# Patient Record
Sex: FEMALE | Race: WHITE | NOT HISPANIC OR LATINO | Employment: OTHER | URBAN - METROPOLITAN AREA
[De-identification: names, ages, dates, MRNs, and addresses within clinical notes are randomized per-mention and may not be internally consistent; named-entity substitution may affect disease eponyms.]

---

## 2019-12-16 ENCOUNTER — OFFICE VISIT (OUTPATIENT)
Dept: TRANSPLANT | Facility: CLINIC | Age: 69
End: 2019-12-16
Attending: INTERNAL MEDICINE
Payer: MEDICARE

## 2019-12-16 ENCOUNTER — MEDICAL CORRESPONDENCE (OUTPATIENT)
Dept: TRANSPLANT | Facility: CLINIC | Age: 69
End: 2019-12-16

## 2019-12-16 VITALS
BODY MASS INDEX: 24.75 KG/M2 | OXYGEN SATURATION: 100 % | HEART RATE: 89 BPM | SYSTOLIC BLOOD PRESSURE: 111 MMHG | DIASTOLIC BLOOD PRESSURE: 72 MMHG | HEIGHT: 64 IN | TEMPERATURE: 98.1 F | WEIGHT: 145 LBS | RESPIRATION RATE: 16 BRPM

## 2019-12-16 DIAGNOSIS — Z71.9 VISIT FOR COUNSELING: Primary | ICD-10-CM

## 2019-12-16 DIAGNOSIS — C90.00 MULTIPLE MYELOMA (H): Primary | ICD-10-CM

## 2019-12-16 DIAGNOSIS — C90.00 MULTIPLE MYELOMA NOT HAVING ACHIEVED REMISSION (H): Primary | ICD-10-CM

## 2019-12-16 PROCEDURE — G0463 HOSPITAL OUTPT CLINIC VISIT: HCPCS

## 2019-12-16 PROCEDURE — 40000268 ZZH STATISTIC NO CHARGES: Mod: ZF

## 2019-12-16 RX ORDER — ATORVASTATIN CALCIUM 40 MG/1
40 TABLET, FILM COATED ORAL DAILY
COMMUNITY
Start: 2019-11-01 | End: 2022-05-30

## 2019-12-16 RX ORDER — SULFAMETHOXAZOLE/TRIMETHOPRIM 800-160 MG
TABLET ORAL
COMMUNITY
Start: 2019-12-06 | End: 2022-01-21

## 2019-12-16 RX ORDER — LORAZEPAM 0.5 MG/1
0.5 TABLET ORAL
COMMUNITY
Start: 2019-07-01 | End: 2022-01-21

## 2019-12-16 RX ORDER — LEVOTHYROXINE SODIUM 112 UG/1
112 TABLET ORAL DAILY
Status: ON HOLD | COMMUNITY
Start: 2018-10-15 | End: 2022-05-30

## 2019-12-16 RX ORDER — LENALIDOMIDE 25 MG/1
CAPSULE ORAL
Status: ON HOLD | COMMUNITY
Start: 2019-12-03 | End: 2022-02-01

## 2019-12-16 RX ORDER — METOPROLOL SUCCINATE 25 MG/1
25 TABLET, EXTENDED RELEASE ORAL DAILY
Status: ON HOLD | COMMUNITY
Start: 2018-11-16 | End: 2022-05-30

## 2019-12-16 RX ORDER — VENLAFAXINE 75 MG/1
75 TABLET ORAL
COMMUNITY
Start: 2019-11-01 | End: 2022-01-21

## 2019-12-16 RX ORDER — CYCLOBENZAPRINE HCL 10 MG
TABLET ORAL
COMMUNITY
Start: 2018-12-04 | End: 2022-01-21

## 2019-12-16 RX ORDER — OXYCODONE HYDROCHLORIDE 10 MG/1
10 TABLET ORAL EVERY 6 HOURS PRN
COMMUNITY
Start: 2019-12-11 | End: 2023-01-01

## 2019-12-16 RX ORDER — ACETAMINOPHEN 500 MG
500-1000 TABLET ORAL EVERY 6 HOURS PRN
Status: ON HOLD | COMMUNITY
Start: 2019-11-21 | End: 2023-01-01

## 2019-12-16 RX ORDER — LIDOCAINE/PRILOCAINE 2.5 %-2.5%
5 CREAM (GRAM) TOPICAL
COMMUNITY
End: 2022-01-21

## 2019-12-16 RX ORDER — ALBUTEROL SULFATE 90 UG/1
2 AEROSOL, METERED RESPIRATORY (INHALATION) EVERY 6 HOURS PRN
COMMUNITY
Start: 2019-04-30

## 2019-12-16 RX ORDER — POLYETHYLENE GLYCOL 3350 17 G/17G
17 POWDER, FOR SOLUTION ORAL DAILY PRN
Status: ON HOLD | COMMUNITY
Start: 2019-08-24 | End: 2022-02-03

## 2019-12-16 RX ORDER — ASPIRIN 81 MG/1
81 TABLET, CHEWABLE ORAL DAILY
COMMUNITY
Start: 2019-08-24 | End: 2022-02-09

## 2019-12-16 RX ORDER — IPRATROPIUM BROMIDE AND ALBUTEROL SULFATE 2.5; .5 MG/3ML; MG/3ML
3 SOLUTION RESPIRATORY (INHALATION)
COMMUNITY
Start: 2019-04-12 | End: 2022-01-21

## 2019-12-16 RX ORDER — BUTALBITAL, ACETAMINOPHEN AND CAFFEINE 300; 40; 50 MG/1; MG/1; MG/1
1 CAPSULE ORAL
COMMUNITY
Start: 2019-11-26 | End: 2022-01-21

## 2019-12-16 RX ORDER — FENTANYL 25 UG/1
1 PATCH TRANSDERMAL
COMMUNITY
End: 2022-01-21

## 2019-12-16 RX ORDER — ONDANSETRON 8 MG/1
TABLET, FILM COATED ORAL
COMMUNITY
Start: 2019-11-18 | End: 2022-01-21

## 2019-12-16 RX ORDER — DEXAMETHASONE 4 MG/1
TABLET ORAL
COMMUNITY
Start: 2019-12-06 | End: 2022-01-21

## 2019-12-16 ASSESSMENT — MIFFLIN-ST. JEOR: SCORE: 1177.72

## 2019-12-16 NOTE — NURSING NOTE
"Oncology Rooming Note    December 16, 2019 2:53 PM   Libby Grimaldo is a 69 year old female who presents for:    Chief Complaint   Patient presents with     RECHECK     Pt is here for a New Eval Consult for      Initial Vitals: Blood Pressure 111/72   Pulse 89   Temperature 98.1  F (36.7  C) (Oral)   Respiration 16   Height 1.626 m (5' 4\")   Weight 65.8 kg (145 lb)   Oxygen Saturation 100%   Body Mass Index 24.89 kg/m   Estimated body mass index is 24.89 kg/m  as calculated from the following:    Height as of this encounter: 1.626 m (5' 4\").    Weight as of this encounter: 65.8 kg (145 lb). Body surface area is 1.72 meters squared.  Data Unavailable Comment: Data Unavailable   No LMP recorded.  Allergies reviewed: Yes  Medications reviewed: Yes    Medications: Medication refills not needed today.  Pharmacy name entered into EPIC: Data Unavailable    Clinical concerns: PT is here for a Consult        Jeanine Weaver MA            "

## 2019-12-16 NOTE — PROGRESS NOTES
"Met with Libby,  and sister following new transplant visit with Dr. So. Reviewed plan of care per NT conversation for Stem Cell Transplant. Explained role of the Nurse Coordinator throughout the BMT process as well as general time line and expectations for transplant. Discussed necessity of caregiver and program's proximity requirements. All questions were answered. Plan:  AutologousTransplant following clearance by dermatology for lesion on shoulder w/ abnormal borders and neurology consult for h/o seizures.    Of note pt seemed to have mild speech ataxia and a poor historian, incorrect when asked her age; pt stated \"66\",  and sister corrected \"no you are 69\".     Contact information provided for Jamaal Guerrero: Yes    HLA typing drawn: No    PRA typing drawn: No    Contact information provided for : No    Financial Release for [facility] search obtained: No      "

## 2019-12-16 NOTE — PROGRESS NOTES
BMT Consult Note   12/16/2019    Patient ID:  Libby Grimaldo is a 69 year old female with high risk, ISS stage III, IgG lambda MM.  She is currently in a *VGRP s/p 7 cycles of KRD, with the most recent ELP showing a detectable M-protein of 0.09 (5/16/2019); down from 4.51 g/dL on 11/26/2018.  IgG was 516, with immunofixation on serum showing the previously identified IgG Lambda specificity monoclonal protein with no free light chains detected.  A bone marrow biopsy from 11/4/2019 shows a cellularity of 20% and no evidence of myeloma by morphology or flow cytometry.  No circulating plasma cells were observed on the peripheral smear.  Recent myeloma labs from 11/8/2019 show a detectable M-protein of 0.2 g/dL.  Thus, curret response is consistent with a VGPR.    A bone marrow biopsy at time of diagnosis in 11/2018 showed 75% clonal plasma cells and was positive for gain of CKS1B (1q21.3), loss of both X66D297 (13q14.3) and 13q34 suggesting monosomy 13, and additional copies of D5S23/F7P110 (5p15.2), CEP9 (centromere 9), and CEP15 (centromere 15) suggesting polysomy.  The gain of 1q21 is associate with higher risk disease (An G. Haematologica. 2014).  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  IgG was 6370 mg/dL, with complete IgG lambda specificity.      Bone pain is controlled with Fentanyl and oxycodone, and is managed locally.    KRD was complicated by mild neuropathy, hypogammaglobulinemia with recurrent infections, and cholecystitis with post-op incisional cellulitis.    SHx:  Former smoker.      FMHx:   Sister breast ca    PMHx:    1.  High risk MM  2.  HTN  3.  Neuropathy  4.  Hypogammaglobulinemia with recurrent infections  5.  Cholecystitis with post-op incisional cellulitis  6.  History of unexplained seizure disorder  7.  Grave's disease  8.  Seizures (last while in undergrad, and occurred after a fever; currently off prophylaxis).  9.  Syncope    Review of Systems:  "10 point ROS negative except as noted above.  # Pain Assessment:   Managed locally.    Scheduled Medications  Current Outpatient Medications   Medication     acetaminophen (TYLENOL) 500 MG tablet     albuterol (PROAIR HFA/PROVENTIL HFA/VENTOLIN HFA) 108 (90 Base) MCG/ACT inhaler     aspirin (ASA) 81 MG chewable tablet     atorvastatin (LIPITOR) 40 MG tablet     Butalbital-APAP-Caffeine -40 MG CAPS     cholecalciferol (VITAMIN D-1000 MAX ST) 25 MCG (1000 UT) TABS     cyclobenzaprine (FLEXERIL) 10 MG tablet     ipratropium - albuterol 0.5 mg/2.5 mg/3 mL (DUONEB) 0.5-2.5 (3) MG/3ML neb solution     levothyroxine (SYNTHROID/LEVOTHROID) 112 MCG tablet     LORazepam (ATIVAN) 0.5 MG tablet     metoprolol succinate ER (TOPROL-XL) 25 MG 24 hr tablet     polyethylene glycol (MIRALAX/GLYCOLAX) powder     venlafaxine (EFFEXOR) 75 MG tablet     dexamethasone (DECADRON) 4 MG tablet     fentaNYL (DURAGESIC) 25 mcg/hr 72 hr patch     Fluticasone-Umeclidin-Vilanterol (TRELEGY ELLIPTA) 100-62.5-25 MCG/INH oral inhaler     lidocaine-prilocaine (EMLA) 2.5-2.5 % external cream     ondansetron (ZOFRAN) 8 MG tablet     oxyCODONE IR (ROXICODONE) 10 MG tablet     REVLIMID 25 MG CAPS capsule     sulfamethoxazole-trimethoprim (BACTRIM DS/SEPTRA DS) 800-160 MG tablet     No current facility-administered medications for this visit.      PHYSICAL EXAM     Weight In/Out     Wt Readings from Last 3 Encounters:   No data found for Wt      [unfilled]       KPS:  80    /72   Pulse 89   Temp 98.1  F (36.7  C) (Oral)   Resp 16   Ht 1.626 m (5' 4\")   Wt 65.8 kg (145 lb)   SpO2 100%   BMI 24.89 kg/m      General: NAD   Eyes: : GREGG, sclera anicteric   Nose/Mouth/Throat: OP clear, buccal mucosa moist, no ulcerations   Lungs: CTA bilaterally  Cardiovascular: RRR, no M/R/G   Abdominal/Rectal: +BS, soft, NT, ND, No HSM   Lymphatics: no edema  Skin: Macula with uneven borders of the right outer shoulder.  Neuro: A&O     LABS AND " IMAGING - PAST 24 HOURS   Reviewed in full.    ASSESSMENT AND PLAN BY SYSTEMS   Libby Grimaldo is a 69 year old female with high risk, ISS stage III, IgG lambda MM.  She was diagnosed over a year ago and has received 7 cycles of KRD, with a VGPR.  Plan on mobilization and collection with Cytoxan, given the detectable M-protein in 11/2019 (0.2 g/dL).  This can be arranged now, given the current response and prior chemotherapy.  ASCT conditioning will consist of melphalan.  The risks and benefits or ASCT were discussed and all questions were answered in full.  We anticipate that day +100 maintenance therapy will consist of Velcade BIW x 2 years.    1) During work up we request a consult from neurology given the unexplained history of seizures.  2) Additionally, we will need a Derm consult to work up the right shoulder nevus.    Rashad So

## 2019-12-17 NOTE — PROGRESS NOTES
"BMT Clinical Social Work New Transplant Evaluation    Information for this evaluation on 19 was collected via Libby (pt) and her family's report, consultation with medical team, and medical chart review.     Present:  Patient: Libby Grimaldo  Spouse: Tres \"Jerrica\" Acoba   Pt's sister: Leisa  Clinical  (CSW):Christine Morgan MSW, [facility]    Medical Team:   Nurse Coordinator: Yesenia New RN  BMT Physician: Rashad So MD  Referring Physician: Kennedi Nichols MD    With whom does pt live: Pt lives with her .     Relocation Requirement: Pt lives in [place], thus will not be required to relocate.     Diagnosis: Multiple Myeloma (MM)    Transplant Type: Autologous    Special Needs:  No needs identified at this time.     Family Constellation:   Spouse: Tres \"Jerrica\" Acoba  Children: 2 adult sons: Luis Eduardo (lives in [facility]) and Pravin (lives in [place]).   Parents:   Siblings: Sister (Leisa) lives in [place] and was present for today's appt.     Employment  Currently employed: No  Previous Occupation: Pt worked as a RN at [employer]. She went on disability in .     Source of Income: Social Security [facility] and a small RN's pension     Spouse/Partner Employed: Retired  Previous Occupation: Worked as an  for 38 years    Finances/Insurance:  Pt shared that her biggest concern is finances. CSW provided pt with [employer] application and S grants. Discussed grants available to transplant patients and let Libby know that I will help her apply for those when she returns for transplant.     Pt stated that she has Medicare but no supplemental insurance. She said that she believes she signed up for [employer] but has not yet received a confirmation. Pt said that she cannot undergo transplant until her supplemental [employer] coverage starts in 2020. CSW provided information regarding the insurance authorization process and the role of the BMT financial case-mangers. CSW provided contact info for " the BMT financial case-managers and pt agreed to have CSW reach out to [facility] ([place]) to see if she has any info on pt's [employer] coverage.     Caregiver:  CSW discussed the caregiver role and expectation at length. Libby is agreeable to having a full time caregiver for a minimum of 30 days until cleared by the BMT physician. Libby's identified caregivers are her  and sister. Caregiver education and information provided. No caregiver concerns identified.     Healthcare Directive:  No. CSW provided education and forms. CSW encouraged Libby to have discussions with their family regarding their health care wishes.     Resources Provided:  BMT Information Book   BMT Resources Packet:   Healthcare Directive:   Tour of Unit NO   Transplant unit description and information provided.     Identified Concerns:   Pt does not have supplemental insurance with her Medicare at this.     Summary:   Libby presents to [facility] for consultation regarding an autologous stem cell transplant. Libby, her  and her sister. She asked good questions regarding psychosocial factors related to BMT; all of which were answered. She lives locally and there are no caregiver concerns. She will need supplemental insurance prior to coming for transplant. CSW emailed [facility] ([place]) to follow up on this. Libby presented as overwhelmed and pleasant. Her affect was anxious. She family presented as very supportive and engaged.      Plan:   CSW provided contact information and encouraged Libby to contact CSW with questions, concerns, resources and for support. CSW will continue to follow Pt to provide supportive counseling and assistance with resources as needed.      ANDREW Leger, [facility]  [phone]

## 2021-02-17 ENCOUNTER — MEDICAL CORRESPONDENCE (OUTPATIENT)
Dept: TRANSPLANT | Facility: CLINIC | Age: 71
End: 2021-02-17
Payer: MEDICARE

## 2021-02-25 ENCOUNTER — MEDICAL CORRESPONDENCE (OUTPATIENT)
Dept: TRANSPLANT | Facility: CLINIC | Age: 71
End: 2021-02-25
Payer: MEDICARE

## 2021-03-03 ENCOUNTER — TELEPHONE (OUTPATIENT)
Dept: TRANSPLANT | Facility: CLINIC | Age: 71
End: 2021-03-03

## 2021-03-03 DIAGNOSIS — C90.00 MULTIPLE MYELOMA (H): Primary | ICD-10-CM

## 2021-04-06 PROBLEM — C90.01 MULTIPLE MYELOMA IN REMISSION (H): Status: ACTIVE | Noted: 2021-04-06

## 2021-04-06 NOTE — PROGRESS NOTES
BMT Consultation    Oncology History   Multiple myeloma not having achieved remission (H)   11/7/2018 -  Cancer Staged    Staging form: Plasma Cell Myeloma and Disorders, AJCC 8th Edition  - Clinical stage from 11/7/2018: Albumin (g/dL): 3.4, ISS: Stage II, High-risk cytogenetics: Absent, LDH: Unknown     11/7/2018 Initial Diagnosis    Multiple myeloma not having achieved remission (H)     1/23/2019 - 11/14/2019 Chemotherapy    KRD x 6 cycles     11/4/2019 - 2/14/2021 Chemotherapy    Rev/Dex(20)      4/3/2020 - 7/17/2020 Chemotherapy    Velcade maintenance - dose reductions for orthostatic hypotension     7/17/2020 - 12/15/2020 Chemotherapy    Revlimid maintenance     11/23/2020 Progression    Bone marrow 35% plasma cells, PET CT demonstrating new lytic lesions     12/15/2020 -  Chemotherapy    [place]         Libby Grimaldo is a 70 year old female referred by Dr. Nichols for relapsed myeloma.    HPI:  Please see my entry above for hematologic history.  Libby comes to clinic today to discuss options for the treatment of her relapsed multiple myeloma diagnosed originally in 2018.  She has had difficulty with the therapy with recurrent infections, difficulty tolerating various medications in particular dexamethasone, Velcade, and possibly carfilzomib.  She had reasonable quality of life while receiving Revlimid maintenance which continued until approximately December 2020.  Unfortunately she developed back pain and was found to have a new T10 in December 2020 and then further evaluation revealed overt relapse.  With the new development, her treatment has changed to daratumumab, carfilzomib, dexamethasone.  From her perspective she feels she is tolerating this well and she is aware of the fact that based on serum markers, she has responded well.  She has received 3 cycles of therapy although it is unclear how well she is tolerating the therapy.  Today in clinic, she has difficulty remembering many aspects of her  treatment.  She is unsure of the date of original diagnosis, unsure of how long she has received treatment, and unsure when her treatment was changed to [place].  She states that her primary symptom is extreme fatigue and malaise.  In having her describe her days, she leaves her house 3 to 4 days/week and is otherwise remaining inside largely watching TV or doing minimal activity at home.  She does not drive, has some unsteadiness with walking and so avoids any long walk while being unaccompanied, and she has little interest in activities.  Her most notable symptoms are fatigue, back pain, and unsteadiness on her feet.  She does require regular assistance from her  and does not cook, shop, or leave the house independently.  She feels her pain is under reasonable control and she is currently taking only nonnarcotic pain medications.  She did recently have an admission to the hospital which she feels is precipitated by the ingestion of too much edible marijuana.  She was offered this as a way to help manage her back pain and she was unsure of the particular dosing.  Her sensorium cleared and she was discharged without sequelae.  Her entire course has been difficult due to recurrent bacterial infections, toxicity from treatment, and ongoing pain.      ASSESSMENT AND PLAN:  Multiple myeloma in relapse-currently, Ms. Santos is a relatively impaired performance status and appears frail.  In addition I have concerns about the neuropsychiatric conditions due to her difficulty remembering various events in the past and communicating her experiences.  She also is limited in her activity and does require help.  Fortunately, she appears to be responding well to therapy and she does feel that her back pain is currently under good control.  Her sister came with her today to help discuss treatment options, which I think is helpful given her apparent memory deficits.  I discussed with her 3 basic options.  The first option  would include moving ahead to collect peripheral stem cells and proceed to high-dose melphalan with stem cell reinfusion.  I explained that the purpose of this therapy is to achieve deeper remissions with longer time to progression that is typically associated with improved quality of life.  I reviewed with her the toxicities of high-dose melphalan which would include significant GI toxicity, pancytopenia, and in her situation the likelihood of a 2 to 3-week hospitalization.  Given her apparent frailty, at this point if we were to proceed along this route, she is likely to be hospitalized during the course.  I discussed the potential need for prolonged convalescence.  The second option we discussed was a clinical trial of which we have potentially 3 different cell therapy trial she may be eligible to join.  She is most likely eligible for the IPS derived NK trial (), which has generally been well-tolerated and with what appears to be low risk of CRS and other infusional reactions.  I spent some time reviewing the risk and benefits of this protocol of which she expressed interest.  She is potentially eligible for other clinical clinical trials as well.  A third potential option would be treatment with Ebonie-li, which will soon become available.  This particular product appears to have significant activity with high rates of complete remission however carries significant risk for cytokine release syndrome and associated complications in particular neurotoxicity.  Of course she is responding well to her current therapy and a final option would be to continue her current path for another 6 months followed by some type of ongoing maintenance without further cell based therapy.    Given her frailty, I recommended that she reach out for physical therapy assessment and guidance to develop strengthening program.  Regardless of the options open to her, she would benefit from improved strength and stability on her feet.   Both her and her sister agree that this should be an extent.    At the end of her visit, I explained that we will review her clinical scenario in our multidisciplinary meeting and I will follow up with her regarding specific recommendations for next steps in therapy.  In the meantime she will continue treatment as planned with Dr. Nichols.          ROS:    10 point ROS neg other than the symptoms noted above in the HPI.        Past Medical History:   Diagnosis Date     Graves disease      Multiple myeloma in relapse (H)      Vitamin B12 deficiency (non anemic)        Past Surgical History:   Procedure Laterality Date     CHOLECYSTECTOMY         Family History   Problem Relation Age of Onset     Heart Disease Mother      Diabetes Father        Social History     Tobacco Use     Smoking status: Current Every Day Smoker     Packs/day: 1.00     Smokeless tobacco: Former User     Quit date: 2010   Substance Use Topics     Alcohol use: None     Drug use: None         Allergies   Allergen Reactions     Bupropion      Other reaction(s): Seizures        Current Outpatient Medications   Medication Sig Dispense Refill     acetaminophen (TYLENOL) 500 MG tablet Take 1,000 mg by mouth       albuterol (PROAIR HFA/PROVENTIL HFA/VENTOLIN HFA) 108 (90 Base) MCG/ACT inhaler Inhale 2 puffs into the lungs       aspirin (ASA) 81 MG chewable tablet Take 81 mg by mouth       atorvastatin (LIPITOR) 40 MG tablet Take 40 mg by mouth       Butalbital-APAP-Caffeine -40 MG CAPS Take 1 capsule by mouth       cholecalciferol (VITAMIN D-1000 MAX ST) 25 MCG (1000 UT) TABS Take 1,000 Units by mouth       cyclobenzaprine (FLEXERIL) 10 MG tablet        dexamethasone (DECADRON) 4 MG tablet        fentaNYL (DURAGESIC) 25 mcg/hr 72 hr patch Place 1 patch onto the skin       Fluticasone-Umeclidin-Vilanterol (TRELEGY ELLIPTA) 100-62.5-25 MCG/INH oral inhaler Inhale 1 puff into the lungs       ipratropium - albuterol 0.5 mg/2.5 mg/3 mL (DUONEB) 0.5-2.5  "(3) MG/3ML neb solution Inhale 3 mLs into the lungs       levothyroxine (SYNTHROID/LEVOTHROID) 112 MCG tablet Take 112 mcg by mouth       lidocaine-prilocaine (EMLA) 2.5-2.5 % external cream Apply 5 g topically       LORazepam (ATIVAN) 0.5 MG tablet Take 0.5 mg by mouth       metoprolol succinate ER (TOPROL-XL) 25 MG 24 hr tablet Take 25 mg by mouth       ondansetron (ZOFRAN) 8 MG tablet        oxyCODONE IR (ROXICODONE) 10 MG tablet        polyethylene glycol (MIRALAX/GLYCOLAX) powder 17 g by Nasogastric route       REVLIMID 25 MG CAPS capsule        sulfamethoxazole-trimethoprim (BACTRIM DS/SEPTRA DS) 800-160 MG tablet        venlafaxine (EFFEXOR) 75 MG tablet Take 75 mg by mouth           Physical Exam:     Vital Signs: BP (!) 157/89   Pulse 94   Temp 99.2  F (37.3  C) (Oral)   Resp 20   Ht 1.62 m (5' 3.78\")   Wt 67.5 kg (148 lb 12.8 oz)   SpO2 100%   BMI 25.72 kg/m          KPS:  70    General Appearance: alert, no distress and fatigued  Eyes: PERRL, conjunctiva and lids normal, sclera nonicteric  Ears/Nose/M/Throat: Oral mucosa and posterior oropharynx normal, moist mucous membranes, edentulous  with denture plate in place above  Neck supple, non-tender, free range of motion, no adenopathy  Cardio/Vascular:regular rate and rhythm, normal S1 and S2, no murmur  Resp Effort And Auscultation: Normal - Clear to auscultation without rales, rhonchi, or wheezing.  GI: soft, nontender, bowel sounds present in all four quadrants, no hepatosplenomegaly  Lymphatics:no significant enlargement of lymph nodes globally   Musculoskeletal: Musculoskeletal normal although with slow movement with standing  Edema: none  Skin: Skin color, texture, turgor normal. No rashes or lesions.  Neurologic: negative although she has difficulty reviewing her history and requires assistance to remember past events.  She is oriented x 3 and has no specific weakness.her speech is slow and deliberate  Psych/Affect: Mood and affect are " appropriate although with mildly depressed appearance  Vascular Access:  Port in place          TODAY'S ASSESSMENT BY SYSTEMS     HEMATOLOGY, COAGULATION    She appears to be responding well to her current therapy but will need to be restaged prior to next steps.    IMMUNOCOMPROMISED HOST  She has history of multiple bacterial infections    PULMONARY  She is a former smoker but does not show evidence of pulmonary insufficiency    LIVER  Adequate    GASTROINTESTINAL  Adequate    RENAL  Adequate    CARDIOVASCULAR  No evidence of cardiovascular disease    ENDOCRINE  She history of graves disease and repeat thyroid studies would be important prior to cell therapy    MUSCULOSKELETAL  She is weak and standing from a chair and has some unsteadiness when walking.    NEUROLOGIC  She shows signs of possible neuropsychiatric disorder and further evaluation may be helpful    PSYCHIATRIC  She has a depressed appearance although states that this is not a problem for her    SOCIAL  She has good support from her  and sister both of whom are healthy well and willing to provide a necessary supportive environment    Libby understood the above assessment and recommendations.  Multiple questions answered.  No barriers to learning identified although her ability to retain information may be compromised.      Known issues that I take into account for medical decisions, with salient changes to the plan considering these complexities noted above.    Patient Active Problem List   Diagnosis     Multiple myeloma not having achieved remission (H)       Today's summary: As mentioned above, we will review her clinical situation and conference and then discuss recommendations for next steps in therapy    I spent 60 minutes in the care of this patient today, which included time necessary for preparation for the visit, obtaining history, ordering medications/tests/procedures as medically indicated, review of pertinent medical literature,  counseling of the patient, communication of recommendations to the care team, and documentation time.    SOTO BOWSER      ------------------------------------------------------------------------------------------------------------------------------------------------    Patient Care Team       Relationship Specialty Notifications Start End    Leah Gustafson PCP - General Family Practice  11/19/19     Phone: [phone] Fax: [phone]         [facility] [address] [facility] [address]    Kennedi Nichols DO Referring Physician Hematology & Oncology  11/19/19     Phone: [phone] Fax: [phone]         [facility] [address] [facility] [address] [facility] [address]    Rashad So MD Assigned Cancer Care Provider   10/23/20     Phone: [phone] Fax: [phone]         [address] [place] [address] [address] [facility] [address]          MDM  Number of Diagnoses or Management Options     Amount and/or Complexity of Data Reviewed  Clinical lab tests: reviewed  Tests in the radiology section of CPT : reviewed  Tests in the medicine section of CPT : reviewed  Discussion of test results with the performing providers: no  Decide to obtain previous medical records or to obtain history from someone other than the patient: yes  Obtain history from someone other than the patient: yes  Review and summarize past medical records: yes  Discuss the patient with other providers: yes  Independent visualization of images, tracings, or specimens: yes    Risk of Complications, Morbidity, and/or Mortality  Presenting problems: high  Diagnostic procedures: high  Management options: high    Critical Care  Total time providing critical care: 30-74 minutes    Patient Progress  Patient progress: stable

## 2021-04-07 ENCOUNTER — OFFICE VISIT (OUTPATIENT)
Dept: TRANSPLANT | Facility: CLINIC | Age: 71
End: 2021-04-07
Attending: INTERNAL MEDICINE
Payer: MEDICARE

## 2021-04-07 ENCOUNTER — MEDICAL CORRESPONDENCE (OUTPATIENT)
Dept: TRANSPLANT | Facility: CLINIC | Age: 71
End: 2021-04-07

## 2021-04-07 VITALS
TEMPERATURE: 99.2 F | DIASTOLIC BLOOD PRESSURE: 89 MMHG | HEART RATE: 94 BPM | BODY MASS INDEX: 25.4 KG/M2 | OXYGEN SATURATION: 100 % | SYSTOLIC BLOOD PRESSURE: 157 MMHG | RESPIRATION RATE: 20 BRPM | WEIGHT: 148.8 LBS | HEIGHT: 64 IN

## 2021-04-07 DIAGNOSIS — C90.00 MULTIPLE MYELOMA NOT HAVING ACHIEVED REMISSION (H): ICD-10-CM

## 2021-04-07 DIAGNOSIS — C90.00 MULTIPLE MYELOMA NOT HAVING ACHIEVED REMISSION (H): Primary | ICD-10-CM

## 2021-04-07 PROCEDURE — G0463 HOSPITAL OUTPT CLINIC VISIT: HCPCS

## 2021-04-07 PROCEDURE — 99215 OFFICE O/P EST HI 40 MIN: CPT

## 2021-04-07 ASSESSMENT — MIFFLIN-ST. JEOR: SCORE: 1186.46

## 2021-04-07 ASSESSMENT — PAIN SCALES - GENERAL: PAINLEVEL: MODERATE PAIN (5)

## 2021-04-07 NOTE — LETTER
4/7/2021         RE: Libby Grimaldo  [address] [facility] [address]             BMT Consultation    Oncology History   Multiple myeloma not having achieved remission (H)   11/7/2018 -  Cancer Staged    Staging form: Plasma Cell Myeloma and Disorders, AJCC 8th Edition  - Clinical stage from 11/7/2018: Albumin (g/dL): 3.4, ISS: Stage II, High-risk cytogenetics: Absent, LDH: Unknown     11/7/2018 Initial Diagnosis    Multiple myeloma not having achieved remission (H)     1/23/2019 - 11/14/2019 Chemotherapy    KRD x 6 cycles     11/4/2019 - 2/14/2021 Chemotherapy    Rev/Dex(20)      4/3/2020 - 7/17/2020 Chemotherapy    Velcade maintenance - dose reductions for orthostatic hypotension     7/17/2020 - 12/15/2020 Chemotherapy    Revlimid maintenance     11/23/2020 Progression    Bone marrow 35% plasma cells, PET CT demonstrating new lytic lesions     12/15/2020 -  Chemotherapy    Chino Chin         Libby Grimaldo is a 70 year old female referred by Dr. Nichols for relapsed myeloma.    HPI:  Please see my entry above for hematologic history.  Libby comes to clinic today to discuss options for the treatment of her relapsed multiple myeloma diagnosed originally in 2018.  She has had difficulty with the therapy with recurrent infections, difficulty tolerating various medications in particular dexamethasone, Velcade, and possibly carfilzomib.  She had reasonable quality of life while receiving Revlimid maintenance which continued until approximately December 2020.  Unfortunately she developed back pain and was found to have a new T10 in December 2020 and then further evaluation revealed overt relapse.  With the new development, her treatment has changed to daratumumab, carfilzomib, dexamethasone.  From her perspective she feels she is tolerating this well and she is aware of the fact that based on serum markers, she has responded well.  She has received 3 cycles of therapy although it is unclear how well she is tolerating  the therapy.  Today in clinic, she has difficulty remembering many aspects of her treatment.  She is unsure of the date of original diagnosis, unsure of how long she has received treatment, and unsure when her treatment was changed to Chino Chin.  She states that her primary symptom is extreme fatigue and malaise.  In having her describe her days, she leaves her house 3 to 4 days/week and is otherwise remaining inside largely watching TV or doing minimal activity at home.  She does not drive, has some unsteadiness with walking and so avoids any long walk while being unaccompanied, and she has little interest in activities.  Her most notable symptoms are fatigue, back pain, and unsteadiness on her feet.  She does require regular assistance from her  and does not cook, shop, or leave the house independently.  She feels her pain is under reasonable control and she is currently taking only nonnarcotic pain medications.  She did recently have an admission to the hospital which she feels is precipitated by the ingestion of too much edible marijuana.  She was offered this as a way to help manage her back pain and she was unsure of the particular dosing.  Her sensorium cleared and she was discharged without sequelae.  Her entire course has been difficult due to recurrent bacterial infections, toxicity from treatment, and ongoing pain.      ASSESSMENT AND PLAN:  Multiple myeloma in relapse-currently, Ms. Santos is a relatively impaired performance status and appears frail.  In addition I have concerns about the neuropsychiatric conditions due to her difficulty remembering various events in the past and communicating her experiences.  She also is limited in her activity and does require help.  Fortunately, she appears to be responding well to therapy and she does feel that her back pain is currently under good control.  Her sister came with her today to help discuss treatment options, which I think is helpful given her  apparent memory deficits.  I discussed with her 3 basic options.  The first option would include moving ahead to collect peripheral stem cells and proceed to high-dose melphalan with stem cell reinfusion.  I explained that the purpose of this therapy is to achieve deeper remissions with longer time to progression that is typically associated with improved quality of life.  I reviewed with her the toxicities of high-dose melphalan which would include significant GI toxicity, pancytopenia, and in her situation the likelihood of a 2 to 3-week hospitalization.  Given her apparent frailty, at this point if we were to proceed along this route, she is likely to be hospitalized during the course.  I discussed the potential need for prolonged convalescence.  The second option we discussed was a clinical trial of which we have potentially 3 different cell therapy trial she may be eligible to join.  She is most likely eligible for the IPS derived NK trial (), which has generally been well-tolerated and with what appears to be low risk of CRS and other infusional reactions.  I spent some time reviewing the risk and benefits of this protocol of which she expressed interest.  She is potentially eligible for other clinical clinical trials as well.  A third potential option would be treatment with Ebonie-il, which will soon become available.  This particular product appears to have significant activity with high rates of complete remission however carries significant risk for cytokine release syndrome and associated complications in particular neurotoxicity.  Of course she is responding well to her current therapy and a final option would be to continue her current path for another 6 months followed by some type of ongoing maintenance without further cell based therapy.    Given her frailty, I recommended that she reach out for physical therapy assessment and guidance to develop strengthening program.  Regardless of the options  open to her, she would benefit from improved strength and stability on her feet.  Both her and her sister agree that this should be an extent.    At the end of her visit, I explained that we will review her clinical scenario in our multidisciplinary meeting and I will follow up with her regarding specific recommendations for next steps in therapy.  In the meantime she will continue treatment as planned with Dr. Nichols.          ROS:    10 point ROS neg other than the symptoms noted above in the HPI.        Past Medical History:   Diagnosis Date     Graves disease      Multiple myeloma in relapse (H)      Vitamin B12 deficiency (non anemic)        Past Surgical History:   Procedure Laterality Date     CHOLECYSTECTOMY         Family History   Problem Relation Age of Onset     Heart Disease Mother      Diabetes Father        Social History     Tobacco Use     Smoking status: Current Every Day Smoker     Packs/day: 1.00     Smokeless tobacco: Former User     Quit date: 2010   Substance Use Topics     Alcohol use: None     Drug use: None         Allergies   Allergen Reactions     Bupropion      Other reaction(s): Seizures        Current Outpatient Medications   Medication Sig Dispense Refill     acetaminophen (TYLENOL) 500 MG tablet Take 1,000 mg by mouth       albuterol (PROAIR HFA/PROVENTIL HFA/VENTOLIN HFA) 108 (90 Base) MCG/ACT inhaler Inhale 2 puffs into the lungs       aspirin (ASA) 81 MG chewable tablet Take 81 mg by mouth       atorvastatin (LIPITOR) 40 MG tablet Take 40 mg by mouth       Butalbital-APAP-Caffeine -40 MG CAPS Take 1 capsule by mouth       cholecalciferol (VITAMIN D-1000 MAX ST) 25 MCG (1000 UT) TABS Take 1,000 Units by mouth       cyclobenzaprine (FLEXERIL) 10 MG tablet        dexamethasone (DECADRON) 4 MG tablet        fentaNYL (DURAGESIC) 25 mcg/hr 72 hr patch Place 1 patch onto the skin       Fluticasone-Umeclidin-Vilanterol (TRELEGY ELLIPTA) 100-62.5-25 MCG/INH oral inhaler Inhale 1 puff  "into the lungs       ipratropium - albuterol 0.5 mg/2.5 mg/3 mL (DUONEB) 0.5-2.5 (3) MG/3ML neb solution Inhale 3 mLs into the lungs       levothyroxine (SYNTHROID/LEVOTHROID) 112 MCG tablet Take 112 mcg by mouth       lidocaine-prilocaine (EMLA) 2.5-2.5 % external cream Apply 5 g topically       LORazepam (ATIVAN) 0.5 MG tablet Take 0.5 mg by mouth       metoprolol succinate ER (TOPROL-XL) 25 MG 24 hr tablet Take 25 mg by mouth       ondansetron (ZOFRAN) 8 MG tablet        oxyCODONE IR (ROXICODONE) 10 MG tablet        polyethylene glycol (MIRALAX/GLYCOLAX) powder 17 g by Nasogastric route       REVLIMID 25 MG CAPS capsule        sulfamethoxazole-trimethoprim (BACTRIM DS/SEPTRA DS) 800-160 MG tablet        venlafaxine (EFFEXOR) 75 MG tablet Take 75 mg by mouth           Physical Exam:     Vital Signs: BP (!) 157/89   Pulse 94   Temp 99.2  F (37.3  C) (Oral)   Resp 20   Ht 1.62 m (5' 3.78\")   Wt 67.5 kg (148 lb 12.8 oz)   SpO2 100%   BMI 25.72 kg/m          KPS:  70    General Appearance: alert, no distress and fatigued  Eyes: PERRL, conjunctiva and lids normal, sclera nonicteric  Ears/Nose/M/Throat: Oral mucosa and posterior oropharynx normal, moist mucous membranes, edentulous  with denture plate in place above  Neck supple, non-tender, free range of motion, no adenopathy  Cardio/Vascular:regular rate and rhythm, normal S1 and S2, no murmur  Resp Effort And Auscultation: Normal - Clear to auscultation without rales, rhonchi, or wheezing.  GI: soft, nontender, bowel sounds present in all four quadrants, no hepatosplenomegaly  Lymphatics:no significant enlargement of lymph nodes globally   Musculoskeletal: Musculoskeletal normal although with slow movement with standing  Edema: none  Skin: Skin color, texture, turgor normal. No rashes or lesions.  Neurologic: negative although she has difficulty reviewing her history and requires assistance to remember past events.  She is oriented x 3 and has no specific " weakness.her speech is slow and deliberate  Psych/Affect: Mood and affect are appropriate although with mildly depressed appearance  Vascular Access:  Port in place          TODAY'S ASSESSMENT BY SYSTEMS     HEMATOLOGY, COAGULATION    She appears to be responding well to her current therapy but will need to be restaged prior to next steps.    IMMUNOCOMPROMISED HOST  She has history of multiple bacterial infections    PULMONARY  She is a former smoker but does not show evidence of pulmonary insufficiency    LIVER  Adequate    GASTROINTESTINAL  Adequate    RENAL  Adequate    CARDIOVASCULAR  No evidence of cardiovascular disease    ENDOCRINE  She history of graves disease and repeat thyroid studies would be important prior to cell therapy    MUSCULOSKELETAL  She is weak and standing from a chair and has some unsteadiness when walking.    NEUROLOGIC  She shows signs of possible neuropsychiatric disorder and further evaluation may be helpful    PSYCHIATRIC  She has a depressed appearance although states that this is not a problem for her    SOCIAL  She has good support from her  and sister both of whom are healthy well and willing to provide a necessary supportive environment    Libby understood the above assessment and recommendations.  Multiple questions answered.  No barriers to learning identified although her ability to retain information may be compromised.      Known issues that I take into account for medical decisions, with salient changes to the plan considering these complexities noted above.    Patient Active Problem List   Diagnosis     Multiple myeloma not having achieved remission (H)       Today's summary: As mentioned above, we will review her clinical situation and conference and then discuss recommendations for next steps in therapy    I spent 60 minutes in the care of this patient today, which included time necessary for preparation for the visit, obtaining history, ordering  medications/tests/procedures as medically indicated, review of pertinent medical literature, counseling of the patient, communication of recommendations to the care team, and documentation time.    SOTO BOWSER      ------------------------------------------------------------------------------------------------------------------------------------------------    Patient Care Team       Relationship Specialty Notifications Start End    Sj Leah Corbin PCP - General Family Practice  11/19/19     Phone: [phone] Fax: [phone]         [facility] [address] [facility] [address]    Kennedi Nichols DO Referring Physician Hematology & Oncology  11/19/19     Phone: [phone] Fax: [phone]         [facility] [address] [facility] [address] [facility] [address]    Rashad So MD Assigned Cancer Care Provider   10/23/20     Phone: [phone] Fax: [phone]         [address] [facility] [address] [facility] [address]          MDM  Number of Diagnoses or Management Options     Amount and/or Complexity of Data Reviewed  Clinical lab tests: reviewed  Tests in the radiology section of CPT : reviewed  Tests in the medicine section of CPT : reviewed  Discussion of test results with the performing providers: no  Decide to obtain previous medical records or to obtain history from someone other than the patient: yes  Obtain history from someone other than the patient: yes  Review and summarize past medical records: yes  Discuss the patient with other providers: yes  Independent visualization of images, tracings, or specimens: yes    Risk of Complications, Morbidity, and/or Mortality  Presenting problems: high  Diagnostic procedures: high  Management options: high    Critical Care  Total time providing critical care: 30-74 minutes    Patient Progress  Patient progress: stable

## 2021-04-07 NOTE — LETTER
4/7/2021         RE: Libby Grimaldo  [address] [address]        Dear Colleague,    Thank you for referring your patient, Libby Grimaldo, to the [facility] BLOOD AND MARROW TRANSPLANT PROGRAM [place]. Please see a copy of my visit note below.           BMT Consultation    Oncology History   Multiple myeloma not having achieved remission (H)   11/7/2018 -  Cancer Staged    Staging form: Plasma Cell Myeloma and Disorders, AJCC 8th Edition  - Clinical stage from 11/7/2018: Albumin (g/dL): 3.4, ISS: Stage II, High-risk cytogenetics: Absent, LDH: Unknown     11/7/2018 Initial Diagnosis    Multiple myeloma not having achieved remission (H)     1/23/2019 - 11/14/2019 Chemotherapy    KRD x 6 cycles     11/4/2019 - 2/14/2021 Chemotherapy    Rev/Dex(20)      4/3/2020 - 7/17/2020 Chemotherapy    Velcade maintenance - dose reductions for orthostatic hypotension     7/17/2020 - 12/15/2020 Chemotherapy    Revlimid maintenance     11/23/2020 Progression    Bone marrow 35% plasma cells, PET CT demonstrating new lytic lesions     12/15/2020 -  Chemotherapy    Chino Chin         Libby Grimaldo is a 70 year old female referred by Dr. Nichols for relapsed myeloma.    HPI:  Please see my entry above for hematologic history.  Libby comes to clinic today to discuss options for the treatment of her relapsed multiple myeloma diagnosed originally in 2018.  She has had difficulty with the therapy with recurrent infections, difficulty tolerating various medications in particular dexamethasone, Velcade, and possibly carfilzomib.  She had reasonable quality of life while receiving Revlimid maintenance which continued until approximately December 2020.  Unfortunately she developed back pain and was found to have a new T10 in December 2020 and then further evaluation revealed overt relapse.  With the new development, her treatment has changed to daratumumab, carfilzomib, dexamethasone.  From her perspective she feels she  is tolerating this well and she is aware of the fact that based on serum markers, she has responded well.  She has received 3 cycles of therapy although it is unclear how well she is tolerating the therapy.  Today in clinic, she has difficulty remembering many aspects of her treatment.  She is unsure of the date of original diagnosis, unsure of how long she has received treatment, and unsure when her treatment was changed to [place], [place].  She states that her primary symptom is extreme fatigue and malaise.  In having her describe her days, she leaves her house 3 to 4 days/week and is otherwise remaining inside largely watching TV or doing minimal activity at home.  She does not drive, has some unsteadiness with walking and so avoids any long walk while being unaccompanied, and she has little interest in activities.  Her most notable symptoms are fatigue, back pain, and unsteadiness on her feet.  She does require regular assistance from her  and does not cook, shop, or leave the house independently.  She feels her pain is under reasonable control and she is currently taking only nonnarcotic pain medications.  She did recently have an admission to the hospital which she feels is precipitated by the ingestion of too much edible marijuana.  She was offered this as a way to help manage her back pain and she was unsure of the particular dosing.  Her sensorium cleared and she was discharged without sequelae.  Her entire course has been difficult due to recurrent bacterial infections, toxicity from treatment, and ongoing pain.      ASSESSMENT AND PLAN:  Multiple myeloma in relapse-currently, Ms. Santos is a relatively impaired performance status and appears frail.  In addition I have concerns about the neuropsychiatric conditions due to her difficulty remembering various events in the past and communicating her experiences.  She also is limited in her activity and does require help.  Fortunately, she appears to be  responding well to therapy and she does feel that her back pain is currently under good control.  Her sister came with her today to help discuss treatment options, which I think is helpful given her apparent memory deficits.  I discussed with her 3 basic options.  The first option would include moving ahead to collect peripheral stem cells and proceed to high-dose melphalan with stem cell reinfusion.  I explained that the purpose of this therapy is to achieve deeper remissions with longer time to progression that is typically associated with improved quality of life.  I reviewed with her the toxicities of high-dose melphalan which would include significant GI toxicity, pancytopenia, and in her situation the likelihood of a 2 to 3-week hospitalization.  Given her apparent frailty, at this point if we were to proceed along this route, she is likely to be hospitalized during the course.  I discussed the potential need for prolonged convalescence.  The second option we discussed was a clinical trial of which we have potentially 3 different cell therapy trial she may be eligible to join.  She is most likely eligible for the IPS derived NK trial (), which has generally been well-tolerated and with what appears to be low risk of CRS and other infusional reactions.  I spent some time reviewing the risk and benefits of this protocol of which she expressed interest.  She is potentially eligible for other clinical clinical trials as well.  A third potential option would be treatment with Ebonie-li, which will soon become available.  This particular product appears to have significant activity with high rates of complete remission however carries significant risk for cytokine release syndrome and associated complications in particular neurotoxicity.  Of course she is responding well to her current therapy and a final option would be to continue her current path for another 6 months followed by some type of ongoing  maintenance without further cell based therapy.    Given her frailty, I recommended that she reach out for physical therapy assessment and guidance to develop strengthening program.  Regardless of the options open to her, she would benefit from improved strength and stability on her feet.  Both her and her sister agree that this should be an extent.    At the end of her visit, I explained that we will review her clinical scenario in our multidisciplinary meeting and I will follow up with her regarding specific recommendations for next steps in therapy.  In the meantime she will continue treatment as planned with Dr. Nichols.          ROS:    10 point ROS neg other than the symptoms noted above in the HPI.        Past Medical History:   Diagnosis Date     Graves disease      Multiple myeloma in relapse (H)      Vitamin B12 deficiency (non anemic)        Past Surgical History:   Procedure Laterality Date     CHOLECYSTECTOMY         Family History   Problem Relation Age of Onset     Heart Disease Mother      Diabetes Father        Social History     Tobacco Use     Smoking status: Current Every Day Smoker     Packs/day: 1.00     Smokeless tobacco: Former User     Quit date: 2010   Substance Use Topics     Alcohol use: None     Drug use: None         Allergies   Allergen Reactions     Bupropion      Other reaction(s): Seizures        Current Outpatient Medications   Medication Sig Dispense Refill     acetaminophen (TYLENOL) 500 MG tablet Take 1,000 mg by mouth       albuterol (PROAIR HFA/PROVENTIL HFA/VENTOLIN HFA) 108 (90 Base) MCG/ACT inhaler Inhale 2 puffs into the lungs       aspirin (ASA) 81 MG chewable tablet Take 81 mg by mouth       atorvastatin (LIPITOR) 40 MG tablet Take 40 mg by mouth       Butalbital-APAP-Caffeine -40 MG CAPS Take 1 capsule by mouth       cholecalciferol (VITAMIN D-1000 MAX ST) 25 MCG (1000 UT) TABS Take 1,000 Units by mouth       cyclobenzaprine (FLEXERIL) 10 MG tablet         "dexamethasone (DECADRON) 4 MG tablet        fentaNYL (DURAGESIC) 25 mcg/hr 72 hr patch Place 1 patch onto the skin       Fluticasone-Umeclidin-Vilanterol (TRELEGY ELLIPTA) 100-62.5-25 MCG/INH oral inhaler Inhale 1 puff into the lungs       ipratropium - albuterol 0.5 mg/2.5 mg/3 mL (DUONEB) 0.5-2.5 (3) MG/3ML neb solution Inhale 3 mLs into the lungs       levothyroxine (SYNTHROID/LEVOTHROID) 112 MCG tablet Take 112 mcg by mouth       lidocaine-prilocaine (EMLA) 2.5-2.5 % external cream Apply 5 g topically       LORazepam (ATIVAN) 0.5 MG tablet Take 0.5 mg by mouth       metoprolol succinate ER (TOPROL-XL) 25 MG 24 hr tablet Take 25 mg by mouth       ondansetron (ZOFRAN) 8 MG tablet        oxyCODONE IR (ROXICODONE) 10 MG tablet        polyethylene glycol (MIRALAX/GLYCOLAX) powder 17 g by Nasogastric route       REVLIMID 25 MG CAPS capsule        sulfamethoxazole-trimethoprim (BACTRIM DS/SEPTRA DS) 800-160 MG tablet        venlafaxine (EFFEXOR) 75 MG tablet Take 75 mg by mouth           Physical Exam:     Vital Signs: BP (!) 157/89   Pulse 94   Temp 99.2  F (37.3  C) (Oral)   Resp 20   Ht 1.62 m (5' 3.78\")   Wt 67.5 kg (148 lb 12.8 oz)   SpO2 100%   BMI 25.72 kg/m          KPS:  70    General Appearance: alert, no distress and fatigued  Eyes: PERRL, conjunctiva and lids normal, sclera nonicteric  Ears/Nose/M/Throat: Oral mucosa and posterior oropharynx normal, moist mucous membranes, edentulous  with denture plate in place above  Neck supple, non-tender, free range of motion, no adenopathy  Cardio/Vascular:regular rate and rhythm, normal S1 and S2, no murmur  Resp Effort And Auscultation: Normal - Clear to auscultation without rales, rhonchi, or wheezing.  GI: soft, nontender, bowel sounds present in all four quadrants, no hepatosplenomegaly  Lymphatics:no significant enlargement of lymph nodes globally   Musculoskeletal: Musculoskeletal normal although with slow movement with standing  Edema: none  Skin: Skin " color, texture, turgor normal. No rashes or lesions.  Neurologic: negative although she has difficulty reviewing her history and requires assistance to remember past events.  She is oriented x 3 and has no specific weakness.her speech is slow and deliberate  Psych/Affect: Mood and affect are appropriate although with mildly depressed appearance  Vascular Access:  Port in place          TODAY'S ASSESSMENT BY SYSTEMS     HEMATOLOGY, COAGULATION    She appears to be responding well to her current therapy but will need to be restaged prior to next steps.    IMMUNOCOMPROMISED HOST  She has history of multiple bacterial infections    PULMONARY  She is a former smoker but does not show evidence of pulmonary insufficiency    LIVER  Adequate    GASTROINTESTINAL  Adequate    RENAL  Adequate    CARDIOVASCULAR  No evidence of cardiovascular disease    ENDOCRINE  She history of graves disease and repeat thyroid studies would be important prior to cell therapy    MUSCULOSKELETAL  She is weak and standing from a chair and has some unsteadiness when walking.    NEUROLOGIC  She shows signs of possible neuropsychiatric disorder and further evaluation may be helpful    PSYCHIATRIC  She has a depressed appearance although states that this is not a problem for her    SOCIAL  She has good support from her  and sister both of whom are healthy well and willing to provide a necessary supportive environment    Libby understood the above assessment and recommendations.  Multiple questions answered.  No barriers to learning identified although her ability to retain information may be compromised.      Known issues that I take into account for medical decisions, with salient changes to the plan considering these complexities noted above.    Patient Active Problem List   Diagnosis     Multiple myeloma not having achieved remission (H)       Today's summary: As mentioned above, we will review her clinical situation and conference and then  discuss recommendations for next steps in therapy    I spent 60 minutes in the care of this patient today, which included time necessary for preparation for the visit, obtaining history, ordering medications/tests/procedures as medically indicated, review of pertinent medical literature, counseling of the patient, communication of recommendations to the care team, and documentation time.    SOTO BOWSER      ------------------------------------------------------------------------------------------------------------------------------------------------    Patient Care Team       Relationship Specialty Notifications Start End    Leah Gustafson PCP - General Family Practice  11/19/19     Phone: [phone] Fax: [phone]         [facility] [address] [facility] [address]    Kennedi Nichols DO Referring Physician Hematology & Oncology  11/19/19     Phone: [phone] Fax: [phone]         [place] [facility] [address] [facility] [address] [facility] [address]    Rashad So MD Assigned Cancer Care Provider   10/23/20     Phone: [phone] Fax: [phone]         [address] [place] [address] [address] [facility] [address]          [facility]  Number of Diagnoses or Management Options     Amount and/or Complexity of Data Reviewed  Clinical lab tests: reviewed  Tests in the radiology section of CPT : reviewed  Tests in the medicine section of CPT : reviewed  Discussion of test results with the performing providers: no  Decide to obtain previous medical records or to obtain history from someone other than the patient: yes  Obtain history from someone other than the patient: yes  Review and summarize past medical records: yes  Discuss the patient with other providers: yes  Independent visualization of images, tracings, or specimens: yes    Risk of Complications, Morbidity, and/or Mortality  Presenting problems: high  Diagnostic procedures: high  Management options: high    Critical Care  Total time providing critical care: 30-74  minutes    Patient Progress  Patient progress: stable

## 2021-04-08 ENCOUNTER — ALLIED HEALTH/NURSE VISIT (OUTPATIENT)
Dept: TRANSPLANT | Facility: CLINIC | Age: 71
End: 2021-04-08
Attending: INTERNAL MEDICINE
Payer: MEDICARE

## 2021-04-08 DIAGNOSIS — Z71.9 VISIT FOR COUNSELING: Primary | ICD-10-CM

## 2021-04-08 NOTE — PROGRESS NOTES
"BMT Clinical Social Work New Transplant Evaluation    Information for this evaluation on 2021 was collected via Pt report, consultation with medical team, and medical chart review.     Present:  Patient: Libby Grimaldo   Clinical  (CSW): ANDREW Bangura, [facility]    Medical Team:   Nurse Coordinator: Sheri Morrison RN  BMT Physician: Julio C Guillory MD  Referring Physician: Simone Kolb MD    Diagnosis: Multiple Myeloma (MM)  Diagnosis Date:       Presenting Information:   Pt is a 70 year old female diagnosed with MM who presents to [facility] for consultation regarding an autologous stem cell transplant. Pt was alone for today's telephone.      Contact Information:  Pt Phone: [phone]  Pt Email: [email or web address]  Pt's Tres \"Jerrica\" Linda Phone: [phone]    Lodging Needs:  No needs identified at this time.The pt lives in [place], [place] and does not need to relocate.     Family Constellation:   Spouse: Tres \"Jerrica\" Acoba  Children: 2 adult sons: Luis Eduardo (lives in [place]) and Pravin (lives in [place]).   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 Pt's  Jerrica is also retired.    Finances/Insurance:  The pt expressed worry regarding her insurance, she was told that she did not have coverage for an OP infusion and is now worried that BMT would not be covered. She did apply for MA, but is uncertain what the status is on this.Message sent to the financial counselors to check about MA status. Pt does have Medicare insurance as well.     No financial concerns identified at this time by the pt.      CSW provided information regarding the insurance authorization process and the role of the BMT financial case-mangers. CSW provided contact info for the BMT financial case-managers and referred pt to them for future insurance questions.     Caregiver:  CSW " discussed with Pt the caregiver role and expectation at length. Pt is agreeable to having a full time caregiver for a minimum of 30 days until cleared by the BMT physician. Pt's identified caregivers are her sister and . Caregiver education and information provided. No caregiver concerns identified.     Healthcare Directive:  No. CSW provided education and forms. CSW encouraged Pt to have discussions with their family regarding their health care wishes.     Resources Provided:  BMT Information Book   BMT Resources Packet:   Healthcare Directive:   Tour of Unit NO   Transplant unit description and information provided.     Identified Concerns:   No concerns identified at this time.     Summary:   Pt presents to [facility] for consultation regarding an autologous stem cell transplant. Pt/family asked good questions regarding psychosocial factors related to BMT; all of which were answered. Pt presented as friendly and open. Pt's affect was engaged and expressive of some worries about the BMT process.     Plan:   CSW provided contact information and encouraged Pt to contact CSW with questions, concerns, resources and for support. CSW will continue to follow Pt to provide supportive counseling and assistance with resources as needed.      ANDREW Bangura, [facility]  Phone [phone]  Pager [phone]

## 2021-04-08 NOTE — PROGRESS NOTES
Spoke with Libby, following new transplant visit with Dr. Guillory. Reviewed plan of care per NT conversation for Stem Cell Transplant. Explained role of the Nurse Coordinator throughout the BMT process as well as general time line and expectations for transplant. Discussed necessity of caregiver and program's proximity requirements. All questions were answered.     Plan: Autologous Transplant, pending additional discussions needed between physician, pt's RMD and patient    Contact information provided for :  yes    HLA typing drawn: no    PRA typing drawn:  no    Contact information provided for :  no    Financial Release for URD search obtained:  no    [id number] Consent Signed: no    EOC Reason updated: yes

## 2021-04-09 ENCOUNTER — TELEPHONE (OUTPATIENT)
Dept: TRANSPLANT | Facility: CLINIC | Age: 71
End: 2021-04-09

## 2021-04-09 NOTE — TELEPHONE ENCOUNTER
I spoke to Dr. Nichols regarding Libby.  We agreed that she is frail currently and at greater risk of morbidity with HD-Sylwia.  He has referred her to addiction services and rehab for treatment.  In the meantime, he will continue her kameron based treatment, which she is responding to well.  In two months, if she has improved her performance status, we will plan to re-evaluate suitability for HD-Sylwia.  She is also a possible candidate for cell-based clinical trial, and we will consider that option as well.      SOTO BOWSER MD  April 9, 2021

## 2021-10-29 ENCOUNTER — MEDICAL CORRESPONDENCE (OUTPATIENT)
Dept: TRANSPLANT | Facility: CLINIC | Age: 71
End: 2021-10-29
Payer: MEDICARE

## 2021-11-02 ENCOUNTER — TELEPHONE (OUTPATIENT)
Dept: TRANSPLANT | Facility: CLINIC | Age: 71
End: 2021-11-02

## 2021-11-02 NOTE — TELEPHONE ENCOUNTER
I spoke to Dr. Nichols regarding Libby.  She has continued her DKD therapy and is now on kameron maintenance.  She has received therapy for a substance abuse disorder and has been doing well with much improved performance status, function and strength.  He wishes to have her return to consider auto PBSCT options.  He plans to restage her in Dec 2021 with bone marrow and PET/CT.  I recommended that we see her after her evaluation.      SOTO BOWSER MD  November 2, 2021

## 2021-11-22 ENCOUNTER — MEDICAL CORRESPONDENCE (OUTPATIENT)
Dept: TRANSPLANT | Facility: CLINIC | Age: 71
End: 2021-11-22
Payer: MEDICARE

## 2021-12-20 ENCOUNTER — TELEPHONE (OUTPATIENT)
Dept: TRANSPLANT | Facility: CLINIC | Age: 71
End: 2021-12-20
Payer: MEDICARE

## 2021-12-20 NOTE — TELEPHONE ENCOUNTER
Called pt. to confirm new appointment.    After 3 attempts, the call keeps going straight to voicemail.     Left our office number so the patient can call to confirm, reschedule or ask any questions the pt might have.

## 2021-12-22 ENCOUNTER — OFFICE VISIT (OUTPATIENT)
Dept: TRANSPLANT | Facility: CLINIC | Age: 71
End: 2021-12-22
Attending: INTERNAL MEDICINE
Payer: MEDICARE

## 2021-12-22 ENCOUNTER — CARE COORDINATION (OUTPATIENT)
Dept: TRANSPLANT | Facility: CLINIC | Age: 71
End: 2021-12-22

## 2021-12-22 VITALS
TEMPERATURE: 98.3 F | RESPIRATION RATE: 18 BRPM | OXYGEN SATURATION: 98 % | WEIGHT: 132.5 LBS | DIASTOLIC BLOOD PRESSURE: 71 MMHG | HEART RATE: 78 BPM | SYSTOLIC BLOOD PRESSURE: 110 MMHG | BODY MASS INDEX: 22.9 KG/M2

## 2021-12-22 DIAGNOSIS — C90.00 MULTIPLE MYELOMA NOT HAVING ACHIEVED REMISSION (H): Primary | ICD-10-CM

## 2021-12-22 PROCEDURE — 99215 OFFICE O/P EST HI 40 MIN: CPT | Performed by: INTERNAL MEDICINE

## 2021-12-22 PROCEDURE — G0463 HOSPITAL OUTPT CLINIC VISIT: HCPCS

## 2021-12-22 RX ORDER — BUPRENORPHINE AND NALOXONE 8; 2 MG/1; MG/1
1 FILM, SOLUBLE BUCCAL; SUBLINGUAL 3 TIMES DAILY
COMMUNITY
Start: 2021-07-07 | End: 2023-01-01

## 2021-12-22 RX ORDER — ACYCLOVIR 400 MG/1
TABLET ORAL
COMMUNITY
Start: 2021-09-03 | End: 2022-01-21

## 2021-12-22 RX ORDER — ALLOPURINOL 300 MG/1
300 TABLET ORAL
COMMUNITY
Start: 2021-01-14 | End: 2022-01-21

## 2021-12-22 RX ORDER — ONDANSETRON 4 MG/1
TABLET, ORALLY DISINTEGRATING ORAL
COMMUNITY
Start: 2021-01-15 | End: 2022-01-20

## 2021-12-22 RX ORDER — PROMETHAZINE HYDROCHLORIDE 25 MG/1
TABLET ORAL
COMMUNITY
Start: 2021-01-05 | End: 2022-01-21

## 2021-12-22 ASSESSMENT — ENCOUNTER SYMPTOMS
FEVER: 0
CARDIOVASCULAR NEGATIVE: 1
CHILLS: 0
DIZZINESS: 1
FATIGUE: 0
ARTHRALGIAS: 0
PSYCHIATRIC NEGATIVE: 1
HEADACHES: 0
NUMBNESS: 1
MYALGIAS: 0
UNEXPECTED WEIGHT CHANGE: 0
LIGHT-HEADEDNESS: 0
GASTROINTESTINAL NEGATIVE: 1
BACK PAIN: 1
RESPIRATORY NEGATIVE: 1
SPEECH DIFFICULTY: 0
HEMATOLOGIC/LYMPHATIC NEGATIVE: 1
NECK PAIN: 0

## 2021-12-22 ASSESSMENT — PAIN SCALES - GENERAL: PAINLEVEL: MODERATE PAIN (5)

## 2021-12-22 NOTE — PROGRESS NOTES
BMT Clinic Follow up    Libby Grimaldo is a 71 year old female      Oncology History   Multiple myeloma not having achieved remission (H)   11/7/2018 -  Cancer Staged    Staging form: Plasma Cell Myeloma and Disorders, AJCC 8th Edition  - Clinical stage from 11/7/2018: Albumin (g/dL): 3.4, ISS: Stage II, High-risk cytogenetics: Absent, LDH: Unknown     11/7/2018 Initial Diagnosis    Multiple myeloma not having achieved remission (H)     1/23/2019 - 11/14/2019 Chemotherapy    KRD x 6 cycles     11/4/2019 - 2/14/2021 Chemotherapy    Rev/Dex(20)      4/3/2020 - 7/17/2020 Chemotherapy    Velcade maintenance - dose reductions for orthostatic hypotension     7/17/2020 - 12/15/2020 Chemotherapy    Revlimid maintenance     11/23/2020 Progression    Bone marrow 35% plasma cells, PET CT demonstrating new lytic lesions     12/15/2020 - 6/11/2021 Chemotherapy    Elsy, Kd x 6 cycles     7/7/2021 -  Cancer Staged    PET/CT - CR     7/21/2021 -  Chemotherapy    Subcutaneous Daratumumab, IVIG, denosumab monthly     11/22/2021 -  Cancer Staged    Bone marrow aspirate - MRD 0.0022% positive          Hematologic history: Libby is a 71-year-old woman with a history of myeloma as described above.  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  She progressed in November 2020 and then received 6 cycles of KRD.  From her perspective she tolerated this reasonably well and it did lead to a complete remission.  She then began maintenance therapy with subcutaneous daratumumab which she is tolerated well.  I saw her in February 2021 at a time that she was frail and recovering from some ongoing issues.  We agreed that that was not the time to move ahead and so her physician continued the maintenance therapy which is helped her disease over most of the past year.  She recently was restaged and  the marrow did show some emerging MRD.  Ms. Santos is does not have signs or symptoms of disease at this time and in fact over the last 8 months or so she is improved her overall state of wellbeing, her activity level, and strength.  I met with her and her sister today.  She describes for the most part feeling well and having relatively normal days.  She gets up routinely in the morning around 9 AM, does chores around the house, has some various hobbies that she does in the house, and she visits with a few friends who she sees in light of the pandemic and ongoing risk.  She does continue to have chronic pain in her back but feels that this is under good control and she has not required any changes in her medications for a number of months.  She is independent at home, cooking and cleaning, and otherwise getting around with any particular help.  Her sister describes some of the activities that they do together and relates stories about activities that she could do now that she was unable to do 9 months ago.  She continues to see physical therapy periodically who helps her with various strengthening exercises.    When I asked her what is most important to her to discuss today, she states that she is now interested in moving ahead to high-dose chemotherapy with transplant.  She is looking for the potential benefit in terms of prolonging her remission with hopefully good quality life.  Currently she lives in [place] with her .  Her sister lives not far away and they interact with each other regularly.  She would anticipate both through  and sister helping with caregiving during the transplant process.  While she is a former smoker of both tobacco and marijuana, she is not smoking currently and does not use any substances.  She currently is not drinking alcohol.  She eats from her perspective a reasonably healthy diet and has no limitations.    Into review of systems, her big complaint is some changes in  her vision.  She at times has difficulty focusing on certain things that she tries to read.  She does sometimes worry about tripping and other hazards on the floor.  She has fallen twice in the last 4 months.  In both accounts it was due to a combination of poor vision she states and also ongoing neuropathy in her feet that sometimes lead to tripping.  She does have neuropathy in both her hands and feet somewhat worse in her left hand and states that this has been stable over the last number of months.  She sleeps well at night but does sometimes have significant fatigue during the day and at times takes a nap.  She has had urinary tract infections and an episode of pneumonia and this led to the institution of IVIG    KPS:  80    ROS:    10 point ROS neg other than the symptoms noted above in the HPI.      Review of Systems   Constitutional: Negative for chills, fatigue, fever and unexpected weight change.   HENT:  Negative.         She is adentulous   Eyes:        She complains of blurry vision   Respiratory: Negative.    Cardiovascular: Negative.    Gastrointestinal: Negative.    Genitourinary: Negative.     Musculoskeletal: Positive for back pain. Negative for arthralgias, gait problem, myalgias and neck pain.   Skin: Negative.    Neurological: Positive for dizziness and numbness. Negative for gait problem, headaches, light-headedness and speech difficulty.        Occasional dizziness with some movements   Hematological: Negative.    Psychiatric/Behavioral: Negative.    All other systems reviewed and are negative.      Past Medical History:   Diagnosis Date     Graves disease      Multiple myeloma in relapse (H)      Vitamin B12 deficiency (non anemic)        Past Surgical History:   Procedure Laterality Date     CHOLECYSTECTOMY         Family History   Problem Relation Age of Onset     Heart Disease Mother      Diabetes Father        Social History     Tobacco Use     Smoking status: Former Smoker     Packs/day:  1.00     Smokeless tobacco: Former User     Quit date: 2010   Substance Use Topics     Alcohol use: None     Drug use: None         Allergies   Allergen Reactions     Bupropion      Other reaction(s): Seizures        Current Outpatient Medications   Medication Sig Dispense Refill     acetaminophen (TYLENOL) 500 MG tablet Take 1,000 mg by mouth       acyclovir (ZOVIRAX) 400 MG tablet        albuterol (PROAIR HFA/PROVENTIL HFA/VENTOLIN HFA) 108 (90 Base) MCG/ACT inhaler Inhale 2 puffs into the lungs       allopurinol (ZYLOPRIM) 300 MG tablet Take 300 mg by mouth       aspirin (ASA) 81 MG chewable tablet Take 81 mg by mouth       atorvastatin (LIPITOR) 40 MG tablet Take 40 mg by mouth       buprenorphine HCl-naloxone HCl (SUBOXONE) 8-2 MG per film Place 1 Film under the tongue       Butalbital-APAP-Caffeine -40 MG CAPS Take 1 capsule by mouth       cholecalciferol (VITAMIN D-1000 MAX ST) 25 MCG (1000 UT) TABS Take 1,000 Units by mouth       cyclobenzaprine (FLEXERIL) 10 MG tablet        dexamethasone (DECADRON) 4 MG tablet        fentaNYL (DURAGESIC) 25 mcg/hr 72 hr patch Place 1 patch onto the skin       Fluticasone-Umeclidin-Vilanterol (TRELEGY ELLIPTA) 100-62.5-25 MCG/INH oral inhaler Inhale 1 puff into the lungs       ipratropium - albuterol 0.5 mg/2.5 mg/3 mL (DUONEB) 0.5-2.5 (3) MG/3ML neb solution Inhale 3 mLs into the lungs       levothyroxine (SYNTHROID/LEVOTHROID) 112 MCG tablet Take 112 mcg by mouth       lidocaine-prilocaine (EMLA) 2.5-2.5 % external cream Apply 5 g topically       LORazepam (ATIVAN) 0.5 MG tablet Take 0.5 mg by mouth       metoprolol succinate ER (TOPROL-XL) 25 MG 24 hr tablet Take 25 mg by mouth       ondansetron (ZOFRAN) 8 MG tablet        ondansetron (ZOFRAN-ODT) 4 MG ODT tab        oxyCODONE IR (ROXICODONE) 10 MG tablet        polyethylene glycol (MIRALAX/GLYCOLAX) powder 17 g by Nasogastric route       promethazine (PHENERGAN) 25 MG tablet        REVLIMID 25 MG CAPS capsule         sulfamethoxazole-trimethoprim (BACTRIM DS/SEPTRA DS) 800-160 MG tablet        venlafaxine (EFFEXOR) 75 MG tablet Take 75 mg by mouth           Physical Exam:     Vital Signs: /71   Pulse 78   Temp 98.3  F (36.8  C) (Oral)   Resp 18   Wt 60.1 kg (132 lb 8 oz)   SpO2 98%   BMI 22.90 kg/m      Physical Exam  Vitals reviewed.   Constitutional:       General: She is not in acute distress.     Appearance: Normal appearance.   HENT:      Head: Normocephalic.      Nose: Nose normal.      Mouth/Throat:      Mouth: Mucous membranes are moist.      Pharynx: No oropharyngeal exudate or posterior oropharyngeal erythema.      Comments: adentulous  Eyes:      Extraocular Movements: Extraocular movements intact.      Comments: exopthalmous   Cardiovascular:      Rate and Rhythm: Normal rate and regular rhythm.      Pulses: Normal pulses.      Heart sounds: Normal heart sounds. No murmur heard.      Pulmonary:      Effort: Pulmonary effort is normal.      Breath sounds: Normal breath sounds.   Abdominal:      General: Abdomen is flat.   Musculoskeletal:         General: No swelling. Normal range of motion.      Cervical back: Normal range of motion.   Lymphadenopathy:      Cervical: No cervical adenopathy.   Skin:     General: Skin is warm and dry.   Neurological:      General: No focal deficit present.      Mental Status: She is alert.      Cranial Nerves: No cranial nerve deficit.      Motor: No weakness.      Gait: Gait normal.   Psychiatric:         Mood and Affect: Mood normal.         Behavior: Behavior normal.         Thought Content: Thought content normal.         Judgment: Judgment normal.         Vascular Access:  port    Patient Active Problem List   Diagnosis     Multiple myeloma not having achieved remission (H)         ASSESSMENT AND PLAN:  Multiple myeloma-Ms. Delacruz was recently restaged and these studies demonstrate early progression of multiple myeloma compared to her staging evaluations that  occurred over the summer.  She is currently asymptomatic from the disease point of view, and her performance status and overall quality life is significantly improve then when she presented in February 2021.  Today I spent time reviewing with her and her sister the process of transplantation.  I explained it would first include a reassessment of her health, and then placement of a large bore catheter which would be used both for stem cell collection as well as management during the transplant process.  I described the benefits and risk associated with high-dose melphalan in particular the GI toxicity, severe hematologic toxicity, and how we would manage these problems.  I discussed the transplant process including a pheresis and stem cell reinfusion.  I also described how we would follow her closely as an outpatient and with some of the days would look like during the early time following transplantation.  After spending time reviewing the various aspects of the process and also drying out a timeline for her, she does wish to proceed when possible.  For now she will continue the maintenance daratumumab and will look towards working her in following the holidays.    Libby understood the above assessment and recommendations.  Multiple questions answered.  No barriers to learning identified.     Known issues that I take into account for medical decisions, with salient changes to the plan considering these complexities noted above.    I spent 60 minutes in the care of this patient today, which included time necessary for preparation for the visit, obtaining history, ordering medications/tests/procedures as medically indicated, review of pertinent medical literature, counseling of the patient, communication of recommendations to the care team, and documentation time.    SOTO BOWSER MD  December 22,  2021    MDM    ------------------------------------------------------------------------------------------------------------------------------------------------    Patient Care Team       Relationship Specialty Notifications Start End    Leah Gustafson PCP - General Family Practice  11/19/19     Phone: [phone] Fax: [phone]         [facility] [address] [facility] [address]    Kennedi Nichols DO Referring Physician Hematology & Oncology  11/19/19     Phone: [phone] Fax: [phone]         [facility] [address] [address]

## 2021-12-22 NOTE — LETTER
12/22/2021         RE: Libby Grimaldo  [address] [facility] [address]        Dear Colleague,    Thank you for referring your patient, Libby Grimaldo, to the [facility] BLOOD AND MARROW TRANSPLANT PROGRAM [place]. Please see a copy of my visit note below.     [facility] BMT and Cell Therapy Program  RN Coordinator Pre-Visit Documentation    Libby Grimaldo is a 71 year old female who has been referred to the [facility] BMT and Cell Therapy Program for hematopoietic cell transplant or immune effector cell therapy.      Referring MD Name: Kennedi Nichols, telephone [phone]      RN Coordinator: evelina    Reason for referral: auto bmt    Link to Adult BMT algorithm    All relevant clinical notes, labs, imaging, and pathology are available in [facility], Care Everywhere, or scanned into Media.    The appropriate disease evaluation form has been emailed to the physician and their continuity clinic fellow to complete.      Patient Care Team       Relationship Specialty Notifications Start End    Leah Gustafson PCP - General [facility] Practice  11/19/19     Phone: [phone] Fax: [phone]         [facility] [address] [facility] [address]    Kennedi Nichols DO Referring Physician Hematology & Oncology  11/19/19     Phone: [phone] Fax: [phone]         [facility] [address] [address]            Evelina Pyle, RN             BMT Clinic Follow up    Libby Grimaldo is a 71 year old female      Oncology History   Multiple myeloma not having achieved remission (H)   11/7/2018 -  Cancer Staged    Staging form: Plasma Cell Myeloma and Disorders, AJCC 8th Edition  - Clinical stage from 11/7/2018: Albumin (g/dL): 3.4, ISS: Stage II, High-risk cytogenetics: Absent, LDH: Unknown     11/7/2018 Initial Diagnosis    Multiple myeloma not having achieved remission (H)     1/23/2019 - 11/14/2019 Chemotherapy    KRD x 6 cycles     11/4/2019 - 2/14/2021 Chemotherapy     Rev/Dex(20)      4/3/2020 - 7/17/2020 Chemotherapy    Velcade maintenance - dose reductions for orthostatic hypotension     7/17/2020 - 12/15/2020 Chemotherapy    Revlimid maintenance     11/23/2020 Progression    Bone marrow 35% plasma cells, PET CT demonstrating new lytic lesions     12/15/2020 - 6/11/2021 Chemotherapy    Elsy, Kd x 6 cycles     7/7/2021 -  Cancer Staged    PET/CT - CR     7/21/2021 -  Chemotherapy    Subcutaneous Daratumumab, IVIG, denosumab monthly     11/22/2021 -  Cancer Staged    Bone marrow aspirate - MRD 0.0022% positive          Hematologic history: Libby is a 71-year-old woman with a history of myeloma as described above.  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  She progressed in November 2020 and then received 6 cycles of KRD.  From her perspective she tolerated this reasonably well and it did lead to a complete remission.  She then began maintenance therapy with subcutaneous daratumumab which she is tolerated well.  I saw her in February 2021 at a time that she was frail and recovering from some ongoing issues.  We agreed that that was not the time to move ahead and so her physician continued the maintenance therapy which is helped her disease over most of the past year.  She recently was restaged and the marrow did show some emerging MRD.  Ms. Santos is does not have signs or symptoms of disease at this time and in fact over the last 8 months or so she is improved her overall state of wellbeing, her activity level, and strength.  I met with her and her sister today.  She describes for the most part feeling well and having relatively normal days.  She gets up routinely in the morning around 9 AM, does chores around the house, has some various hobbies that she does in the house, and she visits with a few friends who she sees in light of  the pandemic and ongoing risk.  She does continue to have chronic pain in her back but feels that this is under good control and she has not required any changes in her medications for a number of months.  She is independent at home, cooking and cleaning, and otherwise getting around with any particular help.  Her sister describes some of the activities that they do together and relates stories about activities that she could do now that she was unable to do 9 months ago.  She continues to see physical therapy periodically who helps her with various strengthening exercises.    When I asked her what is most important to her to discuss today, she states that she is not interested in moving ahead to high-dose chemotherapy with transplant.  She is looking for the potential benefit in terms of prolonging her remission with hopefully good quality life.  Currently she lives in [place] with her .  Her sister lives not far away and they interact with each other regularly.  She would anticipate both through  and sister helping with caregiving during the transplant process.  While she is a former smoker of both tobacco and marijuana, she is not smoking currently and does not use any substances.  She currently is not drinking alcohol.  She eats from her perspective a reasonably healthy diet and has no limitations.    Into review of systems, her big complaint is some changes in her vision.  She at times has difficulty focusing on certain things that she tries to read.  She does sometimes worry about tripping and other hazards on the floor.  She has fallen twice in the last 4 months.  In both accounts it was due to a combination of poor vision she states and also ongoing neuropathy in her feet that sometimes lead to tripping.  She does have neuropathy in both her hands and feet somewhat worse in her left hand and states that this has been stable over the last number of months.  She sleeps well at night but does  sometimes have significant fatigue during the day and at times takes a nap.  She has had urinary tract infections and an episode of pneumonia and this led to the institution of IVIG    KPS:  80    ROS:    10 point ROS neg other than the symptoms noted above in the HPI.      Review of Systems   Constitutional: Negative for chills, fatigue, fever and unexpected weight change.   HENT:  Negative.         She is adentulous   Eyes:        She complains of blurry vision   Respiratory: Negative.    Cardiovascular: Negative.    Gastrointestinal: Negative.    Genitourinary: Negative.     Musculoskeletal: Positive for back pain. Negative for arthralgias, gait problem, myalgias and neck pain.   Skin: Negative.    Neurological: Positive for dizziness and numbness. Negative for gait problem, headaches, light-headedness and speech difficulty.        Occasional dizziness with some movements   Hematological: Negative.    Psychiatric/Behavioral: Negative.    All other systems reviewed and are negative.      Past Medical History:   Diagnosis Date     Graves disease      Multiple myeloma in relapse (H)      Vitamin B12 deficiency (non anemic)        Past Surgical History:   Procedure Laterality Date     CHOLECYSTECTOMY         Family History   Problem Relation Age of Onset     Heart Disease Mother      Diabetes Father        Social History     Tobacco Use     Smoking status: Former Smoker     Packs/day: 1.00     Smokeless tobacco: Former User     Quit date: 2010   Substance Use Topics     Alcohol use: None     Drug use: None         Allergies   Allergen Reactions     Bupropion      Other reaction(s): Seizures        Current Outpatient Medications   Medication Sig Dispense Refill     acetaminophen (TYLENOL) 500 MG tablet Take 1,000 mg by mouth       acyclovir (ZOVIRAX) 400 MG tablet        albuterol (PROAIR HFA/PROVENTIL HFA/VENTOLIN HFA) 108 (90 Base) MCG/ACT inhaler Inhale 2 puffs into the lungs       allopurinol (ZYLOPRIM) 300 MG  tablet Take 300 mg by mouth       aspirin (ASA) 81 MG chewable tablet Take 81 mg by mouth       atorvastatin (LIPITOR) 40 MG tablet Take 40 mg by mouth       buprenorphine HCl-naloxone HCl (SUBOXONE) 8-2 MG per film Place 1 Film under the tongue       Butalbital-APAP-Caffeine -40 MG CAPS Take 1 capsule by mouth       cholecalciferol (VITAMIN D-1000 MAX ST) 25 MCG (1000 UT) TABS Take 1,000 Units by mouth       cyclobenzaprine (FLEXERIL) 10 MG tablet        dexamethasone (DECADRON) 4 MG tablet        fentaNYL (DURAGESIC) 25 mcg/hr 72 hr patch Place 1 patch onto the skin       Fluticasone-Umeclidin-Vilanterol (TRELEGY ELLIPTA) 100-62.5-25 MCG/INH oral inhaler Inhale 1 puff into the lungs       ipratropium - albuterol 0.5 mg/2.5 mg/3 mL (DUONEB) 0.5-2.5 (3) MG/3ML neb solution Inhale 3 mLs into the lungs       levothyroxine (SYNTHROID/LEVOTHROID) 112 MCG tablet Take 112 mcg by mouth       lidocaine-prilocaine (EMLA) 2.5-2.5 % external cream Apply 5 g topically       LORazepam (ATIVAN) 0.5 MG tablet Take 0.5 mg by mouth       metoprolol succinate ER (TOPROL-XL) 25 MG 24 hr tablet Take 25 mg by mouth       ondansetron (ZOFRAN) 8 MG tablet        ondansetron (ZOFRAN-ODT) 4 MG ODT tab        oxyCODONE IR (ROXICODONE) 10 MG tablet        polyethylene glycol (MIRALAX/GLYCOLAX) powder 17 g by Nasogastric route       promethazine (PHENERGAN) 25 MG tablet        REVLIMID 25 MG CAPS capsule        sulfamethoxazole-trimethoprim (BACTRIM DS/SEPTRA DS) 800-160 MG tablet        venlafaxine (EFFEXOR) 75 MG tablet Take 75 mg by mouth           Physical Exam:     Vital Signs: /71   Pulse 78   Temp 98.3  F (36.8  C) (Oral)   Resp 18   Wt 60.1 kg (132 lb 8 oz)   SpO2 98%   BMI 22.90 kg/m      Physical Exam  Vitals reviewed.   Constitutional:       General: She is not in acute distress.     Appearance: Normal appearance.   HENT:      Head: Normocephalic.      Nose: Nose normal.      Mouth/Throat:      Mouth: Mucous  membranes are moist.      Pharynx: No oropharyngeal exudate or posterior oropharyngeal erythema.      Comments: adentulous  Eyes:      Extraocular Movements: Extraocular movements intact.      Comments: exopthalmous   Cardiovascular:      Rate and Rhythm: Normal rate and regular rhythm.      Pulses: Normal pulses.      Heart sounds: Normal heart sounds. No murmur heard.      Pulmonary:      Effort: Pulmonary effort is normal.      Breath sounds: Normal breath sounds.   Abdominal:      General: Abdomen is flat.   Musculoskeletal:         General: No swelling. Normal range of motion.      Cervical back: Normal range of motion.   Lymphadenopathy:      Cervical: No cervical adenopathy.   Skin:     General: Skin is warm and dry.   Neurological:      General: No focal deficit present.      Mental Status: She is alert.      Cranial Nerves: No cranial nerve deficit.      Motor: No weakness.      Gait: Gait normal.   Psychiatric:         Mood and Affect: Mood normal.         Behavior: Behavior normal.         Thought Content: Thought content normal.         Judgment: Judgment normal.         Vascular Access:  port    Patient Active Problem List   Diagnosis     Multiple myeloma not having achieved remission (H)         ASSESSMENT AND PLAN:  Multiple myeloma-Ms. Delacruz was recently restaged and these studies demonstrate early progression of multiple myeloma compared to her staging evaluations that occurred over the summer.  She is currently asymptomatic from the disease point of view, and her performance status and overall quality life is significantly improve then when she presented in February 2021.  Today I spent time reviewing with her and her sister the process of transplantation.  I explained it would first include a reassessment of her health, and then placement of a large bore catheter which would be used both for stem cell collection as well as management during the transplant process.  I described the benefits and risk  associated with high-dose melphalan in particular the GI toxicity, severe hematologic toxicity, and how we would manage these problems.  I discussed the transplant process including a pheresis and stem cell reinfusion.  I also described how we would follow her closely as an outpatient and with some of the days would look like during the early time following transplantation.  After spending time reviewing the various aspects of the process and also drying out a timeline for her, she does wish to proceed when possible.  For now she will continue the maintenance daratumumab and will look towards working her in following the holidays.    Libby understood the above assessment and recommendations.  Multiple questions answered.  No barriers to learning identified.     Known issues that I take into account for medical decisions, with salient changes to the plan considering these complexities noted above.    I spent 60 minutes in the care of this patient today, which included time necessary for preparation for the visit, obtaining history, ordering medications/tests/procedures as medically indicated, review of pertinent medical literature, counseling of the patient, communication of recommendations to the care team, and documentation time.    SOTO BOWSER MD  December 22, 2021    [facility]    ------------------------------------------------------------------------------------------------------------------------------------------------    Patient Care Team       Relationship Specialty Notifications Start End    Sj Leah Corbin PCP - General Family Practice  11/19/19     Phone: [phone] Fax: [phone]         [facility] [address] [facility] [address]    Kennedi Nichols DO Referring Physician Hematology & Oncology  11/19/19     Phone: [phone] Fax: [phone]         [facility] [address] [address]              SOTO BOWSER MD

## 2021-12-22 NOTE — PROGRESS NOTES
[facility] BMT and Cell Therapy Program  RN Coordinator Pre-Visit Documentation      Libby Grimaldo is a 71 year old female who has been referred to the [facility] BMT and Cell Therapy Program for hematopoietic cell transplant or immune effector cell therapy.      Referring MD Name: Kennedi Nichols, telephone [phone]      RN Coordinator: evelina    Reason for referral: auto bmt    Link to Adult BMT algorithm    All relevant clinical notes, labs, imaging, and pathology are available in Epic, Care Everywhere, or scanned into Media.    The appropriate disease evaluation form has been emailed to the physician and their continuity clinic fellow to complete.      Patient Care Team       Relationship Specialty Notifications Start End    Leah Gustafson PCP - General [facility] Practice  11/19/19     Phone: [phone] Fax: [phone]         [facility] [address] [facility] [address]    Kennedi Nichols DO Referring Physician Hematology & Oncology  11/19/19     Phone: [phone] Fax: [phone]         [facility] [address] [address]            Evelina Pyle, RN

## 2021-12-22 NOTE — NURSING NOTE
"Oncology Rooming Note    December 22, 2021 3:46 PM   Libby Grimaldo is a 71 year old female who presents for:    Chief Complaint   Patient presents with     Oncology Clinic Visit     Multiple myeloma      Initial Vitals: /71   Pulse 78   Temp 98.3  F (36.8  C) (Oral)   Resp 18   Wt 60.1 kg (132 lb 8 oz)   SpO2 98%   BMI 22.90 kg/m   Estimated body mass index is 22.9 kg/m  as calculated from the following:    Height as of 4/7/21: 1.62 m (5' 3.78\").    Weight as of this encounter: 60.1 kg (132 lb 8 oz). Body surface area is 1.64 meters squared.  Moderate Pain (5) Comment: Data Unavailable   No LMP recorded. Patient is postmenopausal.  Allergies reviewed: Yes  Medications reviewed: Yes    Medications: Medication refills not needed today.  Pharmacy name entered into [facility]: [id number] [address] AT [facility] [address]    Clinical concerns: Next steps moving forward       Krista Alebrto LPN            "

## 2021-12-23 ENCOUNTER — APPOINTMENT (OUTPATIENT)
Dept: TRANSPLANT | Facility: CLINIC | Age: 71
End: 2021-12-23
Attending: INTERNAL MEDICINE
Payer: MEDICARE

## 2021-12-23 DIAGNOSIS — Z71.9 VISIT FOR COUNSELING: Primary | ICD-10-CM

## 2021-12-23 NOTE — LETTER
Date:January 4, 2022      Provider requested that no letter be sent. Do not send.       [facility]

## 2021-12-23 NOTE — PROGRESS NOTES
"Blood and Marrow Transplant   New Transplant Visit with   Clinical     Libby Grimaldo  2021    New Transplant MD: Julio C Guillory MD  New Transplant NC: Evelina Pyle RN    With whom do you live:  Jerrica    Relocation Requirement:   Libby lives 13 minutes / 8 miles from [facility] and will not be required to relocate post-transplant.     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 She was on disability prior to her [employer] - she feels that the disability was related to her diagnosis of Multiple Myeloma. Her  Jerrica is retired from his work as an  at [employer] and his work at [employer].     Source of Income: detention savings and SSA    Do you have concerns or questions about finances or insurance related to BMT?:   Payor/Plan Subscriber Name Rel Member # Group #   MEDICARE - MEDICARE LIBBY GRIMALDO Self [id number]       ATTN CLAIMS, [address]   [place]ICAID [place] - [place]* LIBBY GRIMALDO Self [id number]       [address] [address], [address]    Libby was concerned at her last appointment with the BMT SW staff when she had just applied for [place]. She now has [place] along with a gris from [employer] for an expensive chemotherapy. She believes that she also applied for [place]-[employer] Assistance but did not submit a reimbursement document within 90 days and was dropped from the gris. We talked about going on-line to reapply (LLS is closed through 2022) if she would like. She " also applied for/did not meet financial eligibility criteria for an [employer] gris while undergoing chemotherapy - we talked about a few financial grants that she would be eligible to apply for while undergoing her BMT.     Have you completed a health care directive?:   Provided education - Libby did not feel prepared to complete the HCD in the past - she has 2 blank copies of the HCD document at home. She now feels more prepared to complete the form and plans to do so. We talked about availability of remote notaries at the hospital and clinic if she would require assistance with this.     Support:  Is there a network of people who are available to support you and/or your family?: Yes    Education Provided:   Caregiver Requirement/Role  BMT Packet provided  BMT Book provided  HCD information   [facility]  Support resources  Description of Inpatient Unit    Comments: Libby has spoken with BMT 2 times in the past and was not surprised by any of the information shared yesterday by Dr. Guillory. Her sister was on the call with BMT SW today. They shared that they did not have any questions about transplant and were prepared to proceed with Work-Up once our program office calls her back to set this up. Libby would like another copy of the packet and spiral bound book sent out to her for her family. Encouraged patient to call with questions or concerns as they arise.     Anita MORALES [facility]    Clinical   12/23/2021  [facility]  Adult Blood and Marrow Transplant Program  [address] [place] [id number]  [email or web address]  [email or web address]  Office: [phone]   Pager: [phone]

## 2021-12-23 NOTE — LETTER
"    2021         RE: Libby Grimaldo  [address] [facility] [address]        Dear Colleague,    Thank you for referring your patient, Libby Grimaldo, to the [facility] BLOOD AND MARROW TRANSPLANT PROGRAM [place]. Please see a copy of my visit note below.    Blood and Marrow Transplant   New Transplant Visit with   Clinical     Libby Grimaldo  2021    New Transplant MD: Julio C Guillory MD  New Transplant NC: Evelina Pyle RN    With whom do you live:  Jerrica    Relocation Requirement:   Libby lives 13 minutes / 8 miles from [facility] and will not be required to relocate post-transplant.     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 Her  Jerrica is retired from his work as an  at [employer] and his work at [employer].     Source of Income: [facility] savings and SSA    Do you have concerns or questions about finances or insurance related to BMT?:   Payor/Plan Subscriber Name Rel Member # Group #   MEDICARE - MEDICARE LIBBY GRIMALDO Self [id number]       ATTN CLAIMS, [address]   [place]ID [place]* LIBBY GRIMALDO Self [id number]       [address] [address], [address]    Libby was concerned at her last appointment with the BMT SW staff when she had just applied for [place]. She now has [place] along with a gris from [employer] " for an expensive chemotherapy. She believes that she also applied for [facility] Co-Pay Assistance but did not submit a reimbursement document within 90 days and was dropped from the gris. We talked about going on-line to reapply (S is closed through 1.2.2022) if she would like. She also applied for/did not meet financial eligibility criteria for an [employer] [facility] gris while undergoing chemotherapy - we talked about a few financial grants that she would be eligible to apply for while undergoing her BMT.     Have you completed a health care directive?:   Provided education - Libby did not feel prepared to complete the HCD in the past - she has 2 blank copies of the HCD document at home. She now feels more prepared to complete the form and plans to do so. We talked about availability of remote notaries at the hospital and clinic if she would require assistance with this.     Support:  Is there a network of people who are available to support you and/or your family?: Yes    Education Provided:   Caregiver Requirement/Role  BMT Packet provided  BMT Book provided  HCD information   [place]  Support resources  Description of Inpatient Unit    Comments: Libby has spoken with BMT 2 times in the past and was not surprised by any of the information shared yesterday by Dr. Guillory. Her sister was on the call with BMT SW today. They shared that they did not have any questions about transplant and were prepared to proceed with Work-Up once our program office calls her back to set this up. Libby would like another copy of the packet and spiral bound book sent out to her for her family. Encouraged patient to call with questions or concerns as they arise.     Anita MORALES [facility]    Clinical   12/23/2021  [facility]  Adult Blood and Marrow Transplant Program  [address] [place] [id number]  [email or web address]   [email or web address]  Office: [phone]   Pager: [phone]          Again, thank you for allowing me to participate in the care of your patient.        Sincerely,        MONO Blair

## 2022-01-11 DIAGNOSIS — Z86.2 PERSONAL HISTORY OF DISEASES OF BLOOD AND BLOOD-FORMING ORGANS: ICD-10-CM

## 2022-01-11 DIAGNOSIS — Z79.899 OTHER LONG TERM (CURRENT) DRUG THERAPY: ICD-10-CM

## 2022-01-11 DIAGNOSIS — Z01.818 EXAMINATION PRIOR TO CHEMOTHERAPY: Primary | ICD-10-CM

## 2022-01-11 DIAGNOSIS — C90.00 MYELOMA (H): ICD-10-CM

## 2022-01-12 DIAGNOSIS — C90.00 MYELOMA (H): Primary | ICD-10-CM

## 2022-01-13 DIAGNOSIS — C90.00 MULTIPLE MYELOMA NOT HAVING ACHIEVED REMISSION (H): Primary | ICD-10-CM

## 2022-01-13 RX ORDER — HEPARIN SODIUM (PORCINE) LOCK FLUSH IV SOLN 100 UNIT/ML 100 UNIT/ML
5 SOLUTION INTRAVENOUS
Status: CANCELLED | OUTPATIENT
Start: 2022-01-18

## 2022-01-13 RX ORDER — HEPARIN SODIUM,PORCINE 10 UNIT/ML
5 VIAL (ML) INTRAVENOUS
Status: CANCELLED | OUTPATIENT
Start: 2022-01-18

## 2022-01-14 DIAGNOSIS — Z11.59 ENCOUNTER FOR SCREENING FOR OTHER VIRAL DISEASES: Primary | ICD-10-CM

## 2022-01-19 ENCOUNTER — MEDICAL CORRESPONDENCE (OUTPATIENT)
Dept: TRANSPLANT | Facility: CLINIC | Age: 72
End: 2022-01-19
Payer: MEDICARE

## 2022-01-19 ENCOUNTER — MEDICAL CORRESPONDENCE (OUTPATIENT)
Dept: TRANSPLANT | Facility: CLINIC | Age: 72
End: 2022-01-19

## 2022-01-19 ENCOUNTER — LAB (OUTPATIENT)
Dept: LAB | Facility: CLINIC | Age: 72
End: 2022-01-19
Attending: INTERNAL MEDICINE
Payer: MEDICARE

## 2022-01-19 VITALS
SYSTOLIC BLOOD PRESSURE: 167 MMHG | HEART RATE: 74 BPM | RESPIRATION RATE: 18 BRPM | DIASTOLIC BLOOD PRESSURE: 86 MMHG | TEMPERATURE: 99.3 F | BODY MASS INDEX: 23.85 KG/M2 | WEIGHT: 138.01 LBS

## 2022-01-19 DIAGNOSIS — C90.00 MYELOMA (H): ICD-10-CM

## 2022-01-19 DIAGNOSIS — C90.00 PLASMA CELL MYELOMA (H): Primary | ICD-10-CM

## 2022-01-19 DIAGNOSIS — Z01.818 EXAMINATION PRIOR TO CHEMOTHERAPY: ICD-10-CM

## 2022-01-19 DIAGNOSIS — Z86.2 PERSONAL HISTORY OF DISEASES OF BLOOD AND BLOOD-FORMING ORGANS: ICD-10-CM

## 2022-01-19 PROCEDURE — G0463 HOSPITAL OUTPT CLINIC VISIT: HCPCS

## 2022-01-19 NOTE — CONSULTS
APHERESIS INITIAL CONSULT CHECKLIST    Current Encounter Information  Current Encounter Information: Reason for Visit, Allergies and Current Meds  Procedure Requested: MNC/PBSC Collection  History of: (Reason for Apheresis): Hx of Multiple Myeloma    Access Assessment  Access Assessment  Vein Assessment:  Veins are adequate: N/A  Catheter Assessment: BMT will schedule for line placement  Needs a catheter placed for Apheresis?: Yes, transfusion medicine physician informed.    Vital Signs  Vital Signs  BP: (!) 167/86  Pulse: 74  Temp: 99.3  F (37.4  C)  Temp src: Oral  Resp: 18    Reviewed   Review With Patient  Have you read the brochure Getting ready for Apheresis?: Yes  Have you had any invasive procedures, surgery, biopsy, bleeding in the last month?: No  Review medications and allergies: Yes  Have you ever been transfused?: No  Do you require pre-medication for blood products?: No  Patient given tour of the unit: Yes  Photophoresis: sun precautions reviewed with patient: N/A    Additional Information  Notes, needs and time spent with patient  Explain procedure, side effects or reactions, instructions: Yes  Patient has special need?: No  Time spent: 45mins face to face contact    Patient is here for consult for her autologous stem cell collection; noted that BP is elevated today, pt takes BP med. Has no significant travel history. Patient reported bilateral eye surgery about ten years ago, which might have used a cadaver tendon. Procedure explained with an emphasis on duration , low fat diet , hydration, side effects. Questions were answered and understood. Transfusion Med physician met with patient for consult and consent.

## 2022-01-19 NOTE — CONSULTS
Transfusion Medicine Consultation    Libby Grimaldo [id number]   YOB: 1950 Age: 71 years old   Date of Consult: 01/19/2022  Reason for consult: Autologous hematopoietic progenitor cell (HPC) collection          Assessment and Plan:     Ms. Grimaldo is a 71-year-old female who presented in consultation for the evaluation of fitness for autologous HPC collection for treatment of her multiple myeloma via autologous HPC transplantation. Despite her history of pulmonary fibrosis, COPD, and several pneumonias complicated by sepsis, she reported her need for oxygen to be minimal and had no respiratory distress or difficulties during our visit with her today such that she should be an appropriate candidate for the proposed apheresis procedures so long as no striking cardiac or pulmonary deficits arise on the remainder of her BMT work-up. We plan to collect for 1 to 3 days or until the target goal is met. A central line will be placed and will be used for access for the procedure.         Chief Complaint:     Transfusion medicine consultation.         History of Present Illness:     Ms. Grimaldo is a 71-year-old female with a past medical history significant for HTN, HLD,  febrile seizures in her youth (once as a toddler, once in high school, and once in college, none since that time),  pulmonary fibrosis, COPD, sepsis secondary to pneumonia, and recent-onset of new 'floaters' in her eyes, double vision, and possible vertigo, and multiple myeloma (please see Dr. Julio C Guillory's 12/22/2021 note for more detail regarding her oncologic history). Additionally, she reported many previous neck surgeries and continued radiculopathies (worse in her LEFT upper and lower extremities) along with back pain. When asked, she stated that reclining in a hospital bed for several hours would pose no problem for her. With regard to her previous pulmonary conditions, she reported that she no longer requires any supplemental oxygen.  Additionally, Ms. Grimaldo has received both shots in the Pfizer COVID vaccination series along with a booster shot. She reported a recent flu vaccination. Ms. Grimaldo had no significant travel history nor did she have any identifiable risk factors for infectious disease. Avila Thompson RN mentioned that Ms. Grimaldo had a cadaver tendon utilized as part of the previous eye surgery, however, this does not seem to pose an infectious risk. The procedure and its concomitant risks, benefits, and alternative(s) were discussed with the patient and all of her questions were addressed at the time of our visit.         Past Medical History:     Past Medical History:   Diagnosis Date     Cervical radiculopathy      COPD (chronic obstructive pulmonary disease) (H)      Double vision      Febrile seizure (H)     Per patient. Once while a toddler, once while in highschool, and once while in college.     Floaters      Graves disease      HLD (hyperlipidemia)      HTN (hypertension)      IPF (idiopathic pulmonary fibrosis) (H)      Lumbar radiculopathy      Multiple myeloma in relapse (H)      Osteoporosis      Pneumonia     Per patient. Complicated by sepsis.     Recurrent UTI     Per patient. Has required prophylactic antibiotcs.     Vitamin B12 deficiency (non anemic)      The above was reviewed with Ms. Grimaldo.         Past Surgical History:     Past Surgical History:   Procedure Laterality Date     CERVICAL FUSION       CHOLECYSTECTOMY       CYSTECTOMY OVARIAN BENIGN       EYE SURGERY      Per patient.     TONSILLECTOMY       WRIST SURGERY Left      The above was reviewed with Ms. Grimaldo.         Social History:     Social History     Tobacco Use     Smoking status: Former Smoker     Packs/day: 1.00     Years: 40.00     Pack years: 40.00     Smokeless tobacco: Former User     Quit date: 2010   Substance Use Topics     Alcohol use: Not Currently     Drug use: Not Currently     Types: Marijuana   Denied ever receiving a tattoo.    The  "above was reviewed with MsChristal Dillan.         Family History:     Family History   Problem Relation Age of Onset     Heart Disease Mother      Cancer Mother         \"Around lungs\" - she explicitly said it was *not* lung cancer.     Diabetes Father      Heart Disease Father      The above was reviewed with MsChristal Dillan.         Immunizations:     She reported receiving both doses of the two-dose series Pfizer COVID-19 vaccination along with a booster dose.  She reported receiving the 5287-8371 influenza vaccination.         Allergies:     Allergies   Allergen Reactions     Bupropion      Other reaction(s): Seizures     Ms. Grimaldo said she did not have a seizure from this medication but remembered 'feeling weird'.         Medications:     Current Outpatient Medications   Medication     acetaminophen (TYLENOL) 500 MG tablet     acyclovir (ZOVIRAX) 400 MG tablet     allopurinol (ZYLOPRIM) 300 MG tablet     aspirin (ASA) 81 MG chewable tablet     atorvastatin (LIPITOR) 40 MG tablet     buprenorphine HCl-naloxone HCl (SUBOXONE) 8-2 MG per film     Butalbital-APAP-Caffeine -40 MG CAPS     cholecalciferol (VITAMIN D-1000 MAX ST) 25 MCG (1000 UT) TABS     cyclobenzaprine (FLEXERIL) 10 MG tablet     dexamethasone (DECADRON) 4 MG tablet     fentaNYL (DURAGESIC) 25 mcg/hr 72 hr patch     Fluticasone-Umeclidin-Vilanterol (TRELEGY ELLIPTA) 100-62.5-25 MCG/INH oral inhaler     levothyroxine (SYNTHROID/LEVOTHROID) 112 MCG tablet     lidocaine-prilocaine (EMLA) 2.5-2.5 % external cream     LORazepam (ATIVAN) 0.5 MG tablet     metoprolol succinate ER (TOPROL-XL) 25 MG 24 hr tablet     ondansetron (ZOFRAN) 8 MG tablet     ondansetron (ZOFRAN-ODT) 4 MG ODT tab     oxyCODONE IR (ROXICODONE) 10 MG tablet     polyethylene glycol (MIRALAX/GLYCOLAX) powder     promethazine (PHENERGAN) 25 MG tablet     REVLIMID 25 MG CAPS capsule     sulfamethoxazole-trimethoprim (BACTRIM DS/SEPTRA DS) 800-160 MG tablet     venlafaxine (EFFEXOR) 75 MG tablet     " albuterol (PROAIR HFA/PROVENTIL HFA/VENTOLIN HFA) 108 (90 Base) MCG/ACT inhaler     ipratropium - albuterol 0.5 mg/2.5 mg/3 mL (DUONEB) 0.5-2.5 (3) MG/3ML neb solution     No current facility-administered medications for this encounter.     The above was reviewed with Ms. Grimaldo by Avila Thompson RN.         Review of Systems:     Reported fevers, reported chills, and reported night sweats.  Denied rhinorrhea, denied cough, denied swollen lymph nodes.  Denied chest pain, denied palpitations.  Denied shortness of breath, denied wheeze.  Denied abdominal pain, reported intermittent diarrhea, reported intermittent constipation, reported intermittent nausea, reported intermittent vomiting, reported painless hernia.  Reported radiating tingling more so in her LEFT arm and leg, reported having had a febrile seizure as a toddler, in high school, and in college.  Reported chronic back and neck pain.          Vital Signs:     01/19/2022 at 1500: BP (!) 167/86   Pulse 74   Temp 99.3  F (37.4  C) (Oral)   Resp 18   Wt 62.6 kg (138 lb 0.1 oz)   BMI 23.85 kg/m           Data:     Ms. Grimaldo did not have any recent laboratory testing.    Terry Cole MD (PGY-2)  Laboratory Medicine and Pathology  Blood Bank and Transfusion Medicine Service  [phone] ([phone]  [phone] (pager)

## 2022-01-20 ENCOUNTER — ALLIED HEALTH/NURSE VISIT (OUTPATIENT)
Dept: TRANSPLANT | Facility: CLINIC | Age: 72
End: 2022-01-20
Attending: INTERNAL MEDICINE
Payer: MEDICARE

## 2022-01-20 ENCOUNTER — ONCOLOGY VISIT (OUTPATIENT)
Dept: TRANSPLANT | Facility: CLINIC | Age: 72
End: 2022-01-20
Attending: STUDENT IN AN ORGANIZED HEALTH CARE EDUCATION/TRAINING PROGRAM
Payer: MEDICARE

## 2022-01-20 ENCOUNTER — ANCILLARY PROCEDURE (OUTPATIENT)
Dept: GENERAL RADIOLOGY | Facility: CLINIC | Age: 72
End: 2022-01-20
Attending: INTERNAL MEDICINE
Payer: MEDICARE

## 2022-01-20 ENCOUNTER — OFFICE VISIT (OUTPATIENT)
Dept: EDUCATION SERVICES | Facility: CLINIC | Age: 72
End: 2022-01-20
Attending: INTERNAL MEDICINE
Payer: MEDICARE

## 2022-01-20 ENCOUNTER — HOSPITAL ENCOUNTER (OUTPATIENT)
Dept: CARDIOLOGY | Facility: CLINIC | Age: 72
End: 2022-01-20
Attending: INTERNAL MEDICINE
Payer: MEDICARE

## 2022-01-20 VITALS
DIASTOLIC BLOOD PRESSURE: 72 MMHG | WEIGHT: 138.01 LBS | SYSTOLIC BLOOD PRESSURE: 129 MMHG | HEART RATE: 78 BPM | OXYGEN SATURATION: 98 % | HEIGHT: 63 IN | TEMPERATURE: 98.2 F | BODY MASS INDEX: 24.45 KG/M2

## 2022-01-20 VITALS
OXYGEN SATURATION: 98 % | TEMPERATURE: 98.2 F | RESPIRATION RATE: 16 BRPM | HEART RATE: 78 BPM | BODY MASS INDEX: 24.45 KG/M2 | WEIGHT: 138 LBS | HEIGHT: 63 IN | DIASTOLIC BLOOD PRESSURE: 72 MMHG | SYSTOLIC BLOOD PRESSURE: 129 MMHG

## 2022-01-20 DIAGNOSIS — Z86.2 PERSONAL HISTORY OF DISEASES OF BLOOD AND BLOOD-FORMING ORGANS: ICD-10-CM

## 2022-01-20 DIAGNOSIS — C90.00 MULTIPLE MYELOMA NOT HAVING ACHIEVED REMISSION (H): ICD-10-CM

## 2022-01-20 DIAGNOSIS — Z01.818 EXAMINATION PRIOR TO CHEMOTHERAPY: ICD-10-CM

## 2022-01-20 DIAGNOSIS — C90.00 MYELOMA (H): ICD-10-CM

## 2022-01-20 DIAGNOSIS — C90.00 MULTIPLE MYELOMA NOT HAVING ACHIEVED REMISSION (H): Primary | ICD-10-CM

## 2022-01-20 DIAGNOSIS — Z71.9 ENCOUNTER FOR EDUCATION: Primary | ICD-10-CM

## 2022-01-20 LAB
3D LVEF ECHO: NORMAL
LVEF ECHO: NORMAL

## 2022-01-20 PROCEDURE — 84484 ASSAY OF TROPONIN QUANT: CPT

## 2022-01-20 PROCEDURE — 85610 PROTHROMBIN TIME: CPT

## 2022-01-20 PROCEDURE — 84165 PROTEIN E-PHORESIS SERUM: CPT | Mod: TC | Performed by: PATHOLOGY

## 2022-01-20 PROCEDURE — 83521 IG LIGHT CHAINS FREE EACH: CPT | Mod: 59 | Performed by: INTERNAL MEDICINE

## 2022-01-20 PROCEDURE — 80053 COMPREHEN METABOLIC PANEL: CPT

## 2022-01-20 PROCEDURE — 86703 HIV-1/HIV-2 1 RESULT ANTBDY: CPT | Mod: GZ | Performed by: INTERNAL MEDICINE

## 2022-01-20 PROCEDURE — 93306 TTE W/DOPPLER COMPLETE: CPT | Mod: 26 | Performed by: INTERNAL MEDICINE

## 2022-01-20 PROCEDURE — 85730 THROMBOPLASTIN TIME PARTIAL: CPT

## 2022-01-20 PROCEDURE — 84550 ASSAY OF BLOOD/URIC ACID: CPT

## 2022-01-20 PROCEDURE — 87798 DETECT AGENT NOS DNA AMP: CPT | Performed by: INTERNAL MEDICINE

## 2022-01-20 PROCEDURE — 84703 CHORIONIC GONADOTROPIN ASSAY: CPT

## 2022-01-20 PROCEDURE — 83615 LACTATE (LD) (LDH) ENZYME: CPT

## 2022-01-20 PROCEDURE — 71046 X-RAY EXAM CHEST 2 VIEWS: CPT | Mod: GC | Performed by: RADIOLOGY

## 2022-01-20 PROCEDURE — 93005 ELECTROCARDIOGRAM TRACING: CPT

## 2022-01-20 PROCEDURE — 83735 ASSAY OF MAGNESIUM: CPT

## 2022-01-20 PROCEDURE — 250N000011 HC RX IP 250 OP 636: Performed by: INTERNAL MEDICINE

## 2022-01-20 PROCEDURE — 82306 VITAMIN D 25 HYDROXY: CPT | Performed by: INTERNAL MEDICINE

## 2022-01-20 PROCEDURE — 85025 COMPLETE CBC W/AUTO DIFF WBC: CPT

## 2022-01-20 PROCEDURE — 86901 BLOOD TYPING SEROLOGIC RH(D): CPT | Performed by: INTERNAL MEDICINE

## 2022-01-20 PROCEDURE — 93306 TTE W/DOPPLER COMPLETE: CPT

## 2022-01-20 PROCEDURE — 99214 OFFICE O/P EST MOD 30 MIN: CPT

## 2022-01-20 PROCEDURE — U0005 INFEC AGEN DETEC AMPLI PROBE: HCPCS

## 2022-01-20 PROCEDURE — 84155 ASSAY OF PROTEIN SERUM: CPT | Mod: 91 | Performed by: INTERNAL MEDICINE

## 2022-01-20 PROCEDURE — 82784 ASSAY IGA/IGD/IGG/IGM EACH: CPT | Performed by: INTERNAL MEDICINE

## 2022-01-20 PROCEDURE — G0463 HOSPITAL OUTPT CLINIC VISIT: HCPCS

## 2022-01-20 PROCEDURE — 86334 IMMUNOFIX E-PHORESIS SERUM: CPT | Performed by: INTERNAL MEDICINE

## 2022-01-20 PROCEDURE — 36415 COLL VENOUS BLD VENIPUNCTURE: CPT

## 2022-01-20 PROCEDURE — 0001U RBC DNA HEA 35 AG 11 BLD GRP: CPT | Performed by: INTERNAL MEDICINE

## 2022-01-20 PROCEDURE — 88240 CELL CRYOPRESERVE/STORAGE: CPT | Performed by: INTERNAL MEDICINE

## 2022-01-20 PROCEDURE — 82785 ASSAY OF IGE: CPT | Performed by: INTERNAL MEDICINE

## 2022-01-20 PROCEDURE — 82784 ASSAY IGA/IGD/IGG/IGM EACH: CPT

## 2022-01-20 PROCEDURE — 84100 ASSAY OF PHOSPHORUS: CPT

## 2022-01-20 PROCEDURE — 86850 RBC ANTIBODY SCREEN: CPT | Performed by: INTERNAL MEDICINE

## 2022-01-20 PROCEDURE — 36591 DRAW BLOOD OFF VENOUS DEVICE: CPT

## 2022-01-20 PROCEDURE — 86780 TREPONEMA PALLIDUM: CPT | Performed by: INTERNAL MEDICINE

## 2022-01-20 PROCEDURE — 82232 ASSAY OF BETA-2 PROTEIN: CPT | Performed by: INTERNAL MEDICINE

## 2022-01-20 PROCEDURE — 93010 ELECTROCARDIOGRAM REPORT: CPT | Performed by: INTERNAL MEDICINE

## 2022-01-20 PROCEDURE — 86665 EPSTEIN-BARR CAPSID VCA: CPT | Performed by: INTERNAL MEDICINE

## 2022-01-20 PROCEDURE — 86870 RBC ANTIBODY IDENTIFICATION: CPT | Performed by: INTERNAL MEDICINE

## 2022-01-20 PROCEDURE — 86696 HERPES SIMPLEX TYPE 2 TEST: CPT | Performed by: INTERNAL MEDICINE

## 2022-01-20 PROCEDURE — 83021 HEMOGLOBIN CHROMOTOGRAPHY: CPT

## 2022-01-20 RX ORDER — HEPARIN SODIUM (PORCINE) LOCK FLUSH IV SOLN 100 UNIT/ML 100 UNIT/ML
5 SOLUTION INTRAVENOUS ONCE
Status: COMPLETED | OUTPATIENT
Start: 2022-01-20 | End: 2022-01-20

## 2022-01-20 RX ORDER — ONDANSETRON 4 MG/1
4 TABLET, ORALLY DISINTEGRATING ORAL EVERY 8 HOURS PRN
Qty: 16 TABLET | Refills: 0 | Status: SHIPPED | OUTPATIENT
Start: 2022-01-20 | End: 2022-02-05

## 2022-01-20 RX ORDER — LEVOFLOXACIN 250 MG/1
500 TABLET, FILM COATED ORAL DAILY
COMMUNITY
End: 2022-01-21

## 2022-01-20 RX ADMIN — SODIUM CHLORIDE, PRESERVATIVE FREE 5 ML: 5 INJECTION INTRAVENOUS at 16:32

## 2022-01-20 ASSESSMENT — MIFFLIN-ST. JEOR
SCORE: 1126.21
SCORE: 1126.25

## 2022-01-20 ASSESSMENT — PAIN SCALES - GENERAL
PAINLEVEL: NO PAIN (0)
PAINLEVEL: NO PAIN (0)

## 2022-01-20 NOTE — NURSING NOTE
EKG done in clinic, pt tolerated. Results scanned into chart.  Tavia Linton CMA on 1/20/2022 at 4:25 PM

## 2022-01-20 NOTE — LETTER
1/20/2022         RE: Libby Grimaldo  [address] [facility] [address] [facility] [address]        Dear Colleague,    Thank you for referring your patient, Libby Grimaldo, to the [facility] BLOOD AND MARROW TRANSPLANT PROGRAM [place]. Please see a copy of my visit note below.        [facility]  BMTCT OPEN VISIT    January 20, 2022        Libby Grimaldo is a 71 year old female undergoing evaluation prior to hematopoietic cell transplant or immune effector cell therapy.    Reason for BMTCT: multiple myeloma    Recent chemotherapy: through first week of January     Recent infections: current UTI    Blood thinner use? If yes, why? No    Treatment for diabetes? No          Today, the patient notes the following symptoms:  Review Of Systems  Skin: positive for easy to bruise, easy to cut skin  Eyes: negative  Ears/Nose/Throat: positive for thrush symptoms with current antibiotic  Respiratory: No shortness of breath, dyspnea on exertion, cough, or hemoptysis  Cardiovascular: negative, palpitations, tachycardia and dyspnea on exertion  Gastrointestinal: positive for poor appetite, nausea and diarrhea  Genitourinary: negative, dysuria, frequency and urgency  Musculoskeletal: positive for neck pain, joint pain and joint stiffness  Neurologic: negative  Psychiatric: negative  Hematologic/Lymphatic/Immunologic: negative  Endocrine: negative      Libby Grimaldo's History    Past Medical History:   Diagnosis Date     Cervical radiculopathy      COPD (chronic obstructive pulmonary disease) (H)      Double vision      Febrile seizure (H)     Per patient. Once while a toddler, once while in highschool, and once while in college.     Floaters      Graves disease      HLD (hyperlipidemia)      HTN (hypertension)      IPF (idiopathic pulmonary fibrosis) (H)      Lumbar radiculopathy      Multiple myeloma in relapse (H)      Osteoporosis      Pneumonia     Per patient. Complicated by sepsis.     Recurrent UTI     Per patient. Has  "required prophylactic antibiotcs.     Vitamin B12 deficiency (non anemic)        Past Surgical History:   Procedure Laterality Date     CERVICAL FUSION       CHOLECYSTECTOMY       CYSTECTOMY OVARIAN BENIGN       EYE SURGERY      Per patient.     TONSILLECTOMY       WRIST SURGERY Left        Family History   Problem Relation Age of Onset     Heart Disease Mother      Cancer Mother         \"Around lungs\" - she explicitly said it was *not* lung cancer.     Diabetes Father      Heart Disease Father        Social History     Tobacco Use     Smoking status: Former Smoker     Packs/day: 1.00     Years: 40.00     Pack years: 40.00     Smokeless tobacco: Former User     Quit date: 2010   Substance Use Topics     Alcohol use: Not Currently           Libby Patton Medications and Allergies    Current Outpatient Medications   Medication     acetaminophen (TYLENOL) 500 MG tablet     acyclovir (ZOVIRAX) 400 MG tablet     albuterol (PROAIR HFA/PROVENTIL HFA/VENTOLIN HFA) 108 (90 Base) MCG/ACT inhaler     allopurinol (ZYLOPRIM) 300 MG tablet     aspirin (ASA) 81 MG chewable tablet     atorvastatin (LIPITOR) 40 MG tablet     buprenorphine HCl-naloxone HCl (SUBOXONE) 8-2 MG per film     Butalbital-APAP-Caffeine -40 MG CAPS     cholecalciferol (VITAMIN D-1000 MAX ST) 25 MCG (1000 UT) TABS     cyclobenzaprine (FLEXERIL) 10 MG tablet     dexamethasone (DECADRON) 4 MG tablet     fentaNYL (DURAGESIC) 25 mcg/hr 72 hr patch     Fluticasone-Umeclidin-Vilanterol (TRELEGY ELLIPTA) 100-62.5-25 MCG/INH oral inhaler     ipratropium - albuterol 0.5 mg/2.5 mg/3 mL (DUONEB) 0.5-2.5 (3) MG/3ML neb solution     levofloxacin (LEVAQUIN) 250 MG tablet     levothyroxine (SYNTHROID/LEVOTHROID) 112 MCG tablet     lidocaine-prilocaine (EMLA) 2.5-2.5 % external cream     LORazepam (ATIVAN) 0.5 MG tablet     metoprolol succinate ER (TOPROL-XL) 25 MG 24 hr tablet     ondansetron (ZOFRAN) 8 MG tablet     ondansetron (ZOFRAN-ODT) 4 MG ODT tab     oxyCODONE " "IR (ROXICODONE) 10 MG tablet     polyethylene glycol (MIRALAX/GLYCOLAX) powder     promethazine (PHENERGAN) 25 MG tablet     REVLIMID 25 MG CAPS capsule     sulfamethoxazole-trimethoprim (BACTRIM DS/SEPTRA DS) 800-160 MG tablet     venlafaxine (EFFEXOR) 75 MG tablet     No current facility-administered medications for this visit.          Allergies   Allergen Reactions     Bupropion      Other reaction(s): Seizures           Physical Examination    /72   Pulse 78   Temp 98.2  F (36.8  C) (Oral)   Ht 1.61 m (5' 3.39\")   Wt 62.6 kg (138 lb 0.1 oz)   SpO2 98%   BMI 24.15 kg/m      Exam:  Constitutional: healthy, alert and no distress  Head: Normocephalic. No masses, lesions, tenderness or abnormalities  ENT: ENT exam normal, no neck nodes or sinus tenderness  Cardiovascular: negative, PMI normal. No lifts, heaves, or thrills. RRR. No murmurs, clicks gallops or rub  Respiratory: negative, Percussion normal. Good diaphragmatic excursion. Lungs clear  Gastrointestinal: Abdomen soft, non-tender. BS normal. No masses, organomegaly  : Deferred  Musculoskeletal: extremities normal- no gross deformities noted, gait normal and normal muscle tone  Skin: no suspicious lesions or rashes  Neurologic: Gait normal. Reflexes normal and symmetric. Sensation grossly WNL.  Psychiatric: mentation appears normal and affect normal/bright  Hematologic/Lymphatic/Immunologic: Normal cervical lymph nodes        Frailty Screening    1. Weight loss: Have you lost >10 pounds (or >5% body weight) unintentionally over the last year? No      2. Exhaustion: How often in the past week did you feel that:  o I feel that everything I do is an effort : .Exhaustion: 1 = some of the time (1-2 days)  o I feel I cannot get going: Exhaustion: 1 = some of the time (1-2 days)    3. Weakness:  Hand  strength (measured by MA; calculate average): 10     Male BMI Frailty Criteria for  Male  Strength Female BMI Frailty Criteria for  Female  " Strength   ?24 ?29 ?23 ?17   24.1 - 26 ?30 23.1 - 26 ?17.3   26.1 - 28 ?30 26.1 - 29 ?18   >28 ?32 >29 ?21     4. Slowness:  15 foot walk time (measured by MA):  6    Height Frailty Criteria for  15 Foot Walk Time   Men   ?173 cm ? 7 seconds    >173 cm  ? 6 seconds   Women   ?159 cm ? 7 seconds   >159 cm  ? 6 seconds     5. Physical activity:     *Please complete 2 calculations for kcal (see frailty worksheet for equations)     Energy expenditure for frailty: 465 kcal expended per week     Gender Frailty Criteria for Low Physical Activity   Male <383 kcal/week   Female <270 kcal/weel     IPAQ score: 450 MET minutes per week       Libby Grimaldo met the following criteria for prefrailty (score 1-2) or frailty (score 3+): Frailty: Weakness  Frailty Score is: 1      Additional assessments not to be used in frailty calculation:   What types of physical activity can you tolerate? Walking around house, laundry, some house chores    Sit to stand test (time to complete 5 reps): 16 seconds    Standing balance in 10 seconds:  Record the Total number of seconds(0-30)--add a+b+c ( take best attempt for each)    First attempt: 10 seconds    Second attempt: 10 seconds        I have reviewed the diagnostic data, medications, frailty screening, and general processes prior to BMTCT.  I have notified the Primary BMT Physician/and or Attending Physician in the clinic of any issues. We also discussed in detail the database and biorepository research for which Libby Grimaldo is eligible. We discussed the potential risks and potential benefits of each of these protocols individually. We explained potential alternatives to the protocols discussed. We explained to the patient that participation is voluntary and that consent may be withdrawn at any time.     Her zofran was refilled today. I deferred the renewal of any ativan or other controlled substances.      Consents Signed:    Blood transfusion consent form    Ethnicity form    [facility]  [facility]    [facility] B[facility] Database    Present during the discussion were Libby and her sister. Copies of the signed consent forms will be provided to the patient on admission. No procedures specific to any studies were performed prior to the patient signing the consent form.    Libby Grimaldo had the opportunity to ask questions, and I answered all of the questions to the best of my ability.      Wendi Davis PA-C        Again, thank you for allowing me to participate in the care of your patient.      Sincerely,    BMT Advanced Practice Provider

## 2022-01-20 NOTE — PROGRESS NOTES
Met with Libby and sister Paola for CVC education. 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 they were both attentive learners throughout.    Both demonstrated understanding of flushing the line with heparin and end cap change. They watched a dressing change and verbalized their understanding for that.    We went through all the instructions for line care, when to call for help, pre insertion etc.    They had questions about their schedule and I answered what I could and then spoke with  Quita to assist with the rest. Quita to call sister Paola's phone with appointment updates with number provided by writer.    No further questions at this time.    Literature given: Handwashing and Skin Care, Your Central Venous Catheter, Caring for Your Central Venous Catheter at Home, Changing the End Cap, Flushing the Line with Heparin, Saline or Citrate, Changing Your Bandage.

## 2022-01-20 NOTE — PROGRESS NOTES
Hx MM here for initial workup appointment. VSS, A&Ox4. Height and weight obtained. Allergies and medications reviewed. Calendar of upcoming clinic visits discussed at length. Map and locations of appointments explained. Clinic consents obtained. Covid swab obtained and sent to lab. Labs drawn via port and sent to lab. 24 hour urine kit given to patient and patient advised to complete on Sunday 1/23 and drop off on Monday 1/24.  Folder given and patient advised to look through before meeting with nurse coordinator. EKG and frailty completed by MA. Total time spent 1 hour.

## 2022-01-21 ENCOUNTER — VIRTUAL VISIT (OUTPATIENT)
Dept: TRANSPLANT | Facility: CLINIC | Age: 72
End: 2022-01-21
Attending: INTERNAL MEDICINE
Payer: MEDICARE

## 2022-01-21 ENCOUNTER — ANESTHESIA EVENT (OUTPATIENT)
Dept: SURGERY | Facility: AMBULATORY SURGERY CENTER | Age: 72
End: 2022-01-21
Payer: MEDICARE

## 2022-01-21 DIAGNOSIS — C90.00 MYELOMA (H): Primary | ICD-10-CM

## 2022-01-21 DIAGNOSIS — C90.00 MULTIPLE MYELOMA NOT HAVING ACHIEVED REMISSION (H): Primary | ICD-10-CM

## 2022-01-21 DIAGNOSIS — R79.1 ABNORMAL COAGULATION PROFILE: ICD-10-CM

## 2022-01-21 DIAGNOSIS — Z71.9 ENCOUNTER FOR COUNSELING: Primary | ICD-10-CM

## 2022-01-21 LAB — SARS-COV-2 RNA RESP QL NAA+PROBE: NEGATIVE

## 2022-01-21 PROCEDURE — 86334 IMMUNOFIX E-PHORESIS SERUM: CPT | Mod: 26 | Performed by: PATHOLOGY

## 2022-01-21 PROCEDURE — 84165 PROTEIN E-PHORESIS SERUM: CPT | Mod: 26 | Performed by: PATHOLOGY

## 2022-01-21 RX ORDER — VENLAFAXINE HYDROCHLORIDE 75 MG/1
75 CAPSULE, EXTENDED RELEASE ORAL DAILY
Status: ON HOLD | COMMUNITY
End: 2022-02-10

## 2022-01-21 NOTE — LETTER
"    1/21/2022         RE: Libby Grimaldo  [address] [facility] [address]        Dear Colleague,    Thank you for referring your patient, Libby Grimaldo, to the [facility] BLOOD AND MARROW TRANSPLANT PROGRAM [place]. Please see a copy of my visit note below.    Pharmacy Assessment - Pre-Stem Cell Transplant    Assessments & Recommendations:  1) Suboxone & Oxycodone dosing shown below is correct based on fill history and  database.  2)  Use oral ice cryotherapy during and for a couple hours after melphalan to decrease the risk/severity of mucositis.  3) Patient may use own inhalers while inpatient (Trelegy and Albuterol)    If this patient is admitted under observation, the patient may bring in their own supply of the following medication for use in the hospital:  1) Trelegy Ellipa inhaler  2) Albuterol inhaler  All other medications will be supply by the hospital inpatient pharmacy.  -Per \"Medications Not Supplied by Pharmacy\" policy (available on [employer])    History of Present Illness:  Libby Grimaldo is a 71 year old year old female diagnosed with Multiple Myeloma.  She has been treated with KVD and recently Daratumumab maintenance.  She is now being work up for Autologous Stem Cell Transplant on protocol 2016-35, which utilizes HD Melphalan as a conditioning regimen.    Pertinent labs/tests:  Viral Testing:  CMV(pending) / HSV(+) / EBV(+)  Ejection Fraction: 57% (1/20/22)  QTc: 430 msec (1/20/22)    Weights:   Wt Readings from Last 3 Encounters:   01/20/22 62.6 kg (138 lb 0.1 oz)   01/20/22 62.6 kg (138 lb)   01/19/22 62.6 kg (138 lb 0.1 oz)   Ideal body weight: 53.3 kg (117 lb 7.7 oz)  Adjusted ideal body weight: 57 kg (125 lb 11 oz)  % IBW:  8.3% over IBW  There is no height or weight on file to calculate BMI.    Primary BMT Physician: Dr. Guillory  BMT RN Coordinator:  Rashad Chen    Past Medical History:  Past Medical History:   Diagnosis Date     Cervical radiculopathy      COPD (chronic " obstructive pulmonary disease) (H)      Double vision      Febrile seizure (H)     Per patient. Once while a toddler, once while in highschool, and once while in college.     Floaters      Graves disease      HLD (hyperlipidemia)      HTN (hypertension)      IPF (idiopathic pulmonary fibrosis) (H)      Lumbar radiculopathy      Multiple myeloma in relapse (H)      Osteoporosis      Pneumonia     Per patient. Complicated by sepsis.     Recurrent UTI     Per patient. Has required prophylactic antibiotcs.     Vitamin B12 deficiency (non anemic)      Medication Allergies:  Allergies   Allergen Reactions     Bupropion      Other reaction(s): Seizures     Current Medications (pre-admit):  Current Outpatient Medications   Medication Sig Dispense Refill     acetaminophen (TYLENOL) 500 MG tablet Take 500-1,000 mg by mouth every 6 hours as needed for mild pain        albuterol (PROAIR HFA/PROVENTIL HFA/VENTOLIN HFA) 108 (90 Base) MCG/ACT inhaler Inhale 2 puffs into the lungs every 6 hours as needed for shortness of breath / dyspnea        aspirin (ASA) 81 MG chewable tablet Take 81 mg by mouth daily        atorvastatin (LIPITOR) 40 MG tablet Take 40 mg by mouth daily        buprenorphine HCl-naloxone HCl (SUBOXONE) 8-2 MG per film Place 1 Film under the tongue 3 times daily        cholecalciferol (VITAMIN D-1000 MAX ST) 25 MCG (1000 UT) TABS Take 1,000 Units by mouth daily        Fluticasone-Umeclidin-Vilanterol (TRELEGY ELLIPTA) 100-62.5-25 MCG/INH oral inhaler Inhale 1 puff into the lungs daily        levothyroxine (SYNTHROID/LEVOTHROID) 112 MCG tablet Take 112 mcg by mouth daily        metoprolol succinate ER (TOPROL-XL) 25 MG 24 hr tablet Take 25 mg by mouth daily        ondansetron (ZOFRAN-ODT) 4 MG ODT tab Take 1 tablet (4 mg) by mouth every 8 hours as needed for nausea 16 tablet 0     oxyCODONE IR (ROXICODONE) 10 MG tablet Take 10 mg by mouth every 8 hours as needed for severe pain        polyethylene glycol  (MIRALAX/GLYCOLAX) powder 17 g by Nasogastric route daily as needed        venlafaxine (EFFEXOR-XR) 75 MG 24 hr capsule Take 75 mg by mouth daily       REVLIMID 25 MG CAPS capsule  (Patient not taking: Reported on 1/20/2022)       Herbal Medication/Nutritional Supplements:  None    Smoking/Past Drug Use:  Former smoker.  Also smoked marijuana about 3 months ago, but not since.    Nausea/Vomiting, Pain, or other issues:  No significant N/V recently.  Libby deals with chronic pain.  She is on Suboxone and Oxycodone.    Summary:  I met with Libby Grimaldo for approximately 45 minutes.  We discussed current medications, allergies, the mobilization and transplant process, use of growth factor, possible need for and side effects of cyclophosphamide, melphalan conditioning chemotherapy, side effects, risk of infection, use of antimicrobials, use of a pill box post transplant, and pharmacy expectations post transplant.  Libby voiced understanding of our conversation.    Thank you,  Andy J. Kurtzweil, PharmD        Again, thank you for allowing me to participate in the care of your patient.      Sincerely,    BMT Pharm D, [facility]

## 2022-01-21 NOTE — LETTER
"    1/21/2022         RE: Libby Grimaldo  [address] [address]        Dear Colleague,    Thank you for referring your patient, Libby Grimaldo, to the [facility] BLOOD AND MARROW TRANSPLANT PROGRAM [place]. Please see a copy of my visit note below.    Blood and Marrow Transplant   Psychosocial Assessment with   Clinical     Assessment completed on 1/21/2022 via phone as part of the COVID 19 protocol. Assessment of living situation, support system, financial status, functional status, coping, stressors, need for resources and social work intervention provided as needed. Information for this assessment was provided by pt, pt's -Jerrica, and pt's sister-Leisa's report in addition to medical chart review and consultation with medical team.     Present at Assessment:   Patient: Libby Grimaldo  Spouse: Tres \"Jerrica\" Linda  : ANDREW Lambert, [facility]    Diagnosis: Multiple Myeloma     Date of Diagnosis: 12/20219    Transplant type: Autologous    Donor: Autologous      Physician: Alex Monsalve MD    Nurse Coordinator (Work-Up): Rashad Chen RN    Nurse Coordinator (Primary): Kiara Stewart RN    : ANDREW Lambert, [facility]     Permanent Address:   [address] [place] [address]    Living Situation: Pt lives at home with her Queenie.    Contact Information:  Pt's Cell Phone: [phone]  Pt Email: [email or web address]  Teddy Mckeon's Phone: [phone]  Efren Grimaldo's Phone: [phone]    Presenting Information:  Libby is a 71 year old female diagnosed with MM who presents for evaluation for autologous transplant at the [facility] ([facility]). Pt was accompanied to today's visit by her -Jerrica and sister-Leisa.     Decision Making: Self    Health Care Directive: No. SW provided education and forms. SW encouraged pt to have discussions with their family regarding their health care wishes. SW " "explained that since she does not have a healthcare directive, legally her -Tres Mckeon would make decisions on her behalf, if she did not have capacity to make her own medical decisions. Pt understood.     Pt wants to complete a healthcare directive. SW will check in with pt while in the hospital.     Relationship Status: . Pt and pt's spouse described relationship as stable and supportive.     Family/Support System: Pt endorsed a good support system including family and close friends who will be available to support pt throughout transplant process.     Spouse: Tres \"Jerrica\" Linda ( has kidney failure and has been sick and attending appts himself)  Children: Son-Luis Eduardo (Lives in [place]) and Son-Pravin (Lives in [place]) - These are Libby's biological children. -Jerrica has 3 Children of his own (2 live in [place], [place] and 1 lives in [place])  Grandchildren: Libby has 1 Grandchild-Yamel Cm (7 years old) and Jerrica has 7 Grandchildren - Together they have 8 grandchildren  Parents:   Siblings: Sister-Leisa Grimaldo (Lives in [place] - 3 miles away from pt)    Caregiver: MIKEY discussed with pt, pt's , and pt's sister the caregiver role and expectation at length. Pt is agreeable to having a full time caregiver for a minimum of 30 days until cleared by the BMT physician. Pt and pt's family confirmed understanding of the caregiver requirement. Pt's primary caregivers will be -Jerrica and sister-Leisa. Pt reviewed and signed the caregiver contract which will be scanned into the EM. Caregiver education and resources provided. No caregiver concerns identified.     Caregiver Contact Information:  -Tres Mckeon's Phone: [phone]  Sister-Leisa Grimaldo's Phone: [phone]    Transportation Mode: Private Vehicle. Pt is aware of driving restrictions post-BMT and the need for the caregiver is to drive until cleared to drive by the BMT physician. SW provided information on " parking info and monthly parking pass options. Pt reported no concerns with transportation and confirmed her caregivers will drive her to call her doctor's appointments.    Insurance: Pt has Medicare and Medicaid [place] health insurance. Pt denied specific insurance concerns at this time. MIKEY reiterated information about the [employer]  should specific insurance questions arise as pt moves through transplant process. Future Insurance questions referred to [employer] -Michael Valdes (CP: [phone]).    Sources of Income: Pt is supported by Social Security correction and Pension. Pt is anticipating financial hardship related to BMT at this time. MIKEY provided information on gris options. Pt would like to apply for grants. MIKEY mailed pt on 1.21.2022 the [employer], [employer], [facility] Patient Aid and [employer] gris applications. MIKEY encouraged pt to return applications to  when completed.     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     Mental Health: Pt denied a history of mental health concerns, specific diagnoses or medications at this time. MIKEY explained that it's not uncommon for patients going through transplant to experience symptoms of depression/anxiety.     PHQ-2:  Pt scored a 0 which indicates no sign of depression on the depression severity scale. Pt endorses this is an accurate reflection of her emotional state.    GAD7:  Pt scored a 5 which indicates mild anxiety on the Generalized Anxiety Disorder Questionnaire. Pt endorses this is not an accurate reflection of her emotional state.    Chemical Use:   Tobacco: Pt quit 15 years ago.  Alcohol: No use  Marijuana: No hx  Other Drugs: No hx  Based on the information provided, there appear to be no specific risks or concerns identified at this time.     Trauma/Loss/Abuse History: Multiple losses associated with cancer diagnosis and treatment, including  "health, changes to physical appearance, etc.     Spirituality: SW explained that there are Chaplains on the unit and pt can request to meet with a  at anytime.    Coping: Pt noted that she is currently feeling \"okay, tired yet moving around and handling it well\". Pt shared she is worried about her  and sister having to be her caregiver all the time. Pt shared she is having trouble with sleeping at night and plants to talk with the MD about her sleep trouble. Pt shared that her main coping mechanisms are talking with family/friends and spending time with her granddaughter. SW and pt discussed additional positive coping mechanisms that pt can utilize while in the hospital. While hospitalized, pt plans to bring her computer.     Caregiver Coping: Pt's caregivers noted that they is feeling \"fine\" at this time. SW encouraged pt's caregivers to reach out if they need anything at all for support as well.     Education Provided: Transplant process expectations, Caregiver requirements, Caregiver self-care, Financial issues related to transplant, Financial resources/grants available, Common psychosocial stressors pre/post transplant, Support group(s) available, Hospital resources available, Web site information, Social Work role and Resources for grandchildren.    Interventions Provided: Psychosocial Support and Education     Assessment and Recommendations for Team:  Pt is a 71 year old female diagnosed with MM who is here undergoing preparation for a planned autologous transplant.     Pt is pleasant, calm and able to articulate concerns/coping mechanisms in an appropriate manner. During our meeting pt was alert, she was interactive, affect was full, she displayed appropriate memory and thought processes. Pt feels comfortable communicating with the medical team. Pt has a strong supportive network of family and friends who are involved. Pt and pt's family will benefit from ongoing psychosocial support in regards " to coping with the adjustment to the BMT process.     Pt has a good support system and a well developed caregiver plan. Pt verbalizes understanding of the transplant process and wanting to proceed. SW provided contact information and encouraged pt to contact SW with questions, concerns, resources and for support.    Per this assessment, SW did not identify any barriers to this patient moving forward with transplant.    Important Information:   -Would like clinic appointments later in the day.    Follow up Planned:   -Initiated financial resources -Mailed gris applications on 1.21.2022 and received on 1.29.2022  -Psychosocial support  -Healthcare Directive - Check in while inpatient  -Resources for grandchildren - Mailed [employer], [employer], and [employer] on 1.21.2022 and received on 1.29.2022              Again, thank you for allowing me to participate in the care of your patient.      Sincerely,    ANDREW Lambert

## 2022-01-21 NOTE — PROGRESS NOTES
"Pharmacy Assessment - Pre-Stem Cell Transplant    Assessments & Recommendations:  1) Suboxone & Oxycodone dosing shown below is correct based on fill history and  database.  2)  Use oral ice cryotherapy during and for a couple hours after melphalan to decrease the risk/severity of mucositis.  3) Patient may use own inhalers while inpatient (Trelegy and Albuterol)    If this patient is admitted under observation, the patient may bring in their own supply of the following medication for use in the hospital:  1) Trelegy Ellipa inhaler  2) Albuterol inhaler  All other medications will be supply by the hospital inpatient pharmacy.  -Per \"Medications Not Supplied by Pharmacy\" policy (available on [employer])    History of Present Illness:  Libby Grimaldo is a 71 year old year old female diagnosed with Multiple Myeloma.  She has been treated with KVD and recently Daratumumab maintenance.  She is now being work up for Autologous Stem Cell Transplant on protocol 2016-35, which utilizes HD Melphalan as a conditioning regimen.    Pertinent labs/tests:  Viral Testing:  CMV(pending) / HSV(+) / EBV(+)  Ejection Fraction: 57% (1/20/22)  QTc: 430 msec (1/20/22)    Weights:   Wt Readings from Last 3 Encounters:   01/20/22 62.6 kg (138 lb 0.1 oz)   01/20/22 62.6 kg (138 lb)   01/19/22 62.6 kg (138 lb 0.1 oz)   Ideal body weight: 53.3 kg (117 lb 7.7 oz)  Adjusted ideal body weight: 57 kg (125 lb 11 oz)  % IBW:  8.3% over IBW  There is no height or weight on file to calculate BMI.    Primary BMT Physician: Dr. Guillory  BMT RN Coordinator:  Rashad Chen    Past Medical History:  Past Medical History:   Diagnosis Date     Cervical radiculopathy      COPD (chronic obstructive pulmonary disease) (H)      Double vision      Febrile seizure (H)     Per patient. Once while a toddler, once while in highschool, and once while in college.     Floaters      Graves disease      HLD (hyperlipidemia)      HTN (hypertension)      IPF (idiopathic " pulmonary fibrosis) (H)      Lumbar radiculopathy      Multiple myeloma in relapse (H)      Osteoporosis      Pneumonia     Per patient. Complicated by sepsis.     Recurrent UTI     Per patient. Has required prophylactic antibiotcs.     Vitamin B12 deficiency (non anemic)      Medication Allergies:  Allergies   Allergen Reactions     Bupropion      Other reaction(s): Seizures     Current Medications (pre-admit):  Current Outpatient Medications   Medication Sig Dispense Refill     acetaminophen (TYLENOL) 500 MG tablet Take 500-1,000 mg by mouth every 6 hours as needed for mild pain        albuterol (PROAIR HFA/PROVENTIL HFA/VENTOLIN HFA) 108 (90 Base) MCG/ACT inhaler Inhale 2 puffs into the lungs every 6 hours as needed for shortness of breath / dyspnea        aspirin (ASA) 81 MG chewable tablet Take 81 mg by mouth daily        atorvastatin (LIPITOR) 40 MG tablet Take 40 mg by mouth daily        buprenorphine HCl-naloxone HCl (SUBOXONE) 8-2 MG per film Place 1 Film under the tongue 3 times daily        cholecalciferol (VITAMIN D-1000 MAX ST) 25 MCG (1000 UT) TABS Take 1,000 Units by mouth daily        Fluticasone-Umeclidin-Vilanterol (TRELEGY ELLIPTA) 100-62.5-25 MCG/INH oral inhaler Inhale 1 puff into the lungs daily        levothyroxine (SYNTHROID/LEVOTHROID) 112 MCG tablet Take 112 mcg by mouth daily        metoprolol succinate ER (TOPROL-XL) 25 MG 24 hr tablet Take 25 mg by mouth daily        ondansetron (ZOFRAN-ODT) 4 MG ODT tab Take 1 tablet (4 mg) by mouth every 8 hours as needed for nausea 16 tablet 0     oxyCODONE IR (ROXICODONE) 10 MG tablet Take 10 mg by mouth every 8 hours as needed for severe pain        polyethylene glycol (MIRALAX/GLYCOLAX) powder 17 g by Nasogastric route daily as needed        venlafaxine (EFFEXOR-XR) 75 MG 24 hr capsule Take 75 mg by mouth daily       REVLIMID 25 MG CAPS capsule  (Patient not taking: Reported on 1/20/2022)       Herbal Medication/Nutritional  Supplements:  None    Smoking/Past Drug Use:  Former smoker.  Also smoked marijuana about 3 months ago, but not since.    Nausea/Vomiting, Pain, or other issues:  No significant N/V recently.  Libby deals with chronic pain.  She is on Suboxone and Oxycodone.    Summary:  I met with Libby Grimaldo for approximately 45 minutes.  We discussed current medications, allergies, the mobilization and transplant process, use of growth factor, possible need for and side effects of cyclophosphamide, melphalan conditioning chemotherapy, side effects, risk of infection, use of antimicrobials, use of a pill box post transplant, and pharmacy expectations post transplant.  Libby voiced understanding of our conversation.    Thank you,  Andy J. Kurtzweil, PharmD

## 2022-01-21 NOTE — LETTER
1/21/2022         RE: Libby Grimaldo  [address] [facility] [address]        Dear Colleague,    Thank you for referring your patient, Libby Grimaldo, to the [facility] BLOOD AND MARROW TRANSPLANT PROGRAM [place]. Please see a copy of my visit note below.    BMT Teaching Flowsheet    Libby Grimaldo is a 71 year old female  There were no encounter diagnoses.    Teaching Topic: 2016-35    Person(s) involved in teaching: Patient and sister  Motivation Level  Asks Questions: Yes  Eager to Learn: Yes  Cooperative: Yes  Receptive (willing/able to accept information): Yes  Any cultural factors/Yazidism beliefs that may influence understanding or compliance? No    Patient and Caregiver demonstrates understanding of the following:  - Reason for the appointment, diagnosis and treatment plan: Yes  - Knowledge of proper use of medications and conditions for which they are ordered (with special attention to potential side effects or drug interactions): Yes  - Which situations necessitate calling provider and whom to contact: Yes    Teaching concerns addressed: None    Proper use and care of (medical equipment, care aids, etc.) NA  Pain management techniques: NA  Patient instructed on hand hygiene: Yes  How and/when to access community resources: Yes    Infection Control:  Patient and Caregiver demonstrates understanding of the following:  Surgical procedure site care taught NA  Signs and symptoms of infection taught Yes  Wound care taught NA  Central venous catheter care taught NA    Instructional Materials Used/Given:   Patient was given and reviewed BMT Teaching Binder, including medication pamphlets, sample treatment calendars, consents, contact phone numbers, hospital and discharge guidelines.  Patient verbalizes understanding of the material and was encouraged to call with any additional questions. .    Research participant Libby Grimaldo was provided the information on the Research Participant Information  Sheet by Rashad Chen RN on January 21, 2022. This document contains information regarding the risks of participating in a research study during the COVID-19 pandemic. Receipt of the information sheet was confirmed by study personnel prior to the visit.    Time spent with patient: 90 minutes.      Specific Concerns: NA          Again, thank you for allowing me to participate in the care of your patient.      Sincerely,    BMT Nurse Coordinator

## 2022-01-23 RX ORDER — LIDOCAINE 40 MG/G
CREAM TOPICAL
Status: CANCELLED | OUTPATIENT
Start: 2022-01-23

## 2022-01-23 RX ORDER — LIDOCAINE HYDROCHLORIDE 10 MG/ML
8-10 INJECTION, SOLUTION EPIDURAL; INFILTRATION; INTRACAUDAL; PERINEURAL
Status: CANCELLED | OUTPATIENT
Start: 2022-01-23

## 2022-01-24 ENCOUNTER — ONCOLOGY VISIT (OUTPATIENT)
Dept: TRANSPLANT | Facility: CLINIC | Age: 72
End: 2022-01-24
Attending: PHYSICIAN ASSISTANT
Payer: MEDICARE

## 2022-01-24 ENCOUNTER — HOSPITAL ENCOUNTER (OUTPATIENT)
Facility: AMBULATORY SURGERY CENTER | Age: 72
End: 2022-01-24
Attending: PHYSICIAN ASSISTANT
Payer: MEDICARE

## 2022-01-24 ENCOUNTER — LAB (OUTPATIENT)
Dept: LAB | Facility: CLINIC | Age: 72
End: 2022-01-24

## 2022-01-24 ENCOUNTER — APPOINTMENT (OUTPATIENT)
Dept: LAB | Facility: CLINIC | Age: 72
End: 2022-01-24
Attending: PHYSICIAN ASSISTANT
Payer: MEDICARE

## 2022-01-24 ENCOUNTER — ANESTHESIA (OUTPATIENT)
Dept: SURGERY | Facility: AMBULATORY SURGERY CENTER | Age: 72
End: 2022-01-24
Payer: MEDICARE

## 2022-01-24 VITALS
HEART RATE: 77 BPM | HEIGHT: 63 IN | OXYGEN SATURATION: 97 % | BODY MASS INDEX: 24.8 KG/M2 | RESPIRATION RATE: 18 BRPM | DIASTOLIC BLOOD PRESSURE: 70 MMHG | WEIGHT: 140 LBS | SYSTOLIC BLOOD PRESSURE: 121 MMHG | TEMPERATURE: 97.2 F

## 2022-01-24 VITALS
BODY MASS INDEX: 24.8 KG/M2 | HEIGHT: 63 IN | TEMPERATURE: 98 F | WEIGHT: 140 LBS | OXYGEN SATURATION: 100 % | SYSTOLIC BLOOD PRESSURE: 141 MMHG | RESPIRATION RATE: 16 BRPM | HEART RATE: 68 BPM | DIASTOLIC BLOOD PRESSURE: 66 MMHG

## 2022-01-24 DIAGNOSIS — C90.00 MULTIPLE MYELOMA NOT HAVING ACHIEVED REMISSION (H): Primary | ICD-10-CM

## 2022-01-24 DIAGNOSIS — Z01.818 EXAMINATION PRIOR TO CHEMOTHERAPY: ICD-10-CM

## 2022-01-24 DIAGNOSIS — C90.00 MULTIPLE MYELOMA NOT HAVING ACHIEVED REMISSION (H): ICD-10-CM

## 2022-01-24 DIAGNOSIS — Z86.2 PERSONAL HISTORY OF DISEASES OF BLOOD AND BLOOD-FORMING ORGANS: ICD-10-CM

## 2022-01-24 DIAGNOSIS — R79.1 ABNORMAL COAGULATION PROFILE: ICD-10-CM

## 2022-01-24 DIAGNOSIS — C90.00 MYELOMA (H): ICD-10-CM

## 2022-01-24 LAB
ALBUMIN UR-MCNC: NEGATIVE MG/DL
APPEARANCE UR: CLEAR
APTT PPP: 47 SECONDS (ref 22–38)
ATRIAL RATE - MUSE: 69 BPM
BASOPHILS # BLD AUTO: 0 10E3/UL (ref 0–0.2)
BASOPHILS NFR BLD AUTO: 0 %
BILIRUB UR QL STRIP: NEGATIVE
CAOX CRY #/AREA URNS HPF: ABNORMAL /HPF
COLLECT DURATION TIME UR: 24 H
COLLECT DURATION TIME UR: 24 H
COLOR UR AUTO: YELLOW
CREAT 24H UR-MRATE: 0.52 G/SPEC (ref 0.8–1.8)
CREAT 24H UR-MRATE: 0.52 G/SPEC (ref 0.8–1.8)
CREAT CL 24H UR+SERPL-VRATE: 48 ML/MIN
CREAT CL/1.73 SQ M 24H UR+SERPL-ARVRAT: 49 ML/MIN/1.7M2
CREAT SERPL-MCNC: 0.75 MG/DL (ref 0.52–1.04)
CREAT UR-MCNC: 54 MG/DL
CREAT UR-MCNC: 54 MG/DL
CREATININE (SYNCED VALUE): 0.75 MG/DL
DIASTOLIC BLOOD PRESSURE - MUSE: NORMAL MMHG
EOSINOPHIL # BLD AUTO: 0 10E3/UL (ref 0–0.7)
EOSINOPHIL NFR BLD AUTO: 1 %
ERYTHROCYTE [DISTWIDTH] IN BLOOD BY AUTOMATED COUNT: 15.8 % (ref 10–15)
GFR SERPL CREATININE-BSD FRML MDRD: 86 ML/MIN/1.73M2
GLUCOSE UR STRIP-MCNC: NEGATIVE MG/DL
HCT VFR BLD AUTO: 34.6 % (ref 35–47)
HGB BLD-MCNC: 10.6 G/DL (ref 11.7–15.7)
HGB UR QL STRIP: NEGATIVE
IMM GRANULOCYTES # BLD: 0 10E3/UL
IMM GRANULOCYTES NFR BLD: 0 %
INTERPRETATION ECG - MUSE: NORMAL
KETONES UR STRIP-MCNC: NEGATIVE MG/DL
LEUKOCYTE ESTERASE UR QL STRIP: NEGATIVE
LYMPHOCYTES # BLD AUTO: 2.1 10E3/UL (ref 0.8–5.3)
LYMPHOCYTES NFR BLD AUTO: 48 %
MCH RBC QN AUTO: 31.9 PG (ref 26.5–33)
MCHC RBC AUTO-ENTMCNC: 30.6 G/DL (ref 31.5–36.5)
MCV RBC AUTO: 104 FL (ref 78–100)
MONOCYTES # BLD AUTO: 0.5 10E3/UL (ref 0–1.3)
MONOCYTES NFR BLD AUTO: 11 %
MUCOUS THREADS #/AREA URNS LPF: PRESENT /LPF
NEUTROPHILS # BLD AUTO: 1.7 10E3/UL (ref 1.6–8.3)
NEUTROPHILS NFR BLD AUTO: 40 %
NITRATE UR QL: NEGATIVE
NRBC # BLD AUTO: 0 10E3/UL
NRBC BLD AUTO-RTO: 0 /100
P AXIS - MUSE: 74 DEGREES
PATH REPORT.COMMENTS IMP SPEC: NORMAL
PATH REPORT.FINAL DX SPEC: NORMAL
PATH REPORT.GROSS SPEC: NORMAL
PATH REPORT.RELEVANT HX SPEC: NORMAL
PATH REPORT.SITE OF ORIGIN SPEC: NORMAL
PH UR STRIP: 6 [PH] (ref 5–7)
PLATELET # BLD AUTO: 161 10E3/UL (ref 150–450)
PR INTERVAL - MUSE: 172 MS
PROT 24H UR-MRATE: 0.1 G/SPEC (ref 0.04–0.23)
PROT UR-MCNC: 0.1 G/L
PROT/CREAT 24H UR: 0.19 G/G CR (ref 0–0.2)
QRS DURATION - MUSE: 80 MS
QT - MUSE: 402 MS
QTC - MUSE: 430 MS
R AXIS - MUSE: 56 DEGREES
RBC # BLD AUTO: 3.32 10E6/UL (ref 3.8–5.2)
RBC URINE: 1 /HPF
SP GR UR STRIP: 1.01 (ref 1–1.03)
SPECIMEN VOL UR: 968 ML
SPECIMEN VOL UR: 968 ML
SQUAMOUS EPITHELIAL: <1 /HPF
SYSTOLIC BLOOD PRESSURE - MUSE: NORMAL MMHG
T AXIS - MUSE: 70 DEGREES
UROBILINOGEN UR STRIP-MCNC: NORMAL MG/DL
VENTRICULAR RATE- MUSE: 69 BPM
WBC # BLD AUTO: 4.3 10E3/UL (ref 4–11)
WBC URINE: 1 /HPF

## 2022-01-24 PROCEDURE — 86335 IMMUNFIX E-PHORSIS/URINE/CSF: CPT | Mod: 26 | Performed by: PATHOLOGY

## 2022-01-24 PROCEDURE — 84166 PROTEIN E-PHORESIS/URINE/CSF: CPT | Mod: 26 | Performed by: PATHOLOGY

## 2022-01-24 PROCEDURE — 88184 FLOWCYTOMETRY/ TC 1 MARKER: CPT | Performed by: STUDENT IN AN ORGANIZED HEALTH CARE EDUCATION/TRAINING PROGRAM

## 2022-01-24 PROCEDURE — 88321 CONSLTJ&REPRT SLD PREP ELSWR: CPT | Performed by: PATHOLOGY

## 2022-01-24 PROCEDURE — 88313 SPECIAL STAINS GROUP 2: CPT | Mod: TC | Performed by: PHYSICIAN ASSISTANT

## 2022-01-24 PROCEDURE — 84166 PROTEIN E-PHORESIS/URINE/CSF: CPT | Performed by: PATHOLOGY

## 2022-01-24 PROCEDURE — 84156 ASSAY OF PROTEIN URINE: CPT | Performed by: PATHOLOGY

## 2022-01-24 PROCEDURE — 38222 DX BONE MARROW BX & ASPIR: CPT | Mod: RT

## 2022-01-24 PROCEDURE — 81050 URINALYSIS VOLUME MEASURE: CPT | Performed by: PATHOLOGY

## 2022-01-24 PROCEDURE — 250N000011 HC RX IP 250 OP 636: Performed by: PHYSICIAN ASSISTANT

## 2022-01-24 PROCEDURE — 36591 DRAW BLOOD OFF VENOUS DEVICE: CPT

## 2022-01-24 PROCEDURE — 88185 FLOWCYTOMETRY/TC ADD-ON: CPT | Performed by: INTERNAL MEDICINE

## 2022-01-24 PROCEDURE — 88311 DECALCIFY TISSUE: CPT | Mod: TC | Performed by: PHYSICIAN ASSISTANT

## 2022-01-24 PROCEDURE — 88275 CYTOGENETICS 100-300: CPT

## 2022-01-24 PROCEDURE — 85730 THROMBOPLASTIN TIME PARTIAL: CPT

## 2022-01-24 PROCEDURE — 88187 FLOWCYTOMETRY/READ 2-8: CPT | Performed by: STUDENT IN AN ORGANIZED HEALTH CARE EDUCATION/TRAINING PROGRAM

## 2022-01-24 PROCEDURE — 88237 TISSUE CULTURE BONE MARROW: CPT

## 2022-01-24 PROCEDURE — G0463 HOSPITAL OUTPT CLINIC VISIT: HCPCS

## 2022-01-24 PROCEDURE — 82575 CREATININE CLEARANCE TEST: CPT | Performed by: PATHOLOGY

## 2022-01-24 PROCEDURE — 86335 IMMUNFIX E-PHORSIS/URINE/CSF: CPT | Performed by: INTERNAL MEDICINE

## 2022-01-24 PROCEDURE — 81001 URINALYSIS AUTO W/SCOPE: CPT

## 2022-01-24 PROCEDURE — 85025 COMPLETE CBC W/AUTO DIFF WBC: CPT

## 2022-01-24 PROCEDURE — 82565 ASSAY OF CREATININE: CPT

## 2022-01-24 RX ORDER — HEPARIN SODIUM,PORCINE 10 UNIT/ML
5-10 VIAL (ML) INTRAVENOUS EVERY 24 HOURS
Status: DISCONTINUED | OUTPATIENT
Start: 2022-01-24 | End: 2022-01-25 | Stop reason: HOSPADM

## 2022-01-24 RX ORDER — PROPOFOL 10 MG/ML
INJECTION, EMULSION INTRAVENOUS CONTINUOUS PRN
Status: DISCONTINUED | OUTPATIENT
Start: 2022-01-24 | End: 2022-01-24

## 2022-01-24 RX ORDER — ONDANSETRON 2 MG/ML
4 INJECTION INTRAMUSCULAR; INTRAVENOUS EVERY 30 MIN PRN
Status: DISCONTINUED | OUTPATIENT
Start: 2022-01-24 | End: 2022-01-25 | Stop reason: HOSPADM

## 2022-01-24 RX ORDER — SODIUM CHLORIDE, SODIUM LACTATE, POTASSIUM CHLORIDE, CALCIUM CHLORIDE 600; 310; 30; 20 MG/100ML; MG/100ML; MG/100ML; MG/100ML
INJECTION, SOLUTION INTRAVENOUS CONTINUOUS
Status: DISCONTINUED | OUTPATIENT
Start: 2022-01-24 | End: 2022-01-25 | Stop reason: HOSPADM

## 2022-01-24 RX ORDER — HYDROMORPHONE HYDROCHLORIDE 1 MG/ML
0.2 INJECTION, SOLUTION INTRAMUSCULAR; INTRAVENOUS; SUBCUTANEOUS EVERY 5 MIN PRN
Status: DISCONTINUED | OUTPATIENT
Start: 2022-01-24 | End: 2022-01-25 | Stop reason: HOSPADM

## 2022-01-24 RX ORDER — MEPERIDINE HYDROCHLORIDE 25 MG/ML
12.5 INJECTION INTRAMUSCULAR; INTRAVENOUS; SUBCUTANEOUS
Status: DISCONTINUED | OUTPATIENT
Start: 2022-01-24 | End: 2022-01-25 | Stop reason: HOSPADM

## 2022-01-24 RX ORDER — FENTANYL CITRATE 50 UG/ML
25 INJECTION, SOLUTION INTRAMUSCULAR; INTRAVENOUS
Status: DISCONTINUED | OUTPATIENT
Start: 2022-01-24 | End: 2022-01-25 | Stop reason: HOSPADM

## 2022-01-24 RX ORDER — HYDRALAZINE HYDROCHLORIDE 20 MG/ML
2.5-5 INJECTION INTRAMUSCULAR; INTRAVENOUS EVERY 10 MIN PRN
Status: DISCONTINUED | OUTPATIENT
Start: 2022-01-24 | End: 2022-01-25 | Stop reason: HOSPADM

## 2022-01-24 RX ORDER — LIDOCAINE 40 MG/G
CREAM TOPICAL
Status: DISCONTINUED | OUTPATIENT
Start: 2022-01-24 | End: 2022-01-25 | Stop reason: HOSPADM

## 2022-01-24 RX ORDER — HEPARIN SODIUM (PORCINE) LOCK FLUSH IV SOLN 100 UNIT/ML 100 UNIT/ML
5 SOLUTION INTRAVENOUS EVERY 8 HOURS
Status: DISCONTINUED | OUTPATIENT
Start: 2022-01-24 | End: 2022-01-24 | Stop reason: HOSPADM

## 2022-01-24 RX ORDER — OXYCODONE HYDROCHLORIDE 5 MG/1
5 TABLET ORAL EVERY 4 HOURS PRN
Status: DISCONTINUED | OUTPATIENT
Start: 2022-01-24 | End: 2022-01-25 | Stop reason: HOSPADM

## 2022-01-24 RX ORDER — ACETAMINOPHEN 325 MG/1
975 TABLET ORAL ONCE
Status: COMPLETED | OUTPATIENT
Start: 2022-01-24 | End: 2022-01-24

## 2022-01-24 RX ORDER — LIDOCAINE HYDROCHLORIDE 20 MG/ML
INJECTION, SOLUTION INFILTRATION; PERINEURAL PRN
Status: DISCONTINUED | OUTPATIENT
Start: 2022-01-24 | End: 2022-01-24

## 2022-01-24 RX ORDER — ONDANSETRON 4 MG/1
4 TABLET, ORALLY DISINTEGRATING ORAL EVERY 30 MIN PRN
Status: DISCONTINUED | OUTPATIENT
Start: 2022-01-24 | End: 2022-01-25 | Stop reason: HOSPADM

## 2022-01-24 RX ORDER — HEPARIN SODIUM (PORCINE) LOCK FLUSH IV SOLN 100 UNIT/ML 100 UNIT/ML
5-10 SOLUTION INTRAVENOUS
Status: DISCONTINUED | OUTPATIENT
Start: 2022-01-24 | End: 2022-01-25 | Stop reason: HOSPADM

## 2022-01-24 RX ORDER — FENTANYL CITRATE 50 UG/ML
25 INJECTION, SOLUTION INTRAMUSCULAR; INTRAVENOUS EVERY 5 MIN PRN
Status: DISCONTINUED | OUTPATIENT
Start: 2022-01-24 | End: 2022-01-25 | Stop reason: HOSPADM

## 2022-01-24 RX ORDER — METOPROLOL TARTRATE 1 MG/ML
1-2 INJECTION, SOLUTION INTRAVENOUS EVERY 5 MIN PRN
Status: DISCONTINUED | OUTPATIENT
Start: 2022-01-24 | End: 2022-01-25 | Stop reason: HOSPADM

## 2022-01-24 RX ORDER — LIDOCAINE HYDROCHLORIDE 10 MG/ML
8-10 INJECTION, SOLUTION EPIDURAL; INFILTRATION; INTRACAUDAL; PERINEURAL
Status: DISCONTINUED | OUTPATIENT
Start: 2022-01-24 | End: 2022-01-25 | Stop reason: HOSPADM

## 2022-01-24 RX ORDER — HEPARIN SODIUM,PORCINE 10 UNIT/ML
5-10 VIAL (ML) INTRAVENOUS
Status: DISCONTINUED | OUTPATIENT
Start: 2022-01-24 | End: 2022-01-25 | Stop reason: HOSPADM

## 2022-01-24 RX ADMIN — OXYCODONE HYDROCHLORIDE 5 MG: 5 TABLET ORAL at 12:06

## 2022-01-24 RX ADMIN — HEPARIN SODIUM (PORCINE) LOCK FLUSH IV SOLN 100 UNIT/ML 5 ML: 100 SOLUTION at 12:21

## 2022-01-24 RX ADMIN — LIDOCAINE HYDROCHLORIDE 20 MG: 20 INJECTION, SOLUTION INFILTRATION; PERINEURAL at 11:16

## 2022-01-24 RX ADMIN — ACETAMINOPHEN 975 MG: 325 TABLET ORAL at 10:04

## 2022-01-24 RX ADMIN — Medication 5 ML: at 07:59

## 2022-01-24 RX ADMIN — SODIUM CHLORIDE, SODIUM LACTATE, POTASSIUM CHLORIDE, CALCIUM CHLORIDE: 600; 310; 30; 20 INJECTION, SOLUTION INTRAVENOUS at 11:10

## 2022-01-24 RX ADMIN — LIDOCAINE HYDROCHLORIDE 40 MG: 20 INJECTION, SOLUTION INFILTRATION; PERINEURAL at 11:13

## 2022-01-24 RX ADMIN — PROPOFOL 150 MCG/KG/MIN: 10 INJECTION, EMULSION INTRAVENOUS at 11:12

## 2022-01-24 ASSESSMENT — MIFFLIN-ST. JEOR
SCORE: 1135.29
SCORE: 1135.29

## 2022-01-24 ASSESSMENT — LIFESTYLE VARIABLES: TOBACCO_USE: 1

## 2022-01-24 ASSESSMENT — PAIN SCALES - GENERAL: PAINLEVEL: NO PAIN (0)

## 2022-01-24 ASSESSMENT — COPD QUESTIONNAIRES: COPD: 1

## 2022-01-24 NOTE — ANESTHESIA CARE TRANSFER NOTE
Patient: Libby Grimaldo    Procedure: Procedure(s):  BIOPSY, BONE MARROW       Diagnosis: Myeloma (H) [C90.00]  Diagnosis Additional Information: No value filed.    Anesthesia Type:   MAC     Note:    Oropharynx: spontaneously breathing  Level of Consciousness: awake  Oxygen Supplementation: room air    Independent Airway: airway patency satisfactory and stable  Dentition: dentition unchanged  Vital Signs Stable: post-procedure vital signs reviewed and stable  Report to RN Given: handoff report given  Patient transferred to: Phase II    Handoff Report: Identifed the Patient, Identified the Reponsible Provider, Reviewed the pertinent medical history, Discussed the surgical course, Reviewed Intra-OP anesthesia mangement and issues during anesthesia, Set expectations for post-procedure period and Allowed opportunity for questions and acknowledgement of understanding      Vitals:  Vitals Value Taken Time   BP     Temp     Pulse     Resp     SpO2         Electronically Signed By: QUAN Allred CRNA  January 24, 2022  11:31 AM

## 2022-01-24 NOTE — OP NOTE
BMT ONC Adult Bone Marrow Biopsy Procedure Note  January 24, 2022  There were no vitals taken for this visit.     Learning needs assessment complete within 12 months? YES    DIAGNOSIS: MM, work up for BMT     PROCEDURE: Unilateral Bone Marrow Biopsy and Unilateral Aspirate    LOCATION: [facility] 5th floor-Procedure Room    Patient s identification was positively verified by verbal identification and invasive procedure safety checklist was completed. Informed consent was obtained. Following the administration of Propofol as pre-medication, patient was placed in the prone position and prepped and draped in a sterile manner. Approximately 10 cc of 1% Lidocaine was used over the right posterior iliac spine. Following this a 3 mm incision was made. Trephine bone marrow core(s) was (were) obtained from the [facility]. Bone marrow aspirates were obtained from the [facility]. Aspirates were sent for morphology, immunophenotyping, cytogenetics and molecular diagnostics. A total of approximately 20 ml of marrow was aspirated. Following this procedure a sterile dressing was applied to the bone marrow biopsy site(s). The patient was placed in the supine position to maintain pressure on the biopsy site. Post-procedure wound care instructions were given.     Complications: NO    Pre-procedural pain: 0 out of 10 on the numeric pain rating scale.       Post-procedural pain assessment: See nursing post-op notes.     Interventions: NO    Length of procedure:20 minutes or less      Procedure performed by: Yuniel Tineo PA-C

## 2022-01-24 NOTE — ANESTHESIA PREPROCEDURE EVALUATION
Anesthesia Pre-Procedure Evaluation    Patient: Libby Grimaldo   MRN: [id number] : 1950        Preoperative Diagnosis: Myeloma (H) [C90.00]    Procedure : Procedure(s):  BIOPSY, BONE MARROW          Past Medical History:   Diagnosis Date     Cervical radiculopathy      COPD (chronic obstructive pulmonary disease) (H)      Double vision      Febrile seizure (H)     Per patient. Once while a toddler, once while in highschool, and once while in college.     Floaters      Graves disease      HLD (hyperlipidemia)      HTN (hypertension)      IPF (idiopathic pulmonary fibrosis) (H)      Lumbar radiculopathy      Multiple myeloma in relapse (H)      Osteoporosis      Pneumonia     Per patient. Complicated by sepsis.     Recurrent UTI     Per patient. Has required prophylactic antibiotcs.     Vitamin B12 deficiency (non anemic)       Past Surgical History:   Procedure Laterality Date     CERVICAL FUSION       CHOLECYSTECTOMY       CYSTECTOMY OVARIAN BENIGN       EYE SURGERY      Per patient.     TONSILLECTOMY       WRIST SURGERY Left       Allergies   Allergen Reactions     Bupropion      Other reaction(s): Seizures      Social History     Tobacco Use     Smoking status: Former Smoker     Packs/day: 1.00     Years: 40.00     Pack years: 40.00     Smokeless tobacco: Former User     Quit date:    Substance Use Topics     Alcohol use: Not Currently      Wt Readings from Last 1 Encounters:   22 63.5 kg (140 lb)        Anesthesia Evaluation   Pt has had prior anesthetic.     No history of anesthetic complications       ROS/MED HX  ENT/Pulmonary: Comment: IPF (idiopathic pulmonary fibrosis) (H)    (+) tobacco use, Past use, COPD,     Neurologic:  - neg neurologic ROS     Cardiovascular:     (+) Dyslipidemia hypertension-----    METS/Exercise Tolerance: 4 - Raking leaves, gardening    Hematologic:  - neg hematologic  ROS     Musculoskeletal:  - neg musculoskeletal ROS     GI/Hepatic:  - neg GI/hepatic ROS      Renal/Genitourinary:  - neg Renal ROS     Endo:     (+) thyroid problem,     Psychiatric/Substance Use:  - neg psychiatric ROS     Infectious Disease:  - neg infectious disease ROS     Malignancy:   (+) Malignancy, History of Other.Other CA Multiple myeloma status post.    Other:  - neg other ROS          Physical Exam    Airway  airway exam normal      Mallampati: I   TM distance: > 3 FB   Neck ROM: full   Mouth opening: > 3 cm    Respiratory Devices and Support         Dental  no notable dental history         Cardiovascular   cardiovascular exam normal       Rhythm and rate: regular and normal     Pulmonary   pulmonary exam normal        breath sounds clear to auscultation           OUTSIDE LABS:  CBC:   Lab Results   Component Value Date    WBC 4.3 01/24/2022    WBC 5.4 01/20/2022    HGB 10.6 (L) 01/24/2022    HGB 12.4 01/20/2022    HCT 34.6 (L) 01/24/2022    HCT 40.2 01/20/2022     01/24/2022     01/20/2022     BMP:   Lab Results   Component Value Date     01/20/2022    POTASSIUM 3.9 01/20/2022    CHLORIDE 111 (H) 01/20/2022    CO2 26 01/20/2022    BUN 23 01/20/2022    CR 0.75 01/24/2022    CR 0.75 01/20/2022    GLC 97 01/20/2022     COAGS:   Lab Results   Component Value Date    PTT 47 (H) 01/24/2022    INR 0.94 01/20/2022     POC:   Lab Results   Component Value Date    HCGS Negative 01/20/2022     HEPATIC:   Lab Results   Component Value Date    ALBUMIN 3.1 (L) 01/20/2022    PROTTOTAL 6.2 (L) 01/20/2022     (H) 01/20/2022    AST 74 (H) 01/20/2022    ALKPHOS 194 (H) 01/20/2022    BILITOTAL 0.4 01/20/2022     OTHER:   Lab Results   Component Value Date    DIANDRA 8.6 01/20/2022    PHOS 3.0 01/20/2022    MAG 2.0 01/20/2022       Anesthesia Plan    ASA Status:  3   NPO Status:  NPO Appropriate    Anesthesia Type: MAC.     - Reason for MAC: Deep or markedly invasive procedure (G8)   Induction: Propofol.   Maintenance: N/A.        Consents    Anesthesia Plan(s) and associated risks,  benefits, and realistic alternatives discussed. Questions answered and patient/representative(s) expressed understanding.    - Discussed:     - Discussed with:  Patient    Use of blood products discussed: No .     Postoperative Care    Pain management: Multi-modal analgesia, Oral pain medications.   PONV prophylaxis: Ondansetron (or other 5HT-3), Dexamethasone or Solumedrol     Comments:                Milton Angeles, DO

## 2022-01-24 NOTE — NURSING NOTE
"Oncology Rooming Note    January 24, 2022 8:15 AM   Libby Grimaldo is a 71 year old female who presents for:    Chief Complaint   Patient presents with     Port Draw     power needle. heparin locked, vitals checked     Oncology Clinic Visit     UMP RETURN - MULTIPLE MYELOMA     Initial Vitals: /70   Pulse 77   Temp 97.2  F (36.2  C)   Resp 18   Ht 1.61 m (5' 3.39\")   Wt 63.5 kg (140 lb)   SpO2 97%   BMI 24.50 kg/m   Estimated body mass index is 24.5 kg/m  as calculated from the following:    Height as of this encounter: 1.61 m (5' 3.39\").    Weight as of this encounter: 63.5 kg (140 lb). Body surface area is 1.69 meters squared.  No Pain (0) Comment: Data Unavailable   No LMP recorded. Patient is postmenopausal.  Allergies reviewed: Yes  Medications reviewed: Yes    Medications: Medication refills not needed today.  Pharmacy name entered into instruMagic: [id number] - [facility] [address] AT [facility] [address]    Clinical concerns: No new concerns. Provider was notified.      Osman Langley LPN            "

## 2022-01-24 NOTE — DISCHARGE INSTRUCTIONS
How to Care for your Bone Marrow Biopsy    Activity  Relax and take it easy for the next 24 hours.   Resume regular activity after 24 hours.    Diet   Resume pre-procedure diet and drink plenty of fluids.    If you received sedation, you may feel a little nauseated so start with a clear liquid diet until the nausea passes.    Do Not Immerse Bone Marrow Biopsy Puncture Site in Water  Do not take a bath until the puncture site has healed.  Do not sit in a hot tub or spa until the puncture site has healed.  Do not swim until the puncture site has healed.  Wait 24 hours before taking a shower.    Drainage  Drainage should be minimal.  IF bleeding should occur and soaks through the dressing, lie down and put pressure on the puncture site.    IF bleeding persists, apply gentle pressure with your hand over the dressing for 5 minutes.    IF the pressure doesn't stop the bleeding, contact your provider immediately.    Dressing  Keep the dressing dry and in place for 24 hours, unless instructed otherwise.    IF bleeding soaks through the dressing in the first 24 hours do NOT remove the dressing as you may pull off any scab that has formed.  Instead, reinforce the dressing with extra gauze and tape.    No Alcohol  Do not drink alcoholic beverages for the next 24 hours.    No Driving or Operating Machinery  No driving or operating machinery for the next 24 hours.    Notify your provider IF:    Excessive bleeding or drainage at the puncture site    Excessive swelling, redness or tenderness at the puncture site    Fever above 100.5 degrees taken orally    Severe pain    Drainage that is green, yellow, thick white or has a bad odor    Telephone Numbers  Bone Marrow transplant clinic:  [phone] (Monday thru Friday, 8:00 am to 4:00 pm)  After business hours call the [facility]:  [phone] and ask for the Hematology/BMT doctor on call.  Or call the Emergency Room at the [facility]  [facility]:  [phone].

## 2022-01-24 NOTE — LETTER
2022         RE: Libby Grimaldo  [address] [facility] [address] [address]        Dear Colleague,    Thank you for referring your patient, Libby Grimaldo, to the [facility] BLOOD AND MARROW TRANSPLANT PROGRAM [place]. Please see a copy of my visit note below.    Patient Name: Libby Grimaldo  Patient MRN: [id number]  Patient : 1950    Abbreviated H&P and Pre-sedation Assessment for bone marrow biopsy (procedure name) with sedation    Chief complaint and/or reason for Procedure: MM    Planned level of sedation: MAC    History of problems with sedation: (patient or family hx): No    ASA Assessment Category: 1 - Healthy patient, no medical problems    History of sleep apnea: No    History of blood thinners: No     Appropriate NPO status: Yes    Current tobacco use: No    Any recent fever, cough, chest or sinus congestion, SOB, weight loss, chest pain, diarrhea or constipation. No    2022 covid neg    Medications   Currently Scheduled Medications       Home Med List)  (Not in a hospital admission)      Allergies  Bupropion    PMH:  Past Medical History:   Diagnosis Date     Cervical radiculopathy      COPD (chronic obstructive pulmonary disease) (H)      Double vision      Febrile seizure (H)     Per patient. Once while a toddler, once while in highschool, and once while in college.     Floaters      Graves disease      HLD (hyperlipidemia)      HTN (hypertension)      IPF (idiopathic pulmonary fibrosis) (H)      Lumbar radiculopathy      Multiple myeloma in relapse (H)      Osteoporosis      Pneumonia     Per patient. Complicated by sepsis.     Recurrent UTI     Per patient. Has required prophylactic antibiotcs.     Vitamin B12 deficiency (non anemic)        Past Surgical History:   Procedure Laterality Date     CERVICAL FUSION       CHOLECYSTECTOMY       CYSTECTOMY OVARIAN BENIGN       EYE SURGERY      Per patient.     TONSILLECTOMY       WRIST SURGERY Left      Family History   Problem  "Relation Age of Onset     Heart Disease Mother      Cancer Mother         \"Around lungs\" - she explicitly said it was *not* lung cancer.     Diabetes Father      Heart Disease Father      Social History     Tobacco Use     Smoking status: Former Smoker     Packs/day: 1.00     Years: 40.00     Pack years: 40.00     Smokeless tobacco: Former User     Quit date: 2010   Substance Use Topics     Alcohol use: Not Currently     Drug use: Not Currently     Types: Marijuana         Focused Physical exam pertinent to procedure:          (Details of heart, lung, ASA assessment and mallampati assessment in pre procedure assessment flowsheet)  General- healthy,alert,no distress   Recent vital signs-  Blood pressure 121/70, pulse 77, temperature 97.2  F (36.2  C), resp. rate 18, height 1.61 m (5' 3.39\"), weight 63.5 kg (140 lb), SpO2 97 %.    HEART-regular rate and rhythm and no murmurs, gallops, or rub  LUNGS-Clear to Ausculation  OROPHARYNGEAL - MALLAMPATTI- Class III (visualization of the soft palate and base of uvula)    A/P:Reviewed history, medications, allergies, clinical information and pre procedure assessment. The patient was informed of the risks and benefits of the procedure.  They would like to proceed.  Pending anesthesia review, Libby Grimaldo is approved for use of sedation during their procedure as noted above.      Sarita Jimenez PA-C        Again, thank you for allowing me to participate in the care of your patient.      Sincerely,    [facility] Provider    "

## 2022-01-24 NOTE — NURSING NOTE
Chief Complaint   Patient presents with     Port Draw     power needle. heparin locked, vitals checked     Christen Kong RN on 1/24/2022 at 8:03 AM

## 2022-01-24 NOTE — ANESTHESIA POSTPROCEDURE EVALUATION
Patient: Libby Grimaldo    Procedure: Procedure(s):  BIOPSY, BONE MARROW       Diagnosis:Myeloma (H) [C90.00]  Diagnosis Additional Information: No value filed.    Anesthesia Type:  MAC    Note:  Disposition: Outpatient   Postop Pain Control: Uneventful            Sign Out: Well controlled pain   PONV: No   Neuro/Psych: Uneventful            Sign Out: Acceptable/Baseline neuro status   Airway/Respiratory: Uneventful            Sign Out: Acceptable/Baseline resp. status   CV/Hemodynamics: Uneventful            Sign Out: Acceptable CV status   Other NRE: NONE   DID A NON-ROUTINE EVENT OCCUR? No           Last vitals:  Vitals Value Taken Time   /66 01/24/22 1200   Temp 36.8  C (98.2  F) 01/24/22 1136   Pulse 68 01/24/22 1200   Resp 16 01/24/22 1200   SpO2 100 % 01/24/22 1200       Electronically Signed By: Milton Angeles DO  January 24, 2022  12:07 PM

## 2022-01-24 NOTE — PROGRESS NOTES
"Patient Name: Libby Grimaldo  Patient MRN: [id number]  Patient : 1950    Abbreviated H&P and Pre-sedation Assessment for bone marrow biopsy (procedure name) with sedation    Chief complaint and/or reason for Procedure: MM    Planned level of sedation: MAC    History of problems with sedation: (patient or family hx): No    ASA Assessment Category: 1 - Healthy patient, no medical problems    History of sleep apnea: No    History of blood thinners: No     Appropriate NPO status: Yes    Current tobacco use: No    Any recent fever, cough, chest or sinus congestion, SOB, weight loss, chest pain, diarrhea or constipation. No    2022 covid neg    Medications   Currently Scheduled Medications       Home Med List)  (Not in a hospital admission)      Allergies  Bupropion    PMH:  Past Medical History:   Diagnosis Date     Cervical radiculopathy      COPD (chronic obstructive pulmonary disease) (H)      Double vision      Febrile seizure (H)     Per patient. Once while a toddler, once while in highschool, and once while in college.     Floaters      Graves disease      HLD (hyperlipidemia)      HTN (hypertension)      IPF (idiopathic pulmonary fibrosis) (H)      Lumbar radiculopathy      Multiple myeloma in relapse (H)      Osteoporosis      Pneumonia     Per patient. Complicated by sepsis.     Recurrent UTI     Per patient. Has required prophylactic antibiotcs.     Vitamin B12 deficiency (non anemic)        Past Surgical History:   Procedure Laterality Date     CERVICAL FUSION       CHOLECYSTECTOMY       CYSTECTOMY OVARIAN BENIGN       EYE SURGERY      Per patient.     TONSILLECTOMY       WRIST SURGERY Left      Family History   Problem Relation Age of Onset     Heart Disease Mother      Cancer Mother         \"Around lungs\" - she explicitly said it was *not* lung cancer.     Diabetes Father      Heart Disease Father      Social History     Tobacco Use     Smoking status: Former Smoker     Packs/day: 1.00     Years: " "40.00     Pack years: 40.00     Smokeless tobacco: Former User     Quit date: 2010   Substance Use Topics     Alcohol use: Not Currently     Drug use: Not Currently     Types: Marijuana         Focused Physical exam pertinent to procedure:          (Details of heart, lung, ASA assessment and mallampati assessment in pre procedure assessment flowsheet)  General- healthy,alert,no distress   Recent vital signs-  Blood pressure 121/70, pulse 77, temperature 97.2  F (36.2  C), resp. rate 18, height 1.61 m (5' 3.39\"), weight 63.5 kg (140 lb), SpO2 97 %.    HEART-regular rate and rhythm and no murmurs, gallops, or rub  LUNGS-Clear to Ausculation  OROPHARYNGEAL - MALLAMPATTI- Class III (visualization of the soft palate and base of uvula)    A/P:Reviewed history, medications, allergies, clinical information and pre procedure assessment. The patient was informed of the risks and benefits of the procedure.  They would like to proceed.  Pending anesthesia review, Libby BERNY Grimaldo is approved for use of sedation during their procedure as noted above.      CAROLINA DarnellC    "

## 2022-01-24 NOTE — LETTER
1/24/2022         RE: Libby Grimaldo  [address] [facility] [address]        Dear Colleague,    Thank you for referring your patient, Libby Grimaldo, to the [facility] BLOOD AND MARROW TRANSPLANT PROGRAM [place]. Please see a copy of my visit note below.    See op note regarding bone marrow biopsy done 1/24/22.     Yuniel Tineo PA-C  [phone]        Again, thank you for allowing me to participate in the care of your patient.      Sincerely,     BONE MARROW BIOPSY

## 2022-01-25 ENCOUNTER — HOSPITAL ENCOUNTER (OUTPATIENT)
Dept: PET IMAGING | Facility: CLINIC | Age: 72
Discharge: HOME | End: 2022-01-25
Attending: INTERNAL MEDICINE | Admitting: INTERNAL MEDICINE
Payer: MEDICARE

## 2022-01-25 DIAGNOSIS — C90.00 MULTIPLE MYELOMA NOT HAVING ACHIEVED REMISSION (H): Primary | ICD-10-CM

## 2022-01-25 LAB
ALBUMIN MFR UR ELPH: 100 %
ALBUMIN SERPL-MCNC: 2.8 G/DL (ref 3.4–5)
ALP SERPL-CCNC: 152 U/L (ref 40–150)
ALPHA1 GLOB MFR UR ELPH: ABNORMAL %
ALPHA2 GLOB MFR UR ELPH: ABNORMAL %
ALT SERPL W P-5'-P-CCNC: 53 U/L (ref 0–50)
AST SERPL W P-5'-P-CCNC: 42 U/L (ref 0–45)
B-GLOBULIN MFR UR ELPH: ABNORMAL %
BILIRUB DIRECT SERPL-MCNC: <0.1 MG/DL (ref 0–0.2)
BILIRUB SERPL-MCNC: 0.2 MG/DL (ref 0.2–1.3)
GAMMA GLOB MFR UR ELPH: ABNORMAL %
M PROTEIN MFR UR ELPH: 0 %
PATH REPORT.COMMENTS IMP SPEC: NORMAL
PATH REPORT.FINAL DX SPEC: NORMAL
PATH REPORT.MICROSCOPIC SPEC OTHER STN: NORMAL
PATH REPORT.RELEVANT HX SPEC: NORMAL
PROT ELPH PNL UR ELPH: NORMAL
PROT PATTERN UR ELPH-IMP: ABNORMAL
PROT SERPL-MCNC: 5.6 G/DL (ref 6.8–8.8)

## 2022-01-25 PROCEDURE — 343N000001 HC RX 343: Performed by: INTERNAL MEDICINE

## 2022-01-25 PROCEDURE — 78816 PET IMAGE W/CT FULL BODY: CPT | Mod: 26

## 2022-01-25 PROCEDURE — G1004 CDSM NDSC: HCPCS | Mod: GC

## 2022-01-25 PROCEDURE — A9552 F18 FDG: HCPCS | Performed by: INTERNAL MEDICINE

## 2022-01-25 PROCEDURE — G1004 CDSM NDSC: HCPCS | Mod: PS

## 2022-01-25 PROCEDURE — 250N000011 HC RX IP 250 OP 636: Performed by: INTERNAL MEDICINE

## 2022-01-25 RX ORDER — HEPARIN SODIUM (PORCINE) LOCK FLUSH IV SOLN 100 UNIT/ML 100 UNIT/ML
500 SOLUTION INTRAVENOUS ONCE
Status: COMPLETED | OUTPATIENT
Start: 2022-01-25 | End: 2022-01-25

## 2022-01-25 RX ADMIN — FLUDEOXYGLUCOSE F-18 10.15 MCI.: 500 INJECTION, SOLUTION INTRAVENOUS at 13:43

## 2022-01-25 RX ADMIN — HEPARIN 500 UNITS: 100 SYRINGE at 13:45

## 2022-01-26 ENCOUNTER — OFFICE VISIT (OUTPATIENT)
Dept: TRANSPLANT | Facility: CLINIC | Age: 72
End: 2022-01-26
Attending: INTERNAL MEDICINE
Payer: MEDICARE

## 2022-01-26 VITALS
DIASTOLIC BLOOD PRESSURE: 80 MMHG | HEART RATE: 91 BPM | TEMPERATURE: 98 F | SYSTOLIC BLOOD PRESSURE: 144 MMHG | BODY MASS INDEX: 24.79 KG/M2 | HEIGHT: 63 IN | WEIGHT: 139.9 LBS | OXYGEN SATURATION: 97 %

## 2022-01-26 DIAGNOSIS — C90.00 MULTIPLE MYELOMA NOT HAVING ACHIEVED REMISSION (H): Primary | ICD-10-CM

## 2022-01-26 DIAGNOSIS — Z01.818 EXAMINATION PRIOR TO CHEMOTHERAPY: ICD-10-CM

## 2022-01-26 DIAGNOSIS — C90.00 MYELOMA (H): ICD-10-CM

## 2022-01-26 DIAGNOSIS — Z86.2 PERSONAL HISTORY OF DISEASES OF BLOOD AND BLOOD-FORMING ORGANS: ICD-10-CM

## 2022-01-26 LAB
INTERPRETATION: NORMAL
PATH REPORT.COMMENTS IMP SPEC: NORMAL
PATH REPORT.COMMENTS IMP SPEC: NORMAL
PATH REPORT.FINAL DX SPEC: NORMAL
PATH REPORT.GROSS SPEC: NORMAL
PATH REPORT.MICROSCOPIC SPEC OTHER STN: NORMAL
PATH REPORT.MICROSCOPIC SPEC OTHER STN: NORMAL
PATH REPORT.RELEVANT HX SPEC: NORMAL

## 2022-01-26 PROCEDURE — 94375 RESPIRATORY FLOW VOLUME LOOP: CPT | Performed by: INTERNAL MEDICINE

## 2022-01-26 PROCEDURE — 88341 IMHCHEM/IMCYTCHM EA ADD ANTB: CPT | Mod: 26 | Performed by: PATHOLOGY

## 2022-01-26 PROCEDURE — 94729 DIFFUSING CAPACITY: CPT | Performed by: INTERNAL MEDICINE

## 2022-01-26 PROCEDURE — 88305 TISSUE EXAM BY PATHOLOGIST: CPT | Mod: 26 | Performed by: PATHOLOGY

## 2022-01-26 PROCEDURE — 94726 PLETHYSMOGRAPHY LUNG VOLUMES: CPT | Performed by: INTERNAL MEDICINE

## 2022-01-26 PROCEDURE — M0220 HC INJECTION TIXAGEVIMAB & CILGAVIMAB (EVUSHELD): HCPCS | Performed by: INTERNAL MEDICINE

## 2022-01-26 PROCEDURE — 88342 IMHCHEM/IMCYTCHM 1ST ANTB: CPT | Mod: 26 | Performed by: PATHOLOGY

## 2022-01-26 PROCEDURE — 85097 BONE MARROW INTERPRETATION: CPT | Performed by: PATHOLOGY

## 2022-01-26 PROCEDURE — 99215 OFFICE O/P EST HI 40 MIN: CPT | Performed by: INTERNAL MEDICINE

## 2022-01-26 PROCEDURE — 96372 THER/PROPH/DIAG INJ SC/IM: CPT | Performed by: INTERNAL MEDICINE

## 2022-01-26 PROCEDURE — 250N000011 HC RX IP 250 OP 636: Performed by: INTERNAL MEDICINE

## 2022-01-26 PROCEDURE — 88313 SPECIAL STAINS GROUP 2: CPT | Mod: 26 | Performed by: PATHOLOGY

## 2022-01-26 PROCEDURE — 88311 DECALCIFY TISSUE: CPT | Mod: 26 | Performed by: PATHOLOGY

## 2022-01-26 PROCEDURE — G0463 HOSPITAL OUTPT CLINIC VISIT: HCPCS | Mod: 25

## 2022-01-26 RX ORDER — HEPARIN SODIUM,PORCINE 10 UNIT/ML
5 VIAL (ML) INTRAVENOUS
Status: CANCELLED | OUTPATIENT
Start: 2022-01-26

## 2022-01-26 RX ORDER — HEPARIN SODIUM (PORCINE) LOCK FLUSH IV SOLN 100 UNIT/ML 100 UNIT/ML
5 SOLUTION INTRAVENOUS
Status: CANCELLED | OUTPATIENT
Start: 2022-01-26

## 2022-01-26 RX ADMIN — Medication: at 15:42

## 2022-01-26 ASSESSMENT — PAIN SCALES - GENERAL: PAINLEVEL: MILD PAIN (2)

## 2022-01-26 ASSESSMENT — EJECTION FRACTION: LAST EJECTION FRACTION (EF) PRIOR TO CONDITIONING (%): NO

## 2022-01-26 ASSESSMENT — MIFFLIN-ST. JEOR: SCORE: 1134.83

## 2022-01-26 ASSESSMENT — PULMONARY FUNCTION TESTS: FEV1/FVC_PERCENT_PREDICTED: 107

## 2022-01-26 NOTE — PROGRESS NOTES
BMT/Cell Therapy Consultation      Libby Grimaldo is a 71 year old female referred by Dr. Nichols for Myeloma.      Diagnosis and Treatment Summary     Oncology History   Multiple myeloma not having achieved remission (H)   11/7/2018 -  Cancer Staged    Staging form: Plasma Cell Myeloma and Disorders, AJCC 8th Edition  - Clinical stage from 11/7/2018: Albumin (g/dL): 3.4, ISS: Stage II, High-risk cytogenetics: Absent, LDH: Unknown     11/7/2018 Initial Diagnosis    Multiple myeloma not having achieved remission (H)     1/23/2019 - 11/14/2019 Chemotherapy    KRD x 6 cycles     11/4/2019 - 2/14/2021 Chemotherapy    Rev/Dex(20)      4/3/2020 - 7/17/2020 Chemotherapy    Velcade maintenance - dose reductions for orthostatic hypotension     7/17/2020 - 12/15/2020 Chemotherapy    Revlimid maintenance     11/23/2020 Progression    Bone marrow 35% plasma cells, PET CT demonstrating new lytic lesions     12/15/2020 - 6/11/2021 Chemotherapy    Elsy, Kd x 6 cycles     7/7/2021 -  Cancer Staged    PET/CT - CR     7/21/2021 -  Chemotherapy    Subcutaneous Daratumumab, IVIG, denosumab monthly     11/22/2021 -  Cancer Staged    Bone marrow aspirate - MRD 0.0022% positive         HPI:  Please see my entry above for disease and treatment history.  Libby is a 71-year-old woman with multiple myeloma is received the treatment as described above.  She achieved an excellent response but was known to have MRD positive disease in November.  For that reason she was referred for possible transplantation.  Since that time she has continued to receive daratumumab maintenance which she is tolerated quite well.  At one point, Libby had a compromised performance status but over the past year her performance status is improved significantly.  She is active and busy at home, interacting with her sister and other friends, and living life with her .  She has good support with notable caregivers and rides back and forth to clinic.  She did have an  "episode of a urinary tract infection that was treated with a combination of Levaquin and TMP sulfa.  During this time, she had elevated liver function studies which resolved afterwards.  Her cystitis symptoms have resolved as well although she did develop some thrush which is now better as well.  She does have some increased lower back pain over the past month.  She has no other notable new sites of pain or any signs of the myeloma that she can detect.      ROS:    10 point ROS neg other than the symptoms noted above in the HPI.        Past Medical History:   Diagnosis Date     Cervical radiculopathy      COPD (chronic obstructive pulmonary disease) (H)      Double vision      Febrile seizure (H)     Per patient. Once while a toddler, once while in highschool, and once while in college.     Floaters      Graves disease      HLD (hyperlipidemia)      HTN (hypertension)      IPF (idiopathic pulmonary fibrosis) (H)      Lumbar radiculopathy      Multiple myeloma in relapse (H)      Osteoporosis      Pneumonia     Per patient. Complicated by sepsis.     Recurrent UTI     Per patient. Has required prophylactic antibiotcs.     Vitamin B12 deficiency (non anemic)        Past Surgical History:   Procedure Laterality Date     BONE MARROW BIOPSY, BONE SPECIMEN, NEEDLE/TROCAR Right 1/24/2022    Procedure: BIOPSY, BONE MARROW;  Surgeon: Yuniel Tineo;  Location: [facility] OR     CERVICAL FUSION       CHOLECYSTECTOMY       CYSTECTOMY OVARIAN BENIGN       EYE SURGERY      Per patient.     TONSILLECTOMY       WRIST SURGERY Left        Family History   Problem Relation Age of Onset     Heart Disease Mother      Cancer Mother         \"Around lungs\" - she explicitly said it was *not* lung cancer.     Diabetes Father      Heart Disease Father        Social History     Tobacco Use     Smoking status: Former Smoker     Packs/day: 1.00     Years: 40.00     Pack years: 40.00     Smokeless tobacco: Former User     Quit date: 2010   Substance " "Use Topics     Alcohol use: Not Currently     Drug use: Not Currently     Types: Marijuana         Allergies   Allergen Reactions     Bupropion      Other reaction(s): Seizures        Current Outpatient Medications   Medication Sig Dispense Refill     acetaminophen (TYLENOL) 500 MG tablet Take 500-1,000 mg by mouth every 6 hours as needed for mild pain        albuterol (PROAIR HFA/PROVENTIL HFA/VENTOLIN HFA) 108 (90 Base) MCG/ACT inhaler Inhale 2 puffs into the lungs every 6 hours as needed for shortness of breath / dyspnea        aspirin (ASA) 81 MG chewable tablet Take 81 mg by mouth daily        atorvastatin (LIPITOR) 40 MG tablet Take 40 mg by mouth daily        buprenorphine HCl-naloxone HCl (SUBOXONE) 8-2 MG per film Place 1 Film under the tongue 3 times daily        cholecalciferol (VITAMIN D-1000 MAX ST) 25 MCG (1000 UT) TABS Take 1,000 Units by mouth daily        Fluticasone-Umeclidin-Vilanterol (TRELEGY ELLIPTA) 100-62.5-25 MCG/INH oral inhaler Inhale 1 puff into the lungs daily        levothyroxine (SYNTHROID/LEVOTHROID) 112 MCG tablet Take 112 mcg by mouth daily        metoprolol succinate ER (TOPROL-XL) 25 MG 24 hr tablet Take 25 mg by mouth daily        ondansetron (ZOFRAN-ODT) 4 MG ODT tab Take 1 tablet (4 mg) by mouth every 8 hours as needed for nausea 16 tablet 0     oxyCODONE IR (ROXICODONE) 10 MG tablet Take 10 mg by mouth every 8 hours as needed for severe pain        polyethylene glycol (MIRALAX/GLYCOLAX) powder 17 g by Nasogastric route daily as needed        venlafaxine (EFFEXOR-XR) 75 MG 24 hr capsule Take 75 mg by mouth daily       REVLIMID 25 MG CAPS capsule  (Patient not taking: Reported on 1/20/2022)           Physical Exam:     Vital Signs: BP (!) 144/80 (BP Location: Right arm, Patient Position: Sitting, Cuff Size: Adult Regular)   Pulse 91   Temp 98  F (36.7  C) (Oral)   Ht 1.61 m (5' 3.39\")   Wt 63.5 kg (139 lb 14.4 oz)   SpO2 97%   BMI 24.48 kg/m      Physical Exam  Vitals " reviewed.   Constitutional:       Appearance: Normal appearance. She is normal weight.   HENT:      Nose: Nose normal.      Mouth/Throat:      Mouth: Mucous membranes are dry.      Pharynx: No oropharyngeal exudate or posterior oropharyngeal erythema.   Eyes:      Conjunctiva/sclera: Conjunctivae normal.   Cardiovascular:      Rate and Rhythm: Normal rate and regular rhythm.      Pulses: Normal pulses.      Heart sounds: Normal heart sounds.   Pulmonary:      Effort: Pulmonary effort is normal.      Breath sounds: No wheezing or rales.   Abdominal:      General: Abdomen is flat.   Musculoskeletal:         General: No swelling. Normal range of motion.      Cervical back: Normal range of motion and neck supple.   Lymphadenopathy:      Cervical: No cervical adenopathy.   Skin:     General: Skin is warm.      Findings: No rash.   Neurological:      General: No focal deficit present.      Mental Status: She is alert. Mental status is at baseline.   Psychiatric:         Mood and Affect: Mood normal.         Behavior: Behavior normal.         Thought Content: Thought content normal.         Judgment: Judgment normal.     Vascular Access:  Port in place and clean      BMT and Cell Therapy Informed Consent Discussion     Multiple myeloma - she has evidence of myeloma progression, although with a low tumor volume.  She does have back pain that is worse recently suggesting symptomatic disease although her symptoms do not correlate with her site of possible disease on PET/CT.  Given the findings and the likelihood that we will have some difficulty collecting PBSC for transplant, I recommended mobilization using cyclophosphamide.  This treatment carries risk of neutropenic infection and toxicity although the use will treat her myeloma, contribute to her disease focused therapy, and improve the PBSC yield.  I reviewed the risks and benefits with her and she agrees to proceed.  We will plan to move ahead within a few  days.    EVUSHELD - Talked to patient and evaluated by me. We discussed the risks and benefits of receiving EVUSHELD, as well as alternative treatments. The Emergency Use Administration (EUA) was provided to the patient for review and an opportunity for questions was provided. Patient wishes to proceed with EVUSHELD.     In today's visit, we discussed in detail the research for which Libby Grimaldo is eligible. We discussed the potential risks and potential benefits of each protocol individually. We explained potential alternatives to the protocols discussed. We explained to the patient that participation is voluntary and that consent may be withdrawn at any time.     The patient completed the last round of treatment on 12/27/21 (daratumumab alone).    HCT-CI score: 3. We counseled the patient about the impact of this on the risk of treatment related and overall mortality. The score fit within treatment protocol eligibility criteria.    Karnofsky performance score: 90     ECOG (required for CAR-T, see KPS to ECOG conversion chart): 0    Active infections:  none.  Prior infections that require additional special prophylaxis considerations: none.  I reviewed and discussed infectious disease evaluation with the patient and the management plan during treatment.    Reproductive status: What methods of birth control does the patient plan to use during the treatment period beginning with conditioning and ending with the discontinuation of immune suppression (indicate with an X all that apply):  __ The patient is confirmed to be sterile or post-menopausal  __ Sexual abstinence  __ Condoms  __ Implants  __ Injectables  __ Oral contraceptives  __ Intrauterine devices (IUD)  __ Other (describe)    The patient received appropriate reproductive counseling and agreed with the need for effective contraception during the treatment procedures.      Dental health suitable to proceed: Yes      Transplants for multiple myeloma:  The  patient has High risk MM (defined as ONE or more of the following: del17p, p53 mutation, t(4;14), t(14;16), t(14;20), abnormal chromosome 1, -13 [by metaphase testing], or plasma cell leukemia), and the collection goal is 5 million CD34/kg for a single transplant..  The day for admission to begin melphalan conditioning is Day -1 for melphalan 200 mg/m2 (not frail, creatinine clearance 30+). .    After our detailed discussion above, the patient signed the following consents for treatment and protocols:    [id number]       Known issues that I take into account for medical decisions, with salient changes to the plan considering these complexities noted above.    Patient Active Problem List   Diagnosis     Multiple myeloma not having achieved remission (H)     I spent 60 minutes in the care of this patient today, which included time necessary for preparation for the visit, obtaining history, ordering medications/tests/procedures as medically indicated, review of pertinent medical literature, counseling of the patient, communication of recommendations to the care team, and documentation time.    SOTO BOWSER MD    Communicate with me securely using "[employer],"    ____________________________________________________________________          BMT/Cell Therapy Workup Summary    Workup Nurse Coordinator: Gustavo  Primary BMT Physician: Kahlil  [facility] BMT Physician (if different): Kahlil  Date of Summary:  01/26/2022        Patient Demographics       Patient ID:  Libby Grimaldo   Age:  71 year old   Sex:  female  Reason for Transplant: Myeloma  Protocol: [id number]    Blood Counts       Recent Labs   Lab Test 01/24/22  0758 01/20/22  1618   HGB 10.6* 12.4   HCT 34.6* 40.2   WBC 4.3 5.4   ANEUTAUTO 1.7 2.8   ALYMPAUTO 2.1 2.0   AMONOAUTO 0.5 0.5   AEOSAUTO 0.0 0.0   ABSBASO 0.0 0.0   NRBCMAN 0.0 0.0    160         Recent Labs   Lab Test 01/20/22  1618   ABORH A POS         Recent Labs   Lab Test 01/20/22  1619   HGBS  Negative         Chemistries     Basic Panel  Recent Labs   Lab Test 01/24/22  0758 01/20/22  1619   NA  --  143   POTASSIUM  --  3.9   CHLORIDE  --  111*   CO2  --  26   BUN  --  23   CR 0.75 0.75   GLC  --  97        Calcium, Magnesium, Phosphorus  Recent Labs   Lab Test 01/20/22  1619   DIANDRA 8.6   MAG 2.0   PHOS 3.0        LFTs  Recent Labs   Lab Test 01/24/22  0758 01/20/22  1619   BILITOTAL 0.2 0.4   ALKPHOS 152* 194*   AST 42 74*   ALT 53* 146*   ALBUMIN 2.8* 3.1*       LDH  Recent Labs   Lab Test 01/20/22  1619          B2-Microglobulin  Recent Labs   Lab Test 01/20/22  1618   [id number] 4.0*       Vitamin D  Recent Labs   Lab Test 01/20/22  1621   VITDT 36         Urine Studies       Recent Labs   Lab Test 01/24/22  0645   COLOR Yellow   APPEARANCE Clear   URINEGLC Negative   URINEBILI Negative   URINEKETONE Negative   SG 1.012   UBLD Negative   URINEPH 6.0   PROTEIN Negative   UUROI Normal   NITRITE Negative   LEUKEST Negative   MUCUS Present*   RBCU 1   WBCU 1   USQEI <1       Creatinine Clearance    Recent Labs   Lab Test 01/24/22  0645      WT 64.0   RAW 48   STD 49     968   DUR 24.0  24.0   CREATININE 0.75   UCRR 54  54   UCR24 0.52*  0.52*         Infectious Disease Markers     [facility] IDM    Recent Labs   Lab Test 01/20/22  1620   DCMIG Positive*   DHBSAG Non-Reactive   DHBCAB Non-Reactive   DHIVAB Non-Reactive   DHCVAB Non-Reactive   DHTLVA Non-Reactive   TCRUZI Non-Reactive   DTRPAB Non-Reactive       CMV  No lab results found.      EBV    Recent Labs   Lab Test 01/20/22  1620   EBVCAG Positive*       HSV 1/2    Recent Labs   Lab Test 01/20/22  1620   U2HHWNS 22.40*   H1IGG Positive.  IgG antibody to HSV-1 detected.*   [id number] 3.16*   H2IGG Positive.  IgG antibody to HSV-2 detected.*         VZV    No lab results found.      HTLV    Recent Labs   Lab Test 01/20/22  1620   DHTLVA Non-Reactive         Toxoplasma  (not routinely checked)      COVID    Recent Labs    Lab Test 01/20/22  1604   [id number] Negative         Immunoglobulins     Recent Labs   Lab Test 01/20/22  1619          Recent Labs   Lab Test 01/20/22  1619   IGA 3*       Recent Labs   Lab Test 01/20/22  1619   IGM <10*         Monocloncal Protein Studies     M spike    Recent Labs   Lab Test 01/20/22  1618   ELPM 0.1*       Cuney FLC    Recent Labs   Lab Test 01/20/22  1619   KFLCA 0.09*       Lambda FLC    Recent Labs   Lab Test 01/20/22  1619   LFLCA 9.96*       FLC Ratio    Recent Labs   Lab Test 01/20/22  1619   KLRA 0.01*           Bone Marrow Biopsy     Final Diagnosis   Bone marrow, posterior iliac crest, right decalcified trephine biopsy and touch imprint; right particle crush, direct aspirate smear, and concentrated aspirate smear; and peripheral smear:  - Marrow cellularity of 10-20%, with trilineage hematopoiesis; plasma cells are not increased; no definitive clonal population by IHC, but minimal residual disease detected by flow cytometry (see comment)  - Peripheral blood showing slight normochromic, macrocytic anemia   Electronically signed by Juma Levy MD on 1/26/2022 at  1:44 PM   Comment  UUMAYO   Concurrent flow cytometry was performed ([id number]) and lambda monotypic plasma cells were identified.  There are very few plasma cells seen on the trephine core based on IHC - this likely represents minimal residual disease.     There are rare hypogranular neutrophils seen on scanning, as well as a few atypical megakaryocytes, but the overall findings are not definitive for dysplasia.         Chest X-Ray - 2 view     Results for orders placed in visit on 01/20/22    XR CHEST 2 VW    Status: Normal 1/20/2022    Narrative  EXAMINATION:  XR CHEST 2 VW 1/20/2022 11:54 AM.    COMPARISON: PET CT 11/23/2021. Radiographic 9/15/2021    HISTORY:  Examination prior to chemotherapy; Myeloma (H); Personal  history of diseases of blood and blood-forming organs    FINDINGS: Upright PA and  lateral radiographs of the chest. Right chest  wall Port-A-Cath tip projects over the SVC. Cardiac silhouette is  normal. No pleural effusion or pneumothorax. No focal pulmonary  opacity. Paraspinous lentiform opacity in right lower thoracic  paravertebral region. Breast implants with calcified capsule on the  left. Cholecystectomy. Degenerative changes of the spine.    Impression  IMPRESSION: Clear lungs. Right paravertebral lentiform opacity  inferiorly seen on the PET-CT.    I have personally reviewed the examination and initial interpretation  and I agree with the findings.    LAWRENCE CARSON MD      SYSTEM ID:  [id number]        Chest CT without Contrast       No results found for this or any previous visit.        PFTs     FVC%  Recent Labs   Lab Test 22  125 104       FEV1%  Recent Labs   Lab Test 22 107       DLCO%  Recent Labs   Lab Test 22  1259    81       EKG       ECG results from 22   EKG 12-lead complete w/read - Clinics     Value    Systolic Blood Pressure     Diastolic Blood Pressure     Ventricular Rate 69    Atrial Rate 69    WI Interval 172    QRS Duration 80        QTc 430    P Axis 74    R AXIS 56    T Axis 70    Interpretation ECG      Sinus rhythm  Minimal voltage criteria for LVH, may be normal variant  Borderline ECG  No previous ECGs available  Confirmed by fellow Chaudhry, Mohsan ([id number]) on 2022 3:56:12 PM  Confirmed by MD KEENA, CECY ([id number]) on 2022 6:05:16 PM           ECHOCARDIOGRAM       Results for orders placed during the hospital encounter of 22    ECHOCARDIOGRAM COMPLETE    Status: Normal 2022    Narrative  [id number]  [id number]  [id number]  [id number]^NIELS^SOTO^GISELA    [facility] [place]  Echocardiography Laboratory  [address] [place] [id number]    Name: ABBI MARTÍNEZ  MRN: [id number]  : 1950  Study Date: 2022 09:44 AM  Age: 71 yrs  Gender: Female  Patient  Location: [email or web address]  Reason For Study: Examination prior to chemotherapy, Myeloma (H), Personal  history  Ordering Physician: SOTO BOWSER  Referring Physician: SOTO BOWSER  Performed By: Linden Dewey    BSA: 1.7 m2  Height: 63 in  Weight: 138 lb  HR: 75  BP: 176/79 mmHg  ______________________________________________________________________________  Procedure  Echocardiogram with two-dimensional, color and spectral Doppler performed.  ______________________________________________________________________________  Interpretation Summary  Global and regional left ventricular function is normal. 3D LVEF volumetric  analysis is 57%. Diastolic dysfunction.  Global peak LV longitudinal strain is averaged at -19.1%. This is within  reported normal limits (normal <-18%).  Global right ventricular function is normal.  Mild calcification of the aortic valve and mitral annulus, but normal doppler  assessment of all valves.  There is no prior study for direct comparison.    Mild LV wall thickening with abnormal diastolic function in the setting of  myeloma. Although strain imaging is not consistent with amyloidosis, could  consider cardiac MRI to confirm.  ______________________________________________________________________________  Left Ventricle  Global and regional left ventricular function is normal with an EF of 55-60%.  3D LVEF volumetric analysis is 57%. Left ventricular size is normal. Relative  wall thickness is increased consistent with concentric remodeling. Grade II or  moderate diastolic dysfunction. Global peak LV longitudinal strain is averaged  at -19.1%. This is within reported normal limits (normal <-18%). No regional  wall motion abnormalities are seen.    Right Ventricle  The right ventricle is normal size. Global right ventricular function is  normal.    Atria  The right atria appears normal. Severe left atrial enlargement is present.    Mitral Valve  Mild mitral annular calcification is present. On  Doppler interrogation, there  is no significant stenosis or regurgitation.    Aortic Valve  Trileaflet aortic sclerosis without stenosis.    Tricuspid Valve  The tricuspid valve is normal. The peak velocity of the tricuspid regurgitant  jet is not obtainable. Pulmonary artery systolic pressure cannot be assessed.    Pulmonic Valve  The valve leaflets are not well visualized. On Doppler interrogation, there is  no significant stenosis or regurgitation.    Vessels  Normal diameter aortic root and proximal ascending aorta. The inferior vena  cava is normal.    Pericardium  No pericardial effusion is present.    Compared to Previous Study  There is no prior study for direct comparison.  ______________________________________________________________________________  MMode/2D Measurements & Calculations  IVSd: 1.2 cm    LVIDd: 3.5 cm  LVIDs: 2.3 cm  LVPWd: 0.93 cm  FS: 33.9 %  LV mass(C)d: 111.8 grams  LV mass(C)dI: 67.7 grams/m2  Ao root diam: 3.1 cm  asc Aorta Diam: 3.1 cm  LVOT diam: 1.9 cm  LVOT area: 2.8 cm2  LA Volume (BP): 95.8 ml  LA Volume Index (BP): 58.1 ml/m2  RWT: 0.53    Doppler Measurements & Calculations  MV E max yudelka: 83.9 cm/sec  MV A max yudelka: 80.5 cm/sec  MV E/A: 1.0  MV dec slope: 366.0 cm/sec2  E/E' avg: 15.5  Lateral E/e': 14.5  Medial E/e': 16.4    ______________________________________________________________________________  Report approved by: Naomie Velasquez 01/20/2022 10:51 AM        PET Scan       Results for orders placed during the hospital encounter of 01/20/22    PET ONCOLOGY WHOLE BODY    Status: Normal 1/25/2022    Narrative  Combined Report of:    PET and CT on  1/25/2022 3:15 PM :    1. PET of the neck, chest, abdomen, and pelvis.  2. PET CT Fusion for Attenuation Correction and Anatomical  Localization:  3. 3D MIP and PET-CT fused images were processed on an independent  workstation and archived to PACS and reviewed by a radiologist.    Technique:    1. PET: The patient  received 10.15 mCi of F-18-FDG; the serum glucose  was 86 prior to administration, body weight was 63.5 kg. Images were  evaluated in the axial, sagittal, and coronal planes as well as the  rotational whole body MIP. Images were acquired from the Vertex to the  Feet.    UPTAKE WAS MEASURED AT 60 MINUTES.    BACKGROUND:  Liver SUV max= 3.56,   Aorta Blood SUV Max: 2.39.    2. CT: CT only obtained for attenuation correction and not diagnostic  purposes.    INDICATION: Multiple myeloma, follow up; Examination prior to  chemotherapy; Myeloma (H); Personal history of diseases of blood and  blood-forming organs    ADDITIONAL INFORMATION OBTAINED FROM EMR: 71 year old year old female  diagnosed with?Multiple Myeloma.??She has been treated with?KVD and  recently Daratumumab maintenance.??She is now being work up  for?Autologous?Stem Cell Transplant on protocol 2016-35, which  utilizes?HD Melphalan?as a conditioning regimen.    COMPARISON: PET/CT 11/23/2021.    FINDINGS:    HEAD/NECK:  There is no suspicious FDG uptake in the neck.    Atrophic thyroid gland demonstrates diffuse uptake, may represent  thyroiditis.    CHEST:  There is no suspicious FDG uptake in the chest.    Right chest wall Port-A-Cath with its tip in the right atrium.  Cardiomegaly. Mild atherosclerosis of the coronary arteries. Trace  pericardial effusion. Bilateral trace pleural effusions.    ABDOMEN AND PELVIS:  Focal hypermetabolism in the distal rectum with SUV max of 20 (series  3 image 400), indeterminate for primary rectal malignancy.    Atherosclerotic calcifications along the aorta and bilateral iliac  arteries.    LOWER EXTREMITIES:  No abnormal masses or hypermetabolic lesions.    BONES:  Increased in thickness and FDG avidity to the right pre-/paravertebral  soft tissue thickening along the T10 vertebra since PET/CT from  11/23/2021, measures 1.7 x 4.3 cm with SUV max of 10.2 (Series 3 image  221).  Lytic/sclerotic lesion along the right aspect  of T10 vertebral body  without abnormal FDG uptake, likely representing treated multiple  myeloma with stable compression deformity T10 vertebral body. Mixed  lytic sclerotic foci in the the iliac bones likely related to prior  bone marrow biopsy and treated multiple myeloma.  Multilevel spondylosis with calcified posterior disc herniation at  T10-T11 level resulting in moderate to severe spinal canal narrowing.  ACDF from C5-7. Unchanged grade 2 anterolisthesis of L5 on S1. Grade 1  anterolisthesis of C3 on C4.    Impression  IMPRESSION: In this patient with multiple myeloma status  post-chemotherapy:    1. Increased  thickness and FDG avidity to the right  pre-/paravertebral soft tissue thickening along the T10 vertebra since  PET/CT from 11/23/2021, suspicious for active perimedullary multiple  myeloma.  2. Mixed lytic sclerotic lesions in the iliac bones and T10 vertebral  body without suspicious FDG uptake, likely treated multiple myeloma.  3. Multilevel spondylosis with calcified posterior disc herniation at  T10-T11 level resulting in moderate to severe spinal canal narrowing.  4. Focal hypermetabolism in the distal rectum and possibly cecum,  indeterminate for primary rectal / colon malignancy. Recommend direct  visualization with colonoscopy.    I have personally reviewed the examination and initial interpretation  and I agree with the findings.    JOAQUIM WILSON MD      SYSTEM ID:  [id number]

## 2022-01-26 NOTE — NURSING NOTE
"Oncology Rooming Note    January 26, 2022 2:21 PM   Libby Grimaldo is a 71 year old female who presents for:    Chief Complaint   Patient presents with     Oncology Clinic Visit     Multiple myeloma      Initial Vitals: BP (!) 144/80 (BP Location: Right arm, Patient Position: Sitting, Cuff Size: Adult Regular)   Pulse 91   Temp 98  F (36.7  C) (Oral)   Ht 1.61 m (5' 3.39\")   Wt 63.5 kg (139 lb 14.4 oz)   SpO2 97%   BMI 24.48 kg/m   Estimated body mass index is 24.48 kg/m  as calculated from the following:    Height as of this encounter: 1.61 m (5' 3.39\").    Weight as of this encounter: 63.5 kg (139 lb 14.4 oz). Body surface area is 1.69 meters squared.  Mild Pain (2) Comment: Data Unavailable   No LMP recorded. Patient is postmenopausal.  Allergies reviewed: Yes  Medications reviewed: Yes    Medications: Medication refills not needed today.  Pharmacy name entered into [facility] [employer]: [facility] #[id number] - [facility] [address] AT [facility] [address]    Clinical concerns:     Pt would like to know if provider would like her to have her chemo at [facility].     Ismael Ho MA              "

## 2022-01-26 NOTE — LETTER
1/26/2022         RE: Libby Grimaldo  [address] [facility] [address]        Dear Colleague,    Thank you for referring your patient, Libby Grimaldo, to the [facility] BLOOD AND MARROW TRANSPLANT PROGRAM [place]. Please see a copy of my visit note below.    BMT/Cell Therapy Consultation      Libby Grimaldo is a 71 year old female referred by Dr. Nichols for Myeloma.      Diagnosis and Treatment Summary     Oncology History   Multiple myeloma not having achieved remission (H)   11/7/2018 -  Cancer Staged    Staging form: Plasma Cell Myeloma and Disorders, AJCC 8th Edition  - Clinical stage from 11/7/2018: Albumin (g/dL): 3.4, ISS: Stage II, High-risk cytogenetics: Absent, LDH: Unknown     11/7/2018 Initial Diagnosis    Multiple myeloma not having achieved remission (H)     1/23/2019 - 11/14/2019 Chemotherapy    KRD x 6 cycles     11/4/2019 - 2/14/2021 Chemotherapy    Rev/Dex(20)      4/3/2020 - 7/17/2020 Chemotherapy    Velcade maintenance - dose reductions for orthostatic hypotension     7/17/2020 - 12/15/2020 Chemotherapy    Revlimid maintenance     11/23/2020 Progression    Bone marrow 35% plasma cells, PET CT demonstrating new lytic lesions     12/15/2020 - 6/11/2021 Chemotherapy    Elsy, Kd x 6 cycles     7/7/2021 -  Cancer Staged    PET/CT - CR     7/21/2021 -  Chemotherapy    Subcutaneous Daratumumab, IVIG, denosumab monthly     11/22/2021 -  Cancer Staged    Bone marrow aspirate - MRD 0.0022% positive         HPI:  Please see my entry above for disease and treatment history.  Libby is a 71-year-old woman with multiple myeloma is received the treatment as described above.  She achieved an excellent response but was known to have MRD positive disease in November.  For that reason she was referred for possible transplantation.  Since that time she has continued to receive daratumumab maintenance which she is tolerated quite well.  At one point, Libby had a compromised performance status but over  "the past year her performance status is improved significantly.  She is active and busy at home, interacting with her sister and other friends, and living life with her .  She has good support with notable caregivers and rides back and forth to clinic.  She did have an episode of a urinary tract infection that was treated with a combination of Levaquin and TMP sulfa.  During this time, she had elevated liver function studies which resolved afterwards.  Her cystitis symptoms have resolved as well although she did develop some thrush which is now better as well.  She does have some increased lower back pain over the past month.  She has no other notable new sites of pain or any signs of the myeloma that she can detect.      ROS:    10 point ROS neg other than the symptoms noted above in the HPI.        Past Medical History:   Diagnosis Date     Cervical radiculopathy      COPD (chronic obstructive pulmonary disease) (H)      Double vision      Febrile seizure (H)     Per patient. Once while a toddler, once while in highschool, and once while in college.     Floaters      Graves disease      HLD (hyperlipidemia)      HTN (hypertension)      IPF (idiopathic pulmonary fibrosis) (H)      Lumbar radiculopathy      Multiple myeloma in relapse (H)      Osteoporosis      Pneumonia     Per patient. Complicated by sepsis.     Recurrent UTI     Per patient. Has required prophylactic antibiotcs.     Vitamin B12 deficiency (non anemic)        Past Surgical History:   Procedure Laterality Date     BONE MARROW BIOPSY, BONE SPECIMEN, NEEDLE/TROCAR Right 1/24/2022    Procedure: BIOPSY, BONE MARROW;  Surgeon: Yuniel Tineo;  Location: [facility] OR     CERVICAL FUSION       CHOLECYSTECTOMY       CYSTECTOMY OVARIAN BENIGN       EYE SURGERY      Per patient.     TONSILLECTOMY       WRIST SURGERY Left        Family History   Problem Relation Age of Onset     Heart Disease Mother      Cancer Mother         \"Around lungs\" - she " explicitly said it was *not* lung cancer.     Diabetes Father      Heart Disease Father        Social History     Tobacco Use     Smoking status: Former Smoker     Packs/day: 1.00     Years: 40.00     Pack years: 40.00     Smokeless tobacco: Former User     Quit date: 2010   Substance Use Topics     Alcohol use: Not Currently     Drug use: Not Currently     Types: Marijuana         Allergies   Allergen Reactions     Bupropion      Other reaction(s): Seizures        Current Outpatient Medications   Medication Sig Dispense Refill     acetaminophen (TYLENOL) 500 MG tablet Take 500-1,000 mg by mouth every 6 hours as needed for mild pain        albuterol (PROAIR HFA/PROVENTIL HFA/VENTOLIN HFA) 108 (90 Base) MCG/ACT inhaler Inhale 2 puffs into the lungs every 6 hours as needed for shortness of breath / dyspnea        aspirin (ASA) 81 MG chewable tablet Take 81 mg by mouth daily        atorvastatin (LIPITOR) 40 MG tablet Take 40 mg by mouth daily        buprenorphine HCl-naloxone HCl (SUBOXONE) 8-2 MG per film Place 1 Film under the tongue 3 times daily        cholecalciferol (VITAMIN D-1000 MAX ST) 25 MCG (1000 UT) TABS Take 1,000 Units by mouth daily        Fluticasone-Umeclidin-Vilanterol (TRELEGY ELLIPTA) 100-62.5-25 MCG/INH oral inhaler Inhale 1 puff into the lungs daily        levothyroxine (SYNTHROID/LEVOTHROID) 112 MCG tablet Take 112 mcg by mouth daily        metoprolol succinate ER (TOPROL-XL) 25 MG 24 hr tablet Take 25 mg by mouth daily        ondansetron (ZOFRAN-ODT) 4 MG ODT tab Take 1 tablet (4 mg) by mouth every 8 hours as needed for nausea 16 tablet 0     oxyCODONE IR (ROXICODONE) 10 MG tablet Take 10 mg by mouth every 8 hours as needed for severe pain        polyethylene glycol (MIRALAX/GLYCOLAX) powder 17 g by Nasogastric route daily as needed        venlafaxine (EFFEXOR-XR) 75 MG 24 hr capsule Take 75 mg by mouth daily       REVLIMID 25 MG CAPS capsule  (Patient not taking: Reported on 1/20/2022)    "        Physical Exam:     Vital Signs: BP (!) 144/80 (BP Location: Right arm, Patient Position: Sitting, Cuff Size: Adult Regular)   Pulse 91   Temp 98  F (36.7  C) (Oral)   Ht 1.61 m (5' 3.39\")   Wt 63.5 kg (139 lb 14.4 oz)   SpO2 97%   BMI 24.48 kg/m      Physical Exam  Vitals reviewed.   Constitutional:       Appearance: Normal appearance. She is normal weight.   HENT:      Nose: Nose normal.      Mouth/Throat:      Mouth: Mucous membranes are dry.      Pharynx: No oropharyngeal exudate or posterior oropharyngeal erythema.   Eyes:      Conjunctiva/sclera: Conjunctivae normal.   Cardiovascular:      Rate and Rhythm: Normal rate and regular rhythm.      Pulses: Normal pulses.      Heart sounds: Normal heart sounds.   Pulmonary:      Effort: Pulmonary effort is normal.      Breath sounds: No wheezing or rales.   Abdominal:      General: Abdomen is flat.   Musculoskeletal:         General: No swelling. Normal range of motion.      Cervical back: Normal range of motion and neck supple.   Lymphadenopathy:      Cervical: No cervical adenopathy.   Skin:     General: Skin is warm.      Findings: No rash.   Neurological:      General: No focal deficit present.      Mental Status: She is alert. Mental status is at baseline.   Psychiatric:         Mood and Affect: Mood normal.         Behavior: Behavior normal.         Thought Content: Thought content normal.         Judgment: Judgment normal.     Vascular Access:  Port in place and clean      BMT and Cell Therapy Informed Consent Discussion     Multiple myeloma - she has evidence of myeloma progression, although with a low tumor volume.  She does have back pain that is worse recently suggesting symptomatic disease although her symptoms do not correlate with her site of possible disease on PET/CT.  Given the findings and the likelihood that we will have some difficulty collecting PBSC for transplant, I recommended mobilization using cyclophosphamide.  This treatment " carries risk of neutropenic infection and toxicity although the use will treat her myeloma, contribute to her disease focused therapy, and improve the PBSC yield.  I reviewed the risks and benefits with her and she agrees to proceed.  We will plan to move ahead within a few days.    EVUSHELD - Talked to patient and evaluated by me. We discussed the risks and benefits of receiving EVUSHELD, as well as alternative treatments. The Emergency Use Administration (EUA) was provided to the patient for review and an opportunity for questions was provided. Patient wishes to proceed with EVUSHELD.     In today's visit, we discussed in detail the research for which Libby Grimaldo is eligible. We discussed the potential risks and potential benefits of each protocol individually. We explained potential alternatives to the protocols discussed. We explained to the patient that participation is voluntary and that consent may be withdrawn at any time.     The patient completed the last round of treatment on 12/27/21 (daratumumab alone).    HCT-CI score: 3. We counseled the patient about the impact of this on the risk of treatment related and overall mortality. The score fit within treatment protocol eligibility criteria.    Karnofsky performance score: 90     ECOG (required for CAR-T, see KPS to ECOG conversion chart): 0    Active infections:  none.  Prior infections that require additional special prophylaxis considerations: none.  I reviewed and discussed infectious disease evaluation with the patient and the management plan during treatment.    Reproductive status: What methods of birth control does the patient plan to use during the treatment period beginning with conditioning and ending with the discontinuation of immune suppression (indicate with an X all that apply):  __ The patient is confirmed to be sterile or post-menopausal  __ Sexual abstinence  __ Condoms  __ Implants  __ Injectables  __ Oral contraceptives  __  Intrauterine devices (IUD)  __ Other (describe)    The patient received appropriate reproductive counseling and agreed with the need for effective contraception during the treatment procedures.      Dental health suitable to proceed: Yes      Transplants for multiple myeloma:  The patient has High risk MM (defined as ONE or more of the following: del17p, p53 mutation, t(4;14), t(14;16), t(14;20), abnormal chromosome 1, -13 [by metaphase testing], or plasma cell leukemia), and the collection goal is 5 million CD34/kg for a single transplant..  The day for admission to begin melphalan conditioning is Day -1 for melphalan 200 mg/m2 (not frail, creatinine clearance 30+). .    After our detailed discussion above, the patient signed the following consents for treatment and protocols:    MT2016-35       Known issues that I take into account for medical decisions, with salient changes to the plan considering these complexities noted above.    Patient Active Problem List   Diagnosis     Multiple myeloma not having achieved remission (H)     I spent 60 minutes in the care of this patient today, which included time necessary for preparation for the visit, obtaining history, ordering medications/tests/procedures as medically indicated, review of pertinent medical literature, counseling of the patient, communication of recommendations to the care team, and documentation time.    SOTO BOWSER MD    Communicate with me securely using [employer]    ____________________________________________________________________          BMT/Cell Therapy Workup Summary    Workup Nurse Coordinator: Gustavo  Primary BMT Physician: Kahlil  [facility] BMT Physician (if different): Kahill  Date of Summary:  01/26/2022        Patient Demographics       Patient ID:  Libby Grimadlo   Age:  71 year old   Sex:  female  Reason for Transplant: Myeloma  Protocol: [id number]    Blood Counts       Recent Labs   Lab Test 01/24/22  0758 01/20/22  1618   HGB 10.6*  12.4   HCT 34.6* 40.2   WBC 4.3 5.4   ANEUTAUTO 1.7 2.8   ALYMPAUTO 2.1 2.0   AMONOAUTO 0.5 0.5   AEOSAUTO 0.0 0.0   ABSBASO 0.0 0.0   NRBCMAN 0.0 0.0    160         Recent Labs   Lab Test 01/20/22  1618   ABORH A POS         Recent Labs   Lab Test 01/20/22  1619   HGBS Negative         Chemistries     Basic Panel  Recent Labs   Lab Test 01/24/22  0758 01/20/22  1619   NA  --  143   POTASSIUM  --  3.9   CHLORIDE  --  111*   CO2  --  26   BUN  --  23   CR 0.75 0.75   GLC  --  97        Calcium, Magnesium, Phosphorus  Recent Labs   Lab Test 01/20/22  1619   DIANDRA 8.6   MAG 2.0   PHOS 3.0        LFTs  Recent Labs   Lab Test 01/24/22  0758 01/20/22  1619   BILITOTAL 0.2 0.4   ALKPHOS 152* 194*   AST 42 74*   ALT 53* 146*   ALBUMIN 2.8* 3.1*       LDH  Recent Labs   Lab Test 01/20/22  1619          B2-Microglobulin  Recent Labs   Lab Test 01/20/22  1618   RQXH1TDFB 4.0*       Vitamin D  Recent Labs   Lab Test 01/20/22  1621   VITDT 36         Urine Studies       Recent Labs   Lab Test 01/24/22  0645   COLOR Yellow   APPEARANCE Clear   URINEGLC Negative   URINEBILI Negative   URINEKETONE Negative   SG 1.012   UBLD Negative   URINEPH 6.0   PROTEIN Negative   UUROI Normal   NITRITE Negative   LEUKEST Negative   MUCUS Present*   RBCU 1   WBCU 1   USQEI <1       Creatinine Clearance    Recent Labs   Lab Test 01/24/22  0645      WT 64.0   RAW 48   STD 49     968   DUR 24.0  24.0   CREATININE 0.75   UCRR 54  54   UCR24 0.52*  0.52*         Infectious Disease Markers     [facility] IDM    Recent Labs   Lab Test 01/20/22  1620   DCMIG Positive*   DHBSAG Non-Reactive   DHBCAB Non-Reactive   DHIVAB Non-Reactive   DHCVAB Non-Reactive   DHTLVA Non-Reactive   TCRUZI Non-Reactive   DTRPAB Non-Reactive       CMV  No lab results found.      EBV    Recent Labs   Lab Test 01/20/22  1620   EBVCAG Positive*       HSV 1/2    Recent Labs   Lab Test 01/20/22  1620   B9DEMMK 22.40*   H1IGG Positive.  IgG  antibody to HSV-1 detected.*   M9CKHEH 3.16*   H2IGG Positive.  IgG antibody to HSV-2 detected.*         VZV    No lab results found.      HTLV    Recent Labs   Lab Test 01/20/22  1620   DHTLVA Non-Reactive         Toxoplasma  (not routinely checked)      COVID    Recent Labs   Lab Test 01/20/22  1604   [id number] Negative         Immunoglobulins     Recent Labs   Lab Test 01/20/22  1619          Recent Labs   Lab Test 01/20/22  1619   IGA 3*       Recent Labs   Lab Test 01/20/22  1619   IGM <10*         Monocloncal Protein Studies     M spike    Recent Labs   Lab Test 01/20/22  1618   ELPM 0.1*       [place] FLC    Recent Labs   Lab Test 01/20/22  1619   KFLCA 0.09*       Lambda FLC    Recent Labs   Lab Test 01/20/22  1619   LFLCA 9.96*       FLC Ratio    Recent Labs   Lab Test 01/20/22  1619   KLRA 0.01*           Bone Marrow Biopsy     Final Diagnosis   Bone marrow, posterior iliac crest, right decalcified trephine biopsy and touch imprint; right particle crush, direct aspirate smear, and concentrated aspirate smear; and peripheral smear:  - Marrow cellularity of 10-20%, with trilineage hematopoiesis; plasma cells are not increased; no definitive clonal population by IHC, but minimal residual disease detected by flow cytometry (see comment)  - Peripheral blood showing slight normochromic, macrocytic anemia   Electronically signed by Juma Levy MD on 1/26/2022 at  1:44 PM   Comment  UUMAYO   Concurrent flow cytometry was performed ([id number]) and lambda monotypic plasma cells were identified.  There are very few plasma cells seen on the trephine core based on IHC - this likely represents minimal residual disease.     There are rare hypogranular neutrophils seen on scanning, as well as a few atypical megakaryocytes, but the overall findings are not definitive for dysplasia.         Chest X-Ray - 2 view     Results for orders placed in visit on 01/20/22    XR CHEST 2 VW    Status: Normal  1/20/2022    Narrative  EXAMINATION:  XR CHEST 2 VW 1/20/2022 11:54 AM.    COMPARISON: PET CT 11/23/2021. Radiographic 9/15/2021    HISTORY:  Examination prior to chemotherapy; Myeloma (H); Personal  history of diseases of blood and blood-forming organs    FINDINGS: Upright PA and lateral radiographs of the chest. Right chest  wall Port-A-Cath tip projects over the SVC. Cardiac silhouette is  normal. No pleural effusion or pneumothorax. No focal pulmonary  opacity. Paraspinous lentiform opacity in right lower thoracic  paravertebral region. Breast implants with calcified capsule on the  left. Cholecystectomy. Degenerative changes of the spine.    Impression  IMPRESSION: Clear lungs. Right paravertebral lentiform opacity  inferiorly seen on the PET-CT.    I have personally reviewed the examination and initial interpretation  and I agree with the findings.    LAWRENCE CARSON MD      SYSTEM ID:  [id number]        Chest CT without Contrast       No results found for this or any previous visit.        PFTs     FVC%  Recent Labs   Lab Test 01/26/22  1259   [id number] 104       FEV1%  Recent Labs   Lab Test 01/26/22  1259   [id number] 107       DLCO%  Recent Labs   Lab Test 01/26/22  1259   [id number] 81       EKG       ECG results from 01/20/22   EKG 12-lead complete w/read - Clinics     Value    Systolic Blood Pressure     Diastolic Blood Pressure     Ventricular Rate 69    Atrial Rate 69    GA Interval 172    QRS Duration 80        QTc 430    P Axis 74    R AXIS 56    T Axis 70    Interpretation ECG      Sinus rhythm  Minimal voltage criteria for LVH, may be normal variant  Borderline ECG  No previous ECGs available  Confirmed by fellow Chaudhry, Mohsan ([id number]) on 1/21/2022 3:56:12 PM  Confirmed by MD KEENA, CECY ([id number]) on 1/24/2022 6:05:16 PM           ECHOCARDIOGRAM       Results for orders placed during the hospital encounter of 01/20/22    ECHOCARDIOGRAM COMPLETE    Status: Normal  2022    [facility]  [id number]  [id number]  [id number]  [id number]^NIELS^SOTO^GISELA    [facility],[place]  Echocardiography Laboratory  [address] [place] [id number]    Name: ABBI MARTÍNEZ  MRN: [id number]  : 1950  Study Date: 2022 09:44 AM  Age: 71 yrs  Gender: Female  Patient Location: [facility]  Reason For Study: Examination prior to chemotherapy, Myeloma (H), Personal  history  Ordering Physician: SOTO BOWSER  Referring Physician: SOTO BOWSER  Performed By: Linden Dewey    BSA: 1.7 m2  Height: 63 in  Weight: 138 lb  HR: 75  BP: 176/79 mmHg  ______________________________________________________________________________  Procedure  Echocardiogram with two-dimensional, color and spectral Doppler performed.  ______________________________________________________________________________  Interpretation Summary  Global and regional left ventricular function is normal. 3D LVEF volumetric  analysis is 57%. Diastolic dysfunction.  Global peak LV longitudinal strain is averaged at -19.1%. This is within  reported normal limits (normal <-18%).  Global right ventricular function is normal.  Mild calcification of the aortic valve and mitral annulus, but normal doppler  assessment of all valves.  There is no prior study for direct comparison.    Mild LV wall thickening with abnormal diastolic function in the setting of  myeloma. Although strain imaging is not consistent with amyloidosis, could  consider cardiac MRI to confirm.  ______________________________________________________________________________  Left Ventricle  Global and regional left ventricular function is normal with an EF of 55-60%.  3D LVEF volumetric analysis is 57%. Left ventricular size is normal. Relative  wall thickness is increased consistent with concentric remodeling. Grade II or  moderate diastolic dysfunction. Global peak LV longitudinal strain is averaged  at -19.1%. This is within reported normal  limits (normal <-18%). No regional  wall motion abnormalities are seen.    Right Ventricle  The right ventricle is normal size. Global right ventricular function is  normal.    Atria  The right atria appears normal. Severe left atrial enlargement is present.    Mitral Valve  Mild mitral annular calcification is present. On Doppler interrogation, there  is no significant stenosis or regurgitation.    Aortic Valve  Trileaflet aortic sclerosis without stenosis.    Tricuspid Valve  The tricuspid valve is normal. The peak velocity of the tricuspid regurgitant  jet is not obtainable. Pulmonary artery systolic pressure cannot be assessed.    Pulmonic Valve  The valve leaflets are not well visualized. On Doppler interrogation, there is  no significant stenosis or regurgitation.    Vessels  Normal diameter aortic root and proximal ascending aorta. The inferior vena  cava is normal.    Pericardium  No pericardial effusion is present.    Compared to Previous Study  There is no prior study for direct comparison.  ______________________________________________________________________________  MMode/2D Measurements & Calculations  IVSd: 1.2 cm    LVIDd: 3.5 cm  LVIDs: 2.3 cm  LVPWd: 0.93 cm  FS: 33.9 %  LV mass(C)d: 111.8 grams  LV mass(C)dI: 67.7 grams/m2  Ao root diam: 3.1 cm  asc Aorta Diam: 3.1 cm  LVOT diam: 1.9 cm  LVOT area: 2.8 cm2  LA Volume (BP): 95.8 ml  LA Volume Index (BP): 58.1 ml/m2  RWT: 0.53    Doppler Measurements & Calculations  MV E max yudelka: 83.9 cm/sec  MV A max yudelka: 80.5 cm/sec  MV E/A: 1.0  MV dec slope: 366.0 cm/sec2  E/E' avg: 15.5  Lateral E/e': 14.5  Medial E/e': 16.4    ______________________________________________________________________________  Report approved by: Naomie Velasquez 01/20/2022 10:51 AM        PET Scan       Results for orders placed during the hospital encounter of 01/20/22    PET ONCOLOGY WHOLE BODY    Status: Normal 1/25/2022    Narrative  Combined Report of:    PET and CT on   1/25/2022 3:15 PM :    1. PET of the neck, chest, abdomen, and pelvis.  2. PET CT Fusion for Attenuation Correction and Anatomical  Localization:  3. 3D MIP and PET-CT fused images were processed on an independent  workstation and archived to PACS and reviewed by a radiologist.    Technique:    1. PET: The patient received 10.15 mCi of F-18-FDG; the serum glucose  was 86 prior to administration, body weight was 63.5 kg. Images were  evaluated in the axial, sagittal, and coronal planes as well as the  rotational whole body MIP. Images were acquired from the Vertex to the  Feet.    UPTAKE WAS MEASURED AT 60 MINUTES.    BACKGROUND:  Liver SUV max= 3.56,   Aorta Blood SUV Max: 2.39.    2. CT: CT only obtained for attenuation correction and not diagnostic  purposes.    INDICATION: Multiple myeloma, follow up; Examination prior to  chemotherapy; Myeloma (H); Personal history of diseases of blood and  blood-forming organs    ADDITIONAL INFORMATION OBTAINED FROM EMR: 71 year old year old female  diagnosed with?Multiple Myeloma.??She has been treated with?KVD and  recently Daratumumab maintenance.??She is now being work up  for?Autologous?Stem Cell Transplant on protocol 2016-35, which  utilizes?HD Melphalan?as a conditioning regimen.    COMPARISON: PET/CT 11/23/2021.    FINDINGS:    HEAD/NECK:  There is no suspicious FDG uptake in the neck.    Atrophic thyroid gland demonstrates diffuse uptake, may represent  thyroiditis.    CHEST:  There is no suspicious FDG uptake in the chest.    Right chest wall Port-A-Cath with its tip in the right atrium.  Cardiomegaly. Mild atherosclerosis of the coronary arteries. Trace  pericardial effusion. Bilateral trace pleural effusions.    ABDOMEN AND PELVIS:  Focal hypermetabolism in the distal rectum with SUV max of 20 (series  3 image 400), indeterminate for primary rectal malignancy.    Atherosclerotic calcifications along the aorta and bilateral iliac  arteries.    LOWER  EXTREMITIES:  No abnormal masses or hypermetabolic lesions.    BONES:  Increased in thickness and FDG avidity to the right pre-/paravertebral  soft tissue thickening along the T10 vertebra since PET/CT from  11/23/2021, measures 1.7 x 4.3 cm with SUV max of 10.2 (Series 3 image  221).  Lytic/sclerotic lesion along the right aspect of T10 vertebral body  without abnormal FDG uptake, likely representing treated multiple  myeloma with stable compression deformity T10 vertebral body. Mixed  lytic sclerotic foci in the the iliac bones likely related to prior  bone marrow biopsy and treated multiple myeloma.  Multilevel spondylosis with calcified posterior disc herniation at  T10-T11 level resulting in moderate to severe spinal canal narrowing.  ACDF from C5-7. Unchanged grade 2 anterolisthesis of L5 on S1. Grade 1  anterolisthesis of C3 on C4.    Impression  IMPRESSION: In this patient with multiple myeloma status  post-chemotherapy:    1. Increased  thickness and FDG avidity to the right  pre-/paravertebral soft tissue thickening along the T10 vertebra since  PET/CT from 11/23/2021, suspicious for active perimedullary multiple  myeloma.  2. Mixed lytic sclerotic lesions in the iliac bones and T10 vertebral  body without suspicious FDG uptake, likely treated multiple myeloma.  3. Multilevel spondylosis with calcified posterior disc herniation at  T10-T11 level resulting in moderate to severe spinal canal narrowing.  4. Focal hypermetabolism in the distal rectum and possibly cecum,  indeterminate for primary rectal / colon malignancy. Recommend direct  visualization with colonoscopy.    I have personally reviewed the examination and initial interpretation  and I agree with the findings.    JOAQUIM WILSON MD      SYSTEM ID:  [id number]           Again, thank you for allowing me to participate in the care of your patient.      Sincerely,    SOTO BOWSER MD

## 2022-01-26 NOTE — NURSING NOTE
EVUSHELD Administration Note:  Libby Grimaldo presents today for EVUSHELD.  Patient seen by provider today: Yes: Julio C Guillory.   present during visit today: Not Applicable.    Note: N/A.    Confirmed patient received the Emergency Use Authorization Fact Sheet for Patients, Parents and Caregivers prior to receiving medication. Confirmed patient had conversation with provider about medication and no further questions at this time.     Post Injection Assessment:  Patient tolerated injection without incident.   Patient was observed in the room for a minimum of 10 minutes after injection per standard, then remained in the buidling for a total 60 minute observation period.      Discharge Plan:   Patient and/or family verbalized understanding of discharge instructions and all questions answered.    Flakito Chu RN

## 2022-01-28 DIAGNOSIS — C90.00 MULTIPLE MYELOMA NOT HAVING ACHIEVED REMISSION (H): Primary | ICD-10-CM

## 2022-01-28 LAB
DLCOCOR-%PRED-PRE: 81 %
DLCOCOR-PRE: 15.29 ML/MIN/MMHG
DLCOUNC-%PRED-PRE: 73 %
DLCOUNC-PRE: 13.79 ML/MIN/MMHG
DLCOUNC-PRED: 18.72 ML/MIN/MMHG
ERV-%PRED-PRE: 74 %
ERV-PRE: 0.5 L
ERV-PRED: 0.67 L
EXPTIME-PRE: 7.51 SEC
FEF2575-%PRED-PRE: 117 %
FEF2575-PRE: 2.17 L/SEC
FEF2575-PRED: 1.86 L/SEC
FEFMAX-%PRED-PRE: 143 %
FEFMAX-PRE: 7.96 L/SEC
FEFMAX-PRED: 5.53 L/SEC
FEV1-%PRED-PRE: 107 %
FEV1-PRE: 2.3 L
FEV1FEV6-PRE: 82 %
FEV1FEV6-PRED: 79 %
FEV1FVC-PRE: 80 %
FEV1FVC-PRED: 78 %
FEV1SVC-PRE: 80 %
FEV1SVC-PRED: 73 %
FIFMAX-PRE: 4.79 L/SEC
FRCPLETH-%PRED-PRE: 89 %
FRCPLETH-PRE: 2.37 L
FRCPLETH-PRED: 2.64 L
FVC-%PRED-PRE: 104 %
FVC-PRE: 2.87 L
FVC-PRED: 2.76 L
IC-%PRED-PRE: 104 %
IC-PRE: 2.35 L
IC-PRED: 2.25 L
RVPLETH-%PRED-PRE: 93 %
RVPLETH-PRE: 1.86 L
RVPLETH-PRED: 1.99 L
TLCPLETH-%PRED-PRE: 99 %
TLCPLETH-PRE: 4.72 L
TLCPLETH-PRED: 4.73 L
VA-%PRED-PRE: 101 %
VA-PRE: 4.53 L
VC-%PRED-PRE: 97 %
VC-PRE: 2.86 L
VC-PRED: 2.92 L

## 2022-01-31 ENCOUNTER — TELEPHONE (OUTPATIENT)
Dept: INTERVENTIONAL RADIOLOGY/VASCULAR | Facility: CLINIC | Age: 72
End: 2022-01-31

## 2022-01-31 ENCOUNTER — LAB (OUTPATIENT)
Dept: LAB | Facility: CLINIC | Age: 72
End: 2022-01-31
Attending: INTERNAL MEDICINE

## 2022-01-31 DIAGNOSIS — C90.00 MULTIPLE MYELOMA NOT HAVING ACHIEVED REMISSION (H): Primary | ICD-10-CM

## 2022-01-31 DIAGNOSIS — C90.00 MULTIPLE MYELOMA NOT HAVING ACHIEVED REMISSION (H): ICD-10-CM

## 2022-01-31 LAB — SARS-COV-2 RNA RESP QL NAA+PROBE: NEGATIVE

## 2022-01-31 PROCEDURE — U0005 INFEC AGEN DETEC AMPLI PROBE: HCPCS | Performed by: INTERNAL MEDICINE

## 2022-01-31 ASSESSMENT — ANXIETY QUESTIONNAIRES
6. BECOMING EASILY ANNOYED OR IRRITABLE: SEVERAL DAYS
5. BEING SO RESTLESS THAT IT IS HARD TO SIT STILL: SEVERAL DAYS
GAD7 TOTAL SCORE: 5
2. NOT BEING ABLE TO STOP OR CONTROL WORRYING: NOT AT ALL
1. FEELING NERVOUS, ANXIOUS, OR ON EDGE: SEVERAL DAYS
3. WORRYING TOO MUCH ABOUT DIFFERENT THINGS: SEVERAL DAYS
IF YOU CHECKED OFF ANY PROBLEMS ON THIS QUESTIONNAIRE, HOW DIFFICULT HAVE THESE PROBLEMS MADE IT FOR YOU TO DO YOUR WORK, TAKE CARE OF THINGS AT HOME, OR GET ALONG WITH OTHER PEOPLE: NOT DIFFICULT AT ALL
7. FEELING AFRAID AS IF SOMETHING AWFUL MIGHT HAPPEN: NOT AT ALL

## 2022-01-31 ASSESSMENT — PATIENT HEALTH QUESTIONNAIRE - PHQ9: 5. POOR APPETITE OR OVEREATING: SEVERAL DAYS

## 2022-01-31 NOTE — PROGRESS NOTES
"Blood and Marrow Transplant   Psychosocial Assessment with   Clinical     Assessment completed on 1/21/2022 via phone as part of the COVID 19 protocol. Assessment of living situation, support system, financial status, functional status, coping, stressors, need for resources and social work intervention provided as needed. Information for this assessment was provided by pt, pt's -Jerrica, and pt's sister-Leisa's report in addition to medical chart review and consultation with medical team.     Present at Assessment:   Patient: Libby Grimaldo  Spouse: Tres \"Jerrica\" Acovidio  : ANDREW Lambert, [facility]    Diagnosis: Multiple Myeloma     Date of Diagnosis: 12/20219    Transplant type: Autologous    Donor: Autologous      Physician: Alex Monsalve MD    Nurse Coordinator (Work-Up): Rashad Chen RN    Nurse Coordinator (Primary): Kiara Stewart RN    : ANDREW Lambert, [facility]     Permanent Address:   [address] [place] [address]    Living Situation: Pt lives at home with her Queenie.    Contact Information:  Pt's Cell Phone: [phone]  Pt Email: [email or web address]  -Tres Mckeon's Phone: [phone]  Sister-Leisa Grimaldo's Phone: [phone]    Presenting Information:  Libby is a 71 year old female diagnosed with MM who presents for evaluation for autologous transplant at the [facility] ([facility]). Pt was accompanied to today's visit by her -Jerrica and sister-Leisa.     Decision Making: Self    Health Care Directive: No. SW provided education and forms. SW encouraged pt to have discussions with their family regarding their health care wishes. SW explained that since she does not have a healthcare directive, legally her -Tres Mckeon would make decisions on her behalf, if she did not have capacity to make her own medical decisions. Pt understood.     Pt wants to complete a healthcare directive. SW will check in with pt " "while in the hospital.     Relationship Status: . Pt and pt's spouse described relationship as stable and supportive.     Family/Support System: Pt endorsed a good support system including family and close friends who will be available to support pt throughout transplant process.     Spouse: Tres \"Jerrica\"Linda ( has kidney failure and has been sick and attending appts himself)  Children: Son-Luis Eduardo (Lives in [place]) and Son-Pravin (Lives in [place]) - These are Libby's biological children. -Jerrica has 3 Children of his own (2 live in [place], [place] and 1 lives in [place])  Grandchildren: Libby has 1 Grandchild-Yamel Cm (7 years old) and Jerrica has 7 Grandchildren - Together they have 8 grandchildren  Parents:   Siblings: Sister-Leisa Grimaldo (Lives in [place] - 3 miles away from pt)    Caregiver: MIKEY discussed with pt, pt's , and pt's sister the caregiver role and expectation at length. Pt is agreeable to having a full time caregiver for a minimum of 30 days until cleared by the BMT physician. Pt and pt's family confirmed understanding of the caregiver requirement. Pt's primary caregivers will be -Jerrica and sister-Leisa. Pt reviewed and signed the caregiver contract which will be scanned into the EM. Caregiver education and resources provided. No caregiver concerns identified.     Caregiver Contact Information:  -Tres Mckeon's Phone: [phone]  Sister-Leisa Grimaldo's Phone: [phone]    Transportation Mode: Private Vehicle. Pt is aware of driving restrictions post-BMT and the need for the caregiver is to drive until cleared to drive by the BMT physician. MIKEY provided information on parking info and monthly parking pass options. Pt reported no concerns with transportation and confirmed her caregivers will drive her to call her doctor's appointments.    Insurance: Pt has Medicare and Medicaid [place] health insurance. Pt denied specific insurance concerns at this time. MIKEY " "reiterated information about the [employer]  should specific insurance questions arise as pt moves through transplant process. Future Insurance questions referred to [facility] [employer] -Michael Valdes (CP: [phone]).    Sources of Income: Pt is supported by Social Security long-term and Pension. Pt is anticipating financial hardship related to BMT at this time. MIKEY provided information on gris options. Pt would like to apply for grants. MIKEY mailed pt on 1.21.2022 the [employer], [employer], [facility] and [employer] gris applications. MIKEY encouraged pt to return applications to  when completed.     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     Mental Health: Pt denied a history of mental health concerns, specific diagnoses or medications at this time. MIKEY explained that it's not uncommon for patients going through transplant to experience symptoms of depression/anxiety.     PHQ-2:  Pt scored a 0 which indicates no sign of depression on the depression severity scale. Pt endorses this is an accurate reflection of her emotional state.    GAD7:  Pt scored a 5 which indicates mild anxiety on the Generalized Anxiety Disorder Questionnaire. Pt endorses this is not an accurate reflection of her emotional state.    Chemical Use:   Tobacco: Pt quit 15 years ago.  Alcohol: No use  Marijuana: No hx  Other Drugs: No hx  Based on the information provided, there appear to be no specific risks or concerns identified at this time.     Trauma/Loss/Abuse History: Multiple losses associated with cancer diagnosis and treatment, including health, changes to physical appearance, etc.     Spirituality: MIKEY explained that there are Chaplains on the unit and pt can request to meet with a  at anytime.    Coping: Pt noted that she is currently feeling \"okay, tired yet moving around and handling it well\". Pt shared she is " "worried about her  and sister having to be her caregiver all the time. Pt shared she is having trouble with sleeping at night and plants to talk with the MD about her sleep trouble. Pt shared that her main coping mechanisms are talking with family/friends and spending time with her granddaughter. SW and pt discussed additional positive coping mechanisms that pt can utilize while in the hospital. While hospitalized, pt plans to bring her computer.     Caregiver Coping: Pt's caregivers noted that they is feeling \"fine\" at this time. SW encouraged pt's caregivers to reach out if they need anything at all for support as well.     Education Provided: Transplant process expectations, Caregiver requirements, Caregiver self-care, Financial issues related to transplant, Financial resources/grants available, Common psychosocial stressors pre/post transplant, Support group(s) available, Hospital resources available, Web site information, Social Work role and Resources for grandchildren.    Interventions Provided: Psychosocial Support and Education     Assessment and Recommendations for Team:  Pt is a 71 year old female diagnosed with MM who is here undergoing preparation for a planned autologous transplant.     Pt is pleasant, calm and able to articulate concerns/coping mechanisms in an appropriate manner. During our meeting pt was alert, she was interactive, affect was full, she displayed appropriate memory and thought processes. Pt feels comfortable communicating with the medical team. Pt has a strong supportive network of family and friends who are involved. Pt and pt's family will benefit from ongoing psychosocial support in regards to coping with the adjustment to the BMT process.     Pt has a good support system and a well developed caregiver plan. Pt verbalizes understanding of the transplant process and wanting to proceed. SW provided contact information and encouraged pt to contact SW with questions, concerns, " resources and for support.    Per this assessment, SW did not identify any barriers to this patient moving forward with transplant.    Important Information:   -Would like clinic appointments later in the day.    Follow up Planned:   -Initiated financial resources -Mailed gris applications on 1.21.2022 and received on 1.29.2022  -Psychosocial support  -Healthcare Directive - Check in while inpatient  -Resources for grandchildren - Mailed [employer], [employer], and [employer] on 1.21.2022 and received on 1.29.2022    Estefanía MORALES, [facility]  Phone: [phone]  Pager: [phone]

## 2022-02-01 ENCOUNTER — APPOINTMENT (OUTPATIENT)
Dept: INTERVENTIONAL RADIOLOGY/VASCULAR | Facility: CLINIC | Age: 72
End: 2022-02-01
Attending: INTERNAL MEDICINE
Payer: MEDICARE

## 2022-02-01 ENCOUNTER — HOSPITAL ENCOUNTER (OUTPATIENT)
Facility: CLINIC | Age: 72
Setting detail: OBSERVATION
Discharge: HOME | End: 2022-02-03
Attending: INTERNAL MEDICINE | Admitting: RADIOLOGY
Payer: MEDICARE

## 2022-02-01 ENCOUNTER — APPOINTMENT (OUTPATIENT)
Dept: MEDSURG UNIT | Facility: CLINIC | Age: 72
End: 2022-02-01
Attending: INTERNAL MEDICINE
Payer: MEDICARE

## 2022-02-01 DIAGNOSIS — Z51.11 ADMISSION FOR CHEMOTHERAPY: ICD-10-CM

## 2022-02-01 DIAGNOSIS — C90.00 MULTIPLE MYELOMA NOT HAVING ACHIEVED REMISSION (H): Primary | ICD-10-CM

## 2022-02-01 LAB
ABO/RH(D): NORMAL
ALBUMIN SERPL-MCNC: 2.5 G/DL (ref 3.4–5)
ALP SERPL-CCNC: 140 U/L (ref 40–150)
ALT SERPL W P-5'-P-CCNC: 17 U/L (ref 0–50)
ANION GAP SERPL CALCULATED.3IONS-SCNC: 5 MMOL/L (ref 3–14)
APTT PPP: 29 SECONDS (ref 22–38)
AST SERPL W P-5'-P-CCNC: 23 U/L (ref 0–45)
BASOPHILS # BLD AUTO: 0 10E3/UL (ref 0–0.2)
BASOPHILS NFR BLD AUTO: 0 %
BILIRUB SERPL-MCNC: 0.3 MG/DL (ref 0.2–1.3)
BUN SERPL-MCNC: 12 MG/DL (ref 7–30)
CALCIUM SERPL-MCNC: 8.1 MG/DL (ref 8.5–10.1)
CHLORIDE BLD-SCNC: 111 MMOL/L (ref 94–109)
CO2 SERPL-SCNC: 27 MMOL/L (ref 20–32)
CREAT SERPL-MCNC: 0.7 MG/DL (ref 0.52–1.04)
EOSINOPHIL # BLD AUTO: 0 10E3/UL (ref 0–0.7)
EOSINOPHIL NFR BLD AUTO: 1 %
ERYTHROCYTE [DISTWIDTH] IN BLOOD BY AUTOMATED COUNT: 14.6 % (ref 10–15)
GFR SERPL CREATININE-BSD FRML MDRD: >90 ML/MIN/1.73M2
GLUCOSE BLD-MCNC: 83 MG/DL (ref 70–99)
HCT VFR BLD AUTO: 30.9 % (ref 35–47)
HGB BLD-MCNC: 9.7 G/DL (ref 11.7–15.7)
IMM GRANULOCYTES # BLD: 0 10E3/UL
IMM GRANULOCYTES NFR BLD: 0 %
INR PPP: 1.06 (ref 0.85–1.15)
LYMPHOCYTES # BLD AUTO: 1.9 10E3/UL (ref 0.8–5.3)
LYMPHOCYTES NFR BLD AUTO: 48 %
MAGNESIUM SERPL-MCNC: 1.9 MG/DL (ref 1.6–2.3)
MCH RBC QN AUTO: 32 PG (ref 26.5–33)
MCHC RBC AUTO-ENTMCNC: 31.4 G/DL (ref 31.5–36.5)
MCV RBC AUTO: 102 FL (ref 78–100)
MONOCYTES # BLD AUTO: 0.5 10E3/UL (ref 0–1.3)
MONOCYTES NFR BLD AUTO: 13 %
NEUTROPHILS # BLD AUTO: 1.5 10E3/UL (ref 1.6–8.3)
NEUTROPHILS NFR BLD AUTO: 38 %
NRBC # BLD AUTO: 0 10E3/UL
NRBC BLD AUTO-RTO: 0 /100
PHOSPHATE SERPL-MCNC: 2.8 MG/DL (ref 2.5–4.5)
PLATELET # BLD AUTO: 165 10E3/UL (ref 150–450)
POTASSIUM BLD-SCNC: 3.6 MMOL/L (ref 3.4–5.3)
POTASSIUM BLD-SCNC: 3.6 MMOL/L (ref 3.4–5.3)
PROT SERPL-MCNC: 5.4 G/DL (ref 6.8–8.8)
RBC # BLD AUTO: 3.03 10E6/UL (ref 3.8–5.2)
SODIUM SERPL-SCNC: 143 MMOL/L (ref 133–144)
SPECIMEN EXPIRATION DATE: NORMAL
URATE SERPL-MCNC: 4.3 MG/DL (ref 2.6–6)
WBC # BLD AUTO: 4 10E3/UL (ref 4–11)

## 2022-02-01 PROCEDURE — 76937 US GUIDE VASCULAR ACCESS: CPT | Mod: 26 | Performed by: RADIOLOGY

## 2022-02-01 PROCEDURE — G0378 HOSPITAL OBSERVATION PER HR: HCPCS

## 2022-02-01 PROCEDURE — 85730 THROMBOPLASTIN TIME PARTIAL: CPT | Performed by: PHYSICIAN ASSISTANT

## 2022-02-01 PROCEDURE — 85610 PROTHROMBIN TIME: CPT | Mod: GZ | Performed by: PHYSICIAN ASSISTANT

## 2022-02-01 PROCEDURE — 250N000011 HC RX IP 250 OP 636: Performed by: INTERNAL MEDICINE

## 2022-02-01 PROCEDURE — 84100 ASSAY OF PHOSPHORUS: CPT | Performed by: PHYSICIAN ASSISTANT

## 2022-02-01 PROCEDURE — 86901 BLOOD TYPING SEROLOGIC RH(D): CPT | Performed by: INTERNAL MEDICINE

## 2022-02-01 PROCEDURE — 99152 MOD SED SAME PHYS/QHP 5/>YRS: CPT | Mod: GC | Performed by: RADIOLOGY

## 2022-02-01 PROCEDURE — 85025 COMPLETE CBC W/AUTO DIFF WBC: CPT | Performed by: PHYSICIAN ASSISTANT

## 2022-02-01 PROCEDURE — 272N000602 HC WOUND GLUE CR1

## 2022-02-01 PROCEDURE — 36558 INSERT TUNNELED CV CATH: CPT

## 2022-02-01 PROCEDURE — 258N000003 HC RX IP 258 OP 636: Performed by: NURSE PRACTITIONER

## 2022-02-01 PROCEDURE — 272N000504 HC NEEDLE CR4

## 2022-02-01 PROCEDURE — 36558 INSERT TUNNELED CV CATH: CPT | Mod: GC | Performed by: RADIOLOGY

## 2022-02-01 PROCEDURE — 99152 MOD SED SAME PHYS/QHP 5/>YRS: CPT

## 2022-02-01 PROCEDURE — 99153 MOD SED SAME PHYS/QHP EA: CPT

## 2022-02-01 PROCEDURE — 250N000011 HC RX IP 250 OP 636: Performed by: NURSE PRACTITIONER

## 2022-02-01 PROCEDURE — 96415 CHEMO IV INFUSION ADDL HR: CPT

## 2022-02-01 PROCEDURE — 250N000011 HC RX IP 250 OP 636: Performed by: PHYSICIAN ASSISTANT

## 2022-02-01 PROCEDURE — 84132 ASSAY OF SERUM POTASSIUM: CPT | Performed by: PHYSICIAN ASSISTANT

## 2022-02-01 PROCEDURE — 250N000013 HC RX MED GY IP 250 OP 250 PS 637: Performed by: PHYSICIAN ASSISTANT

## 2022-02-01 PROCEDURE — 83735 ASSAY OF MAGNESIUM: CPT | Performed by: PHYSICIAN ASSISTANT

## 2022-02-01 PROCEDURE — 96411 CHEMO IV PUSH ADDL DRUG: CPT

## 2022-02-01 PROCEDURE — 250N000011 HC RX IP 250 OP 636: Performed by: RADIOLOGY

## 2022-02-01 PROCEDURE — 84550 ASSAY OF BLOOD/URIC ACID: CPT | Performed by: INTERNAL MEDICINE

## 2022-02-01 PROCEDURE — 77001 FLUOROGUIDE FOR VEIN DEVICE: CPT | Mod: 26 | Performed by: RADIOLOGY

## 2022-02-01 PROCEDURE — 96413 CHEMO IV INFUSION 1 HR: CPT

## 2022-02-01 PROCEDURE — 250N000009 HC RX 250: Performed by: PHYSICIAN ASSISTANT

## 2022-02-01 PROCEDURE — 258N000001 HC RX 258: Performed by: PHYSICIAN ASSISTANT

## 2022-02-01 PROCEDURE — 258N000003 HC RX IP 258 OP 636: Performed by: INTERNAL MEDICINE

## 2022-02-01 PROCEDURE — 999N000142 HC STATISTIC PROCEDURE PREP ONLY

## 2022-02-01 PROCEDURE — 76937 US GUIDE VASCULAR ACCESS: CPT

## 2022-02-01 PROCEDURE — 80053 COMPREHEN METABOLIC PANEL: CPT | Performed by: PHYSICIAN ASSISTANT

## 2022-02-01 PROCEDURE — C1769 GUIDE WIRE: HCPCS

## 2022-02-01 PROCEDURE — C1750 CATH, HEMODIALYSIS,LONG-TERM: HCPCS

## 2022-02-01 RX ORDER — ALLOPURINOL 300 MG/1
300 TABLET ORAL DAILY
Status: DISCONTINUED | OUTPATIENT
Start: 2022-02-01 | End: 2022-02-01

## 2022-02-01 RX ORDER — HEPARIN SODIUM (PORCINE) LOCK FLUSH IV SOLN 100 UNIT/ML 100 UNIT/ML
3 SOLUTION INTRAVENOUS
Status: DISCONTINUED | OUTPATIENT
Start: 2022-02-01 | End: 2022-02-01 | Stop reason: HOSPADM

## 2022-02-01 RX ORDER — ACETAMINOPHEN 325 MG/1
325-650 TABLET ORAL EVERY 4 HOURS PRN
Status: DISCONTINUED | OUTPATIENT
Start: 2022-02-01 | End: 2022-02-03 | Stop reason: HOSPADM

## 2022-02-01 RX ORDER — LEVOTHYROXINE SODIUM 112 UG/1
112 TABLET ORAL DAILY
Status: DISCONTINUED | OUTPATIENT
Start: 2022-02-02 | End: 2022-02-03 | Stop reason: HOSPADM

## 2022-02-01 RX ORDER — ALBUTEROL SULFATE 90 UG/1
1-2 AEROSOL, METERED RESPIRATORY (INHALATION)
Status: DISCONTINUED | OUTPATIENT
Start: 2022-02-01 | End: 2022-02-03

## 2022-02-01 RX ORDER — ACYCLOVIR 200 MG/1
800 CAPSULE ORAL 2 TIMES DAILY
Status: DISCONTINUED | OUTPATIENT
Start: 2022-02-01 | End: 2022-02-03 | Stop reason: HOSPADM

## 2022-02-01 RX ORDER — MEPERIDINE HYDROCHLORIDE 25 MG/ML
25 INJECTION INTRAMUSCULAR; INTRAVENOUS; SUBCUTANEOUS EVERY 30 MIN PRN
Status: DISCONTINUED | OUTPATIENT
Start: 2022-02-01 | End: 2022-02-03

## 2022-02-01 RX ORDER — DIPHENHYDRAMINE HYDROCHLORIDE 50 MG/ML
50 INJECTION INTRAMUSCULAR; INTRAVENOUS
Status: DISCONTINUED | OUTPATIENT
Start: 2022-02-01 | End: 2022-02-03

## 2022-02-01 RX ORDER — EPINEPHRINE 1 MG/ML
0.3 INJECTION, SOLUTION, CONCENTRATE INTRAVENOUS EVERY 5 MIN PRN
Status: DISCONTINUED | OUTPATIENT
Start: 2022-02-01 | End: 2022-02-03

## 2022-02-01 RX ORDER — VENLAFAXINE HYDROCHLORIDE 75 MG/1
75 CAPSULE, EXTENDED RELEASE ORAL DAILY
Status: DISCONTINUED | OUTPATIENT
Start: 2022-02-01 | End: 2022-02-03 | Stop reason: HOSPADM

## 2022-02-01 RX ORDER — METHYLPREDNISOLONE SODIUM SUCCINATE 125 MG/2ML
125 INJECTION, POWDER, LYOPHILIZED, FOR SOLUTION INTRAMUSCULAR; INTRAVENOUS
Status: DISCONTINUED | OUTPATIENT
Start: 2022-02-01 | End: 2022-02-03

## 2022-02-01 RX ORDER — NALOXONE HYDROCHLORIDE 0.4 MG/ML
0.2 INJECTION, SOLUTION INTRAMUSCULAR; INTRAVENOUS; SUBCUTANEOUS
Status: DISCONTINUED | OUTPATIENT
Start: 2022-02-01 | End: 2022-02-03

## 2022-02-01 RX ORDER — BUPRENORPHINE AND NALOXONE 8; 2 MG/1; MG/1
1 FILM, SOLUBLE BUCCAL; SUBLINGUAL 3 TIMES DAILY
Status: DISCONTINUED | OUTPATIENT
Start: 2022-02-01 | End: 2022-02-03 | Stop reason: HOSPADM

## 2022-02-01 RX ORDER — FUROSEMIDE 10 MG/ML
10 INJECTION INTRAMUSCULAR; INTRAVENOUS
Status: DISPENSED | OUTPATIENT
Start: 2022-02-01 | End: 2022-02-02

## 2022-02-01 RX ORDER — FLUMAZENIL 0.1 MG/ML
0.2 INJECTION, SOLUTION INTRAVENOUS
Status: DISCONTINUED | OUTPATIENT
Start: 2022-02-01 | End: 2022-02-01 | Stop reason: HOSPADM

## 2022-02-01 RX ORDER — SODIUM CHLORIDE 9 MG/ML
INJECTION, SOLUTION INTRAVENOUS CONTINUOUS
Status: DISCONTINUED | OUTPATIENT
Start: 2022-02-01 | End: 2022-02-01 | Stop reason: HOSPADM

## 2022-02-01 RX ORDER — CEFAZOLIN SODIUM 1 G/50ML
2 SOLUTION INTRAVENOUS
Status: COMPLETED | OUTPATIENT
Start: 2022-02-01 | End: 2022-02-01

## 2022-02-01 RX ORDER — HEPARIN SODIUM (PORCINE) LOCK FLUSH IV SOLN 100 UNIT/ML 100 UNIT/ML
3 SOLUTION INTRAVENOUS EVERY 24 HOURS
Status: DISCONTINUED | OUTPATIENT
Start: 2022-02-01 | End: 2022-02-01 | Stop reason: HOSPADM

## 2022-02-01 RX ORDER — NALOXONE HYDROCHLORIDE 0.4 MG/ML
0.2 INJECTION, SOLUTION INTRAMUSCULAR; INTRAVENOUS; SUBCUTANEOUS
Status: DISCONTINUED | OUTPATIENT
Start: 2022-02-01 | End: 2022-02-01 | Stop reason: HOSPADM

## 2022-02-01 RX ORDER — ALLOPURINOL 300 MG/1
300 TABLET ORAL DAILY
Status: DISCONTINUED | OUTPATIENT
Start: 2022-02-01 | End: 2022-02-03 | Stop reason: HOSPADM

## 2022-02-01 RX ORDER — LORAZEPAM 2 MG/ML
.5-1 INJECTION INTRAMUSCULAR EVERY 4 HOURS PRN
Status: DISCONTINUED | OUTPATIENT
Start: 2022-02-01 | End: 2022-02-03 | Stop reason: HOSPADM

## 2022-02-01 RX ORDER — ALBUTEROL SULFATE 0.83 MG/ML
2.5 SOLUTION RESPIRATORY (INHALATION)
Status: DISCONTINUED | OUTPATIENT
Start: 2022-02-01 | End: 2022-02-03

## 2022-02-01 RX ORDER — ONDANSETRON 8 MG/1
8 TABLET, FILM COATED ORAL EVERY 8 HOURS
Status: COMPLETED | OUTPATIENT
Start: 2022-02-01 | End: 2022-02-02

## 2022-02-01 RX ORDER — LIDOCAINE 40 MG/G
CREAM TOPICAL
Status: DISCONTINUED | OUTPATIENT
Start: 2022-02-01 | End: 2022-02-01 | Stop reason: HOSPADM

## 2022-02-01 RX ORDER — HEPARIN SODIUM (PORCINE) LOCK FLUSH IV SOLN 100 UNIT/ML 100 UNIT/ML
3 SOLUTION INTRAVENOUS EVERY 24 HOURS
Status: CANCELLED | OUTPATIENT
Start: 2022-02-01

## 2022-02-01 RX ORDER — PROCHLORPERAZINE MALEATE 5 MG
5-10 TABLET ORAL EVERY 6 HOURS PRN
Status: DISCONTINUED | OUTPATIENT
Start: 2022-02-01 | End: 2022-02-03 | Stop reason: HOSPADM

## 2022-02-01 RX ORDER — HEPARIN SODIUM (PORCINE) LOCK FLUSH IV SOLN 100 UNIT/ML 100 UNIT/ML
3 SOLUTION INTRAVENOUS
Status: CANCELLED | OUTPATIENT
Start: 2022-02-01

## 2022-02-01 RX ORDER — NALOXONE HYDROCHLORIDE 0.4 MG/ML
0.4 INJECTION, SOLUTION INTRAMUSCULAR; INTRAVENOUS; SUBCUTANEOUS
Status: DISCONTINUED | OUTPATIENT
Start: 2022-02-01 | End: 2022-02-01 | Stop reason: HOSPADM

## 2022-02-01 RX ORDER — OXYCODONE HYDROCHLORIDE 10 MG/1
10 TABLET ORAL EVERY 8 HOURS PRN
Status: DISCONTINUED | OUTPATIENT
Start: 2022-02-01 | End: 2022-02-03 | Stop reason: HOSPADM

## 2022-02-01 RX ORDER — METOPROLOL SUCCINATE 25 MG/1
25 TABLET, EXTENDED RELEASE ORAL DAILY
Status: DISCONTINUED | OUTPATIENT
Start: 2022-02-02 | End: 2022-02-03 | Stop reason: HOSPADM

## 2022-02-01 RX ORDER — FENTANYL CITRATE 50 UG/ML
25-50 INJECTION, SOLUTION INTRAMUSCULAR; INTRAVENOUS EVERY 5 MIN PRN
Status: DISCONTINUED | OUTPATIENT
Start: 2022-02-01 | End: 2022-02-01 | Stop reason: HOSPADM

## 2022-02-01 RX ORDER — LORAZEPAM 0.5 MG/1
.5-1 TABLET ORAL EVERY 4 HOURS PRN
Status: DISCONTINUED | OUTPATIENT
Start: 2022-02-01 | End: 2022-02-03 | Stop reason: HOSPADM

## 2022-02-01 RX ORDER — FUROSEMIDE 10 MG/ML
20 INJECTION INTRAMUSCULAR; INTRAVENOUS EVERY 8 HOURS PRN
Status: DISPENSED | OUTPATIENT
Start: 2022-02-01 | End: 2022-02-02

## 2022-02-01 RX ADMIN — LIDOCAINE HYDROCHLORIDE 15 ML: 10 INJECTION, SOLUTION EPIDURAL; INFILTRATION; INTRACAUDAL; PERINEURAL at 13:27

## 2022-02-01 RX ADMIN — OXYCODONE HYDROCHLORIDE 10 MG: 10 TABLET ORAL at 20:20

## 2022-02-01 RX ADMIN — ONDANSETRON HYDROCHLORIDE 8 MG: 8 TABLET, FILM COATED ORAL at 20:14

## 2022-02-01 RX ADMIN — FENTANYL CITRATE 25 MCG: 50 INJECTION, SOLUTION INTRAMUSCULAR; INTRAVENOUS at 13:09

## 2022-02-01 RX ADMIN — ALLOPURINOL 300 MG: 300 TABLET ORAL at 15:59

## 2022-02-01 RX ADMIN — SODIUM CHLORIDE: 9 INJECTION, SOLUTION INTRAVENOUS at 12:24

## 2022-02-01 RX ADMIN — MIDAZOLAM 0.58 MG: 1 INJECTION INTRAMUSCULAR; INTRAVENOUS at 13:09

## 2022-02-01 RX ADMIN — HEPARIN 2.5 ML: 100 SYRINGE at 13:34

## 2022-02-01 RX ADMIN — ACYCLOVIR 800 MG: 200 CAPSULE ORAL at 20:12

## 2022-02-01 RX ADMIN — HEPARIN 2.5 ML: 100 SYRINGE at 13:33

## 2022-02-01 RX ADMIN — MIDAZOLAM 1 MG: 1 INJECTION INTRAMUSCULAR; INTRAVENOUS at 13:15

## 2022-02-01 RX ADMIN — FENTANYL CITRATE 50 MCG: 50 INJECTION, SOLUTION INTRAMUSCULAR; INTRAVENOUS at 13:26

## 2022-02-01 RX ADMIN — CYCLOPHOSPHAMIDE 6760 MG: 2 INJECTION, POWDER, FOR SOLUTION INTRAVENOUS; ORAL at 21:05

## 2022-02-01 RX ADMIN — MIDAZOLAM 1 MG: 1 INJECTION INTRAMUSCULAR; INTRAVENOUS at 13:25

## 2022-02-01 RX ADMIN — DEXAMETHASONE 10 MG: 2 TABLET ORAL at 20:13

## 2022-02-01 RX ADMIN — OXYCODONE HYDROCHLORIDE 10 MG: 10 TABLET ORAL at 15:59

## 2022-02-01 RX ADMIN — MIDAZOLAM 0.5 MG: 1 INJECTION INTRAMUSCULAR; INTRAVENOUS at 13:20

## 2022-02-01 RX ADMIN — CEFAZOLIN SODIUM 2 G: 10 INJECTION, POWDER, FOR SOLUTION INTRAVENOUS at 12:23

## 2022-02-01 RX ADMIN — MESNA 6760 MG: 100 INJECTION, SOLUTION INTRAVENOUS at 20:05

## 2022-02-01 RX ADMIN — FENTANYL CITRATE 50 MCG: 50 INJECTION, SOLUTION INTRAMUSCULAR; INTRAVENOUS at 13:15

## 2022-02-01 RX ADMIN — FENTANYL CITRATE 25 MCG: 50 INJECTION, SOLUTION INTRAMUSCULAR; INTRAVENOUS at 13:20

## 2022-02-01 RX ADMIN — DEXTROSE MONOHYDRATE AND SODIUM CHLORIDE 1000 ML: 5; .45 INJECTION, SOLUTION INTRAVENOUS at 15:45

## 2022-02-01 RX ADMIN — MIDAZOLAM 1 MG: 1 INJECTION INTRAMUSCULAR; INTRAVENOUS at 13:26

## 2022-02-01 RX ADMIN — FENTANYL CITRATE 50 MCG: 50 INJECTION, SOLUTION INTRAMUSCULAR; INTRAVENOUS at 13:25

## 2022-02-01 ASSESSMENT — ANXIETY QUESTIONNAIRES: GAD7 TOTAL SCORE: 5

## 2022-02-01 ASSESSMENT — MIFFLIN-ST. JEOR: SCORE: 1106.94

## 2022-02-01 NOTE — H&P
BMT History & Physical     Patient Demographics   Patient ID:  Libby Grimaldo   Age:  71 year old   Sex:  female  Reason for Observation Admission: Cytoxan chemopriming  Date:  2/1/2022  Service: BMT   Informant:  Patient and Chart  Resuscitation Status: Full Code    Patient ID:  Libby Grimaldo is a 71 year old female, currently day 1 of cytoxan chemopriming stem cell mobilization prior to planned autologous stem cell transplant    Transplant Essential Data:   Diagnosis MM Multiple myeloma  BMTCT Type Autologous    Prep Regimen   Donor Source No data was found    Clinical Trials        HPI: Feeling well today. Admitted following line placement which went well.  No acute medical complaints. Has some baseline nausea but no vomiting. No infectious symptoms other than some mild rhinorrhea that is not new. Has chronic pain in her back, hip and ribs, managed with buprenorphine and prn oxycodone. Discussed plan for cytoxan and plan for admission.       Diagnosis and Treatment Summary           Oncology History   Multiple myeloma not having achieved remission (H)   11/7/2018 -  Cancer Staged     Staging form: Plasma Cell Myeloma and Disorders, AJCC 8th Edition  - Clinical stage from 11/7/2018: Albumin (g/dL): 3.4, ISS: Stage II, High-risk cytogenetics: Absent, LDH: Unknown      11/7/2018 Initial Diagnosis     Multiple myeloma not having achieved remission (H)      1/23/2019 - 11/14/2019 Chemotherapy     KRD x 6 cycles      11/4/2019 - 2/14/2021 Chemotherapy     Rev/Dex(20)       4/3/2020 - 7/17/2020 Chemotherapy     Velcade maintenance - dose reductions for orthostatic hypotension      7/17/2020 - 12/15/2020 Chemotherapy     Revlimid maintenance      11/23/2020 Progression     Bone marrow 35% plasma cells, PET CT demonstrating new lytic lesions      12/15/2020 - 6/11/2021 Chemotherapy     Elsy, Kd x 6 cycles      7/7/2021 -  Cancer Staged     PET/CT - CR      7/21/2021 -  Chemotherapy     Subcutaneous Daratumumab, IVIG,  denosumab monthly      11/22/2021 -  Cancer Staged     Bone marrow aspirate - MRD 0.0022% positive           BMT Work Up Results     Blood Counts Recent Labs   Lab Test 01/24/22  0758 01/20/22  1618   HGB 10.6* 12.4   HCT 34.6* 40.2   WBC 4.3 5.4   ANEUTAUTO 1.7 2.8   ALYMPAUTO 2.1 2.0   AMONOAUTO 0.5 0.5   AEOSAUTO 0.0 0.0   ABSBASO 0.0 0.0   NRBCMAN 0.0 0.0    160      Bone Marrow Bone marrow, posterior iliac crest, right decalcified trephine biopsy and touch imprint; right particle crush, direct aspirate smear, and concentrated aspirate smear; and peripheral smear:  - Marrow cellularity of 10-20%, with trilineage hematopoiesis; plasma cells are not increased; no definitive clonal population by IHC, but minimal residual disease detected by flow cytometry (see comment)  - Peripheral blood showing slight normochromic, macrocytic anemia   Electronically signed by Juma Levy MD on 1/26/2022 at  1:44 PM   Comment   UUMAYO   Concurrent flow cytometry was performed ([id number]) and lambda monotypic plasma cells were identified.  There are very few plasma cells seen on the trephine core based on IHC - this likely represents minimal residual disease.     There are rare hypogranular neutrophils seen on scanning, as well as a few atypical megakaryocytes, but the overall findings are not definitive for dysplasia.        Blood Type Recent Labs   Lab Test 01/20/22  1618   ABORH A POS        Chemistries Recent Labs   Lab Test 01/24/22  0758 01/20/22  1619   NA  --  143   POTASSIUM  --  3.9   CHLORIDE  --  111*   CO2  --  26   BUN  --  23   CR 0.75 0.75   GLC  --  97       Liver Tests Recent Labs   Lab Test 01/24/22  0758 01/20/22  1619   BILITOTAL 0.2 0.4   ALKPHOS 152* 194*   AST 42 74*   ALT 53* 146*   ALBUMIN 2.8* 3.1*      Chest X-Ray: Results for orders placed in visit on 01/20/22    XR CHEST 2 VW    Status: Normal 1/20/2022    Narrative  EXAMINATION:  XR CHEST 2 VW 1/20/2022 11:54 AM.    COMPARISON: PET  CT 11/23/2021. Radiographic 9/15/2021    HISTORY:  Examination prior to chemotherapy; Myeloma (H); Personal  history of diseases of blood and blood-forming organs    FINDINGS: Upright PA and lateral radiographs of the chest. Right chest  wall Port-A-Cath tip projects over the SVC. Cardiac silhouette is  normal. No pleural effusion or pneumothorax. No focal pulmonary  opacity. Paraspinous lentiform opacity in right lower thoracic  paravertebral region. Breast implants with calcified capsule on the  left. Cholecystectomy. Degenerative changes of the spine.    Impression  IMPRESSION: Clear lungs. Right paravertebral lentiform opacity  inferiorly seen on the PET-CT.    I have personally reviewed the examination and initial interpretation  and I agree with the findings.    LAWRENCE CARSON MD      SYSTEM ID:  [id number]       Chest CT No results found for this or any previous visit.     PFTs FVC%  Recent Labs   Lab Test 01/26/22  1259   20003 104       FEV1%  Recent Labs   Lab Test 01/26/22  1259   20016 107       DLCO%  Recent Labs   Lab Test 01/26/22  1259   [id number] 81        ECHO or MUGA:   Interpretation Summary  Global and regional left ventricular function is normal. 3D LVEF volumetric  analysis is 57%. Diastolic dysfunction.  Global peak LV longitudinal strain is averaged at -19.1%. This is within  reported normal limits (normal <-18%).  Global right ventricular function is normal.  Mild calcification of the aortic valve and mitral annulus, but normal doppler  assessment of all valves.  There is no prior study for direct comparison.     EKG ECG results from 01/20/22   EKG 12-lead complete w/read - Clinics     Value    Systolic Blood Pressure     Diastolic Blood Pressure     Ventricular Rate 69    Atrial Rate 69    NH Interval 172    QRS Duration 80        QTc 430    P Axis 74    R AXIS 56    T Axis 70    Interpretation ECG      Sinus rhythm  Minimal voltage criteria for LVH, may be normal variant  Borderline  ECG  No previous ECGs available  Confirmed by fellow Chaudhry, Mohsan ([id number]) on 1/21/2022 3:56:12 PM  Confirmed by MD BRINK JANE ([id number]) on 1/24/2022 6:05:16 PM        Serologies: CMV: No lab results found.  EBV:   Recent Labs   Lab Test 01/20/22  1620   EBVCAG Positive*     HSV:   Recent Labs   Lab Test 01/20/22  1620   L0DBDFH 22.40*   H1IGG Positive.  IgG antibody to HSV-1 detected.*   S8CAIPQ 3.16*   H2IGG Positive.  IgG antibody to HSV-2 detected.*     VZV: No lab results found.  HTLV:   Recent Labs   Lab Test 01/20/22  1620   DHTLVA Non-Reactive      Vitamin D Recent Labs   Lab Test 01/20/22  1621   VITDT 36      [facility] [place] IDMs Recent Labs   Lab Test 01/20/22  1620   DCMIG Positive*   DHBSAG Non-Reactive   DHBCAB Non-Reactive   DHIVAB Non-Reactive   DHCVAB Non-Reactive   DHTLVA Non-Reactive   TCRUZI Non-Reactive   DTRPAB Non-Reactive        Other Lab Results:     COVID:  Recent Labs   Lab Test 01/31/22  0942   [id number] Negative       LDH  Recent Labs   Lab Test 01/20/22  1619          Creatinine Clearance  Recent Labs   Lab Test 01/24/22  0645      WT 64.0   RAW 48   STD 49     968   DUR 24.0  24.0   CREATININE 0.75   UCRR 54  54   UCR24 0.52*  0.52*       Immunoglobulins:   Recent Labs   Lab Test 01/20/22  1619        Recent Labs   Lab Test 01/20/22  1619   IGA 3*     Recent Labs   Lab Test 01/20/22  1619   IGM <10*       Monoclonal Protein Studies  M spike:   Recent Labs   Lab Test 01/20/22  1618   ELPM 0.1*     Kappa FLC:   Recent Labs   Lab Test 01/20/22  1619   KFLCA 0.09*     Lambda FLC:   Recent Labs   Lab Test 01/20/22  1619   LFLCA 9.96*     FLC Ratio:   Recent Labs   Lab Test 01/20/22  1619   KLRA 0.01*       B2-Microglobulin   Recent Labs   Lab Test 01/20/22  1618   [id number] 4.0*       Urine Studies  Recent Labs   Lab Test 01/24/22  0645   COLOR Yellow   APPEARANCE Clear   URINEGLC Negative   URINEBILI Negative   URINEKETONE Negative   SG 1.012   UBLD  "Negative   URINEPH 6.0   PROTEIN Negative   UUROI Normal   NITRITE Negative   LEUKEST Negative   MUCUS Present*   RBCU 1   WBCU 1   USQEI <1       I have assessed all abnormal lab values for their clinical significance and any values considered clinically significant have been addressed in the assessment and plan    Family History:   Family History   Problem Relation Age of Onset     Heart Disease Mother      Cancer Mother         \"Around lungs\" - she explicitly said it was *not* lung cancer.     Diabetes Father      Heart Disease Father        Social History:   Social History     Socioeconomic History     Marital status:      Spouse name: Not on file     Number of children: Not on file     Years of education: Not on file     Highest education level: Not on file   Occupational History     Not on file   Tobacco Use     Smoking status: Former Smoker     Packs/day: 1.00     Years: 40.00     Pack years: 40.00     Smokeless tobacco: Former User     Quit date: 2010   Substance and Sexual Activity     Alcohol use: Not Currently     Drug use: Not Currently     Types: Marijuana     Sexual activity: Not on file   Other Topics Concern     Not on file   Social History Narrative     Not on file     Social Determinants of Health     Financial Resource Strain: Not on file   Food Insecurity: Not on file   Transportation Needs: Not on file   Physical Activity: Not on file   Stress: Not on file   Social Connections: Not on file   Intimate Partner Violence: Not At Risk     Fear of Current or Ex-Partner: No     Emotionally Abused: No     Physically Abused: No     Sexually Abused: No   Housing Stability: Not on file       Past Medical History:   Past Medical History:   Diagnosis Date     Cervical radiculopathy      COPD (chronic obstructive pulmonary disease) (H)      Double vision      Febrile seizure (H)     Per patient. Once while a toddler, once while in highschool, and once while in college.     Floaters      Graves disease  "     HLD (hyperlipidemia)      HTN (hypertension)      IPF (idiopathic pulmonary fibrosis) (H)      Lumbar radiculopathy      Multiple myeloma in relapse (H)      Osteoporosis      Pneumonia     Per patient. Complicated by sepsis.     Recurrent UTI     Per patient. Has required prophylactic antibiotcs.     Vitamin B12 deficiency (non anemic)         Past Surgical History:   Past Surgical History:   Procedure Laterality Date     BONE MARROW BIOPSY, BONE SPECIMEN, NEEDLE/TROCAR Right 1/24/2022    Procedure: BIOPSY, BONE MARROW;  Surgeon: Yuniel Tineo;  Location: [facility] OR     CERVICAL FUSION       CHOLECYSTECTOMY       CYSTECTOMY OVARIAN BENIGN       EYE SURGERY      Per patient.     TONSILLECTOMY       WRIST SURGERY Left        Allergies:   Allergies   Allergen Reactions     Bupropion      Other reaction(s): Seizures       Home Medications      Prior to Admission medications    Medication Sig Start Date End Date Taking? Authorizing Provider   acetaminophen (TYLENOL) 500 MG tablet Take 500-1,000 mg by mouth every 6 hours as needed for mild pain  11/21/19   Reported, Patient   albuterol (PROAIR HFA/PROVENTIL HFA/VENTOLIN HFA) 108 (90 Base) MCG/ACT inhaler Inhale 2 puffs into the lungs every 6 hours as needed for shortness of breath / dyspnea  4/30/19   Reported, Patient   aspirin (ASA) 81 MG chewable tablet Take 81 mg by mouth daily  8/24/19   Reported, Patient   atorvastatin (LIPITOR) 40 MG tablet Take 40 mg by mouth daily  11/1/19   Reported, Patient   buprenorphine HCl-naloxone HCl (SUBOXONE) 8-2 MG per film Place 1 Film under the tongue 3 times daily  7/7/21   Reported, Patient   cholecalciferol (VITAMIN D-1000 MAX ST) 25 MCG (1000 UT) TABS Take 1,000 Units by mouth daily  11/22/19   Reported, Patient   Fluticasone-Umeclidin-Vilanterol (TRELEGY ELLIPTA) 100-62.5-25 MCG/INH oral inhaler Inhale 1 puff into the lungs daily     Reported, Patient   levothyroxine (SYNTHROID/LEVOTHROID) 112 MCG tablet Take 112 mcg by  "mouth daily  10/15/18   Reported, Patient   metoprolol succinate ER (TOPROL-XL) 25 MG 24 hr tablet Take 25 mg by mouth daily  11/16/18   Reported, Patient   ondansetron (ZOFRAN-ODT) 4 MG ODT tab Take 1 tablet (4 mg) by mouth every 8 hours as needed for nausea 1/20/22   Wendi Davis, DOROTA   oxyCODONE IR (ROXICODONE) 10 MG tablet Take 10 mg by mouth every 8 hours as needed for severe pain  12/11/19   Reported, Patient   polyethylene glycol (MIRALAX/GLYCOLAX) powder 17 g by Nasogastric route daily as needed  8/24/19   Reported, Patient   REVLIMID 25 MG CAPS capsule  12/3/19   Reported, Patient   venlafaxine (EFFEXOR-XR) 75 MG 24 hr capsule Take 75 mg by mouth daily    Unknown, Entered By History       Review of Systems    Review of Systems:  10 point ROS neg unless specified above.     PHYSICAL EXAM      Weight     Wt Readings from Last 3 Encounters:   02/01/22 63.5 kg (140 lb)   01/26/22 63.5 kg (139 lb 14.4 oz)   01/24/22 63.5 kg (140 lb)          BP (!) 157/85 (BP Location: Right arm)   Pulse 82   Temp 99.1  F (37.3  C) (Oral)   Resp 16   Ht 1.6 m (5' 3\")   Wt 63.5 kg (140 lb)   SpO2 98%   BMI 24.80 kg/m       General: NAD   Eyes: GREGG, sclera anicteric   Lungs: CTA bilaterally  Cardiovascular: RRR, no M/R/G   Abdominal/Rectal: +BS, soft, NT, ND  Lymphatics: No edema  Skin: No rashes or petechaie  Neuro: A&O   Additional Findings: Connelly site NT, no drainage.    LABS AND IMAGING: I have assessed all abnormal lab values for their clinical significance and any values considered clinically significant have been addressed in the assessment and plan.        Lab Results   Component Value Date    WBC 4.3 01/24/2022    ANEUTAUTO 1.7 01/24/2022    HGB 10.6 (L) 01/24/2022    HCT 34.6 (L) 01/24/2022     01/24/2022     01/20/2022    POTASSIUM 3.9 01/20/2022    CHLORIDE 111 (H) 01/20/2022    CO2 26 01/20/2022    GLC 97 01/20/2022    BUN 23 01/20/2022    CR 0.75 01/24/2022    MAG 2.0 01/20/2022    INR 0.94 " 01/20/2022           SYSTEMS-BASED ASSESSMENT AND PLAN     Libby Grimaldo is a 71 year old female with multiple myeloma undergoing cytoxan chemopriming prior to stem cell collections.     1. BMT  - BMT doc/Coordinator: Dr. Julio C Guillory/Rashad Chen  - Cytoxan today (day 1).   - Plan for allopurinol 300mg daily for at least 7 days.   - GCSF 10 mcg/kg starts day +4 (2/4/22), continue through stem cell collections.    2. HEME/COAG  - Transfusion parameters: hgb <7g/dL and plts <10,000  - Anemia secondary to chemo/MM    3. ID  - No active infections. Recent hx of urinary tract infection s/p antibiotics.   - COVID neg 1/31    Prophy:  Viral: ACV BID- start on admission  Bacterial: levofloxacin 250mg daily- send with pt on discharge to start when neutropenic  Fungal: fluconaozole- Send with pt on discharge to start when neutropenic.   PCP: Bactrim or other PCP prophy to start at day +28      4. GI/NUTRITION  - Anti-emetics: zofran/dex prior to transplant to prevent chemotherapy induced n/v.  Ativan and compazine prn.   - Ulcer prophy: Protonix 40mg daily.   - VOD prophy: Ursodiol 300mg TID  - dietician to support as indicated to prevent malnutrition.    5. RENAL/  - Monitor Cr and electrolytes daily.   - Replace electrolytes per sliding scale    6. Endo: hypothyroidism- continue levothyroxine    7. CARDS: On metoprolol for HTN   Hyperlipidemia: crestor on hold with transplant/chemo    8. Neuro: Continue effexor   - Hx of multiple febrile seizures. Last occurred during college.     9. Pulm: COPD- remains on daily fluticasone and albuterol prn.     10. Cancer related pain: Remains on buprenorphine-naloxone and prn oxycodone    11. Supportive Care:   - # Pain Assessment: See nursing flowsheets       Code Status: Full Code   Dispo/Discharge: Pt will discharge following completion of 24 hour fluids following cytoxan. If cytoxan is not given until late today, may consider delaying discharge until 2/3.      I saw and evaluated  the patient as part of a shared visit with attending physician. I reviewed and discussed with MD history, physical and plan. Spent 40 minutes on this patient today. Over 50% of our time counseling the patient and coordinating care.     Yuniel Tineo PA-C  [phone]    Securely message with the Vocera Web Console

## 2022-02-01 NOTE — IR NOTE
Patient Name: Libby Grimaldo  Medical Record Number: [id number]  Today's Date: 2/1/2022    Procedure: Image guided tunneled central venous catheter placement  Proceduralist: Dr. Black and Dr. Jain  Pathology present: NA    Procedure Start: 1315  Procedure end: 1340  Sedation medications administered:    Fentanyl 200 mcg   Midazolam 4 mg     Sedation start time: 1309 (ok'd by provider)   Sedation end time: 1340   Total sedation time: 31 minutes     Report given to: Opal VALIENTE 5C  : JUMA    Other Notes: Pt arrived to IR room 5 from 2A. Consent reviewed. Pt denies any questions or concerns regarding procedure. Pt positioned supine and monitored per protocol. Pt tolerated procedure without any noted complications. Pt transferred to 5C.    Both lumens locked with 2.5ml heparin 100unit/ml concentration. Ok to use.

## 2022-02-01 NOTE — PRE-PROCEDURE
GENERAL PRE-PROCEDURE:   Procedure:  TCVC  Date/Time:  2/1/2022 12:10 PM    Verbal consent obtained?: Yes    Written consent obtained?: Yes    Risks and benefits: Risks, benefits and alternatives were discussed    Consent given by:  Patient  Patient states understanding of procedure being performed: Yes    Patient's understanding of procedure matches consent: Yes    Procedure consent matches procedure scheduled: Yes    Expected level of sedation:  Moderate  Appropriately NPO:  Yes  Mallampati  :  Grade 2- soft palate, base of uvula, tonsillar pillars, and portion of posterior pharyngeal wall visible  Lungs:  Lungs clear with good breath sounds bilaterally  Heart:  Normal heart sounds and rate  History & Physical reviewed:  History and physical reviewed and no updates needed  Statement of review:  I have reviewed the lab findings, diagnostic data, medications, and the plan for sedation

## 2022-02-01 NOTE — PROCEDURES
[facility]    Procedure: IR Procedure Note    Date/Time: 2/1/2022 1:46 PM  Performed by: Holley Jani DO  Authorized by: Jack Black MD   IR Fellow Physician:  Radiology Resident Physician: Holley Jain DO        UNIVERSAL PROTOCOL   Site Marked: NA  Prior Images Obtained and Reviewed:  Yes  Required items: Required blood products, implants, devices and special equipment available    Patient identity confirmed:  Verbally with patient, arm band, provided demographic data and hospital-assigned identification number  Patient was reevaluated immediately before administering moderate or deep sedation or anesthesia  Confirmation Checklist:  Patient's identity using two indicators, relevant allergies, procedure was appropriate and matched the consent or emergent situation and correct equipment/implants were available  Time out: Immediately prior to the procedure a time out was called    Universal Protocol: the Joint Commission Universal Protocol was followed    Preparation: Patient was prepped and draped in usual sterile fashion    ESBL (mL):  5     ANESTHESIA    Anesthesia: Local infiltration  Local Anesthetic:  Lidocaine 1% without epinephrine  Anesthetic Total (mL):  20      SEDATION  Patient Sedated: Yes    Sedation:  Fentanyl and midazolam  Vital signs: Vital signs monitored during sedation    See dictated procedure note for full details.  Findings: Left IJV was identified as patent. Left internal jugular venotomy was created. 23 cm Palindrome dual lumen catheter was placed with tip in high right atrium. Catheter was heparin locked. No complications.    Specimens: none    Complications: None    Condition: Stable    Plan: -bedrest 1 hour  -catheter ok to use.      PROCEDURE    Patient Tolerance:  Patient tolerated the procedure well with no immediate complications  Length of time physician/provider present for 1:1 monitoring during sedation: 30

## 2022-02-01 NOTE — PROGRESS NOTES
Patient admitted to: 5C  Admitted from: Clinic/IR   Arrived by: personal vehicle  Reason for admission: Stem cell mobilization/collection  Patient accompanied by: Leisa Aponte (friend and designated visitor)  Belongings: wallet, purse, clothing, shoes, cell phone  Teaching: unit orientation, visitor policy, CLABSI prevention, vitals/labs collection time  Skin double check completed by: Opal Lauren and Elly Camacho

## 2022-02-01 NOTE — PROGRESS NOTES
Pt arrived for tunneled line placement. Vitally stable ex HTN, denies pain. Port accessed, pre-procedure fluids and medications infusing. H&P reviewed. Consent signed. No labs ordered. Paola (sister) at the bedside. Pt will transfer to  after procedure for chemotherapy.

## 2022-02-01 NOTE — PROGRESS NOTES
"PRIMARY DIAGNOSIS: \"GENERIC\" NURSING  OUTPATIENT/OBSERVATION GOALS TO BE MET BEFORE DISCHARGE  Goals to be met before discharge home     1. Vital signs at baseline.   2. Tolerating oral intake.   3. Labs results verified and approved for discharge by provider.   4. Caregiver identified prior to discharge.   5. Adequate pain control.   6. Baseline activity remains tolerated.   7. No active infection. - met  8. O2 sats at baseline or greater than 92% on room air. - met   9. RN reviews BMT Discharge paper work and Med List document, with patient and care giver, prior to discharge.   10. Nurse to notify Provider when observation goals have been met and patient is ready for discharge  1. ADLs back to baseline: No    2. Activity and level of assistance: Up with standby assistance.    3. Pain status: patient has baseline pain    4. Return to near baseline physical activity: No     Discharge Planner Nurse   Safe discharge environment identified: Yes  Barriers to discharge: No       Entered by: Opal Lauren 02/01/2022 3:40 PM     Please review provider order for any additional goals.   Nurse to notify provider when observation goals have been met and patient is ready for discharge.  "

## 2022-02-02 LAB
ANION GAP SERPL CALCULATED.3IONS-SCNC: 7 MMOL/L (ref 3–14)
BASOPHILS # BLD AUTO: 0 10E3/UL (ref 0–0.2)
BASOPHILS NFR BLD AUTO: 0 %
BUN SERPL-MCNC: 7 MG/DL (ref 7–30)
CALCIUM SERPL-MCNC: 7 MG/DL (ref 8.5–10.1)
CHLORIDE BLD-SCNC: 110 MMOL/L (ref 94–109)
CO2 SERPL-SCNC: 23 MMOL/L (ref 20–32)
CREAT SERPL-MCNC: 0.52 MG/DL (ref 0.52–1.04)
EOSINOPHIL # BLD AUTO: 0 10E3/UL (ref 0–0.7)
EOSINOPHIL NFR BLD AUTO: 0 %
ERYTHROCYTE [DISTWIDTH] IN BLOOD BY AUTOMATED COUNT: 14.5 % (ref 10–15)
GFR SERPL CREATININE-BSD FRML MDRD: >90 ML/MIN/1.73M2
GLUCOSE BLD-MCNC: 186 MG/DL (ref 70–99)
HCT VFR BLD AUTO: 29.4 % (ref 35–47)
HGB BLD-MCNC: 9.4 G/DL (ref 11.7–15.7)
IMM GRANULOCYTES # BLD: 0 10E3/UL
IMM GRANULOCYTES NFR BLD: 1 %
LYMPHOCYTES # BLD AUTO: 0.6 10E3/UL (ref 0.8–5.3)
LYMPHOCYTES NFR BLD AUTO: 28 %
MCH RBC QN AUTO: 32.3 PG (ref 26.5–33)
MCHC RBC AUTO-ENTMCNC: 32 G/DL (ref 31.5–36.5)
MCV RBC AUTO: 101 FL (ref 78–100)
MONOCYTES # BLD AUTO: 0.1 10E3/UL (ref 0–1.3)
MONOCYTES NFR BLD AUTO: 3 %
NEUTROPHILS # BLD AUTO: 1.5 10E3/UL (ref 1.6–8.3)
NEUTROPHILS NFR BLD AUTO: 68 %
NRBC # BLD AUTO: 0 10E3/UL
NRBC BLD AUTO-RTO: 0 /100
PLATELET # BLD AUTO: 150 10E3/UL (ref 150–450)
POTASSIUM BLD-SCNC: 2.9 MMOL/L (ref 3.4–5.3)
POTASSIUM BLD-SCNC: 3.1 MMOL/L (ref 3.4–5.3)
POTASSIUM BLD-SCNC: 3.1 MMOL/L (ref 3.4–5.3)
POTASSIUM BLD-SCNC: 4 MMOL/L (ref 3.4–5.3)
RBC # BLD AUTO: 2.91 10E6/UL (ref 3.8–5.2)
SARS-COV-2 RNA RESP QL NAA+PROBE: NEGATIVE
SODIUM SERPL-SCNC: 137 MMOL/L (ref 133–144)
SODIUM SERPL-SCNC: 140 MMOL/L (ref 133–144)
SODIUM SERPL-SCNC: 140 MMOL/L (ref 133–144)
URATE SERPL-MCNC: 3 MG/DL (ref 2.6–6)
WBC # BLD AUTO: 2.2 10E3/UL (ref 4–11)

## 2022-02-02 PROCEDURE — G0378 HOSPITAL OBSERVATION PER HR: HCPCS

## 2022-02-02 PROCEDURE — 258N000001 HC RX 258: Performed by: PHYSICIAN ASSISTANT

## 2022-02-02 PROCEDURE — 96376 TX/PRO/DX INJ SAME DRUG ADON: CPT

## 2022-02-02 PROCEDURE — 82310 ASSAY OF CALCIUM: CPT | Performed by: PHYSICIAN ASSISTANT

## 2022-02-02 PROCEDURE — 96375 TX/PRO/DX INJ NEW DRUG ADDON: CPT

## 2022-02-02 PROCEDURE — 96415 CHEMO IV INFUSION ADDL HR: CPT

## 2022-02-02 PROCEDURE — 84132 ASSAY OF SERUM POTASSIUM: CPT | Performed by: PHYSICIAN ASSISTANT

## 2022-02-02 PROCEDURE — 250N000013 HC RX MED GY IP 250 OP 250 PS 637: Performed by: PHYSICIAN ASSISTANT

## 2022-02-02 PROCEDURE — 250N000011 HC RX IP 250 OP 636: Performed by: INTERNAL MEDICINE

## 2022-02-02 PROCEDURE — 250N000011 HC RX IP 250 OP 636: Performed by: PHYSICIAN ASSISTANT

## 2022-02-02 PROCEDURE — 99226 PR SUBSEQUENT OBSERVATION CARE,LEVEL III: CPT | Performed by: INTERNAL MEDICINE

## 2022-02-02 PROCEDURE — 84550 ASSAY OF BLOOD/URIC ACID: CPT | Performed by: PHYSICIAN ASSISTANT

## 2022-02-02 PROCEDURE — U0005 INFEC AGEN DETEC AMPLI PROBE: HCPCS | Performed by: PHYSICIAN ASSISTANT

## 2022-02-02 PROCEDURE — 85025 COMPLETE CBC W/AUTO DIFF WBC: CPT | Performed by: PHYSICIAN ASSISTANT

## 2022-02-02 PROCEDURE — 84295 ASSAY OF SERUM SODIUM: CPT | Performed by: PHYSICIAN ASSISTANT

## 2022-02-02 PROCEDURE — 84132 ASSAY OF SERUM POTASSIUM: CPT | Mod: 91 | Performed by: PHYSICIAN ASSISTANT

## 2022-02-02 RX ORDER — POTASSIUM CHLORIDE 29.8 MG/ML
20 INJECTION INTRAVENOUS ONCE
Status: COMPLETED | OUTPATIENT
Start: 2022-02-02 | End: 2022-02-02

## 2022-02-02 RX ORDER — FUROSEMIDE 10 MG/ML
10 INJECTION INTRAMUSCULAR; INTRAVENOUS ONCE
Status: COMPLETED | OUTPATIENT
Start: 2022-02-03 | End: 2022-02-03

## 2022-02-02 RX ORDER — AMLODIPINE BESYLATE 5 MG/1
5 TABLET ORAL DAILY
Status: DISCONTINUED | OUTPATIENT
Start: 2022-02-02 | End: 2022-02-03 | Stop reason: HOSPADM

## 2022-02-02 RX ORDER — AMLODIPINE BESYLATE 5 MG/1
5 TABLET ORAL DAILY
Qty: 30 TABLET | Refills: 1 | Status: SHIPPED | OUTPATIENT
Start: 2022-02-02 | End: 2022-02-03

## 2022-02-02 RX ORDER — POTASSIUM CHLORIDE 29.8 MG/ML
20 INJECTION INTRAVENOUS
Status: COMPLETED | OUTPATIENT
Start: 2022-02-02 | End: 2022-02-02

## 2022-02-02 RX ORDER — POLYETHYLENE GLYCOL 3350 17 G/17G
17 POWDER, FOR SOLUTION ORAL DAILY
Status: DISCONTINUED | OUTPATIENT
Start: 2022-02-02 | End: 2022-02-02

## 2022-02-02 RX ORDER — CALCIUM CARBONATE 500 MG/1
500 TABLET, CHEWABLE ORAL DAILY PRN
Status: DISCONTINUED | OUTPATIENT
Start: 2022-02-02 | End: 2022-02-03 | Stop reason: HOSPADM

## 2022-02-02 RX ADMIN — DEXTROSE MONOHYDRATE AND SODIUM CHLORIDE 1000 ML: 5; .45 INJECTION, SOLUTION INTRAVENOUS at 00:07

## 2022-02-02 RX ADMIN — CALCIUM CARBONATE (ANTACID) CHEW TAB 500 MG 500 MG: 500 CHEW TAB at 20:25

## 2022-02-02 RX ADMIN — DEXAMETHASONE 10 MG: 2 TABLET ORAL at 18:33

## 2022-02-02 RX ADMIN — DEXTROSE MONOHYDRATE AND SODIUM CHLORIDE 1000 ML: 5; .45 INJECTION, SOLUTION INTRAVENOUS at 11:57

## 2022-02-02 RX ADMIN — DEXTROSE MONOHYDRATE AND SODIUM CHLORIDE 1000 ML: 5; .45 INJECTION, SOLUTION INTRAVENOUS at 03:53

## 2022-02-02 RX ADMIN — ONDANSETRON HYDROCHLORIDE 8 MG: 8 TABLET, FILM COATED ORAL at 11:54

## 2022-02-02 RX ADMIN — LORAZEPAM 0.5 MG: 0.5 TABLET ORAL at 03:55

## 2022-02-02 RX ADMIN — POTASSIUM CHLORIDE 20 MEQ: 29.8 INJECTION, SOLUTION INTRAVENOUS at 17:24

## 2022-02-02 RX ADMIN — LORAZEPAM 0.5 MG: 0.5 TABLET ORAL at 14:51

## 2022-02-02 RX ADMIN — VENLAFAXINE HYDROCHLORIDE 75 MG: 75 CAPSULE, EXTENDED RELEASE ORAL at 08:38

## 2022-02-02 RX ADMIN — POTASSIUM CHLORIDE 20 MEQ: 29.8 INJECTION, SOLUTION INTRAVENOUS at 06:11

## 2022-02-02 RX ADMIN — FUROSEMIDE 10 MG: 10 INJECTION, SOLUTION INTRAVENOUS at 17:18

## 2022-02-02 RX ADMIN — ACETAMINOPHEN 650 MG: 325 TABLET, FILM COATED ORAL at 04:21

## 2022-02-02 RX ADMIN — FUROSEMIDE 10 MG: 10 INJECTION, SOLUTION INTRAVENOUS at 00:42

## 2022-02-02 RX ADMIN — ACYCLOVIR 800 MG: 200 CAPSULE ORAL at 08:38

## 2022-02-02 RX ADMIN — FUROSEMIDE 20 MG: 10 INJECTION, SOLUTION INTRAVENOUS at 11:54

## 2022-02-02 RX ADMIN — BUPRENORPHINE AND NALOXONE 1 FILM: 8; 2 FILM, SOLUBLE BUCCAL; SUBLINGUAL at 17:20

## 2022-02-02 RX ADMIN — OXYCODONE HYDROCHLORIDE 10 MG: 10 TABLET ORAL at 08:38

## 2022-02-02 RX ADMIN — BUPRENORPHINE AND NALOXONE 1 FILM: 8; 2 FILM, SOLUBLE BUCCAL; SUBLINGUAL at 20:27

## 2022-02-02 RX ADMIN — ACYCLOVIR 800 MG: 200 CAPSULE ORAL at 20:30

## 2022-02-02 RX ADMIN — ONDANSETRON HYDROCHLORIDE 8 MG: 8 TABLET, FILM COATED ORAL at 03:54

## 2022-02-02 RX ADMIN — BUPRENORPHINE AND NALOXONE 1 FILM: 8; 2 FILM, SOLUBLE BUCCAL; SUBLINGUAL at 11:53

## 2022-02-02 RX ADMIN — FUROSEMIDE 10 MG: 10 INJECTION, SOLUTION INTRAVENOUS at 07:37

## 2022-02-02 RX ADMIN — METOPROLOL SUCCINATE 25 MG: 25 TABLET, EXTENDED RELEASE ORAL at 08:38

## 2022-02-02 RX ADMIN — LEVOTHYROXINE SODIUM 112 MCG: 0.11 TABLET ORAL at 08:38

## 2022-02-02 RX ADMIN — POTASSIUM CHLORIDE 20 MEQ: 29.8 INJECTION, SOLUTION INTRAVENOUS at 20:26

## 2022-02-02 RX ADMIN — DEXTROSE MONOHYDRATE AND SODIUM CHLORIDE 1000 ML: 5; .45 INJECTION, SOLUTION INTRAVENOUS at 18:32

## 2022-02-02 RX ADMIN — ALLOPURINOL 300 MG: 300 TABLET ORAL at 08:38

## 2022-02-02 RX ADMIN — POTASSIUM CHLORIDE 20 MEQ: 29.8 INJECTION, SOLUTION INTRAVENOUS at 18:27

## 2022-02-02 RX ADMIN — DEXTROSE MONOHYDRATE AND SODIUM CHLORIDE 1000 ML: 5; .45 INJECTION, SOLUTION INTRAVENOUS at 20:25

## 2022-02-02 RX ADMIN — PROCHLORPERAZINE MALEATE 5 MG: 5 TABLET ORAL at 15:10

## 2022-02-02 RX ADMIN — AMLODIPINE BESYLATE 5 MG: 5 TABLET ORAL at 09:24

## 2022-02-02 ASSESSMENT — MIFFLIN-ST. JEOR
SCORE: 1145.94
SCORE: 1139.14

## 2022-02-02 NOTE — PROGRESS NOTES
BMT SOCIAL WORK NOTE:    Focus: Support/Financial    Data: Libby Grimaldo is a 71 year old female, currently day +2 of cytoxan chemo-priming.      Interventions: SW met with pt to assess coping, provide psychosocial support and check in about gris applications. Also present in the room was pt's sister-Leisa. Pt shared she was not able to complete the gris applications yesterday as she was not feeling well. Pt shared she will complete them before her transplant admission and will bring them into the hospital for transplant. Pt shared the Med Team is anticipating her discharging home tomorrow. Pt and pt's sister did not have any questions or concerns at this time. SW encouraged pt to contact SW for support, questions and/or resources.     Assessment: Pt presented as very pleasant and engaged. Pt was laying in bed during conversation. Pt appears to be coping well. Pt continues to be supported by her sister-Leisa,  and other family/friends.    Plan: SW will continue to provide psychosocial support and assistance with resources as needed. SW will continue to collaborate with multidisciplinary team regarding pt's plan of care.     Discharge Plan: Pt will discharge home with her  and sister as caregivers when medically stable.    Estefanía MORALES, [facility]  Phone: [phone]  Pager: [phone]

## 2022-02-02 NOTE — PROGRESS NOTES
"BMT/Cell Therapy Daily Progress Note   02/02/2022    Patient ID:  Libby Grimaldo is a 71 year old female, currently day +2 of cytoxan chemo-priming.       Admission date: 2/1/2022    INTERVAL  HISTORY   Libby was initially feeling constipated and reported no BM x 2 days; later in the morning, she was incontinent of urine and stool.  She is persistently hypertensive, and reports that in the past she was taking up to 3 anti-hypertensives.  She doesn't remember what they were.   She has ongoing chronic back pain and some weakness in her legs.  Per nursing report, the patient's sister reports that the patient falls at home on a daily basis.     Review of Systems: 8 point ROS negative except as noted above.      PHYSICAL EXAM     Weight In/Out     Wt Readings from Last 3 Encounters:   02/02/22 66.8 kg (147 lb 3.2 oz)   01/26/22 63.5 kg (139 lb 14.4 oz)   01/24/22 63.5 kg (140 lb)      I/O last 3 completed shifts:  In: 1887.8 [P.O.:50; I.V.:1465; IV Piggyback:372.8]  Out: 1675 [Urine:1675]       KPS:  70    BP (!) 162/94 (BP Location: Left arm)   Pulse 72   Temp 99.2  F (37.3  C) (Oral)   Resp 18   Ht 1.575 m (5' 2\")   Wt 66.8 kg (147 lb 3.2 oz)   SpO2 98%   BMI 26.92 kg/m       General: NAD   Eyes: : GREGG, sclera anicteric   Lungs: CTA bilaterally  Cardiovascular: RRR, no M/R/G   Abdominal/Rectal: +BS  Lymphatics: no edema  Skin: no rashes or petechiae  Neuro: A&O   Musculoskeletal: muscle mass decreased  Additional Findings: Connelly site NT, no drainage.      LABS AND IMAGING: I have assessed all abnormal lab values for their clinical significance and any values considered clinically significant have been addressed in the assessment and plan.        Lab Results   Component Value Date    WBC 2.2 (L) 02/02/2022    HGB 9.4 (L) 02/02/2022    HCT 29.4 (L) 02/02/2022     02/02/2022     02/02/2022     02/02/2022    POTASSIUM 3.1 (L) 02/02/2022    POTASSIUM 3.1 (L) 02/02/2022    CHLORIDE 110 (H) " 02/02/2022    CO2 23 02/02/2022     (H) 02/02/2022    BUN 7 02/02/2022    CR 0.52 02/02/2022    MAG 1.9 02/01/2022    INR 1.06 02/01/2022           SYSTEMS-BASED ASSESSMENT AND PLAN       Libby Grimaldo is a 71 year old female with multiple myeloma undergoing cytoxan chemopriming prior to stem cell collections.      1. BMT  - BMT doc/Coordinator: Dr. Julio C Guillory/Rashad Chen  - Cytoxan 2/1; this was given at 9pm and thus her flush will run through 11pm this evening.   - Plan for allopurinol 300mg daily for at least 7 days.   - GCSF 10 mcg/kg starts day +4 (2/4/22), continue through stem cell collections.     2. HEME/COAG  - Transfusion parameters: hgb <7g/dL and plts <10,000  - Anemia secondary to chemo/MM     3. ID  - No active infections. Recent hx of urinary tract infection s/p antibiotics.   - COVID neg 1/31     Prophy:  Viral: ACV BID   Bacterial: levofloxacin 250mg daily- send with pt on discharge to start when neutropenic  Fungal: fluconaozole- Send with pt on discharge to start when neutropenic.   PCP: Bactrim or other PCP prophy to start at day +28        4. GI/NUTRITION  - Anti-emetics: zofran/dex prior to transplant to prevent chemotherapy induced n/v.  Ativan and compazine prn.   - Ulcer prophy: Protonix 40mg daily.   - dietician to support as indicated to prevent malnutrition.     5. RENAL/  - Monitor Cr and electrolytes daily.   - Replace electrolytes per sliding scale  - lasix for fluid flush management     6. Endo: hypothyroidism- continue levothyroxine     7. CARDS: On metoprolol for HTN; add amlodipine 5mg daily.   Hyperlipidemia: crestor on hold with transplant/chemo     8. Neuro: Continue effexor   - Hx of multiple febrile seizures. Last occurred during college.      9. Pulm: COPD- remains on daily fluticasone and albuterol prn.      10. Cancer related pain: Remains on buprenorphine-naloxone and prn oxycodone     11. Supportive Care:   - # Pain Assessment: See nursing flowsheets      12.  MS: history of frequent falls at home.  Will request home PT prior to return for transplant.     I spent __40__ minutes face-to-face or coordinating care of Libby Grimaldo. Over 50% of our time on the unit was spent counseling the patient and/or coordinating care regarding as above.     Nidia Littlejohn

## 2022-02-02 NOTE — PLAN OF CARE
Patient completed Cytoxan beginning of night shift, at 2330. Post Cytoxan flush continues at 155 ml/hour for 24 hours post until 2330 tonight. Mesna continuous infusion at 46.6 ml/hour. Commode set up at bedside due to patient being unsteady. She was unable to void at 2400. Given Lasix 10 mg IV once. Voiding clear yellow urine. Scheduled Zofran. No complaints of nausea. Temp max 99.4 PO. BP became hypertensive mid shift. She also reported having a headache. Medicated with Tylenol with relief.She requested Ativan for sleep. Tried to explain to her that frequent voiding would interfere with sleep. Concern that adding Ativan to already unsteady gait would increase risk for fall.  Patient restless at times. Asking many questions, but doesn't remember answers. Forgetful at times. Tends to get IV lines tangled up between legs. Bed alarm used overnight for patient safety. KCL supplemented IV, per electrolyte protocol. Given lower dose of Ativan 0.5 mg po after patient repeatedly asked for it and she was not sleeping at all. Dozing later. Additional Lasix 10 mg  given due to inadequate urine output. May need bladder scan to see if patient is retaining urine.

## 2022-02-02 NOTE — CONSULTS
"Assessment completed on 1/21/2022. Please see information below for inpatient review.    Blood and Marrow Transplant   Psychosocial Assessment with   Clinical      Assessment completed on 1/21/2022 via phone as part of the COVID 19 protocol. Assessment of living situation, support system, financial status, functional status, coping, stressors, need for resources and social work intervention provided as needed. Information for this assessment was provided by pt, pt's -Jerrica, and pt's sister-Leisa's report in addition to medical chart review and consultation with medical team.      Present at Assessment:   Patient: Libby Grimaldo  Spouse: Tres \"Jerrica\" Linda  Sister: Leisa Grimaldo  : ANDREW Lambert, [facility]     Diagnosis: Multiple Myeloma      Date of Diagnosis: 12/20219     Transplant type: Autologous     Donor: Autologous       Physician: Alex Monsalve MD     Nurse Coordinator (Work-Up): Rashad Chen RN     Nurse Coordinator (Primary): Kiara Stewart RN     : ANDREW Lambert, [facility]      Permanent Address:   [address] [place] [address]     Living Situation: Pt lives at home with her Queenie.     Contact Information:  Pt's Cell Phone: [phone]  Pt Email: [email or web address]  -Tres Mckeon's Phone: [phone]  Sister-Leisa Grimaldo's Phone: [phone]     Presenting Information:  Libby is a 71 year old female diagnosed with MM who presents for evaluation for autologous transplant at the [facility] ([facility] Pt was accompanied to today's visit by her -Jerrica and sister-Leisa.      Decision Making: Self     Health Care Directive: No. SW provided education and forms. SW encouraged pt to have discussions with their family regarding their health care wishes. SW explained that since she does not have a healthcare directive, legally her -Tres Mckeon would make decisions on her behalf, if she did not have " "capacity to make her own medical decisions. Pt understood.      Pt wants to complete a healthcare directive. SW will check in with pt while in the hospital.      Relationship Status: . Pt and pt's spouse described relationship as stable and supportive.      Family/Support System: Pt endorsed a good support system including family and close friends who will be available to support pt throughout transplant process.      Spouse: Tres \"Jerrica\"Linda ( has kidney failure and has been sick and attending appts himself)  Children: Son-Luis Eduardo (Lives in [place]) and Son-Pravin (Lives in [place]) - These are Libby's biological children. -Jerrica has 3 Children of his own (2 live in [place], [place] and 1 lives in [place])  Grandchildren: Libby has 1 Grandchild-Yamel Cm (7 years old) and Jerrica has 7 Grandchildren - Together they have 8 grandchildren  Parents:   Siblings: Sister-Leisa Grimaldo (Lives in [place] - 3 miles away from pt)     Caregiver: MIKEY discussed with pt, pt's , and pt's sister the caregiver role and expectation at length. Pt is agreeable to having a full time caregiver for a minimum of 30 days until cleared by the BMT physician. Pt and pt's family confirmed understanding of the caregiver requirement. Pt's primary caregivers will be -Jerrica and sister-Leisa. Pt reviewed and signed the caregiver contract which will be scanned into the EM. Caregiver education and resources provided. No caregiver concerns identified.      Caregiver Contact Information:  -Tres Mckeon's Phone: [phone]  Sister-Leisa Grimaldo's Phone: [phone]     Transportation Mode: Private Vehicle. Pt is aware of driving restrictions post-BMT and the need for the caregiver is to drive until cleared to drive by the BMT physician. SW provided information on parking info and monthly parking pass options. Pt reported no concerns with transportation and confirmed her caregivers will drive her to call her " doctor's appointments.     Insurance: Pt has Medicare and Medicaid [place] health insurance. Pt denied specific insurance concerns at this time. MIKEY reiterated information about the [employer]  should specific insurance questions arise as pt moves through transplant process. Future Insurance questions referred to [employer] -Michael Valdes (CP: [phone]).     Sources of Income: Pt is supported by Social Security [employer] and Pension. Pt is anticipating financial hardship related to BMT at this time. MIKEY provided information on gris options. Pt would like to apply for grants. MIKEY mailed pt on 1.21.2022 the [employer], [employer], [facility] Patient [employer] and [employer] gris applications. MIKEY encouraged pt to return applications to  when completed.      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      Mental Health: Pt denied a history of mental health concerns, specific diagnoses or medications at this time. MIKEY explained that it's not uncommon for patients going through transplant to experience symptoms of depression/anxiety.      PHQ-2:  Pt scored a 0 which indicates no sign of depression on the depression severity scale. Pt endorses this is an accurate reflection of her emotional state.     GAD7:  Pt scored a 5 which indicates mild anxiety on the Generalized Anxiety Disorder Questionnaire. Pt endorses this is not an accurate reflection of her emotional state.     Chemical Use:   Tobacco: Pt quit 15 years ago.  Alcohol: No use  Marijuana: No hx  Other Drugs: No hx  Based on the information provided, there appear to be no specific risks or concerns identified at this time.      Trauma/Loss/Abuse History: Multiple losses associated with cancer diagnosis and treatment, including health, changes to physical appearance, etc.      Spirituality: MIKEY explained that there are Chaplains on the unit and pt can request to meet  "with a  at anytime.     Coping: Pt noted that she is currently feeling \"okay, tired yet moving around and handling it well\". Pt shared she is worried about her  and sister having to be her caregiver all the time. Pt shared she is having trouble with sleeping at night and plants to talk with the MD about her sleep trouble. Pt shared that her main coping mechanisms are talking with family/friends and spending time with her granddaughter. SW and pt discussed additional positive coping mechanisms that pt can utilize while in the hospital. While hospitalized, pt plans to bring her computer.      Caregiver Coping: Pt's caregivers noted that they is feeling \"fine\" at this time. SW encouraged pt's caregivers to reach out if they need anything at all for support as well.      Education Provided: Transplant process expectations, Caregiver requirements, Caregiver self-care, Financial issues related to transplant, Financial resources/grants available, Common psychosocial stressors pre/post transplant, Support group(s) available, Hospital resources available, Web site information, Social Work role and Resources for grandchildren.     Interventions Provided: Psychosocial Support and Education      Assessment and Recommendations for Team:  Pt is a 71 year old female diagnosed with MM who is here undergoing preparation for a planned autologous transplant.      Pt is pleasant, calm and able to articulate concerns/coping mechanisms in an appropriate manner. During our meeting pt was alert, she was interactive, affect was full, she displayed appropriate memory and thought processes. Pt feels comfortable communicating with the medical team. Pt has a strong supportive network of family and friends who are involved. Pt and pt's family will benefit from ongoing psychosocial support in regards to coping with the adjustment to the BMT process.      Pt has a good support system and a well developed caregiver plan. Pt " verbalizes understanding of the transplant process and wanting to proceed. SW provided contact information and encouraged pt to contact SW with questions, concerns, resources and for support.     Per this assessment, SW did not identify any barriers to this patient moving forward with transplant.     Important Information:   -Would like clinic appointments later in the day.     Follow up Planned:   -Initiated financial resources -Mailed gris applications on 1.21.2022 and received on 1.29.2022  -Psychosocial support  -Healthcare Directive - Check in while inpatient  -Resources for grandchildren - Mailed [employer], [employer], and [employer] on 1.21.2022 and received on 1.29.2022     Estefanía MORALES, [facility]  Phone: [phone]  Pager: [phone]

## 2022-02-02 NOTE — PLAN OF CARE
Pt is afebrile, vital signs are stable; started her Mesna which will run 24 hours and Cytoxan over 2 hours. complained of pain and got Oxycodone x 2; weakness in lower extremities and got a bedside commode; still needs C.Diff cxs; urine out is good. Monitor pt closely and continue plan of care.     Problem: Adult Inpatient Plan of Care  Goal: Plan of Care Review  Outcome: No Change     Problem: Adult Inpatient Plan of Care  Goal: Patient-Specific Goal (Individualized)  Outcome: No Change     Problem: Adult Inpatient Plan of Care  Goal: Absence of Hospital-Acquired Illness or Injury  Outcome: No Change     Problem: Adult Inpatient Plan of Care  Goal: Absence of Hospital-Acquired Illness or Injury  Intervention: Prevent and Manage VTE (Venous Thromboembolism) Risk  Recent Flowsheet Documentation  Taken 2/1/2022 1600 by Yuki Rinaldi, RN  VTE Prevention/Management: ambulation promoted

## 2022-02-03 ENCOUNTER — APPOINTMENT (OUTPATIENT)
Dept: PHYSICAL THERAPY | Facility: CLINIC | Age: 72
End: 2022-02-03
Attending: PHYSICIAN ASSISTANT
Payer: MEDICARE

## 2022-02-03 ENCOUNTER — CARE COORDINATION (OUTPATIENT)
Dept: ONCOLOGY | Facility: CLINIC | Age: 72
End: 2022-02-03
Payer: MEDICARE

## 2022-02-03 VITALS
HEART RATE: 87 BPM | TEMPERATURE: 98.7 F | WEIGHT: 145.3 LBS | RESPIRATION RATE: 16 BRPM | DIASTOLIC BLOOD PRESSURE: 77 MMHG | HEIGHT: 62 IN | BODY MASS INDEX: 26.74 KG/M2 | OXYGEN SATURATION: 96 % | SYSTOLIC BLOOD PRESSURE: 164 MMHG

## 2022-02-03 DIAGNOSIS — Z94.81 STATUS POST BONE MARROW TRANSPLANT (H): ICD-10-CM

## 2022-02-03 DIAGNOSIS — R53.81 PHYSICAL DECONDITIONING: ICD-10-CM

## 2022-02-03 DIAGNOSIS — C90.01 MULTIPLE MYELOMA IN REMISSION (H): Primary | ICD-10-CM

## 2022-02-03 LAB
ANION GAP SERPL CALCULATED.3IONS-SCNC: 4 MMOL/L (ref 3–14)
BASOPHILS # BLD AUTO: 0 10E3/UL (ref 0–0.2)
BASOPHILS NFR BLD AUTO: 0 %
BUN SERPL-MCNC: 14 MG/DL (ref 7–30)
CALCIUM SERPL-MCNC: 7.8 MG/DL (ref 8.5–10.1)
CHLORIDE BLD-SCNC: 108 MMOL/L (ref 94–109)
CO2 SERPL-SCNC: 26 MMOL/L (ref 20–32)
CREAT SERPL-MCNC: 0.75 MG/DL (ref 0.52–1.04)
CULTURE HARVEST COMPLETE DATE: NORMAL
EOSINOPHIL # BLD AUTO: 0 10E3/UL (ref 0–0.7)
EOSINOPHIL NFR BLD AUTO: 0 %
ERYTHROCYTE [DISTWIDTH] IN BLOOD BY AUTOMATED COUNT: 14.4 % (ref 10–15)
GFR SERPL CREATININE-BSD FRML MDRD: 86 ML/MIN/1.73M2
GLUCOSE BLD-MCNC: 143 MG/DL (ref 70–99)
HCT VFR BLD AUTO: 28.9 % (ref 35–47)
HGB BLD-MCNC: 9.5 G/DL (ref 11.7–15.7)
IMM GRANULOCYTES # BLD: 0 10E3/UL
IMM GRANULOCYTES NFR BLD: 1 %
LYMPHOCYTES # BLD AUTO: 0.3 10E3/UL (ref 0.8–5.3)
LYMPHOCYTES NFR BLD AUTO: 9 %
MAGNESIUM SERPL-MCNC: 1.6 MG/DL (ref 1.6–2.3)
MCH RBC QN AUTO: 32.9 PG (ref 26.5–33)
MCHC RBC AUTO-ENTMCNC: 32.9 G/DL (ref 31.5–36.5)
MCV RBC AUTO: 100 FL (ref 78–100)
MONOCYTES # BLD AUTO: 0.2 10E3/UL (ref 0–1.3)
MONOCYTES NFR BLD AUTO: 8 %
NEUTROPHILS # BLD AUTO: 2.4 10E3/UL (ref 1.6–8.3)
NEUTROPHILS NFR BLD AUTO: 82 %
NRBC # BLD AUTO: 0 10E3/UL
NRBC BLD AUTO-RTO: 0 /100
PHOSPHATE SERPL-MCNC: 2 MG/DL (ref 2.5–4.5)
PLATELET # BLD AUTO: 164 10E3/UL (ref 150–450)
POTASSIUM BLD-SCNC: 3.9 MMOL/L (ref 3.4–5.3)
RBC # BLD AUTO: 2.89 10E6/UL (ref 3.8–5.2)
SODIUM SERPL-SCNC: 138 MMOL/L (ref 133–144)
WBC # BLD AUTO: 2.9 10E3/UL (ref 4–11)

## 2022-02-03 PROCEDURE — 97530 THERAPEUTIC ACTIVITIES: CPT | Mod: GP | Performed by: PHYSICAL THERAPIST

## 2022-02-03 PROCEDURE — 83735 ASSAY OF MAGNESIUM: CPT | Performed by: INTERNAL MEDICINE

## 2022-02-03 PROCEDURE — 250N000013 HC RX MED GY IP 250 OP 250 PS 637: Performed by: PHYSICIAN ASSISTANT

## 2022-02-03 PROCEDURE — 250N000009 HC RX 250: Performed by: INTERNAL MEDICINE

## 2022-02-03 PROCEDURE — 258N000003 HC RX IP 258 OP 636: Performed by: INTERNAL MEDICINE

## 2022-02-03 PROCEDURE — 97116 GAIT TRAINING THERAPY: CPT | Mod: GP | Performed by: PHYSICAL THERAPIST

## 2022-02-03 PROCEDURE — 96376 TX/PRO/DX INJ SAME DRUG ADON: CPT

## 2022-02-03 PROCEDURE — 250N000011 HC RX IP 250 OP 636: Performed by: STUDENT IN AN ORGANIZED HEALTH CARE EDUCATION/TRAINING PROGRAM

## 2022-02-03 PROCEDURE — 85025 COMPLETE CBC W/AUTO DIFF WBC: CPT | Performed by: PHYSICIAN ASSISTANT

## 2022-02-03 PROCEDURE — G0378 HOSPITAL OBSERVATION PER HR: HCPCS

## 2022-02-03 PROCEDURE — 80048 BASIC METABOLIC PNL TOTAL CA: CPT | Performed by: PHYSICIAN ASSISTANT

## 2022-02-03 PROCEDURE — 97161 PT EVAL LOW COMPLEX 20 MIN: CPT | Mod: GP | Performed by: PHYSICAL THERAPIST

## 2022-02-03 PROCEDURE — 84100 ASSAY OF PHOSPHORUS: CPT | Performed by: INTERNAL MEDICINE

## 2022-02-03 RX ORDER — PROCHLORPERAZINE MALEATE 5 MG
5 TABLET ORAL EVERY 6 HOURS PRN
Qty: 30 TABLET | Refills: 0 | Status: SHIPPED | OUTPATIENT
Start: 2022-02-03 | End: 2022-04-11

## 2022-02-03 RX ORDER — LEVOFLOXACIN 250 MG/1
250 TABLET, FILM COATED ORAL DAILY
Qty: 30 TABLET | Refills: 0 | Status: ON HOLD | OUTPATIENT
Start: 2022-02-03 | End: 2022-02-26

## 2022-02-03 RX ORDER — ONDANSETRON 8 MG/1
8 TABLET, FILM COATED ORAL EVERY 8 HOURS PRN
Qty: 30 TABLET | Refills: 0 | Status: SHIPPED | OUTPATIENT
Start: 2022-02-03 | End: 2022-04-11

## 2022-02-03 RX ORDER — ACYCLOVIR 800 MG/1
800 TABLET ORAL EVERY 12 HOURS
Qty: 60 TABLET | Refills: 0 | Status: SHIPPED | OUTPATIENT
Start: 2022-02-03 | End: 2022-04-05

## 2022-02-03 RX ORDER — ALLOPURINOL 300 MG/1
300 TABLET ORAL DAILY
Qty: 5 TABLET | Refills: 0 | Status: ON HOLD | OUTPATIENT
Start: 2022-02-04 | End: 2022-02-26

## 2022-02-03 RX ORDER — AMLODIPINE BESYLATE 5 MG/1
5 TABLET ORAL DAILY
Status: COMPLETED | OUTPATIENT
Start: 2022-02-03 | End: 2022-02-03

## 2022-02-03 RX ORDER — OXYCODONE HYDROCHLORIDE 10 MG/1
10 TABLET ORAL ONCE
Status: COMPLETED | OUTPATIENT
Start: 2022-02-03 | End: 2022-02-03

## 2022-02-03 RX ORDER — AMLODIPINE BESYLATE 5 MG/1
10 TABLET ORAL DAILY
Qty: 60 TABLET | Refills: 0 | Status: SHIPPED | OUTPATIENT
Start: 2022-02-03 | End: 2022-02-09

## 2022-02-03 RX ORDER — FLUCONAZOLE 200 MG/1
200 TABLET ORAL DAILY
Qty: 30 TABLET | Refills: 0 | Status: ON HOLD | OUTPATIENT
Start: 2022-02-03 | End: 2022-02-26

## 2022-02-03 RX ORDER — HEPARIN SODIUM,PORCINE 10 UNIT/ML
5 VIAL (ML) INTRAVENOUS
Status: CANCELLED | OUTPATIENT
Start: 2022-02-04

## 2022-02-03 RX ORDER — HEPARIN SODIUM (PORCINE) LOCK FLUSH IV SOLN 100 UNIT/ML 100 UNIT/ML
5 SOLUTION INTRAVENOUS
Status: CANCELLED | OUTPATIENT
Start: 2022-02-04

## 2022-02-03 RX ORDER — ACYCLOVIR 200 MG/1
800 CAPSULE ORAL 2 TIMES DAILY
Qty: 60 CAPSULE | Refills: 0 | Status: SHIPPED | OUTPATIENT
Start: 2022-02-03 | End: 2022-02-03

## 2022-02-03 RX ADMIN — POTASSIUM PHOSPHATE, MONOBASIC AND POTASSIUM PHOSPHATE, DIBASIC 9 MMOL: 224; 236 INJECTION, SOLUTION, CONCENTRATE INTRAVENOUS at 06:49

## 2022-02-03 RX ADMIN — ACYCLOVIR 800 MG: 200 CAPSULE ORAL at 08:32

## 2022-02-03 RX ADMIN — LEVOTHYROXINE SODIUM 112 MCG: 0.11 TABLET ORAL at 08:32

## 2022-02-03 RX ADMIN — ACETAMINOPHEN 650 MG: 325 TABLET, FILM COATED ORAL at 10:29

## 2022-02-03 RX ADMIN — AMLODIPINE BESYLATE 5 MG: 5 TABLET ORAL at 09:22

## 2022-02-03 RX ADMIN — OXYCODONE HYDROCHLORIDE 10 MG: 10 TABLET ORAL at 10:29

## 2022-02-03 RX ADMIN — METOPROLOL SUCCINATE 25 MG: 25 TABLET, EXTENDED RELEASE ORAL at 08:31

## 2022-02-03 RX ADMIN — AMLODIPINE BESYLATE 5 MG: 5 TABLET ORAL at 08:31

## 2022-02-03 RX ADMIN — VENLAFAXINE HYDROCHLORIDE 75 MG: 75 CAPSULE, EXTENDED RELEASE ORAL at 08:32

## 2022-02-03 RX ADMIN — FUROSEMIDE 10 MG: 10 INJECTION, SOLUTION INTRAVENOUS at 00:17

## 2022-02-03 RX ADMIN — BUPRENORPHINE AND NALOXONE 1 FILM: 8; 2 FILM, SOLUBLE BUCCAL; SUBLINGUAL at 14:08

## 2022-02-03 RX ADMIN — OXYCODONE HYDROCHLORIDE 10 MG: 10 TABLET ORAL at 14:58

## 2022-02-03 RX ADMIN — ALLOPURINOL 300 MG: 300 TABLET ORAL at 08:32

## 2022-02-03 RX ADMIN — BUPRENORPHINE AND NALOXONE 1 FILM: 8; 2 FILM, SOLUBLE BUCCAL; SUBLINGUAL at 08:34

## 2022-02-03 ASSESSMENT — MIFFLIN-ST. JEOR: SCORE: 1137.33

## 2022-02-03 NOTE — PROGRESS NOTES
"Discharge SBAR:    Situation:  Libby Grimaldo is a 71 year old being discharged to: Home  Admission reason: Scheduled Admission  Is this a readmission? No  Discharge time: 1530  Discharge barrier if discharged after 11AM: Physical therapy consult     Background:  Primary diagnosis: Multiple Myeloma  BP (!) 164/77   Pulse 87   Temp 98.7  F (37.1  C) (Oral)   Resp 16   Ht 1.575 m (5' 2\")   Wt 65.9 kg (145 lb 4.8 oz)   SpO2 96%   BMI 26.58 kg/m    Type of donor: Autologous  Type of stem cells: PBSC  Relapsed? No  Falls Precautions? Yes  Isolation? Yes  DNR? No  DNI? No  Confidential Patient? No  Positive blood cultures? No    Assessment:  Discharge teaching    Review discharge medications and schedule: Yes    Set up pill box: No    Discharge instructions reviewed: Yes    Special considerations (think about previous reactions, issues with flushing CVC, premedication needs, etc): no     Patient Concerns: Yes: Patient is a high falls risk who is frequently confused and does not appreciate the bed alarm. Discussed this with her at length which she finally agrees to use bed alarm but will be ongoing issue.       Recommendations:  Anticipated needs:    Daily infusions: No    Daily transfusions: No    G-CSF: No    Other: Not Applicable  Verbal report called to clinic: No    Jerilyn Pinto RN        "

## 2022-02-03 NOTE — PROGRESS NOTES
02/03/22 0913   Quick Adds   Type of Visit Initial PT Evaluation   Living Environment   People in home spouse   Current Living Arrangements   ([facility])   Home Accessibility stairs within home;stairs to enter home   Number of Stairs, Main Entrance 4   Stair Railings, Main Entrance none   Transportation Anticipated car, drives self   Living Environment Comments Pt has a tub shower. Pt and her SO are retired.    Self-Care   Usual Activity Tolerance moderate   Current Activity Tolerance good   Regular Exercise Yes   Activity/Exercise Type other (see comments)   Equipment Currently Used at Home shower chair;cane, straight   Activity/Exercise/Self-Care Comment Pt reports going to OP PT 1x/week over the past 3 months to address balance, strengthening (UEs and LEs). Pt also reports PT also gave her eye exercises, pt reports occasional double vision with watching TV. Closing one eye resolved double vision. Pt has had eye surgery for Graves disease ~8 years ago). Pt reports falling going up stairs without UE support and walking.    Disability/Function   Hearing Difficulty or Deaf no   Wear Glasses or Blind other (see comments)  (intermittent double vision, difficulty with convergence.)   Concentrating, Remembering or Making Decisions Difficulty yes   Concentration Management Increased difficulty with memory, increased after starting chemotherapy.    Difficulty Communicating no   Difficulty Eating/Swallowing no   Walking or Climbing Stairs Difficulty yes  (increased UE support on railing)   Walking or Climbing Stairs stair climbing difficulty, requires equipment   Dressing/Bathing Difficulty yes   Dressing/Bathing bathing difficulty, requires equipment   Dressing/Bathing Management occassionallys sits down in shower chair   Toileting issues yes   Toileting Management increased incontinence the past year.    Toileting Assistance   (also reports difficulty standing from low toilet height. )   Doing Errands Independently  Difficulty (such as shopping) no   Fall history within last six months yes   Number of times patient has fallen within last six months 3   Change in Functional Status Since Onset of Current Illness/Injury no   General Information   Onset of Illness/Injury or Date of Surgery 02/03/22   Referring Physician Julio C Guillory MD   Patient/Family Therapy Goals Statement (PT) go home   Pertinent History of Current Problem (include personal factors and/or comorbidities that impact the POC) Pt is a 71 year old female, currently day +3 of cytoxan chemo-priming.      Existing Precautions/Restrictions fall   General Observations Activity: Up ad german.    Cognition   Orientation Status (Cognition) oriented x 4   Affect/Mental Status (Cognition) WFL   Follows Commands (Cognition) WFL   Safety Deficit (Cognition) minimal deficit;impulsivity;awareness of need for assistance   Posture    Posture Protracted shoulders   Range of Motion (ROM)   ROM Comment BLE ROM WFL. UE ROM, thumb ROM limited, pt's sister states she doesn't have a thumb joint.    Strength   Strength Comments LE strength: L ankle df 5/5, R ankle df 4+/5, B knee extension 5/5, B hip flexion 3+/5. Pt reports pain with MMT to L knee (quad pain resisting knee flexion)   Bed Mobility   Comment (Bed Mobility) IND   Transfers   Transfer Safety Comments Sit<>stand with increased se of UEs.    Gait/Stairs (Locomotion)   Comment (Gait/Stairs) Pt ambulated 20 ft with no AD, CGA-min A. Pt ambulates with decreased heather, decreased step length, scissor gait pattern, mild to moderate gait deviations, fixed forward gaze.    Balance   Balance Comments Pt stands with normal TRINY EO/EC x 10 seconds each, increased postural sway without LOB wiht EC. NBOS EO/EC x 10 seconds with increased postural sway, LOB with EC requiring min A.    Sensory Examination   Sensory Perception Comments Intermittent N/T in hands/fingers and B radiculopathy (LUE>RUE). Intermittent NT in B feet (L>R)    Coordination   Coordination Comments Impaired fine motor coordination to fingers per pt report.    Clinical Impression   Criteria for Skilled Therapeutic Intervention yes, treatment indicated   PT Diagnosis (PT) impaired functional mobility, fall risk.    Influenced by the following impairments impaired sensation, impaired balance, L hip pain   Functional limitations due to impairments difficulty with ambulation, stairs   Clinical Presentation Stable/Uncomplicated   Clinical Presentation Rationale level of impairments   Clinical Decision Making (Complexity) low complexity   Therapy Frequency (PT) 5x/week   Predicted Duration of Therapy Intervention (days/wks) 2 days   Planned Therapy Interventions (PT) gait training;stair training   Anticipated Equipment Needs at Discharge (PT) walker, rolling   Risk & Benefits of therapy have been explained evaluation/treatment results reviewed;care plan/treatment goals reviewed;risks/benefits reviewed;current/potential barriers reviewed;participants voiced agreement with care plan;participants included;patient;sibling   PT Discharge Planning    PT Discharge Recommendation (DC Rec) home with home care physical therapy   PT Rationale for DC Rec Pt has impaired balance and is currently at an increased risk of falling. Recommend home with assist, use of GB and CGA x 1 with ambulation to prevent falls at home. Recommend home PT/OT for home safety evaluation and improve balance, independence with mobility/ADLs.    PT Brief overview of current status  Ax1 with GB   Total Evaluation Time   Total Evaluation Time (Minutes) 27

## 2022-02-03 NOTE — PROGRESS NOTES
1. Vital signs at baseline: Pt is hypertensive (160's/90's).  2. Tolerating oral intake: Yes  3. Labs results verified and approved for discharge by provider.   4. Caregiver identified prior to discharge.   5. Adequate pain control: Yes; pt denies pain at this time.  6. Baseline activity remains tolerated: No; pt is an assist of one. She is a bit unsteady, forgetful, and mildly confused. Bed alarm on.  7. No active infection: Yes  8. O2 sats at baseline or greater than 92% on room air: Yes.  9. RN reviews BMT Discharge paper work and Med List document, with patient and care giver, prior to discharge.   10. Nurse to notify Provider when observation goals have been met and patient is ready for discharge.

## 2022-02-03 NOTE — PROGRESS NOTES
BMT Summary of Care    February 3, 2022 11:47 AM  Libby Grimaldo  MRN: [id number]    Discharge Date: 2/3/22    BMT Primary Physician: Julio C Guillory    BMT Nurse Coordinator: Rashad Chen    Discharge Diagnosis: S/P admission for cytoxan chemopriming    Discharge To: Home    Activity: As tolerated    Catheter Care: Connelly - Flush each line with 5mL of 10 unit/mL heparin every 24 hours    Vascular Access Device Protocol Per Policy: Flush daily in clinic.     IV Medications through home infusion: None    Nutrition: Regular diet as tolerated    Blood Transfusions:  Transfuse if Hemoglobin < or equal 7 g/dL  Red Blood Cell Order: 1 units, irradiated and leukoreduced   Transfuse if Platelet count < or equal 26817 uL  Platelet order: 1 adult dose, irradiated and leukoreduced  Transfusion Pre-meds:  None    Intravenous Electrolyte Replacement:  Potassium  chloride (give only if serum creatinine < 2)     3-3.3  20mEq/hr  over 1 hour x 2 doses     <3      20mEq/hr  over 1 hour x 3 doses  Magnesium sulfate 1.3-1.7     2 grams over 1 hour x1 dose                                     <1.3         2 grams over 2 hours x1 dose    Outpatient Pharmacy:  G-CSF to be given in clinic: (dose) 780 mcg subcutaneous daily starting 2/4/22 and continue through stem cell collections    Laboratory Tests:  At next clinic appointment (date: 2/4/21)  Hemogram (CBC) differential, platelet count  Basic Metabolic Panel  Magnesium    Support Services:Resume home PT/OT    Appointments:   2/422: 1100am labs and 1130am provider visit. Possible infusion for IV lytes afterwards.     Yuniel Tineo PA-C  [phone]    BMT Contact Information:-   For issues including fevers 100.5 or more:  Please call during the week: Monday through Friday between hours of 8:00 am and 4:30 pm- Call BMT office- [phone]  After hours/Weekends: Please call [facility] : [phone] and ask for BMT physician on call and the  will have the  BMT physician call you back.

## 2022-02-03 NOTE — PROGRESS NOTES
"PRIMARY DIAGNOSIS: \"GENERIC\" NURSING  OUTPATIENT/OBSERVATION GOALS TO BE MET BEFORE DISCHARGE:  1. Vital signs at baseline. yes  2. Tolerating oral intake. yes  3. Labs results verified and approved for discharge by provider.   4. Caregiver identified prior to discharge.   5. Adequate pain control. yes   6. Baseline activity remains tolerated. No. Pt unsteady. Forgetful and mildly confused. On bed alarm  7. No active infection. yes  8. O2 sats at baseline or greater than 92% on room air. yes  9. RN reviews BMT Discharge paper work and Med List document, with patient and care giver, prior to discharge.   10. Nurse to notify Provider when observation goals have been met and patient is ready for discharge.     Discharge Planner Nurse        Entered by: Maria Bailey 02/02/2022 8:12 PM     Please review provider order for any additional goals.   Nurse to notify provider when observation goals have been met and patient is ready for discharge.  "

## 2022-02-03 NOTE — PLAN OF CARE
Afebrile. VSS on RA except hypertensive (160's/90's). Team aware; possible increase of Norvasc? Pt is A&Ox4 but forgetful at times. Bed alarm on to prevent falls. Lasix x1 given due to low urine output during Cytoxan flush; amount not recorded d/t no hat in the bathroom. Pt states a good urine output. Flush ended at 2330. Slept between cares. Good appetite. Phosphorus replacement infusing. Possible discharge today?    Problem: Adult Inpatient Plan of Care  Goal: Plan of Care Review  Outcome: No Change  Goal: Patient-Specific Goal (Individualized)  Outcome: No Change  Goal: Absence of Hospital-Acquired Illness or Injury  Outcome: No Change  Intervention: Identify and Manage Fall Risk  Recent Flowsheet Documentation  Taken 2/2/2022 2100 by Summer Tinoco RN  Safety Promotion/Fall Prevention:    assistive device/personal items within reach    bed alarm on    clutter free environment maintained    fall prevention program maintained    nonskid shoes/slippers when out of bed    patient and family education  Intervention: Prevent Skin Injury  Recent Flowsheet Documentation  Taken 2/2/2022 2100 by Summer Tinoco, RN  Body Position: position changed independently  Intervention: Prevent and Manage VTE (Venous Thromboembolism) Risk  Recent Flowsheet Documentation  Taken 2/2/2022 2100 by Summer Tinoco RN  VTE Prevention/Management:    ambulation promoted    bleeding risk assessed    fluids promoted  Goal: Optimal Comfort and Wellbeing  Outcome: No Change  Goal: Readiness for Transition of Care  Outcome: No Change

## 2022-02-03 NOTE — PROGRESS NOTES
"BMT/Cell Therapy Daily Progress Note   02/03/2022    Patient ID:  Libby Grimaldo is a 71 year old female, currently day +3 of cytoxan chemo-priming.       Admission date: 2/1/2022    INTERVAL  HISTORY   Libby reports feeling much better today compared to yesterday. No further incontinence of urine or stool since yesterday morning. Stool was loose yesterday morning but hasn't had any since. No n/v. Urinating frequently due to fluids from cytoxan. No SOB or swelling. Blood pressures remain very elevated, no headaches or vision changes.     Review of Systems: 8 point ROS negative except as noted above.      PHYSICAL EXAM     Weight In/Out     Wt Readings from Last 3 Encounters:   02/03/22 65.9 kg (145 lb 4.8 oz)   01/26/22 63.5 kg (139 lb 14.4 oz)   01/24/22 63.5 kg (140 lb)      I/O last 3 completed shifts:  In: 3215.8 [P.O.:610; I.V.:2000; IV Piggyback:605.8]  Out: 2225 [Urine:2225]       KPS:  70    BP (!) 185/84   Pulse 76   Temp 98.1  F (36.7  C) (Oral)   Resp 16   Ht 1.575 m (5' 2\")   Wt 65.9 kg (145 lb 4.8 oz)   SpO2 99%   BMI 26.58 kg/m       General: NAD, engaged   Eyes: : GREGG, sclera anicteric   Lungs: CTA bilaterally  Cardiovascular: RRR, no M/R/G   Abdominal/Rectal: +BS  Lymphatics: no edema  Skin: no rashes or petechiae  Neuro: A&O, occasionally forgetful but answering and asking questions appropriately.   Musculoskeletal: muscle mass decreased  Additional Findings: Connelly site NT, no drainage.      LABS AND IMAGING: I have assessed all abnormal lab values for their clinical significance and any values considered clinically significant have been addressed in the assessment and plan.        Lab Results   Component Value Date    WBC 2.9 (L) 02/03/2022    HGB 9.5 (L) 02/03/2022    HCT 28.9 (L) 02/03/2022     02/03/2022     02/03/2022    POTASSIUM 3.9 02/03/2022    CHLORIDE 108 02/03/2022    CO2 26 02/03/2022     (H) 02/03/2022    BUN 14 02/03/2022    CR 0.75 02/03/2022    MAG 1.6 " 02/03/2022    INR 1.06 02/01/2022           SYSTEMS-BASED ASSESSMENT AND PLAN       Libby Grimaldo is a 71 year old female with multiple myeloma undergoing cytoxan chemopriming prior to stem cell collections.      1. BMT  - BMT doc/Coordinator: Dr. Julio C Guillory/Rashad Chen  - Cytoxan 2/1 in the evening. Stayed extra night d/t fluids ending at 11pm.  - Plan for allopurinol 300mg daily for at least 7 days.   - GCSF 10 mcg/kg starts day +5 (2/5/22), continue through stem cell collections.     2. HEME/COAG  - Transfusion parameters: hgb <7g/dL and plts <10,000  - Anemia secondary to chemo/MM     3. ID  - No active infections. Recent hx of urinary tract infection s/p antibiotics.   - COVID neg 1/31     Prophy:  Viral: ACV BID   Bacterial: levofloxacin 250mg daily- start on discharge.   Fungal: fluconaozole- start on discharge.   PCP: Bactrim or other PCP prophy to start at day +28        4. GI/NUTRITION  - Anti-emetics: zofran/dex prior to transplant to prevent chemotherapy induced n/v.  Ativan and compazine prn.   - Ulcer prophy: Protonix 40mg daily.   - dietician to support as indicated to prevent malnutrition.     5. RENAL/  - Monitor Cr and electrolytes daily.   - Replace electrolytes per sliding scale. Replace phos today prior to discharge.   - lasix for fluid flush management. Weight down 2 lbs and urinating frequently. Will not give lasix today but will re-eval in clinic prn.      6. Endo: hypothyroidism- continue levothyroxine     7. CARDS: On metoprolol for HTN; added amlodipine 5mg daily 2/2 but pressures still 160s-180s. Increase norvasc to 10mg daily. Will tolerate mild hypertension as will likely improve as fluid status normalizes and pt has hx of falls at home.   Hyperlipidemia: crestor on hold with transplant/chemo     8. Neuro: Continue effexor   - Hx of multiple febrile seizures. Last occurred during college.      9. Pulm: COPD- remains on daily fluticasone and albuterol prn.      10. Cancer related  pain: Remains on buprenorphine-naloxone and prn oxycodone     11. Supportive Care:   - # Pain Assessment: See nursing flowsheets      12. Musculoskeletal: history of frequent falls at home. Had home PT prior to admission. PT to eval prior to discharge 2/3 and then resume home PT.      Dispo: Pending PT eval and improvement in BP following norvasc pt will discharge home with planned follow up in BMT clinic tomorrow. Pt not sure what home meds she need refilled so will give scripts for all new medications and if she needs other refills she will notify the provider in clinic tomorrow.     I spent  30 minutes face-to-face or coordinating care of Libby Grimaldo. Over 50% of our time on the unit was spent counseling the patient and/or coordinating care regarding as above.     Yuniel Tineo PA-C  [phone]

## 2022-02-03 NOTE — PROGRESS NOTES
"PRIMARY DIAGNOSIS: \"GENERIC\" NURSING  OUTPATIENT/OBSERVATION GOALS TO BE MET BEFORE DISCHARGE:   Goals to be met before discharge home     1. Vital signs at baseline. No. Hypertensive  2. Tolerating oral intake. yes  3. Labs results verified and approved for discharge by provider.   4. Caregiver identified prior to discharge.   5. Adequate pain control. No. Given Oxycodone x1  6. Baseline activity remains tolerated. No. Pt unsteady. Bed alarm on.   7. No active infection. yes  8. O2 sats at baseline or greater than 92% on room air. yes  9. RN reviews BMT Discharge paper work and Med List document, with patient and care giver, prior to discharge.   10. Nurse to notify Provider when observation goals have been met and patient is ready for discharge.         Entered by: Maria Bailey 02/02/2022 8:08 PM     Please review provider order for any additional goals.   Nurse to notify provider when observation goals have been met and patient is ready for discharge.  "

## 2022-02-03 NOTE — PROVIDER NOTIFICATION
Pt didn't meet 300mL output for the 2hr Cytoxan flush void. By the time I could pull the med, the order . The flush ended at 2330. Do you want me to give her 10 of Lasix? If so, can you put in a one time order?  Thanks!

## 2022-02-03 NOTE — PROGRESS NOTES
Care Coordination  Discharge Planning     Line Company:  Not arranged - will be in clinic daily through collections  Referral made for line care:  No    PT/OT/therapies recommended:  Home PT  Referral made for PT/OT/therapies:  Yes - PT order placed 2/3 (for SOC 2/7) through [facility]    D/c location:  Home - [facility]      Kiara Stewart, RN, BSN  RN Care Coordinator - BMT   [phone]   [phone]

## 2022-02-03 NOTE — PROGRESS NOTES
1. Vital signs at baseline: Pt is hypertensive (160's/90's); team aware.  2. Tolerating oral intake: Yes  3. Labs results verified and approved for discharge by provider.   4. Caregiver identified prior to discharge.   5. Adequate pain control: Yes; pt denies pain at this time.  6. Baseline activity remains tolerated: No; pt is a standby assist. She is a bit forgetful. Bed alarm on to prevent falls.  7. No active infection: Yes  8. O2 sats at baseline or greater than 92% on room air: Yes.  9. RN reviews BMT Discharge paper work and Med List document, with patient and care giver, prior to discharge.   10. Nurse to notify Provider when observation goals have been met and patient is ready for discharge.

## 2022-02-03 NOTE — PROGRESS NOTES
"[facility] Discharge Summary   Libby Grimaldo MRN# [id number]   Age: 71 year old  YOB: 1950   Date of Admission: 2/1/2022  Date of Discharge:  2/3/22  Admitting Physician: Julio C Guillory MD  Discharge Physician:  Julio C Guillory MD  Discharge Diagnoses:    1. Multiple Myeloma, s/p cytoxan chemopriming  2. Anemia and thrombocytopenia secondary to chemotherapy  3. Hypophosphatemia   4. Fluid volume overload secondary to IVF  5. Hypertension  Discharge Medications:         Medication List      Started    acyclovir 800 MG tablet  Commonly known as: ZOVIRAX  800 mg, Oral, EVERY 12 HOURS     allopurinol 300 MG tablet  Commonly known as: ZYLOPRIM  300 mg, Oral, DAILY  Start taking on: February 4, 2022     amLODIPine 5 MG tablet  Commonly known as: NORVASC  10 mg, Oral, DAILY     fluconazole 200 MG tablet  Commonly known as: DIFLUCAN  200 mg, Oral, DAILY     levofloxacin 250 MG tablet  Commonly known as: LEVAQUIN  250 mg, Oral, DAILY     ondansetron 8 MG tablet  Commonly known as: ZOFRAN  8 mg, Oral, EVERY 8 HOURS PRN     prochlorperazine 5 MG tablet  Commonly known as: COMPAZINE  5 mg, Oral, EVERY 6 HOURS PRN          Brief History of Illness:    Multiple myeloma, admitted for chemotherapy.    See H&P dated 2/1 for hematologic history.     Physical Exam:    BP (!) 185/84   Pulse 76   Temp 98.1  F (36.7  C) (Oral)   Resp 16   Ht 1.575 m (5' 2\")   Wt 65.9 kg (145 lb 4.8 oz)   SpO2 99%   BMI 26.58 kg/m    # Discharge Pain Plan:   - Patient currently has NO PAIN and is not being prescribed pain medications on discharge.    See progress note dated 2/3/22.     Lab Values     I have assessed all abnormal lab values for their clinical significance and any values considered clinically significant have been addressed in the assessment and plan.   Hospital Course:      Libby Grimaldo is a 71 year old female with multiple myeloma undergoing cytoxan chemopriming prior to stem cell collections.      1. BMT  - " BMT doc/Coordinator: Dr. Julio C Guillory/Rashad Chen  - Cytoxan 2/1 in the evening. Stayed extra night d/t fluids ending at 11pm.  - Plan for allopurinol 300mg daily for at least 7 days.   - GCSF 10 mcg/kg starts day +4 (2/4/22), continue through stem cell collections.     2. HEME/COAG  - Transfusion parameters: hgb <7g/dL and plts <10,000  - Anemia secondary to chemo/MM     3. ID  - No active infections. Recent hx of urinary tract infection s/p antibiotics.   - COVID neg 1/31     Prophy:  Viral: ACV BID   Bacterial: levofloxacin 250mg daily- start on discharge.   Fungal: fluconaozole- start on discharge.   PCP: Bactrim or other PCP prophy to start at day +28        4. GI/NUTRITION  - Anti-emetics: zofran/dex prior to transplant to prevent chemotherapy induced n/v.  Ativan and compazine prn.   - Ulcer prophy: Protonix 40mg daily.   - dietician to support as indicated to prevent malnutrition.     5. RENAL/  - Monitor Cr and electrolytes daily.   - Replace electrolytes per sliding scale. Replace phos today prior to discharge.   - lasix for fluid flush management. Weight down 2 lbs and urinating frequently. Will not give lasix today but will re-eval in clinic prn.      6. Endo: hypothyroidism- continue levothyroxine     7. CARDS: On metoprolol for HTN; added amlodipine 5mg daily 2/2 but pressures still 160s-180s. Increase norvasc to 10mg daily. Will tolerate mild hypertension as will likely improve as fluid status normalizes and pt has hx of falls at home.   Hyperlipidemia: crestor on hold with transplant/chemo     8. Neuro: Continue effexor   - Hx of multiple febrile seizures. Last occurred during college.      9. Pulm: COPD- remains on daily fluticasone and albuterol prn.      10. Cancer related pain: Remains on buprenorphine-naloxone and prn oxycodone     11. Supportive Care:   - # Pain Assessment: See nursing flowsheets      12. Musculoskeletal: history of frequent falls at home. Had home PT prior to admission. PT  to eval prior to discharge 2/3 and then resume home PT.     CODE STATUS: Full code  Discharge Instructions and Follow-Up:    Discharge diet: Regular diet as tolerated  Discharge activity: Activity as tolerated   Discharge follow-up: Follow up with BMT Clinic as follows:  2/422: 1100am labs and 1130am provider visit. Possible infusion for IV lytes afterwards.   Discharge Disposition:    Discharged to home.    Yuniel Tineo  2/3/2022    Advice for Patients concerning COVID19:  a. Avoid contact with individuals:   i. Who are sick or have recently been sick  ii. Have traveled to high risk areas (per CDC guidelines) or have been on a cruise in the last 14 days  iii. Who were or could have been exposed to COVID-19   b. If experiencing symptoms such as: Fever, cough or shortness of breath contact BMT at [phone] Mon-Fri 8am-4:30pm or After Hours at [phone] (ask to speak to a BMT Fellow) for guidance on need for clinical assessment  c. Avoid all non- essential travel at this time; if traveling is necessary use mask (N-95)   d. Wear a mask when in public areas  d. Avoid crowded places, if possible  f. Follow CDC advice https://www.cdc.gov/coronavirus/2019-ncov/index.html and travel guidelines https://www.cdc.gov/coronavirus/2019-ncov/travelers/index.html

## 2022-02-03 NOTE — PLAN OF CARE
"BP (!) 169/78 (BP Location: Left arm)   Pulse 84   Temp 98.4  F (36.9  C) (Oral)   Resp 18   Ht 1.575 m (5' 2\")   Wt 66.1 kg (145 lb 11.2 oz)   SpO2 98%   BMI 26.65 kg/m      BP remains elevated 160/70's. Provider paged. No new orders tonight but per MD will increased Norvasc tomorrow. Patient c/o hip pain this morning. Given Oxycodone x1. Ativan for anxiety, compazine foe nausea. Patients weight is up 3.9 kg- given Lasix 20 mg. She also had not been meeting her q 2 hrs voiding parameter. Lasix given per protocol. Had diarrhea today. Still waiting for stool sample for C-diff. Potassium recheck 2.9- given 40 meq (2 bags) will need one more bag 20 jaclyn kcl and recheck 1 hr post. Pt uses a commode. Bed alarm on. Pt is unsteady, forgetful and mildly confused. Wears brief. Cytoxan flush ends at 2330 tonight. COVID negative. Continue with POC.          Problem: Adult Inpatient Plan of Care  Goal: Plan of Care Review  Outcome: No Change  Goal: Patient-Specific Goal (Individualized)  Outcome: No Change  Goal: Absence of Hospital-Acquired Illness or Injury  Outcome: No Change  Intervention: Identify and Manage Fall Risk  Recent Flowsheet Documentation  Taken 2/2/2022 0800 by Maria Bailey RN  Safety Promotion/Fall Prevention:   activity supervised   assistive device/personal items within reach  Intervention: Prevent Skin Injury  Recent Flowsheet Documentation  Taken 2/2/2022 0800 by Maria Bailey RN  Body Position: position changed independently  Intervention: Prevent and Manage VTE (Venous Thromboembolism) Risk  Recent Flowsheet Documentation  Taken 2/2/2022 0800 by Maria Bailey RN  VTE Prevention/Management: ambulation promoted  Goal: Optimal Comfort and Wellbeing  Outcome: No Change  Goal: Readiness for Transition of Care  Outcome: No Change     Problem: Patient Goal: Social Work Focus  Goal: 1. Patient Goal: Care Coordination / Social Work  Outcome: No Change  Goal: 2. Patient Goal: Care " Coordination / Social Work Focus  Outcome: No Change  Goal: 3. Patient Goal: Care Coordination / Social Work Focus  Outcome: No Change     Problem: Discharge Planning  Goal: Discharge Planning (Adult, OB, Behavioral, Peds)  Outcome: No Change

## 2022-02-03 NOTE — PHARMACY-ADMISSION MEDICATION HISTORY
Admission Medication History Completed by Pharmacy    See [facility] Admission Navigator for allergy information, preferred outpatient pharmacy, prior to admission medications and immunization status.     Medication History Sources:     BMT clinic pharmacist med hx, admission RN      Prior to Admission medications    Medication Sig Last Dose Taking? Auth Provider   acetaminophen (TYLENOL) 500 MG tablet Take 500-1,000 mg by mouth every 6 hours as needed for mild pain  2/1/2022 at 0400 Yes Reported, Patient   albuterol (PROAIR HFA/PROVENTIL HFA/VENTOLIN HFA) 108 (90 Base) MCG/ACT inhaler Inhale 2 puffs into the lungs every 6 hours as needed for shortness of breath / dyspnea  Unknown at Unknown time Yes Reported, Patient   amLODIPine (NORVASC) 5 MG tablet Take 1 tablet (5 mg) by mouth daily  Yes Nidia Littlejohn PA-C   aspirin (ASA) 81 MG chewable tablet Take 81 mg by mouth daily  1/30/2022 at Unknown time Yes Reported, Patient   atorvastatin (LIPITOR) 40 MG tablet Take 40 mg by mouth daily  1/31/2022 at 1200 Yes Reported, Patient   buprenorphine HCl-naloxone HCl (SUBOXONE) 8-2 MG per film Place 1 Film under the tongue 3 times daily  1/31/2022 at 0000 Yes Reported, Patient   cholecalciferol (VITAMIN D-1000 MAX ST) 25 MCG (1000 UT) TABS Take 1,000 Units by mouth daily  1/31/2022 at 1200 Yes Reported, Patient   levothyroxine (SYNTHROID/LEVOTHROID) 112 MCG tablet Take 112 mcg by mouth daily  2/1/2022 at 0400 Yes Reported, Patient   metoprolol succinate ER (TOPROL-XL) 25 MG 24 hr tablet Take 25 mg by mouth daily  2/1/2022 at 0400 Yes Reported, Patient   ondansetron (ZOFRAN-ODT) 4 MG ODT tab Take 1 tablet (4 mg) by mouth every 8 hours as needed for nausea Past Week at Unknown time Yes Wendi Davis PA-C   oxyCODONE IR (ROXICODONE) 10 MG tablet Take 10 mg by mouth every 8 hours as needed for severe pain  Past Week at Unknown time Yes Reported, Patient   polyethylene glycol (MIRALAX/GLYCOLAX) powder 17 g by Nasogastric route  daily as needed  Past Month at Unknown time Yes Reported, Patient   venlafaxine (EFFEXOR-XR) 75 MG 24 hr capsule Take 75 mg by mouth daily 1/31/2022 at 1200 Yes Unknown, Entered By History   Fluticasone-Umeclidin-Vilanterol (TRELEGY ELLIPTA) 100-62.5-25 MCG/INH oral inhaler Inhale 1 puff into the lungs daily    Reported, Patient       Date completed: 02/02/22    Medication history completed by: Gwen Mojica [facility]

## 2022-02-03 NOTE — PLAN OF CARE
Problem: Adult Inpatient Plan of Care  Goal: Patient-Specific Goal (Individualized)  Outcome: Adequate for Discharge     Problem: Adult Inpatient Plan of Care  Goal: Absence of Hospital-Acquired Illness or Injury  Outcome: Adequate for Discharge     Problem: Adult Inpatient Plan of Care  Goal: Absence of Hospital-Acquired Illness or Injury  Intervention: Prevent Skin Injury  Recent Flowsheet Documentation  Taken 2/3/2022 0800 by Jerilyn Pinto RN  Body Position: position changed independently

## 2022-02-04 ENCOUNTER — APPOINTMENT (OUTPATIENT)
Dept: LAB | Facility: CLINIC | Age: 72
End: 2022-02-04
Attending: FAMILY MEDICINE
Payer: MEDICARE

## 2022-02-04 ENCOUNTER — ANCILLARY PROCEDURE (OUTPATIENT)
Dept: GENERAL RADIOLOGY | Facility: CLINIC | Age: 72
End: 2022-02-04
Attending: PHYSICIAN ASSISTANT
Payer: MEDICARE

## 2022-02-04 ENCOUNTER — HOME INFUSION (PRE-WILLOW HOME INFUSION) (OUTPATIENT)
Dept: PHARMACY | Facility: CLINIC | Age: 72
End: 2022-02-04

## 2022-02-04 ENCOUNTER — INFUSION THERAPY VISIT (OUTPATIENT)
Dept: TRANSPLANT | Facility: CLINIC | Age: 72
End: 2022-02-04
Attending: INTERNAL MEDICINE
Payer: MEDICARE

## 2022-02-04 VITALS
DIASTOLIC BLOOD PRESSURE: 76 MMHG | SYSTOLIC BLOOD PRESSURE: 136 MMHG | OXYGEN SATURATION: 94 % | TEMPERATURE: 98.7 F | RESPIRATION RATE: 18 BRPM | WEIGHT: 151 LBS | HEART RATE: 90 BPM | BODY MASS INDEX: 27.62 KG/M2

## 2022-02-04 DIAGNOSIS — C90.00 MULTIPLE MYELOMA NOT HAVING ACHIEVED REMISSION (H): ICD-10-CM

## 2022-02-04 DIAGNOSIS — Z51.11 ADMISSION FOR CHEMOTHERAPY: ICD-10-CM

## 2022-02-04 DIAGNOSIS — C90.00 MULTIPLE MYELOMA NOT HAVING ACHIEVED REMISSION (H): Primary | ICD-10-CM

## 2022-02-04 LAB
ANION GAP SERPL CALCULATED.3IONS-SCNC: 5 MMOL/L (ref 3–14)
BASOPHILS # BLD AUTO: 0 10E3/UL (ref 0–0.2)
BASOPHILS NFR BLD AUTO: 0 %
BUN SERPL-MCNC: 16 MG/DL (ref 7–30)
CALCIUM SERPL-MCNC: 8.6 MG/DL (ref 8.5–10.1)
CHLORIDE BLD-SCNC: 111 MMOL/L (ref 94–109)
CO2 SERPL-SCNC: 26 MMOL/L (ref 20–32)
CREAT SERPL-MCNC: 0.65 MG/DL (ref 0.52–1.04)
EOSINOPHIL # BLD AUTO: 0 10E3/UL (ref 0–0.7)
EOSINOPHIL NFR BLD AUTO: 0 %
ERYTHROCYTE [DISTWIDTH] IN BLOOD BY AUTOMATED COUNT: 14.8 % (ref 10–15)
GFR SERPL CREATININE-BSD FRML MDRD: >90 ML/MIN/1.73M2
GLUCOSE BLD-MCNC: 85 MG/DL (ref 70–99)
HCT VFR BLD AUTO: 28.7 % (ref 35–47)
HGB BLD-MCNC: 9 G/DL (ref 11.7–15.7)
IMM GRANULOCYTES # BLD: 0 10E3/UL
IMM GRANULOCYTES NFR BLD: 0 %
LYMPHOCYTES # BLD AUTO: 0.2 10E3/UL (ref 0.8–5.3)
LYMPHOCYTES NFR BLD AUTO: 8 %
MAGNESIUM SERPL-MCNC: 1.7 MG/DL (ref 1.8–2.6)
MCH RBC QN AUTO: 32.3 PG (ref 26.5–33)
MCHC RBC AUTO-ENTMCNC: 31.4 G/DL (ref 31.5–36.5)
MCV RBC AUTO: 103 FL (ref 78–100)
MONOCYTES # BLD AUTO: 0.2 10E3/UL (ref 0–1.3)
MONOCYTES NFR BLD AUTO: 5 %
NEUTROPHILS # BLD AUTO: 2.6 10E3/UL (ref 1.6–8.3)
NEUTROPHILS NFR BLD AUTO: 87 %
NRBC # BLD AUTO: 0 10E3/UL
NRBC BLD AUTO-RTO: 0 /100
PLATELET # BLD AUTO: 147 10E3/UL (ref 150–450)
POTASSIUM BLD-SCNC: 3.6 MMOL/L (ref 3.4–5.3)
RBC # BLD AUTO: 2.79 10E6/UL (ref 3.8–5.2)
SODIUM SERPL-SCNC: 142 MMOL/L (ref 133–144)
WBC # BLD AUTO: 3 10E3/UL (ref 4–11)

## 2022-02-04 PROCEDURE — 250N000011 HC RX IP 250 OP 636: Performed by: INTERNAL MEDICINE

## 2022-02-04 PROCEDURE — 82310 ASSAY OF CALCIUM: CPT

## 2022-02-04 PROCEDURE — G0463 HOSPITAL OUTPT CLINIC VISIT: HCPCS | Mod: 25

## 2022-02-04 PROCEDURE — 250N000011 HC RX IP 250 OP 636: Performed by: PHYSICIAN ASSISTANT

## 2022-02-04 PROCEDURE — 99214 OFFICE O/P EST MOD 30 MIN: CPT

## 2022-02-04 PROCEDURE — 83735 ASSAY OF MAGNESIUM: CPT

## 2022-02-04 PROCEDURE — 96374 THER/PROPH/DIAG INJ IV PUSH: CPT

## 2022-02-04 PROCEDURE — 85025 COMPLETE CBC W/AUTO DIFF WBC: CPT

## 2022-02-04 PROCEDURE — 36592 COLLECT BLOOD FROM PICC: CPT

## 2022-02-04 PROCEDURE — 71046 X-RAY EXAM CHEST 2 VIEWS: CPT | Mod: GC | Performed by: RADIOLOGY

## 2022-02-04 RX ORDER — HEPARIN SODIUM,PORCINE 10 UNIT/ML
5 VIAL (ML) INTRAVENOUS
Status: DISCONTINUED | OUTPATIENT
Start: 2022-02-04 | End: 2022-02-04 | Stop reason: HOSPADM

## 2022-02-04 RX ORDER — FUROSEMIDE 10 MG/ML
20 INJECTION INTRAMUSCULAR; INTRAVENOUS ONCE
Status: COMPLETED | OUTPATIENT
Start: 2022-02-04 | End: 2022-02-04

## 2022-02-04 RX ORDER — HEPARIN SODIUM (PORCINE) LOCK FLUSH IV SOLN 100 UNIT/ML 100 UNIT/ML
5 SOLUTION INTRAVENOUS EVERY 8 HOURS
Status: DISCONTINUED | OUTPATIENT
Start: 2022-02-04 | End: 2022-02-04 | Stop reason: HOSPADM

## 2022-02-04 RX ADMIN — SODIUM CHLORIDE, PRESERVATIVE FREE 5 ML: 5 INJECTION INTRAVENOUS at 13:03

## 2022-02-04 RX ADMIN — Medication 5 ML: at 11:05

## 2022-02-04 RX ADMIN — FUROSEMIDE 20 MG: 10 INJECTION, SOLUTION INTRAVENOUS at 13:00

## 2022-02-04 RX ADMIN — Medication 5 ML: at 11:04

## 2022-02-04 ASSESSMENT — PAIN SCALES - GENERAL: PAINLEVEL: NO PAIN (0)

## 2022-02-04 NOTE — PROGRESS NOTES
BMT/Cell Therapy Daily Progress Note      Patient ID:  Libby Grimaldo is a 71 year old female, currently day +4 of cytoxan chemo-priming.          INTERVAL  HISTORY   Libby had a LOU this morning.  She got some relief with tylenol.  Today she also notes some SOB.  Her chest just feels a 'little heavy.'  She noticed some CARRILLO with working with PT yesterday in [facility] but now she thinks she notes it again but even at rest.  No distress.  Feels like she can take a deep breath and no pain with deep inspiration.  BP better today.  No n/v.         Review of Systems: 8 point ROS negative except as noted above.        PHYSICAL EXAM      KPS:  70     Blood pressure 136/76, pulse 90, temperature 98.7  F (37.1  C), resp. rate 18, weight 68.5 kg (151 lb), SpO2 94 %.     Wt Readings from Last 4 Encounters:   02/04/22 68.5 kg (151 lb)   02/03/22 65.9 kg (145 lb 4.8 oz)   01/26/22 63.5 kg (139 lb 14.4 oz)   01/24/22 63.5 kg (140 lb)     General: NAD, engaged   Eyes: GREGG, sclera anicteric   Lungs: few crackles on R, good effort, speaking in full sentences; in NAD  Cardiovascular: RRR, no M/R/G   Abdominal/Rectal: +BS  Lymphatics: no edema  Skin: no rashes or petechiae  Neuro: A&O, occasionally forgetful but answering and asking questions appropriately.   Musculoskeletal: muscle mass decreased  Additional Findings: Connelly site NT, no drainage.        LABS AND IMAGING: I have assessed all abnormal lab values for their clinical significance and any values considered clinically significant have been addressed in the assessment and plan.          Lab Results   Component Value Date    WBC 2.9 (L) 02/03/2022    HGB 9.5 (L) 02/03/2022    HCT 28.9 (L) 02/03/2022     02/03/2022     02/03/2022    POTASSIUM 3.9 02/03/2022    CHLORIDE 108 02/03/2022    CO2 26 02/03/2022     (H) 02/03/2022    BUN 14 02/03/2022    CR 0.75 02/03/2022    MAG 1.6 02/03/2022    INR 1.06 02/01/2022      SYSTEMS-BASED ASSESSMENT AND PLAN          Libby Grimaldo is a 71 year old female with multiple myeloma undergoing cytoxan chemopriming prior to stem cell collections.      1. BMT  - BMT doc/Coordinator: Dr. Julio C Guillory/Rashad Chen  - Cytoxan 2/1 in the evening. Stayed extra night d/t fluids ending at 11pm.  - Plan for allopurinol 300mg daily for at least 7 days.   - GCSF 10 mcg/kg starts day +5 (2/5/22), continue through stem cell collections.     2. HEME/COAG  - Transfusion parameters: hgb <7g/dL and plts <10,000  - Anemia secondary to chemo/MM     3. ID: looks well and afebrile.  CXR official read pending; hx of pneumonia/COPD.  Fluid up.  Give lasix today, if still symptomatic, consider short abx course tomorrow.  - No active infections. Recent hx of urinary tract infection s/p antibiotics.   - COVID neg 1/31     Prophy:  Viral: ACV BID   Bacterial: levofloxacin 250mg daily- start on discharge.   Fungal: fluconaozole- start on discharge.   PCP: Bactrim or other PCP prophy to start at day +28        4. GI/NUTRITION  - Anti-emetics: zofran/dex prior to transplant to prevent chemotherapy induced n/v.  Ativan and compazine prn.   - Ulcer prophy: Protonix 40mg daily.   - dietician to support as indicated to prevent malnutrition.     5. RENAL/  - Monitor Cr and electrolytes daily.   - lasix for fluid flush management. Give lasix x 1 today (wt up, SOB, crackles on exam)     6. Endo: hypothyroidism- continue levothyroxine     7. CARDS: On metoprolol for HTN; added amlodipine 5mg daily 2/2 but pressures still 160s-180s. Increase norvasc to 10mg daily. Will tolerate mild hypertension as will likely improve as fluid status normalizes and pt has hx of falls at home.   Hyperlipidemia: crestor on hold with transplant/chemo     8. Neuro: Continue effexor   - Hx of multiple febrile seizures. Last occurred during college.      9. Pulm: COPD- remains on daily fluticasone and albuterol prn.      10. Cancer related pain: Remains on buprenorphine-naloxone and prn  oxycodone     11. Musculoskeletal: history of frequent falls at home. Had home PT prior to admission. PT to eval prior to discharge 2/3 and then resume home PT (per SOC).         CXR today--formal read pending  Lasix 20mg IV x i  Has daily appt through 2/17     Review of the result(s) of each unique test - cbc, bmp  40 minutes spent on the date of the encounter doing chart review, history and exam, documentation and further activities per the note    Sarita Jimenez pa-c  [phone]

## 2022-02-04 NOTE — LETTER
2/4/2022         RE: Libby Grimaldo  [address] [facility] [address]        Dear Colleague,    Thank you for referring your patient, Libby Grimaldo, to the [facility] BLOOD AND MARROW TRANSPLANT PROGRAM [place]. Please see a copy of my visit note below.    Infusion Nursing Note:  Libby Grimaldo presents today for IV lasix.    Patient seen by provider today: Yes: Sarita Jimenez   present during visit today: Not Applicable.    Note: Pneumonia, fluid up. 20mg IV lasix administered. Pt was able to void shortly after administration.    Intravenous Access:  Connelly.    Treatment Conditions:  Results reviewed, labs MET treatment parameters, ok to proceed with treatment.    Post Infusion Assessment:  Patient tolerated infusion without incident.     Discharge Plan:   Patient discharged in stable condition accompanied by: self.  Departure Mode: Ambulatory.  Sai Forrest RN      Again, thank you for allowing me to participate in the care of your patient.      Sincerely,    [facility]

## 2022-02-04 NOTE — PROGRESS NOTES
Infusion Nursing Note:  Libby Grimaldo presents today for IV lasix.    Patient seen by provider today: Yes: Sarita Jimenez   present during visit today: Not Applicable.    Note: Pneumonia, fluid up. 20mg IV lasix administered. Pt was able to void shortly after administration.      Intravenous Access:  Connelly.    Treatment Conditions:  Results reviewed, labs MET treatment parameters, ok to proceed with treatment.      Post Infusion Assessment:  Patient tolerated infusion without incident.       Discharge Plan:   Patient discharged in stable condition accompanied by: self.  Departure Mode: Ambulatory.      Sai Forrest RN

## 2022-02-04 NOTE — NURSING NOTE
"Oncology Rooming Note    February 4, 2022 11:26 AM   Libby Grimaldo is a 71 year old female who presents for:    Chief Complaint   Patient presents with     Labs Only     cvc, heparin locked, vitals checked     Oncology Clinic Visit     Multiple myeloma not having achieved remission (H)     Initial Vitals: /76   Pulse 90   Temp 98.7  F (37.1  C)   Resp 18   Wt 68.5 kg (151 lb)   SpO2 94%   BMI 27.62 kg/m   Estimated body mass index is 27.62 kg/m  as calculated from the following:    Height as of 2/1/22: 1.575 m (5' 2\").    Weight as of this encounter: 68.5 kg (151 lb). Body surface area is 1.73 meters squared.  No Pain (0) Comment: Data Unavailable   No LMP recorded. Patient is postmenopausal.  Allergies reviewed: Yes  Medications reviewed: Yes    Medications: Medication refills not needed today.  Pharmacy name entered into [employer]: [id number] - [place] [phone] [place] [facility] [place] [facility] [address]    Clinical concerns: Patient reports being more fatigued, intermittent dizziness, brain fog, and unsteadiness on her feet-please discuss.        Aniyah Key LPN February 4, 2022 11:27 AM                "

## 2022-02-04 NOTE — PLAN OF CARE
Physical Therapy Discharge Summary    Reason for therapy discharge:    Discharged to home with home therapy.    Progress towards therapy goal(s). See goals on Care Plan in [facility] electronic health record for goal details.  Goals not met.  Barriers to achieving goals:   discharge from facility.    Therapy recommendation(s):    Continued therapy is recommended.  Rationale/Recommendations:  Pt has impaired balance and is currently at an increased risk of falling. Recommend home with assist, use of GB and CGA x 1 with ambulation to prevent falls at home. Recommend home PT/OT for home safety evaluation and improve balance, independence with mobility/ADLs. .

## 2022-02-04 NOTE — NURSING NOTE
Chief Complaint   Patient presents with     Labs Only     cvc, heparin locked, vitals checked     Oncology Clinic Visit     Multiple myeloma not having achieved remission (H)     Christen Kong RN on 2/4/2022 at 11:09 AM

## 2022-02-04 NOTE — LETTER
2/4/2022         RE: Libby Grimaldo  [address] [facility] [address]        Dear Colleague,    Thank you for referring your patient, Libby Grimaldo, to the [facility] BLOOD AND MARROW TRANSPLANT PROGRAM [place]. Please see a copy of my visit note below.    BMT/Cell Therapy Daily Progress Note      Patient ID:  Libby Grimaldo is a 71 year old female, currently day +4 of cytoxan chemo-priming.       INTERVAL  HISTORY   Libby had a LOU this morning.  She got some relief with tylenol.  Today she also notes some SOB.  Her chest just feels a 'little heavy.'  She noticed some CARRILLO with working with PT yesterday in [facility] but now she thinks she notes it again but even at rest.  No distress.  Feels like she can take a deep breath and no pain with deep inspiration.  BP better today.  No n/v.       Review of Systems: 8 point ROS negative except as noted above.     PHYSICAL EXAM      KPS:  70     Blood pressure 136/76, pulse 90, temperature 98.7  F (37.1  C), resp. rate 18, weight 68.5 kg (151 lb), SpO2 94 %.     Wt Readings from Last 4 Encounters:   02/04/22 68.5 kg (151 lb)   02/03/22 65.9 kg (145 lb 4.8 oz)   01/26/22 63.5 kg (139 lb 14.4 oz)   01/24/22 63.5 kg (140 lb)     General: NAD, engaged   Eyes: GREGG, sclera anicteric   Lungs: few crackles on R, good effort, speaking in full sentences; in NAD  Cardiovascular: RRR, no M/R/G   Abdominal/Rectal: +BS  Lymphatics: no edema  Skin: no rashes or petechiae  Neuro: A&O, occasionally forgetful but answering and asking questions appropriately.   Musculoskeletal: muscle mass decreased  Additional Findings: Connelly site NT, no drainage.      LABS AND IMAGING: I have assessed all abnormal lab values for their clinical significance and any values considered clinically significant have been addressed in the assessment and plan.      Lab Results   Component Value Date    WBC 2.9 (L) 02/03/2022    HGB 9.5 (L) 02/03/2022    HCT 28.9 (L) 02/03/2022      02/03/2022     02/03/2022    POTASSIUM 3.9 02/03/2022    CHLORIDE 108 02/03/2022    CO2 26 02/03/2022     (H) 02/03/2022    BUN 14 02/03/2022    CR 0.75 02/03/2022    MAG 1.6 02/03/2022    INR 1.06 02/01/2022      SYSTEMS-BASED ASSESSMENT AND PLAN       Libby Grimaldo is a 71 year old female with multiple myeloma undergoing cytoxan chemopriming prior to stem cell collections.      1. BMT  - BMT doc/Coordinator: Dr. Julio C Guillory/Rashad Chen  - Cytoxan 2/1 in the evening. Stayed extra night d/t fluids ending at 11pm.  - Plan for allopurinol 300mg daily for at least 7 days.   - GCSF 10 mcg/kg starts day +5 (2/5/22), continue through stem cell collections.     2. HEME/COAG  - Transfusion parameters: hgb <7g/dL and plts <10,000  - Anemia secondary to chemo/MM     3. ID: looks well and afebrile.  CXR official read pending; hx of pneumonia/COPD.  Fluid up.  Give lasix today, if still symptomatic, consider short abx course tomorrow.  - No active infections. Recent hx of urinary tract infection s/p antibiotics.   - COVID neg 1/31  Prophy:  Viral: ACV BID   Bacterial: levofloxacin 250mg daily- start on discharge.   Fungal: fluconaozole- start on discharge.   PCP: Bactrim or other PCP prophy to start at day +28     4. GI/NUTRITION  - Anti-emetics: zofran/dex prior to transplant to prevent chemotherapy induced n/v.  Ativan and compazine prn.   - Ulcer prophy: Protonix 40mg daily.   - dietician to support as indicated to prevent malnutrition.     5. RENAL/  - Monitor Cr and electrolytes daily.   - lasix for fluid flush management. Give lasix x 1 today (wt up, SOB, crackles on exam)     6. Endo: hypothyroidism- continue levothyroxine     7. CARDS: On metoprolol for HTN; added amlodipine 5mg daily 2/2 but pressures still 160s-180s. Increase norvasc to 10mg daily. Will tolerate mild hypertension as will likely improve as fluid status normalizes and pt has hx of falls at home.   Hyperlipidemia: crestor on hold with  transplant/chemo     8. Neuro: Continue effexor   - Hx of multiple febrile seizures. Last occurred during college.      9. Pulm: COPD- remains on daily fluticasone and albuterol prn.      10. Cancer related pain: Remains on buprenorphine-naloxone and prn oxycodone     11. Musculoskeletal: history of frequent falls at home. Had home PT prior to admission. PT to eval prior to discharge 2/3 and then resume home PT (per SOC).       CXR today--formal read pending  Lasix 20mg IV x i  Has daily appt through 2/17     Review of the result(s) of each unique test - cbc, bmp  40 minutes spent on the date of the encounter doing chart review, history and exam, documentation and further activities per the note    Sarita Jimenez pa-c  [phone]      Again, thank you for allowing me to participate in the care of your patient.      Sincerely,    BMT Advanced Practice Provider

## 2022-02-05 ENCOUNTER — APPOINTMENT (OUTPATIENT)
Dept: LAB | Facility: CLINIC | Age: 72
End: 2022-02-05
Attending: FAMILY MEDICINE
Payer: MEDICARE

## 2022-02-05 ENCOUNTER — ONCOLOGY VISIT (OUTPATIENT)
Dept: TRANSPLANT | Facility: CLINIC | Age: 72
End: 2022-02-05
Attending: INTERNAL MEDICINE
Payer: MEDICARE

## 2022-02-05 VITALS
WEIGHT: 143 LBS | TEMPERATURE: 98.3 F | DIASTOLIC BLOOD PRESSURE: 83 MMHG | RESPIRATION RATE: 16 BRPM | OXYGEN SATURATION: 96 % | SYSTOLIC BLOOD PRESSURE: 148 MMHG | HEART RATE: 103 BPM | BODY MASS INDEX: 26.16 KG/M2

## 2022-02-05 DIAGNOSIS — C90.00 MULTIPLE MYELOMA NOT HAVING ACHIEVED REMISSION (H): Primary | ICD-10-CM

## 2022-02-05 DIAGNOSIS — Z94.84 STEM CELLS TRANSPLANT STATUS (H): ICD-10-CM

## 2022-02-05 LAB
ANION GAP SERPL CALCULATED.3IONS-SCNC: 8 MMOL/L (ref 3–14)
BASOPHILS # BLD AUTO: 0 10E3/UL (ref 0–0.2)
BASOPHILS NFR BLD AUTO: 0 %
BUN SERPL-MCNC: 12 MG/DL (ref 7–30)
CALCIUM SERPL-MCNC: 8.2 MG/DL (ref 8.5–10.1)
CHLORIDE BLD-SCNC: 108 MMOL/L (ref 94–109)
CO2 SERPL-SCNC: 26 MMOL/L (ref 20–32)
CREAT SERPL-MCNC: 0.64 MG/DL (ref 0.52–1.04)
EOSINOPHIL # BLD AUTO: 0 10E3/UL (ref 0–0.7)
EOSINOPHIL NFR BLD AUTO: 1 %
ERYTHROCYTE [DISTWIDTH] IN BLOOD BY AUTOMATED COUNT: 14.5 % (ref 10–15)
GFR SERPL CREATININE-BSD FRML MDRD: >90 ML/MIN/1.73M2
GLUCOSE BLD-MCNC: 153 MG/DL (ref 70–99)
HCT VFR BLD AUTO: 30.5 % (ref 35–47)
HGB BLD-MCNC: 9.6 G/DL (ref 11.7–15.7)
IMM GRANULOCYTES # BLD: 0 10E3/UL
IMM GRANULOCYTES NFR BLD: 1 %
INTERPRETATION: NORMAL
LYMPHOCYTES # BLD AUTO: 0.1 10E3/UL (ref 0.8–5.3)
LYMPHOCYTES NFR BLD AUTO: 5 %
MCH RBC QN AUTO: 31.5 PG (ref 26.5–33)
MCHC RBC AUTO-ENTMCNC: 31.5 G/DL (ref 31.5–36.5)
MCV RBC AUTO: 100 FL (ref 78–100)
MONOCYTES # BLD AUTO: 0 10E3/UL (ref 0–1.3)
MONOCYTES NFR BLD AUTO: 2 %
NEUTROPHILS # BLD AUTO: 2.3 10E3/UL (ref 1.6–8.3)
NEUTROPHILS NFR BLD AUTO: 91 %
NRBC # BLD AUTO: 0 10E3/UL
NRBC BLD AUTO-RTO: 0 /100
PLATELET # BLD AUTO: 149 10E3/UL (ref 150–450)
POTASSIUM BLD-SCNC: 3.3 MMOL/L (ref 3.4–5.3)
RBC # BLD AUTO: 3.05 10E6/UL (ref 3.8–5.2)
SODIUM SERPL-SCNC: 142 MMOL/L (ref 133–144)
WBC # BLD AUTO: 2.5 10E3/UL (ref 4–11)

## 2022-02-05 PROCEDURE — 250N000011 HC RX IP 250 OP 636: Mod: JB | Performed by: PHYSICIAN ASSISTANT

## 2022-02-05 PROCEDURE — 36592 COLLECT BLOOD FROM PICC: CPT

## 2022-02-05 PROCEDURE — 250N000011 HC RX IP 250 OP 636: Performed by: INTERNAL MEDICINE

## 2022-02-05 PROCEDURE — 85025 COMPLETE CBC W/AUTO DIFF WBC: CPT

## 2022-02-05 PROCEDURE — 82310 ASSAY OF CALCIUM: CPT

## 2022-02-05 PROCEDURE — G0463 HOSPITAL OUTPT CLINIC VISIT: HCPCS | Mod: 25

## 2022-02-05 PROCEDURE — 88368 INSITU HYBRIDIZATION MANUAL: CPT | Mod: 26 | Performed by: MEDICAL GENETICS

## 2022-02-05 PROCEDURE — 88369 M/PHMTRC ALYSISHQUANT/SEMIQ: CPT | Mod: 26 | Performed by: MEDICAL GENETICS

## 2022-02-05 PROCEDURE — 96372 THER/PROPH/DIAG INJ SC/IM: CPT | Performed by: PHYSICIAN ASSISTANT

## 2022-02-05 PROCEDURE — 99214 OFFICE O/P EST MOD 30 MIN: CPT

## 2022-02-05 RX ORDER — ONDANSETRON 4 MG/1
4 TABLET, ORALLY DISINTEGRATING ORAL EVERY 8 HOURS PRN
Qty: 16 TABLET | Refills: 0 | Status: ON HOLD | OUTPATIENT
Start: 2022-02-05 | End: 2022-02-26

## 2022-02-05 RX ORDER — HEPARIN SODIUM (PORCINE) LOCK FLUSH IV SOLN 100 UNIT/ML 100 UNIT/ML
5 SOLUTION INTRAVENOUS
Status: CANCELLED | OUTPATIENT
Start: 2022-02-06

## 2022-02-05 RX ORDER — HEPARIN SODIUM,PORCINE 10 UNIT/ML
5 VIAL (ML) INTRAVENOUS
Status: CANCELLED | OUTPATIENT
Start: 2022-02-06

## 2022-02-05 RX ORDER — LORATADINE 10 MG/1
10 TABLET ORAL DAILY
Qty: 14 TABLET | Refills: 0 | Status: ON HOLD | OUTPATIENT
Start: 2022-02-05 | End: 2022-02-26

## 2022-02-05 RX ORDER — HEPARIN SODIUM,PORCINE 10 UNIT/ML
5 VIAL (ML) INTRAVENOUS ONCE
Status: COMPLETED | OUTPATIENT
Start: 2022-02-05 | End: 2022-02-05

## 2022-02-05 RX ADMIN — Medication 5 ML: at 08:20

## 2022-02-05 RX ADMIN — FILGRASTIM 780 MCG: 480 INJECTION, SOLUTION INTRAVENOUS; SUBCUTANEOUS at 09:12

## 2022-02-05 ASSESSMENT — PAIN SCALES - GENERAL: PAINLEVEL: NO PAIN (0)

## 2022-02-05 NOTE — PROGRESS NOTES
BMT/Cell Therapy Daily Progress Note      Patient ID:  Libby Grimaldo is a 71 year old female, currently day +5 of cytoxan chemo-priming.          INTERVAL  HISTORY   Much better after lasix, no SOB, diuresed very well, no rhinorrhea/sore throat/cough/Cp/chest heaviness. Mild intermittent HA unclear if similar to BL or since zofran. BP better today.  No n/v.  Intermittent loose stools, no bleeding. No sick contacts. BP overall better.       Review of Systems: 8 point ROS negative except as noted above.        PHYSICAL EXAM      KPS:  70     Blood pressure (!) 148/83, pulse 103, temperature 98.3  F (36.8  C), temperature source Oral, resp. rate 16, weight 64.9 kg (143 lb), SpO2 96 %.     Wt Readings from Last 4 Encounters:   02/05/22 64.9 kg (143 lb)   02/04/22 68.5 kg (151 lb)   02/03/22 65.9 kg (145 lb 4.8 oz)   01/26/22 63.5 kg (139 lb 14.4 oz)     General: NAD  Dry mucous membranes  Eyes: GREGG, sclera anicteric   Lungs: few crackles on r base  Cardiovascular: RRR, no M/R/G   Abdominal/Rectal: +BS  Lymphatics: no edema  Skin: no rashes or petechiae  Neuro: A&O.   Additional Findings: Connelly site NT, no drainage.        LABS AND IMAGING: I have assessed all abnormal lab values for their clinical significance and any values considered clinically significant have been addressed in the assessment and plan.          Lab Results   Component Value Date    WBC 3.0 (L) 02/04/2022    HGB 9.0 (L) 02/04/2022    HCT 28.7 (L) 02/04/2022     (L) 02/04/2022     02/04/2022    POTASSIUM 3.6 02/04/2022    CHLORIDE 111 (H) 02/04/2022    CO2 26 02/04/2022    GLC 85 02/04/2022    BUN 16 02/04/2022    CR 0.65 02/04/2022    MAG 1.7 (L) 02/04/2022    INR 1.06 02/01/2022      SYSTEMS-BASED ASSESSMENT AND PLAN         Libby Grimaldo is a 71 year old female with multiple myeloma undergoing cytoxan chemopriming prior to stem cell collections. Day +5     1. BMT  - BMT doc/Coordinator: Dr. Julio C Guillory/Rashad Chen  - Cytoxan 2/1 in  the evening. Stayed extra night d/t fluids ending at 11pm.  - Plan for allopurinol 300mg daily for at least 7 days.   - GCSF 10 mcg/kg starts day +5 (2/5/22), continue through stem cell collections.     2. HEME/COAG  - Transfusion parameters: hgb <7g/dL and plts <10,000  - Anemia secondary to chemo/MM     3. ID: Afebrile.    CXR 2/4 Increased diffuse interstitial opacities and hazy right lower lobe opacity possibly representing pulmonary edema versus infection including atypical infections; hx of pneumonia/COPD.   2/4 Given lasix, respiratory status significantly improved, has no clinical signs or symptoms of pneumonia including no fevers cough or otherwise.  I would therefore not start on antibiotics since this is not clinically compatible with pneumonia or bronchitis.  - Recent hx of urinary tract infection s/p antibiotics.   - COVID neg 1/31     Prophy:  Viral: ACV BID   Bacterial: levofloxacin 250mg daily- start on discharge.   Fungal: fluconaozole- start on discharge.   PCP: Bactrim or other PCP prophy to start at day +28        4. GI/NUTRITION  - Anti-emetics: zofran/dex prior to transplant to prevent chemotherapy induced n/v.  Ativan and compazine prn.   - Ulcer prophy: Protonix 40mg daily.   - dietician to support as indicated to prevent malnutrition.     5. RENAL/  - Monitor Cr and electrolytes daily.   - lasix for fluid flush management. 2/4 Give lasix x 1  (wt up, SOB, crackles on exam)     6. Endo: hypothyroidism- continue levothyroxine     7. CARDS: On metoprolol for HTN; added amlodipine 5mg daily 2/2 but pressures still 160s-180s. Increased norvasc to 10mg daily. Will tolerate mild hypertension as will likely improve as fluid status normalizes and pt has hx of falls at home.   Hyperlipidemia: crestor on hold with transplant/chemo     8. Neuro: Continue effexor   - Hx of multiple febrile seizures. Last occurred during college.      9. Pulm: COPD- remains on daily fluticasone and albuterol prn.       10. Cancer related pain: Remains on buprenorphine-naloxone and prn oxycodone     11. Musculoskeletal: history of frequent falls at home. Had home PT prior to admission. PT to eval prior to discharge 2/3 and then resume home PT (per SOC).       Has daily appt through 2/17    30 minutes spent on the date of the encounter doing chart review, history and exam, documentation and further activities per the note

## 2022-02-05 NOTE — NURSING NOTE
Chief Complaint   Patient presents with     Blood Draw     Labs drawn via CVC by RN. VS taken.     Labs drawn from CVC by rn.  Good blood return noted in both lumens.  Both lumens flushed with NS and heparin.  Pt tolerated well.  VS taken.  Pt checked in for next appt.    Krishna Dodson RN

## 2022-02-05 NOTE — LETTER
2/5/2022     RE: Libby Grimaldo  [address] [facility] [address]    Dear Colleague,    Thank you for referring your patient, Libby Grimaldo, to the [facility] BLOOD AND MARROW TRANSPLANT PROGRAM [place]. Please see a copy of my visit note below.    BMT/Cell Therapy Daily Progress Note      Patient ID:  Libby Grimaldo is a 71 year old female, currently day +5 of cytoxan chemo-priming.        INTERVAL  HISTORY   Much better after lasix, no SOB, diuresed very well, no rhinorrhea/sore throat/cough/Cp/chest heaviness. Mild intermittent HA unclear if similar to BL or since zofran. BP better today.  No n/v.  Intermittent loose stools, no bleeding. No sick contacts. BP overall better.     Review of Systems: 8 point ROS negative except as noted above.      PHYSICAL EXAM      KPS:  70     Blood pressure (!) 148/83, pulse 103, temperature 98.3  F (36.8  C), temperature source Oral, resp. rate 16, weight 64.9 kg (143 lb), SpO2 96 %.     Wt Readings from Last 4 Encounters:   02/05/22 64.9 kg (143 lb)   02/04/22 68.5 kg (151 lb)   02/03/22 65.9 kg (145 lb 4.8 oz)   01/26/22 63.5 kg (139 lb 14.4 oz)     General: NAD  Dry mucous membranes  Eyes: GREGG, sclera anicteric   Lungs: few crackles on r base  Cardiovascular: RRR, no M/R/G   Abdominal/Rectal: +BS  Lymphatics: no edema  Skin: no rashes or petechiae  Neuro: A&O.   Additional Findings: Connelly site NT, no drainage.        LABS AND IMAGING: I have assessed all abnormal lab values for their clinical significance and any values considered clinically significant have been addressed in the assessment and plan.          Lab Results   Component Value Date    WBC 3.0 (L) 02/04/2022    HGB 9.0 (L) 02/04/2022    HCT 28.7 (L) 02/04/2022     (L) 02/04/2022     02/04/2022    POTASSIUM 3.6 02/04/2022    CHLORIDE 111 (H) 02/04/2022    CO2 26 02/04/2022    GLC 85 02/04/2022    BUN 16 02/04/2022    CR 0.65 02/04/2022    MAG 1.7 (L) 02/04/2022    INR 1.06 02/01/2022       SYSTEMS-BASED ASSESSMENT AND PLAN         Libby Grimaldo is a 71 year old female with multiple myeloma undergoing cytoxan chemopriming prior to stem cell collections. Day +5     1. BMT  - BMT doc/Coordinator: Dr. Julio C Guillory/Rashad Chen  - Cytoxan 2/1 in the evening. Stayed extra night d/t fluids ending at 11pm.  - Plan for allopurinol 300mg daily for at least 7 days.   - GCSF 10 mcg/kg starts day +5 (2/5/22), continue through stem cell collections.     2. HEME/COAG  - Transfusion parameters: hgb <7g/dL and plts <10,000  - Anemia secondary to chemo/MM     3. ID: Afebrile.    CXR 2/4 Increased diffuse interstitial opacities and hazy right lower lobe opacity possibly representing pulmonary edema versus infection including atypical infections; hx of pneumonia/COPD.   2/4 Given lasix, respiratory status significantly improved, has no clinical signs or symptoms of pneumonia including no fevers cough or otherwise.  I would therefore not start on antibiotics since this is not clinically compatible with pneumonia or bronchitis.  - Recent hx of urinary tract infection s/p antibiotics.   - COVID neg 1/31     Prophy:  Viral: ACV BID   Bacterial: levofloxacin 250mg daily- start on discharge.   Fungal: fluconaozole- start on discharge.   PCP: Bactrim or other PCP prophy to start at day +28        4. GI/NUTRITION  - Anti-emetics: zofran/dex prior to transplant to prevent chemotherapy induced n/v.  Ativan and compazine prn.   - Ulcer prophy: Protonix 40mg daily.   - dietician to support as indicated to prevent malnutrition.     5. RENAL/  - Monitor Cr and electrolytes daily.   - lasix for fluid flush management. 2/4 Give lasix x 1  (wt up, SOB, crackles on exam)     6. Endo: hypothyroidism- continue levothyroxine     7. CARDS: On metoprolol for HTN; added amlodipine 5mg daily 2/2 but pressures still 160s-180s. Increased norvasc to 10mg daily. Will tolerate mild hypertension as will likely improve as fluid status normalizes  and pt has hx of falls at home.   Hyperlipidemia: crestor on hold with transplant/chemo     8. Neuro: Continue effexor   - Hx of multiple febrile seizures. Last occurred during college.      9. Pulm: COPD- remains on daily fluticasone and albuterol prn.      10. Cancer related pain: Remains on buprenorphine-naloxone and prn oxycodone     11. Musculoskeletal: history of frequent falls at home. Had home PT prior to admission. PT to eval prior to discharge 2/3 and then resume home PT (per SOC).       Has daily appt through 2/17    30 minutes spent on the date of the encounter doing chart review, history and exam, documentation and further activities per the note    Again, thank you for allowing me to participate in the care of your patient.      Sincerely,  BMT DOM

## 2022-02-05 NOTE — NURSING NOTE
"Oncology Rooming Note    February 5, 2022 8:26 AM   Libby Grimaldo is a 71 year old female who presents for:    Chief Complaint   Patient presents with     Blood Draw     Labs drawn via CVC by RN. VS taken.     RECHECK     MM here for provider visit     Initial Vitals: BP (!) 148/83   Pulse 103   Temp 98.3  F (36.8  C) (Oral)   Resp 16   Wt 64.9 kg (143 lb)   SpO2 96%   BMI 26.16 kg/m   Estimated body mass index is 26.16 kg/m  as calculated from the following:    Height as of 2/1/22: 1.575 m (5' 2\").    Weight as of this encounter: 64.9 kg (143 lb). Body surface area is 1.68 meters squared.  No Pain (0) Comment: Data Unavailable   No LMP recorded. Patient is postmenopausal.  Allergies reviewed: Yes  Medications reviewed: Yes    Medications: Medication refills not needed today.  Pharmacy name entered into [employer]: [id number] [place], [place] - [address] AT [facility] [address]    Clinical concerns:Nausea      Sai Forrest RN              "

## 2022-02-06 ENCOUNTER — ONCOLOGY VISIT (OUTPATIENT)
Dept: TRANSPLANT | Facility: CLINIC | Age: 72
End: 2022-02-06
Attending: INTERNAL MEDICINE
Payer: MEDICARE

## 2022-02-06 ENCOUNTER — APPOINTMENT (OUTPATIENT)
Dept: LAB | Facility: CLINIC | Age: 72
End: 2022-02-06
Attending: FAMILY MEDICINE
Payer: MEDICARE

## 2022-02-06 VITALS
BODY MASS INDEX: 26.01 KG/M2 | WEIGHT: 142.2 LBS | OXYGEN SATURATION: 98 % | TEMPERATURE: 98.4 F | DIASTOLIC BLOOD PRESSURE: 92 MMHG | RESPIRATION RATE: 16 BRPM | HEART RATE: 87 BPM | SYSTOLIC BLOOD PRESSURE: 148 MMHG

## 2022-02-06 DIAGNOSIS — C90.00 MULTIPLE MYELOMA NOT HAVING ACHIEVED REMISSION (H): Primary | ICD-10-CM

## 2022-02-06 DIAGNOSIS — Z94.84 STEM CELLS TRANSPLANT STATUS (H): ICD-10-CM

## 2022-02-06 LAB
ANION GAP SERPL CALCULATED.3IONS-SCNC: 3 MMOL/L (ref 3–14)
BASOPHILS # BLD AUTO: 0 10E3/UL (ref 0–0.2)
BASOPHILS NFR BLD AUTO: 0 %
BUN SERPL-MCNC: 10 MG/DL (ref 7–30)
CALCIUM SERPL-MCNC: 7.7 MG/DL (ref 8.5–10.1)
CHLORIDE BLD-SCNC: 108 MMOL/L (ref 94–109)
CO2 SERPL-SCNC: 31 MMOL/L (ref 20–32)
CREAT SERPL-MCNC: 0.69 MG/DL (ref 0.52–1.04)
EOSINOPHIL # BLD AUTO: 0 10E3/UL (ref 0–0.7)
EOSINOPHIL NFR BLD AUTO: 1 %
ERYTHROCYTE [DISTWIDTH] IN BLOOD BY AUTOMATED COUNT: 14.4 % (ref 10–15)
GFR SERPL CREATININE-BSD FRML MDRD: >90 ML/MIN/1.73M2
GLUCOSE BLD-MCNC: 101 MG/DL (ref 70–99)
HCT VFR BLD AUTO: 30.3 % (ref 35–47)
HGB BLD-MCNC: 9.6 G/DL (ref 11.7–15.7)
IMM GRANULOCYTES # BLD: 0.1 10E3/UL
IMM GRANULOCYTES NFR BLD: 1 %
LYMPHOCYTES # BLD AUTO: 0.1 10E3/UL (ref 0.8–5.3)
LYMPHOCYTES NFR BLD AUTO: 2 %
MAGNESIUM SERPL-MCNC: 1.7 MG/DL (ref 1.8–2.6)
MCH RBC QN AUTO: 31.8 PG (ref 26.5–33)
MCHC RBC AUTO-ENTMCNC: 31.7 G/DL (ref 31.5–36.5)
MCV RBC AUTO: 100 FL (ref 78–100)
MONOCYTES # BLD AUTO: 0 10E3/UL (ref 0–1.3)
MONOCYTES NFR BLD AUTO: 1 %
NEUTROPHILS # BLD AUTO: 3.3 10E3/UL (ref 1.6–8.3)
NEUTROPHILS NFR BLD AUTO: 95 %
NRBC # BLD AUTO: 0 10E3/UL
NRBC BLD AUTO-RTO: 0 /100
PLATELET # BLD AUTO: 118 10E3/UL (ref 150–450)
POTASSIUM BLD-SCNC: 3.5 MMOL/L (ref 3.4–5.3)
RBC # BLD AUTO: 3.02 10E6/UL (ref 3.8–5.2)
SODIUM SERPL-SCNC: 142 MMOL/L (ref 133–144)
WBC # BLD AUTO: 3.5 10E3/UL (ref 4–11)

## 2022-02-06 PROCEDURE — G0463 HOSPITAL OUTPT CLINIC VISIT: HCPCS | Mod: 25

## 2022-02-06 PROCEDURE — 96372 THER/PROPH/DIAG INJ SC/IM: CPT | Performed by: PHYSICIAN ASSISTANT

## 2022-02-06 PROCEDURE — 99214 OFFICE O/P EST MOD 30 MIN: CPT

## 2022-02-06 PROCEDURE — 83735 ASSAY OF MAGNESIUM: CPT

## 2022-02-06 PROCEDURE — 250N000011 HC RX IP 250 OP 636: Mod: JB | Performed by: PHYSICIAN ASSISTANT

## 2022-02-06 PROCEDURE — 85025 COMPLETE CBC W/AUTO DIFF WBC: CPT

## 2022-02-06 PROCEDURE — 36592 COLLECT BLOOD FROM PICC: CPT

## 2022-02-06 PROCEDURE — 80048 BASIC METABOLIC PNL TOTAL CA: CPT

## 2022-02-06 PROCEDURE — 250N000011 HC RX IP 250 OP 636: Performed by: INTERNAL MEDICINE

## 2022-02-06 RX ORDER — HEPARIN SODIUM,PORCINE 10 UNIT/ML
5 VIAL (ML) INTRAVENOUS ONCE
Status: COMPLETED | OUTPATIENT
Start: 2022-02-06 | End: 2022-02-06

## 2022-02-06 RX ORDER — HEPARIN SODIUM,PORCINE 10 UNIT/ML
5 VIAL (ML) INTRAVENOUS
Status: CANCELLED | OUTPATIENT
Start: 2022-02-07

## 2022-02-06 RX ORDER — HEPARIN SODIUM (PORCINE) LOCK FLUSH IV SOLN 100 UNIT/ML 100 UNIT/ML
5 SOLUTION INTRAVENOUS
Status: CANCELLED | OUTPATIENT
Start: 2022-02-07

## 2022-02-06 RX ADMIN — Medication 5 ML: at 08:21

## 2022-02-06 RX ADMIN — FILGRASTIM 780 MCG: 480 INJECTION, SOLUTION INTRAVENOUS; SUBCUTANEOUS at 08:28

## 2022-02-06 ASSESSMENT — PAIN SCALES - GENERAL: PAINLEVEL: NO PAIN (0)

## 2022-02-06 NOTE — LETTER
2/6/2022     RE: Libby Grimaldo  [address] [facility] [address]    Dear Colleague,    Thank you for referring your patient, Libby Grimaldo, to the [facility] BLOOD AND MARROW TRANSPLANT PROGRAM [place]. Please see a copy of my visit note below.    BMT/Cell Therapy Daily Progress Note      Patient ID:  Libby Grimaldo is a 71 year old female, currently day +6 of cytoxan chemo-priming.          INTERVAL  HISTORY   Feels overall ok, no new concerns. Much better after lasix, no SOB, diuresed very well, no rhinorrhea/sore throat/cough/Cp/chest heaviness. Mild intermittent HA unclear if similar to BL or since zofran. BP better today.  No n/v.  Intermittent loose stools, no bleeding. No sick contacts. BP overall better but did not take BP meds this am before coming top clinic. .     Review of Systems: 8 point ROS negative except as noted above.      PHYSICAL EXAM      KPS:  70     Blood pressure (!) 148/92, pulse 87, temperature 98.4  F (36.9  C), temperature source Oral, resp. rate 16, weight 64.5 kg (142 lb 3.2 oz), SpO2 98 %.     Wt Readings from Last 4 Encounters:   02/06/22 64.5 kg (142 lb 3.2 oz)   02/05/22 64.9 kg (143 lb)   02/04/22 68.5 kg (151 lb)   02/03/22 65.9 kg (145 lb 4.8 oz)     General: NAD  Dry mucous membranes  Eyes: GREGG, sclera anicteric   Lungs: few crackles on r base  Cardiovascular: RRR, no M/R/G   Abdominal/Rectal: +BS  Lymphatics: no edema  Skin: no rashes or petechiae  Neuro: A&O.   Additional Findings: Connelly site NT, no drainage.        LABS AND IMAGING: I have assessed all abnormal lab values for their clinical significance and any values considered clinically significant have been addressed in the assessment and plan.          Lab Results   Component Value Date    WBC 3.5 (L) 02/06/2022    HGB 9.6 (L) 02/06/2022    HCT 30.3 (L) 02/06/2022     (L) 02/06/2022     02/05/2022    POTASSIUM 3.3 (L) 02/05/2022    CHLORIDE 108 02/05/2022    CO2 26 02/05/2022      (H) 02/05/2022    BUN 12 02/05/2022    CR 0.64 02/05/2022    MAG 1.7 (L) 02/04/2022    INR 1.06 02/01/2022      SYSTEMS-BASED ASSESSMENT AND PLAN       Libby Grimaldo is a 71 year old female with multiple myeloma undergoing cytoxan chemopriming prior to stem cell collections. Day +6     1. BMT  - BMT doc/Coordinator: Dr. Julio C Guillory/Rashad Chen  - Cytoxan 2/1 in the evening. Stayed extra night d/t fluids ending at 11pm.  - Plan for allopurinol 300mg daily for at least 7 days.   - GCSF 10 mcg/kg starts day +5 (2/5/22), continue through stem cell collections.     2. HEME/COAG  - Transfusion parameters: hgb <7g/dL and plts <10,000  - Anemia secondary to chemo/MM     3. ID: Afebrile.    CXR 2/4 Increased diffuse interstitial opacities and hazy right lower lobe opacity possibly representing pulmonary edema versus infection including atypical infections; hx of pneumonia/COPD.   2/4 Given lasix, respiratory status significantly improved, has no clinical signs or symptoms of pneumonia including no fevers cough or otherwise.  I would therefore not start on antibiotics since this is not clinically compatible with pneumonia or bronchitis.  - Recent hx of urinary tract infection s/p antibiotics.   - COVID neg 1/31     Prophy:  Viral: ACV BID   Bacterial: levofloxacin 250mg daily- start on discharge.   Fungal: fluconaozole- start on discharge.   PCP: Bactrim or other PCP prophy to start at day +28      4. GI/NUTRITION  - Anti-emetics: zofran/dex prior to transplant to prevent chemotherapy induced n/v.  Ativan and compazine prn.   - Ulcer prophy: Protonix 40mg daily.   - dietician to support as indicated to prevent malnutrition.     5. RENAL/  - Monitor Cr and electrolytes daily.   - lasix for fluid flush management. 2/4 Give lasix x 1  (wt up, SOB, crackles on exam)     6. Endo: hypothyroidism- continue levothyroxine     7. CARDS: On metoprolol for HTN; added amlodipine 5mg daily 2/2 but pressures still 160s-180s. Increased  norvasc to 10mg daily. Will tolerate mild hypertension as will likely improve as fluid status normalizes and pt has hx of falls at home.   Hyperlipidemia: crestor on hold with transplant/chemo     8. Neuro: Continue effexor   - Hx of multiple febrile seizures. Last occurred during college.      9. Pulm: COPD- remains on daily fluticasone and albuterol prn.      10. Cancer related pain: Remains on buprenorphine-naloxone and prn oxycodone     11. Musculoskeletal: history of frequent falls at home. Had home PT prior to admission. PT to eval prior to discharge 2/3 and then resume home PT (per SOC).       Has daily appt through 2/17  Follow up Mag from today, results pending    30 minutes spent on the date of the encounter doing chart review, history and exam, documentation and further activities per the note    Again, thank you for allowing me to participate in the care of your patient.        Sincerely,        BMT DOM

## 2022-02-06 NOTE — PROGRESS NOTES
BMT/Cell Therapy Daily Progress Note      Patient ID:  Libby Grimaldo is a 71 year old female, currently day +6 of cytoxan chemo-priming.          INTERVAL  HISTORY   Feels overall ok, no new concerns. Much better after lasix, no SOB, diuresed very well, no rhinorrhea/sore throat/cough/Cp/chest heaviness. Mild intermittent HA unclear if similar to BL or since zofran. BP better today.  No n/v.  Intermittent loose stools, no bleeding. No sick contacts. BP overall better but did not take BP meds this am before coming top clinic. .       Review of Systems: 8 point ROS negative except as noted above.        PHYSICAL EXAM      KPS:  70     Blood pressure (!) 148/92, pulse 87, temperature 98.4  F (36.9  C), temperature source Oral, resp. rate 16, weight 64.5 kg (142 lb 3.2 oz), SpO2 98 %.     Wt Readings from Last 4 Encounters:   02/06/22 64.5 kg (142 lb 3.2 oz)   02/05/22 64.9 kg (143 lb)   02/04/22 68.5 kg (151 lb)   02/03/22 65.9 kg (145 lb 4.8 oz)     General: NAD  Dry mucous membranes  Eyes: GREGG, sclera anicteric   Lungs: few crackles on r base  Cardiovascular: RRR, no M/R/G   Abdominal/Rectal: +BS  Lymphatics: no edema  Skin: no rashes or petechiae  Neuro: A&O.   Additional Findings: Connelly site NT, no drainage.        LABS AND IMAGING: I have assessed all abnormal lab values for their clinical significance and any values considered clinically significant have been addressed in the assessment and plan.          Lab Results   Component Value Date    WBC 3.5 (L) 02/06/2022    HGB 9.6 (L) 02/06/2022    HCT 30.3 (L) 02/06/2022     (L) 02/06/2022     02/05/2022    POTASSIUM 3.3 (L) 02/05/2022    CHLORIDE 108 02/05/2022    CO2 26 02/05/2022     (H) 02/05/2022    BUN 12 02/05/2022    CR 0.64 02/05/2022    MAG 1.7 (L) 02/04/2022    INR 1.06 02/01/2022      SYSTEMS-BASED ASSESSMENT AND PLAN         Libby ARRIETA Dillan is a 71 year old female with multiple myeloma undergoing cytoxan chemopriming prior to stem cell  collections. Day +6     1. BMT  - BMT doc/Coordinator: Dr. Julio C Guillory/Rashad Chen  - Cytoxan 2/1 in the evening. Stayed extra night d/t fluids ending at 11pm.  - Plan for allopurinol 300mg daily for at least 7 days.   - GCSF 10 mcg/kg starts day +5 (2/5/22), continue through stem cell collections.     2. HEME/COAG  - Transfusion parameters: hgb <7g/dL and plts <10,000  - Anemia secondary to chemo/MM     3. ID: Afebrile.    CXR 2/4 Increased diffuse interstitial opacities and hazy right lower lobe opacity possibly representing pulmonary edema versus infection including atypical infections; hx of pneumonia/COPD.   2/4 Given lasix, respiratory status significantly improved, has no clinical signs or symptoms of pneumonia including no fevers cough or otherwise.  I would therefore not start on antibiotics since this is not clinically compatible with pneumonia or bronchitis.  - Recent hx of urinary tract infection s/p antibiotics.   - COVID neg 1/31     Prophy:  Viral: ACV BID   Bacterial: levofloxacin 250mg daily- start on discharge.   Fungal: fluconaozole- start on discharge.   PCP: Bactrim or other PCP prophy to start at day +28        4. GI/NUTRITION  - Anti-emetics: zofran/dex prior to transplant to prevent chemotherapy induced n/v.  Ativan and compazine prn.   - Ulcer prophy: Protonix 40mg daily.   - dietician to support as indicated to prevent malnutrition.     5. RENAL/  - Monitor Cr and electrolytes daily.   - lasix for fluid flush management. 2/4 Give lasix x 1  (wt up, SOB, crackles on exam)     6. Endo: hypothyroidism- continue levothyroxine     7. CARDS: On metoprolol for HTN; added amlodipine 5mg daily 2/2 but pressures still 160s-180s. Increased norvasc to 10mg daily. Will tolerate mild hypertension as will likely improve as fluid status normalizes and pt has hx of falls at home.   Hyperlipidemia: crestor on hold with transplant/chemo     8. Neuro: Continue effexor   - Hx of multiple febrile seizures.  Last occurred during college.      9. Pulm: COPD- remains on daily fluticasone and albuterol prn.      10. Cancer related pain: Remains on buprenorphine-naloxone and prn oxycodone     11. Musculoskeletal: history of frequent falls at home. Had home PT prior to admission. PT to eval prior to discharge 2/3 and then resume home PT (per SOC).       Has daily appt through 2/17  Follow up Mag from today, results pending    30 minutes spent on the date of the encounter doing chart review, history and exam, documentation and further activities per the note

## 2022-02-06 NOTE — NURSING NOTE
"Oncology Rooming Note    February 6, 2022 8:34 AM   Libby Grimaldo is a 71 year old female who presents for:    Chief Complaint   Patient presents with     Blood Draw     Labs drawn via CVC by RN. VS taken.     RECHECK     pre bmt for MM here for labs and md visit with GCSF  inj     Initial Vitals: BP (!) 148/92   Pulse 87   Temp 98.4  F (36.9  C) (Oral)   Resp 16   Wt 64.5 kg (142 lb 3.2 oz)   SpO2 98%   BMI 26.01 kg/m   Estimated body mass index is 26.01 kg/m  as calculated from the following:    Height as of 2/1/22: 1.575 m (5' 2\").    Weight as of this encounter: 64.5 kg (142 lb 3.2 oz). Body surface area is 1.68 meters squared.  No Pain (0) Comment: Data Unavailable   No LMP recorded. Patient is postmenopausal.  Allergies reviewed: Yes  Medications reviewed: Yes    Medications: Medication refills not needed today.  Pharmacy name entered into [employer]: [facility] #[id number] - [place], [place] - [address] AT [facility] [address]    Clinical concerns: none       Christen Kong RN              "
Chief Complaint   Patient presents with     Blood Draw     Labs drawn via CVC by RN. VS taken.     Labs drawn from CVC by rn.  Good blood return noted in both lumens.  Both lumens flushed with NS and heparin.  Pt tolerated well.  VS taken.  Pt checked in for next appt.    Krishna Dodson RN  
[phone]

## 2022-02-07 ENCOUNTER — ONCOLOGY VISIT (OUTPATIENT)
Dept: TRANSPLANT | Facility: CLINIC | Age: 72
DRG: 152 | End: 2022-02-07
Attending: INTERNAL MEDICINE
Payer: MEDICARE

## 2022-02-07 ENCOUNTER — APPOINTMENT (OUTPATIENT)
Dept: LAB | Facility: CLINIC | Age: 72
DRG: 152 | End: 2022-02-07
Attending: FAMILY MEDICINE
Payer: MEDICARE

## 2022-02-07 VITALS
OXYGEN SATURATION: 96 % | TEMPERATURE: 98.7 F | WEIGHT: 140.2 LBS | DIASTOLIC BLOOD PRESSURE: 80 MMHG | SYSTOLIC BLOOD PRESSURE: 153 MMHG | HEART RATE: 111 BPM | RESPIRATION RATE: 16 BRPM | BODY MASS INDEX: 25.64 KG/M2

## 2022-02-07 DIAGNOSIS — Z94.84 STEM CELLS TRANSPLANT STATUS (H): ICD-10-CM

## 2022-02-07 DIAGNOSIS — C90.00 MULTIPLE MYELOMA NOT HAVING ACHIEVED REMISSION (H): Primary | ICD-10-CM

## 2022-02-07 LAB
ANION GAP SERPL CALCULATED.3IONS-SCNC: 8 MMOL/L (ref 3–14)
BUN SERPL-MCNC: 8 MG/DL (ref 7–30)
CALCIUM SERPL-MCNC: 8.2 MG/DL (ref 8.5–10.1)
CHLORIDE BLD-SCNC: 104 MMOL/L (ref 94–109)
CO2 SERPL-SCNC: 29 MMOL/L (ref 20–32)
CREAT SERPL-MCNC: 0.75 MG/DL (ref 0.52–1.04)
ERYTHROCYTE [DISTWIDTH] IN BLOOD BY AUTOMATED COUNT: 13.7 % (ref 10–15)
GFR SERPL CREATININE-BSD FRML MDRD: 86 ML/MIN/1.73M2
GLUCOSE BLD-MCNC: 162 MG/DL (ref 70–99)
HCT VFR BLD AUTO: 33.6 % (ref 35–47)
HGB BLD-MCNC: 10.7 G/DL (ref 11.7–15.7)
MCH RBC QN AUTO: 32.1 PG (ref 26.5–33)
MCHC RBC AUTO-ENTMCNC: 31.8 G/DL (ref 31.5–36.5)
MCV RBC AUTO: 101 FL (ref 78–100)
PLAT MORPH BLD: NORMAL
PLATELET # BLD AUTO: 91 10E3/UL (ref 150–450)
POTASSIUM BLD-SCNC: 3.4 MMOL/L (ref 3.4–5.3)
RBC # BLD AUTO: 3.33 10E6/UL (ref 3.8–5.2)
RBC MORPH BLD: NORMAL
SODIUM SERPL-SCNC: 141 MMOL/L (ref 133–144)
WBC # BLD AUTO: 0.3 10E3/UL (ref 4–11)

## 2022-02-07 PROCEDURE — 85027 COMPLETE CBC AUTOMATED: CPT

## 2022-02-07 PROCEDURE — 96372 THER/PROPH/DIAG INJ SC/IM: CPT | Performed by: PHYSICIAN ASSISTANT

## 2022-02-07 PROCEDURE — 250N000011 HC RX IP 250 OP 636: Performed by: INTERNAL MEDICINE

## 2022-02-07 PROCEDURE — 250N000011 HC RX IP 250 OP 636: Performed by: PHYSICIAN ASSISTANT

## 2022-02-07 PROCEDURE — 99214 OFFICE O/P EST MOD 30 MIN: CPT

## 2022-02-07 PROCEDURE — G0463 HOSPITAL OUTPT CLINIC VISIT: HCPCS | Mod: 25

## 2022-02-07 PROCEDURE — 80048 BASIC METABOLIC PNL TOTAL CA: CPT

## 2022-02-07 PROCEDURE — 36592 COLLECT BLOOD FROM PICC: CPT

## 2022-02-07 PROCEDURE — 250N000013 HC RX MED GY IP 250 OP 250 PS 637: Performed by: PHYSICIAN ASSISTANT

## 2022-02-07 RX ORDER — HEPARIN SODIUM (PORCINE) LOCK FLUSH IV SOLN 100 UNIT/ML 100 UNIT/ML
500 SOLUTION INTRAVENOUS ONCE
Status: COMPLETED | OUTPATIENT
Start: 2022-02-07 | End: 2022-02-07

## 2022-02-07 RX ORDER — HEPARIN SODIUM,PORCINE 10 UNIT/ML
5 VIAL (ML) INTRAVENOUS
Status: CANCELLED | OUTPATIENT
Start: 2022-02-08

## 2022-02-07 RX ORDER — HEPARIN SODIUM (PORCINE) LOCK FLUSH IV SOLN 100 UNIT/ML 100 UNIT/ML
5 SOLUTION INTRAVENOUS
Status: CANCELLED | OUTPATIENT
Start: 2022-02-08

## 2022-02-07 RX ORDER — HEPARIN SODIUM,PORCINE 10 UNIT/ML
3 VIAL (ML) INTRAVENOUS ONCE
Status: COMPLETED | OUTPATIENT
Start: 2022-02-07 | End: 2022-02-07

## 2022-02-07 RX ORDER — POTASSIUM CHLORIDE 1500 MG/1
40 TABLET, EXTENDED RELEASE ORAL ONCE
Status: COMPLETED | OUTPATIENT
Start: 2022-02-07 | End: 2022-02-07

## 2022-02-07 RX ADMIN — FILGRASTIM 780 MCG: 480 INJECTION, SOLUTION INTRAVENOUS; SUBCUTANEOUS at 11:54

## 2022-02-07 RX ADMIN — Medication 500 UNITS: at 11:07

## 2022-02-07 RX ADMIN — POTASSIUM CHLORIDE 40 MEQ: 1500 TABLET, EXTENDED RELEASE ORAL at 12:19

## 2022-02-07 RX ADMIN — Medication 3 ML: at 10:59

## 2022-02-07 ASSESSMENT — PAIN SCALES - GENERAL: PAINLEVEL: MODERATE PAIN (4)

## 2022-02-07 NOTE — NURSING NOTE
Chief Complaint   Patient presents with     Blood Draw     Labs drawn via CVC by RN. Vitals taken.     Labs collected from CVC by RN, line flushed with saline and heparin.  Vitals taken. Pt checked in for appointment(s).    Martha Baca RN

## 2022-02-07 NOTE — NURSING NOTE
Neupogen 780mcg administered subcutaneously in left lower abdomen. Patient tolerated well. See MAR for details.       Brit Hollingsworth, CHAS on 2/7/2022 at 11:58 AM

## 2022-02-07 NOTE — LETTER
2/7/2022         RE: Libby Grimaldo  [address] [facility] [address]        Dear Colleague,    Thank you for referring your patient, Libby Grimaldo, to the [facility] BLOOD AND MARROW TRANSPLANT PROGRAM [place]. Please see a copy of my visit note below.    BMT/Cell Therapy Daily Progress Note      Patient ID:  Libby Grimaldo is a 71 year old female, currently day +7 of cytoxan chemo-priming.          INTERVAL  HISTORY   Feels overall ok, no new concerns. Increased fatigue today, just super tired. She is having diarrhea about 3x daily -  Managed with imodium few times per day. She has been nasueated, not feeling up to eating - taking zofran 4mg about BID. No emesis just struggling to eat and not feeling well. NO fevers or bleeding.        Review of Systems: 8 point ROS negative except as noted above.        PHYSICAL EXAM      KPS:  70     Blood pressure (!) 153/80, pulse 111, temperature 98.7  F (37.1  C), temperature source Oral, resp. rate 16, weight 63.6 kg (140 lb 3.2 oz), SpO2 96 %.     Wt Readings from Last 4 Encounters:   02/07/22 63.6 kg (140 lb 3.2 oz)   02/06/22 64.5 kg (142 lb 3.2 oz)   02/05/22 64.9 kg (143 lb)   02/04/22 68.5 kg (151 lb)     General: NAD  Dry mucous membranes  Eyes: GREGG, sclera anicteric   Lungs: few crackles on r base  Cardiovascular: RRR, no M/R/G   Abdominal/Rectal: +BS  Lymphatics: no edema  Skin: no rashes or petechiae  Neuro: A&O.   Additional Findings: Connelly site NT, no drainage.        LABS AND IMAGING: I have assessed all abnormal lab values for their clinical significance and any values considered clinically significant have been addressed in the assessment and plan.          Lab Results   Component Value Date    WBC 3.5 (L) 02/06/2022    HGB 9.6 (L) 02/06/2022    HCT 30.3 (L) 02/06/2022     (L) 02/06/2022     02/06/2022    POTASSIUM 3.5 02/06/2022    CHLORIDE 108 02/06/2022    CO2 31 02/06/2022     (H) 02/06/2022    BUN 10 02/06/2022     CR 0.69 02/06/2022    MAG 1.7 (L) 02/06/2022    INR 1.06 02/01/2022      SYSTEMS-BASED ASSESSMENT AND PLAN         Libby Grimaldo is a 71 year old female with multiple myeloma undergoing cytoxan chemopriming prior to stem cell collections. Day +7     1. BMT  - BMT doc/Coordinator: Dr. Julio C Guillory/Rashad Chen  - Cytoxan 2/1 in the evening. Stayed extra night d/t fluids ending at 11pm.  - Plan for allopurinol 300mg daily for at least 7 days.   - GCSF 10 mcg/kg starts day +5 (2/5/22), continue through stem cell collections.  - 2/7 Newly neutropenic- discussed neutropenic precautions.      2. HEME/COAG  - Transfusion parameters: hgb <7g/dL and plts <10,000  - Pancytopenia secondary to chemo/MM     3. ID: Afebrile- no respiratory symptoms.   - Discussed neutropenic precautions. No respiratory complaints.   CXR 2/4 Increased diffuse interstitial opacities and hazy right lower lobe opacity possibly representing pulmonary edema versus infection including atypical infections; hx of pneumonia/COPD. 2/4 given lasix.     - Recent hx of urinary tract infection s/p antibiotics.   - COVID neg 1/31     Prophy:  Viral: ACV BID   Bacterial: levofloxacin 250mg daily- start on discharge.   Fungal: fluconaozole- start on discharge.   PCP: Bactrim or other PCP prophy to start at day +28        4. GI/NUTRITION  - Anti-emetics:  Recommend increased zofran 4mg ODT to TID. Then take compazine as needed for nausea there after.   - Ulcer prophy: Protonix 40mg daily.     5. RENAL/  - Monitor Cr and electrolytes daily. Cr up slightly-- consider IVF if higher again tomorrow. Strongly encouraged increased fluid/oral intake and increased antinausea regimen today, 2/7.   - New hypokalemia - 3.4. Give 40meq Oral kdur today in clinic. F/u tomorrow.      6. Endo: hypothyroidism- continue levothyroxine     7. CARDS: On metoprolol for HTN; added amlodipine 5mg daily 2/2 but pressures still 160s-180s. Increased norvasc to 10mg daily- continue though  still hypertensive today.    Hyperlipidemia: crestor on hold with transplant/chemo     8. Neuro: Continue effexor   - Hx of multiple febrile seizures. Last occurred during college.      9. Pulm: COPD- remains on daily fluticasone and albuterol prn.      10. Cancer related pain: Remains on buprenorphine-naloxone and prn oxycodone     11. Musculoskeletal: history of frequent falls at home.  resume home PT      Has daily appt through 2/17  Follow up Mag from today, results pending    30 minutes spent on the date of the encounter doing chart review, history and exam, documentation and further activities per the note    Anna Naylor PA-C  [phone]          Again, thank you for allowing me to participate in the care of your patient.        Sincerely,        BMT Advanced Practice Provider

## 2022-02-07 NOTE — PROGRESS NOTES
BMT/Cell Therapy Daily Progress Note      Patient ID:  Libby Grimaldo is a 71 year old female, currently day +7 of cytoxan chemo-priming.          INTERVAL  HISTORY   Feels overall ok, no new concerns. Increased fatigue today, just super tired. She is having diarrhea about 3x daily -  Managed with imodium few times per day. She has been nasueated, not feeling up to eating - taking zofran 4mg about BID. No emesis just struggling to eat and not feeling well. NO fevers or bleeding.        Review of Systems: 8 point ROS negative except as noted above.        PHYSICAL EXAM      KPS:  70     Blood pressure (!) 153/80, pulse 111, temperature 98.7  F (37.1  C), temperature source Oral, resp. rate 16, weight 63.6 kg (140 lb 3.2 oz), SpO2 96 %.     Wt Readings from Last 4 Encounters:   02/07/22 63.6 kg (140 lb 3.2 oz)   02/06/22 64.5 kg (142 lb 3.2 oz)   02/05/22 64.9 kg (143 lb)   02/04/22 68.5 kg (151 lb)     General: NAD  Dry mucous membranes  Eyes: GREGG, sclera anicteric   Lungs: few crackles on r base  Cardiovascular: RRR, no M/R/G   Abdominal/Rectal: +BS  Lymphatics: no edema  Skin: no rashes or petechiae  Neuro: A&O.   Additional Findings: Connelly site NT, no drainage.        LABS AND IMAGING: I have assessed all abnormal lab values for their clinical significance and any values considered clinically significant have been addressed in the assessment and plan.          Lab Results   Component Value Date    WBC 3.5 (L) 02/06/2022    HGB 9.6 (L) 02/06/2022    HCT 30.3 (L) 02/06/2022     (L) 02/06/2022     02/06/2022    POTASSIUM 3.5 02/06/2022    CHLORIDE 108 02/06/2022    CO2 31 02/06/2022     (H) 02/06/2022    BUN 10 02/06/2022    CR 0.69 02/06/2022    MAG 1.7 (L) 02/06/2022    INR 1.06 02/01/2022      SYSTEMS-BASED ASSESSMENT AND PLAN         Libby Grimaldo is a 71 year old female with multiple myeloma undergoing cytoxan chemopriming prior to stem cell collections. Day +7     1. BMT  - BMT  doc/Coordinator: Dr. Julio C Guillory/Rashad Chen  - Cytoxan 2/1 in the evening. Stayed extra night d/t fluids ending at 11pm.  - Plan for allopurinol 300mg daily for at least 7 days.   - GCSF 10 mcg/kg starts day +5 (2/5/22), continue through stem cell collections.  - 2/7 Newly neutropenic- discussed neutropenic precautions.      2. HEME/COAG  - Transfusion parameters: hgb <7g/dL and plts <10,000  - Pancytopenia secondary to chemo/MM     3. ID: Afebrile- no respiratory symptoms.   - Discussed neutropenic precautions. No respiratory complaints.   CXR 2/4 Increased diffuse interstitial opacities and hazy right lower lobe opacity possibly representing pulmonary edema versus infection including atypical infections; hx of pneumonia/COPD. 2/4 given lasix.     - Recent hx of urinary tract infection s/p antibiotics.   - COVID neg 1/31     Prophy:  Viral: ACV BID   Bacterial: levofloxacin 250mg daily- start on discharge.   Fungal: fluconaozole- start on discharge.   PCP: Bactrim or other PCP prophy to start at day +28        4. GI/NUTRITION  - Anti-emetics:  Recommend increased zofran 4mg ODT to TID. Then take compazine as needed for nausea there after.   - Ulcer prophy: Protonix 40mg daily.     5. RENAL/  - Monitor Cr and electrolytes daily. Cr up slightly-- consider IVF if higher again tomorrow. Strongly encouraged increased fluid/oral intake and increased antinausea regimen today, 2/7.   - New hypokalemia - 3.4. Give 40meq Oral kdur today in clinic. F/u tomorrow.      6. Endo: hypothyroidism- continue levothyroxine     7. CARDS: On metoprolol for HTN; added amlodipine 5mg daily 2/2 but pressures still 160s-180s. Increased norvasc to 10mg daily- continue though still hypertensive today.    Hyperlipidemia: crestor on hold with transplant/chemo     8. Neuro: Continue effexor   - Hx of multiple febrile seizures. Last occurred during college.      9. Pulm: COPD- remains on daily fluticasone and albuterol prn.      10. Cancer  related pain: Remains on buprenorphine-naloxone and prn oxycodone     11. Musculoskeletal: history of frequent falls at home.  resume home PT      Has daily appt through 2/17  Follow up Mag from today, results pending    30 minutes spent on the date of the encounter doing chart review, history and exam, documentation and further activities per the note    Anna Naylor PA-C  [phone]

## 2022-02-07 NOTE — NURSING NOTE
"Oncology Rooming Note    February 7, 2022 11:28 AM   Libby Grimaldo is a 71 year old female who presents for:    Chief Complaint   Patient presents with     Blood Draw     Labs drawn via CVC by RN. Vitals taken.     Oncology Clinic Visit     Multiple myeloma     Initial Vitals: BP (!) 153/80 (BP Location: Right arm)   Pulse 111   Temp 98.7  F (37.1  C) (Oral)   Resp 16   Wt 63.6 kg (140 lb 3.2 oz)   SpO2 96%   BMI 25.64 kg/m   Estimated body mass index is 25.64 kg/m  as calculated from the following:    Height as of 2/1/22: 1.575 m (5' 2\").    Weight as of this encounter: 63.6 kg (140 lb 3.2 oz). Body surface area is 1.67 meters squared.  Moderate Pain (4) Comment: Data Unavailable   No LMP recorded. Patient is postmenopausal.  Allergies reviewed: Yes  Medications reviewed: Yes    Medications: Medication refills not needed today.  Pharmacy name entered into [employer]: [facility] #[id number] - [facility] [address] AT [facility] [address]    Clinical concerns: Wondering if she needs to be taking allopurinol again      Krista Alberto LPN            "

## 2022-02-08 ENCOUNTER — ONCOLOGY VISIT (OUTPATIENT)
Dept: TRANSPLANT | Facility: CLINIC | Age: 72
DRG: 152 | End: 2022-02-08
Attending: INTERNAL MEDICINE
Payer: MEDICARE

## 2022-02-08 ENCOUNTER — APPOINTMENT (OUTPATIENT)
Dept: LAB | Facility: CLINIC | Age: 72
DRG: 152 | End: 2022-02-08
Attending: FAMILY MEDICINE
Payer: MEDICARE

## 2022-02-08 VITALS
SYSTOLIC BLOOD PRESSURE: 128 MMHG | WEIGHT: 138 LBS | DIASTOLIC BLOOD PRESSURE: 71 MMHG | BODY MASS INDEX: 25.24 KG/M2 | OXYGEN SATURATION: 99 % | TEMPERATURE: 99.3 F | HEART RATE: 106 BPM | RESPIRATION RATE: 16 BRPM

## 2022-02-08 DIAGNOSIS — C90.00 MULTIPLE MYELOMA NOT HAVING ACHIEVED REMISSION (H): Primary | ICD-10-CM

## 2022-02-08 DIAGNOSIS — Z94.84 STEM CELLS TRANSPLANT STATUS (H): ICD-10-CM

## 2022-02-08 LAB
ANION GAP SERPL CALCULATED.3IONS-SCNC: 8 MMOL/L (ref 3–14)
BUN SERPL-MCNC: 10 MG/DL (ref 7–30)
CALCIUM SERPL-MCNC: 8.2 MG/DL (ref 8.5–10.1)
CHLORIDE BLD-SCNC: 104 MMOL/L (ref 94–109)
CO2 SERPL-SCNC: 27 MMOL/L (ref 20–32)
CREAT SERPL-MCNC: 0.82 MG/DL (ref 0.52–1.04)
ERYTHROCYTE [DISTWIDTH] IN BLOOD BY AUTOMATED COUNT: 13.2 % (ref 10–15)
GFR SERPL CREATININE-BSD FRML MDRD: 77 ML/MIN/1.73M2
GLUCOSE BLD-MCNC: 133 MG/DL (ref 70–99)
HCT VFR BLD AUTO: 29.2 % (ref 35–47)
HGB BLD-MCNC: 9.6 G/DL (ref 11.7–15.7)
MAGNESIUM SERPL-MCNC: 1.6 MG/DL (ref 1.8–2.6)
MCH RBC QN AUTO: 32.4 PG (ref 26.5–33)
MCHC RBC AUTO-ENTMCNC: 32.9 G/DL (ref 31.5–36.5)
MCV RBC AUTO: 99 FL (ref 78–100)
PLAT MORPH BLD: NORMAL
PLATELET # BLD AUTO: 54 10E3/UL (ref 150–450)
POTASSIUM BLD-SCNC: 3.7 MMOL/L (ref 3.4–5.3)
RBC # BLD AUTO: 2.96 10E6/UL (ref 3.8–5.2)
RBC MORPH BLD: NORMAL
SODIUM SERPL-SCNC: 139 MMOL/L (ref 133–144)
WBC # BLD AUTO: 0.1 10E3/UL (ref 4–11)

## 2022-02-08 PROCEDURE — 83735 ASSAY OF MAGNESIUM: CPT

## 2022-02-08 PROCEDURE — G0463 HOSPITAL OUTPT CLINIC VISIT: HCPCS

## 2022-02-08 PROCEDURE — 80048 BASIC METABOLIC PNL TOTAL CA: CPT

## 2022-02-08 PROCEDURE — 250N000011 HC RX IP 250 OP 636: Mod: JB | Performed by: PHYSICIAN ASSISTANT

## 2022-02-08 PROCEDURE — 96372 THER/PROPH/DIAG INJ SC/IM: CPT | Performed by: PHYSICIAN ASSISTANT

## 2022-02-08 PROCEDURE — 85027 COMPLETE CBC AUTOMATED: CPT

## 2022-02-08 PROCEDURE — 99214 OFFICE O/P EST MOD 30 MIN: CPT

## 2022-02-08 PROCEDURE — 36592 COLLECT BLOOD FROM PICC: CPT

## 2022-02-08 PROCEDURE — 250N000011 HC RX IP 250 OP 636: Performed by: INTERNAL MEDICINE

## 2022-02-08 RX ORDER — HEPARIN SODIUM,PORCINE 10 UNIT/ML
5 VIAL (ML) INTRAVENOUS
Status: DISCONTINUED | OUTPATIENT
Start: 2022-02-08 | End: 2022-02-08 | Stop reason: HOSPADM

## 2022-02-08 RX ORDER — HEPARIN SODIUM (PORCINE) LOCK FLUSH IV SOLN 100 UNIT/ML 100 UNIT/ML
5 SOLUTION INTRAVENOUS
Status: CANCELLED | OUTPATIENT
Start: 2022-02-09

## 2022-02-08 RX ORDER — HEPARIN SODIUM,PORCINE 10 UNIT/ML
5 VIAL (ML) INTRAVENOUS
Status: CANCELLED | OUTPATIENT
Start: 2022-02-09

## 2022-02-08 RX ADMIN — FILGRASTIM 780 MCG: 480 INJECTION, SOLUTION INTRAVENOUS; SUBCUTANEOUS at 12:50

## 2022-02-08 RX ADMIN — Medication 5 ML: at 11:38

## 2022-02-08 ASSESSMENT — PAIN SCALES - GENERAL: PAINLEVEL: MODERATE PAIN (4)

## 2022-02-08 NOTE — NURSING NOTE
Chief Complaint   Patient presents with     Blood Draw     cvc labs drawn, heparin locked, vitals taken, checked into next appt     /71 (BP Location: Left arm, Patient Position: Sitting, Cuff Size: Adult Regular)   Pulse 106   Temp 99.3  F (37.4  C) (Oral)   Resp 16   Wt 62.6 kg (138 lb)   SpO2 99%   BMI 25.24 kg/m       Jace Don RN on 2/8/2022 at 11:24 AM

## 2022-02-08 NOTE — NURSING NOTE
"Oncology Rooming Note    February 8, 2022 12:00 PM   Libby Grimaldo is a 71 year old female who presents for:    Chief Complaint   Patient presents with     Blood Draw     cvc labs drawn, caps changed, heparin locked, vitals taken, checked into next appt     RECHECK     Pt is here for a rtn for Pre BMT for MM      Initial Vitals: Blood Pressure 128/71 (BP Location: Left arm, Patient Position: Sitting, Cuff Size: Adult Regular)   Pulse 106   Temperature 99.3  F (37.4  C) (Oral)   Respiration 16   Weight 62.6 kg (138 lb)   Oxygen Saturation 99%   Body Mass Index 25.24 kg/m   Estimated body mass index is 25.24 kg/m  as calculated from the following:    Height as of 2/1/22: 1.575 m (5' 2\").    Weight as of this encounter: 62.6 kg (138 lb). Body surface area is 1.65 meters squared.  Moderate Pain (4) Comment: 0-4 hips   No LMP recorded. Patient is postmenopausal.  Allergies reviewed: Yes  Medications reviewed: Yes    Medications: Medication refills not needed today.  Pharmacy name entered into [employer]: [facility] #[id number] - [place], [place] - [address] AT [facility] [address]    Clinical concerns: none       Jeanine Weaver MA            "

## 2022-02-08 NOTE — LETTER
2/8/2022         RE: Libby Grimaldo  [address] [address]        Dear Colleague,    Thank you for referring your patient, Libby Grimaldo, to the [facility] BLOOD AND MARROW TRANSPLANT PROGRAM [place]. Please see a copy of my visit note below.    BMT/Cell Therapy Daily Progress Note      Patient ID:  Libby Grimaldo is a 71 year old female, currently day +8 of cytoxan chemo-priming.          INTERVAL  HISTORY   Feels overall ok, no new concerns.  NO fevers or bleeding.  Feels tired.  No new complaints today.  Again reviewed next steps.       Review of Systems: 8 point ROS negative except as noted above.        PHYSICAL EXAM      KPS:  70     Blood pressure 128/71, pulse 106, temperature 99.3  F (37.4  C), temperature source Oral, resp. rate 16, weight 62.6 kg (138 lb), SpO2 99 %.     Wt Readings from Last 4 Encounters:   02/07/22 63.6 kg (140 lb 3.2 oz)   02/06/22 64.5 kg (142 lb 3.2 oz)   02/05/22 64.9 kg (143 lb)   02/04/22 68.5 kg (151 lb)     General: NAD  Dry mucous membranes  Eyes: GREGG, sclera anicteric   Lungs: few crackles on r base  Cardiovascular: RRR, no M/R/G   Abdominal/Rectal: +BS  Lymphatics: no edema  Skin: no rashes or petechiae  Neuro: A&O.   Additional Findings: Connelly site NT, no drainage.        LABS AND IMAGING: I have assessed all abnormal lab values for their clinical significance and any values considered clinically significant have been addressed in the assessment and plan.          Lab Results   Component Value Date    WBC 0.3 (LL) 02/07/2022    HGB 10.7 (L) 02/07/2022    HCT 33.6 (L) 02/07/2022    PLT 91 (L) 02/07/2022     02/07/2022    POTASSIUM 3.4 02/07/2022    CHLORIDE 104 02/07/2022    CO2 29 02/07/2022     (H) 02/07/2022    BUN 8 02/07/2022    CR 0.75 02/07/2022    MAG 1.7 (L) 02/06/2022    INR 1.06 02/01/2022      SYSTEMS-BASED ASSESSMENT AND PLAN         Libby ARRIETA Dillan is a 71 year old female with multiple myeloma undergoing cytoxan  chemopriming prior to stem cell collections. Day +8     1. BMT  - BMT doc/Coordinator: Dr. Julio C Guillory/Rashad Chen  - Cytoxan 2/1 in the evening. Stayed extra night d/t fluids ending at 11pm.  - Plan for allopurinol 300mg daily for at least 7 days.   - GCSF 10 mcg/kg starts day +5 (2/5/22), continue through stem cell collections.  - 2/7 Newly neutropenic- discussed neutropenic precautions.      2. HEME/COAG  - Transfusion parameters: hgb <7g/dL and plts <10,000  - Pancytopenia secondary to chemo/MM     3. ID: Afebrile  - Discussed neutropenic precautions.   CXR 2/4 Increased diffuse interstitial opacities and hazy right lower lobe opacity possibly representing pulmonary edema versus infection including atypical infections; hx of pneumonia/COPD. 2/4 given lasix.     - Recent hx of urinary tract infection s/p antibiotics.   - COVID neg 1/31     Prophy:  Viral: ACV BID   Bacterial: levofloxacin 250mg daily- start on discharge.   Fungal: fluconaozole- start on discharge.   PCP: Bactrim or other PCP prophy to start at day +28        4. GI/NUTRITION  - Anti-emetics:  Recommend increased zofran 4mg ODT to TID. Then take compazine as needed for nausea there after.   - Ulcer prophy: Protonix 40mg daily.     5. RENAL/  - Monitor Cr and electrolytes daily. Cr up slightly-- consider IVF if higher again tomorrow. Strongly encouraged increased fluid/oral intake and increased antinausea regimen today, 2/7.   - New hypokalemia - 3.4. Give 40meq Oral kdur today in clinic. F/u tomorrow.      6. Endo: hypothyroidism- continue levothyroxine     7. CARDS: On metoprolol for HTN; added amlodipine 5mg daily 2/2 but pressures still 160s-180s. Increased norvasc to 10mg daily- continue though still hypertensive today.    Hyperlipidemia: crestor on hold with transplant/chemo     8. Neuro: Continue effexor   - Hx of multiple febrile seizures. Last occurred during college.      9. Pulm: COPD- remains on daily fluticasone and albuterol prn.       10. Cancer related pain: Remains on buprenorphine-naloxone and prn oxycodone     11. Musculoskeletal: history of frequent falls at home.  resume home PT        RTC daily  Has daily appt through 2/17      30 minutes spent on the date of the encounter doing chart review, history and exam, documentation and further activities per the note    Sarita Jimenez pa-c  [phone]          Again, thank you for allowing me to participate in the care of your patient.      Sincerely,    BMT Advanced Practice Provider

## 2022-02-08 NOTE — PROGRESS NOTES
BMT/Cell Therapy Daily Progress Note      Patient ID:  Libby Grimaldo is a 71 year old female, currently day +8 of cytoxan chemo-priming.          INTERVAL  HISTORY   Feels overall ok, no new concerns.  NO fevers or bleeding.  Feels tired.  No new complaints today.  Again reviewed next steps.       Review of Systems: 8 point ROS negative except as noted above.        PHYSICAL EXAM      KPS:  70     Blood pressure 128/71, pulse 106, temperature 99.3  F (37.4  C), temperature source Oral, resp. rate 16, weight 62.6 kg (138 lb), SpO2 99 %.     Wt Readings from Last 4 Encounters:   02/07/22 63.6 kg (140 lb 3.2 oz)   02/06/22 64.5 kg (142 lb 3.2 oz)   02/05/22 64.9 kg (143 lb)   02/04/22 68.5 kg (151 lb)     General: NAD  Dry mucous membranes  Eyes: GREGG, sclera anicteric   Lungs: few crackles on r base  Cardiovascular: RRR, no M/R/G   Abdominal/Rectal: +BS  Lymphatics: no edema  Skin: no rashes or petechiae  Neuro: A&O.   Additional Findings: Connelly site NT, no drainage.        LABS AND IMAGING: I have assessed all abnormal lab values for their clinical significance and any values considered clinically significant have been addressed in the assessment and plan.          Lab Results   Component Value Date    WBC 0.3 (LL) 02/07/2022    HGB 10.7 (L) 02/07/2022    HCT 33.6 (L) 02/07/2022    PLT 91 (L) 02/07/2022     02/07/2022    POTASSIUM 3.4 02/07/2022    CHLORIDE 104 02/07/2022    CO2 29 02/07/2022     (H) 02/07/2022    BUN 8 02/07/2022    CR 0.75 02/07/2022    MAG 1.7 (L) 02/06/2022    INR 1.06 02/01/2022      SYSTEMS-BASED ASSESSMENT AND PLAN         Libby Grimaldo is a 71 year old female with multiple myeloma undergoing cytoxan chemopriming prior to stem cell collections. Day +8     1. BMT  - BMT doc/Coordinator: Dr. Julio C Guillory/Rashad Chen  - Cytoxan 2/1 in the evening. Stayed extra night d/t fluids ending at 11pm.  - Plan for allopurinol 300mg daily for at least 7 days.   - GCSF 10 mcg/kg starts day +5  (2/5/22), continue through stem cell collections.  - 2/7 Newly neutropenic- discussed neutropenic precautions.      2. HEME/COAG  - Transfusion parameters: hgb <7g/dL and plts <10,000  - Pancytopenia secondary to chemo/MM     3. ID: Afebrile  - Discussed neutropenic precautions.   CXR 2/4 Increased diffuse interstitial opacities and hazy right lower lobe opacity possibly representing pulmonary edema versus infection including atypical infections; hx of pneumonia/COPD. 2/4 given lasix.     - Recent hx of urinary tract infection s/p antibiotics.   - COVID neg 1/31     Prophy:  Viral: ACV BID   Bacterial: levofloxacin 250mg daily- start on discharge.   Fungal: fluconaozole- start on discharge.   PCP: Bactrim or other PCP prophy to start at day +28        4. GI/NUTRITION  - Anti-emetics:  Recommend increased zofran 4mg ODT to TID. Then take compazine as needed for nausea there after.   - Ulcer prophy: Protonix 40mg daily.     5. RENAL/  - Monitor Cr and electrolytes daily. Cr up slightly-- consider IVF if higher again tomorrow. Strongly encouraged increased fluid/oral intake and increased antinausea regimen today, 2/7.   - New hypokalemia - 3.4. Give 40meq Oral kdur today in clinic. F/u tomorrow.      6. Endo: hypothyroidism- continue levothyroxine     7. CARDS: On metoprolol for HTN; added amlodipine 5mg daily 2/2 but pressures still 160s-180s. Increased norvasc to 10mg daily- continue though still hypertensive today.    Hyperlipidemia: crestor on hold with transplant/chemo     8. Neuro: Continue effexor   - Hx of multiple febrile seizures. Last occurred during college.      9. Pulm: COPD- remains on daily fluticasone and albuterol prn.      10. Cancer related pain: Remains on buprenorphine-naloxone and prn oxycodone     11. Musculoskeletal: history of frequent falls at home.  resume home PT        RTC daily  Has daily appt through 2/17      30 minutes spent on the date of the encounter doing chart review, history  and exam, documentation and further activities per the note    Sarita Jimenez pa-c  [phone]

## 2022-02-08 NOTE — NURSING NOTE
Neupogen given in left arm in clinic, pt tolerated. See MAR for details.    CVC dressing change done in clinic, pt tolerated. See flowsheet for additional details.    Tavia Linton CMA on 2/8/2022 at 1:20 PM

## 2022-02-09 ENCOUNTER — ONCOLOGY VISIT (OUTPATIENT)
Dept: TRANSPLANT | Facility: CLINIC | Age: 72
DRG: 152 | End: 2022-02-09
Attending: INTERNAL MEDICINE
Payer: MEDICARE

## 2022-02-09 ENCOUNTER — APPOINTMENT (OUTPATIENT)
Dept: LAB | Facility: CLINIC | Age: 72
DRG: 152 | End: 2022-02-09
Attending: FAMILY MEDICINE
Payer: MEDICARE

## 2022-02-09 VITALS
WEIGHT: 138.3 LBS | TEMPERATURE: 99 F | OXYGEN SATURATION: 100 % | SYSTOLIC BLOOD PRESSURE: 110 MMHG | RESPIRATION RATE: 16 BRPM | DIASTOLIC BLOOD PRESSURE: 63 MMHG | BODY MASS INDEX: 25.3 KG/M2 | HEART RATE: 117 BPM

## 2022-02-09 DIAGNOSIS — C90.00 MULTIPLE MYELOMA NOT HAVING ACHIEVED REMISSION (H): Primary | ICD-10-CM

## 2022-02-09 DIAGNOSIS — Z94.84 STEM CELLS TRANSPLANT STATUS (H): ICD-10-CM

## 2022-02-09 LAB
ANION GAP SERPL CALCULATED.3IONS-SCNC: 6 MMOL/L (ref 3–14)
BUN SERPL-MCNC: 14 MG/DL (ref 7–30)
CALCIUM SERPL-MCNC: 8.3 MG/DL (ref 8.5–10.1)
CHLORIDE BLD-SCNC: 104 MMOL/L (ref 94–109)
CO2 SERPL-SCNC: 27 MMOL/L (ref 20–32)
CREAT SERPL-MCNC: 0.92 MG/DL (ref 0.52–1.04)
ERYTHROCYTE [DISTWIDTH] IN BLOOD BY AUTOMATED COUNT: 13.2 % (ref 10–15)
GFR SERPL CREATININE-BSD FRML MDRD: 67 ML/MIN/1.73M2
GLUCOSE BLD-MCNC: 116 MG/DL (ref 70–99)
HCT VFR BLD AUTO: 29 % (ref 35–47)
HGB BLD-MCNC: 9.3 G/DL (ref 11.7–15.7)
MCH RBC QN AUTO: 31.5 PG (ref 26.5–33)
MCHC RBC AUTO-ENTMCNC: 32.1 G/DL (ref 31.5–36.5)
MCV RBC AUTO: 98 FL (ref 78–100)
PLATELET # BLD AUTO: 36 10E3/UL (ref 150–450)
POTASSIUM BLD-SCNC: 3.8 MMOL/L (ref 3.4–5.3)
RBC # BLD AUTO: 2.95 10E6/UL (ref 3.8–5.2)
SODIUM SERPL-SCNC: 137 MMOL/L (ref 133–144)
WBC # BLD AUTO: 0.1 10E3/UL (ref 4–11)

## 2022-02-09 PROCEDURE — G0463 HOSPITAL OUTPT CLINIC VISIT: HCPCS | Mod: 25

## 2022-02-09 PROCEDURE — 82310 ASSAY OF CALCIUM: CPT

## 2022-02-09 PROCEDURE — 36592 COLLECT BLOOD FROM PICC: CPT

## 2022-02-09 PROCEDURE — 96372 THER/PROPH/DIAG INJ SC/IM: CPT | Performed by: PHYSICIAN ASSISTANT

## 2022-02-09 PROCEDURE — 250N000011 HC RX IP 250 OP 636: Performed by: INTERNAL MEDICINE

## 2022-02-09 PROCEDURE — G0463 HOSPITAL OUTPT CLINIC VISIT: HCPCS

## 2022-02-09 PROCEDURE — 85027 COMPLETE CBC AUTOMATED: CPT

## 2022-02-09 PROCEDURE — 250N000011 HC RX IP 250 OP 636: Performed by: PHYSICIAN ASSISTANT

## 2022-02-09 PROCEDURE — 99215 OFFICE O/P EST HI 40 MIN: CPT | Mod: 25

## 2022-02-09 RX ORDER — HEPARIN SODIUM (PORCINE) LOCK FLUSH IV SOLN 100 UNIT/ML 100 UNIT/ML
5 SOLUTION INTRAVENOUS
Status: CANCELLED | OUTPATIENT
Start: 2022-02-10

## 2022-02-09 RX ORDER — PANTOPRAZOLE SODIUM 40 MG/1
40 TABLET, DELAYED RELEASE ORAL DAILY
Qty: 30 TABLET | Refills: 1 | Status: ON HOLD | OUTPATIENT
Start: 2022-02-09 | End: 2022-05-30

## 2022-02-09 RX ORDER — ASPIRIN 81 MG/1
TABLET, CHEWABLE ORAL
Status: ON HOLD | COMMUNITY
Start: 2022-02-09 | End: 2022-05-26

## 2022-02-09 RX ORDER — HEPARIN SODIUM,PORCINE 10 UNIT/ML
5 VIAL (ML) INTRAVENOUS
Status: CANCELLED | OUTPATIENT
Start: 2022-02-10

## 2022-02-09 RX ORDER — HEPARIN SODIUM,PORCINE 10 UNIT/ML
3 VIAL (ML) INTRAVENOUS ONCE
Status: COMPLETED | OUTPATIENT
Start: 2022-02-09 | End: 2022-02-09

## 2022-02-09 RX ORDER — AMLODIPINE BESYLATE 5 MG/1
5 TABLET ORAL DAILY
Status: ON HOLD | COMMUNITY
Start: 2022-02-09 | End: 2022-05-30

## 2022-02-09 RX ADMIN — FILGRASTIM 780 MCG: 480 INJECTION, SOLUTION INTRAVENOUS; SUBCUTANEOUS at 12:27

## 2022-02-09 RX ADMIN — Medication 3 ML: at 11:02

## 2022-02-09 ASSESSMENT — PAIN SCALES - GENERAL: PAINLEVEL: MODERATE PAIN (5)

## 2022-02-09 NOTE — NURSING NOTE
"Oncology Rooming Note    February 9, 2022 11:23 AM   Libby Grimaldo is a 71 year old female who presents for:    Chief Complaint   Patient presents with     Blood Draw     Labs drawn via CVC by RN.  Vitals taken.     Oncology Clinic Visit     multiple myeloma     Initial Vitals: /63 (BP Location: Right arm)   Pulse 117   Temp 99  F (37.2  C) (Oral)   Resp 16   Wt 62.7 kg (138 lb 4.8 oz)   SpO2 100%   BMI 25.30 kg/m   Estimated body mass index is 25.3 kg/m  as calculated from the following:    Height as of 2/1/22: 1.575 m (5' 2\").    Weight as of this encounter: 62.7 kg (138 lb 4.8 oz). Body surface area is 1.66 meters squared.  Moderate Pain (5) Comment: Data Unavailable   No LMP recorded. Patient is postmenopausal.  Allergies reviewed: Yes  Medications reviewed: Yes    Medications: MEDICATION REFILLS NEEDED TODAY. Provider was notified.   ALLOPURINOL    Pharmacy name entered into [employer]: [id number] - [facility] [address] [place] [facility] [address]    Clinical concerns: NONE        Troy Scales            "

## 2022-02-09 NOTE — NURSING NOTE
NEUPOGEN administered in right arm.    Patient tolerated well and had no issues.    Osman Langley LPN

## 2022-02-09 NOTE — PROGRESS NOTES
This is a recent snapshot of the patient's [facility] medical record.  For current drug dose and complete information and questions, call [phone] or In Basket pool, [facility] home infusion ([id number])  CSN Number:  [id number]

## 2022-02-09 NOTE — PROGRESS NOTES
BMT Daily Progress Note      ID: Libby Grimaldo is a 70 yo woman D+9 s/p Cytoxan chemo priming prior to planned auto PSBCT for MM      INTERVAL  HISTORY   Here for follow-up with her sister. She has some nausea managed with anti-emetics before meals. No vomiting. Hasn't picked up Zofran ODT yet. Some loose stools a couple days ago so has been taking Imodium daily but now no bm recently. No fevers or bleeding. Chronic back ache, a little worse with GCSF. Occasional HA. Some reflux.      Review of Systems: 8 point ROS negative except as noted above.        PHYSICAL EXAM      KPS:  70  Blood pressure 110/63, pulse 117, temperature 99  F (37.2  C), temperature source Oral, resp. rate 16, weight 62.7 kg (138 lb 4.8 oz), SpO2 100 %.    General: NAD  OP: no ulcerations  Eyes: sclera anicteric   Lungs: faint bibasilar crackles R>L  Cardiovascular: RRR, no M/R/G   Abdominal/Rectal: +BS, soft, NT/ND  Lymphatics: no edema  Skin: no rash  Neuro: non-focal   Access: L chest CVC site NT, no drainage.     Labs:    Lab Results   Component Value Date    WBC 0.1 (LL) 02/09/2022    HGB 9.3 (L) 02/09/2022    HCT 29.0 (L) 02/09/2022    PLT 36 (LL) 02/09/2022     02/09/2022    POTASSIUM 3.8 02/09/2022    CHLORIDE 104 02/09/2022    CO2 27 02/09/2022     (H) 02/09/2022    BUN 14 02/09/2022    CR 0.92 02/09/2022    MAG 1.6 (L) 02/08/2022    INR 1.06 02/01/2022    BILITOTAL 0.3 02/01/2022    AST 23 02/01/2022    ALT 17 02/01/2022    ALKPHOS 140 02/01/2022    PROTTOTAL 5.4 (L) 02/01/2022    ALBUMIN 2.5 (L) 02/01/2022            SYSTEMS-BASED ASSESSMENT AND PLAN   Libby Grimaldo is a 70 yo woman D+9 s/p Cytoxan chemo priming prior to planned auto PSBCT for MM        1. BMT  - BMT MD/Coordinator: Dr. Julio C Guillory/Rahsad Chen  - Cytoxan 2/1  - Plan for allopurinol 300mg daily for at least 7 days.   - GCSF 10 mcg/kg started day +5 (2/5/22), continue through stem cell collections.     2. HEME/COAG  - Transfusion parameters: hgb <7g/dL  and plts <10,000  - Pancytopenia secondary to chemo     3. ID: Afebrile  - Discussed neutropenic precautions.   CXR 2/4 Increased diffuse interstitial opacities and hazy right lower lobe opacity possibly representing pulmonary edema versus infection including atypical infections; hx of pneumonia/COPD. 2/4 given lasix. No cough. Faint bibasilar crackles.  - Recent hx of urinary tract infection s/p antibiotics.   - COVID neg 1/31     #Prophy: ACV, Levaquin, fluconazole.       4. GI: Reflux, ELADIO  - pt not on PPI (previous notes are incorrect). Rx Protonix 40mg/d (2/9); prn Tums.  - Anti-emetics:  Zofran 2-3x/d (reminded to  ODT Rx sent 2/5). Compazine prn.      5. RENAL/  - loose stools: instructed to stop taking Imodium (has been taking daily); stool cx if diarrhea.  - Monitor Cr and electrolytes daily. Cr up slightly-- consider IVF if higher again tomorrow. Strongly encouraged increased fluid/oral intake and discussed nausea regimen.     6. Endo: hypothyroidism- continue levothyroxine     7. CARDS: On metoprolol for HTN; added amlodipine 5mg daily 2/2 but pressures still 160s-180s. Increased norvasc to 10mg daily- but now  ; decrease back to 5mg/d (starting 2/10). Instructed to hold Norvasc if wakes up dizzy.     Hyperlipidemia: Lipitor (Per notes this is on hold but still on med list). Probably ok to continue for now.  ASA: hold x2/9 with thrombocytopenia     8. Neuro: Continue effexor   - Hx of multiple febrile seizures. Last occurred during college.      9. Pulm: COPD- remains on daily fluticasone and albuterol prn.      10. Cancer related pain: Remains on buprenorphine-naloxone and prn oxycodone. Claritin for GCSF-related pain exacerbation.     11. Musculoskeletal: history of frequent falls at home.  resume home PT      Final Plan:  Start Protonix  Hold ASA while thrombocytopenic.  Decrease Norvasc to 5mg/d; hold if dizzy (also on metop XL)  Med list updated/printed/reviewed with pt/sister.  RTC  daily for labs, follow-up; possible IVF tomorrow  Has daily appt through 2/17      40 minutes spent on the date of the encounter doing chart review, history and exam, documentation and further activities per the note    Stephie Ayers PA-C  [phone]

## 2022-02-09 NOTE — LETTER
2/9/2022         RE: Libby Grimaldo  [address]  [place] [address]        Dear Colleague,    Thank you for referring your patient, Libby Grimaldo, to the [facility] BLOOD AND MARROW TRANSPLANT PROGRAM [place]. Please see a copy of my visit note below.    BMT Daily Progress Note      ID: Libby Grimaldo is a 70 yo woman D+9 s/p Cytoxan chemo priming prior to planned auto PSBCT for MM      INTERVAL  HISTORY   Here for follow-up with her sister. She has some nausea managed with anti-emetics before meals. No vomiting. Hasn't picked up Zofran ODT yet. Some loose stools a couple days ago so has been taking Imodium daily but now no bm recently. No fevers or bleeding. Chronic back ache, a little worse with GCSF. Occasional HA. Some reflux.      Review of Systems: 8 point ROS negative except as noted above.        PHYSICAL EXAM      KPS:  70  Blood pressure 110/63, pulse 117, temperature 99  F (37.2  C), temperature source Oral, resp. rate 16, weight 62.7 kg (138 lb 4.8 oz), SpO2 100 %.    General: NAD  OP: no ulcerations  Eyes: sclera anicteric   Lungs: faint bibasilar crackles R>L  Cardiovascular: RRR, no M/R/G   Abdominal/Rectal: +BS, soft, NT/ND  Lymphatics: no edema  Skin: no rash  Neuro: non-focal   Access: L chest CVC site NT, no drainage.     Labs:    Lab Results   Component Value Date    WBC 0.1 (LL) 02/09/2022    HGB 9.3 (L) 02/09/2022    HCT 29.0 (L) 02/09/2022    PLT 36 (LL) 02/09/2022     02/09/2022    POTASSIUM 3.8 02/09/2022    CHLORIDE 104 02/09/2022    CO2 27 02/09/2022     (H) 02/09/2022    BUN 14 02/09/2022    CR 0.92 02/09/2022    MAG 1.6 (L) 02/08/2022    INR 1.06 02/01/2022    BILITOTAL 0.3 02/01/2022    AST 23 02/01/2022    ALT 17 02/01/2022    ALKPHOS 140 02/01/2022    PROTTOTAL 5.4 (L) 02/01/2022    ALBUMIN 2.5 (L) 02/01/2022            SYSTEMS-BASED ASSESSMENT AND PLAN   Libby Grimaldo is a 70 yo woman D+9 s/p Cytoxan chemo priming prior to planned auto PSBCT for MM         1. BMT  - BMT MD/Coordinator: Dr. Julio C Guillory/Rashad Chen  - Cytoxan 2/1  - Plan for allopurinol 300mg daily for at least 7 days.   - GCSF 10 mcg/kg started day +5 (2/5/22), continue through stem cell collections.     2. HEME/COAG  - Transfusion parameters: hgb <7g/dL and plts <10,000  - Pancytopenia secondary to chemo     3. ID: Afebrile  - Discussed neutropenic precautions.   CXR 2/4 Increased diffuse interstitial opacities and hazy right lower lobe opacity possibly representing pulmonary edema versus infection including atypical infections; hx of pneumonia/COPD. 2/4 given lasix. No cough. Faint bibasilar crackles.  - Recent hx of urinary tract infection s/p antibiotics.   - COVID neg 1/31     #Prophy: ACV, Levaquin, fluconazole.       4. GI: Reflux, ELADIO  - pt not on PPI (previous notes are incorrect). Rx Protonix 40mg/d (2/9); prn Tums.  - Anti-emetics:  Zofran 2-3x/d (reminded to  ODT Rx sent 2/5). Compazine prn.      5. RENAL/  - loose stools: instructed to stop taking Imodium (has been taking daily); stool cx if diarrhea.  - Monitor Cr and electrolytes daily. Cr up slightly-- consider IVF if higher again tomorrow. Strongly encouraged increased fluid/oral intake and discussed nausea regimen.     6. Endo: hypothyroidism- continue levothyroxine     7. CARDS: On metoprolol for HTN; added amlodipine 5mg daily 2/2 but pressures still 160s-180s. Increased norvasc to 10mg daily- but now  ; decrease back to 5mg/d (starting 2/10). Instructed to hold Norvasc if wakes up dizzy.     Hyperlipidemia: Lipitor (Per notes this is on hold but still on med list). Probably ok to continue for now.  ASA: hold x2/9 with thrombocytopenia     8. Neuro: Continue effexor   - Hx of multiple febrile seizures. Last occurred during college.      9. Pulm: COPD- remains on daily fluticasone and albuterol prn.      10. Cancer related pain: Remains on buprenorphine-naloxone and prn oxycodone. Claritin for GCSF-related  pain exacerbation.     11. Musculoskeletal: history of frequent falls at home.  resume home PT      Final Plan:  Start Protonix  Hold ASA while thrombocytopenic.  Decrease Norvasc to 5mg/d; hold if dizzy (also on metop XL)  Med list updated/printed/reviewed with pt/sister.  RTC daily for labs, follow-up; possible IVF tomorrow  Has daily appt through 2/17      40 minutes spent on the date of the encounter doing chart review, history and exam, documentation and further activities per the note    Stephie Ayers PA-C  [phone]        Again, thank you for allowing me to participate in the care of your patient.      Sincerely,    BMT Advanced Practice Provider

## 2022-02-10 ENCOUNTER — APPOINTMENT (OUTPATIENT)
Dept: GENERAL RADIOLOGY | Facility: CLINIC | Age: 72
DRG: 152 | End: 2022-02-10
Attending: EMERGENCY MEDICINE
Payer: MEDICARE

## 2022-02-10 ENCOUNTER — HOSPITAL ENCOUNTER (INPATIENT)
Facility: CLINIC | Age: 72
LOS: 16 days | Discharge: HOME | DRG: 152 | End: 2022-02-26
Attending: EMERGENCY MEDICINE | Admitting: STUDENT IN AN ORGANIZED HEALTH CARE EDUCATION/TRAINING PROGRAM
Payer: MEDICARE

## 2022-02-10 DIAGNOSIS — D70.9 NEUTROPENIC FEVER (H): ICD-10-CM

## 2022-02-10 DIAGNOSIS — C90.00 MULTIPLE MYELOMA NOT HAVING ACHIEVED REMISSION (H): Primary | ICD-10-CM

## 2022-02-10 DIAGNOSIS — Z51.11 ADMISSION FOR CHEMOTHERAPY: ICD-10-CM

## 2022-02-10 DIAGNOSIS — Z20.822 CONTACT WITH AND (SUSPECTED) EXPOSURE TO COVID-19: ICD-10-CM

## 2022-02-10 DIAGNOSIS — A41.9 SEPSIS, DUE TO UNSPECIFIED ORGANISM, UNSPECIFIED WHETHER ACUTE ORGAN DYSFUNCTION PRESENT (H): ICD-10-CM

## 2022-02-10 DIAGNOSIS — Z94.84 STEM CELLS TRANSPLANT STATUS (H): ICD-10-CM

## 2022-02-10 DIAGNOSIS — D61.810 ANTINEOPLASTIC CHEMOTHERAPY INDUCED PANCYTOPENIA (CODE) (H): ICD-10-CM

## 2022-02-10 DIAGNOSIS — G93.40 ENCEPHALOPATHY, UNSPECIFIED: ICD-10-CM

## 2022-02-10 DIAGNOSIS — R50.81 NEUTROPENIC FEVER (H): ICD-10-CM

## 2022-02-10 LAB
ABO/RH(D): NORMAL
ALBUMIN SERPL-MCNC: 2.7 G/DL (ref 3.4–5)
ALBUMIN UR-MCNC: NEGATIVE MG/DL
ALP SERPL-CCNC: 158 U/L (ref 40–150)
ALT SERPL W P-5'-P-CCNC: 15 U/L (ref 0–50)
ANION GAP SERPL CALCULATED.3IONS-SCNC: 8 MMOL/L (ref 3–14)
ANTIBODY SCREEN, TUBE: NORMAL
ANTIBODY SCREEN: POSITIVE
APPEARANCE UR: CLEAR
AST SERPL W P-5'-P-CCNC: 17 U/L (ref 0–45)
BILIRUB SERPL-MCNC: 1.2 MG/DL (ref 0.2–1.3)
BILIRUB UR QL STRIP: NEGATIVE
BUN SERPL-MCNC: 17 MG/DL (ref 7–30)
C PNEUM DNA SPEC QL NAA+PROBE: NOT DETECTED
CALCIUM SERPL-MCNC: 8.3 MG/DL (ref 8.5–10.1)
CHLORIDE BLD-SCNC: 100 MMOL/L (ref 94–109)
CO2 SERPL-SCNC: 27 MMOL/L (ref 20–32)
COLOR UR AUTO: NORMAL
CREAT SERPL-MCNC: 0.81 MG/DL (ref 0.52–1.04)
ERYTHROCYTE [DISTWIDTH] IN BLOOD BY AUTOMATED COUNT: 13.1 % (ref 10–15)
FLUAV H1 2009 PAND RNA SPEC QL NAA+PROBE: NOT DETECTED
FLUAV H1 RNA SPEC QL NAA+PROBE: NOT DETECTED
FLUAV H3 RNA SPEC QL NAA+PROBE: NOT DETECTED
FLUAV RNA SPEC QL NAA+PROBE: NOT DETECTED
FLUBV RNA SPEC QL NAA+PROBE: NOT DETECTED
GFR SERPL CREATININE-BSD FRML MDRD: 78 ML/MIN/1.73M2
GLUCOSE BLD-MCNC: 112 MG/DL (ref 70–99)
GLUCOSE UR STRIP-MCNC: NEGATIVE MG/DL
HADV DNA SPEC QL NAA+PROBE: NOT DETECTED
HCOV PNL SPEC NAA+PROBE: NOT DETECTED
HCT VFR BLD AUTO: 28.2 % (ref 35–47)
HGB BLD-MCNC: 9.1 G/DL (ref 11.7–15.7)
HGB UR QL STRIP: NEGATIVE
HMPV RNA SPEC QL NAA+PROBE: NOT DETECTED
HOLD SPECIMEN: NORMAL
HPIV1 RNA SPEC QL NAA+PROBE: NOT DETECTED
HPIV2 RNA SPEC QL NAA+PROBE: NOT DETECTED
HPIV3 RNA SPEC QL NAA+PROBE: NOT DETECTED
HPIV4 RNA SPEC QL NAA+PROBE: NOT DETECTED
KETONES UR STRIP-MCNC: NEGATIVE MG/DL
LACTATE SERPL-SCNC: 1.1 MMOL/L (ref 0.7–2)
LEUKOCYTE ESTERASE UR QL STRIP: NEGATIVE
M PNEUMO DNA SPEC QL NAA+PROBE: NOT DETECTED
MCH RBC QN AUTO: 31.8 PG (ref 26.5–33)
MCHC RBC AUTO-ENTMCNC: 32.3 G/DL (ref 31.5–36.5)
MCV RBC AUTO: 99 FL (ref 78–100)
NITRATE UR QL: NEGATIVE
PH UR STRIP: 6.5 [PH] (ref 5–7)
PLAT MORPH BLD: NORMAL
PLATELET # BLD AUTO: 21 10E3/UL (ref 150–450)
POTASSIUM BLD-SCNC: 3.6 MMOL/L (ref 3.4–5.3)
PROT SERPL-MCNC: 6.2 G/DL (ref 6.8–8.8)
RBC # BLD AUTO: 2.86 10E6/UL (ref 3.8–5.2)
RBC MORPH BLD: NORMAL
RBC URINE: 0 /HPF
RSV RNA SPEC QL NAA+PROBE: NOT DETECTED
RSV RNA SPEC QL NAA+PROBE: NOT DETECTED
RV+EV RNA SPEC QL NAA+PROBE: NOT DETECTED
SARS-COV-2 RNA RESP QL NAA+PROBE: NEGATIVE
SODIUM SERPL-SCNC: 135 MMOL/L (ref 133–144)
SP GR UR STRIP: 1.01 (ref 1–1.03)
SPECIMEN EXPIRATION DATE: NORMAL
SQUAMOUS EPITHELIAL: 1 /HPF
TRANSITIONAL EPI: <1 /HPF
URATE SERPL-MCNC: 2.3 MG/DL (ref 2.6–6)
UROBILINOGEN UR STRIP-MCNC: NORMAL MG/DL
WBC # BLD AUTO: <0.1 10E3/UL (ref 4–11)
WBC URINE: 0 /HPF

## 2022-02-10 PROCEDURE — 86901 BLOOD TYPING SEROLOGIC RH(D): CPT | Performed by: STUDENT IN AN ORGANIZED HEALTH CARE EDUCATION/TRAINING PROGRAM

## 2022-02-10 PROCEDURE — 258N000003 HC RX IP 258 OP 636: Performed by: STUDENT IN AN ORGANIZED HEALTH CARE EDUCATION/TRAINING PROGRAM

## 2022-02-10 PROCEDURE — 96365 THER/PROPH/DIAG IV INF INIT: CPT | Performed by: EMERGENCY MEDICINE

## 2022-02-10 PROCEDURE — 36415 COLL VENOUS BLD VENIPUNCTURE: CPT | Performed by: NURSE PRACTITIONER

## 2022-02-10 PROCEDURE — 84155 ASSAY OF PROTEIN SERUM: CPT | Performed by: EMERGENCY MEDICINE

## 2022-02-10 PROCEDURE — 206N000001 HC R&B BMT UMMC

## 2022-02-10 PROCEDURE — 81001 URINALYSIS AUTO W/SCOPE: CPT | Performed by: EMERGENCY MEDICINE

## 2022-02-10 PROCEDURE — 83605 ASSAY OF LACTIC ACID: CPT | Performed by: EMERGENCY MEDICINE

## 2022-02-10 PROCEDURE — 87040 BLOOD CULTURE FOR BACTERIA: CPT | Performed by: NURSE PRACTITIONER

## 2022-02-10 PROCEDURE — 86850 RBC ANTIBODY SCREEN: CPT | Performed by: STUDENT IN AN ORGANIZED HEALTH CARE EDUCATION/TRAINING PROGRAM

## 2022-02-10 PROCEDURE — 96366 THER/PROPH/DIAG IV INF ADDON: CPT | Performed by: EMERGENCY MEDICINE

## 2022-02-10 PROCEDURE — 99285 EMERGENCY DEPT VISIT HI MDM: CPT | Mod: 25 | Performed by: EMERGENCY MEDICINE

## 2022-02-10 PROCEDURE — C9803 HOPD COVID-19 SPEC COLLECT: HCPCS | Performed by: EMERGENCY MEDICINE

## 2022-02-10 PROCEDURE — 71046 X-RAY EXAM CHEST 2 VIEWS: CPT | Mod: 26 | Performed by: RADIOLOGY

## 2022-02-10 PROCEDURE — 85027 COMPLETE CBC AUTOMATED: CPT | Performed by: EMERGENCY MEDICINE

## 2022-02-10 PROCEDURE — 87486 CHLMYD PNEUM DNA AMP PROBE: CPT | Performed by: STUDENT IN AN ORGANIZED HEALTH CARE EDUCATION/TRAINING PROGRAM

## 2022-02-10 PROCEDURE — 258N000003 HC RX IP 258 OP 636: Performed by: EMERGENCY MEDICINE

## 2022-02-10 PROCEDURE — 87633 RESP VIRUS 12-25 TARGETS: CPT | Performed by: STUDENT IN AN ORGANIZED HEALTH CARE EDUCATION/TRAINING PROGRAM

## 2022-02-10 PROCEDURE — U0005 INFEC AGEN DETEC AMPLI PROBE: HCPCS | Performed by: EMERGENCY MEDICINE

## 2022-02-10 PROCEDURE — 250N000013 HC RX MED GY IP 250 OP 250 PS 637: Performed by: EMERGENCY MEDICINE

## 2022-02-10 PROCEDURE — 250N000011 HC RX IP 250 OP 636: Performed by: STUDENT IN AN ORGANIZED HEALTH CARE EDUCATION/TRAINING PROGRAM

## 2022-02-10 PROCEDURE — 99285 EMERGENCY DEPT VISIT HI MDM: CPT | Performed by: EMERGENCY MEDICINE

## 2022-02-10 PROCEDURE — 99223 1ST HOSP IP/OBS HIGH 75: CPT | Mod: AI | Performed by: STUDENT IN AN ORGANIZED HEALTH CARE EDUCATION/TRAINING PROGRAM

## 2022-02-10 PROCEDURE — 250N000013 HC RX MED GY IP 250 OP 250 PS 637: Performed by: HOSPITALIST

## 2022-02-10 PROCEDURE — 87040 BLOOD CULTURE FOR BACTERIA: CPT | Performed by: EMERGENCY MEDICINE

## 2022-02-10 PROCEDURE — 96367 TX/PROPH/DG ADDL SEQ IV INF: CPT | Performed by: EMERGENCY MEDICINE

## 2022-02-10 PROCEDURE — 250N000013 HC RX MED GY IP 250 OP 250 PS 637: Performed by: STUDENT IN AN ORGANIZED HEALTH CARE EDUCATION/TRAINING PROGRAM

## 2022-02-10 PROCEDURE — 86922 COMPATIBILITY TEST ANTIGLOB: CPT | Performed by: HOSPITALIST

## 2022-02-10 PROCEDURE — 84550 ASSAY OF BLOOD/URIC ACID: CPT | Performed by: STUDENT IN AN ORGANIZED HEALTH CARE EDUCATION/TRAINING PROGRAM

## 2022-02-10 PROCEDURE — 36415 COLL VENOUS BLD VENIPUNCTURE: CPT | Performed by: STUDENT IN AN ORGANIZED HEALTH CARE EDUCATION/TRAINING PROGRAM

## 2022-02-10 PROCEDURE — 250N000011 HC RX IP 250 OP 636: Performed by: EMERGENCY MEDICINE

## 2022-02-10 PROCEDURE — 250N000013 HC RX MED GY IP 250 OP 250 PS 637: Performed by: INTERNAL MEDICINE

## 2022-02-10 PROCEDURE — 71046 X-RAY EXAM CHEST 2 VIEWS: CPT

## 2022-02-10 PROCEDURE — 96361 HYDRATE IV INFUSION ADD-ON: CPT | Performed by: EMERGENCY MEDICINE

## 2022-02-10 PROCEDURE — 36415 COLL VENOUS BLD VENIPUNCTURE: CPT | Performed by: EMERGENCY MEDICINE

## 2022-02-10 PROCEDURE — 86922 COMPATIBILITY TEST ANTIGLOB: CPT | Performed by: INTERNAL MEDICINE

## 2022-02-10 PROCEDURE — 87086 URINE CULTURE/COLONY COUNT: CPT | Performed by: EMERGENCY MEDICINE

## 2022-02-10 RX ORDER — ACETAMINOPHEN 500 MG
1000 TABLET ORAL ONCE
Status: COMPLETED | OUTPATIENT
Start: 2022-02-10 | End: 2022-02-10

## 2022-02-10 RX ORDER — FLUCONAZOLE 200 MG/1
200 TABLET ORAL DAILY
Status: DISCONTINUED | OUTPATIENT
Start: 2022-02-10 | End: 2022-02-20

## 2022-02-10 RX ORDER — METHOCARBAMOL 500 MG/1
500 TABLET, FILM COATED ORAL 3 TIMES DAILY PRN
COMMUNITY
End: 2023-01-01

## 2022-02-10 RX ORDER — OXYCODONE HYDROCHLORIDE 5 MG/1
5 TABLET ORAL ONCE
Status: COMPLETED | OUTPATIENT
Start: 2022-02-10 | End: 2022-02-10

## 2022-02-10 RX ORDER — PANTOPRAZOLE SODIUM 40 MG/1
40 TABLET, DELAYED RELEASE ORAL DAILY
Status: DISCONTINUED | OUTPATIENT
Start: 2022-02-10 | End: 2022-02-26 | Stop reason: HOSPADM

## 2022-02-10 RX ORDER — ALLOPURINOL 300 MG/1
300 TABLET ORAL DAILY
Status: DISCONTINUED | OUTPATIENT
Start: 2022-02-10 | End: 2022-02-26 | Stop reason: HOSPADM

## 2022-02-10 RX ORDER — DEXTROSE MONOHYDRATE 50 MG/ML
10-20 INJECTION, SOLUTION INTRAVENOUS
Status: DISCONTINUED | OUTPATIENT
Start: 2022-02-10 | End: 2022-02-11

## 2022-02-10 RX ORDER — VENLAFAXINE 75 MG/1
75 TABLET ORAL
Status: DISCONTINUED | OUTPATIENT
Start: 2022-02-10 | End: 2022-02-25

## 2022-02-10 RX ORDER — LORATADINE 10 MG/1
10 TABLET ORAL DAILY
Status: DISCONTINUED | OUTPATIENT
Start: 2022-02-10 | End: 2022-02-26 | Stop reason: HOSPADM

## 2022-02-10 RX ORDER — VENLAFAXINE 75 MG/1
75 TABLET ORAL 3 TIMES DAILY
Status: ON HOLD | COMMUNITY
End: 2022-02-26

## 2022-02-10 RX ORDER — ONDANSETRON 4 MG/1
4 TABLET, ORALLY DISINTEGRATING ORAL EVERY 8 HOURS PRN
Status: DISCONTINUED | OUTPATIENT
Start: 2022-02-10 | End: 2022-02-18

## 2022-02-10 RX ORDER — OXYCODONE HYDROCHLORIDE 10 MG/1
10 TABLET ORAL EVERY 8 HOURS PRN
Status: CANCELLED | OUTPATIENT
Start: 2022-02-10

## 2022-02-10 RX ORDER — ATORVASTATIN CALCIUM 40 MG/1
40 TABLET, FILM COATED ORAL DAILY
Status: DISCONTINUED | OUTPATIENT
Start: 2022-02-10 | End: 2022-02-10 | Stop reason: CLARIF

## 2022-02-10 RX ORDER — ATORVASTATIN CALCIUM 20 MG/1
40 TABLET, FILM COATED ORAL DAILY
Status: DISCONTINUED | OUTPATIENT
Start: 2022-02-10 | End: 2022-02-26 | Stop reason: HOSPADM

## 2022-02-10 RX ORDER — VENLAFAXINE HYDROCHLORIDE 75 MG/1
75 CAPSULE, EXTENDED RELEASE ORAL DAILY
Status: DISCONTINUED | OUTPATIENT
Start: 2022-02-10 | End: 2022-02-10 | Stop reason: DRUGHIGH

## 2022-02-10 RX ORDER — BUPRENORPHINE AND NALOXONE 8; 2 MG/1; MG/1
1 FILM, SOLUBLE BUCCAL; SUBLINGUAL 3 TIMES DAILY
Status: DISCONTINUED | OUTPATIENT
Start: 2022-02-10 | End: 2022-02-26 | Stop reason: HOSPADM

## 2022-02-10 RX ORDER — METOPROLOL SUCCINATE 25 MG/1
25 TABLET, EXTENDED RELEASE ORAL DAILY
Status: DISCONTINUED | OUTPATIENT
Start: 2022-02-11 | End: 2022-02-26 | Stop reason: HOSPADM

## 2022-02-10 RX ORDER — LEVOTHYROXINE SODIUM 112 UG/1
112 TABLET ORAL DAILY
Status: DISCONTINUED | OUTPATIENT
Start: 2022-02-10 | End: 2022-02-26 | Stop reason: HOSPADM

## 2022-02-10 RX ORDER — ACETAMINOPHEN 325 MG/1
650 TABLET ORAL EVERY 4 HOURS PRN
Status: DISCONTINUED | OUTPATIENT
Start: 2022-02-10 | End: 2022-02-26 | Stop reason: HOSPADM

## 2022-02-10 RX ORDER — PROCHLORPERAZINE MALEATE 5 MG
5 TABLET ORAL EVERY 6 HOURS PRN
Status: DISCONTINUED | OUTPATIENT
Start: 2022-02-10 | End: 2022-02-18

## 2022-02-10 RX ORDER — ACYCLOVIR 800 MG/1
800 TABLET ORAL EVERY 12 HOURS
Status: DISCONTINUED | OUTPATIENT
Start: 2022-02-10 | End: 2022-02-26 | Stop reason: HOSPADM

## 2022-02-10 RX ORDER — AMLODIPINE BESYLATE 5 MG/1
5 TABLET ORAL DAILY
Status: DISCONTINUED | OUTPATIENT
Start: 2022-02-11 | End: 2022-02-26 | Stop reason: HOSPADM

## 2022-02-10 RX ADMIN — ALLOPURINOL 300 MG: 300 TABLET ORAL at 22:29

## 2022-02-10 RX ADMIN — ACYCLOVIR 800 MG: 800 TABLET ORAL at 22:28

## 2022-02-10 RX ADMIN — ACETAMINOPHEN 1000 MG: 500 TABLET ORAL at 17:34

## 2022-02-10 RX ADMIN — VANCOMYCIN HYDROCHLORIDE 1250 MG: 100 INJECTION, POWDER, LYOPHILIZED, FOR SOLUTION INTRAVENOUS at 15:24

## 2022-02-10 RX ADMIN — SODIUM CHLORIDE 1000 ML: 9 INJECTION, SOLUTION INTRAVENOUS at 11:37

## 2022-02-10 RX ADMIN — FLUCONAZOLE 200 MG: 200 TABLET ORAL at 22:29

## 2022-02-10 RX ADMIN — VENLAFAXINE 75 MG: 75 TABLET ORAL at 22:50

## 2022-02-10 RX ADMIN — ATORVASTATIN CALCIUM 40 MG: 20 TABLET, FILM COATED ORAL at 22:28

## 2022-02-10 RX ADMIN — FLUTICASONE FUROATE AND VILANTEROL TRIFENATATE 1 PUFF: 100; 25 POWDER RESPIRATORY (INHALATION) at 22:53

## 2022-02-10 RX ADMIN — OXYCODONE HYDROCHLORIDE 5 MG: 5 TABLET ORAL at 22:27

## 2022-02-10 RX ADMIN — LEVOTHYROXINE SODIUM 112 MCG: 0.11 TABLET ORAL at 22:28

## 2022-02-10 RX ADMIN — ACETAMINOPHEN 650 MG: 325 TABLET, FILM COATED ORAL at 23:13

## 2022-02-10 RX ADMIN — LORATADINE 10 MG: 10 TABLET ORAL at 22:29

## 2022-02-10 RX ADMIN — CEFEPIME HYDROCHLORIDE 2 G: 2 INJECTION, POWDER, FOR SOLUTION INTRAVENOUS at 22:55

## 2022-02-10 RX ADMIN — CEFEPIME HYDROCHLORIDE 2 G: 2 INJECTION, POWDER, FOR SOLUTION INTRAVENOUS at 14:57

## 2022-02-10 RX ADMIN — BUPRENORPHINE AND NALOXONE 1 FILM: 8; 2 FILM, SOLUBLE BUCCAL; SUBLINGUAL at 22:31

## 2022-02-10 RX ADMIN — PANTOPRAZOLE SODIUM 40 MG: 40 TABLET, DELAYED RELEASE ORAL at 22:28

## 2022-02-10 RX ADMIN — FILGRASTIM 480 MCG: 480 INJECTION, SOLUTION INTRAVENOUS; SUBCUTANEOUS at 20:11

## 2022-02-10 ASSESSMENT — MIFFLIN-ST. JEOR: SCORE: 1135.96

## 2022-02-10 ASSESSMENT — ACTIVITIES OF DAILY LIVING (ADL)
FALL_HISTORY_WITHIN_LAST_SIX_MONTHS: NO
DOING_ERRANDS_INDEPENDENTLY_DIFFICULTY: NO
WALKING_OR_CLIMBING_STAIRS_DIFFICULTY: YES
HEARING_DIFFICULTY_OR_DEAF: NO
ADLS_ACUITY_SCORE: 10
ADLS_ACUITY_SCORE: 10
TOILETING_ISSUES: YES
ADLS_ACUITY_SCORE: 10
DRESSING/BATHING_DIFFICULTY: YES
TOILETING_ASSISTANCE: TOILETING DIFFICULTY, ASSISTANCE 1 PERSON
DIFFICULTY_EATING/SWALLOWING: NO
ADLS_ACUITY_SCORE: 10
CONCENTRATING,_REMEMBERING_OR_MAKING_DECISIONS_DIFFICULTY: YES
ADLS_ACUITY_SCORE: 10
EQUIPMENT_CURRENTLY_USED_AT_HOME: SHOWER CHAIR
WEAR_GLASSES_OR_BLIND: YES
ADLS_ACUITY_SCORE: 10
DIFFICULTY_COMMUNICATING: NO

## 2022-02-10 NOTE — H&P
[facility]    History and Physical - BMT Service     Date of Admission:  2/10/2022    Assessment & Plan      Libby Grimaldo is a 72 yo woman D+10 s/p Cytoxan chemo priming prior to planned auto PSBCT for MM.  She is being admitted for neutropenic fever.    # Neutropenic fever  # Sepsis   # Pancytopenia 2/2 chemo  # Mild encephalopathy, likely 2/2 sepsis  - Given central line, started on cefepime + vancomycin in the ED.  Continue for now.  - BC, CXR, UA done.  - GCSF 10 mcg/kg ordered daily until counts recover.   - Plan for allopurinol 300mg daily for at least 7 days.   - Transfusion parameters: hgb <7g/dL and plts <10,000  - Prophy: ACV, fluconazole. Hold levo while on cefepime.   - Per sister patient gets confused whenever she has an infection. Consider head imaging, LP / EEG etc. If encephalopathy not improving with antibiotics.     # Cancer related pain / nausea  - continue PTA suboxone, ondansetron, and compazine.   Hold PTA oxycodone given mild encephalopathy   - Claritin for GCSF related pain    --- CHRONIC ISSUES ---  # GERD: PPI  # hypothyroidism- continue levothyroxine  # HTN: Amlodipine and metoprolol.  Hold parameter for SBP < 100mmHg.    # HLD: statin.  Hold PTA asa given thrombocytopenia  # Mood disorder: venlafaxine  # COPD: Continue PTA inhaled steroid, anticholinergic, QUYNH/LABA      Diet:   regular  DVT Prophylaxis: Ambulate every shift  Raya Catheter: Not present  Central Lines: PRESENT     Cardiac Monitoring: None  Code Status:   Full    Clinically Significant Risk Factors Present on Admission               # Platelet Defect: home medication list includes an antiplatelet medication       Disposition Plan   Expected Discharge:  In 2-3 days when no longer febrile.    Anticipated discharge location:  Awaiting care coordination huddle  Delays:            The patient's care was discussed with the Bedside Nurse, Patient and BMT .    Clayton  MD Isabela  Hospitalist Service  [facility]  Securely message with the [employer] Web Console (learn more here)  Text page via [facility] Paging/Directory         ______________________________________________________________________    Chief Complaint   Neutropenic fever    History is obtained from the patient and her sister    History of Present Illness   Libby Grimaldo is a 72 yo woman D+10 s/p Cytoxan chemo priming prior to planned auto PSBCT for MM.  She is being admitted for neutropenic fever.  Patient is mildly confused.  Called  and went to voicemail and then called sister.  Per sister, patient has been in her usual state of health until about 1-2 days ago when she has been feeling unwell.  Patient says she doesn't feel good, can't really elaborate on it.     Review of Systems    The 10 point Review of Systems is negative other than noted in the HPI or here.     Past Medical History    I have reviewed this patient's medical history and updated it with pertinent information if needed.   Past Medical History:   Diagnosis Date     Cervical radiculopathy      COPD (chronic obstructive pulmonary disease) (H)      Double vision      Febrile seizure (H)     Per patient. Once while a toddler, once while in highschool, and once while in college.     Floaters      Graves disease      HLD (hyperlipidemia)      HTN (hypertension)      IPF (idiopathic pulmonary fibrosis) (H)      Lumbar radiculopathy      Multiple myeloma in relapse (H)      Osteoporosis      Pneumonia     Per patient. Complicated by sepsis.     Recurrent UTI     Per patient. Has required prophylactic antibiotcs.     Vitamin B12 deficiency (non anemic)        Past Surgical History   I have reviewed this patient's surgical history and updated it with pertinent information if needed.  Past Surgical History:   Procedure Laterality Date     BONE MARROW BIOPSY, BONE SPECIMEN, NEEDLE/TROCAR Right 1/24/2022    Procedure:  "BIOPSY, BONE MARROW;  Surgeon: Yuniel Tineo;  Location: [facility] OR     CERVICAL FUSION       CHOLECYSTECTOMY       CYSTECTOMY OVARIAN BENIGN       EYE SURGERY      Per patient.     IR CVC TUNNEL PLACEMENT > 5 YRS OF AGE  2/1/2022     TONSILLECTOMY       WRIST SURGERY Left        Social History   I have reviewed this patient's social history and updated it with pertinent information if needed.  Social History     Tobacco Use     Smoking status: Former Smoker     Packs/day: 1.00     Years: 40.00     Pack years: 40.00     Smokeless tobacco: Former User     Quit date: 2010   Substance Use Topics     Alcohol use: Not Currently     Drug use: Not Currently     Types: Marijuana       Family History   I have reviewed this patient's family history and updated it with pertinent information if needed.  Family History   Problem Relation Age of Onset     Heart Disease Mother      Cancer Mother         \"Around lungs\" - she explicitly said it was *not* lung cancer.     Diabetes Father      Heart Disease Father        Prior to Admission Medications   Prior to Admission Medications   Prescriptions Last Dose Informant Patient Reported? Taking?   Fluticasone-Umeclidin-Vilanterol (TRELEGY ELLIPTA) 100-62.5-25 MCG/INH oral inhaler   Yes No   Sig: Inhale 1 puff into the lungs daily    acetaminophen (TYLENOL) 500 MG tablet   Yes No   Sig: Take 500-1,000 mg by mouth every 6 hours as needed for mild pain    acyclovir (ZOVIRAX) 800 MG tablet   No No   Sig: Take 1 tablet (800 mg) by mouth every 12 hours   albuterol (PROAIR HFA/PROVENTIL HFA/VENTOLIN HFA) 108 (90 Base) MCG/ACT inhaler   Yes No   Sig: Inhale 2 puffs into the lungs every 6 hours as needed for shortness of breath / dyspnea    allopurinol (ZYLOPRIM) 300 MG tablet   No No   Sig: Take 1 tablet (300 mg) by mouth daily   amLODIPine (NORVASC) 5 MG tablet   Yes No   Sig: Take 1 tablet (5 mg) by mouth daily   aspirin (ASA) 81 MG chewable tablet   Yes No   Sig: HOLD due to low platelets "   atorvastatin (LIPITOR) 40 MG tablet   Yes No   Sig: Take 40 mg by mouth daily    buprenorphine HCl-naloxone HCl (SUBOXONE) 8-2 MG per film   Yes No   Sig: Place 1 Film under the tongue 3 times daily    cholecalciferol (VITAMIN D-1000 MAX ST) 25 MCG (1000 UT) TABS   Yes No   Sig: Take 1,000 Units by mouth daily    fluconazole (DIFLUCAN) 200 MG tablet   No No   Sig: Take 1 tablet (200 mg) by mouth daily   levofloxacin (LEVAQUIN) 250 MG tablet   No No   Sig: Take 1 tablet (250 mg) by mouth daily   levothyroxine (SYNTHROID/LEVOTHROID) 112 MCG tablet   Yes No   Sig: Take 112 mcg by mouth daily    loratadine (CLARITIN) 10 MG tablet   No No   Sig: Take 1 tablet (10 mg) by mouth daily   metoprolol succinate ER (TOPROL-XL) 25 MG 24 hr tablet   Yes No   Sig: Take 25 mg by mouth daily    ondansetron (ZOFRAN) 8 MG tablet   No No   Sig: Take 1 tablet (8 mg) by mouth every 8 hours as needed for nausea   ondansetron (ZOFRAN-ODT) 4 MG ODT tab   No No   Sig: Take 1 tablet (4 mg) by mouth every 8 hours as needed for nausea   oxyCODONE IR (ROXICODONE) 10 MG tablet   Yes No   Sig: Take 10 mg by mouth every 8 hours as needed for severe pain    pantoprazole (PROTONIX) 40 MG EC tablet   No No   Sig: Take 1 tablet (40 mg) by mouth daily   prochlorperazine (COMPAZINE) 5 MG tablet   No No   Sig: Take 1 tablet (5 mg) by mouth every 6 hours as needed for nausea or vomiting   venlafaxine (EFFEXOR-XR) 75 MG 24 hr capsule   Yes No   Sig: Take 75 mg by mouth daily      Facility-Administered Medications: None     Allergies   Allergies   Allergen Reactions     Bupropion      Other reaction(s): Seizures       Physical Exam   Vital Signs: Temp: 99.3  F (37.4  C) Temp src: Oral BP: (!) 177/120 Pulse: 116   Resp: 16 SpO2: 90 % O2 Device: None (Room air)    Weight: 138 lbs 0 oz    General Appearance: NAD  Eyes: EOMI  HEENT: Neck suppple  Respiratory: CTAB on RA  Cardiovascular: RRR  GI: Soft, NTND  Skin: No rash on exposed areas  Musculoskeletal: No LE  edema  Neurologic: CN 2-12 intact, muscle strength equal bilaterally on biceps/tricps/quads, sensation to light touch intact bilaterally in UE and LE.   Psychiatric: Alert and oriented to person, place but not time.  Thought it was the year 1953.  Slow to answer and can't answer questions with details, following commands and pleasant.     Data   Data reviewed today: I reviewed all medications, new labs and imaging results over the last 24 hours.

## 2022-02-10 NOTE — ED TRIAGE NOTES
"Coming in with night sweats and \"not feeling well\". Last growth factor yesterday and last \"big chemo\" Tuesday.  "

## 2022-02-10 NOTE — ED PROVIDER NOTES
History   No chief complaint on file.    HPI  Libby Grimaldo is a 71 year old female w/ PMH of HTN, pulmonary fibrosis/COPD, several pneumonias c/b sepsis, hypothyroidism, multiple myeloma w/ ongoing cytoxan chemopriming prior to autologous HPC collection presenting w/ night sweats and malaise.    Has been feeling chilled and somewhat weak.  Noted a fever at home.  Has felt mildly confused.  Overall feels unwell    Review of systems somewhat unreliable but denies chest pain or shortness of breath no abdominal pain no vomiting.      I have reviewed the Medications, Allergies, Past Medical and Surgical History, and Social History in the [employer]Page system.  PAST MEDICAL HISTORY:   Past Medical History:   Diagnosis Date     Cervical radiculopathy      COPD (chronic obstructive pulmonary disease) (H)      Double vision      Febrile seizure (H)     Per patient. Once while a toddler, once while in highschool, and once while in college.     Floaters      Graves disease      HLD (hyperlipidemia)      HTN (hypertension)      IPF (idiopathic pulmonary fibrosis) (H)      Lumbar radiculopathy      Multiple myeloma in relapse (H)      Osteoporosis      Pneumonia     Per patient. Complicated by sepsis.     Recurrent UTI     Per patient. Has required prophylactic antibiotcs.     Vitamin B12 deficiency (non anemic)        PAST SURGICAL HISTORY:   Past Surgical History:   Procedure Laterality Date     BONE MARROW BIOPSY, BONE SPECIMEN, NEEDLE/TROCAR Right 1/24/2022    Procedure: BIOPSY, BONE MARROW;  Surgeon: Yuniel Tineo;  Location: [facility] OR     CERVICAL FUSION       CHOLECYSTECTOMY       CYSTECTOMY OVARIAN BENIGN       EYE SURGERY      Per patient.     IR CVC TUNNEL PLACEMENT > 5 YRS OF AGE  2/1/2022     IR CVC TUNNEL REMOVAL LEFT  2/22/2022     TONSILLECTOMY       WRIST SURGERY Left        Past medical history, past surgical history, medications, and allergies were reviewed with the patient. Additional pertinent items: None    FAMILY  "HISTORY:   Family History   Problem Relation Age of Onset     Heart Disease Mother      Cancer Mother         \"Around lungs\" - she explicitly said it was *not* lung cancer.     Diabetes Father      Heart Disease Father        SOCIAL HISTORY:   Social History     Tobacco Use     Smoking status: Former Smoker     Packs/day: 1.00     Years: 40.00     Pack years: 40.00     Smokeless tobacco: Former User     Quit date: 2010   Substance Use Topics     Alcohol use: Not Currently     Social history was reviewed with the patient. Additional pertinent items: None    Review of Systems  10 point review of symptoms was performed and is negative except as noted above.     Physical Exam   BP: 128/75  Pulse: 109  Temp: 99.3  F (37.4  C)  Resp: 16  Height: 162.6 cm (5' 4\")  Weight: 62.6 kg (138 lb)  SpO2: 96 %      Physical Exam  GEN: Somewhat weak appearing, non toxic, cooperative and conversant.   HEENT: The head is normocephalic and atraumatic. Pupils are equal round and reactive to light. Extraocular motions are intact. There is no facial swelling. The neck is nontender and supple.   CV: Regular r tachycardia. 2+ radial pulses bilaterally.  PULM: Clear to auscultation bilaterally.  ABD: Soft, nontender, nondistended.   EXT: Full range of motion.  No edema.  NEURO: Cranial nerves II through XII are intact and symmetric. Bilateral upper and lower extremities grossly show full range of motion without any focal deficits.   PSYCH: Calm and cooperative, interactive, though somewhat slow to speak.  ED Course        Procedures           Results for orders placed or performed during the hospital encounter of 02/10/22 (from the past 24 hour(s))   Heparin Unfractionated Anti Xa Level   Result Value Ref Range    Anti Xa Unfractionated Heparin >1.10 (HH) For Reference Range, See Comment IU/mL    Narrative    Therapeutic Range: UFH: 0.25-0.50 IU/mL for low intensity dosing,  0.30-0.70 IU/mL for high intensity dosing DVT and PE.  This test is " not validated for other direct factor X inhibitors (e.g. rivaroxaban, apixaban, edoxaban, betrixaban, fondaparinux) and should not be used for monitoring of other medications.   Lactate Dehydrogenase   Result Value Ref Range    Lactate Dehydrogenase 263 (H) 81 - 234 U/L   Protein total   Result Value Ref Range    Protein Total 5.0 (L) 6.8 - 8.8 g/dL   Phosphorus   Result Value Ref Range    Phosphorus 2.4 (L) 2.5 - 4.5 mg/dL   Magnesium   Result Value Ref Range    Magnesium 2.1 1.6 - 2.3 mg/dL   Basic metabolic panel   Result Value Ref Range    Sodium 138 133 - 144 mmol/L    Potassium 3.7 3.4 - 5.3 mmol/L    Chloride 107 94 - 109 mmol/L    Carbon Dioxide (CO2) 27 20 - 32 mmol/L    Anion Gap 4 3 - 14 mmol/L    Urea Nitrogen 8 7 - 30 mg/dL    Creatinine 0.84 0.52 - 1.04 mg/dL    Calcium 7.9 (L) 8.5 - 10.1 mg/dL    Glucose 66 (L) 70 - 99 mg/dL    GFR Estimate 75 >60 mL/min/1.73m2   CBC with platelets differential    Narrative    The following orders were created for panel order CBC with platelets differential.  Procedure                               Abnormality         Status                     ---------                               -----------         ------                     CBC with platelets and d...[[id number]]  Abnormal            Preliminary result           Please view results for these tests on the individual orders.   CBC with platelets and differential   Result Value Ref Range    WBC Count 5.8 4.0 - 11.0 10e3/uL    RBC Count 2.34 (L) 3.80 - 5.20 10e6/uL    Hemoglobin 7.4 (L) 11.7 - 15.7 g/dL    Hematocrit 23.5 (L) 35.0 - 47.0 %     78 - 100 fL    MCH 31.6 26.5 - 33.0 pg    MCHC 31.5 31.5 - 36.5 g/dL    RDW 14.6 10.0 - 15.0 %    Platelet Count 131 (L) 150 - 450 10e3/uL   Glucose by meter   Result Value Ref Range    GLUCOSE BY METER POCT 99 70 - 99 mg/dL     Medications   sodium chloride (PF) 0.9% PF flush 3 mL (3 mLs Intravenous Given 2/10/22 1479)   acyclovir (ZOVIRAX) tablet 800 mg (800 mg Oral  Given 2/24/22 1932)   allopurinol (ZYLOPRIM) tablet 300 mg (300 mg Oral Given 2/24/22 0846)   buprenorphine HCl-naloxone HCl (SUBOXONE) 8-2 MG per film 1 Film (1 Film Sublingual Given 2/24/22 1932)   levothyroxine (SYNTHROID/LEVOTHROID) tablet 112 mcg (112 mcg Oral Given 2/24/22 0846)   loratadine (CLARITIN) tablet 10 mg (10 mg Oral Given 2/24/22 0846)   metoprolol succinate ER (TOPROL-XL) 24 hr tablet 25 mg (25 mg Oral Given 2/24/22 0846)   amLODIPine (NORVASC) tablet 5 mg (5 mg Oral Given 2/24/22 0846)   pantoprazole (PROTONIX) EC tablet 40 mg (40 mg Oral Given 2/24/22 0846)   atorvastatin (LIPITOR) tablet 40 mg (40 mg Oral Given 2/24/22 1932)   venlafaxine (EFFEXOR) tablet 75 mg (75 mg Oral Given 2/24/22 1821)   fluticasone-vilanterol (BREO ELLIPTA) 100-25 MCG/INH inhaler 1 puff (1 puff Inhalation Given 2/24/22 0847)   umeclidinium (INCRUSE ELLIPTA) 62.5 MCG/INH inhaler 1 puff (1 puff Inhalation Given 2/24/22 0846)   acetaminophen (TYLENOL) tablet 650 mg (650 mg Oral Given 2/25/22 0604)   methocarbamol (ROBAXIN) tablet 500 mg (500 mg Oral Given 2/24/22 1306)   potassium & sodium phosphates (NEUTRA-PHOS) Packet 2 packet (2 packets Oral or Feeding Tube Not Given 2/12/22 0846)   dextromethorphan-guaiFENesin (MUCINEX DM)  MG per 12 hr tablet 1 tablet (1 tablet Oral Given 2/24/22 2040)   hydrALAZINE (APRESOLINE) injection 10 mg (10 mg Intravenous Given 2/19/22 2052)   sennosides (SENOKOT) tablet 8.6 mg (8.6 mg Oral Given 2/13/22 1724)   sodium chloride (PF) 0.9% PF flush 10-20 mL (has no administration in time range)   sodium chloride (PF) 0.9% PF flush 10-20 mL (has no administration in time range)   sodium chloride (PF) 0.9% PF flush 10-20 mL (10 mLs Intracatheter Not Given 2/12/22 1043)   heparin lock flush 10 UNIT/ML injection 5-10 mL (5 mLs Intracatheter Given 2/24/22 1012)   heparin lock flush 10 UNIT/ML injection 5-10 mL (5 mLs Intracatheter Given 2/25/22 0413)   heparin 100 UNIT/ML injection 5-10 mL (5  mLs Intracatheter Not Given 2/12/22 1308)   sodium chloride (PF) 0.9% PF flush 10-20 mL (has no administration in time range)   heparin lock flush 10 UNIT/ML injection 5-20 mL (5 mLs Intracatheter Given 2/23/22 1835)   oxyCODONE IR (ROXICODONE) tablet 10-20 mg (10 mg Oral Given 2/25/22 0423)   calcium carbonate (TUMS) chewable tablet 500 mg (500 mg Oral Given 2/21/22 1256)   QUEtiapine (SEROquel) tablet 25 mg ( Oral Canceled Entry 2/24/22 2200)   haloperidol lactate (HALDOL) injection 2 mg (has no administration in time range)   potassium & sodium phosphates (NEUTRA-PHOS) Packet 1 packet ( Oral Automatically Held 3/2/22 2000)   acetaminophen (TYLENOL) tablet 325 mg ( Oral Canceled Entry 2/19/22 2110)   sucralfate (CARAFATE) suspension 1 g ( Oral Canceled Entry 2/24/22 2200)   alteplase (CATHFLO ACTIVASE) injection 2 mg (2 mg Intravenous Not Given 2/16/22 1401)   heparin infusion 25,000 units in D5W 250 mL ANTICOAGULANT (0 Units/hr Intravenous Stopped 2/17/22 1122)   loperamide (IMODIUM) capsule 2 mg (2 mg Oral Given 2/21/22 1422)   albuterol (PROVENTIL) neb solution 2.5 mg (2.5 mg Nebulization Given 2/18/22 2123)   ondansetron (ZOFRAN-ODT) ODT tab 4 mg (4 mg Oral Given 2/20/22 0946)     Or   ondansetron (ZOFRAN) injection 4 mg ( Intravenous See Alternative 2/20/22 0946)   prochlorperazine (COMPAZINE) injection 5 mg (has no administration in time range)     Or   prochlorperazine (COMPAZINE) tablet 5 mg (has no administration in time range)     Or   prochlorperazine (COMPAZINE) suppository 12.5 mg (has no administration in time range)   benzocaine-menthol (CEPACOL EXTRA STRENGTH) 15-2.6 MG lozenge 1 lozenge (1 lozenge Buccal Given 2/23/22 2224)   fluconazole (DIFLUCAN) tablet 100 mg (100 mg Oral Given 2/24/22 9119)   oxymetazoline (AFRIN) 0.05 % spray 2 spray (has no administration in time range)   rivaroxaban ANTICOAGULANT (XARELTO) tablet 20 mg (20 mg Oral Given 2/24/22 1631)   furosemide (LASIX) tablet 20 mg (20 mg  Oral Given 2/24/22 0850)   magnesium sulfate 2 g in water intermittent infusion ( Intravenous Canceled Entry 2/24/22 1530)   potassium phosphate (KPHOS) 9 mmol/250 mL in D5W (premix) (has no administration in time range)   potassium chloride ER (KLOR-CON M) CR tablet 10 mEq (has no administration in time range)   0.9% sodium chloride BOLUS (0 mLs Intravenous Stopped 2/10/22 1211)   ceFEPIme (MAXIPIME) 2 g vial to attach to  ml bag for ADULTS or 50 ml bag for PEDS (0 g Intravenous Stopped 2/10/22 1528)   vancomycin 1250 mg in 0.9% NaCl 250 mL intermittent infusion 1,250 mg (0 mg Intravenous Stopped 2/10/22 1703)   acetaminophen (TYLENOL) tablet 1,000 mg (1,000 mg Oral Given 2/10/22 1734)   oxyCODONE (ROXICODONE) tablet 5 mg (5 mg Oral Given 2/10/22 2227)   0.9% sodium chloride BOLUS (1,000 mLs Intravenous New Bag 2/11/22 1007)   iopamidol (ISOVUE-370) solution 70 mL (70 mLs Intravenous Given 2/11/22 1033)   sodium chloride (PF) 0.9% PF flush 85 mL (85 mLs Intravenous Given 2/11/22 1033)   magnesium oxide (MAG-OX) tablet 400 mg (400 mg Oral Given 2/12/22 2106)   potassium chloride ER (KLOR-CON M) CR tablet 10 mEq (10 mEq Oral Given 2/11/22 1214)   potassium chloride 20 mEq in 50 mL intermittent infusion (20 mEq Intravenous New Bag 2/12/22 0704)   potassium phosphate (KPHOS) 15 mmol/250 mL in D5W (premix) (15 mmol Intravenous Given 2/12/22 1023)   magnesium sulfate 2 g in water intermittent infusion (2 g Intravenous New Bag 2/12/22 1200)   potassium chloride 10 mEq in 100 mL sterile water intermittent infusion (premix) (10 mEq Intravenous New Bag 2/12/22 1848)   potassium phosphate (KPHOS) 15 mmol/250 mL in D5W (premix) (15 mmol Intravenous Given 2/13/22 0100)   potassium chloride 10 mEq in 100 mL sterile water intermittent infusion (premix) (10 mEq Intravenous New Bag 2/13/22 0557)   potassium phosphate (KPHOS) 9 mmol/250 mL in D5W (premix) (9 mmol Intravenous Given 2/13/22 1041)   furosemide (LASIX) injection  20 mg (20 mg Intravenous Given 2/13/22 2107)   potassium chloride ER (KLOR-CON M) CR tablet 40 mEq (40 mEq Oral Given 2/14/22 1201)   potassium & sodium phosphates (NEUTRA-PHOS) Packet 2 packet (2 packets Oral or Feeding Tube Given 2/15/22 0831)     And   sodium phosphate 15 mmol in D5W intermittent infusion (15 mmol Intravenous Given 2/14/22 1335)   magnesium sulfate 2 g in water intermittent infusion (2 g Intravenous New Bag 2/14/22 1804)   potassium chloride 10 mEq in 100 mL sterile water intermittent infusion (premix) (10 mEq Intravenous New Bag 2/14/22 2044)   potassium phosphate (KPHOS) 15 mmol/250 mL in D5W (premix) (15 mmol Intravenous Given 2/15/22 0107)   magnesium sulfate 2 g in water intermittent infusion (2 g Intravenous New Bag 2/15/22 0827)   sodium phosphate 20 mmol in D5W 250 mL intermittent infusion (20 mmol Intravenous New Bag 2/15/22 1025)   heparin loading dose for LOW INTENSITY TREATMENT * Give BEFORE starting heparin infusion (3,800 Units Intravenous Given 2/15/22 1711)   potassium phosphate (KPHOS) 9 mmol/250 mL in D5W (premix) (9 mmol Intravenous Given 2/15/22 2054)   heparin - BOLUS DOSE from infusion (3,800 Units Intravenous Given 2/16/22 0005)   potassium chloride 20 mEq in 50 mL intermittent infusion (20 mEq Intravenous New Bag 2/16/22 0617)   heparin - BOLUS DOSE from infusion (1,900 Units Intravenous Given 2/16/22 0807)   vancomycin 1250 mg in 0.9% NaCl 250 mL intermittent infusion 1,250 mg (1,250 mg Intravenous New Bag 2/16/22 1027)   furosemide (LASIX) injection 40 mg (40 mg Intravenous Given 2/16/22 1400)   heparin - BOLUS DOSE from infusion (1,950 Units Intravenous Given 2/16/22 1353)   potassium phosphate (KPHOS) 15 mmol/250 mL in D5W (premix) (15 mmol Intravenous Given 2/17/22 0626)   potassium chloride 20 mEq in 50 mL intermittent infusion (20 mEq Intravenous New Bag 2/17/22 0422)   potassium chloride 20 mEq in 50 mL intermittent infusion (20 mEq Intravenous New Bag 2/17/22  1116)   magnesium sulfate 2 g in water intermittent infusion (2 g Intravenous New Bag 2/17/22 1116)   furosemide (LASIX) injection 20 mg (20 mg Intravenous Given 2/17/22 1116)   potassium phosphate (KPHOS) 9 mmol/250 mL in D5W (premix) (9 mmol Intravenous Given 2/17/22 1845)   potassium chloride 20 mEq in 50 mL intermittent infusion (20 mEq Intravenous New Bag 2/18/22 0542)   potassium phosphate (KPHOS) 9 mmol/250 mL in D5W (premix) (9 mmol Intravenous Given 2/18/22 0609)   magnesium sulfate 2 g in water intermittent infusion (2 g Intravenous New Bag 2/18/22 0753)   furosemide (LASIX) injection 20 mg (20 mg Intravenous Given 2/18/22 0902)   potassium phosphate (KPHOS) 9 mmol/250 mL in D5W (premix) (9 mmol Intravenous Given 2/19/22 0448)   magnesium sulfate 2 g in water intermittent infusion (2 g Intravenous New Bag 2/19/22 0750)   Anticoagulant Citrate Dextrose Formula A (supplied by [facility]) (1,494 mLs Apheresis Given 2/19/22 0855)   calcium gluconate 10% (100 mg/mL) (FOR APHERESIS) infusion (2,600 mg/hr Intravenous Given 2/19/22 0855)   furosemide (LASIX) injection 20 mg (20 mg Intravenous Given 2/19/22 1438)   alteplase (CATHFLO ACTIVASE) injection 2 mg (2 mg Intracatheter Given 2/19/22 1410)   heparin 100 UNIT/ML injection 3 mL (3 mLs Intracatheter Given 2/19/22 1409)   sodium chloride (PF) 0.9% PF flush 10 mL (10 mLs Intracatheter Given 2/19/22 1408)   Anticoagulant Citrate Dextrose Formula A (supplied by [facility]) ( [facility] Given 2/20/22 0922)   calcium gluconate 10% (100 mg/mL) (FOR APHERESIS) infusion (2,400 mg/hr Intravenous Given 2/20/22 0923)   heparin 100 UNIT/ML injection 3 mL (3 mLs Intracatheter Given 2/20/22 1453)   heparin 100 UNIT/ML injection 3 mL (3 mLs Intracatheter Given 2/20/22 1453)   potassium chloride 20 mEq in 50 mL intermittent infusion (20 mEq Intravenous New Bag 2/20/22 7769)   furosemide (LASIX) injection 40 mg (40 mg Intravenous Given 2/20/22 1417)   potassium  chloride 20 mEq in 50 mL intermittent infusion (20 mEq Intravenous New Bag 2/20/22 1417)   magnesium sulfate 4 g in 100 mL sterile water (premade) (4 g Intravenous New Bag 2/20/22 2018)   potassium chloride 20 mEq in 50 mL intermittent infusion (20 mEq Intravenous New Bag 2/20/22 2210)   potassium chloride 20 mEq in 50 mL intermittent infusion (20 mEq Intravenous New Bag 2/21/22 0449)   furosemide (LASIX) injection 40 mg (40 mg Intravenous Given 2/21/22 0859)   potassium chloride ER (KLOR-CON M) CR tablet 40 mEq (40 mEq Oral Given 2/21/22 2032)   potassium chloride ER (KLOR-CON M) CR tablet 20 mEq (20 mEq Oral Given 2/22/22 0133)   magnesium sulfate 2 g in water intermittent infusion (2 g Intravenous New Bag 2/22/22 0656)   furosemide (LASIX) injection 20 mg (20 mg Intravenous Given 2/22/22 1150)   potassium chloride ER (KLOR-CON M) CR tablet 10 mEq (10 mEq Oral Given 2/23/22 0826)   magnesium sulfate 2 g in water intermittent infusion (2 g Intravenous New Bag 2/24/22 0439)             Assessments & Plan (with Medical Decision Making)   71-year-old female with COPD, pulmonary hypertension, multiple myeloma on chemotherapy now with fever    Labs notable for neutropenia, source of infection not yet clear  Chest x-ray without focal infiltrates    Neutropenic fever  -IV fluids  -Broad-spectrum antibiotics  -Frequent neuro evaluation monitoring due to altered mental status  -Antipyretics as needed, antiemetics as needed         I have reviewed the nursing notes.    I have reviewed the findings, diagnosis, plan and need for follow up with the patient.    Current Discharge Medication List          Final diagnoses:   Multiple myeloma not having achieved remission (H)   Sepsis, due to unspecified organism, unspecified whether acute organ dysfunction present (H)   Neutropenic fever (H)   Antineoplastic chemotherapy induced pancytopenia (CODE) (H)   Encephalopathy, unspecified   Contact with and (suspected) exposure to  covid-19   Stem cells transplant status (H)   Admission for chemotherapy       2/10/2022   [facility] EMERGENCY DEPARTMENT     Alex Rdz MD  02/25/22 0668

## 2022-02-10 NOTE — ED NOTES
"ED Triage Provider Note  [facility]  Encounter Date: Feb 10, 2022    History:  No chief complaint on file.    Libby Grimaldo is a 71 year old female who presents to the ED with c/o night sweats, weakness, bone pain, nausea, loss of appetite, and some abdominal pain that has gotten worse over past 24 hours.  Patient has a history of multiple myeloma with ongoing Cytoxan last infusion on Tuesday of this week.     Review of Systems:  Multiple myeloma    Exam:  /75   Pulse 109   Temp 99.3  F (37.4  C) (Oral)   Resp 16   Ht 1.626 m (5' 4\")   Wt 62.6 kg (138 lb)   SpO2 96%   BMI 23.69 kg/m    General: No acute distress. Appears stated age.   Cardio: Regular rate, extremities well perfused  Resp: Normal work of breathing, grossly normal respiratory rate  Neuro: Alert. CN II-XII grossly intact. Grossly intact strength.   Abdominal: Flat, soft, tender to palpation in RUQ, LUQ    Medical Decision Making:  Patient arriving to the ED with problem as above. A medical screening exam was performed.  Lab orders initiated from Triage. The patient is appropriate to be immediately roomed.      QUAN Dooley CNP on 2/10/2022 at 11:24 AM       Lori Sim APRN CNP  02/10/22 1128    "

## 2022-02-10 NOTE — PHARMACY-VANCOMYCIN DOSING SERVICE
Pharmacy Vancomycin Initial Note  Date of Service February 10, 2022  Patient's  1950  71 year old, female  Actual body weight: 62.6 kg    Indication: Sepsis    Current estimated CrCl = Estimated Creatinine Clearance: 64.8 mL/min (based on SCr of 0.81 mg/dL).    Creatinine for last 3 days  2022: 11:36 AM Creatinine 0.82 mg/dL  2022: 11:05 AM Creatinine 0.92 mg/dL  2/10/2022: 11:22 AM Creatinine 0.81 mg/dL    Recent Vancomycin Level(s) for last 3 days  No results found for requested labs within last 72 hours.      Vancomycin IV Administrations (past 72 hours)      No vancomycin orders with administrations in past 72 hours.                Nephrotoxins and other renal medications (From now, onward)    Start     Dose/Rate Route Frequency Ordered Stop    02/10/22 1330  vancomycin 1250 mg in 0.9% NaCl 250 mL intermittent infusion 1,250 mg         1,250 mg  over 90 Minutes Intravenous ONCE 02/10/22 1309      02/10/22 0130  vancomycin (VANCOCIN) 750 mg in sodium chloride 0.9 % 250 mL intermittent infusion         750 mg  over 60-90 Minutes Intravenous EVERY 12 HOURS 02/10/22 1309            Contrast Orders - past 72 hours (72h ago, onward)            None          [employer] Prediction of Planned Initial Vancomycin Regimen  Loading dose: 1250 mg at 13:30 02/10/2022.  Regimen: 750 mg IV every 12 hours.  Start time: 13:08 on 02/10/2022  Exposure target: AUC24 (range)400-600 mg/L.hr   AUC24,ss: 530 mg/L.hr  Probability of AUC24 > 400: 79 %  Ctrough,ss: 17.4 mg/L  Probability of Ctrough,ss > 20: 37 %  Probability of nephrotoxicity (Lodise CHEL ): 13 %          Plan:  1. Start vancomycin  1250 mg (20 mg/kg) IV once, then vancomycin 750 mg IV every 12 hours thereafter.   2. Vancomycin monitoring method: AUC  3. Vancomycin therapeutic monitoring goal: 400-600 mg*h/L  4. Pharmacy will check vancomycin levels as appropriate in 1-3 Days.    5. Serum creatinine levels will be ordered daily for the first week of  therapy and at least twice weekly for subsequent weeks.      Earlene Martinez, RPH

## 2022-02-10 NOTE — ED NOTES
Oral temp assessed as pt was tachycardic, and states feeling chilled and had body aches, temp was 103.2 oral- MD aware and orders placed

## 2022-02-11 ENCOUNTER — APPOINTMENT (OUTPATIENT)
Dept: OCCUPATIONAL THERAPY | Facility: CLINIC | Age: 72
DRG: 152 | End: 2022-02-11
Attending: STUDENT IN AN ORGANIZED HEALTH CARE EDUCATION/TRAINING PROGRAM
Payer: MEDICARE

## 2022-02-11 ENCOUNTER — APPOINTMENT (OUTPATIENT)
Dept: CT IMAGING | Facility: CLINIC | Age: 72
DRG: 152 | End: 2022-02-11
Attending: PHYSICIAN ASSISTANT
Payer: MEDICARE

## 2022-02-11 ENCOUNTER — HOME INFUSION (PRE-WILLOW HOME INFUSION) (OUTPATIENT)
Dept: PHARMACY | Facility: CLINIC | Age: 72
End: 2022-02-11

## 2022-02-11 LAB
ALBUMIN SERPL-MCNC: 2 G/DL (ref 3.4–5)
ALP SERPL-CCNC: 118 U/L (ref 40–150)
ALT SERPL W P-5'-P-CCNC: 10 U/L (ref 0–50)
ANION GAP SERPL CALCULATED.3IONS-SCNC: 7 MMOL/L (ref 3–14)
APTT PPP: 33 SECONDS (ref 22–38)
AST SERPL W P-5'-P-CCNC: 13 U/L (ref 0–45)
BACTERIA UR CULT: NORMAL
BILIRUB SERPL-MCNC: 1.1 MG/DL (ref 0.2–1.3)
BLD PROD TYP BPU: NORMAL
BLOOD COMPONENT TYPE: NORMAL
BUN SERPL-MCNC: 16 MG/DL (ref 7–30)
CALCIUM SERPL-MCNC: 7.7 MG/DL (ref 8.5–10.1)
CHLORIDE BLD-SCNC: 108 MMOL/L (ref 94–109)
CO2 SERPL-SCNC: 23 MMOL/L (ref 20–32)
CODING SYSTEM: NORMAL
CREAT SERPL-MCNC: 0.65 MG/DL (ref 0.52–1.04)
DEPRECATED S PYO AG THROAT QL EIA: POSITIVE
ERYTHROCYTE [DISTWIDTH] IN BLOOD BY AUTOMATED COUNT: 12.9 % (ref 10–15)
GFR SERPL CREATININE-BSD FRML MDRD: >90 ML/MIN/1.73M2
GLUCOSE BLD-MCNC: 105 MG/DL (ref 70–99)
HCT VFR BLD AUTO: 24 % (ref 35–47)
HGB BLD-MCNC: 7.8 G/DL (ref 11.7–15.7)
INR PPP: 1.29 (ref 0.85–1.15)
ISSUE DATE AND TIME: NORMAL
LACTATE SERPL-SCNC: 0.7 MMOL/L (ref 0.7–2)
MAGNESIUM SERPL-MCNC: 1.6 MG/DL (ref 1.8–2.6)
MCH RBC QN AUTO: 32 PG (ref 26.5–33)
MCHC RBC AUTO-ENTMCNC: 32.5 G/DL (ref 31.5–36.5)
MCV RBC AUTO: 98 FL (ref 78–100)
PHOSPHATE SERPL-MCNC: 1.5 MG/DL (ref 2.5–4.5)
PLATELET # BLD AUTO: 9 10E3/UL (ref 150–450)
POTASSIUM BLD-SCNC: 3.6 MMOL/L (ref 3.4–5.3)
PROT SERPL-MCNC: 4.9 G/DL (ref 6.8–8.8)
RBC # BLD AUTO: 2.44 10E6/UL (ref 3.8–5.2)
SODIUM SERPL-SCNC: 138 MMOL/L (ref 133–144)
UNIT ABO/RH: NORMAL
UNIT NUMBER: NORMAL
UNIT STATUS: NORMAL
UNIT TYPE ISBT: 6200
WBC # BLD AUTO: <0.1 10E3/UL (ref 4–11)

## 2022-02-11 PROCEDURE — 80053 COMPREHEN METABOLIC PANEL: CPT | Performed by: STUDENT IN AN ORGANIZED HEALTH CARE EDUCATION/TRAINING PROGRAM

## 2022-02-11 PROCEDURE — 97530 THERAPEUTIC ACTIVITIES: CPT | Mod: GO

## 2022-02-11 PROCEDURE — 83735 ASSAY OF MAGNESIUM: CPT | Performed by: INTERNAL MEDICINE

## 2022-02-11 PROCEDURE — 85027 COMPLETE CBC AUTOMATED: CPT | Performed by: STUDENT IN AN ORGANIZED HEALTH CARE EDUCATION/TRAINING PROGRAM

## 2022-02-11 PROCEDURE — 85610 PROTHROMBIN TIME: CPT | Performed by: INTERNAL MEDICINE

## 2022-02-11 PROCEDURE — 250N000013 HC RX MED GY IP 250 OP 250 PS 637: Performed by: INTERNAL MEDICINE

## 2022-02-11 PROCEDURE — 83605 ASSAY OF LACTIC ACID: CPT | Performed by: INTERNAL MEDICINE

## 2022-02-11 PROCEDURE — 87880 STREP A ASSAY W/OPTIC: CPT | Performed by: STUDENT IN AN ORGANIZED HEALTH CARE EDUCATION/TRAINING PROGRAM

## 2022-02-11 PROCEDURE — 250N000013 HC RX MED GY IP 250 OP 250 PS 637: Performed by: HOSPITALIST

## 2022-02-11 PROCEDURE — 83735 ASSAY OF MAGNESIUM: CPT | Performed by: STUDENT IN AN ORGANIZED HEALTH CARE EDUCATION/TRAINING PROGRAM

## 2022-02-11 PROCEDURE — 84100 ASSAY OF PHOSPHORUS: CPT | Performed by: PHYSICIAN ASSISTANT

## 2022-02-11 PROCEDURE — 250N000013 HC RX MED GY IP 250 OP 250 PS 637: Performed by: STUDENT IN AN ORGANIZED HEALTH CARE EDUCATION/TRAINING PROGRAM

## 2022-02-11 PROCEDURE — 206N000001 HC R&B BMT UMMC

## 2022-02-11 PROCEDURE — 250N000011 HC RX IP 250 OP 636: Performed by: PHYSICIAN ASSISTANT

## 2022-02-11 PROCEDURE — 74177 CT ABD & PELVIS W/CONTRAST: CPT | Mod: 26 | Performed by: RADIOLOGY

## 2022-02-11 PROCEDURE — 97535 SELF CARE MNGMENT TRAINING: CPT | Mod: GO

## 2022-02-11 PROCEDURE — G1004 CDSM NDSC: HCPCS | Mod: GC | Performed by: RADIOLOGY

## 2022-02-11 PROCEDURE — 250N000011 HC RX IP 250 OP 636: Performed by: INTERNAL MEDICINE

## 2022-02-11 PROCEDURE — 87040 BLOOD CULTURE FOR BACTERIA: CPT | Performed by: PHYSICIAN ASSISTANT

## 2022-02-11 PROCEDURE — 258N000003 HC RX IP 258 OP 636: Performed by: EMERGENCY MEDICINE

## 2022-02-11 PROCEDURE — 97166 OT EVAL MOD COMPLEX 45 MIN: CPT | Mod: GO

## 2022-02-11 PROCEDURE — 250N000011 HC RX IP 250 OP 636: Performed by: EMERGENCY MEDICINE

## 2022-02-11 PROCEDURE — 99233 SBSQ HOSP IP/OBS HIGH 50: CPT | Mod: FS | Performed by: INTERNAL MEDICINE

## 2022-02-11 PROCEDURE — P9037 PLATE PHERES LEUKOREDU IRRAD: HCPCS | Performed by: HOSPITALIST

## 2022-02-11 PROCEDURE — 250N000013 HC RX MED GY IP 250 OP 250 PS 637: Performed by: PHYSICIAN ASSISTANT

## 2022-02-11 PROCEDURE — 71260 CT THORAX DX C+: CPT | Mod: 26 | Performed by: RADIOLOGY

## 2022-02-11 PROCEDURE — 258N000003 HC RX IP 258 OP 636: Performed by: PHYSICIAN ASSISTANT

## 2022-02-11 PROCEDURE — 250N000011 HC RX IP 250 OP 636: Performed by: STUDENT IN AN ORGANIZED HEALTH CARE EDUCATION/TRAINING PROGRAM

## 2022-02-11 PROCEDURE — 74177 CT ABD & PELVIS W/CONTRAST: CPT | Mod: MG

## 2022-02-11 PROCEDURE — 87040 BLOOD CULTURE FOR BACTERIA: CPT | Performed by: STUDENT IN AN ORGANIZED HEALTH CARE EDUCATION/TRAINING PROGRAM

## 2022-02-11 PROCEDURE — 85730 THROMBOPLASTIN TIME PARTIAL: CPT | Performed by: STUDENT IN AN ORGANIZED HEALTH CARE EDUCATION/TRAINING PROGRAM

## 2022-02-11 RX ORDER — MAGNESIUM OXIDE 400 MG/1
400 TABLET ORAL 2 TIMES DAILY
Status: COMPLETED | OUTPATIENT
Start: 2022-02-11 | End: 2022-02-12

## 2022-02-11 RX ORDER — GUAIFENESIN AND DEXTROMETHORPHAN HYDROBROMIDE 600; 30 MG/1; MG/1
1 TABLET, EXTENDED RELEASE ORAL 2 TIMES DAILY PRN
Status: DISCONTINUED | OUTPATIENT
Start: 2022-02-11 | End: 2022-02-26 | Stop reason: HOSPADM

## 2022-02-11 RX ORDER — DEXTROSE MONOHYDRATE 50 MG/ML
10-20 INJECTION, SOLUTION INTRAVENOUS
Status: DISCONTINUED | OUTPATIENT
Start: 2022-02-11 | End: 2022-02-18

## 2022-02-11 RX ORDER — OXYCODONE HYDROCHLORIDE 10 MG/1
10 TABLET ORAL EVERY 8 HOURS PRN
Status: DISCONTINUED | OUTPATIENT
Start: 2022-02-11 | End: 2022-02-13

## 2022-02-11 RX ORDER — IOPAMIDOL 755 MG/ML
70 INJECTION, SOLUTION INTRAVASCULAR ONCE
Status: COMPLETED | OUTPATIENT
Start: 2022-02-11 | End: 2022-02-11

## 2022-02-11 RX ORDER — VENLAFAXINE 75 MG/1
75 TABLET ORAL 3 TIMES DAILY
Status: DISCONTINUED | OUTPATIENT
Start: 2022-02-11 | End: 2022-02-11

## 2022-02-11 RX ORDER — METHOCARBAMOL 500 MG/1
500 TABLET, FILM COATED ORAL 3 TIMES DAILY PRN
Status: DISCONTINUED | OUTPATIENT
Start: 2022-02-11 | End: 2022-02-26 | Stop reason: HOSPADM

## 2022-02-11 RX ORDER — POTASSIUM CHLORIDE 750 MG/1
10 TABLET, EXTENDED RELEASE ORAL ONCE
Status: COMPLETED | OUTPATIENT
Start: 2022-02-11 | End: 2022-02-11

## 2022-02-11 RX ADMIN — POTASSIUM CHLORIDE 10 MEQ: 750 TABLET, EXTENDED RELEASE ORAL at 12:14

## 2022-02-11 RX ADMIN — POTASSIUM & SODIUM PHOSPHATES POWDER PACK 280-160-250 MG 2 PACKET: 280-160-250 PACK at 14:09

## 2022-02-11 RX ADMIN — PANTOPRAZOLE SODIUM 40 MG: 40 TABLET, DELAYED RELEASE ORAL at 08:10

## 2022-02-11 RX ADMIN — ACYCLOVIR 800 MG: 800 TABLET ORAL at 10:05

## 2022-02-11 RX ADMIN — ATORVASTATIN CALCIUM 40 MG: 20 TABLET, FILM COATED ORAL at 08:11

## 2022-02-11 RX ADMIN — METOPROLOL SUCCINATE 25 MG: 25 TABLET, EXTENDED RELEASE ORAL at 08:11

## 2022-02-11 RX ADMIN — OXYCODONE HYDROCHLORIDE 10 MG: 10 TABLET ORAL at 10:09

## 2022-02-11 RX ADMIN — BUPRENORPHINE AND NALOXONE 1 FILM: 8; 2 FILM, SOLUBLE BUCCAL; SUBLINGUAL at 08:10

## 2022-02-11 RX ADMIN — VENLAFAXINE 75 MG: 75 TABLET ORAL at 18:23

## 2022-02-11 RX ADMIN — ALLOPURINOL 300 MG: 300 TABLET ORAL at 08:11

## 2022-02-11 RX ADMIN — VENLAFAXINE 75 MG: 75 TABLET ORAL at 12:14

## 2022-02-11 RX ADMIN — POTASSIUM & SODIUM PHOSPHATES POWDER PACK 280-160-250 MG 2 PACKET: 280-160-250 PACK at 21:21

## 2022-02-11 RX ADMIN — AMLODIPINE BESYLATE 5 MG: 5 TABLET ORAL at 08:10

## 2022-02-11 RX ADMIN — ACETAMINOPHEN 650 MG: 325 TABLET, FILM COATED ORAL at 04:48

## 2022-02-11 RX ADMIN — VANCOMYCIN HYDROCHLORIDE 750 MG: 1 INJECTION, POWDER, LYOPHILIZED, FOR SOLUTION INTRAVENOUS at 15:57

## 2022-02-11 RX ADMIN — MAGNESIUM OXIDE TAB 400 MG (241.3 MG ELEMENTAL MG) 400 MG: 400 (241.3 MG) TAB at 21:22

## 2022-02-11 RX ADMIN — FILGRASTIM 780 MCG: 480 INJECTION, SOLUTION INTRAVENOUS; SUBCUTANEOUS at 21:20

## 2022-02-11 RX ADMIN — CEFEPIME HYDROCHLORIDE 2 G: 2 INJECTION, POWDER, FOR SOLUTION INTRAVENOUS at 23:09

## 2022-02-11 RX ADMIN — CEFEPIME HYDROCHLORIDE 2 G: 2 INJECTION, POWDER, FOR SOLUTION INTRAVENOUS at 14:07

## 2022-02-11 RX ADMIN — FLUTICASONE FUROATE AND VILANTEROL TRIFENATATE 1 PUFF: 100; 25 POWDER RESPIRATORY (INHALATION) at 08:39

## 2022-02-11 RX ADMIN — VENLAFAXINE 75 MG: 75 TABLET ORAL at 08:10

## 2022-02-11 RX ADMIN — VANCOMYCIN HYDROCHLORIDE 750 MG: 1 INJECTION, POWDER, LYOPHILIZED, FOR SOLUTION INTRAVENOUS at 04:48

## 2022-02-11 RX ADMIN — LORATADINE 10 MG: 10 TABLET ORAL at 08:11

## 2022-02-11 RX ADMIN — BUPRENORPHINE AND NALOXONE 1 FILM: 8; 2 FILM, SOLUBLE BUCCAL; SUBLINGUAL at 21:23

## 2022-02-11 RX ADMIN — ACETAMINOPHEN 650 MG: 325 TABLET, FILM COATED ORAL at 21:21

## 2022-02-11 RX ADMIN — FLUCONAZOLE 200 MG: 200 TABLET ORAL at 08:10

## 2022-02-11 RX ADMIN — ACYCLOVIR 800 MG: 800 TABLET ORAL at 21:21

## 2022-02-11 RX ADMIN — IOPAMIDOL 70 ML: 755 INJECTION, SOLUTION INTRAVENOUS at 10:33

## 2022-02-11 RX ADMIN — CEFEPIME HYDROCHLORIDE 2 G: 2 INJECTION, POWDER, FOR SOLUTION INTRAVENOUS at 08:09

## 2022-02-11 RX ADMIN — LEVOTHYROXINE SODIUM 112 MCG: 0.11 TABLET ORAL at 08:11

## 2022-02-11 RX ADMIN — DEXTROSE MONOHYDRATE 20 ML: 50 INJECTION, SOLUTION INTRAVENOUS at 21:20

## 2022-02-11 RX ADMIN — SODIUM CHLORIDE 1000 ML: 9 INJECTION, SOLUTION INTRAVENOUS at 10:07

## 2022-02-11 RX ADMIN — MAGNESIUM OXIDE TAB 400 MG (241.3 MG ELEMENTAL MG) 400 MG: 400 (241.3 MG) TAB at 12:14

## 2022-02-11 RX ADMIN — DEXTROSE MONOHYDRATE 20 ML: 50 INJECTION, SOLUTION INTRAVENOUS at 21:21

## 2022-02-11 ASSESSMENT — ACTIVITIES OF DAILY LIVING (ADL)
ADLS_ACUITY_SCORE: 16
ADLS_ACUITY_SCORE: 10
ADLS_ACUITY_SCORE: 10
ADLS_ACUITY_SCORE: 16
ADLS_ACUITY_SCORE: 10
ADLS_ACUITY_SCORE: 10
ADLS_ACUITY_SCORE: 16
ADLS_ACUITY_SCORE: 10
ADLS_ACUITY_SCORE: 15
ADLS_ACUITY_SCORE: 10
ADLS_ACUITY_SCORE: 16
ADLS_ACUITY_SCORE: 16
ADLS_ACUITY_SCORE: 10
ADLS_ACUITY_SCORE: 16
ADLS_ACUITY_SCORE: 10
ADLS_ACUITY_SCORE: 10
ADLS_ACUITY_SCORE: 16
ADLS_ACUITY_SCORE: 10
ADLS_ACUITY_SCORE: 10
ADLS_ACUITY_SCORE: 16
ADLS_ACUITY_SCORE: 16

## 2022-02-11 ASSESSMENT — MIFFLIN-ST. JEOR: SCORE: 1127.35

## 2022-02-11 NOTE — PHARMACY-ADMISSION MEDICATION HISTORY
"  Admission Medication History Completed by Pharmacy    See [facility] Admission Navigator for allergy information, preferred outpatient pharmacy, prior to admission medications and immunization status.     Medication History Sources:     Patient, pharmacy dispense records    Changes made to PTA medication list (reason):    Added: methocarbamol (filled Jan 2022 and pt reports taking PRN)    Deleted: none    Changed: venlafaxine (changed from 75 mg ER capsule daily to 75 mg IR tablet TID based on fill records; pt also seemed to think she takes this TID rather than one long-acting capsule daily)    Additional Information:    Patient was somnolent during interview. Had to corroborate patient interview with pharmacy dispense records.    Patient reports she had her \"morning meds\" 2/9.     Suboxone: pt was unclear about this medication. She said she doesn't always take this three times a day. However; fill records demonstrate very regularly fills that add up to patient taking TID. Perhaps patient was confused.     Prior to Admission medications    Medication Sig Last Dose Taking? Auth Provider   acetaminophen (TYLENOL) 500 MG tablet Take 500-1,000 mg by mouth every 6 hours as needed for mild pain   at PRN Yes Reported, Patient   acyclovir (ZOVIRAX) 800 MG tablet Take 1 tablet (800 mg) by mouth every 12 hours Past Week at Unknown time Yes Yuniel Tineo   albuterol (PROAIR HFA/PROVENTIL HFA/VENTOLIN HFA) 108 (90 Base) MCG/ACT inhaler Inhale 2 puffs into the lungs every 6 hours as needed for shortness of breath / dyspnea   at PRN Yes Reported, Patient   allopurinol (ZYLOPRIM) 300 MG tablet Take 1 tablet (300 mg) by mouth daily Past Week at Unknown time Yes Yuniel Tineo   amLODIPine (NORVASC) 5 MG tablet Take 1 tablet (5 mg) by mouth daily Past Week at Unknown time Yes Stephie Ayers PA-C   aspirin (ASA) 81 MG chewable tablet HOLD due to low platelets NOT TAKING ASPIRIN Yes Stephie Ayers PA-C "   atorvastatin (LIPITOR) 40 MG tablet Take 40 mg by mouth daily  Past Week at Unknown time Yes Reported, Patient   buprenorphine HCl-naloxone HCl (SUBOXONE) 8-2 MG per film Place 1 Film under the tongue 3 times daily  2/9/2022 at Unknown time Yes Reported, Patient   cholecalciferol (VITAMIN D-1000 MAX ST) 25 MCG (1000 UT) TABS Take 1,000 Units by mouth daily  Past Week at Unknown time Yes Reported, Patient   fluconazole (DIFLUCAN) 200 MG tablet Take 1 tablet (200 mg) by mouth daily Past Week at Unknown time Yes Yuniel Tineo   Fluticasone-Umeclidin-Vilanterol (TRELEGY ELLIPTA) 100-62.5-25 MCG/INH oral inhaler Inhale 1 puff into the lungs daily  Past Week at Unknown time Yes Reported, Patient   levofloxacin (LEVAQUIN) 250 MG tablet Take 1 tablet (250 mg) by mouth daily Past Week at Unknown time Yes Yuniel Tineo   levothyroxine (SYNTHROID/LEVOTHROID) 112 MCG tablet Take 112 mcg by mouth daily  Past Week at Unknown time Yes Reported, Patient   loratadine (CLARITIN) 10 MG tablet Take 1 tablet (10 mg) by mouth daily Past Week at Unknown time Yes Jaqueline Israel MD   methocarbamol (ROBAXIN) 500 MG tablet Take 500 mg by mouth 3 times daily as needed for muscle spasms  at PRN Yes Unknown, Entered By History   metoprolol succinate ER (TOPROL-XL) 25 MG 24 hr tablet Take 25 mg by mouth daily  Past Week at Unknown time Yes Reported, Patient   ondansetron (ZOFRAN) 8 MG tablet Take 1 tablet (8 mg) by mouth every 8 hours as needed for nausea  at PRN Yes Yuniel Tineo   ondansetron (ZOFRAN-ODT) 4 MG ODT tab Take 1 tablet (4 mg) by mouth every 8 hours as needed for nausea  at PRN Yes Jaqueline Israel MD   oxyCODONE IR (ROXICODONE) 10 MG tablet Take 10 mg by mouth every 8 hours as needed for severe pain   at PRN Yes Reported, Patient   prochlorperazine (COMPAZINE) 5 MG tablet Take 1 tablet (5 mg) by mouth every 6 hours as needed for nausea or vomiting  at PRN Yes Yuniel Tineo   venlafaxine (EFFEXOR) 75 MG tablet Take 75 mg  by mouth 3 times daily Past Week at Unknown time Yes Unknown, Entered By History   pantoprazole (PROTONIX) 40 MG EC tablet Take 1 tablet (40 mg) by mouth daily  Patient not taking: Reported on 2/10/2022 Not Taking at Unknown time  Stephie Ayers PA-C     Date completed: 02/10/22    Medication history completed by: Sarita Cuevas [facility]
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
## 2022-02-11 NOTE — PROGRESS NOTES
Patient admitted to:  BMT 5419 at approximately 2150  Admitted from: ED 16  Arrived by: Wheelchair  Reason for admission: Neutropenic fevers  Patient accompanied by: Self  Belongings: Cell phone, tote bag, clothing, wallet, purse  Teaching: Oriented to unit  Skin double check completed by: Angelica AGUIRRE RN    Small scratch on R foot and lower R shin.  Bandage on lower L abdomen from an injection.    Otherwise skin looks great!

## 2022-02-11 NOTE — PLAN OF CARE
"Temp: 100  F (37.8  C) Temp src: Oral BP: 134/62 Pulse: 99   Resp: 18 SpO2: 98 % O2 Device: None (Room air)       Pt disoriented to situation and time (President: Wing, but pt got month and year correct).  Pt's attention and tracking of conversations limited.  At times, pt unable to answer how much she was in pain (0-10) or quantity (low/medium/high).  Pt asked odd questions or said irrelevant statements at times (\"Is that lotion you are giving me? When RN was hanging IV abx).  Sister also commented in pt's AMS and 'acting goofy'.  Tmax 100. Pt denied chills or rigors.  Blood culture from port obtained.  Pt denies SOB, nausea, or constipation.  CT of C/A/P completed.  1L bolus given before CT.  Pt c/o chronic low back pain that radiates up back and into hips.  Scheduled Suboxone given 1x, denied afternoon does.  PRN oxycodone given 1x.  Appetite poor.  Does not call appropriately; bed alarm on for SBA and GB.        Problem: Pain Acute  Goal: Acceptable Pain Control and Functional Ability  Outcome: Improving  Intervention: Develop Pain Management Plan  Recent Flowsheet Documentation  Taken 2/11/2022 1009 by Bhumi Casanova RN  Pain Management Interventions:   medication (see MAR)   MD notified (comment)  Taken 2/11/2022 0810 by Bhumi Casanova, RN  Pain Management Interventions: (Oxycodone PRN requested)   medication (see MAR)   MD notified (comment)     Problem: Adult Inpatient Plan of Care  Goal: Plan of Care Review  Outcome: Improving  Goal: Patient-Specific Goal (Individualized)  Outcome: Improving  Goal: Absence of Hospital-Acquired Illness or Injury  Outcome: Improving  Intervention: Identify and Manage Fall Risk  Recent Flowsheet Documentation  Taken 2/11/2022 0810 by Bhumi Casanova RN  Safety Promotion/Fall Prevention:   bed alarm on   assistive device/personal items within reach   activity supervised   clutter free environment maintained   fall prevention program maintained   increased rounding and " observation   lighting adjusted   nonskid shoes/slippers when out of bed   safety round/check completed   supervised activity   toileting scheduled  Intervention: Prevent Skin Injury  Recent Flowsheet Documentation  Taken 2/11/2022 0810 by Bhumi Casanova RN  Body Position: position changed independently  Goal: Optimal Comfort and Wellbeing  Outcome: Improving  Goal: Readiness for Transition of Care  Outcome: Improving

## 2022-02-11 NOTE — PROVIDER NOTIFICATION
"Provider paged @ [phone]    \"Pt. has a temp of 102.3. Blood cultures drawn & Tylenol given. BP also elevated to 173/80. Is asymptomatic. How to proceed? Thanks!\"  "

## 2022-02-11 NOTE — PLAN OF CARE
"Arrived to our unit at approximately 2150. Hypertensive (173/80). Tachycardic (102-112). Tmax 102.3. Tylenol x2 for fevers. Blood cultures drawn. Independent in room. Alert & oriented x3; disoriented to time, thought today was 02/02. PO Oxy x1 for \"all over\" pain. Adequate intake & output. No BM overnight. Still need C. Diff sample. Platelets at a 9 with AM labs, will transfuse as soon as they arrive on the unit. Will continue plan of care.    Problem: Adult Inpatient Plan of Care  Goal: Plan of Care Review  Outcome: No Change  Goal: Patient-Specific Goal (Individualized)  Outcome: No Change  Goal: Absence of Hospital-Acquired Illness or Injury  Outcome: No Change  Intervention: Identify and Manage Fall Risk  Recent Flowsheet Documentation  Taken 2/10/2022 2300 by Barrington Thomas, RN  Safety Promotion/Fall Prevention:   assistive device/personal items within reach   clutter free environment maintained   fall prevention program maintained   lighting adjusted   nonskid shoes/slippers when out of bed   patient and family education   room organization consistent   safety round/check completed  Intervention: Prevent Skin Injury  Recent Flowsheet Documentation  Taken 2/10/2022 2300 by Barrington Thomas, RN  Body Position: position changed independently  Goal: Optimal Comfort and Wellbeing  Outcome: No Change  Goal: Readiness for Transition of Care  Outcome: No Change  Intervention: Mutually Develop Transition Plan  Recent Flowsheet Documentation  Taken 2/10/2022 2200 by Barrington Thomas, RN  Equipment Currently Used at Home: shower chair     Problem: Pain Acute  Goal: Acceptable Pain Control and Functional Ability  Outcome: No Change     "

## 2022-02-11 NOTE — PROGRESS NOTES
"   02/11/22 1134   Living Environment   People in home spouse   Current Living Arrangements house  ((townhouse))   Home Accessibility stairs within home;stairs to enter home   Number of Stairs, Main Entrance 4   Stair Railings, Main Entrance none   Transportation Anticipated family or friend will provide   Living Environment Comments Pt lives in a townhouse with her spouse. pt reporting stairs within home, however unclear number or railings. Pt reporting tub shower with shower chair. Spouse is able to A PRN.    Self-Care   Usual Activity Tolerance good   Current Activity Tolerance moderate   Regular Exercise Yes   Activity/Exercise Type other (see comments)   Equipment Currently Used at Home shower chair   Activity/Exercise/Self-Care Comment Pt reporting weekly PT appointments for strength, balance, and vision. Pt IND-mod I at baseline, denying any more \"difficulty\" recently with ADLs, however also endorsing fatigue with \"busy cancer appt schedule\"   Instrumental Activities of Daily Living (IADL)   IADL Comments Spouse A    Disability/Function   Hearing Difficulty or Deaf no   Wear Glasses or Blind yes   Vision Management readers, pt reporting diplopia   Concentrating, Remembering or Making Decisions Difficulty yes   Concentration Management Memory and concentration - increased after chemo   Difficulty Communicating no   Difficulty Eating/Swallowing no   Walking or Climbing Stairs Difficulty yes   Walking or Climbing Stairs stair climbing difficulty, requires equipment   Mobility Management Relies on railing, Spouse   Dressing/Bathing Difficulty yes   Dressing/Bathing bathing difficulty, requires equipment   Dressing/Bathing Management shower chair   Toileting issues no   Doing Errands Independently Difficulty (such as shopping) no   Fall history within last six months yes   Number of times patient has fallen within last six months   (Pt not specifying number, endorsing many falls )   Change in Functional Status " Since Onset of Current Illness/Injury yes   General Information   Onset of Illness/Injury or Date of Surgery 02/10/22   Referring Physician Clayton Mar MD   Additional Occupational Profile Info/Pertinent History of Current Problem Per chart Libby Grimaldo is a 72 yo woman D+10 s/p Cytoxan chemo priming prior to planned auto PSBCT for MM.  She is being admitted for neutropenic fever.   Existing Precautions/Restrictions fall;spinal   Cognitive Status Examination   Orientation Status orientation to person, place and time   Affect/Mental Status (Cognitive) confused   Follows Commands follows one-step commands;75-90% accuracy   Safety Deficit minimal deficit;impulsivity;awareness of need for assistance;insight into deficits/self-awareness   Memory Deficit minimal deficit   Attention Deficit minimal deficit;concentration;distractible in noisy environment   Cognitive Status Comments Pt noted to have difficulty with attention to conversation with Th when distractions present. Pt also forgetful throughout session, and at times lacking awareness of surroundings. Per pt, some vision deficits, maybe attributing to confusion/lack of awareness of surroundings.    Visual Perception   Visual Impairment/Limitations diplopia   Impact of Vision Impairment on Function (Vision) hx of Graves disease, occasional diplopia    Posture   Posture forward head position;protracted shoulders   Range of Motion Comprehensive   General Range of Motion bilateral upper extremity ROM WFL   Strength Comprehensive (MMT)   Comment, General Manual Muscle Testing (MMT) Assessment Not formally tested, per observations WFL, though likely generalized weakness   Coordination   Upper Extremity Coordination No deficits were identified   Bed Mobility   Bed Mobility sit-supine   Sit-Supine [place] (Bed Mobility) modified independence   Comment (Bed Mobility) mod I via logroll    Transfers   Transfers sit-stand transfer   Sit-Stand Transfer   Sit-Stand  [place] (Transfers) supervision   Sit/Stand Transfer Comments Close SBA    Balance   Balance Comments Pt with 1-2 slight LOB during short distance ambulation, noted to reach for IV pole for increased steadiness, whereas initially declining use of IV pole. Pt also with frequent path deviations when ambulating in hallway.    Activities of Daily Living   BADL Assessment upper body dressing;grooming   Upper Body Dressing Assessment   [place] Level (Upper Body Dressing) set up   Position (Upper Body Dressing) edge of bed sitting   Grooming Assessment   [place] Level (Grooming) set up;verbal cues   Clinical Impression   Criteria for Skilled Therapeutic Interventions Met (OT) yes;meets criteria   OT Diagnosis Pt is below baseline for ADLs    OT Problem List-Impairments impacting ADL activity tolerance impaired;cognition;pain   Assessment of Occupational Performance 3-5 Performance Deficits   Identified Performance Deficits bathing, functional mobility, home management   Planned Therapy Interventions (OT) ADL retraining;IADL retraining;cognition;strengthening;home program guidelines;progressive activity/exercise   Clinical Decision Making Complexity (OT) moderate complexity   Therapy Frequency (OT) 5x/week   Predicted Duration of Therapy 5 days    Anticipated Equipment Needs Upon Discharge (OT) walker, rolling;walker, standard   Risk & Benefits of therapy have been explained evaluation/treatment results reviewed;care plan/treatment goals reviewed;risks/benefits reviewed;current/potential barriers reviewed;patient;participants included;participants voiced agreement with care plan   Comment-Clinical Impression Pt presents below baseline, limited by activity tolerance and some cognitive deficits impacting safety with ADLs/IADLs.    OT Discharge Planning    OT Discharge Recommendation (DC Rec) home with home care occupational therapy;Home with assist;Transitional Care Facility   OT Rationale for DC Rec Home v.  TCU. Pt with good support at home, may be safe to return home with 24/7 A. Pt with frequent falls at home and may benefit from AD (defer to PT recs) and HH OT/PT. Pt may lack some insight into deficits impacting her safety ambulating and completing ADLs at home.   OT Brief overview of current status  Ax1 w/GB and IV pole    Total Evaluation Time (Minutes)   Total Evaluation Time (Minutes) 7

## 2022-02-11 NOTE — PROVIDER NOTIFICATION
"MD paged a [phone] re, \"ED admit on floor. Requesting oxycodone for back/hip pain. has been taking at home. Also can r/o c. diff for BMT unit be ordered?\"   "

## 2022-02-11 NOTE — PROVIDER NOTIFICATION
"Provider paged @ [phone]    \"Pt. Platelet at a 9 with AM labs. Need transfusion parameters & a blood consent form filled out/signed. Thanks!\"  " Patient is a 57y old  Female who presents with a chief complaint of Sepsis/septic shock, ?HRS, CRS, Liver failure, severe HAGMA (19 Jul 2018 19:54)      INTERVAL HPI/OVERNIGHT EVENTS:  no overnight events, denies sob or orthopnea     MEDICATIONS  (STANDING):  aspirin  chewable 81 milliGRAM(s) Oral daily  furosemide    Tablet 40 milliGRAM(s) Oral daily  midodrine 2.5 milliGRAM(s) Oral three times a day  simethicone 80 milliGRAM(s) Chew daily    MEDICATIONS  (PRN):        Allergies    penicillin (Unknown)    Intolerances    Milk (Other)      REVIEW OF SYSTEMS:  difficult to assess as not a reliable historian but offers no new physical complaints other than what is stated in hpi           PHYSICAL EXAM:  GENERAL: NAD,   HEAD:  Atraumatic, Normocephalic  EYES: EOMI, PERRLA, conjunctiva and sclera clear  ENMT: No tonsillar erythema, exudates, or enlargement; Moist mucous membranes, poor dentition  NECK: Supple, No JVD, Normal thyroid  NERVOUS SYSTEM:  Alert , Good concentration; Motor Strength 5/5 B/L upper and lower extremities  CHEST/LUNG: decreased breath sounds in the left base; no distress, No rales, rhonchi, wheezing, or rubs  HEART: Regular rate and rhythm; No murmurs, rubs, or gallops  ABDOMEN: Soft, Nontender, Nondistended; Bowel sounds present  EXTREMITIES:  2+ Peripheral Pulses, No clubbing, cyanosis, or edema  LYMPH: No lymphadenopathy noted  SKIN: No rashes or lesions    LABS:                                             CAPILLARY BLOOD GLUCOSE          RADIOLOGY & ADDITIONAL TESTS:    Imaging Personally Reviewed:  [x ] YES  [ ] NO    Consultant(s) Notes Reviewed:  [x ] YES  [ ] NO    Care Discussed with Consultants/Other Providers [x ] YES  [ ] NO

## 2022-02-12 LAB
ANION GAP SERPL CALCULATED.3IONS-SCNC: 10 MMOL/L (ref 3–14)
BLD PROD TYP BPU: NORMAL
BLOOD COMPONENT TYPE: NORMAL
BUN SERPL-MCNC: 13 MG/DL (ref 7–30)
CALCIUM SERPL-MCNC: 7.5 MG/DL (ref 8.5–10.1)
CHLORIDE BLD-SCNC: 108 MMOL/L (ref 94–109)
CO2 SERPL-SCNC: 21 MMOL/L (ref 20–32)
CODING SYSTEM: NORMAL
CREAT SERPL-MCNC: 0.76 MG/DL (ref 0.52–1.04)
CROSSMATCH: NORMAL
DACRYOCYTES BLD QL SMEAR: SLIGHT
ERYTHROCYTE [DISTWIDTH] IN BLOOD BY AUTOMATED COUNT: 13.1 % (ref 10–15)
GFR SERPL CREATININE-BSD FRML MDRD: 84 ML/MIN/1.73M2
GLUCOSE BLD-MCNC: 98 MG/DL (ref 70–99)
HCT VFR BLD AUTO: 20.1 % (ref 35–47)
HGB BLD-MCNC: 6.6 G/DL (ref 11.7–15.7)
ISSUE DATE AND TIME: NORMAL
MAGNESIUM SERPL-MCNC: 1.5 MG/DL (ref 1.8–2.6)
MAGNESIUM SERPL-MCNC: 1.7 MG/DL (ref 1.8–2.6)
MCH RBC QN AUTO: 32 PG (ref 26.5–33)
MCHC RBC AUTO-ENTMCNC: 32.8 G/DL (ref 31.5–36.5)
MCV RBC AUTO: 98 FL (ref 78–100)
PHOSPHATE SERPL-MCNC: 1.2 MG/DL (ref 2.5–4.5)
PHOSPHATE SERPL-MCNC: 1.3 MG/DL (ref 2.5–4.5)
PLAT MORPH BLD: ABNORMAL
PLATELET # BLD AUTO: 28 10E3/UL (ref 150–450)
POTASSIUM BLD-SCNC: 3.2 MMOL/L (ref 3.4–5.3)
POTASSIUM BLD-SCNC: 3.6 MMOL/L (ref 3.4–5.3)
RBC # BLD AUTO: 2.06 10E6/UL (ref 3.8–5.2)
RBC MORPH BLD: ABNORMAL
SODIUM SERPL-SCNC: 139 MMOL/L (ref 133–144)
UNIT ABO/RH: NORMAL
UNIT NUMBER: NORMAL
UNIT STATUS: NORMAL
UNIT TYPE ISBT: 5100
WBC # BLD AUTO: 0.1 10E3/UL (ref 4–11)

## 2022-02-12 PROCEDURE — 250N000011 HC RX IP 250 OP 636: Performed by: EMERGENCY MEDICINE

## 2022-02-12 PROCEDURE — 87081 CULTURE SCREEN ONLY: CPT | Performed by: STUDENT IN AN ORGANIZED HEALTH CARE EDUCATION/TRAINING PROGRAM

## 2022-02-12 PROCEDURE — 250N000013 HC RX MED GY IP 250 OP 250 PS 637: Performed by: STUDENT IN AN ORGANIZED HEALTH CARE EDUCATION/TRAINING PROGRAM

## 2022-02-12 PROCEDURE — 80048 BASIC METABOLIC PNL TOTAL CA: CPT | Performed by: STUDENT IN AN ORGANIZED HEALTH CARE EDUCATION/TRAINING PROGRAM

## 2022-02-12 PROCEDURE — 250N000013 HC RX MED GY IP 250 OP 250 PS 637: Performed by: PHYSICIAN ASSISTANT

## 2022-02-12 PROCEDURE — 250N000011 HC RX IP 250 OP 636: Performed by: STUDENT IN AN ORGANIZED HEALTH CARE EDUCATION/TRAINING PROGRAM

## 2022-02-12 PROCEDURE — 250N000011 HC RX IP 250 OP 636: Performed by: INTERNAL MEDICINE

## 2022-02-12 PROCEDURE — 250N000013 HC RX MED GY IP 250 OP 250 PS 637: Performed by: INTERNAL MEDICINE

## 2022-02-12 PROCEDURE — 84100 ASSAY OF PHOSPHORUS: CPT | Performed by: PHYSICIAN ASSISTANT

## 2022-02-12 PROCEDURE — 86922 COMPATIBILITY TEST ANTIGLOB: CPT | Performed by: HOSPITALIST

## 2022-02-12 PROCEDURE — 258N000003 HC RX IP 258 OP 636: Performed by: INTERNAL MEDICINE

## 2022-02-12 PROCEDURE — 258N000003 HC RX IP 258 OP 636: Performed by: EMERGENCY MEDICINE

## 2022-02-12 PROCEDURE — 87040 BLOOD CULTURE FOR BACTERIA: CPT | Performed by: STUDENT IN AN ORGANIZED HEALTH CARE EDUCATION/TRAINING PROGRAM

## 2022-02-12 PROCEDURE — 85027 COMPLETE CBC AUTOMATED: CPT | Performed by: STUDENT IN AN ORGANIZED HEALTH CARE EDUCATION/TRAINING PROGRAM

## 2022-02-12 PROCEDURE — 250N000011 HC RX IP 250 OP 636: Performed by: PHYSICIAN ASSISTANT

## 2022-02-12 PROCEDURE — 83735 ASSAY OF MAGNESIUM: CPT | Performed by: INTERNAL MEDICINE

## 2022-02-12 PROCEDURE — P9040 RBC LEUKOREDUCED IRRADIATED: HCPCS | Performed by: INTERNAL MEDICINE

## 2022-02-12 PROCEDURE — 84132 ASSAY OF SERUM POTASSIUM: CPT | Performed by: PHYSICIAN ASSISTANT

## 2022-02-12 PROCEDURE — 99233 SBSQ HOSP IP/OBS HIGH 50: CPT | Mod: FS | Performed by: INTERNAL MEDICINE

## 2022-02-12 PROCEDURE — 84100 ASSAY OF PHOSPHORUS: CPT | Performed by: INTERNAL MEDICINE

## 2022-02-12 PROCEDURE — 86901 BLOOD TYPING SEROLOGIC RH(D): CPT | Performed by: STUDENT IN AN ORGANIZED HEALTH CARE EDUCATION/TRAINING PROGRAM

## 2022-02-12 PROCEDURE — 258N000003 HC RX IP 258 OP 636: Performed by: PHYSICIAN ASSISTANT

## 2022-02-12 PROCEDURE — 250N000013 HC RX MED GY IP 250 OP 250 PS 637: Performed by: HOSPITALIST

## 2022-02-12 PROCEDURE — 206N000001 HC R&B BMT UMMC

## 2022-02-12 PROCEDURE — 250N000009 HC RX 250: Performed by: INTERNAL MEDICINE

## 2022-02-12 RX ORDER — HEPARIN SODIUM,PORCINE 10 UNIT/ML
5-20 VIAL (ML) INTRAVENOUS
Status: DISCONTINUED | OUTPATIENT
Start: 2022-02-12 | End: 2022-02-26 | Stop reason: HOSPADM

## 2022-02-12 RX ORDER — HEPARIN SODIUM (PORCINE) LOCK FLUSH IV SOLN 100 UNIT/ML 100 UNIT/ML
5-10 SOLUTION INTRAVENOUS
Status: DISCONTINUED | OUTPATIENT
Start: 2022-02-12 | End: 2022-02-26 | Stop reason: HOSPADM

## 2022-02-12 RX ORDER — HEPARIN SODIUM,PORCINE 10 UNIT/ML
5-10 VIAL (ML) INTRAVENOUS
Status: DISCONTINUED | OUTPATIENT
Start: 2022-02-12 | End: 2022-02-26 | Stop reason: HOSPADM

## 2022-02-12 RX ORDER — MAGNESIUM SULFATE HEPTAHYDRATE 40 MG/ML
2 INJECTION, SOLUTION INTRAVENOUS ONCE
Status: COMPLETED | OUTPATIENT
Start: 2022-02-12 | End: 2022-02-12

## 2022-02-12 RX ORDER — HEPARIN SODIUM,PORCINE 10 UNIT/ML
5-20 VIAL (ML) INTRAVENOUS EVERY 24 HOURS
Status: DISCONTINUED | OUTPATIENT
Start: 2022-02-12 | End: 2022-02-22

## 2022-02-12 RX ORDER — HEPARIN SODIUM,PORCINE 10 UNIT/ML
5-10 VIAL (ML) INTRAVENOUS EVERY 24 HOURS
Status: DISCONTINUED | OUTPATIENT
Start: 2022-02-12 | End: 2022-02-26 | Stop reason: HOSPADM

## 2022-02-12 RX ORDER — HYDRALAZINE HYDROCHLORIDE 20 MG/ML
10 INJECTION INTRAMUSCULAR; INTRAVENOUS EVERY 6 HOURS PRN
Status: DISCONTINUED | OUTPATIENT
Start: 2022-02-12 | End: 2022-02-26 | Stop reason: HOSPADM

## 2022-02-12 RX ORDER — SENNOSIDES 8.6 MG
8.6 TABLET ORAL 2 TIMES DAILY PRN
Status: DISCONTINUED | OUTPATIENT
Start: 2022-02-12 | End: 2022-02-26 | Stop reason: HOSPADM

## 2022-02-12 RX ORDER — POTASSIUM CHLORIDE 7.45 MG/ML
10 INJECTION INTRAVENOUS ONCE
Status: COMPLETED | OUTPATIENT
Start: 2022-02-12 | End: 2022-02-12

## 2022-02-12 RX ORDER — POTASSIUM CHLORIDE 29.8 MG/ML
20 INJECTION INTRAVENOUS ONCE
Status: COMPLETED | OUTPATIENT
Start: 2022-02-12 | End: 2022-02-12

## 2022-02-12 RX ADMIN — DEXTROSE MONOHYDRATE 20 ML: 50 INJECTION, SOLUTION INTRAVENOUS at 21:05

## 2022-02-12 RX ADMIN — ATORVASTATIN CALCIUM 40 MG: 20 TABLET, FILM COATED ORAL at 21:06

## 2022-02-12 RX ADMIN — MAGNESIUM OXIDE TAB 400 MG (241.3 MG ELEMENTAL MG) 400 MG: 400 (241.3 MG) TAB at 21:06

## 2022-02-12 RX ADMIN — FLUCONAZOLE 200 MG: 200 TABLET ORAL at 08:36

## 2022-02-12 RX ADMIN — POTASSIUM CHLORIDE 20 MEQ: 29.8 INJECTION, SOLUTION INTRAVENOUS at 07:04

## 2022-02-12 RX ADMIN — VENLAFAXINE 75 MG: 75 TABLET ORAL at 17:13

## 2022-02-12 RX ADMIN — METOPROLOL SUCCINATE 25 MG: 25 TABLET, EXTENDED RELEASE ORAL at 08:37

## 2022-02-12 RX ADMIN — AMLODIPINE BESYLATE 5 MG: 5 TABLET ORAL at 08:37

## 2022-02-12 RX ADMIN — LEVOTHYROXINE SODIUM 112 MCG: 0.11 TABLET ORAL at 08:36

## 2022-02-12 RX ADMIN — ONDANSETRON 4 MG: 4 TABLET, ORALLY DISINTEGRATING ORAL at 10:22

## 2022-02-12 RX ADMIN — BENZOCAINE AND MENTHOL 1 LOZENGE: 15; 3.6 LOZENGE ORAL at 12:00

## 2022-02-12 RX ADMIN — LORATADINE 10 MG: 10 TABLET ORAL at 08:37

## 2022-02-12 RX ADMIN — OXYCODONE HYDROCHLORIDE 10 MG: 10 TABLET ORAL at 05:03

## 2022-02-12 RX ADMIN — ACETAMINOPHEN 650 MG: 325 TABLET, FILM COATED ORAL at 08:36

## 2022-02-12 RX ADMIN — POTASSIUM PHOSPHATE, MONOBASIC AND POTASSIUM PHOSPHATE, DIBASIC 15 MMOL: 224; 236 INJECTION, SOLUTION, CONCENTRATE INTRAVENOUS at 10:23

## 2022-02-12 RX ADMIN — POTASSIUM CHLORIDE 10 MEQ: 7.46 INJECTION, SOLUTION INTRAVENOUS at 18:48

## 2022-02-12 RX ADMIN — CEFEPIME HYDROCHLORIDE 2 G: 2 INJECTION, POWDER, FOR SOLUTION INTRAVENOUS at 06:14

## 2022-02-12 RX ADMIN — GUAIFENESIN AND DEXTROMETHORPHAN HYDROBROMIDE 1 TABLET: 600; 30 TABLET, EXTENDED RELEASE ORAL at 08:37

## 2022-02-12 RX ADMIN — SODIUM CHLORIDE, PRESERVATIVE FREE 5 ML: 5 INJECTION INTRAVENOUS at 12:48

## 2022-02-12 RX ADMIN — PANTOPRAZOLE SODIUM 40 MG: 40 TABLET, DELAYED RELEASE ORAL at 08:37

## 2022-02-12 RX ADMIN — ACYCLOVIR 800 MG: 800 TABLET ORAL at 21:06

## 2022-02-12 RX ADMIN — FLUTICASONE FUROATE AND VILANTEROL TRIFENATATE 1 PUFF: 100; 25 POWDER RESPIRATORY (INHALATION) at 08:47

## 2022-02-12 RX ADMIN — ALLOPURINOL 300 MG: 300 TABLET ORAL at 08:37

## 2022-02-12 RX ADMIN — ONDANSETRON 4 MG: 4 TABLET, ORALLY DISINTEGRATING ORAL at 15:03

## 2022-02-12 RX ADMIN — VENLAFAXINE 75 MG: 75 TABLET ORAL at 08:36

## 2022-02-12 RX ADMIN — CEFEPIME HYDROCHLORIDE 2 G: 2 INJECTION, POWDER, FOR SOLUTION INTRAVENOUS at 14:33

## 2022-02-12 RX ADMIN — BUPRENORPHINE AND NALOXONE 1 FILM: 8; 2 FILM, SOLUBLE BUCCAL; SUBLINGUAL at 21:06

## 2022-02-12 RX ADMIN — Medication 5 ML: at 12:01

## 2022-02-12 RX ADMIN — SENNOSIDES 8.6 MG: 8.6 TABLET ORAL at 12:00

## 2022-02-12 RX ADMIN — CEFEPIME HYDROCHLORIDE 2 G: 2 INJECTION, POWDER, FOR SOLUTION INTRAVENOUS at 21:20

## 2022-02-12 RX ADMIN — MAGNESIUM OXIDE TAB 400 MG (241.3 MG ELEMENTAL MG) 400 MG: 400 (241.3 MG) TAB at 08:36

## 2022-02-12 RX ADMIN — OXYCODONE HYDROCHLORIDE 10 MG: 10 TABLET ORAL at 08:44

## 2022-02-12 RX ADMIN — VANCOMYCIN HYDROCHLORIDE 750 MG: 1 INJECTION, POWDER, LYOPHILIZED, FOR SOLUTION INTRAVENOUS at 04:46

## 2022-02-12 RX ADMIN — FILGRASTIM 780 MCG: 480 INJECTION, SOLUTION INTRAVENOUS; SUBCUTANEOUS at 21:06

## 2022-02-12 RX ADMIN — OXYCODONE HYDROCHLORIDE 10 MG: 10 TABLET ORAL at 14:49

## 2022-02-12 RX ADMIN — MAGNESIUM SULFATE IN WATER 2 G: 40 INJECTION, SOLUTION INTRAVENOUS at 12:00

## 2022-02-12 RX ADMIN — ACYCLOVIR 800 MG: 800 TABLET ORAL at 08:37

## 2022-02-12 RX ADMIN — VENLAFAXINE 75 MG: 75 TABLET ORAL at 12:00

## 2022-02-12 ASSESSMENT — ACTIVITIES OF DAILY LIVING (ADL)
ADLS_ACUITY_SCORE: 20
ADLS_ACUITY_SCORE: 14
ADLS_ACUITY_SCORE: 20
ADLS_ACUITY_SCORE: 14
ADLS_ACUITY_SCORE: 20
ADLS_ACUITY_SCORE: 14
ADLS_ACUITY_SCORE: 14
ADLS_ACUITY_SCORE: 20
ADLS_ACUITY_SCORE: 16
ADLS_ACUITY_SCORE: 14
ADLS_ACUITY_SCORE: 14
ADLS_ACUITY_SCORE: 16
ADLS_ACUITY_SCORE: 14
ADLS_ACUITY_SCORE: 20
ADLS_ACUITY_SCORE: 14
ADLS_ACUITY_SCORE: 20

## 2022-02-12 ASSESSMENT — MIFFLIN-ST. JEOR: SCORE: 1144.13

## 2022-02-12 NOTE — PROGRESS NOTES
Pt c/o increased sore throat.  New light red rash noted on torso.  MD notified.  Strep swab sent.  Pt also has increase cough.  Mucinex ordered.  Pt's  has had a sore throat for the past 4 days, but thought he was starting to feel better today.  He has not been to a doctor for the sore throat.

## 2022-02-12 NOTE — PROGRESS NOTES
# Rapid strep positive  - Ag test positive.  She does have sore throat so maybe a true infection rather than just colonization.  Cefepime will provide adequate coverage for this.  Given she is neutropenic, will not narrow: continue cefepime and vanco.     Clayton Mar DO, MS   of Medicine  General Internal Medicine  [facility], [place]  Text Page (8am - 5pm)

## 2022-02-12 NOTE — PLAN OF CARE
Temp: 98.8  F (37.1  C) Temp src: Oral BP: (!) 141/72 Pulse: 89   Resp: 16 SpO2: 98 % O2 Device: None (Room air)       Pt disoriented to situation and place.  Pt able to correctly identify month, year, and president.  At times, pt rambles and makes odd comments that don't fit the situation.  Tmax 101.1 this morning.  Blood cultures drawn from CVC lines and port.  Tylenol given with no more fevers noted.  BP elevated at times, but did not meet hydralazine parameters.  Pt c/o pain in back and generalized achiness.  Oxycodone given 2x.  Pt also c/o nausea; zofran given 2x.  Senna given for constipation.  Still waiting on r/o C.diff sample.  Pt continues to eat poorly.  Cepacol given for consistent throat pain.  Mucinex given for occasional cough.  Mg and Phos replaced.  K recheck 3.6; 10 mEq IV given.  Bed and chair alarm on.  Up with SBA and gait belt.      Problem: Infection  Goal: Absence of Infection Signs and Symptoms  Outcome: Improving  Intervention: Prevent or Manage Infection  Recent Flowsheet Documentation  Taken 2/12/2022 0830 by Bhumi Casanova RN  Isolation Precautions: enteric precautions maintained     Problem: Pain Acute  Goal: Acceptable Pain Control and Functional Ability  Outcome: Improving  Intervention: Develop Pain Management Plan  Recent Flowsheet Documentation  Taken 2/12/2022 1616 by Bhumi Casanova RN  Pain Management Interventions:    aromatherapy    care clustered    rest  Taken 2/12/2022 1449 by Bhumi Casanova RN  Pain Management Interventions: medication (see MAR)  Taken 2/12/2022 0844 by Bhumi Casanova RN  Pain Management Interventions: medication (see MAR)     Problem: Adult Inpatient Plan of Care  Goal: Plan of Care Review  Outcome: Improving  Goal: Patient-Specific Goal (Individualized)  Outcome: Improving  Goal: Absence of Hospital-Acquired Illness or Injury  Outcome: Improving  Intervention: Identify and Manage Fall Risk  Recent Flowsheet Documentation  Taken 2/12/2022 1600 by Edilberto  Bhumi, STEFFANIE  Safety Promotion/Fall Prevention:    bed alarm on    clutter free environment maintained    fall prevention program maintained    increased rounding and observation    increase visualization of patient    nonskid shoes/slippers when out of bed    safety round/check completed  Taken 2/12/2022 0830 by Bhumi Casanova, STEFFANIE  Safety Promotion/Fall Prevention:    bed alarm on    clutter free environment maintained    fall prevention program maintained    increased rounding and observation    increase visualization of patient    nonskid shoes/slippers when out of bed    safety round/check completed  Intervention: Prevent Skin Injury  Recent Flowsheet Documentation  Taken 2/12/2022 0830 by Bhumi Casanova, RN  Body Position: position changed independently  Goal: Optimal Comfort and Wellbeing  Outcome: Improving  Goal: Readiness for Transition of Care  Outcome: Improving

## 2022-02-12 NOTE — PROGRESS NOTES
BMT Daily Progress Note      ID: Libby Grimaldo is a 72 yo woman D+12 s/p Cytoxan chemo priming prior to planned auto PSBCT for MM      INTERVAL  HISTORY   Libby is still febrile this morning, which is causing some mild delirium.  Her throat is still sore.  She tested postiive for strep throat last night.  She has developed a classic strep rash.  She has not had a BM since admission.  Continues with stable, chronic back pain.       Review of Systems: 8 point ROS negative except as noted above.        PHYSICAL EXAM      KPS:  70  Blood pressure 110/63, pulse 117, temperature 99  F (37.2  C), temperature source Oral, resp. rate 16, weight 62.7 kg (138 lb 4.8 oz), SpO2 100 %.     General: NAD  OP: no ulcerations  Eyes: sclera anicteric   Lungs: coarse crackles right base  Cardiovascular: RRR, no M/R/G   Abdominal/Rectal: guarding on palpation of bilateral lower abdomen  Lymphatics: no edema  Skin: pinpoint erythematous papules with slightly rough feel on abdomen.  Neuro: non-focal   Access: L chest CVC site NT, no drainage. PAC not accessed.      Labs:  Lab Results   Component Value Date    WBC 0.1 (LL) 02/12/2022    HGB 6.6 (LL) 02/12/2022    HCT 20.1 (L) 02/12/2022    PLT 28 (LL) 02/12/2022     02/12/2022    POTASSIUM 3.2 (L) 02/12/2022    CHLORIDE 108 02/12/2022    CO2 21 02/12/2022    GLC 98 02/12/2022    BUN 13 02/12/2022    CR 0.76 02/12/2022    MAG 1.7 (L) 02/12/2022    INR 1.29 (H) 02/11/2022    BILITOTAL 1.1 02/11/2022    AST 13 02/11/2022    ALT 10 02/11/2022    ALKPHOS 118 02/11/2022    PROTTOTAL 4.9 (L) 02/11/2022    ALBUMIN 2.0 (L) 02/11/2022       I have assessed all abnormal lab values for their clinical significance and any values considered clinically significant have been addressed in the assessment and plan.          SYSTEMS-BASED ASSESSMENT AND PLAN   Libby Grimaldo is a 72 yo woman D+12 s/p Cytoxan chemo priming prior to planned auto PSBCT for MM        1. BMT  - BMT MD/Coordinator: Dr. Bunch  Kahlil/Rashad Chen  - Cytoxan 2/1  - Plan for allopurinol 300mg daily for at least 7 days.   - GCSF 10 mcg/kg started day +5 (2/5/22), continue through stem cell collections.  Apheresis aware that patient is inpatient.      2. HEME/COAG  - Transfusion parameters: hgb <7g/dL and plts <10,000; red cells today for hgb of 6.6.  - Pancytopenia secondary to chemo     3. ID:   - Neutropenic fevers, attributable to strep throat.  Continue cefepime (2/11-present). Discontinue vancomycin. Cepacol lozenges prn.  - Recent hx of urinary tract infection s/p antibiotics.      #Prophy: ACV,  fluconazole.       4. GI:   - constipation secondary to narcotics: senna bid prn  - Protonix 40mg/d (2/9); prn Tums.  - Anti-emetics:  prn zofran, prn compazine     5. RENAL/  - hypophosphatemia: replete per sliding scale  - hypokalemia: replete per sliding scale  - regular diet     6. Endo: hypothyroidism secondary to therapy for Graves Disease- continue levothyroxine     7. CARDS: On metoprolol and norvasc for hypertension.  Add prn hydralazine.   Hyperlipidemia: Lipitor   ASA: hold x2/9 with thrombocytopenia     8. Neuro: Continue effexor   - Hx of multiple febrile seizures. Last occurred during college.      9. Pulm: COPD- remains on daily fluticasone and albuterol prn.      10. Cancer related pain: Remains on buprenorphine-naloxone and prn oxycodone. Claritin for GCSF-related pain exacerbation.     11. Musculoskeletal: history of frequent falls at home.  Consulted PT/OT.      12. Neuro: fever induced delirium.  This is mild and resolves when she is not febrile.    I spent 40 minutes in the care of this patient today, which included time necessary for preparation for the visit, obtaining history, ordering medications/tests/procedures as medically indicated, review of pertinent medical literature, counseling of the patient, communication of recommendations to the care team, and documentation time.    Nidia Littlejohn,  [place]  2/12/2022

## 2022-02-12 NOTE — PLAN OF CARE
".BP (!) 160/70 (BP Location: Right arm)   Pulse 100   Temp 99.7  F (37.6  C) (Oral)   Resp 20   Ht 1.626 m (5' 4\")   Wt 61.7 kg (136 lb 1.6 oz)   SpO2 96%   BMI 23.36 kg/m      Pt still disoriented to time and situation.  Tmax 101.3oral. Tachy. One episode of HTN. Ovss. Denies nausea. C/o chronic pain, prn Oxycodone x1. Up to bathroom with SBA, steady on feet. Bed alrm for safety. 20 mEq potassium replaced. Will need 1 unit of RBCs, ordered awaiting blood bank. Phos is low, replacement phos ordered. Cont to monitor and follow poc.    Problem: Adult Inpatient Plan of Care  Goal: Plan of Care Review  Outcome: No Change     Problem: Pain Acute  Goal: Acceptable Pain Control and Functional Ability  Outcome: No Change      "

## 2022-02-13 ENCOUNTER — APPOINTMENT (OUTPATIENT)
Dept: GENERAL RADIOLOGY | Facility: CLINIC | Age: 72
DRG: 152 | End: 2022-02-13
Attending: STUDENT IN AN ORGANIZED HEALTH CARE EDUCATION/TRAINING PROGRAM
Payer: MEDICARE

## 2022-02-13 LAB
ABO/RH(D): ABNORMAL
ANION GAP SERPL CALCULATED.3IONS-SCNC: 6 MMOL/L (ref 3–14)
ANTIBODY SCREEN: POSITIVE
BUN SERPL-MCNC: 13 MG/DL (ref 7–30)
BURR CELLS BLD QL SMEAR: ABNORMAL
CALCIUM SERPL-MCNC: 7.3 MG/DL (ref 8.5–10.1)
CHLORIDE BLD-SCNC: 110 MMOL/L (ref 94–109)
CO2 SERPL-SCNC: 20 MMOL/L (ref 20–32)
CREAT SERPL-MCNC: 0.76 MG/DL (ref 0.52–1.04)
CRP SERPL-MCNC: 180 MG/L (ref 0–8)
ELLIPTOCYTES BLD QL SMEAR: SLIGHT
ERYTHROCYTE [DISTWIDTH] IN BLOOD BY AUTOMATED COUNT: 14.8 % (ref 10–15)
GFR SERPL CREATININE-BSD FRML MDRD: 84 ML/MIN/1.73M2
GLUCOSE BLD-MCNC: 108 MG/DL (ref 70–99)
GLUCOSE BLDC GLUCOMTR-MCNC: 100 MG/DL (ref 70–99)
HCT VFR BLD AUTO: 22.8 % (ref 35–47)
HGB BLD-MCNC: 7.4 G/DL (ref 11.7–15.7)
LACTATE SERPL-SCNC: 0.7 MMOL/L (ref 0.7–2)
LACTATE SERPL-SCNC: 0.8 MMOL/L (ref 0.7–2)
MAGNESIUM SERPL-MCNC: 2.2 MG/DL (ref 1.8–2.6)
MCH RBC QN AUTO: 31.1 PG (ref 26.5–33)
MCHC RBC AUTO-ENTMCNC: 32.5 G/DL (ref 31.5–36.5)
MCV RBC AUTO: 96 FL (ref 78–100)
MRSA DNA SPEC QL NAA+PROBE: NEGATIVE
PHOSPHATE SERPL-MCNC: 2.1 MG/DL (ref 2.5–4.5)
PLAT MORPH BLD: ABNORMAL
PLATELET # BLD AUTO: 21 10E3/UL (ref 150–450)
POTASSIUM BLD-SCNC: 3.7 MMOL/L (ref 3.4–5.3)
PROCALCITONIN SERPL-MCNC: 1.91 NG/ML
RBC # BLD AUTO: 2.38 10E6/UL (ref 3.8–5.2)
RBC MORPH BLD: ABNORMAL
SA TARGET DNA: NEGATIVE
SODIUM SERPL-SCNC: 136 MMOL/L (ref 133–144)
SPECIMEN EXPIRATION DATE: ABNORMAL
WBC # BLD AUTO: 0.3 10E3/UL (ref 4–11)

## 2022-02-13 PROCEDURE — 84100 ASSAY OF PHOSPHORUS: CPT | Performed by: INTERNAL MEDICINE

## 2022-02-13 PROCEDURE — 206N000001 HC R&B BMT UMMC

## 2022-02-13 PROCEDURE — 250N000013 HC RX MED GY IP 250 OP 250 PS 637: Performed by: HOSPITALIST

## 2022-02-13 PROCEDURE — 84145 PROCALCITONIN (PCT): CPT | Performed by: STUDENT IN AN ORGANIZED HEALTH CARE EDUCATION/TRAINING PROGRAM

## 2022-02-13 PROCEDURE — 83605 ASSAY OF LACTIC ACID: CPT | Performed by: STUDENT IN AN ORGANIZED HEALTH CARE EDUCATION/TRAINING PROGRAM

## 2022-02-13 PROCEDURE — 71045 X-RAY EXAM CHEST 1 VIEW: CPT

## 2022-02-13 PROCEDURE — 71045 X-RAY EXAM CHEST 1 VIEW: CPT | Mod: 26 | Performed by: RADIOLOGY

## 2022-02-13 PROCEDURE — 80048 BASIC METABOLIC PNL TOTAL CA: CPT | Performed by: STUDENT IN AN ORGANIZED HEALTH CARE EDUCATION/TRAINING PROGRAM

## 2022-02-13 PROCEDURE — 250N000013 HC RX MED GY IP 250 OP 250 PS 637: Performed by: STUDENT IN AN ORGANIZED HEALTH CARE EDUCATION/TRAINING PROGRAM

## 2022-02-13 PROCEDURE — 258N000003 HC RX IP 258 OP 636: Performed by: PHYSICIAN ASSISTANT

## 2022-02-13 PROCEDURE — 258N000003 HC RX IP 258 OP 636: Performed by: INTERNAL MEDICINE

## 2022-02-13 PROCEDURE — 250N000011 HC RX IP 250 OP 636: Performed by: PHYSICIAN ASSISTANT

## 2022-02-13 PROCEDURE — 86140 C-REACTIVE PROTEIN: CPT | Performed by: STUDENT IN AN ORGANIZED HEALTH CARE EDUCATION/TRAINING PROGRAM

## 2022-02-13 PROCEDURE — 83605 ASSAY OF LACTIC ACID: CPT | Performed by: INTERNAL MEDICINE

## 2022-02-13 PROCEDURE — 85027 COMPLETE CBC AUTOMATED: CPT | Performed by: STUDENT IN AN ORGANIZED HEALTH CARE EDUCATION/TRAINING PROGRAM

## 2022-02-13 PROCEDURE — 250N000013 HC RX MED GY IP 250 OP 250 PS 637: Performed by: INTERNAL MEDICINE

## 2022-02-13 PROCEDURE — 250N000013 HC RX MED GY IP 250 OP 250 PS 637: Performed by: PHYSICIAN ASSISTANT

## 2022-02-13 PROCEDURE — 99233 SBSQ HOSP IP/OBS HIGH 50: CPT | Mod: FS | Performed by: INTERNAL MEDICINE

## 2022-02-13 PROCEDURE — 250N000011 HC RX IP 250 OP 636: Performed by: STUDENT IN AN ORGANIZED HEALTH CARE EDUCATION/TRAINING PROGRAM

## 2022-02-13 PROCEDURE — 87040 BLOOD CULTURE FOR BACTERIA: CPT | Performed by: STUDENT IN AN ORGANIZED HEALTH CARE EDUCATION/TRAINING PROGRAM

## 2022-02-13 PROCEDURE — 250N000011 HC RX IP 250 OP 636: Performed by: INTERNAL MEDICINE

## 2022-02-13 PROCEDURE — 250N000009 HC RX 250: Performed by: INTERNAL MEDICINE

## 2022-02-13 PROCEDURE — 87641 MR-STAPH DNA AMP PROBE: CPT | Performed by: STUDENT IN AN ORGANIZED HEALTH CARE EDUCATION/TRAINING PROGRAM

## 2022-02-13 PROCEDURE — 99232 SBSQ HOSP IP/OBS MODERATE 35: CPT | Performed by: STUDENT IN AN ORGANIZED HEALTH CARE EDUCATION/TRAINING PROGRAM

## 2022-02-13 PROCEDURE — 83735 ASSAY OF MAGNESIUM: CPT | Performed by: PHYSICIAN ASSISTANT

## 2022-02-13 RX ORDER — CALCIUM CARBONATE 500 MG/1
500 TABLET, CHEWABLE ORAL DAILY PRN
Status: DISCONTINUED | OUTPATIENT
Start: 2022-02-13 | End: 2022-02-14

## 2022-02-13 RX ORDER — OXYCODONE HYDROCHLORIDE 10 MG/1
10-20 TABLET ORAL 3 TIMES DAILY
Status: DISCONTINUED | OUTPATIENT
Start: 2022-02-13 | End: 2022-02-14

## 2022-02-13 RX ORDER — POTASSIUM CHLORIDE 7.45 MG/ML
10 INJECTION INTRAVENOUS ONCE
Status: COMPLETED | OUTPATIENT
Start: 2022-02-13 | End: 2022-02-13

## 2022-02-13 RX ORDER — PIPERACILLIN SODIUM, TAZOBACTAM SODIUM 3; .375 G/15ML; G/15ML
3.38 INJECTION, POWDER, LYOPHILIZED, FOR SOLUTION INTRAVENOUS EVERY 6 HOURS
Status: DISCONTINUED | OUTPATIENT
Start: 2022-02-13 | End: 2022-02-18

## 2022-02-13 RX ORDER — FUROSEMIDE 10 MG/ML
20 INJECTION INTRAMUSCULAR; INTRAVENOUS ONCE
Status: COMPLETED | OUTPATIENT
Start: 2022-02-13 | End: 2022-02-13

## 2022-02-13 RX ADMIN — AMLODIPINE BESYLATE 5 MG: 5 TABLET ORAL at 07:43

## 2022-02-13 RX ADMIN — Medication 5 ML: at 04:30

## 2022-02-13 RX ADMIN — VENLAFAXINE 75 MG: 75 TABLET ORAL at 13:21

## 2022-02-13 RX ADMIN — ACETAMINOPHEN 650 MG: 325 TABLET, FILM COATED ORAL at 00:59

## 2022-02-13 RX ADMIN — METHOCARBAMOL 500 MG: 500 TABLET ORAL at 07:51

## 2022-02-13 RX ADMIN — ALLOPURINOL 300 MG: 300 TABLET ORAL at 07:43

## 2022-02-13 RX ADMIN — PIPERACILLIN AND TAZOBACTAM 3.38 G: 3; .375 INJECTION, POWDER, FOR SOLUTION INTRAVENOUS at 20:45

## 2022-02-13 RX ADMIN — FILGRASTIM 780 MCG: 480 INJECTION, SOLUTION INTRAVENOUS; SUBCUTANEOUS at 21:05

## 2022-02-13 RX ADMIN — VENLAFAXINE 75 MG: 75 TABLET ORAL at 17:24

## 2022-02-13 RX ADMIN — ACYCLOVIR 800 MG: 800 TABLET ORAL at 07:43

## 2022-02-13 RX ADMIN — METOPROLOL SUCCINATE 25 MG: 25 TABLET, EXTENDED RELEASE ORAL at 07:43

## 2022-02-13 RX ADMIN — CEFEPIME HYDROCHLORIDE 2 G: 2 INJECTION, POWDER, FOR SOLUTION INTRAVENOUS at 05:57

## 2022-02-13 RX ADMIN — FUROSEMIDE 20 MG: 10 INJECTION, SOLUTION INTRAVENOUS at 21:07

## 2022-02-13 RX ADMIN — DEXTROSE MONOHYDRATE 20 ML: 50 INJECTION, SOLUTION INTRAVENOUS at 21:05

## 2022-02-13 RX ADMIN — POTASSIUM PHOSPHATE, MONOBASIC AND POTASSIUM PHOSPHATE, DIBASIC 9 MMOL: 224; 236 INJECTION, SOLUTION, CONCENTRATE INTRAVENOUS at 10:41

## 2022-02-13 RX ADMIN — POTASSIUM PHOSPHATE, MONOBASIC AND POTASSIUM PHOSPHATE, DIBASIC 15 MMOL: 224; 236 INJECTION, SOLUTION, CONCENTRATE INTRAVENOUS at 01:00

## 2022-02-13 RX ADMIN — ATORVASTATIN CALCIUM 40 MG: 20 TABLET, FILM COATED ORAL at 20:58

## 2022-02-13 RX ADMIN — BUPRENORPHINE AND NALOXONE 1 FILM: 8; 2 FILM, SOLUBLE BUCCAL; SUBLINGUAL at 13:21

## 2022-02-13 RX ADMIN — OXYCODONE HYDROCHLORIDE 10 MG: 10 TABLET ORAL at 20:58

## 2022-02-13 RX ADMIN — FLUTICASONE FUROATE AND VILANTEROL TRIFENATATE 1 PUFF: 100; 25 POWDER RESPIRATORY (INHALATION) at 07:48

## 2022-02-13 RX ADMIN — SODIUM CHLORIDE, PRESERVATIVE FREE 5 ML: 5 INJECTION INTRAVENOUS at 15:28

## 2022-02-13 RX ADMIN — PANTOPRAZOLE SODIUM 40 MG: 40 TABLET, DELAYED RELEASE ORAL at 07:43

## 2022-02-13 RX ADMIN — Medication 5 ML: at 15:29

## 2022-02-13 RX ADMIN — FLUCONAZOLE 200 MG: 200 TABLET ORAL at 07:44

## 2022-02-13 RX ADMIN — POTASSIUM CHLORIDE 10 MEQ: 7.46 INJECTION, SOLUTION INTRAVENOUS at 05:57

## 2022-02-13 RX ADMIN — PROCHLORPERAZINE MALEATE 5 MG: 5 TABLET ORAL at 04:34

## 2022-02-13 RX ADMIN — ACYCLOVIR 800 MG: 800 TABLET ORAL at 20:58

## 2022-02-13 RX ADMIN — SENNOSIDES 8.6 MG: 8.6 TABLET ORAL at 00:59

## 2022-02-13 RX ADMIN — OXYCODONE HYDROCHLORIDE 10 MG: 10 TABLET ORAL at 14:00

## 2022-02-13 RX ADMIN — VENLAFAXINE 75 MG: 75 TABLET ORAL at 07:43

## 2022-02-13 RX ADMIN — OXYCODONE HYDROCHLORIDE 10 MG: 10 TABLET ORAL at 00:59

## 2022-02-13 RX ADMIN — Medication 5 ML: at 23:23

## 2022-02-13 RX ADMIN — CEFEPIME HYDROCHLORIDE 2 G: 2 INJECTION, POWDER, FOR SOLUTION INTRAVENOUS at 13:21

## 2022-02-13 RX ADMIN — CALCIUM CARBONATE (ANTACID) CHEW TAB 500 MG 500 MG: 500 CHEW TAB at 04:34

## 2022-02-13 RX ADMIN — BUPRENORPHINE AND NALOXONE 1 FILM: 8; 2 FILM, SOLUBLE BUCCAL; SUBLINGUAL at 07:43

## 2022-02-13 RX ADMIN — LEVOTHYROXINE SODIUM 112 MCG: 0.11 TABLET ORAL at 07:43

## 2022-02-13 RX ADMIN — BUPRENORPHINE AND NALOXONE 1 FILM: 8; 2 FILM, SOLUBLE BUCCAL; SUBLINGUAL at 20:58

## 2022-02-13 RX ADMIN — SENNOSIDES 8.6 MG: 8.6 TABLET ORAL at 17:24

## 2022-02-13 RX ADMIN — LORATADINE 10 MG: 10 TABLET ORAL at 07:43

## 2022-02-13 RX ADMIN — SENNOSIDES 8.6 MG: 8.6 TABLET ORAL at 10:43

## 2022-02-13 ASSESSMENT — ACTIVITIES OF DAILY LIVING (ADL)
ADLS_ACUITY_SCORE: 18
ADLS_ACUITY_SCORE: 20
ADLS_ACUITY_SCORE: 18
ADLS_ACUITY_SCORE: 20
ADLS_ACUITY_SCORE: 20
ADLS_ACUITY_SCORE: 18
ADLS_ACUITY_SCORE: 20
ADLS_ACUITY_SCORE: 18
ADLS_ACUITY_SCORE: 20
ADLS_ACUITY_SCORE: 18
ADLS_ACUITY_SCORE: 18
ADLS_ACUITY_SCORE: 20
ADLS_ACUITY_SCORE: 18
ADLS_ACUITY_SCORE: 20
ADLS_ACUITY_SCORE: 18

## 2022-02-13 ASSESSMENT — MIFFLIN-ST. JEOR: SCORE: 1158.64

## 2022-02-13 NOTE — PROGRESS NOTES
"BMT Daily Progress Note      ID: Libby Grimaldo is a 70 yo woman D+13 s/p Cytoxan chemo priming prior to planned auto PSBCT for MM      INTERVAL  HISTORY   Libby is very displeased this morning and minimally interactive.  She does state that her throat is hurting less. She refuses physical exam.  Per nursing staff, she has been angry through the night and threatening to call the police, as she believes her pain medication is being withheld.  She declined 2 doses of her scheduled suboxone yesterday.  Per nursing report, she believes that her oxycodone is scheduled wrong, and she was frustrated that she couldn't get it when she wanted (as the oxycodone is every 8 hours prn). She explains that at home, she only takes a maximum of 3 tablets per 24 hours, but they aren't at regimented 8 hour intervals.      Review of Systems: ROS minimal due to patient's lack of participation.        PHYSICAL EXAM      KPS:  70  Blood pressure 138/69, pulse 88, temperature 98.3  F (36.8  C), temperature source Oral, resp. rate 16, height 1.626 m (5' 4\"), weight 63.4 kg (139 lb 12.8 oz), SpO2 96 %.    General: avoidant; minimally interactive  Rest of exam refused by patient.      Labs:  Lab Results   Component Value Date    WBC 0.3 (LL) 02/13/2022    HGB 7.4 (L) 02/13/2022    HCT 22.8 (L) 02/13/2022    PLT 21 (LL) 02/13/2022     02/13/2022    POTASSIUM 3.7 02/13/2022    CHLORIDE 110 (H) 02/13/2022    CO2 20 02/13/2022     (H) 02/13/2022    BUN 13 02/13/2022    CR 0.76 02/13/2022    MAG 2.2 02/13/2022    INR 1.29 (H) 02/11/2022    BILITOTAL 1.1 02/11/2022    AST 13 02/11/2022    ALT 10 02/11/2022    ALKPHOS 118 02/11/2022    PROTTOTAL 4.9 (L) 02/11/2022    ALBUMIN 2.0 (L) 02/11/2022       I have assessed all abnormal lab values for their clinical significance and any values considered clinically significant have been addressed in the assessment and plan.          SYSTEMS-BASED ASSESSMENT AND PLAN   Libby Grimaldo is a 70 yo woman " D+13 s/p Cytoxan chemo priming prior to planned auto PSBCT for MM        1. BMT  - BMT MD/Coordinator: Dr. Julio C Guillory/Rashad Chen  - Cytoxan 2/1  - Plan for allopurinol 300mg daily for at least 7 days.   - GCSF 10 mcg/kg started day +5 (2/5/22), continue through stem cell collections.  Apheresis aware that patient is inpatient.  White count is beginning to rise.      2. HEME/COAG  - Transfusion parameters: hgb <7g/dL and plts <10,000.  - Pancytopenia secondary to chemo     3. ID:   - Neutropenic fevers, attributable to strep throat.  Fevers are trending down. Continue cefepime (2/11-present). Discontinue vancomycin. Cepacol lozenges prn.  - Recent hx of urinary tract infection s/p antibiotics.      #Prophy: ACV,  fluconazole.       4. GI:   - constipation secondary to narcotics: senna bid prn  - Protonix 40mg/d (2/9); prn Tums.  - Anti-emetics:  prn zofran, prn compazine     5. RENAL/  - hypophosphatemia: replete per sliding scale  - hypokalemia: replete per sliding scale  - regular diet     6. Endo: hypothyroidism secondary to therapy for Graves Disease- continue levothyroxine     7. CARDS: On metoprolol and norvasc for hypertension.  Add prn hydralazine.   Hyperlipidemia: Lipitor   ASA: hold x2/9 with thrombocytopenia     8. Neuro: Continue effexor   - Hx of multiple febrile seizures. Last occurred during college.      9. Pulm: COPD- remains on daily fluticasone and albuterol prn.      10. Cancer related pain: Remains on buprenorphine-naloxone and prn oxycodone. Oxycodone order changed to 10-20mg tid with a max of 30mg per 24 hours. Claritin for GCSF-related pain exacerbation.     11. Musculoskeletal: history of frequent falls at home.  Consulted PT/OT.      12. Neuro: fever induced delirium.  This is mild and resolves when she is not febrile.  It has continued to improve this hospitalization. She is refusing discussion/exam today, but overnight she was reported by nursing staff to be alert and oriented x 4.      I spent 45 minutes in the care of this patient today, which included time necessary for preparation for the visit, obtaining history, ordering medications/tests/procedures as medically indicated, review of pertinent medical literature, counseling of the patient, communication of recommendations to the care team, and documentation time.    Nidia Littlejohn PA-C  2/13/2022

## 2022-02-13 NOTE — PLAN OF CARE
".BP (!) 145/83 (BP Location: Left arm)   Pulse 84   Temp 98.3  F (36.8  C) (Oral)   Resp 16   Ht 1.626 m (5' 4\")   Wt 63.4 kg (139 lb 12.8 oz)   SpO2 95%   BMI 24.00 kg/m      Pt alert and oriented x4. Illogical and forgetful. Febrile with Tmax 100.9ax. Gave Tylenol and sent blood culture. Triggered sepsis, lactic 0.7. Received 10mEq Potassium per replacement scale. 15mmol pHos replaced last night. Will need Phos again this morning. Very upset this morning about not getting Suboxone as scheduled-per MAR pt has been refusing. Also very upset about not getting as many Oxycodone as per her home sched. Tried to explain to patient which is not very helpful, refuses to listen. Tried getting prn Robaxin from pharmacy, called pharmacy x2. On bed alarm. SBA to bathroom. Cont to monitor and follow poc.    Problem: Adult Inpatient Plan of Care  Goal: Plan of Care Review  Outcome: No Change     Problem: Pain Acute  Goal: Acceptable Pain Control and Functional Ability  Outcome: No Change     Problem: Infection  Goal: Absence of Infection Signs and Symptoms  Outcome: No Change      "

## 2022-02-13 NOTE — PLAN OF CARE
Temp: 99.6  F (37.6  C) Temp src: Oral BP: (!) 141/70 Pulse: 95   Resp: 16 SpO2: 95 % O2 Device: None (Room air)       Pt very irritated, agitated, and verbally threatening to call the police because she did not feel her pain regiment was adequate.  RN, PA, and MD reviewed the pain plan.  Plan to keep taking Saboxone TID w/out refusing any doses.  PRN oxycodone changed scheduled.  Once pt took Saboxone this morning, pt was no longer agitated.  Pt able to answer A&O questions correctly, but poor historian, is very forgetful, and makes frequent illogical comments.  Sister found home meds in pt's purse, including Saboxone and Oxycodone.  Unknown if pt took any home meds while here.  Sister took home meds home with her.  Pt stated sore throat and rash on torso improving.  Appetite poor: 4-5 bites of breakfast, no lunch, a few bites of dinner.  Denies nausea.  Phos replaced.  Senna given 2x; no BM in 3 days.  Shower completed.  Bed alarm on; does not call appropriately.        Problem: Violence Risk or Actual  Goal: Anger and Impulse Control  Outcome: Improving     Problem: Infection  Goal: Absence of Infection Signs and Symptoms  Outcome: Improving     Problem: Pain Acute  Goal: Acceptable Pain Control and Functional Ability  Outcome: Improving  Intervention: Develop Pain Management Plan  Recent Flowsheet Documentation  Taken 2/13/2022 1600 by Bhumi Casanova RN  Pain Management Interventions: declines  Taken 2/13/2022 1400 by Bhumi Casanova RN  Pain Management Interventions: medication (see MAR)  Taken 2/13/2022 0743 by Bhumi Casanova RN  Pain Management Interventions: medication (see MAR)     Problem: Adult Inpatient Plan of Care  Goal: Plan of Care Review  Outcome: Improving  Goal: Patient-Specific Goal (Individualized)  Outcome: Improving  Goal: Absence of Hospital-Acquired Illness or Injury  Outcome: Improving  Intervention: Identify and Manage Fall Risk  Recent Flowsheet Documentation  Taken 2/13/2022 1600 by  Bhumi Casanova, RN  Safety Promotion/Fall Prevention:   bed alarm on   assistive device/personal items within reach   clutter free environment maintained   fall prevention program maintained   increased rounding and observation   increase visualization of patient   nonskid shoes/slippers when out of bed   safety round/check completed  Taken 2/13/2022 0800 by Bhumi Casanova, STEFFANIE  Safety Promotion/Fall Prevention:   bed alarm on   assistive device/personal items within reach   clutter free environment maintained   fall prevention program maintained   increased rounding and observation   increase visualization of patient   nonskid shoes/slippers when out of bed   safety round/check completed  Intervention: Prevent Skin Injury  Recent Flowsheet Documentation  Taken 2/13/2022 0800 by Bhumi Casanova, RN  Body Position: position changed independently  Goal: Optimal Comfort and Wellbeing  Outcome: Improving  Goal: Readiness for Transition of Care  Outcome: Improving

## 2022-02-14 ENCOUNTER — APPOINTMENT (OUTPATIENT)
Dept: GENERAL RADIOLOGY | Facility: CLINIC | Age: 72
DRG: 152 | End: 2022-02-14
Attending: PHYSICIAN ASSISTANT
Payer: MEDICARE

## 2022-02-14 ENCOUNTER — APPOINTMENT (OUTPATIENT)
Dept: CT IMAGING | Facility: CLINIC | Age: 72
DRG: 152 | End: 2022-02-14
Attending: PHYSICIAN ASSISTANT
Payer: MEDICARE

## 2022-02-14 ENCOUNTER — APPOINTMENT (OUTPATIENT)
Dept: OCCUPATIONAL THERAPY | Facility: CLINIC | Age: 72
DRG: 152 | End: 2022-02-14
Payer: MEDICARE

## 2022-02-14 LAB
ALBUMIN SERPL-MCNC: 1.6 G/DL (ref 3.4–5)
ALP SERPL-CCNC: 119 U/L (ref 40–150)
ALT SERPL W P-5'-P-CCNC: 14 U/L (ref 0–50)
ANION GAP SERPL CALCULATED.3IONS-SCNC: 5 MMOL/L (ref 3–14)
AST SERPL W P-5'-P-CCNC: 19 U/L (ref 0–45)
BACTERIA SPEC CULT: NORMAL
BASOPHILS # BLD MANUAL: 0 10E3/UL (ref 0–0.2)
BASOPHILS NFR BLD MANUAL: 1 %
BILIRUB SERPL-MCNC: 0.7 MG/DL (ref 0.2–1.3)
BLD PROD TYP BPU: NORMAL
BLD PROD TYP BPU: NORMAL
BLOOD COMPONENT TYPE: NORMAL
BLOOD COMPONENT TYPE: NORMAL
BUN SERPL-MCNC: 8 MG/DL (ref 7–30)
CALCIUM SERPL-MCNC: 5.8 MG/DL (ref 8.5–10.1)
CHLORIDE BLD-SCNC: 117 MMOL/L (ref 94–109)
CO2 SERPL-SCNC: 20 MMOL/L (ref 20–32)
CODING SYSTEM: NORMAL
CODING SYSTEM: NORMAL
CREAT SERPL-MCNC: 0.64 MG/DL (ref 0.52–1.04)
CROSSMATCH: NORMAL
CULTURE HARVEST COMPLETE DATE: NORMAL
CULTURE HARVEST COMPLETE DATE: NORMAL
EOSINOPHIL # BLD MANUAL: 0 10E3/UL (ref 0–0.7)
EOSINOPHIL NFR BLD MANUAL: 0 %
ERYTHROCYTE [DISTWIDTH] IN BLOOD BY AUTOMATED COUNT: 14.4 % (ref 10–15)
GFR SERPL CREATININE-BSD FRML MDRD: >90 ML/MIN/1.73M2
GLUCOSE BLD-MCNC: 66 MG/DL (ref 70–99)
GLUCOSE BLDC GLUCOMTR-MCNC: 98 MG/DL (ref 70–99)
HCT VFR BLD AUTO: 20.1 % (ref 35–47)
HGB BLD-MCNC: 6.6 G/DL (ref 11.7–15.7)
INTERPRETATION: NORMAL
ISCN: NORMAL
ISSUE DATE AND TIME: NORMAL
ISSUE DATE AND TIME: NORMAL
LYMPHOCYTES # BLD MANUAL: 0 10E3/UL (ref 0.8–5.3)
LYMPHOCYTES NFR BLD MANUAL: 1 %
MAGNESIUM SERPL-MCNC: 1.6 MG/DL (ref 1.8–2.6)
MCH RBC QN AUTO: 31.7 PG (ref 26.5–33)
MCHC RBC AUTO-ENTMCNC: 32.8 G/DL (ref 31.5–36.5)
MCV RBC AUTO: 97 FL (ref 78–100)
METHODS: NORMAL
MONOCYTES # BLD MANUAL: 0.2 10E3/UL (ref 0–1.3)
MONOCYTES NFR BLD MANUAL: 32 %
NEUTROPHILS # BLD MANUAL: 0.5 10E3/UL (ref 1.6–8.3)
NEUTROPHILS NFR BLD MANUAL: 66 %
PHOSPHATE SERPL-MCNC: 0.9 MG/DL (ref 2.5–4.5)
PHOSPHATE SERPL-MCNC: 1.9 MG/DL (ref 2.5–4.5)
PLAT MORPH BLD: ABNORMAL
PLATELET # BLD AUTO: 8 10E3/UL (ref 150–450)
POTASSIUM BLD-SCNC: 2.7 MMOL/L (ref 3.4–5.3)
POTASSIUM BLD-SCNC: 3.8 MMOL/L (ref 3.4–5.3)
PROT SERPL-MCNC: 4.1 G/DL (ref 6.8–8.8)
RBC # BLD AUTO: 2.08 10E6/UL (ref 3.8–5.2)
RBC MORPH BLD: ABNORMAL
SODIUM SERPL-SCNC: 142 MMOL/L (ref 133–144)
UNIT ABO/RH: NORMAL
UNIT ABO/RH: NORMAL
UNIT NUMBER: NORMAL
UNIT NUMBER: NORMAL
UNIT STATUS: NORMAL
UNIT STATUS: NORMAL
UNIT TYPE ISBT: 6200
UNIT TYPE ISBT: 6200
WBC # BLD AUTO: 0.7 10E3/UL (ref 4–11)

## 2022-02-14 PROCEDURE — 250N000011 HC RX IP 250 OP 636: Performed by: INTERNAL MEDICINE

## 2022-02-14 PROCEDURE — 71046 X-RAY EXAM CHEST 2 VIEWS: CPT | Mod: 26 | Performed by: RADIOLOGY

## 2022-02-14 PROCEDURE — G1004 CDSM NDSC: HCPCS

## 2022-02-14 PROCEDURE — 84100 ASSAY OF PHOSPHORUS: CPT | Performed by: INTERNAL MEDICINE

## 2022-02-14 PROCEDURE — 71046 X-RAY EXAM CHEST 2 VIEWS: CPT

## 2022-02-14 PROCEDURE — P9040 RBC LEUKOREDUCED IRRADIATED: HCPCS | Performed by: HOSPITALIST

## 2022-02-14 PROCEDURE — 87040 BLOOD CULTURE FOR BACTERIA: CPT | Performed by: STUDENT IN AN ORGANIZED HEALTH CARE EDUCATION/TRAINING PROGRAM

## 2022-02-14 PROCEDURE — 84132 ASSAY OF SERUM POTASSIUM: CPT | Performed by: INTERNAL MEDICINE

## 2022-02-14 PROCEDURE — 250N000009 HC RX 250: Performed by: INTERNAL MEDICINE

## 2022-02-14 PROCEDURE — 99233 SBSQ HOSP IP/OBS HIGH 50: CPT | Mod: FS | Performed by: INTERNAL MEDICINE

## 2022-02-14 PROCEDURE — 250N000013 HC RX MED GY IP 250 OP 250 PS 637: Performed by: INTERNAL MEDICINE

## 2022-02-14 PROCEDURE — 250N000013 HC RX MED GY IP 250 OP 250 PS 637: Performed by: PHYSICIAN ASSISTANT

## 2022-02-14 PROCEDURE — 258N000003 HC RX IP 258 OP 636: Performed by: PHYSICIAN ASSISTANT

## 2022-02-14 PROCEDURE — 250N000011 HC RX IP 250 OP 636: Performed by: PHYSICIAN ASSISTANT

## 2022-02-14 PROCEDURE — 250N000013 HC RX MED GY IP 250 OP 250 PS 637: Performed by: STUDENT IN AN ORGANIZED HEALTH CARE EDUCATION/TRAINING PROGRAM

## 2022-02-14 PROCEDURE — 70450 CT HEAD/BRAIN W/O DYE: CPT | Mod: 26 | Performed by: RADIOLOGY

## 2022-02-14 PROCEDURE — P9037 PLATE PHERES LEUKOREDU IRRAD: HCPCS | Performed by: HOSPITALIST

## 2022-02-14 PROCEDURE — 250N000011 HC RX IP 250 OP 636: Performed by: STUDENT IN AN ORGANIZED HEALTH CARE EDUCATION/TRAINING PROGRAM

## 2022-02-14 PROCEDURE — 258N000003 HC RX IP 258 OP 636: Performed by: INTERNAL MEDICINE

## 2022-02-14 PROCEDURE — 206N000001 HC R&B BMT UMMC

## 2022-02-14 PROCEDURE — 83735 ASSAY OF MAGNESIUM: CPT | Performed by: STUDENT IN AN ORGANIZED HEALTH CARE EDUCATION/TRAINING PROGRAM

## 2022-02-14 PROCEDURE — 85027 COMPLETE CBC AUTOMATED: CPT | Performed by: STUDENT IN AN ORGANIZED HEALTH CARE EDUCATION/TRAINING PROGRAM

## 2022-02-14 PROCEDURE — 88291 CYTO/MOLECULAR REPORT: CPT | Performed by: MEDICAL GENETICS

## 2022-02-14 PROCEDURE — G1004 CDSM NDSC: HCPCS | Performed by: RADIOLOGY

## 2022-02-14 PROCEDURE — 97535 SELF CARE MNGMENT TRAINING: CPT | Mod: GO

## 2022-02-14 PROCEDURE — 84100 ASSAY OF PHOSPHORUS: CPT | Performed by: STUDENT IN AN ORGANIZED HEALTH CARE EDUCATION/TRAINING PROGRAM

## 2022-02-14 PROCEDURE — 250N000013 HC RX MED GY IP 250 OP 250 PS 637: Performed by: HOSPITALIST

## 2022-02-14 PROCEDURE — 80053 COMPREHEN METABOLIC PANEL: CPT | Performed by: PHYSICIAN ASSISTANT

## 2022-02-14 RX ORDER — POTASSIUM CHLORIDE 20MEQ/15ML
10 LIQUID (ML) ORAL ONCE
Status: DISCONTINUED | OUTPATIENT
Start: 2022-02-14 | End: 2022-02-14 | Stop reason: CLARIF

## 2022-02-14 RX ORDER — POTASSIUM CHLORIDE 7.45 MG/ML
10 INJECTION INTRAVENOUS ONCE
Status: COMPLETED | OUTPATIENT
Start: 2022-02-14 | End: 2022-02-14

## 2022-02-14 RX ORDER — HALOPERIDOL 5 MG/ML
2 INJECTION INTRAMUSCULAR EVERY 6 HOURS PRN
Status: DISCONTINUED | OUTPATIENT
Start: 2022-02-14 | End: 2022-02-25

## 2022-02-14 RX ORDER — CALCIUM CARBONATE 500 MG/1
500 TABLET, CHEWABLE ORAL 4 TIMES DAILY PRN
Status: DISCONTINUED | OUTPATIENT
Start: 2022-02-14 | End: 2022-02-26 | Stop reason: HOSPADM

## 2022-02-14 RX ORDER — MAGNESIUM SULFATE HEPTAHYDRATE 40 MG/ML
2 INJECTION, SOLUTION INTRAVENOUS ONCE
Status: COMPLETED | OUTPATIENT
Start: 2022-02-14 | End: 2022-02-14

## 2022-02-14 RX ORDER — OXYCODONE HYDROCHLORIDE 10 MG/1
10-20 TABLET ORAL 3 TIMES DAILY PRN
Status: DISCONTINUED | OUTPATIENT
Start: 2022-02-14 | End: 2022-02-26 | Stop reason: HOSPADM

## 2022-02-14 RX ORDER — QUETIAPINE FUMARATE 25 MG/1
25 TABLET, FILM COATED ORAL AT BEDTIME
Status: DISCONTINUED | OUTPATIENT
Start: 2022-02-14 | End: 2022-02-25

## 2022-02-14 RX ORDER — POTASSIUM CHLORIDE 750 MG/1
40 TABLET, EXTENDED RELEASE ORAL ONCE
Status: COMPLETED | OUTPATIENT
Start: 2022-02-14 | End: 2022-02-14

## 2022-02-14 RX ADMIN — LORATADINE 10 MG: 10 TABLET ORAL at 09:32

## 2022-02-14 RX ADMIN — SODIUM PHOSPHATE, MONOBASIC, MONOHYDRATE AND SODIUM PHOSPHATE, DIBASIC, ANHYDROUS 15 MMOL: 276; 142 INJECTION, SOLUTION INTRAVENOUS at 13:35

## 2022-02-14 RX ADMIN — POTASSIUM & SODIUM PHOSPHATES POWDER PACK 280-160-250 MG 2 PACKET: 280-160-250 PACK at 14:00

## 2022-02-14 RX ADMIN — FLUCONAZOLE 200 MG: 200 TABLET ORAL at 09:29

## 2022-02-14 RX ADMIN — SODIUM CHLORIDE, PRESERVATIVE FREE 5 ML: 5 INJECTION INTRAVENOUS at 21:26

## 2022-02-14 RX ADMIN — LEVOTHYROXINE SODIUM 112 MCG: 0.11 TABLET ORAL at 09:29

## 2022-02-14 RX ADMIN — CALCIUM CARBONATE (ANTACID) CHEW TAB 500 MG 500 MG: 500 CHEW TAB at 09:27

## 2022-02-14 RX ADMIN — ALLOPURINOL 300 MG: 300 TABLET ORAL at 09:28

## 2022-02-14 RX ADMIN — PIPERACILLIN AND TAZOBACTAM 3.38 G: 3; .375 INJECTION, POWDER, FOR SOLUTION INTRAVENOUS at 20:43

## 2022-02-14 RX ADMIN — GUAIFENESIN AND DEXTROMETHORPHAN HYDROBROMIDE 1 TABLET: 600; 30 TABLET, EXTENDED RELEASE ORAL at 23:58

## 2022-02-14 RX ADMIN — VENLAFAXINE 75 MG: 75 TABLET ORAL at 09:33

## 2022-02-14 RX ADMIN — ACYCLOVIR 800 MG: 800 TABLET ORAL at 09:29

## 2022-02-14 RX ADMIN — METOPROLOL SUCCINATE 25 MG: 25 TABLET, EXTENDED RELEASE ORAL at 09:29

## 2022-02-14 RX ADMIN — PIPERACILLIN AND TAZOBACTAM 3.38 G: 3; .375 INJECTION, POWDER, FOR SOLUTION INTRAVENOUS at 16:15

## 2022-02-14 RX ADMIN — POTASSIUM CHLORIDE 10 MEQ: 7.46 INJECTION, SOLUTION INTRAVENOUS at 20:44

## 2022-02-14 RX ADMIN — POTASSIUM CHLORIDE 40 MEQ: 750 TABLET, EXTENDED RELEASE ORAL at 12:01

## 2022-02-14 RX ADMIN — VENLAFAXINE 75 MG: 75 TABLET ORAL at 18:05

## 2022-02-14 RX ADMIN — VENLAFAXINE 75 MG: 75 TABLET ORAL at 12:01

## 2022-02-14 RX ADMIN — CALCIUM CARBONATE (ANTACID) CHEW TAB 500 MG 500 MG: 500 CHEW TAB at 13:59

## 2022-02-14 RX ADMIN — OXYCODONE HYDROCHLORIDE 10 MG: 10 TABLET ORAL at 09:28

## 2022-02-14 RX ADMIN — OXYCODONE HYDROCHLORIDE 10 MG: 10 TABLET ORAL at 15:15

## 2022-02-14 RX ADMIN — FLUTICASONE FUROATE AND VILANTEROL TRIFENATATE 1 PUFF: 100; 25 POWDER RESPIRATORY (INHALATION) at 10:13

## 2022-02-14 RX ADMIN — BUPRENORPHINE AND NALOXONE: 8; 2 FILM, SOLUBLE BUCCAL; SUBLINGUAL at 11:20

## 2022-02-14 RX ADMIN — AMLODIPINE BESYLATE 5 MG: 5 TABLET ORAL at 09:33

## 2022-02-14 RX ADMIN — PIPERACILLIN AND TAZOBACTAM 3.38 G: 3; .375 INJECTION, POWDER, FOR SOLUTION INTRAVENOUS at 09:27

## 2022-02-14 RX ADMIN — MAGNESIUM SULFATE IN WATER 2 G: 40 INJECTION, SOLUTION INTRAVENOUS at 18:04

## 2022-02-14 RX ADMIN — POTASSIUM & SODIUM PHOSPHATES POWDER PACK 280-160-250 MG 2 PACKET: 280-160-250 PACK at 19:51

## 2022-02-14 RX ADMIN — ACYCLOVIR 800 MG: 800 TABLET ORAL at 19:49

## 2022-02-14 RX ADMIN — DEXTROSE MONOHYDRATE 20 ML: 50 INJECTION, SOLUTION INTRAVENOUS at 19:56

## 2022-02-14 RX ADMIN — PANTOPRAZOLE SODIUM 40 MG: 40 TABLET, DELAYED RELEASE ORAL at 09:33

## 2022-02-14 RX ADMIN — DEXTROSE MONOHYDRATE 10 ML: 50 INJECTION, SOLUTION INTRAVENOUS at 19:54

## 2022-02-14 RX ADMIN — FILGRASTIM 780 MCG: 480 INJECTION, SOLUTION INTRAVENOUS; SUBCUTANEOUS at 19:54

## 2022-02-14 RX ADMIN — OXYCODONE HYDROCHLORIDE 20 MG: 10 TABLET ORAL at 19:50

## 2022-02-14 RX ADMIN — OXYCODONE HYDROCHLORIDE 10 MG: 10 TABLET ORAL at 14:00

## 2022-02-14 RX ADMIN — ATORVASTATIN CALCIUM 40 MG: 20 TABLET, FILM COATED ORAL at 19:49

## 2022-02-14 RX ADMIN — QUETIAPINE FUMARATE 25 MG: 25 TABLET ORAL at 21:27

## 2022-02-14 ASSESSMENT — ACTIVITIES OF DAILY LIVING (ADL)
ADLS_ACUITY_SCORE: 20
ADLS_ACUITY_SCORE: 22
ADLS_ACUITY_SCORE: 20
ADLS_ACUITY_SCORE: 22
ADLS_ACUITY_SCORE: 20
ADLS_ACUITY_SCORE: 22
ADLS_ACUITY_SCORE: 20
ADLS_ACUITY_SCORE: 22
ADLS_ACUITY_SCORE: 20
ADLS_ACUITY_SCORE: 22
ADLS_ACUITY_SCORE: 20
ADLS_ACUITY_SCORE: 20
ADLS_ACUITY_SCORE: 22
ADLS_ACUITY_SCORE: 20

## 2022-02-14 ASSESSMENT — MIFFLIN-ST. JEOR: SCORE: 1139.14

## 2022-02-14 NOTE — PROGRESS NOTES
"Called about increased confusion and dyspnea.  Patient had desaturated on RA and now on 2L oxygen and feeling dyspneic.  Sister is at bedside and patient is more confused.  She tells me that she is in [place] and the year is 1922 and unable to tell me why she is in the hospital.  CN 2-12 intact, PERRL, biceps/triceps/quads symmetric strength and sensation equal bilaterally.  Some tremor and difficulty with FNF testing but that's unchanged from prior (I admitted Ms. Grimaldo two days ago).  On bedside US her EF appears > 50% but IVC looks slightly dilated.  Her internal jugular vein on US is also dilated at 30 degrees angle, concerning for JVD.  Per sister patient is having some muscle twitching and spilled food twice today but I didn't notice any during my exam.    BP (!) 145/67 (BP Location: Left arm)   Pulse 96   Temp 100.2  F (37.9  C) (Axillary)   Resp 16   Ht 1.626 m (5' 4\")   Wt 64.9 kg (143 lb)   SpO2 98%   BMI 24.55 kg/m    CXR looks about the same as prior, maybe a little more pulmonary venous congestion.  Official read pending.    # Mild acute hypoxemic resp failure  # Neutropenic fever, ongoing  # Acute encephalopathy, likely 2/2 above medical conditions  - Patient is having confusion and muscle spasms and even though she has good kidney function, given that she is very prone to getting delirium, I will switch her from cefepime to pip/tazo  - Given signs of fluid overload on exam, give her 1 dose of 20mg IV lasix.  - Hold off on head imaging now given no focal deficit.    Clayton Mar DO, MS   of Medicine  General Internal Medicine  [facility]  Text Page (8am - 5pm)     Time Spent on this Encounter   I spent 30 minutes on the unit/floor managing the care of Libby Grimaldo. Over 50% of my time was spent on the following: not doing head imaging, switching abx, and diuresis.   - Counseling the patient and/or family regarding: diagnostic results and risks and " benefits of treatment options  - Coordination of care with the: nurse      Clayton Mar MD

## 2022-02-14 NOTE — PLAN OF CARE
"Assumed cares 7989-2180.    /66 (BP Location: Left arm)   Pulse 89   Temp (!) 100.6  F (38.1  C) (Oral)   Resp 18   Ht 1.626 m (5' 4\")   Wt 62.9 kg (138 lb 11.2 oz)   SpO2 96%   BMI 23.81 kg/m        Status: VSS except 100.6 fever. (Needs blood cultures drawn per provider order)  Neuro: A&Ox4, forgetful, short-term memory loss, easily frustrated. Does not call appropriately. Bed alarm on.  Respiratory: WDL except fine crackels in bilateral lower lobes. Refused continuous pulse oximetry   Cardiac: WDL  GI: No BM this shift. Last BM yesterday. BS normoactive.  : Voids spontaneously without difficulty in bathroom. Does not like to use the commode.    Pain/Nausea: Pain controlled with PRN Oxycodone and scheduled suboxone   Mobility: Assist of 1. Refused gait belt. Held onto staffs arm while ambulating to bathroom. Steady gait.   Diet: High protein/kcal   Labs: Hgb: 6.6 (released blood, awaiting blood from blood bank), WBC: 0.7, Plt: 8 (will need replacement once blood is infused), K: 2.7 (replaced, redraw at 1600), Phos: 0.9 (IV sodium phosphate infusing, 1/3 PO phosphorus doses administered, redraw due 6 hours after last PO dose)  Lines: R chest port infusing Phosphorus, L chest central line double lumen SL.   New Changes: Chest xray done and head CT done today. Sister (Leisa) is at bedside.   Plan: Continue with plan of care. Update BMT adult team with any changes.   "

## 2022-02-14 NOTE — CONSULTS
Care Management Initial Consult    General Information  Assessment completed with: VM-chart review,    Type of CM/SW Visit: Initial Assessment    Primary Care Provider verified and updated as needed:     Readmission within the last 30 days:        Reason for Consult: discharge planning,emotional/coping/adjustment concerns,substance use concerns  Advance Care Planning:            Communication Assessment  Patient's communication style: spoken language (English or Bilingual)    Hearing Difficulty or Deaf: no   Wear Glasses or Blind: yes    Cognitive  Cognitive/Neuro/Behavioral: .WDL except  Level of Consciousness: confused,alert  Arousal Level: opens eyes spontaneously  Orientation: person  Mood/Behavior: agitated,threatening  Best Language: 0 - No aphasia  Speech: illogical    Living Environment:   People in home: significant other   ([place])  Current living Arrangements: house      Able to return to prior arrangements: other (see comments) (Pending assessments)       Family/Social Support:  Care provided by: self,spouse/significant other,other (see comments)  Provides care for: no one  Marital Status:   ,Sibling(s)   ([place])       Description of Support System: Supportive,Involved    Support Assessment: Adequate family and caregiver support,Adequate social supports,Caregiver experiencing high stress    Current Resources:   Patient receiving home care services:       Community Resources:    Equipment currently used at home: shower chair  Supplies currently used at home:      Employment/Financial:  Employment Status: retired        Financial Concerns: No concerns identified   Referral to Financial Counselor: No       Lifestyle & Psychosocial Needs:  Social Determinants of Health     Tobacco Use: Medium Risk     Smoking Tobacco Use: Former Smoker     Smokeless Tobacco Use: Former User   Alcohol Use: Not on file   Financial Resource Strain: Not on file   Food Insecurity: Not on file   Transportation  Needs: Not on file   Physical Activity: Not on file   Stress: Not on file   Social Connections: Not on file   Intimate Partner Violence: Not At Risk     Fear of Current or Ex-Partner: No     Emotionally Abused: No     Physically Abused: No     Sexually Abused: No   Depression: Not at risk     PHQ-2 Score: 0   Housing Stability: Not on file       Functional Status:  Prior to admission patient needed assistance:              Mental Health Status:  Mental Health Status: No Current Concerns       Chemical Dependency Status:  Chemical Dependency Status: Current Concern             Values/Beliefs:  Spiritual, Cultural Beliefs, Syn[employer] Practices, Values that affect care:                 Additional Information:  Pt was admitted on 2/10/22 for neutropenic fever, post chemo priming for a planned Auto BMT. Pt lives at home with her  and has support from her sister. Concerns over the weekend due to miss use of medication prior to admission including taking her husbands medication as well as her own pain meds. Pt is confused at times per nursing, refusing medications and care.     Dr. Hernandez, Nidia Littlejohn PA-C, CSW,  pt, and her sister Leisa met today to discuss plan moving forward. Pt is much clearer this afternoon. She reports feeling food, but having bone pain. Dr. Hernandez discussed the plan moving forward. Discussed a few concerns regarding the timing of the dosing of her pain meds and Suboxone. The pt notes that her pain team has worked with her on this and the pt was correct on the timing. Nidia updated this in the pt's EMR. Leisa noted that she will be managing the pt's medications and bringing over 1 days worth of dosing a day to the pt's house to confirm that no mistakes are being made. Leisa noted that the pt is normally very clear in her thinking and the current concerns are not things she has noticed at home in the past. Discussed plan to stay IP for a few more days then return to the OP setting  to start collections.      ANDREW Bangura, [facility]  Phone [phone]  Pager [phone]

## 2022-02-14 NOTE — PROGRESS NOTES
Pt's sister noticed change in patient's condition.  She is having delusion and more confused this evening.  Pt also has increased SOB and new dyspnea.  Pt has occasional cough that is congested.  SpO2 on RA is 80s.  2L O2 placed: SpO2 97%.  SOB improved with O2.  Pt also has fine tremors in hands that are now muscle jerking in hands and legs.  Pt spilled drink and oatmeal d/t the muscle jerking.  MD paged; awaiting response.

## 2022-02-14 NOTE — PLAN OF CARE
".BP (!) 141/70 (BP Location: Left arm)   Pulse 100   Temp 98.7  F (37.1  C) (Oral)   Resp 20   Ht 1.626 m (5' 4\")   Wt 64.9 kg (143 lb)   SpO2 97%   BMI 24.55 kg/m      Pt confused and oriented to self. Illogical, uncooperative and irritable. Continue to have mild muscle twitches/jerks. Afebrile. Ovss on 2lpm NC, sating well. Sent MRSA-negative result. CXR done on evening. Cefepime changed to Zosyn. One time dose Lasix with GUOP. Soft- loose BM x2. Bed alarm on and pt not calling out. Still at risk for falls and not aware of iv lines. On continuous pulse oximetry. Cont to monitor and follow poc.    Problem: Adult Inpatient Plan of Care  Goal: Plan of Care Review  Outcome: No Change     Problem: Pain Acute  Goal: Acceptable Pain Control and Functional Ability  Outcome: No Change     Problem: Infection  Goal: Absence of Infection Signs and Symptoms  Outcome: Improving     Problem: Violence Risk or Actual  Goal: Anger and Impulse Control  Outcome: Improving     "

## 2022-02-14 NOTE — PROGRESS NOTES
"BMT Daily Progress Note      ID: Libby Grimaldo is a 72 yo woman D+13 s/p Cytoxan chemo priming prior to planned auto PSBCT for MM      INTERVAL  HISTORY   Libby had a difficult evening and night.  She was confused throughout.  She thought it was 1922 and that she was in [place].  She refused her new antibiotic and refused a lab draw.  She has taken a strong objection to the nurse she had last night, and requests to have a different nurse.  She states she felt that the nurse \"looked like through her and it was like a [employer] movie.\"      This morning, she is much more clear.  She is still a little off, asking about why the doctor isn't here, etc.  She is mistaking today's nurse with last night's nurse.  She is surprised that she was so confused last night, and she didn't recall the events of her confusion.       Review of Systems:    8 point ROS negative except as above.    PHYSICAL EXAM      KPS:  70  Blood pressure 114/66, pulse 89, temperature (!) 100.6  F (38.1  C), temperature source Oral, resp. rate 18, height 1.626 m (5' 4\"), weight 62.9 kg (138 lb 11.2 oz), SpO2 96 %.    Physical Exam  Constitutional:       General: She is not in acute distress.  HENT:      Head: Normocephalic and atraumatic.      Nose: No rhinorrhea.      Mouth/Throat:      Mouth: Mucous membranes are moist.      Pharynx: Oropharynx is clear. No oropharyngeal exudate or posterior oropharyngeal erythema.   Eyes:      Comments: Bilateral exophthalmos   Cardiovascular:      Rate and Rhythm: Normal rate and regular rhythm.   Pulmonary:      Breath sounds: Rales present.      Comments: Some rales right base  Abdominal:      General: There is no distension.      Palpations: There is no mass.      Tenderness: There is no abdominal tenderness. There is no guarding.   Musculoskeletal:      Right lower leg: No edema.      Left lower leg: No edema.   Skin:     General: Skin is warm and dry.   Neurological:      Mental Status: She is alert. Mental " status is at baseline.      Comments: Some curious questions; asks why doctor isn't here in the middle of talking about something else, for example.        Labs:  Lab Results   Component Value Date    WBC 0.3 (LL) 02/13/2022    HGB 7.4 (L) 02/13/2022    HCT 22.8 (L) 02/13/2022    PLT 21 (LL) 02/13/2022     02/13/2022    POTASSIUM 3.7 02/13/2022    CHLORIDE 110 (H) 02/13/2022    CO2 20 02/13/2022     (H) 02/13/2022    BUN 13 02/13/2022    CR 0.76 02/13/2022    MAG 2.2 02/13/2022    INR 1.29 (H) 02/11/2022    BILITOTAL 1.1 02/11/2022    AST 13 02/11/2022    ALT 10 02/11/2022    ALKPHOS 118 02/11/2022    PROTTOTAL 4.9 (L) 02/11/2022    ALBUMIN 2.0 (L) 02/11/2022       I have assessed all abnormal lab values for their clinical significance and any values considered clinically significant have been addressed in the assessment and plan.          SYSTEMS-BASED ASSESSMENT AND PLAN   Libby Grimaldo is a 70 yo woman D+14 s/p Cytoxan chemo priming prior to planned auto PSBCT for MM        1. BMT  - BMT MD/Coordinator: Dr. Julio C Guillory/Rashad Chen  - Cytoxan 2/1  - Plan for allopurinol 300mg daily for at least 7 days.   - GCSF 10 mcg/kg started day +5 (2/5/22), continue through stem cell collections.  Apheresis aware that patient is inpatient.  White count is beginning to rise.      2. HEME/COAG  - Transfusion parameters: hgb <7g/dL and plts <10,000.  - Pancytopenia secondary to chemo     3. ID:   - Neutropenic fevers, attributable to strep throat.  Fevers are trending down.Cefepime 2/11-2/13; changed to zosyn with mental status changes.   - Recent hx of urinary tract infection s/p antibiotics.      #Prophy: ACV,  fluconazole.       4. GI:   - constipation secondary to narcotics: senna bid prn  - Protonix 40mg/d (2/9); prn Tums.  - Anti-emetics:  prn zofran, prn compazine     5. RENAL/  - hypophosphatemia: replete per sliding scale  - hypokalemia: replete per sliding scale  - regular diet     6. Endo:  hypothyroidism secondary to therapy for Graves Disease- continue levothyroxine     7. CARDS: On metoprolol and norvasc for hypertension.  Prn hydralazine.   Hyperlipidemia: Lipitor   ASA: hold x2/9 with thrombocytopenia     8. Neuro: Continue effexor   - Hx of multiple febrile seizures. Last occurred during college.      9. Pulm: COPD- remains on daily fluticasone and albuterol prn.      10. Cancer related pain: Remains on buprenorphine-naloxone and prn oxycodone. Oxycodone order changed to 10-20mg tid prn with a max of 30mg per 24 hours. Claritin for GCSF-related pain exacerbation.     11. Musculoskeletal: history of frequent falls at home.  Consulted PT/OT.      12. Neuro: question of illness related delirium vs. unmasking of underlying dementia.  Patient has family history of dementia is grandmother, paternal uncle and father.  Will request OT to do MOCA testing.      I spent 60 minutes in the care of this patient today, which included time necessary for preparation for the visit, obtaining history, ordering medications/tests/procedures as medically indicated, review of pertinent medical literature, counseling of the patient, communication of recommendations to the care team, and documentation time.    Nidia Littlejohn PA-C  2/14/2022

## 2022-02-15 ENCOUNTER — APPOINTMENT (OUTPATIENT)
Dept: ULTRASOUND IMAGING | Facility: CLINIC | Age: 72
DRG: 152 | End: 2022-02-15
Attending: PHYSICIAN ASSISTANT
Payer: MEDICARE

## 2022-02-15 LAB
ALBUMIN UR-MCNC: NEGATIVE MG/DL
ANION GAP SERPL CALCULATED.3IONS-SCNC: 6 MMOL/L (ref 3–14)
APPEARANCE UR: CLEAR
BACTERIA BLD CULT: NO GROWTH
BASOPHILS # BLD MANUAL: 0 10E3/UL (ref 0–0.2)
BASOPHILS NFR BLD MANUAL: 0 %
BILIRUB UR QL STRIP: NEGATIVE
BLD PROD TYP BPU: NORMAL
BLOOD COMPONENT TYPE: NORMAL
BUN SERPL-MCNC: 10 MG/DL (ref 7–30)
CALCIUM SERPL-MCNC: 7 MG/DL (ref 8.5–10.1)
CHLORIDE BLD-SCNC: 107 MMOL/L (ref 94–109)
CO COMPONENTS: NORMAL
CO2 SERPL-SCNC: 23 MMOL/L (ref 20–32)
CODING SYSTEM: NORMAL
COLOR UR AUTO: ABNORMAL
COMMENTS: NORMAL
CREAT SERPL-MCNC: 0.8 MG/DL (ref 0.52–1.04)
DACRYOCYTES BLD QL SMEAR: SLIGHT
EOSINOPHIL # BLD MANUAL: 0 10E3/UL (ref 0–0.7)
EOSINOPHIL NFR BLD MANUAL: 0 %
ERYTHROCYTE [DISTWIDTH] IN BLOOD BY AUTOMATED COUNT: 15 % (ref 10–15)
ERYTHROCYTE [DISTWIDTH] IN BLOOD BY AUTOMATED COUNT: 15.1 % (ref 10–15)
GFR SERPL CREATININE-BSD FRML MDRD: 79 ML/MIN/1.73M2
GLUCOSE BLD-MCNC: 91 MG/DL (ref 70–99)
GLUCOSE UR STRIP-MCNC: NEGATIVE MG/DL
GRAM/CULTURE INDICATED?: YES
HCT VFR BLD AUTO: 25 % (ref 35–47)
HCT VFR BLD AUTO: 25.2 % (ref 35–47)
HGB BLD-MCNC: 8.1 G/DL (ref 11.7–15.7)
HGB BLD-MCNC: 8.2 G/DL (ref 11.7–15.7)
HGB UR QL STRIP: NEGATIVE
HOLD SPECIMEN: NORMAL
ISSUE DATE AND TIME: NORMAL
KETONES UR STRIP-MCNC: NEGATIVE MG/DL
LACTATE SERPL-SCNC: 1.1 MMOL/L (ref 0.7–2)
LEUKOCYTE ESTERASE UR QL STRIP: NEGATIVE
LYMPHOCYTES # BLD MANUAL: 0 10E3/UL (ref 0.8–5.3)
LYMPHOCYTES NFR BLD MANUAL: 3 %
MAGNESIUM SERPL-MCNC: 2 MG/DL (ref 1.8–2.6)
MCH RBC QN AUTO: 30.8 PG (ref 26.5–33)
MCH RBC QN AUTO: 31.3 PG (ref 26.5–33)
MCHC RBC AUTO-ENTMCNC: 32.4 G/DL (ref 31.5–36.5)
MCHC RBC AUTO-ENTMCNC: 32.5 G/DL (ref 31.5–36.5)
MCV RBC AUTO: 95 FL (ref 78–100)
MCV RBC AUTO: 96 FL (ref 78–100)
MONOCYTES # BLD MANUAL: 0.2 10E3/UL (ref 0–1.3)
MONOCYTES NFR BLD MANUAL: 19 %
MUCOUS THREADS #/AREA URNS LPF: PRESENT /LPF
NEUTROPHILS # BLD MANUAL: 0.9 10E3/UL (ref 1.6–8.3)
NEUTROPHILS NFR BLD MANUAL: 78 %
NITRATE UR QL: NEGATIVE
NRBC # BLD AUTO: 0 10E3/UL
NRBC BLD MANUAL-RTO: 1 %
OTHER UNITS TRANSFUSED: NORMAL
PH UR STRIP: 6 [PH] (ref 5–7)
PHOSPHATE SERPL-MCNC: 1.8 MG/DL (ref 2.5–4.5)
PHOSPHATE SERPL-MCNC: 2.5 MG/DL (ref 2.5–4.5)
PLAT MORPH BLD: ABNORMAL
PLATELET # BLD AUTO: 21 10E3/UL (ref 150–450)
PLATELET # BLD AUTO: 37 10E3/UL (ref 150–450)
PLATELET # BLD AUTO: 44 10E3/UL (ref 150–450)
POST RXN CLERICAL CHECK: NORMAL
POST SPECIMEN APPEARANCE: NORMAL
POST-RXN ABO/RH: NORMAL
POST-RXN POLY DAT: NORMAL
POTASSIUM BLD-SCNC: 3.9 MMOL/L (ref 3.4–5.3)
PRODUCT CODE: NORMAL
RADIOLOGIST FLAGS: ABNORMAL
RBC # BLD AUTO: 2.62 10E6/UL (ref 3.8–5.2)
RBC # BLD AUTO: 2.63 10E6/UL (ref 3.8–5.2)
RBC MORPH BLD: ABNORMAL
RBC URINE: 2 /HPF
SODIUM SERPL-SCNC: 136 MMOL/L (ref 133–144)
SP GR UR STRIP: 1.01 (ref 1–1.03)
UFH PPP CHRO-ACNC: <0.1 IU/ML
UNIT ABO/RH: NORMAL
UNIT NUMBER: NORMAL
UNIT STATUS: NORMAL
UNIT TYPE ISBT: 600
UNIT TYPE ISBT: 6200
UNIT TYPE ISBT: 6200
UROBILINOGEN UR STRIP-MCNC: NORMAL MG/DL
WBC # BLD AUTO: 1.2 10E3/UL (ref 4–11)
WBC # BLD AUTO: 1.8 10E3/UL (ref 4–11)
WBC URINE: 1 /HPF

## 2022-02-15 PROCEDURE — 83605 ASSAY OF LACTIC ACID: CPT | Performed by: INTERNAL MEDICINE

## 2022-02-15 PROCEDURE — P9037 PLATE PHERES LEUKOREDU IRRAD: HCPCS | Performed by: PHYSICIAN ASSISTANT

## 2022-02-15 PROCEDURE — 250N000013 HC RX MED GY IP 250 OP 250 PS 637: Performed by: INTERNAL MEDICINE

## 2022-02-15 PROCEDURE — 250N000013 HC RX MED GY IP 250 OP 250 PS 637: Performed by: HOSPITALIST

## 2022-02-15 PROCEDURE — 250N000013 HC RX MED GY IP 250 OP 250 PS 637: Performed by: PHYSICIAN ASSISTANT

## 2022-02-15 PROCEDURE — 82310 ASSAY OF CALCIUM: CPT | Performed by: STUDENT IN AN ORGANIZED HEALTH CARE EDUCATION/TRAINING PROGRAM

## 2022-02-15 PROCEDURE — 83735 ASSAY OF MAGNESIUM: CPT | Performed by: INTERNAL MEDICINE

## 2022-02-15 PROCEDURE — 87040 BLOOD CULTURE FOR BACTERIA: CPT | Performed by: STUDENT IN AN ORGANIZED HEALTH CARE EDUCATION/TRAINING PROGRAM

## 2022-02-15 PROCEDURE — 258N000003 HC RX IP 258 OP 636: Performed by: PHYSICIAN ASSISTANT

## 2022-02-15 PROCEDURE — 93970 EXTREMITY STUDY: CPT | Mod: 26 | Performed by: RADIOLOGY

## 2022-02-15 PROCEDURE — 87533 HHV-6 DNA QUANT: CPT | Performed by: PHYSICIAN ASSISTANT

## 2022-02-15 PROCEDURE — 206N000001 HC R&B BMT UMMC

## 2022-02-15 PROCEDURE — 85520 HEPARIN ASSAY: CPT | Performed by: PHYSICIAN ASSISTANT

## 2022-02-15 PROCEDURE — 250N000009 HC RX 250: Performed by: PHYSICIAN ASSISTANT

## 2022-02-15 PROCEDURE — 85049 AUTOMATED PLATELET COUNT: CPT | Performed by: INTERNAL MEDICINE

## 2022-02-15 PROCEDURE — 87799 DETECT AGENT NOS DNA QUANT: CPT | Performed by: PHYSICIAN ASSISTANT

## 2022-02-15 PROCEDURE — 85014 HEMATOCRIT: CPT | Performed by: PHYSICIAN ASSISTANT

## 2022-02-15 PROCEDURE — 84100 ASSAY OF PHOSPHORUS: CPT | Performed by: PHYSICIAN ASSISTANT

## 2022-02-15 PROCEDURE — 250N000011 HC RX IP 250 OP 636: Performed by: PHYSICIAN ASSISTANT

## 2022-02-15 PROCEDURE — 93970 EXTREMITY STUDY: CPT | Mod: XS

## 2022-02-15 PROCEDURE — 99233 SBSQ HOSP IP/OBS HIGH 50: CPT | Mod: FS | Performed by: INTERNAL MEDICINE

## 2022-02-15 PROCEDURE — 81001 URINALYSIS AUTO W/SCOPE: CPT | Performed by: PHYSICIAN ASSISTANT

## 2022-02-15 PROCEDURE — 250N000009 HC RX 250: Performed by: INTERNAL MEDICINE

## 2022-02-15 PROCEDURE — 250N000013 HC RX MED GY IP 250 OP 250 PS 637: Performed by: STUDENT IN AN ORGANIZED HEALTH CARE EDUCATION/TRAINING PROGRAM

## 2022-02-15 PROCEDURE — 87086 URINE CULTURE/COLONY COUNT: CPT | Performed by: PHYSICIAN ASSISTANT

## 2022-02-15 PROCEDURE — 258N000003 HC RX IP 258 OP 636: Performed by: INTERNAL MEDICINE

## 2022-02-15 PROCEDURE — 250N000011 HC RX IP 250 OP 636: Performed by: STUDENT IN AN ORGANIZED HEALTH CARE EDUCATION/TRAINING PROGRAM

## 2022-02-15 PROCEDURE — 93970 EXTREMITY STUDY: CPT

## 2022-02-15 PROCEDURE — 85027 COMPLETE CBC AUTOMATED: CPT | Performed by: STUDENT IN AN ORGANIZED HEALTH CARE EDUCATION/TRAINING PROGRAM

## 2022-02-15 PROCEDURE — 87529 HSV DNA AMP PROBE: CPT | Performed by: PHYSICIAN ASSISTANT

## 2022-02-15 RX ORDER — MAGNESIUM SULFATE HEPTAHYDRATE 40 MG/ML
2 INJECTION, SOLUTION INTRAVENOUS ONCE
Status: COMPLETED | OUTPATIENT
Start: 2022-02-15 | End: 2022-02-15

## 2022-02-15 RX ORDER — ACETAMINOPHEN 325 MG/1
325 TABLET ORAL EVERY 4 HOURS PRN
Status: DISCONTINUED | OUTPATIENT
Start: 2022-02-15 | End: 2022-02-26 | Stop reason: HOSPADM

## 2022-02-15 RX ORDER — HEPARIN SODIUM 10000 [USP'U]/100ML
0-5000 INJECTION, SOLUTION INTRAVENOUS CONTINUOUS
Status: DISCONTINUED | OUTPATIENT
Start: 2022-02-15 | End: 2022-02-15

## 2022-02-15 RX ORDER — HEPARIN SODIUM 10000 [USP'U]/100ML
0-5000 INJECTION, SOLUTION INTRAVENOUS CONTINUOUS
Status: DISCONTINUED | OUTPATIENT
Start: 2022-02-15 | End: 2022-02-16 | Stop reason: DRUGHIGH

## 2022-02-15 RX ADMIN — HEPARIN SODIUM 750 UNITS/HR: 1000 INJECTION INTRAVENOUS; SUBCUTANEOUS at 17:17

## 2022-02-15 RX ADMIN — POTASSIUM PHOSPHATE, MONOBASIC AND POTASSIUM PHOSPHATE, DIBASIC 9 MMOL: 224; 236 INJECTION, SOLUTION, CONCENTRATE INTRAVENOUS at 20:54

## 2022-02-15 RX ADMIN — FILGRASTIM 780 MCG: 480 INJECTION, SOLUTION INTRAVENOUS; SUBCUTANEOUS at 20:47

## 2022-02-15 RX ADMIN — DEXTROSE MONOHYDRATE 10 ML: 50 INJECTION, SOLUTION INTRAVENOUS at 20:51

## 2022-02-15 RX ADMIN — PIPERACILLIN AND TAZOBACTAM 3.38 G: 3; .375 INJECTION, POWDER, FOR SOLUTION INTRAVENOUS at 03:31

## 2022-02-15 RX ADMIN — ATORVASTATIN CALCIUM 40 MG: 20 TABLET, FILM COATED ORAL at 20:47

## 2022-02-15 RX ADMIN — POTASSIUM & SODIUM PHOSPHATES POWDER PACK 280-160-250 MG 1 PACKET: 280-160-250 PACK at 20:47

## 2022-02-15 RX ADMIN — Medication 5 ML: at 03:56

## 2022-02-15 RX ADMIN — POTASSIUM PHOSPHATE, MONOBASIC AND POTASSIUM PHOSPHATE, DIBASIC 15 MMOL: 224; 236 INJECTION, SOLUTION, CONCENTRATE INTRAVENOUS at 01:07

## 2022-02-15 RX ADMIN — MAGNESIUM SULFATE IN WATER 2 G: 40 INJECTION, SOLUTION INTRAVENOUS at 08:27

## 2022-02-15 RX ADMIN — ACETAMINOPHEN 650 MG: 325 TABLET, FILM COATED ORAL at 03:41

## 2022-02-15 RX ADMIN — FLUCONAZOLE 200 MG: 200 TABLET ORAL at 08:31

## 2022-02-15 RX ADMIN — OXYCODONE HYDROCHLORIDE 20 MG: 10 TABLET ORAL at 08:45

## 2022-02-15 RX ADMIN — ALLOPURINOL 300 MG: 300 TABLET ORAL at 08:30

## 2022-02-15 RX ADMIN — VENLAFAXINE 75 MG: 75 TABLET ORAL at 08:31

## 2022-02-15 RX ADMIN — OXYCODONE HYDROCHLORIDE 20 MG: 10 TABLET ORAL at 16:10

## 2022-02-15 RX ADMIN — PIPERACILLIN AND TAZOBACTAM 3.38 G: 3; .375 INJECTION, POWDER, FOR SOLUTION INTRAVENOUS at 20:52

## 2022-02-15 RX ADMIN — POTASSIUM & SODIUM PHOSPHATES POWDER PACK 280-160-250 MG 2 PACKET: 280-160-250 PACK at 08:31

## 2022-02-15 RX ADMIN — PIPERACILLIN AND TAZOBACTAM 3.38 G: 3; .375 INJECTION, POWDER, FOR SOLUTION INTRAVENOUS at 08:29

## 2022-02-15 RX ADMIN — FLUTICASONE FUROATE AND VILANTEROL TRIFENATATE 1 PUFF: 100; 25 POWDER RESPIRATORY (INHALATION) at 08:33

## 2022-02-15 RX ADMIN — DEXTROSE MONOHYDRATE 20 ML: 50 INJECTION, SOLUTION INTRAVENOUS at 20:47

## 2022-02-15 RX ADMIN — AMLODIPINE BESYLATE 5 MG: 5 TABLET ORAL at 08:31

## 2022-02-15 RX ADMIN — ACYCLOVIR 800 MG: 800 TABLET ORAL at 20:47

## 2022-02-15 RX ADMIN — METOPROLOL SUCCINATE 25 MG: 25 TABLET, EXTENDED RELEASE ORAL at 08:31

## 2022-02-15 RX ADMIN — SODIUM CHLORIDE, PRESERVATIVE FREE 5 ML: 5 INJECTION INTRAVENOUS at 22:31

## 2022-02-15 RX ADMIN — ACETAMINOPHEN 650 MG: 325 TABLET, FILM COATED ORAL at 18:54

## 2022-02-15 RX ADMIN — PIPERACILLIN AND TAZOBACTAM 3.38 G: 3; .375 INJECTION, POWDER, FOR SOLUTION INTRAVENOUS at 16:10

## 2022-02-15 RX ADMIN — PANTOPRAZOLE SODIUM 40 MG: 40 TABLET, DELAYED RELEASE ORAL at 08:31

## 2022-02-15 RX ADMIN — LEVOTHYROXINE SODIUM 112 MCG: 0.11 TABLET ORAL at 08:31

## 2022-02-15 RX ADMIN — CALCIUM CARBONATE (ANTACID) CHEW TAB 500 MG 500 MG: 500 CHEW TAB at 08:50

## 2022-02-15 RX ADMIN — LORATADINE 10 MG: 10 TABLET ORAL at 08:31

## 2022-02-15 RX ADMIN — SODIUM PHOSPHATE, MONOBASIC, MONOHYDRATE AND SODIUM PHOSPHATE, DIBASIC, ANHYDROUS 20 MMOL: 276; 142 INJECTION, SOLUTION INTRAVENOUS at 10:25

## 2022-02-15 RX ADMIN — POTASSIUM & SODIUM PHOSPHATES POWDER PACK 280-160-250 MG 1 PACKET: 280-160-250 PACK at 17:10

## 2022-02-15 RX ADMIN — QUETIAPINE FUMARATE 25 MG: 25 TABLET ORAL at 20:47

## 2022-02-15 RX ADMIN — OXYCODONE HYDROCHLORIDE 20 MG: 10 TABLET ORAL at 22:31

## 2022-02-15 RX ADMIN — ACYCLOVIR 800 MG: 800 TABLET ORAL at 08:31

## 2022-02-15 RX ADMIN — VENLAFAXINE 75 MG: 75 TABLET ORAL at 17:10

## 2022-02-15 RX ADMIN — VENLAFAXINE 75 MG: 75 TABLET ORAL at 12:25

## 2022-02-15 RX ADMIN — ACETAMINOPHEN 325 MG: 325 TABLET, FILM COATED ORAL at 23:25

## 2022-02-15 ASSESSMENT — ACTIVITIES OF DAILY LIVING (ADL)
ADLS_ACUITY_SCORE: 20
ADLS_ACUITY_SCORE: 22
ADLS_ACUITY_SCORE: 20
ADLS_ACUITY_SCORE: 22
ADLS_ACUITY_SCORE: 20
ADLS_ACUITY_SCORE: 22
ADLS_ACUITY_SCORE: 20

## 2022-02-15 ASSESSMENT — MIFFLIN-ST. JEOR: SCORE: 1140

## 2022-02-15 NOTE — PLAN OF CARE
"/61   Pulse 87   Temp 99  F (37.2  C) (Oral)   Resp 18   Ht 1.626 m (5' 4\")   Wt 62.9 kg (138 lb 11.2 oz)   SpO2 98%   BMI 23.81 kg/m    Pt forgetful, disoriented situation/time-needs orientation.  Has been cooperative, pleasant this shift.  Up with SBA to BR-uop/bm x1.  Mg replaced-recheck in am.  Phos replaced, recheck at 2200.  Platelets infused.  PRBCs infusing.  K+ recheck 3.8-replacement ordered.  Bed alarm on.  Ate about 1/4 meal.  Problem: Adult Inpatient Plan of Care  Goal: Plan of Care Review  Outcome: No Change     Problem: Adult Inpatient Plan of Care  Goal: Optimal Comfort and Wellbeing  Outcome: No Change     Problem: Pain Acute  Goal: Acceptable Pain Control and Functional Ability  Outcome: No Change     Problem: Infection  Goal: Absence of Infection Signs and Symptoms  Outcome: No Change     "

## 2022-02-15 NOTE — PROVIDER NOTIFICATION
"MD paged at [phone], re \"fever 101. BC drawn. Tylenol given. on Zosyn. Appears a little more confused than normal.\"  "

## 2022-02-15 NOTE — PROGRESS NOTES
"CLINICAL NUTRITION SERVICES - ASSESSMENT NOTE     Nutrition Prescription    RECOMMENDATIONS FOR MDs/PROVIDERS TO ORDER:  Encourage oral intake    Malnutrition Status:    Moderate malnutrition in the context of acute on chronic illness     Recommendations already ordered by Registered Dietitian (RD):  Ordered ensure clear apple and boost soothe daily     Future/Additional Recommendations:  Continue to monitor appetite and oral intake + use of supplements     If need for nutrition support:   Osmolite 1.5 Kevin @ goal of  50ml/hr  (1200ml/day)  will provide: 1800 kcals, 75 g PRO, 914 ml free H20, 244 g CHO, and 0 g fiber daily.  -Start at 10 mL/hr and advance by 10 mL q 8 hours to goal   --Head of bed 30 degrees with gastric feeds  --60 mL water flush q 4 hours for tube patency      REASON FOR ASSESSMENT  Libby Grimaldo is a/an 71 year old female assessed by the dietitian for Nutrition Risk Monitoring    CLINICAL HISTORY  Day +13 auto PSBCT for MM with cytoxan chemo priming     NUTRITION HISTORY  Attempted to obtain information from patient, however, limited due to patient focusing on two family members not being able to visit. She overall endorsed poor appetite- confirmed below. Reported she would be willing to try supplements     CURRENT NUTRITION ORDERS  Diet: High Kcal/High Protein + supplements between meals   Intake/Tolerance: 25%   [facility] reviewed, ordering 1-2 meals per day for the past few days. Meal range from 712-1604 kcal and 29-55 g protein. If 25% consumed, average intake 296 kcal     LABS  Labs reviewed    MEDICATIONS  Medications reviewed    ANTHROPOMETRICS  Height: 162.6 cm (5' 4\")  Most Recent Weight: 63 kg (138 lb 14.2 oz)    IBW: 54.5 kg  BMI: Overweight BMI 25-29.9  Weight History:   Wt Readings from Last 15 Encounters:   02/15/22 63 kg (138 lb 14.2 oz)   02/09/22 62.7 kg (138 lb 4.8 oz)   02/08/22 62.6 kg (138 lb)   02/07/22 63.6 kg (140 lb 3.2 oz)   02/06/22 64.5 kg (142 lb 3.2 oz)   02/05/22 " 64.9 kg (143 lb)   02/04/22 68.5 kg (151 lb)   02/03/22 65.9 kg (145 lb 4.8 oz)   01/26/22 63.5 kg (139 lb 14.4 oz)   01/24/22 63.5 kg (140 lb)   01/24/22 63.5 kg (140 lb)   01/20/22 62.6 kg (138 lb 0.1 oz)   01/20/22 62.6 kg (138 lb)   01/19/22 62.6 kg (138 lb 0.1 oz)   12/22/21 60.1 kg (132 lb 8 oz)     Dosing Weight: 61.7 kg (lowest admission weight)     ASSESSED NUTRITION NEEDS  Estimated Energy Needs: 4318-5837 kcals/day (25 - 30 kcals/kg)  Justification: Maintenance  Estimated Protein Needs: 74+ grams protein/day (1.2+ grams of pro/kg)  Justification: Increased needs  Estimated Fluid Needs: 7412-1345 mL/day (1 mL/kcal)   Justification: Maintenance    PHYSICAL FINDINGS  See malnutrition section below.    MALNUTRITION  % Intake: </= 50% for >/= 5 days (severe)  % Weight Loss: None noted  Subcutaneous Fat Loss: None observed  Muscle Loss: Facial & jaw region:  Mild, unable to assess other areas due to clothing   Fluid Accumulation/Edema: None noted  Malnutrition Diagnosis: Moderate malnutrition in the context of acute on chronic illness     NUTRITION DIAGNOSIS  Inadequate oral intake related to decreased appetite as evidenced by 25% intakes since admission meeting less than 50% of energy needs    INTERVENTIONS  Implementation  Nutrition Education: RD role in care, use of supplements    Collaboration with other providers  Medical food supplement therapy     Goals  Patient to consume 50-75% of nutritionally adequate meal trays TID, or the equivalent with supplements/snacks.     Monitoring/Evaluation  Progress toward goals will be monitored and evaluated per protocol.    Sabrina Yancey RD, LD  5C/BMT pager: [phone]

## 2022-02-15 NOTE — PROGRESS NOTES
02/14/22 1706   Cognitive Assessments   Cognitive Assessments Completed MoCA   Misha Cognitive Assessment (MoCA) Total Score out of 30 11/30   Norms Score of 26 or above considered normal   Domains assessed: attention, executive functions, memory, language, visuoconstructional skills, conceptual thinking, calculations, orientation

## 2022-02-15 NOTE — PROGRESS NOTES
"BMT Daily Progress Note      ID: Libby Grimaldo is a 70 yo woman D+14 s/p Cytoxan chemo priming prior to planned auto PSBCT for MM      INTERVAL  HISTORY   Libby did not complain of pain this morning, and she seems to be in good spirits.  She does not seem confused.  She does report that she looked over her shoulder at the TV while on the commode, and saw a rabbit coming out of the screen, sort of like a 3D image.  She was watching a nature show, but not one with rabbits.  She denies seeing other things. She had a dip in her oxygen to 88% around midnight; now breathing comfortably and satting normally on room air.  She had another fever to 101F this morning around 4am.  Bowels moving fine.       Review of Systems:    8 point ROS negative except as above.    PHYSICAL EXAM      KPS:  70  Blood pressure 117/62, pulse 104, temperature (!) 101  F (38.3  C), temperature source Oral, resp. rate 18, height 1.626 m (5' 4\"), weight 62.9 kg (138 lb 11.2 oz), SpO2 95 %.    Physical Exam  Constitutional:       General: She is not in acute distress.  HENT:      Head: Normocephalic and atraumatic.      Nose: No rhinorrhea.      Mouth/Throat:      Mouth: Mucous membranes are moist.   Eyes:      Comments: Bilateral exophthalmos   Cardiovascular:      Rate and Rhythm: Normal rate and regular rhythm.   Pulmonary:      Breath sounds: Rales present.      Comments: Some rales right base  Abdominal:      General: There is no distension.      Palpations: There is no mass.      Tenderness: There is no abdominal tenderness. There is no guarding.   Musculoskeletal:      Right lower leg: No edema.      Left lower leg: No edema.   Skin:     General: Skin is warm and dry.   Neurological:      Mental Status: She is alert. Mental status is at baseline.      Comments: Reports seeing a rabbit coming out of the TV screen; thought content otherwise at her baseline.        Labs:  Lab Results   Component Value Date    WBC 1.2 (L) 02/15/2022    ANEU 0.9 " (L) 02/15/2022    HGB 8.1 (L) 02/15/2022    HCT 25.0 (L) 02/15/2022    PLT 21 (LL) 02/15/2022     02/15/2022    POTASSIUM 3.9 02/15/2022    CHLORIDE 107 02/15/2022    CO2 23 02/15/2022    GLC 91 02/15/2022    BUN 10 02/15/2022    CR 0.80 02/15/2022    MAG 2.0 02/15/2022    INR 1.29 (H) 02/11/2022    BILITOTAL 0.7 02/14/2022    AST 19 02/14/2022    ALT 14 02/14/2022    ALKPHOS 119 02/14/2022    PROTTOTAL 4.1 (L) 02/14/2022    ALBUMIN 1.6 (L) 02/14/2022       I have assessed all abnormal lab values for their clinical significance and any values considered clinically significant have been addressed in the assessment and plan.          SYSTEMS-BASED ASSESSMENT AND PLAN   Libby Grimaldo is a 72 yo woman D+15 s/p Cytoxan chemo priming prior to planned auto PSBCT for MM        1. BMT  - BMT MD/Coordinator: Dr. Julio C Guillory/Rashad Chen  - Cytoxan 2/1  - Plan for allopurinol 300mg daily for at least 7 days.   - GCSF 10 mcg/kg started day +5 (2/5/22), continue through stem cell collections.  Apheresis aware that patient is inpatient.  White count is beginning to rise.      2. HEME/COAG  - Transfusion parameters: hgb <7g/dL and plts <10,000.  - Pancytopenia secondary to chemo     ADDENDUM 2/15/2022 @1:30PM: patient has non-occlusive LUE DVT.  Will keep platelets > 50,000 and start heparin drip.    3. ID:   - Neutropenic fevers, initially attributed to strep throat.  She was on cefepime 2/11-2/13, but this was changed to zosyn with her mental status changes.  Continues on zosyn at this time.  Fevers are lower, but persistent.  Will check venous US x 4 extremities to assess for clot.  Will check HSV, CMV, HHV6, EBV in blood.  Blood cultures with fevers continue to be negative.  - Recent hx of urinary tract infection s/p antibiotics. Recheck UA/UC 2/15.      #Prophy: ACV,  fluconazole.       4. GI:   - constipation secondary to narcotics: senna bid prn  - Protonix 40mg/d (2/9); prn Tums.  - Anti-emetics:  prn zofran, prn  compazine     5. RENAL/  - hypophosphatemia: replete per sliding scale and schedule 1 packet qid starting 2/15.   - hypokalemia: replete per sliding scale; improving.   - regular diet     6. Endo: hypothyroidism secondary to therapy for Graves Disease- continue levothyroxine     7. CARDS: On metoprolol and norvasc for hypertension.  Prn hydralazine.   Hyperlipidemia: Lipitor   ASA: hold x2/9 with thrombocytopenia     8. Neuro: Continue effexor   - Hx of multiple febrile seizures. Last occurred during college.      9. Pulm: COPD- remains on daily fluticasone and albuterol prn.      10. Cancer related pain: Remains on buprenorphine-naloxone and prn oxycodone. Oxycodone order changed to 10-20mg tid prn with a max of 50mg per 24 hours (previous max was 30mg in 24 hours, but she is having legitimate increase in headaches and bone pain with GCSF). Claritin for GCSF-related pain exacerbation.     11. Musculoskeletal: history of frequent falls at home.  Consulted PT/OT.      12. Neuro: question of illness related delirium vs. unmasking of underlying dementia.  Patient has family history of dementia is grandmother, paternal uncle and father.  Requested OT to do MOCA testing 2/14.  Head CT showed mild to moderate changes of chronic small vessel ischemia.  Consider MRI if mental status changes persist.     I spent 45 minutes in the care of this patient today, which included time necessary for preparation for the visit, obtaining history, ordering medications/tests/procedures as medically indicated, review of pertinent medical literature, counseling of the patient, communication of recommendations to the care team, and documentation time.    Nidia Littlejohn PA-C  2/15/2022

## 2022-02-15 NOTE — PLAN OF CARE
Temperature of 101 overnight, , OVSS. Tylenol given. MD updated. BC drawn. Continues on bed alarm. Remains confused/ forgettful-appears to be possibly seeing things that aren't there/increased confusion with fever. Up with assist of one, bed side commode with fever/confusion. Redirectable and cooperative. One 2L overnight while sleeping. Oxycodone given prior to bed for shoulder pain. Call light in reach. Mucinex given per request.       Replaced phos overnight, recheck at 10am. Awaiting magnesium result. Other labs stable.   Problem: Adult Inpatient Plan of Care  Goal: Plan of Care Review  Outcome: No Change  Goal: Patient-Specific Goal (Individualized)  Outcome: No Change  Goal: Absence of Hospital-Acquired Illness or Injury  Outcome: No Change  Intervention: Prevent Skin Injury  Recent Flowsheet Documentation  Taken 2/15/2022 0001 by Jami Bateman RN  Body Position: position changed independently  Goal: Optimal Comfort and Wellbeing  Outcome: No Change  Goal: Readiness for Transition of Care  Outcome: No Change     Problem: Pain Acute  Goal: Acceptable Pain Control and Functional Ability  Outcome: No Change     Problem: Infection  Goal: Absence of Infection Signs and Symptoms  Outcome: No Change     Problem: Violence Risk or Actual  Goal: Anger and Impulse Control  Outcome: No Change     Problem: Discharge Planning  Goal: Discharge Planning (Adult, OB, Behavioral, Peds)  Outcome: No Change

## 2022-02-16 ENCOUNTER — APPOINTMENT (OUTPATIENT)
Dept: CT IMAGING | Facility: CLINIC | Age: 72
DRG: 152 | End: 2022-02-16
Attending: NURSE PRACTITIONER
Payer: MEDICARE

## 2022-02-16 ENCOUNTER — APPOINTMENT (OUTPATIENT)
Dept: OCCUPATIONAL THERAPY | Facility: CLINIC | Age: 72
DRG: 152 | End: 2022-02-16
Payer: MEDICARE

## 2022-02-16 LAB
ANION GAP SERPL CALCULATED.3IONS-SCNC: 7 MMOL/L (ref 3–14)
BACTERIA BLD CULT: NO GROWTH
BACTERIA UR CULT: NO GROWTH
BASOPHILS # BLD MANUAL: 0 10E3/UL (ref 0–0.2)
BASOPHILS NFR BLD MANUAL: 0 %
BUN SERPL-MCNC: 6 MG/DL (ref 7–30)
C PNEUM DNA SPEC QL NAA+PROBE: NOT DETECTED
CALCIUM SERPL-MCNC: 7 MG/DL (ref 8.5–10.1)
CD34 ABSOLUTE COUNT COMMENT: NORMAL
CD34 CELLS # SPEC: 3 CELLS/UL
CD34 CELLS NFR SPEC: 0.11 %
CHLORIDE BLD-SCNC: 110 MMOL/L (ref 94–109)
CMV DNA SPEC NAA+PROBE-ACNC: NOT DETECTED IU/ML
CO2 SERPL-SCNC: 24 MMOL/L (ref 20–32)
CREAT SERPL-MCNC: 0.75 MG/DL (ref 0.52–1.04)
EBV DNA # SPEC NAA+PROBE: NOT DETECTED COPIES/ML
EOSINOPHIL # BLD MANUAL: 0 10E3/UL (ref 0–0.7)
EOSINOPHIL NFR BLD MANUAL: 0 %
ERYTHROCYTE [DISTWIDTH] IN BLOOD BY AUTOMATED COUNT: 14.7 % (ref 10–15)
FLUAV H1 2009 PAND RNA SPEC QL NAA+PROBE: NOT DETECTED
FLUAV H1 RNA SPEC QL NAA+PROBE: NOT DETECTED
FLUAV H3 RNA SPEC QL NAA+PROBE: NOT DETECTED
FLUAV RNA SPEC QL NAA+PROBE: NEGATIVE
FLUAV RNA SPEC QL NAA+PROBE: NOT DETECTED
FLUBV RNA RESP QL NAA+PROBE: NEGATIVE
FLUBV RNA SPEC QL NAA+PROBE: NOT DETECTED
GFR SERPL CREATININE-BSD FRML MDRD: 86 ML/MIN/1.73M2
GLUCOSE BLD-MCNC: 98 MG/DL (ref 70–99)
HADV DNA SPEC QL NAA+PROBE: NOT DETECTED
HCOV PNL SPEC NAA+PROBE: NOT DETECTED
HCT VFR BLD AUTO: 24 % (ref 35–47)
HGB BLD-MCNC: 7.8 G/DL (ref 11.7–15.7)
HHV6 DNA # SPEC NAA+PROBE: NOT DETECTED COPIES/ML
HMPV RNA SPEC QL NAA+PROBE: NOT DETECTED
HPIV1 RNA SPEC QL NAA+PROBE: NOT DETECTED
HPIV2 RNA SPEC QL NAA+PROBE: NOT DETECTED
HPIV3 RNA SPEC QL NAA+PROBE: NOT DETECTED
HPIV4 RNA SPEC QL NAA+PROBE: NOT DETECTED
HSV1 DNA SPEC QL NAA+PROBE: NEGATIVE
HSV2 DNA SPEC QL NAA+PROBE: NEGATIVE
LYMPHOCYTES # BLD MANUAL: 0.1 10E3/UL (ref 0.8–5.3)
LYMPHOCYTES NFR BLD MANUAL: 4 %
M PNEUMO DNA SPEC QL NAA+PROBE: NOT DETECTED
MAGNESIUM SERPL-MCNC: 2.2 MG/DL (ref 1.8–2.6)
MCH RBC QN AUTO: 31.1 PG (ref 26.5–33)
MCHC RBC AUTO-ENTMCNC: 32.5 G/DL (ref 31.5–36.5)
MCV RBC AUTO: 96 FL (ref 78–100)
MONOCYTES # BLD MANUAL: 0.6 10E3/UL (ref 0–1.3)
MONOCYTES NFR BLD MANUAL: 20 %
NEUTROPHILS # BLD MANUAL: 2.3 10E3/UL (ref 1.6–8.3)
NEUTROPHILS NFR BLD MANUAL: 76 %
NRBC # BLD AUTO: 0.1 10E3/UL
NRBC BLD MANUAL-RTO: 3 %
PHOSPHATE SERPL-MCNC: 2.8 MG/DL (ref 2.5–4.5)
PLAT MORPH BLD: ABNORMAL
PLATELET # BLD AUTO: 70 10E3/UL (ref 150–450)
PLATELET # BLD AUTO: 72 10E3/UL (ref 150–450)
POTASSIUM BLD-SCNC: 3.2 MMOL/L (ref 3.4–5.3)
POTASSIUM BLD-SCNC: 3.2 MMOL/L (ref 3.4–5.3)
PRODUCT NUMBER FLOW CYTOMETRY: NORMAL
RBC # BLD AUTO: 2.51 10E6/UL (ref 3.8–5.2)
RBC MORPH BLD: ABNORMAL
RSV RNA SPEC NAA+PROBE: NEGATIVE
RSV RNA SPEC QL NAA+PROBE: NOT DETECTED
RSV RNA SPEC QL NAA+PROBE: NOT DETECTED
RV+EV RNA SPEC QL NAA+PROBE: NOT DETECTED
SARS-COV-2 RNA RESP QL NAA+PROBE: NEGATIVE
SODIUM SERPL-SCNC: 141 MMOL/L (ref 133–144)
UFH PPP CHRO-ACNC: 0.21 IU/ML
UFH PPP CHRO-ACNC: 0.21 IU/ML
UFH PPP CHRO-ACNC: 0.53 IU/ML
VIABLE CD34 CELLS NFR FLD: 90.85 %
WBC # BLD AUTO: 3 10E3/UL (ref 4–11)

## 2022-02-16 PROCEDURE — G1004 CDSM NDSC: HCPCS | Performed by: RADIOLOGY

## 2022-02-16 PROCEDURE — 83735 ASSAY OF MAGNESIUM: CPT | Performed by: PHYSICIAN ASSISTANT

## 2022-02-16 PROCEDURE — 84100 ASSAY OF PHOSPHORUS: CPT | Performed by: PHYSICIAN ASSISTANT

## 2022-02-16 PROCEDURE — 87040 BLOOD CULTURE FOR BACTERIA: CPT | Performed by: NURSE PRACTITIONER

## 2022-02-16 PROCEDURE — 250N000011 HC RX IP 250 OP 636: Performed by: NURSE PRACTITIONER

## 2022-02-16 PROCEDURE — 71250 CT THORAX DX C-: CPT | Mod: ME

## 2022-02-16 PROCEDURE — 84132 ASSAY OF SERUM POTASSIUM: CPT | Performed by: INTERNAL MEDICINE

## 2022-02-16 PROCEDURE — 36415 COLL VENOUS BLD VENIPUNCTURE: CPT | Performed by: PHYSICIAN ASSISTANT

## 2022-02-16 PROCEDURE — 258N000003 HC RX IP 258 OP 636: Performed by: INTERNAL MEDICINE

## 2022-02-16 PROCEDURE — 97535 SELF CARE MNGMENT TRAINING: CPT | Mod: GO

## 2022-02-16 PROCEDURE — 250N000013 HC RX MED GY IP 250 OP 250 PS 637: Performed by: PHYSICIAN ASSISTANT

## 2022-02-16 PROCEDURE — 87040 BLOOD CULTURE FOR BACTERIA: CPT | Performed by: PHYSICIAN ASSISTANT

## 2022-02-16 PROCEDURE — 86367 STEM CELLS TOTAL COUNT: CPT | Performed by: NURSE PRACTITIONER

## 2022-02-16 PROCEDURE — 85520 HEPARIN ASSAY: CPT | Performed by: INTERNAL MEDICINE

## 2022-02-16 PROCEDURE — 206N000001 HC R&B BMT UMMC

## 2022-02-16 PROCEDURE — 258N000003 HC RX IP 258 OP 636: Performed by: NURSE PRACTITIONER

## 2022-02-16 PROCEDURE — 97530 THERAPEUTIC ACTIVITIES: CPT | Mod: GO

## 2022-02-16 PROCEDURE — 85049 AUTOMATED PLATELET COUNT: CPT | Performed by: NURSE PRACTITIONER

## 2022-02-16 PROCEDURE — 71250 CT THORAX DX C-: CPT | Mod: 26 | Performed by: RADIOLOGY

## 2022-02-16 PROCEDURE — 250N000013 HC RX MED GY IP 250 OP 250 PS 637: Performed by: HOSPITALIST

## 2022-02-16 PROCEDURE — 87081 CULTURE SCREEN ONLY: CPT | Performed by: STUDENT IN AN ORGANIZED HEALTH CARE EDUCATION/TRAINING PROGRAM

## 2022-02-16 PROCEDURE — 250N000013 HC RX MED GY IP 250 OP 250 PS 637: Performed by: INTERNAL MEDICINE

## 2022-02-16 PROCEDURE — 250N000013 HC RX MED GY IP 250 OP 250 PS 637: Performed by: STUDENT IN AN ORGANIZED HEALTH CARE EDUCATION/TRAINING PROGRAM

## 2022-02-16 PROCEDURE — 250N000011 HC RX IP 250 OP 636: Performed by: PHYSICIAN ASSISTANT

## 2022-02-16 PROCEDURE — 250N000011 HC RX IP 250 OP 636: Performed by: STUDENT IN AN ORGANIZED HEALTH CARE EDUCATION/TRAINING PROGRAM

## 2022-02-16 PROCEDURE — 87637 SARSCOV2&INF A&B&RSV AMP PRB: CPT | Performed by: NURSE PRACTITIONER

## 2022-02-16 PROCEDURE — 250N000011 HC RX IP 250 OP 636: Performed by: INTERNAL MEDICINE

## 2022-02-16 PROCEDURE — 250N000013 HC RX MED GY IP 250 OP 250 PS 637: Performed by: NURSE PRACTITIONER

## 2022-02-16 PROCEDURE — 80048 BASIC METABOLIC PNL TOTAL CA: CPT | Performed by: STUDENT IN AN ORGANIZED HEALTH CARE EDUCATION/TRAINING PROGRAM

## 2022-02-16 PROCEDURE — 85027 COMPLETE CBC AUTOMATED: CPT | Performed by: STUDENT IN AN ORGANIZED HEALTH CARE EDUCATION/TRAINING PROGRAM

## 2022-02-16 PROCEDURE — 258N000003 HC RX IP 258 OP 636: Performed by: PHYSICIAN ASSISTANT

## 2022-02-16 PROCEDURE — 87486 CHLMYD PNEUM DNA AMP PROBE: CPT | Performed by: NURSE PRACTITIONER

## 2022-02-16 PROCEDURE — 87633 RESP VIRUS 12-25 TARGETS: CPT | Performed by: NURSE PRACTITIONER

## 2022-02-16 RX ORDER — HEPARIN SODIUM 10000 [USP'U]/100ML
0-5000 INJECTION, SOLUTION INTRAVENOUS CONTINUOUS
Status: DISPENSED | OUTPATIENT
Start: 2022-02-16 | End: 2022-02-17

## 2022-02-16 RX ORDER — VANCOMYCIN HYDROCHLORIDE 1 G/200ML
1000 INJECTION, SOLUTION INTRAVENOUS EVERY 12 HOURS
Status: DISCONTINUED | OUTPATIENT
Start: 2022-02-16 | End: 2022-02-16 | Stop reason: DRUGHIGH

## 2022-02-16 RX ORDER — SUCRALFATE ORAL 1 G/10ML
1 SUSPENSION ORAL
Status: DISCONTINUED | OUTPATIENT
Start: 2022-02-16 | End: 2022-02-26 | Stop reason: HOSPADM

## 2022-02-16 RX ORDER — FUROSEMIDE 10 MG/ML
40 INJECTION INTRAMUSCULAR; INTRAVENOUS ONCE
Status: COMPLETED | OUTPATIENT
Start: 2022-02-16 | End: 2022-02-16

## 2022-02-16 RX ORDER — HEPARIN SODIUM 10000 [USP'U]/100ML
0-5000 INJECTION, SOLUTION INTRAVENOUS CONTINUOUS
Status: DISCONTINUED | OUTPATIENT
Start: 2022-02-16 | End: 2022-02-16

## 2022-02-16 RX ORDER — POTASSIUM CHLORIDE 29.8 MG/ML
20 INJECTION INTRAVENOUS ONCE
Status: COMPLETED | OUTPATIENT
Start: 2022-02-16 | End: 2022-02-16

## 2022-02-16 RX ADMIN — SUCRALFATE 1 G: 1 SUSPENSION ORAL at 21:01

## 2022-02-16 RX ADMIN — ALLOPURINOL 300 MG: 300 TABLET ORAL at 08:31

## 2022-02-16 RX ADMIN — BUPRENORPHINE AND NALOXONE 1 FILM: 8; 2 FILM, SOLUBLE BUCCAL; SUBLINGUAL at 08:31

## 2022-02-16 RX ADMIN — ACYCLOVIR 800 MG: 800 TABLET ORAL at 08:31

## 2022-02-16 RX ADMIN — FLUTICASONE FUROATE AND VILANTEROL TRIFENATATE 1 PUFF: 100; 25 POWDER RESPIRATORY (INHALATION) at 08:10

## 2022-02-16 RX ADMIN — CALCIUM CARBONATE (ANTACID) CHEW TAB 500 MG 500 MG: 500 CHEW TAB at 07:01

## 2022-02-16 RX ADMIN — LORATADINE 10 MG: 10 TABLET ORAL at 08:31

## 2022-02-16 RX ADMIN — QUETIAPINE FUMARATE 25 MG: 25 TABLET ORAL at 21:01

## 2022-02-16 RX ADMIN — ACETAMINOPHEN 650 MG: 325 TABLET, FILM COATED ORAL at 21:01

## 2022-02-16 RX ADMIN — ACYCLOVIR 800 MG: 800 TABLET ORAL at 20:44

## 2022-02-16 RX ADMIN — FILGRASTIM 780 MCG: 480 INJECTION, SOLUTION INTRAVENOUS; SUBCUTANEOUS at 20:44

## 2022-02-16 RX ADMIN — PROCHLORPERAZINE MALEATE 5 MG: 5 TABLET ORAL at 21:01

## 2022-02-16 RX ADMIN — METOPROLOL SUCCINATE 25 MG: 25 TABLET, EXTENDED RELEASE ORAL at 08:32

## 2022-02-16 RX ADMIN — BUPRENORPHINE AND NALOXONE 1 FILM: 8; 2 FILM, SOLUBLE BUCCAL; SUBLINGUAL at 20:44

## 2022-02-16 RX ADMIN — DEXTROSE MONOHYDRATE 10 ML: 50 INJECTION, SOLUTION INTRAVENOUS at 20:48

## 2022-02-16 RX ADMIN — PIPERACILLIN AND TAZOBACTAM 3.38 G: 3; .375 INJECTION, POWDER, FOR SOLUTION INTRAVENOUS at 14:31

## 2022-02-16 RX ADMIN — VENLAFAXINE 75 MG: 75 TABLET ORAL at 17:17

## 2022-02-16 RX ADMIN — VENLAFAXINE 75 MG: 75 TABLET ORAL at 11:22

## 2022-02-16 RX ADMIN — FLUCONAZOLE 200 MG: 200 TABLET ORAL at 08:31

## 2022-02-16 RX ADMIN — HEPARIN SODIUM 1350 UNITS/HR: 1000 INJECTION INTRAVENOUS; SUBCUTANEOUS at 13:52

## 2022-02-16 RX ADMIN — HEPARIN SODIUM 1200 UNITS/HR: 1000 INJECTION INTRAVENOUS; SUBCUTANEOUS at 10:27

## 2022-02-16 RX ADMIN — CALCIUM CARBONATE (ANTACID) CHEW TAB 500 MG 500 MG: 500 CHEW TAB at 18:24

## 2022-02-16 RX ADMIN — ALTEPLASE 2 MG: 2.2 INJECTION, POWDER, LYOPHILIZED, FOR SOLUTION INTRAVENOUS at 12:42

## 2022-02-16 RX ADMIN — OXYCODONE HYDROCHLORIDE 20 MG: 10 TABLET ORAL at 05:09

## 2022-02-16 RX ADMIN — LEVOTHYROXINE SODIUM 112 MCG: 0.11 TABLET ORAL at 08:31

## 2022-02-16 RX ADMIN — FUROSEMIDE 40 MG: 10 INJECTION, SOLUTION INTRAVENOUS at 14:00

## 2022-02-16 RX ADMIN — OXYCODONE HYDROCHLORIDE 10 MG: 10 TABLET ORAL at 17:16

## 2022-02-16 RX ADMIN — POTASSIUM & SODIUM PHOSPHATES POWDER PACK 280-160-250 MG 1 PACKET: 280-160-250 PACK at 11:22

## 2022-02-16 RX ADMIN — POTASSIUM & SODIUM PHOSPHATES POWDER PACK 280-160-250 MG 1 PACKET: 280-160-250 PACK at 08:31

## 2022-02-16 RX ADMIN — OXYCODONE HYDROCHLORIDE 20 MG: 10 TABLET ORAL at 11:22

## 2022-02-16 RX ADMIN — PANTOPRAZOLE SODIUM 40 MG: 40 TABLET, DELAYED RELEASE ORAL at 08:31

## 2022-02-16 RX ADMIN — DEXTROSE MONOHYDRATE 10 ML: 50 INJECTION, SOLUTION INTRAVENOUS at 20:49

## 2022-02-16 RX ADMIN — ONDANSETRON 4 MG: 4 TABLET, ORALLY DISINTEGRATING ORAL at 18:24

## 2022-02-16 RX ADMIN — VANCOMYCIN HYDROCHLORIDE 750 MG: 10 INJECTION, POWDER, LYOPHILIZED, FOR SOLUTION INTRAVENOUS at 21:16

## 2022-02-16 RX ADMIN — ACETAMINOPHEN 650 MG: 325 TABLET, FILM COATED ORAL at 14:29

## 2022-02-16 RX ADMIN — Medication 5 ML: at 01:11

## 2022-02-16 RX ADMIN — POTASSIUM & SODIUM PHOSPHATES POWDER PACK 280-160-250 MG 1 PACKET: 280-160-250 PACK at 17:15

## 2022-02-16 RX ADMIN — PIPERACILLIN AND TAZOBACTAM 3.38 G: 3; .375 INJECTION, POWDER, FOR SOLUTION INTRAVENOUS at 08:30

## 2022-02-16 RX ADMIN — VENLAFAXINE 75 MG: 75 TABLET ORAL at 08:32

## 2022-02-16 RX ADMIN — UMECLIDINIUM 1 PUFF: 62.5 AEROSOL, POWDER ORAL at 12:56

## 2022-02-16 RX ADMIN — POTASSIUM & SODIUM PHOSPHATES POWDER PACK 280-160-250 MG 1 PACKET: 280-160-250 PACK at 20:43

## 2022-02-16 RX ADMIN — VANCOMYCIN HYDROCHLORIDE 1250 MG: 100 INJECTION, POWDER, LYOPHILIZED, FOR SOLUTION INTRAVENOUS at 10:27

## 2022-02-16 RX ADMIN — ATORVASTATIN CALCIUM 40 MG: 20 TABLET, FILM COATED ORAL at 20:44

## 2022-02-16 RX ADMIN — SUCRALFATE 1 G: 1 SUSPENSION ORAL at 17:16

## 2022-02-16 RX ADMIN — PIPERACILLIN AND TAZOBACTAM 3.38 G: 3; .375 INJECTION, POWDER, FOR SOLUTION INTRAVENOUS at 21:16

## 2022-02-16 RX ADMIN — BUPRENORPHINE AND NALOXONE 1 FILM: 8; 2 FILM, SOLUBLE BUCCAL; SUBLINGUAL at 14:29

## 2022-02-16 RX ADMIN — PIPERACILLIN AND TAZOBACTAM 3.38 G: 3; .375 INJECTION, POWDER, FOR SOLUTION INTRAVENOUS at 04:00

## 2022-02-16 RX ADMIN — POTASSIUM CHLORIDE 20 MEQ: 29.8 INJECTION, SOLUTION INTRAVENOUS at 06:17

## 2022-02-16 RX ADMIN — SUCRALFATE 1 G: 1 SUSPENSION ORAL at 11:22

## 2022-02-16 RX ADMIN — AMLODIPINE BESYLATE 5 MG: 5 TABLET ORAL at 08:31

## 2022-02-16 ASSESSMENT — ACTIVITIES OF DAILY LIVING (ADL)
ADLS_ACUITY_SCORE: 20
ADLS_ACUITY_SCORE: 18
ADLS_ACUITY_SCORE: 20
ADLS_ACUITY_SCORE: 18
ADLS_ACUITY_SCORE: 20
ADLS_ACUITY_SCORE: 18
ADLS_ACUITY_SCORE: 20

## 2022-02-16 NOTE — PROGRESS NOTES
MD paged about plt transfusion reaction asking if wants to order premeds for future transfusions as she will need plts for sure.

## 2022-02-16 NOTE — PROGRESS NOTES
"BMT Daily Progress Note      ID: Libby Grimaldo is a 72 yo woman D+14 s/p Cytoxan chemo priming prior to planned auto PSBCT for MM      INTERVAL  HISTORY   Libby spiked to 103.1 last evening just prior to plts. At the same time she was mildly hypoxic. No longer requiring O2 this morning. Very intermittent cough. +fatigue. Now with loose stools this morning. Low appetite.      Review of Systems:    8 point ROS negative except as above.    PHYSICAL EXAM      KPS:  70  Blood pressure 137/70, pulse 96, temperature 97.8  F (36.6  C), resp. rate 20, height 1.626 m (5' 4\"), weight 64.7 kg (142 lb 9.6 oz), SpO2 93 %.    Physical Exam  Constitutional:       General: She is not in acute distress.  HENT:      Head: Normocephalic and atraumatic.      Nose: No rhinorrhea.      Mouth/Throat:      Mouth: Mucous membranes are moist.   Eyes:      Comments: Bilateral exophthalmos   Cardiovascular:      Rate and Rhythm: Normal rate and regular rhythm.   Pulmonary:      Breath sounds: Examination of the right-upper field reveals rales. Examination of the right-middle field reveals rales. Examination of the right-lower field reveals rales. Rales present.      Comments: Some rales right base  Abdominal:      General: There is no distension.      Palpations: There is no mass.      Tenderness: There is no abdominal tenderness. There is no guarding.   Musculoskeletal:      Right lower leg: No edema.      Left lower leg: No edema.   Skin:     General: Skin is warm and dry.   Neurological:      Mental Status: She is alert. Mental status is at baseline.      Comments: Reports seeing a rabbit coming out of the TV screen; thought content otherwise at her baseline.        Labs:  Lab Results   Component Value Date    WBC 3.0 (L) 02/16/2022    ANEU 2.3 02/16/2022    HGB 7.8 (L) 02/16/2022    HCT 24.0 (L) 02/16/2022    PLT 70 (L) 02/16/2022     02/16/2022    POTASSIUM 3.2 (L) 02/16/2022    CHLORIDE 110 (H) 02/16/2022    CO2 24 02/16/2022    GLC " 98 02/16/2022    BUN 6 (L) 02/16/2022    CR 0.75 02/16/2022    MAG 2.2 02/16/2022    INR 1.29 (H) 02/11/2022    BILITOTAL 0.7 02/14/2022    AST 19 02/14/2022    ALT 14 02/14/2022    ALKPHOS 119 02/14/2022    PROTTOTAL 4.1 (L) 02/14/2022    ALBUMIN 1.6 (L) 02/14/2022       I have assessed all abnormal lab values for their clinical significance and any values considered clinically significant have been addressed in the assessment and plan.          SYSTEMS-BASED ASSESSMENT AND PLAN   Libby Grimaldo is a 72 yo woman D+16 s/p Cytoxan chemo priming prior to planned auto PSBCT for MM        1. BMT  - BMT MD/Coordinator: Dr. Julio C Guillory/Rashad Chen  - Cytoxan 2/1  - Plan for allopurinol 300mg daily for at least 7 days.   - GCSF 10 mcg/kg started day +5 (2/5/22), continue through stem cell collections.  Apheresis aware that patient is inpatient.  White count is beginning to rise. CD34 today 3     2. HEME/COAG  - Transfusion parameters: hgb <7g/dL and plts <10,000.  - Pancytopenia secondary to chemo  - 2/15: new non-occlusive LUE DVT.  Will keep platelets > 50,000 and start heparin drip. Change to high intensity today. Check plts BID    3. ID: Add Vanco today. Check covid/rvp/sputum culture. CCT pending  - Neutropenic fevers, initially attributed to strep throat.  She was on cefepime 2/11-2/13, but this was changed to zosyn with her mental status changes.  Continues on zosyn at this time.  Fevers are lower, but persistent.  Will check venous US x 4 extremities to assess for clot.  Will check HSV, CMV, HHV6, EBV in blood.  Blood cultures with fevers continue to be negative.  - Recent hx of urinary tract infection s/p antibiotics. Recheck UA/UC 2/15.      #Prophy: ACV,  fluconazole.       4. GI:   - constipation secondary to narcotics: senna bid prn  - Protonix 40mg/d (2/9); prn Tums.  - Anti-emetics:  prn zofran, prn compazine     5. RENAL/  - hypophosphatemia: replete per sliding scale and schedule 1 packet qid starting  2/15.   - hypokalemia: replete per sliding scale; improving.   - regular diet     6. Endo: hypothyroidism secondary to therapy for Graves Disease- continue levothyroxine     7. CARDS: On metoprolol and norvasc for hypertension.  Prn hydralazine.   Hyperlipidemia: Lipitor   ASA: hold x2/9 with thrombocytopenia     8. Neuro: Continue effexor   - Hx of multiple febrile seizures. Last occurred during college.      9. Pulm: COPD- remains on daily fluticasone and albuterol prn.      10. Cancer related pain: Remains on buprenorphine-naloxone and prn oxycodone. Oxycodone order changed to 10-20mg tid prn with a max of 50mg per 24 hours (previous max was 30mg in 24 hours, but she is having legitimate increase in headaches and bone pain with GCSF). Claritin for GCSF-related pain exacerbation.     11. Musculoskeletal: history of frequent falls at home.  Consulted PT/OT.      12. Neuro: question of illness related delirium vs. unmasking of underlying dementia.  Patient has family history of dementia is grandmother, paternal uncle and father.  Requested OT to do MOCA testing 2/14.  Head CT showed mild to moderate changes of chronic small vessel ischemia.  Consider MRI if mental status changes persist.     I spent 45 minutes in the care of this patient today, which included time necessary for preparation for the visit, obtaining history, ordering medications/tests/procedures as medically indicated, review of pertinent medical literature, counseling of the patient, communication of recommendations to the care team, and documentation time.    Yesenia Butler NP

## 2022-02-16 NOTE — PHARMACY-VANCOMYCIN DOSING SERVICE
"Pharmacy Vancomycin Initial Note  Date of Service 2022  Patient's  1950  71 year old, female    Indication: Febrile Neutropenia    Current estimated CrCl = Estimated Creatinine Clearance: 72.3 mL/min (based on SCr of 0.75 mg/dL).    Creatinine for last 3 days  2022: 10:08 AM Creatinine 0.64 mg/dL  2/15/2022:  4:10 AM Creatinine 0.80 mg/dL  2022:  3:40 AM Creatinine 0.75 mg/dL    Recent Vancomycin Level(s) for last 3 days  No results found for requested labs within last 72 hours.      Vancomycin IV Administrations (past 72 hours)                   vancomycin 1250 mg in 0.9% NaCl 250 mL intermittent infusion 1,250 mg (mg) 1,250 mg New Bag 22 1027                Nephrotoxins and other renal medications (From now, onward)    Start     Dose/Rate Route Frequency Ordered Stop    22 2100  vancomycin (VANCOCIN) 750 mg in sodium chloride 0.9 % 250 mL intermittent infusion        \"Followed by\" Linked Group Details    750 mg  over 60-90 Minutes Intravenous EVERY 12 HOURS 22 0838      22 0900  vancomycin 1250 mg in 0.9% NaCl 250 mL intermittent infusion 1,250 mg        \"Followed by\" Linked Group Details    1,250 mg  over 90 Minutes Intravenous ONCE 22 0838      22 1930  piperacillin-tazobactam (ZOSYN) 3.375 g vial to attach to  mL bag        Note to Pharmacy: For [facility], [facility] and [facility]: For Zosyn-naive patients, use the \"Zosyn initial dose + extended infusion\" order panel.    3.375 g  over 30 Minutes Intravenous EVERY 6 HOURS 22 1908      02/10/22 2200  acyclovir (ZOVIRAX) tablet 800 mg         800 mg Oral EVERY 12 HOURS 02/10/22 193            Contrast Orders - past 72 hours (72h ago, onward)            None          [employer] Prediction of Planned Initial Vancomycin Regimen  Loading dose: 1250 mg at 09:30 2022.  Regimen: 750 mg IV every 12 hours.  Start time: 08:37 on 2022  Exposure target: AUC24 (range)400-600 mg/L.hr   AUC24,ss: 499 " mg/L.hr  Probability of AUC24 > 400: 73 %  Ctrough,ss: 16.2 mg/L  Probability of Ctrough,ss > 20: 31 %  Probability of nephrotoxicity (Lodise CHEL 2009): 12 %        Plan:  1. Start vancomycin 1250 mg loading dose followed by 750 mg IV q12h.   2. Vancomycin monitoring method: AUC  3. Vancomycin therapeutic monitoring goal: 400-600 mg*h/L  4. Pharmacy will check vancomycin levels as appropriate in 1-3 Days.    5. Serum creatinine levels will be ordered daily for the first week of therapy and at least twice weekly for subsequent weeks.      Fanny Mayorga, PharmD

## 2022-02-16 NOTE — PLAN OF CARE
VSS. Afebrile. UA sent. Remains forgetful but less impulsiveness. Bed alarm up, using BSC overnight. One episode of incontinence. Oxycodone given twice for generalized pain/shoulder pain. Recheck of plt counts after transfusion workup, received unit of platelets. Continues on heparin gtt, increased overnight, next lab in process. Replacing potassium this am, recheck needed.      Goal Outcome Evaluation:    Plan of Care Reviewed With: patient     Overall Patient Progress: improving            Problem: Adult Inpatient Plan of Care  Goal: Plan of Care Review  Outcome: Ongoing, Progressing  Flowsheets (Taken 2/16/2022 0634)  Plan of Care Reviewed With: patient  Overall Patient Progress: improving  Goal: Patient-Specific Goal (Individualized)  Outcome: Ongoing, Progressing  Goal: Absence of Hospital-Acquired Illness or Injury  Outcome: Ongoing, Progressing  Intervention: Identify and Manage Fall Risk  Recent Flowsheet Documentation  Taken 2/15/2022 2100 by Jami Bateman RN  Safety Promotion/Fall Prevention:   activity supervised   assistive device/personal items within reach   bed alarm on   fall prevention program maintained   clutter free environment maintained   increase visualization of patient   lighting adjusted   nonskid shoes/slippers when out of bed  Intervention: Prevent Skin Injury  Recent Flowsheet Documentation  Taken 2/15/2022 2100 by Jami Bateman, RN  Body Position: position changed independently  Intervention: Prevent and Manage VTE (Venous Thromboembolism) Risk  Recent Flowsheet Documentation  Taken 2/15/2022 2100 by Jami Bateman, RN  Activity Management: activity adjusted per tolerance  Goal: Optimal Comfort and Wellbeing  Outcome: Ongoing, Progressing  Goal: Readiness for Transition of Care  Outcome: Ongoing, Progressing     Problem: Infection  Goal: Absence of Infection Signs and Symptoms  Outcome: Ongoing, Progressing     Problem: Violence Risk or Actual  Goal: Anger and Impulse Control  Outcome:  Ongoing, Progressing     Problem: Discharge Planning  Goal: Discharge Planning (Adult, OB, Behavioral, Peds)  Outcome: Ongoing, Progressing

## 2022-02-16 NOTE — PROVIDER NOTIFICATION
"MD paged at [phone], re\" pt on heparin gtt. had to stop bag of platelets for reaction. Per blood bank we can give more now. Should I order another bag or recheck platelets?\"  "

## 2022-02-17 ENCOUNTER — APPOINTMENT (OUTPATIENT)
Dept: PHYSICAL THERAPY | Facility: CLINIC | Age: 72
DRG: 152 | End: 2022-02-17
Attending: STUDENT IN AN ORGANIZED HEALTH CARE EDUCATION/TRAINING PROGRAM
Payer: MEDICARE

## 2022-02-17 LAB
ANION GAP SERPL CALCULATED.3IONS-SCNC: 6 MMOL/L (ref 3–14)
BACTERIA BLD CULT: NO GROWTH
BASOPHILS # BLD MANUAL: 0 10E3/UL (ref 0–0.2)
BASOPHILS NFR BLD MANUAL: 0 %
BUN SERPL-MCNC: 5 MG/DL (ref 7–30)
CALCIUM SERPL-MCNC: 6.8 MG/DL (ref 8.5–10.1)
CHLORIDE BLD-SCNC: 109 MMOL/L (ref 94–109)
CO2 SERPL-SCNC: 26 MMOL/L (ref 20–32)
CREAT SERPL-MCNC: 0.82 MG/DL (ref 0.52–1.04)
EOSINOPHIL # BLD MANUAL: 0 10E3/UL (ref 0–0.7)
EOSINOPHIL NFR BLD MANUAL: 0 %
ERYTHROCYTE [DISTWIDTH] IN BLOOD BY AUTOMATED COUNT: 14.6 % (ref 10–15)
GFR SERPL CREATININE-BSD FRML MDRD: 77 ML/MIN/1.73M2
GLUCOSE BLD-MCNC: 73 MG/DL (ref 70–99)
GLUCOSE BLDC GLUCOMTR-MCNC: 101 MG/DL (ref 70–99)
HCT VFR BLD AUTO: 23.4 % (ref 35–47)
HGB BLD-MCNC: 7.5 G/DL (ref 11.7–15.7)
LYMPHOCYTES # BLD MANUAL: 0 10E3/UL (ref 0.8–5.3)
LYMPHOCYTES NFR BLD MANUAL: 1 %
MAGNESIUM SERPL-MCNC: 1.6 MG/DL (ref 1.8–2.6)
MCH RBC QN AUTO: 30.9 PG (ref 26.5–33)
MCHC RBC AUTO-ENTMCNC: 32.1 G/DL (ref 31.5–36.5)
MCV RBC AUTO: 96 FL (ref 78–100)
MONOCYTES # BLD MANUAL: 0.3 10E3/UL (ref 0–1.3)
MONOCYTES NFR BLD MANUAL: 9 %
NEUTROPHILS # BLD MANUAL: 3.3 10E3/UL (ref 1.6–8.3)
NEUTROPHILS NFR BLD MANUAL: 90 %
PHOSPHATE SERPL-MCNC: 1.9 MG/DL (ref 2.5–4.5)
PHOSPHATE SERPL-MCNC: 2.2 MG/DL (ref 2.5–4.5)
PLAT MORPH BLD: ABNORMAL
PLATELET # BLD AUTO: 60 10E3/UL (ref 150–450)
PLATELET # BLD AUTO: 73 10E3/UL (ref 150–450)
POTASSIUM BLD-SCNC: 3.1 MMOL/L (ref 3.4–5.3)
POTASSIUM BLD-SCNC: 3.7 MMOL/L (ref 3.4–5.3)
RBC # BLD AUTO: 2.43 10E6/UL (ref 3.8–5.2)
RBC MORPH BLD: ABNORMAL
SODIUM SERPL-SCNC: 141 MMOL/L (ref 133–144)
UFH PPP CHRO-ACNC: 0.91 IU/ML
WBC # BLD AUTO: 3.7 10E3/UL (ref 4–11)

## 2022-02-17 PROCEDURE — 84100 ASSAY OF PHOSPHORUS: CPT | Performed by: PHYSICIAN ASSISTANT

## 2022-02-17 PROCEDURE — 250N000013 HC RX MED GY IP 250 OP 250 PS 637: Performed by: PHYSICIAN ASSISTANT

## 2022-02-17 PROCEDURE — 250N000013 HC RX MED GY IP 250 OP 250 PS 637: Performed by: HOSPITALIST

## 2022-02-17 PROCEDURE — 206N000001 HC R&B BMT UMMC

## 2022-02-17 PROCEDURE — 85049 AUTOMATED PLATELET COUNT: CPT | Performed by: NURSE PRACTITIONER

## 2022-02-17 PROCEDURE — 250N000013 HC RX MED GY IP 250 OP 250 PS 637: Performed by: INTERNAL MEDICINE

## 2022-02-17 PROCEDURE — 97530 THERAPEUTIC ACTIVITIES: CPT | Mod: GP

## 2022-02-17 PROCEDURE — 250N000009 HC RX 250: Performed by: INTERNAL MEDICINE

## 2022-02-17 PROCEDURE — 250N000011 HC RX IP 250 OP 636: Performed by: NURSE PRACTITIONER

## 2022-02-17 PROCEDURE — 258N000003 HC RX IP 258 OP 636: Performed by: PHYSICIAN ASSISTANT

## 2022-02-17 PROCEDURE — 87040 BLOOD CULTURE FOR BACTERIA: CPT | Performed by: STUDENT IN AN ORGANIZED HEALTH CARE EDUCATION/TRAINING PROGRAM

## 2022-02-17 PROCEDURE — 250N000011 HC RX IP 250 OP 636: Performed by: PHYSICIAN ASSISTANT

## 2022-02-17 PROCEDURE — 250N000013 HC RX MED GY IP 250 OP 250 PS 637: Performed by: NURSE PRACTITIONER

## 2022-02-17 PROCEDURE — 84100 ASSAY OF PHOSPHORUS: CPT | Performed by: INTERNAL MEDICINE

## 2022-02-17 PROCEDURE — 84132 ASSAY OF SERUM POTASSIUM: CPT | Performed by: INTERNAL MEDICINE

## 2022-02-17 PROCEDURE — 258N000003 HC RX IP 258 OP 636: Performed by: NURSE PRACTITIONER

## 2022-02-17 PROCEDURE — 97162 PT EVAL MOD COMPLEX 30 MIN: CPT | Mod: GP

## 2022-02-17 PROCEDURE — 87040 BLOOD CULTURE FOR BACTERIA: CPT | Performed by: PHYSICIAN ASSISTANT

## 2022-02-17 PROCEDURE — 250N000011 HC RX IP 250 OP 636: Performed by: INTERNAL MEDICINE

## 2022-02-17 PROCEDURE — 83735 ASSAY OF MAGNESIUM: CPT | Performed by: STUDENT IN AN ORGANIZED HEALTH CARE EDUCATION/TRAINING PROGRAM

## 2022-02-17 PROCEDURE — 85520 HEPARIN ASSAY: CPT | Performed by: INTERNAL MEDICINE

## 2022-02-17 PROCEDURE — 85027 COMPLETE CBC AUTOMATED: CPT | Performed by: STUDENT IN AN ORGANIZED HEALTH CARE EDUCATION/TRAINING PROGRAM

## 2022-02-17 PROCEDURE — 80048 BASIC METABOLIC PNL TOTAL CA: CPT | Performed by: STUDENT IN AN ORGANIZED HEALTH CARE EDUCATION/TRAINING PROGRAM

## 2022-02-17 PROCEDURE — 97116 GAIT TRAINING THERAPY: CPT | Mod: GP

## 2022-02-17 PROCEDURE — 258N000003 HC RX IP 258 OP 636: Performed by: INTERNAL MEDICINE

## 2022-02-17 PROCEDURE — 250N000013 HC RX MED GY IP 250 OP 250 PS 637: Performed by: STUDENT IN AN ORGANIZED HEALTH CARE EDUCATION/TRAINING PROGRAM

## 2022-02-17 PROCEDURE — 250N000011 HC RX IP 250 OP 636: Performed by: STUDENT IN AN ORGANIZED HEALTH CARE EDUCATION/TRAINING PROGRAM

## 2022-02-17 RX ORDER — FUROSEMIDE 10 MG/ML
20 INJECTION INTRAMUSCULAR; INTRAVENOUS ONCE
Status: COMPLETED | OUTPATIENT
Start: 2022-02-17 | End: 2022-02-17

## 2022-02-17 RX ORDER — POTASSIUM CHLORIDE 29.8 MG/ML
20 INJECTION INTRAVENOUS ONCE
Status: COMPLETED | OUTPATIENT
Start: 2022-02-17 | End: 2022-02-17

## 2022-02-17 RX ORDER — MAGNESIUM SULFATE HEPTAHYDRATE 40 MG/ML
2 INJECTION, SOLUTION INTRAVENOUS ONCE
Status: COMPLETED | OUTPATIENT
Start: 2022-02-17 | End: 2022-02-17

## 2022-02-17 RX ADMIN — POTASSIUM & SODIUM PHOSPHATES POWDER PACK 280-160-250 MG 1 PACKET: 280-160-250 PACK at 07:54

## 2022-02-17 RX ADMIN — APIXABAN 10 MG: 5 TABLET, FILM COATED ORAL at 11:17

## 2022-02-17 RX ADMIN — BUPRENORPHINE AND NALOXONE 1 FILM: 8; 2 FILM, SOLUBLE BUCCAL; SUBLINGUAL at 14:35

## 2022-02-17 RX ADMIN — ONDANSETRON 4 MG: 4 TABLET, ORALLY DISINTEGRATING ORAL at 08:14

## 2022-02-17 RX ADMIN — AMLODIPINE BESYLATE 5 MG: 5 TABLET ORAL at 07:55

## 2022-02-17 RX ADMIN — BUPRENORPHINE AND NALOXONE 1 FILM: 8; 2 FILM, SOLUBLE BUCCAL; SUBLINGUAL at 07:54

## 2022-02-17 RX ADMIN — POTASSIUM & SODIUM PHOSPHATES POWDER PACK 280-160-250 MG 1 PACKET: 280-160-250 PACK at 18:45

## 2022-02-17 RX ADMIN — UMECLIDINIUM 1 PUFF: 62.5 AEROSOL, POWDER ORAL at 07:55

## 2022-02-17 RX ADMIN — MAGNESIUM SULFATE IN WATER 2 G: 40 INJECTION, SOLUTION INTRAVENOUS at 11:16

## 2022-02-17 RX ADMIN — VANCOMYCIN HYDROCHLORIDE 750 MG: 10 INJECTION, POWDER, LYOPHILIZED, FOR SOLUTION INTRAVENOUS at 09:51

## 2022-02-17 RX ADMIN — BUPRENORPHINE AND NALOXONE 1 FILM: 8; 2 FILM, SOLUBLE BUCCAL; SUBLINGUAL at 20:49

## 2022-02-17 RX ADMIN — APIXABAN 10 MG: 5 TABLET, FILM COATED ORAL at 20:49

## 2022-02-17 RX ADMIN — SUCRALFATE 1 G: 1 SUSPENSION ORAL at 07:54

## 2022-02-17 RX ADMIN — FILGRASTIM 780 MCG: 480 INJECTION, SOLUTION INTRAVENOUS; SUBCUTANEOUS at 20:50

## 2022-02-17 RX ADMIN — ALLOPURINOL 300 MG: 300 TABLET ORAL at 07:55

## 2022-02-17 RX ADMIN — OXYCODONE HYDROCHLORIDE 10 MG: 10 TABLET ORAL at 21:35

## 2022-02-17 RX ADMIN — PIPERACILLIN AND TAZOBACTAM 3.38 G: 3; .375 INJECTION, POWDER, FOR SOLUTION INTRAVENOUS at 21:35

## 2022-02-17 RX ADMIN — DEXTROSE MONOHYDRATE 20 ML: 50 INJECTION, SOLUTION INTRAVENOUS at 20:50

## 2022-02-17 RX ADMIN — CALCIUM CARBONATE (ANTACID) CHEW TAB 500 MG 500 MG: 500 CHEW TAB at 08:14

## 2022-02-17 RX ADMIN — OXYCODONE HYDROCHLORIDE 20 MG: 10 TABLET ORAL at 05:15

## 2022-02-17 RX ADMIN — ONDANSETRON 4 MG: 4 TABLET, ORALLY DISINTEGRATING ORAL at 19:13

## 2022-02-17 RX ADMIN — POTASSIUM PHOSPHATE, MONOBASIC AND POTASSIUM PHOSPHATE, DIBASIC 9 MMOL: 224; 236 INJECTION, SOLUTION, CONCENTRATE INTRAVENOUS at 18:45

## 2022-02-17 RX ADMIN — QUETIAPINE FUMARATE 25 MG: 25 TABLET ORAL at 21:35

## 2022-02-17 RX ADMIN — POTASSIUM & SODIUM PHOSPHATES POWDER PACK 280-160-250 MG 1 PACKET: 280-160-250 PACK at 14:35

## 2022-02-17 RX ADMIN — FLUCONAZOLE 200 MG: 200 TABLET ORAL at 07:55

## 2022-02-17 RX ADMIN — DEXTROSE MONOHYDRATE 20 ML: 50 INJECTION, SOLUTION INTRAVENOUS at 21:36

## 2022-02-17 RX ADMIN — LORATADINE 10 MG: 10 TABLET ORAL at 07:55

## 2022-02-17 RX ADMIN — SUCRALFATE 1 G: 1 SUSPENSION ORAL at 11:17

## 2022-02-17 RX ADMIN — CALCIUM CARBONATE (ANTACID) CHEW TAB 500 MG 500 MG: 500 CHEW TAB at 21:45

## 2022-02-17 RX ADMIN — PIPERACILLIN AND TAZOBACTAM 3.38 G: 3; .375 INJECTION, POWDER, FOR SOLUTION INTRAVENOUS at 14:35

## 2022-02-17 RX ADMIN — VENLAFAXINE 75 MG: 75 TABLET ORAL at 14:35

## 2022-02-17 RX ADMIN — POTASSIUM CHLORIDE 20 MEQ: 29.8 INJECTION, SOLUTION INTRAVENOUS at 04:22

## 2022-02-17 RX ADMIN — POTASSIUM PHOSPHATE, MONOBASIC AND POTASSIUM PHOSPHATE, DIBASIC 15 MMOL: 224; 236 INJECTION, SOLUTION, CONCENTRATE INTRAVENOUS at 06:26

## 2022-02-17 RX ADMIN — ACYCLOVIR 800 MG: 800 TABLET ORAL at 20:49

## 2022-02-17 RX ADMIN — LEVOTHYROXINE SODIUM 112 MCG: 0.11 TABLET ORAL at 07:55

## 2022-02-17 RX ADMIN — POTASSIUM CHLORIDE 20 MEQ: 29.8 INJECTION, SOLUTION INTRAVENOUS at 11:16

## 2022-02-17 RX ADMIN — PANTOPRAZOLE SODIUM 40 MG: 40 TABLET, DELAYED RELEASE ORAL at 07:55

## 2022-02-17 RX ADMIN — ACYCLOVIR 800 MG: 800 TABLET ORAL at 07:54

## 2022-02-17 RX ADMIN — SUCRALFATE 1 G: 1 SUSPENSION ORAL at 16:47

## 2022-02-17 RX ADMIN — PIPERACILLIN AND TAZOBACTAM 3.38 G: 3; .375 INJECTION, POWDER, FOR SOLUTION INTRAVENOUS at 03:00

## 2022-02-17 RX ADMIN — ATORVASTATIN CALCIUM 40 MG: 20 TABLET, FILM COATED ORAL at 20:49

## 2022-02-17 RX ADMIN — METOPROLOL SUCCINATE 25 MG: 25 TABLET, EXTENDED RELEASE ORAL at 07:55

## 2022-02-17 RX ADMIN — Medication 5 ML: at 03:00

## 2022-02-17 RX ADMIN — OXYCODONE HYDROCHLORIDE 20 MG: 10 TABLET ORAL at 13:06

## 2022-02-17 RX ADMIN — VENLAFAXINE 75 MG: 75 TABLET ORAL at 07:54

## 2022-02-17 RX ADMIN — FLUTICASONE FUROATE AND VILANTEROL TRIFENATATE 1 PUFF: 100; 25 POWDER RESPIRATORY (INHALATION) at 07:55

## 2022-02-17 RX ADMIN — VENLAFAXINE 75 MG: 75 TABLET ORAL at 18:45

## 2022-02-17 RX ADMIN — PIPERACILLIN AND TAZOBACTAM 3.38 G: 3; .375 INJECTION, POWDER, FOR SOLUTION INTRAVENOUS at 08:14

## 2022-02-17 RX ADMIN — POTASSIUM & SODIUM PHOSPHATES POWDER PACK 280-160-250 MG 1 PACKET: 280-160-250 PACK at 21:45

## 2022-02-17 RX ADMIN — SUCRALFATE 1 G: 1 SUSPENSION ORAL at 21:35

## 2022-02-17 RX ADMIN — ACETAMINOPHEN 650 MG: 325 TABLET, FILM COATED ORAL at 19:13

## 2022-02-17 RX ADMIN — HEPARIN SODIUM 1150 UNITS/HR: 1000 INJECTION INTRAVENOUS; SUBCUTANEOUS at 09:51

## 2022-02-17 RX ADMIN — FUROSEMIDE 20 MG: 10 INJECTION, SOLUTION INTRAVENOUS at 11:16

## 2022-02-17 ASSESSMENT — ACTIVITIES OF DAILY LIVING (ADL)
ADLS_ACUITY_SCORE: 20
ADLS_ACUITY_SCORE: 20
ADLS_ACUITY_SCORE: 18
ADLS_ACUITY_SCORE: 20
ADLS_ACUITY_SCORE: 20
ADLS_ACUITY_SCORE: 18
ADLS_ACUITY_SCORE: 20
ADLS_ACUITY_SCORE: 18
ADLS_ACUITY_SCORE: 20
ADLS_ACUITY_SCORE: 18
ADLS_ACUITY_SCORE: 20
ADLS_ACUITY_SCORE: 20
ADLS_ACUITY_SCORE: 18
ADLS_ACUITY_SCORE: 20
ADLS_ACUITY_SCORE: 18

## 2022-02-17 NOTE — PLAN OF CARE
"/67   Pulse 99   Temp 98.6  F (37  C) (Oral)   Resp 20   Ht 1.626 m (5' 4\")   Wt 64.6 kg (142 lb 6.4 oz)   SpO2 92%   BMI 24.44 kg/m      Afebrile, OVSS on RA. No oxygen needed overnight, spot checking throughout the night.  Orientation waxes and wanes, most of shift oriented to person and place.  Pt reports shoulder and back pain, PRN oxycodone x1, tylenol x1 and scheduled suboxone given. Reports mild nausea, PRN compazine x1 given with good relief.   Still needs c.diff and sputum cx, no BM this shift and was not able to cough up enough sputum for cx.  Heparin gtt was increased to high intensity during the day, Hep Xa was 0.53 at 2100 and 0.91 at 0300.  Heparin gtt stopped for 60 minutes and decreased by 200 units/hr, running at 1150 units/hr.  Recheck will be at 1045. 20 mEq of potassium replaced, recheck at 0730. 15 mmol of phos infusing, recheck 4 hours after infusion. Waiting for magnesium results.  Continue plan of care.     Problem: Pain Acute  Goal: Acceptable Pain Control and Functional Ability  Outcome: Ongoing, Progressing  Intervention: Prevent or Manage Pain  Recent Flowsheet Documentation  Taken 2/17/2022 0009 by Sarahy Page, RN  Medication Review/Management: medications reviewed  Taken 2/16/2022 2350 by Sarahy Page, RN  Medication Review/Management: medications reviewed     Problem: Infection  Goal: Absence of Infection Signs and Symptoms  Outcome: Ongoing, Progressing  Intervention: Prevent or Manage Infection  Recent Flowsheet Documentation  Taken 2/17/2022 0009 by Sarahy Page, RN  Isolation Precautions: enteric precautions maintained  Taken 2/16/2022 2350 by Sarahy Page, RN  Isolation Precautions: enteric precautions maintained             "

## 2022-02-17 NOTE — PROGRESS NOTES
02/17/22 1300   Quick Adds   Type of Visit Initial PT Evaluation       Present no   Living Environment   Current Living Arrangements other (see comments)  (bungalow)   Home Accessibility stairs to enter home;stairs within home   Number of Stairs, Main Entrance 4   Stair Railings, Main Entrance railing on right side (ascending)   Number of Stairs, Within Home, Primary seven   Stair Railings, Within Home, Primary railing on right side (ascending)   Living Environment Comments PT: [facility] w/ spouse who is able to assist PRN. Pt reports 4 NAKIA and 7 once inside.    Self-Care   Usual Activity Tolerance good   Current Activity Tolerance moderate   Regular Exercise Yes   Activity/Exercise Type other (see comments)  (PT exercises, balance exercises)   Exercise Amount/Frequency 2 times/wk   Equipment Currently Used at Home shower chair  (has cane, gait belt)   Fall history within last six months yes   Number of times patient has fallen within last six months   (pt reports 1, chart review states many)   Activity/Exercise/Self-Care Comment PT: Pt reports being IND w/ mobility and ADLs at baseline. Reports  bought her a hurrycane to use but pt reports not feeling it to have been necessary. Pt's son lives ~2 blocks from pt, and sister lives close by.    General Information   Onset of Illness/Injury or Date of Surgery 02/10/22   Referring Physician Clayton Mar MD   Patient/Family Therapy Goals Statement (PT) to be able to walk outside of home IND w/o fear of falling   Pertinent History of Current Problem (include personal factors and/or comorbidities that impact the POC) Libby Grimaldo is a 70 yo woman D+18 s/p Cytoxan chemo priming prior to planned auto PSBCT for MM    Existing Precautions/Restrictions fall;immunosuppressed;spinal   Weight-Bearing Status - LUE partial weight-bearing (% in comments)  (<10#)   Weight-Bearing Status - RUE partial weight-bearing (% in comments)  (<10#)    Heart Disease Risk Factors Age;Medical history;Lack of physical activity   General Observations PT: up in recliner, pleasant and engaged   Cognition   Affect/Mental Status (Cognition) confused   Orientation Status (Cognition) oriented to;person;place;situation   Safety Deficit (Cognition) moderate deficit;judgment;insight into deficits/self-awareness;safety precautions awareness;safety precautions follow-through/compliance   Memory Deficit (Cognition) moderate deficit;immediate recall;short-term memory   Cognitive Status Comments PT: pt confused, disjointed thinking at times, needs repeat of education and unable to recall education provided in previous OT sessions   Pain Assessment   Patient Currently in Pain Yes, see Vital Sign flowsheet  (whole body pain, tolerable at time of eval)   Integumentary/Edema   Integumentary/Edema no deficits were identifed   Posture    Posture Protracted shoulders   Range of Motion (ROM)   ROM Comment BLE ROM WFL   Strength (Manual Muscle Testing)   Strength Comments generalize deconditioning, decreased ability to clear feet with gait. >3/5 for functional mobility   Bed Mobility   Comment, (Bed Mobility) NT; pt declines wishing to remain in bed   Transfers   Comment, (Transfers) CGA w/o device   Gait/Stairs (Locomotion)   Comment, (Gait/Stairs) 15' w/o device and grossly Aivva. short step lengths, reduced swing clearance tere, lateral path deviation.    Balance   Balance Comments IND static sitting, SBA dynamic sitting balance. CGA for static and dynamic standing balance   Sensory Examination   Sensory Perception Comments reports intermittent N/T in tere feet   Coordination   Coordination no deficits were identified   Muscle Tone   Muscle Tone no deficits were identified   Clinical Impression   Criteria for Skilled Therapeutic Intervention Yes, treatment indicated   PT Diagnosis (PT) impaired functaionl mobility    Influenced by the following impairments impaired activity tolerance,  functional strength and balance, cognition and safety awareness   Functional limitations due to impairments decreaed (I) w/ transfers, gait, home access and tolerance for ADLs   Clinical Presentation (PT Evaluation Complexity) Evolving/Changing   Clinical Presentation Rationale medical status, clinical judgement, PMH, home set up   Clinical Decision Making (Complexity) moderate complexity   Planned Therapy Interventions (PT) balance training;bed mobility training;gait training;home exercise program;neuromuscular re-education;patient/family education;stair training;strengthening;transfer training   Anticipated Equipment Needs at Discharge (PT) walker, rolling   Risk & Benefits of therapy have been explained evaluation/treatment results reviewed;care plan/treatment goals reviewed;risks/benefits reviewed;current/potential barriers reviewed;participants voiced agreement with care plan;participants included;patient   Clinical Impression Comments PT: Pt presents with decreased activity tolerance, functional strength and balance. Overall functional safety impaired 2/2 cognition as well. Will benefit from skilled inpatient PT to progress functional IND.    PT Discharge Planning   PT Discharge Recommendation (DC Rec) Transitional Care Facility;home with home care physical therapy;home with assist   PT Rationale for DC Rec Pt w/ limited activity tolerance and balance deficits leading to a high falls risk. Cognition limiting safety awareness. Would benefit from rehab to progress fucntional IND and safety. If pt declining rehab stay would require likely FWW/4WW and 24/7 assist with mobility, family training w/ .    PT Brief overview of current status A of 1 w/ GB   Plan of Care Review   Plan of Care Reviewed With patient   Total Evaluation Time   Total Evaluation Time (Minutes) 9   Physical Therapy Goals   PT Frequency 5x/week   PT Predicated Duration/Target Date for Goal Attainment 02/24/22   PT: Bed Mobility Supine  to/from sit;Rolling;Supervision/stand-by assist;Within precautions   PT: Transfers Independent   PT: Gait Supervision/stand-by assist;Rolling walker;100 feet   PT: Stairs Supervision/stand-by assist;7 stairs;Rail on right   PT: Perform aerobic activity with stable cardiovascular response 5 minutes;intermittent activity;ambulation   PT: Goal 1 Pt/ family understanding of LE strengtheing HEP to progress functional mobilty and discharge.

## 2022-02-17 NOTE — PROGRESS NOTES
"BMT CLINICAL SOCIAL WORK NOTE:    Focus: Supportive Counseling    Data: Pt is a 71 year old female admitted to  for a neutropenic fever. PT is day +18 s/p chemo priming.    Interventions: Clinical  (CSW) met with Pt to assess coping, provide supportive counseling and assist with resources as needed. Pt shared that she is doing ok, but notes she is very tired.  Pt processed that she has been sleeping better, but it give her limited motivation to get out of bed because she is comfortable.  The pt shared that she has good support at home, 1 of her son's lives down the street and her sister is also close by. The pt discussed that her  does feel left out currently as he is unable to visit due to the current visitor restriction and the pt's sister being the current visitor. The pt notes that he will be her caregiver during the BMT process and we discussed that we will keep him well informed as she prepares for transplant.    The pt shared that she has had years of back pain and surgeries which lead to her care at a pain clinic in [place]. She discussed they placed her on Suboxone about a year ago. She discussed that at times she has not taken her meds \"on time\"  But notes this has never been an issue for her. She does identify that her sister will be helping with meds at discharge.  CSW provided empathic listening, validation of concerns, and encouragement. CSW encouraged Pt to contact CSW for support, questions and/or resources.     Assessment: Pt presented as friendly and open, she does have some memory impairment. Struggles at times to track conversations, but is also able to repeat the plan that was given to her when the MD visited.  Pt appears to be coping well at this time. Pt continues to be supported by her  and sister.     Plan: CSW will continue to provide supportive counseling and assistance with resources as needed. CSW will continue to collaborate with multidisciplinary team " regarding Pt's plan of care.     ANDREW Bangura, [facility]  Pager: [phone]  Phone: [phone]

## 2022-02-17 NOTE — PLAN OF CARE
A OVSS besides O2 sats that were down to 88% with therapy.  CARRILLO and appears to have labored breathing at times.  1-2L NC.  On RA most of the day though.  Bld cx x1 from line and x1 VPT.  Chest CT done.  COVID/Influenza/RVP swab all done and negative.  Vanco started today.  Need sputum and c.diff cx.  Doesn't cough anything up for sputum though, and keeps mixing stool and urine or is incontinent and can't get the c.diff cx.   Lasix x1 for pleural effusions.  Carafate and tums for heartburn/epigastric pain.  Insistent on oxycodone and also taking her suboxone today.  Argumentative today about meds and when and if she got them.  Forgetful.  Alert to person and place only.  Heparin gtt increased to High intensity when level checked.  Hep Xa was 0.21 so bolus given and gtt increased to 1350 units/hr.  Next check is at 2000.  Plt 72.    Continue to monitor, Continue plan of care.    Problem: Adult Inpatient Plan of Care  Goal: Plan of Care Review  Outcome: Ongoing, Progressing  Flowsheets (Taken 2/16/2022 2019)  Plan of Care Reviewed With:   patient   sibling  Goal: Patient-Specific Goal (Individualized)  Outcome: Ongoing, Progressing  Goal: Absence of Hospital-Acquired Illness or Injury  Outcome: Ongoing, Progressing  Intervention: Identify and Manage Fall Risk  Recent Flowsheet Documentation  Taken 2/16/2022 1649 by Rowan Doll, RN  Safety Promotion/Fall Prevention:   clutter free environment maintained   lighting adjusted   nonskid shoes/slippers when out of bed  Taken 2/16/2022 1008 by Rowan Doll, RN  Safety Promotion/Fall Prevention:   activity supervised   clutter free environment maintained   lighting adjusted   nonskid shoes/slippers when out of bed  Intervention: Prevent Skin Injury  Recent Flowsheet Documentation  Taken 2/16/2022 1008 by Rowan Doll, RN  Body Position: position changed independently  Intervention: Prevent and Manage VTE (Venous Thromboembolism) Risk  Recent Flowsheet Documentation  Taken  2/16/2022 1008 by Rowan Doll RN  Activity Management: activity adjusted per tolerance  Goal: Optimal Comfort and Wellbeing  Outcome: Ongoing, Progressing  Goal: Readiness for Transition of Care  Outcome: Ongoing, Progressing     Problem: Pain Acute  Goal: Acceptable Pain Control and Functional Ability  Outcome: Ongoing, Progressing  Intervention: Develop Pain Management Plan  Recent Flowsheet Documentation  Taken 2/16/2022 1715 by Rowan Doll RN  Pain Management Interventions: medication (see MAR)  Taken 2/16/2022 1120 by Rowan Doll RN  Pain Management Interventions: medication (see MAR)  Intervention: Prevent or Manage Pain  Recent Flowsheet Documentation  Taken 2/16/2022 1649 by Rowan Doll, RN  Medication Review/Management: medications reviewed  Taken 2/16/2022 1008 by Rowan Doll RN  Medication Review/Management: medications reviewed     Problem: Infection  Goal: Absence of Infection Signs and Symptoms  Outcome: Ongoing, Progressing  Intervention: Prevent or Manage Infection  Recent Flowsheet Documentation  Taken 2/16/2022 1008 by Rowan Doll RN  Isolation Precautions: enteric precautions maintained     Problem: Violence Risk or Actual  Goal: Anger and Impulse Control  Outcome: Ongoing, Progressing     Problem: Discharge Planning  Goal: Discharge Planning (Adult, OB, Behavioral, Peds)  Outcome: Ongoing, Progressing   Goal Outcome Evaluation:    Plan of Care Reviewed With: patient, sibling     Overall Patient Progress: improving

## 2022-02-17 NOTE — PROGRESS NOTES
"BMT Daily Progress Note      ID: Libby Grimaldo is a 72 yo woman D+18 s/p Cytoxan chemo priming prior to planned auto PSBCT for MM      INTERVAL  HISTORY   Libby newly afebrile overnight. She generally feels better, still weaker than baseline. Pleased to hear her WBC are increasing. Discussed hope to transition off heparin gtt and to a bloodthinner pill. She denies any nasuea/abdominal pain. Aware she may collect stem cells are early as tomorrow.      Review of Systems:    8 point ROS negative except as above.    PHYSICAL EXAM      KPS:  70  Blood pressure 130/69, pulse 86, temperature 98.7  F (37.1  C), temperature source Oral, resp. rate 16, height 1.626 m (5' 4\"), weight 65.3 kg (143 lb 14.4 oz), SpO2 96 %.    Physical Exam  Constitutional:       General: She is not in acute distress.  HENT:      Head: Normocephalic and atraumatic.      Nose: No rhinorrhea.      Mouth/Throat:      Mouth: Mucous membranes are moist.   Eyes:      Comments: Bilateral exophthalmos   Cardiovascular:      Rate and Rhythm: Normal rate and regular rhythm.   Pulmonary:      Breath sounds: Examination of the right-upper field reveals rales. Examination of the right-middle field reveals rales. Examination of the right-lower field reveals rales. Rales present.      Comments: Some rales right base  Abdominal:      General: There is no distension.      Palpations: There is no mass.      Tenderness: There is no abdominal tenderness. There is no guarding.   Musculoskeletal:      Right lower leg: No edema.      Left lower leg: No edema.   Skin:     General: Skin is warm and dry.   Neurological:      Mental Status: She is alert. Mental status is at baseline.      Comments: Reports seeing a rabbit coming out of the TV screen; thought content otherwise at her baseline.        Labs:  Lab Results   Component Value Date    WBC 3.7 (L) 02/17/2022    ANEU 3.3 02/17/2022    HGB 7.5 (L) 02/17/2022    HCT 23.4 (L) 02/17/2022    PLT 60 (L) 02/17/2022     " 02/17/2022    POTASSIUM 3.7 02/17/2022    CHLORIDE 109 02/17/2022    CO2 26 02/17/2022     (H) 02/17/2022    BUN 5 (L) 02/17/2022    CR 0.82 02/17/2022    MAG 1.6 (L) 02/17/2022    INR 1.29 (H) 02/11/2022    BILITOTAL 0.7 02/14/2022    AST 19 02/14/2022    ALT 14 02/14/2022    ALKPHOS 119 02/14/2022    PROTTOTAL 4.1 (L) 02/14/2022    ALBUMIN 1.6 (L) 02/14/2022       I have assessed all abnormal lab values for their clinical significance and any values considered clinically significant have been addressed in the assessment and plan.          SYSTEMS-BASED ASSESSMENT AND PLAN   Libby Grimaldo is a 72 yo woman D+18 s/p Cytoxan chemo priming prior to planned auto PSBCT for MM        1. BMT  - BMT MD/Coordinator: Dr. Julio C Guillory/Rashad Chen  - Cytoxan 2/1  - Plan for allopurinol 300mg daily for at least 7 days.   - GCSF 10 mcg/kg started day +5 (2/5/22), continue through stem cell collections.  Apheresis aware that patient is inpatient.  White count is beginning to rise. CD34 today 3  - 2/18 repeat CD34-- updated aphresis as possible collections inpatient. (would collect inpt over weekend to keep plts up while fully anticoagulated).      2. HEME/COAG  - Transfusion parameters: hgb <7g/dL and plts <10,000.  - Pancytopenia secondary to chemo  - 2/15: new non-occlusive LUE DVT.  Will keep platelets > 50,000 and start heparin drip. Stop hep gtt transition to eliquis 10mg BID x7 days (2/17-2/23). Then start 5mg PO BID (2/24-on beltrán).   - WBC now increasing, CD34 3 yesterday. Plan to check CD34 2/18 AM-- possible    3. ID: newly afebrile  - Chest CT-- fluid overload but no focus of infection  - Stop Vanco as now afebrile. Ddx: needs vanco despite negative blood cultures vs fever or neutropenia vs DVT? Continue zosyn. If afebrile overnight will stop abx 2/18 AM.   - Neutropenic fevers, initially attributed to strep throat.  She was on cefepime 2/11-2/13, but this was changed to zosyn with her mental status changes.     -2/15: Serum PCR:  HSV, CMV, HHV6, EBV  All negative.  Blood cultures with fevers continue to be negative.  - Recent hx of urinary tract infection s/p antibiotics. Recheck UA/UC 2/15.      #Prophy: ACV,  fluconazole.       4. GI:   - constipation secondary to narcotics: senna bid prn  - Protonix 40mg/d (2/9); prn Tums.  - Anti-emetics:  prn zofran, prn compazine     5. RENAL/  - hypophosphatemia: replete per sliding scale and schedule 1 packet qid starting 2/15.   - hypokalemia: replete per sliding scale; improving.   - regular diet     6. Endo: hypothyroidism secondary to therapy for Graves Disease- continue levothyroxine     7. CARDS: On metoprolol and norvasc for hypertension.  Prn hydralazine.   Hyperlipidemia: Lipitor   ASA: hold x2/9 with thrombocytopenia     8. Neuro: Continue effexor   - Hx of multiple febrile seizures. Last occurred during college.      9. Pulm: COPD- remains on daily fluticasone and albuterol prn.      10. Cancer related pain: Remains on buprenorphine-naloxone and prn oxycodone. Oxycodone order changed to 10-20mg tid prn with a max of 50mg per 24 hours (previous max was 30mg in 24 hours, but she is having legitimate increase in headaches and bone pain with GCSF). Claritin for GCSF-related pain exacerbation.     11. Musculoskeletal: history of frequent falls at home.  Consulted PT/OT.      12. Neuro: question of illness related delirium vs. unmasking of underlying dementia.  Patient has family history of dementia is grandmother, paternal uncle and father.  Requested OT to do MOCA testing 2/14.  Head CT showed mild to moderate changes of chronic small vessel ischemia.  MS improved. Plan to retest MOCA outpatient in clinic once she is no longer acutely ill.     I spent 45 minutes in the care of this patient today, which included time necessary for preparation for the visit, obtaining history, ordering medications/tests/procedures as medically indicated, review of pertinent medical  literature, counseling of the patient, communication of recommendations to the care team, and documentation time.    Anna Naylor PA-C  [phone]

## 2022-02-18 ENCOUNTER — APPOINTMENT (OUTPATIENT)
Dept: OCCUPATIONAL THERAPY | Facility: CLINIC | Age: 72
DRG: 152 | End: 2022-02-18
Payer: MEDICARE

## 2022-02-18 ENCOUNTER — APPOINTMENT (OUTPATIENT)
Dept: PHYSICAL THERAPY | Facility: CLINIC | Age: 72
DRG: 152 | End: 2022-02-18
Payer: MEDICARE

## 2022-02-18 DIAGNOSIS — C90.00 MULTIPLE MYELOMA NOT HAVING ACHIEVED REMISSION (H): Primary | ICD-10-CM

## 2022-02-18 LAB
ANION GAP SERPL CALCULATED.3IONS-SCNC: 8 MMOL/L (ref 3–14)
BACTERIA BLD CULT: NO GROWTH
BACTERIA SPEC CULT: NORMAL
BASOPHILS # BLD MANUAL: 0 10E3/UL (ref 0–0.2)
BASOPHILS NFR BLD MANUAL: 0 %
BKR LAB AP TR RXN INTERPRETATION: NORMAL
BKR LAB AP TRAN RXN RECOMMENDATION: NORMAL
BKR LAB AP TRANS SIGNS AND SX: NORMAL
BKR LAB AP TRANSFUSION REACTION: NORMAL
BLASTS # BLD MANUAL: 0.1 10E3/UL
BLASTS NFR BLD MANUAL: 1 %
BUN SERPL-MCNC: 5 MG/DL (ref 7–30)
C DIFF TOX B STL QL: NEGATIVE
CALCIUM SERPL-MCNC: 6.4 MG/DL (ref 8.5–10.1)
CD34 ABSOLUTE COUNT COMMENT: NORMAL
CD34 CELLS # SPEC: 5 CELLS/UL
CD34 CELLS NFR SPEC: 0.06 %
CHLORIDE BLD-SCNC: 109 MMOL/L (ref 94–109)
CO2 SERPL-SCNC: 23 MMOL/L (ref 20–32)
CREAT SERPL-MCNC: 0.85 MG/DL (ref 0.52–1.04)
ELLIPTOCYTES BLD QL SMEAR: ABNORMAL
EOSINOPHIL # BLD MANUAL: 0 10E3/UL (ref 0–0.7)
EOSINOPHIL NFR BLD MANUAL: 0 %
ERYTHROCYTE [DISTWIDTH] IN BLOOD BY AUTOMATED COUNT: 14.9 % (ref 10–15)
GFR SERPL CREATININE-BSD FRML MDRD: 74 ML/MIN/1.73M2
GLUCOSE BLD-MCNC: 68 MG/DL (ref 70–99)
GLUCOSE BLDC GLUCOMTR-MCNC: 70 MG/DL (ref 70–99)
GLUCOSE BLDC GLUCOMTR-MCNC: 79 MG/DL (ref 70–99)
HCT VFR BLD AUTO: 23.4 % (ref 35–47)
HGB BLD-MCNC: 7.4 G/DL (ref 11.7–15.7)
LYMPHOCYTES # BLD MANUAL: 0 10E3/UL (ref 0.8–5.3)
LYMPHOCYTES NFR BLD MANUAL: 0 %
MAGNESIUM SERPL-MCNC: 1.8 MG/DL (ref 1.8–2.6)
MCH RBC QN AUTO: 31.1 PG (ref 26.5–33)
MCHC RBC AUTO-ENTMCNC: 31.6 G/DL (ref 31.5–36.5)
MCV RBC AUTO: 98 FL (ref 78–100)
METAMYELOCYTES # BLD MANUAL: 0.2 10E3/UL
METAMYELOCYTES NFR BLD MANUAL: 2 %
MONOCYTES # BLD MANUAL: 0.4 10E3/UL (ref 0–1.3)
MONOCYTES NFR BLD MANUAL: 5 %
NEUTROPHILS # BLD MANUAL: 7.3 10E3/UL (ref 1.6–8.3)
NEUTROPHILS NFR BLD MANUAL: 92 %
PATH REPORT.COMMENTS IMP SPEC: NORMAL
PATH REPORT.COMMENTS IMP SPEC: NORMAL
PHOSPHATE SERPL-MCNC: 2.2 MG/DL (ref 2.5–4.5)
PLAT MORPH BLD: ABNORMAL
PLATELET # BLD AUTO: 68 10E3/UL (ref 150–450)
PLATELET # BLD AUTO: 82 10E3/UL (ref 150–450)
POTASSIUM BLD-SCNC: 3.5 MMOL/L (ref 3.4–5.3)
PRODUCT NUMBER FLOW CYTOMETRY: NORMAL
RBC # BLD AUTO: 2.38 10E6/UL (ref 3.8–5.2)
RBC MORPH BLD: ABNORMAL
SODIUM SERPL-SCNC: 140 MMOL/L (ref 133–144)
VIABLE CD34 CELLS NFR FLD: 87.74 %
WBC # BLD AUTO: 7.9 10E3/UL (ref 4–11)

## 2022-02-18 PROCEDURE — 250N000009 HC RX 250: Performed by: INTERNAL MEDICINE

## 2022-02-18 PROCEDURE — 250N000011 HC RX IP 250 OP 636: Performed by: INTERNAL MEDICINE

## 2022-02-18 PROCEDURE — 250N000011 HC RX IP 250 OP 636: Performed by: PHYSICIAN ASSISTANT

## 2022-02-18 PROCEDURE — 250N000013 HC RX MED GY IP 250 OP 250 PS 637: Performed by: NURSE PRACTITIONER

## 2022-02-18 PROCEDURE — 250N000013 HC RX MED GY IP 250 OP 250 PS 637: Performed by: PHYSICIAN ASSISTANT

## 2022-02-18 PROCEDURE — 258N000003 HC RX IP 258 OP 636: Performed by: INTERNAL MEDICINE

## 2022-02-18 PROCEDURE — 250N000013 HC RX MED GY IP 250 OP 250 PS 637: Performed by: INTERNAL MEDICINE

## 2022-02-18 PROCEDURE — 85027 COMPLETE CBC AUTOMATED: CPT | Performed by: STUDENT IN AN ORGANIZED HEALTH CARE EDUCATION/TRAINING PROGRAM

## 2022-02-18 PROCEDURE — 97530 THERAPEUTIC ACTIVITIES: CPT | Mod: GP | Performed by: PHYSICAL THERAPIST

## 2022-02-18 PROCEDURE — 86367 STEM CELLS TOTAL COUNT: CPT | Performed by: PHYSICIAN ASSISTANT

## 2022-02-18 PROCEDURE — 206N000001 HC R&B BMT UMMC

## 2022-02-18 PROCEDURE — 250N000011 HC RX IP 250 OP 636: Performed by: STUDENT IN AN ORGANIZED HEALTH CARE EDUCATION/TRAINING PROGRAM

## 2022-02-18 PROCEDURE — 83735 ASSAY OF MAGNESIUM: CPT | Performed by: INTERNAL MEDICINE

## 2022-02-18 PROCEDURE — 250N000013 HC RX MED GY IP 250 OP 250 PS 637: Performed by: STUDENT IN AN ORGANIZED HEALTH CARE EDUCATION/TRAINING PROGRAM

## 2022-02-18 PROCEDURE — 85049 AUTOMATED PLATELET COUNT: CPT | Performed by: NURSE PRACTITIONER

## 2022-02-18 PROCEDURE — 84100 ASSAY OF PHOSPHORUS: CPT | Performed by: PHYSICIAN ASSISTANT

## 2022-02-18 PROCEDURE — 97530 THERAPEUTIC ACTIVITIES: CPT | Mod: GO

## 2022-02-18 PROCEDURE — 86078 PHYS BLOOD BANK SERV REACTJ: CPT | Performed by: PATHOLOGY

## 2022-02-18 PROCEDURE — 87493 C DIFF AMPLIFIED PROBE: CPT | Performed by: NURSE PRACTITIONER

## 2022-02-18 PROCEDURE — 80048 BASIC METABOLIC PNL TOTAL CA: CPT | Performed by: STUDENT IN AN ORGANIZED HEALTH CARE EDUCATION/TRAINING PROGRAM

## 2022-02-18 PROCEDURE — 258N000003 HC RX IP 258 OP 636: Performed by: PHYSICIAN ASSISTANT

## 2022-02-18 RX ORDER — PROCHLORPERAZINE MALEATE 5 MG
5 TABLET ORAL EVERY 6 HOURS PRN
Status: DISCONTINUED | OUTPATIENT
Start: 2022-02-18 | End: 2022-02-26 | Stop reason: HOSPADM

## 2022-02-18 RX ORDER — DEXTROSE MONOHYDRATE 50 MG/ML
10-20 INJECTION, SOLUTION INTRAVENOUS
Status: DISCONTINUED | OUTPATIENT
Start: 2022-02-18 | End: 2022-02-18

## 2022-02-18 RX ORDER — DEXTROSE MONOHYDRATE 50 MG/ML
10-20 INJECTION, SOLUTION INTRAVENOUS
Status: DISCONTINUED | OUTPATIENT
Start: 2022-02-18 | End: 2022-02-20

## 2022-02-18 RX ORDER — ONDANSETRON 4 MG/1
4 TABLET, ORALLY DISINTEGRATING ORAL EVERY 6 HOURS PRN
Status: DISCONTINUED | OUTPATIENT
Start: 2022-02-18 | End: 2022-02-26 | Stop reason: HOSPADM

## 2022-02-18 RX ORDER — ALBUTEROL SULFATE 0.83 MG/ML
2.5 SOLUTION RESPIRATORY (INHALATION) EVERY 6 HOURS PRN
Status: DISCONTINUED | OUTPATIENT
Start: 2022-02-18 | End: 2022-02-26 | Stop reason: HOSPADM

## 2022-02-18 RX ORDER — LOPERAMIDE HCL 2 MG
2 CAPSULE ORAL 4 TIMES DAILY PRN
Status: DISCONTINUED | OUTPATIENT
Start: 2022-02-18 | End: 2022-02-26 | Stop reason: HOSPADM

## 2022-02-18 RX ORDER — ONDANSETRON 2 MG/ML
4 INJECTION INTRAMUSCULAR; INTRAVENOUS EVERY 6 HOURS PRN
Status: DISCONTINUED | OUTPATIENT
Start: 2022-02-18 | End: 2022-02-26 | Stop reason: HOSPADM

## 2022-02-18 RX ORDER — MAGNESIUM SULFATE HEPTAHYDRATE 40 MG/ML
2 INJECTION, SOLUTION INTRAVENOUS ONCE
Status: COMPLETED | OUTPATIENT
Start: 2022-02-18 | End: 2022-02-18

## 2022-02-18 RX ORDER — HEPARIN SODIUM (PORCINE) LOCK FLUSH IV SOLN 100 UNIT/ML 100 UNIT/ML
3 SOLUTION INTRAVENOUS ONCE
Status: CANCELLED | OUTPATIENT
Start: 2022-02-18 | End: 2022-02-18

## 2022-02-18 RX ORDER — PROCHLORPERAZINE 25 MG
12.5 SUPPOSITORY, RECTAL RECTAL EVERY 12 HOURS PRN
Status: DISCONTINUED | OUTPATIENT
Start: 2022-02-18 | End: 2022-02-26 | Stop reason: HOSPADM

## 2022-02-18 RX ORDER — PLERIXAFOR 24 MG/1.2ML
16 SOLUTION SUBCUTANEOUS DAILY
Status: DISCONTINUED | OUTPATIENT
Start: 2022-02-18 | End: 2022-02-20

## 2022-02-18 RX ORDER — FUROSEMIDE 10 MG/ML
20 INJECTION INTRAMUSCULAR; INTRAVENOUS ONCE
Status: COMPLETED | OUTPATIENT
Start: 2022-02-18 | End: 2022-02-18

## 2022-02-18 RX ORDER — POTASSIUM CHLORIDE 29.8 MG/ML
20 INJECTION INTRAVENOUS ONCE
Status: COMPLETED | OUTPATIENT
Start: 2022-02-18 | End: 2022-02-18

## 2022-02-18 RX ADMIN — ACYCLOVIR 800 MG: 800 TABLET ORAL at 08:59

## 2022-02-18 RX ADMIN — SUCRALFATE 1 G: 1 SUSPENSION ORAL at 23:15

## 2022-02-18 RX ADMIN — VENLAFAXINE 75 MG: 75 TABLET ORAL at 09:01

## 2022-02-18 RX ADMIN — VENLAFAXINE 75 MG: 75 TABLET ORAL at 13:31

## 2022-02-18 RX ADMIN — BUPRENORPHINE AND NALOXONE 1 FILM: 8; 2 FILM, SOLUBLE BUCCAL; SUBLINGUAL at 20:22

## 2022-02-18 RX ADMIN — APIXABAN 10 MG: 5 TABLET, FILM COATED ORAL at 09:01

## 2022-02-18 RX ADMIN — POTASSIUM PHOSPHATE, MONOBASIC AND POTASSIUM PHOSPHATE, DIBASIC 9 MMOL: 224; 236 INJECTION, SOLUTION, CONCENTRATE INTRAVENOUS at 06:09

## 2022-02-18 RX ADMIN — BUPRENORPHINE AND NALOXONE 1 FILM: 8; 2 FILM, SOLUBLE BUCCAL; SUBLINGUAL at 14:09

## 2022-02-18 RX ADMIN — FLUTICASONE FUROATE AND VILANTEROL TRIFENATATE 1 PUFF: 100; 25 POWDER RESPIRATORY (INHALATION) at 07:59

## 2022-02-18 RX ADMIN — OXYCODONE HYDROCHLORIDE 20 MG: 10 TABLET ORAL at 05:42

## 2022-02-18 RX ADMIN — SUCRALFATE 1 G: 1 SUSPENSION ORAL at 16:22

## 2022-02-18 RX ADMIN — QUETIAPINE FUMARATE 25 MG: 25 TABLET ORAL at 23:15

## 2022-02-18 RX ADMIN — ONDANSETRON 4 MG: 4 TABLET, ORALLY DISINTEGRATING ORAL at 19:54

## 2022-02-18 RX ADMIN — LOPERAMIDE HYDROCHLORIDE 2 MG: 2 CAPSULE ORAL at 13:31

## 2022-02-18 RX ADMIN — POTASSIUM & SODIUM PHOSPHATES POWDER PACK 280-160-250 MG 1 PACKET: 280-160-250 PACK at 09:01

## 2022-02-18 RX ADMIN — PIPERACILLIN AND TAZOBACTAM 3.38 G: 3; .375 INJECTION, POWDER, FOR SOLUTION INTRAVENOUS at 03:58

## 2022-02-18 RX ADMIN — CALCIUM CARBONATE (ANTACID) CHEW TAB 500 MG 500 MG: 500 CHEW TAB at 08:31

## 2022-02-18 RX ADMIN — ONDANSETRON 4 MG: 4 TABLET, ORALLY DISINTEGRATING ORAL at 11:05

## 2022-02-18 RX ADMIN — METHOCARBAMOL 500 MG: 500 TABLET ORAL at 16:22

## 2022-02-18 RX ADMIN — METOPROLOL SUCCINATE 25 MG: 25 TABLET, EXTENDED RELEASE ORAL at 08:59

## 2022-02-18 RX ADMIN — LEVOTHYROXINE SODIUM 112 MCG: 0.11 TABLET ORAL at 09:01

## 2022-02-18 RX ADMIN — PLERIXAFOR 16 MG: 24 SOLUTION SUBCUTANEOUS at 20:27

## 2022-02-18 RX ADMIN — MAGNESIUM SULFATE IN WATER 2 G: 40 INJECTION, SOLUTION INTRAVENOUS at 07:53

## 2022-02-18 RX ADMIN — PIPERACILLIN AND TAZOBACTAM 3.38 G: 3; .375 INJECTION, POWDER, FOR SOLUTION INTRAVENOUS at 09:02

## 2022-02-18 RX ADMIN — POTASSIUM CHLORIDE 20 MEQ: 29.8 INJECTION, SOLUTION INTRAVENOUS at 05:42

## 2022-02-18 RX ADMIN — FILGRASTIM 780 MCG: 480 INJECTION, SOLUTION INTRAVENOUS; SUBCUTANEOUS at 11:04

## 2022-02-18 RX ADMIN — ALLOPURINOL 300 MG: 300 TABLET ORAL at 09:01

## 2022-02-18 RX ADMIN — POTASSIUM & SODIUM PHOSPHATES POWDER PACK 280-160-250 MG 1 PACKET: 280-160-250 PACK at 20:22

## 2022-02-18 RX ADMIN — AMLODIPINE BESYLATE 5 MG: 5 TABLET ORAL at 09:01

## 2022-02-18 RX ADMIN — SUCRALFATE 1 G: 1 SUSPENSION ORAL at 13:31

## 2022-02-18 RX ADMIN — SUCRALFATE 1 G: 1 SUSPENSION ORAL at 07:58

## 2022-02-18 RX ADMIN — FUROSEMIDE 20 MG: 10 INJECTION, SOLUTION INTRAVENOUS at 09:02

## 2022-02-18 RX ADMIN — ONDANSETRON 4 MG: 2 INJECTION INTRAMUSCULAR; INTRAVENOUS at 22:54

## 2022-02-18 RX ADMIN — POTASSIUM & SODIUM PHOSPHATES POWDER PACK 280-160-250 MG 1 PACKET: 280-160-250 PACK at 17:53

## 2022-02-18 RX ADMIN — POTASSIUM & SODIUM PHOSPHATES POWDER PACK 280-160-250 MG 1 PACKET: 280-160-250 PACK at 13:31

## 2022-02-18 RX ADMIN — VENLAFAXINE 75 MG: 75 TABLET ORAL at 17:52

## 2022-02-18 RX ADMIN — ALBUTEROL SULFATE 2.5 MG: 2.5 SOLUTION RESPIRATORY (INHALATION) at 21:23

## 2022-02-18 RX ADMIN — OXYCODONE HYDROCHLORIDE 20 MG: 10 TABLET ORAL at 17:52

## 2022-02-18 RX ADMIN — Medication 5 ML: at 11:05

## 2022-02-18 RX ADMIN — LORATADINE 10 MG: 10 TABLET ORAL at 08:59

## 2022-02-18 RX ADMIN — ACYCLOVIR 800 MG: 800 TABLET ORAL at 20:22

## 2022-02-18 RX ADMIN — PANTOPRAZOLE SODIUM 40 MG: 40 TABLET, DELAYED RELEASE ORAL at 07:58

## 2022-02-18 RX ADMIN — UMECLIDINIUM 1 PUFF: 62.5 AEROSOL, POWDER ORAL at 07:59

## 2022-02-18 RX ADMIN — BUPRENORPHINE AND NALOXONE 1 FILM: 8; 2 FILM, SOLUBLE BUCCAL; SUBLINGUAL at 07:58

## 2022-02-18 RX ADMIN — CALCIUM CARBONATE (ANTACID) CHEW TAB 500 MG 500 MG: 500 CHEW TAB at 17:53

## 2022-02-18 RX ADMIN — ATORVASTATIN CALCIUM 40 MG: 20 TABLET, FILM COATED ORAL at 20:22

## 2022-02-18 RX ADMIN — OXYCODONE HYDROCHLORIDE 10 MG: 10 TABLET ORAL at 12:00

## 2022-02-18 RX ADMIN — FLUCONAZOLE 200 MG: 200 TABLET ORAL at 09:01

## 2022-02-18 RX ADMIN — APIXABAN 10 MG: 5 TABLET, FILM COATED ORAL at 20:22

## 2022-02-18 ASSESSMENT — ACTIVITIES OF DAILY LIVING (ADL)
ADLS_ACUITY_SCORE: 21
ADLS_ACUITY_SCORE: 22
ADLS_ACUITY_SCORE: 20
ADLS_ACUITY_SCORE: 22
ADLS_ACUITY_SCORE: 21
ADLS_ACUITY_SCORE: 22
ADLS_ACUITY_SCORE: 20
ADLS_ACUITY_SCORE: 22
ADLS_ACUITY_SCORE: 20
ADLS_ACUITY_SCORE: 22
ADLS_ACUITY_SCORE: 22
ADLS_ACUITY_SCORE: 20
ADLS_ACUITY_SCORE: 22
ADLS_ACUITY_SCORE: 22
ADLS_ACUITY_SCORE: 20
ADLS_ACUITY_SCORE: 22
ADLS_ACUITY_SCORE: 20
ADLS_ACUITY_SCORE: 22

## 2022-02-18 NOTE — PROGRESS NOTES
CLINICAL NUTRITION SERVICES - BRIEF NOTE      Nutrition Prescription     RECOMMENDATIONS FOR MDs/PROVIDERS TO ORDER:  None at this time      Recommendations already ordered by Registered Dietitian (RD):  Discontinued boost soothe, continue ensure clear apple      Future/Additional Recommendations:  Continue to monitor appetite, oral intake, use of supplements    *Please see full assessment note from 2/15/22    New Findings:  OT spoke with writer about patient having questions on the menu/supplements     Libby reported she is having difficulty with chewing but likes fresh fruits, wondering about snack ideas    Interventions  Medical food supplement therapy  Nutrition education for recommended modifications  Discussed using canned or frozen fruits for softer texture vs ripe fresh fruits  Discussed blending/pureeing foods at home with firmer texture such as pete  Also discussed frozen vegetables    Patient likes ensure clear apple but not boost soothe.     RD to follow per protocol.    Sabrina Yancey RD, LD  5C/BMT pager: [phone]

## 2022-02-18 NOTE — PLAN OF CARE
VSS.  Placed 2LO2 this am with sats 88% on RA.  Once pt got up and used inhalers, was able to be weaned to RA.  Temp spike at 1900 to 100.8.  BC drawn per port and CVC.  MD notified.  On Zosyn.  Vanco discontinued today.-per moonlighter, if pt spikes again, will restart Vanco.  No further orders at this time.  Lasix 20 mg given with good urine output.  Stool x 1 mixed with urine so unable to send a sample.  Mg 2gm given.  K 20meq given, phos completed and recheck was 2.2- 9 mmol running.  Recheck all in am.  Plt 73.  Hep gtt stopped at 1030 and Eliquis started.  Eating small amounts.  Sat in chair for several hours.  Declined shower and said that sister will help her.  Pt became upset about time and dosage of Oxycodone.  Asked RN to write out order and wanted to speak with physician, but then changed her mind and said she would just wait.  No further complaints after that.   called and was updated.  Zofran, Tums given.  Sister present and supportive.  Will continue with POC.      Problem: Adult Inpatient Plan of Care  Goal: Plan of Care Review  Outcome: Ongoing, Progressing  Goal: Patient-Specific Goal (Individualized)  Outcome: Ongoing, Progressing  Goal: Absence of Hospital-Acquired Illness or Injury  Outcome: Ongoing, Progressing  Intervention: Identify and Manage Fall Risk  Recent Flowsheet Documentation  Taken 2/17/2022 0800 by Lacie Plata, RN  Safety Promotion/Fall Prevention:   assistive device/personal items within reach   fall prevention program maintained   increased rounding and observation   increase visualization of patient   lighting adjusted   nonskid shoes/slippers when out of bed   patient and family education   room near nurse's station   room organization consistent   treat reversible contributory factors  Intervention: Prevent and Manage VTE (Venous Thromboembolism) Risk  Recent Flowsheet Documentation  Taken 2/17/2022 0800 by Lacie Plata, RN  Activity Management:   activity adjusted  per tolerance   activity encouraged   up in chair  Goal: Readiness for Transition of Care  Outcome: Ongoing, Progressing     Problem: Pain Acute  Goal: Acceptable Pain Control and Functional Ability  Outcome: Ongoing, Progressing  Intervention: Develop Pain Management Plan  Recent Flowsheet Documentation  Taken 2/17/2022 1306 by Lacie Plata RN  Pain Management Interventions: medication (see MAR)  Taken 2/17/2022 1124 by Laice Plata RN  Pain Management Interventions: (heat) declines  Taken 2/17/2022 0600 by Lcaie Plata RN  Pain Management Interventions: medication (see MAR)  Intervention: Prevent or Manage Pain  Recent Flowsheet Documentation  Taken 2/17/2022 0800 by Lacie Plata RN  Medication Review/Management: medications reviewed     Problem: Infection  Goal: Absence of Infection Signs and Symptoms  Outcome: Ongoing, Progressing  Intervention: Prevent or Manage Infection  Recent Flowsheet Documentation  Taken 2/17/2022 0800 by Lacie Plata RN  Isolation Precautions: enteric precautions maintained     Problem: Violence Risk or Actual  Goal: Anger and Impulse Control  Outcome: Ongoing, Progressing     Problem: Discharge Planning  Goal: Discharge Planning (Adult, OB, Behavioral, Peds)  Outcome: Ongoing, Progressing   Goal Outcome Evaluation:    Plan of Care Reviewed With: patient     Overall Patient Progress: improving

## 2022-02-18 NOTE — PROGRESS NOTES
"BMT Daily Progress Note      ID: Libby Grimaldo is a 72 yo woman D+19 s/p Cytoxan chemo priming prior to planned auto PSBCT for MM      INTERVAL  HISTORY   Libby newly afebrile overnight. She generally feels better, still weaker than baseline. Pleased to hear her WBC are increasing. Discussed hope to transition off heparin gtt and to a bloodthinner pill. She denies any nasuea/abdominal pain. Aware she may collect stem cells are early as tomorrow.      Review of Systems:    8 point ROS negative except as above.    PHYSICAL EXAM      KPS:  70  Blood pressure 127/66, pulse 85, temperature 99.5  F (37.5  C), temperature source Oral, resp. rate 18, height 1.626 m (5' 4\"), weight 65.3 kg (143 lb 14.4 oz), SpO2 92 %.    Physical Exam  Constitutional:       General: She is not in acute distress.  HENT:      Head: Normocephalic and atraumatic.      Comments: alopecia     Nose: No rhinorrhea.   Eyes:      Conjunctiva/sclera: Conjunctivae normal.      Comments: Bilateral exophthalmos   Cardiovascular:      Rate and Rhythm: Normal rate and regular rhythm.   Pulmonary:      Breath sounds: Normal breath sounds.      Comments: Some rales right base  Abdominal:      General: There is no distension.      Palpations: There is no mass.      Tenderness: There is abdominal tenderness. There is no guarding.   Musculoskeletal:      Right lower leg: No edema.      Left lower leg: No edema.   Skin:     General: Skin is warm and dry.      Coloration: Skin is pale. Skin is not jaundiced.   Neurological:      Mental Status: She is alert. Mental status is at baseline.      Motor: Weakness present.      Coordination: Coordination abnormal.      Comments: Reports seeing a rabbit coming out of the TV screen; thought content otherwise at her baseline.    Psychiatric:         Mood and Affect: Mood normal.      Comments: Low attention span       Labs:  Lab Results   Component Value Date    WBC 7.9 02/18/2022    ANEU 7.3 02/18/2022    HGB 7.4 (L) " [facility]  Diabetes Clinical Nurse Specialist Consult Note    Arch [facility] Patient Status:  Inpatient    3/11/1966 MRN [id number]   [facility] 4SW-A Attending Kavita Maher MD   Hosp Day # 1 PCP Bryson Gosselin, MD     Reason for 02/18/2022    HCT 23.4 (L) 02/18/2022    PLT 68 (L) 02/18/2022     02/18/2022    POTASSIUM 3.5 02/18/2022    CHLORIDE 109 02/18/2022    CO2 23 02/18/2022    GLC 79 02/18/2022    BUN 5 (L) 02/18/2022    CR 0.85 02/18/2022    MAG 1.8 02/18/2022    INR 1.29 (H) 02/11/2022    BILITOTAL 0.7 02/14/2022    AST 19 02/14/2022    ALT 14 02/14/2022    ALKPHOS 119 02/14/2022    PROTTOTAL 4.1 (L) 02/14/2022    ALBUMIN 1.6 (L) 02/14/2022       I have assessed all abnormal lab values for their clinical significance and any values considered clinically significant have been addressed in the assessment and plan.          SYSTEMS-BASED ASSESSMENT AND PLAN   Libby Grimaldo is a 72 yo woman D+18 s/p Cytoxan chemo priming prior to planned auto PSBCT for MM        1. BMT  - BMT MD/Coordinator: Dr. Julio C Guillory/Rashad Chen  - Cytoxan 2/1  - Plan for allopurinol 300mg daily for at least 7 days.   - GCSF 10 mcg/kg started day +5 (2/5/22), continue through stem cell collections.  Will move GCSF to morning to see if fevers follow suit.  - Apheresis aware that patient is inpatient.  White count is beginning to rise. CD34 today is 5; give mozobil at 9pm tonight and will plan to collect inpatient 2/19; apheresis is aware.      2. HEME/COAG  - Transfusion parameters: hgb <7g/dL and plts <10,000.  - Pancytopenia secondary to chemo  - 2/15: new non-occlusive LUE DVT. Initially on heparin gtt; now on eliquis  10mg BID x7 days (2/17-2/23). Then start 5mg PO BID (2/24-onward).     3. ID:   - Chest CT-- fluid overload but no focus of infection   - Neutropenic fevers, initially attributed to strep throat.  She was on cefepime 2/11-2/13; zosyn through 2/17   -2/15: Serum PCR:  HSV, CMV, HHV6, EBV  All negative.  Blood cultures with fevers continue to be negative.  - Recent hx of urinary tract infection s/p antibiotics. Recheck UA/UC 2/15.      #Prophy: ACV,  fluconazole.       4. GI:   - constipation secondary to narcotics: senna bid prn  -  unspecified    Labs:    Recent Labs   Lab 03/01/21  0911 03/01/21  1012 03/01/21  1106 03/01/21  1220 03/01/21  1316   PGLU 227* 196* 205* 186* 179*     Recent Labs   Lab 02/24/21  1430 02/28/21  1125 02/28/21  1609 02/28/21  2012 03/01/21  0005 03/01/21 Protonix 40mg/d (2/9); prn Tums. Scheduled carafate.  - Anti-emetics:  prn zofran, prn compazine     5. RENAL/  - hypophosphatemia: replete per sliding scale and schedule 1 packet qid starting 2/15.   - hypokalemia: replete per sliding scale; improving.   - regular diet     6. Endo: hypothyroidism secondary to therapy for Graves Disease- continue levothyroxine     7. CARDS: On metoprolol and norvasc for hypertension.  Prn hydralazine.   Hyperlipidemia: Lipitor   ASA: hold x2/9 with thrombocytopenia     8. Neuro: Continue effexor   - Hx of multiple febrile seizures. Last occurred during college.      9. Pulm: COPD- remains on daily fluticasone and albuterol prn.      10. Cancer related pain: Remains on buprenorphine-naloxone and prn oxycodone. Oxycodone order changed to 10-20mg tid prn with a max of 50mg per 24 hours (previous max was 30mg in 24 hours, but she is having legitimate increase in headaches and bone pain with GCSF). Claritin for GCSF-related pain exacerbation.     11. Musculoskeletal: history of frequent falls at home.  Consulted PT/OT.      12. Neuro: question of illness related delirium vs. unmasking of underlying dementia.  Patient has family history of dementia is grandmother, paternal uncle and father.  Requested OT to do MOCA testing 2/14.  Head CT showed mild to moderate changes of chronic small vessel ischemia.  MS improved. Plan to retest MOCA outpatient in clinic once she is no longer acutely ill. (Note: it was 11 this admission.)    I spent 40 minutes in the care of this patient today, which included time necessary for preparation for the visit, obtaining history, ordering medications/tests/procedures as medically indicated, review of pertinent medical literature, counseling of the patient, communication of recommendations to the care team, and documentation time.    Nidia Littlejohn PA-C  2/18/2022       pre-meal sugar level     Blood sugar                   Units  <150                               zero  150-200                          8 Units-->10 units  201-250                          10 Units-->12  251-300                          12 Units-->14  301-

## 2022-02-18 NOTE — PLAN OF CARE
Goal Outcome Evaluation:    Plan of Care Reviewed With: patient     Overall Patient Progress: improving    Tmax: 100, placed on 2LO2 this morning for sats of 87%, other VSS. Patient received 10mg of oxy last evening for pain and 20mg this morning. Blood sugar with am labs was 68, recheck with glucometer was 70, patient drank some orange juice and blood sugar came up to 79. 20 mEq of potassium was replaced with recheck tomorrow morning. 9mmol of phos is infusing with a recheck tomorrow morning. Up SBA to bedside commode. Continue to monitor and follow plan of care.

## 2022-02-19 ENCOUNTER — APPOINTMENT (OUTPATIENT)
Dept: LAB | Facility: CLINIC | Age: 72
DRG: 152 | End: 2022-02-19
Payer: MEDICARE

## 2022-02-19 ENCOUNTER — APPOINTMENT (OUTPATIENT)
Dept: OCCUPATIONAL THERAPY | Facility: CLINIC | Age: 72
DRG: 152 | End: 2022-02-19
Payer: MEDICARE

## 2022-02-19 LAB
ABO/RH(D): ABNORMAL
ANION GAP SERPL CALCULATED.3IONS-SCNC: 5 MMOL/L (ref 3–14)
ANTIBODY SCREEN, TUBE: NORMAL
ANTIBODY SCREEN: POSITIVE
BACTERIA BLD CULT: NO GROWTH
BASOPHILS # BLD MANUAL: 0 10E3/UL (ref 0–0.2)
BASOPHILS NFR BLD MANUAL: 0 %
BILL ONLY AUTO PBPC FREEZE: NORMAL
BLD PROD TYP BPU: NORMAL
BLOOD COMPONENT TYPE: NORMAL
BUN SERPL-MCNC: 6 MG/DL (ref 7–30)
CALCIUM SERPL-MCNC: 6.6 MG/DL (ref 8.5–10.1)
CD34 ABSOLUTE COUNT COMMENT: NORMAL
CD34 CELLS # SPEC: 15 CELLS/UL
CD34 CELLS NFR SPEC: 0.1 %
CHLORIDE BLD-SCNC: 109 MMOL/L (ref 94–109)
CO2 SERPL-SCNC: 24 MMOL/L (ref 20–32)
CODING SYSTEM: NORMAL
CREAT SERPL-MCNC: 0.93 MG/DL (ref 0.52–1.04)
CROSSMATCH: NORMAL
EOSINOPHIL # BLD MANUAL: 0 10E3/UL (ref 0–0.7)
EOSINOPHIL NFR BLD MANUAL: 0 %
ERYTHROCYTE [DISTWIDTH] IN BLOOD BY AUTOMATED COUNT: 15.1 % (ref 10–15)
GFR SERPL CREATININE-BSD FRML MDRD: 66 ML/MIN/1.73M2
GLUCOSE BLD-MCNC: 78 MG/DL (ref 70–99)
HCT VFR BLD AUTO: 22.5 % (ref 35–47)
HGB BLD-MCNC: 7.1 G/DL (ref 11.7–15.7)
ISSUE DATE AND TIME: NORMAL
LYMPHOCYTES # BLD MANUAL: 0.2 10E3/UL (ref 0.8–5.3)
LYMPHOCYTES NFR BLD MANUAL: 1 %
MAGNESIUM SERPL-MCNC: 1.8 MG/DL (ref 1.8–2.6)
MCH RBC QN AUTO: 30.9 PG (ref 26.5–33)
MCHC RBC AUTO-ENTMCNC: 31.6 G/DL (ref 31.5–36.5)
MCV RBC AUTO: 98 FL (ref 78–100)
MONOCYTES # BLD MANUAL: 1.6 10E3/UL (ref 0–1.3)
MONOCYTES NFR BLD MANUAL: 10 %
MYELOCYTES # BLD MANUAL: 0.3 10E3/UL
MYELOCYTES NFR BLD MANUAL: 2 %
NEUTROPHILS # BLD MANUAL: 13.7 10E3/UL (ref 1.6–8.3)
NEUTROPHILS NFR BLD MANUAL: 87 %
PHOSPHATE SERPL-MCNC: 2.3 MG/DL (ref 2.5–4.5)
PLAT MORPH BLD: ABNORMAL
PLATELET # BLD AUTO: 63 10E3/UL (ref 150–450)
PLATELET # BLD AUTO: 77 10E3/UL (ref 150–450)
POTASSIUM BLD-SCNC: 3.9 MMOL/L (ref 3.4–5.3)
PRODUCT NUMBER FLOW CYTOMETRY: NORMAL
RBC # BLD AUTO: 2.3 10E6/UL (ref 3.8–5.2)
RBC MORPH BLD: ABNORMAL
SODIUM SERPL-SCNC: 138 MMOL/L (ref 133–144)
SPECIMEN EXPIRATION DATE: ABNORMAL
SPECIMEN EXPIRATION DATE: NORMAL
UNIT ABO/RH: NORMAL
UNIT NUMBER: NORMAL
UNIT STATUS: NORMAL
UNIT TYPE ISBT: 6200
VIABLE CD34 CELLS NFR FLD: 97.28 %
WBC # BLD AUTO: 15.7 10E3/UL (ref 4–11)

## 2022-02-19 PROCEDURE — 38206 HARVEST AUTO STEM CELLS: CPT

## 2022-02-19 PROCEDURE — 87040 BLOOD CULTURE FOR BACTERIA: CPT | Performed by: INTERNAL MEDICINE

## 2022-02-19 PROCEDURE — 86922 COMPATIBILITY TEST ANTIGLOB: CPT | Performed by: HOSPITALIST

## 2022-02-19 PROCEDURE — 80048 BASIC METABOLIC PNL TOTAL CA: CPT | Performed by: STUDENT IN AN ORGANIZED HEALTH CARE EDUCATION/TRAINING PROGRAM

## 2022-02-19 PROCEDURE — 87040 BLOOD CULTURE FOR BACTERIA: CPT | Performed by: STUDENT IN AN ORGANIZED HEALTH CARE EDUCATION/TRAINING PROGRAM

## 2022-02-19 PROCEDURE — 250N000013 HC RX MED GY IP 250 OP 250 PS 637: Performed by: HOSPITALIST

## 2022-02-19 PROCEDURE — 85027 COMPLETE CBC AUTOMATED: CPT | Performed by: STUDENT IN AN ORGANIZED HEALTH CARE EDUCATION/TRAINING PROGRAM

## 2022-02-19 PROCEDURE — 258N000003 HC RX IP 258 OP 636: Performed by: INTERNAL MEDICINE

## 2022-02-19 PROCEDURE — 206N000001 HC R&B BMT UMMC

## 2022-02-19 PROCEDURE — 250N000013 HC RX MED GY IP 250 OP 250 PS 637: Performed by: INTERNAL MEDICINE

## 2022-02-19 PROCEDURE — 86901 BLOOD TYPING SEROLOGIC RH(D): CPT | Performed by: INTERNAL MEDICINE

## 2022-02-19 PROCEDURE — 250N000013 HC RX MED GY IP 250 OP 250 PS 637: Performed by: PHYSICIAN ASSISTANT

## 2022-02-19 PROCEDURE — 250N000009 HC RX 250

## 2022-02-19 PROCEDURE — 86850 RBC ANTIBODY SCREEN: CPT | Performed by: STUDENT IN AN ORGANIZED HEALTH CARE EDUCATION/TRAINING PROGRAM

## 2022-02-19 PROCEDURE — 250N000013 HC RX MED GY IP 250 OP 250 PS 637: Performed by: NURSE PRACTITIONER

## 2022-02-19 PROCEDURE — 97530 THERAPEUTIC ACTIVITIES: CPT | Mod: GO

## 2022-02-19 PROCEDURE — 250N000011 HC RX IP 250 OP 636: Performed by: PHYSICIAN ASSISTANT

## 2022-02-19 PROCEDURE — 86367 STEM CELLS TOTAL COUNT: CPT | Performed by: PHYSICIAN ASSISTANT

## 2022-02-19 PROCEDURE — 250N000011 HC RX IP 250 OP 636

## 2022-02-19 PROCEDURE — 6A551ZV PHERESIS OF HEMATOPOIETIC STEM CELLS, MULTIPLE: ICD-10-PCS | Performed by: PATHOLOGY

## 2022-02-19 PROCEDURE — 258N000003 HC RX IP 258 OP 636: Performed by: PHYSICIAN ASSISTANT

## 2022-02-19 PROCEDURE — 250N000009 HC RX 250: Performed by: INTERNAL MEDICINE

## 2022-02-19 PROCEDURE — 38207 CRYOPRESERVE STEM CELLS: CPT | Performed by: INTERNAL MEDICINE

## 2022-02-19 PROCEDURE — 250N000013 HC RX MED GY IP 250 OP 250 PS 637: Performed by: STUDENT IN AN ORGANIZED HEALTH CARE EDUCATION/TRAINING PROGRAM

## 2022-02-19 PROCEDURE — 250N000011 HC RX IP 250 OP 636: Performed by: STUDENT IN AN ORGANIZED HEALTH CARE EDUCATION/TRAINING PROGRAM

## 2022-02-19 PROCEDURE — 85049 AUTOMATED PLATELET COUNT: CPT | Performed by: NURSE PRACTITIONER

## 2022-02-19 PROCEDURE — 84100 ASSAY OF PHOSPHORUS: CPT | Performed by: PHYSICIAN ASSISTANT

## 2022-02-19 PROCEDURE — 38207 CRYOPRESERVE STEM CELLS: CPT

## 2022-02-19 PROCEDURE — P9040 RBC LEUKOREDUCED IRRADIATED: HCPCS | Performed by: HOSPITALIST

## 2022-02-19 PROCEDURE — 250N000011 HC RX IP 250 OP 636: Performed by: INTERNAL MEDICINE

## 2022-02-19 PROCEDURE — 87040 BLOOD CULTURE FOR BACTERIA: CPT | Performed by: PHYSICIAN ASSISTANT

## 2022-02-19 PROCEDURE — 83735 ASSAY OF MAGNESIUM: CPT | Performed by: INTERNAL MEDICINE

## 2022-02-19 PROCEDURE — 99233 SBSQ HOSP IP/OBS HIGH 50: CPT | Performed by: INTERNAL MEDICINE

## 2022-02-19 RX ORDER — HEPARIN SODIUM (PORCINE) LOCK FLUSH IV SOLN 100 UNIT/ML 100 UNIT/ML
3 SOLUTION INTRAVENOUS ONCE
Status: COMPLETED | OUTPATIENT
Start: 2022-02-19 | End: 2022-02-19

## 2022-02-19 RX ORDER — FUROSEMIDE 10 MG/ML
20 INJECTION INTRAMUSCULAR; INTRAVENOUS ONCE
Status: COMPLETED | OUTPATIENT
Start: 2022-02-19 | End: 2022-02-19

## 2022-02-19 RX ORDER — PIPERACILLIN SODIUM, TAZOBACTAM SODIUM 3; .375 G/15ML; G/15ML
3.38 INJECTION, POWDER, LYOPHILIZED, FOR SOLUTION INTRAVENOUS EVERY 6 HOURS
Status: DISCONTINUED | OUTPATIENT
Start: 2022-02-20 | End: 2022-02-21

## 2022-02-19 RX ORDER — MAGNESIUM SULFATE HEPTAHYDRATE 40 MG/ML
2 INJECTION, SOLUTION INTRAVENOUS ONCE
Status: COMPLETED | OUTPATIENT
Start: 2022-02-19 | End: 2022-02-19

## 2022-02-19 RX ADMIN — BUPRENORPHINE AND NALOXONE 1 FILM: 8; 2 FILM, SOLUBLE BUCCAL; SUBLINGUAL at 14:38

## 2022-02-19 RX ADMIN — APIXABAN 10 MG: 5 TABLET, FILM COATED ORAL at 08:37

## 2022-02-19 RX ADMIN — SUCRALFATE 1 G: 1 SUSPENSION ORAL at 08:05

## 2022-02-19 RX ADMIN — METOPROLOL SUCCINATE 25 MG: 25 TABLET, EXTENDED RELEASE ORAL at 08:38

## 2022-02-19 RX ADMIN — FLUTICASONE FUROATE AND VILANTEROL TRIFENATATE 1 PUFF: 100; 25 POWDER RESPIRATORY (INHALATION) at 08:38

## 2022-02-19 RX ADMIN — SUCRALFATE 1 G: 1 SUSPENSION ORAL at 21:00

## 2022-02-19 RX ADMIN — FUROSEMIDE 20 MG: 10 INJECTION, SOLUTION INTRAVENOUS at 14:38

## 2022-02-19 RX ADMIN — FILGRASTIM 780 MCG: 480 INJECTION, SOLUTION INTRAVENOUS; SUBCUTANEOUS at 07:50

## 2022-02-19 RX ADMIN — VENLAFAXINE 75 MG: 75 TABLET ORAL at 13:38

## 2022-02-19 RX ADMIN — LEVOTHYROXINE SODIUM 112 MCG: 0.11 TABLET ORAL at 08:05

## 2022-02-19 RX ADMIN — HYDRALAZINE HYDROCHLORIDE 10 MG: 20 INJECTION INTRAMUSCULAR; INTRAVENOUS at 20:52

## 2022-02-19 RX ADMIN — ALTEPLASE 2 MG: 2.2 INJECTION, POWDER, LYOPHILIZED, FOR SOLUTION INTRAVENOUS at 14:10

## 2022-02-19 RX ADMIN — VENLAFAXINE 75 MG: 75 TABLET ORAL at 08:37

## 2022-02-19 RX ADMIN — CALCIUM CARBONATE (ANTACID) CHEW TAB 500 MG 500 MG: 500 CHEW TAB at 14:44

## 2022-02-19 RX ADMIN — UMECLIDINIUM 1 PUFF: 62.5 AEROSOL, POWDER ORAL at 08:37

## 2022-02-19 RX ADMIN — APIXABAN 10 MG: 5 TABLET, FILM COATED ORAL at 20:08

## 2022-02-19 RX ADMIN — QUETIAPINE FUMARATE 25 MG: 25 TABLET ORAL at 21:01

## 2022-02-19 RX ADMIN — DEXTROSE MONOHYDRATE 20 ML: 50 INJECTION, SOLUTION INTRAVENOUS at 07:50

## 2022-02-19 RX ADMIN — MAGNESIUM SULFATE IN WATER 2 G: 40 INJECTION, SOLUTION INTRAVENOUS at 07:50

## 2022-02-19 RX ADMIN — PANTOPRAZOLE SODIUM 40 MG: 40 TABLET, DELAYED RELEASE ORAL at 08:05

## 2022-02-19 RX ADMIN — METHOCARBAMOL 500 MG: 500 TABLET ORAL at 09:32

## 2022-02-19 RX ADMIN — FLUCONAZOLE 200 MG: 200 TABLET ORAL at 08:38

## 2022-02-19 RX ADMIN — Medication 3 ML: at 14:09

## 2022-02-19 RX ADMIN — BUPRENORPHINE AND NALOXONE 1 FILM: 8; 2 FILM, SOLUBLE BUCCAL; SUBLINGUAL at 08:06

## 2022-02-19 RX ADMIN — METHOCARBAMOL 500 MG: 500 TABLET ORAL at 03:22

## 2022-02-19 RX ADMIN — OXYCODONE HYDROCHLORIDE 10 MG: 10 TABLET ORAL at 11:29

## 2022-02-19 RX ADMIN — OXYCODONE HYDROCHLORIDE 20 MG: 10 TABLET ORAL at 04:57

## 2022-02-19 RX ADMIN — SUCRALFATE 1 G: 1 SUSPENSION ORAL at 11:30

## 2022-02-19 RX ADMIN — POTASSIUM & SODIUM PHOSPHATES POWDER PACK 280-160-250 MG 1 PACKET: 280-160-250 PACK at 20:07

## 2022-02-19 RX ADMIN — SODIUM CHLORIDE, PRESERVATIVE FREE 5 ML: 5 INJECTION INTRAVENOUS at 08:02

## 2022-02-19 RX ADMIN — ACYCLOVIR 800 MG: 800 TABLET ORAL at 20:08

## 2022-02-19 RX ADMIN — BUPRENORPHINE AND NALOXONE 1 FILM: 8; 2 FILM, SOLUBLE BUCCAL; SUBLINGUAL at 20:08

## 2022-02-19 RX ADMIN — VENLAFAXINE 75 MG: 75 TABLET ORAL at 18:35

## 2022-02-19 RX ADMIN — PLERIXAFOR 16 MG: 24 SOLUTION SUBCUTANEOUS at 20:52

## 2022-02-19 RX ADMIN — ONDANSETRON 4 MG: 4 TABLET, ORALLY DISINTEGRATING ORAL at 13:45

## 2022-02-19 RX ADMIN — POTASSIUM & SODIUM PHOSPHATES POWDER PACK 280-160-250 MG 1 PACKET: 280-160-250 PACK at 15:51

## 2022-02-19 RX ADMIN — ACETAMINOPHEN 650 MG: 325 TABLET, FILM COATED ORAL at 21:01

## 2022-02-19 RX ADMIN — ALLOPURINOL 300 MG: 300 TABLET ORAL at 08:37

## 2022-02-19 RX ADMIN — OXYCODONE HYDROCHLORIDE 20 MG: 10 TABLET ORAL at 16:46

## 2022-02-19 RX ADMIN — POTASSIUM & SODIUM PHOSPHATES POWDER PACK 280-160-250 MG 1 PACKET: 280-160-250 PACK at 08:38

## 2022-02-19 RX ADMIN — SUCRALFATE 1 G: 1 SUSPENSION ORAL at 15:51

## 2022-02-19 RX ADMIN — LORATADINE 10 MG: 10 TABLET ORAL at 08:38

## 2022-02-19 RX ADMIN — LOPERAMIDE HYDROCHLORIDE 2 MG: 2 CAPSULE ORAL at 08:38

## 2022-02-19 RX ADMIN — ATORVASTATIN CALCIUM 40 MG: 20 TABLET, FILM COATED ORAL at 20:08

## 2022-02-19 RX ADMIN — POTASSIUM PHOSPHATE, MONOBASIC AND POTASSIUM PHOSPHATE, DIBASIC 9 MMOL: 224; 236 INJECTION, SOLUTION, CONCENTRATE INTRAVENOUS at 04:48

## 2022-02-19 RX ADMIN — ACYCLOVIR 800 MG: 800 TABLET ORAL at 08:38

## 2022-02-19 RX ADMIN — POTASSIUM & SODIUM PHOSPHATES POWDER PACK 280-160-250 MG 1 PACKET: 280-160-250 PACK at 13:39

## 2022-02-19 RX ADMIN — AMLODIPINE BESYLATE 5 MG: 5 TABLET ORAL at 08:38

## 2022-02-19 RX ADMIN — ANTICOAGULANT CITRATE DEXTROSE SOLUTION FORMULA A 1494 ML: 12.25; 11; 3.65 SOLUTION INTRAVENOUS at 08:55

## 2022-02-19 RX ADMIN — METHOCARBAMOL 500 MG: 500 TABLET ORAL at 15:51

## 2022-02-19 ASSESSMENT — ACTIVITIES OF DAILY LIVING (ADL)
ADLS_ACUITY_SCORE: 21
ADLS_ACUITY_SCORE: 23
ADLS_ACUITY_SCORE: 19
ADLS_ACUITY_SCORE: 21
ADLS_ACUITY_SCORE: 23
ADLS_ACUITY_SCORE: 19
ADLS_ACUITY_SCORE: 19
ADLS_ACUITY_SCORE: 21
ADLS_ACUITY_SCORE: 23
ADLS_ACUITY_SCORE: 21
ADLS_ACUITY_SCORE: 23
ADLS_ACUITY_SCORE: 21
ADLS_ACUITY_SCORE: 23
ADLS_ACUITY_SCORE: 21

## 2022-02-19 NOTE — PLAN OF CARE
VSS on 2L O2. Pt noted to be satting in 80s on RA, placed on 2L and continuous O2 monitoring. Sats improved to 94-97%. T Max 100.2. CARRILLO and complained of SOB. A&Ox4 but forgetful and makes illogical statements at times. A1 and refused GB. BA on for safety. Voiding spontaneously and no BM this shift. Complained of generalized pain and given PRN Robaxin and Oxycodone x1. Reported feeling nauseous and given PRN IV Zofran with relief. RPort hep locked and LCVC TKO. Phos replaced this AM with recheck scheduled for tomorrow AM. Still awaiting Mag results. Will continue to monitor.     Problem: Adult Inpatient Plan of Care  Goal: Absence of Hospital-Acquired Illness or Injury  Intervention: Identify and Manage Fall Risk  Recent Flowsheet Documentation  Taken 2/18/2022 2000 by Sarita Aragon, RN  Safety Promotion/Fall Prevention:    activity supervised    bed alarm on    clutter free environment maintained    fall prevention program maintained    nonskid shoes/slippers when out of bed    patient and family education           Goal Outcome Evaluation:    Plan of Care Reviewed With: patient     Overall Patient Progress: no change

## 2022-02-19 NOTE — PROGRESS NOTES
Spoke with pt's  x2 today regarding updates about pt. 2nd call only 2 hrs after first and pt insisted that he has not spoken to anyone regarding his wife in days and is frustrated about lack of updates regarding her care.

## 2022-02-19 NOTE — PROGRESS NOTES
"BMT Daily Progress Note      ID: Libby Grimaldo is a 70 yo woman D+20 s/p Cytoxan chemo priming prior to planned auto PSBCT for MM; 1st day collections today.      INTERVAL  HISTORY   Mild hypoxia overnight (86%) now stable on RA this am. Throat still a little sore. Less short of breath. Mild nausea.  Some bony hip pain. Starting collections today.     Review of Systems:    8 point ROS negative except as above.    PHYSICAL EXAM      KPS:  70  Blood pressure 138/59, pulse 99, temperature 99.3  F (37.4  C), temperature source Oral, resp. rate 20, height 1.626 m (5' 4\"), weight 66.6 kg (146 lb 12.8 oz), SpO2 96 %.    Physical Exam  Constitutional:       General: She is not in acute distress.  HENT:      Head: Normocephalic and atraumatic.      Comments: alopecia     Nose: No rhinorrhea.   Eyes:      Conjunctiva/sclera: Conjunctivae normal.      Comments: Bilateral exophthalmos   Cardiovascular:      Rate and Rhythm: Normal rate and regular rhythm.   Pulmonary:      Breath sounds: Normal breath sounds.      Comments: Some rales right base  Abdominal:      General: There is no distension.      Palpations: There is no mass.      Tenderness: There is abdominal tenderness. There is no guarding.   Musculoskeletal:      Right lower leg: No edema.      Left lower leg: No edema.   Skin:     General: Skin is warm and dry.      Coloration: Skin is pale. Skin is not jaundiced.   Neurological:      Mental Status: She is alert. Mental status is at baseline.      Motor: Weakness present.      Coordination: Coordination abnormal.   Psychiatric:         Mood and Affect: Mood normal.      Comments: Low attention span       Labs:  Lab Results   Component Value Date    WBC 15.7 (H) 02/19/2022    ANEU 13.7 (H) 02/19/2022    HGB 7.1 (L) 02/19/2022    HCT 22.5 (L) 02/19/2022    PLT 77 (L) 02/19/2022     02/19/2022    POTASSIUM 3.9 02/19/2022    CHLORIDE 109 02/19/2022    CO2 24 02/19/2022    GLC 78 02/19/2022    BUN 6 (L) 02/19/2022    " CR 0.93 02/19/2022    MAG 1.8 02/19/2022    INR 1.29 (H) 02/11/2022    BILITOTAL 0.7 02/14/2022    AST 19 02/14/2022    ALT 14 02/14/2022    ALKPHOS 119 02/14/2022    PROTTOTAL 4.1 (L) 02/14/2022    ALBUMIN 1.6 (L) 02/14/2022       I have assessed all abnormal lab values for their clinical significance and any values considered clinically significant have been addressed in the assessment and plan.          SYSTEMS-BASED ASSESSMENT AND PLAN   Libby Grimaldo is a 72 yo woman D+20 s/p Cytoxan chemo priming prior to planned auto PSBCT for MM        1. BMT  - BMT MD/Coordinator: Dr. Julio C Guillory/Rashad Chen  - Cytoxan 2/1  - Plan for allopurinol 300mg daily for at least 7 days.   - GCSF 10 mcg/kg started day +5 (2/5/22), continue through stem cell collections. Moved GCSF to morning to see if fevers follow suit.  - Mozobil #1 given 2/18: CD34 up to 15 (5); 1st day apheresis today. [facility]. If further collections are needed, give Mozobil again today.   - cell dose goal 5 x10^6 CD34/kg    2. HEME/COAG  - Transfusion parameters: hgb <7g/dL and plts <10,000.  - Pancytopenia secondary to chemo  - 2/15: new non-occlusive LUE DVT. Initially on heparin gtt; now on eliquis 10mg BID x7 days (2/17-2/23). Then start 5mg PO BID (2/24-onward).     3. ID:   - Chest CT (2/16)-- fluid overload but no focus of infection   - Neutropenic fevers, initially attributed to strep throat.  She was on cefepime 2/11-2/13; zosyn through 2/17   - 2/15: Serum PCR:  HSV, CMV, HHV6, EBV  All negative.  Blood cultures with fevers continue to be negative.  - Recent hx of urinary tract infection s/p antibiotics. Recheck UA/UC 2/15.      #Prophy: ACV,  fluconazole.       4. GI:   - constipation secondary to narcotics: senna bid prn  - Protonix 40mg/d (2/9); prn Tums. Scheduled carafate.  - Anti-emetics:  prn zofran, prn compazine     5. RENAL/FEN  - hypophosphatemia: replete per sliding scale and schedule 1  packet qid starting 2/15.   - hypokalemia: resolved. Replete prn per sliding scale.   - FVO: Lasix 20mg IV again today     6. Endo: hypothyroidism secondary to therapy for Graves Disease- continue levothyroxine     7. CARDS: On metoprolol and norvasc for hypertension.  Prn hydralazine.   Hyperlipidemia: Lipitor   ASA: hold x2/9 with thrombocytopenia     8. Neuro: Continue effexor   - Hx of multiple febrile seizures. Last occurred during college.      9. Pulm: COPD- remains on daily fluticasone and albuterol prn.      10. Cancer related pain: Remains on buprenorphine-naloxone and prn oxycodone. Oxycodone order changed to 10-20mg tid prn with a max of 50mg per 24 hours (previous max was 30mg in 24 hours, but she is having legitimate increase in headaches and bone pain with GCSF). Claritin for GCSF-related pain exacerbation.     11. Musculoskeletal: history of frequent falls at home.  Consulted PT/OT.      12. Neuro: question of illness related delirium vs. unmasking of underlying dementia.  Patient has family history of dementia is grandmother, paternal uncle and father.  Requested OT to do MOCA testing 2/14.  Head CT showed mild to moderate changes of chronic small vessel ischemia.  MS improved. Plan to retest MOCA outpatient in clinic once she is no longer acutely ill. (Note: it was 11 this admission.)    I spent 40 minutes in the care of this patient today, which included time necessary for preparation for the visit, obtaining history, ordering medications/tests/procedures as medically indicated, review of pertinent medical literature, counseling of the patient, communication of recommendations to the care team, and documentation time.     updated via phone today.    Stephie Ayers PA-C  [phone]

## 2022-02-19 NOTE — PROCEDURES
Transfusion Medicine/Apheresis Procedure    Libby Grimaldo [id number]   YOB: 1950 Age: 71 years old     Procedure: Autologous hematopoietic progenitor cell (HPC) collection          Assessment and Plan:     Ms. Grimaldo is a 71-year-old female who is an inpatient and undergoing autologous HPC collection for treatment of her multiple myeloma. We are planning to collect for 1 to 3 days or until the target goal is met. A central line was placed and is being used for access for the procedure.  She is tolerating the collection today.  No signs/symptoms of hypocalcemia secondary to citrate-based anticoagulation.  No other concerns/complaints voiced.  Her WBC is 15.7, and her CD34+ cell enumeration (pre-collection, peripheral blood) is 15/microliter.  Anticipate a decent collection but more than likely will need an additional 1-2 collections.  Will be notified of CD34 yield later today.  Continue with plan as per BMT.         Chief Complaint:     Transfusion medicine consultation.         History of Present Illness:     From initial apheresis consult:  Ms. Grimaldo is a 71-year-old female with a past medical history significant for HTN, HLD,  febrile seizures in her youth (once as a toddler, once in high school, and once in college, none since that time),  pulmonary fibrosis, COPD, sepsis secondary to pneumonia, and recent-onset of new 'floaters' in her eyes, double vision, and possible vertigo, and multiple myeloma (please see Dr. Julio C Guillory's 12/22/2021 note for more detail regarding her myeloma history). Additionally, she reported many previous neck surgeries and continued radiculopathies (worse in her LEFT upper and lower extremities) along with back pain. When asked, she stated that reclining in a hospital bed for several hours would pose no problem for her. With regard to her previous pulmonary conditions, she reported that she no longer requires any supplemental oxygen. Additionally, Ms. Grimaldo has  received both shots in the Pfizer COVID vaccination series along with a booster shot. She reported a recent flu vaccination. Ms. Grimaldo had no significant travel history nor did she have any identifiable risk factors for infectious disease. Avila Thompson RN mentioned that Ms. Grimaldo had a cadaver tendon utilized as part of the previous eye surgery, however, this does not seem to pose an infectious risk. The procedure and its concomitant risks, benefits, and alternative(s) were discussed with the patient and all of her questions were addressed at the time of our visit.         Past Medical History:     Past Medical History:   Diagnosis Date     Cervical radiculopathy      COPD (chronic obstructive pulmonary disease) (H)      Double vision      Febrile seizure (H)     Per patient. Once while a toddler, once while in highschool, and once while in college.     Floaters      Graves disease      HLD (hyperlipidemia)      HTN (hypertension)      IPF (idiopathic pulmonary fibrosis) (H)      Lumbar radiculopathy      Multiple myeloma in relapse (H)      Osteoporosis      Pneumonia     Per patient. Complicated by sepsis.     Recurrent UTI     Per patient. Has required prophylactic antibiotcs.     Vitamin B12 deficiency (non anemic)           Past Surgical History:     Past Surgical History:   Procedure Laterality Date     BONE MARROW BIOPSY, BONE SPECIMEN, NEEDLE/TROCAR Right 1/24/2022    Procedure: BIOPSY, BONE MARROW;  Surgeon: Yuniel Tineo;  Location: [facility] OR     CERVICAL FUSION       CHOLECYSTECTOMY       CYSTECTOMY OVARIAN BENIGN       EYE SURGERY      Per patient.     IR CVC TUNNEL PLACEMENT > 5 YRS OF AGE  2/1/2022     TONSILLECTOMY       WRIST SURGERY Left             Social History:     Social History     Tobacco Use     Smoking status: Former Smoker     Packs/day: 1.00     Years: 40.00     Pack years: 40.00     Smokeless tobacco: Former User     Quit date: 2010   Substance Use Topics     Alcohol use: Not  "Currently     Drug use: Not Currently     Types: Marijuana   Denied ever receiving a tattoo.           Family History:     Family History   Problem Relation Age of Onset     Heart Disease Mother      Cancer Mother         \"Around lungs\" - she explicitly said it was *not* lung cancer.     Diabetes Father      Heart Disease Father               Immunizations:     She reported receiving both doses of the two-dose series Pfizer COVID-19 vaccination along with a booster dose.  She reported receiving the [id number] influenza vaccination.         Allergies:     Allergies   Allergen Reactions     Bupropion      Other reaction(s): Seizures     Ms. Grimaldo said she did not have a seizure from this medication but remembered 'feeling weird'.         Medications:     Current Facility-Administered Medications   Medication     acetaminophen (TYLENOL) tablet 325 mg     acetaminophen (TYLENOL) tablet 650 mg     acyclovir (ZOVIRAX) tablet 800 mg     albuterol (PROVENTIL) neb solution 2.5 mg     allopurinol (ZYLOPRIM) tablet 300 mg     amLODIPine (NORVASC) tablet 5 mg     apixaban ANTICOAGULANT (ELIQUIS) tablet 10 mg     [START ON 2/24/2022] apixaban ANTICOAGULANT (ELIQUIS) tablet 5 mg     atorvastatin (LIPITOR) tablet 40 mg     benzocaine-menthol (CEPACOL EXTRA STRENGTH) 15-2.6 MG lozenge 1 lozenge     buprenorphine HCl-naloxone HCl (SUBOXONE) 8-2 MG per film 1 Film     calcium carbonate (TUMS) chewable tablet 500 mg     dextromethorphan-guaiFENesin (MUCINEX DM)  MG per 12 hr tablet 1 tablet     filgrastim (NEUPOGEN) 780 mcg in D5W 25 mL infusion    And     dextrose 5 % flush PRE/POST medication    And     dextrose 5 % flush PRE/POST medication     fluconazole (DIFLUCAN) tablet 200 mg     fluticasone-vilanterol (BREO ELLIPTA) 100-25 MCG/INH inhaler 1 puff     haloperidol lactate (HALDOL) injection 2 mg     heparin 100 UNIT/ML injection 5-10 mL     heparin lock flush 10 UNIT/ML injection 5-10 mL     heparin lock flush 10 UNIT/ML " injection 5-10 mL     heparin lock flush 10 UNIT/ML injection 5-20 mL     heparin lock flush 10 UNIT/ML injection 5-20 mL     hydrALAZINE (APRESOLINE) injection 10 mg     levothyroxine (SYNTHROID/LEVOTHROID) tablet 112 mcg     loperamide (IMODIUM) capsule 2 mg     loratadine (CLARITIN) tablet 10 mg     methocarbamol (ROBAXIN) tablet 500 mg     metoprolol succinate ER (TOPROL-XL) 24 hr tablet 25 mg     ondansetron (ZOFRAN-ODT) ODT tab 4 mg    Or     ondansetron (ZOFRAN) injection 4 mg     oxyCODONE IR (ROXICODONE) tablet 10-20 mg     pantoprazole (PROTONIX) EC tablet 40 mg     plerixafor (MOZOBIL) injection SOLN 16 mg     potassium & sodium phosphates (NEUTRA-PHOS) Packet 1 packet     prochlorperazine (COMPAZINE) injection 5 mg    Or     prochlorperazine (COMPAZINE) tablet 5 mg    Or     prochlorperazine (COMPAZINE) suppository 12.5 mg     QUEtiapine (SEROquel) tablet 25 mg     sennosides (SENOKOT) tablet 8.6 mg     sodium chloride (PF) 0.9% PF flush 10-20 mL     sodium chloride (PF) 0.9% PF flush 10-20 mL     sodium chloride (PF) 0.9% PF flush 10-20 mL     sodium chloride (PF) 0.9% PF flush 10-20 mL     sodium chloride (PF) 0.9% PF flush 3 mL     sucralfate (CARAFATE) suspension 1 g     umeclidinium (INCRUSE ELLIPTA) 62.5 MCG/INH inhaler 1 puff     venlafaxine (EFFEXOR) tablet 75 mg            Vital Signs:     Vitals:    02/19/22 0307 02/19/22 0732 02/19/22 0748 02/19/22 0830   BP: 126/67 127/64  138/59   BP Location: Left arm Left arm     Pulse: 88 86  99   Resp: 20 20  20   Temp: 99.3  F (37.4  C) 99.4  F (37.4  C)  99.3  F (37.4  C)   TempSrc: Oral Oral  Oral   SpO2: 95% 97% 96%    Weight:  66.6 kg (146 lb 12.8 oz)     Height:                Data:     BMPRecent Labs   Lab 02/19/22  0310 02/18/22  0612 02/18/22  0533 02/18/22  0415 02/17/22  0757 02/17/22  0453 02/17/22  0304 02/16/22  2109 02/16/22  0340     --   --  140  --   --  141  --  141   POTASSIUM 3.9  --   --  3.5 3.7  --  3.1*   < > 3.2*    CHLORIDE 109  --   --  109  --   --  109  --  110*   DIANDRA 6.6*  --   --  6.4*  --   --  6.8*  --  7.0*   CO2 24  --   --  23  --   --  26  --  24   BUN 6*  --   --  5*  --   --  5*  --  6*   CR 0.93  --   --  0.85  --   --  0.82  --  0.75   GLC 78 79 70 68*  --    < > 73  --  98    < > = values in this interval not displayed.     CBC  Recent Labs   Lab 02/19/22  0310 02/18/22  1642 02/18/22  0415 02/17/22 1648 02/17/22  0304 02/16/22 1648 02/16/22  0350   WBC 15.7*  --  7.9  --  3.7*  --  3.0*   RBC 2.30*  --  2.38*  --  2.43*  --  2.51*   HGB 7.1*  --  7.4*  --  7.5*  --  7.8*   HCT 22.5*  --  23.4*  --  23.4*  --  24.0*   MCV 98  --  98  --  96  --  96   MCH 30.9  --  31.1  --  30.9  --  31.1   MCHC 31.6  --  31.6  --  32.1  --  32.5   RDW 15.1*  --  14.9  --  14.6  --  14.7   PLT 77* 82* 68* 73* 60*   < > 70*    < > = values in this interval not displayed.     INRNo lab results found in last 7 days.    Attestation:  During the collection the patient was directly seen and evaluated by me, Robert Hdez M.D..    Robert Hdez M.D.  Professor, Transfusion Medicine  Laboratory Medicine & Pathology  Pager: [phone]

## 2022-02-19 NOTE — PLAN OF CARE
AVSS on room air, temp max 99.4. pt complaining of heartburn, throat pain, diarrhea, and intermittent nausea. Pt encouraged to sit up right and spend time in the chair to allow gravity to help but is frustrated by this and refuses. Up to chair x1 for breakfast this morning. Ate 50% bean soup, 1/2 ensure clear, 1/4 ramen cup, and plans to try a late dinner as well. Mg replaced. Given lasix 20mg, with good output, 4 loose/watery stools today, c.diff negative. Given imodium x1, no further stools this evening. Robaxin given x1 for pain and oxy x2 for pain. Zofran given x1 and tums given x2. Pt is allotted 3 doses of oxy per day, has no further doses available until after midnight.Neupogen given this morning to see if it had an impact on fevers. Will get mozobil tonight and plan for collections tomorrow.    Problem: Adult Inpatient Plan of Care  Goal: Plan of Care Review  Outcome: Ongoing, Not Progressing  Flowsheets (Taken 2/18/2022 1808)  Plan of Care Reviewed With:    patient    sibling    spouse  Overall Patient Progress: no change  Goal: Absence of Hospital-Acquired Illness or Injury  Intervention: Identify and Manage Fall Risk  Recent Flowsheet Documentation  Taken 2/18/2022 6363 by Gilda Begum RN  Safety Promotion/Fall Prevention:    activity supervised    assistive device/personal items within reach    bed alarm on    chair alarm on    chemotherapeutic precautions    clutter free environment maintained    fall prevention program maintained    increased rounding and observation    increase visualization of patient    lighting adjusted    mobility aid in reach    nonskid shoes/slippers when out of bed    patient and family education    room near nurse's station  Intervention: Prevent Skin Injury  Recent Flowsheet Documentation  Taken 2/18/2022 0753 by Gilda Begum, RN  Body Position: position changed independently  Intervention: Prevent and Manage VTE (Venous Thromboembolism) Risk  Recent Flowsheet  Documentation  Taken 2/18/2022 0753 by Gilda Begum, RN  Activity Management:    activity adjusted per tolerance    activity encouraged  Goal: Optimal Comfort and Wellbeing  Outcome: Ongoing, Not Progressing   Goal Outcome Evaluation:    Plan of Care Reviewed With: patient, sibling, spouse     Overall Patient Progress: no change

## 2022-02-20 ENCOUNTER — APPOINTMENT (OUTPATIENT)
Dept: OCCUPATIONAL THERAPY | Facility: CLINIC | Age: 72
DRG: 152 | End: 2022-02-20
Payer: MEDICARE

## 2022-02-20 ENCOUNTER — APPOINTMENT (OUTPATIENT)
Dept: LAB | Facility: CLINIC | Age: 72
DRG: 152 | End: 2022-02-20
Payer: MEDICARE

## 2022-02-20 LAB
ALBUMIN UR-MCNC: NEGATIVE MG/DL
AMORPH CRY #/AREA URNS HPF: ABNORMAL /HPF
ANION GAP SERPL CALCULATED.3IONS-SCNC: 6 MMOL/L (ref 3–14)
ANION GAP SERPL CALCULATED.3IONS-SCNC: 8 MMOL/L (ref 3–14)
APPEARANCE UR: ABNORMAL
BACTERIA BLD CULT: NO GROWTH
BASOPHILS # BLD MANUAL: 0 10E3/UL (ref 0–0.2)
BASOPHILS NFR BLD MANUAL: 0 %
BILIRUB UR QL STRIP: NEGATIVE
BILL ONLY AUTO PBPC FREEZE: NORMAL
BUN SERPL-MCNC: 7 MG/DL (ref 7–30)
BUN SERPL-MCNC: 8 MG/DL (ref 7–30)
CA-I BLD-MCNC: 4.5 MG/DL (ref 4.4–5.2)
CALCIUM SERPL-MCNC: 10.9 MG/DL (ref 8.5–10.1)
CALCIUM SERPL-MCNC: 9.3 MG/DL (ref 8.5–10.1)
CHLORIDE BLD-SCNC: 102 MMOL/L (ref 94–109)
CHLORIDE BLD-SCNC: 96 MMOL/L (ref 94–109)
CO2 SERPL-SCNC: 31 MMOL/L (ref 20–32)
CO2 SERPL-SCNC: 36 MMOL/L (ref 20–32)
COLOR UR AUTO: YELLOW
CREAT SERPL-MCNC: 0.83 MG/DL (ref 0.52–1.04)
CREAT SERPL-MCNC: 0.86 MG/DL (ref 0.52–1.04)
ELLIPTOCYTES BLD QL SMEAR: SLIGHT
EOSINOPHIL # BLD MANUAL: 0 10E3/UL (ref 0–0.7)
EOSINOPHIL NFR BLD MANUAL: 0 %
ERYTHROCYTE [DISTWIDTH] IN BLOOD BY AUTOMATED COUNT: 14.6 % (ref 10–15)
GFR SERPL CREATININE-BSD FRML MDRD: 73 ML/MIN/1.73M2
GFR SERPL CREATININE-BSD FRML MDRD: 76 ML/MIN/1.73M2
GLUCOSE BLD-MCNC: 115 MG/DL (ref 70–99)
GLUCOSE BLD-MCNC: 87 MG/DL (ref 70–99)
GLUCOSE UR STRIP-MCNC: NEGATIVE MG/DL
HCT VFR BLD AUTO: 26.1 % (ref 35–47)
HGB BLD-MCNC: 8.5 G/DL (ref 11.7–15.7)
HGB UR QL STRIP: ABNORMAL
KETONES UR STRIP-MCNC: NEGATIVE MG/DL
LACTATE SERPL-SCNC: 1.9 MMOL/L (ref 0.7–2)
LACTATE SERPL-SCNC: 2.5 MMOL/L (ref 0.7–2)
LEUKOCYTE ESTERASE UR QL STRIP: NEGATIVE
LYMPHOCYTES # BLD MANUAL: 0.3 10E3/UL (ref 0.8–5.3)
LYMPHOCYTES NFR BLD MANUAL: 2 %
MAGNESIUM SERPL-MCNC: 1.5 MG/DL (ref 1.8–2.6)
MCH RBC QN AUTO: 30.8 PG (ref 26.5–33)
MCHC RBC AUTO-ENTMCNC: 32.6 G/DL (ref 31.5–36.5)
MCV RBC AUTO: 95 FL (ref 78–100)
MONOCYTES # BLD MANUAL: 0.8 10E3/UL (ref 0–1.3)
MONOCYTES NFR BLD MANUAL: 5 %
MUCOUS THREADS #/AREA URNS LPF: PRESENT /LPF
MYELOCYTES # BLD MANUAL: 0.2 10E3/UL
MYELOCYTES NFR BLD MANUAL: 1 %
NEUTROPHILS # BLD MANUAL: 13.8 10E3/UL (ref 1.6–8.3)
NEUTROPHILS NFR BLD MANUAL: 92 %
NITRATE UR QL: NEGATIVE
NRBC # BLD AUTO: 0.2 10E3/UL
NRBC BLD MANUAL-RTO: 1 %
PH UR STRIP: 8 [PH] (ref 5–7)
PHOSPHATE SERPL-MCNC: 4.8 MG/DL (ref 2.5–4.5)
PHOSPHATE SERPL-MCNC: 4.9 MG/DL (ref 2.5–4.5)
PLAT MORPH BLD: ABNORMAL
PLATELET # BLD AUTO: 55 10E3/UL (ref 150–450)
PLATELET # BLD AUTO: 57 10E3/UL (ref 150–450)
POTASSIUM BLD-SCNC: 3.3 MMOL/L (ref 3.4–5.3)
POTASSIUM BLD-SCNC: 3.7 MMOL/L (ref 3.4–5.3)
POTASSIUM BLD-SCNC: 3.7 MMOL/L (ref 3.4–5.3)
RBC # BLD AUTO: 2.76 10E6/UL (ref 3.8–5.2)
RBC MORPH BLD: ABNORMAL
RBC URINE: 6 /HPF
SODIUM SERPL-SCNC: 139 MMOL/L (ref 133–144)
SODIUM SERPL-SCNC: 140 MMOL/L (ref 133–144)
SP GR UR STRIP: 1.01 (ref 1–1.03)
SQUAMOUS EPITHELIAL: <1 /HPF
UROBILINOGEN UR STRIP-MCNC: 6 MG/DL
WBC # BLD AUTO: 15 10E3/UL (ref 4–11)
WBC URINE: 8 /HPF

## 2022-02-20 PROCEDURE — 99233 SBSQ HOSP IP/OBS HIGH 50: CPT | Performed by: INTERNAL MEDICINE

## 2022-02-20 PROCEDURE — 83605 ASSAY OF LACTIC ACID: CPT | Performed by: PHYSICIAN ASSISTANT

## 2022-02-20 PROCEDURE — 250N000013 HC RX MED GY IP 250 OP 250 PS 637: Performed by: STUDENT IN AN ORGANIZED HEALTH CARE EDUCATION/TRAINING PROGRAM

## 2022-02-20 PROCEDURE — 250N000011 HC RX IP 250 OP 636: Performed by: PHYSICIAN ASSISTANT

## 2022-02-20 PROCEDURE — 250N000011 HC RX IP 250 OP 636: Performed by: STUDENT IN AN ORGANIZED HEALTH CARE EDUCATION/TRAINING PROGRAM

## 2022-02-20 PROCEDURE — 250N000009 HC RX 250: Performed by: PATHOLOGY

## 2022-02-20 PROCEDURE — 250N000011 HC RX IP 250 OP 636: Performed by: PATHOLOGY

## 2022-02-20 PROCEDURE — 84132 ASSAY OF SERUM POTASSIUM: CPT | Performed by: PHYSICIAN ASSISTANT

## 2022-02-20 PROCEDURE — 250N000011 HC RX IP 250 OP 636: Performed by: INTERNAL MEDICINE

## 2022-02-20 PROCEDURE — 36415 COLL VENOUS BLD VENIPUNCTURE: CPT | Performed by: INTERNAL MEDICINE

## 2022-02-20 PROCEDURE — 258N000003 HC RX IP 258 OP 636: Performed by: PHYSICIAN ASSISTANT

## 2022-02-20 PROCEDURE — 38207 CRYOPRESERVE STEM CELLS: CPT

## 2022-02-20 PROCEDURE — 250N000013 HC RX MED GY IP 250 OP 250 PS 637: Performed by: NURSE PRACTITIONER

## 2022-02-20 PROCEDURE — 84100 ASSAY OF PHOSPHORUS: CPT | Performed by: PHYSICIAN ASSISTANT

## 2022-02-20 PROCEDURE — 83735 ASSAY OF MAGNESIUM: CPT | Performed by: INTERNAL MEDICINE

## 2022-02-20 PROCEDURE — 206N000001 HC R&B BMT UMMC

## 2022-02-20 PROCEDURE — 86780 TREPONEMA PALLIDUM: CPT | Performed by: PATHOLOGY

## 2022-02-20 PROCEDURE — 38206 HARVEST AUTO STEM CELLS: CPT

## 2022-02-20 PROCEDURE — 83605 ASSAY OF LACTIC ACID: CPT | Performed by: INTERNAL MEDICINE

## 2022-02-20 PROCEDURE — 85027 COMPLETE CBC AUTOMATED: CPT | Performed by: STUDENT IN AN ORGANIZED HEALTH CARE EDUCATION/TRAINING PROGRAM

## 2022-02-20 PROCEDURE — 87040 BLOOD CULTURE FOR BACTERIA: CPT | Performed by: INTERNAL MEDICINE

## 2022-02-20 PROCEDURE — 80048 BASIC METABOLIC PNL TOTAL CA: CPT | Performed by: STUDENT IN AN ORGANIZED HEALTH CARE EDUCATION/TRAINING PROGRAM

## 2022-02-20 PROCEDURE — 86687 HTLV-I ANTIBODY: CPT | Performed by: PATHOLOGY

## 2022-02-20 PROCEDURE — 82330 ASSAY OF CALCIUM: CPT | Performed by: PATHOLOGY

## 2022-02-20 PROCEDURE — 97530 THERAPEUTIC ACTIVITIES: CPT | Mod: GO

## 2022-02-20 PROCEDURE — 97535 SELF CARE MNGMENT TRAINING: CPT | Mod: GO

## 2022-02-20 PROCEDURE — 85049 AUTOMATED PLATELET COUNT: CPT | Performed by: PHYSICIAN ASSISTANT

## 2022-02-20 PROCEDURE — 84132 ASSAY OF SERUM POTASSIUM: CPT | Performed by: INTERNAL MEDICINE

## 2022-02-20 PROCEDURE — 250N000013 HC RX MED GY IP 250 OP 250 PS 637: Performed by: PHYSICIAN ASSISTANT

## 2022-02-20 PROCEDURE — 86367 STEM CELLS TOTAL COUNT: CPT | Performed by: PATHOLOGY

## 2022-02-20 PROCEDURE — 81001 URINALYSIS AUTO W/SCOPE: CPT | Performed by: INTERNAL MEDICINE

## 2022-02-20 PROCEDURE — 38207 CRYOPRESERVE STEM CELLS: CPT | Performed by: INTERNAL MEDICINE

## 2022-02-20 PROCEDURE — 250N000013 HC RX MED GY IP 250 OP 250 PS 637: Performed by: INTERNAL MEDICINE

## 2022-02-20 PROCEDURE — 87798 DETECT AGENT NOS DNA AMP: CPT | Performed by: PATHOLOGY

## 2022-02-20 PROCEDURE — 250N000013 HC RX MED GY IP 250 OP 250 PS 637: Performed by: HOSPITALIST

## 2022-02-20 RX ORDER — HEPARIN SODIUM (PORCINE) LOCK FLUSH IV SOLN 100 UNIT/ML 100 UNIT/ML
3 SOLUTION INTRAVENOUS ONCE
Status: COMPLETED | OUTPATIENT
Start: 2022-02-20 | End: 2022-02-20

## 2022-02-20 RX ORDER — POTASSIUM CHLORIDE 29.8 MG/ML
20 INJECTION INTRAVENOUS ONCE
Status: COMPLETED | OUTPATIENT
Start: 2022-02-20 | End: 2022-02-20

## 2022-02-20 RX ORDER — FLUCONAZOLE 100 MG/1
100 TABLET ORAL DAILY
Status: DISCONTINUED | OUTPATIENT
Start: 2022-02-20 | End: 2022-02-26 | Stop reason: HOSPADM

## 2022-02-20 RX ORDER — POTASSIUM CHLORIDE 29.8 MG/ML
20 INJECTION INTRAVENOUS
Status: COMPLETED | OUTPATIENT
Start: 2022-02-20 | End: 2022-02-20

## 2022-02-20 RX ORDER — HEPARIN SODIUM (PORCINE) LOCK FLUSH IV SOLN 100 UNIT/ML 100 UNIT/ML
3 SOLUTION INTRAVENOUS ONCE
Status: CANCELLED | OUTPATIENT
Start: 2022-02-20 | End: 2022-02-20

## 2022-02-20 RX ORDER — MAGNESIUM SULFATE HEPTAHYDRATE 40 MG/ML
4 INJECTION, SOLUTION INTRAVENOUS ONCE
Status: COMPLETED | OUTPATIENT
Start: 2022-02-20 | End: 2022-02-20

## 2022-02-20 RX ORDER — FUROSEMIDE 10 MG/ML
40 INJECTION INTRAMUSCULAR; INTRAVENOUS ONCE
Status: COMPLETED | OUTPATIENT
Start: 2022-02-20 | End: 2022-02-20

## 2022-02-20 RX ADMIN — FILGRASTIM 780 MCG: 480 INJECTION, SOLUTION INTRAVENOUS; SUBCUTANEOUS at 08:30

## 2022-02-20 RX ADMIN — ALLOPURINOL 300 MG: 300 TABLET ORAL at 08:38

## 2022-02-20 RX ADMIN — QUETIAPINE FUMARATE 25 MG: 25 TABLET ORAL at 20:17

## 2022-02-20 RX ADMIN — SUCRALFATE 1 G: 1 SUSPENSION ORAL at 16:25

## 2022-02-20 RX ADMIN — UMECLIDINIUM 1 PUFF: 62.5 AEROSOL, POWDER ORAL at 07:50

## 2022-02-20 RX ADMIN — OXYCODONE HYDROCHLORIDE 20 MG: 10 TABLET ORAL at 16:26

## 2022-02-20 RX ADMIN — SODIUM CHLORIDE, PRESERVATIVE FREE 3 ML: 5 INJECTION INTRAVENOUS at 14:53

## 2022-02-20 RX ADMIN — POTASSIUM CHLORIDE 20 MEQ: 29.8 INJECTION, SOLUTION INTRAVENOUS at 06:06

## 2022-02-20 RX ADMIN — CALCIUM CARBONATE (ANTACID) CHEW TAB 500 MG 500 MG: 500 CHEW TAB at 12:46

## 2022-02-20 RX ADMIN — APIXABAN 10 MG: 5 TABLET, FILM COATED ORAL at 20:18

## 2022-02-20 RX ADMIN — SUCRALFATE 1 G: 1 SUSPENSION ORAL at 12:18

## 2022-02-20 RX ADMIN — FUROSEMIDE 40 MG: 10 INJECTION, SOLUTION INTRAVENOUS at 14:17

## 2022-02-20 RX ADMIN — FLUTICASONE FUROATE AND VILANTEROL TRIFENATATE 1 PUFF: 100; 25 POWDER RESPIRATORY (INHALATION) at 07:50

## 2022-02-20 RX ADMIN — BUPRENORPHINE AND NALOXONE 1 FILM: 8; 2 FILM, SOLUBLE BUCCAL; SUBLINGUAL at 20:18

## 2022-02-20 RX ADMIN — ANTICOAGULANT CITRATE DEXTROSE SOLUTION FORMULA A: 12.25; 11; 3.65 SOLUTION INTRAVENOUS at 09:22

## 2022-02-20 RX ADMIN — PIPERACILLIN AND TAZOBACTAM 3.38 G: 3; .375 INJECTION, POWDER, LYOPHILIZED, FOR SOLUTION INTRAVENOUS at 00:49

## 2022-02-20 RX ADMIN — BUPRENORPHINE AND NALOXONE 1 FILM: 8; 2 FILM, SOLUBLE BUCCAL; SUBLINGUAL at 07:50

## 2022-02-20 RX ADMIN — ATORVASTATIN CALCIUM 40 MG: 20 TABLET, FILM COATED ORAL at 20:18

## 2022-02-20 RX ADMIN — PIPERACILLIN AND TAZOBACTAM 3.38 G: 3; .375 INJECTION, POWDER, LYOPHILIZED, FOR SOLUTION INTRAVENOUS at 18:53

## 2022-02-20 RX ADMIN — POTASSIUM CHLORIDE 20 MEQ: 29.8 INJECTION, SOLUTION INTRAVENOUS at 14:17

## 2022-02-20 RX ADMIN — PIPERACILLIN AND TAZOBACTAM 3.38 G: 3; .375 INJECTION, POWDER, LYOPHILIZED, FOR SOLUTION INTRAVENOUS at 06:34

## 2022-02-20 RX ADMIN — PANTOPRAZOLE SODIUM 40 MG: 40 TABLET, DELAYED RELEASE ORAL at 07:50

## 2022-02-20 RX ADMIN — Medication 5 ML: at 03:55

## 2022-02-20 RX ADMIN — METHOCARBAMOL 500 MG: 500 TABLET ORAL at 03:47

## 2022-02-20 RX ADMIN — LORATADINE 10 MG: 10 TABLET ORAL at 07:51

## 2022-02-20 RX ADMIN — PIPERACILLIN AND TAZOBACTAM 3.38 G: 3; .375 INJECTION, POWDER, LYOPHILIZED, FOR SOLUTION INTRAVENOUS at 12:18

## 2022-02-20 RX ADMIN — POTASSIUM CHLORIDE 20 MEQ: 29.8 INJECTION, SOLUTION INTRAVENOUS at 06:58

## 2022-02-20 RX ADMIN — ACYCLOVIR 800 MG: 800 TABLET ORAL at 08:38

## 2022-02-20 RX ADMIN — VENLAFAXINE 75 MG: 75 TABLET ORAL at 07:51

## 2022-02-20 RX ADMIN — AMLODIPINE BESYLATE 5 MG: 5 TABLET ORAL at 07:50

## 2022-02-20 RX ADMIN — ACETAMINOPHEN 650 MG: 325 TABLET, FILM COATED ORAL at 00:56

## 2022-02-20 RX ADMIN — FLUCONAZOLE 100 MG: 100 TABLET ORAL at 08:42

## 2022-02-20 RX ADMIN — VENLAFAXINE 75 MG: 75 TABLET ORAL at 18:53

## 2022-02-20 RX ADMIN — Medication 5 ML: at 00:49

## 2022-02-20 RX ADMIN — VENLAFAXINE 75 MG: 75 TABLET ORAL at 12:18

## 2022-02-20 RX ADMIN — BUPRENORPHINE AND NALOXONE 1 FILM: 8; 2 FILM, SOLUBLE BUCCAL; SUBLINGUAL at 14:17

## 2022-02-20 RX ADMIN — SUCRALFATE 1 G: 1 SUSPENSION ORAL at 20:18

## 2022-02-20 RX ADMIN — LOPERAMIDE HYDROCHLORIDE 2 MG: 2 CAPSULE ORAL at 06:55

## 2022-02-20 RX ADMIN — ACYCLOVIR 800 MG: 800 TABLET ORAL at 20:18

## 2022-02-20 RX ADMIN — OXYCODONE HYDROCHLORIDE 10 MG: 10 TABLET ORAL at 00:57

## 2022-02-20 RX ADMIN — MAGNESIUM SULFATE IN WATER 4 G: 40 INJECTION, SOLUTION INTRAVENOUS at 20:18

## 2022-02-20 RX ADMIN — DEXTROSE MONOHYDRATE 20 ML: 50 INJECTION, SOLUTION INTRAVENOUS at 08:30

## 2022-02-20 RX ADMIN — METHOCARBAMOL 500 MG: 500 TABLET ORAL at 14:17

## 2022-02-20 RX ADMIN — ONDANSETRON 4 MG: 2 INJECTION INTRAMUSCULAR; INTRAVENOUS at 03:46

## 2022-02-20 RX ADMIN — Medication 5 ML: at 03:47

## 2022-02-20 RX ADMIN — ONDANSETRON 4 MG: 4 TABLET, ORALLY DISINTEGRATING ORAL at 09:46

## 2022-02-20 RX ADMIN — METOPROLOL SUCCINATE 25 MG: 25 TABLET, EXTENDED RELEASE ORAL at 07:50

## 2022-02-20 RX ADMIN — METHOCARBAMOL 500 MG: 500 TABLET ORAL at 20:36

## 2022-02-20 RX ADMIN — APIXABAN 10 MG: 5 TABLET, FILM COATED ORAL at 08:38

## 2022-02-20 RX ADMIN — GUAIFENESIN AND DEXTROMETHORPHAN HYDROBROMIDE 1 TABLET: 600; 30 TABLET, EXTENDED RELEASE ORAL at 22:10

## 2022-02-20 RX ADMIN — POTASSIUM CHLORIDE 20 MEQ: 29.8 INJECTION, SOLUTION INTRAVENOUS at 22:10

## 2022-02-20 RX ADMIN — CALCIUM CARBONATE (ANTACID) CHEW TAB 500 MG 500 MG: 500 CHEW TAB at 04:03

## 2022-02-20 RX ADMIN — OXYCODONE HYDROCHLORIDE 20 MG: 10 TABLET ORAL at 08:42

## 2022-02-20 RX ADMIN — GUAIFENESIN AND DEXTROMETHORPHAN HYDROBROMIDE 1 TABLET: 600; 30 TABLET, EXTENDED RELEASE ORAL at 00:49

## 2022-02-20 RX ADMIN — LOPERAMIDE HYDROCHLORIDE 2 MG: 2 CAPSULE ORAL at 16:26

## 2022-02-20 RX ADMIN — LEVOTHYROXINE SODIUM 112 MCG: 0.11 TABLET ORAL at 07:50

## 2022-02-20 RX ADMIN — SUCRALFATE 1 G: 1 SUSPENSION ORAL at 07:50

## 2022-02-20 ASSESSMENT — ACTIVITIES OF DAILY LIVING (ADL)
ADLS_ACUITY_SCORE: 21
ADLS_ACUITY_SCORE: 19
ADLS_ACUITY_SCORE: 21
ADLS_ACUITY_SCORE: 19
ADLS_ACUITY_SCORE: 21
ADLS_ACUITY_SCORE: 19
ADLS_ACUITY_SCORE: 21
ADLS_ACUITY_SCORE: 21
ADLS_ACUITY_SCORE: 19
ADLS_ACUITY_SCORE: 19
ADLS_ACUITY_SCORE: 21
ADLS_ACUITY_SCORE: 19
ADLS_ACUITY_SCORE: 21

## 2022-02-20 NOTE — PROVIDER NOTIFICATION
5C. 5419. C.S. Pt temp 100.3 prior to blood administration. Do you want me to give blood still? Thanks, Sarita #[id number]    Addendum: Provider ordered blood cultures,UA/UC, restarted Zosyn, and to administer blood.

## 2022-02-20 NOTE — CODE/RAPID RESPONSE
Rapid Response Team Note    Assessment   In assessment a rapid response was called on Libby Grimaldo due to SIRS/Sepsis trigger. This presentation is likely due to apheresis and worsened by The presentation is worsened by: and multiple myeloma .     Plan   -  Lactic acid elevated at 2.5, suspect recent apheresis contributing.  She is currently on Zosyn for neutropenic fevers.  Blood cultures have been negative.    -Of note, patient fluid overloaded.  S/p Lasix 40mg IV   -Consider repeat dose of Lasix this evening.   -Repeat lactic acid in 4 hours  -  The Hematology/Oncology primary team was able to be reached and they are in agreement with the above plan.  -  Disposition: The patient will remain on the current unit. We will continue to monitor this patient closely.  -  Reassessment and plan follow-up will be performed by the primary team      Antonia Angeles PA-C  [facility] RRT [facility] Job Code Contact #[phone]    Hospital Course   Brief Summary of events leading to rapid response:   Rapid response called for lactic acid of 2.5, drawn for sepsis triggers of leukocytosis and tachypnea.     She states her breathing is improving since receiving Lasix.  She denies any other complaints.       Admission Diagnosis:   Neutropenic fever (H) [D70.9, R50.81]     Physical Exam   Temp: 98.5  F (36.9  C) Temp  Min: 98  F (36.7  C)  Max: 99.6  F (37.6  C)  Resp: 22 Resp  Min: 16  Max: 22  SpO2: 97 % SpO2  Min: 82 %  Max: 100 %  Pulse: 85 Pulse  Min: 78  Max: 92    No data recorded  BP: (!) 171/71 Systolic (24hrs), Av , Min:122 , Max:173   Diastolic (24hrs), Av, Min:49, Max:102     I/Os: I/O last 3 completed shifts:  In: 2632 [P.O.:340; I.V.:]  Out: 2875 [Urine:1975; Other:900]     Exam:   GENERAL: Alert and oriented x 3. NAD. Ambulatory. Cooperative.   HEENT: Anicteric sclera. Mucous membranes moist. NC. AT.   CV: RRR. S1, S2. No murmurs appreciated.   RESPIRATORY: Tachypneic on 2Ls. Lungs with rales in bilateral bases.  "  GI: Abdomen soft and non distended with normoactive bowel sounds present in all quadrants. No tenderness, rebound, guarding. No lesions.   NEUROLOGICAL: No focal deficits. Moves all extremities.  CN 2-12 grossly intact.  EXTREMITIES: No peripheral edema. Intact bilateral pedal pulses.   SKIN: No jaundice. No rashes.        Significant Results and Procedures   Lactic Acid:   Recent Labs   Lab Test 02/20/22  1451 02/15/22  0410 02/13/22  2045   LACT 2.5* 1.1 0.8     CBC:   Recent Labs   Lab Test 02/20/22  0354 02/19/22  1616 02/19/22  0310 02/18/22  1642 02/18/22  0415   WBC 15.0*  --  15.7*  --  7.9   HGB 8.5*  --  7.1*  --  7.4*   HCT 26.1*  --  22.5*  --  23.4*   PLT 55* 63* 77*   < > 68*    < > = values in this interval not displayed.        Sepsis Evaluation   The patient is not known to have an infection.  Libby Grimaldo meets SIRS criteria AND has a lactate >2 or other evidence of acute organ damage.  These vital signs, lab and physical exam findings constitute a diagnosis of SEVERE SEPSIS.    Sepsis Time-Zero (time severe sepsis diagnosis confirmed): 1512 02/20/22 as this was the time when Lactate resulted, and the level was > 2.0     Anti-infectives (From now, onward)    Start     Dose/Rate Route Frequency Ordered Stop    02/20/22 0800  fluconazole (DIFLUCAN) tablet 100 mg         100 mg Oral DAILY 02/20/22 0730      02/20/22 0000  piperacillin-tazobactam (ZOSYN) 3.375 g vial to attach to  mL bag        Note to Pharmacy: For [facility], [facility] and [facility]: For Zosyn-naive patients, use the \"Zosyn initial dose + extended infusion\" order panel.    3.375 g  over 30 Minutes Intravenous EVERY 6 HOURS 02/19/22 2319      02/10/22 2200  acyclovir (ZOVIRAX) tablet 800 mg         800 mg Oral EVERY 12 HOURS 02/10/22 1931          Current antibiotic coverage is appropriate for source of infection.    3 Hour Severe Sepsis Bundle Completion:  1. Initial Lactic Acid result shown above. Repeat lactic acid in 4 hours  2. Blood " Cultures before Antibiotics: Yes  3. Broad Spectrum Antibiotics Administered: yes  4. Fluids: Fluid bolus not indicated due to: CHF & Pulmonary Edema

## 2022-02-20 NOTE — PLAN OF CARE
AVSS, with exception of hypertension, given lasix 40mg with good response and did bring BP down. KCl replaced. Given tums x1. Oxy given x2, no further doses available today. Robaxin given x1. Less back pain compared to yesterday. Complained of sore throat but declined lozenges. One loose stool, given imodium x1. Had apheresis stem cell collection this morning and was more fatigued with it. At the end of collection pt quite SOB even at rest, with increased work of breathing. Breathing appears more comfortable after lasix but still requiring 2L O2. Pt alert and oriented but continues to be forgetful with short attention span and often forgets what she's doing mid task while also being familiar with her medications and what they're for (but no idea when she took them). Up with assist of 1 to bedside commode. Up in chair for breakfast briefly otherwise in bed all day and refused PT this afternoon.    Pt's sister states that she is concerned about pt going home with her  because often Libby is helping her  and she feels unsure how he will be able to help her. Appointment tomorrow at 1000 for her  to come in to do PT/OT discharge education.    Problem: Adult Inpatient Plan of Care  Goal: Plan of Care Review  Outcome: Ongoing, Not Progressing  Flowsheets (Taken 2/20/2022 1750)  Plan of Care Reviewed With: patient  Overall Patient Progress: declining  Goal: Absence of Hospital-Acquired Illness or Injury  Intervention: Identify and Manage Fall Risk  Recent Flowsheet Documentation  Taken 2/20/2022 0744 by Gilda Begum, RN  Safety Promotion/Fall Prevention:   activity supervised   bed alarm on   clutter free environment maintained   fall prevention program maintained   nonskid shoes/slippers when out of bed   patient and family education  Intervention: Prevent Skin Injury  Recent Flowsheet Documentation  Taken 2/20/2022 0744 by Gilda Begum, RN  Body Position: position changed independently  Intervention:  Prevent and Manage VTE (Venous Thromboembolism) Risk  Recent Flowsheet Documentation  Taken 2/20/2022 0744 by Gilda Begum, RN  Activity Management: activity adjusted per tolerance  Goal: Optimal Comfort and Wellbeing  Outcome: Ongoing, Not Progressing   Goal Outcome Evaluation:    Plan of Care Reviewed With: patient     Overall Patient Progress: declining

## 2022-02-20 NOTE — PROGRESS NOTES
Phone call from pt's  Jerrica, updated on plan of care. Inquiring about when she will discharge,at this time no sooner than Monday. Plan for him to come in for discharge education with PT and OT on Monday at 1000.

## 2022-02-20 NOTE — PROCEDURES
Transfusion Medicine/Apheresis Procedure    Libby Grimaldo [id number]   YOB: 1950 Age: 71 years old     Procedure: Autologous hematopoietic progenitor cell (HPC) collection          Assessment and Plan:     Ms. Grimaldo is a 71-year-old female who is an inpatient and undergoing autologous HPC collection for treatment of her multiple myeloma.  A central line was placed and is being used for access for the procedure.  One unit PRBC given overnight, as she was approaching a Hgb of 7g/dL which could be problematic for apheresis procedures.  She is tolerating the collection today.  No signs/symptoms of hypocalcemia secondary to citrate-based anticoagulation.  No other concerns/complaints voiced.  Day #1 collection yield was 2.17M CD34+ cells/kg.  This will be the last day of collection per BMT.  Her WBC is 15.0, and her CD34+ cell enumeration (pre-collection, peripheral blood) is pending.   Continue with plan as per BMT.         Chief Complaint:     Transfusion medicine consultation.         History of Present Illness:     From initial apheresis consult:  Ms. Grimaldo is a 71-year-old female with a past medical history significant for HTN, HLD,  febrile seizures in her youth (once as a toddler, once in high school, and once in college, none since that time),  pulmonary fibrosis, COPD, sepsis secondary to pneumonia, and recent-onset of new 'floaters' in her eyes, double vision, and possible vertigo, and multiple myeloma (please see Dr. Julio C Guillory's 12/22/2021 note for more detail regarding her myeloma history). Additionally, she reported many previous neck surgeries and continued radiculopathies (worse in her LEFT upper and lower extremities) along with back pain. When asked, she stated that reclining in a hospital bed for several hours would pose no problem for her. With regard to her previous pulmonary conditions, she reported that she no longer requires any supplemental oxygen. Additionally, Ms. Grimaldo has  received both shots in the Pfizer COVID vaccination series along with a booster shot. She reported a recent flu vaccination. Ms. Grimaldo had no significant travel history nor did she have any identifiable risk factors for infectious disease. Avila Thompson RN mentioned that Ms. Grimaldo had a cadaver tendon utilized as part of the previous eye surgery, however, this does not seem to pose an infectious risk. The procedure and its concomitant risks, benefits, and alternative(s) were discussed with the patient and all of her questions were addressed at the time of our visit.         Past Medical History:     Past Medical History:   Diagnosis Date     Cervical radiculopathy      COPD (chronic obstructive pulmonary disease) (H)      Double vision      Febrile seizure (H)     Per patient. Once while a toddler, once while in highschool, and once while in college.     Floaters      Graves disease      HLD (hyperlipidemia)      HTN (hypertension)      IPF (idiopathic pulmonary fibrosis) (H)      Lumbar radiculopathy      Multiple myeloma in relapse (H)      Osteoporosis      Pneumonia     Per patient. Complicated by sepsis.     Recurrent UTI     Per patient. Has required prophylactic antibiotcs.     Vitamin B12 deficiency (non anemic)           Past Surgical History:     Past Surgical History:   Procedure Laterality Date     BONE MARROW BIOPSY, BONE SPECIMEN, NEEDLE/TROCAR Right 1/24/2022    Procedure: BIOPSY, BONE MARROW;  Surgeon: Yuniel Tineo;  Location: [facility] OR     CERVICAL FUSION       CHOLECYSTECTOMY       CYSTECTOMY OVARIAN BENIGN       EYE SURGERY      Per patient.     IR CVC TUNNEL PLACEMENT > 5 YRS OF AGE  2/1/2022     TONSILLECTOMY       WRIST SURGERY Left             Social History:     Social History     Tobacco Use     Smoking status: Former Smoker     Packs/day: 1.00     Years: 40.00     Pack years: 40.00     Smokeless tobacco: Former User     Quit date: 2010   Substance Use Topics     Alcohol use: Not  "Currently     Drug use: Not Currently     Types: Marijuana   Denied ever receiving a tattoo.           Family History:     Family History   Problem Relation Age of Onset     Heart Disease Mother      Cancer Mother         \"Around lungs\" - she explicitly said it was *not* lung cancer.     Diabetes Father      Heart Disease Father           Immunizations:     She reported receiving both doses of the two-dose series Pfizer COVID-19 vaccination along with a booster dose.  She reported receiving the 9608-5732 influenza vaccination.         Allergies:     Allergies   Allergen Reactions     Bupropion      Other reaction(s): Seizures     Ms. Grimaldo said she did not have a seizure from this medication but remembered 'feeling weird'.         Medications:     Current Facility-Administered Medications   Medication     acetaminophen (TYLENOL) tablet 325 mg     acetaminophen (TYLENOL) tablet 650 mg     acyclovir (ZOVIRAX) tablet 800 mg     albuterol (PROVENTIL) neb solution 2.5 mg     allopurinol (ZYLOPRIM) tablet 300 mg     amLODIPine (NORVASC) tablet 5 mg     apixaban ANTICOAGULANT (ELIQUIS) tablet 10 mg     [START ON 2/24/2022] apixaban ANTICOAGULANT (ELIQUIS) tablet 5 mg     atorvastatin (LIPITOR) tablet 40 mg     benzocaine-menthol (CEPACOL EXTRA STRENGTH) 15-2.6 MG lozenge 1 lozenge     buprenorphine HCl-naloxone HCl (SUBOXONE) 8-2 MG per film 1 Film     calcium carbonate (TUMS) chewable tablet 500 mg     dextromethorphan-guaiFENesin (MUCINEX DM)  MG per 12 hr tablet 1 tablet     fluconazole (DIFLUCAN) tablet 100 mg     fluticasone-vilanterol (BREO ELLIPTA) 100-25 MCG/INH inhaler 1 puff     furosemide (LASIX) injection 40 mg     haloperidol lactate (HALDOL) injection 2 mg     heparin 100 UNIT/ML injection 5-10 mL     heparin lock flush 10 UNIT/ML injection 5-10 mL     heparin lock flush 10 UNIT/ML injection 5-10 mL     heparin lock flush 10 UNIT/ML injection 5-20 mL     heparin lock flush 10 UNIT/ML injection 5-20 mL "     hydrALAZINE (APRESOLINE) injection 10 mg     levothyroxine (SYNTHROID/LEVOTHROID) tablet 112 mcg     loperamide (IMODIUM) capsule 2 mg     loratadine (CLARITIN) tablet 10 mg     methocarbamol (ROBAXIN) tablet 500 mg     metoprolol succinate ER (TOPROL-XL) 24 hr tablet 25 mg     ondansetron (ZOFRAN-ODT) ODT tab 4 mg    Or     ondansetron (ZOFRAN) injection 4 mg     oxyCODONE IR (ROXICODONE) tablet 10-20 mg     pantoprazole (PROTONIX) EC tablet 40 mg     piperacillin-tazobactam (ZOSYN) 3.375 g vial to attach to  mL bag     [Held by provider] potassium & sodium phosphates (NEUTRA-PHOS) Packet 1 packet     prochlorperazine (COMPAZINE) injection 5 mg    Or     prochlorperazine (COMPAZINE) tablet 5 mg    Or     prochlorperazine (COMPAZINE) suppository 12.5 mg     QUEtiapine (SEROquel) tablet 25 mg     sennosides (SENOKOT) tablet 8.6 mg     sodium chloride (PF) 0.9% PF flush 10-20 mL     sodium chloride (PF) 0.9% PF flush 10-20 mL     sodium chloride (PF) 0.9% PF flush 10-20 mL     sodium chloride (PF) 0.9% PF flush 10-20 mL     sodium chloride (PF) 0.9% PF flush 3 mL     sucralfate (CARAFATE) suspension 1 g     umeclidinium (INCRUSE ELLIPTA) 62.5 MCG/INH inhaler 1 puff     venlafaxine (EFFEXOR) tablet 75 mg            Vital Signs:     Vitals:    02/20/22 0130 02/20/22 0406 02/20/22 0744 02/20/22 0900   BP: (!) 146/67 137/69 (!) 145/68 133/78   BP Location:  Left arm Left arm    Pulse: 78 81 82 82   Resp: 18 18 18 16   Temp: 98  F (36.7  C) 98  F (36.7  C) 99  F (37.2  C) 99  F (37.2  C)   TempSrc:  Oral Oral Oral   SpO2: 96% 96% 96%    Weight:   67 kg (147 lb 12.8 oz)    Height:                Data:     BMP  Recent Labs   Lab 02/20/22  0354 02/19/22  0310 02/18/22  0612 02/18/22  0533 02/18/22  0415 02/17/22  0757 02/17/22  0453 02/17/22  0304    138  --   --  140  --   --  141   POTASSIUM 3.3* 3.9  --   --  3.5 3.7  --  3.1*   CHLORIDE 102 109  --   --  109  --   --  109   DIANDRA 9.3 6.6*  --   --  6.4*  --    --  6.8*   CO2 31 24  --   --  23  --   --  26   BUN 7 6*  --   --  5*  --   --  5*   CR 0.86 0.93  --   --  0.85  --   --  0.82   GLC 87 78 79 70 68*  --    < > 73    < > = values in this interval not displayed.     CBC  Recent Labs   Lab 02/20/22  0354 02/19/22  1616 02/19/22  0310 02/18/22  1642 02/18/22  0415 02/17/22  1648 02/17/22  0304   WBC 15.0*  --  15.7*  --  7.9  --  3.7*   RBC 2.76*  --  2.30*  --  2.38*  --  2.43*   HGB 8.5*  --  7.1*  --  7.4*  --  7.5*   HCT 26.1*  --  22.5*  --  23.4*  --  23.4*   MCV 95  --  98  --  98  --  96   MCH 30.8  --  30.9  --  31.1  --  30.9   MCHC 32.6  --  31.6  --  31.6  --  32.1   RDW 14.6  --  15.1*  --  14.9  --  14.6   PLT 55* 63* 77* 82* 68*   < > 60*    < > = values in this interval not displayed.     INRNo lab results found in last 7 days.    Attestation:  During the collection the patient was directly seen and evaluated by me, Robert Hdez M.D..    Robert Hdez M.D.  Professor, Transfusion Medicine  Laboratory Medicine & Pathology  Pager: [phone]

## 2022-02-20 NOTE — PROGRESS NOTES
Spoke with Dr. Hernandez who ordered for 1unit PRBC to be given this evening. Last Hgb 7.1 and patient will have PBSCT collection tomorrow AM.

## 2022-02-20 NOTE — PROVIDER NOTIFICATION
Paged Dr. Soriano:  lactic acid triggered and was 2.5. pt with increased work of breathing, had given lasix at 1430 and pt is already looking better, RRT recommends possibly repeating lasix this evening.

## 2022-02-20 NOTE — PROGRESS NOTES
"BMT Daily Progress Note      ID: Libby Grimaldo is a 72 yo woman D+21 s/p Cytoxan chemo priming prior to planned auto PSBCT for MM; 2nd day collections today.      INTERVAL  HISTORY   Libby is on 2L supplemental O2 as she desats to 80s when off; likely 2/2 FVO. No cough. Mild intermittent nausea but eating ok. Throat more sore again today. Temp to 100.3 and was diaphoretic; Zosyn resumed.     Review of Systems:    8 point ROS negative except as above.    PHYSICAL EXAM      KPS:  70  Blood pressure 133/78, pulse 82, temperature 99  F (37.2  C), temperature source Oral, resp. rate 16, height 1.626 m (5' 4\"), weight 67 kg (147 lb 12.8 oz), SpO2 96 %.    Physical Exam  Constitutional:       General: She is not in acute distress.  HENT:      Head: Normocephalic and atraumatic.      Comments: alopecia     Nose: No rhinorrhea.   Eyes:      Conjunctiva/sclera: Conjunctivae normal.      Comments: Bilateral exophthalmos   Cardiovascular:      Rate and Rhythm: Normal rate and regular rhythm.   Pulmonary:      Breath sounds: Normal breath sounds.      Comments: Diminished bases with rales R>L  Abdominal:      General: There is no distension.      Palpations: There is no mass.      Tenderness: There is abdominal tenderness. There is no guarding.   Musculoskeletal:      Right lower leg: No edema.      Left lower leg: No edema.   Skin:     General: Skin is warm and dry.      Coloration: Skin is pale. Skin is not jaundiced.   Neurological:      Mental Status: She is alert. Mental status is at baseline.      Motor: Weakness present.      Coordination: Coordination abnormal.   Psychiatric:         Mood and Affect: Mood normal.      Comments: Low attention span       Labs:  Lab Results   Component Value Date    WBC 15.0 (H) 02/20/2022    ANEU 13.8 (H) 02/20/2022    HGB 8.5 (L) 02/20/2022    HCT 26.1 (L) 02/20/2022    PLT 55 (L) 02/20/2022     02/20/2022    POTASSIUM 3.3 (L) 02/20/2022    CHLORIDE 102 02/20/2022    CO2 31 " 02/20/2022    GLC 87 02/20/2022    BUN 7 02/20/2022    CR 0.86 02/20/2022    MAG 1.5 (L) 02/20/2022    INR 1.29 (H) 02/11/2022    BILITOTAL 0.7 02/14/2022    AST 19 02/14/2022    ALT 14 02/14/2022    ALKPHOS 119 02/14/2022    PROTTOTAL 4.1 (L) 02/14/2022    ALBUMIN 1.6 (L) 02/14/2022       I have assessed all abnormal lab values for their clinical significance and any values considered clinically significant have been addressed in the assessment and plan.          SYSTEMS-BASED ASSESSMENT AND PLAN   Libby Grimaldo is a 70 yo woman D+21 s/p Cytoxan chemo priming prior to planned auto PSBCT for MM        1. BMT  - BMT MD/Coordinator: Dr. Julio C Guillory/Rashad Chen  - Cytoxan 2/1  - Plan for allopurinol 300mg daily for at least 7 days.   - GCSF 10 mcg/kg started day +5 (2/5/22), continue through stem cell collections-last dose 2/20. Moved GCSF to morning to see if fevers follow suit.  - s/p Mozobil 2/18, 2/19  - collected 2.17 x10^6 CD34/kg after 1st day; 2nd/final day collections today  - cell dose goal 5 x10^6 CD34/kg    2. HEME/COAG  - Transfusion parameters: hgb <7g and plts <50 (anticoag). Plts 55k. Repeat 1600 today after apheresis and transfuse if needed; cont Eliquis.  - anemia, thrombocytopenia secondary to chemo; plts likely lower d/t apheresis as well  - mild leukocytosis 2/2 GCSF/Mozobil  - 2/15: new non-occlusive LUE DVT. Initially on heparin gtt; now on eliquis 10mg BID x7 days (2/17-2/23). Then start 5mg PO BID (2/24-onward).     3. ID: Tmax 100.3; Zosyn resumed 2/19. Cont. UA neg. Blood cx NTD.  - Chest CT (2/16)-- fluid overload but no focus of infection   - Neutropenic fevers, initially attributed to strep throat.  She was on cefepime 2/11-2/13; Zosyn through 2/17; resumed 2/19 as above.  - 2/15: Serum PCR:  HSV, CMV, HHV6, EBV  All negative.    - Recent hx of urinary tract infection s/p antibiotics.      #Prophy: ACV, fluconazole.       4. GI:   - constipation secondary to narcotics: senna bid prn  -  Protonix 40mg/d; prn Tums. Scheduled carafate.  - Anti-emetics:  prn zofran, prn compazine     5. RENAL/FEN  - hypophosphatemia: recent hx; now phos 4.9. Hold Kphos packet x2/15.   - hypokalemia: resolved. Replete prn per sliding scale.   - FVO: Lasix 40mg IV today after apheresis today; repeat BMP and phos at 1800     6. Endo: hypothyroidism secondary to therapy for Graves Disease- continue levothyroxine     7. CARDS: On metoprolol and norvasc for hypertension.  Prn hydralazine.   Hyperlipidemia: Lipitor   ASA: hold x2/9 with thrombocytopenia     8. Neuro: Continue effexor   - Hx of multiple febrile seizures. Last occurred during college.      9. Pulm:   #mild intermittent hypoxia thought 2/2 FVO; diuresis as above. Add incentive spirometer.  #COPD- remains on daily fluticasone and albuterol prn.      10. Cancer related pain: Remains on buprenorphine-naloxone and prn oxycodone. Oxycodone order changed to 10-20mg tid prn with a max of 50mg per 24 hours (previous max was 30mg in 24 hours, but she is having legitimate increase in headaches and bone pain with GCSF). Claritin for GCSF-related pain exacerbation.     11. Musculoskeletal: history of frequent falls at home. PT/OT following.      12. Neuro: question of illness related delirium vs. unmasking of underlying dementia.  Patient has family history of dementia is grandmother, paternal uncle and father.  Requested OT to do MOCA testing 2/14.  Head CT showed mild to moderate changes of chronic small vessel ischemia.  MS improved. Plan to retest MOCA outpatient in clinic once she is no longer acutely ill. (Note: it was 11 this admission.)    I spent 40 minutes in the care of this patient today, which included time necessary for preparation for the visit, obtaining history, ordering medications/tests/procedures as medically indicated, review of pertinent medical literature, counseling of the patient, communication of recommendations to the care team, and documentation  time.    Dispo: possible discharge next 1-2 days pending fevers, caregiver status.    Stephie Ayers PA-C  [phone]

## 2022-02-20 NOTE — PROGRESS NOTES
Night 2 crosscover:     Paged by nursing to notify that patient with temperature of 100.3F around 11:15pm, felt diaphoretic and subjectively feverish. Rechecked temp a little later as patient due for blood transfusion and repeat temp 98.5F. Ok to start blood, per signout will go ahead and restart zosyn and ordered blood culture and UA per signout given subjective fever symptoms with measured temperature and no confounding issues noted like heating packs etc.     Codi Riggs MD  [phone]

## 2022-02-20 NOTE — PLAN OF CARE
AVSS with exception of mild hypertension on 2L NC throughout the day.  Temp max 100.1. Trialed removing O2 a few times but desats into 80s on room air. Pt with increased SOB on exertion. 20mg lasix given with good response. Stem cell collection done by apheresis nurse at bedside, will collect again tomorrow, will get a second dose of mozobil tonight. 2g Mg replaced. Oxy given x2, no further doses available today but does have a dose of robaxin available. Pt has also liked an ice pack on her back today. No complaints of sore throat today, ate a hard boiled egg and 1/2 a sandwich, sipping at ginger ale, 7 up, and water. Port and CVC heparin locked. Pt agreeable to CHG wipes, change of clothes, and teeth brushed this afternoon and was very gracious for it. Pt encouraged to do these things daily. Pt oriented x4 but continues to make illogical statements at times and forgets what she's doing in the middle of the task ie in the middle of putting her sweatshirt on she got distracted and forgot to finish putting her sweatshirt on.    Problem: Adult Inpatient Plan of Care  Goal: Plan of Care Review  Outcome: Ongoing, Not Progressing  Flowsheets (Taken 2/19/2022 1803)  Plan of Care Reviewed With:   patient   sibling  Overall Patient Progress: no change  Goal: Absence of Hospital-Acquired Illness or Injury  Intervention: Identify and Manage Fall Risk  Recent Flowsheet Documentation  Taken 2/19/2022 0830 by Gilda Begum, RN  Safety Promotion/Fall Prevention:   activity supervised   bed alarm on   clutter free environment maintained   fall prevention program maintained   increased rounding and observation   increase visualization of patient   lighting adjusted   mobility aid in reach   nonskid shoes/slippers when out of bed   patient and family education  Intervention: Prevent Skin Injury  Recent Flowsheet Documentation  Taken 2/19/2022 0830 by Gilda Begum, RN  Body Position: position changed independently  Intervention:  Prevent and Manage VTE (Venous Thromboembolism) Risk  Recent Flowsheet Documentation  Taken 2/19/2022 0830 by Gilda Begum, RN  Activity Management:   activity adjusted per tolerance   activity encouraged   up ad german  Goal: Optimal Comfort and Wellbeing  Outcome: Ongoing, Not Progressing   Goal Outcome Evaluation:    Plan of Care Reviewed With: patient, sibling     Overall Patient Progress: no change

## 2022-02-20 NOTE — PLAN OF CARE
VSS on 2L O2 ex HTN. Given PRN Hydralazine x1.T Max 100.3. Patient diaphoretic and reported feeling feverish. Provider ordered blood cultures, UA/UC, and restarted Zosyn. A&Ox4 but forgetful and makes illogical statements at times. A1 and refused GB. BA on for safety. Voiding spontaneously and no BM this shift. Complained of generalized pain and given PRN Tylenol x2, Oxycodone x1, and Robaxin x1. Given PRN Zofran x1 for nausea. Administered 1 unit PRBC overnight as ordered by provider. Plan to have PBSCT collection this AM. Potassium replaced this AM with recheck scheduled for 0900. Will continue to monitor.    Problem: Adult Inpatient Plan of Care  Goal: Absence of Hospital-Acquired Illness or Injury  Intervention: Identify and Manage Fall Risk  Recent Flowsheet Documentation  Taken 2/19/2022 2000 by Sarita Aragon, RN  Safety Promotion/Fall Prevention:    activity supervised    bed alarm on    clutter free environment maintained    fall prevention program maintained    nonskid shoes/slippers when out of bed    patient and family education     Goal Outcome Evaluation:    Plan of Care Reviewed With: patient, sibling     Overall Patient Progress: no change

## 2022-02-21 ENCOUNTER — APPOINTMENT (OUTPATIENT)
Dept: PHYSICAL THERAPY | Facility: CLINIC | Age: 72
DRG: 152 | End: 2022-02-21
Payer: MEDICARE

## 2022-02-21 ENCOUNTER — APPOINTMENT (OUTPATIENT)
Dept: OCCUPATIONAL THERAPY | Facility: CLINIC | Age: 72
DRG: 152 | End: 2022-02-21
Payer: MEDICARE

## 2022-02-21 LAB
ALBUMIN SERPL-MCNC: 2.2 G/DL (ref 3.4–5)
ALP SERPL-CCNC: 171 U/L (ref 40–150)
ALT SERPL W P-5'-P-CCNC: 14 U/L (ref 0–50)
ANION GAP SERPL CALCULATED.3IONS-SCNC: 4 MMOL/L (ref 3–14)
ANION GAP SERPL CALCULATED.3IONS-SCNC: 6 MMOL/L (ref 3–14)
AST SERPL W P-5'-P-CCNC: 20 U/L (ref 0–45)
BACTERIA BLD CULT: NO GROWTH
BACTERIA BLD CULT: NO GROWTH
BASOPHILS # BLD MANUAL: 0 10E3/UL (ref 0–0.2)
BASOPHILS NFR BLD MANUAL: 0 %
BILIRUB DIRECT SERPL-MCNC: 0.4 MG/DL (ref 0–0.2)
BILIRUB SERPL-MCNC: 0.8 MG/DL (ref 0.2–1.3)
BUN SERPL-MCNC: 8 MG/DL (ref 7–30)
BUN SERPL-MCNC: 8 MG/DL (ref 7–30)
CALCIUM SERPL-MCNC: 10.6 MG/DL (ref 8.5–10.1)
CALCIUM SERPL-MCNC: 8.9 MG/DL (ref 8.5–10.1)
CD34 ABSOLUTE COUNT COMMENT: NORMAL
CD34 CELLS # SPEC: 10 CELLS/UL
CD34 CELLS NFR SPEC: 0.21 %
CHLORIDE BLD-SCNC: 98 MMOL/L (ref 94–109)
CHLORIDE BLD-SCNC: 98 MMOL/L (ref 94–109)
CO2 SERPL-SCNC: 34 MMOL/L (ref 20–32)
CO2 SERPL-SCNC: 35 MMOL/L (ref 20–32)
CREAT SERPL-MCNC: 0.88 MG/DL (ref 0.52–1.04)
CREAT SERPL-MCNC: 0.91 MG/DL (ref 0.52–1.04)
EOSINOPHIL # BLD MANUAL: 0 10E3/UL (ref 0–0.7)
EOSINOPHIL NFR BLD MANUAL: 0 %
ERYTHROCYTE [DISTWIDTH] IN BLOOD BY AUTOMATED COUNT: 14.8 % (ref 10–15)
GFR SERPL CREATININE-BSD FRML MDRD: 68 ML/MIN/1.73M2
GFR SERPL CREATININE-BSD FRML MDRD: 71 ML/MIN/1.73M2
GLUCOSE BLD-MCNC: 77 MG/DL (ref 70–99)
GLUCOSE BLD-MCNC: 95 MG/DL (ref 70–99)
HCT VFR BLD AUTO: 25.2 % (ref 35–47)
HGB BLD-MCNC: 8.3 G/DL (ref 11.7–15.7)
LYMPHOCYTES # BLD MANUAL: 0.2 10E3/UL (ref 0.8–5.3)
LYMPHOCYTES NFR BLD MANUAL: 1 %
MAGNESIUM SERPL-MCNC: 2.1 MG/DL (ref 1.8–2.6)
MCH RBC QN AUTO: 31.1 PG (ref 26.5–33)
MCHC RBC AUTO-ENTMCNC: 32.9 G/DL (ref 31.5–36.5)
MCV RBC AUTO: 94 FL (ref 78–100)
MONOCYTES # BLD MANUAL: 0.6 10E3/UL (ref 0–1.3)
MONOCYTES NFR BLD MANUAL: 3 %
MYELOCYTES # BLD MANUAL: 0.2 10E3/UL
MYELOCYTES NFR BLD MANUAL: 1 %
NEUTROPHILS # BLD MANUAL: 18.1 10E3/UL (ref 1.6–8.3)
NEUTROPHILS NFR BLD MANUAL: 95 %
PHOSPHATE SERPL-MCNC: 5.5 MG/DL (ref 2.5–4.5)
PLAT MORPH BLD: ABNORMAL
PLATELET # BLD AUTO: 56 10E3/UL (ref 150–450)
PLATELET # BLD AUTO: 74 10E3/UL (ref 150–450)
POTASSIUM BLD-SCNC: 3.2 MMOL/L (ref 3.4–5.3)
POTASSIUM BLD-SCNC: 3.8 MMOL/L (ref 3.4–5.3)
PRODUCT NUMBER FLOW CYTOMETRY: NORMAL
PROT SERPL-MCNC: 4.8 G/DL (ref 6.8–8.8)
RBC # BLD AUTO: 2.67 10E6/UL (ref 3.8–5.2)
RBC MORPH BLD: ABNORMAL
SODIUM SERPL-SCNC: 137 MMOL/L (ref 133–144)
SODIUM SERPL-SCNC: 138 MMOL/L (ref 133–144)
VIABLE CD34 CELLS NFR FLD: 95.73 %
WBC # BLD AUTO: 19.1 10E3/UL (ref 4–11)

## 2022-02-21 PROCEDURE — 250N000013 HC RX MED GY IP 250 OP 250 PS 637: Performed by: PHYSICIAN ASSISTANT

## 2022-02-21 PROCEDURE — 206N000001 HC R&B BMT UMMC

## 2022-02-21 PROCEDURE — 97530 THERAPEUTIC ACTIVITIES: CPT | Mod: GP | Performed by: REHABILITATION PRACTITIONER

## 2022-02-21 PROCEDURE — 250N000011 HC RX IP 250 OP 636: Performed by: INTERNAL MEDICINE

## 2022-02-21 PROCEDURE — 250N000013 HC RX MED GY IP 250 OP 250 PS 637: Performed by: NURSE PRACTITIONER

## 2022-02-21 PROCEDURE — 97530 THERAPEUTIC ACTIVITIES: CPT | Mod: GO

## 2022-02-21 PROCEDURE — 97116 GAIT TRAINING THERAPY: CPT | Mod: GP | Performed by: REHABILITATION PRACTITIONER

## 2022-02-21 PROCEDURE — 83735 ASSAY OF MAGNESIUM: CPT | Performed by: INTERNAL MEDICINE

## 2022-02-21 PROCEDURE — 250N000013 HC RX MED GY IP 250 OP 250 PS 637: Performed by: INTERNAL MEDICINE

## 2022-02-21 PROCEDURE — 97535 SELF CARE MNGMENT TRAINING: CPT | Mod: GO

## 2022-02-21 PROCEDURE — 250N000011 HC RX IP 250 OP 636: Performed by: PHYSICIAN ASSISTANT

## 2022-02-21 PROCEDURE — 85049 AUTOMATED PLATELET COUNT: CPT | Performed by: PHYSICIAN ASSISTANT

## 2022-02-21 PROCEDURE — 250N000013 HC RX MED GY IP 250 OP 250 PS 637: Performed by: STUDENT IN AN ORGANIZED HEALTH CARE EDUCATION/TRAINING PROGRAM

## 2022-02-21 PROCEDURE — 99233 SBSQ HOSP IP/OBS HIGH 50: CPT | Performed by: INTERNAL MEDICINE

## 2022-02-21 PROCEDURE — 80053 COMPREHEN METABOLIC PANEL: CPT | Performed by: STUDENT IN AN ORGANIZED HEALTH CARE EDUCATION/TRAINING PROGRAM

## 2022-02-21 PROCEDURE — 82248 BILIRUBIN DIRECT: CPT | Performed by: STUDENT IN AN ORGANIZED HEALTH CARE EDUCATION/TRAINING PROGRAM

## 2022-02-21 PROCEDURE — 85027 COMPLETE CBC AUTOMATED: CPT | Performed by: STUDENT IN AN ORGANIZED HEALTH CARE EDUCATION/TRAINING PROGRAM

## 2022-02-21 PROCEDURE — 84100 ASSAY OF PHOSPHORUS: CPT | Performed by: INTERNAL MEDICINE

## 2022-02-21 RX ORDER — POTASSIUM CHLORIDE 750 MG/1
40 TABLET, EXTENDED RELEASE ORAL ONCE
Status: COMPLETED | OUTPATIENT
Start: 2022-02-21 | End: 2022-02-21

## 2022-02-21 RX ORDER — POTASSIUM CHLORIDE 29.8 MG/ML
20 INJECTION INTRAVENOUS ONCE
Status: COMPLETED | OUTPATIENT
Start: 2022-02-21 | End: 2022-02-21

## 2022-02-21 RX ORDER — FUROSEMIDE 10 MG/ML
40 INJECTION INTRAMUSCULAR; INTRAVENOUS ONCE
Status: COMPLETED | OUTPATIENT
Start: 2022-02-21 | End: 2022-02-21

## 2022-02-21 RX ADMIN — APIXABAN 10 MG: 5 TABLET, FILM COATED ORAL at 08:45

## 2022-02-21 RX ADMIN — LORATADINE 10 MG: 10 TABLET ORAL at 08:45

## 2022-02-21 RX ADMIN — SODIUM CHLORIDE, PRESERVATIVE FREE 5 ML: 5 INJECTION INTRAVENOUS at 17:28

## 2022-02-21 RX ADMIN — PIPERACILLIN AND TAZOBACTAM 3.38 G: 3; .375 INJECTION, POWDER, LYOPHILIZED, FOR SOLUTION INTRAVENOUS at 00:38

## 2022-02-21 RX ADMIN — BUPRENORPHINE AND NALOXONE 1 FILM: 8; 2 FILM, SOLUBLE BUCCAL; SUBLINGUAL at 19:35

## 2022-02-21 RX ADMIN — APIXABAN 10 MG: 5 TABLET, FILM COATED ORAL at 19:35

## 2022-02-21 RX ADMIN — FLUTICASONE FUROATE AND VILANTEROL TRIFENATATE 1 PUFF: 100; 25 POWDER RESPIRATORY (INHALATION) at 08:46

## 2022-02-21 RX ADMIN — QUETIAPINE FUMARATE 25 MG: 25 TABLET ORAL at 21:40

## 2022-02-21 RX ADMIN — AMLODIPINE BESYLATE 5 MG: 5 TABLET ORAL at 08:45

## 2022-02-21 RX ADMIN — OXYCODONE HYDROCHLORIDE 10 MG: 10 TABLET ORAL at 00:19

## 2022-02-21 RX ADMIN — SUCRALFATE 1 G: 1 SUSPENSION ORAL at 08:44

## 2022-02-21 RX ADMIN — VENLAFAXINE 75 MG: 75 TABLET ORAL at 08:45

## 2022-02-21 RX ADMIN — SUCRALFATE 1 G: 1 SUSPENSION ORAL at 21:40

## 2022-02-21 RX ADMIN — LOPERAMIDE HYDROCHLORIDE 2 MG: 2 CAPSULE ORAL at 14:22

## 2022-02-21 RX ADMIN — FLUCONAZOLE 100 MG: 100 TABLET ORAL at 08:45

## 2022-02-21 RX ADMIN — VENLAFAXINE 75 MG: 75 TABLET ORAL at 17:34

## 2022-02-21 RX ADMIN — ALLOPURINOL 300 MG: 300 TABLET ORAL at 08:45

## 2022-02-21 RX ADMIN — SUCRALFATE 1 G: 1 SUSPENSION ORAL at 12:51

## 2022-02-21 RX ADMIN — LEVOTHYROXINE SODIUM 112 MCG: 0.11 TABLET ORAL at 08:45

## 2022-02-21 RX ADMIN — OXYCODONE HYDROCHLORIDE 10 MG: 10 TABLET ORAL at 09:41

## 2022-02-21 RX ADMIN — BUPRENORPHINE AND NALOXONE 1 FILM: 8; 2 FILM, SOLUBLE BUCCAL; SUBLINGUAL at 08:45

## 2022-02-21 RX ADMIN — ACYCLOVIR 800 MG: 800 TABLET ORAL at 19:35

## 2022-02-21 RX ADMIN — METOPROLOL SUCCINATE 25 MG: 25 TABLET, EXTENDED RELEASE ORAL at 08:45

## 2022-02-21 RX ADMIN — POTASSIUM CHLORIDE 40 MEQ: 750 TABLET, EXTENDED RELEASE ORAL at 20:32

## 2022-02-21 RX ADMIN — METHOCARBAMOL 500 MG: 500 TABLET ORAL at 12:54

## 2022-02-21 RX ADMIN — BUPRENORPHINE AND NALOXONE 1 FILM: 8; 2 FILM, SOLUBLE BUCCAL; SUBLINGUAL at 14:22

## 2022-02-21 RX ADMIN — ACYCLOVIR 800 MG: 800 TABLET ORAL at 08:45

## 2022-02-21 RX ADMIN — PANTOPRAZOLE SODIUM 40 MG: 40 TABLET, DELAYED RELEASE ORAL at 08:45

## 2022-02-21 RX ADMIN — OXYCODONE HYDROCHLORIDE 10 MG: 10 TABLET ORAL at 17:34

## 2022-02-21 RX ADMIN — CALCIUM CARBONATE (ANTACID) CHEW TAB 500 MG 500 MG: 500 CHEW TAB at 12:56

## 2022-02-21 RX ADMIN — ATORVASTATIN CALCIUM 40 MG: 20 TABLET, FILM COATED ORAL at 19:35

## 2022-02-21 RX ADMIN — SUCRALFATE 1 G: 1 SUSPENSION ORAL at 16:25

## 2022-02-21 RX ADMIN — PIPERACILLIN AND TAZOBACTAM 3.38 G: 3; .375 INJECTION, POWDER, LYOPHILIZED, FOR SOLUTION INTRAVENOUS at 06:30

## 2022-02-21 RX ADMIN — POTASSIUM CHLORIDE 20 MEQ: 29.8 INJECTION, SOLUTION INTRAVENOUS at 04:49

## 2022-02-21 RX ADMIN — UMECLIDINIUM 1 PUFF: 62.5 AEROSOL, POWDER ORAL at 08:46

## 2022-02-21 RX ADMIN — VENLAFAXINE 75 MG: 75 TABLET ORAL at 12:51

## 2022-02-21 RX ADMIN — FUROSEMIDE 40 MG: 10 INJECTION, SOLUTION INTRAVENOUS at 08:59

## 2022-02-21 ASSESSMENT — ACTIVITIES OF DAILY LIVING (ADL)
ADLS_ACUITY_SCORE: 19
ADLS_ACUITY_SCORE: 19
ADLS_ACUITY_SCORE: 20
ADLS_ACUITY_SCORE: 19
ADLS_ACUITY_SCORE: 20
ADLS_ACUITY_SCORE: 19
ADLS_ACUITY_SCORE: 20
ADLS_ACUITY_SCORE: 19
ADLS_ACUITY_SCORE: 20
ADLS_ACUITY_SCORE: 20
ADLS_ACUITY_SCORE: 19
ADLS_ACUITY_SCORE: 19

## 2022-02-21 NOTE — PROGRESS NOTES
"BMT Daily Progress Note      ID: Libby Grimaldo is a 72 yo woman D+22 s/p Cytoxan chemo priming prior to planned auto PSBCT for MM; s/p 2 days apheresis.      INTERVAL  HISTORY   Remains volume up with some associated hypoxia. Better after Lasix yesterday afternoon. Mild nausea, no emesis. Some loose stools. Throat pain improved. No fevers.     Review of Systems:    8 point ROS negative except as above.    PHYSICAL EXAM      KPS:  70  Blood pressure (!) 152/81, pulse 95, temperature 98.6  F (37  C), temperature source Oral, resp. rate 16, height 1.626 m (5' 4\"), weight 65.4 kg (144 lb 2.9 oz), SpO2 96 %.    Physical Exam  Constitutional:       General: She is not in acute distress.  HENT:      Head: Normocephalic and atraumatic.      Comments: alopecia     Nose: No rhinorrhea.   Eyes:      Conjunctiva/sclera: Conjunctivae normal.      Comments: Bilateral exophthalmos   Cardiovascular:      Rate and Rhythm: Normal rate and regular rhythm.   Pulmonary:      Breath sounds: Normal breath sounds.      Comments: Diminished bases with rales R>L  Abdominal:      General: There is no distension.      Palpations: There is no mass.      Tenderness: There is abdominal tenderness. There is no guarding.   Musculoskeletal:      Right lower leg: No edema.      Left lower leg: No edema.   Skin:     General: Skin is warm and dry.      Coloration: Skin is pale. Skin is not jaundiced.   Neurological:      Mental Status: She is alert. Mental status is at baseline.      Motor: Weakness present.      Coordination: Coordination abnormal.   Psychiatric:         Mood and Affect: Mood normal.      Comments: Low attention span       Labs:  Lab Results   Component Value Date    WBC 19.1 (H) 02/21/2022    ANEU 18.1 (H) 02/21/2022    HGB 8.3 (L) 02/21/2022    HCT 25.2 (L) 02/21/2022    PLT 56 (L) 02/21/2022     02/21/2022    POTASSIUM 3.8 02/21/2022    CHLORIDE 98 02/21/2022    CO2 34 (H) 02/21/2022    GLC 77 02/21/2022    BUN 8 02/21/2022    " CR 0.88 02/21/2022    MAG 2.1 02/21/2022    INR 1.29 (H) 02/11/2022    BILITOTAL 0.8 02/21/2022    AST 20 02/21/2022    ALT 14 02/21/2022    ALKPHOS 171 (H) 02/21/2022    PROTTOTAL 4.8 (L) 02/21/2022    ALBUMIN 2.2 (L) 02/21/2022       I have assessed all abnormal lab values for their clinical significance and any values considered clinically significant have been addressed in the assessment and plan.          SYSTEMS-BASED ASSESSMENT AND PLAN   Libby Grimaldo is a 70 yo woman D+22 s/p Cytoxan chemo priming prior to planned auto PSBCT for MM        1. BMT  - BMT MD/Coordinator: Dr. Julio C Guillory/Rashad Chen  - Cytoxan 2/1  - Plan for allopurinol 300mg daily for at least 7 days.   - GCSF 10 mcg/kg started day +5 (2/5/22), continue through stem cell collections-last dose 2/20. Moved GCSF to morning to see if fevers follow suit.  - s/p Mozobil 2/18, 2/19  - collected 3.94 x10^6 CD34/kg after 2 days (goal 5).  Allowing time for recovery post chemo-priming before proceeding with transplant.  CVC to be removed 2/22 d/t dvt, infection risk, etc (has port a cath). Will place PICC for transplant.    2. HEME/COAG  - Transfusion parameters: hgb <7g and plts <50 (anticoag). Check plts bid.   - anemia, thrombocytopenia secondary to chemo; plts likely lower d/t apheresis as well  - mild leukocytosis 2/2 GCSF/Mozobil  - 2/15: new non-occlusive LUE DVT. Initially on heparin gtt; now on eliquis 10mg BID x7 days (2/17-2/23). Then start 5mg PO BID (2/24-onward).     3. ID: Afebrile. Stop empiric Zosyn and monitor. UA neg. Blood cx NTD.  - Chest CT (2/16)-- fluid overload but no focus of infection   - Neutropenic fevers, initially attributed to strep throat.  She was on cefepime 2/11-2/13; Zosyn through 2/17; resumed 2/19 as above.  - 2/15: Serum PCR:  HSV, CMV, HHV6, EBV  All negative.    - Recent hx of urinary tract infection s/p antibiotics.      #Prophy: ACV, fluconazole.       4. GI:   - constipation secondary to narcotics: senna  bid prn  - Protonix 40mg/d; prn Tums. Scheduled carafate.  - Anti-emetics:  prn zofran, prn compazine     5. RENAL/FEN  - mild hyperphos (recent hypophos): holding KPhos; monitor.    Replete lytes prn per sliding scale.   #FVO: Lasix 40mg IV today; repeat BMP 1600; consider repeating diuresis this afternoon.  #Moderate malnutrition in the context of acute on chronic illness      6. Endo: hypothyroidism secondary to therapy for Graves Disease- continue levothyroxine     7. CARDS: On metoprolol and norvasc for hypertension.  Prn hydralazine.   Hyperlipidemia: Lipitor   ASA: hold x2/9 with thrombocytopenia     8. Neuro: Continue effexor   - Hx of multiple febrile seizures. Last occurred during college.      9. Pulm:   #mild intermittent hypoxia thought 2/2 FVO; diuresis as above. Add incentive spirometer. Wean O2 as able.  #COPD- remains on daily fluticasone and albuterol prn.      10. Cancer related pain: Remains on buprenorphine-naloxone and prn oxycodone. Oxycodone order changed to 10-20mg tid prn with a max of 50mg per 24 hours (previous max was 30mg in 24 hours, but she is having legitimate increase in headaches and bone pain with GCSF).      11. Musculoskeletal: history of frequent falls at home. PT/OT following.      12. Neuro: question of illness related delirium vs. unmasking of underlying dementia.  Patient has family history of dementia is grandmother, paternal uncle and father.  Requested OT to do MOCA testing 2/14.  Head CT showed mild to moderate changes of chronic small vessel ischemia.  MS improved. Plan to retest MOCA outpatient in clinic once she is no longer acutely ill. (Note: it was 11 this admission.)    I spent 60 minutes in the care of this patient today, which included time necessary for preparation for the visit, obtaining history, ordering medications/tests/procedures as medically indicated, review of pertinent medical literature, counseling of the patient, communication of recommendations to  the care team, and documentation time.    Dispo: possible discharge next 1-2 days pending fevers, caregiver status.    Stephie Ayers PA-C  [phone]

## 2022-02-21 NOTE — CONSULTS
"    Interventional Radiology Consult Service Note    Patient is on IR schedule Tuesday 2/22 for a left CVC tunneled line removal. Patient has a history of MM with a left large bore line in place for autologous transplant. She has a left internal jugular clot associated with line line. Team requesting removal. She has a well functioning right port. Plan to discharge and get a PICC for remainder of transplant. Concern also for cares on TCVC on outpatient side. Right port to stay in place.     Labs WNL for procedure.  No NPO required.  Medications to be held include: None  Consent will be done prior to procedure.     Please contact the IR [place] RN [place] [phone] for estimated time of procedure.     Case discussed with Stephie Ayers PA-C    Vitals:   BP (!) 152/81 (BP Location: Right arm)   Pulse 95   Temp 98.6  F (37  C) (Oral)   Resp 16   Ht 1.626 m (5' 4\")   Wt 65.4 kg (144 lb 2.9 oz)   SpO2 96%   BMI 24.75 kg/m      Pertinent Labs:     Lab Results   Component Value Date    WBC 19.1 (H) 02/21/2022    WBC 15.0 (H) 02/20/2022    WBC 15.7 (H) 02/19/2022       Lab Results   Component Value Date    HGB 8.3 02/21/2022    HGB 8.5 02/20/2022    HGB 7.1 02/19/2022       Lab Results   Component Value Date    PLT 56 02/21/2022    PLT 57 02/20/2022    PLT 55 02/20/2022       Lab Results   Component Value Date    INR 1.29 (H) 02/11/2022    PTT 33 02/11/2022       Kirsten Calles PA-C  Interventional Radiology  Pager: [phone]    "

## 2022-02-21 NOTE — PLAN OF CARE
VSS on 2L O2 ex HTN. A&Ox4 but forgetful and makes illogical statements at times. A1 and refused GB. BA on for safety. Voiding spontaneously with good UOP and BM x1 this shift. Reports SOB has improved. Given PRN Mucinex x1 for cough. Complained of generalized pain and given PRN Oxycodone x1 and Robaxin x1. Potassium replaced this AM. Will continue to monitor.      Problem: Pain Acute  Goal: Acceptable Pain Control and Functional Ability  Outcome: Ongoing, Progressing  Intervention: Develop Pain Management Plan  Recent Flowsheet Documentation  Taken 2/20/2022 2029 by Sarita Aragon, RN  Pain Management Interventions: medication (see MAR)  Intervention: Prevent or Manage Pain  Recent Flowsheet Documentation  Taken 2/20/2022 2000 by Sarita Aragon RN  Medication Review/Management: medications reviewed         Goal Outcome Evaluation:    Plan of Care Reviewed With: patient     Overall Patient Progress: no change

## 2022-02-21 NOTE — PROGRESS NOTES
BMT CLINICAL SOCIAL WORK NOTE:    Focus: Discharge Planning    Data: Pt is a 71 year old female admitted post cytoxan priming prior to her planned auto BMT.    Interventions: Clinical  (CSW) met with Pt to assess coping, provide supportive counseling and assist with resources as needed. CSW discussed with the pt the recommendations for TCU placement. CSW reviewed the reason for the recommendations and goals of TCU placement. The pt expressed that her desire is to return home and does not want to consider TCU. She is open to considering HC support at home. The pt shared that her , son and sister are all close by and able to help as needed.  CSW provided empathic listening, validation of concerns, and encouragement. CSW encouraged Pt to contact CSW for support, questions and/or resources.     Assessment: Pt presented as engaged and open to conversation.  Pt appears to be coping well at this time. Pt continues to be supported by her  and sister.     Plan: CSW will continue to provide supportive counseling and assistance with resources as needed. CSW will continue to collaborate with multidisciplinary team regarding Pt's plan of care.     ANDREW Bangura, [facility]  Pager: [phone]  Phone: [phone]

## 2022-02-21 NOTE — PLAN OF CARE
"Care Coordination  Discharge Planning     Line Company:  NA - central line will be removed on 2/22 and pt will only have port-a-cath, which can be managed in clinic  Referral made for line care:  No (previously was with [facility]; will cancel pt with [facility] on 2/22 following CVC removal)  IV medications needed at discharge:  None     Pharmacy concerns:  None. (Of note, [facility] requires prior auth)     PT/OT/therapies recommended:  Home PT/OT/SW through [facility] [phone]  Referral made for PT/OT/therapies:  No - will need new order. Em will send new Home Care referral/order at discharge, indicating Home Care to start on 3/2/22    [place] location:  [place]  Has placement need been communicated to Social Work?  NA    NC Teaching time arranged with patient/caregiver:  Spoke with pt on 2/21 to discuss home care needs (pt is agreeable to the plan above) and line removal. Pt is aware that Home Care will be calling her to arrange for first home visit date/time.  Notify nurse to schedule line care class/ DM teaching, prior to d/c:  NA - see above    Caregiver:  Tres \"Jerrica\" Linda () [phone]    Leisa Grimaldo (sister) [phone]      Kiara Stewart, RN, BSN  RN Care Coordinator - BMT   [phone]  Pg [phone]   "

## 2022-02-21 NOTE — PLAN OF CARE
"BP (!) 143/76 (BP Location: Left arm)   Pulse 97   Temp 99.1  F (37.3  C) (Oral)   Resp 16   Ht 1.626 m (5' 4\")   Wt 65.4 kg (144 lb 2.9 oz)   SpO2 96%   BMI 24.75 kg/m    VSS, AOx4 with short term forgetfulness, up with SBA to commode at bedside.  Received Oxycodone 10mg x2 for back and side pain today, Robaxin x1 as well as scheduled pain meds.  Currently needing 1L oxygen to maintain greater than 88% O2 sats.  Bed alarm on for safety at all times.  Continue POC.  Problem: Adult Inpatient Plan of Care  Goal: Plan of Care Review  Outcome: Ongoing, Not Progressing  Flowsheets (Taken 2/21/2022 1751)  Plan of Care Reviewed With: patient  Goal: Optimal Comfort and Wellbeing  Outcome: Ongoing, Not Progressing  Goal: Readiness for Transition of Care  Outcome: Ongoing, Not Progressing     Problem: Pain Acute  Goal: Acceptable Pain Control and Functional Ability  Outcome: Ongoing, Not Progressing   Goal Outcome Evaluation:    Plan of Care Reviewed With: patient     Overall Patient Progress: no change               "

## 2022-02-22 ENCOUNTER — APPOINTMENT (OUTPATIENT)
Dept: INTERVENTIONAL RADIOLOGY/VASCULAR | Facility: CLINIC | Age: 72
DRG: 152 | End: 2022-02-22
Attending: PHYSICIAN ASSISTANT
Payer: MEDICARE

## 2022-02-22 LAB
ANION GAP SERPL CALCULATED.3IONS-SCNC: 4 MMOL/L (ref 3–14)
BACTERIA BLD CULT: NO GROWTH
BASOPHILS # BLD MANUAL: 0.1 10E3/UL (ref 0–0.2)
BASOPHILS NFR BLD MANUAL: 1 %
BUN SERPL-MCNC: 7 MG/DL (ref 7–30)
CALCIUM SERPL-MCNC: 8.5 MG/DL (ref 8.5–10.1)
CHLORIDE BLD-SCNC: 101 MMOL/L (ref 94–109)
CO2 SERPL-SCNC: 34 MMOL/L (ref 20–32)
CREAT SERPL-MCNC: 0.94 MG/DL (ref 0.52–1.04)
DONOR CYTOMEGALOVIRUS ABY: POSITIVE
DONOR HEP B CORE ABY: NORMAL
DONOR HEP B SURF AGN: NORMAL
DONOR HEPATITIS C ABY: NORMAL
DONOR HTLV 1&2 ANTIBODY: NORMAL
DONOR TREPONEMA PAL ABY: NORMAL
EOSINOPHIL # BLD MANUAL: 0 10E3/UL (ref 0–0.7)
EOSINOPHIL NFR BLD MANUAL: 0 %
ERYTHROCYTE [DISTWIDTH] IN BLOOD BY AUTOMATED COUNT: 14.4 % (ref 10–15)
GFR SERPL CREATININE-BSD FRML MDRD: 65 ML/MIN/1.73M2
GLUCOSE BLD-MCNC: 76 MG/DL (ref 70–99)
HBV DNA SERPL QL NAA+PROBE: NORMAL
HCT VFR BLD AUTO: 23.9 % (ref 35–47)
HCV RNA SERPL QL NAA+PROBE: NORMAL
HGB BLD-MCNC: 7.6 G/DL (ref 11.7–15.7)
HIV1+2 AB SERPL QL IA: NORMAL
HIV1+2 RNA SERPL QL NAA+PROBE: NORMAL
LYMPHOCYTES # BLD MANUAL: 0.1 10E3/UL (ref 0.8–5.3)
LYMPHOCYTES NFR BLD MANUAL: 1 %
MAGNESIUM SERPL-MCNC: 1.6 MG/DL (ref 1.8–2.6)
MCH RBC QN AUTO: 30.5 PG (ref 26.5–33)
MCHC RBC AUTO-ENTMCNC: 31.8 G/DL (ref 31.5–36.5)
MCV RBC AUTO: 96 FL (ref 78–100)
MONOCYTES # BLD MANUAL: 0.8 10E3/UL (ref 0–1.3)
MONOCYTES NFR BLD MANUAL: 6 %
MYELOCYTES # BLD MANUAL: 0.1 10E3/UL
MYELOCYTES NFR BLD MANUAL: 1 %
NEUTROPHILS # BLD MANUAL: 12.6 10E3/UL (ref 1.6–8.3)
NEUTROPHILS NFR BLD MANUAL: 91 %
PHOSPHATE SERPL-MCNC: 4.6 MG/DL (ref 2.5–4.5)
PLAT MORPH BLD: ABNORMAL
PLATELET # BLD AUTO: 84 10E3/UL (ref 150–450)
PLATELET # BLD AUTO: 89 10E3/UL (ref 150–450)
POTASSIUM BLD-SCNC: 3.5 MMOL/L (ref 3.4–5.3)
POTASSIUM BLD-SCNC: 3.8 MMOL/L (ref 3.4–5.3)
RBC # BLD AUTO: 2.49 10E6/UL (ref 3.8–5.2)
RBC MORPH BLD: ABNORMAL
SODIUM SERPL-SCNC: 139 MMOL/L (ref 133–144)
TRYPANOSOMA CRUZI: NORMAL
WBC # BLD AUTO: 13.9 10E3/UL (ref 4–11)
WNV RNA SERPL DONR QL NAA+PROBE: NORMAL

## 2022-02-22 PROCEDURE — 250N000011 HC RX IP 250 OP 636: Performed by: PHYSICIAN ASSISTANT

## 2022-02-22 PROCEDURE — 250N000013 HC RX MED GY IP 250 OP 250 PS 637: Performed by: PHYSICIAN ASSISTANT

## 2022-02-22 PROCEDURE — 0JPTXXZ REMOVAL OF TUNNELED VASCULAR ACCESS DEVICE FROM TRUNK SUBCUTANEOUS TISSUE AND FASCIA, EXTERNAL APPROACH: ICD-10-PCS | Performed by: PHYSICIAN ASSISTANT

## 2022-02-22 PROCEDURE — 84100 ASSAY OF PHOSPHORUS: CPT | Performed by: PHYSICIAN ASSISTANT

## 2022-02-22 PROCEDURE — 84132 ASSAY OF SERUM POTASSIUM: CPT | Performed by: STUDENT IN AN ORGANIZED HEALTH CARE EDUCATION/TRAINING PROGRAM

## 2022-02-22 PROCEDURE — 250N000013 HC RX MED GY IP 250 OP 250 PS 637: Performed by: INTERNAL MEDICINE

## 2022-02-22 PROCEDURE — 83735 ASSAY OF MAGNESIUM: CPT | Performed by: INTERNAL MEDICINE

## 2022-02-22 PROCEDURE — 36589 REMOVAL TUNNELED CV CATH: CPT

## 2022-02-22 PROCEDURE — 85049 AUTOMATED PLATELET COUNT: CPT | Performed by: PHYSICIAN ASSISTANT

## 2022-02-22 PROCEDURE — 250N000011 HC RX IP 250 OP 636: Performed by: INTERNAL MEDICINE

## 2022-02-22 PROCEDURE — 84132 ASSAY OF SERUM POTASSIUM: CPT | Performed by: INTERNAL MEDICINE

## 2022-02-22 PROCEDURE — 36589 REMOVAL TUNNELED CV CATH: CPT | Performed by: PHYSICIAN ASSISTANT

## 2022-02-22 PROCEDURE — 85027 COMPLETE CBC AUTOMATED: CPT | Performed by: STUDENT IN AN ORGANIZED HEALTH CARE EDUCATION/TRAINING PROGRAM

## 2022-02-22 PROCEDURE — 02PYX3Z REMOVAL OF INFUSION DEVICE FROM GREAT VESSEL, EXTERNAL APPROACH: ICD-10-PCS | Performed by: PHYSICIAN ASSISTANT

## 2022-02-22 PROCEDURE — 206N000001 HC R&B BMT UMMC

## 2022-02-22 PROCEDURE — 250N000013 HC RX MED GY IP 250 OP 250 PS 637: Performed by: NURSE PRACTITIONER

## 2022-02-22 PROCEDURE — 250N000013 HC RX MED GY IP 250 OP 250 PS 637: Performed by: STUDENT IN AN ORGANIZED HEALTH CARE EDUCATION/TRAINING PROGRAM

## 2022-02-22 RX ORDER — OXYMETAZOLINE HYDROCHLORIDE 0.05 G/100ML
2 SPRAY NASAL 4 TIMES DAILY PRN
Status: DISCONTINUED | OUTPATIENT
Start: 2022-02-22 | End: 2022-02-26 | Stop reason: HOSPADM

## 2022-02-22 RX ORDER — MAGNESIUM SULFATE HEPTAHYDRATE 40 MG/ML
2 INJECTION, SOLUTION INTRAVENOUS ONCE
Status: COMPLETED | OUTPATIENT
Start: 2022-02-22 | End: 2022-02-22

## 2022-02-22 RX ORDER — FUROSEMIDE 10 MG/ML
20 INJECTION INTRAMUSCULAR; INTRAVENOUS ONCE
Status: COMPLETED | OUTPATIENT
Start: 2022-02-22 | End: 2022-02-22

## 2022-02-22 RX ORDER — POTASSIUM CHLORIDE 750 MG/1
20 TABLET, EXTENDED RELEASE ORAL ONCE
Status: COMPLETED | OUTPATIENT
Start: 2022-02-22 | End: 2022-02-22

## 2022-02-22 RX ADMIN — FLUCONAZOLE 100 MG: 100 TABLET ORAL at 08:30

## 2022-02-22 RX ADMIN — MAGNESIUM SULFATE IN WATER 2 G: 40 INJECTION, SOLUTION INTRAVENOUS at 06:56

## 2022-02-22 RX ADMIN — VENLAFAXINE 75 MG: 75 TABLET ORAL at 08:30

## 2022-02-22 RX ADMIN — OXYCODONE HYDROCHLORIDE 10 MG: 10 TABLET ORAL at 08:43

## 2022-02-22 RX ADMIN — METHOCARBAMOL 500 MG: 500 TABLET ORAL at 11:38

## 2022-02-22 RX ADMIN — LORATADINE 10 MG: 10 TABLET ORAL at 08:30

## 2022-02-22 RX ADMIN — OXYCODONE HYDROCHLORIDE 10 MG: 10 TABLET ORAL at 13:45

## 2022-02-22 RX ADMIN — BUPRENORPHINE AND NALOXONE 1 FILM: 8; 2 FILM, SOLUBLE BUCCAL; SUBLINGUAL at 08:30

## 2022-02-22 RX ADMIN — OXYCODONE HYDROCHLORIDE 10 MG: 10 TABLET ORAL at 01:36

## 2022-02-22 RX ADMIN — PANTOPRAZOLE SODIUM 40 MG: 40 TABLET, DELAYED RELEASE ORAL at 08:30

## 2022-02-22 RX ADMIN — SODIUM CHLORIDE, PRESERVATIVE FREE 5 ML: 5 INJECTION INTRAVENOUS at 11:55

## 2022-02-22 RX ADMIN — Medication 5 ML: at 17:47

## 2022-02-22 RX ADMIN — ACYCLOVIR 800 MG: 800 TABLET ORAL at 08:30

## 2022-02-22 RX ADMIN — POTASSIUM CHLORIDE 20 MEQ: 750 TABLET, EXTENDED RELEASE ORAL at 01:33

## 2022-02-22 RX ADMIN — METOPROLOL SUCCINATE 25 MG: 25 TABLET, EXTENDED RELEASE ORAL at 08:30

## 2022-02-22 RX ADMIN — LEVOTHYROXINE SODIUM 112 MCG: 0.11 TABLET ORAL at 08:30

## 2022-02-22 RX ADMIN — APIXABAN 10 MG: 5 TABLET, FILM COATED ORAL at 08:31

## 2022-02-22 RX ADMIN — Medication 5 ML: at 03:48

## 2022-02-22 RX ADMIN — OXYCODONE HYDROCHLORIDE 10 MG: 10 TABLET ORAL at 18:02

## 2022-02-22 RX ADMIN — SUCRALFATE 1 G: 1 SUSPENSION ORAL at 08:30

## 2022-02-22 RX ADMIN — AMLODIPINE BESYLATE 5 MG: 5 TABLET ORAL at 08:30

## 2022-02-22 RX ADMIN — GUAIFENESIN AND DEXTROMETHORPHAN HYDROBROMIDE 1 TABLET: 600; 30 TABLET, EXTENDED RELEASE ORAL at 08:43

## 2022-02-22 RX ADMIN — VENLAFAXINE 75 MG: 75 TABLET ORAL at 17:46

## 2022-02-22 RX ADMIN — ATORVASTATIN CALCIUM 40 MG: 20 TABLET, FILM COATED ORAL at 19:41

## 2022-02-22 RX ADMIN — QUETIAPINE FUMARATE 25 MG: 25 TABLET ORAL at 22:49

## 2022-02-22 RX ADMIN — ACYCLOVIR 800 MG: 800 TABLET ORAL at 19:41

## 2022-02-22 RX ADMIN — BUPRENORPHINE AND NALOXONE 1 FILM: 8; 2 FILM, SOLUBLE BUCCAL; SUBLINGUAL at 15:05

## 2022-02-22 RX ADMIN — FLUTICASONE FUROATE AND VILANTEROL TRIFENATATE 1 PUFF: 100; 25 POWDER RESPIRATORY (INHALATION) at 08:30

## 2022-02-22 RX ADMIN — UMECLIDINIUM 1 PUFF: 62.5 AEROSOL, POWDER ORAL at 08:30

## 2022-02-22 RX ADMIN — VENLAFAXINE 75 MG: 75 TABLET ORAL at 11:56

## 2022-02-22 RX ADMIN — BENZOCAINE AND MENTHOL 1 LOZENGE: 15; 2.6 LOZENGE ORAL at 22:49

## 2022-02-22 RX ADMIN — SODIUM CHLORIDE, PRESERVATIVE FREE 5 ML: 5 INJECTION INTRAVENOUS at 07:58

## 2022-02-22 RX ADMIN — BUPRENORPHINE AND NALOXONE 1 FILM: 8; 2 FILM, SOLUBLE BUCCAL; SUBLINGUAL at 19:41

## 2022-02-22 RX ADMIN — ALLOPURINOL 300 MG: 300 TABLET ORAL at 08:30

## 2022-02-22 RX ADMIN — FUROSEMIDE 20 MG: 10 INJECTION, SOLUTION INTRAVENOUS at 11:50

## 2022-02-22 RX ADMIN — SUCRALFATE 1 G: 1 SUSPENSION ORAL at 22:49

## 2022-02-22 ASSESSMENT — ACTIVITIES OF DAILY LIVING (ADL)
ADLS_ACUITY_SCORE: 19
ADLS_ACUITY_SCORE: 20
ADLS_ACUITY_SCORE: 19
ADLS_ACUITY_SCORE: 20

## 2022-02-22 NOTE — PROGRESS NOTES
CLINICAL NUTRITION SERVICES - REASSESSMENT NOTE   Nutrition Prescription    RECOMMENDATIONS FOR MDs/PROVIDERS TO ORDER:  -Monitor for signs/symptoms of bezoar formation. Pt is on sucralfate QID which can increase risk of bezoar formation.    -Encourage PO.     Malnutrition Status:    Severe malnutrition in the context of acute on chronic illness    Recommendations already ordered by Registered Dietitian (RD):  -Ordered sample of Special K Protein Bar  -Discontinued scheduled Ensure Clear Apple (pt reports she does not like, although question if she is confusing with regular Ensure)    Future/Additional Recommendations:  -Monitor tolerance of Special K Protein Bar and if ok to schedule    If need for nutrition support:   Osmolite 1.5 Kevin @ goal of  50ml/hr  (1200ml/day)  will provide: 1800 kcals (29 kcal/kg), 75 g PRO (1.2 g pro/kg), 914 ml free H20, 244 g CHO, and 0 g fiber daily.  -Start at 10 mL/hr and advance by 10 mL q 8 hours to goal   --Head of bed 30 degrees with gastric feeds  --60 mL water flush q 4 hours for tube patency     If TPN becomes nutrition POC, rec:  --Use dosing weight 61.7 kg  --Begin TPN, goal volume 1320 ml/day (or per PharmD discretion) with initial 130g Dex daily (442 kcal, GIR 1.4), 95g AA daily (380 kcal), and 250 ml 20% IV lipids 2x/wk (to accommodate the lipid shortage and minimal dosing to prevent EFAD).  Micro/Rx:   --ONLY once pt receives ~100% of initial continuous PN volume with K+/Mg++/Phos WNL, advance PN dex by 50 g every 1-2 days (pending lytes/Glu and Pharm D/RD discretion) to initial goal of 230g Dex (782 kcal) to increase provisions to 1305 kcals (21 kcal/kg/day), 1.5 g PRO/kg/day, GIR 2.6 with 11% kcals from Fat.     EVALUATION OF THE PROGRESS TOWARD GOALS   Diet: High Kcal/High Protein + Ensure Clear Apple @ 2pm    Oral Intake: Usually consuming 0-50% small meals such as bean soup and cottage cheese, toast and oatmeal, hard boiled eggs per RN flowsheets over the past week.  Pt reports poor appetite at time of visit. She was thinking of ordering a lunch soon. She reports disliking Ensure drinks but is willing to try Special K protein bars.      NEW FINDINGS   -Wt trends: Pt remains above admit wt, suspect fluid-related wt gain since suboptimal nutrition since admit.  02/21/22 0818 65.4 kg (144 lb 2.9 oz)   02/20/22 0744 67 kg (147 lb 12.8 oz)   02/19/22 0732 66.6 kg (146 lb 12.8 oz)   02/18/22 0910 66 kg (145 lb 8 oz)   02/17/22 0830 65.3 kg (143 lb 14.4 oz)   02/16/22 1600 64.6 kg (142 lb 6.4 oz)   02/16/22 1008 64.7 kg (142 lb 9.6 oz)   02/15/22 0813 63 kg (138 lb 14.2 oz)   02/14/22 0748 62.9 kg (138 lb 11.2 oz)   02/13/22 1752 64.9 kg (143 lb)   02/12/22 0749 63.4 kg (139 lb 12.8 oz)   02/11/22 0849 61.7 kg (136 lb 1.6 oz)   02/10/22 1116 62.6 kg (138 lb) - admit      -Labs: Reviewed, notable for: Cr 0.94 (WNL, uptrended), K+ 3.8 (WNL, had been previously low), phos 4.6 (H, had been previously low, suspect elevated 2/2 replacement)  --Pt last received IV and enteral Kphos on 2/19.     -BMT: D+23 s/p Cytoxan chemo priming prior to planned auto PSBCT for MM    -GI: 0-3 BMs daily over the past week per I/Os.      -Renal: Lasix    -Meds & Vitamin/Mineral Supplementation: Reviewed, notable for: KCl, carafate QID    Dosing Weight: 61.7 kg (lowest admission weight)     UPDATED ASSESSED NUTRITION NEEDS   Estimated Energy Needs: 7971-2865 kcals/day (25 - 30 kcals/kg) if EN/PO, or 4101-5221 kcals/day (20 - 25 kcals/kg) if PN  Justification: Maintenance   Estimated Protein Needs: 62-74+ grams protein/day (1 - 1.2+ grams of pro/kg)   Justification: Increased needs   Estimated Fluid Needs: 1492-3934 mL/day (1 mL/kcal)   Justification: Maintenance    MALNUTRITION  % Intake: </= 50% for >/= 5 days (severe)  % Weight Loss: Difficult to assess 2/2 suspected fluid wt gain  Subcutaneous Fat Loss: Facial region:  Mild, Upper arm:  Mild and Lower arm:  Mild  Muscle Loss: Temporal:  Mild, Thoracic region  (clavicle, acromium bone, deltoid, trapezius, pectoral):  Moderate, Upper arm (bicep, tricep):  Moderate, Lower arm  (forearm):  Moderate and Dorsal hand: Moderate  Fluid Accumulation/Edema: Does not meet criteria (trace bipedal edema, indentation from socks around ankles noted on exam)  Malnutrition Diagnosis: Severe malnutrition in the context of acute on chronic illness    Previous Goals   Patient to consume 50-75% of nutritionally adequate meal trays TID, or the equivalent with supplements/snacks.  Evaluation: Not met    Previous Nutrition Diagnosis  Inadequate oral intake related to decreased appetite as evidenced by 25% intakes since admission meeting less than 50% of energy needs  Evaluation: Ongoing    CURRENT NUTRITION DIAGNOSIS  Inadequate oral intake related to decreased appetite as evidenced by pt typically consuming 0-50% small meals such as bean soup and cottage cheese, toast and oatmeal, hard boiled eggs per RN flowsheets over the past week and signs of mild subcutaneous adipose tissue loss and mild-moderated muscle wasting on physical exam.    INTERVENTIONS  Implementation  -Collaboration with other providers: Rounds  -Enteral Nutrition - Recs if needed  -Medical food supplement therapy  -Parenteral Nutrition/IV Fluids - Recs if needed  -Nutrition education: Encouraged use of protein supplements and high protein menu options.     Goals  Patient to consume % of nutritionally adequate meal trays TID, or the equivalent with supplements/snacks.    Monitoring/Evaluation  Progress toward goals will be monitored and evaluated per protocol.     Chanda Minor RD, LD  Pager: [phone]

## 2022-02-22 NOTE — CONSULTS
"    Interventional Radiology Consult Service Note    IR consult placed for thoracentesis. IR consulted due to patient being on Xarelto. Discussed with CAPS team who states they would consider thoracentesis with patient on Xarelto. Requesting team made aware to consult [facility]S procedure service for thoracentesis. If needed, please contact IR after evaluation from CAPS provider.    Vitals:   /63 (BP Location: Right arm)   Pulse 82   Temp (P) 98.6  F (37  C) (Axillary)   Resp 16   Ht 1.626 m (5' 4\")   Wt (P) 63.7 kg (140 lb 6.9 oz)   SpO2 99%   BMI (P) 24.11 kg/m      Pertinent Labs:     Lab Results   Component Value Date    WBC 13.9 (H) 02/22/2022    WBC 19.1 (H) 02/21/2022    WBC 15.0 (H) 02/20/2022       Lab Results   Component Value Date    HGB 7.6 02/22/2022    HGB 8.3 02/21/2022    HGB 8.5 02/20/2022       Lab Results   Component Value Date    PLT 84 02/22/2022    PLT 74 02/21/2022    PLT 56 02/21/2022       Lab Results   Component Value Date    INR 1.29 (H) 02/11/2022    PTT 33 02/11/2022     Discussed with Dr. Purdy and Brionna Littlejohn PA-C.    Kirsten Calles PA-C  Interventional Radiology  Pager: [phone]    "

## 2022-02-22 NOTE — PLAN OF CARE
"/72 (BP Location: Left arm)   Pulse 89   Temp 98  F (36.7  C) (Oral)   Resp 18   Ht 1.626 m (5' 4\")   Wt 65.4 kg (144 lb 2.9 oz)   SpO2 95%   BMI 24.75 kg/m      AVSS on 1 L of O2. Alert and oriented x4 but forgetful and makes illogical statements at times.  Provider held scheduled potassium phos d/t increase in phos level.  40 mEq of potassium was ordered to replace with recheck 4 hours later, resulted at 3.5. Per electrolyte replacement order, another 20 mEq of potassium was given with a recheck tomorrow AM. Received PRN 10 mg oxycodone x1 for back and side pain with good relief.  No replacements needed this AM. Magnesium infusing, recheck tomorrow. Continue plan of care.     Problem: Pain Acute  Goal: Acceptable Pain Control and Functional Ability  Outcome: Ongoing, Progressing  Intervention: Prevent or Manage Pain  Recent Flowsheet Documentation  Taken 2/21/2022 2114 by Sarahy Page, RN  Medication Review/Management: medications reviewed     Problem: Infection  Goal: Absence of Infection Signs and Symptoms  Outcome: Ongoing, Progressing  Intervention: Prevent or Manage Infection  Recent Flowsheet Documentation  Taken 2/21/2022 2114 by Sarahy Page, RN  Isolation Precautions: protective environment maintained    Goal Outcome Evaluation:    Plan of Care Reviewed With: patient     Overall Patient Progress: no change  "

## 2022-02-22 NOTE — PROGRESS NOTES
Patient has been assessed for Home Oxygen by a credentialed professional during activity/exercise and in a chronic stable state.    (Activity/Exercise should mimic patient s home activity/exercise and ADLs)     1) Resting saturation on Room Air: 87%  2) Resting saturation on O2: 92% on 1 LPM   3) Resting saturation on 4 LPM O2: NA    4) Activity/Exercise saturation on Room Air: 76% after going to the bathroom, washing hands and walking 10 feet.   5) Activity/Exercise saturation on O2: 90 on 2 LPM   6) Activity/Exercise saturation on 4 LPM O2: NA      Please notify patient s RN Care Coordinator when you ve completed this dot phrase.

## 2022-02-22 NOTE — PLAN OF CARE
Cancel, pt firmly declined.       Physical Therapy Discharge Summary  Reason for discharge:   Discharge from hospital to home scheduled for later today.  Progress towards goals: See goals on Care Plan [facility] record for goal details.  Goals partially met. CGA to min A with FWW for transfers, gait, Barriers to achieving goals: limited tolerance, fatigue, weakness  Recommendation(s):   Continued therapy is recommended. Rationale/Recommendations: TCU, however, pt plans on Home with assist and home PT to increase safety and independence with basic functional mobility, and to address unmet goals.    Per notes

## 2022-02-22 NOTE — IR NOTE
Patient Name: Libby Grimaldo  Medical Record Number: [id number]  Today's Date: 2/22/2022    Procedure: LEFT CVC Removal  Proceduralist: Geronimo Medel PA-C  Pathology present: No    Procedure Start: 0953  Procedure end: 1000  Sedation medications administered: None    Report given to: Erwin VALIENTE  : No    Other Notes: Pt arrived to IR room 2 from Unit 5C. Consent reviewed. Pt denies any questions or concerns regarding procedure. Pt positioned supine and monitored per protocol. Pt tolerated procedure without any noted complications. Dressing on LEFT chest clean and dry.  Pt transferred back to Unit 5C.  Bed rest x 1 hour.

## 2022-02-22 NOTE — PROCEDURES
Libby Grimaldo  [id number]    Completed removal of dual lumen tunneled central venous access catheter via LEFT chest. Dx: no longer needed. Lizy.

## 2022-02-22 NOTE — PROGRESS NOTES
"BMT Daily Progress Note      ID: Libby Grimaldo is a 70 yo woman D+23 s/p Cytoxan chemo priming prior to planned auto PSBCT for MM; s/p 2 days apheresis.      INTERVAL  HISTORY   Remains volume up with some associated hypoxia. Sats dropping to 76% on room air with light activity.  Nosebleed this afternoon.  She is feeling really worn down by all the interventions and with being in the hospital.      Review of Systems:    8 point ROS negative except as above.    PHYSICAL EXAM      KPS:  70  Blood pressure 134/63, pulse 82, temperature (P) 98.6  F (37  C), temperature source (P) Axillary, resp. rate 16, height 1.626 m (5' 4\"), weight (P) 63.7 kg (140 lb 6.9 oz), SpO2 99 %.    Physical Exam  Constitutional:       Comments: tearful   HENT:      Head: Normocephalic and atraumatic.      Comments: alopecia     Nose: No rhinorrhea.   Eyes:      Conjunctiva/sclera: Conjunctivae normal.      Comments: Bilateral exophthalmos   Cardiovascular:      Rate and Rhythm: Normal rate and regular rhythm.   Pulmonary:      Comments: Very diminished right base  Abdominal:      General: There is no distension.      Palpations: There is no mass.      Tenderness: There is no abdominal tenderness. There is no guarding.   Musculoskeletal:      Right lower leg: No edema.      Left lower leg: No edema.   Skin:     General: Skin is warm and dry.      Coloration: Skin is pale. Skin is not jaundiced.   Neurological:      Mental Status: She is alert. Mental status is at baseline.   Psychiatric:      Comments: Mood is low, feels like she is hitting \"dead ends\" with our interventions       Labs:  Lab Results   Component Value Date    WBC 13.9 (H) 02/22/2022    ANEU 12.6 (H) 02/22/2022    HGB 7.6 (L) 02/22/2022    HCT 23.9 (L) 02/22/2022    PLT 84 (L) 02/22/2022     02/22/2022    POTASSIUM 3.8 02/22/2022    CHLORIDE 101 02/22/2022    CO2 34 (H) 02/22/2022    GLC 76 02/22/2022    BUN 7 02/22/2022    CR 0.94 02/22/2022    MAG 1.6 (L) 02/22/2022    " INR 1.29 (H) 02/11/2022    BILITOTAL 0.8 02/21/2022    AST 20 02/21/2022    ALT 14 02/21/2022    ALKPHOS 171 (H) 02/21/2022    PROTTOTAL 4.8 (L) 02/21/2022    ALBUMIN 2.2 (L) 02/21/2022       I have assessed all abnormal lab values for their clinical significance and any values considered clinically significant have been addressed in the assessment and plan.          SYSTEMS-BASED ASSESSMENT AND PLAN   Libby Grimaldo is a 70 yo woman D+23 s/p Cytoxan chemo priming prior to planned auto PSBCT for MM        1. BMT  - BMT MD/Coordinator: Dr. Julio C Guillory/Rashad Chen  - Cytoxan 2/1  - Plan for allopurinol 300mg daily for at least 7 days.   - GCSF 10 mcg/kg started day +5 (2/5/22), continue through stem cell collections-last dose 2/20. Moved GCSF to morning to see if fevers follow suit.  - s/p Mozobil 2/18, 2/19  - collected 3.94 x10^6 CD34/kg after 2 days (goal 5).  Allowing time for recovery post chemo-priming before proceeding with transplant.  CVC to be removed today; will need PICC placed for transplant.     2. HEME/COAG  - Transfusion parameters: hgb <7g and plts <50 (anticoag). Check plts bid.   - anemia, thrombocytopenia secondary to chemo  - mild leukocytosis 2/2 GCSF/Mozobil  - 2/15: new non-occlusive LUE DVT. Initially on heparin gtt; now on eliquis; hold evening dose 2/22 due to epistaxis; will resume on xarelto 2/23 (insurance preference), assuming bleeding has stopped.  - Afrin prn epistaxis    3. ID: Afebrile. Stop empiric Zosyn and monitor. UA neg. Blood cx NTD.  - Chest CT (2/16)-- fluid overload but no focus of infection   - Neutropenic fevers, initially attributed to strep throat.  She was on cefepime 2/11-2/13; Zosyn through 2/17; resumed 2/19 as above.  - 2/15: Serum PCR:  HSV, CMV, HHV6, EBV  All negative.    - Recent hx of urinary tract infection s/p antibiotics.      #Prophy: ACV, fluconazole.       4. GI:   - constipation secondary to narcotics: senna bid prn  - Protonix 40mg/d; prn Tums.  Scheduled carafate.  - Anti-emetics:  prn zofran, prn compazine     5. RENAL/FEN  - mild hyperphos (recent hypophos): holding KPhos; monitor.    Replete lytes prn per sliding scale.   #FVO: Lasix 20mg IV today  #Moderate malnutrition in the context of acute on chronic illness      6. Endo: hypothyroidism secondary to therapy for Graves Disease- continue levothyroxine     7. CARDS: On metoprolol and norvasc for hypertension.  Prn hydralazine.   Hyperlipidemia: Lipitor   ASA: hold x2/9 with thrombocytopenia     8. Neuro: Continue effexor   - Hx of multiple febrile seizures. Last occurred during college.      9. Pulm:   #mild intermittent hypoxia thought 2/2 FVO and bilateral pleural effusions; diuresis as above. Add incentive spirometer. Wean O2 as able.  # pleural effusions: thoracenteses 2/23.   #COPD- remains on daily fluticasone and albuterol prn.      10. Cancer related pain: Remains on buprenorphine-naloxone and prn oxycodone. Oxycodone order changed to 10-20mg tid prn with a max of 50mg per 24 hours (previous max was 30mg in 24 hours, but she is having legitimate increase in headaches and bone pain with GCSF).      11. Musculoskeletal: history of frequent falls at home. PT/OT following.      12. Neuro: question of illness related delirium vs. unmasking of underlying dementia.  Patient has family history of dementia is grandmother, paternal uncle and father.  Requested OT to do MOCA testing 2/14.  Head CT showed mild to moderate changes of chronic small vessel ischemia.  MS improved. Plan to retest MOCA outpatient in clinic once she is no longer acutely ill. (Note: it was 11 this admission.)    I spent 45 minutes in the care of this patient today, which included time necessary for preparation for the visit, obtaining history, ordering medications/tests/procedures as medically indicated, review of pertinent medical literature, counseling of the patient, communication of recommendations to the care team, and  documentation time.    Dispo: possible discharge next 1-2 days pending fevers, hypoxia, caregiver status.  Note that she does have oxygen coverage, if it is needed at home.     Nidia Littlejohn PA-C  2/22/2022

## 2022-02-23 ENCOUNTER — APPOINTMENT (OUTPATIENT)
Dept: OCCUPATIONAL THERAPY | Facility: CLINIC | Age: 72
DRG: 152 | End: 2022-02-23
Payer: MEDICARE

## 2022-02-23 ENCOUNTER — APPOINTMENT (OUTPATIENT)
Dept: PHYSICAL THERAPY | Facility: CLINIC | Age: 72
DRG: 152 | End: 2022-02-23
Payer: MEDICARE

## 2022-02-23 LAB
ANION GAP SERPL CALCULATED.3IONS-SCNC: 3 MMOL/L (ref 3–14)
BASOPHILS # BLD MANUAL: 0 10E3/UL (ref 0–0.2)
BASOPHILS NFR BLD MANUAL: 0 %
BUN SERPL-MCNC: 9 MG/DL (ref 7–30)
CALCIUM SERPL-MCNC: 8 MG/DL (ref 8.5–10.1)
CHLORIDE BLD-SCNC: 102 MMOL/L (ref 94–109)
CO2 SERPL-SCNC: 31 MMOL/L (ref 20–32)
CREAT SERPL-MCNC: 0.84 MG/DL (ref 0.52–1.04)
EOSINOPHIL # BLD MANUAL: 0 10E3/UL (ref 0–0.7)
EOSINOPHIL NFR BLD MANUAL: 0 %
ERYTHROCYTE [DISTWIDTH] IN BLOOD BY AUTOMATED COUNT: 14.4 % (ref 10–15)
GFR SERPL CREATININE-BSD FRML MDRD: 75 ML/MIN/1.73M2
GLUCOSE BLD-MCNC: 73 MG/DL (ref 70–99)
HCT VFR BLD AUTO: 23.3 % (ref 35–47)
HGB BLD-MCNC: 7.5 G/DL (ref 11.7–15.7)
LYMPHOCYTES # BLD MANUAL: 0 10E3/UL (ref 0.8–5.3)
LYMPHOCYTES NFR BLD MANUAL: 0 %
MAGNESIUM SERPL-MCNC: 2.1 MG/DL (ref 1.6–2.3)
MCH RBC QN AUTO: 31.8 PG (ref 26.5–33)
MCHC RBC AUTO-ENTMCNC: 32.2 G/DL (ref 31.5–36.5)
MCV RBC AUTO: 99 FL (ref 78–100)
METAMYELOCYTES # BLD MANUAL: 0.1 10E3/UL
METAMYELOCYTES NFR BLD MANUAL: 2 %
MONOCYTES # BLD MANUAL: 0.4 10E3/UL (ref 0–1.3)
MONOCYTES NFR BLD MANUAL: 6 %
MYELOCYTES # BLD MANUAL: 0.2 10E3/UL
MYELOCYTES NFR BLD MANUAL: 3 %
NEUTROPHILS # BLD MANUAL: 5.8 10E3/UL (ref 1.6–8.3)
NEUTROPHILS NFR BLD MANUAL: 88 %
PHOSPHATE SERPL-MCNC: 2.9 MG/DL (ref 2.5–4.5)
PLAT MORPH BLD: ABNORMAL
PLATELET # BLD AUTO: 111 10E3/UL (ref 150–450)
PLATELET # BLD AUTO: 91 10E3/UL (ref 150–450)
POTASSIUM BLD-SCNC: 3.7 MMOL/L (ref 3.4–5.3)
PROMYELOCYTES # BLD MANUAL: 0.1 10E3/UL
PROMYELOCYTES NFR BLD MANUAL: 1 %
RBC # BLD AUTO: 2.36 10E6/UL (ref 3.8–5.2)
RBC MORPH BLD: ABNORMAL
SARS-COV-2 RNA RESP QL NAA+PROBE: NEGATIVE
SODIUM SERPL-SCNC: 136 MMOL/L (ref 133–144)
WBC # BLD AUTO: 6.6 10E3/UL (ref 4–11)

## 2022-02-23 PROCEDURE — 250N000013 HC RX MED GY IP 250 OP 250 PS 637: Performed by: STUDENT IN AN ORGANIZED HEALTH CARE EDUCATION/TRAINING PROGRAM

## 2022-02-23 PROCEDURE — 250N000013 HC RX MED GY IP 250 OP 250 PS 637: Performed by: NURSE PRACTITIONER

## 2022-02-23 PROCEDURE — U0005 INFEC AGEN DETEC AMPLI PROBE: HCPCS | Performed by: PHYSICIAN ASSISTANT

## 2022-02-23 PROCEDURE — 250N000013 HC RX MED GY IP 250 OP 250 PS 637

## 2022-02-23 PROCEDURE — 250N000013 HC RX MED GY IP 250 OP 250 PS 637: Performed by: HOSPITALIST

## 2022-02-23 PROCEDURE — 84100 ASSAY OF PHOSPHORUS: CPT | Performed by: PHYSICIAN ASSISTANT

## 2022-02-23 PROCEDURE — 83735 ASSAY OF MAGNESIUM: CPT | Performed by: INTERNAL MEDICINE

## 2022-02-23 PROCEDURE — 97535 SELF CARE MNGMENT TRAINING: CPT | Mod: GO

## 2022-02-23 PROCEDURE — 250N000011 HC RX IP 250 OP 636: Performed by: PHYSICIAN ASSISTANT

## 2022-02-23 PROCEDURE — 85049 AUTOMATED PLATELET COUNT: CPT | Performed by: PHYSICIAN ASSISTANT

## 2022-02-23 PROCEDURE — 99233 SBSQ HOSP IP/OBS HIGH 50: CPT | Mod: FS

## 2022-02-23 PROCEDURE — 250N000013 HC RX MED GY IP 250 OP 250 PS 637: Performed by: PHYSICIAN ASSISTANT

## 2022-02-23 PROCEDURE — 85027 COMPLETE CBC AUTOMATED: CPT | Performed by: STUDENT IN AN ORGANIZED HEALTH CARE EDUCATION/TRAINING PROGRAM

## 2022-02-23 PROCEDURE — 206N000001 HC R&B BMT UMMC

## 2022-02-23 PROCEDURE — 80048 BASIC METABOLIC PNL TOTAL CA: CPT | Performed by: STUDENT IN AN ORGANIZED HEALTH CARE EDUCATION/TRAINING PROGRAM

## 2022-02-23 PROCEDURE — 97530 THERAPEUTIC ACTIVITIES: CPT | Mod: GP

## 2022-02-23 PROCEDURE — 250N000013 HC RX MED GY IP 250 OP 250 PS 637: Performed by: INTERNAL MEDICINE

## 2022-02-23 RX ORDER — FUROSEMIDE 10 MG/ML
40 INJECTION INTRAMUSCULAR; INTRAVENOUS 2 TIMES DAILY
Status: DISCONTINUED | OUTPATIENT
Start: 2022-02-23 | End: 2022-02-23

## 2022-02-23 RX ORDER — FUROSEMIDE 20 MG
20 TABLET ORAL DAILY
Status: DISCONTINUED | OUTPATIENT
Start: 2022-02-23 | End: 2022-02-26

## 2022-02-23 RX ORDER — POTASSIUM CHLORIDE 750 MG/1
10 TABLET, EXTENDED RELEASE ORAL ONCE
Status: COMPLETED | OUTPATIENT
Start: 2022-02-23 | End: 2022-02-23

## 2022-02-23 RX ORDER — METOLAZONE 2.5 MG/1
2.5 TABLET ORAL
Status: DISCONTINUED | OUTPATIENT
Start: 2022-02-23 | End: 2022-02-23

## 2022-02-23 RX ADMIN — BUPRENORPHINE AND NALOXONE 1 FILM: 8; 2 FILM, SOLUBLE BUCCAL; SUBLINGUAL at 20:21

## 2022-02-23 RX ADMIN — LEVOTHYROXINE SODIUM 112 MCG: 0.11 TABLET ORAL at 08:26

## 2022-02-23 RX ADMIN — METHOCARBAMOL 500 MG: 500 TABLET ORAL at 23:48

## 2022-02-23 RX ADMIN — ACETAMINOPHEN 650 MG: 325 TABLET, FILM COATED ORAL at 16:00

## 2022-02-23 RX ADMIN — METHOCARBAMOL 500 MG: 500 TABLET ORAL at 01:08

## 2022-02-23 RX ADMIN — METHOCARBAMOL 500 MG: 500 TABLET ORAL at 08:26

## 2022-02-23 RX ADMIN — Medication 5 ML: at 03:41

## 2022-02-23 RX ADMIN — UMECLIDINIUM 1 PUFF: 62.5 AEROSOL, POWDER ORAL at 08:25

## 2022-02-23 RX ADMIN — BUPRENORPHINE AND NALOXONE 1 FILM: 8; 2 FILM, SOLUBLE BUCCAL; SUBLINGUAL at 08:26

## 2022-02-23 RX ADMIN — SUCRALFATE 1 G: 1 SUSPENSION ORAL at 11:29

## 2022-02-23 RX ADMIN — VENLAFAXINE 75 MG: 75 TABLET ORAL at 08:26

## 2022-02-23 RX ADMIN — VENLAFAXINE 75 MG: 75 TABLET ORAL at 11:29

## 2022-02-23 RX ADMIN — GUAIFENESIN AND DEXTROMETHORPHAN HYDROBROMIDE 1 TABLET: 600; 30 TABLET, EXTENDED RELEASE ORAL at 21:13

## 2022-02-23 RX ADMIN — AMLODIPINE BESYLATE 5 MG: 5 TABLET ORAL at 08:26

## 2022-02-23 RX ADMIN — BUPRENORPHINE AND NALOXONE 1 FILM: 8; 2 FILM, SOLUBLE BUCCAL; SUBLINGUAL at 14:20

## 2022-02-23 RX ADMIN — PANTOPRAZOLE SODIUM 40 MG: 40 TABLET, DELAYED RELEASE ORAL at 08:26

## 2022-02-23 RX ADMIN — QUETIAPINE FUMARATE 25 MG: 25 TABLET ORAL at 22:24

## 2022-02-23 RX ADMIN — METOPROLOL SUCCINATE 25 MG: 25 TABLET, EXTENDED RELEASE ORAL at 08:26

## 2022-02-23 RX ADMIN — FLUTICASONE FUROATE AND VILANTEROL TRIFENATATE 1 PUFF: 100; 25 POWDER RESPIRATORY (INHALATION) at 08:26

## 2022-02-23 RX ADMIN — RIVAROXABAN 20 MG: 20 TABLET, FILM COATED ORAL at 17:00

## 2022-02-23 RX ADMIN — SUCRALFATE 1 G: 1 SUSPENSION ORAL at 22:24

## 2022-02-23 RX ADMIN — LORATADINE 10 MG: 10 TABLET ORAL at 08:26

## 2022-02-23 RX ADMIN — FUROSEMIDE 20 MG: 20 TABLET ORAL at 18:40

## 2022-02-23 RX ADMIN — ATORVASTATIN CALCIUM 40 MG: 20 TABLET, FILM COATED ORAL at 20:21

## 2022-02-23 RX ADMIN — OXYCODONE HYDROCHLORIDE 10 MG: 10 TABLET ORAL at 03:41

## 2022-02-23 RX ADMIN — OXYCODONE HYDROCHLORIDE 10 MG: 10 TABLET ORAL at 10:02

## 2022-02-23 RX ADMIN — POTASSIUM CHLORIDE 10 MEQ: 750 TABLET, EXTENDED RELEASE ORAL at 08:26

## 2022-02-23 RX ADMIN — VENLAFAXINE 75 MG: 75 TABLET ORAL at 17:00

## 2022-02-23 RX ADMIN — ALLOPURINOL 300 MG: 300 TABLET ORAL at 08:26

## 2022-02-23 RX ADMIN — ACYCLOVIR 800 MG: 800 TABLET ORAL at 08:26

## 2022-02-23 RX ADMIN — BENZOCAINE AND MENTHOL 1 LOZENGE: 15; 2.6 LOZENGE ORAL at 22:24

## 2022-02-23 RX ADMIN — ACYCLOVIR 800 MG: 800 TABLET ORAL at 20:21

## 2022-02-23 RX ADMIN — OXYCODONE HYDROCHLORIDE 10 MG: 10 TABLET ORAL at 17:00

## 2022-02-23 RX ADMIN — FLUCONAZOLE 100 MG: 100 TABLET ORAL at 08:26

## 2022-02-23 RX ADMIN — Medication 5 ML: at 18:35

## 2022-02-23 ASSESSMENT — ACTIVITIES OF DAILY LIVING (ADL)
ADLS_ACUITY_SCORE: 20

## 2022-02-23 NOTE — PLAN OF CARE
"/66 (BP Location: Left arm)   Pulse 89   Temp 99.6  F (37.6  C) (Oral)   Resp 16   Ht 1.626 m (5' 4\")   Wt 63.7 kg (140 lb 6.9 oz)   SpO2 100%   BMI 24.11 kg/m    VSS, AOx4 but can be very forgetful at times, bed alarm on at all times as she is unsteady on her feet.  Walking sats completed today.  Lasix given with fair output.  Pt to have BiLat thoracentesis tomorrow.  Continue POC.  Problem: Adult Inpatient Plan of Care  Goal: Plan of Care Review  Outcome: Ongoing, Not Progressing  Flowsheets (Taken 2/22/2022 1846)  Plan of Care Reviewed With: patient  Goal: Optimal Comfort and Wellbeing  Outcome: Ongoing, Not Progressing     Problem: Pain Acute  Goal: Acceptable Pain Control and Functional Ability  Outcome: Ongoing, Not Progressing   Goal Outcome Evaluation:    Plan of Care Reviewed With: patient     Overall Patient Progress: no change               "

## 2022-02-23 NOTE — PLAN OF CARE
"BP (!) 147/77 (BP Location: Left arm)   Pulse 92   Temp 99  F (37.2  C) (Oral)   Resp 16   Ht 1.626 m (5' 4\")   Wt 63.7 kg (140 lb 6.9 oz)   SpO2 97%   BMI 24.11 kg/m      VSS on 1L O2 overnight, endorses SOB on exertion. Pt is forgetful and often makes illogical statements. Oxycodone x1 and Robaxin x1 for back pain with good effect. Denies nausea, fair appetite. Will receive 10mEq K+ this AM, no other replacements. Cap changed on Port, due for dressing/needle change today. Up with 1 assist and GB to commode, pt unsteady on feet. Bed alarm on. Plan is for thoracentesis today.    Problem: Adult Inpatient Plan of Care  Goal: Plan of Care Review  Outcome: Ongoing, Progressing  Flowsheets (Taken 2/23/2022 0126)  Plan of Care Reviewed With: patient     Problem: Pain Acute  Goal: Acceptable Pain Control and Functional Ability  Outcome: Ongoing, Progressing  Intervention: Develop Pain Management Plan  Recent Flowsheet Documentation  Taken 2/22/2022 2300 by Odalis Noland, RN  Pain Management Interventions:    rest    repositioned  Taken 2/22/2022 2045 by Odalis Noland, RN  Pain Management Interventions:    medication (see MAR)    repositioned    rest  Intervention: Prevent or Manage Pain  Recent Flowsheet Documentation  Taken 2/22/2022 2000 by Odalis Noland, RN  Medication Review/Management: medications reviewed     Problem: Violence Risk or Actual  Goal: Anger and Impulse Control  Outcome: Ongoing, Progressing     Goal Outcome Evaluation:    Plan of Care Reviewed With: patient     Overall Patient Progress: no change               "

## 2022-02-23 NOTE — PROGRESS NOTES
"BMT Daily Progress Note      ID: Libby Girmaldo is a 72 yo woman D+24 s/p Cytoxan chemo priming prior to planned auto PSBCT for MM; s/p 2 days apheresis.      INTERVAL  HISTORY   Libby is upset this morning and she is very tired of all the medical procedures she has had to sustain.  She feels she can't bear another procedure unless she is sedated.  This is problematic, as she needs thoracenteses to be done, but she refuses to have this done without \"being asleep.\"  She continues to be short of breath and drop her oxygen sats when moving about her hospital room.     Review of Systems:    6 point ROS negative except as above.    PHYSICAL EXAM      KPS:  70  Blood pressure (!) 152/60, pulse 92, temperature 98.5  F (36.9  C), temperature source Axillary, resp. rate 18, height 1.626 m (5' 4\"), weight 63.7 kg (140 lb 6.9 oz), SpO2 97 %.    Physical Exam  Constitutional:       Comments: Upset, stressed, sad   HENT:      Head: Normocephalic and atraumatic.      Comments: alopecia     Nose: No rhinorrhea.   Eyes:      Conjunctiva/sclera: Conjunctivae normal.      Comments: Bilateral exophthalmos   Pulmonary:      Effort: Pulmonary effort is normal.      Comments: Very diminished right base  Musculoskeletal:      Right lower leg: No edema.      Left lower leg: No edema.   Skin:     General: Skin is warm and dry.      Coloration: Skin is pale. Skin is not jaundiced.   Neurological:      Mental Status: She is alert. Mental status is at baseline.   Psychiatric:      Comments: Stressed and sad       Labs:  Lab Results   Component Value Date    WBC 6.6 02/23/2022    ANEU 5.8 02/23/2022    HGB 7.5 (L) 02/23/2022    HCT 23.3 (L) 02/23/2022    PLT 91 (L) 02/23/2022     02/23/2022    POTASSIUM 3.7 02/23/2022    CHLORIDE 102 02/23/2022    CO2 31 02/23/2022    GLC 73 02/23/2022    BUN 9 02/23/2022    CR 0.84 02/23/2022    MAG 2.1 02/23/2022    INR 1.29 (H) 02/11/2022    BILITOTAL 0.8 02/21/2022    AST 20 02/21/2022    ALT 14 " 02/21/2022    ALKPHOS 171 (H) 02/21/2022    PROTTOTAL 4.8 (L) 02/21/2022    ALBUMIN 2.2 (L) 02/21/2022       I have assessed all abnormal lab values for their clinical significance and any values considered clinically significant have been addressed in the assessment and plan.          SYSTEMS-BASED ASSESSMENT AND PLAN   Libby Grimaldo is a 72 yo woman D+24 s/p Cytoxan chemo priming prior to planned auto PSBCT for MM        1. BMT  - BMT MD/Coordinator: Dr. Julio C Guillory/Rashad Chen  - Cytoxan 2/1  - Plan for allopurinol 300mg daily for at least 7 days.   - GCSF 10 mcg/kg started day +5 (2/5/22), continue through stem cell collections-last dose 2/20. Moved GCSF to morning to see if fevers follow suit.  - s/p Mozobil 2/18, 2/19  - collected 3.94 x10^6 CD34/kg after 2 days (goal 5).  Allowing time for recovery post chemo-priming before proceeding with transplant.  Will need PICC placed for transplant.     2. HEME/COAG  - Transfusion parameters: hgb <7g and plts <50 (anticoag). Check plts bid.   - anemia, thrombocytopenia secondary to chemo  - mild leukocytosis 2/2 GCSF/Mozobil  - 2/15: new non-occlusive LUE DVT. Initially on heparin gtt; then eliquis; held evening dose 2/22 due to epistaxis; will resume on xarelto 2/23 (insurance preference).  - Afrin prn epistaxis    3. ID:   - neutropenic fevers have resolved; w/u showed strep throat.  Cefepime 2/11-2/13; Zosyn 2/13-2/17; 2/19-2/21.   - Chest CT (2/16)-- fluid overload but no focus of infection       #Prophy: ACV, fluconazole.       4. GI:   - constipation secondary to narcotics: senna bid prn  - Protonix 40mg/d; prn Tums. Scheduled carafate.  - Anti-emetics:  prn zofran, prn compazine     5. RENAL/FEN  - mild hyperphos (recent hypophos): holding KPhos; monitor.    Replete lytes prn per sliding scale.   #Moderate malnutrition in the context of acute on chronic illness      6. Endo: hypothyroidism secondary to therapy for Graves Disease- continue levothyroxine     7.  CARDS: On metoprolol and norvasc for hypertension.  Prn hydralazine.   Hyperlipidemia: Lipitor   ASA: hold x2/9 with thrombocytopenia     8. Neuro: Continue effexor   - Hx of multiple febrile seizures. Last occurred during college.      9. Pulm:   #mild intermittent hypoxia thought 2/2 FVO and bilateral pleural effusions; diuresis as above. Add incentive spirometer. Wean O2 as able.  # pleural effusions: thoracenteses 2/23 if patient able to agree to procedure without sedation  #COPD- remains on daily fluticasone and albuterol prn.      10. Cancer related pain: Remains on buprenorphine-naloxone and prn oxycodone. Oxycodone order changed to 10-20mg tid prn with a max of 50mg per 24 hours when she had GCSF related pain; 2/23 will resume her previous max of 30mg per 24 hours.      11. Musculoskeletal: history of frequent falls at home. PT/OT following.      12. Neuro: question of illness related delirium vs. unmasking of underlying dementia.  Patient has family history of dementia is grandmother, paternal uncle and father. Head CT showed mild to moderate changes of chronic small vessel ischemia. Plan to retest MOCA outpatient in clinic once she is no longer acutely ill. (Note: it was 11 this admission.)    I spent 50 minutes in the care of this patient today, which included time necessary for preparation for the visit, obtaining history, ordering medications/tests/procedures as medically indicated, review of pertinent medical literature, counseling of the patient, communication of recommendations to the care team, and documentation time.    Dispo: possible discharge next 1-2 days, pending thoracenteses. Note that she does have oxygen coverage, if it is needed at home.     Nidia Littlejohn PA-C  2/22/2022

## 2022-02-24 ENCOUNTER — APPOINTMENT (OUTPATIENT)
Dept: PHYSICAL THERAPY | Facility: CLINIC | Age: 72
DRG: 152 | End: 2022-02-24
Payer: MEDICARE

## 2022-02-24 ENCOUNTER — APPOINTMENT (OUTPATIENT)
Dept: OCCUPATIONAL THERAPY | Facility: CLINIC | Age: 72
DRG: 152 | End: 2022-02-24
Payer: MEDICARE

## 2022-02-24 LAB
ANION GAP SERPL CALCULATED.3IONS-SCNC: 2 MMOL/L (ref 3–14)
BASOPHILS # BLD MANUAL: 0 10E3/UL (ref 0–0.2)
BASOPHILS NFR BLD MANUAL: 0 %
BUN SERPL-MCNC: 9 MG/DL (ref 7–30)
CALCIUM SERPL-MCNC: 8.1 MG/DL (ref 8.5–10.1)
CHLORIDE BLD-SCNC: 106 MMOL/L (ref 94–109)
CO2 SERPL-SCNC: 32 MMOL/L (ref 20–32)
CREAT SERPL-MCNC: 0.94 MG/DL (ref 0.52–1.04)
DACRYOCYTES BLD QL SMEAR: SLIGHT
ELLIPTOCYTES BLD QL SMEAR: SLIGHT
EOSINOPHIL # BLD MANUAL: 0 10E3/UL (ref 0–0.7)
EOSINOPHIL NFR BLD MANUAL: 0 %
ERYTHROCYTE [DISTWIDTH] IN BLOOD BY AUTOMATED COUNT: 14.4 % (ref 10–15)
GFR SERPL CREATININE-BSD FRML MDRD: 65 ML/MIN/1.73M2
GLUCOSE BLD-MCNC: 66 MG/DL (ref 70–99)
GLUCOSE BLDC GLUCOMTR-MCNC: 71 MG/DL (ref 70–99)
HCT VFR BLD AUTO: 23.5 % (ref 35–47)
HGB BLD-MCNC: 7.3 G/DL (ref 11.7–15.7)
LDH SERPL L TO P-CCNC: 263 U/L (ref 81–234)
LYMPHOCYTES # BLD MANUAL: 0.2 10E3/UL (ref 0.8–5.3)
LYMPHOCYTES NFR BLD MANUAL: 4 %
MAGNESIUM SERPL-MCNC: 1.9 MG/DL (ref 1.6–2.3)
MCH RBC QN AUTO: 30.8 PG (ref 26.5–33)
MCHC RBC AUTO-ENTMCNC: 31.1 G/DL (ref 31.5–36.5)
MCV RBC AUTO: 99 FL (ref 78–100)
MONOCYTES # BLD MANUAL: 0.5 10E3/UL (ref 0–1.3)
MONOCYTES NFR BLD MANUAL: 9 %
NEUTROPHILS # BLD MANUAL: 4.8 10E3/UL (ref 1.6–8.3)
NEUTROPHILS NFR BLD MANUAL: 87 %
PHOSPHATE SERPL-MCNC: 3 MG/DL (ref 2.5–4.5)
PLAT MORPH BLD: ABNORMAL
PLATELET # BLD AUTO: 111 10E3/UL (ref 150–450)
POTASSIUM BLD-SCNC: 3.9 MMOL/L (ref 3.4–5.3)
PROT SERPL-MCNC: 5 G/DL (ref 6.8–8.8)
RBC # BLD AUTO: 2.37 10E6/UL (ref 3.8–5.2)
RBC MORPH BLD: ABNORMAL
SODIUM SERPL-SCNC: 140 MMOL/L (ref 133–144)
UFH PPP CHRO-ACNC: >1.1 IU/ML
WBC # BLD AUTO: 5.5 10E3/UL (ref 4–11)

## 2022-02-24 PROCEDURE — 83615 LACTATE (LD) (LDH) ENZYME: CPT | Performed by: PHYSICIAN ASSISTANT

## 2022-02-24 PROCEDURE — 250N000011 HC RX IP 250 OP 636: Performed by: PHYSICIAN ASSISTANT

## 2022-02-24 PROCEDURE — 82310 ASSAY OF CALCIUM: CPT | Performed by: STUDENT IN AN ORGANIZED HEALTH CARE EDUCATION/TRAINING PROGRAM

## 2022-02-24 PROCEDURE — 84155 ASSAY OF PROTEIN SERUM: CPT | Performed by: PHYSICIAN ASSISTANT

## 2022-02-24 PROCEDURE — 87081 CULTURE SCREEN ONLY: CPT | Performed by: STUDENT IN AN ORGANIZED HEALTH CARE EDUCATION/TRAINING PROGRAM

## 2022-02-24 PROCEDURE — 206N000001 HC R&B BMT UMMC

## 2022-02-24 PROCEDURE — 83735 ASSAY OF MAGNESIUM: CPT | Performed by: INTERNAL MEDICINE

## 2022-02-24 PROCEDURE — 97110 THERAPEUTIC EXERCISES: CPT | Mod: GO

## 2022-02-24 PROCEDURE — 250N000013 HC RX MED GY IP 250 OP 250 PS 637: Performed by: INTERNAL MEDICINE

## 2022-02-24 PROCEDURE — 84100 ASSAY OF PHOSPHORUS: CPT | Performed by: PHYSICIAN ASSISTANT

## 2022-02-24 PROCEDURE — 250N000013 HC RX MED GY IP 250 OP 250 PS 637: Performed by: PHYSICIAN ASSISTANT

## 2022-02-24 PROCEDURE — 250N000013 HC RX MED GY IP 250 OP 250 PS 637: Performed by: STUDENT IN AN ORGANIZED HEALTH CARE EDUCATION/TRAINING PROGRAM

## 2022-02-24 PROCEDURE — 250N000013 HC RX MED GY IP 250 OP 250 PS 637: Performed by: NURSE PRACTITIONER

## 2022-02-24 PROCEDURE — 97530 THERAPEUTIC ACTIVITIES: CPT | Mod: GP | Performed by: PHYSICAL THERAPIST

## 2022-02-24 PROCEDURE — 85520 HEPARIN ASSAY: CPT | Performed by: INTERNAL MEDICINE

## 2022-02-24 PROCEDURE — 85014 HEMATOCRIT: CPT | Performed by: STUDENT IN AN ORGANIZED HEALTH CARE EDUCATION/TRAINING PROGRAM

## 2022-02-24 PROCEDURE — 99232 SBSQ HOSP IP/OBS MODERATE 35: CPT

## 2022-02-24 PROCEDURE — 97530 THERAPEUTIC ACTIVITIES: CPT | Mod: GO

## 2022-02-24 PROCEDURE — 250N000013 HC RX MED GY IP 250 OP 250 PS 637

## 2022-02-24 PROCEDURE — 97535 SELF CARE MNGMENT TRAINING: CPT | Mod: GO

## 2022-02-24 PROCEDURE — 97116 GAIT TRAINING THERAPY: CPT | Mod: GP | Performed by: PHYSICAL THERAPIST

## 2022-02-24 RX ORDER — MAGNESIUM SULFATE HEPTAHYDRATE 40 MG/ML
2 INJECTION, SOLUTION INTRAVENOUS ONCE
Status: COMPLETED | OUTPATIENT
Start: 2022-02-24 | End: 2022-02-24

## 2022-02-24 RX ORDER — MAGNESIUM SULFATE HEPTAHYDRATE 40 MG/ML
2 INJECTION, SOLUTION INTRAVENOUS ONCE
Status: DISCONTINUED | OUTPATIENT
Start: 2022-02-24 | End: 2022-02-26

## 2022-02-24 RX ADMIN — SUCRALFATE 1 G: 1 SUSPENSION ORAL at 20:40

## 2022-02-24 RX ADMIN — LEVOTHYROXINE SODIUM 112 MCG: 0.11 TABLET ORAL at 08:46

## 2022-02-24 RX ADMIN — GUAIFENESIN AND DEXTROMETHORPHAN HYDROBROMIDE 1 TABLET: 600; 30 TABLET, EXTENDED RELEASE ORAL at 20:40

## 2022-02-24 RX ADMIN — FLUTICASONE FUROATE AND VILANTEROL TRIFENATATE 1 PUFF: 100; 25 POWDER RESPIRATORY (INHALATION) at 08:47

## 2022-02-24 RX ADMIN — SODIUM CHLORIDE, PRESERVATIVE FREE 5 ML: 5 INJECTION INTRAVENOUS at 10:12

## 2022-02-24 RX ADMIN — SUCRALFATE 1 G: 1 SUSPENSION ORAL at 11:38

## 2022-02-24 RX ADMIN — ACYCLOVIR 800 MG: 800 TABLET ORAL at 08:46

## 2022-02-24 RX ADMIN — ALLOPURINOL 300 MG: 300 TABLET ORAL at 08:46

## 2022-02-24 RX ADMIN — OXYCODONE HYDROCHLORIDE 10 MG: 10 TABLET ORAL at 05:46

## 2022-02-24 RX ADMIN — FUROSEMIDE 20 MG: 20 TABLET ORAL at 08:50

## 2022-02-24 RX ADMIN — BUPRENORPHINE AND NALOXONE 1 FILM: 8; 2 FILM, SOLUBLE BUCCAL; SUBLINGUAL at 14:16

## 2022-02-24 RX ADMIN — METOPROLOL SUCCINATE 25 MG: 25 TABLET, EXTENDED RELEASE ORAL at 08:46

## 2022-02-24 RX ADMIN — LORATADINE 10 MG: 10 TABLET ORAL at 08:46

## 2022-02-24 RX ADMIN — VENLAFAXINE 75 MG: 75 TABLET ORAL at 08:46

## 2022-02-24 RX ADMIN — FLUCONAZOLE 100 MG: 100 TABLET ORAL at 08:46

## 2022-02-24 RX ADMIN — BUPRENORPHINE AND NALOXONE 1 FILM: 8; 2 FILM, SOLUBLE BUCCAL; SUBLINGUAL at 08:51

## 2022-02-24 RX ADMIN — VENLAFAXINE 75 MG: 75 TABLET ORAL at 12:17

## 2022-02-24 RX ADMIN — AMLODIPINE BESYLATE 5 MG: 5 TABLET ORAL at 08:46

## 2022-02-24 RX ADMIN — MAGNESIUM SULFATE IN WATER 2 G: 40 INJECTION, SOLUTION INTRAVENOUS at 04:39

## 2022-02-24 RX ADMIN — ATORVASTATIN CALCIUM 40 MG: 20 TABLET, FILM COATED ORAL at 19:32

## 2022-02-24 RX ADMIN — SUCRALFATE 1 G: 1 SUSPENSION ORAL at 16:25

## 2022-02-24 RX ADMIN — PANTOPRAZOLE SODIUM 40 MG: 40 TABLET, DELAYED RELEASE ORAL at 08:46

## 2022-02-24 RX ADMIN — Medication 5 ML: at 03:03

## 2022-02-24 RX ADMIN — OXYCODONE HYDROCHLORIDE 20 MG: 10 TABLET ORAL at 13:06

## 2022-02-24 RX ADMIN — SUCRALFATE 1 G: 1 SUSPENSION ORAL at 08:46

## 2022-02-24 RX ADMIN — QUETIAPINE FUMARATE 25 MG: 25 TABLET ORAL at 20:40

## 2022-02-24 RX ADMIN — VENLAFAXINE 75 MG: 75 TABLET ORAL at 18:21

## 2022-02-24 RX ADMIN — RIVAROXABAN 20 MG: 20 TABLET, FILM COATED ORAL at 16:31

## 2022-02-24 RX ADMIN — ACYCLOVIR 800 MG: 800 TABLET ORAL at 19:32

## 2022-02-24 RX ADMIN — BUPRENORPHINE AND NALOXONE 1 FILM: 8; 2 FILM, SOLUBLE BUCCAL; SUBLINGUAL at 19:32

## 2022-02-24 RX ADMIN — METHOCARBAMOL 500 MG: 500 TABLET ORAL at 13:06

## 2022-02-24 RX ADMIN — UMECLIDINIUM 1 PUFF: 62.5 AEROSOL, POWDER ORAL at 08:46

## 2022-02-24 ASSESSMENT — ACTIVITIES OF DAILY LIVING (ADL)
ADLS_ACUITY_SCORE: 22
ADLS_ACUITY_SCORE: 20
ADLS_ACUITY_SCORE: 20
ADLS_ACUITY_SCORE: 22
ADLS_ACUITY_SCORE: 22
ADLS_ACUITY_SCORE: 20
ADLS_ACUITY_SCORE: 22
ADLS_ACUITY_SCORE: 20
ADLS_ACUITY_SCORE: 22
ADLS_ACUITY_SCORE: 22
ADLS_ACUITY_SCORE: 20
ADLS_ACUITY_SCORE: 22
ADLS_ACUITY_SCORE: 20
ADLS_ACUITY_SCORE: 20
ADLS_ACUITY_SCORE: 22
ADLS_ACUITY_SCORE: 20
ADLS_ACUITY_SCORE: 20
ADLS_ACUITY_SCORE: 22
ADLS_ACUITY_SCORE: 20
ADLS_ACUITY_SCORE: 20
ADLS_ACUITY_SCORE: 22

## 2022-02-24 NOTE — PLAN OF CARE
Patient has been assessed for Home Oxygen needs. Oxygen readings:    *Pulse oximetry (SpO2) = 96% on room air at rest while awake.    *SpO2 = 74% on room air during activity/with exercise.    *SpO2 improved to 84% on 4 liters/minute during activity/with exercise.    *SpO2 improved to 98% on 6 liters/minute during activity/with exercise.

## 2022-02-24 NOTE — PLAN OF CARE
"/57 (BP Location: Left arm)   Pulse 89   Temp 99.2  F (37.3  C) (Oral)   Resp 16   Ht 1.626 m (5' 4\")   Wt 62 kg (136 lb 11 oz)   SpO2 95%   BMI 23.46 kg/m    Port cath is patent and HL. Patient is on 1-2 L with physical movement, SPO2 drops into low 80's in room. PT will do walking O2 with patient. Pt is forgetful and often makes illogical/rambling statements. Patient continue to c/o lower back pain and received oxycodone 20 mg po x 1 and Robaxin x 1 with some relief. Patient is eating and drinking fair. Patient has good urine out, clear and yellow. Patient is up with one assist + walker + gait belt due to Bilateral LE weakness. Labs were drawn and primary care team are aware of the results. Primary care team stated pt might not have thoracentesis because she is breathing much better today.  Please assist pt with food orders. Continue with plan of care and notify MD for status changes.     Problem: Adult Inpatient Plan of Care  Goal: Plan of Care Review  Outcome: Ongoing, Progressing  Flowsheets (Taken 2/24/2022 1418)  Plan of Care Reviewed With: patient     Problem: Adult Inpatient Plan of Care  Goal: Absence of Hospital-Acquired Illness or Injury  Intervention: Identify and Manage Fall Risk  Recent Flowsheet Documentation  Taken 2/24/2022 0845 by Shanda Reed RN  Safety Promotion/Fall Prevention:   bed alarm on   chair alarm on   fall prevention program maintained   increased rounding and observation   increase visualization of patient   lighting adjusted     Problem: Adult Inpatient Plan of Care  Goal: Absence of Hospital-Acquired Illness or Injury  Intervention: Prevent Skin Injury  Recent Flowsheet Documentation  Taken 2/24/2022 1218 by Shanda Reed RN  Body Position:   position changed independently   foot of bed elevated   heels elevated   legs elevated  Taken 2/24/2022 0845 by Shanda Reed RN  Body Position:   position changed independently   foot of bed " elevated   heels elevated   legs elevated     Problem: Adult Inpatient Plan of Care  Goal: Absence of Hospital-Acquired Illness or Injury  Intervention: Prevent and Manage VTE (Venous Thromboembolism) Risk  Recent Flowsheet Documentation  Taken 2/24/2022 1218 by Shanda Reed RN  Activity Management:   activity adjusted per tolerance   activity encouraged  Taken 2/24/2022 0845 by Shanda Reed RN  VTE Prevention/Management: SCDs (sequential compression devices) off  Activity Management:   activity adjusted per tolerance   activity encouraged     Problem: Pain Acute  Goal: Acceptable Pain Control and Functional Ability  Intervention: Develop Pain Management Plan  Recent Flowsheet Documentation  Taken 2/24/2022 1305 by Shanda Reed RN  Pain Management Interventions: medication (see MAR)     Problem: Pain Acute  Goal: Acceptable Pain Control and Functional Ability  Intervention: Prevent or Manage Pain  Recent Flowsheet Documentation  Taken 2/24/2022 0845 by Shanda Reed RN  Medication Review/Management: medications reviewed     Problem: Infection  Goal: Absence of Infection Signs and Symptoms  Intervention: Prevent or Manage Infection  Recent Flowsheet Documentation  Taken 2/24/2022 0845 by Shanda Reed RN  Isolation Precautions: protective environment maintained                       Goal Outcome Evaluation:    Plan of Care Reviewed With: patient     Overall Patient Progress: no change

## 2022-02-24 NOTE — PROGRESS NOTES
"BMT Daily Progress Note      ID: Libby Grimaldo is a 70 yo woman D+25 s/p Cytoxan chemo priming prior to planned auto PSBCT for MM; s/p 2 days apheresis.      INTERVAL  HISTORY   Libby is in a better mood, today.  She is amenable to bilateral thoracenteses.  No fevers.  No diarrhea.  Thinks her breathing is okay; per RN, she drops to low 70s when walking on room air.     Review of Systems:    6 point ROS negative except as above.    PHYSICAL EXAM      KPS:  70  Blood pressure 135/57, pulse 89, temperature 99.2  F (37.3  C), temperature source Oral, resp. rate 16, height 1.626 m (5' 4\"), weight 62 kg (136 lb 11 oz), SpO2 95 %.    Physical Exam  Constitutional:       Comments:     HENT:      Head: Normocephalic and atraumatic.      Comments: alopecia     Nose: No rhinorrhea.   Eyes:      Conjunctiva/sclera: Conjunctivae normal.      Comments: Bilateral exophthalmos   Pulmonary:      Effort: Pulmonary effort is normal.      Comments: Crackles bilateral bases; decreased right base.  Musculoskeletal:      Right lower leg: No edema.      Left lower leg: No edema.   Skin:     General: Skin is warm and dry.      Coloration: Skin is pale. Skin is not jaundiced.   Neurological:      Mental Status: She is alert. Mental status is at baseline.   Psychiatric:      Comments: Upbeat mood       Labs:  Lab Results   Component Value Date    WBC 5.5 02/24/2022    ANEU 4.8 02/24/2022    HGB 7.3 (L) 02/24/2022    HCT 23.5 (L) 02/24/2022     (L) 02/24/2022     02/24/2022    POTASSIUM 3.9 02/24/2022    CHLORIDE 106 02/24/2022    CO2 32 02/24/2022    GLC 71 02/24/2022    BUN 9 02/24/2022    CR 0.94 02/24/2022    MAG 1.9 02/24/2022    INR 1.29 (H) 02/11/2022    BILITOTAL 0.8 02/21/2022    AST 20 02/21/2022    ALT 14 02/21/2022    ALKPHOS 171 (H) 02/21/2022    PROTTOTAL 5.0 (L) 02/24/2022    ALBUMIN 2.2 (L) 02/21/2022       I have assessed all abnormal lab values for their clinical significance and any values considered clinically " significant have been addressed in the assessment and plan.          SYSTEMS-BASED ASSESSMENT AND PLAN   Libby Grimaldo is a 72 yo woman D+25 s/p Cytoxan chemo priming prior to planned auto PSBCT for MM        1. BMT  - BMT MD/Coordinator: Dr. Julio C Guillory/Rashad Chen  - Cytoxan 2/1  - Plan for allopurinol 300mg daily for at least 7 days.   - GCSF 10 mcg/kg started day +5 (2/5/22), continue through stem cell collections-last dose 2/20. Moved GCSF to morning to see if fevers follow suit.  - s/p Mozobil 2/18, 2/19  - collected 3.94 x10^6 CD34/kg after 2 days (goal 5).  Allowing time for recovery post chemo-priming before proceeding with transplant.  Recommend neurocognitive testing as part of review.  Also recommend amyloidosis work-up with cardiac MRI, given she has concentric LV thickening and diastolic dysfunction.   Will need PICC placed for transplant.     2. HEME/COAG  - Transfusion parameters: hgb <7g and plts <50 (anticoag).   - anemia, thrombocytopenia secondary to chemo  - mild leukocytosis 2/2 GCSF/Mozobil  - 2/15: new non-occlusive LUE DVT. Initially on heparin gtt; then eliquis; held evening dose 2/22 due to epistaxis; will resume on xarelto 2/23 (insurance preference).  - Afrin prn epistaxis    3. ID:   - neutropenic fevers have resolved; w/u showed strep throat.  Cefepime 2/11-2/13; Zosyn 2/13-2/17; 2/19-2/21.      #Prophy: ACV, fluconazole.       4. GI:   - constipation secondary to narcotics: senna bid prn  - Protonix 40mg/d; prn Tums. Scheduled carafate.  - Anti-emetics:  prn zofran, prn compazine     5. RENAL/FEN  - mild hyperphos (recent hypophos): holding KPhos; monitor.    - 2/23 began daily lasix 20mg PO for fluid balance maintenance, given diastolic dysfunction  Replete lytes prn per sliding scale.   #Moderate malnutrition in the context of acute on chronic illness      6. Endo: hypothyroidism secondary to therapy for Graves Disease- continue levothyroxine     7. CARDS: On metoprolol and  norvasc for hypertension.  Prn hydralazine.   Hyperlipidemia: Lipitor   ASA: hold x2/9 with thrombocytopenia     8. Neuro: Continue effexor   - Hx of multiple febrile seizures. Last occurred during college.      9. Pulm:   #mild intermittent hypoxia thought 2/2 FVO and bilateral pleural effusions; diuresis as above. Add incentive spirometer. Wean O2 as able.  # pleural effusions: thoracenteses 2/24   #COPD- remains on daily fluticasone and albuterol prn.      10. Cancer related pain: Remains on buprenorphine-naloxone and prn oxycodone. Oxycodone order changed to 10-20mg tid prn with a max of 50mg per 24 hours when she had GCSF related pain; 2/23 will resume her previous max of 30mg per 24 hours.      11. Musculoskeletal: history of frequent falls at home. PT/OT following.      12. Neuro: question of illness related delirium vs. unmasking of underlying dementia.  Patient has family history of dementia is grandmother, paternal uncle and father. Head CT showed mild to moderate changes of chronic small vessel ischemia. Plan to retest MOCA outpatient in clinic once she is no longer acutely ill. (Note: it was 11 this admission.)  Recommend neurocognitive testing before determining transplant plan.     I spent 40 minutes in the care of this patient today, which included time necessary for preparation for the visit, obtaining history, ordering medications/tests/procedures as medically indicated, review of pertinent medical literature, counseling of the patient, communication of recommendations to the care team, and documentation time.    Dispo: possible discharge next 1-2 days, pending thoracenteses. Note that she does have oxygen coverage, if it is needed at home.     Nidia Littlejohn PA-C  2/24/2022

## 2022-02-24 NOTE — PLAN OF CARE
"BP (!) 140/71 (BP Location: Left arm)   Pulse 90   Temp 98.1  F (36.7  C) (Oral)   Resp 18   Ht 1.626 m (5' 4\")   Wt 63.7 kg (140 lb 6.9 oz)   SpO2 94%   BMI 24.11 kg/m    VSS, AOx4 but very forgetful (short term), up with SBA to commode, pain remains the same, asking for pain meds frequently (needs to be reminded of when last dose was taken).  Thoracentesis has not happened today, no word from CAPS team as of yet.  If Patient does not discharge tomorrow, will need port needle changed.  Continue POC.  Problem: Adult Inpatient Plan of Care  Goal: Plan of Care Review  Outcome: Ongoing, Not Progressing  Flowsheets (Taken 2/23/2022 1901)  Plan of Care Reviewed With: patient  Goal: Optimal Comfort and Wellbeing  Outcome: Ongoing, Not Progressing     Problem: Pain Acute  Goal: Acceptable Pain Control and Functional Ability  Outcome: Ongoing, Not Progressing   Goal Outcome Evaluation:    Plan of Care Reviewed With: patient     Overall Patient Progress: no change               "

## 2022-02-24 NOTE — PROGRESS NOTES
Care Management Discharge Note    Discharge Date: 02/25/2022       Discharge Disposition: Home    Discharge Services: None    Discharge DME: None    Discharge Transportation: family or friend will provide    Private pay costs discussed: Not applicable    PAS Confirmation Code:    Patient/family educated on Medicare website which has current facility and service quality ratings:      Education Provided on the Discharge Plan:  Yes  Persons Notified of Discharge Plans: Pt  Patient/Family in Agreement with the Plan: yes    Handoff Referral Completed: No    Additional Information:  Per medical team pt is stable for discharge  tomorrw. MIKEY met with the pt she notes that she feels ready to return home. She understands she needs more testing done OP before coming for transplant. MIKEY provided the pt with her IMM, when asking her to sign she did flip the page upside down and start to sign. MIKEY corrected her and she was able to follow those instructions. MIKEY will message her primary BMT MD Dr. Guillory about possible need for Neuropsych eval prior to transport due to cognitive concerns and low MOCA assessment score.    IMM provided to the pt and informations explained. Pt signed form and copy provided to the pt.       ANDREW Bangura, [facility]  Phone [phone]  Pager [phone]

## 2022-02-24 NOTE — PLAN OF CARE
"BP (!) 143/92 (BP Location: Left arm)   Pulse 86   Temp 98.5  F (36.9  C) (Oral)   Resp 18   Ht 1.626 m (5' 4\")   Wt 63.7 kg (140 lb 6.9 oz)   SpO2 99%   BMI 24.11 kg/m      Remains afebrile, VSS on 1L O2 overnight. Coarse crackles bilaterally, diminished in bases. Mucinex given x1 for frequent cough. Pt is forgetful and often makes illogical/rambling statements. Oxy x1 and Robaxin x1 for chronic back pain. Good appetite, no BM overnight. Up with 1/GB to commode, pt unsteady on feet. Bed alarm on. Received 2g magnesium this AM, no other replacements needed. Port dressing and needle changed, hep locked. Plan is for thoracentesis today.    Problem: Adult Inpatient Plan of Care  Goal: Plan of Care Review  Outcome: Ongoing, Progressing  Flowsheets (Taken 2/24/2022 0152)  Plan of Care Reviewed With: patient  Goal: Optimal Comfort and Wellbeing  Outcome: Ongoing, Progressing     Problem: Pain Acute  Goal: Acceptable Pain Control and Functional Ability  Outcome: Ongoing, Progressing  Intervention: Develop Pain Management Plan  Recent Flowsheet Documentation  Taken 2/23/2022 2340 by Odalis Noland, RN  Pain Management Interventions:    medication (see MAR)    rest    repositioned  Intervention: Prevent or Manage Pain  Recent Flowsheet Documentation  Taken 2/23/2022 2000 by Odalis Noland, RN  Medication Review/Management: medications reviewed     Problem: Violence Risk or Actual  Goal: Anger and Impulse Control  Outcome: Ongoing, Progressing     Goal Outcome Evaluation:    Plan of Care Reviewed With: patient     Overall Patient Progress: no change               "

## 2022-02-25 ENCOUNTER — APPOINTMENT (OUTPATIENT)
Dept: PHYSICAL THERAPY | Facility: CLINIC | Age: 72
DRG: 152 | End: 2022-02-25
Payer: MEDICARE

## 2022-02-25 DIAGNOSIS — C90.01 MULTIPLE MYELOMA IN REMISSION (H): ICD-10-CM

## 2022-02-25 DIAGNOSIS — R53.81 PHYSICAL DECONDITIONING: Primary | ICD-10-CM

## 2022-02-25 LAB
ANION GAP SERPL CALCULATED.3IONS-SCNC: 4 MMOL/L (ref 3–14)
BACTERIA BLD CULT: NO GROWTH
BASOPHILS # BLD MANUAL: 0 10E3/UL (ref 0–0.2)
BASOPHILS NFR BLD MANUAL: 0 %
BUN SERPL-MCNC: 8 MG/DL (ref 7–30)
CALCIUM SERPL-MCNC: 7.9 MG/DL (ref 8.5–10.1)
CHLORIDE BLD-SCNC: 107 MMOL/L (ref 94–109)
CO2 SERPL-SCNC: 27 MMOL/L (ref 20–32)
CREAT SERPL-MCNC: 0.84 MG/DL (ref 0.52–1.04)
EOSINOPHIL # BLD MANUAL: 0 10E3/UL (ref 0–0.7)
EOSINOPHIL NFR BLD MANUAL: 0 %
ERYTHROCYTE [DISTWIDTH] IN BLOOD BY AUTOMATED COUNT: 14.6 % (ref 10–15)
GFR SERPL CREATININE-BSD FRML MDRD: 75 ML/MIN/1.73M2
GLUCOSE BLD-MCNC: 66 MG/DL (ref 70–99)
GLUCOSE BLDC GLUCOMTR-MCNC: 99 MG/DL (ref 70–99)
HCT VFR BLD AUTO: 23.5 % (ref 35–47)
HGB BLD-MCNC: 7.4 G/DL (ref 11.7–15.7)
LDH SERPL L TO P-CCNC: 272 U/L (ref 81–234)
LYMPHOCYTES # BLD MANUAL: 0.1 10E3/UL (ref 0.8–5.3)
LYMPHOCYTES NFR BLD MANUAL: 2 %
MAGNESIUM SERPL-MCNC: 2.1 MG/DL (ref 1.6–2.3)
MCH RBC QN AUTO: 31.6 PG (ref 26.5–33)
MCHC RBC AUTO-ENTMCNC: 31.5 G/DL (ref 31.5–36.5)
MCV RBC AUTO: 100 FL (ref 78–100)
METAMYELOCYTES # BLD MANUAL: 0.1 10E3/UL
METAMYELOCYTES NFR BLD MANUAL: 1 %
MONOCYTES # BLD MANUAL: 0.2 10E3/UL (ref 0–1.3)
MONOCYTES NFR BLD MANUAL: 4 %
MYELOCYTES # BLD MANUAL: 0.1 10E3/UL
MYELOCYTES NFR BLD MANUAL: 1 %
NEUTROPHILS # BLD MANUAL: 5.3 10E3/UL (ref 1.6–8.3)
NEUTROPHILS NFR BLD MANUAL: 92 %
PHOSPHATE SERPL-MCNC: 2.4 MG/DL (ref 2.5–4.5)
PLAT MORPH BLD: ABNORMAL
PLATELET # BLD AUTO: 131 10E3/UL (ref 150–450)
POTASSIUM BLD-SCNC: 3.7 MMOL/L (ref 3.4–5.3)
PROT SERPL-MCNC: 4.7 G/DL (ref 6.8–8.8)
RBC # BLD AUTO: 2.34 10E6/UL (ref 3.8–5.2)
RBC MORPH BLD: ABNORMAL
SODIUM SERPL-SCNC: 138 MMOL/L (ref 133–144)
WBC # BLD AUTO: 5.8 10E3/UL (ref 4–11)

## 2022-02-25 PROCEDURE — 250N000013 HC RX MED GY IP 250 OP 250 PS 637: Performed by: NURSE PRACTITIONER

## 2022-02-25 PROCEDURE — 250N000013 HC RX MED GY IP 250 OP 250 PS 637: Performed by: PHYSICIAN ASSISTANT

## 2022-02-25 PROCEDURE — 250N000011 HC RX IP 250 OP 636: Performed by: PHYSICIAN ASSISTANT

## 2022-02-25 PROCEDURE — 84155 ASSAY OF PROTEIN SERUM: CPT | Performed by: PHYSICIAN ASSISTANT

## 2022-02-25 PROCEDURE — 84100 ASSAY OF PHOSPHORUS: CPT | Performed by: PHYSICIAN ASSISTANT

## 2022-02-25 PROCEDURE — 250N000009 HC RX 250

## 2022-02-25 PROCEDURE — 258N000003 HC RX IP 258 OP 636

## 2022-02-25 PROCEDURE — 85027 COMPLETE CBC AUTOMATED: CPT | Performed by: STUDENT IN AN ORGANIZED HEALTH CARE EDUCATION/TRAINING PROGRAM

## 2022-02-25 PROCEDURE — 250N000013 HC RX MED GY IP 250 OP 250 PS 637: Performed by: HOSPITALIST

## 2022-02-25 PROCEDURE — 36415 COLL VENOUS BLD VENIPUNCTURE: CPT | Performed by: PHYSICIAN ASSISTANT

## 2022-02-25 PROCEDURE — 87040 BLOOD CULTURE FOR BACTERIA: CPT | Performed by: PHYSICIAN ASSISTANT

## 2022-02-25 PROCEDURE — 250N000013 HC RX MED GY IP 250 OP 250 PS 637

## 2022-02-25 PROCEDURE — 97530 THERAPEUTIC ACTIVITIES: CPT | Mod: GP | Performed by: PHYSICAL THERAPIST

## 2022-02-25 PROCEDURE — 99233 SBSQ HOSP IP/OBS HIGH 50: CPT

## 2022-02-25 PROCEDURE — 206N000001 HC R&B BMT UMMC

## 2022-02-25 PROCEDURE — 250N000013 HC RX MED GY IP 250 OP 250 PS 637: Performed by: STUDENT IN AN ORGANIZED HEALTH CARE EDUCATION/TRAINING PROGRAM

## 2022-02-25 PROCEDURE — 250N000013 HC RX MED GY IP 250 OP 250 PS 637: Performed by: INTERNAL MEDICINE

## 2022-02-25 PROCEDURE — 83615 LACTATE (LD) (LDH) ENZYME: CPT | Performed by: PHYSICIAN ASSISTANT

## 2022-02-25 PROCEDURE — 97116 GAIT TRAINING THERAPY: CPT | Mod: GP | Performed by: PHYSICAL THERAPIST

## 2022-02-25 PROCEDURE — 83735 ASSAY OF MAGNESIUM: CPT | Performed by: PHYSICIAN ASSISTANT

## 2022-02-25 PROCEDURE — 80048 BASIC METABOLIC PNL TOTAL CA: CPT | Performed by: STUDENT IN AN ORGANIZED HEALTH CARE EDUCATION/TRAINING PROGRAM

## 2022-02-25 RX ORDER — LORAZEPAM 1 MG/1
1 TABLET ORAL ONCE
Status: COMPLETED | OUTPATIENT
Start: 2022-02-25 | End: 2022-02-25

## 2022-02-25 RX ORDER — LIDOCAINE HYDROCHLORIDE 10 MG/ML
5-10 INJECTION, SOLUTION EPIDURAL; INFILTRATION; INTRACAUDAL; PERINEURAL ONCE
Status: DISCONTINUED | OUTPATIENT
Start: 2022-02-25 | End: 2022-02-25

## 2022-02-25 RX ORDER — POTASSIUM CHLORIDE 750 MG/1
10 TABLET, EXTENDED RELEASE ORAL ONCE
Status: COMPLETED | OUTPATIENT
Start: 2022-02-25 | End: 2022-02-25

## 2022-02-25 RX ORDER — LIDOCAINE HYDROCHLORIDE 10 MG/ML
1-10 INJECTION, SOLUTION EPIDURAL; INFILTRATION; INTRACAUDAL; PERINEURAL ONCE
Status: DISCONTINUED | OUTPATIENT
Start: 2022-02-25 | End: 2022-02-26 | Stop reason: HOSPADM

## 2022-02-25 RX ORDER — HALOPERIDOL 2 MG/1
2 TABLET ORAL EVERY 6 HOURS PRN
Status: DISCONTINUED | OUTPATIENT
Start: 2022-02-25 | End: 2022-02-26 | Stop reason: HOSPADM

## 2022-02-25 RX ADMIN — OXYCODONE HYDROCHLORIDE 10 MG: 10 TABLET ORAL at 04:23

## 2022-02-25 RX ADMIN — POTASSIUM CHLORIDE 10 MEQ: 750 TABLET, EXTENDED RELEASE ORAL at 08:42

## 2022-02-25 RX ADMIN — UMECLIDINIUM 1 PUFF: 62.5 AEROSOL, POWDER ORAL at 08:43

## 2022-02-25 RX ADMIN — BUPRENORPHINE AND NALOXONE 1 FILM: 8; 2 FILM, SOLUBLE BUCCAL; SUBLINGUAL at 13:48

## 2022-02-25 RX ADMIN — SUCRALFATE 1 G: 1 SUSPENSION ORAL at 12:08

## 2022-02-25 RX ADMIN — Medication 5 ML: at 04:13

## 2022-02-25 RX ADMIN — ACYCLOVIR 800 MG: 800 TABLET ORAL at 19:57

## 2022-02-25 RX ADMIN — OXYCODONE HYDROCHLORIDE 10 MG: 10 TABLET ORAL at 13:51

## 2022-02-25 RX ADMIN — BUPRENORPHINE AND NALOXONE 1 FILM: 8; 2 FILM, SOLUBLE BUCCAL; SUBLINGUAL at 19:57

## 2022-02-25 RX ADMIN — LEVOTHYROXINE SODIUM 112 MCG: 0.11 TABLET ORAL at 08:42

## 2022-02-25 RX ADMIN — PANTOPRAZOLE SODIUM 40 MG: 40 TABLET, DELAYED RELEASE ORAL at 08:42

## 2022-02-25 RX ADMIN — ALLOPURINOL 300 MG: 300 TABLET ORAL at 08:42

## 2022-02-25 RX ADMIN — BUPRENORPHINE AND NALOXONE 1 FILM: 8; 2 FILM, SOLUBLE BUCCAL; SUBLINGUAL at 08:42

## 2022-02-25 RX ADMIN — FUROSEMIDE 20 MG: 20 TABLET ORAL at 08:42

## 2022-02-25 RX ADMIN — ACETAMINOPHEN 650 MG: 325 TABLET, FILM COATED ORAL at 06:04

## 2022-02-25 RX ADMIN — OXYCODONE HYDROCHLORIDE 10 MG: 10 TABLET ORAL at 08:52

## 2022-02-25 RX ADMIN — LORAZEPAM 1 MG: 1 TABLET ORAL at 14:08

## 2022-02-25 RX ADMIN — LORATADINE 10 MG: 10 TABLET ORAL at 08:42

## 2022-02-25 RX ADMIN — POTASSIUM PHOSPHATE, MONOBASIC AND POTASSIUM PHOSPHATE, DIBASIC 9 MMOL: 224; 236 INJECTION, SOLUTION, CONCENTRATE INTRAVENOUS at 08:41

## 2022-02-25 RX ADMIN — FLUCONAZOLE 100 MG: 100 TABLET ORAL at 08:42

## 2022-02-25 RX ADMIN — METOPROLOL SUCCINATE 25 MG: 25 TABLET, EXTENDED RELEASE ORAL at 08:42

## 2022-02-25 RX ADMIN — VENLAFAXINE 75 MG: 75 TABLET ORAL at 08:42

## 2022-02-25 RX ADMIN — METHOCARBAMOL 500 MG: 500 TABLET ORAL at 13:51

## 2022-02-25 RX ADMIN — FLUTICASONE FUROATE AND VILANTEROL TRIFENATATE 1 PUFF: 100; 25 POWDER RESPIRATORY (INHALATION) at 08:43

## 2022-02-25 RX ADMIN — SUCRALFATE 1 G: 1 SUSPENSION ORAL at 16:31

## 2022-02-25 RX ADMIN — ACYCLOVIR 800 MG: 800 TABLET ORAL at 08:41

## 2022-02-25 RX ADMIN — RIVAROXABAN 20 MG: 20 TABLET, FILM COATED ORAL at 16:34

## 2022-02-25 RX ADMIN — SUCRALFATE 1 G: 1 SUSPENSION ORAL at 23:18

## 2022-02-25 RX ADMIN — AMLODIPINE BESYLATE 5 MG: 5 TABLET ORAL at 08:41

## 2022-02-25 RX ADMIN — ATORVASTATIN CALCIUM 40 MG: 20 TABLET, FILM COATED ORAL at 19:57

## 2022-02-25 ASSESSMENT — ACTIVITIES OF DAILY LIVING (ADL)
ADLS_ACUITY_SCORE: 21
ADLS_ACUITY_SCORE: 18
ADLS_ACUITY_SCORE: 18
ADLS_ACUITY_SCORE: 22
ADLS_ACUITY_SCORE: 21
ADLS_ACUITY_SCORE: 21
ADLS_ACUITY_SCORE: 18
ADLS_ACUITY_SCORE: 18
ADLS_ACUITY_SCORE: 21
ADLS_ACUITY_SCORE: 18
ADLS_ACUITY_SCORE: 21
ADLS_ACUITY_SCORE: 18
ADLS_ACUITY_SCORE: 17
ADLS_ACUITY_SCORE: 18
ADLS_ACUITY_SCORE: 21
ADLS_ACUITY_SCORE: 21
ADLS_ACUITY_SCORE: 22
ADLS_ACUITY_SCORE: 17

## 2022-02-25 NOTE — PLAN OF CARE
"BP (!) 123/98 (BP Location: Left arm)   Pulse 82   Temp 98.6  F (37  C) (Oral)   Resp 18   Ht 1.626 m (5' 4\")   Wt 59.1 kg (130 lb 4.7 oz)   SpO2 96%   BMI 22.36 kg/m    Port cath is patent and HL. At the start of this shift, patient was very angry this morning, but also delusional (thinks someone is in the closet; thinks she is \"locked up\" here), paranoid (questions everything the nurse is doing when giving meds, using computer) and having visual hallucinations (states that \"Dr. Ugarte\" was here). She refused to talk to us ( BMT team and me) and ordered us from the room. Patient c/o lower back pain and received oxycodone 10 mg po x 2, Robaxin x 1 with some relief. Patient was very restless during examination with the Thoracentesis team at the bedside and primary team order ativan 1 mg po x 1. Patient refused for the thoracentesis to be done at that time and waiting for the team to come back to do it lateral today. Potassium po x 1, K phos 9 mmol iv x 1 and will be recheck tomorrow with am labs. Blood culture is done from the port cath and Blood Culture Hand, Left by lab. UA & UC to be done. Patient is eating and drinking fair. Patient has a lot of hand tremors and needs assist with eating/ drinking. While the patient is sleeping, she has increased tremors all over her body,\"shaking\". Bed/chair alarm on. Patient's  and sister are at the bedside and very supportive, calming her down. Continue with plan of care and notify MD for status changes.     Goal Outcome Evaluation:    Plan of Care Reviewed With: patient, spouse, caregiver, sibling     Overall Patient Progress: no change               "

## 2022-02-25 NOTE — PLAN OF CARE
Patient has been assessed for Home Oxygen by a credentialed professional during activity/exercise and in a chronic stable state.    (Activity/Exercise should mimic patient s home activity/exercise and ADLs)     1) Resting saturation on Room Air: 96  2) Resting saturation on O2: - not tested    4) Activity/Exercise saturation on Room Air: 94   5) Activity/Exercise saturation on O2: -not tested      Please notify patient s RN Care Coordinator when you ve completed this dot phrase.

## 2022-02-25 NOTE — PLAN OF CARE
"[id number]  /71 (BP Location: Left arm)   Pulse 86   Temp 99.4  F (37.4  C) (Oral)   Resp 18   Ht 1.626 m (5' 4\")   Wt 62 kg (136 lb 11 oz)   SpO2 93%   BMI 23.46 kg/m    Tmax 99.4. O2 sating at 93% on RA while at rest, pt continues to require 4-6L NC for minimal activity to keep O2 sat>90%. OVSS. Pt continues to be confused, disoriented to time and place overnight. K 3.7, 10 mEq Po scheduled to be given with 0800 meds with recheck tomorrow morning. Phos 2.4, 9 mmol IV needed with recheck tomorrow morning. BG 66 this morning, apple juice and cookies given, 15 min recheck BG recovered to 99. Tylenol given x1 for pt c/o headache. 10 mg of oxycodone given x1 for pt c/o back pain. Pt denies N/V/D. No BM overnight. Voiding adequately. Up with assist x1, walker, and gait belt to bedside commode. Bed alarm on for pt safety. Plan for possible thoracentesis and discharge today. Continue with plan of care.    Problem: Adult Inpatient Plan of Care  Goal: Plan of Care Review  Outcome: Ongoing, Not Progressing  Flowsheets (Taken 2/25/2022 0428)  Plan of Care Reviewed With: patient  Overall Patient Progress: no change     Problem: Pain Acute  Goal: Acceptable Pain Control and Functional Ability  Outcome: Ongoing, Not Progressing     Problem: Pain Acute  Goal: Acceptable Pain Control and Functional Ability  Intervention: Develop Pain Management Plan  Recent Flowsheet Documentation  Taken 2/25/2022 0400 by Heike Garrett RN  Pain Management Interventions: medication (see MAR)     Problem: Pain Acute  Goal: Acceptable Pain Control and Functional Ability  Intervention: Prevent or Manage Pain  Recent Flowsheet Documentation  Taken 2/25/2022 0046 by Heike Garrett RN  Medication Review/Management: medications reviewed     Problem: Infection  Goal: Absence of Infection Signs and Symptoms  Outcome: Ongoing, Not Progressing     Problem: Infection  Goal: Absence of Infection Signs and Symptoms  Intervention: Prevent or " Manage Infection  Recent Flowsheet Documentation  Taken 2/25/2022 0046 by Heike Garrett RN  Isolation Precautions: protective environment maintained     Problem: Violence Risk or Actual  Goal: Anger and Impulse Control  Outcome: Ongoing, Not Progressing     Problem: Adult Inpatient Plan of Care  Goal: Absence of Hospital-Acquired Illness or Injury  Intervention: Identify and Manage Fall Risk  Recent Flowsheet Documentation  Taken 2/25/2022 0046 by Heike Garrett RN  Safety Promotion/Fall Prevention:    activity supervised    assistive device/personal items within reach    bed alarm on    clutter free environment maintained    fall prevention program maintained    increased rounding and observation    increase visualization of patient    lighting adjusted    mobility aid in reach    nonskid shoes/slippers when out of bed    patient and family education    room near nurse's station    room organization consistent    safety round/check completed    treat reversible contributory factors     Problem: Adult Inpatient Plan of Care  Goal: Absence of Hospital-Acquired Illness or Injury  Intervention: Prevent Skin Injury  Recent Flowsheet Documentation  Taken 2/25/2022 0046 by Heike Garrett RN  Body Position: position changed independently     Problem: Adult Inpatient Plan of Care  Goal: Absence of Hospital-Acquired Illness or Injury  Intervention: Prevent and Manage VTE (Venous Thromboembolism) Risk  Recent Flowsheet Documentation  Taken 2/25/2022 0046 by Heike Garrett RN  VTE Prevention/Management: SCDs (sequential compression devices) off  Activity Management: activity adjusted per tolerance     Problem: Adult Inpatient Plan of Care  Goal: Absence of Hospital-Acquired Illness or Injury  Intervention: Prevent Infection  Recent Flowsheet Documentation  Taken 2/25/2022 0046 by Heike Garrett RN  Infection Prevention:    visitors restricted/screened    single patient room provided    rest/sleep  promoted    personal protective equipment utilized    hand hygiene promoted    equipment surfaces disinfected    environmental surveillance performed   Goal Outcome Evaluation:    Plan of Care Reviewed With: patient     Overall Patient Progress: no change

## 2022-02-25 NOTE — PLAN OF CARE
"Care Coordination  Discharge Planning     Line Company:  NA - central line removed 2/22; pt only has port-a-cath, which can be managed in clinic  Referral made for line care:  No (previously was with [facility]; canceled pt with [facility] on 2/22 following CVC removal)  IV medications needed at discharge:  None   Pharmacy concerns:  None. (Of note, [facility] requires prior auth)     PT/OT/therapies recommended:  Home PT/OT/SW through [facility] [phone] with start of care 3/2/22         **On Mon 2/21, pt and  received PT/OT education from inpatient PT/OT staff for discharge   Referral made for PT/OT/therapies:  Yes - Order for Home PT/OT/SW was placed on 2/25 (for possible weekend discharge), indicating Home Care to start on 3/2/22.    Home O2:  Does have coverage for this if needed, however it has not been arranged d/t walking sats on 2/25 in the 90s on RA (no longer qualifying for Home O2). If Home O2 is needed at discharge, will need new walking sats (and new O2 order placed) within 48hrs of discharge.              [facility]              Ph. [phone]              Fax. [phone]    [place] location:  [place]  Has placement need been communicated to Social Work?  NA    NC Teaching time arranged with patient/caregiver:  Spoke with pt on 2/21 to discuss home care needs (pt is agreeable to the plan above) and line removal. Pt is aware that Home Care will be calling her to arrange for first home visit date/time.  Notify nurse to schedule line care class/ DM teaching, prior to d/c:  NA - see above    Caregiver:  Tres \"Jerrica\" Linda () [phone]    Leisa Grimaldo (sister) [phone]      Kiara Stewart, RN, BSN  RN Care Coordinator - BMT  Ph [phone]  Pg [phone]   "

## 2022-02-25 NOTE — PROGRESS NOTES
"BMT Daily Progress Note      ID: Libby Grimalod is a 70 yo woman D+26 s/p Cytoxan chemo priming prior to planned auto PSBCT for MM; s/p 2 days apheresis.      INTERVAL  HISTORY   Libby was very angry this morning, but also delusional (thinks someone is in the closet; thinks she is \"locked up\" here), paranoid (questions everything the nurse is doing when giving meds, using computer) and having visual hallucinations (states that \"Dr. Ugarte\" was here). She refused to talk with me and ordered me from the room.    Later, when seen by Dr. Quevedo, she was oriented x 3 and had insight into her confusion.    Her family notes that she seems much more confused than usual, even later in the day.     Review of Systems:   ROS not possible due to patient's mental status   PHYSICAL EXAM      Patient is well perfused; sats in the 90's at rest.  Refuses rest of exam.  She is agitated, delusional and hallucinating.     KPS:  70  Blood pressure 136/71, pulse 86, temperature 99.4  F (37.4  C), temperature source Oral, resp. rate 18, height 1.626 m (5' 4\"), weight 62 kg (136 lb 11 oz), SpO2 93 %.    Labs:  Lab Results   Component Value Date    WBC 5.8 02/25/2022    ANEU 5.3 02/25/2022    HGB 7.4 (L) 02/25/2022    HCT 23.5 (L) 02/25/2022     (L) 02/25/2022     02/25/2022    POTASSIUM 3.7 02/25/2022    CHLORIDE 107 02/25/2022    CO2 27 02/25/2022    GLC 99 02/25/2022    BUN 8 02/25/2022    CR 0.84 02/25/2022    MAG 2.1 02/25/2022    INR 1.29 (H) 02/11/2022    BILITOTAL 0.8 02/21/2022    AST 20 02/21/2022    ALT 14 02/21/2022    ALKPHOS 171 (H) 02/21/2022    PROTTOTAL 5.0 (L) 02/24/2022    ALBUMIN 2.2 (L) 02/21/2022       I have assessed all abnormal lab values for their clinical significance and any values considered clinically significant have been addressed in the assessment and plan.          SYSTEMS-BASED ASSESSMENT AND PLAN   Libby Grimaldo is a 70 yo woman D+26 s/p Cytoxan chemo priming prior to planned auto PSBCT for " MM        1. BMT  - BMT MD/Coordinator: Dr. Julio C Guillory/Rashad Chen  - Cytoxan 2/1  - Plan for allopurinol 300mg daily for at least 7 days.   - GCSF 10 mcg/kg started day +5 (2/5/22), continue through stem cell collections-last dose 2/20. Moved GCSF to morning to see if fevers follow suit.  - s/p Mozobil 2/18, 2/19  - collected 3.94 x10^6 CD34/kg after 2 days (goal 5).  Allowing time for recovery post chemo-priming before proceeding with transplant.  Recommend neurocognitive testing as part of review.  Also recommend amyloidosis work-up with cardiac MRI, given she has concentric LV thickening and diastolic dysfunction.   Will need PICC placed for transplant.     2. HEME/COAG  - Transfusion parameters: hgb <7g and plts <50 (anticoag).   - anemia, thrombocytopenia secondary to chemo  - mild leukocytosis 2/2 GCSF/Mozobil    - 2/15: new non-occlusive LUE DVT. Initially on heparin gtt; then eliquis; held evening dose 2/22 due to epistaxis; resumed on xarelto 2/23 (insurance preference).  - Afrin prn epistaxis    3. ID:   - neutropenic fevers have resolved; w/u showed strep throat.  Cefepime 2/11-2/13; Zosyn 2/13-2/17; 2/19-2/21.   - check surveillance blood cultures, UA/UC 2/25 due to altered mental status     #Prophy: ACV, fluconazole.       4. GI:   - constipation secondary to narcotics: senna bid prn  - Protonix 40mg/d; prn Tums. Scheduled carafate.  - Anti-emetics:  prn zofran, prn compazine     5. RENAL/FEN  - mild hyperphos (recent hypophos): holding KPhos; monitor.    - 2/23 began daily lasix 20mg PO for fluid balance maintenance, given diastolic dysfunction  Replete lytes prn per sliding scale.   #Moderate malnutrition in the context of acute on chronic illness      6. Endo: hypothyroidism secondary to therapy for Graves Disease- continue levothyroxine     7. CARDS: On metoprolol and norvasc for hypertension.  Prn hydralazine.   Hyperlipidemia: Lipitor   ASA: hold x2/9 with thrombocytopenia     8. Neuro: Hold  effexor starting 2/25 due to concern for possible contribution to AMS  - HOLD PM seroquel (started this admission due to possible sundowning), as possible contributor to AMS  - Hx of multiple febrile seizures. Last occurred during college.      9. Pulm:   #mild intermittent hypoxia thought 2/2 FVO and bilateral pleural effusions; diuresis as above. Add incentive spirometer. Wean O2 as able.  # pleural effusions: thoracenteses 2/25 pending.  # hypoxia: note that yesterday, her sats were 76% when walking on room air; 2/25 recheck indicated she stayed in the 90's and thus does not qualify for oxygen at home.  #COPD- remains on daily fluticasone and albuterol prn.      10. Cancer related pain: Remains on buprenorphine-naloxone and prn oxycodone. Oxycodone order changed to 10-20mg tid prn with a max of 50mg per 24 hours when she had GCSF related pain; 2/23 resumed her previous max of 30mg per 24 hours.      11. Musculoskeletal: history of frequent falls at home. PT/OT following. They recommend TCU, but Libby declines.      12. Neuro: question of illness related delirium vs. unmasking of underlying dementia.  Patient has family history of dementia is grandmother, paternal uncle and father. Head CT showed mild to moderate changes of chronic small vessel ischemia. Plan to retest MOCA outpatient in clinic once she is no longer acutely ill. (Note: it was 11 this admission.)  Recommend neurocognitive testing before determining transplant plan.     I spent 60 minutes in the care of this patient today, which included time necessary for preparation for the visit, obtaining history, ordering medications/tests/procedures as medically indicated, review of pertinent medical literature, counseling of the patient, communication of recommendations to the care team, and documentation time.    Dispo: possible discharge next 1-2 days, pending thoracenteses and safe discharge plan.      Nidia Littlejohn PA-C  2/25/2022

## 2022-02-25 NOTE — PROGRESS NOTES
This is a recent snapshot of the patient's [facility] medical record.  For current drug dose and complete information and questions, call [phone] or In Basket pool, [facility] home infusion ([id number])  CSN Number:  [id number]

## 2022-02-25 NOTE — PLAN OF CARE
Temp: 97.4  F (36.3  C) Temp src: Oral BP: 137/63 Pulse: 89   Resp: 16 SpO2: 95 % O2 Device: None (Room air)     Pt confused and disoriented to situation, time, and place.  POC reviewed with family to have thoracentesis tomorrow before discharge.  Pt fixated on testifying to a jury tomorrow regardless of how POC was reviewed.  Pt very anxious and concerned about testifying.  SpO2>95% on RA while at rest, but SpO2 drops to the 80's just sitting at the bedside and having CHG wipes completed.  4-6L O2 needed to keep SpO2>90% with minimal activity.  Bed alarm on for safety; does not call appropriately.      Goal Outcome Evaluation:    Plan of Care Reviewed With: patient, sister,     Overall Patient Progress: no change

## 2022-02-26 ENCOUNTER — APPOINTMENT (OUTPATIENT)
Dept: OCCUPATIONAL THERAPY | Facility: CLINIC | Age: 72
DRG: 152 | End: 2022-02-26
Payer: MEDICARE

## 2022-02-26 ENCOUNTER — HOME INFUSION (PRE-WILLOW HOME INFUSION) (OUTPATIENT)
Dept: PHARMACY | Facility: CLINIC | Age: 72
End: 2022-02-26

## 2022-02-26 VITALS
RESPIRATION RATE: 18 BRPM | WEIGHT: 131.6 LBS | HEART RATE: 77 BPM | BODY MASS INDEX: 22.47 KG/M2 | TEMPERATURE: 98.5 F | DIASTOLIC BLOOD PRESSURE: 55 MMHG | HEIGHT: 64 IN | OXYGEN SATURATION: 95 % | SYSTOLIC BLOOD PRESSURE: 137 MMHG

## 2022-02-26 LAB
ALBUMIN UR-MCNC: NEGATIVE MG/DL
ANION GAP SERPL CALCULATED.3IONS-SCNC: 3 MMOL/L (ref 3–14)
APPEARANCE UR: CLEAR
BACTERIA SPEC CULT: NORMAL
BASOPHILS # BLD AUTO: 0 10E3/UL (ref 0–0.2)
BASOPHILS NFR BLD AUTO: 1 %
BILIRUB UR QL STRIP: NEGATIVE
BUN SERPL-MCNC: 7 MG/DL (ref 7–30)
CALCIUM SERPL-MCNC: 7.9 MG/DL (ref 8.5–10.1)
CHLORIDE BLD-SCNC: 109 MMOL/L (ref 94–109)
CO2 SERPL-SCNC: 27 MMOL/L (ref 20–32)
COLOR UR AUTO: ABNORMAL
CREAT SERPL-MCNC: 0.78 MG/DL (ref 0.52–1.04)
DACRYOCYTES BLD QL SMEAR: SLIGHT
EOSINOPHIL # BLD AUTO: 0 10E3/UL (ref 0–0.7)
EOSINOPHIL NFR BLD AUTO: 0 %
ERYTHROCYTE [DISTWIDTH] IN BLOOD BY AUTOMATED COUNT: 15 % (ref 10–15)
GFR SERPL CREATININE-BSD FRML MDRD: 82 ML/MIN/1.73M2
GLUCOSE BLD-MCNC: 70 MG/DL (ref 70–99)
GLUCOSE UR STRIP-MCNC: NEGATIVE MG/DL
HCT VFR BLD AUTO: 24.7 % (ref 35–47)
HGB BLD-MCNC: 7.7 G/DL (ref 11.7–15.7)
HGB UR QL STRIP: ABNORMAL
IMM GRANULOCYTES # BLD: 0.3 10E3/UL
IMM GRANULOCYTES NFR BLD: 4 %
KETONES UR STRIP-MCNC: NEGATIVE MG/DL
LEUKOCYTE ESTERASE UR QL STRIP: NEGATIVE
LYMPHOCYTES # BLD AUTO: 0.9 10E3/UL (ref 0.8–5.3)
LYMPHOCYTES NFR BLD AUTO: 15 %
MAGNESIUM SERPL-MCNC: 1.9 MG/DL (ref 1.6–2.3)
MCH RBC QN AUTO: 30.9 PG (ref 26.5–33)
MCHC RBC AUTO-ENTMCNC: 31.2 G/DL (ref 31.5–36.5)
MCV RBC AUTO: 99 FL (ref 78–100)
MONOCYTES # BLD AUTO: 1.2 10E3/UL (ref 0–1.3)
MONOCYTES NFR BLD AUTO: 19 %
NEUTROPHILS # BLD AUTO: 3.9 10E3/UL (ref 1.6–8.3)
NEUTROPHILS NFR BLD AUTO: 61 %
NITRATE UR QL: NEGATIVE
NRBC # BLD AUTO: 0 10E3/UL
NRBC BLD AUTO-RTO: 0 /100
PH UR STRIP: 8 [PH] (ref 5–7)
PHOSPHATE SERPL-MCNC: 2.6 MG/DL (ref 2.5–4.5)
PLAT MORPH BLD: ABNORMAL
PLATELET # BLD AUTO: 187 10E3/UL (ref 150–450)
POTASSIUM BLD-SCNC: 4.2 MMOL/L (ref 3.4–5.3)
RBC # BLD AUTO: 2.49 10E6/UL (ref 3.8–5.2)
RBC MORPH BLD: ABNORMAL
RBC URINE: 1 /HPF
SODIUM SERPL-SCNC: 139 MMOL/L (ref 133–144)
SP GR UR STRIP: 1.01 (ref 1–1.03)
UROBILINOGEN UR STRIP-MCNC: NORMAL MG/DL
WBC # BLD AUTO: 6.4 10E3/UL (ref 4–11)
WBC URINE: <1 /HPF

## 2022-02-26 PROCEDURE — 250N000013 HC RX MED GY IP 250 OP 250 PS 637: Performed by: STUDENT IN AN ORGANIZED HEALTH CARE EDUCATION/TRAINING PROGRAM

## 2022-02-26 PROCEDURE — 258N000003 HC RX IP 258 OP 636

## 2022-02-26 PROCEDURE — 81001 URINALYSIS AUTO W/SCOPE: CPT | Performed by: PHYSICIAN ASSISTANT

## 2022-02-26 PROCEDURE — 250N000013 HC RX MED GY IP 250 OP 250 PS 637: Performed by: NURSE PRACTITIONER

## 2022-02-26 PROCEDURE — 97530 THERAPEUTIC ACTIVITIES: CPT | Mod: GO

## 2022-02-26 PROCEDURE — 250N000009 HC RX 250

## 2022-02-26 PROCEDURE — 250N000011 HC RX IP 250 OP 636: Performed by: PHYSICIAN ASSISTANT

## 2022-02-26 PROCEDURE — 87086 URINE CULTURE/COLONY COUNT: CPT | Performed by: PHYSICIAN ASSISTANT

## 2022-02-26 PROCEDURE — 250N000013 HC RX MED GY IP 250 OP 250 PS 637: Performed by: PHYSICIAN ASSISTANT

## 2022-02-26 PROCEDURE — 84100 ASSAY OF PHOSPHORUS: CPT | Performed by: PHYSICIAN ASSISTANT

## 2022-02-26 PROCEDURE — 250N000013 HC RX MED GY IP 250 OP 250 PS 637

## 2022-02-26 PROCEDURE — 85025 COMPLETE CBC W/AUTO DIFF WBC: CPT | Performed by: STUDENT IN AN ORGANIZED HEALTH CARE EDUCATION/TRAINING PROGRAM

## 2022-02-26 PROCEDURE — 250N000011 HC RX IP 250 OP 636

## 2022-02-26 PROCEDURE — 97535 SELF CARE MNGMENT TRAINING: CPT | Mod: GO

## 2022-02-26 PROCEDURE — 82310 ASSAY OF CALCIUM: CPT | Performed by: STUDENT IN AN ORGANIZED HEALTH CARE EDUCATION/TRAINING PROGRAM

## 2022-02-26 PROCEDURE — 83735 ASSAY OF MAGNESIUM: CPT

## 2022-02-26 RX ORDER — VENLAFAXINE 75 MG/1
75 TABLET ORAL 2 TIMES DAILY WITH MEALS
Status: DISCONTINUED | OUTPATIENT
Start: 2022-02-26 | End: 2022-02-26 | Stop reason: HOSPADM

## 2022-02-26 RX ORDER — MAGNESIUM SULFATE HEPTAHYDRATE 40 MG/ML
2 INJECTION, SOLUTION INTRAVENOUS ONCE
Status: COMPLETED | OUTPATIENT
Start: 2022-02-26 | End: 2022-02-26

## 2022-02-26 RX ORDER — FLUCONAZOLE 200 MG/1
100 TABLET ORAL DAILY
Qty: 30 TABLET | Refills: 0 | COMMUNITY
Start: 2022-02-26 | End: 2022-04-05

## 2022-02-26 RX ORDER — VENLAFAXINE 75 MG/1
75 TABLET ORAL 3 TIMES DAILY
COMMUNITY
Start: 2022-02-26 | End: 2022-06-01

## 2022-02-26 RX ORDER — SUCRALFATE ORAL 1 G/10ML
1 SUSPENSION ORAL
Qty: 420 ML | Refills: 0 | Status: SHIPPED | OUTPATIENT
Start: 2022-02-26 | End: 2022-03-28

## 2022-02-26 RX ADMIN — LORATADINE 10 MG: 10 TABLET ORAL at 09:11

## 2022-02-26 RX ADMIN — SUCRALFATE 1 G: 1 SUSPENSION ORAL at 09:01

## 2022-02-26 RX ADMIN — SUCRALFATE 1 G: 1 SUSPENSION ORAL at 12:05

## 2022-02-26 RX ADMIN — FLUCONAZOLE 100 MG: 100 TABLET ORAL at 09:11

## 2022-02-26 RX ADMIN — ALLOPURINOL 300 MG: 300 TABLET ORAL at 09:11

## 2022-02-26 RX ADMIN — MAGNESIUM SULFATE HEPTAHYDRATE 2 G: 40 INJECTION, SOLUTION INTRAVENOUS at 09:05

## 2022-02-26 RX ADMIN — BUPRENORPHINE AND NALOXONE 1 FILM: 8; 2 FILM, SOLUBLE BUCCAL; SUBLINGUAL at 09:17

## 2022-02-26 RX ADMIN — METOPROLOL SUCCINATE 25 MG: 25 TABLET, EXTENDED RELEASE ORAL at 09:11

## 2022-02-26 RX ADMIN — FUROSEMIDE 20 MG: 20 TABLET ORAL at 09:11

## 2022-02-26 RX ADMIN — UMECLIDINIUM 1 PUFF: 62.5 AEROSOL, POWDER ORAL at 09:11

## 2022-02-26 RX ADMIN — ACYCLOVIR 800 MG: 800 TABLET ORAL at 09:11

## 2022-02-26 RX ADMIN — OXYCODONE HYDROCHLORIDE 20 MG: 10 TABLET ORAL at 15:07

## 2022-02-26 RX ADMIN — VENLAFAXINE 75 MG: 75 TABLET ORAL at 12:05

## 2022-02-26 RX ADMIN — OXYCODONE HYDROCHLORIDE 20 MG: 10 TABLET ORAL at 09:17

## 2022-02-26 RX ADMIN — Medication 5 ML: at 04:54

## 2022-02-26 RX ADMIN — LEVOTHYROXINE SODIUM 112 MCG: 0.11 TABLET ORAL at 09:11

## 2022-02-26 RX ADMIN — FLUTICASONE FUROATE AND VILANTEROL TRIFENATATE 1 PUFF: 100; 25 POWDER RESPIRATORY (INHALATION) at 09:11

## 2022-02-26 RX ADMIN — PANTOPRAZOLE SODIUM 40 MG: 40 TABLET, DELAYED RELEASE ORAL at 09:11

## 2022-02-26 RX ADMIN — OXYCODONE HYDROCHLORIDE 10 MG: 10 TABLET ORAL at 02:19

## 2022-02-26 RX ADMIN — POTASSIUM PHOSPHATE, MONOBASIC AND POTASSIUM PHOSPHATE, DIBASIC 9 MMOL: 224; 236 INJECTION, SOLUTION, CONCENTRATE INTRAVENOUS at 10:18

## 2022-02-26 RX ADMIN — METHOCARBAMOL 500 MG: 500 TABLET ORAL at 06:12

## 2022-02-26 RX ADMIN — AMLODIPINE BESYLATE 5 MG: 5 TABLET ORAL at 09:11

## 2022-02-26 RX ADMIN — LOPERAMIDE HYDROCHLORIDE 2 MG: 2 CAPSULE ORAL at 06:12

## 2022-02-26 RX ADMIN — BUPRENORPHINE AND NALOXONE 1 FILM: 8; 2 FILM, SOLUBLE BUCCAL; SUBLINGUAL at 14:05

## 2022-02-26 ASSESSMENT — ACTIVITIES OF DAILY LIVING (ADL)
ADLS_ACUITY_SCORE: 19
ADLS_ACUITY_SCORE: 21
ADLS_ACUITY_SCORE: 19
ADLS_ACUITY_SCORE: 19
ADLS_ACUITY_SCORE: 21
ADLS_ACUITY_SCORE: 19

## 2022-02-26 NOTE — PLAN OF CARE
Physical Therapy Discharge Summary    Reason for therapy discharge:    Discharged to home.    Progress towards therapy goal(s). See goals on Care Plan in [facility] electronic health record for goal details.  Goals not met.  Barriers to achieving goals:   limited tolerance for therapy and discharge from facility.    Therapy recommendation(s):    Continued therapy is recommended.  Rationale/Recommendations:  Per discussion with OT and RN, patient should discharge to TCU due to safety concerns with balance and cognitive status .

## 2022-02-26 NOTE — PROGRESS NOTES
"BMT Daily Progress Note      ID: Libby Grimaldo is a 72 yo woman D+27 s/p Cytoxan chemo priming prior to planned auto PSBCT for MM; s/p 2 days apheresis.      INTERVAL  HISTORY   Libby is alert and oriented x3 this morning but very fidgety in bed. She denies any hallucinations this morning and says yesterday she had \"floaters.\" Denies diarrhea. Hasn't eaten yet this morning. She is asking about \"getting the fluid off today.\"      Review of Systems:   ROS otherwise negative    PHYSICAL EXAM        KPS:  70  Blood pressure (!) 150/59, pulse 84, temperature 99.2  F (37.3  C), temperature source Oral, resp. rate 18, height 1.626 m (5' 4\"), weight 59.7 kg (131 lb 9.6 oz), SpO2 93 %.    Labs:  Lab Results   Component Value Date    WBC 6.4 02/26/2022    ANEU 5.3 02/25/2022    HGB 7.7 (L) 02/26/2022    HCT 24.7 (L) 02/26/2022     02/26/2022     02/26/2022    POTASSIUM 4.2 02/26/2022    CHLORIDE 109 02/26/2022    CO2 27 02/26/2022    GLC 70 02/26/2022    BUN 7 02/26/2022    CR 0.78 02/26/2022    MAG 1.9 02/26/2022    INR 1.29 (H) 02/11/2022    BILITOTAL 0.8 02/21/2022    AST 20 02/21/2022    ALT 14 02/21/2022    ALKPHOS 171 (H) 02/21/2022    PROTTOTAL 4.7 (L) 02/25/2022    ALBUMIN 2.2 (L) 02/21/2022       I have assessed all abnormal lab values for their clinical significance and any values considered clinically significant have been addressed in the assessment and plan.          SYSTEMS-BASED ASSESSMENT AND PLAN   Libby Grimaldo is a 72 yo woman D+27 s/p Cytoxan chemo priming prior to planned auto PSBCT for MM        1. BMT  - BMT MD/Coordinator: Dr. Julio C Guillory/Rashad Chen  - Cytoxan 2/1  - Plan for allopurinol 300mg daily for at least 7 days.   - GCSF 10 mcg/kg started day +5 (2/5/22), continue through stem cell collections-last dose 2/20. Moved GCSF to morning to see if fevers follow suit.  - s/p Mozobil 2/18, 2/19  - collected 3.94 x10^6 CD34/kg after 2 days (goal 5).  Allowing time for recovery post " chemo-priming before proceeding with transplant.  Recommend neurocognitive testing as part of review.  Also recommend amyloidosis work-up with cardiac MRI, given she has concentric LV thickening and diastolic dysfunction.   Will need PICC placed for transplant.     2. HEME/COAG  - Transfusion parameters: hgb <7g and plts <50 (anticoag).   - anemia, thrombocytopenia secondary to chemo  - mild leukocytosis 2/2 GCSF/Mozobil    - 2/15: new non-occlusive LUE DVT. Initially on heparin gtt; then eliquis; held evening dose 2/22 due to epistaxis; resumed on xarelto 2/23 (insurance preference).  - Afrin prn epistaxis    3. ID:   - neutropenic fevers have resolved; w/u showed strep throat.  Cefepime 2/11-2/13; Zosyn 2/13-2/17; 2/19-2/21.   - check surveillance blood cultures, UA/UC 2/25 due to altered mental status-negative     #Prophy: ACV, fluconazole.       4. GI:   - constipation secondary to narcotics: senna bid prn  - Protonix 40mg/d; prn Tums. Scheduled carafate.  - Anti-emetics:  prn zofran, prn compazine     5. RENAL/FEN  - mild hyperphos (recent hypophos): holding KPhos; monitor.    - 2/23 began daily lasix 20mg PO for fluid balance maintenance, given diastolic dysfunction  Replete lytes prn per sliding scale.   #Moderate malnutrition in the context of acute on chronic illness      6. Endo: hypothyroidism secondary to therapy for Graves Disease- continue levothyroxine     7. CARDS: On metoprolol and norvasc for hypertension.  Prn hydralazine.   Hyperlipidemia: Lipitor   ASA: hold x2/9 with thrombocytopenia     8. Neuro: Hold effexor starting 2/25 due to concern for possible contribution to AMS  - HOLD PM seroquel (started this admission due to possible sundowning), as possible contributor to AMS  - Hx of multiple febrile seizures. Last occurred during college.      9. Pulm:   #mild intermittent hypoxia thought 2/2 FVO and bilateral pleural effusions; diuresis as above. Add incentive spirometer. Wean O2 as able.  #  pleural effusions: thoracenteses pending.  # hypoxia: note that 2/24, her sats were 76% when walking on room air; 2/25 recheck indicated she stayed in the 90's and thus does not qualify for oxygen at home.  #COPD- remains on daily fluticasone and albuterol prn.      10. Cancer related pain: Remains on buprenorphine-naloxone and prn oxycodone. Oxycodone order changed to 10-20mg tid prn with a max of 50mg per 24 hours when she had GCSF related pain; 2/23 resumed her previous max of 30mg per 24 hours.      11. Musculoskeletal: history of frequent falls at home. PT/OT following. They recommend TCU, but Libby declines.      12. Neuro: question of illness related delirium vs. unmasking of underlying dementia.  Patient has family history of dementia is grandmother, paternal uncle and father. Head CT showed mild to moderate changes of chronic small vessel ischemia. Plan to retest MOCA outpatient in clinic once she is no longer acutely ill. (Note: it was 11 this admission.)  Recommend neurocognitive testing before determining transplant plan.     I spent 30 minutes in the care of this patient today, which included time necessary for preparation for the visit, obtaining history, ordering medications/tests/procedures as medically indicated, review of pertinent medical literature, counseling of the patient, communication of recommendations to the care team, and documentation time.    Dispo: possible discharge next 1-2 days, pending thoracenteses and safe discharge plan.      Yesenia Butler NP

## 2022-02-26 NOTE — PLAN OF CARE
"/55 (BP Location: Left arm)   Pulse 77   Temp 98.5  F (36.9  C) (Oral)   Resp 18   Ht 1.626 m (5' 4\")   Wt 59.7 kg (131 lb 9.6 oz)   SpO2 95%   BMI 22.59 kg/m    Port cath is patent and HL. Patient pleasantly confused during this shift,  disoriented to time, place, and situation. Patient continues to have visual hallucinations and to make illogical/incoherent statements. Patient continue to c/o lower back and bilateral arms pain. Patient received oxycodone 20 mg po x 2 with some relief. Magnesium and phos are replaced. Patient is eating and drinking fair with assist to eat, order meals. Patient continue to have increase muscle jerking and tremors. Patient is up with one assist + walker + gait belt due to generalized weakness. Discharge instructions were explained to the patient and , sister at the bedside. Patient's  had questions and the primary care team were notified and answered the 's question. Patient does not have follow appointment set up yet and the if the BMT Clinic does not call the patient or the caregiver before Monday, then the /sister to call the BMT Clinic follow up visit. Continue with plan of care and notify MD for status changes.     Goal Outcome Evaluation:    Plan of Care Reviewed With: patient, spouse, caregiver, sibling     Overall Patient Progress: Primary care team discharge the patient to home.                "

## 2022-02-26 NOTE — PROGRESS NOTES
Pt discharged to: Home.   Via:Car.   Accompanied by: , sister.   Belongings: With patient.   Teaching: Done by the RN and patient with her , sister verbalized understanding of the discharge teaching.   Clinic appointment: No Clinic follow up visit set up yet. If the BMT Clinic does not call before Monday, then the / sister to call the BMT Clinic for follow up appointment.   Report called/faxed: N/A.   Local housing: Within 30 minutes.

## 2022-02-26 NOTE — DISCHARGE SUMMARY
"[facility] Discharge Summary   Libby Grimaldo MRN# [id number]   Age: 71 year old  YOB: 1950   Date of Admission: 2/10/2022  Date of Discharge:  2/26/22  Admitting Physician: Peña Hernandez MD  Discharge Physician:    Discharge Diagnoses:    1. D+27 s/p Cytoxan chemo priming   2. DVT  3. S/p strep throat  4. Moderate malnutrition in the context of acute on chronic illness   5. Hypothyroidism  6. HTN  7. AMS resolved  8. COPD  9. Cancer related pain  10. S/p readmission for neutropenic fever    Discharge Medications:         Medication List      Started    rivaroxaban ANTICOAGULANT 20 MG Tabs tablet  Commonly known as: XARELTO  20 mg, Oral, DAILY WITH SUPPER     sucralfate 1 GM/10ML suspension  Commonly known as: CARAFATE  1 g, Oral, 4 TIMES DAILY BEFORE MEALS & NIGHTLY        Modified    fluconazole 200 MG tablet  Commonly known as: DIFLUCAN  What changed: how much to take     venlafaxine 75 MG tablet  Commonly known as: EFFEXOR  What changed: when to take this        Discontinued    allopurinol 300 MG tablet  Commonly known as: ZYLOPRIM     levofloxacin 250 MG tablet  Commonly known as: LEVAQUIN     loratadine 10 MG tablet  Commonly known as: CLARITIN     ondansetron 4 MG ODT tab  Commonly known as: ZOFRAN-ODT          Brief History of Illness:    **Adopted from [facility]  Libby Grimaldo is a 72 yo woman D+10 s/p Cytoxan chemo priming prior to planned auto PSBCT for MM.  She is being admitted for neutropenic fever.  Patient is mildly confused.  Called  and went to [facility] and then called sister.  Per sister, patient has been in her usual state of health until about 1-2 days ago when she has been feeling unwell.  Patient says she doesn't feel good, can't really elaborate on it.   Physical Exam:    BP (!) 150/59 (BP Location: Left arm)   Pulse 84   Temp 99.2  F (37.3  C) (Oral)   Resp 18   Ht 1.626 m (5' 4\")   Wt 59.7 kg (131 lb 9.6 oz)   SpO2 93%   BMI 22.59 kg/m    Physical " Exam  Constitutional:       Comments:     HENT:      Head: Normocephalic and atraumatic.      Comments: alopecia     Nose: No rhinorrhea.   Eyes:      Conjunctiva/sclera: Conjunctivae normal.      Comments: Bilateral exophthalmos   Pulmonary:      Effort: Pulmonary effort is normal.      Comments: Crackles bilateral bases; decreased right base.  Musculoskeletal:      Right lower leg: No edema.      Left lower leg: No edema.   Skin:     General: Skin is warm and dry.      Coloration: Skin is pale. Skin is not jaundiced.   Neurological:      Mental Status: She is alert. Mental status is at baseline.   Psychiatric:      Comments: unable to stay still  Hospital Course:    Libby Grimaldo is a 72 yo woman D+27 s/p Cytoxan chemo priming prior to planned auto PSBCT for MM        1. BMT  - BMT MD/Coordinator: Dr. Julio C Guillory/Rashad Chen  - Cytoxan 2/1  - Plan for allopurinol 300mg daily for at least 7 days.   - GCSF 10 mcg/kg started day +5 (2/5/22), continue through stem cell collections-last dose 2/20. Moved GCSF to morning to see if fevers follow suit.  - s/p Mozobil 2/18, 2/19  - collected 3.94 x10^6 CD34/kg after 2 days (goal 5).  Allowing time for recovery post chemo-priming before proceeding with transplant.  Recommend neurocognitive testing as part of review.  Also recommend amyloidosis work-up with cardiac MRI, given she has concentric LV thickening and diastolic dysfunction.   Will need PICC placed for transplant.   Requested appt with Dr. Guillory to discuss whether transplant is still an option     2. HEME/COAG  - Transfusion parameters: hgb <7g and plts <50 (anticoag).   - anemia, thrombocytopenia secondary to chemo  - mild leukocytosis 2/2 GCSF/Mozobil     - 2/15: new non-occlusive LUE DVT. Initially on heparin gtt; then eliquis; held evening dose 2/22 due to epistaxis; resumed on xarelto 2/23 (insurance preference).  - Afrin prn epistaxis     3. ID:   - neutropenic fevers have resolved; w/u showed strep  throat.  Cefepime 2/11-2/13; Zosyn 2/13-2/17; 2/19-2/21.   - check surveillance blood cultures, UA/UC 2/25 due to altered mental status   #Prophy: ACV, fluconazole.       4. GI:   - constipation secondary to narcotics: senna bid prn  - Protonix 40mg/d; prn Tums. Scheduled carafate.  - Anti-emetics:  prn zofran, prn compazine     5. RENAL/FEN  - 2/23 began daily lasix 20mg PO for fluid balance maintenance, given diastolic dysfunction. Weight is now below baseline so will stop on discharge.  Replete lytes prn per sliding scale.   #Moderate malnutrition in the context of acute on chronic illness      6. Endo: hypothyroidism secondary to therapy for Graves Disease- continue levothyroxine     7. CARDS: On metoprolol and norvasc for hypertension.   Hyperlipidemia: Lipitor   ASA: hold x2/9 with thrombocytopenia-resume prn     8. Neuro: Held effexor starting 2/25 due to concern for possible contribution to AMS. 2/26 restart at 75mg BID. BMT clinic to increase back to TID if needed. AMS likely delirium.  - Hx of multiple febrile seizures. Last occurred during college.      9. Pulm:   #mild intermittent hypoxia thought 2/2 FVO and bilateral pleural effusions  # pleural effusions: thoracenteses 2/25 aborted as pt refused. Will cancel now as not hypoxic.  #COPD- remains on daily fluticasone and albuterol prn.      10. Cancer related pain: Remains on buprenorphine-naloxone and prn oxycodone. Oxycodone order changed to 10-20mg tid prn with a max of 50mg per 24 hours when she had GCSF related pain; 2/23 resumed her previous max of 30mg per 24 hours.      11. Musculoskeletal: history of frequent falls at home. PT/OT following. They recommend TCU, but Libby declines.      12. Neuro: question of illness related delirium vs. unmasking of underlying dementia.  Patient has family history of dementia is grandmother, paternal uncle and father. Head CT showed mild to moderate changes of chronic small vessel ischemia. Plan to retest MOCA  outpatient in clinic once she is no longer acutely ill. (Note: it was 11 this admission.)  Recommend neurocognitive testing before determining transplant plan.      I spent 60 minutes in the care of this patient today, which included time necessary for preparation for the visit, obtaining history, ordering medications/tests/procedures as medically indicated, review of pertinent medical literature, counseling of the patient, communication of recommendations to the care team, and documentation time.        Discharge Instructions and Follow-Up:    Discharge diet: Regular diet as tolerated  Discharge activity: Activity as tolerated   Discharge follow-up: Follow up with BMT Clinic     Discharge Disposition:    Discharged to home.    Yesenia Butler NP

## 2022-02-26 NOTE — PLAN OF CARE
"BP (!) 142/73 (BP Location: Left arm)   Pulse 82   Temp 98.8  F (37.1  C) (Oral)   Resp 18   Ht 1.626 m (5' 4\")   Wt 59.1 kg (130 lb 4.7 oz)   SpO2 96%   BMI 22.36 kg/m    Tmax 99. OVSS on RA. Pt pleasantly confused overnight, disoriented to time, place, and situation. Pt continues visual hallucinations and to make illogical/incoherent statements overnight, asking \"are we in a basement right now?\", \"who is he, you're friend over there.\" Mg 1.9, 2 g IV needed with recheck tomorrow morning. Phos 2.6, 9 mmol IV needed with recheck tomorrow morning. 10 mg of oxycodone given x1, and robaxin given x1 for pt c/o lower back pain. Pt denies N/V. 2 small incontinent BMs overnight, imodium given x1 per pt request. Voiding adequately, UA/UC collected. Up with assist x1 walker and gait belt to bedside commode. Bed alarm on for pt safety. Continue with plan of care.    Problem: Adult Inpatient Plan of Care  Goal: Plan of Care Review  Outcome: Ongoing, Not Progressing  Flowsheets (Taken 2/26/2022 0626)  Plan of Care Reviewed With: patient  Overall Patient Progress: no change     Problem: Pain Acute  Goal: Acceptable Pain Control and Functional Ability  Outcome: Ongoing, Not Progressing     Problem: Pain Acute  Goal: Acceptable Pain Control and Functional Ability  Intervention: Develop Pain Management Plan  Recent Flowsheet Documentation  Taken 2/26/2022 0219 by Heike Garrett, RN  Pain Management Interventions: medication (see MAR)     Problem: Pain Acute  Goal: Acceptable Pain Control and Functional Ability  Intervention: Prevent or Manage Pain  Recent Flowsheet Documentation  Taken 2/25/2022 2000 by Heike Garrett, RN  Medication Review/Management: medications reviewed     Problem: Infection  Goal: Absence of Infection Signs and Symptoms  Outcome: Ongoing, Not Progressing     Problem: Infection  Goal: Absence of Infection Signs and Symptoms  Intervention: Prevent or Manage Infection  Recent Flowsheet " Documentation  Taken 2/25/2022 2000 by Heike Garrett RN  Isolation Precautions: protective environment maintained     Problem: Violence Risk or Actual  Goal: Anger and Impulse Control  Outcome: Ongoing, Not Progressing     Problem: Adult Inpatient Plan of Care  Goal: Absence of Hospital-Acquired Illness or Injury  Intervention: Identify and Manage Fall Risk  Recent Flowsheet Documentation  Taken 2/25/2022 2000 by Heike Garrett RN  Safety Promotion/Fall Prevention:    activity supervised    assistive device/personal items within reach    bed alarm on    clutter free environment maintained    fall prevention program maintained    increased rounding and observation    increase visualization of patient    lighting adjusted    mobility aid in reach    nonskid shoes/slippers when out of bed    patient and family education    room near nurse's station    room organization consistent    safety round/check completed    supervised activity     Problem: Adult Inpatient Plan of Care  Goal: Absence of Hospital-Acquired Illness or Injury  Intervention: Prevent Skin Injury  Recent Flowsheet Documentation  Taken 2/25/2022 2000 by Heike Garrett RN  Body Position: position changed independently     Problem: Adult Inpatient Plan of Care  Goal: Absence of Hospital-Acquired Illness or Injury  Intervention: Prevent and Manage VTE (Venous Thromboembolism) Risk  Recent Flowsheet Documentation  Taken 2/25/2022 2000 by Heike Garrett RN  VTE Prevention/Management: SCDs (sequential compression devices) off  Activity Management: activity adjusted per tolerance     Problem: Adult Inpatient Plan of Care  Goal: Absence of Hospital-Acquired Illness or Injury  Intervention: Prevent Infection  Recent Flowsheet Documentation  Taken 2/25/2022 2000 by Heike Garrett RN  Infection Prevention:    visitors restricted/screened    single patient room provided    rest/sleep promoted    personal protective equipment utilized    hand  hygiene promoted    equipment surfaces disinfected    environmental surveillance performed   Goal Outcome Evaluation:    Plan of Care Reviewed With: patient     Overall Patient Progress: no change

## 2022-02-26 NOTE — SUMMARY OF CARE
BMT Hospital Discharge Summary of Care    Treatment Team:  Patient Care Team:  Leah Gustafson as PCP - General (Family Practice)  Kennedi Nichols DO as Referring Physician (Hematology & Oncology)  Julio C Guillory MD as Assigned Cancer Care Provider  Julio C Guillory MD as BMT Physician (Hematology)  Roro Goddard LICSW as  (BMT - Adult)  Discharge Diagnosis: S/P admission for cytoxan priming    Hospital Discharge on 02/26/22  Discharge Location home   Activity at Discharge As tolerated   Nutrition Regular diet as tolerated     Blood Transfusion Parameters Transfuse if Hemoglobin < or equal 7 mg/dL  Red Blood Cell Order: 1 units, irradiated and leukoreduced   Transfuse if Platelet count < or equal 50k uL  Platelet order: 1 adult dose, irradiated and leukoreduced       Blood Counts Recent Labs   Lab Test 02/26/22  0502 02/25/22  0412   WBC 6.4 5.8   ANEU  --  5.3   ALYM  --  0.1*   BRITTANY  --  0.2   AEOS  --  0.0   HGB 7.7* 7.4*   HCT 24.7* 23.5*    131*      IV Medications Outpatient Pharmacy none     Electrolyte Replacement  Parameters in Clinic Intravenous Electrolyte Replacement:    Potassium Chloride  Give only if serum creatinine <2. Reference range 3.4-5.3 mmol/L        3.0 - 3.3 mmol/L Oral: 40 mEq by mouth x 1  PIV/CVC on TPN: 30 mEq over 1 hour x 1 doses & 20 mEq by mouth  CVC NOT on TPN: 40 mEq IV x 1   2.5-2.9 mmol/L  Oral: 40 mEq by mouth every 2 hrs x 2  PIV/CVC on TPN: 30 mEq IV & 40 mEq by mouth   CVC NOT on TPN: 60 mEq IV        <2.5 PIV/CVC on TPN: 30 mEq IV & 40 mEq by mouth   CVC NOT on TPN: 60 mEq IV      Magnesium Sulfate  Reference range 1.6-2.3 mg/dl       1.2-1.5 mg/dl  Oral:400 mg by mouth twice daily x 2 days   IV: 2 gm over 1 hour        0.9-1.1 IV Only: 4 gm IV over 2 hours   < 0.9  Discretion of provider and pharmacist        Home Infusion  IV Medications through home infusion: None   Line Care Care: Riiz-t-sqej-will be managed in clinic     PHYSICIAN NEXT STEPS:   Review Only      CHIEF COMPLAINT:   Chief Complaint/Protocol Used: Frostbite   Onset: Pain right index finger x 3 days with bruising, greyish blue         ASSESSMENT:   Â» Did not know what to do True   Â» Onset: Pain right index finger x 3 days with bruising, greyish blue   Â» Normal True   Â» Description: hurts when bending, feels ice cold   Â» Location: right index finger   Â» Size: bottom half of right finger, nail is gray   Â» Onset: suspects 3 days ago   Â» Cause: does not recall   Â» Other Symptoms: feels tired, nailbed is grey   -------------------------------------------------------      DISPOSITION:   Disposition Recommendation: Home Care   Questions that led to disposition:   Â» Mild frostbite symptoms (all triage questions negative)   Patient Directed To: Unspecified   Patient Intended Action: Seek care in the doctor's office          CALL NOTES:   01/19/2018 at 11:39 AM by Roberta Kimball   Â» Patient states that she is not sure if she has frostbite to her finger but did notice discoloration to her finger after returning in from cold.  Nailbed is greyish, bottom half of finger is discolored, bluish. Patient feels mild pain with bending. Appointment made for patient at [facility] with Dr. Mock on 1-19-18 at 1:40pm.      DISPOSITION OVERRIDE/PROVIDER CONSULT:   Disposition Override: N/A   Override Source: Unspecified   Consulted with PCP: No   Consulted with On-Call Physician: No         CALLER CONTACT INFO:   Name: RAYSA GUTIÉRREZ (Self)   Phone 1: [phone] (Home)   Phone 2: [phone] (Cell)   Phone 3: [phone] (Work)   Phone 4: [phone] - Preferred         ENCOUNTER STARTED:   01/19/18 11:22:20 AM   ENCOUNTER ASSIGNED TO/CLOSED BY:   Roberta Kimball @ 01/19/18 11:40:14 AM         -------------------------------------------------------      UNDERSTANDS CARE ADVICE: Yes      AGREES WITH CARE ADVICE: Yes      WILL FOLLOW CARE ADVICE: Yes       -------------------------------------------------------     Physical Therapy & Support Services None     Laboratory Tests at Next Clinic Visit Hemogram (CBC) differential, platelet count  Basic Metabolic Panel     Healthy Lifestyle Follow general guidelines for physical activity as recommended by the Office of Disease Prevention & Health Promotion (2017) www.health.gov/paguidelines:    Avoid Inactivity  Some physical activity is better than none -- and any amount has health benefits.    Do Aerobic Activity  For substantial health benefits, work up to:    150 minutes (2 hours and 30 minutes) each week of moderate-intensity aerobic physical activity (such as brisk walking or tennis)    Do aerobic physical activity in episodes of at least 10 minutes and, if possible, spread it out through the week.  For even greater health benefits, do one of the following:    Doing more will lead to even greater health benefits.    Strengthen Muscles  Do muscle-strengthening activities that involve all major muscle groups on 2 or more days a week.    Healthy Lifestyle    Eat a healthy diet with a wide variety of foods    Don t smoke (passive or active exposure), chew tobacco, or use illegal drugs.     Discuss drinking alcohol with your provider. If cleared to use alcohol, do so in moderation, generally less than two drinks per day.     Maintain a healthy weight    Avoid excessive sun exposure and wear sunscreen protection for anticipated periods of long exposure.    Information above directly cited from these sources:  1. Linda, STEVEN S., ODELL Prabhakar., NISHI Triana., Jonn S., LIVIA Everett., Lizbet, JChristal L.,   Evans, K. M. (2013). Prevalence of Hematopoietic Cell Transplant Survivors in the [place]. Biology of Blood and Marrow Transplantation?: Journal of the American Society for Blood and Marrow Transplantation, 19(10), 7340-6754. doi   10.1016/j.bbmt.2013.07.020  2. Office of Disease Prevention and Health Promotion. (2017). Physical activity   guidelines. Retrieved from  https://health.gov/paguidelines/.     When to Call  (Refer to Discharge Teaching Materials)   Fever > 100.5 F    Bleeding that does not stop after a few minutes    Severe nausea, vomiting, or diarrhea     New fall or injury    Change in designated caregiver    Medication question    Any other urgent concern   Follow Up Visits BMT Clinic Appointment Date/Time:   BMT Clinic (date, time, provider): requested Monday labs, provider, pharmD       BMT Contact Information  For issues including fevers 100.5 or more:  Please call during the week: Monday through Friday between hours of 8:00 am and 4:30 pm- Call BMT office- [phone]  After hours/Weekends: Please call [facility]  and ask for BMT physician on call and the  will have the BMT Fellow Physician call you back: [phone]     Advice for Patients on COVID19:  a. Avoid contact with individuals:   i. Who are sick or have recently been sick  ii. Have traveled to high risk areas (per CDC guidelines) or have been on a cruise in the last 14 days  iii. Who were or could have been exposed to COVID-19   b. If experiencing symptoms such as: Fever, cough or shortness of breath contact BMT at [phone] Mon-Fri 8am-4:30pm or After Hours at [phone] (ask to speak to a BMT Fellow) for guidance on need for clinical assessment  c. Avoid all non- essential travel at this time; if traveling is necessary use mask (N-95)   d. Wear a mask when in public areas  d. Avoid crowded places, if possible  f. Follow CDC advice https://www.cdc.gov/coronavirus/2019-ncov/index.html and travel guidelines https://www.cdc.gov/coronavirus/2019-ncov/travelers/index.html  Shanita Butler

## 2022-02-26 NOTE — PLAN OF CARE
Occupational Therapy Discharge Summary    Reason for therapy discharge:    Discharged to home with home therapy.    Progress towards therapy goal(s). See goals on Care Plan in [facility] electronic health record for goal details.  Goals not met.  Barriers to achieving goals:   discharge from facility.    Therapy recommendation(s):    Continued therapy is recommended.  Rationale/Recommendations:  Strongly recommend TCU, however patient declines this option. As returning home, would recommend 24/7 assist, Ax1 with gait belt and walker for all ADL/Mobility and HH therapies.

## 2022-02-27 LAB — BACTERIA UR CULT: NORMAL

## 2022-02-28 ENCOUNTER — APPOINTMENT (OUTPATIENT)
Dept: LAB | Facility: CLINIC | Age: 72
End: 2022-02-28
Attending: INTERNAL MEDICINE
Payer: MEDICARE

## 2022-02-28 ENCOUNTER — ALLIED HEALTH/NURSE VISIT (OUTPATIENT)
Dept: TRANSPLANT | Facility: CLINIC | Age: 72
End: 2022-02-28
Attending: INTERNAL MEDICINE
Payer: MEDICARE

## 2022-02-28 ENCOUNTER — CARE COORDINATION (OUTPATIENT)
Dept: TRANSPLANT | Facility: CLINIC | Age: 72
End: 2022-02-28
Payer: MEDICARE

## 2022-02-28 VITALS
OXYGEN SATURATION: 97 % | WEIGHT: 127 LBS | DIASTOLIC BLOOD PRESSURE: 58 MMHG | BODY MASS INDEX: 21.8 KG/M2 | SYSTOLIC BLOOD PRESSURE: 93 MMHG | TEMPERATURE: 99.6 F | HEART RATE: 97 BPM | RESPIRATION RATE: 18 BRPM

## 2022-02-28 DIAGNOSIS — Z94.84 STEM CELLS TRANSPLANT STATUS (H): ICD-10-CM

## 2022-02-28 DIAGNOSIS — C90.00 MULTIPLE MYELOMA NOT HAVING ACHIEVED REMISSION (H): Primary | ICD-10-CM

## 2022-02-28 LAB
ANION GAP SERPL CALCULATED.3IONS-SCNC: 10 MMOL/L (ref 3–14)
BASOPHILS # BLD AUTO: 0.1 10E3/UL (ref 0–0.2)
BASOPHILS NFR BLD AUTO: 1 %
BUN SERPL-MCNC: 5 MG/DL (ref 7–30)
CALCIUM SERPL-MCNC: 8.8 MG/DL (ref 8.5–10.1)
CHLORIDE BLD-SCNC: 107 MMOL/L (ref 94–109)
CO2 SERPL-SCNC: 24 MMOL/L (ref 20–32)
CREAT SERPL-MCNC: 0.83 MG/DL (ref 0.52–1.04)
EOSINOPHIL # BLD AUTO: 0 10E3/UL (ref 0–0.7)
EOSINOPHIL NFR BLD AUTO: 0 %
ERYTHROCYTE [DISTWIDTH] IN BLOOD BY AUTOMATED COUNT: 15.5 % (ref 10–15)
GFR SERPL CREATININE-BSD FRML MDRD: 76 ML/MIN/1.73M2
GLUCOSE BLD-MCNC: 142 MG/DL (ref 70–99)
HCT VFR BLD AUTO: 31.8 % (ref 35–47)
HGB BLD-MCNC: 10.1 G/DL (ref 11.7–15.7)
IMM GRANULOCYTES # BLD: 0.1 10E3/UL
IMM GRANULOCYTES NFR BLD: 1 %
LYMPHOCYTES # BLD AUTO: 1.6 10E3/UL (ref 0.8–5.3)
LYMPHOCYTES NFR BLD AUTO: 22 %
MCH RBC QN AUTO: 31.7 PG (ref 26.5–33)
MCHC RBC AUTO-ENTMCNC: 31.8 G/DL (ref 31.5–36.5)
MCV RBC AUTO: 100 FL (ref 78–100)
MONOCYTES # BLD AUTO: 1.2 10E3/UL (ref 0–1.3)
MONOCYTES NFR BLD AUTO: 16 %
NEUTROPHILS # BLD AUTO: 4.4 10E3/UL (ref 1.6–8.3)
NEUTROPHILS NFR BLD AUTO: 60 %
NRBC # BLD AUTO: 0 10E3/UL
NRBC BLD AUTO-RTO: 0 /100
PLATELET # BLD AUTO: 326 10E3/UL (ref 150–450)
POTASSIUM BLD-SCNC: 3.6 MMOL/L (ref 3.4–5.3)
RBC # BLD AUTO: 3.19 10E6/UL (ref 3.8–5.2)
SODIUM SERPL-SCNC: 141 MMOL/L (ref 133–144)
WBC # BLD AUTO: 7.3 10E3/UL (ref 4–11)

## 2022-02-28 PROCEDURE — 250N000011 HC RX IP 250 OP 636: Performed by: INTERNAL MEDICINE

## 2022-02-28 PROCEDURE — 99214 OFFICE O/P EST MOD 30 MIN: CPT | Mod: 25

## 2022-02-28 PROCEDURE — 36591 DRAW BLOOD OFF VENOUS DEVICE: CPT

## 2022-02-28 PROCEDURE — 82310 ASSAY OF CALCIUM: CPT

## 2022-02-28 PROCEDURE — 85014 HEMATOCRIT: CPT

## 2022-02-28 PROCEDURE — G0463 HOSPITAL OUTPT CLINIC VISIT: HCPCS

## 2022-02-28 RX ORDER — HEPARIN SODIUM (PORCINE) LOCK FLUSH IV SOLN 100 UNIT/ML 100 UNIT/ML
5 SOLUTION INTRAVENOUS
Status: CANCELLED | OUTPATIENT
Start: 2022-03-01

## 2022-02-28 RX ORDER — HEPARIN SODIUM (PORCINE) LOCK FLUSH IV SOLN 100 UNIT/ML 100 UNIT/ML
5 SOLUTION INTRAVENOUS
Status: DISCONTINUED | OUTPATIENT
Start: 2022-02-28 | End: 2022-02-28 | Stop reason: HOSPADM

## 2022-02-28 RX ORDER — HEPARIN SODIUM,PORCINE 10 UNIT/ML
5 VIAL (ML) INTRAVENOUS
Status: CANCELLED | OUTPATIENT
Start: 2022-03-01

## 2022-02-28 RX ADMIN — Medication 5 ML: at 12:41

## 2022-02-28 ASSESSMENT — PAIN SCALES - GENERAL: PAINLEVEL: MODERATE PAIN (5)

## 2022-02-28 NOTE — PROGRESS NOTES
"Care Coordination - Discharge Note     Line Company:  NA - central line removed 2/22; pt only has port-a-cath, which can be managed in clinic  Referral made for line care:  No (previously was with [facility]; canceled pt with [facility] on 2/22 following CVC removal)  IV medications needed at discharge:  None     Pharmacy concerns:  None. (Of note, [facility] requires prior auth)     PT/OT/therapies recommended:  Home PT/OT/SW through  [facility] [phone] with start of care 3/2/22         **On Mon 2/21, pt and  received PT/OT education from inpatient PT/OT staff for discharge   Referral made for PT/OT/therapies:  Yes - Order for Home PT/OT/SW was placed on 2/25 (for weekend discharge), indicating Home Care to start on 3/2/22.    Home O2:  Does have coverage for this if needed - however, was not arranged for discharge as pt's walking sats on 2/25 were in the 90s on RA (no longer qualifying for Home O2).              [facility]              Ph. [phone]              Fax. [phone]    D[place]c location:  [place]  Has placement need been communicated to Social Work?  NA    NC Teaching time arranged with patient/caregiver:  Spoke with pt on 2/21 to discuss home care needs (pt is agreeable to the plan above) and line removal. Pt is aware that Home Care will be calling her to arrange for first home visit date/time. Following weekend discharge, called pt on Mon 2/28 to follow up (and remind her of home care start date); pt's sister-Paola answered. Paola requested that scheduling goes through her for home care services. Emailed  [facility] intake team + hospital liaisons to relay this message; also requested they call Paola to discuss first home care visit time.    Notify nurse to schedule line care class/ DM teaching, prior to d/c:  NA - see above    Caregiver:  Tres \"Jerrica\" Linda () [phone]    Leisa Grimadlo (sister) [phone]    Outpatient Nurse Coordinator notified of patient discharge. "       Kiara Stewart, RN, BSN  RN Care Coordinator - Inpatient BMT, [address]  Ph [phone]   [phone]

## 2022-02-28 NOTE — PROGRESS NOTES
I reviewed the discharge medications with Libby and her care giver. The medication list was correct and  Complete per protocol. I answered all her questions about the pain medication and administration times. I answered all her questions.     Current Outpatient Medications   Medication Sig Dispense Refill     acetaminophen (TYLENOL) 500 MG tablet Take 500-1,000 mg by mouth every 6 hours as needed for mild pain        acyclovir (ZOVIRAX) 800 MG tablet Take 1 tablet (800 mg) by mouth every 12 hours 60 tablet 0     albuterol (PROAIR HFA/PROVENTIL HFA/VENTOLIN HFA) 108 (90 Base) MCG/ACT inhaler Inhale 2 puffs into the lungs every 6 hours as needed for shortness of breath / dyspnea        amLODIPine (NORVASC) 5 MG tablet Take 1 tablet (5 mg) by mouth daily       aspirin (ASA) 81 MG chewable tablet HOLD due to low platelets       atorvastatin (LIPITOR) 40 MG tablet Take 40 mg by mouth daily        buprenorphine HCl-naloxone HCl (SUBOXONE) 8-2 MG per film Place 1 Film under the tongue 3 times daily        cholecalciferol (VITAMIN D-1000 MAX ST) 25 MCG (1000 UT) TABS Take 1,000 Units by mouth daily        fluconazole (DIFLUCAN) 200 MG tablet Take 0.5 tablets (100 mg) by mouth daily 30 tablet 0     Fluticasone-Umeclidin-Vilanterol (TRELEGY ELLIPTA) 100-62.5-25 MCG/INH oral inhaler Inhale 1 puff into the lungs daily        levothyroxine (SYNTHROID/LEVOTHROID) 112 MCG tablet Take 112 mcg by mouth daily        methocarbamol (ROBAXIN) 500 MG tablet Take 500 mg by mouth 3 times daily as needed for muscle spasms       metoprolol succinate ER (TOPROL-XL) 25 MG 24 hr tablet Take 25 mg by mouth daily        ondansetron (ZOFRAN) 8 MG tablet Take 1 tablet (8 mg) by mouth every 8 hours as needed for nausea 30 tablet 0     oxyCODONE IR (ROXICODONE) 10 MG tablet Take 10 mg by mouth every 8 hours as needed for severe pain        pantoprazole (PROTONIX) 40 MG EC tablet Take 1 tablet (40 mg) by mouth daily 30 tablet 1     prochlorperazine  (COMPAZINE) 5 MG tablet Take 1 tablet (5 mg) by mouth every 6 hours as needed for nausea or vomiting 30 tablet 0     rivaroxaban ANTICOAGULANT (XARELTO) 20 MG TABS tablet Take 1 tablet (20 mg) by mouth daily (with dinner) 30 tablet 1     sucralfate (CARAFATE) 1 GM/10ML suspension Take 10 mLs (1 g) by mouth 4 times daily (before meals and nightly) 420 mL 0     venlafaxine (EFFEXOR) 75 MG tablet Take 1 tablet (75 mg) by mouth 2 times daily          Pertinent labs considered today:  Lab Results   Component Value Date     02/28/2022                 normal sodium 136-148  Lab Results   Component Value Date    POTASSIUM 3.6 02/28/2022       normal potassium 3.5-5.2   Lab Results   Component Value Date    CHLORIDE 107 02/28/2022           normal chloride    Lab Results   Component Value Date    CO2 24 02/28/2022                normal CO2 20-32  Lab Results   Component Value Date    BUN 5 02/28/2022                 normal BUN 5-24  Lab Results   Component Value Date     02/28/2022                 normal glucose   Lab Results   Component Value Date    CR 0.83 02/28/2022                 normal cr 0.8-1.5  Lab Results   Component Value Date    DIANDRA 8.8 02/28/2022               normal calcium  8.5-10.4  Lab Results   Component Value Date    BILITOTAL 0.8 02/21/2022          normal bilirubin 0.2-1.3  Lab Results   Component Value Date    PROTTOTAL 4.7 02/25/2022          normal total protein 6.0-8.2  Lab Results   Component Value Date    ALBUMIN 2.2 02/21/2022             normal albumin 3.2-4.5  Lab Results   Component Value Date    ALKPHOS 171 02/21/2022            normal alkphos   Lab Results   Component Value Date    ALT 14 02/21/2022         normal ALT 0-65  Lab Results   Component Value Date    AST 20 02/21/2022         normal AST 0-37    Lab Results   Component Value Date    HGB 10.1 02/28/2022     Lab Results   Component Value Date    WBC 7.3 02/28/2022      Lab Results   Component Value Date      02/28/2022       Estimated Creatinine Clearance: 58.2 mL/min (based on SCr of 0.83 mg/dL).      Time spent in this activity: 10 minutes        Flakito Celestin RPH, PharmD

## 2022-02-28 NOTE — PROGRESS NOTES
"BMT/Cell Therapy Daily Progress Note   02/28/2022    Patient ID:  Libby Grimaldo is a 71 year old female, who has collected stem cells but has not yet undergone autologous HSCT.     Diagnosis MM Multiple myeloma  BMTCT Type Autologous    Prep Regimen Melphalan  Donor Source Self    GVHD Prophylaxis No  Primary BMT MD Guillory   Clinical Trials [id number]         INTERVAL  HISTORY     Feeling much better since leaving the hospital. Intermittent pain and \"jerking\" sensations; wondering if she is experiencing some tardive dyskinesia from her medications. Not eating much; low appetite. No fevers or chills. Bone pain present post-collections, but no new or worsening pain.    Review of Systems: 10 point ROS negative except as noted above.      PHYSICAL EXAM     Weight In/Out     Wt Readings from Last 3 Encounters:   02/28/22 57.6 kg (127 lb)   02/26/22 59.7 kg (131 lb 9.6 oz)   02/09/22 62.7 kg (138 lb 4.8 oz)             KPS:  70    BP 93/58   Pulse 97   Temp 99.6  F (37.6  C)   Resp 18   Wt 57.6 kg (127 lb)   SpO2 97%   BMI 21.80 kg/m       General: NAD, frail appearing   Eyes: : GREGG, sclera anicteric   Nose/Mouth/Throat: OP clear, buccal mucosa moist, no ulcerations   Lungs: No wheeze  Cardiovascular: No JVD  Abdominal/Rectal: Non-distended  Lymphatics: no edema  Skin: no rashes or petechiae  Neuro: A&O   Musculoskeletal: muscle mass low, sarcopenic and cachectic      LABS AND IMAGING: I have assessed all abnormal lab values for their clinical significance and any values considered clinically significant have been addressed in the assessment and plan.        Lab Results   Component Value Date    WBC 7.3 02/28/2022    ANEU 5.3 02/25/2022    HGB 10.1 (L) 02/28/2022    HCT 31.8 (L) 02/28/2022     02/28/2022     02/28/2022    POTASSIUM 3.6 02/28/2022    CHLORIDE 107 02/28/2022    CO2 24 02/28/2022     (H) 02/28/2022    BUN 5 (L) 02/28/2022    CR 0.83 02/28/2022    MAG 1.9 02/26/2022    INR 1.29 (H) " "02/11/2022         SYSTEMS-BASED ASSESSMENT AND PLAN       1. BMT  - BMT MD/Coordinator: Dr. Julio C Guillory/Rashad Chen  - Cytoxan 2/1  - Plan for allopurinol 300mg daily for at least 7 days.   - GCSF 10 mcg/kg started day +5 (2/5/22), continue through stem cell collections-last dose 2/20. Moved GCSF to morning to see if fevers follow suit.  - s/p Mozobil 2/18, 2/19  - collected 3.94 x10^6 CD34/kg after 2 days (goal 5).  - Recovering from hospitalization on 5C. Per Dr. Quevedo \"She and her family is agreeable, she will need a neurocognitive testing and discussion with her primary transplant MD about risks and benfits or transplant. She is high risk for complications and prolonged recovery.\"  - Allowing time for recovery post chemo-priming before proceeding with transplant.  Also recommend amyloidosis work-up with cardiac MRI, given she has concentric LV thickening and diastolic dysfunction. She appears profoundly fatigued, sarcopenic, and frail, placing her at risk of long-term moribidity and mortality from transplant.  - Will need repeat assessment from Dr. Guillory and perhaps repeat of frailty screening before moving forrd.  - Will need PICC placed for transplant.     2. HEME/COAG  - Transfusion parameters: hgb <7g and plts <50 (anticoag).   - anemia, thrombocytopenia secondary to chemo  - mild leukocytosis 2/2 GCSF/Mozobil    - 2/15: new non-occlusive LUE DVT. Initially on heparin gtt; then eliquis; held evening dose 2/22 due to epistaxis; resumed on xarelto 2/23 (insurance preference).  - Afrin prn epistaxis    3. ID:   - neutropenic fevers have resolved; w/u showed strep throat.  Cefepime 2/11-2/13; Zosyn 2/13-2/17; 2/19-2/21.   - check surveillance blood cultures, UA/UC 2/25 due to altered mental status-negative     #Prophy: ACV, fluconazole.       4. GI:   - constipation secondary to narcotics: senna bid prn  - Protonix 40mg/d; prn Tums. Scheduled carafate.  - Anti-emetics:  prn zofran, prn " compazine     5. RENAL/FEN  - mild hyperphos (recent hypophos): holding KPhos; monitor.    - 2/23 began daily lasix 20mg PO for fluid balance maintenance, given diastolic dysfunction  Replete lytes prn per sliding scale.   #Moderate malnutrition in the context of acute on chronic illness      6. Endo: hypothyroidism secondary to therapy for Graves Disease- continue levothyroxine     7. CARDS: On metoprolol and norvasc for hypertension.  Prn hydralazine.   Hyperlipidemia: Lipitor   ASA: hold x2/9 with thrombocytopenia     8. Neuro: Hold effexor starting 2/25 due to concern for possible contribution to AMS  - HOLD PM seroquel (started this admission due to possible sundowning), as possible contributor to AMS  - Hx of multiple febrile seizures. Last occurred during college.      9. Pulm:   #mild intermittent hypoxia thought 2/2 FVO and bilateral pleural effusions; diuresis as above. Add incentive spirometer. Wean O2 as able.  # pleural effusions: thoracenteses pending.  # hypoxia: note that 2/24, her sats were 76% when walking on room air; 2/25 recheck indicated she stayed in the 90's and thus does not qualify for oxygen at home.  #COPD- remains on daily fluticasone and albuterol prn.      10. Cancer related pain: Remains on buprenorphine-naloxone and prn oxycodone. Oxycodone order changed to 10-20mg tid prn with a max of 50mg per 24 hours when she had GCSF related pain; 2/23 resumed her previous max of 30mg per 24 hours.      11. Musculoskeletal: history of frequent falls at home. PT/OT following. They recommend TCU, but Libby declines.      12. Neuro: question of illness related delirium vs. unmasking of underlying dementia.  Patient has family history of dementia is grandmother, paternal uncle and father. Head CT showed mild to moderate changes of chronic small vessel ischemia. Plan to retest MOCA outpatient in clinic once she is no longer acutely ill. (Note: it was 11 at admission.)  Recommend neurocognitive  testing before determining transplant plan.       Known issues that I take into account for medical decisions, with salient changes to the plan considering these complexities noted above.      Patient Active Problem List   Diagnosis     Multiple myeloma not having achieved remission (H)     Admission for chemotherapy     Stem cells transplant status (H)     Neutropenic fever (H)       Today's summary: Sarcopenic and cachectic, needs time to rehabilitate and improve nutritional status prior to BMT. Pharmacy review of medications site effects today.  RTC to see Kahlil in 1-2 weeks for decision on whether to proceed with autologous HSCT.    I spent 40 minutes in the care of this patient today, which included time necessary for preparation for the visit, obtaining history, ordering medications/tests/procedures as medically indicated, review of pertinent medical literature, counseling of the patient, communication of recommendations to the care team, and documentation time.    Sonali Jorgensen    Securely message with the Vocera Web Console

## 2022-02-28 NOTE — LETTER
"    2/28/2022         RE: Libby Grimaldo  [address] [facility] [address]        Dear Colleague,    Thank you for referring your patient, Libby Grimaldo, to the [facility] [place]. Please see a copy of my visit note below.    BMT/Cell Therapy Daily Progress Note   02/28/2022    Patient ID:  Libby Grimaldo is a 71 year old female, who has collected stem cells but has not yet undergone autologous HSCT.     Diagnosis MM Multiple myeloma  BMTCT Type Autologous    Prep Regimen Melphalan  Donor Source Self    GVHD Prophylaxis No  Primary BMT MD Guillory   Clinical Trials [id number]         INTERVAL  HISTORY     Feeling much better since leaving the hospital. Intermittent pain and \"jerking\" sensations; wondering if she is experiencing some tardive dyskinesia from her medications. Not eating much; low appetite. No fevers or chills. Bone pain present post-collections, but no new or worsening pain.    Review of Systems: 10 point ROS negative except as noted above.      PHYSICAL EXAM     Weight In/Out     Wt Readings from Last 3 Encounters:   02/28/22 57.6 kg (127 lb)   02/26/22 59.7 kg (131 lb 9.6 oz)   02/09/22 62.7 kg (138 lb 4.8 oz)             KPS:  70    BP 93/58   Pulse 97   Temp 99.6  F (37.6  C)   Resp 18   Wt 57.6 kg (127 lb)   SpO2 97%   BMI 21.80 kg/m       General: NAD, frail appearing   Eyes: : GREGG, sclera anicteric   Nose/Mouth/Throat: OP clear, buccal mucosa moist, no ulcerations   Lungs: No wheeze  Cardiovascular: No JVD  Abdominal/Rectal: Non-distended  Lymphatics: no edema  Skin: no rashes or petechiae  Neuro: A&O   Musculoskeletal: muscle mass low, sarcopenic and cachectic      LABS AND IMAGING: I have assessed all abnormal lab values for their clinical significance and any values considered clinically significant have been addressed in the assessment and plan.        Lab Results   Component Value Date    WBC 7.3 02/28/2022    ANEU 5.3 02/25/2022    HGB " "10.1 (L) 02/28/2022    HCT 31.8 (L) 02/28/2022     02/28/2022     02/28/2022    POTASSIUM 3.6 02/28/2022    CHLORIDE 107 02/28/2022    CO2 24 02/28/2022     (H) 02/28/2022    BUN 5 (L) 02/28/2022    CR 0.83 02/28/2022    MAG 1.9 02/26/2022    INR 1.29 (H) 02/11/2022         SYSTEMS-BASED ASSESSMENT AND PLAN       1. BMT  - BMT MD/Coordinator: Dr. Julio C Guillory/Rashad Chen  - Cytoxan 2/1  - Plan for allopurinol 300mg daily for at least 7 days.   - GCSF 10 mcg/kg started day +5 (2/5/22), continue through stem cell collections-last dose 2/20. Moved GCSF to morning to see if fevers follow suit.  - s/p Mozobil 2/18, 2/19  - collected 3.94 x10^6 CD34/kg after 2 days (goal 5).  - Recovering from hospitalization on 5C. Per Dr. Quevedo \"She and her family is agreeable, she will need a neurocognitive testing and discussion with her primary transplant MD about risks and benfits or transplant. She is high risk for complications and prolonged recovery.\"  - Allowing time for recovery post chemo-priming before proceeding with transplant.  Also recommend amyloidosis work-up with cardiac MRI, given she has concentric LV thickening and diastolic dysfunction. She appears profoundly fatigued, sarcopenic, and frail, placing her at risk of long-term moribidity and mortality from transplant.  - Will need repeat assessment from Dr. Guillory and perhaps repeat of frailty screening before moving forawrd.  - Will need PICC placed for transplant.     2. HEME/COAG  - Transfusion parameters: hgb <7g and plts <50 (anticoag).   - anemia, thrombocytopenia secondary to chemo  - mild leukocytosis 2/2 GCSF/Mozobil    - 2/15: new non-occlusive LUE DVT. Initially on heparin gtt; then eliquis; held evening dose 2/22 due to epistaxis; resumed on xarelto 2/23 (insurance preference).  - Afrin prn epistaxis    3. ID:   - neutropenic fevers have resolved; w/u showed strep throat.  Cefepime 2/11-2/13; Zosyn 2/13-2/17; 2/19-2/21.   - check " surveillance blood cultures, UA/UC 2/25 due to altered mental status-negative     #Prophy: ACV, fluconazole.       4. GI:   - constipation secondary to narcotics: senna bid prn  - Protonix 40mg/d; prn Tums. Scheduled carafate.  - Anti-emetics:  prn zofran, prn compazine     5. RENAL/FEN  - mild hyperphos (recent hypophos): holding KPhos; monitor.    - 2/23 began daily lasix 20mg PO for fluid balance maintenance, given diastolic dysfunction  Replete lytes prn per sliding scale.   #Moderate malnutrition in the context of acute on chronic illness      6. Endo: hypothyroidism secondary to therapy for Graves Disease- continue levothyroxine     7. CARDS: On metoprolol and norvasc for hypertension.  Prn hydralazine.   Hyperlipidemia: Lipitor   ASA: hold x2/9 with thrombocytopenia     8. Neuro: Hold effexor starting 2/25 due to concern for possible contribution to AMS  - HOLD PM seroquel (started this admission due to possible sundowning), as possible contributor to AMS  - Hx of multiple febrile seizures. Last occurred during college.      9. Pulm:   #mild intermittent hypoxia thought 2/2 FVO and bilateral pleural effusions; diuresis as above. Add incentive spirometer. Wean O2 as able.  # pleural effusions: thoracenteses pending.  # hypoxia: note that 2/24, her sats were 76% when walking on room air; 2/25 recheck indicated she stayed in the 90's and thus does not qualify for oxygen at home.  #COPD- remains on daily fluticasone and albuterol prn.      10. Cancer related pain: Remains on buprenorphine-naloxone and prn oxycodone. Oxycodone order changed to 10-20mg tid prn with a max of 50mg per 24 hours when she had GCSF related pain; 2/23 resumed her previous max of 30mg per 24 hours.      11. Musculoskeletal: history of frequent falls at home. PT/OT following. They recommend TCU, but Libby declines.      12. Neuro: question of illness related delirium vs. unmasking of underlying dementia.  Patient has family history of  dementia is grandmother, paternal uncle and father. Head CT showed mild to moderate changes of chronic small vessel ischemia. Plan to retest MOCA outpatient in clinic once she is no longer acutely ill. (Note: it was 11 at admission.)  Recommend neurocognitive testing before determining transplant plan.       Known issues that I take into account for medical decisions, with salient changes to the plan considering these complexities noted above.      Patient Active Problem List   Diagnosis     Multiple myeloma not having achieved remission (H)     Admission for chemotherapy     Stem cells transplant status (H)     Neutropenic fever (H)       Today's summary: Sarcopenic and cachectic, needs time to rehabilitate and improve nutritional status prior to BMT. Pharmacy review of medications site effects today.  RTC to see Kahlil in 1-2 weeks for decision on whether to proceed with autologous HSCT.    I spent 40 minutes in the care of this patient today, which included time necessary for preparation for the visit, obtaining history, ordering medications/tests/procedures as medically indicated, review of pertinent medical literature, counseling of the patient, communication of recommendations to the care team, and documentation time.    Sonali Jorgensen    Securely message with the Vocera Web Console           Again, thank you for allowing me to participate in the care of your patient.        Sincerely,        BMT DOM

## 2022-03-01 LAB
BACTERIA BLD CULT: NO GROWTH
GRAM STAIN RESULT: NORMAL

## 2022-03-02 LAB
BACTERIA BLD CULT: NO GROWTH
BACTERIA BLD CULT: NO GROWTH

## 2022-03-04 NOTE — PROGRESS NOTES
This is a recent snapshot of the patient's [facility] medical record.  For current drug dose and complete information and questions, call [phone] or In Basket pool, [facility] home infusion ([id number])  CSN Number:  [id number]

## 2022-03-07 DIAGNOSIS — C90.00 MULTIPLE MYELOMA NOT HAVING ACHIEVED REMISSION (H): Primary | ICD-10-CM

## 2022-03-11 ENCOUNTER — TELEPHONE (OUTPATIENT)
Dept: TRANSPLANT | Facility: CLINIC | Age: 72
End: 2022-03-11
Payer: MEDICARE

## 2022-03-11 NOTE — TELEPHONE ENCOUNTER
BMT CLINICAL SOCIAL WORK NOTE:    Focus: Resources    Data: Pt is a 71 year old female planning to come for a autologous transplant due to MM.    Interventions: Clinical  (CSW) received a call from the Pt's  Tres to assist with handicap parking. Tres noted that they had this in the past and now that the pt is having more difficulty walking he was hoping to get a new past. CSW discussed that we will have the MD complete and provide to them at her next appt on 3/21/22. Tres agreed with this plan. SW encouraged Pt to contact CSW for support, questions and/or resources.    Plan: CSW will continue to work with Pt and family to provide supportive counseling and assist with resources as needed. CSW will continue to collaborate with multidisciplinary team regarding Pt's plan of care.     ANDREW Bangura, [facility]  Pager: [phone]  Phone: [phone]

## 2022-03-16 ENCOUNTER — OFFICE VISIT (OUTPATIENT)
Dept: NEUROPSYCHOLOGY | Facility: CLINIC | Age: 72
End: 2022-03-16
Attending: INTERNAL MEDICINE
Payer: MEDICARE

## 2022-03-16 DIAGNOSIS — C90.00 MULTIPLE MYELOMA NOT HAVING ACHIEVED REMISSION (H): ICD-10-CM

## 2022-03-16 DIAGNOSIS — G31.84 MILD NEUROCOGNITIVE DISORDER: Primary | ICD-10-CM

## 2022-03-16 PROCEDURE — 96132 NRPSYC TST EVAL PHYS/QHP 1ST: CPT

## 2022-03-16 PROCEDURE — 96138 PSYCL/NRPSYC TECH 1ST: CPT

## 2022-03-16 PROCEDURE — 90791 PSYCH DIAGNOSTIC EVALUATION: CPT

## 2022-03-16 PROCEDURE — 96139 PSYCL/NRPSYC TST TECH EA: CPT

## 2022-03-16 PROCEDURE — 96133 NRPSYC TST EVAL PHYS/QHP EA: CPT

## 2022-03-16 NOTE — LETTER
3/16/2022    RE: Libby Grimaldo  [address] [facility] [address]     Pt was seen for neuropsychological evaluation at the request of Julio C Guillory MD for the purposes of diagnostic clarification and treatment planning. 250 minutes of test administration and scoring were provided by this writer. Please see Dr. Tamra Garrtet's report for a full interpretation of the findings.    Kirsten Cortez  Psychometrist   NEUROPSYCHOLOGICAL EVALUATION  **CONFIDENTIAL**    **This is a medical document intended for communication with the referring provider. It is written in medical language and may contain abbreviations or verbiage that are unfamiliar. It is recommended that you follow up with your neuropsychologist and/or referring provider to discuss the results of this evaluation. This report and the results within are not intended to be interpreted in isolation without consultation with your medical provider. **    Name: Libby Grimaldo Education: 14 years    (age): 1950 (71 years) CARRILLO:  3/16/2022   Referral: Julio C Guillory MD    Handedness:  MRN (Epic): Right  [id number]     IDENTIFYING INFORMATION AND REASON FOR REFERRAL   Ms. Grimaldo is a 71-year old, right-handed, White female with a history of multiple myeloma. This evaluation was requested to provide an assessment of her current neuropsychological status as part of a comprehensive workup for stem cell transplant.     IMPRESSION  See below for relevant background information and detailed test results. See separate abstract for supporting documentation including a list of neuropsychological measures and test scores.    In the context of estimated average premorbid intellectual abilities, Ms. Grimaldo s neuropsychological profile revealed impaired performance on tests of immediate auditory attention and working memory, visual memory, processing speed, mental flexibility, and verbal fluency. Performances on visuospatial tests were also impaired but are difficult to  interpret in the context of patient s report of double vision and trouble perceiving test stimuli; thus, scores on tests with visual demands (visual memory, visual scanning, visuospatial processing etc.) may under-represent her true abilities. Performance was within expectation on other cognitive tests including verbal memory, abstract reasoning, response inhibition, naming, and knowledge of health and safety.    On a self-report measure of personality and psychopathology, results suggested some variable response inconsistency but did not suggest over- or under-reporting of symptoms. She reported a diffuse pattern of somatic complaints including vague neurological complaints and she reported a general sense of malaise manifested in poor health, and feeling tired, weak, and incapacitated. She also endorsed various negative emotional experiences including anxiety, anger, and fear. She reported being anger prone and engaging in compulsive behaviors. She described herself as being passive, indecisive, and inefficacious. She reported having cynical beliefs, being distrustful of others, and believing others look out only for their own interests. She also reported conflictual family relationships and lack of support from family members.    Overall, Ms. Grimaldo s neuropsychological profile revealed multidomain cognitive impairment. Her medical history is notable for chronic small vessel ischemic disease and chemotherapy treatment, and these are likely etiologic contributors to observed difficulties on cognitive testing. Visual disturbance, pain, and health-related anxiety may also be contributory. While findings suggest she may experience cognitive inefficiencies in her day-to-day life, she and her  reported limited functional difficulties at present. Taken together, observed test performance and reported levels of day-to-day functioning suggest a diagnosis of Mild Neurocognitive Disorder. Her current neurocognitive  profile is not suggestive of any underlying neurodegenerative process, though this may best be evaluated by ongoing monitoring of her cognitive status over time. Importantly, she has several cognitive strengths that will be beneficial to her in order to compensate for any cognitive weaknesses. With regard to transplant candidacy, Ms. Grimaldo demonstrated a good understanding of her medical conditions and the risks, benefits, and side effects of transplant. She expressed a high degree of motivation for this treatment. Despite responses on a self-report questionnaire suggesting conflictual family relationships and lack of support from family members, she described a  good  social support network in place including her , son, and friends, and she recognizes the need for increased functional assistance during the post-operative period. There are no substance use concerns. She endorsed normative health-related anxiety, anger, and fear but denied other significant psychopathology. She also demonstrates good treatment compliance and the capacity to make lifestyle changes, all of which are positive outcome indicators.     DIAGNOSTIC IMPRESSIONS (ICD-10)  ? Mild neurocognitive disorder (G31.84)    RECOMMENDATIONS  1. The BMT program offers a support group for transplant patients. The group is held virtually the 1st and 3rd Tuesdays of the month - the 1st Tuesday the group is from 10:00 am - 11:00 am and the 3rd Tuesday the group is held from 6:00 pm - 7:00 pm. Please contact the [facility] Program at [phone] Option 1 to speak with one of the BMT social workers if you would like more information. Additionally, Ms. Grimaldo may benefit from engagement with a health psychologist to address anxiety, anger, and fear in the context of her health conditions. Health psychologists within [facility] are listed below. Alternatively, one of the BMT social workers could help connect you with other supportive services.  [facility]  [facility] Psychologists: Yann Tobar, Ph.D.: [phone]; Khloe Garcia, Ph.D: [phone]; Bhumi Issa, Ph.D. [phone]; Kendy Chakraborty, Ph.D.: [phone]  2. Ms. Grimaldo is encouraged to utilize the following behavioral strategies to maximize cognitive functioning in her daily life:   a. Eliminate distraction. Eliminating environmental distracters can facilitate attention and memory performance. For example, turning off music or the television while having a conversation, so that all of your attention is focused on the task at hand.  b. Work on decreasing interference from intrusive thoughts. When intrusive thoughts begin to interfere with your thinking, do not concentrate on them. Instead, observe them passively and calmly redirect your attention back to task.   c. Engage in calming activities that increase focus on the present. Incorporating mindfulness techniques such as meditation into your daily routine will help keep you present-oriented and consequently improve attention and memory.  d. Pay mindful attention. You may find that you need to make a conscious effort to pay attention to conversations or when learning new information. When attention is not focused, it is difficult for the brain to learn new information. Thus, making a mindful effort to pay attention as you go through daily tasks will assist and facilitate memory recall later on.  e. Allow for time. Take the time needed to learn and recall information. Some people tend to worry if they can't immediately recall something. Instead, you should take time to complete or express a thought or complete a task rather than be critical or harsh on yourself.  f. Use external aids to increase structure in daily life. Tools such as personal data assistants with alarms and reminders are helpful in bringing structure to daily activities.   g. Compartmentalize problem solving.  Executive function difficulties may interfere with your ability to make  decisions and reason through complex and/or abstract situations in daily life. Break large overwhelming decisions into small manageable problems, and do not go on to the next step before accomplishing the previous one. Use written outlines to track your progress.   3. The current evaluation will serve as an objective cognitive baseline against which results of future evaluations may be compared.  Ms. Grimaldo may be referred for a follow-up neuropsychological evaluation in 12 months to objectively assess neurocognitive change.     Thank you for the opportunity to participate in Mr. Grimaldo s care.  These findings and recommendations were reviewed with the patient in a separate feedback session on 3/18/2022 and all her questions were answered. If you have any questions regarding this evaluation, please do not hesitate to contact me.       Tamra Garrett, Ph.D.,   Clinical Neuropsychologist    RELEVANT BACKGROUND INFORMATION AND SUPPORTING DOCUMENTATION  Gathered from a clinical interview with the patient and reviews of electronic medical record.    History of Presenting Problem(s)  Ms. Grimaldo presented with a history of multiple myeloma s/p chemotherapy, not having achieved remission. She is undergoing a comprehensive workup prior to planned autologous stem cell transplant. She reported cognitive complaints since starting chemotherapy charactered by trouble with mental calculations, attention, and memory difficulties. She described difficulty taking in information about her medical care and procedures and needing to re-read information but denied other memory complaints. She described difficulty with concentration and distractibility and longstanding difficulty with word-finding without notable change. Her  assists with managing appointments and medications, but they denied concern about her ability to do these tasks independently. Her  has always managed meal preparation. She has not driven since before  2015 because she did not renew her license since moving from [place] to [place], but she and her  denied any concern about her ability to drive. She denied difficulty with managing finances and basic self-care. Physically, she reported occasional dizziness but denied recent falls. She stated her balance and bilateral hand tremors have improved recently. She endorsed numbness/tingling due to prior neck surgeries and neuropathy. She described vision difficulties characterized by blurry vision and floaters. Emotionally, she endorsed normative low mood and health-related anxiety.     Ms. Grimaldo expressed a high degree of motivation for transplant. She was able to articulate a basic lay knowledge and understanding of her health conditions as well as risks, benefits, and side effects of transplant. She was able to discuss details regarding the process of transplant and was aware of some necessary lifestyle changes required such as strict isolation and frequent blood tests. Her current living situation is stable. She is living with her , who will provide post-transplant assistance as needed, and her son also lives nearby for additional support as needed.     Neurodiagnostic Findings   ? Head CT w/o contrast (2/14/2022) IMPRESSION: No acute intracranial pathology. Mild to moderate presumed chronic small vessel ischemic disease.    Medical History  ? Multiple myeloma s/p chemotherapy, not having achieved remission  ? Graves  disease   ? History of seizures (1 febrile seizure in infancy, two seizures later in life, with negative workup)  ? Denied history of stroke, head injury with LOC, or known toxin exposure    Health Behaviors   ? Sleep: 8-9 hours of cumulative sleep nightly; denied difficulty with sleep onset or maintenance; reported light infrequent snoring but denied gasping arousals; denied parasomnic behaviors  ? Appetite: Improving  ? Pain: Denied pain during the clinical interview, stated she just felt  sore; however, patient reported significant pain during cognitive testing    Psychiatric History  ? Ms. Grimaldo reported current mood is  down  in the context of uncertainty about her medical status and treatment plans, and inability to spend time with her family, but she denied prominent symptoms of depression currently. She endorsed normative health-related anxiety in the context of her health conditions and upcoming treatments but denied frequent worry that is difficult to control.    ? She otherwise denied history of significant depressive, irritable, hypomanic or manic mood symptoms, excessive rumination, worry or uncontrollable anxiety, alteration of sensory perceptual processes or disordered thought.  ? Suicidality/ Self-harm: She denied any thoughts of suicide or engagement in self-harm or potentially life threatening behaviors.   ? Psychiatric treatment: Prescribed venlafaxine; not currently engaged in individual psychotherapy    Substance Use History  ? Alcohol: None  ? Nicotine: None  ? Cannabis: None  ? Problematic Substance Use: Denied    Current Medications (per EMR)    Developmental, Educational, & Occupational History  ? Gestational: Full-term  ?  complications: None reported  ? Developmental milestones: Met at expected ages  ? Childhood behavioral / emotional / academic problems: Denied  ? Native Language: English  ? Education: Graduated from high school on time with average grades; obtained RN  ? 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 Born in [place] and raised in [place], [place]   ? Marital:  and remarried to current   ? Children: 2 sons  ? Housing: Lives with   ? Psychosocial support: Family, friends; described level of support as  good     Family History  ?  No known family history of dementia or neurological disorders    RESULTS  See separate abstract for list of neuropsychological  measures and test scores.    PRE-MORBID ABILITY: Premorbid abilities were estimated within the average range based on single word reading ability and demographic factors including sex, ethnicity, education, occupation, and geographic region.  GENERAL COGNITIVE STATUS: Orientation was within expectation for general personal information, time, place, and cultural information. Within the practical domain, performance was low average on a measure of conceptual understanding of issues pertaining to health and safety. Her score was consistent with individuals functioning at a moderate level of independence in the community.  ATTENTION/EXECUTIVE FUNCTIONS: Immediate auditory attention performance was below average. Working memory performances were variable with below average and average scores. Overall performance was below average to exceptionally low. Verbal abstract reasoning performance was average. Visual reasoning through pattern identification was low average. Performance on a test of set-shifting/cognitive flexibility was exceptionally low and was discontinued due to inability to complete the task in the allotted amount of time. Spontaneous clock drawing was severely impaired and notable for poor spacing of numbers, no hands, and written  11 10  between 11 and 12. Response inhibition performance was average when considering overall slowed verbal processing speed. Psychomotor processing speed performances were exceptionally low.  LANGUAGE: Confrontation naming performance was average. Letter-cue verbal fluency performance was below average. Semantic verbal fluency performance was below average.   VISUOSPATIAL PROCESSING: Performance on a spatial perception task requiring judgement of angled lines was discontinued due to patient s report of double vision. Copy of increasingly complex figures was exceptionally low and she reported difficulty perceiving the stimuli. Copy of a clock was borderline impaired and notable  for poor spacing of numbers and lack of size difference of the hands.  LEARNING AND MEMORY: Initial encoding of a word-list over multiple learning trials was low average and she was not aided with repetition. Delayed recall of the list after a delay was average. Recognition of the word-list was average. Initial encoding of contextualized verbal information in the form of a short story was above average and delayed retrieval was average. Recognition of story details was average. Encoding of visual information was exceptionally low and delayed retrieval was low average. Recognition of visual information among distractors was below average.   PSYCHOLOGICAL AND BEHAVIORAL: On a self-report measure of personality and psychopathology, results suggested some variable response inconsistency but did not suggest over- or under-reporting of symptoms. She reported a diffuse pattern of somatic complaints involving different bodily systems that probably include head pain and neurologic and gastrointestinal problems, likely perceiving her physical problems as life-interfering. Specifically, she reported vague neurological complaints and reported a general sense of malaise manifested in poor health, and feeling tired, weak, and incapacitated. She also endorsed various negative emotional experiences including anxiety, anger, and fear. She reported being anger prone and engaging in compulsive behaviors. She described herself as being passive, indecisive, and inefficacious. She reported having cynical beliefs, being distrustful of others, and believing others look out only for their own interests. She also reported conflictual family relationships and lack of support from family members.   PERFORMANCE VALIDITY: Performance on neuropsychological tests is dependent upon a number of factors, including sufficient engagement and motivation, to reliably establish an examinee s level of cognitive functioning.  Based upon observations made  throughout the evaluation, the patient did not appear to deliberately perform in a suboptimal manner and demonstrated good frustration tolerance on cognitively challenging tasks. Overall, test results are believed to accurately represent the patient s current neurocognitive status.    BEHAVIORAL OBSERVATIONS  ? Alert, oriented to self, time, place, and circumstance; attentive and focused while undergoing testing  ? Appearance: Well-groomed, casually dressed  ? Gait/Posture: No abnormalities noted  ? Sensorimotor: No abnormalities noted  ? Behavior: Cooperative, pleasant, no behavioral abnormalities noted  ? Speech: Fluent, articulate, normal rate, prosody, and volume; no conversational word finding difficulty  ? Thought process: Logical, linear, and goal-directed   ? Thought content: Logical, appropriate   ? Affect: Broad, responsive, consistent with reported mood; good eye contact  ? Mood: Euthymic  ? Insight and Judgment: Intact  ? Approach to testing: Efficient, deliberate; good frustration on cognitively demanding tasks  ? Rapport: Easily established and maintained      The clinical interview portion of this evaluation (88113) was conducted via a telehealth visit in lieu of in-person visit due to the coronavirus emergency.     Activities included in this evaluation: CPT Code #Units Time   Neurobehavioral Status Exam 51843 1 --   Test evaluation services by professional; first hour. 57421 1 2:55   Test evaluation services by professional, additional hour (+) 33717 2    Test administration and scoring by technician, first 30 mins 14754 1 4:10   Test administration and scoring by technician, additional 30 mins (+) [id number]        KIARRA GATICA, PhD

## 2022-03-17 NOTE — PROGRESS NOTES
Pt was seen for neuropsychological evaluation at the request of Julio C Guillory MD for the purposes of diagnostic clarification and treatment planning. 250 minutes of test administration and scoring were provided by this writer. Please see Dr. Tamra Garrett's report for a full interpretation of the findings.    Kirsten Cortez  Psychometrist

## 2022-03-18 NOTE — PROGRESS NOTES
Provider: CE      Tech: PETRA      Patient Name: Libby Grimaldo      : [birth year]      MRN: [id number]      CARRILLO: 3/16/22      Age: 71      Education: 14      Ethnicity: C       Handedness: Right      Station: OP             NEUROPSYCHOLOGICAL TESTS RAW SCORE STANDARDIZED SCORE* DESCRIPTIVE RANGE**   PREMORBID ABILITY       WAIS-IV ACS Test of Premorbid Functioning (Estimated FSIQ) 40 Estimated FSIQ = 104 Average          ATTENTION AND EXECUTIVE FUNCTIONS RAW SCORE STANDARDIZED SCORE* DESCRIPTIVE RANGE**   Wechsler Adult Intelligence Scale - 4th Edition (WAIS-IV)       Digit Span Forward 6 ss= 5 Below Average   Digit Span Backward 4 ss= 5 Below Average   Digit Span Sequencing 6 ss= 8 Average   Digit Span Total 16 ss= 5 Below Average     TSs,r,e = 29 Exceptionally Low   Coding 19 ss= 3 Exceptionally Low     TSs,r,e = 16 Exceptionally Low   Similarities 27 ss= 11 Average     TSs,r,e = 53 Average   Matrix Reasoning 9 ss= 7 Low Average     TSs,r,e = 37 Low Average   Trail Making Test (Time/errors)        Part A s,r,e 102/1 TS= 15 Exceptionally Low   Part B s,r,e  TS= - Exceptionally Low   Stroop       Word e 67 TS= 26 Exceptionally Low   Color e 42 TS= 24 Exceptionally Low   Color/Word e 20 TS= 35 Below Average   Interference e -6 TS= 44 Average   Independent Living Scales (ILS)       Health and Safety 32 TS= 42 Low Average/Moderate          LANGUAGE RAW SCORE STANDARDIZED SCORE* DESCRIPTIVE RANGE**   [place] Naming Test s,r,e 55 TS= 47 Average   Letter-Cue Verbal Fluency s,r,e 10-6-7 (23) TS= 34 Below Average   Animal Fluency s,r,e 13 TS=  33 Below Average          VISUOSPATIAL PROCESSING RAW SCORE STANDARDIZED SCORE* DESCRIPTIVE RANGE**   Clock Drawing       Command 3/10 - - Severe Impairment   Copy 8/10 - - Borderline Impairment   RBANS       Line Orientation DC      WMS-IV Visual Reproduction Copy 29 %= <2 Exceptionally Low          LEARNING & MEMORY RAW SCORE STANDARDIZED SCORE* DESCRIPTIVE RANGE**   Wechsler Memory  Scale-4th Edition (WMS-IV)       Logical Memory I 37 ss= 12 High Average     TSs,r,e = 63 Above Average   Logical Memory II 17 ss= 9 Average     TSs,r,e = 43 Average   Logical Memory Recognition 20 %= 51-75 Average   Visual Reproduction I 15 ss= 2 Exceptionally Low     TSs,r,e = 23 Exceptionally Low   Visual Reproduction II 6 ss= 5 Below Average     TSs,r,e = 33 Below Average   Visual Reproduction Recognition 2 %= 3-9 Below Average   De [place] Santos Verbal Learning Test - Revised (HVLT-R; Form 1)     Total Trials 1-3 6-7-5 (18) TS= 39 Low Average   Delayed Recall 7 TS= 44 Average   Retention (%) 100% TS= 56 Average   Recognition Hits 11 --     Recognition False Alarms 1 --     Recognition Discriminability 10 TS= 47 Average          PERFORMANCE VALIDITY RAW SCORE      RDS 6             * All standardized scores are adjusted for age. Superscripts denote additional adjustment for (s)ex, (r)ace/ethnicity, (l)anguage, and/or (e)ducation.   ** Descriptive ranges are based on American Academy of Clinical Neuropsychology (2020) consensus guidelines, or test manuals where appropriate.    WNL = within normal limits. DC = discontinued due to patient s inability to complete the test.     Standardized scores: TS = T-score; mean = 50, standard deviation =10; z = z-score; mean = 0, standard deviation = 1; ss = scaled score; mean = 10, standard deviation = 3; MAS = [place] Older Adult Normative Study age adjusted scaled score; mean = 10, standard deviation = 3; SS = standard score; mean = 100, standard deviation = 15.

## 2022-03-18 NOTE — PROGRESS NOTES
NEUROPSYCHOLOGICAL EVALUATION  **CONFIDENTIAL**    **This is a medical document intended for communication with the referring provider. It is written in medical language and may contain abbreviations or verbiage that are unfamiliar. It is recommended that you follow up with your neuropsychologist and/or referring provider to discuss the results of this evaluation. This report and the results within are not intended to be interpreted in isolation without consultation with your medical provider. **      Name: Libby Grimaldo Education: 14 years    (age): 1950 (71 years) CARRILLO:  3/16/2022   Referral: Julio C Guillory MD    Handedness:  MRN (Epic): Right  [id number]     IDENTIFYING INFORMATION AND REASON FOR REFERRAL   Ms. Grimaldo is a 71-year old, right-handed, White female with a history of multiple myeloma. This evaluation was requested to provide an assessment of her current neuropsychological status as part of a comprehensive workup for stem cell transplant.     IMPRESSION  See below for relevant background information and detailed test results. See separate abstract for supporting documentation including a list of neuropsychological measures and test scores.    In the context of estimated average premorbid intellectual abilities, Ms. Grimaldo s neuropsychological profile revealed impaired performance on tests of immediate auditory attention and working memory, visual memory, processing speed, mental flexibility, and verbal fluency. Performances on visuospatial tests were also impaired but are difficult to interpret in the context of patient s report of double vision and trouble perceiving test stimuli; thus, scores on tests with visual demands (visual memory, visual scanning, visuospatial processing etc.) may under-represent her true abilities. Performance was within expectation on other cognitive tests including verbal memory, abstract reasoning, response inhibition, naming, and knowledge of health and safety.    On a  self-report measure of personality and psychopathology, results suggested some variable response inconsistency but did not suggest over- or under-reporting of symptoms. She reported a diffuse pattern of somatic complaints including vague neurological complaints and she reported a general sense of malaise manifested in poor health, and feeling tired, weak, and incapacitated. She also endorsed various negative emotional experiences including anxiety, anger, and fear. She reported being anger prone and engaging in compulsive behaviors. She described herself as being passive, indecisive, and inefficacious. She reported having cynical beliefs, being distrustful of others, and believing others look out only for their own interests. She also reported conflictual family relationships and lack of support from family members.    Overall, Ms. Grimaldo s neuropsychological profile revealed multidomain cognitive impairment. Her medical history is notable for chronic small vessel ischemic disease and chemotherapy treatment, and these are likely etiologic contributors to observed difficulties on cognitive testing. Visual disturbance, pain, and health-related anxiety may also be contributory. While findings suggest she may experience cognitive inefficiencies in her day-to-day life, she and her  reported limited functional difficulties at present. Taken together, observed test performance and reported levels of day-to-day functioning suggest a diagnosis of Mild Neurocognitive Disorder. Her current neurocognitive profile is not suggestive of any underlying neurodegenerative process, though this may best be evaluated by ongoing monitoring of her cognitive status over time. Importantly, she has several cognitive strengths that will be beneficial to her in order to compensate for any cognitive weaknesses. With regard to transplant candidacy, Ms. Grimaldo demonstrated a good understanding of her medical conditions and the risks, benefits,  and side effects of transplant. She expressed a high degree of motivation for this treatment. Despite responses on a self-report questionnaire suggesting conflictual family relationships and lack of support from family members, she described a  good  social support network in place including her , son, and friends, and she recognizes the need for increased functional assistance during the post-operative period. There are no substance use concerns. She endorsed normative health-related anxiety, anger, and fear but denied other significant psychopathology. She also demonstrates good treatment compliance and the capacity to make lifestyle changes, all of which are positive outcome indicators.     DIAGNOSTIC IMPRESSIONS (ICD-10)  ? Mild neurocognitive disorder (G31.84)    RECOMMENDATIONS  1. The [facility] program offers a support group for transplant patients. The group is held virtually the 1st and 3rd Tuesdays of the month - the 1st Tuesday the group is from 10:00 am - 11:00 am and the 3rd Tuesday the group is held from 6:00 pm - 7:00 pm. Please contact the [facility] Program at [phone] Option 1 to speak with one of the BMT social workers if you would like more information. Additionally, Ms. Grimaldo may benefit from engagement with a health psychologist to address anxiety, anger, and fear in the context of her health conditions. Health psychologists within [facility] are listed below. Alternatively, one of the BMT social workers could help connect you with other supportive services.  [facility] Psychologists: Yann Tobar, Ph.D.: [phone]; Khloe Garcia, Ph.D: [phone]; Bhumi Issa, Ph.D. [phone]; Kendy Chakraborty, Ph.D.: [phone]  2. Ms. Grimaldo is encouraged to utilize the following behavioral strategies to maximize cognitive functioning in her daily life:   a. Eliminate distraction. Eliminating environmental distracters can facilitate attention and memory performance. For example,  turning off music or the television while having a conversation, so that all of your attention is focused on the task at hand.  b. Work on decreasing interference from intrusive thoughts. When intrusive thoughts begin to interfere with your thinking, do not concentrate on them. Instead, observe them passively and calmly redirect your attention back to task.   c. Engage in calming activities that increase focus on the present. Incorporating mindfulness techniques such as meditation into your daily routine will help keep you present-oriented and consequently improve attention and memory.  d. Pay mindful attention. You may find that you need to make a conscious effort to pay attention to conversations or when learning new information. When attention is not focused, it is difficult for the brain to learn new information. Thus, making a mindful effort to pay attention as you go through daily tasks will assist and facilitate memory recall later on.  e. Allow for time. Take the time needed to learn and recall information. Some people tend to worry if they can't immediately recall something. Instead, you should take time to complete or express a thought or complete a task rather than be critical or harsh on yourself.  f. Use external aids to increase structure in daily life. Tools such as personal data assistants with alarms and reminders are helpful in bringing structure to daily activities.   g. Compartmentalize problem solving.  Executive function difficulties may interfere with your ability to make decisions and reason through complex and/or abstract situations in daily life. Break large overwhelming decisions into small manageable problems, and do not go on to the next step before accomplishing the previous one. Use written outlines to track your progress.   3. The current evaluation will serve as an objective cognitive baseline against which results of future evaluations may be compared.  MsChristal Dillan may be referred for  a follow-up neuropsychological evaluation in 12 months to objectively assess neurocognitive change.     Thank you for the opportunity to participate in Mr. Grimaldo s care.  These findings and recommendations were reviewed with the patient in a separate feedback session on 3/18/2022 and all her questions were answered. If you have any questions regarding this evaluation, please do not hesitate to contact me.       Tamra Garrett, Ph.D.,   Clinical Neuropsychologist    RELEVANT BACKGROUND INFORMATION AND SUPPORTING DOCUMENTATION  Gathered from a clinical interview with the patient and reviews of electronic medical record.    History of Presenting Problem(s)  Ms. Grimaldo presented with a history of multiple myeloma s/p chemotherapy, not having achieved remission. She is undergoing a comprehensive workup prior to planned autologous stem cell transplant. She reported cognitive complaints since starting chemotherapy charactered by trouble with mental calculations, attention, and memory difficulties. She described difficulty taking in information about her medical care and procedures and needing to re-read information but denied other memory complaints. She described difficulty with concentration and distractibility and longstanding difficulty with word-finding without notable change. Her  assists with managing appointments and medications, but they denied concern about her ability to do these tasks independently. Her  has always managed meal preparation. She has not driven since before 2015 because she did not renew her license since moving from [place] to [place], but she and her  denied any concern about her ability to drive. She denied difficulty with managing finances and basic self-care. Physically, she reported occasional dizziness but denied recent falls. She stated her balance and bilateral hand tremors have improved recently. She endorsed numbness/tingling due to prior neck surgeries and  neuropathy. She described vision difficulties characterized by blurry vision and floaters. Emotionally, she endorsed normative low mood and health-related anxiety.     Ms. Grimaldo expressed a high degree of motivation for transplant. She was able to articulate a basic lay knowledge and understanding of her health conditions as well as risks, benefits, and side effects of transplant. She was able to discuss details regarding the process of transplant and was aware of some necessary lifestyle changes required such as strict isolation and frequent blood tests. Her current living situation is stable. She is living with her , who will provide post-transplant assistance as needed, and her son also lives nearby for additional support as needed.     Neurodiagnostic Findings   ? Head CT w/o contrast (2/14/2022) IMPRESSION: No acute intracranial pathology. Mild to moderate presumed chronic small vessel ischemic disease.    Medical History  ? Multiple myeloma s/p chemotherapy, not having achieved remission  ? Graves  disease   ? History of seizures (1 febrile seizure in infancy, two seizures later in life, with negative workup)  ? Denied history of stroke, head injury with LOC, or known toxin exposure    Health Behaviors   ? Sleep: 8-9 hours of cumulative sleep nightly; denied difficulty with sleep onset or maintenance; reported light infrequent snoring but denied gasping arousals; denied parasomnic behaviors  ? Appetite: Improving  ? Pain: Denied pain during the clinical interview, stated she just felt sore; however, patient reported significant pain during cognitive testing    Psychiatric History  ? Ms. Grimaldo reported current mood is  down  in the context of uncertainty about her medical status and treatment plans, and inability to spend time with her family, but she denied prominent symptoms of depression currently. She endorsed normative health-related anxiety in the context of her health conditions and upcoming  treatments but denied frequent worry that is difficult to control.    ? She otherwise denied history of significant depressive, irritable, hypomanic or manic mood symptoms, excessive rumination, worry or uncontrollable anxiety, alteration of sensory perceptual processes or disordered thought.  ? Suicidality/ Self-harm: She denied any thoughts of suicide or engagement in self-harm or potentially life threatening behaviors.   ? Psychiatric treatment: Prescribed venlafaxine; not currently engaged in individual psychotherapy    Substance Use History  ? Alcohol: None  ? Nicotine: None  ? Cannabis: None  ? Problematic Substance Use: Denied    Current Medications (per EMR)    Developmental, Educational, & Occupational History  ? Gestational: Full-term  ?  complications: None reported  ? Developmental milestones: Met at expected ages  ? Childhood behavioral / emotional / academic problems: Denied  ? Native Language: English  ? Education: Graduated from high school on time with average grades; obtained RN  ? 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 Born in [place] and raised in [place], [place]   ? Marital:  and remarried to current   ? Children: 2 sons  ? Housing: Lives with   ? Psychosocial support: Family, friends; described level of support as  good     Family History  ?  No known family history of dementia or neurological disorders    RESULTS  See separate abstract for list of neuropsychological measures and test scores.    PRE-MORBID ABILITY: Premorbid abilities were estimated within the average range based on single word reading ability and demographic factors including sex, ethnicity, education, occupation, and geographic region.  GENERAL COGNITIVE STATUS: Orientation was within expectation for general personal information, time, place, and cultural information. Within the practical domain, performance was  low average on a measure of conceptual understanding of issues pertaining to health and safety. Her score was consistent with individuals functioning at a moderate level of independence in the community.  ATTENTION/EXECUTIVE FUNCTIONS: Immediate auditory attention performance was below average. Working memory performances were variable with below average and average scores. Overall performance was below average to exceptionally low. Verbal abstract reasoning performance was average. Visual reasoning through pattern identification was low average. Performance on a test of set-shifting/cognitive flexibility was exceptionally low and was discontinued due to inability to complete the task in the allotted amount of time. Spontaneous clock drawing was severely impaired and notable for poor spacing of numbers, no hands, and written  11 10  between 11 and 12. Response inhibition performance was average when considering overall slowed verbal processing speed. Psychomotor processing speed performances were exceptionally low.  LANGUAGE: Confrontation naming performance was average. Letter-cue verbal fluency performance was below average. Semantic verbal fluency performance was below average.   VISUOSPATIAL PROCESSING: Performance on a spatial perception task requiring judgement of angled lines was discontinued due to patient s report of double vision. Copy of increasingly complex figures was exceptionally low and she reported difficulty perceiving the stimuli. Copy of a clock was borderline impaired and notable for poor spacing of numbers and lack of size difference of the hands.  LEARNING AND MEMORY: Initial encoding of a word-list over multiple learning trials was low average and she was not aided with repetition. Delayed recall of the list after a delay was average. Recognition of the word-list was average. Initial encoding of contextualized verbal information in the form of a short story was above average and delayed  retrieval was average. Recognition of story details was average. Encoding of visual information was exceptionally low and delayed retrieval was low average. Recognition of visual information among distractors was below average.   PSYCHOLOGICAL AND BEHAVIORAL: On a self-report measure of personality and psychopathology, results suggested some variable response inconsistency but did not suggest over- or under-reporting of symptoms. She reported a diffuse pattern of somatic complaints involving different bodily systems that probably include head pain and neurologic and gastrointestinal problems, likely perceiving her physical problems as life-interfering. Specifically, she reported vague neurological complaints and reported a general sense of malaise manifested in poor health, and feeling tired, weak, and incapacitated. She also endorsed various negative emotional experiences including anxiety, anger, and fear. She reported being anger prone and engaging in compulsive behaviors. She described herself as being passive, indecisive, and inefficacious. She reported having cynical beliefs, being distrustful of others, and believing others look out only for their own interests. She also reported conflictual family relationships and lack of support from family members.   PERFORMANCE VALIDITY: Performance on neuropsychological tests is dependent upon a number of factors, including sufficient engagement and motivation, to reliably establish an examinee s level of cognitive functioning.  Based upon observations made throughout the evaluation, the patient did not appear to deliberately perform in a suboptimal manner and demonstrated good frustration tolerance on cognitively challenging tasks. Overall, test results are believed to accurately represent the patient s current neurocognitive status.    BEHAVIORAL OBSERVATIONS  ? Alert, oriented to self, time, place, and circumstance; attentive and focused while undergoing  testing  ? Appearance: Well-groomed, casually dressed  ? Gait/Posture: No abnormalities noted  ? Sensorimotor: No abnormalities noted  ? Behavior: Cooperative, pleasant, no behavioral abnormalities noted  ? Speech: Fluent, articulate, normal rate, prosody, and volume; no conversational word finding difficulty  ? Thought process: Logical, linear, and goal-directed   ? Thought content: Logical, appropriate   ? Affect: Broad, responsive, consistent with reported mood; good eye contact  ? Mood: Euthymic  ? Insight and Judgment: Intact  ? Approach to testing: Efficient, deliberate; good frustration on cognitively demanding tasks  ? Rapport: Easily established and maintained      The clinical interview portion of this evaluation (63792) was conducted via a telehealth visit in lieu of in-person visit due to the coronavirus emergency.       Activities included in this evaluation: CPT Code #Units Time   Neurobehavioral Status Exam 40532 1 --   Test evaluation services by professional; first hour. 47155 1 2:55   Test evaluation services by professional, additional hour (+) 28346 2    Test administration and scoring by technician, first 30 mins 75490 1 4:10   Test administration and scoring by technician, additional 30 mins (+) [id number]

## 2022-03-21 ENCOUNTER — ANCILLARY PROCEDURE (OUTPATIENT)
Dept: GENERAL RADIOLOGY | Facility: CLINIC | Age: 72
End: 2022-03-21
Attending: INTERNAL MEDICINE
Payer: MEDICARE

## 2022-03-21 ENCOUNTER — ONCOLOGY VISIT (OUTPATIENT)
Dept: TRANSPLANT | Facility: CLINIC | Age: 72
End: 2022-03-21
Attending: INTERNAL MEDICINE
Payer: MEDICARE

## 2022-03-21 ENCOUNTER — LAB (OUTPATIENT)
Dept: LAB | Facility: CLINIC | Age: 72
End: 2022-03-21
Attending: INTERNAL MEDICINE
Payer: MEDICARE

## 2022-03-21 VITALS
TEMPERATURE: 98.2 F | SYSTOLIC BLOOD PRESSURE: 115 MMHG | RESPIRATION RATE: 16 BRPM | WEIGHT: 129.3 LBS | HEART RATE: 80 BPM | DIASTOLIC BLOOD PRESSURE: 71 MMHG | BODY MASS INDEX: 22.19 KG/M2 | OXYGEN SATURATION: 97 %

## 2022-03-21 DIAGNOSIS — C90.00 MULTIPLE MYELOMA NOT HAVING ACHIEVED REMISSION (H): Primary | ICD-10-CM

## 2022-03-21 DIAGNOSIS — C90.00 MULTIPLE MYELOMA NOT HAVING ACHIEVED REMISSION (H): ICD-10-CM

## 2022-03-21 LAB
ALBUMIN SERPL-MCNC: 2.7 G/DL (ref 3.4–5)
ALBUMIN UR-MCNC: NEGATIVE MG/DL
ALP SERPL-CCNC: 387 U/L (ref 40–150)
ALT SERPL W P-5'-P-CCNC: 23 U/L (ref 0–50)
ANION GAP SERPL CALCULATED.3IONS-SCNC: 11 MMOL/L (ref 3–14)
APPEARANCE UR: ABNORMAL
AST SERPL W P-5'-P-CCNC: 29 U/L (ref 0–45)
BILIRUB SERPL-MCNC: 0.4 MG/DL (ref 0.2–1.3)
BILIRUB UR QL STRIP: NEGATIVE
BUN SERPL-MCNC: 9 MG/DL (ref 7–30)
CALCIUM SERPL-MCNC: 8.7 MG/DL (ref 8.5–10.1)
CHLORIDE BLD-SCNC: 107 MMOL/L (ref 94–109)
CO2 SERPL-SCNC: 25 MMOL/L (ref 20–32)
COLOR UR AUTO: YELLOW
CREAT SERPL-MCNC: 0.8 MG/DL (ref 0.52–1.04)
ERYTHROCYTE [DISTWIDTH] IN BLOOD BY AUTOMATED COUNT: 17.1 % (ref 10–15)
GFR SERPL CREATININE-BSD FRML MDRD: 79 ML/MIN/1.73M2
GLUCOSE BLD-MCNC: 122 MG/DL (ref 70–99)
GLUCOSE UR STRIP-MCNC: NEGATIVE MG/DL
HCT VFR BLD AUTO: 31.1 % (ref 35–47)
HGB BLD-MCNC: 9.8 G/DL (ref 11.7–15.7)
HGB UR QL STRIP: ABNORMAL
HYALINE CASTS: 64 /LPF
IGA SERPL-MCNC: 4 MG/DL (ref 84–499)
IGG SERPL-MCNC: 477 MG/DL (ref 610–1616)
IGM SERPL-MCNC: <10 MG/DL (ref 35–242)
KAPPA LC FREE SER-MCNC: 0.1 MG/DL (ref 0.33–1.94)
KAPPA LC FREE/LAMBDA FREE SER NEPH: 0.02 {RATIO} (ref 0.26–1.65)
KETONES UR STRIP-MCNC: 15 MG/DL
LAMBDA LC FREE SERPL-MCNC: 6.61 MG/DL (ref 0.57–2.63)
LEUKOCYTE ESTERASE UR QL STRIP: ABNORMAL
MCH RBC QN AUTO: 32 PG (ref 26.5–33)
MCHC RBC AUTO-ENTMCNC: 31.5 G/DL (ref 31.5–36.5)
MCV RBC AUTO: 102 FL (ref 78–100)
MUCOUS THREADS #/AREA URNS LPF: PRESENT /LPF
NITRATE UR QL: NEGATIVE
PH UR STRIP: 5 [PH] (ref 5–7)
PLATELET # BLD AUTO: 192 10E3/UL (ref 150–450)
POTASSIUM BLD-SCNC: 4 MMOL/L (ref 3.4–5.3)
PROT SERPL-MCNC: 5.7 G/DL (ref 6.8–8.8)
RBC # BLD AUTO: 3.06 10E6/UL (ref 3.8–5.2)
RBC URINE: 4 /HPF
RENAL TUB EPI: 1 /HPF
SODIUM SERPL-SCNC: 143 MMOL/L (ref 133–144)
SP GR UR STRIP: 1.02 (ref 1–1.03)
SQUAMOUS EPITHELIAL: 2 /HPF
TOTAL PROTEIN SERUM FOR ELP: 5.3 G/DL (ref 6.8–8.8)
TRANSITIONAL EPI: 1 /HPF
UROBILINOGEN UR STRIP-MCNC: NORMAL MG/DL
WBC # BLD AUTO: 7.3 10E3/UL (ref 4–11)
WBC URINE: >182 /HPF

## 2022-03-21 PROCEDURE — 82784 ASSAY IGA/IGD/IGG/IGM EACH: CPT | Performed by: INTERNAL MEDICINE

## 2022-03-21 PROCEDURE — 85014 HEMATOCRIT: CPT | Performed by: INTERNAL MEDICINE

## 2022-03-21 PROCEDURE — 71046 X-RAY EXAM CHEST 2 VIEWS: CPT | Performed by: RADIOLOGY

## 2022-03-21 PROCEDURE — 250N000011 HC RX IP 250 OP 636: Performed by: INTERNAL MEDICINE

## 2022-03-21 PROCEDURE — 80053 COMPREHEN METABOLIC PANEL: CPT | Performed by: INTERNAL MEDICINE

## 2022-03-21 PROCEDURE — 87186 SC STD MICRODIL/AGAR DIL: CPT

## 2022-03-21 PROCEDURE — G0463 HOSPITAL OUTPT CLINIC VISIT: HCPCS

## 2022-03-21 PROCEDURE — 83521 IG LIGHT CHAINS FREE EACH: CPT | Performed by: INTERNAL MEDICINE

## 2022-03-21 PROCEDURE — 99214 OFFICE O/P EST MOD 30 MIN: CPT | Performed by: INTERNAL MEDICINE

## 2022-03-21 PROCEDURE — 84165 PROTEIN E-PHORESIS SERUM: CPT | Mod: TC | Performed by: PATHOLOGY

## 2022-03-21 PROCEDURE — 36591 DRAW BLOOD OFF VENOUS DEVICE: CPT | Performed by: INTERNAL MEDICINE

## 2022-03-21 PROCEDURE — 84155 ASSAY OF PROTEIN SERUM: CPT | Performed by: INTERNAL MEDICINE

## 2022-03-21 PROCEDURE — 81001 URINALYSIS AUTO W/SCOPE: CPT

## 2022-03-21 RX ORDER — GUAIFENESIN 600 MG/1
1200 TABLET, EXTENDED RELEASE ORAL 2 TIMES DAILY
Qty: 20 TABLET | Refills: 1 | Status: ON HOLD | OUTPATIENT
Start: 2022-03-21 | End: 2022-05-30

## 2022-03-21 RX ORDER — LEVOFLOXACIN 750 MG/1
750 TABLET, FILM COATED ORAL DAILY
Qty: 7 TABLET | Refills: 0 | Status: SHIPPED | OUTPATIENT
Start: 2022-03-21 | End: 2022-04-11

## 2022-03-21 RX ORDER — HEPARIN SODIUM,PORCINE 10 UNIT/ML
5 VIAL (ML) INTRAVENOUS ONCE
Status: DISCONTINUED | OUTPATIENT
Start: 2022-03-21 | End: 2022-03-21 | Stop reason: HOSPADM

## 2022-03-21 RX ORDER — HEPARIN SODIUM (PORCINE) LOCK FLUSH IV SOLN 100 UNIT/ML 100 UNIT/ML
500 SOLUTION INTRAVENOUS ONCE
Status: COMPLETED | OUTPATIENT
Start: 2022-03-21 | End: 2022-03-21

## 2022-03-21 RX ADMIN — Medication 500 UNITS: at 09:28

## 2022-03-21 ASSESSMENT — PAIN SCALES - GENERAL: PAINLEVEL: MODERATE PAIN (4)

## 2022-03-21 NOTE — NURSING NOTE
"Oncology Rooming Note    March 21, 2022 9:37 AM   Libby Grimaldo is a 71 year old female who presents for:    Chief Complaint   Patient presents with     Port Draw     Labs drawn via port by RN in lab.  VS taken     Oncology Clinic Visit     Multiple myeloma not having achieved remission (H) (Primary Dx)      Initial Vitals: /71   Pulse 80   Temp 98.2  F (36.8  C) (Oral)   Resp 16   Wt 58.7 kg (129 lb 4.8 oz)   SpO2 97%   BMI 22.19 kg/m   Estimated body mass index is 22.19 kg/m  as calculated from the following:    Height as of 2/10/22: 1.626 m (5' 4\").    Weight as of this encounter: 58.7 kg (129 lb 4.8 oz). Body surface area is 1.63 meters squared.  Moderate Pain (4) Comment: Data Unavailable   No LMP recorded. Patient is postmenopausal.  Allergies reviewed: Yes  Medications reviewed: Yes    Medications: Medication refills not needed today.  Pharmacy name entered into [facility]: [id number] - [facility] [address] [facility] [address]    Clinical concerns: No new concerns.       Lori Davis CMA            "

## 2022-03-21 NOTE — LETTER
3/21/2022         RE: Libby Grimaldo  [address] [facility] [address]        Dear Colleague,    Thank you for referring your patient, Libby Grimaldo, to the [facility] BLOOD AND MARROW TRANSPLANT PROGRAM [place]. Please see a copy of my visit note below.    BMT/Cell Therapy Daily Progress Note   03/21/2022    Patient ID:  Libby Grimaldo is a 71 year old female, who has collected stem cells but has not yet undergone autologous HSCT.     Diagnosis MM Multiple myeloma  BMTCT Type Autologous    Prep Regimen Melphalan  Donor Source Self    GVHD Prophylaxis No  Primary BMT MD Guillory   Clinical Trials [id number]         INTERVAL  HISTORY     Libby returns today feeling poorly.  She has a new cough that began this weekend and also now dysuria.  She has had no fever that she recognizes, no sinus congestion, sore throat, or pain.  Her previous migratory bone pain is much improved since receiving chemotherapy.  She feels that she has been recovering uneventfully, and is eating and drinking well up until this weekend.  She believes at home she has had some low-grade fevers up to 99 or 100.  She has had no rigors or chills.    Review of Systems: 10 point ROS negative except as noted above.      PHYSICAL EXAM     Weight In/Out     Wt Readings from Last 3 Encounters:   03/21/22 58.7 kg (129 lb 4.8 oz)   02/28/22 57.6 kg (127 lb)   02/26/22 59.7 kg (131 lb 9.6 oz)             KPS:  70    /71   Pulse 80   Temp 98.2  F (36.8  C) (Oral)   Resp 16   Wt 58.7 kg (129 lb 4.8 oz)   SpO2 97%   BMI 22.19 kg/m       Physical Exam  Constitutional:       General: She is not in acute distress.     Appearance: Normal appearance. She is ill-appearing.   HENT:      Nose: Nose normal. No congestion.      Mouth/Throat:      Mouth: Mucous membranes are moist.      Pharynx: Oropharynx is clear. No oropharyngeal exudate or posterior oropharyngeal erythema.   Eyes:      Conjunctiva/sclera: Conjunctivae normal.    Cardiovascular:      Rate and Rhythm: Normal rate and regular rhythm.      Pulses: Normal pulses.      Heart sounds: Normal heart sounds. No murmur heard.  Pulmonary:      Effort: Pulmonary effort is normal. No respiratory distress.      Breath sounds: Rhonchi and rales present. No wheezing.      Comments: She has some rhonchi in the right lower lobe  Abdominal:      General: Abdomen is flat.   Musculoskeletal:         General: No swelling. Normal range of motion.      Cervical back: Normal range of motion.   Lymphadenopathy:      Cervical: No cervical adenopathy.   Skin:     General: Skin is warm.      Findings: No erythema.   Neurological:      Mental Status: Mental status is at baseline.   Psychiatric:         Mood and Affect: Mood normal.         Thought Content: Thought content normal.         Judgment: Judgment normal.             LABS AND IMAGING: I have assessed all abnormal lab values for their clinical significance and any values considered clinically significant have been addressed in the assessment and plan.        Lab Results   Component Value Date    WBC 7.3 03/21/2022    ANEU 5.3 02/25/2022    HGB 9.8 (L) 03/21/2022    HCT 31.1 (L) 03/21/2022     03/21/2022     03/21/2022    POTASSIUM 4.0 03/21/2022    CHLORIDE 107 03/21/2022    CO2 25 03/21/2022     (H) 03/21/2022    BUN 9 03/21/2022    CR 0.80 03/21/2022    MAG 1.9 02/26/2022    INR 1.29 (H) 02/11/2022         SYSTEMS-BASED ASSESSMENT AND PLAN       1.  Multiple myeloma  Today we had a brief conversation regarding the next steps with respect to treating her myeloma with high-dose melphalan.  She has had neurocognitive testing that revealed some cognitive decline without a specific diagnosis.  She has now recovered with the exception of an acute likely infectious event that began this weekend.  When she is recovered from the current infection, then we can consider moving ahead to high-dose melphalan 140.    3. ID: Given the  changes on physical exam and dysuria, I have prescribed levofloxacin 750 mg for 7 days.  We will plan on seeing her back in 2 weeks and then if she seems recovered, carry on with conversation about next steps.    Known issues that I take into account for medical decisions, with salient changes to the plan considering these complexities noted above.      Patient Active Problem List   Diagnosis     Multiple myeloma not having achieved remission (H)     Admission for chemotherapy     Stem cells transplant status (H)     Neutropenic fever (H)       Today's summary: Sarcopenic and cachectic, needs time to rehabilitate and improve nutritional status prior to BMT. Pharmacy review of medications site effects today.  RTC to see Kahlil in 1-2 weeks for decision on whether to proceed with autologous HSCT.    I spent 40 minutes in the care of this patient today, which included time necessary for preparation for the visit, obtaining history, ordering medications/tests/procedures as medically indicated, review of pertinent medical literature, counseling of the patient, communication of recommendations to the care team, and documentation time.      Securely message with the Vocera Web Console         Again, thank you for allowing me to participate in the care of your patient.      Sincerely,    SOTO BOWSER MD

## 2022-03-21 NOTE — PROGRESS NOTES
BMT/Cell Therapy Daily Progress Note   03/21/2022    Patient ID:  Libby Grimaldo is a 71 year old female, who has collected stem cells but has not yet undergone autologous HSCT.     Diagnosis MM Multiple myeloma  BMTCT Type Autologous    Prep Regimen Melphalan  Donor Source Self    GVHD Prophylaxis No  Primary BMT MD Guillory   Clinical Trials [id number]         INTERVAL  HISTORY     Libby returns today feeling poorly.  She has a new cough that began this weekend and also now dysuria.  She has had no fever that she recognizes, no sinus congestion, sore throat, or pain.  Her previous migratory bone pain is much improved since receiving chemotherapy.  She feels that she has been recovering uneventfully, and is eating and drinking well up until this weekend.  She believes at home she has had some low-grade fevers up to 99 or 100.  She has had no rigors or chills.    Review of Systems: 10 point ROS negative except as noted above.      PHYSICAL EXAM     Weight In/Out     Wt Readings from Last 3 Encounters:   03/21/22 58.7 kg (129 lb 4.8 oz)   02/28/22 57.6 kg (127 lb)   02/26/22 59.7 kg (131 lb 9.6 oz)             KPS:  70    /71   Pulse 80   Temp 98.2  F (36.8  C) (Oral)   Resp 16   Wt 58.7 kg (129 lb 4.8 oz)   SpO2 97%   BMI 22.19 kg/m       Physical Exam  Constitutional:       General: She is not in acute distress.     Appearance: Normal appearance. She is ill-appearing.   HENT:      Nose: Nose normal. No congestion.      Mouth/Throat:      Mouth: Mucous membranes are moist.      Pharynx: Oropharynx is clear. No oropharyngeal exudate or posterior oropharyngeal erythema.   Eyes:      Conjunctiva/sclera: Conjunctivae normal.   Cardiovascular:      Rate and Rhythm: Normal rate and regular rhythm.      Pulses: Normal pulses.      Heart sounds: Normal heart sounds. No murmur heard.  Pulmonary:      Effort: Pulmonary effort is normal. No respiratory distress.      Breath sounds: Rhonchi and rales present. No  wheezing.      Comments: She has some rhonchi in the right lower lobe  Abdominal:      General: Abdomen is flat.   Musculoskeletal:         General: No swelling. Normal range of motion.      Cervical back: Normal range of motion.   Lymphadenopathy:      Cervical: No cervical adenopathy.   Skin:     General: Skin is warm.      Findings: No erythema.   Neurological:      Mental Status: Mental status is at baseline.   Psychiatric:         Mood and Affect: Mood normal.         Thought Content: Thought content normal.         Judgment: Judgment normal.             LABS AND IMAGING: I have assessed all abnormal lab values for their clinical significance and any values considered clinically significant have been addressed in the assessment and plan.        Lab Results   Component Value Date    WBC 7.3 03/21/2022    ANEU 5.3 02/25/2022    HGB 9.8 (L) 03/21/2022    HCT 31.1 (L) 03/21/2022     03/21/2022     03/21/2022    POTASSIUM 4.0 03/21/2022    CHLORIDE 107 03/21/2022    CO2 25 03/21/2022     (H) 03/21/2022    BUN 9 03/21/2022    CR 0.80 03/21/2022    MAG 1.9 02/26/2022    INR 1.29 (H) 02/11/2022         SYSTEMS-BASED ASSESSMENT AND PLAN       1.  Multiple myeloma  Today we had a brief conversation regarding the next steps with respect to treating her myeloma with high-dose melphalan.  She has had neurocognitive testing that revealed some cognitive decline without a specific diagnosis.  She has now recovered with the exception of an acute likely infectious event that began this weekend.  When she is recovered from the current infection, then we can consider moving ahead to high-dose melphalan 140.    3. ID: Given the changes on physical exam and dysuria, I have prescribed levofloxacin 750 mg for 7 days.  We will plan on seeing her back in 2 weeks and then if she seems recovered, carry on with conversation about next steps.    Known issues that I take into account for medical decisions, with salient  changes to the plan considering these complexities noted above.      Patient Active Problem List   Diagnosis     Multiple myeloma not having achieved remission (H)     Admission for chemotherapy     Stem cells transplant status (H)     Neutropenic fever (H)       Today's summary: Sarcopenic and cachectic, needs time to rehabilitate and improve nutritional status prior to BMT. Pharmacy review of medications site effects today.  RTC to see Kahlil in 1-2 weeks for decision on whether to proceed with autologous HSCT.    I spent 40 minutes in the care of this patient today, which included time necessary for preparation for the visit, obtaining history, ordering medications/tests/procedures as medically indicated, review of pertinent medical literature, counseling of the patient, communication of recommendations to the care team, and documentation time.    SOTO BOWSER MD  March 21, 2022      Securely message with the Vocera Web Console

## 2022-03-21 NOTE — NURSING NOTE
"Chief Complaint   Patient presents with     Port Draw     Labs drawn via port by RN in lab.  VS taken       Port accessed with 20 gauge 3/4\" gripper needle by RN, labs collected, line flushed with saline and heparin, then de-accessed.  Vitals taken. Pt checked in for appointment(s).    Linda Mcrae RN    "

## 2022-03-22 LAB
ALBUMIN SERPL ELPH-MCNC: 3.1 G/DL (ref 3.7–5.1)
ALPHA1 GLOB SERPL ELPH-MCNC: 0.4 G/DL (ref 0.2–0.4)
ALPHA2 GLOB SERPL ELPH-MCNC: 0.9 G/DL (ref 0.5–0.9)
B-GLOBULIN SERPL ELPH-MCNC: 0.5 G/DL (ref 0.6–1)
BACTERIA UR CULT: ABNORMAL
GAMMA GLOB SERPL ELPH-MCNC: 0.4 G/DL (ref 0.7–1.6)
M PROTEIN SERPL ELPH-MCNC: 0.1 G/DL
PROT PATTERN SERPL ELPH-IMP: ABNORMAL

## 2022-03-22 PROCEDURE — 84165 PROTEIN E-PHORESIS SERUM: CPT | Mod: 26 | Performed by: PATHOLOGY

## 2022-03-28 ENCOUNTER — ONCOLOGY VISIT (OUTPATIENT)
Dept: TRANSPLANT | Facility: CLINIC | Age: 72
End: 2022-03-28
Attending: INTERNAL MEDICINE
Payer: MEDICARE

## 2022-03-28 VITALS
TEMPERATURE: 97.6 F | WEIGHT: 124 LBS | DIASTOLIC BLOOD PRESSURE: 70 MMHG | BODY MASS INDEX: 21.28 KG/M2 | HEART RATE: 80 BPM | SYSTOLIC BLOOD PRESSURE: 107 MMHG | OXYGEN SATURATION: 97 %

## 2022-03-28 DIAGNOSIS — C90.00 MULTIPLE MYELOMA NOT HAVING ACHIEVED REMISSION (H): ICD-10-CM

## 2022-03-28 DIAGNOSIS — Z94.84 STEM CELLS TRANSPLANT STATUS (H): ICD-10-CM

## 2022-03-28 DIAGNOSIS — C90.00 MULTIPLE MYELOMA NOT HAVING ACHIEVED REMISSION (H): Primary | ICD-10-CM

## 2022-03-28 LAB
ALBUMIN SERPL-MCNC: 2.9 G/DL (ref 3.4–5)
ALBUMIN UR-MCNC: NEGATIVE MG/DL
ALP SERPL-CCNC: 284 U/L (ref 40–150)
ALT SERPL W P-5'-P-CCNC: 16 U/L (ref 0–50)
ANION GAP SERPL CALCULATED.3IONS-SCNC: 8 MMOL/L (ref 3–14)
APPEARANCE UR: CLEAR
AST SERPL W P-5'-P-CCNC: 30 U/L (ref 0–45)
BACTERIA #/AREA URNS HPF: ABNORMAL /HPF
BILIRUB SERPL-MCNC: 0.4 MG/DL (ref 0.2–1.3)
BILIRUB UR QL STRIP: ABNORMAL
BUN SERPL-MCNC: 12 MG/DL (ref 7–30)
CALCIUM SERPL-MCNC: 9.4 MG/DL (ref 8.5–10.1)
CHLORIDE BLD-SCNC: 106 MMOL/L (ref 94–109)
CO2 SERPL-SCNC: 28 MMOL/L (ref 20–32)
COLOR UR AUTO: YELLOW
CREAT SERPL-MCNC: 0.81 MG/DL (ref 0.52–1.04)
ERYTHROCYTE [DISTWIDTH] IN BLOOD BY AUTOMATED COUNT: 16.7 % (ref 10–15)
GFR SERPL CREATININE-BSD FRML MDRD: 78 ML/MIN/1.73M2
GLUCOSE BLD-MCNC: 106 MG/DL (ref 70–99)
GLUCOSE UR STRIP-MCNC: NEGATIVE MG/DL
HCT VFR BLD AUTO: 34.2 % (ref 35–47)
HGB BLD-MCNC: 10.9 G/DL (ref 11.7–15.7)
HGB UR QL STRIP: NEGATIVE
HYALINE CASTS: 85 /LPF
IGA SERPL-MCNC: 3 MG/DL (ref 84–499)
IGG SERPL-MCNC: 442 MG/DL (ref 610–1616)
IGM SERPL-MCNC: <10 MG/DL (ref 35–242)
KAPPA LC FREE SER-MCNC: 0.84 MG/DL (ref 0.33–1.94)
KAPPA LC FREE/LAMBDA FREE SER NEPH: 0.1 {RATIO} (ref 0.26–1.65)
KETONES UR STRIP-MCNC: NEGATIVE MG/DL
LAMBDA LC FREE SERPL-MCNC: 8.68 MG/DL (ref 0.57–2.63)
LEUKOCYTE ESTERASE UR QL STRIP: NEGATIVE
MCH RBC QN AUTO: 33.1 PG (ref 26.5–33)
MCHC RBC AUTO-ENTMCNC: 31.9 G/DL (ref 31.5–36.5)
MCV RBC AUTO: 104 FL (ref 78–100)
MUCOUS THREADS #/AREA URNS LPF: PRESENT /LPF
NITRATE UR QL: NEGATIVE
PH UR STRIP: 5 [PH] (ref 5–7)
PLATELET # BLD AUTO: 201 10E3/UL (ref 150–450)
POTASSIUM BLD-SCNC: 3.8 MMOL/L (ref 3.4–5.3)
PROT SERPL-MCNC: 6.1 G/DL (ref 6.8–8.8)
RBC # BLD AUTO: 3.29 10E6/UL (ref 3.8–5.2)
RBC URINE: 1 /HPF
SODIUM SERPL-SCNC: 142 MMOL/L (ref 133–144)
SP GR UR STRIP: 1.02 (ref 1–1.03)
SQUAMOUS EPITHELIAL: 2 /HPF
TOTAL PROTEIN SERUM FOR ELP: 5.5 G/DL (ref 6.8–8.8)
TRANSITIONAL EPI: 1 /HPF
UROBILINOGEN UR STRIP-MCNC: NORMAL MG/DL
WBC # BLD AUTO: 8.4 10E3/UL (ref 4–11)
WBC URINE: 6 /HPF

## 2022-03-28 PROCEDURE — 250N000011 HC RX IP 250 OP 636: Performed by: INTERNAL MEDICINE

## 2022-03-28 PROCEDURE — 80053 COMPREHEN METABOLIC PANEL: CPT | Performed by: INTERNAL MEDICINE

## 2022-03-28 PROCEDURE — 81001 URINALYSIS AUTO W/SCOPE: CPT | Performed by: INTERNAL MEDICINE

## 2022-03-28 PROCEDURE — 84165 PROTEIN E-PHORESIS SERUM: CPT | Mod: TC | Performed by: PATHOLOGY

## 2022-03-28 PROCEDURE — 84155 ASSAY OF PROTEIN SERUM: CPT | Performed by: INTERNAL MEDICINE

## 2022-03-28 PROCEDURE — 86334 IMMUNOFIX E-PHORESIS SERUM: CPT | Performed by: PATHOLOGY

## 2022-03-28 PROCEDURE — 36591 DRAW BLOOD OFF VENOUS DEVICE: CPT | Performed by: INTERNAL MEDICINE

## 2022-03-28 PROCEDURE — 82784 ASSAY IGA/IGD/IGG/IGM EACH: CPT | Performed by: INTERNAL MEDICINE

## 2022-03-28 PROCEDURE — 36593 DECLOT VASCULAR DEVICE: CPT

## 2022-03-28 PROCEDURE — G0463 HOSPITAL OUTPT CLINIC VISIT: HCPCS | Mod: 25

## 2022-03-28 PROCEDURE — 85014 HEMATOCRIT: CPT | Performed by: INTERNAL MEDICINE

## 2022-03-28 PROCEDURE — 99214 OFFICE O/P EST MOD 30 MIN: CPT | Performed by: INTERNAL MEDICINE

## 2022-03-28 PROCEDURE — 83521 IG LIGHT CHAINS FREE EACH: CPT | Performed by: INTERNAL MEDICINE

## 2022-03-28 RX ORDER — HEPARIN SODIUM,PORCINE 10 UNIT/ML
5 VIAL (ML) INTRAVENOUS
Status: CANCELLED | OUTPATIENT
Start: 2022-03-29

## 2022-03-28 RX ORDER — HEPARIN SODIUM (PORCINE) LOCK FLUSH IV SOLN 100 UNIT/ML 100 UNIT/ML
5 SOLUTION INTRAVENOUS
Status: CANCELLED | OUTPATIENT
Start: 2022-03-29

## 2022-03-28 RX ORDER — HEPARIN SODIUM (PORCINE) LOCK FLUSH IV SOLN 100 UNIT/ML 100 UNIT/ML
5 SOLUTION INTRAVENOUS
Status: DISCONTINUED | OUTPATIENT
Start: 2022-03-28 | End: 2022-03-28 | Stop reason: HOSPADM

## 2022-03-28 RX ORDER — SUCRALFATE ORAL 1 G/10ML
1 SUSPENSION ORAL
Qty: 420 ML | Refills: 4 | Status: SHIPPED | OUTPATIENT
Start: 2022-03-28 | End: 2022-09-01

## 2022-03-28 RX ADMIN — HEPARIN 5 ML: 100 SYRINGE at 10:07

## 2022-03-28 NOTE — NURSING NOTE
Urinalysis collected in clinic today per provider order by Dr. HARLEY Guillory. Patient's name and date of birth both confirmed prior to urine collection. LPN sent sample to lab.       Aniyah Key LPN March 28, 2022 11:48 AM

## 2022-03-28 NOTE — PROGRESS NOTES
BMT/Cell Therapy Daily Progress Note   03/28/2022    Patient ID:  Libby Grimaldo is a 71 year old female, who has collected stem cells but has not yet undergone autologous HSCT.     Diagnosis MM Multiple myeloma  BMTCT Type Autologous    Prep Regimen Melphalan  Donor Source Self    GVHD Prophylaxis No  Primary BMT MD Guillory   Clinical Trials [id number]         INTERVAL  HISTORY     Lbiby returns to clinic today for reevaluation before possible admission for high-dose melphalan and transplant.  When I saw her 2 weeks ago, she felt poorly with low-grade fevers, urinary tract symptoms, and new cough and she had radiographic and physical exam findings of a right lower lobe pneumonia.  I had given her treatment with Levaquin which she stated improved the low-grade fevers, resolved her urinary symptoms, and she feels her cough is improving as well.  Unfortunately her urinary symptoms now seem to be worsening again, and on a more worrisome note, she is noticing increasing bone and joint pain which she ascribes to her myeloma and signs of progression.  She is feeling poorly, not having fevers, eating and drinking well, but today scared about the possibility of progressive myeloma.  It is notable that she has lost 34 pounds in the last 2 weeks.    Review of Systems: 10 point ROS negative except as noted above.      PHYSICAL EXAM     Weight In/Out     Wt Readings from Last 3 Encounters:   03/28/22 56.2 kg (124 lb)   03/21/22 58.7 kg (129 lb 4.8 oz)   02/28/22 57.6 kg (127 lb)             KPS:  70    /70   Pulse 80   Temp 97.6  F (36.4  C) (Oral)   Wt 56.2 kg (124 lb)   SpO2 97%   BMI 21.28 kg/m       Physical Exam  Constitutional:       General: She is not in acute distress.     Appearance: Normal appearance. She is ill-appearing.   HENT:      Nose: Nose normal. No congestion.      Mouth/Throat:      Mouth: Mucous membranes are moist.      Pharynx: Oropharynx is clear. No oropharyngeal exudate or posterior  oropharyngeal erythema.   Eyes:      Conjunctiva/sclera: Conjunctivae normal.   Cardiovascular:      Rate and Rhythm: Normal rate and regular rhythm.      Pulses: Normal pulses.      Heart sounds: Normal heart sounds. No murmur heard.  Pulmonary:      Effort: Pulmonary effort is normal. No respiratory distress.      Breath sounds: Rales present. No wheezing or rhonchi.      Comments: She has some rhonchi in the right lower lobe  Abdominal:      General: Abdomen is flat.   Musculoskeletal:         General: No swelling. Normal range of motion.      Cervical back: Normal range of motion.   Lymphadenopathy:      Cervical: No cervical adenopathy.   Skin:     General: Skin is warm.      Findings: No erythema.   Neurological:      Mental Status: Mental status is at baseline.   Psychiatric:         Mood and Affect: Mood normal.         Thought Content: Thought content normal.         Judgment: Judgment normal.       LABS AND IMAGING: I have assessed all abnormal lab values for their clinical significance and any values considered clinically significant have been addressed in the assessment and plan.        Lab Results   Component Value Date    WBC 7.3 03/21/2022    ANEU 5.3 02/25/2022    HGB 9.8 (L) 03/21/2022    HCT 31.1 (L) 03/21/2022     03/21/2022     03/21/2022    POTASSIUM 4.0 03/21/2022    CHLORIDE 107 03/21/2022    CO2 25 03/21/2022     (H) 03/21/2022    BUN 9 03/21/2022    CR 0.80 03/21/2022    MAG 1.9 02/26/2022    INR 1.29 (H) 02/11/2022         SYSTEMS-BASED ASSESSMENT AND PLAN       1.  Multiple myeloma  Today, we had a more in-depth conversation about her current disease status and the next steps.  I expressed my worry about her bone pain possibly reflecting progressive myeloma.  I pointed out that if she is progressing so quickly after high-dose cyclophosphamide, proceeding to high-dose melphalan would not make sense due to the chemotherapy refractory nature of the disease and the need for  some immediate and more palliative therapy.  In addition, her performance status currently is poor and she has symptoms of an active infection.  If she is already showing signs of progression, then I would recommend either referral back to Dr. Shea for palliative chemotherapy, or enrollment on a clinical trial for which she could be eligible for at least 2 cell therapy options.  SYDNIE Edwardsgeraldo would potentially be an option but slots now are booked out into the late summer.  She would potentially be eligible for either of the fate trials  Plan: We will repeat her myeloma labs today and if there is signs of progression, look towards clinical trial management or palliative therapy.    3. ID: She continues to have physical findings of right lower lobe pneumonia although clinically she is improved.  Her dysuria is worse today.  We will repeat a UA UC and today we will change the antibiotic to Augmentin.      Patient Active Problem List   Diagnosis     Multiple myeloma not having achieved remission (H)     Admission for chemotherapy     Stem cells transplant status (H)     Neutropenic fever (H)       Today's summary: We will send basic myeloma labs today to ascertain her response status and also retreat her urinary tract infection and pneumonia.  We will see her back in 2 weeks for reevaluation and determination next steps.    I spent 40 minutes in the care of this patient today, which included time necessary for preparation for the visit, obtaining history, ordering medications/tests/procedures as medically indicated, review of pertinent medical literature, counseling of the patient, communication of recommendations to the care team, and documentation time.    SOTO BOWSER MD  March 28, 2022          Securely message with the Vocera Web Console

## 2022-03-28 NOTE — NURSING NOTE
"Oncology Rooming Note    March 28, 2022 9:11 AM   Libby Grimaldo is a 71 year old female who presents for:    Chief Complaint   Patient presents with     Oncology Clinic Visit     rtn for MM     Initial Vitals: /70   Pulse 80   Temp 97.6  F (36.4  C) (Oral)   Wt 56.2 kg (124 lb)   SpO2 97%   BMI 21.28 kg/m   Estimated body mass index is 21.28 kg/m  as calculated from the following:    Height as of 2/10/22: 1.626 m (5' 4\").    Weight as of this encounter: 56.2 kg (124 lb). Body surface area is 1.59 meters squared.  Data Unavailable Comment: Data Unavailable   No LMP recorded. Patient is postmenopausal.  Allergies reviewed: Yes  Medications reviewed: Yes    Medications: MEDICATION REFILLS NEEDED TODAY. Provider was notified.   Sucralfate    Pharmacy name entered into [email or web address]: [id number] [address] AT [facility] [address]    Clinical concerns: none       Elizabeth Craft, EMT            "

## 2022-03-28 NOTE — LETTER
3/28/2022     RE: Libby Grimaldo  [address] [facility] [address]    Dear Colleague,    Thank you for referring your patient, Libby Grimaldo, to the [facility] BLOOD AND MARROW TRANSPLANT PROGRAM [place]. Please see a copy of my visit note below.    BMT/Cell Therapy Daily Progress Note   03/28/2022    Patient ID:  Libby Grimaldo is a 71 year old female, who has collected stem cells but has not yet undergone autologous HSCT.     Diagnosis MM Multiple myeloma  BMTCT Type Autologous    Prep Regimen Melphalan  Donor Source Self    GVHD Prophylaxis No  Primary BMT MD Guillory   Clinical Trials [id number]         INTERVAL  HISTORY     Libby returns to clinic today for reevaluation before possible admission for high-dose melphalan and transplant.  When I saw her 2 weeks ago, she felt poorly with low-grade fevers, urinary tract symptoms, and new cough and she had radiographic and physical exam findings of a right lower lobe pneumonia.  I had given her treatment with Levaquin which she stated improved the low-grade fevers, resolved her urinary symptoms, and she feels her cough is improving as well.  Unfortunately her urinary symptoms now seem to be worsening again, and on a more worrisome note, she is noticing increasing bone and joint pain which she ascribes to her myeloma and signs of progression.  She is feeling poorly, not having fevers, eating and drinking well, but today scared about the possibility of progressive myeloma.  It is notable that she has lost 34 pounds in the last 2 weeks.    Review of Systems: 10 point ROS negative except as noted above.      PHYSICAL EXAM     Weight In/Out     Wt Readings from Last 3 Encounters:   03/28/22 56.2 kg (124 lb)   03/21/22 58.7 kg (129 lb 4.8 oz)   02/28/22 57.6 kg (127 lb)             KPS:  70    /70   Pulse 80   Temp 97.6  F (36.4  C) (Oral)   Wt 56.2 kg (124 lb)   SpO2 97%   BMI 21.28 kg/m       Physical Exam  Constitutional:       General: She is  not in acute distress.     Appearance: Normal appearance. She is ill-appearing.   HENT:      Nose: Nose normal. No congestion.      Mouth/Throat:      Mouth: Mucous membranes are moist.      Pharynx: Oropharynx is clear. No oropharyngeal exudate or posterior oropharyngeal erythema.   Eyes:      Conjunctiva/sclera: Conjunctivae normal.   Cardiovascular:      Rate and Rhythm: Normal rate and regular rhythm.      Pulses: Normal pulses.      Heart sounds: Normal heart sounds. No murmur heard.  Pulmonary:      Effort: Pulmonary effort is normal. No respiratory distress.      Breath sounds: Rales present. No wheezing or rhonchi.      Comments: She has some rhonchi in the right lower lobe  Abdominal:      General: Abdomen is flat.   Musculoskeletal:         General: No swelling. Normal range of motion.      Cervical back: Normal range of motion.   Lymphadenopathy:      Cervical: No cervical adenopathy.   Skin:     General: Skin is warm.      Findings: No erythema.   Neurological:      Mental Status: Mental status is at baseline.   Psychiatric:         Mood and Affect: Mood normal.         Thought Content: Thought content normal.         Judgment: Judgment normal.       LABS AND IMAGING: I have assessed all abnormal lab values for their clinical significance and any values considered clinically significant have been addressed in the assessment and plan.        Lab Results   Component Value Date    WBC 7.3 03/21/2022    ANEU 5.3 02/25/2022    HGB 9.8 (L) 03/21/2022    HCT 31.1 (L) 03/21/2022     03/21/2022     03/21/2022    POTASSIUM 4.0 03/21/2022    CHLORIDE 107 03/21/2022    CO2 25 03/21/2022     (H) 03/21/2022    BUN 9 03/21/2022    CR 0.80 03/21/2022    MAG 1.9 02/26/2022    INR 1.29 (H) 02/11/2022         SYSTEMS-BASED ASSESSMENT AND PLAN       1.  Multiple myeloma  Today, we had a more in-depth conversation about her current disease status and the next steps.  I expressed my worry about her bone  pain possibly reflecting progressive myeloma.  I pointed out that if she is progressing so quickly after high-dose cyclophosphamide, proceeding to high-dose melphalan would not make sense due to the chemotherapy refractory nature of the disease and the need for some immediate and more palliative therapy.  In addition, her performance status currently is poor and she has symptoms of an active infection.  If she is already showing signs of progression, then I would recommend either referral back to Dr. Shea for palliative chemotherapy, or enrollment on a clinical trial for which she could be eligible for at least 2 cell therapy options.  SYDNIE Mixon would potentially be an option but slots now are booked out into the late summer.  She would potentially be eligible for either of the fate trials  Plan: We will repeat her myeloma labs today and if there is signs of progression, look towards clinical trial management or palliative therapy.    3. ID: She continues to have physical findings of right lower lobe pneumonia although clinically she is improved.  Her dysuria is worse today.  We will repeat a UA UC and today we will change the antibiotic to Augmentin.      Patient Active Problem List   Diagnosis     Multiple myeloma not having achieved remission (H)     Admission for chemotherapy     Stem cells transplant status (H)     Neutropenic fever (H)       Today's summary: We will send basic myeloma labs today to ascertain her response status and also retreat her urinary tract infection and pneumonia.  We will see her back in 2 weeks for reevaluation and determination next steps.    I spent 40 minutes in the care of this patient today, which included time necessary for preparation for the visit, obtaining history, ordering medications/tests/procedures as medically indicated, review of pertinent medical literature, counseling of the patient, communication of recommendations to the care team, and documentation  time.    SOTO BOWSER MD  March 28, 2022          Securely message with the Vocera Web Console           Again, thank you for allowing me to participate in the care of your patient.        Sincerely,        SOTO BOWSER MD

## 2022-03-29 LAB
ALBUMIN SERPL ELPH-MCNC: 3.2 G/DL (ref 3.7–5.1)
ALPHA1 GLOB SERPL ELPH-MCNC: 0.4 G/DL (ref 0.2–0.4)
ALPHA2 GLOB SERPL ELPH-MCNC: 0.9 G/DL (ref 0.5–0.9)
B-GLOBULIN SERPL ELPH-MCNC: 0.5 G/DL (ref 0.6–1)
GAMMA GLOB SERPL ELPH-MCNC: 0.4 G/DL (ref 0.7–1.6)
M PROTEIN SERPL ELPH-MCNC: 0.1 G/DL
PROT PATTERN SERPL ELPH-IMP: ABNORMAL
PROT PATTERN SERPL IFE-IMP: NORMAL

## 2022-03-29 PROCEDURE — 84165 PROTEIN E-PHORESIS SERUM: CPT | Mod: 26 | Performed by: PATHOLOGY

## 2022-03-29 PROCEDURE — 86334 IMMUNOFIX E-PHORESIS SERUM: CPT | Mod: 26 | Performed by: PATHOLOGY

## 2022-04-05 DIAGNOSIS — Z51.11 ADMISSION FOR CHEMOTHERAPY: ICD-10-CM

## 2022-04-05 DIAGNOSIS — C90.00 MULTIPLE MYELOMA NOT HAVING ACHIEVED REMISSION (H): ICD-10-CM

## 2022-04-05 RX ORDER — FLUCONAZOLE 100 MG/1
100 TABLET ORAL DAILY
Qty: 30 TABLET | Refills: 1 | Status: ON HOLD | OUTPATIENT
Start: 2022-04-05 | End: 2022-05-30

## 2022-04-05 RX ORDER — ACYCLOVIR 800 MG/1
800 TABLET ORAL EVERY 12 HOURS
Qty: 60 TABLET | Refills: 0 | Status: SHIPPED | OUTPATIENT
Start: 2022-04-05 | End: 2022-05-06

## 2022-04-11 ENCOUNTER — ONCOLOGY VISIT (OUTPATIENT)
Dept: TRANSPLANT | Facility: CLINIC | Age: 72
End: 2022-04-11
Attending: INTERNAL MEDICINE
Payer: MEDICARE

## 2022-04-11 ENCOUNTER — MEDICAL CORRESPONDENCE (OUTPATIENT)
Dept: TRANSPLANT | Facility: CLINIC | Age: 72
End: 2022-04-11

## 2022-04-11 ENCOUNTER — APPOINTMENT (OUTPATIENT)
Dept: LAB | Facility: CLINIC | Age: 72
End: 2022-04-11
Attending: INTERNAL MEDICINE
Payer: MEDICARE

## 2022-04-11 VITALS
HEART RATE: 80 BPM | WEIGHT: 124.8 LBS | DIASTOLIC BLOOD PRESSURE: 57 MMHG | TEMPERATURE: 98 F | BODY MASS INDEX: 21.42 KG/M2 | RESPIRATION RATE: 16 BRPM | OXYGEN SATURATION: 98 % | SYSTOLIC BLOOD PRESSURE: 103 MMHG

## 2022-04-11 DIAGNOSIS — Z51.11 ADMISSION FOR CHEMOTHERAPY: ICD-10-CM

## 2022-04-11 DIAGNOSIS — C90.00 MULTIPLE MYELOMA NOT HAVING ACHIEVED REMISSION (H): Primary | ICD-10-CM

## 2022-04-11 DIAGNOSIS — Z94.84 STEM CELLS TRANSPLANT STATUS (H): ICD-10-CM

## 2022-04-11 LAB
ALBUMIN SERPL-MCNC: 3.2 G/DL (ref 3.4–5)
ALP SERPL-CCNC: 396 U/L (ref 40–150)
ALT SERPL W P-5'-P-CCNC: 61 U/L (ref 0–50)
ANION GAP SERPL CALCULATED.3IONS-SCNC: 7 MMOL/L (ref 3–14)
AST SERPL W P-5'-P-CCNC: 104 U/L (ref 0–45)
BASOPHILS # BLD AUTO: 0 10E3/UL (ref 0–0.2)
BASOPHILS NFR BLD AUTO: 0 %
BILIRUB SERPL-MCNC: 0.4 MG/DL (ref 0.2–1.3)
BUN SERPL-MCNC: 11 MG/DL (ref 7–30)
CALCIUM SERPL-MCNC: 9.4 MG/DL (ref 8.5–10.1)
CHLORIDE BLD-SCNC: 108 MMOL/L (ref 94–109)
CO2 SERPL-SCNC: 26 MMOL/L (ref 20–32)
CREAT SERPL-MCNC: 0.79 MG/DL (ref 0.52–1.04)
EOSINOPHIL # BLD AUTO: 0.1 10E3/UL (ref 0–0.7)
EOSINOPHIL NFR BLD AUTO: 2 %
ERYTHROCYTE [DISTWIDTH] IN BLOOD BY AUTOMATED COUNT: 15.7 % (ref 10–15)
GFR SERPL CREATININE-BSD FRML MDRD: 81 ML/MIN/1.73M2
GLUCOSE BLD-MCNC: 114 MG/DL (ref 70–99)
HCT VFR BLD AUTO: 34.7 % (ref 35–47)
HGB BLD-MCNC: 10.9 G/DL (ref 11.7–15.7)
IMM GRANULOCYTES # BLD: 0 10E3/UL
IMM GRANULOCYTES NFR BLD: 0 %
LYMPHOCYTES # BLD AUTO: 2.1 10E3/UL (ref 0.8–5.3)
LYMPHOCYTES NFR BLD AUTO: 33 %
MCH RBC QN AUTO: 32.5 PG (ref 26.5–33)
MCHC RBC AUTO-ENTMCNC: 31.4 G/DL (ref 31.5–36.5)
MCV RBC AUTO: 104 FL (ref 78–100)
MONOCYTES # BLD AUTO: 0.7 10E3/UL (ref 0–1.3)
MONOCYTES NFR BLD AUTO: 12 %
NEUTROPHILS # BLD AUTO: 3.3 10E3/UL (ref 1.6–8.3)
NEUTROPHILS NFR BLD AUTO: 53 %
NRBC # BLD AUTO: 0 10E3/UL
NRBC BLD AUTO-RTO: 0 /100
PLATELET # BLD AUTO: 219 10E3/UL (ref 150–450)
POTASSIUM BLD-SCNC: 3.7 MMOL/L (ref 3.4–5.3)
PROT SERPL-MCNC: 6.3 G/DL (ref 6.8–8.8)
RBC # BLD AUTO: 3.35 10E6/UL (ref 3.8–5.2)
SODIUM SERPL-SCNC: 141 MMOL/L (ref 133–144)
WBC # BLD AUTO: 6.3 10E3/UL (ref 4–11)

## 2022-04-11 PROCEDURE — 82784 ASSAY IGA/IGD/IGG/IGM EACH: CPT | Performed by: INTERNAL MEDICINE

## 2022-04-11 PROCEDURE — G0463 HOSPITAL OUTPT CLINIC VISIT: HCPCS

## 2022-04-11 PROCEDURE — 83521 IG LIGHT CHAINS FREE EACH: CPT | Performed by: INTERNAL MEDICINE

## 2022-04-11 PROCEDURE — 85025 COMPLETE CBC W/AUTO DIFF WBC: CPT | Performed by: INTERNAL MEDICINE

## 2022-04-11 PROCEDURE — 80053 COMPREHEN METABOLIC PANEL: CPT | Performed by: INTERNAL MEDICINE

## 2022-04-11 PROCEDURE — 36591 DRAW BLOOD OFF VENOUS DEVICE: CPT | Performed by: INTERNAL MEDICINE

## 2022-04-11 PROCEDURE — 250N000011 HC RX IP 250 OP 636: Performed by: INTERNAL MEDICINE

## 2022-04-11 PROCEDURE — 99215 OFFICE O/P EST HI 40 MIN: CPT | Performed by: INTERNAL MEDICINE

## 2022-04-11 RX ORDER — HEPARIN SODIUM (PORCINE) LOCK FLUSH IV SOLN 100 UNIT/ML 100 UNIT/ML
5 SOLUTION INTRAVENOUS DAILY PRN
Status: DISCONTINUED | OUTPATIENT
Start: 2022-04-11 | End: 2022-04-12 | Stop reason: HOSPADM

## 2022-04-11 RX ORDER — POLYETHYLENE GLYCOL 3350 17 G/17G
17 POWDER, FOR SOLUTION ORAL
Status: ON HOLD | COMMUNITY
Start: 2020-07-13 | End: 2022-05-30

## 2022-04-11 RX ORDER — PROCHLORPERAZINE MALEATE 5 MG
5 TABLET ORAL EVERY 6 HOURS PRN
Qty: 30 TABLET | Refills: 0 | Status: ON HOLD | OUTPATIENT
Start: 2022-04-11 | End: 2022-05-30

## 2022-04-11 RX ORDER — ONDANSETRON 8 MG/1
8 TABLET, FILM COATED ORAL EVERY 8 HOURS PRN
Qty: 30 TABLET | Refills: 0 | Status: ON HOLD | OUTPATIENT
Start: 2022-04-11 | End: 2022-05-30

## 2022-04-11 RX ADMIN — Medication 5 ML: at 10:55

## 2022-04-11 ASSESSMENT — PAIN SCALES - GENERAL: PAINLEVEL: NO PAIN (0)

## 2022-04-11 NOTE — NURSING NOTE
Chief Complaint   Patient presents with     Oncology Clinic Visit     Multiple Myeloma     Port Draw     Labs drawn via port by RN in lab. VS taken      Port accessed with 20 gauge 3/4 in gripper by RN, labs collected, line flushed with saline and heparin.  Vitals taken. Pt checked in for next appointment.    Shanita Tirado RN

## 2022-04-11 NOTE — PROGRESS NOTES
BMT/Cell Therapy Daily Progress Note   04/11/2022    Patient ID:  Libby Grimaldo is a 71 year old female, who has collected stem cells but has not yet undergone autologous HSCT.     Diagnosis MM Multiple myeloma  BMTCT Type Autologous    Prep Regimen Melphalan  Donor Source Self    GVHD Prophylaxis No  Primary BMT MD Guillory   Clinical Trials [id number]         INTERVAL  HISTORY     Libby returns today generally feeling well.  Her cough is improved, her urinary symptoms are better, and she is eager to move ahead to transplant.  She has no other specific concerns or complaints today.    Review of Systems: 10 point ROS negative except as noted above.      PHYSICAL EXAM     Weight In/Out     Wt Readings from Last 3 Encounters:   04/11/22 56.6 kg (124 lb 12.8 oz)   03/28/22 56.2 kg (124 lb)   03/21/22 58.7 kg (129 lb 4.8 oz)             KPS:  70    /57   Pulse 80   Temp 98  F (36.7  C) (Oral)   Resp 16   Wt 56.6 kg (124 lb 12.8 oz)   SpO2 98%   BMI 21.42 kg/m       Physical Exam  Constitutional:       General: She is not in acute distress.     Appearance: Normal appearance. She is not ill-appearing.   HENT:      Nose: Nose normal. No congestion.      Mouth/Throat:      Mouth: Mucous membranes are moist.      Pharynx: Oropharynx is clear. No oropharyngeal exudate or posterior oropharyngeal erythema.   Eyes:      Conjunctiva/sclera: Conjunctivae normal.   Cardiovascular:      Rate and Rhythm: Normal rate and regular rhythm.      Pulses: Normal pulses.      Heart sounds: Normal heart sounds. No murmur heard.  Pulmonary:      Effort: Pulmonary effort is normal. No respiratory distress.      Breath sounds: No wheezing or rhonchi.      Comments: A few crackles mostly cleared with coughing in RLL  Abdominal:      General: Abdomen is flat.   Musculoskeletal:         General: No swelling. Normal range of motion.      Cervical back: Normal range of motion.   Lymphadenopathy:      Cervical: No cervical adenopathy.    Skin:     General: Skin is warm.      Findings: No erythema.   Neurological:      Mental Status: Mental status is at baseline.   Psychiatric:         Mood and Affect: Mood normal.         Thought Content: Thought content normal.         Judgment: Judgment normal.       LABS AND IMAGING: I have assessed all abnormal lab values for their clinical significance and any values considered clinically significant have been addressed in the assessment and plan.        Lab Results   Component Value Date    WBC 8.4 03/28/2022    ANEU 5.3 02/25/2022    HGB 10.9 (L) 03/28/2022    HCT 34.2 (L) 03/28/2022     03/28/2022     03/28/2022    POTASSIUM 3.8 03/28/2022    CHLORIDE 106 03/28/2022    CO2 28 03/28/2022     (H) 03/28/2022    BUN 12 03/28/2022    CR 0.81 03/28/2022    MAG 1.9 02/26/2022    INR 1.29 (H) 02/11/2022         SYSTEMS-BASED ASSESSMENT AND PLAN        Multiple myeloma  Today I reviewed with her the plan to move ahead to transplantation.  We would anticipate using the lower dose of melphalan for patients who are frail 140 Mcdowell grams per meter squared given as a day -2 dose.  Given the difficulty with the post transplant cyclophosphamide and the intermittent delirium associated with fever, I recommended to her that we keep her in the hospital for the post transplant follow-up until engraftment.  After reviewing the risk and benefits of moving ahead, she does wish to proceed.  Given her recovery, and her improved performance status currently, think this is reasonable..    ID: She is completed a course of antibiotics and is improved.          Patient Active Problem List   Diagnosis     Multiple myeloma not having achieved remission (H)     Admission for chemotherapy     Stem cells transplant status (H)     Neutropenic fever (H)       Today's summary: We will send basic myeloma labs today to ascertain her response status and also retreat her urinary tract infection and pneumonia.  We will see her  back in 2 weeks for reevaluation and determination next steps.    I spent 40 minutes in the care of this patient today, which included time necessary for preparation for the visit, obtaining history, ordering medications/tests/procedures as medically indicated, review of pertinent medical literature, counseling of the patient, communication of recommendations to the care team, and documentation time.    SOTO BOWSER MD  April 12, 2022              Securely message with the Vocera Web Console

## 2022-04-11 NOTE — NURSING NOTE
"Oncology Rooming Note    April 11, 2022 11:07 AM   Libby Grimaldo is a 71 year old female who presents for:    Chief Complaint   Patient presents with     Oncology Clinic Visit     Multiple Myeloma     Port Draw     Labs drawn via port by RN in lab. VS taken      Initial Vitals: /57   Pulse 80   Temp 98  F (36.7  C) (Oral)   Resp 16   Wt 56.6 kg (124 lb 12.8 oz)   SpO2 98%   BMI 21.42 kg/m   Estimated body mass index is 21.42 kg/m  as calculated from the following:    Height as of 2/10/22: 1.626 m (5' 4\").    Weight as of this encounter: 56.6 kg (124 lb 12.8 oz). Body surface area is 1.6 meters squared.  No Pain (0) Comment: Data Unavailable   No LMP recorded. Patient is postmenopausal.  Allergies reviewed: Yes  Medications reviewed: Yes    Medications: MEDICATION REFILLS NEEDED TODAY. Provider was notified.  Pharmacy name entered into [employer] ([employer]): [id number] - [facility] [address] AT [facility] [address]    Clinical concerns: Medication refill: Zofran and compazine       Krista Alberto LPN            "

## 2022-04-11 NOTE — LETTER
4/11/2022         RE: Libby Grimaldo  [address] [facility] [address]        Dear Colleague,    Thank you for referring your patient, Libby Grimaldo, to the [facility]. Please see a copy of my visit note below.    BMT/Cell Therapy Daily Progress Note   04/11/2022    Patient ID:  Libby Grimaldo is a 71 year old female, who has collected stem cells but has not yet undergone autologous HSCT.     Diagnosis MM Multiple myeloma  BMTCT Type Autologous    Prep Regimen Melphalan  Donor Source Self    GVHD Prophylaxis No  Primary BMT MD Guillory   Clinical Trials [id number]         INTERVAL  HISTORY     Libby returns today generally feeling well.  Her cough is improved, her urinary symptoms are better, and she is eager to move ahead to transplant.  She has no other specific concerns or complaints today.    Review of Systems: 10 point ROS negative except as noted above.      PHYSICAL EXAM     Weight In/Out     Wt Readings from Last 3 Encounters:   04/11/22 56.6 kg (124 lb 12.8 oz)   03/28/22 56.2 kg (124 lb)   03/21/22 58.7 kg (129 lb 4.8 oz)             KPS:  70    /57   Pulse 80   Temp 98  F (36.7  C) (Oral)   Resp 16   Wt 56.6 kg (124 lb 12.8 oz)   SpO2 98%   BMI 21.42 kg/m       Physical Exam  Constitutional:       General: She is not in acute distress.     Appearance: Normal appearance. She is not ill-appearing.   HENT:      Nose: Nose normal. No congestion.      Mouth/Throat:      Mouth: Mucous membranes are moist.      Pharynx: Oropharynx is clear. No oropharyngeal exudate or posterior oropharyngeal erythema.   Eyes:      Conjunctiva/sclera: Conjunctivae normal.   Cardiovascular:      Rate and Rhythm: Normal rate and regular rhythm.      Pulses: Normal pulses.      Heart sounds: Normal heart sounds. No murmur heard.  Pulmonary:      Effort: Pulmonary effort is normal. No respiratory distress.      Breath sounds: No wheezing or rhonchi.       Comments: A few crackles mostly cleared with coughing in RLL  Abdominal:      General: Abdomen is flat.   Musculoskeletal:         General: No swelling. Normal range of motion.      Cervical back: Normal range of motion.   Lymphadenopathy:      Cervical: No cervical adenopathy.   Skin:     General: Skin is warm.      Findings: No erythema.   Neurological:      Mental Status: Mental status is at baseline.   Psychiatric:         Mood and Affect: Mood normal.         Thought Content: Thought content normal.         Judgment: Judgment normal.       LABS AND IMAGING: I have assessed all abnormal lab values for their clinical significance and any values considered clinically significant have been addressed in the assessment and plan.        Lab Results   Component Value Date    WBC 8.4 03/28/2022    ANEU 5.3 02/25/2022    HGB 10.9 (L) 03/28/2022    HCT 34.2 (L) 03/28/2022     03/28/2022     03/28/2022    POTASSIUM 3.8 03/28/2022    CHLORIDE 106 03/28/2022    CO2 28 03/28/2022     (H) 03/28/2022    BUN 12 03/28/2022    CR 0.81 03/28/2022    MAG 1.9 02/26/2022    INR 1.29 (H) 02/11/2022         SYSTEMS-BASED ASSESSMENT AND PLAN        Multiple myeloma  Today I reviewed with her the plan to move ahead to transplantation.  We would anticipate using the lower dose of melphalan for patients who are frail 140 Mcdowell grams per meter squared given as a day -2 dose.  Given the difficulty with the post transplant cyclophosphamide and the intermittent delirium associated with fever, I recommended to her that we keep her in the hospital for the post transplant follow-up until engraftment.  After reviewing the risk and benefits of moving ahead, she does wish to proceed.  Given her recovery, and her improved performance status currently, think this is reasonable..    ID: She is completed a course of antibiotics and is improved.          Patient Active Problem List   Diagnosis     Multiple myeloma not having achieved  remission (H)     Admission for chemotherapy     Stem cells transplant status (H)     Neutropenic fever (H)       Today's summary: We will send basic myeloma labs today to ascertain her response status and also retreat her urinary tract infection and pneumonia.  We will see her back in 2 weeks for reevaluation and determination next steps.    I spent 40 minutes in the care of this patient today, which included time necessary for preparation for the visit, obtaining history, ordering medications/tests/procedures as medically indicated, review of pertinent medical literature, counseling of the patient, communication of recommendations to the care team, and documentation time.    SOTO BOWSER MD  April 12, 2022    Securely message with the Vocera Web Console           Again, thank you for allowing me to participate in the care of your patient.      Sincerely,    SOTO BOWSER MD

## 2022-04-12 LAB
IGA SERPL-MCNC: 3 MG/DL (ref 84–499)
IGG SERPL-MCNC: 497 MG/DL (ref 610–1616)
IGM SERPL-MCNC: <10 MG/DL (ref 35–242)
KAPPA LC FREE SER-MCNC: 0.11 MG/DL (ref 0.33–1.94)
KAPPA LC FREE/LAMBDA FREE SER NEPH: 0.01 {RATIO} (ref 0.26–1.65)
LAMBDA LC FREE SERPL-MCNC: 8.97 MG/DL (ref 0.57–2.63)

## 2022-04-14 DIAGNOSIS — C90.00 MULTIPLE MYELOMA NOT HAVING ACHIEVED REMISSION (H): Primary | ICD-10-CM

## 2022-04-15 ENCOUNTER — TELEPHONE (OUTPATIENT)
Dept: TRANSPLANT | Facility: CLINIC | Age: 72
End: 2022-04-15
Payer: MEDICARE

## 2022-04-15 DIAGNOSIS — C90.00 MULTIPLE MYELOMA NOT HAVING ACHIEVED REMISSION (H): Primary | ICD-10-CM

## 2022-04-15 RX ORDER — DEXTROSE MONOHYDRATE 50 MG/ML
10-20 INJECTION, SOLUTION INTRAVENOUS
Status: CANCELLED | OUTPATIENT
Start: 2022-04-27

## 2022-04-15 RX ORDER — SODIUM CHLORIDE 9 MG/ML
INJECTION, SOLUTION INTRAVENOUS CONTINUOUS
Status: CANCELLED
Start: 2022-04-20

## 2022-04-15 RX ORDER — MEPERIDINE HYDROCHLORIDE 25 MG/ML
25-50 INJECTION INTRAMUSCULAR; INTRAVENOUS; SUBCUTANEOUS
Status: CANCELLED | OUTPATIENT
Start: 2022-04-22 | End: 2022-04-23

## 2022-04-15 RX ORDER — ONDANSETRON 8 MG/1
8 TABLET, FILM COATED ORAL EVERY 8 HOURS
Status: CANCELLED
Start: 2022-04-20

## 2022-04-15 RX ORDER — DEXAMETHASONE 4 MG/1
8 TABLET ORAL DAILY
Status: CANCELLED
Start: 2022-04-22

## 2022-04-15 RX ORDER — ALLOPURINOL 300 MG/1
300 TABLET ORAL DAILY
Status: CANCELLED
Start: 2022-04-20

## 2022-04-15 RX ORDER — DIPHENHYDRAMINE HCL 25 MG
25 CAPSULE ORAL ONCE
Status: CANCELLED
Start: 2022-04-22 | End: 2022-04-22

## 2022-04-15 RX ORDER — ACETAMINOPHEN 325 MG/1
650 TABLET ORAL ONCE
Status: CANCELLED
Start: 2022-04-22 | End: 2022-04-22

## 2022-04-15 NOTE — TELEPHONE ENCOUNTER
Admission  PT: Libby Mullinszaid  : 1950  MRN: [id number]  Dx: MM  Protocol: 35  TBI: None  BMT Admission: 22 @ 8AM  RAC: Inpatient IR on-call PICC     Reviewed planned Day -2 admission if hepatic panel on  looks okay. COVID test on Monday after labs. Pt requesting sister be able to stay the night while admitted, advised this is against COVID visitor policy, but will check with IP NC. No further questions or concerns at this time.

## 2022-04-16 ENCOUNTER — HOSPITAL ENCOUNTER (OUTPATIENT)
Age: 72
End: 2022-04-16
Admitting: INTERNAL MEDICINE
Payer: MEDICARE

## 2022-04-18 ENCOUNTER — CARE COORDINATION (OUTPATIENT)
Dept: TRANSPLANT | Facility: CLINIC | Age: 72
End: 2022-04-18

## 2022-04-18 ENCOUNTER — LAB (OUTPATIENT)
Dept: LAB | Facility: CLINIC | Age: 72
End: 2022-04-18
Attending: INTERNAL MEDICINE
Payer: MEDICARE

## 2022-04-18 DIAGNOSIS — C90.00 MULTIPLE MYELOMA NOT HAVING ACHIEVED REMISSION (H): Primary | ICD-10-CM

## 2022-04-18 DIAGNOSIS — C90.00 MULTIPLE MYELOMA NOT HAVING ACHIEVED REMISSION (H): ICD-10-CM

## 2022-04-18 LAB
ALBUMIN SERPL-MCNC: 3.4 G/DL (ref 3.4–5)
ALP SERPL-CCNC: 494 U/L (ref 40–150)
ALT SERPL W P-5'-P-CCNC: 105 U/L (ref 0–50)
AST SERPL W P-5'-P-CCNC: 63 U/L (ref 0–45)
BILIRUB DIRECT SERPL-MCNC: 0.1 MG/DL (ref 0–0.2)
BILIRUB SERPL-MCNC: 0.4 MG/DL (ref 0.2–1.3)
PROT SERPL-MCNC: 6.3 G/DL (ref 6.8–8.8)
SARS-COV-2 RNA RESP QL NAA+PROBE: NEGATIVE

## 2022-04-18 PROCEDURE — U0005 INFEC AGEN DETEC AMPLI PROBE: HCPCS | Performed by: INTERNAL MEDICINE

## 2022-04-18 PROCEDURE — 250N000011 HC RX IP 250 OP 636: Performed by: INTERNAL MEDICINE

## 2022-04-18 PROCEDURE — 36591 DRAW BLOOD OFF VENOUS DEVICE: CPT

## 2022-04-18 PROCEDURE — 80076 HEPATIC FUNCTION PANEL: CPT

## 2022-04-18 RX ORDER — HEPARIN SODIUM (PORCINE) LOCK FLUSH IV SOLN 100 UNIT/ML 100 UNIT/ML
500 SOLUTION INTRAVENOUS ONCE
Status: COMPLETED | OUTPATIENT
Start: 2022-04-18 | End: 2022-04-18

## 2022-04-18 RX ADMIN — Medication 500 UNITS: at 11:58

## 2022-04-18 NOTE — PROGRESS NOTES
Called patient regarding elevated LFTS, after consulting with Dr. Guillory, he would like to hold off on admission while we do some further investigation into Libby's liver function.  He recommends a CT scan and holding the following medications:  Fluconazole, robaxin, tylenol, lipitor    Patient wrote down which medications to hold and is anticipating scheduling the CT scan. She is aware the hospital admission is deferred for now. Questions were answered to the best of my ability.    Cheryl Lassiter on 4/18/2022 at 3:48 PM  BMT Nurse Coordinator

## 2022-04-18 NOTE — NURSING NOTE
"Chief Complaint   Patient presents with     Labs Only     Labs drawn via port by RN.     Port accessed with 20G 3/4\" flat needle by RN, labs collected, line flushed with saline and heparin.      Martha Thompson RN    "

## 2022-04-21 DIAGNOSIS — C90.00 MULTIPLE MYELOMA NOT HAVING ACHIEVED REMISSION (H): Primary | ICD-10-CM

## 2022-04-24 ENCOUNTER — HEALTH MAINTENANCE LETTER (OUTPATIENT)
Age: 72
End: 2022-04-24

## 2022-04-28 ENCOUNTER — TELEPHONE (OUTPATIENT)
Dept: TRANSPLANT | Facility: CLINIC | Age: 72
End: 2022-04-28

## 2022-04-28 ENCOUNTER — TELEPHONE (OUTPATIENT)
Dept: GASTROENTEROLOGY | Facility: CLINIC | Age: 72
End: 2022-04-28

## 2022-04-28 ENCOUNTER — LAB (OUTPATIENT)
Dept: LAB | Facility: CLINIC | Age: 72
End: 2022-04-28
Attending: INTERNAL MEDICINE
Payer: MEDICARE

## 2022-04-28 DIAGNOSIS — C90.00 MULTIPLE MYELOMA NOT HAVING ACHIEVED REMISSION (H): ICD-10-CM

## 2022-04-28 DIAGNOSIS — C90.00 MULTIPLE MYELOMA NOT HAVING ACHIEVED REMISSION (H): Primary | ICD-10-CM

## 2022-04-28 DIAGNOSIS — R93.3 ABNORMAL FINDING ON GI TRACT IMAGING: Primary | ICD-10-CM

## 2022-04-28 LAB
ALBUMIN SERPL-MCNC: 2.9 G/DL (ref 3.4–5)
ALP SERPL-CCNC: 248 U/L (ref 40–150)
ALT SERPL W P-5'-P-CCNC: 26 U/L (ref 0–50)
AST SERPL W P-5'-P-CCNC: 38 U/L (ref 0–45)
BILIRUB DIRECT SERPL-MCNC: 0.1 MG/DL (ref 0–0.2)
BILIRUB SERPL-MCNC: 0.4 MG/DL (ref 0.2–1.3)
PROT SERPL-MCNC: 5.3 G/DL (ref 6.8–8.8)

## 2022-04-28 PROCEDURE — 36591 DRAW BLOOD OFF VENOUS DEVICE: CPT

## 2022-04-28 PROCEDURE — 250N000011 HC RX IP 250 OP 636: Performed by: INTERNAL MEDICINE

## 2022-04-28 PROCEDURE — 82040 ASSAY OF SERUM ALBUMIN: CPT

## 2022-04-28 RX ORDER — HEPARIN SODIUM (PORCINE) LOCK FLUSH IV SOLN 100 UNIT/ML 100 UNIT/ML
5 SOLUTION INTRAVENOUS
Status: DISCONTINUED | OUTPATIENT
Start: 2022-04-28 | End: 2022-05-04 | Stop reason: HOSPADM

## 2022-04-28 RX ADMIN — Medication 5 ML: at 10:08

## 2022-04-28 NOTE — TELEPHONE ENCOUNTER
[facility]     Referring provider:     Julio C Guillory MD           Referred to: [facility]     Provider Requested:  NA     Referral Received: 04/28/22     Records received: Epic     Images received: PACS    Evaluation for: biliary dilation     Clinical History (per RN review):        spoke to Libby about her CT of the abd that demonstrates worsening biliary dilation.  She also states that she has been noticing some RUQ pain and nausea.  I explained that we are concerned about a worsening blockage and I recommended she see a GI specialist to sort out the problem.  I also recommended that we not proceed to transplant until this problem is resolved.  Her myeloma appears stable for now.  She agrees to the plan     JULIO C GUILLORY MD  April 28, 2022      CT A/P 04/28/22     HEPATOBILIARY: Cholecystectomy. Intra and extrahepatic biliary  dilatation has increased compared to 2/11/2022. The common duct  measures 1.2 cm in diameter (previously 1 cm). No cause for increasing  biliary dilatation is identified.     PANCREAS: Pancreatic ductal dilatation has increased slightly,  measuring up to 0.7 cm (previously 0.5 cm). No pancreatic masses are  identified. Pancreas divisum is noted.     SPLEEN: Normal.     ADRENAL GLANDS: Normal.     KIDNEYS/BLADDER: Unremarkable. No hydronephrosis.     BOWEL: No bowel obstruction. No convincing evidence for colitis or  diverticulitis. Unremarkable appendix.     PELVIC ORGANS: Unremarkable.     LYMPH NODES: No enlarged lymph nodes are identified in the abdomen or  pelvis.     VASCULATURE: Advanced atherosclerotic aortoiliac calcification.     ADDITIONAL FINDINGS: Small fat-containing paraumbilical hernia.     MUSCULOSKELETAL: Degenerative changes throughout the thoracolumbar  spine. Postoperative changes of anterior fusion in the lower cervical  spine.                                                                      IMPRESSION:   1.  Intra and extrahepatic  biliary dilatation has increased compared  to 2/16/2022, with no definite cause identified. Pancreatic ductal  dilatation has also increased. Consider MRI with MRCP for further  evaluation.  2.  Masslike soft tissue thickening along the right aspect of the T10  vertebral body is unchanged.  3.  Mild reticulonodular opacities in the right middle lobe and  lingula were not visible on the previous exam. There is also mild  interstitial thickening at the right lung base posteriorly, also not  visible on the previous exam. These findings are nonspecific, but  could be infectious in etiology.     MD review date:   MD Decision for clinic consultation/Orders:            Referral updates/Patient contacted:

## 2022-04-28 NOTE — TELEPHONE ENCOUNTER
I spoke to Libby about her CT of the abd that demonstrates worsening biliary dilation.  She also states that she has been noticing some RUQ pain and nausea.  I explained that we are concerned about a worsening blockage and I recommended she see a GI specialist to sort out the problem.  I also recommended that we not proceed to transplant until this problem is resolved.  Her myeloma appears stable for now.  She agrees to the plan    SOTO BOWSER MD  April 28, 2022

## 2022-04-28 NOTE — NURSING NOTE
Chief Complaint   Patient presents with     Labs Only     Labs drawn by RN in Lab from Right Chest Port-a-Cath. Line flushed with Saline and Heparin.      Roro Fairbanks RN

## 2022-05-02 NOTE — TELEPHONE ENCOUNTER
Per Dr. Walker  We should get an MRI/MRCP to further exclude small stone or mass   If negative, clinic visit; if positive we will decide of ERCP +/- EUS     Called to review with patient, scan ordered, scheduling number given. Pt understands plan.    ML

## 2022-05-06 ENCOUNTER — HOSPITAL ENCOUNTER (OUTPATIENT)
Dept: MRI IMAGING | Facility: CLINIC | Age: 72
Discharge: HOME | End: 2022-05-06
Attending: INTERNAL MEDICINE | Admitting: INTERNAL MEDICINE
Payer: MEDICARE

## 2022-05-06 DIAGNOSIS — C90.00 MULTIPLE MYELOMA NOT HAVING ACHIEVED REMISSION (H): ICD-10-CM

## 2022-05-06 DIAGNOSIS — Z51.11 ADMISSION FOR CHEMOTHERAPY: ICD-10-CM

## 2022-05-06 DIAGNOSIS — R93.3 ABNORMAL FINDING ON GI TRACT IMAGING: ICD-10-CM

## 2022-05-06 PROCEDURE — 74183 MRI ABD W/O CNTR FLWD CNTR: CPT | Mod: 26 | Performed by: RADIOLOGY

## 2022-05-06 PROCEDURE — 255N000002 HC RX 255 OP 636: Performed by: INTERNAL MEDICINE

## 2022-05-06 PROCEDURE — A9585 GADOBUTROL INJECTION: HCPCS | Performed by: INTERNAL MEDICINE

## 2022-05-06 PROCEDURE — 74183 MRI ABD W/O CNTR FLWD CNTR: CPT

## 2022-05-06 RX ORDER — GADOBUTROL 604.72 MG/ML
0.1 INJECTION INTRAVENOUS ONCE
Status: COMPLETED | OUTPATIENT
Start: 2022-05-06 | End: 2022-05-06

## 2022-05-06 RX ORDER — ACYCLOVIR 800 MG/1
800 TABLET ORAL EVERY 12 HOURS
Qty: 60 TABLET | Refills: 1 | Status: ON HOLD | OUTPATIENT
Start: 2022-05-06 | End: 2022-05-30

## 2022-05-06 RX ADMIN — GADOBUTROL 5.66 ML: 604.72 INJECTION INTRAVENOUS at 18:50

## 2022-05-10 NOTE — TELEPHONE ENCOUNTER
Per Dr. Walker letter after scan:  We are writing to inform you of your test results. In short, the MRI/MRCP did not demonstrate any evidence of solid mass or stone involving the pancreas or bile ducts. There is mild prominence but not beyond the limits of normal. There were benign, small pancreatic cysts that will require surveillance by MRI in another year. There was also a paravetebral finding similar to what was seen on your PET scan and you should follow up with your established oncologists. I would suggest a clinic visit with me to allow me to hear about your symptoms and to further explain these findings.     Called patient to review recommendations. Clinic scheduled with Dr. Walker on 6/23    ML

## 2022-05-13 ENCOUNTER — TELEPHONE (OUTPATIENT)
Dept: TRANSPLANT | Facility: CLINIC | Age: 72
End: 2022-05-13
Payer: MEDICARE

## 2022-05-16 DIAGNOSIS — C90.00 MULTIPLE MYELOMA NOT HAVING ACHIEVED REMISSION (H): Primary | ICD-10-CM

## 2022-05-16 NOTE — PROGRESS NOTES
LVM in response to patient call. Would like to get patient in for labs in coming day w/ return visit with Kahlil later in week to assess labs and determine readiness for transplant admission.

## 2022-05-17 ENCOUNTER — LAB (OUTPATIENT)
Dept: LAB | Facility: CLINIC | Age: 72
End: 2022-05-17
Attending: INTERNAL MEDICINE
Payer: MEDICARE

## 2022-05-17 VITALS
RESPIRATION RATE: 16 BRPM | WEIGHT: 120.6 LBS | SYSTOLIC BLOOD PRESSURE: 123 MMHG | HEART RATE: 80 BPM | BODY MASS INDEX: 20.7 KG/M2 | OXYGEN SATURATION: 95 % | TEMPERATURE: 98.1 F | DIASTOLIC BLOOD PRESSURE: 76 MMHG

## 2022-05-17 DIAGNOSIS — Z94.84 STEM CELLS TRANSPLANT STATUS (H): ICD-10-CM

## 2022-05-17 DIAGNOSIS — C90.00 MULTIPLE MYELOMA NOT HAVING ACHIEVED REMISSION (H): Primary | ICD-10-CM

## 2022-05-17 LAB
ALBUMIN SERPL-MCNC: 3.5 G/DL (ref 3.4–5)
ALP SERPL-CCNC: 222 U/L (ref 40–150)
ALT SERPL W P-5'-P-CCNC: 30 U/L (ref 0–50)
ANION GAP SERPL CALCULATED.3IONS-SCNC: 9 MMOL/L (ref 3–14)
AST SERPL W P-5'-P-CCNC: 40 U/L (ref 0–45)
BASOPHILS # BLD AUTO: 0 10E3/UL (ref 0–0.2)
BASOPHILS NFR BLD AUTO: 0 %
BILIRUB SERPL-MCNC: 0.5 MG/DL (ref 0.2–1.3)
BUN SERPL-MCNC: 11 MG/DL (ref 7–30)
CALCIUM SERPL-MCNC: 9.2 MG/DL (ref 8.5–10.1)
CHLORIDE BLD-SCNC: 110 MMOL/L (ref 94–109)
CO2 SERPL-SCNC: 27 MMOL/L (ref 20–32)
CREAT SERPL-MCNC: 0.68 MG/DL (ref 0.52–1.04)
EOSINOPHIL # BLD AUTO: 0.2 10E3/UL (ref 0–0.7)
EOSINOPHIL NFR BLD AUTO: 3 %
ERYTHROCYTE [DISTWIDTH] IN BLOOD BY AUTOMATED COUNT: 12.9 % (ref 10–15)
GFR SERPL CREATININE-BSD FRML MDRD: >90 ML/MIN/1.73M2
GLUCOSE BLD-MCNC: 127 MG/DL (ref 70–99)
HCT VFR BLD AUTO: 39.6 % (ref 35–47)
HGB BLD-MCNC: 12.7 G/DL (ref 11.7–15.7)
IMM GRANULOCYTES # BLD: 0 10E3/UL
IMM GRANULOCYTES NFR BLD: 0 %
LYMPHOCYTES # BLD AUTO: 2 10E3/UL (ref 0.8–5.3)
LYMPHOCYTES NFR BLD AUTO: 34 %
MCH RBC QN AUTO: 32.9 PG (ref 26.5–33)
MCHC RBC AUTO-ENTMCNC: 32.1 G/DL (ref 31.5–36.5)
MCV RBC AUTO: 103 FL (ref 78–100)
MONOCYTES # BLD AUTO: 0.6 10E3/UL (ref 0–1.3)
MONOCYTES NFR BLD AUTO: 10 %
NEUTROPHILS # BLD AUTO: 3.1 10E3/UL (ref 1.6–8.3)
NEUTROPHILS NFR BLD AUTO: 53 %
NRBC # BLD AUTO: 0 10E3/UL
NRBC BLD AUTO-RTO: 0 /100
PLATELET # BLD AUTO: 206 10E3/UL (ref 150–450)
POTASSIUM BLD-SCNC: 3.2 MMOL/L (ref 3.4–5.3)
PROT SERPL-MCNC: 6.2 G/DL (ref 6.8–8.8)
RBC # BLD AUTO: 3.86 10E6/UL (ref 3.8–5.2)
SODIUM SERPL-SCNC: 146 MMOL/L (ref 133–144)
TOTAL PROTEIN SERUM FOR ELP: 5.9 G/DL (ref 6.8–8.8)
WBC # BLD AUTO: 5.9 10E3/UL (ref 4–11)

## 2022-05-17 PROCEDURE — 80053 COMPREHEN METABOLIC PANEL: CPT

## 2022-05-17 PROCEDURE — 85025 COMPLETE CBC W/AUTO DIFF WBC: CPT

## 2022-05-17 PROCEDURE — 83521 IG LIGHT CHAINS FREE EACH: CPT

## 2022-05-17 PROCEDURE — 250N000011 HC RX IP 250 OP 636: Performed by: INTERNAL MEDICINE

## 2022-05-17 PROCEDURE — 86334 IMMUNOFIX E-PHORESIS SERUM: CPT | Performed by: PATHOLOGY

## 2022-05-17 PROCEDURE — 84155 ASSAY OF PROTEIN SERUM: CPT | Mod: 91

## 2022-05-17 PROCEDURE — 36591 DRAW BLOOD OFF VENOUS DEVICE: CPT

## 2022-05-17 PROCEDURE — 84165 PROTEIN E-PHORESIS SERUM: CPT | Mod: TC | Performed by: PATHOLOGY

## 2022-05-17 RX ORDER — HEPARIN SODIUM (PORCINE) LOCK FLUSH IV SOLN 100 UNIT/ML 100 UNIT/ML
5 SOLUTION INTRAVENOUS
Status: DISCONTINUED | OUTPATIENT
Start: 2022-05-17 | End: 2022-05-23 | Stop reason: HOSPADM

## 2022-05-17 RX ORDER — HEPARIN SODIUM,PORCINE 10 UNIT/ML
5 VIAL (ML) INTRAVENOUS
Status: CANCELLED | OUTPATIENT
Start: 2022-05-18

## 2022-05-17 RX ORDER — HEPARIN SODIUM (PORCINE) LOCK FLUSH IV SOLN 100 UNIT/ML 100 UNIT/ML
5 SOLUTION INTRAVENOUS
Status: CANCELLED | OUTPATIENT
Start: 2022-05-18

## 2022-05-17 RX ADMIN — Medication 5 ML: at 12:02

## 2022-05-17 ASSESSMENT — PAIN SCALES - GENERAL: PAINLEVEL: NO PAIN (0)

## 2022-05-17 NOTE — NURSING NOTE
"Chief Complaint   Patient presents with     Port Draw     Port accessed and labs drawn by rn in lab. Vital signs taken.     Port accessed by RN in lab with 20g 3/4\" gripper needle, labs drawn, port flushed with saline and heparin, port de-accessed.  vitals checked.    Deepa Azevedo RN      "

## 2022-05-18 LAB
ALBUMIN SERPL ELPH-MCNC: 3.8 G/DL (ref 3.7–5.1)
ALPHA1 GLOB SERPL ELPH-MCNC: 0.3 G/DL (ref 0.2–0.4)
ALPHA2 GLOB SERPL ELPH-MCNC: 0.7 G/DL (ref 0.5–0.9)
B-GLOBULIN SERPL ELPH-MCNC: 0.6 G/DL (ref 0.6–1)
GAMMA GLOB SERPL ELPH-MCNC: 0.5 G/DL (ref 0.7–1.6)
KAPPA LC FREE SER-MCNC: 0.14 MG/DL (ref 0.33–1.94)
KAPPA LC FREE/LAMBDA FREE SER NEPH: 0.01 {RATIO} (ref 0.26–1.65)
LAMBDA LC FREE SERPL-MCNC: 10.61 MG/DL (ref 0.57–2.63)
M PROTEIN SERPL ELPH-MCNC: 0.2 G/DL
PROT PATTERN SERPL ELPH-IMP: ABNORMAL
PROT PATTERN SERPL IFE-IMP: NORMAL

## 2022-05-18 PROCEDURE — 86334 IMMUNOFIX E-PHORESIS SERUM: CPT | Mod: 26

## 2022-05-18 PROCEDURE — 84165 PROTEIN E-PHORESIS SERUM: CPT | Mod: 26

## 2022-05-19 ENCOUNTER — ONCOLOGY VISIT (OUTPATIENT)
Dept: TRANSPLANT | Facility: CLINIC | Age: 72
End: 2022-05-19
Attending: INTERNAL MEDICINE
Payer: MEDICARE

## 2022-05-19 VITALS
TEMPERATURE: 98.2 F | DIASTOLIC BLOOD PRESSURE: 77 MMHG | OXYGEN SATURATION: 98 % | WEIGHT: 123 LBS | SYSTOLIC BLOOD PRESSURE: 146 MMHG | BODY MASS INDEX: 21.11 KG/M2 | RESPIRATION RATE: 16 BRPM | HEART RATE: 73 BPM

## 2022-05-19 DIAGNOSIS — C90.00 MULTIPLE MYELOMA NOT HAVING ACHIEVED REMISSION (H): Primary | ICD-10-CM

## 2022-05-19 DIAGNOSIS — Z86.2 PERSONAL HISTORY OF DISEASES OF BLOOD AND BLOOD-FORMING ORGANS: ICD-10-CM

## 2022-05-19 PROCEDURE — G0463 HOSPITAL OUTPT CLINIC VISIT: HCPCS

## 2022-05-19 PROCEDURE — 99214 OFFICE O/P EST MOD 30 MIN: CPT | Mod: GC

## 2022-05-19 ASSESSMENT — PAIN SCALES - GENERAL: PAINLEVEL: MODERATE PAIN (5)

## 2022-05-19 NOTE — NURSING NOTE
"Oncology Rooming Note    May 19, 2022 4:02 PM   Libby Grimaldo is a 71 year old female who presents for:    Chief Complaint   Patient presents with     Oncology Clinic Visit     Multiple Myeloma     Initial Vitals: Pulse 73   Temp 98.2  F (36.8  C) (Oral)   Resp 16   Wt 55.8 kg (123 lb)   SpO2 98%   BMI 21.11 kg/m   Estimated body mass index is 21.11 kg/m  as calculated from the following:    Height as of 2/10/22: 1.626 m (5' 4\").    Weight as of this encounter: 55.8 kg (123 lb). Body surface area is 1.59 meters squared.  Moderate Pain (5) Comment: Data Unavailable   No LMP recorded. Patient is postmenopausal.  Allergies reviewed: Yes  Medications reviewed: Yes    Medications: Medication refills not needed today.  Pharmacy name entered into [employer]: [facility] #[id number] - [place], [place] - [address] [place] [facility] [address]    Clinical concerns: Granddaughter tested + for COVID.        Telma Gotti CMA              "

## 2022-05-19 NOTE — LETTER
5/19/2022         RE: Libby Grimaldo  [address] [facility] [id number]        Dear Colleague,    Thank you for referring your patient, Libby Grimaldo, to the [facility] BLOOD AND MARROW TRANSPLANT [facility] [place]. Please see a copy of my visit note below.    BMT/Cell Therapy Consultation      Libby Grimaldo is a 71 year old female referred for Dr. Nichols for high risk (+1q) IgG lambda light chain multiple myeloma.  She has already collected stem cells but had her transplant delayed due to multiple complications including urinary tract infection and elevated LFTs of unclear etiology.  She presents today for re-evaluation prior to transplant.      Diagnosis and Treatment Summary     Oncology History   Multiple myeloma not having achieved remission (H)   11/7/2018 -  Cancer Staged    Staging form: Plasma Cell Myeloma and Disorders, AJCC 8th Edition  - Clinical stage from 11/7/2018: Albumin (g/dL): 3.4, ISS: Stage II, High-risk cytogenetics: Absent, LDH: Unknown     11/7/2018 Initial Diagnosis    Multiple myeloma not having achieved remission (H)     1/23/2019 - 11/14/2019 Chemotherapy    KRD x 6 cycles     11/4/2019 - 2/14/2021 Chemotherapy    Rev/Dex(20)      4/3/2020 - 7/17/2020 Chemotherapy    Velcade maintenance - dose reductions for orthostatic hypotension     7/17/2020 - 12/15/2020 Chemotherapy    Revlimid maintenance     11/23/2020 Progression    Bone marrow 35% plasma cells, PET CT demonstrating new lytic lesions     12/15/2020 - 6/11/2021 Chemotherapy    Elsy, Kd x 6 cycles     7/7/2021 -  Cancer Staged    PET/CT - CR     7/21/2021 -  Chemotherapy    Subcutaneous Daratumumab, IVIG, denosumab monthly     11/22/2021 -  Cancer Staged    Bone marrow aspirate - MRD 0.0022% positive     2/1/2022 - 2/5/2022 Chemotherapy    IP BMT Chemotherapy For Mobilization - High Dose Cyclophosphamide  Plan Provider: Julio C Guillory MD  Treatment goal: Other  Line of treatment: [No plan line of treatment]      4/20/2022 -  Chemotherapy    IP - OP BMT 2016-35 Auto Multiple Myeloma - Melphalan (CrCL < 30 mL/min, >/= 2 comorbidities, OR age > 75yrs) - Adult (Version: 7/13/21)  Plan Provider: Julio C Guillory MD  Treatment goal: Other  Line of treatment: [No plan line of treatment]         HPI:  Please see my entry above for disease and treatment history.  Libby presents today accompanied by her  Tres.  She reports she is doing well today.  She denies urinary frequency/pain, fevers/chills, abdominal pain, skin changes, rashes, nausea/vomiting, diarrhea/constipation, shortness of breath or other active issues.  She does endorse moderate fatigue but notes this is chronic and unchanged over the past several months.      ROS:       ROS   10-point ROS completed and negative except as outlined above.    Past Medical History:   Diagnosis Date     Cervical radiculopathy      COPD (chronic obstructive pulmonary disease) (H)      Double vision      Febrile seizure (H)     Per patient. Once while a toddler, once while in highschool, and once while in college.     Floaters      Graves disease      HLD (hyperlipidemia)      HTN (hypertension)      IPF (idiopathic pulmonary fibrosis) (H)      Lumbar radiculopathy      Multiple myeloma in relapse (H)      Osteoporosis      Pneumonia     Per patient. Complicated by sepsis.     Recurrent UTI     Per patient. Has required prophylactic antibiotcs.     Vitamin B12 deficiency (non anemic)        Past Surgical History:   Procedure Laterality Date     BONE MARROW BIOPSY, BONE SPECIMEN, NEEDLE/TROCAR Right 1/24/2022    Procedure: BIOPSY, BONE MARROW;  Surgeon: Yuniel Tineo;  Location: [facility] OR     CERVICAL FUSION       CHOLECYSTECTOMY       CYSTECTOMY OVARIAN BENIGN       EYE SURGERY      Per patient.     IR CVC TUNNEL PLACEMENT > 5 YRS OF AGE  2/1/2022     IR CVC TUNNEL REMOVAL LEFT  2/22/2022     TONSILLECTOMY       WRIST SURGERY Left        Family History   Problem Relation Age of  "Onset     Heart Disease Mother      Cancer Mother         \"Around lungs\" - she explicitly said it was *not* lung cancer.     Diabetes Father      Heart Disease Father        Social History     Tobacco Use     Smoking status: Former Smoker     Packs/day: 1.00     Years: 40.00     Pack years: 40.00     Smokeless tobacco: Former User     Quit date: 2010   Substance Use Topics     Alcohol use: Not Currently     Drug use: Not Currently     Types: Marijuana         Allergies   Allergen Reactions     Bupropion      Other reaction(s): Seizures        Current Outpatient Medications   Medication Sig Dispense Refill     acetaminophen (TYLENOL) 500 MG tablet Take 500-1,000 mg by mouth every 6 hours as needed for mild pain        acyclovir (ZOVIRAX) 800 MG tablet Take 1 tablet (800 mg) by mouth every 12 hours 60 tablet 1     albuterol (PROAIR HFA/PROVENTIL HFA/VENTOLIN HFA) 108 (90 Base) MCG/ACT inhaler Inhale 2 puffs into the lungs every 6 hours as needed for shortness of breath / dyspnea        amLODIPine (NORVASC) 5 MG tablet Take 1 tablet (5 mg) by mouth daily       aspirin (ASA) 81 MG chewable tablet HOLD due to low platelets (Patient not taking: No sig reported)       atorvastatin (LIPITOR) 40 MG tablet Take 40 mg by mouth daily        buprenorphine HCl-naloxone HCl (SUBOXONE) 8-2 MG per film Place 1 Film under the tongue 3 times daily        cholecalciferol (VITAMIN D-1000 MAX ST) 25 MCG (1000 UT) TABS Take 1,000 Units by mouth daily        fluconazole (DIFLUCAN) 100 MG tablet Take 1 tablet (100 mg) by mouth daily 30 tablet 1     Fluticasone-Umeclidin-Vilanterol (TRELEGY ELLIPTA) 100-62.5-25 MCG/INH oral inhaler Inhale 1 puff into the lungs daily        guaiFENesin (MUCINEX) 600 MG 12 hr tablet Take 2 tablets (1,200 mg) by mouth 2 times daily 20 tablet 1     levothyroxine (SYNTHROID/LEVOTHROID) 112 MCG tablet Take 112 mcg by mouth daily        methocarbamol (ROBAXIN) 500 MG tablet Take 500 mg by mouth 3 times daily as " needed for muscle spasms       metoprolol succinate ER (TOPROL-XL) 25 MG 24 hr tablet Take 25 mg by mouth daily        ondansetron (ZOFRAN) 8 MG tablet Take 1 tablet (8 mg) by mouth every 8 hours as needed for nausea 30 tablet 0     oxyCODONE IR (ROXICODONE) 10 MG tablet Take 10 mg by mouth every 8 hours as needed for severe pain        pantoprazole (PROTONIX) 40 MG EC tablet Take 1 tablet (40 mg) by mouth daily 30 tablet 1     polyethylene glycol (MIRALAX) 17 GM/Dose powder Take 17 g by mouth       prochlorperazine (COMPAZINE) 5 MG tablet Take 1 tablet (5 mg) by mouth every 6 hours as needed for nausea or vomiting 30 tablet 0     rivaroxaban ANTICOAGULANT (XARELTO) 20 MG TABS tablet Take 1 tablet (20 mg) by mouth daily (with dinner) 30 tablet 1     sucralfate (CARAFATE) 1 GM/10ML suspension Take 10 mLs (1 g) by mouth 4 times daily (before meals and nightly) 420 mL 4     venlafaxine (EFFEXOR) 75 MG tablet Take 1 tablet (75 mg) by mouth 2 times daily           Physical Exam:     Vital Signs: BP (!) 146/77   Pulse 73   Temp 98.2  F (36.8  C) (Oral)   Resp 16   Wt 55.8 kg (123 lb)   SpO2 98%   BMI 21.11 kg/m      Physical Exam  General Appearance: Sitting up on exam table, no acute distress  Eyes: No jaundice, no injection  HEENT: Head atraumatic, oropharynx unremarkable  Respiratory: Clear to auscultation bilaterally, no crackles/wheezes  Cardiovascular: Regular rate and rhythm, normal S1/S2  GI: Non-tender, non-distended, normoactive bowel sounds  Lymph/Hematologic: No supraclavicular/cervical lymphadenopathy  Skin: No rash, no jaundice  Musculoskeletal: No lower extremity edema, normal ROM        Assessment   Libby Grimaldo is a 71 year old woman with high-risk IgG lambda multiple myeloma, now with slowly progressive disease after being off therapy for several months.  Libby appears to be back to her baseline health today and is ready to proceed with her autologous stem cell transplant.  We do consider Libby to be  higher risk of complications based on the difficulties proceeding to transplant as outlined above.  We will therefore plan to keep Libby inpatient for closer monitoring until she engrafts.  We discussed this with Libby and her  and they were in agreement with the plan.      BMT and Cell Therapy Informed Consent Discussion     In today's visit, we discussed in detail the research for which Libby Grimaldo is eligible. We discussed the potential risks and potential benefits of each protocol individually. We explained potential alternatives to the protocols discussed. We explained to the patient that participation is voluntary and that consent may be withdrawn at any time.     We discussed:    The rationale for our approach to the disease treatment    The eligibility requirements for treatment in the context of clinical trials    The need for caregiver support and the caregiver's role in recovery    The importance of adherence to the treatment plan and appropriate follow up    The requirements for contraception while undergoing treatment    The potential risks of morbidity and mortality related to this treatment    The requirements for supportive care to reduce the risk of infection and other complications    The role of the dietician and PT/OT to reduce the risk of muscle loss/sarcopenia    Support that is available through our social workers and care team to mitigate distress    The desired outcomes/goals of treatment, including the possibility of long-term disease control    The patient completed the last round of treatment on 2/2022.    HCT-CI score: 3. We counseled the patient about the impact of this on the risk of treatment related and overall mortality. The score fit within treatment protocol eligibility criteria.    Karnofsky performance score: 70       Reproductive status: What methods of birth control does the patient plan to use during the treatment period beginning with conditioning and ending with the  discontinuation of immune suppression (indicate with an X all that apply):  X  The patient is confirmed to be sterile or post-menopausal  __ Sexual abstinence  __ Condoms  __ Implants  __ Injectables  __ Oral contraceptives  __ Intrauterine devices (IUD)  __ Other (describe)    The patient received appropriate reproductive counseling and agreed with the need for effective contraception during the treatment procedures.      Dental health suitable to proceed: Yes      Transplants for multiple myeloma:  The patient has High risk MM (defined as ONE or more of the following: del17p, p53 mutation, t(4;14), t(14;16), t(14;20), abnormal chromosome 1, -13 [by metaphase testing], or plasma cell leukemia), and the collection goal is 5 million CD34/kg for a single transplant..  The day for admission to begin melphalan conditioning is Day -2 for melphalan 140 mg/m2 (frail, creatinine clearance <30)..       Known issues that I take into account for medical decisions, with salient changes to the plan considering these complexities noted above.    Patient Active Problem List   Diagnosis     Multiple myeloma not having achieved remission (H)     Admission for chemotherapy     Stem cells transplant status (H)     Neutropenic fever (H)     Patient seen and discussed with attending Dr. Guillory.    Rigoberto Daley MD  Hematology/Oncology/Transplant Fellow    Attending Note:   I saw this patient with Dr. Daley and agree with the findings and plan of care as documented in the note above. I personally reviewed the history, salient aspects of the exam, laboratory and imaging as needed.  The key findings are that after a long delay, she has regained strength, demonstrated improved LFTs, and wishes to move ahead.  She is showing signs of myeloma progression.  I recommended that we proceed as originally planned with Sylwia 140 and will admission to stay inpatient over the course of her treatment and recovery.  She agrees to the plan.      SOTO  GISELA BOSWER MD  May 23, 2022             ____________________________________________________________________      BMT/Cell Therapy Workup Summary    Workup Nurse Coordinator: Rashad Chen  Primary BMT Physician: Julio C Bowser  Date of Summary:  05/21/2022        Patient Demographics       Patient ID:  Libby Grimaldo   Age:  71 year old   Sex:  female  Reason for Transplant: Multiple myeloma  Protocol: 2016-35        `  Blood Counts       Recent Labs   Lab Test 05/17/22  1202 04/11/22  1101 03/28/22  1005 03/21/22  0927 02/28/22  1241 02/18/22  1642 02/18/22  0415   HGB 12.7 10.9* 10.9*   < > 10.1*   < > 7.4*   HCT 39.6 34.7* 34.2*   < > 31.8*   < > 23.4*   WBC 5.9 6.3 8.4   < > 7.3   < > 7.9   ANEUTAUTO 3.1 3.3  --   --  4.4   < >  --    ALYMPAUTO 2.0 2.1  --   --  1.6   < >  --    AMONOAUTO 0.6 0.7  --   --  1.2   < >  --    AEOSAUTO 0.2 0.1  --   --  0.0   < >  --    ABSBASO 0.0 0.0  --   --  0.1   < >  --    NRBCMAN 0.0 0.0  --   --  0.0   < >  --    ABLA  --   --   --   --   --   --  0.1*    219 201   < > 326   < > 68*    < > = values in this interval not displayed.         Recent Labs   Lab Test 02/19/22  0310   ABORH A POS         Recent Labs   Lab Test 01/20/22  1619   HGBS Negative         Chemistries     Basic Panel  Recent Labs   Lab Test 05/17/22  1202 04/11/22  1101 03/28/22  1005   * 141 142   POTASSIUM 3.2* 3.7 3.8   CHLORIDE 110* 108 106   CO2 27 26 28   BUN 11 11 12   CR 0.68 0.79 0.81   * 114* 106*        Calcium, Magnesium, Phosphorus  Recent Labs   Lab Test 05/17/22  1202 04/11/22  1101 03/28/22  1005 02/28/22  1241 02/26/22  0502 02/25/22  0412 02/24/22  0259   DIANDRA 9.2 9.4 9.4   < > 7.9* 7.9* 8.1*   MAG  --   --   --   --  1.9 2.1 1.9   PHOS  --   --   --   --  2.6 2.4* 3.0    < > = values in this interval not displayed.        LFTs  Recent Labs   Lab Test 05/17/22  1202 04/28/22  1017 04/18/22  1205   BILITOTAL 0.5 0.4 0.4   ALKPHOS 222* 248* 494*   AST 40 38 63*   ALT 30 26  105*   ALBUMIN 3.5 2.9* 3.4       LDH  Recent Labs   Lab Test 02/25/22  0412 02/24/22  0953 01/20/22  1619   * 263* 218       B2-Microglobulin  Recent Labs   Lab Test 01/20/22  1618   LIIU9GPDA 4.0*       Vitamin D  Recent Labs   Lab Test 01/20/22  1621   VITDT 36         Urine Studies       Recent Labs   Lab Test 03/28/22  1139   COLOR Yellow   APPEARANCE Clear   URINEGLC Negative   URINEBILI Small*   URINEKETONE Negative   SG 1.025   UBLD Negative   URINEPH 5.0   PROTEIN Negative   UUROI Normal   NITRITE Negative   LEUKEST Negative   MUCUS Present*   RBCU 1   WBCU 6*   USQEI 2*       Creatinine Clearance    Recent Labs   Lab Test 01/24/22  0645      WT 64.0   RAW 48   STD 49     968   DUR 24.0  24.0   CREATININE 0.75   UCRR 54  54   UCR24 0.52*  0.52*         Infectious Disease Markers     [facility] IDM    Recent Labs   Lab Test 02/20/22  0900   DCMIG POSITIVE   DHBSAG Non-Reactive   DHBCAB Non-Reactive   DHIVAB Non-Reactive   DHCVAB Non-Reactive   DHTLVA Non-Reactive   TCRUZI Non-Reactive   DTRPAB Non-Reactive       CMV  No lab results found.      EBV    Recent Labs   Lab Test 01/20/22  1620   EBVCAG Positive*       HSV 1/2    Recent Labs   Lab Test 01/20/22  1620   O1HVVVD 22.40*   H1IGG Positive.  IgG antibody to HSV-1 detected.*   D9VMLGL 3.16*   H2IGG Positive.  IgG antibody to HSV-2 detected.*         VZV    No lab results found.      HTLV    Recent Labs   Lab Test 02/20/22  0900   DHTLVA Non-Reactive     COVID    Recent Labs   Lab Test 04/18/22  1226   [id number] Negative         Immunoglobulins     Recent Labs   Lab Test 04/11/22  1101 03/28/22  1005 03/21/22  0927 01/20/22  1619   * 442* 477* 725       Recent Labs   Lab Test 04/11/22  1101 03/28/22  1005 03/21/22  0927 01/20/22  1619   IGA 3* 3* 4* 3*       Recent Labs   Lab Test 04/11/22  1101 03/28/22  1005 03/21/22  0927 01/20/22  1619   IGM <10* <10* <10* <10*         Monocloncal Protein Studies     M  spike    Recent Labs   Lab Test 05/17/22  1202 03/28/22  1005 03/21/22  0927 01/20/22  1618   ELPM 0.2* 0.1* 0.1* 0.1*       Kappa FLC    Recent Labs   Lab Test 05/17/22  1202 04/11/22  1101 03/28/22  1005 03/21/22  0927 01/20/22  1619   KFLCA 0.14* 0.11* 0.84 0.10* 0.09*       Lambda FLC    Recent Labs   Lab Test 05/17/22  1202 04/11/22  1101 03/28/22  1005 03/21/22  0927 01/20/22  1619   LFLCA 10.61* 8.97* 8.68* 6.61* 9.96*       FLC Ratio    Recent Labs   Lab Test 05/17/22  1202 04/11/22  1101 03/28/22  1005 03/21/22  0927 01/20/22  1619   KLRA 0.01* 0.01* 0.10* 0.02* 0.01*           Bone Marrow Biopsy     1/24/22 Bone marrow    Final Diagnosis   Bone marrow, posterior iliac crest, right decalcified trephine biopsy and touch imprint; right particle crush, direct aspirate smear, and concentrated aspirate smear; and peripheral smear:  - Marrow cellularity of 10-20%, with trilineage hematopoiesis; plasma cells are not increased; no definitive clonal population by IHC, but minimal residual disease detected by flow cytometry (see comment)  - Peripheral blood showing slight normochromic, macrocytic anemia   Electronically signed by Juma Levy MD on 1/26/2022 at  1:44 PM   Comment  UUMAYO   Concurrent flow cytometry was performed ([id number]) and lambda monotypic plasma cells were identified.  There are very few plasma cells seen on the trephine core based on IHC - this likely represents minimal residual disease.     There are rare hypogranular neutrophils seen on scanning, as well as a few atypical megakaryocytes, but the overall findings are not definitive for dysplasia.       PFTs     FVC%  Recent Labs   Lab Test 01/26/22  1259   [id number] 104       FEV1%  Recent Labs   Lab Test 01/26/22  1259   [id number] 107       DLCO%  Recent Labs   Lab Test 01/26/22  1259   [id number] 81         EKG       ECG results from 01/20/22   EKG 12-lead complete w/read - Clinics     Value    Systolic Blood Pressure     Diastolic Blood  Pressure     Ventricular Rate 69    Atrial Rate 69    VA Interval 172    QRS Duration 80        QTc 430    P Axis 74    R AXIS 56    T Axis 70    Interpretation ECG      Sinus rhythm  Minimal voltage criteria for LVH, may be normal variant  Borderline ECG  No previous ECGs available  Confirmed by fellow Chaudhry, Mohsan ([id number]) on 2022 3:56:12 PM  Confirmed by MD BRINK JANE ([id number]) on 2022 6:05:16 PM           ECHOCARDIOGRAM       Results for orders placed during the hospital encounter of 22    ECHOCARDIOGRAM COMPLETE    Status: Normal 2022    Narrative  [id number]  [id number]  [id number]  [id number]^NIELS^NAEEM    [facility]  Echocardiography Laboratory  [address] [place] [address]    Name: ABBI MARTÍNEZ  MRN: [id number]  : 1950  Study Date: 2022 09:44 AM  Age: 71 yrs  Gender: Female  Patient [facility]: [facility]  Reason For Study: Examination prior to chemotherapy, Myeloma (H), Personal  history  Ordering Physician: SOTO BOWSER  Referring Physician: SOTO BOWSER  Performed By: Linden Dewey    BSA: 1.7 m2  Height: 63 in  Weight: 138 lb  HR: 75  BP: 176/79 mmHg  ______________________________________________________________________________  Procedure  Echocardiogram with two-dimensional, color and spectral Doppler performed.  ______________________________________________________________________________  Interpretation Summary  Global and regional left ventricular function is normal. 3D LVEF volumetric  analysis is 57%. Diastolic dysfunction.  Global peak LV longitudinal strain is averaged at -19.1%. This is within  reported normal limits (normal <-18%).  Global right ventricular function is normal.  Mild calcification of the aortic valve and mitral annulus, but normal doppler  assessment of all valves.  There is no prior study for direct comparison.    Mild LV wall thickening with abnormal diastolic function in the setting  of  myeloma. Although strain imaging is not consistent with amyloidosis, could  consider cardiac MRI to confirm.  ______________________________________________________________________________  Left Ventricle  Global and regional left ventricular function is normal with an EF of 55-60%.  3D LVEF volumetric analysis is 57%. Left ventricular size is normal. Relative  wall thickness is increased consistent with concentric remodeling. Grade II or  moderate diastolic dysfunction. Global peak LV longitudinal strain is averaged  at -19.1%. This is within reported normal limits (normal <-18%). No regional  wall motion abnormalities are seen.    Right Ventricle  The right ventricle is normal size. Global right ventricular function is  normal.    Atria  The right atria appears normal. Severe left atrial enlargement is present.    Mitral Valve  Mild mitral annular calcification is present. On Doppler interrogation, there  is no significant stenosis or regurgitation.    Aortic Valve  Trileaflet aortic sclerosis without stenosis.    Tricuspid Valve  The tricuspid valve is normal. The peak velocity of the tricuspid regurgitant  jet is not obtainable. Pulmonary artery systolic pressure cannot be assessed.    Pulmonic Valve  The valve leaflets are not well visualized. On Doppler interrogation, there is  no significant stenosis or regurgitation.    Vessels  Normal diameter aortic root and proximal ascending aorta. The inferior vena  cava is normal.    Pericardium  No pericardial effusion is present.    Compared to Previous Study  There is no prior study for direct comparison.  ______________________________________________________________________________  MMode/2D Measurements & Calculations  IVSd: 1.2 cm    LVIDd: 3.5 cm  LVIDs: 2.3 cm  LVPWd: 0.93 cm  FS: 33.9 %  LV mass(C)d: 111.8 grams  LV mass(C)dI: 67.7 grams/m2  Ao root diam: 3.1 cm  asc Aorta Diam: 3.1 cm  LVOT diam: 1.9 cm  LVOT area: 2.8 cm2  LA Volume (BP): 95.8 ml  LA  Volume Index (BP): 58.1 ml/m2  RWT: 0.53    Doppler Measurements & Calculations  MV E max yudelka: 83.9 cm/sec  MV A max yudelka: 80.5 cm/sec  MV E/A: 1.0  MV dec slope: 366.0 cm/sec2  E/E' avg: 15.5  Lateral E/e': 14.5  Medial E/e': 16.4    ______________________________________________________________________________  Report approved by: Naomie Velasquez 01/20/2022 10:51 AM        PET Scan       Results for orders placed during the hospital encounter of 01/20/22    PET ONCOLOGY WHOLE BODY    Status: Normal 1/25/2022    Narrative  Combined Report of:    PET and CT on  1/25/2022 3:15 PM :    1. PET of the neck, chest, abdomen, and pelvis.  2. PET CT Fusion for Attenuation Correction and Anatomical  Localization:  3. 3D MIP and PET-CT fused images were processed on an independent  workstation and archived to PACS and reviewed by a radiologist.    Technique:    1. PET: The patient received 10.15 mCi of F-18-FDG; the serum glucose  was 86 prior to administration, body weight was 63.5 kg. Images were  evaluated in the axial, sagittal, and coronal planes as well as the  rotational whole body MIP. Images were acquired from the Vertex to the  Feet.    UPTAKE WAS MEASURED AT 60 MINUTES.    BACKGROUND:  Liver SUV max= 3.56,   Aorta Blood SUV Max: 2.39.    2. CT: CT only obtained for attenuation correction and not diagnostic  purposes.    INDICATION: Multiple myeloma, follow up; Examination prior to  chemotherapy; Myeloma (H); Personal history of diseases of blood and  blood-forming organs    ADDITIONAL INFORMATION OBTAINED FROM EMR: 71 year old year old female  diagnosed with?Multiple Myeloma.??She has been treated with?KVD and  recently Daratumumab maintenance.??She is now being work up  for?Autologous?Stem Cell Transplant on protocol 2016-35, which  utilizes?HD Melphalan?as a conditioning regimen.    COMPARISON: PET/CT 11/23/2021.    FINDINGS:    HEAD/NECK:  There is no suspicious FDG uptake in the neck.    Atrophic thyroid  gland demonstrates diffuse uptake, may represent  thyroiditis.    CHEST:  There is no suspicious FDG uptake in the chest.    Right chest wall Port-A-Cath with its tip in the right atrium.  Cardiomegaly. Mild atherosclerosis of the coronary arteries. Trace  pericardial effusion. Bilateral trace pleural effusions.    ABDOMEN AND PELVIS:  Focal hypermetabolism in the distal rectum with SUV max of 20 (series  3 image 400), indeterminate for primary rectal malignancy.    Atherosclerotic calcifications along the aorta and bilateral iliac  arteries.    LOWER EXTREMITIES:  No abnormal masses or hypermetabolic lesions.    BONES:  Increased in thickness and FDG avidity to the right pre-/paravertebral  soft tissue thickening along the T10 vertebra since PET/CT from  11/23/2021, measures 1.7 x 4.3 cm with SUV max of 10.2 (Series 3 image  221).  Lytic/sclerotic lesion along the right aspect of T10 vertebral body  without abnormal FDG uptake, likely representing treated multiple  myeloma with stable compression deformity T10 vertebral body. Mixed  lytic sclerotic foci in the the iliac bones likely related to prior  bone marrow biopsy and treated multiple myeloma.  Multilevel spondylosis with calcified posterior disc herniation at  T10-T11 level resulting in moderate to severe spinal canal narrowing.  ACDF from C5-7. Unchanged grade 2 anterolisthesis of L5 on S1. Grade 1  anterolisthesis of C3 on C4.    Impression  IMPRESSION: In this patient with multiple myeloma status  post-chemotherapy:    1. Increased  thickness and FDG avidity to the right  pre-/paravertebral soft tissue thickening along the T10 vertebra since  PET/CT from 11/23/2021, suspicious for active perimedullary multiple  myeloma.  2. Mixed lytic sclerotic lesions in the iliac bones and T10 vertebral  body without suspicious FDG uptake, likely treated multiple myeloma.  3. Multilevel spondylosis with calcified posterior disc herniation at  T10-T11 level resulting in  moderate to severe spinal canal narrowing.  4. Focal hypermetabolism in the distal rectum and possibly cecum,  indeterminate for primary rectal / colon malignancy. Recommend direct  visualization with colonoscopy.    I have personally reviewed the examination and initial interpretation  and I agree with the findings.    JOAQUIM WILSON MD      SYSTEM ID:  [id number]         Sincerely,    [facility]

## 2022-05-19 NOTE — PROGRESS NOTES
BMT/Cell Therapy Consultation      Libby Grimaldo is a 71 year old female referred for Dr. Nichols for high risk (+1q) IgG lambda light chain multiple myeloma.  She has already collected stem cells but had her transplant delayed due to multiple complications including urinary tract infection and elevated LFTs of unclear etiology.  She presents today for re-evaluation prior to transplant.      Diagnosis and Treatment Summary     Oncology History   Multiple myeloma not having achieved remission (H)   11/7/2018 -  Cancer Staged    Staging form: Plasma Cell Myeloma and Disorders, AJCC 8th Edition  - Clinical stage from 11/7/2018: Albumin (g/dL): 3.4, ISS: Stage II, High-risk cytogenetics: Absent, LDH: Unknown     11/7/2018 Initial Diagnosis    Multiple myeloma not having achieved remission (H)     1/23/2019 - 11/14/2019 Chemotherapy    KRD x 6 cycles     11/4/2019 - 2/14/2021 Chemotherapy    Rev/Dex(20)      4/3/2020 - 7/17/2020 Chemotherapy    Velcade maintenance - dose reductions for orthostatic hypotension     7/17/2020 - 12/15/2020 Chemotherapy    Revlimid maintenance     11/23/2020 Progression    Bone marrow 35% plasma cells, PET CT demonstrating new lytic lesions     12/15/2020 - 6/11/2021 Chemotherapy    Elsy, Kd x 6 cycles     7/7/2021 -  Cancer Staged    PET/CT - CR     7/21/2021 -  Chemotherapy    Subcutaneous Daratumumab, IVIG, denosumab monthly     11/22/2021 -  Cancer Staged    Bone marrow aspirate - MRD 0.0022% positive     2/1/2022 - 2/5/2022 Chemotherapy    IP BMT Chemotherapy For Mobilization - High Dose Cyclophosphamide  Plan Provider: Julio C Guillory MD  Treatment goal: Other  Line of treatment: [No plan line of treatment]     4/20/2022 -  Chemotherapy    IP - OP BMT 2016-35 Auto Multiple Myeloma - Melphalan (CrCL < 30 mL/min, >/= 2 comorbidities, OR age > 75yrs) - Adult (Version: 7/13/21)  Plan Provider: Julio C Guillory MD  Treatment goal: Other  Line of treatment: [No plan line of treatment]    "      HPI:  Please see my entry above for disease and treatment history.  Libby presents today accompanied by her  rTes.  She reports she is doing well today.  She denies urinary frequency/pain, fevers/chills, abdominal pain, skin changes, rashes, nausea/vomiting, diarrhea/constipation, shortness of breath or other active issues.  She does endorse moderate fatigue but notes this is chronic and unchanged over the past several months.      ROS:       ROS   10-point ROS completed and negative except as outlined above.    Past Medical History:   Diagnosis Date    Cervical radiculopathy     COPD (chronic obstructive pulmonary disease) (H)     Double vision     Febrile seizure (H)     Per patient. Once while a toddler, once while in highschool, and once while in college.    Floaters     Graves disease     HLD (hyperlipidemia)     HTN (hypertension)     IPF (idiopathic pulmonary fibrosis) (H)     Lumbar radiculopathy     Multiple myeloma in relapse (H)     Osteoporosis     Pneumonia     Per patient. Complicated by sepsis.    Recurrent UTI     Per patient. Has required prophylactic antibiotcs.    Vitamin B12 deficiency (non anemic)        Past Surgical History:   Procedure Laterality Date    BONE MARROW BIOPSY, BONE SPECIMEN, NEEDLE/TROCAR Right 1/24/2022    Procedure: BIOPSY, BONE MARROW;  Surgeon: Yuniel Tineo;  Location: [facility] OR    CERVICAL FUSION      CHOLECYSTECTOMY      CYSTECTOMY OVARIAN BENIGN      EYE SURGERY      Per patient.    IR CVC TUNNEL PLACEMENT > 5 YRS OF AGE  2/1/2022    IR CVC TUNNEL REMOVAL LEFT  2/22/2022    TONSILLECTOMY      WRIST SURGERY Left        Family History   Problem Relation Age of Onset    Heart Disease Mother     Cancer Mother         \"Around lungs\" - she explicitly said it was *not* lung cancer.    Diabetes Father     Heart Disease Father        Social History     Tobacco Use    Smoking status: Former Smoker     Packs/day: 1.00     Years: 40.00     Pack years: 40.00    " Smokeless tobacco: Former User     Quit date: 2010   Substance Use Topics    Alcohol use: Not Currently    Drug use: Not Currently     Types: Marijuana         Allergies   Allergen Reactions    Bupropion      Other reaction(s): Seizures        Current Outpatient Medications   Medication Sig Dispense Refill    acetaminophen (TYLENOL) 500 MG tablet Take 500-1,000 mg by mouth every 6 hours as needed for mild pain       acyclovir (ZOVIRAX) 800 MG tablet Take 1 tablet (800 mg) by mouth every 12 hours 60 tablet 1    albuterol (PROAIR HFA/PROVENTIL HFA/VENTOLIN HFA) 108 (90 Base) MCG/ACT inhaler Inhale 2 puffs into the lungs every 6 hours as needed for shortness of breath / dyspnea       amLODIPine (NORVASC) 5 MG tablet Take 1 tablet (5 mg) by mouth daily      aspirin (ASA) 81 MG chewable tablet HOLD due to low platelets (Patient not taking: No sig reported)      atorvastatin (LIPITOR) 40 MG tablet Take 40 mg by mouth daily       buprenorphine HCl-naloxone HCl (SUBOXONE) 8-2 MG per film Place 1 Film under the tongue 3 times daily       cholecalciferol (VITAMIN D-1000 MAX ST) 25 MCG (1000 UT) TABS Take 1,000 Units by mouth daily       fluconazole (DIFLUCAN) 100 MG tablet Take 1 tablet (100 mg) by mouth daily 30 tablet 1    Fluticasone-Umeclidin-Vilanterol (TRELEGY ELLIPTA) 100-62.5-25 MCG/INH oral inhaler Inhale 1 puff into the lungs daily       guaiFENesin (MUCINEX) 600 MG 12 hr tablet Take 2 tablets (1,200 mg) by mouth 2 times daily 20 tablet 1    levothyroxine (SYNTHROID/LEVOTHROID) 112 MCG tablet Take 112 mcg by mouth daily       methocarbamol (ROBAXIN) 500 MG tablet Take 500 mg by mouth 3 times daily as needed for muscle spasms      metoprolol succinate ER (TOPROL-XL) 25 MG 24 hr tablet Take 25 mg by mouth daily       ondansetron (ZOFRAN) 8 MG tablet Take 1 tablet (8 mg) by mouth every 8 hours as needed for nausea 30 tablet 0    oxyCODONE IR (ROXICODONE) 10 MG tablet Take 10 mg by mouth every 8 hours as needed for  severe pain       pantoprazole (PROTONIX) 40 MG EC tablet Take 1 tablet (40 mg) by mouth daily 30 tablet 1    polyethylene glycol (MIRALAX) 17 GM/Dose powder Take 17 g by mouth      prochlorperazine (COMPAZINE) 5 MG tablet Take 1 tablet (5 mg) by mouth every 6 hours as needed for nausea or vomiting 30 tablet 0    rivaroxaban ANTICOAGULANT (XARELTO) 20 MG TABS tablet Take 1 tablet (20 mg) by mouth daily (with dinner) 30 tablet 1    sucralfate (CARAFATE) 1 GM/10ML suspension Take 10 mLs (1 g) by mouth 4 times daily (before meals and nightly) 420 mL 4    venlafaxine (EFFEXOR) 75 MG tablet Take 1 tablet (75 mg) by mouth 2 times daily           Physical Exam:     Vital Signs: BP (!) 146/77   Pulse 73   Temp 98.2  F (36.8  C) (Oral)   Resp 16   Wt 55.8 kg (123 lb)   SpO2 98%   BMI 21.11 kg/m      Physical Exam  General Appearance: Sitting up on exam table, no acute distress  Eyes: No jaundice, no injection  HEENT: Head atraumatic, oropharynx unremarkable  Respiratory: Clear to auscultation bilaterally, no crackles/wheezes  Cardiovascular: Regular rate and rhythm, normal S1/S2  GI: Non-tender, non-distended, normoactive bowel sounds  Lymph/Hematologic: No supraclavicular/cervical lymphadenopathy  Skin: No rash, no jaundice  Musculoskeletal: No lower extremity edema, normal ROM        Assessment   Libby Grimaldo is a 71 year old woman with high-risk IgG lambda multiple myeloma, now with slowly progressive disease after being off therapy for several months.  Libby appears to be back to her baseline health today and is ready to proceed with her autologous stem cell transplant.  We do consider Libby to be higher risk of complications based on the difficulties proceeding to transplant as outlined above.  We will therefore plan to keep Libby inpatient for closer monitoring until she engrafts.  We discussed this with Libby and her  and they were in agreement with the plan.      BMT and Cell Therapy Informed Consent  Discussion     In today's visit, we discussed in detail the research for which Libby Grimaldo is eligible. We discussed the potential risks and potential benefits of each protocol individually. We explained potential alternatives to the protocols discussed. We explained to the patient that participation is voluntary and that consent may be withdrawn at any time.     We discussed:  The rationale for our approach to the disease treatment  The eligibility requirements for treatment in the context of clinical trials  The need for caregiver support and the caregiver's role in recovery  The importance of adherence to the treatment plan and appropriate follow up  The requirements for contraception while undergoing treatment  The potential risks of morbidity and mortality related to this treatment  The requirements for supportive care to reduce the risk of infection and other complications  The role of the dietician and PT/OT to reduce the risk of muscle loss/sarcopenia  Support that is available through our social workers and care team to mitigate distress  The desired outcomes/goals of treatment, including the possibility of long-term disease control    The patient completed the last round of treatment on 2/2022.    HCT-CI score: 3. We counseled the patient about the impact of this on the risk of treatment related and overall mortality. The score fit within treatment protocol eligibility criteria.    Karnofsky performance score: 70       Reproductive status: What methods of birth control does the patient plan to use during the treatment period beginning with conditioning and ending with the discontinuation of immune suppression (indicate with an X all that apply):  X  The patient is confirmed to be sterile or post-menopausal  __ Sexual abstinence  __ Condoms  __ Implants  __ Injectables  __ Oral contraceptives  __ Intrauterine devices (IUD)  __ Other (describe)    The patient received appropriate reproductive counseling and  agreed with the need for effective contraception during the treatment procedures.    Dental health suitable to proceed: Yes    Transplants for multiple myeloma:  The patient has High risk MM (defined as ONE or more of the following: del17p, p53 mutation, t(4;14), t(14;16), t(14;20), abnormal chromosome 1, -13 [by metaphase testing], or plasma cell leukemia), and the collection goal is 5 million CD34/kg for a single transplant..  The day for admission to begin melphalan conditioning is Day -2 for melphalan 140 mg/m2 (frail, creatinine clearance <30)..       Known issues that I take into account for medical decisions, with salient changes to the plan considering these complexities noted above.    Patient Active Problem List   Diagnosis    Multiple myeloma not having achieved remission (H)    Admission for chemotherapy    Stem cells transplant status (H)    Neutropenic fever (H)     Patient seen and discussed with attending Dr. Guillory.    Rigoberto Daley MD  Hematology/Oncology/Transplant Fellow    Attending Note:   I saw this patient with Dr. Daley and agree with the findings and plan of care as documented in the note above. I personally reviewed the history, salient aspects of the exam, laboratory and imaging as needed.  The key findings are that after a long delay, she has regained strength, demonstrated improved LFTs, and wishes to move ahead.  She is showing signs of myeloma progression.  I recommended that we proceed as originally planned with Sylwia 140 and will admission to stay inpatient over the course of her treatment and recovery.  She agrees to the plan.      SOTO GUILLORY MD  May 23, 2022             ____________________________________________________________________      BMT/Cell Therapy Workup Summary    Workup Nurse Coordinator: Rashad Chen  Primary BMT Physician: Soto Guillory  Date of Summary:  05/21/2022        Patient Demographics       Patient ID:  Libby Grimaldo   Age:  71 year old   Sex:   female  Reason for Transplant: Multiple myeloma  Protocol: 2016-35        `  Blood Counts       Recent Labs   Lab Test 05/17/22  1202 04/11/22  1101 03/28/22  1005 03/21/22  0927 02/28/22  1241 02/18/22  1642 02/18/22  0415   HGB 12.7 10.9* 10.9*   < > 10.1*   < > 7.4*   HCT 39.6 34.7* 34.2*   < > 31.8*   < > 23.4*   WBC 5.9 6.3 8.4   < > 7.3   < > 7.9   ANEUTAUTO 3.1 3.3  --   --  4.4   < >  --    ALYMPAUTO 2.0 2.1  --   --  1.6   < >  --    AMONOAUTO 0.6 0.7  --   --  1.2   < >  --    AEOSAUTO 0.2 0.1  --   --  0.0   < >  --    ABSBASO 0.0 0.0  --   --  0.1   < >  --    NRBCMAN 0.0 0.0  --   --  0.0   < >  --    ABLA  --   --   --   --   --   --  0.1*    219 201   < > 326   < > 68*    < > = values in this interval not displayed.         Recent Labs   Lab Test 02/19/22  0310   ABORH A POS         Recent Labs   Lab Test 01/20/22  1619   HGBS Negative         Chemistries     Basic Panel  Recent Labs   Lab Test 05/17/22  1202 04/11/22  1101 03/28/22  1005   * 141 142   POTASSIUM 3.2* 3.7 3.8   CHLORIDE 110* 108 106   CO2 27 26 28   BUN 11 11 12   CR 0.68 0.79 0.81   * 114* 106*        Calcium, Magnesium, Phosphorus  Recent Labs   Lab Test 05/17/22  1202 04/11/22  1101 03/28/22  1005 02/28/22  1241 02/26/22  0502 02/25/22  0412 02/24/22  0259   DIANDRA 9.2 9.4 9.4   < > 7.9* 7.9* 8.1*   MAG  --   --   --   --  1.9 2.1 1.9   PHOS  --   --   --   --  2.6 2.4* 3.0    < > = values in this interval not displayed.        LFTs  Recent Labs   Lab Test 05/17/22  1202 04/28/22  1017 04/18/22  1205   BILITOTAL 0.5 0.4 0.4   ALKPHOS 222* 248* 494*   AST 40 38 63*   ALT 30 26 105*   ALBUMIN 3.5 2.9* 3.4       LDH  Recent Labs   Lab Test 02/25/22  0412 02/24/22  0953 01/20/22  1619   * 263* 218       B2-Microglobulin  Recent Labs   Lab Test 01/20/22  1618   HNNC3YRPL 4.0*       Vitamin D  Recent Labs   Lab Test 01/20/22  1621   VITDT 36         Urine Studies       Recent Labs   Lab Test 03/28/22  1139   COLOR  Yellow   APPEARANCE Clear   URINEGLC Negative   URINEBILI Small*   URINEKETONE Negative   SG 1.025   UBLD Negative   URINEPH 5.0   PROTEIN Negative   UUROI Normal   NITRITE Negative   LEUKEST Negative   MUCUS Present*   RBCU 1   WBCU 6*   USQEI 2*       Creatinine Clearance    Recent Labs   Lab Test 01/24/22  0645      WT 64.0   RAW 48   STD 49     968   DUR 24.0  24.0   CREATININE 0.75   UCRR 54  54   UCR24 0.52*  0.52*         Infectious Disease Markers     [facility] IDM    Recent Labs   Lab Test 02/20/22  0900   DCMIG POSITIVE   DHBSAG Non-Reactive   DHBCAB Non-Reactive   DHIVAB Non-Reactive   DHCVAB Non-Reactive   DHTLVA Non-Reactive   TCRUZI Non-Reactive   DTRPAB Non-Reactive       CMV  No lab results found.      EBV    Recent Labs   Lab Test 01/20/22  1620   EBVCAG Positive*       HSV 1/2    Recent Labs   Lab Test 01/20/22  1620   R1AQCJH 22.40*   H1IGG Positive.  IgG antibody to HSV-1 detected.*   X2IWDKP 3.16*   H2IGG Positive.  IgG antibody to HSV-2 detected.*         VZV    No lab results found.      HTLV    Recent Labs   Lab Test 02/20/22  0900   DHTLVA Non-Reactive     COVID    Recent Labs   Lab Test 04/18/22  1226   [id number] Negative         Immunoglobulins     Recent Labs   Lab Test 04/11/22  1101 03/28/22  1005 03/21/22  0927 01/20/22  1619   * 442* 477* 725       Recent Labs   Lab Test 04/11/22  1101 03/28/22  1005 03/21/22  0927 01/20/22  1619   IGA 3* 3* 4* 3*       Recent Labs   Lab Test 04/11/22  1101 03/28/22  1005 03/21/22  0927 01/20/22  1619   IGM <10* <10* <10* <10*         Monocloncal Protein Studies     M spike    Recent Labs   Lab Test 05/17/22  1202 03/28/22  1005 03/21/22  0927 01/20/22  1618   ELPM 0.2* 0.1* 0.1* 0.1*       Kappa FLC    Recent Labs   Lab Test 05/17/22  1202 04/11/22  1101 03/28/22  1005 03/21/22  0927 01/20/22  1619   KFLCA 0.14* 0.11* 0.84 0.10* 0.09*       Lambda FLC    Recent Labs   Lab Test 05/17/22  1202 04/11/22  1101  03/28/22  1005 03/21/22  0927 01/20/22  1619   LFLCA 10.61* 8.97* 8.68* 6.61* 9.96*       FLC Ratio    Recent Labs   Lab Test 05/17/22  1202 04/11/22  1101 03/28/22  1005 03/21/22  0927 01/20/22  1619   KLRA 0.01* 0.01* 0.10* 0.02* 0.01*           Bone Marrow Biopsy     1/24/22 Bone marrow    Final Diagnosis   Bone marrow, posterior iliac crest, right decalcified trephine biopsy and touch imprint; right particle crush, direct aspirate smear, and concentrated aspirate smear; and peripheral smear:  - Marrow cellularity of 10-20%, with trilineage hematopoiesis; plasma cells are not increased; no definitive clonal population by IHC, but minimal residual disease detected by flow cytometry (see comment)  - Peripheral blood showing slight normochromic, macrocytic anemia   Electronically signed by Juma Levy MD on 1/26/2022 at  1:44 PM   Comment  UUMAYO   Concurrent flow cytometry was performed ([id number]) and lambda monotypic plasma cells were identified.  There are very few plasma cells seen on the trephine core based on IHC - this likely represents minimal residual disease.     There are rare hypogranular neutrophils seen on scanning, as well as a few atypical megakaryocytes, but the overall findings are not definitive for dysplasia.       PFTs     FVC%  Recent Labs   Lab Test 01/26/22  1259   [id number] 104       FEV1%  Recent Labs   Lab Test 01/26/22  1259   [id number] 107       DLCO%  Recent Labs   Lab Test 01/26/22  1259   [id number] 81         EKG       ECG results from 01/20/22   EKG 12-lead complete w/read - Clinics     Value    Systolic Blood Pressure     Diastolic Blood Pressure     Ventricular Rate 69    Atrial Rate 69    MT Interval 172    QRS Duration 80        QTc 430    P Axis 74    R AXIS 56    T Axis 70    Interpretation ECG      Sinus rhythm  Minimal voltage criteria for LVH, may be normal variant  Borderline ECG  No previous ECGs available  Confirmed by fellow Chaudhry, Mohsan ([id number]) on 1/21/2022  3:56:12 PM  Confirmed by MD BRINK JANE ([id number]) on 2022 6:05:16 PM           ECHOCARDIOGRAM       Results for orders placed during the hospital encounter of 22    ECHOCARDIOGRAM COMPLETE    Status: Normal 2022    Narrative  [id number]  [id number]  [id number]  [id number]^NIELS^SOTO^GISELA    [facility],[place]  Echocardiography Laboratory  [address] [place] [id number]    Name: ABBI MARTÍNEZ  MRN: [id number]  : 1950  Study Date: 2022 09:44 AM  Age: 71 yrs  Gender: Female  Patient Location: [facility]  Reason For Study: Examination prior to chemotherapy, Myeloma (H), Personal  history  Ordering Physician: SOTO BOWSER  Referring Physician: SOTO BOWSER  Performed By: Linden Dewey    BSA: 1.7 m2  Height: 63 in  Weight: 138 lb  HR: 75  BP: 176/79 mmHg  ______________________________________________________________________________  Procedure  Echocardiogram with two-dimensional, color and spectral Doppler performed.  ______________________________________________________________________________  Interpretation Summary  Global and regional left ventricular function is normal. 3D LVEF volumetric  analysis is 57%. Diastolic dysfunction.  Global peak LV longitudinal strain is averaged at -19.1%. This is within  reported normal limits (normal <-18%).  Global right ventricular function is normal.  Mild calcification of the aortic valve and mitral annulus, but normal doppler  assessment of all valves.  There is no prior study for direct comparison.    Mild LV wall thickening with abnormal diastolic function in the setting of  myeloma. Although strain imaging is not consistent with amyloidosis, could  consider cardiac MRI to confirm.  ______________________________________________________________________________  Left Ventricle  Global and regional left ventricular function is normal with an EF of 55-60%.  3D LVEF volumetric analysis is 57%. Left ventricular size is normal.  Relative  wall thickness is increased consistent with concentric remodeling. Grade II or  moderate diastolic dysfunction. Global peak LV longitudinal strain is averaged  at -19.1%. This is within reported normal limits (normal <-18%). No regional  wall motion abnormalities are seen.    Right Ventricle  The right ventricle is normal size. Global right ventricular function is  normal.    Atria  The right atria appears normal. Severe left atrial enlargement is present.    Mitral Valve  Mild mitral annular calcification is present. On Doppler interrogation, there  is no significant stenosis or regurgitation.    Aortic Valve  Trileaflet aortic sclerosis without stenosis.    Tricuspid Valve  The tricuspid valve is normal. The peak velocity of the tricuspid regurgitant  jet is not obtainable. Pulmonary artery systolic pressure cannot be assessed.    Pulmonic Valve  The valve leaflets are not well visualized. On Doppler interrogation, there is  no significant stenosis or regurgitation.    Vessels  Normal diameter aortic root and proximal ascending aorta. The inferior vena  cava is normal.    Pericardium  No pericardial effusion is present.    Compared to Previous Study  There is no prior study for direct comparison.  ______________________________________________________________________________  MMode/2D Measurements & Calculations  IVSd: 1.2 cm    LVIDd: 3.5 cm  LVIDs: 2.3 cm  LVPWd: 0.93 cm  FS: 33.9 %  LV mass(C)d: 111.8 grams  LV mass(C)dI: 67.7 grams/m2  Ao root diam: 3.1 cm  asc Aorta Diam: 3.1 cm  LVOT diam: 1.9 cm  LVOT area: 2.8 cm2  LA Volume (BP): 95.8 ml  LA Volume Index (BP): 58.1 ml/m2  RWT: 0.53    Doppler Measurements & Calculations  MV E max yudelka: 83.9 cm/sec  MV A max yudelka: 80.5 cm/sec  MV E/A: 1.0  MV dec slope: 366.0 cm/sec2  E/E' avg: 15.5  Lateral E/e': 14.5  Medial E/e': 16.4    ______________________________________________________________________________  Report approved by: Naomie Velasquez  01/20/2022 10:51 AM        PET Scan       Results for orders placed during the hospital encounter of 01/20/22    PET ONCOLOGY WHOLE BODY    Status: Normal 1/25/2022    Narrative  Combined Report of:    PET and CT on  1/25/2022 3:15 PM :    1. PET of the neck, chest, abdomen, and pelvis.  2. PET CT Fusion for Attenuation Correction and Anatomical  Localization:  3. 3D MIP and PET-CT fused images were processed on an independent  workstation and archived to PACS and reviewed by a radiologist.    Technique:    1. PET: The patient received 10.15 mCi of F-18-FDG; the serum glucose  was 86 prior to administration, body weight was 63.5 kg. Images were  evaluated in the axial, sagittal, and coronal planes as well as the  rotational whole body MIP. Images were acquired from the Vertex to the  Feet.    UPTAKE WAS MEASURED AT 60 MINUTES.    BACKGROUND:  Liver SUV max= 3.56,   Aorta Blood SUV Max: 2.39.    2. CT: CT only obtained for attenuation correction and not diagnostic  purposes.    INDICATION: Multiple myeloma, follow up; Examination prior to  chemotherapy; Myeloma (H); Personal history of diseases of blood and  blood-forming organs    ADDITIONAL INFORMATION OBTAINED FROM EMR: 71 year old year old female  diagnosed with?Multiple Myeloma.??She has been treated with?KVD and  recently Daratumumab maintenance.??She is now being work up  for?Autologous?Stem Cell Transplant on protocol 2016-35, which  utilizes?HD Melphalan?as a conditioning regimen.    COMPARISON: PET/CT 11/23/2021.    FINDINGS:    HEAD/NECK:  There is no suspicious FDG uptake in the neck.    Atrophic thyroid gland demonstrates diffuse uptake, may represent  thyroiditis.    CHEST:  There is no suspicious FDG uptake in the chest.    Right chest wall Port-A-Cath with its tip in the right atrium.  Cardiomegaly. Mild atherosclerosis of the coronary arteries. Trace  pericardial effusion. Bilateral trace pleural effusions.    ABDOMEN AND PELVIS:  Focal  hypermetabolism in the distal rectum with SUV max of 20 (series  3 image 400), indeterminate for primary rectal malignancy.    Atherosclerotic calcifications along the aorta and bilateral iliac  arteries.    LOWER EXTREMITIES:  No abnormal masses or hypermetabolic lesions.    BONES:  Increased in thickness and FDG avidity to the right pre-/paravertebral  soft tissue thickening along the T10 vertebra since PET/CT from  11/23/2021, measures 1.7 x 4.3 cm with SUV max of 10.2 (Series 3 image  221).  Lytic/sclerotic lesion along the right aspect of T10 vertebral body  without abnormal FDG uptake, likely representing treated multiple  myeloma with stable compression deformity T10 vertebral body. Mixed  lytic sclerotic foci in the the iliac bones likely related to prior  bone marrow biopsy and treated multiple myeloma.  Multilevel spondylosis with calcified posterior disc herniation at  T10-T11 level resulting in moderate to severe spinal canal narrowing.  ACDF from C5-7. Unchanged grade 2 anterolisthesis of L5 on S1. Grade 1  anterolisthesis of C3 on C4.    Impression  IMPRESSION: In this patient with multiple myeloma status  post-chemotherapy:    1. Increased  thickness and FDG avidity to the right  pre-/paravertebral soft tissue thickening along the T10 vertebra since  PET/CT from 11/23/2021, suspicious for active perimedullary multiple  myeloma.  2. Mixed lytic sclerotic lesions in the iliac bones and T10 vertebral  body without suspicious FDG uptake, likely treated multiple myeloma.  3. Multilevel spondylosis with calcified posterior disc herniation at  T10-T11 level resulting in moderate to severe spinal canal narrowing.  4. Focal hypermetabolism in the distal rectum and possibly cecum,  indeterminate for primary rectal / colon malignancy. Recommend direct  visualization with colonoscopy.    I have personally reviewed the examination and initial interpretation  and I agree with the findings.    JOAQUIM WILSON,  MD      SYSTEM ID:  [id number]

## 2022-05-23 ENCOUNTER — CARE COORDINATION (OUTPATIENT)
Dept: TRANSPLANT | Facility: CLINIC | Age: 72
End: 2022-05-23
Payer: MEDICARE

## 2022-05-23 DIAGNOSIS — C90.00 MULTIPLE MYELOMA NOT HAVING ACHIEVED REMISSION (H): Primary | ICD-10-CM

## 2022-05-23 NOTE — PROGRESS NOTES
Writer called patient to confirm covid test today at 5:15, left voicemail message regarding hospital admission plan.    Notified patient of admission to hospital on 5/25 for planned autologous stem cell transplant (D-2).  She understands to check-in at [facility] at 9:15 AM. Patient was advised to call with questions.    Cheryl Lassiter on 5/23/2022 at 3:08 PM  BMT Nurse Coordinator

## 2022-05-24 ENCOUNTER — MEDICAL CORRESPONDENCE (OUTPATIENT)
Dept: TRANSPLANT | Facility: CLINIC | Age: 72
End: 2022-05-24

## 2022-05-24 ENCOUNTER — TELEPHONE (OUTPATIENT)
Dept: TRANSPLANT | Facility: CLINIC | Age: 72
End: 2022-05-24

## 2022-05-24 ENCOUNTER — CARE COORDINATION (OUTPATIENT)
Dept: TRANSPLANT | Facility: CLINIC | Age: 72
End: 2022-05-24

## 2022-05-24 ENCOUNTER — LAB (OUTPATIENT)
Dept: LAB | Facility: CLINIC | Age: 72
End: 2022-05-24
Attending: INTERNAL MEDICINE
Payer: MEDICARE

## 2022-05-24 DIAGNOSIS — C90.00 MULTIPLE MYELOMA NOT HAVING ACHIEVED REMISSION (H): Primary | ICD-10-CM

## 2022-05-24 DIAGNOSIS — C90.00 MULTIPLE MYELOMA NOT HAVING ACHIEVED REMISSION (H): ICD-10-CM

## 2022-05-24 LAB — SARS-COV-2 RNA RESP QL NAA+PROBE: NEGATIVE

## 2022-05-24 PROCEDURE — U0005 INFEC AGEN DETEC AMPLI PROBE: HCPCS | Performed by: INTERNAL MEDICINE

## 2022-05-24 NOTE — TELEPHONE ENCOUNTER
Admission  PT: Libby Harpreetzaid  : 1950  MRN: [id number]  Dx: MM  Protocol: 35  TBI: None  BMT Admission: 22 @ 10:30AM  PICC: Check in 9:15AM [facility],  9:30AM procedure    Pt no show to scheduled appointment yesterday. [place] [facility] [place] is aware of the appointment today and to present to [facility] waiting room tomorrow at 930 in anticipation of PICC line placement, admission for transplant.

## 2022-05-24 NOTE — PROGRESS NOTES
Inpatient Admission Information:      Admit Date:  5/25/22   Diagnosis:  MM   Transplant Type:  Auto   Protocol:  [id number]      Notes:  Day - 2 admit, keep through engraftment       New Eval Work-Up   MD Kahlil Solorzano   Date 4/7/21 1/26/22         Consult Type Date   1 Neuropsych 3/16/22   2     3           Long Term Follow-Up   MD Kahlil Solorzano

## 2022-05-25 ENCOUNTER — HOSPITAL ENCOUNTER (OUTPATIENT)
Dept: VASCULAR ULTRASOUND | Facility: CLINIC | Age: 72
Discharge: HOME | DRG: 016 | End: 2022-05-25
Attending: INTERNAL MEDICINE
Payer: MEDICARE

## 2022-05-25 ENCOUNTER — HOSPITAL ENCOUNTER (INPATIENT)
Facility: CLINIC | Age: 72
LOS: 6 days | Discharge: HOME | DRG: 016 | End: 2022-05-31
Attending: INTERNAL MEDICINE | Admitting: INTERNAL MEDICINE
Payer: MEDICARE

## 2022-05-25 DIAGNOSIS — Z51.11 ADMISSION FOR CHEMOTHERAPY: ICD-10-CM

## 2022-05-25 DIAGNOSIS — C90.00 MULTIPLE MYELOMA NOT HAVING ACHIEVED REMISSION (H): Primary | ICD-10-CM

## 2022-05-25 DIAGNOSIS — Z94.84 STEM CELLS TRANSPLANT STATUS (H): ICD-10-CM

## 2022-05-25 DIAGNOSIS — C90.00 MULTIPLE MYELOMA NOT HAVING ACHIEVED REMISSION (H): ICD-10-CM

## 2022-05-25 LAB
ABO/RH(D): ABNORMAL
ALBUMIN SERPL-MCNC: 3 G/DL (ref 3.4–5)
ALP SERPL-CCNC: 172 U/L (ref 40–150)
ALT SERPL W P-5'-P-CCNC: 23 U/L (ref 0–50)
ANION GAP SERPL CALCULATED.3IONS-SCNC: 7 MMOL/L (ref 3–14)
ANTIBODY SCREEN, TUBE: NORMAL
ANTIBODY SCREEN: POSITIVE
APTT PPP: 30 SECONDS (ref 22–38)
AST SERPL W P-5'-P-CCNC: 42 U/L (ref 0–45)
BASOPHILS # BLD AUTO: 0 10E3/UL (ref 0–0.2)
BASOPHILS NFR BLD AUTO: 0 %
BILIRUB SERPL-MCNC: 0.7 MG/DL (ref 0.2–1.3)
BUN SERPL-MCNC: 9 MG/DL (ref 7–30)
CALCIUM SERPL-MCNC: 9 MG/DL (ref 8.5–10.1)
CHLORIDE BLD-SCNC: 108 MMOL/L (ref 94–109)
CO2 SERPL-SCNC: 28 MMOL/L (ref 20–32)
CREAT SERPL-MCNC: 0.72 MG/DL (ref 0.52–1.04)
EOSINOPHIL # BLD AUTO: 0.2 10E3/UL (ref 0–0.7)
EOSINOPHIL NFR BLD AUTO: 3 %
ERYTHROCYTE [DISTWIDTH] IN BLOOD BY AUTOMATED COUNT: 12.3 % (ref 10–15)
GFR SERPL CREATININE-BSD FRML MDRD: 90 ML/MIN/1.73M2
GLUCOSE BLD-MCNC: 87 MG/DL (ref 70–99)
HCT VFR BLD AUTO: 33.6 % (ref 35–47)
HGB BLD-MCNC: 10.9 G/DL (ref 11.7–15.7)
IMM GRANULOCYTES # BLD: 0 10E3/UL
IMM GRANULOCYTES NFR BLD: 0 %
INR PPP: 1.28 (ref 0.85–1.15)
LYMPHOCYTES # BLD AUTO: 1.8 10E3/UL (ref 0.8–5.3)
LYMPHOCYTES NFR BLD AUTO: 36 %
MAGNESIUM SERPL-MCNC: 1.6 MG/DL (ref 1.6–2.3)
MCH RBC QN AUTO: 32.8 PG (ref 26.5–33)
MCHC RBC AUTO-ENTMCNC: 32.4 G/DL (ref 31.5–36.5)
MCV RBC AUTO: 101 FL (ref 78–100)
MONOCYTES # BLD AUTO: 0.7 10E3/UL (ref 0–1.3)
MONOCYTES NFR BLD AUTO: 13 %
NEUTROPHILS # BLD AUTO: 2.4 10E3/UL (ref 1.6–8.3)
NEUTROPHILS NFR BLD AUTO: 48 %
NRBC # BLD AUTO: 0 10E3/UL
NRBC BLD AUTO-RTO: 0 /100
PHOSPHATE SERPL-MCNC: 3.9 MG/DL (ref 2.5–4.5)
PLATELET # BLD AUTO: 143 10E3/UL (ref 150–450)
POTASSIUM BLD-SCNC: 3.2 MMOL/L (ref 3.4–5.3)
POTASSIUM BLD-SCNC: 3.2 MMOL/L (ref 3.4–5.3)
POTASSIUM BLD-SCNC: 3.7 MMOL/L (ref 3.4–5.3)
PROT SERPL-MCNC: 5.7 G/DL (ref 6.8–8.8)
RBC # BLD AUTO: 3.32 10E6/UL (ref 3.8–5.2)
SODIUM SERPL-SCNC: 143 MMOL/L (ref 133–144)
SPECIMEN EXPIRATION DATE: ABNORMAL
SPECIMEN EXPIRATION DATE: NORMAL
URATE SERPL-MCNC: 4 MG/DL (ref 2.6–6)
WBC # BLD AUTO: 5.1 10E3/UL (ref 4–11)

## 2022-05-25 PROCEDURE — 250N000013 HC RX MED GY IP 250 OP 250 PS 637: Performed by: PHYSICIAN ASSISTANT

## 2022-05-25 PROCEDURE — 85025 COMPLETE CBC W/AUTO DIFF WBC: CPT | Performed by: PHYSICIAN ASSISTANT

## 2022-05-25 PROCEDURE — 36569 INSJ PICC 5 YR+ W/O IMAGING: CPT

## 2022-05-25 PROCEDURE — 86850 RBC ANTIBODY SCREEN: CPT | Performed by: PHYSICIAN ASSISTANT

## 2022-05-25 PROCEDURE — 272N000201 ZZ HC ADHESIVE SKIN CLOSURE, DERMABOND

## 2022-05-25 PROCEDURE — 250N000011 HC RX IP 250 OP 636: Performed by: INTERNAL MEDICINE

## 2022-05-25 PROCEDURE — 99223 1ST HOSP IP/OBS HIGH 75: CPT | Mod: AI | Performed by: PHYSICIAN ASSISTANT

## 2022-05-25 PROCEDURE — 258N000003 HC RX IP 258 OP 636: Performed by: INTERNAL MEDICINE

## 2022-05-25 PROCEDURE — 85730 THROMBOPLASTIN TIME PARTIAL: CPT | Performed by: PHYSICIAN ASSISTANT

## 2022-05-25 PROCEDURE — 85610 PROTHROMBIN TIME: CPT | Performed by: PHYSICIAN ASSISTANT

## 2022-05-25 PROCEDURE — 206N000001 HC R&B BMT UMMC

## 2022-05-25 PROCEDURE — 3E04305 INTRODUCTION OF OTHER ANTINEOPLASTIC INTO CENTRAL VEIN, PERCUTANEOUS APPROACH: ICD-10-PCS | Performed by: INTERNAL MEDICINE

## 2022-05-25 PROCEDURE — 84100 ASSAY OF PHOSPHORUS: CPT | Performed by: PHYSICIAN ASSISTANT

## 2022-05-25 PROCEDURE — 258N000001 HC RX 258: Performed by: INTERNAL MEDICINE

## 2022-05-25 PROCEDURE — 250N000011 HC RX IP 250 OP 636: Performed by: PHYSICIAN ASSISTANT

## 2022-05-25 PROCEDURE — 82435 ASSAY OF BLOOD CHLORIDE: CPT | Performed by: PHYSICIAN ASSISTANT

## 2022-05-25 PROCEDURE — 272N000473 HC KIT, VPS RHYTHM STYLET

## 2022-05-25 PROCEDURE — 250N000013 HC RX MED GY IP 250 OP 250 PS 637: Performed by: INTERNAL MEDICINE

## 2022-05-25 PROCEDURE — 84132 ASSAY OF SERUM POTASSIUM: CPT | Performed by: PHYSICIAN ASSISTANT

## 2022-05-25 PROCEDURE — 84132 ASSAY OF SERUM POTASSIUM: CPT | Performed by: INTERNAL MEDICINE

## 2022-05-25 PROCEDURE — 272N000451 HC KIT SHRLOCK 5FR POWER PICC DOUBLE LUMEN

## 2022-05-25 PROCEDURE — 86901 BLOOD TYPING SEROLOGIC RH(D): CPT | Performed by: PHYSICIAN ASSISTANT

## 2022-05-25 PROCEDURE — 83735 ASSAY OF MAGNESIUM: CPT | Performed by: PHYSICIAN ASSISTANT

## 2022-05-25 PROCEDURE — 82374 ASSAY BLOOD CARBON DIOXIDE: CPT | Performed by: PHYSICIAN ASSISTANT

## 2022-05-25 PROCEDURE — 84550 ASSAY OF BLOOD/URIC ACID: CPT | Performed by: PHYSICIAN ASSISTANT

## 2022-05-25 RX ORDER — ACYCLOVIR 800 MG/1
800 TABLET ORAL 2 TIMES DAILY
Status: DISCONTINUED | OUTPATIENT
Start: 2022-05-25 | End: 2022-05-31 | Stop reason: HOSPADM

## 2022-05-25 RX ORDER — PROCHLORPERAZINE MALEATE 5 MG
5 TABLET ORAL EVERY 6 HOURS PRN
Status: DISCONTINUED | OUTPATIENT
Start: 2022-05-25 | End: 2022-05-31 | Stop reason: HOSPADM

## 2022-05-25 RX ORDER — VITAMIN B COMPLEX
1000 TABLET ORAL DAILY
Status: DISCONTINUED | OUTPATIENT
Start: 2022-05-26 | End: 2022-05-31 | Stop reason: HOSPADM

## 2022-05-25 RX ORDER — HEPARIN SODIUM,PORCINE 10 UNIT/ML
5-20 VIAL (ML) INTRAVENOUS
Status: DISCONTINUED | OUTPATIENT
Start: 2022-05-25 | End: 2022-05-25 | Stop reason: CLARIF

## 2022-05-25 RX ORDER — LEVOFLOXACIN 250 MG/1
250 TABLET, FILM COATED ORAL
Status: DISCONTINUED | OUTPATIENT
Start: 2022-05-26 | End: 2022-05-31 | Stop reason: HOSPADM

## 2022-05-25 RX ORDER — DEXTROSE MONOHYDRATE 50 MG/ML
10-20 INJECTION, SOLUTION INTRAVENOUS
Status: DISCONTINUED | OUTPATIENT
Start: 2022-06-01 | End: 2022-05-31 | Stop reason: HOSPADM

## 2022-05-25 RX ORDER — LORAZEPAM 0.5 MG/1
.5-1 TABLET ORAL EVERY 4 HOURS PRN
Status: DISCONTINUED | OUTPATIENT
Start: 2022-05-25 | End: 2022-05-31 | Stop reason: HOSPADM

## 2022-05-25 RX ORDER — LEVOTHYROXINE SODIUM 112 UG/1
112 TABLET ORAL DAILY
Status: DISCONTINUED | OUTPATIENT
Start: 2022-05-26 | End: 2022-05-31 | Stop reason: HOSPADM

## 2022-05-25 RX ORDER — SODIUM CHLORIDE 9 MG/ML
INJECTION, SOLUTION INTRAVENOUS CONTINUOUS
Status: ACTIVE | OUTPATIENT
Start: 2022-05-25 | End: 2022-05-25

## 2022-05-25 RX ORDER — GUAIFENESIN 600 MG/1
1200 TABLET, EXTENDED RELEASE ORAL 2 TIMES DAILY
Status: DISCONTINUED | OUTPATIENT
Start: 2022-05-25 | End: 2022-05-31 | Stop reason: HOSPADM

## 2022-05-25 RX ORDER — POTASSIUM CHLORIDE 7.45 MG/ML
10 INJECTION INTRAVENOUS ONCE
Status: COMPLETED | OUTPATIENT
Start: 2022-05-25 | End: 2022-05-25

## 2022-05-25 RX ORDER — HEPARIN SODIUM,PORCINE 10 UNIT/ML
5-20 VIAL (ML) INTRAVENOUS EVERY 24 HOURS
Status: DISCONTINUED | OUTPATIENT
Start: 2022-05-25 | End: 2022-05-31 | Stop reason: HOSPADM

## 2022-05-25 RX ORDER — LORAZEPAM 2 MG/ML
.5-1 INJECTION INTRAMUSCULAR EVERY 4 HOURS PRN
Status: DISCONTINUED | OUTPATIENT
Start: 2022-05-25 | End: 2022-05-31 | Stop reason: HOSPADM

## 2022-05-25 RX ORDER — ACETAMINOPHEN 325 MG/1
650 TABLET ORAL ONCE
Status: COMPLETED | OUTPATIENT
Start: 2022-05-27 | End: 2022-05-27

## 2022-05-25 RX ORDER — FLUCONAZOLE 200 MG/1
200 TABLET ORAL DAILY
Status: DISCONTINUED | OUTPATIENT
Start: 2022-05-26 | End: 2022-05-31 | Stop reason: HOSPADM

## 2022-05-25 RX ORDER — DIPHENHYDRAMINE HCL 25 MG
25-50 CAPSULE ORAL EVERY 6 HOURS PRN
Status: DISCONTINUED | OUTPATIENT
Start: 2022-05-25 | End: 2022-05-31 | Stop reason: HOSPADM

## 2022-05-25 RX ORDER — PANTOPRAZOLE SODIUM 40 MG/1
40 TABLET, DELAYED RELEASE ORAL DAILY
Status: DISCONTINUED | OUTPATIENT
Start: 2022-05-26 | End: 2022-05-31 | Stop reason: HOSPADM

## 2022-05-25 RX ORDER — OXYCODONE HYDROCHLORIDE 10 MG/1
10 TABLET ORAL EVERY 6 HOURS PRN
Status: DISCONTINUED | OUTPATIENT
Start: 2022-05-25 | End: 2022-05-27

## 2022-05-25 RX ORDER — DIPHENHYDRAMINE HCL 25 MG
25 CAPSULE ORAL ONCE
Status: COMPLETED | OUTPATIENT
Start: 2022-05-27 | End: 2022-05-27

## 2022-05-25 RX ORDER — ACETAMINOPHEN 325 MG/1
325-650 TABLET ORAL EVERY 4 HOURS PRN
Status: DISCONTINUED | OUTPATIENT
Start: 2022-05-25 | End: 2022-05-31 | Stop reason: HOSPADM

## 2022-05-25 RX ORDER — ACETAMINOPHEN 325 MG/1
650 TABLET ORAL EVERY 6 HOURS PRN
Status: DISCONTINUED | OUTPATIENT
Start: 2022-05-25 | End: 2022-05-31 | Stop reason: HOSPADM

## 2022-05-25 RX ORDER — BUPRENORPHINE AND NALOXONE 8; 2 MG/1; MG/1
1 FILM, SOLUBLE BUCCAL; SUBLINGUAL 3 TIMES DAILY
Status: DISCONTINUED | OUTPATIENT
Start: 2022-05-25 | End: 2022-05-31 | Stop reason: HOSPADM

## 2022-05-25 RX ORDER — ALBUTEROL SULFATE 90 UG/1
2 AEROSOL, METERED RESPIRATORY (INHALATION) EVERY 6 HOURS PRN
Status: DISCONTINUED | OUTPATIENT
Start: 2022-05-25 | End: 2022-05-31 | Stop reason: HOSPADM

## 2022-05-25 RX ORDER — HEPARIN SODIUM,PORCINE 10 UNIT/ML
5-20 VIAL (ML) INTRAVENOUS EVERY 24 HOURS
Status: DISCONTINUED | OUTPATIENT
Start: 2022-05-25 | End: 2022-05-25 | Stop reason: CLARIF

## 2022-05-25 RX ORDER — VENLAFAXINE 75 MG/1
75 TABLET ORAL 2 TIMES DAILY
Status: DISCONTINUED | OUTPATIENT
Start: 2022-05-25 | End: 2022-05-26

## 2022-05-25 RX ORDER — METOPROLOL SUCCINATE 25 MG/1
25 TABLET, EXTENDED RELEASE ORAL DAILY
Status: DISCONTINUED | OUTPATIENT
Start: 2022-05-26 | End: 2022-05-31 | Stop reason: HOSPADM

## 2022-05-25 RX ORDER — HEPARIN SODIUM,PORCINE 10 UNIT/ML
5-20 VIAL (ML) INTRAVENOUS
Status: DISCONTINUED | OUTPATIENT
Start: 2022-05-25 | End: 2022-05-31 | Stop reason: HOSPADM

## 2022-05-25 RX ORDER — MEPERIDINE HYDROCHLORIDE 25 MG/ML
25-50 INJECTION INTRAMUSCULAR; INTRAVENOUS; SUBCUTANEOUS
Status: ACTIVE | OUTPATIENT
Start: 2022-05-27 | End: 2022-05-28

## 2022-05-25 RX ORDER — SUCRALFATE ORAL 1 G/10ML
1 SUSPENSION ORAL
Status: DISCONTINUED | OUTPATIENT
Start: 2022-05-25 | End: 2022-05-31 | Stop reason: HOSPADM

## 2022-05-25 RX ORDER — ALLOPURINOL 300 MG/1
300 TABLET ORAL DAILY
Status: COMPLETED | OUTPATIENT
Start: 2022-05-25 | End: 2022-05-26

## 2022-05-25 RX ORDER — SULFAMETHOXAZOLE/TRIMETHOPRIM 800-160 MG
1 TABLET ORAL
Status: DISCONTINUED | OUTPATIENT
Start: 2022-06-27 | End: 2022-05-31 | Stop reason: HOSPADM

## 2022-05-25 RX ORDER — METHOCARBAMOL 500 MG/1
500 TABLET, FILM COATED ORAL 3 TIMES DAILY PRN
Status: DISCONTINUED | OUTPATIENT
Start: 2022-05-25 | End: 2022-05-26

## 2022-05-25 RX ORDER — POTASSIUM CHLORIDE 29.8 MG/ML
20 INJECTION INTRAVENOUS ONCE
Status: COMPLETED | OUTPATIENT
Start: 2022-05-25 | End: 2022-05-25

## 2022-05-25 RX ORDER — DEXAMETHASONE 4 MG/1
8 TABLET ORAL DAILY
Status: COMPLETED | OUTPATIENT
Start: 2022-05-27 | End: 2022-05-27

## 2022-05-25 RX ORDER — ONDANSETRON 8 MG/1
8 TABLET, FILM COATED ORAL EVERY 8 HOURS
Status: COMPLETED | OUTPATIENT
Start: 2022-05-25 | End: 2022-05-27

## 2022-05-25 RX ORDER — AMLODIPINE BESYLATE 5 MG/1
5 TABLET ORAL DAILY
Status: DISCONTINUED | OUTPATIENT
Start: 2022-05-26 | End: 2022-05-27

## 2022-05-25 RX ADMIN — METHOCARBAMOL 500 MG: 500 TABLET ORAL at 20:57

## 2022-05-25 RX ADMIN — SUCRALFATE 1 G: 1 SUSPENSION ORAL at 21:00

## 2022-05-25 RX ADMIN — PROCHLORPERAZINE MALEATE 5 MG: 5 TABLET ORAL at 20:58

## 2022-05-25 RX ADMIN — DEXAMETHASONE 10 MG: 2 TABLET ORAL at 17:29

## 2022-05-25 RX ADMIN — VENLAFAXINE 75 MG: 75 TABLET ORAL at 20:58

## 2022-05-25 RX ADMIN — ACYCLOVIR 800 MG: 800 TABLET ORAL at 20:57

## 2022-05-25 RX ADMIN — ONDANSETRON HYDROCHLORIDE 8 MG: 8 TABLET, FILM COATED ORAL at 17:29

## 2022-05-25 RX ADMIN — DEXTROSE AND SODIUM CHLORIDE: 5; 450 INJECTION, SOLUTION INTRAVENOUS at 14:10

## 2022-05-25 RX ADMIN — ALLOPURINOL 300 MG: 300 TABLET ORAL at 16:46

## 2022-05-25 RX ADMIN — SUCRALFATE 1 G: 1 SUSPENSION ORAL at 16:45

## 2022-05-25 RX ADMIN — DEXTROSE AND SODIUM CHLORIDE: 5; 450 INJECTION, SOLUTION INTRAVENOUS at 20:39

## 2022-05-25 RX ADMIN — MELPHALAN HYDROCHLORIDE 224 MG: KIT INTRAVENOUS at 18:09

## 2022-05-25 RX ADMIN — Medication 5 ML: at 20:57

## 2022-05-25 RX ADMIN — OXYCODONE HYDROCHLORIDE 10 MG: 10 TABLET ORAL at 16:44

## 2022-05-25 RX ADMIN — POTASSIUM CHLORIDE 20 MEQ: 29.8 INJECTION, SOLUTION INTRAVENOUS at 18:03

## 2022-05-25 RX ADMIN — POTASSIUM CHLORIDE 10 MEQ: 7.46 INJECTION, SOLUTION INTRAVENOUS at 21:46

## 2022-05-25 RX ADMIN — BUPRENORPHINE AND NALOXONE 1 FILM: 8; 2 FILM, SOLUBLE BUCCAL; SUBLINGUAL at 20:58

## 2022-05-25 ASSESSMENT — ACTIVITIES OF DAILY LIVING (ADL)
ADLS_ACUITY_SCORE: 25
DRESSING/BATHING_DIFFICULTY: NO
DIFFICULTY_COMMUNICATING: NO
WALKING_OR_CLIMBING_STAIRS: STAIR CLIMBING DIFFICULTY, ASSISTANCE 1 PERSON
TOILETING_ISSUES: NO;YES
EQUIPMENT_CURRENTLY_USED_AT_HOME: SHOWER CHAIR
CHANGE_IN_FUNCTIONAL_STATUS_SINCE_ONSET_OF_CURRENT_ILLNESS/INJURY: YES
WEAR_GLASSES_OR_BLIND: YES
ADLS_ACUITY_SCORE: 25
ADLS_ACUITY_SCORE: 25
VISION_MANAGEMENT: READERS
ADLS_ACUITY_SCORE: 25
DOING_ERRANDS_INDEPENDENTLY_DIFFICULTY: NO
ADLS_ACUITY_SCORE: 25
WALKING_OR_CLIMBING_STAIRS_DIFFICULTY: YES
FALL_HISTORY_WITHIN_LAST_SIX_MONTHS: NO
HEARING_DIFFICULTY_OR_DEAF: NO
CONCENTRATING,_REMEMBERING_OR_MAKING_DECISIONS_DIFFICULTY: YES
DIFFICULTY_EATING/SWALLOWING: NO

## 2022-05-25 NOTE — H&P
BMT History & Physical       Patient Demographics   Patient ID:  Libby Grimaldo   Age:  71 year old   Sex:  female  Reason for Admission/CC: high risk MM   Date:  5/25/2022  Service: BMT   Informant:  Patient and Chart  Resuscitation Status: Full Code    Patient ID:  Libby Grimaldo is a 71 year old female, currently day -2 of Auto 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     Transplant Essential Data:   Diagnosis MM Multiple myeloma  BMTCT Type Autologous    Prep Regimen melphalan  Donor Source Self     GVHD Prophylaxis NA  Primary BMT MD Julio C Guillory    Clinical Trials [id number]           HPI:   Libby Grimaldo is a 71 year old female referred for Dr. Nichols for high risk (+1q) IgG lambda light chain multiple myeloma.  She has already collected stem cells February of this year but had her transplant delayed due to multiple complications including urinary tract infection and elevated LFTs of unclear etiology.  She presents for hospital admission for stem cell transplant.She reports she is not great- ongoing pain, fatigue though no concern for new/active infection.  She denies urinary frequency/pain, fevers/chills, abdominal pain, skin changes, rashes, nausea/vomiting, diarrhea/constipation, shortness of breath or other active issues.  She does endorse moderate fatigue but notes this is chronic and unchanged over the past several months Given her comorbities and issues with chronic pain Dr Guillory  considers Libby to be higher risk of complications based on the difficulties proceeding to transplant as outlined above.  We will therefore plan to keep Libby inpatient for closer monitoring until she engrafts. Libby and Tres were in agreement with this plan.       Diagnosis and Treatment Summary       Oncology History   Multiple myeloma not having achieved remission (H)   11/7/2018 -  Cancer Staged     Staging form: Plasma Cell Myeloma and Disorders, AJCC 8th Edition  - Clinical stage from 11/7/2018: Albumin (g/dL): 3.4, ISS:  Stage II, High-risk cytogenetics: Absent, LDH: Unknown      11/7/2018 Initial Diagnosis     Multiple myeloma not having achieved remission (H)      1/23/2019 - 11/14/2019 Chemotherapy     KRD x 6 cycles      11/4/2019 - 2/14/2021 Chemotherapy     Rev/Dex(20)       4/3/2020 - 7/17/2020 Chemotherapy     Velcade maintenance - dose reductions for orthostatic hypotension      7/17/2020 - 12/15/2020 Chemotherapy     Revlimid maintenance      11/23/2020 Progression     Bone marrow 35% plasma cells, PET CT demonstrating new lytic lesions      12/15/2020 - 6/11/2021 Chemotherapy     Elsy, Kd x 6 cycles      7/7/2021 -  Cancer Staged     PET/CT - CR      7/21/2021 -  Chemotherapy     Subcutaneous Daratumumab, IVIG, denosumab monthly      11/22/2021 -  Cancer Staged     Bone marrow aspirate - MRD 0.0022% positive      2/1/2022 - 2/5/2022 Chemotherapy     IP BMT Chemotherapy For Mobilization - High Dose Cyclophosphamide  Plan Provider: Julio C Guillory MD  Treatment goal: Other  Line of treatment: [No plan line of treatment]      4/20/2022 -  Chemotherapy     IP - OP BMT 2016-35 Auto Multiple Myeloma - Melphalan (CrCL < 30 mL/min, >/= 2 comorbidities, OR age > 75yrs) - Adult (Version: 7/13/21)  Plan Provider: Julio C Guillory MD  Treatment goal: Other  Line of treatment: [No plan line of treatment]            Blood Counts       Recent Labs   Lab Test 05/25/22  1411 05/17/22  1202 04/11/22  1101 02/18/22  1642 02/18/22  0415   HGB 10.9* 12.7 10.9*   < > 7.4*   HCT 33.6* 39.6 34.7*   < > 23.4*   WBC 5.1 5.9 6.3   < > 7.9   ANEUTAUTO 2.4 3.1 3.3   < >  --    ALYMPAUTO 1.8 2.0 2.1   < >  --    AMONOAUTO 0.7 0.6 0.7   < >  --    AEOSAUTO 0.2 0.2 0.1   < >  --    ABSBASO 0.0 0.0 0.0   < >  --    NRBCMAN 0.0 0.0 0.0   < >  --    ABLA  --   --   --   --  0.1*   * 206 219   < > 68*    < > = values in this interval not displayed.         Recent Labs   Lab Test 02/19/22  0310   ABORH A POS         Recent Labs   Lab Test  01/20/22  1619   HGBS Negative         Chemistries     Basic Panel  Recent Labs   Lab Test 05/25/22  1411 05/17/22  1202 04/11/22  1101    146* 141   POTASSIUM 3.2*  3.2* 3.2* 3.7   CHLORIDE 108 110* 108   CO2 28 27 26   BUN 9 11 11   CR 0.72 0.68 0.79   GLC 87 127* 114*        Calcium, Magnesium, Phosphorus  Recent Labs   Lab Test 05/25/22  1411 05/17/22  1202 04/11/22  1101 02/28/22  1241 02/26/22  0502 02/25/22  0412   DIANDRA 9.0 9.2 9.4   < > 7.9* 7.9*   MAG 1.6  --   --   --  1.9 2.1   PHOS 3.9  --   --   --  2.6 2.4*    < > = values in this interval not displayed.        LFTs  Recent Labs   Lab Test 05/25/22  1411 05/17/22  1202 04/28/22  1017   BILITOTAL 0.7 0.5 0.4   ALKPHOS 172* 222* 248*   AST 42 40 38   ALT 23 30 26   ALBUMIN 3.0* 3.5 2.9*       LDH  Recent Labs   Lab Test 02/25/22  0412 02/24/22  0953 01/20/22  1619   * 263* 218       B2-Microglobulin  Recent Labs   Lab Test 01/20/22  1618   [id number] 4.0*       Vitamin D  Recent Labs   Lab Test 01/20/22  1621   VITDT 36         Urine Studies       Recent Labs   Lab Test 03/28/22  1139   COLOR Yellow   APPEARANCE Clear   URINEGLC Negative   URINEBILI Small*   URINEKETONE Negative   SG 1.025   UBLD Negative   URINEPH 5.0   PROTEIN Negative   UUROI Normal   NITRITE Negative   LEUKEST Negative   MUCUS Present*   RBCU 1   WBCU 6*   USQEI 2*       Creatinine Clearance    Recent Labs   Lab Test 01/24/22  0645      WT 64.0   RAW 48   STD 49     968   DUR 24.0  24.0   CREATININE 0.75   UCRR 54  54   UCR24 0.52*  0.52*         Infectious Disease Markers     [facility] IDM    Recent Labs   Lab Test 02/20/22  0900   DCMIG POSITIVE   DHBSAG Non-Reactive   DHBCAB Non-Reactive   DHIVAB Non-Reactive   DHCVAB Non-Reactive   DHTLVA Non-Reactive   TCRUZI Non-Reactive   DTRPAB Non-Reactive       CMV  No lab results found.      EBV    Recent Labs   Lab Test 01/20/22  1620   EBVCAG Positive*       HSV 1/2    Recent Labs   Lab Test  01/20/22  1620   [id number] 22.40*   H1IGG Positive.  IgG antibody to HSV-1 detected.*   Z6CKCQT 3.16*   H2IGG Positive.  IgG antibody to HSV-2 detected.*         VZV    No lab results found.      HTLV    Recent Labs   Lab Test 02/20/22  0900   DHTLVA Non-Reactive         Toxoplasma  (not routinely checked)      COVID    Recent Labs   Lab Test 05/24/22  1421   [id number] Negative         Immunoglobulins     Recent Labs   Lab Test 04/11/22  1101 03/28/22  1005 03/21/22  0927 01/20/22  1619   * 442* 477* 725       Recent Labs   Lab Test 04/11/22  1101 03/28/22  1005 03/21/22  0927 01/20/22  1619   IGA 3* 3* 4* 3*       Recent Labs   Lab Test 04/11/22  1101 03/28/22  1005 03/21/22  0927 01/20/22  1619   IGM <10* <10* <10* <10*         Monocloncal Protein Studies     M spike    Recent Labs   Lab Test 05/17/22  1202 03/28/22  1005 03/21/22  0927 01/20/22  1618   ELPM 0.2* 0.1* 0.1* 0.1*       Kappa FLC    Recent Labs   Lab Test 05/17/22  1202 04/11/22  1101 03/28/22  1005 03/21/22  0927 01/20/22  1619   KFLCA 0.14* 0.11* 0.84 0.10* 0.09*       Lambda FLC    Recent Labs   Lab Test 05/17/22  1202 04/11/22  1101 03/28/22  1005 03/21/22  0927 01/20/22  1619   LFLCA 10.61* 8.97* 8.68* 6.61* 9.96*       FLC Ratio    Recent Labs   Lab Test 05/17/22  1202 04/11/22  1101 03/28/22  1005 03/21/22  0927 01/20/22  1619   KLRA 0.01* 0.01* 0.10* 0.02* 0.01*           Bone Marrow Biopsy     1/24/22:   Final Diagnosis   Bone marrow, posterior iliac crest, right decalcified trephine biopsy and touch imprint; right particle crush, direct aspirate smear, and concentrated aspirate smear; and peripheral smear:  - Marrow cellularity of 10-20%, with trilineage hematopoiesis; plasma cells are not increased; no definitive clonal population by IHC, but minimal residual disease detected by flow cytometry (see comment)  - Peripheral blood showing slight normochromic, macrocytic anemia       Chest X-Ray - 2 view     Results for orders placed in  visit on 03/21/22    XR CHEST 2 VW    Status: Normal 3/21/2022    Narrative  Exam: XR CHEST 2 VW, 3/21/2022 10:31 AM    Indication: new RLL crackles, low grade fever and cough.  Is there  pneumonia?; Multiple myeloma not having achieved remission (H)    Comparison: 2/14/2022 and 2/16/2022    Findings:  Right chest wall internal jugular Port-A-Cath tip at the mid SVC. The  cardiomediastinal silhouette and pulmonary vasculature are within  normal limits. No pleural effusion or pneumothorax. Trace patchy and  streaky right basilar opacities. Bilateral breast implants. ACDF  changes in the lower cervical spine. Degenerative changes in the  shoulder joints. Right upper quadrant cholecystectomy clips.    Impression  Impression: Trace streaky and patchy right basilar opacities,  atelectasis versus infection. Since 2/14/2022 and 3/16/2022, bilateral  pleural effusions have resolved and bibasilar opacities are decreased.    KATHARINE MORENO DO      SYSTEM ID:  [id number]        Chest CT without Contrast       Results for orders placed during the hospital encounter of 02/10/22    CT CHEST W/O CONTRAST    Status: Normal 2/16/2022    Narrative  CT chest without contrast indication: Neutropenic fever    COMPARISON: Most recent chest CT 2/11/22 and plain films 2/14/2022    FINDINGS: Right-sided chest wall approach catheter tip is in the SVC.  Peripherally calcified breast implants noted with somewhat more  heterogeneous appearance on the left than the right, similar to  previous. Increased prominent bilateral pleural effusions.  Cholecystectomy. Extrahepatic biliary dilation. This is likely related  to reservoir effect from cholecystectomy. No increasing soft tissue  prominence in the pancreatic head in this short interval. Splenic  artery atherosclerosis. Diffuse abdominal aortic an bilateral renal  ostial atherosclerosis. Thoracic aortic atherosclerosis. Multivessel  relatively mild coronary artery atherosclerotic calcifications.  Heart  size upper normal. Thoracic aorta and main pulmonary artery are normal  in size. Nonenlarged mediastinal lymph nodes and right hilar lymph  nodes appear grossly similar. These may be reactive. No enlarged  axillary lymph nodes. Left-sided approach large bore catheter tip in  the SVC.  Detail of the lungs shows calcified punctate right upper lobe  granuloma. Septal thickening and bronchial wall thickening  bilaterally. 2 mm left upper lobe nodule anteriorly and laterally  (series 6 image 66) appears similar. Calcified left upper lobe  granuloma.  Bones show osteopenia with ACDF changes in the lower cervical spine an  compression deformities throughout the thoracic spine and upper lumbar  spine, not significantly changed in the short interval.    Impression  IMPRESSION: Increased bilateral pleural effusions and septal  thickening which may indicate edema. Cannot entirely exclude infection  given the presence of bronchial wall thickening which is somewhat more  prominent than before but this could be peribronchial cuffing from  fluid balance issues. Hiatal hernia. Atherosclerosis. Bilateral breast  implants with somewhat heterogeneous appearance of the left implant  compared to the right, similar to recent previous. Continued  osteopenia with multiple thoracolumbar vertebral compression  deformities. ACDF. Granulomatous disease. Extrahepatic duct dilation,  likely reservoir effect from cholecystectomy status.    ALBERT ARROYO MD      SYSTEM ID:  [id number]        PFTs     FVC%  Recent Labs   Lab Test 01/26/22 1259 20003 104       FEV1%  Recent Labs   Lab Test 01/26/22  1259   20016 107       DLCO%  Recent Labs   Lab Test 01/26/22  1259   20143 81       DLCO% (uncorrected, if corrected not available)      EKG       ECG results from 01/20/22   EKG 12-lead complete w/read - Clinics     Value    Systolic Blood Pressure     Diastolic Blood Pressure     Ventricular Rate 69    Atrial Rate 69    MD Interval 172     QRS Duration 80        QTc 430    P Axis 74    R AXIS 56    T Axis 70    Interpretation ECG      Sinus rhythm  Minimal voltage criteria for LVH, may be normal variant  Borderline ECG  No previous ECGs available  Confirmed by fellow Chaudhry, Mohsan ([id number]) on 2022 3:56:12 PM  Confirmed by MD BRINK JANE ([id number]) on 2022 6:05:16 PM           ECHOCARDIOGRAM       Results for orders placed during the hospital encounter of 22    ECHOCARDIOGRAM COMPLETE    Status: Normal 2022    Narrative  [id number]  [id number]  [id number]  [id number]^NIELS^NAEEM    [facility],[place]  Echocardiography Laboratory  [address] [place] [address]    Name: ABBI MARTÍNEZ  MRN: [id number]  : 1950  Study Date: 2022 09:44 AM  Age: 71 yrs  Gender: Female  Patient Location: [facility]  Reason For Study: Examination prior to chemotherapy, Myeloma (H), Personal  history  Ordering Physician: SOTO BOWSER  Referring Physician: SOTO BOWSER  Performed By: Linden Dewey    BSA: 1.7 m2  Height: 63 in  Weight: 138 lb  HR: 75  BP: 176/79 mmHg  ______________________________________________________________________________  Procedure  Echocardiogram with two-dimensional, color and spectral Doppler performed.  ______________________________________________________________________________  Interpretation Summary  Global and regional left ventricular function is normal. 3D LVEF volumetric  analysis is 57%. Diastolic dysfunction.  Global peak LV longitudinal strain is averaged at -19.1%. This is within  reported normal limits (normal <-18%).  Global right ventricular function is normal.  Mild calcification of the aortic valve and mitral annulus, but normal doppler  assessment of all valves.  There is no prior study for direct comparison.    Mild LV wall thickening with abnormal diastolic function in the setting of  myeloma. Although strain imaging is not consistent with amyloidosis,  could  consider cardiac MRI to confirm.  ______________________________________________________________________________  Left Ventricle  Global and regional left ventricular function is normal with an EF of 55-60%.  3D LVEF volumetric analysis is 57%. Left ventricular size is normal. Relative  wall thickness is increased consistent with concentric remodeling. Grade II or  moderate diastolic dysfunction. Global peak LV longitudinal strain is averaged  at -19.1%. This is within reported normal limits (normal <-18%). No regional  wall motion abnormalities are seen.    Right Ventricle  The right ventricle is normal size. Global right ventricular function is  normal.    Atria  The right atria appears normal. Severe left atrial enlargement is present.    Mitral Valve  Mild mitral annular calcification is present. On Doppler interrogation, there  is no significant stenosis or regurgitation.    Aortic Valve  Trileaflet aortic sclerosis without stenosis.    Tricuspid Valve  The tricuspid valve is normal. The peak velocity of the tricuspid regurgitant  jet is not obtainable. Pulmonary artery systolic pressure cannot be assessed.    Pulmonic Valve  The valve leaflets are not well visualized. On Doppler interrogation, there is  no significant stenosis or regurgitation.    Vessels  Normal diameter aortic root and proximal ascending aorta. The inferior vena  cava is normal.    Pericardium  No pericardial effusion is present.    Compared to Previous Study  There is no prior study for direct comparison.  ______________________________________________________________________________  MMode/2D Measurements & Calculations  IVSd: 1.2 cm    LVIDd: 3.5 cm  LVIDs: 2.3 cm  LVPWd: 0.93 cm  FS: 33.9 %  LV mass(C)d: 111.8 grams  LV mass(C)dI: 67.7 grams/m2  Ao root diam: 3.1 cm  asc Aorta Diam: 3.1 cm  LVOT diam: 1.9 cm  LVOT area: 2.8 cm2  LA Volume (BP): 95.8 ml  LA Volume Index (BP): 58.1 ml/m2  RWT: 0.53    Doppler Measurements &  Calculations  MV E max yudelka: 83.9 cm/sec  MV A max yudelka: 80.5 cm/sec  MV E/A: 1.0  MV dec slope: 366.0 cm/sec2  E/E' avg: 15.5  Lateral E/e': 14.5  Medial E/e': 16.4    ______________________________________________________________________________  Report approved by: Naomie Velasquez 01/20/2022 10:51 AM        PET Scan       Results for orders placed during the hospital encounter of 01/20/22    PET ONCOLOGY WHOLE BODY    Status: Normal 1/25/2022    Narrative  Combined Report of:    PET and CT on  1/25/2022 3:15 PM :    1. PET of the neck, chest, abdomen, and pelvis.  2. PET CT Fusion for Attenuation Correction and Anatomical  Localization:  3. 3D MIP and PET-CT fused images were processed on an independent  workstation and archived to PACS and reviewed by a radiologist.    Technique:    1. PET: The patient received 10.15 mCi of F-18-FDG; the serum glucose  was 86 prior to administration, body weight was 63.5 kg. Images were  evaluated in the axial, sagittal, and coronal planes as well as the  rotational whole body MIP. Images were acquired from the Vertex to the  Feet.    UPTAKE WAS MEASURED AT 60 MINUTES.    BACKGROUND:  Liver SUV max= 3.56,   Aorta Blood SUV Max: 2.39.    2. CT: CT only obtained for attenuation correction and not diagnostic  purposes.    INDICATION: Multiple myeloma, follow up; Examination prior to  chemotherapy; Myeloma (H); Personal history of diseases of blood and  blood-forming organs    ADDITIONAL INFORMATION OBTAINED FROM EMR: 71 year old year old female  diagnosed with?Multiple Myeloma.??She has been treated with?KVD and  recently Daratumumab maintenance.??She is now being work up  for?Autologous?Stem Cell Transplant on protocol 2016-35, which  utilizes?HD Melphalan?as a conditioning regimen.    COMPARISON: PET/CT 11/23/2021.    FINDINGS:    HEAD/NECK:  There is no suspicious FDG uptake in the neck.    Atrophic thyroid gland demonstrates diffuse uptake, may  represent  thyroiditis.    CHEST:  There is no suspicious FDG uptake in the chest.    Right chest wall Port-A-Cath with its tip in the right atrium.  Cardiomegaly. Mild atherosclerosis of the coronary arteries. Trace  pericardial effusion. Bilateral trace pleural effusions.    ABDOMEN AND PELVIS:  Focal hypermetabolism in the distal rectum with SUV max of 20 (series  3 image 400), indeterminate for primary rectal malignancy.    Atherosclerotic calcifications along the aorta and bilateral iliac  arteries.    LOWER EXTREMITIES:  No abnormal masses or hypermetabolic lesions.    BONES:  Increased in thickness and FDG avidity to the right pre-/paravertebral  soft tissue thickening along the T10 vertebra since PET/CT from  11/23/2021, measures 1.7 x 4.3 cm with SUV max of 10.2 (Series 3 image  221).  Lytic/sclerotic lesion along the right aspect of T10 vertebral body  without abnormal FDG uptake, likely representing treated multiple  myeloma with stable compression deformity T10 vertebral body. Mixed  lytic sclerotic foci in the the iliac bones likely related to prior  bone marrow biopsy and treated multiple myeloma.  Multilevel spondylosis with calcified posterior disc herniation at  T10-T11 level resulting in moderate to severe spinal canal narrowing.  ACDF from C5-7. Unchanged grade 2 anterolisthesis of L5 on S1. Grade 1  anterolisthesis of C3 on C4.    Impression  IMPRESSION: In this patient with multiple myeloma status  post-chemotherapy:    1. Increased  thickness and FDG avidity to the right  pre-/paravertebral soft tissue thickening along the T10 vertebra since  PET/CT from 11/23/2021, suspicious for active perimedullary multiple  myeloma.  2. Mixed lytic sclerotic lesions in the iliac bones and T10 vertebral  body without suspicious FDG uptake, likely treated multiple myeloma.  3. Multilevel spondylosis with calcified posterior disc herniation at  T10-T11 level resulting in moderate to severe spinal canal  "narrowing.  4. Focal hypermetabolism in the distal rectum and possibly cecum,  indeterminate for primary rectal / colon malignancy. Recommend direct  visualization with colonoscopy.    I have personally reviewed the examination and initial interpretation  and I agree with the findings.    JOAQUIM WILSON MD      SYSTEM ID:  [id number]       Family History:   Family History   Problem Relation Age of Onset     Heart Disease Mother      Cancer Mother         \"Around lungs\" - she explicitly said it was *not* lung cancer.     Diabetes Father      Heart Disease Father        Social History:   Social History     Socioeconomic History     Marital status:      Spouse name: Not on file     Number of children: Not on file     Years of education: Not on file     Highest education level: Not on file   Occupational History     Not on file   Tobacco Use     Smoking status: Former Smoker     Packs/day: 1.00     Years: 40.00     Pack years: 40.00     Smokeless tobacco: Former User     Quit date: 2010   Substance and Sexual Activity     Alcohol use: Not Currently     Drug use: Not Currently     Types: Marijuana     Sexual activity: Not on file   Other Topics Concern     Not on file   Social History Narrative     Not on file     Social Determinants of Health     Financial Resource Strain: Not on file   Food Insecurity: Not on file   Transportation Needs: Not on file   Physical Activity: Not on file   Stress: Not on file   Social Connections: Not on file   Intimate Partner Violence: Not At Risk     Fear of Current or Ex-Partner: No     Emotionally Abused: No     Physically Abused: No     Sexually Abused: No   Housing Stability: Not on file       Past Medical History:   Past Medical History:   Diagnosis Date     Cervical radiculopathy      COPD (chronic obstructive pulmonary disease) (H)      Double vision      Febrile seizure (H)     Per patient. Once while a toddler, once while in highschool, and once while in college.     " Floaters      Graves disease      HLD (hyperlipidemia)      HTN (hypertension)      IPF (idiopathic pulmonary fibrosis) (H)      Lumbar radiculopathy      Multiple myeloma in relapse (H)      Osteoporosis      Pneumonia     Per patient. Complicated by sepsis.     Recurrent UTI     Per patient. Has required prophylactic antibiotcs.     Vitamin B12 deficiency (non anemic)         Past Surgical History:   Past Surgical History:   Procedure Laterality Date     BONE MARROW BIOPSY, BONE SPECIMEN, NEEDLE/TROCAR Right 01/24/2022    Procedure: BIOPSY, BONE MARROW;  Surgeon: Yuniel Tineo;  Location: [facility] OR     CERVICAL FUSION       CHOLECYSTECTOMY       CYSTECTOMY OVARIAN BENIGN       EYE SURGERY      Per patient.     IR CVC TUNNEL PLACEMENT > 5 YRS OF AGE  02/01/2022     IR CVC TUNNEL REMOVAL LEFT  02/22/2022     PICC DOUBLE LUMEN PLACEMENT Left 05/25/2022    Left basilic, 44 cm, 2 cm external length     TONSILLECTOMY       WRIST SURGERY Left        Allergies:   Allergies   Allergen Reactions     Bupropion      Other reaction(s): Seizures       Home Medications      Prior to Admission medications    Medication Sig Start Date End Date Taking? Authorizing Provider   acetaminophen (TYLENOL) 500 MG tablet Take 500-1,000 mg by mouth every 6 hours as needed for mild pain  11/21/19   Reported, Patient   acyclovir (ZOVIRAX) 800 MG tablet Take 1 tablet (800 mg) by mouth every 12 hours 5/6/22   Julio C Guillory MD   albuterol (PROAIR HFA/PROVENTIL HFA/VENTOLIN HFA) 108 (90 Base) MCG/ACT inhaler Inhale 2 puffs into the lungs every 6 hours as needed for shortness of breath / dyspnea  4/30/19   Reported, Patient   amLODIPine (NORVASC) 5 MG tablet Take 1 tablet (5 mg) by mouth daily 2/9/22   Stephie Ayers PA-C   aspirin (ASA) 81 MG chewable tablet HOLD due to low platelets  Patient not taking: No sig reported 2/9/22   Stephie Ayers PA-C   atorvastatin (LIPITOR) 40 MG tablet Take 40 mg by mouth daily  11/1/19    Reported, Patient   buprenorphine HCl-naloxone HCl (SUBOXONE) 8-2 MG per film Place 1 Film under the tongue 3 times daily  7/7/21   Reported, Patient   cholecalciferol (VITAMIN D-1000 MAX ST) 25 MCG (1000 UT) TABS Take 1,000 Units by mouth daily  11/22/19   Reported, Patient   fluconazole (DIFLUCAN) 100 MG tablet Take 1 tablet (100 mg) by mouth daily 4/5/22   Julio C Guillory MD   Fluticasone-Umeclidin-Vilanterol (TRELEGY ELLIPTA) 100-62.5-25 MCG/INH oral inhaler Inhale 1 puff into the lungs daily     Reported, Patient   guaiFENesin (MUCINEX) 600 MG 12 hr tablet Take 2 tablets (1,200 mg) by mouth 2 times daily 3/21/22   Julio C Guillory MD   levothyroxine (SYNTHROID/LEVOTHROID) 112 MCG tablet Take 112 mcg by mouth daily  10/15/18   Reported, Patient   methocarbamol (ROBAXIN) 500 MG tablet Take 500 mg by mouth 3 times daily as needed for muscle spasms    Unknown, Entered By History   metoprolol succinate ER (TOPROL-XL) 25 MG 24 hr tablet Take 25 mg by mouth daily  11/16/18   Reported, Patient   ondansetron (ZOFRAN) 8 MG tablet Take 1 tablet (8 mg) by mouth every 8 hours as needed for nausea 4/11/22   Julio C Guillory MD   oxyCODONE IR (ROXICODONE) 10 MG tablet Take 10 mg by mouth every 8 hours as needed for severe pain  12/11/19   Reported, Patient   pantoprazole (PROTONIX) 40 MG EC tablet Take 1 tablet (40 mg) by mouth daily 2/9/22   Stephie Ayers PA-C   polyethylene glycol (MIRALAX) 17 GM/Dose powder Take 17 g by mouth 7/13/20   Reported, Patient   prochlorperazine (COMPAZINE) 5 MG tablet Take 1 tablet (5 mg) by mouth every 6 hours as needed for nausea or vomiting 4/11/22   Julio C Guillory MD   rivaroxaban ANTICOAGULANT (XARELTO) 20 MG TABS tablet Take 1 tablet (20 mg) by mouth daily (with dinner) 2/26/22   Butler, Shanita Juju, APRN CNP   sucralfate (CARAFATE) 1 GM/10ML suspension Take 10 mLs (1 g) by mouth 4 times daily (before meals and nightly) 3/28/22   Julio C Guillory MD   venlafaxine (EFFEXOR)  "75 MG tablet Take 1 tablet (75 mg) by mouth 2 times daily 2/26/22   Shanita Butler APRN CNP       Review of Systems    Review of Systems:  CONSTITUTIONAL: NEGATIVE for fever, chills, change in weight. Positive for chronic fatigue.   INTEGUMENTARY/SKIN: NEGATIVE for worrisome rashes, moles or lesions  EYES: NEGATIVE for vision changes or irritation  ENT/MOUTH: NEGATIVE for ear, mouth and throat problems  RESP: NEGATIVE for significant cough or SOB  BREAST: NEGATIVE for masses, tenderness or discharge  CV: NEGATIVE for chest pain, palpitations or peripheral edema  GI: NEGATIVE for nausea, abdominal pain, heartburn, or change in bowel habits  : NEGATIVE for frequency, dysuria, or hematuria  MUSCULOSKELETAL: positive for significant diffuse body aches and pain. Notes she follows pain outpatient and they recommended increased oxycodone to 10mg PO QID.   NEURO: NEGATIVE for weakness, dizziness or paresthesias.   ENDOCRINE: NEGATIVE for temperature intolerance, skin/hair changes  HEME/ALLERGY: NEGATIVE for bleeding problems  PSYCHIATRIC: NEGATIVE for changes in mood or affect    PHYSICAL EXAM      Weight     Wt Readings from Last 3 Encounters:   05/25/22 55 kg (121 lb 4.8 oz)   05/19/22 55.8 kg (123 lb)   05/17/22 54.7 kg (120 lb 9.6 oz)        KPS: 80-- chronic fatigue    BP (!) 150/86 (BP Location: Right arm)   Pulse 79   Temp 98.5  F (36.9  C) (Oral)   Resp 16   Ht 1.57 m (5' 1.81\")   Wt 55 kg (121 lb 4.8 oz)   SpO2 97%   BMI 22.32 kg/m       General: NAD   Eyes: GREGG, sclera anicteric   Nose/Mouth/Throat: OP clear, buccal mucosa moist, no ulcerations   Lungs: CTA bilaterally  Cardiovascular: RRR, no M/R/G   Abdominal/Rectal: +BS, soft, NT, ND, No HSM   Lymphatics: No edema  Skin: No rashes or petechaie  Neuro: A&O   Additional Findings: Connelly site NT, no drainage.    Current aGVHD staging:  Skin 0, UGI 0, LGI 0, Liver 0 (keep in note through day +180 for allos)      LABS AND IMAGING: I have assessed " all abnormal lab values for their clinical significance and any values considered clinically significant have been addressed in the assessment and plan.        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     BMT/IEC PROTOCOL for 2016-35  - Chemo protocol: HD Melphalan  Day -1: rest day, 5/26  Day 0: Cell dose: 3.94x10^6 (s/p cytoxan chemo priming 2/1/2022 and subsequent stem cell collection).   - Restaging plan:     HEME/COAG  - Risk of cytopenias due to chemotherapy and radiation  - Transfusion parameters: hemoglobin <7, platelets <10  - Relevant thrombosis or bleeding history:   2/15: new non-occlusive LUE DVT.  - on xeralto 20mg daily. Given line associated Dvt consider stopping xeralto when plts <50k and not resuming unless KARIS score necessitates anticoagulation.     IMMUNOCOMPROMISED  - Relevant infection history: Recurrent PNA and UTIs   - Vaccination status: Influenza vaccination after day +60, COVID vaccination after day +100, followed by remaining vaccinations at 12, 14, 24, and 26 months.  - Prophylaxis plan: ACV 800mg PO BID, levaquin 250mg daily, fungal prophy: fluconazole 200mg po daily.   - Active infections: None    CARDIOVASCULAR  - Risk of cardiomyopathy:  Baseline EF 56%  -known hyperlipidemia- hold crestor until day +60 post transplant.   Hypertension: continue norvasc 5mg PO daily.   History of Afib - now in NSR. On Metoprolol 25mg PO daily.     RESPIRATORY  - hx of COPD, continue  breo-ellipa inhaler   - monitor vitals while inpatient.     GI/NUTRITION  - Ulcer prophylaxis: protonix 40mg daily.   - Risk of nausea/vomiting due to chemo/radiation: prophy dex and zofran. Prn ativan and compazine available.  - Risk of malnutrition: moderate given systemic chemotherapy.     RENAL/ELECTROLYTES/  - Risk of renal injury: IV hydration 150cc/hr melphalan flush.   - Electrolyte management: replace per sliding scale    DIABETES/ENDOCRINE  - Risk of steroid-induced hyperglyemia: Monitor BG, sliding scale if needed    MUSCULOSKELETAL/FRAILTY  - Baseline Frailty Score: 4  - Patient with substantial risk of sarcopenia  - Daily PT/OT as needed while inpatient  - Cancer Rehab as needed outpatient    SYMPTOM MANAGEMENT  - Nausea from chemo/radiation: Prochlorperazine, ondansetron, lorazepam.  - # Pain Assessment:  Current Pain Score 5/25/2022   Patient currently in pain? yes   Pain score (0-10) -   - Libby is experiencing pain due to chronic pain. Pain management was discussed with Libby and her spouse and the plan was created in a collaborative fashion.  Libby's response to the current recommendations: engaged  - Continue suboxone and prn oxycodone.   Pain medicine consult to help manage pain while inpatient and make plan regarding using narcotic with suboxone vs holding.       SOCIAL DETERMINANTS  - Caregiver: Tres. Plan to remain admitted through engraftment   - Financial/insurance concerns: Not discussed.       Known issues that I take into account for medical decisions, with salient changes to the plan considering these complexities noted above.    Patient Active Problem List   Diagnosis     Multiple myeloma not having achieved remission (H)     Admission for chemotherapy     Stem cells transplant status (H)     Neutropenic fever (H)       Today's summary: Clinically stable. Ready to move forward with     I spent 80 minutes in the care of this patient today, which included time necessary for  preparation for the visit, obtaining history, ordering medications/tests/procedures as medically indicated, review of pertinent medical literature, counseling of the patient, communication of recommendations to the care team, and documentation time.    Shanita Naylor PA-C

## 2022-05-25 NOTE — PROCEDURES
[facility]    Double Lumen PICC Placement    Date/Time: 5/25/2022 12:25 PM  Performed by: Antoinette Ward RN  Authorized by: Julio C Guillory MD   Indications: vascular access      UNIVERSAL PROTOCOL   Site Marked: Yes  Prior Images Obtained and Reviewed:  Yes  Required items: Required blood products, implants, devices and special equipment available    Patient identity confirmed:  Verbally with patient and arm band  NA - No sedation, light sedation, or local anesthesia  Confirmation Checklist:  Patient's identity using two indicators, relevant allergies, procedure was appropriate and matched the consent or emergent situation and correct equipment/implants were available  Time out: Immediately prior to the procedure a time out was called    Universal Protocol: the Joint Commission Universal Protocol was followed    Preparation: Patient was prepped and draped in usual sterile fashion       ANESTHESIA    Anesthesia: Local infiltration  Local Anesthetic:  Lidocaine 1% without epinephrine  Anesthetic Total (mL):  3.5      SEDATION    Patient Sedated: No        Preparation: skin prepped with ChloraPrep  Skin prep agent: skin prep agent completely dried prior to procedure  Sterile barriers: maximum sterile barriers were used: cap, mask, sterile gown, sterile gloves, and large sterile sheet  Hand hygiene: hand hygiene performed prior to central venous catheter insertion  Type of line used: PICC and Power PICC  Catheter type: double lumen  Catheter size: 5 Fr  Brand: Bard  Lot number: [id number]  Placement method: venipuncture, MST, ultrasound and tip navigation system  Difficulty threading catheter: no  Successful placement: yes  Orientation: left    Location: brachial vein (lateral) (0.52 cm vein diameter)  Tip Location: superior vena cava  Site rationale: right PAC  Arm circumference: adults 10 cm  Extremity circumference: 25  Visible catheter length: 2  Total catheter  length: 44  Dressing and securement: adhesive securement device, blood cleaned with CHG, chlorhexidine disc applied, glue, transparent dressing, site cleansed, statlock and sterile dressing applied  Post procedure assessment: free fluid flow, blood return through all ports and placement verified by 3CG technology  PROCEDURE   Patient Tolerance:  Patient tolerated the procedure well with no immediate complications

## 2022-05-25 NOTE — CONSULTS
"Below is the pt's psychosocial assessment for the staff to review as needed.      Blood and Marrow Transplant   Psychosocial Assessment with   Clinical      Assessment completed on 1/21/2022 via phone as part of the COVID 19 protocol. Assessment of living situation, support system, financial status, functional status, coping, stressors, need for resources and social work intervention provided as needed. Information for this assessment was provided by pt, pt's -Jerrica, and pt's sister-Leisa's report in addition to medical chart review and consultation with medical team.      Present at Assessment:   Patient: Libby Grimaldo  Spouse: Tres \"Jerrica\" Linda  : ANDREW Lambert, [facility]     Diagnosis: Multiple Myeloma      Date of Diagnosis: 12/20219     Transplant type: Autologous     Donor: Autologous       Physician: Alex Monsalve MD     Nurse Coordinator (Work-Up): Rashad Chen RN     Nurse Coordinator (Primary): Kiara Stewart RN     : ANDREW Lambert, [facility]      Permanent Address:   [address] [place] [address]     Living Situation: Pt lives at home with her Queenie.     Contact Information:  Pt's Cell Phone: [phone]  Pt Email: [email or web address]  Teddy Mckeon's Phone: [phone]  Sister-Leisa Grimaldo's Phone: [phone]     Presenting Information:  Libby is a 71 year old female diagnosed with MM who presents for evaluation for autologous transplant at the [facility] ([facility] Pt was accompanied to today's visit by her -Jerrica and sister-Leisa.      Decision Making: Self     Health Care Directive: No. SW provided education and forms. SW encouraged pt to have discussions with their family regarding their health care wishes. SW explained that since she does not have a healthcare directive, legally her -Tres Mckeon would make decisions on her behalf, if she did not have capacity to make her own medical " "decisions. Pt understood.      Pt wants to complete a healthcare directive. SW will check in with pt while in the hospital.      Relationship Status: . Pt and pt's spouse described relationship as stable and supportive.      Family/Support System: Pt endorsed a good support system including family and close friends who will be available to support pt throughout transplant process.      Spouse: Tres \"Jerrica\" Linda ( has kidney failure and has been sick and attending appts himself)  Children: Son-Luis Eduardo (Lives in [place]) and Son-Pravin (Lives in [place]) - These are Libby's biological children. -Jerrica has 3 Children of his own (2 live in [place], [place] and 1 lives in [place])  Grandchildren: Libby has 1 Grandchild-Yamel Cm (7 years old) and Jerrica has 7 Grandchildren - Together they have 8 grandchildren  Parents:   Siblings: Sister-Leisa Grimaldo (Lives in [place] - 3 miles away from pt)     Caregiver: MIKEY discussed with pt, pt's , and pt's sister the caregiver role and expectation at length. Pt is agreeable to having a full time caregiver for a minimum of 30 days until cleared by the BMT physician. Pt and pt's family confirmed understanding of the caregiver requirement. Pt's primary caregivers will be -Jerrica and sister-Leisa. Pt reviewed and signed the caregiver contract which will be scanned into the EM. Caregiver education and resources provided. No caregiver concerns identified.      Caregiver Contact Information:  -Tres Mckeon's Phone: [phone]  Sister-Leisa Grimaldo's Phone: [phone]     Transportation Mode: Private Vehicle. Pt is aware of driving restrictions post-BMT and the need for the caregiver is to drive until cleared to drive by the BMT physician. SW provided information on parking info and monthly parking pass options. Pt reported no concerns with transportation and confirmed her caregivers will drive her to call her doctor's " appointments.     Insurance: Pt has Medicare and Medicaid [place] health insurance. Pt denied specific insurance concerns at this time. MIKEY reiterated information about the [employer]  should specific insurance questions arise as pt moves through transplant process. Future Insurance questions referred to [employer] -Michael Valdes (CP: [phone]).     Sources of Income: Pt is supported by Social Security FDC and Pension. Pt is anticipating financial hardship related to BMT at this time. MIKEY provided information on gris options. Pt would like to apply for grants. MIKEY mailed pt on 1.21.2022 the [employer], [employer], [facility] Patient Aid and [employer] gris applications. MIKEY encouraged pt to return applications to  when completed.      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      Mental Health: Pt denied a history of mental health concerns, specific diagnoses or medications at this time. MIKEY explained that it's not uncommon for patients going through transplant to experience symptoms of depression/anxiety.      PHQ-2:  Pt scored a 0 which indicates no sign of depression on the depression severity scale. Pt endorses this is an accurate reflection of her emotional state.     GAD7:  Pt scored a 5 which indicates mild anxiety on the Generalized Anxiety Disorder Questionnaire. Pt endorses this is not an accurate reflection of her emotional state.     Chemical Use:   Tobacco: Pt quit 15 years ago.  Alcohol: No use  Marijuana: No hx  Other Drugs: No hx  Based on the information provided, there appear to be no specific risks or concerns identified at this time.      Trauma/Loss/Abuse History: Multiple losses associated with cancer diagnosis and treatment, including health, changes to physical appearance, etc.      Spirituality: MIKEY explained that there are Chaplains on the unit and pt can request to meet with a  " at anytime.     Coping: Pt noted that she is currently feeling \"okay, tired yet moving around and handling it well\". Pt shared she is worried about her  and sister having to be her caregiver all the time. Pt shared she is having trouble with sleeping at night and plants to talk with the MD about her sleep trouble. Pt shared that her main coping mechanisms are talking with family/friends and spending time with her granddaughter. SW and pt discussed additional positive coping mechanisms that pt can utilize while in the hospital. While hospitalized, pt plans to bring her computer.      Caregiver Coping: Pt's caregivers noted that they is feeling \"fine\" at this time. SW encouraged pt's caregivers to reach out if they need anything at all for support as well.      Education Provided: Transplant process expectations, Caregiver requirements, Caregiver self-care, Financial issues related to transplant, Financial resources/grants available, Common psychosocial stressors pre/post transplant, Support group(s) available, Hospital resources available, Web site information, Social Work role and Resources for grandchildren.     Interventions Provided: Psychosocial Support and Education      Assessment and Recommendations for Team:  Pt is a 71 year old female diagnosed with MM who is here undergoing preparation for a planned autologous transplant.      Pt is pleasant, calm and able to articulate concerns/coping mechanisms in an appropriate manner. During our meeting pt was alert, she was interactive, affect was full, she displayed appropriate memory and thought processes. Pt feels comfortable communicating with the medical team. Pt has a strong supportive network of family and friends who are involved. Pt and pt's family will benefit from ongoing psychosocial support in regards to coping with the adjustment to the BMT process.      Pt has a good support system and a well developed caregiver plan. Pt verbalizes " understanding of the transplant process and wanting to proceed. SW provided contact information and encouraged pt to contact SW with questions, concerns, resources and for support.     Per this assessment, SW did not identify any barriers to this patient moving forward with transplant.     Important Information:   -Would like clinic appointments later in the day.     Follow up Planned:   -Initiated financial resources -Mailed gris applications on 1.21.2022 and received on 1.29.2022  -Psychosocial support  -Healthcare Directive - Check in while inpatient  -Resources for grandchildren - Mailed [employer], [employer], and [employer] on 1.21.2022 and received on 1.29.2022     Estefanía MORALES, [facility]  Phone: [phone]  Pager: [phone]

## 2022-05-26 ENCOUNTER — APPOINTMENT (OUTPATIENT)
Dept: OCCUPATIONAL THERAPY | Facility: CLINIC | Age: 72
DRG: 016 | End: 2022-05-26
Attending: PHYSICIAN ASSISTANT
Payer: MEDICARE

## 2022-05-26 LAB
ALBUMIN SERPL-MCNC: 2.8 G/DL (ref 3.4–5)
ALP SERPL-CCNC: 155 U/L (ref 40–150)
ALT SERPL W P-5'-P-CCNC: 20 U/L (ref 0–50)
ANION GAP SERPL CALCULATED.3IONS-SCNC: 5 MMOL/L (ref 3–14)
AST SERPL W P-5'-P-CCNC: 16 U/L (ref 0–45)
BASOPHILS # BLD AUTO: 0 10E3/UL (ref 0–0.2)
BASOPHILS NFR BLD AUTO: 0 %
BILIRUB SERPL-MCNC: 0.4 MG/DL (ref 0.2–1.3)
BUN SERPL-MCNC: 8 MG/DL (ref 7–30)
CALCIUM SERPL-MCNC: 8.4 MG/DL (ref 8.5–10.1)
CHLORIDE BLD-SCNC: 111 MMOL/L (ref 94–109)
CO2 SERPL-SCNC: 28 MMOL/L (ref 20–32)
CREAT SERPL-MCNC: 0.57 MG/DL (ref 0.52–1.04)
EOSINOPHIL # BLD AUTO: 0 10E3/UL (ref 0–0.7)
EOSINOPHIL NFR BLD AUTO: 1 %
ERYTHROCYTE [DISTWIDTH] IN BLOOD BY AUTOMATED COUNT: 12.1 % (ref 10–15)
GFR SERPL CREATININE-BSD FRML MDRD: >90 ML/MIN/1.73M2
GLUCOSE BLD-MCNC: 165 MG/DL (ref 70–99)
HCT VFR BLD AUTO: 32.6 % (ref 35–47)
HGB BLD-MCNC: 10.6 G/DL (ref 11.7–15.7)
IMM GRANULOCYTES # BLD: 0 10E3/UL
IMM GRANULOCYTES NFR BLD: 1 %
LDH SERPL L TO P-CCNC: 175 U/L (ref 81–234)
LYMPHOCYTES # BLD AUTO: 0.6 10E3/UL (ref 0.8–5.3)
LYMPHOCYTES NFR BLD AUTO: 30 %
MAGNESIUM SERPL-MCNC: 1.6 MG/DL (ref 1.6–2.3)
MCH RBC QN AUTO: 32.9 PG (ref 26.5–33)
MCHC RBC AUTO-ENTMCNC: 32.5 G/DL (ref 31.5–36.5)
MCV RBC AUTO: 101 FL (ref 78–100)
MONOCYTES # BLD AUTO: 0.1 10E3/UL (ref 0–1.3)
MONOCYTES NFR BLD AUTO: 3 %
NEUTROPHILS # BLD AUTO: 1.4 10E3/UL (ref 1.6–8.3)
NEUTROPHILS NFR BLD AUTO: 65 %
NRBC # BLD AUTO: 0 10E3/UL
NRBC BLD AUTO-RTO: 0 /100
PHOSPHATE SERPL-MCNC: 2.6 MG/DL (ref 2.5–4.5)
PLATELET # BLD AUTO: 142 10E3/UL (ref 150–450)
POTASSIUM BLD-SCNC: 3.8 MMOL/L (ref 3.4–5.3)
PROT SERPL-MCNC: 5.4 G/DL (ref 6.8–8.8)
RBC # BLD AUTO: 3.22 10E6/UL (ref 3.8–5.2)
SODIUM SERPL-SCNC: 144 MMOL/L (ref 133–144)
URATE SERPL-MCNC: 2.3 MG/DL (ref 2.6–6)
WBC # BLD AUTO: 2.1 10E3/UL (ref 4–11)

## 2022-05-26 PROCEDURE — 83735 ASSAY OF MAGNESIUM: CPT | Performed by: INTERNAL MEDICINE

## 2022-05-26 PROCEDURE — 97165 OT EVAL LOW COMPLEX 30 MIN: CPT | Mod: GO

## 2022-05-26 PROCEDURE — 250N000011 HC RX IP 250 OP 636: Performed by: INTERNAL MEDICINE

## 2022-05-26 PROCEDURE — 250N000013 HC RX MED GY IP 250 OP 250 PS 637: Performed by: INTERNAL MEDICINE

## 2022-05-26 PROCEDURE — 250N000013 HC RX MED GY IP 250 OP 250 PS 637: Performed by: PHYSICIAN ASSISTANT

## 2022-05-26 PROCEDURE — 97535 SELF CARE MNGMENT TRAINING: CPT | Mod: GO

## 2022-05-26 PROCEDURE — 82040 ASSAY OF SERUM ALBUMIN: CPT | Performed by: INTERNAL MEDICINE

## 2022-05-26 PROCEDURE — 85025 COMPLETE CBC W/AUTO DIFF WBC: CPT | Performed by: PHYSICIAN ASSISTANT

## 2022-05-26 PROCEDURE — 99233 SBSQ HOSP IP/OBS HIGH 50: CPT | Mod: FS | Performed by: PHYSICIAN ASSISTANT

## 2022-05-26 PROCEDURE — 97110 THERAPEUTIC EXERCISES: CPT | Mod: GO

## 2022-05-26 PROCEDURE — 83615 LACTATE (LD) (LDH) ENZYME: CPT | Performed by: INTERNAL MEDICINE

## 2022-05-26 PROCEDURE — 84550 ASSAY OF BLOOD/URIC ACID: CPT | Performed by: INTERNAL MEDICINE

## 2022-05-26 PROCEDURE — 84100 ASSAY OF PHOSPHORUS: CPT | Performed by: INTERNAL MEDICINE

## 2022-05-26 PROCEDURE — 80053 COMPREHEN METABOLIC PANEL: CPT | Performed by: INTERNAL MEDICINE

## 2022-05-26 PROCEDURE — 99223 1ST HOSP IP/OBS HIGH 75: CPT | Performed by: PHYSICIAN ASSISTANT

## 2022-05-26 PROCEDURE — 206N000001 HC R&B BMT UMMC

## 2022-05-26 RX ORDER — METHOCARBAMOL 500 MG/1
500 TABLET, FILM COATED ORAL 4 TIMES DAILY PRN
Status: DISCONTINUED | OUTPATIENT
Start: 2022-05-26 | End: 2022-05-31 | Stop reason: HOSPADM

## 2022-05-26 RX ORDER — MAGNESIUM SULFATE HEPTAHYDRATE 40 MG/ML
2 INJECTION, SOLUTION INTRAVENOUS ONCE
Status: COMPLETED | OUTPATIENT
Start: 2022-05-26 | End: 2022-05-26

## 2022-05-26 RX ORDER — POTASSIUM CHLORIDE 7.45 MG/ML
10 INJECTION INTRAVENOUS ONCE
Status: COMPLETED | OUTPATIENT
Start: 2022-05-26 | End: 2022-05-26

## 2022-05-26 RX ORDER — SODIUM CHLORIDE 9 MG/ML
INJECTION, SOLUTION INTRAVENOUS CONTINUOUS
Status: ACTIVE | OUTPATIENT
Start: 2022-05-27 | End: 2022-05-27

## 2022-05-26 RX ORDER — POLYETHYLENE GLYCOL 3350 17 G/17G
17 POWDER, FOR SOLUTION ORAL DAILY PRN
Status: DISCONTINUED | OUTPATIENT
Start: 2022-05-26 | End: 2022-05-30

## 2022-05-26 RX ORDER — VENLAFAXINE 75 MG/1
75 TABLET ORAL 3 TIMES DAILY
Status: DISCONTINUED | OUTPATIENT
Start: 2022-05-26 | End: 2022-05-31 | Stop reason: HOSPADM

## 2022-05-26 RX ADMIN — PANTOPRAZOLE SODIUM 40 MG: 40 TABLET, DELAYED RELEASE ORAL at 09:41

## 2022-05-26 RX ADMIN — ONDANSETRON HYDROCHLORIDE 8 MG: 8 TABLET, FILM COATED ORAL at 12:20

## 2022-05-26 RX ADMIN — LEVOTHYROXINE SODIUM 112 MCG: 0.11 TABLET ORAL at 09:42

## 2022-05-26 RX ADMIN — OXYCODONE HYDROCHLORIDE 10 MG: 10 TABLET ORAL at 14:06

## 2022-05-26 RX ADMIN — POTASSIUM CHLORIDE 10 MEQ: 7.46 INJECTION, SOLUTION INTRAVENOUS at 05:45

## 2022-05-26 RX ADMIN — LORAZEPAM 0.5 MG: 0.5 TABLET ORAL at 23:51

## 2022-05-26 RX ADMIN — ONDANSETRON HYDROCHLORIDE 8 MG: 8 TABLET, FILM COATED ORAL at 04:16

## 2022-05-26 RX ADMIN — METOPROLOL SUCCINATE 25 MG: 25 TABLET, EXTENDED RELEASE ORAL at 09:42

## 2022-05-26 RX ADMIN — ACYCLOVIR 800 MG: 800 TABLET ORAL at 19:29

## 2022-05-26 RX ADMIN — METHOCARBAMOL 500 MG: 500 TABLET ORAL at 23:51

## 2022-05-26 RX ADMIN — BUPRENORPHINE AND NALOXONE 1 FILM: 8; 2 FILM, SOLUBLE BUCCAL; SUBLINGUAL at 09:38

## 2022-05-26 RX ADMIN — FLUCONAZOLE 200 MG: 200 TABLET ORAL at 09:41

## 2022-05-26 RX ADMIN — VENLAFAXINE 75 MG: 75 TABLET ORAL at 09:38

## 2022-05-26 RX ADMIN — ALLOPURINOL 300 MG: 300 TABLET ORAL at 09:40

## 2022-05-26 RX ADMIN — ONDANSETRON HYDROCHLORIDE 8 MG: 8 TABLET, FILM COATED ORAL at 19:10

## 2022-05-26 RX ADMIN — RIVAROXABAN 20 MG: 20 TABLET, FILM COATED ORAL at 09:46

## 2022-05-26 RX ADMIN — ACETAMINOPHEN 325MG 650 MG: 325 TABLET ORAL at 12:31

## 2022-05-26 RX ADMIN — LEVOFLOXACIN 250 MG: 250 TABLET, FILM COATED ORAL at 12:20

## 2022-05-26 RX ADMIN — SUCRALFATE 1 G: 1 SUSPENSION ORAL at 21:50

## 2022-05-26 RX ADMIN — VENLAFAXINE 75 MG: 75 TABLET ORAL at 14:59

## 2022-05-26 RX ADMIN — SUCRALFATE 1 G: 1 SUSPENSION ORAL at 17:20

## 2022-05-26 RX ADMIN — AMLODIPINE BESYLATE 5 MG: 5 TABLET ORAL at 09:41

## 2022-05-26 RX ADMIN — Medication 1000 UNITS: at 09:39

## 2022-05-26 RX ADMIN — METHOCARBAMOL 500 MG: 500 TABLET ORAL at 04:16

## 2022-05-26 RX ADMIN — BUPRENORPHINE AND NALOXONE 1 FILM: 8; 2 FILM, SOLUBLE BUCCAL; SUBLINGUAL at 21:50

## 2022-05-26 RX ADMIN — DEXAMETHASONE 10 MG: 2 TABLET ORAL at 09:39

## 2022-05-26 RX ADMIN — MAGNESIUM SULFATE IN WATER 2 G: 40 INJECTION, SOLUTION INTRAVENOUS at 06:44

## 2022-05-26 RX ADMIN — OXYCODONE HYDROCHLORIDE 10 MG: 10 TABLET ORAL at 04:16

## 2022-05-26 RX ADMIN — OXYCODONE HYDROCHLORIDE 10 MG: 10 TABLET ORAL at 19:29

## 2022-05-26 RX ADMIN — ACETAMINOPHEN 325MG 650 MG: 325 TABLET ORAL at 23:51

## 2022-05-26 RX ADMIN — ACETAMINOPHEN 325MG 650 MG: 325 TABLET ORAL at 19:29

## 2022-05-26 RX ADMIN — ACYCLOVIR 800 MG: 800 TABLET ORAL at 09:40

## 2022-05-26 RX ADMIN — BUPRENORPHINE AND NALOXONE 1 FILM: 8; 2 FILM, SOLUBLE BUCCAL; SUBLINGUAL at 14:59

## 2022-05-26 RX ADMIN — Medication 5 ML: at 10:03

## 2022-05-26 RX ADMIN — VENLAFAXINE 75 MG: 75 TABLET ORAL at 19:30

## 2022-05-26 RX ADMIN — SUCRALFATE 1 G: 1 SUSPENSION ORAL at 09:50

## 2022-05-26 ASSESSMENT — ACTIVITIES OF DAILY LIVING (ADL)
ADLS_ACUITY_SCORE: 25
PREVIOUS_RESPONSIBILITIES: MEDICATION MANAGEMENT
ADLS_ACUITY_SCORE: 25

## 2022-05-26 NOTE — PLAN OF CARE
Patient admitted to  for autologous transplant. AOVSS, with HTN, but within call parameters. Patient up with SBA. Received melphalan & tolerated well. Received pre-flush & cryotherapy started prior to melphalan infusion. Post-flush to go until 0440. Cryotherapy to go until 2140. Continue to monitor.    Problem: Plan of Care - These are the overarching goals to be used throughout the patient stay.    Goal: Plan of Care Review/Shift Note  Outcome: Ongoing, Progressing  Goal: Patient-Specific Goal (Individualized)  Outcome: Ongoing, Progressing  Goal: Absence of Hospital-Acquired Illness or Injury  Outcome: Ongoing, Progressing  Intervention: Identify and Manage Fall Risk  Recent Flowsheet Documentation  Taken 5/25/2022 1300 by Nidia Trivedi RN  Safety Promotion/Fall Prevention:    activity supervised    bed alarm on    chair alarm on    clutter free environment maintained    fall prevention program maintained    increased rounding and observation    increase visualization of patient    lighting adjusted    mobility aid in reach    nonskid shoes/slippers when out of bed    patient and family education  Intervention: Prevent and Manage VTE (Venous Thromboembolism) Risk  Recent Flowsheet Documentation  Taken 5/25/2022 1300 by Nidia Trivedi RN  Activity Management:    activity adjusted per tolerance    activity encouraged  Goal: Optimal Comfort and Wellbeing  Outcome: Ongoing, Progressing  Intervention: Monitor Pain and Promote Comfort  Recent Flowsheet Documentation  Taken 5/25/2022 1644 by Nidia Trivedi RN  Pain Management Interventions: medication (see MAR)  Goal: Readiness for Transition of Care  Outcome: Ongoing, Progressing  Intervention: Mutually Develop Transition Plan  Recent Flowsheet Documentation  Taken 5/25/2022 1306 by Nidia Trivedi RN  Equipment Currently Used at Home: shower chair     Problem: Adjustment to Transplant (Stem Cell/Bone Marrow Transplant)  Goal: Optimal Coping with  Transplant  Outcome: Ongoing, Progressing     Problem: Bladder Irritation (Stem Cell/Bone Marrow Transplant)  Goal: Symptom-Free Urinary Elimination  Outcome: Ongoing, Progressing  Intervention: Prevent or Manage Bladder Irritation  Recent Flowsheet Documentation  Taken 5/25/2022 1644 by Nidia Trivedi RN  Pain Management Interventions: medication (see MAR)     Problem: Diarrhea (Stem Cell/Bone Marrow Transplant)  Goal: Diarrhea Symptom Control  Outcome: Ongoing, Progressing     Problem: Fatigue (Stem Cell/Bone Marrow Transplant)  Goal: Improved Activity Tolerance  Outcome: Ongoing, Progressing  Intervention: Promote Improved Energy  Recent Flowsheet Documentation  Taken 5/25/2022 1300 by Nidia Trivedi RN  Activity Management:    activity adjusted per tolerance    activity encouraged     Problem: Hematologic Alteration (Stem Cell/Bone Marrow Transplant)  Goal: Blood Counts Within Acceptable Range  Outcome: Ongoing, Progressing  Intervention: Monitor and Manage Hematologic Symptoms  Recent Flowsheet Documentation  Taken 5/25/2022 1300 by Nidia Trivedi RN  Medication Review/Management: medications reviewed     Problem: Hypersensitivity Reaction (Stem Cell/Bone Marrow Transplant)  Goal: Absence of Hypersensitivity Reaction  Outcome: Ongoing, Progressing     Problem: Infection (Stem Cell/Bone Marrow Transplant)  Goal: Absence of Infection  Outcome: Ongoing, Progressing     Problem: Mucositis (Stem Cell/Bone Marrow Transplant)  Goal: Improved Oral Mucous Membrane Health and Integrity  Outcome: Ongoing, Progressing     Problem: Nausea and Vomiting (Stem Cell/Bone Marrow Transplant)  Goal: Nausea and Vomiting Symptom Relief  Outcome: Ongoing, Progressing     Problem: Nutrition Intake Altered (Stem Cell/Bone Marrow Transplant)  Goal: Optimal Nutrition Intake  Outcome: Ongoing, Progressing   Goal Outcome Evaluation:

## 2022-05-26 NOTE — PHARMACY-ADMISSION MEDICATION HISTORY
Admission Medication History Completed by Pharmacy    See [facility] Admission Navigator for allergy information, preferred outpatient pharmacy, prior to admission medications and immunization status.     Medication History Sources:     Patient and     SureScripts    Changes made to PTA medication list (reason):    Added: None    Deleted: Aspirin 81 mg    Changed: -Oxycodone 10 mg every 6 hrs prn    -Venlafaxine 75 mg tid     Additional Information:    None    Prior to Admission medications    Medication Sig Last Dose Taking? Auth Provider   acetaminophen (TYLENOL) 500 MG tablet Take 500-1,000 mg by mouth every 6 hours as needed for mild pain  Past Month at Unknown time Yes Reported, Patient   acyclovir (ZOVIRAX) 800 MG tablet Take 1 tablet (800 mg) by mouth every 12 hours 5/25/2022 at Unknown time Yes Julio C Guillory MD   albuterol (PROAIR HFA/PROVENTIL HFA/VENTOLIN HFA) 108 (90 Base) MCG/ACT inhaler Inhale 2 puffs into the lungs every 6 hours as needed for shortness of breath / dyspnea  More than a month at Unknown time Yes Reported, Patient   amLODIPine (NORVASC) 5 MG tablet Take 1 tablet (5 mg) by mouth daily 5/25/2022 at Unknown time Yes Stephie Ayers PA-C   atorvastatin (LIPITOR) 40 MG tablet Take 40 mg by mouth daily  Unknown at Unknown time Yes Reported, Patient   buprenorphine HCl-naloxone HCl (SUBOXONE) 8-2 MG per film Place 1 Film under the tongue 3 times daily  5/25/2022 at Unknown time Yes Reported, Patient   cholecalciferol 25 MCG (1000 UT) TABS Take 1,000 Units by mouth daily  5/25/2022 at Unknown time Yes Reported, Patient   fluconazole (DIFLUCAN) 100 MG tablet Take 1 tablet (100 mg) by mouth daily 5/25/2022 at Unknown time Yes Julio C Guillory MD   Fluticasone-Umeclidin-Vilanterol (TRELEGY ELLIPTA) 100-62.5-25 MCG/INH oral inhaler Inhale 1 puff into the lungs daily  5/25/2022 at Unknown time Yes Reported, Patient   guaiFENesin (MUCINEX) 600 MG 12 hr tablet Take 2 tablets (1,200 mg) by  Prostate cancer mouth 2 times daily  Patient taking differently: Take 1,200 mg by mouth 2 times daily as needed Past Week at Unknown time Yes Julio C Guillory MD   levothyroxine (SYNTHROID/LEVOTHROID) 112 MCG tablet Take 112 mcg by mouth daily  5/25/2022 at Unknown time Yes Reported, Patient   methocarbamol (ROBAXIN) 500 MG tablet Take 500 mg by mouth 3 times daily as needed for muscle spasms 5/25/2022 at Unknown time Yes Unknown, Entered By History   metoprolol succinate ER (TOPROL-XL) 25 MG 24 hr tablet Take 25 mg by mouth daily  5/25/2022 at Unknown time Yes Reported, Patient   ondansetron (ZOFRAN) 8 MG tablet Take 1 tablet (8 mg) by mouth every 8 hours as needed for nausea Past Week at Unknown time Yes Julio C Guillory MD   oxyCODONE IR (ROXICODONE) 10 MG tablet Take 10 mg by mouth every 6 hours as needed for severe pain 5/25/2022 at Unknown time Yes Reported, Patient   pantoprazole (PROTONIX) 40 MG EC tablet Take 1 tablet (40 mg) by mouth daily 5/25/2022 at Unknown time Yes Stephie Ayers PA-C   polyethylene glycol (MIRALAX) 17 GM/Dose powder Take 17 g by mouth Past Month at Unknown time Yes Reported, Patient   prochlorperazine (COMPAZINE) 5 MG tablet Take 1 tablet (5 mg) by mouth every 6 hours as needed for nausea or vomiting Past Month at Unknown time Yes Julio C Guillory MD   rivaroxaban ANTICOAGULANT (XARELTO) 20 MG TABS tablet Take 1 tablet (20 mg) by mouth daily (with dinner) 5/25/2022 at Unknown time Yes Shanita Butler APRN CNP   sucralfate (CARAFATE) 1 GM/10ML suspension Take 10 mLs (1 g) by mouth 4 times daily (before meals and nightly) 5/25/2022 at Unknown time Yes Julio C Guillory MD   venlafaxine (EFFEXOR) 75 MG tablet Take 75 mg by mouth 3 times daily 5/25/2022 at Unknown time Yes Shanita Butler APRN CNP       Date completed: 05/26/22    Medication history completed by: Chris Boo, Lyle

## 2022-05-26 NOTE — CONSULTS
"Pain Service Consultation Note  [facility]      Patient Name: Libby Grimaldo  MRN: [id number]   Age: 71 year old  Sex: female  Date: May 26, 2022                                      Reviewed: Yes    Referring Provider:  Shanita Naylor PA-C                                Referring Service:  Heme/onc  Reason for Consultation: \"ongoing oxycodone use for chronic pain on suboxone. Unclear why currently on narcotics. tells me she sees pain OP - open to following while inpatient for BMT\"        Assessment/Recommendations:  Libby Grimaldo is a 71 year old female who has PMH of hypertension, hypothyroidism, multiple myeloma dx in 2018-responded well to chemotherapy, UE and LE neuropathy, chronic pain of neck, lower back, hip on chronic opioid pain management who was admitted on 5/25/22 for BMT.    Pain service was consulted to assist with pain management.    Libby states she had chronic neck pain s/p \"neck surgeries\", back pain, and hip pain for 5-10 years (prior to cancer dx) and has been treated just as long with opioids initiated when she was living in [place].  Since being in [place], she had been followed by [facility] and was on oxycodone 10mg up to 6x/day.  Eventually in the last one year, she was referred to [facility] by her PCP and was put on Suboxone 8mg SL TID and oxycodone 5mg TID which has since increased to oxycodone 10mg TID.  She states that her PTA pain level varies depending on activities.  She states that her pain ranged from 5-6/10.    Today, Libby reports pain that is very chronic in her neck, lower back, hips, and \"nerve pain.\"  Pain level is 5/10.  She feels that her pain is manageable at this time especially now that the oxycodone 10mg was increased to 4x/day.  We discussed keeping the pain regimen as is but can be adjusted if needed given that her BMT procedure is planned for tomorrow, 5/27/22.  We also discussed alternatives to opioids, " "indications, risks and benefits.     Plan:   1. Continue PTA dose of Suboxone 8mg SL TID. (she states it's for pain)  2. Continue oxycodone 10mg PO Q 6 hours PRN.      -if needed can consider increasing to Q 3-4 hours PRN if pain increases after BMT.   -will return to [facility] for continued management, oxycodone 10mg TID.  3. Consider increasing Robaxin to 500mg PO Q 6 hours PRN. (note PTA was on TID and states it's very helpful)  4. Can consider lidocaine patches, menthol patches.  5. After BMT, can consider using Lyrica 25mg PO at bedtime x 1-2 days then if tolerating can increase to BID or wait until outpatient setting to help with chronic neuropathic pain-not recently addressed.  (she states that she tried gabapentin years ago and it made her dizzy?)  6. Bowel regimen to prevent opioid induced constipation. Last BM on 5/25/22.  At baseline, on occasion, has used Miralax at home.    Thank you for the opportunity to participate in the care of Libby Grimaldo  Pain Service will sign off.    Discussed with attending anesthesiologist  Primary Service Contacted with Recommendations? Yes    Thank you for the opportunity to participate in the care of Libby Grimaldo    Contact information:  Mon - Friday 8 AM - 3 PM: [phone]  After Hours, Weekends and Holidays: [phone]  Please Page via Care Thread (Search \"Pain\")    Alejandra Hussein PA-C  5/26/2022      Chief Complaint:  Multi-level: neck, lower back, hip      History of Present Illness:  Libby Grimaldo is a 71 year old old female who has long standing h/o chronic neck, lower back, hip pain and has been on chronic opioids. Currently, she is here for BMT on 5/27/22.  Currently, she has no change to chronic pain as of today.  Pain is 5/10 and achy with nerve type pain in hands and feet.  She feels pain is managed at this point.          Per [place]  review pulled from system on 05/26/22.     05/10/2022  1   04/26/2022  Oxycodone Hcl (Ir) 10 MG Tab    45.00  15 " "  04/26/2022  1   04/26/2022  Buprenorphine-Nalox 8-2mg Film    90.00  30   04/26/2022  1   04/26/2022  Oxycodone Hcl (Ir) 10 MG Tab    45.00  15   04/11/2022  1   03/29/2022  Oxycodone Hcl (Ir) 10 MG Tab    42.00  14   03/29/2022  1   03/29/2022  Buprenorphine-Nalox 8-2mg Film    84.00  28         Past Medical History:  Past Medical History:   Diagnosis Date     Cervical radiculopathy      COPD (chronic obstructive pulmonary disease) (H)      Double vision      Febrile seizure (H)     Per patient. Once while a toddler, once while in highschool, and once while in college.     Floaters      Graves disease      HLD (hyperlipidemia)      HTN (hypertension)      IPF (idiopathic pulmonary fibrosis) (H)      Lumbar radiculopathy      Multiple myeloma in relapse (H)      Osteoporosis      Pneumonia     Per patient. Complicated by sepsis.     Recurrent UTI     Per patient. Has required prophylactic antibiotcs.     Vitamin B12 deficiency (non anemic)          Family History:    Family History   Problem Relation Age of Onset     Heart Disease Mother      Cancer Mother         \"Around lungs\" - she explicitly said it was *not* lung cancer.     Diabetes Father      Heart Disease Father          Social History:  Social History     Tobacco Use     Smoking status: Former Smoker     Packs/day: 1.00     Years: 40.00     Pack years: 40.00     Smokeless tobacco: Former User     Quit date: 2010   Substance Use Topics     Alcohol use: Not Currently               Review of Systems:  Complete ROS reviewed. Unless otherwise noted, all other systems found to be negative.        Laboratory Results:  Recent Labs   Lab Test 05/26/22  0409 05/25/22  1411 01/24/22  0758 01/24/22  0645   INR  --  1.28*   < >  --    * 143*   < >  --    PTT  --  30   < >  --    BUN 8 9   < >  --    CREATININE  --   --   --  0.75    < > = values in this interval not displayed.           Allergies:  Allergies   Allergen Reactions     Bupropion      Other " "reaction(s): Seizures         Pain Medications:  Pain Medications/Prescriber: [facility]    Current Pain Related Medications:  Medications related to Pain Management (From now, onward)    Start     Dose/Rate Route Frequency Ordered Stop    05/27/22 1100  acetaminophen (TYLENOL) tablet 650 mg         650 mg Oral ONCE 05/25/22 1340      05/27/22 1100  diphenhydrAMINE (BENADRYL) capsule 25 mg         25 mg Oral ONCE 05/25/22 1340      05/27/22 1000  meperidine (DEMEROL) injection 25-50 mg         25-50 mg Intravenous ONCE PRN 05/25/22 1340 05/28/22 0959    05/25/22 1545  oxyCODONE IR (ROXICODONE) tablet 10 mg         10 mg Oral EVERY 6 HOURS PRN 05/25/22 1545      05/25/22 1400  buprenorphine HCl-naloxone HCl (SUBOXONE) 8-2 MG per film 1 Film         1 Film Sublingual 3 TIMES DAILY 05/25/22 1349      05/25/22 1348  diphenhydrAMINE (BENADRYL) capsule 25-50 mg         25-50 mg Oral EVERY 6 HOURS PRN 05/25/22 1349      05/25/22 1348  acetaminophen (TYLENOL) tablet 650 mg         650 mg Oral EVERY 6 HOURS PRN 05/25/22 1349      05/25/22 1346  LORazepam (ATIVAN) injection 0.5-1 mg        \"Or\" Linked Group Details    0.5-1 mg Intravenous EVERY 4 HOURS PRN 05/25/22 1349      05/25/22 1346  LORazepam (ATIVAN) tablet 0.5-1 mg        \"Or\" Linked Group Details    0.5-1 mg Oral EVERY 4 HOURS PRN 05/25/22 1349      05/25/22 1346  acetaminophen (TYLENOL) tablet 325-650 mg         325-650 mg Oral EVERY 4 HOURS PRN 05/25/22 1349      05/25/22 1346  acetaminophen (TYLENOL) tablet 325-650 mg         325-650 mg Oral EVERY 4 HOURS PRN 05/25/22 1349      05/25/22 1345  methocarbamol (ROBAXIN) tablet 500 mg         500 mg Oral 3 TIMES DAILY PRN 05/25/22 1349              Physical Exam:  Vitals: BP (!) 160/89   Pulse 90   Temp 97.9  F (36.6  C) (Oral)   Resp 18   Ht 1.57 m (5' 1.81\")   Wt 55.8 kg (123 lb 1.6 oz)   SpO2 97%   BMI 22.65 kg/m      Physical Exam:     CONSTITUTIONAL/GENERAL APPEARANCE:  NAD.  EYES: EOMI, " sclera anicteric, PERRLA  ENT/NECK: atraumatic, lips and oral mucous membranes dry  RESPIRATORY: non-labored breathing on room air. No cough, wheeze  CV: HR within normal limits  ABDOMEN: Soft, non-tender, non-distended  MUSCULOSKELETAL/BACK/SPINE/EXTREMITIES: Moves all extremities purposefully.  No edema or obvious joint deformities   NEURO: Alert and Oriented x3. Answers questions appropriately  SKIN/VASCULAR EXAM:  No jaundice, no visible rashes or lesions      Total time spent 80 minutes with greater than 50% in consultation, education and coordination of care.  Also discussed with DOROTA and MD.    Please Page the Pain Team Via [facility]

## 2022-05-26 NOTE — PROGRESS NOTES
"   05/26/22 1504   Quick Adds   Type of Visit Initial Occupational Therapy Evaluation   Living Environment   People in Home spouse   Current Living Arrangements house  ([facility])   Home Accessibility stairs to enter home   Number of Stairs, Main Entrance 4   Stair Railings, Main Entrance railing on left side (ascending)   Number of Stairs, Within Home, Primary seven   Stair Railings, Within Home, Primary railing on right side (ascending)   Transportation Anticipated family or friend will provide   Living Environment Comments tub shower, grab bars in shower and by toilet.  doesn't need to go in basement, spouse completes laundry   Self-Care   Usual Activity Tolerance moderate   Current Activity Tolerance good   Regular Exercise Yes   Activity/Exercise Type walking  (PT sessions)   Exercise Amount/Frequency 2 times/wk   Equipment Currently Used at Home grab bar, toilet;grab bar, tub/shower;cane, quad;shower chair   Fall history within last six months no   Number of times patient has fallen within last six months 0   Activity/Exercise/Self-Care Comment indp.   Instrumental Activities of Daily Living (IADL)   Previous Responsibilities medication management  (with a med box)   IADL Comments spouse takes care of most IADLs   General Information   Onset of Illness/Injury or Date of Surgery 05/25/22   Referring Physician Shanita Naylor PA-C   Patient/Family Therapy Goal Statement (OT) return home safely, progress activity and independence   Additional Occupational Profile Info/Pertinent History of Current Problem per chart: \"Libby Grimaldo is a 71 year old female who has PMH of hypertension, hypothyroidism, multiple myeloma dx in 2018-responded well to chemotherapy, UE and LE neuropathy, chronic pain of neck, lower back, hip on chronic opioid pain management who was admitted on 5/25/22 for BMT. 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\"   Existing Precautions/Restrictions fall;immunosuppressed "   Limitations/Impairments   (pain)   Left Upper Extremity (Weight-bearing Status) weight-bearing as tolerated (WBAT)   Right Upper Extremity (Weight-bearing Status) weight-bearing as tolerated (WBAT)   Left Lower Extremity (Weight-bearing Status) weight-bearing as tolerated (WBAT)   Right Lower Extremity (Weight-bearing Status) weight-bearing as tolerated (WBAT)   Cognitive Status Examination   Orientation Status orientation to person, place and time   Affect/Mental Status (Cognitive) WFL   Follows Commands WFL   Visual Perception   Visual Impairment/Limitations diplopia  (c/o double vision intermittently)   Sensory   Sensory Comments BUE/BLE neuropathy at baseline. reports worse in hands. but does not impact daily activities.   Pain Assessment   Patient Currently in Pain No   Range of Motion Comprehensive   Comment, General Range of Motion BUE/BLE WFL   Strength Comprehensive (MMT)   Comment, General Manual Muscle Testing (MMT) Assessment WFL   Coordination   Fine Motor Coordination limited due to neuropathy, however, able to complete tasks with increased time   Bed Mobility   Comment (Bed Mobility) mod I via log roll   Transfers   Transfer Comments Indp   Clinical Impression   Criteria for Skilled Therapeutic Interventions Met (OT) Yes, treatment indicated   OT Diagnosis OT order for BMT admission   Influenced by the following impairments strength, fatigue, activity tolerance   OT Problem List-Impairments impacting ADL problems related to;activity tolerance impaired;strength   Assessment of Occupational Performance 1-3 Performance Deficits   Identified Performance Deficits functional mobility, home management, social skills   Planned Therapy Interventions (OT) ADL retraining;strengthening;home program guidelines;progressive activity/exercise;transfer training;risk factor education   Clinical Decision Making Complexity (OT) low complexity   Predicted Duration of Therapy 5 days   Anticipated Equipment Needs Upon  Discharge (OT)   (TBD)   Risk & Benefits of therapy have been explained evaluation/treatment results reviewed;care plan/treatment goals reviewed;risks/benefits reviewed;current/potential barriers reviewed;participants voiced agreement with care plan;participants included;patient;spouse/significant other   OT Discharge Planning   OT Discharge Recommendation (DC Rec) home with assist   OT Rationale for DC Rec Anticipate pt will be safe to return home when medically appropriate with assist as needed from family and continuation of home program.   OT Brief overview of current status up IND, encourage walking in hallway   Total Evaluation Time (Minutes)   Total Evaluation Time (Minutes) 5   OT Goals   Therapy Frequency (OT) 3 times/wk   OT Predicted Duration/Target Date for Goal Attainment 05/31/22   OT Goals OT Goal 1;Aerobic Activity;OT Goal 2;OT Goal 3   OT: Perform aerobic activity with stable cardiovascular response continuous activity;30 minutes   OT: Goal 1 pt will demonstrate safety with tb/shower transfer with SBA   OT: Goal 2 pt will demonstrate safety with ascending/descending stairs with SBA   OT: Goal 3 pt will independently verbalize understanding of lab values and implications on exercise

## 2022-05-26 NOTE — PROGRESS NOTES
BMT CLINICAL SOCIAL WORK NOTE:    Focus: Supportive Counseling    Data: Pt is a 71 year old female, day +1 for her auto BMT.    Interventions: Clinical  (CSW) met with the Pt and her  Tres to assess coping, provide supportive counseling and assist with resources as needed. Pt shared that she is doing well, she is happy to be moving forward with her BMT after a long break. She notes that she is feeling strong, but does still have some fatigue. The pt and her  were having a conversation regarding if the pt should stay  IP through engraftment vs going home on day +1. We processed through her thoughts of having to come to clinic daily vs being able to be in the hospital longer to eliminate the number of days. The pt noted that she does worry how she will feels and if her fatigue levels get worse she worries about how she will handle getting to clinic. Tres notes he feels comfortable being the caregiver whenever she is discharged. CSW encouraged the pt to think things through and see how she does have her transplant tomorrow. Pt agreed to this plan and then noted maybe she would plan to stay until next week and see how she is feeling at that point.  CSW provided empathic listening, validation of concerns, and encouragement. CSW encouraged Pt to contact CSW for support, questions and/or resources.     Assessment: Pt presented as friendly and open.  Pt appears to be coping well at this time. Pt continues to be supported by her  and sister.     Plan: CSW will continue to provide supportive counseling and assistance with resources as needed. CSW will continue to collaborate with multidisciplinary team regarding Pt's plan of care.       ANDREW Bangura, [facility]  Pager: [phone]  Phone: [phone]

## 2022-05-26 NOTE — PLAN OF CARE
2312-7365: Hypertensive through the night. 150-160s systolic. Asymptomatic. Paged moonlighter last evening & no intervention at the time. C/o some nausea & chronic pain/fatigue. PRN compazine given x1, robaxin x2 & oxycodone x1. Flush stopped at 0440 this morning. Received 10mEq of potassium last evening & again receiving potassium this morning. Also will get mag replaced. Both rechecks with next AM labs. Voiding. Still need Cdiff sample per admission protocol. Sleeping well in between cares overnight. Plan for rest day today. Continue to follow plan of care.     Problem: Plan of Care - These are the overarching goals to be used throughout the patient stay.    Goal: Plan of Care Review/Shift Note  Description: The Plan of Care Review/Shift note should be completed every shift.  The Outcome Evaluation is a brief statement about your assessment that the patient is improving, declining, or no change.  This information will be displayed automatically on your shift note.  Outcome: Ongoing, Progressing     Problem: Plan of Care - These are the overarching goals to be used throughout the patient stay.    Goal: Optimal Comfort and Wellbeing  Outcome: Ongoing, Progressing  Intervention: Monitor Pain and Promote Comfort  Recent Flowsheet Documentation  Taken 5/26/2022 0416 by Sarahy Leung, RN  Pain Management Interventions: medication (see MAR)  Taken 5/25/2022 2300 by Sarahy Leung, RN  Pain Management Interventions: rest  Taken 5/25/2022 2057 by Sarahy Leung, RN  Pain Management Interventions: medication (see MAR)     Problem: Fatigue (Stem Cell/Bone Marrow Transplant)  Goal: Improved Activity Tolerance  Outcome: Ongoing, Progressing  Intervention: Promote Improved Energy  Recent Flowsheet Documentation  Taken 5/25/2022 2100 by Sarahy Leung, RN  Activity Management: activity adjusted per tolerance     Problem: Hematologic Alteration (Stem Cell/Bone Marrow Transplant)  Goal: Blood Counts Within  Acceptable Range  Outcome: Ongoing, Progressing  Intervention: Monitor and Manage Hematologic Symptoms  Recent Flowsheet Documentation  Taken 5/25/2022 2100 by Sarahy Leung, RN  Medication Review/Management: medications reviewed   Goal Outcome Evaluation:

## 2022-05-26 NOTE — PROVIDER NOTIFICATION
"Smith paged, \"5C room 4. MONIQUE Griamldo /58. Elevated since admission today. Asymptomatic. Also, can you place admit Cdiff order? Need to rule/out all new admits regardless of symptoms.\"  "

## 2022-05-26 NOTE — PROGRESS NOTES
"  BMT Progress note  BMT Physician: Dr. 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     INTERIM HISTORY: tolerated melphalan well.  Has fatigue.  Had some pain o/n and took oxy with relief.  Denies n/v/d this AM.  No new complaints    REVIEW OF SYSTEMS: Otherwise unremarkable other than what is noted in the Interim History.     PHYSICAL EXAM:   Vitals:  BP (!) 160/89   Pulse 90   Temp 97.9  F (36.6  C) (Oral)   Resp 18   Ht 1.57 m (5' 1.81\")   Wt 55.8 kg (123 lb 1.6 oz)   SpO2 97%   BMI 22.65 kg/m      General Appearance: NAD  HEENT: large pupils; ERRLA; sclera anicteric. Moist mucus membranes, no ulcerations.  CV: RRR. no murmur or rub.   RESP: CTA bilaterally; no rales or wheezes.   GI: thin; +BS, soft, nontender, nondistended.   EXT: No edema. No cyanosis/clubbing.   SKIN: no lesions or rash  NEURO: A&O x3   PSYCH: Appropriate affect   VASCULAR ACCESS: R port, not accessed; PICC c/d/i    ROUTINE LABS:    Lab Results   Component Value Date    WBC 2.1 (L) 05/26/2022    ANEU 5.3 02/25/2022    HGB 10.6 (L) 05/26/2022    HCT 32.6 (L) 05/26/2022     (L) 05/26/2022     05/26/2022    POTASSIUM 3.8 05/26/2022    CHLORIDE 111 (H) 05/26/2022    CO2 28 05/26/2022     (H) 05/26/2022    BUN 8 05/26/2022    CR 0.57 05/26/2022    MAG 1.6 05/26/2022    INR 1.28 (H) 05/25/2022          ASSESSMENT AND PLAN:   Libby Grimaldo is a 71 year old female, 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      BMT/IEC PROTOCOL for 2016-35  - Chemo protocol: HD Melphalan 140mg/m2  Day -1: rest day, 5/26  Day 0: Cell dose: 3.94x10^6 (s/p cytoxan chemo priming 2/1/2022 and subsequent stem cell collection).   - Restaging plan: per protocol     HEME/COAG  - Risk of cytopenias due to chemotherapy and radiation  - Transfusion parameters: hemoglobin <7g/dL, platelets <10k  - Relevant thrombosis or bleeding " history:   2/15: new non-occlusive LUE DVT.  - on xarelto 20mg daily. Given line associated DVT, consider stopping xeralto when plts <50k and not resuming unless KARIS score necessitates anticoagulation.      IMMUNOCOMPROMISED  - Relevant infection history: Recurrent PNA and UTIs   - Vaccination status: Influenza vaccination after day +60, COVID vaccination after day +100, followed by remaining vaccinations at 12, 14, 24, and 26 months.  - Prophylaxis plan: ACV 800mg PO BID, levaquin 250mg daily, fungal prophy: fluconazole 200mg po daily.   - Active infections: None     CARDIOVASCULAR  - Risk of cardiomyopathy:  Baseline EF 56%  - known hyperlipidemia- hold crestor until day +60 post transplant.   Hypertension: continue norvasc 5mg PO daily.   History of Afib - now in NSR. On Metoprolol 25mg PO daily.      RESPIRATORY  - hx of COPD, continue breo-ellipa inhaler   - monitor vitals while inpatient.      GI/NUTRITION  - Ulcer prophylaxis: protonix 40mg daily.   - Risk of nausea/vomiting due to chemo/radiation: prophy dex and zofran. Prn ativan and compazine available.  - Risk of malnutrition: moderate given systemic chemotherapy.      RENAL/ELECTROLYTES/  - Risk of renal injury: IV hydration 150cc/hr melphalan flush.   - Electrolyte management: replace per sliding scale     DIABETES/ENDOCRINE  - Risk of steroid-induced hyperglyemia: Monitor BG, sliding scale if needed     MUSCULOSKELETAL/FRAILTY  - Baseline Frailty Score: 4  - Patient with substantial risk of sarcopenia  - Daily PT/OT as needed while inpatient  - Cancer Rehab as needed outpatient     SYMPTOM MANAGEMENT  - Nausea from chemo/radiation: Prochlorperazine, ondansetron, lorazepam.  - # Pain Assessment:  - Libby is experiencing pain due to chronic pain. Pain management was discussed with Libby and her spouse and the plan was created in a collaborative fashion.  Libby's response to the current recommendations: engaged  - Continue suboxone and prn oxycodone.    - Pain medicine consult to help manage pain while inpatient and make plan regarding using narcotic with suboxone vs holding.     SOCIAL DETERMINANTS  - Caregiver: Tres. Plan to remain admitted through engraftment, although will discuss this with pt today      Sarita Jimenez PA-c  Pager [phone]  5/26/2022  9:18 AM

## 2022-05-26 NOTE — PROGRESS NOTES
"CLINICAL NUTRITION SERVICES - ASSESSMENT NOTE     Nutrition Prescription    RECOMMENDATIONS FOR MDs/PROVIDERS TO ORDER:  Encourage oral intake     Malnutrition Status:    Moderate malnutrition in the context of acute on chronic illness     Recommendations already ordered by Registered Dietitian (RD):  Ordered ensure enlive     Future/Additional Recommendations:  Monitor appetite, tolerance of oral intake, use of supplements   Monitor ability to maintain weight      REASON FOR ASSESSMENT  Libby Grimaldo is a/an 71 year old female assessed by the dietitian for Admission Nutrition Risk Screen for positive (weight loss 14-24 lbs and poor appetite)     CLINICAL HISTORY   High risk (+1q) IgG lambda light chain multiple myeloma.  She has already collected stem cells February of this year but had her transplant delayed due to multiple complications including urinary tract infection and elevated LFTs of unclear etiology.  She presents for hospital admission for stem cell transplant.    NUTRITION HISTORY  Libby reported her appetite is good today, she was eating a sausage egg mcmuffin. She reported appetite at home was up and down due to intermittent nausea, food not sounding good. She was able to consume soups, cream and broth based but avoided acidic soups such as tomato.     CURRENT NUTRITION ORDERS  Diet: High Kcal/High Protein + supplements Between meals   Intake/Tolerance: No intakes documented     LABS  Alkaline Phosphatase 155 (H)    MEDICATIONS  Suboxone  Decadron  Levothyroxine  Magnesium Sulfate  Carafate  Cholecalciferol    ANTHROPOMETRICS  Height: 157 cm (5' 1.811\")  Most Recent Weight: 55.8 kg (123 lb 1.6 oz)    IBW: 47.7 kg  BMI: Normal BMI  Weight History:   Wt Readings from Last 15 Encounters:   05/26/22 55.8 kg (123 lb 1.6 oz)   05/19/22 55.8 kg (123 lb)   05/17/22 54.7 kg (120 lb 9.6 oz)   04/11/22 56.6 kg (124 lb 12.8 oz)   03/28/22 56.2 kg (124 lb)   03/21/22 58.7 kg (129 lb 4.8 oz)   02/28/22 57.6 kg (127 " lb)   02/26/22 59.7 kg (131 lb 9.6 oz)   02/09/22 62.7 kg (138 lb 4.8 oz)   02/08/22 62.6 kg (138 lb)   02/07/22 63.6 kg (140 lb 3.2 oz)   02/06/22 64.5 kg (142 lb 3.2 oz)   02/05/22 64.9 kg (143 lb)   02/04/22 68.5 kg (151 lb)   02/03/22 65.9 kg (145 lb 4.8 oz)   12% weight loss in 3 months     Dosing Weight: 55.8 kg    ASSESSED NUTRITION NEEDS  Estimated Energy Needs: 9279-7761 kcals/day (30 - 35 kcals/kg )  Justification: Maintenance  Estimated Protein Needs: 73-84+ grams protein/day (1.3 - 1.5 grams of pro/kg)  Justification: Increased needs  Estimated Fluid Needs: 1605-6064 mL/day (1 mL/kcal)   Justification: Maintenance    PHYSICAL FINDINGS  See malnutrition section below.    MALNUTRITION  % Intake: < 75% for >/= 1 month (moderate)  % Weight Loss: > 7.5% in 3 months (severe)  Subcutaneous Fat Loss: Facial region:  Mild-Moderate, visual only as patient was eating   Muscle Loss: Unable to assess, deferred as patient was eating   Fluid Accumulation/Edema: None noted  Malnutrition Diagnosis: Moderate malnutrition in the context of acute on chronic illness     NUTRITION DIAGNOSIS  Inadequate oral intake related to intermittent nausea and decreased appetite as evidenced by patient report and 12% weight loss in 3 months      INTERVENTIONS  Implementation  Nutrition Education: RD role in care   Medical food supplement therapy - ordered ensure enlive    Goals  Patient to consume % of nutritionally adequate meal trays TID, or the equivalent with supplements/snacks.     Monitoring/Evaluation  Progress toward goals will be monitored and evaluated per protocol.    Sabrina Yancey RD, LD  5C/BMT pager: [phone]

## 2022-05-26 NOTE — PROGRESS NOTES
Chemo drug: Melphalan  Tolerated: Well, no complaints.  Intervention: Pre-medicated with anti-emetics & completed cryotherapy pre-&continuing post-melphalan until 9:40pm. Post-flush until 04:40am.  Response: Tolerated well, no nausea.  Plan: Post-melphalan flush until 04:40am.

## 2022-05-26 NOTE — PROGRESS NOTES
Patient admitted to: 5C  Admitted from: Home  Arrived by: Private car  Reason for admission:   Patient accompanied by: , Jerrica  Belongings: In patient room.  Teaching: Oriented to unit routine & plan of care.  Skin double check completed by: Sarahy Leung RN    Bruises noted bilaterally on forearms. No other skin concerns noted.

## 2022-05-27 ENCOUNTER — APPOINTMENT (OUTPATIENT)
Dept: OCCUPATIONAL THERAPY | Facility: CLINIC | Age: 72
DRG: 016 | End: 2022-05-27
Attending: INTERNAL MEDICINE
Payer: MEDICARE

## 2022-05-27 LAB
ANION GAP SERPL CALCULATED.3IONS-SCNC: 5 MMOL/L (ref 3–14)
BASOPHILS # BLD AUTO: 0 10E3/UL (ref 0–0.2)
BASOPHILS NFR BLD AUTO: 0 %
BILL ONLY STEM CELL INFUSION: NORMAL
BUN SERPL-MCNC: 20 MG/DL (ref 7–30)
CALCIUM SERPL-MCNC: 8.6 MG/DL (ref 8.5–10.1)
CHLORIDE BLD-SCNC: 110 MMOL/L (ref 94–109)
CO2 SERPL-SCNC: 29 MMOL/L (ref 20–32)
CREAT SERPL-MCNC: 0.66 MG/DL (ref 0.52–1.04)
EOSINOPHIL # BLD AUTO: 0 10E3/UL (ref 0–0.7)
EOSINOPHIL NFR BLD AUTO: 0 %
ERYTHROCYTE [DISTWIDTH] IN BLOOD BY AUTOMATED COUNT: 12.2 % (ref 10–15)
GFR SERPL CREATININE-BSD FRML MDRD: >90 ML/MIN/1.73M2
GLUCOSE BLD-MCNC: 119 MG/DL (ref 70–99)
HCT VFR BLD AUTO: 31.1 % (ref 35–47)
HGB BLD-MCNC: 10.2 G/DL (ref 11.7–15.7)
IMM GRANULOCYTES # BLD: 0 10E3/UL
IMM GRANULOCYTES NFR BLD: 0 %
LYMPHOCYTES # BLD AUTO: 0.1 10E3/UL (ref 0.8–5.3)
LYMPHOCYTES NFR BLD AUTO: 2 %
MAGNESIUM SERPL-MCNC: 2.3 MG/DL (ref 1.6–2.3)
MCH RBC QN AUTO: 32.8 PG (ref 26.5–33)
MCHC RBC AUTO-ENTMCNC: 32.8 G/DL (ref 31.5–36.5)
MCV RBC AUTO: 100 FL (ref 78–100)
MONOCYTES # BLD AUTO: 0.2 10E3/UL (ref 0–1.3)
MONOCYTES NFR BLD AUTO: 4 %
NEUTROPHILS # BLD AUTO: 5.7 10E3/UL (ref 1.6–8.3)
NEUTROPHILS NFR BLD AUTO: 94 %
NRBC # BLD AUTO: 0 10E3/UL
NRBC BLD AUTO-RTO: 0 /100
PHOSPHATE SERPL-MCNC: 3.8 MG/DL (ref 2.5–4.5)
PLATELET # BLD AUTO: 141 10E3/UL (ref 150–450)
POTASSIUM BLD-SCNC: 4.1 MMOL/L (ref 3.4–5.3)
RBC # BLD AUTO: 3.11 10E6/UL (ref 3.8–5.2)
SODIUM SERPL-SCNC: 144 MMOL/L (ref 133–144)
WBC # BLD AUTO: 6 10E3/UL (ref 4–11)

## 2022-05-27 PROCEDURE — 250N000013 HC RX MED GY IP 250 OP 250 PS 637: Performed by: INTERNAL MEDICINE

## 2022-05-27 PROCEDURE — 250N000011 HC RX IP 250 OP 636: Performed by: INTERNAL MEDICINE

## 2022-05-27 PROCEDURE — 84100 ASSAY OF PHOSPHORUS: CPT | Performed by: INTERNAL MEDICINE

## 2022-05-27 PROCEDURE — 30243Y0 TRANSFUSION OF AUTOLOGOUS HEMATOPOIETIC STEM CELLS INTO CENTRAL VEIN, PERCUTANEOUS APPROACH: ICD-10-PCS | Performed by: INTERNAL MEDICINE

## 2022-05-27 PROCEDURE — 999N000022 HC STATISTIC AUTOLOGOUS BM-INITIAL INFUSION

## 2022-05-27 PROCEDURE — 250N000011 HC RX IP 250 OP 636: Performed by: PHYSICIAN ASSISTANT

## 2022-05-27 PROCEDURE — 258N000003 HC RX IP 258 OP 636: Performed by: PHYSICIAN ASSISTANT

## 2022-05-27 PROCEDURE — 206N000001 HC R&B BMT UMMC

## 2022-05-27 PROCEDURE — 82310 ASSAY OF CALCIUM: CPT | Performed by: PHYSICIAN ASSISTANT

## 2022-05-27 PROCEDURE — 38241 TRANSPLT AUTOL HCT/DONOR: CPT | Performed by: INTERNAL MEDICINE

## 2022-05-27 PROCEDURE — 85025 COMPLETE CBC W/AUTO DIFF WBC: CPT | Performed by: PHYSICIAN ASSISTANT

## 2022-05-27 PROCEDURE — 83735 ASSAY OF MAGNESIUM: CPT | Performed by: PHYSICIAN ASSISTANT

## 2022-05-27 PROCEDURE — 250N000013 HC RX MED GY IP 250 OP 250 PS 637: Performed by: PHYSICIAN ASSISTANT

## 2022-05-27 PROCEDURE — 38208 THAW PRESERVED STEM CELLS: CPT | Performed by: INTERNAL MEDICINE

## 2022-05-27 PROCEDURE — 97530 THERAPEUTIC ACTIVITIES: CPT | Mod: GO

## 2022-05-27 RX ORDER — AMLODIPINE BESYLATE 5 MG/1
10 TABLET ORAL DAILY
Status: DISCONTINUED | OUTPATIENT
Start: 2022-05-28 | End: 2022-05-31 | Stop reason: HOSPADM

## 2022-05-27 RX ORDER — OXYCODONE HYDROCHLORIDE 10 MG/1
10 TABLET ORAL 3 TIMES DAILY PRN
Status: DISCONTINUED | OUTPATIENT
Start: 2022-05-27 | End: 2022-05-28

## 2022-05-27 RX ORDER — HYDRALAZINE HYDROCHLORIDE 20 MG/ML
10 INJECTION INTRAMUSCULAR; INTRAVENOUS EVERY 6 HOURS PRN
Status: DISCONTINUED | OUTPATIENT
Start: 2022-05-27 | End: 2022-05-31 | Stop reason: HOSPADM

## 2022-05-27 RX ADMIN — AMLODIPINE BESYLATE 5 MG: 5 TABLET ORAL at 07:51

## 2022-05-27 RX ADMIN — OXYCODONE HYDROCHLORIDE 10 MG: 10 TABLET ORAL at 22:26

## 2022-05-27 RX ADMIN — BUPRENORPHINE AND NALOXONE 1 FILM: 8; 2 FILM, SOLUBLE BUCCAL; SUBLINGUAL at 07:51

## 2022-05-27 RX ADMIN — SUCRALFATE 1 G: 1 SUSPENSION ORAL at 11:13

## 2022-05-27 RX ADMIN — PANTOPRAZOLE SODIUM 40 MG: 40 TABLET, DELAYED RELEASE ORAL at 07:50

## 2022-05-27 RX ADMIN — Medication 1000 UNITS: at 07:50

## 2022-05-27 RX ADMIN — ONDANSETRON HYDROCHLORIDE 8 MG: 8 TABLET, FILM COATED ORAL at 11:13

## 2022-05-27 RX ADMIN — ONDANSETRON HYDROCHLORIDE 8 MG: 8 TABLET, FILM COATED ORAL at 04:55

## 2022-05-27 RX ADMIN — PROCHLORPERAZINE EDISYLATE 5 MG: 5 INJECTION INTRAMUSCULAR; INTRAVENOUS at 05:15

## 2022-05-27 RX ADMIN — ACYCLOVIR 800 MG: 800 TABLET ORAL at 07:51

## 2022-05-27 RX ADMIN — SODIUM CHLORIDE: 9 INJECTION, SOLUTION INTRAVENOUS at 16:34

## 2022-05-27 RX ADMIN — BUPRENORPHINE AND NALOXONE 1 FILM: 8; 2 FILM, SOLUBLE BUCCAL; SUBLINGUAL at 16:28

## 2022-05-27 RX ADMIN — ACYCLOVIR 800 MG: 800 TABLET ORAL at 21:20

## 2022-05-27 RX ADMIN — LEVOTHYROXINE SODIUM 112 MCG: 0.11 TABLET ORAL at 07:51

## 2022-05-27 RX ADMIN — OXYCODONE HYDROCHLORIDE 10 MG: 10 TABLET ORAL at 01:27

## 2022-05-27 RX ADMIN — SODIUM CHLORIDE: 9 INJECTION, SOLUTION INTRAVENOUS at 05:02

## 2022-05-27 RX ADMIN — BUPRENORPHINE AND NALOXONE 1 FILM: 8; 2 FILM, SOLUBLE BUCCAL; SUBLINGUAL at 21:20

## 2022-05-27 RX ADMIN — METOPROLOL SUCCINATE 25 MG: 25 TABLET, EXTENDED RELEASE ORAL at 07:51

## 2022-05-27 RX ADMIN — LEVOFLOXACIN 250 MG: 250 TABLET, FILM COATED ORAL at 10:15

## 2022-05-27 RX ADMIN — SUCRALFATE 1 G: 1 SUSPENSION ORAL at 16:28

## 2022-05-27 RX ADMIN — DIPHENHYDRAMINE HYDROCHLORIDE 25 MG: 25 CAPSULE ORAL at 10:15

## 2022-05-27 RX ADMIN — SUCRALFATE 1 G: 1 SUSPENSION ORAL at 21:20

## 2022-05-27 RX ADMIN — DEXAMETHASONE 8 MG: 4 TABLET ORAL at 07:50

## 2022-05-27 RX ADMIN — ACETAMINOPHEN 325MG 650 MG: 325 TABLET ORAL at 10:15

## 2022-05-27 RX ADMIN — Medication 5 ML: at 16:28

## 2022-05-27 RX ADMIN — Medication 5 ML: at 05:10

## 2022-05-27 RX ADMIN — VENLAFAXINE 75 MG: 75 TABLET ORAL at 21:20

## 2022-05-27 RX ADMIN — SUCRALFATE 1 G: 1 SUSPENSION ORAL at 07:49

## 2022-05-27 RX ADMIN — OXYCODONE HYDROCHLORIDE 10 MG: 10 TABLET ORAL at 14:44

## 2022-05-27 RX ADMIN — VENLAFAXINE 75 MG: 75 TABLET ORAL at 07:50

## 2022-05-27 RX ADMIN — FLUCONAZOLE 200 MG: 200 TABLET ORAL at 07:50

## 2022-05-27 RX ADMIN — GUAIFENESIN 1200 MG: 600 TABLET ORAL at 07:50

## 2022-05-27 RX ADMIN — VENLAFAXINE 75 MG: 75 TABLET ORAL at 14:28

## 2022-05-27 RX ADMIN — RIVAROXABAN 20 MG: 20 TABLET, FILM COATED ORAL at 07:51

## 2022-05-27 ASSESSMENT — ACTIVITIES OF DAILY LIVING (ADL)
ADLS_ACUITY_SCORE: 27

## 2022-05-27 NOTE — PLAN OF CARE
Temp: 97.8  F (36.6  C) Temp src: Oral BP: (!) 148/78 Pulse: 83   Resp: 20 SpO2: 95 % O2 Device: None (Room air)      A&Ox4, but forgetful and rambles at times.  BP elevated, but did not meet parameters for hydralazine.  OVSS.  Pt denies nausea, SOB, or diarrhea.  Pt has not had a BM in 2 days, but denied constipation.  Will continue to monitor.  Appetite not very good.  Pt only had a sandwich form [employer] today and pop.  Pt c/o generalized pain off/on throughout the day.  Scheduled Saboxone and Oxycodone given 1x to keep pain at a manageable level.  Although transplant took over 2 hours, pt tolerated it well.  IVF are to continue until 0100 on 5/28/22.  Up independently and calls appropriately.  Possible discharge in the next few days.        Problem: Nutrition Intake Altered (Stem Cell/Bone Marrow Transplant)  Goal: Optimal Nutrition Intake  Outcome: Ongoing, Progressing     Problem: Nausea and Vomiting (Stem Cell/Bone Marrow Transplant)  Goal: Nausea and Vomiting Symptom Relief  Outcome: Ongoing, Progressing  Intervention: Prevent and Manage Nausea and Vomiting  Recent Flowsheet Documentation  Taken 5/27/2022 0800 by Bhumi Casanova, RN  Nausea/Vomiting Interventions:   antiemetic   sips of clear liquids given     Problem: Mucositis (Stem Cell/Bone Marrow Transplant)  Goal: Improved Oral Mucous Membrane Health and Integrity  Outcome: Ongoing, Progressing  Intervention: Promote Oral Comfort and Health  Recent Flowsheet Documentation  Taken 5/27/2022 1700 by Bhumi Casanova, RN  Oral Care:   teeth brushed   oral rinse provided  Taken 5/27/2022 0800 by Bhumi Casanova, RN  Oral Care:   teeth brushed   oral rinse provided     Problem: Infection (Stem Cell/Bone Marrow Transplant)  Goal: Absence of Infection  Outcome: Ongoing, Progressing     Problem: Infection (Stem Cell/Bone Marrow Transplant)  Goal: Absence of Infection  Outcome: Ongoing, Progressing     Problem: Hypersensitivity Reaction (Stem Cell/Bone Marrow  Transplant)  Goal: Absence of Hypersensitivity Reaction  Outcome: Ongoing, Progressing     Problem: Hematologic Alteration (Stem Cell/Bone Marrow Transplant)  Goal: Blood Counts Within Acceptable Range  Outcome: Ongoing, Progressing  Intervention: Monitor and Manage Hematologic Symptoms  Recent Flowsheet Documentation  Taken 5/27/2022 1700 by Bhumi Casanova RN  Medication Review/Management: medications reviewed  Taken 5/27/2022 0800 by Bhumi Casanova RN  Medication Review/Management: medications reviewed     Problem: Fatigue (Stem Cell/Bone Marrow Transplant)  Goal: Improved Activity Tolerance  Outcome: Ongoing, Progressing  Intervention: Promote Improved Energy  Recent Flowsheet Documentation  Taken 5/27/2022 1700 by Bhumi Casanova RN  Activity Management: activity adjusted per tolerance  Taken 5/27/2022 0800 by Bhumi Casanova RN  Activity Management: activity adjusted per tolerance     Problem: Diarrhea (Stem Cell/Bone Marrow Transplant)  Goal: Diarrhea Symptom Control  Outcome: Ongoing, Progressing     Problem: Bladder Irritation (Stem Cell/Bone Marrow Transplant)  Goal: Symptom-Free Urinary Elimination  Outcome: Ongoing, Progressing  Intervention: Prevent or Manage Bladder Irritation  Recent Flowsheet Documentation  Taken 5/27/2022 0800 by Bhumi Casanova RN  Pain Management Interventions: medication (see MAR)     Problem: Adjustment to Transplant (Stem Cell/Bone Marrow Transplant)  Goal: Optimal Coping with Transplant  Outcome: Ongoing, Progressing     Problem: Plan of Care - These are the overarching goals to be used throughout the patient stay.    Goal: Plan of Care Review/Shift Note  Description: The Plan of Care Review/Shift note should be completed every shift.  The Outcome Evaluation is a brief statement about your assessment that the patient is improving, declining, or no change.  This information will be displayed automatically on your shift note.  Outcome: Ongoing, Progressing  Goal: Patient-Specific  "Goal (Individualized)  Description: You can add care plan individualizations to a care plan. Examples of Individualization might be:  \"Parent requests to be called daily at 9am for status\", \"I have a hard time hearing out of my right ear\", or \"Do not touch me to wake me up as it startles me\".  Outcome: Ongoing, Progressing  Goal: Absence of Hospital-Acquired Illness or Injury  Outcome: Ongoing, Progressing  Intervention: Identify and Manage Fall Risk  Recent Flowsheet Documentation  Taken 5/27/2022 1700 by Bhumi Casanova RN  Safety Promotion/Fall Prevention:   assistive device/personal items within reach   clutter free environment maintained   fall prevention program maintained   nonskid shoes/slippers when out of bed   room organization consistent   safety round/check completed  Taken 5/27/2022 0800 by Bhumi Casanova RN  Safety Promotion/Fall Prevention:   assistive device/personal items within reach   clutter free environment maintained   fall prevention program maintained   nonskid shoes/slippers when out of bed   room organization consistent   safety round/check completed  Intervention: Prevent Skin Injury  Recent Flowsheet Documentation  Taken 5/27/2022 1700 by Bhumi Casanova RN  Body Position: position changed independently  Taken 5/27/2022 0800 by Bhumi Casanova RN  Body Position: position changed independently  Intervention: Prevent and Manage VTE (Venous Thromboembolism) Risk  Recent Flowsheet Documentation  Taken 5/27/2022 1700 by Bhumi Casanova RN  Activity Management: activity adjusted per tolerance  Taken 5/27/2022 0800 by Bhumi Casanova RN  Activity Management: activity adjusted per tolerance  Goal: Optimal Comfort and Wellbeing  Outcome: Ongoing, Progressing  Intervention: Monitor Pain and Promote Comfort  Recent Flowsheet Documentation  Taken 5/27/2022 0800 by Bhumi Casanova RN  Pain Management Interventions: medication (see MAR)  Goal: Readiness for Transition of Care  Outcome: Ongoing, " Progressing

## 2022-05-27 NOTE — PLAN OF CARE
Pt afebrile, BP elevated this morning but improved with morning BP meds. OVSS on RA. Pt has chronic pain and reported having a headache throughout the day. Tylenol x2, oxy x2, robaxin x1. Pt is up ind. Plan for transplant tomorrow at 1030. Flush begins at 5am.  Continue to monitor.   Problem: Plan of Care - These are the overarching goals to be used throughout the patient stay.    Goal: Plan of Care Review/Shift Note  Description: The Plan of Care Review/Shift note should be completed every shift.  The Outcome Evaluation is a brief statement about your assessment that the patient is improving, declining, or no change.  This information will be displayed automatically on your shift note.  Outcome: Ongoing, Progressing  Goal: Optimal Comfort and Wellbeing  Outcome: Ongoing, Progressing  Intervention: Monitor Pain and Promote Comfort  Recent Flowsheet Documentation  Taken 5/26/2022 1500 by Ling Khan, RN  Pain Management Interventions: medication (see MAR)  Taken 5/26/2022 0938 by Ling Khan, RN  Pain Management Interventions: medication (see MAR)   Goal Outcome Evaluation:

## 2022-05-27 NOTE — PLAN OF CARE
[id number]  VSS. Afebrile. Up with SBA. Transplant tomorrow at 10:30 & 4 bags. Pre-flush due at 5AM.   Problem: Plan of Care - These are the overarching goals to be used throughout the patient stay.    Goal: Plan of Care Review/Shift Note  Outcome: Ongoing, Progressing  Goal: Patient-Specific Goal (Individualized)  Outcome: Ongoing, Progressing  Goal: Absence of Hospital-Acquired Illness or Injury  Outcome: Ongoing, Progressing  Intervention: Identify and Manage Fall Risk  Recent Flowsheet Documentation  Taken 5/26/2022 2100 by Nidia Trivedi RN  Safety Promotion/Fall Prevention:   assistive device/personal items within reach   clutter free environment maintained   fall prevention program maintained   nonskid shoes/slippers when out of bed  Intervention: Prevent Skin Injury  Recent Flowsheet Documentation  Taken 5/26/2022 2100 by Nidia Trivedi RN  Body Position: position changed independently  Intervention: Prevent and Manage VTE (Venous Thromboembolism) Risk  Recent Flowsheet Documentation  Taken 5/26/2022 2100 by Nidia Trivedi RN  Activity Management: activity adjusted per tolerance  Goal: Optimal Comfort and Wellbeing  Outcome: Ongoing, Progressing  Goal: Readiness for Transition of Care  Outcome: Ongoing, Progressing     Problem: Adjustment to Transplant (Stem Cell/Bone Marrow Transplant)  Goal: Optimal Coping with Transplant  Outcome: Ongoing, Progressing     Problem: Bladder Irritation (Stem Cell/Bone Marrow Transplant)  Goal: Symptom-Free Urinary Elimination  Outcome: Ongoing, Progressing     Problem: Diarrhea (Stem Cell/Bone Marrow Transplant)  Goal: Diarrhea Symptom Control  Outcome: Ongoing, Progressing     Problem: Fatigue (Stem Cell/Bone Marrow Transplant)  Goal: Improved Activity Tolerance  Outcome: Ongoing, Progressing  Intervention: Promote Improved Energy  Recent Flowsheet Documentation  Taken 5/26/2022 2100 by Nidia Trivedi RN  Activity Management: activity adjusted per tolerance     Problem:  Hematologic Alteration (Stem Cell/Bone Marrow Transplant)  Goal: Blood Counts Within Acceptable Range  Outcome: Ongoing, Progressing  Intervention: Monitor and Manage Hematologic Symptoms  Recent Flowsheet Documentation  Taken 5/26/2022 2100 by Nidia Trivedi RN  Medication Review/Management: medications reviewed     Problem: Hypersensitivity Reaction (Stem Cell/Bone Marrow Transplant)  Goal: Absence of Hypersensitivity Reaction  Outcome: Ongoing, Progressing     Problem: Infection (Stem Cell/Bone Marrow Transplant)  Goal: Absence of Infection  Outcome: Ongoing, Progressing     Problem: Mucositis (Stem Cell/Bone Marrow Transplant)  Goal: Improved Oral Mucous Membrane Health and Integrity  Outcome: Ongoing, Progressing  Intervention: Promote Oral Comfort and Health  Recent Flowsheet Documentation  Taken 5/26/2022 2100 by Nidia Trivedi RN  Oral Care:   mouth wash rinse   oral rinse provided     Problem: Nausea and Vomiting (Stem Cell/Bone Marrow Transplant)  Goal: Nausea and Vomiting Symptom Relief  Outcome: Ongoing, Progressing     Problem: Nutrition Intake Altered (Stem Cell/Bone Marrow Transplant)  Goal: Optimal Nutrition Intake  Outcome: Ongoing, Progressing   Goal Outcome Evaluation:

## 2022-05-27 NOTE — PROGRESS NOTES
BMT/Cellular Autologous Product Infusion         Patient Vitals for the past 24 hrs:   Temp Temp src Pulse Resp BP   05/26/22 1242 98.3  F (36.8  C) Oral 85 16 (!) 148/63   05/26/22 1621 98.5  F (36.9  C) Oral 83 16 137/60   05/26/22 2111 100.3  F (37.9  C) Oral 82 18 (!) 156/79   05/26/22 2340 98.2  F (36.8  C) Oral 86 16 (!) 164/82   05/27/22 0459 98.3  F (36.8  C) Oral 88 16 (!) 165/76   05/27/22 0737 98.3  F (36.8  C) Oral 78 20 (!) 148/92   05/27/22 1038 98.2  F (36.8  C) Oral 79 18 (!) 143/75   05/27/22 1117 98  F (36.7  C) Oral 76 16 (!) 159/75      BMT INFUSION DOCUMENTATION (last 48 hours)     BMT/Cellular Product Infusion     Row Name 05/27/22 0800                Product 05/27/22 0930 HPC, Apheresis    Product Details Product Release Date: 05/27/22  -AK Product Release Time: 0930  -LL Product Type: HPC, Apheresis  -AK DIN: [id number]  -AK Product Description Code: [id number]  -AK Volume Dispensed (mL): 78 mL  -AK       Checked by (Patient RN) Bhumi Casanova RN  -RR       Checked by (Witness) --       Product Volume Infused (mL) 78 mL  -RR       Flush Volume (mL) 40 mL  -RR       Volume Dispensed (mL) --                Product 05/27/22 0930 HPC, Apheresis    Product Details Product Release Date: 05/27/22  -AK Product Release Time: 0930  -LL Product Type: HPC, Apheresis  -AK DIN: [id number]  -AK Product Description Code: [id number]  -AK Volume Dispensed (mL): 78 mL  -AK       Checked by (Patient RN) Bhumi Casanova RN  -RR       Checked by (Witness) --       Product Volume Infused (mL) --       Flush Volume (mL) 40 mL  -RR       Volume Dispensed (mL) --                Product 05/27/22 0930 HPC, Apheresis    Product Details Product Release Date: 05/27/22  -AK Product Release Time: 0930  -LL Product Type: HPC, Apheresis  -AK DIN: [id number]  -AK Product Description Code: [id number]  -AK Volume Dispensed (mL): 82 mL  -AK       Checked by (Patient RN) Bhumi Casanova RN  -RR       Checked by (Witness) --        Product Volume Infused (mL) --       Flush Volume (mL) --       Volume Dispensed (mL) --                Product 05/27/22 0930 HPC, Apheresis    Product Details Product Release Date: 05/27/22  -AK Product Release Time: 0930 -LL Product Type: HPC, Apheresis  -AK DIN: [id number]  -AK Product Description Code: [id number]  -AK Volume Dispensed (mL): 82 mL  -AK       Checked by (Patient RN) Bhumi Casanova RN  -RR       Checked by (Witness) --       Product Volume Infused (mL) --       Flush Volume (mL) --       Volume Dispensed (mL) --             User Key  (r) = Recorded By, (t) = Taken By, (c) = Cosigned By    Initials Name Effective Dates    AK Enrrique Springerley 11/10/15 -     LL NatyAlia 07/11/21 -     RR Bhumi Casanova RN 02/06/18 -               Allogeneic Donor Eligibility Determination and Summary of Records: N/A - Autologous        Type of Infusion: Autologous      Baseline Pre-Infusion Evaluation (to be completed by Provider):   Dyspnea: Grade 0 - none  Hypoxia: Grade 0 - not present  Fever: Grade 0 - afebrile  Chills: Grade 0 - none  Febrile Neutropenia: Grade 0 - not present  Sinus Bradycardia: Grade 0 - none  Hypertension: Grade 3 - stage 2 hypertension (systolic BP >/ 160 mm Hg or diastolic BP >/ 100 mm Hg); medical intervention indicated; more than one drug or more intensive therapy than previously used indicated. PRN hydralazine available. Pt is asymptomatic. HTN attributable to chronic HTN, steroid exacerbated.   Hypotension: Grade 0 - none  Chest Pain: Grade 0 - none  Bronchospasm: Grade 0 - none  Pain: Grade 2 - moderate pain; limiting instrumental ADL- chronic, not new or related to chemotherapy/stem cell infusion  Rash: Grade 0 - None  Neurologic Specify: none    If adverse reactions, events or complications occur (fever greater than 2 degrees fahrenheit increase, and severe reactions of the following types: chills, dyspnea, bronchospasm, hyper/hypotension, hypoxia, bradycardia, chest pain,  back/flank pain, hypoxia, and any other reaction deemed severe or life threatening; any instance of product bag breakage or unusual product appearance)    Any other events that are >= grade 3, then immediately contact the BMT Attending physician, the Cell Therapy Laboratory Medical Director (pager [phone]) and the Cell Therapy Laboratory ([phone]).  After midnight, holidays & weekends contact the [facility] on the appropriate campus ([facility] [place]: [phone]; [facility] [place]: [phone]).    Shanita Naylor PA-C

## 2022-05-27 NOTE — PROGRESS NOTES
Type of transplant: Donor: Autologous  Product:   BMT INFUSION DOCUMENTATION (last 48 hours)     BMT/Cellular Product Infusion     Row Name 05/27/22 0800                [REMOVED] Product 05/27/22 0930 HPC, Apheresis    Product Details Product Release Date: 05/27/22 -AK Product Release Time: 0930 -LL Product Type: HPC, Apheresis  -AK DIN: [id number]  -AK Product Description Code: [id number]  -AK Volume Dispensed (mL): 78 mL  -AK Completion Date (RN to complete): 05/27/22  -RR Completion Time (RN to complete): 1116  -RR       Checked by (Patient RN) Bhumi Casanova RN  -RR       Checked by (Witness) Ana Gilbert RN  -LR       Product Volume Infused (mL) 78 mL  -RR       Flush Volume (mL) 40 mL  -RR       Volume Dispensed (mL) --                [REMOVED] Product 05/27/22 0930 HPC, Apheresis    Product Details Product Release Date: 05/27/22 -AK Product Release Time: 0930 -LL Product Type: HPC, Apheresis  -AK DIN: [id number]  -AK Product Description Code: [id number]  -AK Volume Dispensed (mL): 78 mL  -AK Completion Date (RN to complete): 05/27/22  -RR Completion Time (RN to complete): 1143  -RR       Checked by (Patient RN) Bhumi Casanova RN  -RR       Checked by (Witness) Ana Gilbert RN  -LR       Product Volume Infused (mL) 78 mL  -RR       Flush Volume (mL) 40 mL  -RR       Volume Dispensed (mL) --                [REMOVED] Product 05/27/22 0930 HPC, Apheresis    Product Details Product Release Date: 05/27/22 -AK Product Release Time: 0930 -LL Product Type: HPC, Apheresis  -AK DIN: [id number]  -AK Product Description Code: [id number]  -AK Volume Dispensed (mL): 82 mL  -AK Completion Date (RN to complete): 05/27/22  -RR Completion Time (RN to complete): 1207  -RR       Checked by (Patient RN) Bhumi Casanova RN  -RR       Checked by (Witness) Ana Gilbert RN  -LR       Product Volume Infused (mL) 82 mL  -RR       Flush Volume (mL) 30 mL  -RR       Volume Dispensed (mL) --                [REMOVED] Product 05/27/22 0927  HPC, Apheresis    Product Details Product Release Date: 05/27/22  -AK Product Release Time: 0930  -LL Product Type: HPC, Apheresis  -AK DIN: [id number]  -AK Product Description Code: [id number]  -AK Volume Dispensed (mL): 82 mL  -AK Completion Date (RN to complete): 05/27/22  -RR Completion Time (RN to complete): 1236  -RR       Checked by (Patient RN) Bhumi Casanova RN  -RR       Checked by (Witness) Ana Gilbert RN  -LR       Product Volume Infused (mL) 82 mL  -RR       Flush Volume (mL) 40 mL  -RR       Volume Dispensed (mL) --             User Key  (r) = Recorded By, (t) = Taken By, (c) = Cosigned By    Initials Name Effective Dates    AK Adriana Springer 11/10/15 -     LL NatyAlia 07/11/21 -     LR Ana Gilbert RN 04/29/14 -     RR Bhumi Casanova RN 02/06/18 -               Preparation: RN Documentation  Patient was premedicated as ordered: yes  Line Type: central line, left (PICC)  Patient Stable Prior to Infusion: yes  Time Infusion Started: 1057  Teaching: side effects/monitoring  Tolerated/Reaction: Patient tolerance of product infusion  Immediate suspected transfusion reaction to the product: none  Did patient have prior history of similar signs/symptoms during this hospitalization?: no  Did the patient tolerate the infusion well: yes  Medications and treatment for symptoms: none  Flush until: 0100 on 5/28  Plan: Per POC. PT might not discharge 5/28/22.  Will continue to assess.

## 2022-05-27 NOTE — PROGRESS NOTES
"  BMT Progress note  BMT Physician: Dr. 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     INTERIM HISTORY: No n/v/d. Stable chronic pain - would like changed to QID not q6hrs as typically doesn't need oxycodone in the night. Discussed anticipated events with transplant.   REVIEW OF SYSTEMS: Otherwise unremarkable other than what is noted in the Interim History.     PHYSICAL EXAM:   Vitals:  BP (!) 148/92   Pulse 78   Temp 98.3  F (36.8  C) (Oral)   Resp 20   Ht 1.57 m (5' 1.81\")   Wt 55.7 kg (122 lb 12.8 oz)   SpO2 97%   BMI 22.60 kg/m      General Appearance: NAD  HEENT: large pupils; ERRLA; sclera anicteric. Moist mucus membranes, no ulcerations.  CV: RRR. no murmur or rub.   RESP: CTA bilaterally; no rales or wheezes.   GI: thin; +BS, soft, nontender, nondistended.   EXT: No edema. No cyanosis/clubbing.   SKIN: no lesions or rash  NEURO: A&O x3   PSYCH: Appropriate affect   VASCULAR ACCESS: R port, not accessed; PICC c/d/i    ROUTINE LABS:    Lab Results   Component Value Date    WBC 6.0 05/27/2022    ANEU 5.3 02/25/2022    HGB 10.2 (L) 05/27/2022    HCT 31.1 (L) 05/27/2022     (L) 05/27/2022     05/27/2022    POTASSIUM 4.1 05/27/2022    CHLORIDE 110 (H) 05/27/2022    CO2 29 05/27/2022     (H) 05/27/2022    BUN 20 05/27/2022    CR 0.66 05/27/2022    MAG 2.3 05/27/2022    INR 1.28 (H) 05/25/2022          ASSESSMENT AND PLAN:   Libby Grimaldo is a 71 year old female, currently day 0 of Auto 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      BMT/IEC PROTOCOL for 2016-35  - Chemo protocol: HD Melphalan 140mg/m2  Day -1: rest day, 5/26  Day 0: Cell dose: 3.94x10^6 (s/p cytoxan chemo priming 2/1/2022 and subsequent stem cell collection).   - Restaging plan: per protocol     HEME/COAG  - Risk of cytopenias due to chemotherapy and radiation  - Transfusion parameters: hemoglobin <7g/dL, platelets <10k  - " Relevant thrombosis or bleeding history:   2/15: new non-occlusive LUE DVT.  - on xarelto 20mg daily. Given line associated DVT, consider stopping xeralto when plts <50k and not resuming unless KARIS score necessitates anticoagulation.      IMMUNOCOMPROMISED  - Relevant infection history: Recurrent PNA and UTIs   - Vaccination status: Influenza vaccination after day +60, COVID vaccination after day +100, followed by remaining vaccinations at 12, 14, 24, and 26 months.  - Prophylaxis plan: ACV 800mg PO BID, levaquin 250mg daily, fungal prophy: fluconazole 200mg po daily.   - Active infections: None     CARDIOVASCULAR  - Risk of cardiomyopathy:  Baseline EF 56%  - known hyperlipidemia- hold crestor until day +60 post transplant.   Hypertension: continue norvasc 5mg PO daily.   History of Afib - now in NSR. On Metoprolol 25mg PO daily.      RESPIRATORY  - hx of COPD, continue breo-ellipa inhaler   - monitor vitals while inpatient.      GI/NUTRITION  - Ulcer prophylaxis: protonix 40mg daily.   - Risk of nausea/vomiting due to chemo/radiation: prophy dex and zofran. Prn ativan and compazine available.  - Risk of malnutrition: moderate given systemic chemotherapy.      RENAL/ELECTROLYTES/  - Risk of renal injury: IV hydration 125cc/hr 2 hours pre and 12 hours post transplant. Monitor for DMSO toxicity.   - Electrolyte management: replace per sliding scale     DIABETES/ENDOCRINE  - Risk of steroid-induced hyperglyemia: Monitor BG is not higher.      MUSCULOSKELETAL/FRAILTY  - Baseline Frailty Score: 4  - Patient with substantial risk of sarcopenia  - Daily PT/OT as needed while inpatient  - Cancer Rehab as needed outpatient     SYMPTOM MANAGEMENT  - Nausea from chemo/radiation: Prochlorperazine, ondansetron, lorazepam.  - # Pain Assessment:  - Libby is experiencing pain due to chronic pain. Pain management was discussed with Libby and her spouse and the plan was created in a collaborative fashion.  Libby's response to the  current recommendations: engaged  - Continue suboxone and prn oxycodone.   - Pain medicine consult- no change to regimen currently.     SOCIAL DETERMINANTS  - Caregiver: Tres. Plan to remain admitted through engraftment, although will discuss this with pt today    Anna Naylor PA-C  [phone]

## 2022-05-27 NOTE — PLAN OF CARE
"[id number]  Afebrile. Hypertensive, (160's/80's). MD notified, prn hydralazine ordered. OVSS. Chronic neck/back/hip pain and had a headache last night. Tylenol x1, oxycodone x1 and robaxin x1. Intermittently nauseated, ativan x1 and compazine x1. No replacements needed. Transplant scheduled to 1030 am this morning (4 bags). Flush infusing at 125 ml/hr. Voiding well. Still needs stool sample for admission c.diff. SBA. Continue plan of care.         Problem: Plan of Care - These are the overarching goals to be used throughout the patient stay.    Goal: Plan of Care Review/Shift Note  Description: The Plan of Care Review/Shift note should be completed every shift.  The Outcome Evaluation is a brief statement about your assessment that the patient is improving, declining, or no change.  This information will be displayed automatically on your shift note.  Outcome: Ongoing, Not Progressing  Flowsheets (Taken 5/27/2022 1706)  Plan of Care Reviewed With: patient  Overall Patient Progress: no change     Problem: Plan of Care - These are the overarching goals to be used throughout the patient stay.    Goal: Patient-Specific Goal (Individualized)  Description: You can add care plan individualizations to a care plan. Examples of Individualization might be:  \"Parent requests to be called daily at 9am for status\", \"I have a hard time hearing out of my right ear\", or \"Do not touch me to wake me up as it startles me\".  Outcome: Ongoing, Not Progressing     Problem: Plan of Care - These are the overarching goals to be used throughout the patient stay.    Goal: Absence of Hospital-Acquired Illness or Injury  Outcome: Ongoing, Not Progressing     Problem: Plan of Care - These are the overarching goals to be used throughout the patient stay.    Goal: Readiness for Transition of Care  Outcome: Ongoing, Not Progressing     Problem: Adjustment to Transplant (Stem Cell/Bone Marrow Transplant)  Goal: Optimal Coping with Transplant  Outcome: " Ongoing, Not Progressing     Problem: Bladder Irritation (Stem Cell/Bone Marrow Transplant)  Goal: Symptom-Free Urinary Elimination  Outcome: Ongoing, Not Progressing     Problem: Diarrhea (Stem Cell/Bone Marrow Transplant)  Goal: Diarrhea Symptom Control  Outcome: Ongoing, Not Progressing     Problem: Fatigue (Stem Cell/Bone Marrow Transplant)  Goal: Improved Activity Tolerance  Outcome: Ongoing, Not Progressing     Problem: Hematologic Alteration (Stem Cell/Bone Marrow Transplant)  Goal: Blood Counts Within Acceptable Range  Outcome: Ongoing, Not Progressing     Problem: Hypersensitivity Reaction (Stem Cell/Bone Marrow Transplant)  Goal: Absence of Hypersensitivity Reaction  Outcome: Ongoing, Not Progressing     Problem: Infection (Stem Cell/Bone Marrow Transplant)  Goal: Absence of Infection  Outcome: Ongoing, Not Progressing     Problem: Mucositis (Stem Cell/Bone Marrow Transplant)  Goal: Improved Oral Mucous Membrane Health and Integrity  Outcome: Ongoing, Not Progressing     Problem: Nausea and Vomiting (Stem Cell/Bone Marrow Transplant)  Goal: Nausea and Vomiting Symptom Relief  Outcome: Ongoing, Not Progressing     Problem: Nutrition Intake Altered (Stem Cell/Bone Marrow Transplant)  Goal: Optimal Nutrition Intake  Outcome: Ongoing, Not Progressing   Goal Outcome Evaluation:    Plan of Care Reviewed With: patient     Overall Patient Progress: no change

## 2022-05-28 ENCOUNTER — APPOINTMENT (OUTPATIENT)
Dept: OCCUPATIONAL THERAPY | Facility: CLINIC | Age: 72
DRG: 016 | End: 2022-05-28
Attending: INTERNAL MEDICINE
Payer: MEDICARE

## 2022-05-28 LAB
ANION GAP SERPL CALCULATED.3IONS-SCNC: 4 MMOL/L (ref 3–14)
BASOPHILS # BLD AUTO: 0 10E3/UL (ref 0–0.2)
BASOPHILS NFR BLD AUTO: 0 %
BUN SERPL-MCNC: 20 MG/DL (ref 7–30)
CALCIUM SERPL-MCNC: 7.3 MG/DL (ref 8.5–10.1)
CHLORIDE BLD-SCNC: 112 MMOL/L (ref 94–109)
CO2 SERPL-SCNC: 26 MMOL/L (ref 20–32)
CREAT SERPL-MCNC: 0.63 MG/DL (ref 0.52–1.04)
EOSINOPHIL # BLD AUTO: 0 10E3/UL (ref 0–0.7)
EOSINOPHIL NFR BLD AUTO: 0 %
ERYTHROCYTE [DISTWIDTH] IN BLOOD BY AUTOMATED COUNT: 12.5 % (ref 10–15)
GFR SERPL CREATININE-BSD FRML MDRD: >90 ML/MIN/1.73M2
GLUCOSE BLD-MCNC: 109 MG/DL (ref 70–99)
HCT VFR BLD AUTO: 29 % (ref 35–47)
HGB BLD-MCNC: 9.4 G/DL (ref 11.7–15.7)
IMM GRANULOCYTES # BLD: 0 10E3/UL
IMM GRANULOCYTES NFR BLD: 1 %
LYMPHOCYTES # BLD AUTO: 0 10E3/UL (ref 0.8–5.3)
LYMPHOCYTES NFR BLD AUTO: 1 %
MAGNESIUM SERPL-MCNC: 2 MG/DL (ref 1.6–2.3)
MCH RBC QN AUTO: 32.8 PG (ref 26.5–33)
MCHC RBC AUTO-ENTMCNC: 32.4 G/DL (ref 31.5–36.5)
MCV RBC AUTO: 101 FL (ref 78–100)
MONOCYTES # BLD AUTO: 0.1 10E3/UL (ref 0–1.3)
MONOCYTES NFR BLD AUTO: 2 %
NEUTROPHILS # BLD AUTO: 6.3 10E3/UL (ref 1.6–8.3)
NEUTROPHILS NFR BLD AUTO: 96 %
NRBC # BLD AUTO: 0 10E3/UL
NRBC BLD AUTO-RTO: 0 /100
PHOSPHATE SERPL-MCNC: 2.2 MG/DL (ref 2.5–4.5)
PLATELET # BLD AUTO: 104 10E3/UL (ref 150–450)
POTASSIUM BLD-SCNC: 4 MMOL/L (ref 3.4–5.3)
RBC # BLD AUTO: 2.87 10E6/UL (ref 3.8–5.2)
SODIUM SERPL-SCNC: 142 MMOL/L (ref 133–144)
WBC # BLD AUTO: 6.5 10E3/UL (ref 4–11)

## 2022-05-28 PROCEDURE — 999N000147 HC STATISTIC PT IP EVAL DEFER: Performed by: PHYSICAL THERAPIST

## 2022-05-28 PROCEDURE — 206N000001 HC R&B BMT UMMC

## 2022-05-28 PROCEDURE — 97535 SELF CARE MNGMENT TRAINING: CPT | Mod: GO

## 2022-05-28 PROCEDURE — 80048 BASIC METABOLIC PNL TOTAL CA: CPT | Performed by: PHYSICIAN ASSISTANT

## 2022-05-28 PROCEDURE — 250N000013 HC RX MED GY IP 250 OP 250 PS 637: Performed by: PHYSICIAN ASSISTANT

## 2022-05-28 PROCEDURE — 250N000009 HC RX 250: Performed by: INTERNAL MEDICINE

## 2022-05-28 PROCEDURE — 258N000003 HC RX IP 258 OP 636: Performed by: INTERNAL MEDICINE

## 2022-05-28 PROCEDURE — 85004 AUTOMATED DIFF WBC COUNT: CPT | Performed by: PHYSICIAN ASSISTANT

## 2022-05-28 PROCEDURE — 99233 SBSQ HOSP IP/OBS HIGH 50: CPT | Mod: FS | Performed by: PHYSICIAN ASSISTANT

## 2022-05-28 PROCEDURE — 84100 ASSAY OF PHOSPHORUS: CPT | Performed by: INTERNAL MEDICINE

## 2022-05-28 PROCEDURE — 250N000011 HC RX IP 250 OP 636: Performed by: INTERNAL MEDICINE

## 2022-05-28 PROCEDURE — 97530 THERAPEUTIC ACTIVITIES: CPT | Mod: GO

## 2022-05-28 PROCEDURE — 250N000013 HC RX MED GY IP 250 OP 250 PS 637: Performed by: INTERNAL MEDICINE

## 2022-05-28 PROCEDURE — 83735 ASSAY OF MAGNESIUM: CPT | Performed by: INTERNAL MEDICINE

## 2022-05-28 PROCEDURE — 250N000011 HC RX IP 250 OP 636: Performed by: PHYSICIAN ASSISTANT

## 2022-05-28 RX ORDER — ONDANSETRON 8 MG/1
8 TABLET, FILM COATED ORAL EVERY 8 HOURS
Status: DISCONTINUED | OUTPATIENT
Start: 2022-05-28 | End: 2022-05-31 | Stop reason: HOSPADM

## 2022-05-28 RX ORDER — MAGNESIUM SULFATE HEPTAHYDRATE 40 MG/ML
2 INJECTION, SOLUTION INTRAVENOUS ONCE
Status: COMPLETED | OUTPATIENT
Start: 2022-05-28 | End: 2022-05-28

## 2022-05-28 RX ORDER — OXYCODONE HYDROCHLORIDE 10 MG/1
10 TABLET ORAL 4 TIMES DAILY PRN
Status: DISCONTINUED | OUTPATIENT
Start: 2022-05-28 | End: 2022-05-31 | Stop reason: HOSPADM

## 2022-05-28 RX ADMIN — GUAIFENESIN 1200 MG: 600 TABLET ORAL at 08:34

## 2022-05-28 RX ADMIN — SUCRALFATE 1 G: 1 SUSPENSION ORAL at 21:39

## 2022-05-28 RX ADMIN — SUCRALFATE 1 G: 1 SUSPENSION ORAL at 09:54

## 2022-05-28 RX ADMIN — VENLAFAXINE 75 MG: 75 TABLET ORAL at 19:40

## 2022-05-28 RX ADMIN — ONDANSETRON HYDROCHLORIDE 8 MG: 8 TABLET, FILM COATED ORAL at 09:54

## 2022-05-28 RX ADMIN — LORAZEPAM 0.5 MG: 0.5 TABLET ORAL at 23:06

## 2022-05-28 RX ADMIN — Medication 5 ML: at 04:25

## 2022-05-28 RX ADMIN — PROCHLORPERAZINE MALEATE 5 MG: 5 TABLET ORAL at 04:20

## 2022-05-28 RX ADMIN — OXYCODONE HYDROCHLORIDE 10 MG: 10 TABLET ORAL at 09:54

## 2022-05-28 RX ADMIN — GUAIFENESIN 1200 MG: 600 TABLET ORAL at 19:40

## 2022-05-28 RX ADMIN — VENLAFAXINE 75 MG: 75 TABLET ORAL at 08:36

## 2022-05-28 RX ADMIN — VENLAFAXINE 75 MG: 75 TABLET ORAL at 14:59

## 2022-05-28 RX ADMIN — FLUCONAZOLE 200 MG: 200 TABLET ORAL at 08:34

## 2022-05-28 RX ADMIN — LORAZEPAM 0.5 MG: 0.5 TABLET ORAL at 08:32

## 2022-05-28 RX ADMIN — BUPRENORPHINE AND NALOXONE 1 FILM: 8; 2 FILM, SOLUBLE BUCCAL; SUBLINGUAL at 14:59

## 2022-05-28 RX ADMIN — SUCRALFATE 1 G: 1 SUSPENSION ORAL at 17:32

## 2022-05-28 RX ADMIN — METOPROLOL SUCCINATE 25 MG: 25 TABLET, EXTENDED RELEASE ORAL at 08:34

## 2022-05-28 RX ADMIN — ACYCLOVIR 800 MG: 800 TABLET ORAL at 08:35

## 2022-05-28 RX ADMIN — PANTOPRAZOLE SODIUM 40 MG: 40 TABLET, DELAYED RELEASE ORAL at 08:34

## 2022-05-28 RX ADMIN — Medication 5 ML: at 01:07

## 2022-05-28 RX ADMIN — POTASSIUM PHOSPHATE, MONOBASIC AND POTASSIUM PHOSPHATE, DIBASIC 9 MMOL: 224; 236 INJECTION, SOLUTION, CONCENTRATE INTRAVENOUS at 08:35

## 2022-05-28 RX ADMIN — BUPRENORPHINE AND NALOXONE 1 FILM: 8; 2 FILM, SOLUBLE BUCCAL; SUBLINGUAL at 08:33

## 2022-05-28 RX ADMIN — RIVAROXABAN 20 MG: 20 TABLET, FILM COATED ORAL at 08:35

## 2022-05-28 RX ADMIN — MAGNESIUM SULFATE IN WATER 2 G: 40 INJECTION, SOLUTION INTRAVENOUS at 06:51

## 2022-05-28 RX ADMIN — LEVOTHYROXINE SODIUM 112 MCG: 0.11 TABLET ORAL at 08:35

## 2022-05-28 RX ADMIN — AMLODIPINE BESYLATE 10 MG: 5 TABLET ORAL at 08:36

## 2022-05-28 RX ADMIN — ONDANSETRON HYDROCHLORIDE 8 MG: 8 TABLET, FILM COATED ORAL at 17:31

## 2022-05-28 RX ADMIN — METHOCARBAMOL 500 MG: 500 TABLET ORAL at 01:15

## 2022-05-28 RX ADMIN — LEVOFLOXACIN 250 MG: 250 TABLET, FILM COATED ORAL at 09:54

## 2022-05-28 RX ADMIN — OXYCODONE HYDROCHLORIDE 10 MG: 10 TABLET ORAL at 19:41

## 2022-05-28 RX ADMIN — BUPRENORPHINE AND NALOXONE 1 FILM: 8; 2 FILM, SOLUBLE BUCCAL; SUBLINGUAL at 21:39

## 2022-05-28 RX ADMIN — Medication 1000 UNITS: at 08:34

## 2022-05-28 RX ADMIN — ACYCLOVIR 800 MG: 800 TABLET ORAL at 19:40

## 2022-05-28 ASSESSMENT — ACTIVITIES OF DAILY LIVING (ADL)
ADLS_ACUITY_SCORE: 27

## 2022-05-28 NOTE — PROGRESS NOTES
BMT Post Infusion Documentation    Data   Patient Vitals for the past 72 hrs:   Temp Temp src Pulse Resp BP   05/25/22 1233 98.5  F (36.9  C) Oral 79 16 (!) 150/86   05/25/22 1613 98.1  F (36.7  C) Oral 72 16 (!) 162/73   05/25/22 2100 97.8  F (36.6  C) Oral 86 16 (!) 167/56   05/25/22 2300 98.1  F (36.7  C) Oral 86 16 (!) 159/80   05/26/22 0403 98  F (36.7  C) Oral 86 16 (!) 157/89   05/26/22 0836 97.9  F (36.6  C) Oral 90 18 (!) 160/89   05/26/22 1242 98.3  F (36.8  C) Oral 85 16 (!) 148/63   05/26/22 1621 98.5  F (36.9  C) Oral 83 16 137/60   05/26/22 2111 100.3  F (37.9  C) Oral 82 18 (!) 156/79   05/26/22 2340 98.2  F (36.8  C) Oral 86 16 (!) 164/82   05/27/22 0459 98.3  F (36.8  C) Oral 88 16 (!) 165/76   05/27/22 0737 98.3  F (36.8  C) Oral 78 20 (!) 148/92   05/27/22 1038 98.2  F (36.8  C) Oral 79 18 (!) 143/75   05/27/22 1117 98  F (36.7  C) Oral 76 16 (!) 159/75   05/27/22 1145 98  F (36.7  C) Oral 74 16 (!) 161/81   05/27/22 1210 98.4  F (36.9  C) Oral 71 16 (!) 154/86   05/27/22 1237 97.8  F (36.6  C) Oral 70 16 (!) 155/79   05/27/22 1621 97.8  F (36.6  C) Oral 83 20 (!) 148/78   05/27/22 1930 97.8  F (36.6  C) Oral 78 18 (!) 145/80   05/28/22 0107 98  F (36.7  C) Tympanic 83 18 (!) 155/80   05/28/22 0423 98.4  F (36.9  C) Oral 78 18 (!) 148/73     BMT INFUSION DOCUMENTATION (last 24 hours)     BMT/Cellular Product Infusion     Row Name 05/27/22 0800                Cell Therapy Documentation    Product Release Date 05/27/22  -AK       Recipient Study ID N/A  -AK       Donor Autologous  -AK       Donor MRN/ID [id number]  -AK       Donor ABO/Rh A pos  -AK       Allogeneic Donor Eligibility Determination and Summary of Records N/A - Autologous  -AK       Type of Infusion Autologous  -AK       Total Volume Dispensed (mL) 320  -AK       Total NC Dose 8.90E+08  -AK       Total CD34 Dose 3.94E+06  -AK       Total CD3 dose N/A  -AK       Total NC Dose Left in Storage None  -AK       Comments for Product Issues  N/A  -AK       Donor ABO/Rh --       Product Types --       Product Numbers --       Product Types and Numbers --       Volume --       ABO Mismatch --       ZZTotal NC Dose --       ZZTotal CD34 Dose --       ZZTotal NC Dose Left in Storage --                [REMOVED] Product 05/27/22 0930 HPC, Apheresis    Product Details Product Release Date: 05/27/22 -AK Product Release Time: 0930  -LL Product Type: HPC, Apheresis  -AK DIN: [id number]  -AK Product Description Code: [id number]  -AK Volume Dispensed (mL): 78 mL  -AK Completion Date (RN to complete): 05/27/22  -RR Completion Time (RN to complete): 1116  -RR       Checked by (Patient RN) Bhumi Casanova RN  -RR       Checked by (Witness) Ana Gilbert RN  -LR       Product Volume Infused (mL) 78 mL  -RR       Flush Volume (mL) 40 mL  -RR       Volume Dispensed (mL) --                [REMOVED] Product 05/27/22 0930 HPC, Apheresis    Product Details Product Release Date: 05/27/22 -AK Product Release Time: 0930 -LL Product Type: HPC, Apheresis  -AK DIN: [id number]  -AK Product Description Code: [id number]  -AK Volume Dispensed (mL): 78 mL  -AK Completion Date (RN to complete): 05/27/22  -RR Completion Time (RN to complete): 1143  -RR       Checked by (Patient RN) Bhumi Casanova RN  -RR       Checked by (Witness) Ana Gilbert RN  -LR       Product Volume Infused (mL) 78 mL  -RR       Flush Volume (mL) 40 mL  -RR       Volume Dispensed (mL) --                [REMOVED] Product 05/27/22 0930 HPC, Apheresis    Product Details Product Release Date: 05/27/22 -AK Product Release Time: 0930 -LL Product Type: HPC, Apheresis  -AK DIN: [id number]  -AK Product Description Code: [id number]  -AK Volume Dispensed (mL): 82 mL  -AK Completion Date (RN to complete): 05/27/22  -RR Completion Time (RN to complete): 1207  -RR       Checked by (Patient RN) Bhumi Casanova RN  -RR       Checked by (Witness) Ana Rank RN  -LR       Product Volume Infused (mL) 82 mL  -RR       Flush Volume  (mL) 30 mL  -RR       Volume Dispensed (mL) --                [REMOVED] Product 05/27/22 0930 HPC, Apheresis    Product Details Product Release Date: 05/27/22  -AK Product Release Time: 0930  -LL Product Type: HPC, Apheresis  -AK DIN: [id number]  -AK Product Description Code: [id number]  -AK Volume Dispensed (mL): 82 mL  -AK Completion Date (RN to complete): 05/27/22  -RR Completion Time (RN to complete): 1236  -RR       Checked by (Patient RN) Bhumi Casanova RN  -RR       Checked by (Witness) Ana Gilbert RN  -LR       Product Volume Infused (mL) 82 mL  -RR       Flush Volume (mL) 40 mL  -RR       Volume Dispensed (mL) --                RN Documentation    Patient was premedicated as ordered yes  -RR       Line Type central line, left  PICC  -RR       Patient Stable Prior to Infusion yes  -RR       Time Infusion Started 1057  -RR       Checked by (Patient RN) --       Checked by (RN 2) --       Broken Bag? --       Immediate suspected transfusion reaction to the product --       Time Infusion Stopped --       Total Flush Volume (mL) --       Checked by (Witness) --       Date Infusion Started --       Date Infusion Stopped --       Volume Infused (mL) --       Total Volume Infused (cc) --                Patient tolerance of product infusion    Immediate suspected transfusion reaction to the product none  -RR       Did patient have prior history of similar signs/symptoms during this hospitalization? no  -RR       Symptoms during/after infusion --       Did the patient tolerate the infusion well yes  -RR       Medications and treatment for symptoms none  -RR       Did the symptoms resolve? --       Enter comments if clots, leaks, broken bag, infusion delays, other issues with bag/infusion --       Describe symptoms --             User Key  (r) = Recorded By, (t) = Taken By, (c) = Cosigned By    Initials Name Effective Dates    Adriana Kendall 11/10/15 -     LL Alia Alfonso 07/11/21 -     Ana Verdin RN  04/29/14 -     RR Bhumi Casanova RN 02/06/18 -                   Post-Infusion Evaluation:   Infusion Related Reaction: Grade 0 - none  Dyspnea: Grade 0 - none  Hypoxia: Grade 0 - not present  Fever: Grade 0 - afebrile  Chills: Grade 0 - none  Febrile Neutropenia: Grade 0 - not present  Sinus Bradycardia: Grade 0 - none  Hypertension: Grade 2 - stage 1 hypertension (systolic -159 mm Hg or diastolic BP 90-99 mm Hg); medical intervention indicated; recurrent or persistent (>/ 24 hours); symptomatic increase by >/ 20 mm Hg (diastolic) or to > 140/90 mm Hg if previously WNL; monotherapy indicated  Hypotension: Grade 0 - none  Chest Pain: Grade 0 - none  Bronchospasm: Grade 0 - none  Pain: Grade 0 - none  Rash: Grade 0 - None  Neurologic Specify: none    If this was a cord blood transplant, was more than one cord blood unit infused? no    Shanita Naylor PA-C

## 2022-05-28 NOTE — PLAN OF CARE
"      Afebrile. O2 stable on RA. BP hypertensive but remained within parameters. HR stable. Pt is up independent in room. Post transplant flush completed at 0100. PRN oxy given x 1 for chronic pain. Pt complained of some nausea around 0415, PRN compazine given. PRN Methocarbomal given x 1. Mag came back 2.0, replacement ordered and started with recheck in am. Phos is 2.2, replacement ordered with recheck also in am. No other replacements needed. Continue plan of care.               Problem: Plan of Care - These are the overarching goals to be used throughout the patient stay.    Goal: Plan of Care Review/Shift Note  Description: The Plan of Care Review/Shift note should be completed every shift.  The Outcome Evaluation is a brief statement about your assessment that the patient is improving, declining, or no change.  This information will be displayed automatically on your shift note.  Outcome: Ongoing, Progressing  Goal: Patient-Specific Goal (Individualized)  Description: You can add care plan individualizations to a care plan. Examples of Individualization might be:  \"Parent requests to be called daily at 9am for status\", \"I have a hard time hearing out of my right ear\", or \"Do not touch me to wake me up as it startles me\".  Outcome: Ongoing, Progressing  Goal: Absence of Hospital-Acquired Illness or Injury  Outcome: Ongoing, Progressing  Intervention: Identify and Manage Fall Risk  Recent Flowsheet Documentation  Taken 5/27/2022 2100 by Sarita Whiting, RN  Safety Promotion/Fall Prevention:    assistive device/personal items within reach    clutter free environment maintained    fall prevention program maintained    nonskid shoes/slippers when out of bed    patient and family education  Intervention: Prevent Skin Injury  Recent Flowsheet Documentation  Taken 5/27/2022 2100 by Sarita Whiting, RN  Body Position: position changed independently  Intervention: Prevent and Manage VTE (Venous Thromboembolism) " Risk  Recent Flowsheet Documentation  Taken 5/27/2022 2100 by Sarita Whiting RN  VTE Prevention/Management:    compression stockings off    SCDs (sequential compression devices) off  Activity Management: activity adjusted per tolerance  Goal: Optimal Comfort and Wellbeing  Outcome: Ongoing, Progressing  Goal: Readiness for Transition of Care  Outcome: Ongoing, Progressing     Problem: Adjustment to Transplant (Stem Cell/Bone Marrow Transplant)  Goal: Optimal Coping with Transplant  Outcome: Ongoing, Progressing     Problem: Bladder Irritation (Stem Cell/Bone Marrow Transplant)  Goal: Symptom-Free Urinary Elimination  Outcome: Ongoing, Progressing     Problem: Diarrhea (Stem Cell/Bone Marrow Transplant)  Goal: Diarrhea Symptom Control  Outcome: Ongoing, Progressing     Problem: Fatigue (Stem Cell/Bone Marrow Transplant)  Goal: Improved Activity Tolerance  Outcome: Ongoing, Progressing  Intervention: Promote Improved Energy  Recent Flowsheet Documentation  Taken 5/27/2022 2100 by Sarita Whiting RN  Activity Management: activity adjusted per tolerance     Problem: Hematologic Alteration (Stem Cell/Bone Marrow Transplant)  Goal: Blood Counts Within Acceptable Range  Outcome: Ongoing, Progressing  Intervention: Monitor and Manage Hematologic Symptoms  Recent Flowsheet Documentation  Taken 5/27/2022 2100 by Sarita Whiting RN  Medication Review/Management: medications reviewed     Problem: Hypersensitivity Reaction (Stem Cell/Bone Marrow Transplant)  Goal: Absence of Hypersensitivity Reaction  Outcome: Ongoing, Progressing     Problem: Infection (Stem Cell/Bone Marrow Transplant)  Goal: Absence of Infection  Outcome: Ongoing, Progressing     Problem: Mucositis (Stem Cell/Bone Marrow Transplant)  Goal: Improved Oral Mucous Membrane Health and Integrity  Outcome: Ongoing, Progressing  Intervention: Promote Oral Comfort and Health  Recent Flowsheet Documentation  Taken 5/27/2022 2100 by Sarita Whiting  RN  Oral Care: oral rinse provided     Problem: Nausea and Vomiting (Stem Cell/Bone Marrow Transplant)  Goal: Nausea and Vomiting Symptom Relief  Outcome: Ongoing, Progressing  Intervention: Prevent and Manage Nausea and Vomiting  Recent Flowsheet Documentation  Taken 5/27/2022 2100 by Sarita Whiting RN  Nausea/Vomiting Interventions: antiemetic     Problem: Nutrition Intake Altered (Stem Cell/Bone Marrow Transplant)  Goal: Optimal Nutrition Intake  Outcome: Ongoing, Progressing   Goal Outcome Evaluation:

## 2022-05-28 NOTE — PROGRESS NOTES
"  BMT Progress note  BMT Physician: Dr. 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     INTERIM HISTORY: Toelrated stem cell infusion well yesterday. She is newly nauseated this morning - has not vomited- worse this morning but started overnight. Would liek zofran to remain scheduled, will try prn compazine this morning. Stable chronic fatigue/pain. NO oral sores or infectious symptoms currently.      REVIEW OF SYSTEMS: Otherwise unremarkable other than what is noted in the Interim History.     PHYSICAL EXAM:   Vitals:  BP (!) 152/74   Pulse 80   Temp 97.6  F (36.4  C) (Oral)   Resp 18   Ht 1.57 m (5' 1.81\")   Wt 59.4 kg (130 lb 14.4 oz)   SpO2 97%   BMI 24.09 kg/m      General Appearance: NAD  HEENT: large pupils; ERRLA; sclera anicteric. Moist mucus membranes, no ulcerations.  CV: RRR. no murmur or rub.   RESP: CTA bilaterally; no rales or wheezes.   GI: thin; +BS, soft, nontender, nondistended.   EXT: No edema. No cyanosis/clubbing.   SKIN: no lesions or rash  NEURO: A&O x3   PSYCH: Appropriate affect   VASCULAR ACCESS: R port, not accessed; PICC c/d/i    ROUTINE LABS:    Lab Results   Component Value Date    WBC 6.5 05/28/2022    ANEU 5.3 02/25/2022    HGB 9.4 (L) 05/28/2022    HCT 29.0 (L) 05/28/2022     (L) 05/28/2022     05/28/2022    POTASSIUM 4.0 05/28/2022    CHLORIDE 112 (H) 05/28/2022    CO2 26 05/28/2022     (H) 05/28/2022    BUN 20 05/28/2022    CR 0.63 05/28/2022    MAG 2.0 05/28/2022    INR 1.28 (H) 05/25/2022          ASSESSMENT AND PLAN:   Libby Grimaldo is a 71 year old female, currently day +1 of Auto 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      BMT/IEC PROTOCOL for 2016-35  - Chemo protocol: HD Melphalan 140mg/m2  Day -1: rest day, 5/26  Day 0: Cell dose: 3.94x10^6 (s/p cytoxan chemo priming 2/1/2022 and subsequent stem cell collection).   - Restaging plan: per " protocol  Day+5 gcsf starts (6/1/22).      HEME/COAG  - Risk of cytopenias due to chemotherapy and radiation  - Transfusion parameters: hemoglobin <7g/dL, platelets <10k  - Relevant thrombosis or bleeding history:   2/15: new non-occlusive LUE DVT.    - on xarelto 20mg daily. Given line associated DVT, consider stopping xeralto when plts <50k.     IMMUNOCOMPROMISED  - Relevant infection history: Recurrent PNA and UTIs   - Vaccination status: Influenza vaccination after day +60, COVID vaccination after day +100, followed by remaining vaccinations at 12, 14, 24, and 26 months.  - Prophylaxis plan: ACV 800mg PO BID, levaquin 250mg daily, fungal prophy: fluconazole 200mg po daily.   - Active infections: None     CARDIOVASCULAR  - Risk of cardiomyopathy:  Baseline EF 56%  - known hyperlipidemia- hold crestor until day +60 post transplant.   Hypertension: continue norvasc 5mg PO daily.   History of Afib - now in NSR. On Metoprolol 25mg PO daily.      RESPIRATORY  - hx of COPD, continue breo-ellipa inhaler   - monitor vitals while inpatient.      GI/NUTRITION  - Ulcer prophylaxis: protonix 40mg daily.   - Risk of nausea/vomiting due to chemo/radiation: prophy dex and zofran. Prn ativan and compazine available.  - Risk of malnutrition: moderate given systemic chemotherapy.      RENAL/ELECTROLYTES/  - Risk of renal injury: IV hydration 125cc/hr 2 hours pre and 12 hours post transplant. Monitor for DMSO toxicity.   - Electrolyte management: replace per sliding scale     DIABETES/ENDOCRINE  - Risk of steroid-induced hyperglyemia: Monitor BG is not higher.      MUSCULOSKELETAL/FRAILTY  - Baseline Frailty Score: 4  - Patient with substantial risk of sarcopenia  - Daily PT/OT as needed while inpatient  - Cancer Rehab as needed outpatient     SYMPTOM MANAGEMENT  - Nausea from chemo/radiation: Prochlorperazine, ondansetron, lorazepam.  - # Pain Assessment:  - Libby is experiencing pain due to chronic pain. Pain management was  discussed with Libby and her spouse and the plan was created in a collaborative fashion.  Libby's response to the current recommendations: engaged  - Continue suboxone and prn oxycodone.   - Pain medicine consult- no change to regimen currently.     SOCIAL DETERMINANTS  - Caregiver: Tres. Plan to remain admitted through engraftment, although will discuss this with pt today    Anna Naylor PA-C  [phone]

## 2022-05-28 NOTE — PLAN OF CARE
"/65 (BP Location: Right arm)   Pulse 87   Temp 98.1  F (36.7  C) (Oral)   Resp 16   Ht 1.57 m (5' 1.81\")   Wt 59.4 kg (130 lb 14.4 oz)   SpO2 99%   BMI 24.09 kg/m      Afebrile; VSS on RA. Chronic back pain controlled on scheduled Suboxone; PRN Oxycodone x1 with good relief. Nausea controlled throughout shift with PRN Ativan x1 and Zofran added back on a schedule this AM. Phos replaced this AM; routine recheck with AM labs. Pt requires encouragement with PO intake. Worked with PT this AM. Up independently. Continue POC.     Problem: Plan of Care - These are the overarching goals to be used throughout the patient stay.    Goal: Absence of Hospital-Acquired Illness or Injury  Intervention: Identify and Manage Fall Risk  Recent Flowsheet Documentation  Taken 5/28/2022 0900 by Therese Quiros RN  Safety Promotion/Fall Prevention:    assistive device/personal items within reach    clutter free environment maintained    fall prevention program maintained    nonskid shoes/slippers when out of bed    patient and family education    safety round/check completed  Intervention: Prevent Skin Injury  Recent Flowsheet Documentation  Taken 5/28/2022 0900 by Therese Quiros RN  Body Position: position changed independently  Intervention: Prevent and Manage VTE (Venous Thromboembolism) Risk  Recent Flowsheet Documentation  Taken 5/28/2022 0900 by Therese Quiros RN  Activity Management: activity adjusted per tolerance     Problem: Fatigue (Stem Cell/Bone Marrow Transplant)  Goal: Improved Activity Tolerance  Intervention: Promote Improved Energy  Recent Flowsheet Documentation  Taken 5/28/2022 0900 by Therese Quiros RN  Activity Management: activity adjusted per tolerance     Problem: Hematologic Alteration (Stem Cell/Bone Marrow Transplant)  Goal: Blood Counts Within Acceptable Range  Intervention: Monitor and Manage Hematologic Symptoms  Recent Flowsheet Documentation  Taken 5/28/2022 0900 by Ishaan" STEFFANIE Saleh  Medication Review/Management:    medications reviewed    high-risk medications identified     Problem: Mucositis (Stem Cell/Bone Marrow Transplant)  Goal: Improved Oral Mucous Membrane Health and Integrity  Intervention: Promote Oral Comfort and Health  Recent Flowsheet Documentation  Taken 5/28/2022 0900 by Therese Quiros RN  Oral Care: oral rinse provided     Problem: Nausea and Vomiting (Stem Cell/Bone Marrow Transplant)  Goal: Nausea and Vomiting Symptom Relief  Intervention: Prevent and Manage Nausea and Vomiting  Recent Flowsheet Documentation  Taken 5/28/2022 0900 by Therese Quiros RN  Nausea/Vomiting Interventions: antiemetic

## 2022-05-29 ENCOUNTER — APPOINTMENT (OUTPATIENT)
Dept: OCCUPATIONAL THERAPY | Facility: CLINIC | Age: 72
DRG: 016 | End: 2022-05-29
Attending: INTERNAL MEDICINE
Payer: MEDICARE

## 2022-05-29 LAB
ANION GAP SERPL CALCULATED.3IONS-SCNC: 5 MMOL/L (ref 3–14)
BASOPHILS # BLD AUTO: 0 10E3/UL (ref 0–0.2)
BASOPHILS NFR BLD AUTO: 0 %
BUN SERPL-MCNC: 12 MG/DL (ref 7–30)
CALCIUM SERPL-MCNC: 7.1 MG/DL (ref 8.5–10.1)
CHLORIDE BLD-SCNC: 110 MMOL/L (ref 94–109)
CO2 SERPL-SCNC: 28 MMOL/L (ref 20–32)
CREAT SERPL-MCNC: 0.7 MG/DL (ref 0.52–1.04)
EOSINOPHIL # BLD AUTO: 0 10E3/UL (ref 0–0.7)
EOSINOPHIL NFR BLD AUTO: 0 %
ERYTHROCYTE [DISTWIDTH] IN BLOOD BY AUTOMATED COUNT: 12.5 % (ref 10–15)
GFR SERPL CREATININE-BSD FRML MDRD: >90 ML/MIN/1.73M2
GLUCOSE BLD-MCNC: 94 MG/DL (ref 70–99)
HCT VFR BLD AUTO: 29.1 % (ref 35–47)
HGB BLD-MCNC: 9.4 G/DL (ref 11.7–15.7)
IMM GRANULOCYTES # BLD: 0 10E3/UL
IMM GRANULOCYTES NFR BLD: 0 %
LYMPHOCYTES # BLD AUTO: 0.1 10E3/UL (ref 0.8–5.3)
LYMPHOCYTES NFR BLD AUTO: 2 %
MAGNESIUM SERPL-MCNC: 2.2 MG/DL (ref 1.6–2.3)
MCH RBC QN AUTO: 32.5 PG (ref 26.5–33)
MCHC RBC AUTO-ENTMCNC: 32.3 G/DL (ref 31.5–36.5)
MCV RBC AUTO: 101 FL (ref 78–100)
MONOCYTES # BLD AUTO: 0.1 10E3/UL (ref 0–1.3)
MONOCYTES NFR BLD AUTO: 2 %
NEUTROPHILS # BLD AUTO: 4.2 10E3/UL (ref 1.6–8.3)
NEUTROPHILS NFR BLD AUTO: 96 %
NRBC # BLD AUTO: 0 10E3/UL
NRBC BLD AUTO-RTO: 0 /100
PHOSPHATE SERPL-MCNC: 2 MG/DL (ref 2.5–4.5)
PLATELET # BLD AUTO: 93 10E3/UL (ref 150–450)
POTASSIUM BLD-SCNC: 3.6 MMOL/L (ref 3.4–5.3)
RBC # BLD AUTO: 2.89 10E6/UL (ref 3.8–5.2)
SODIUM SERPL-SCNC: 143 MMOL/L (ref 133–144)
WBC # BLD AUTO: 4.3 10E3/UL (ref 4–11)

## 2022-05-29 PROCEDURE — 97530 THERAPEUTIC ACTIVITIES: CPT | Mod: GO

## 2022-05-29 PROCEDURE — 250N000013 HC RX MED GY IP 250 OP 250 PS 637: Performed by: PHYSICIAN ASSISTANT

## 2022-05-29 PROCEDURE — 99233 SBSQ HOSP IP/OBS HIGH 50: CPT | Mod: FS | Performed by: PHYSICIAN ASSISTANT

## 2022-05-29 PROCEDURE — 97110 THERAPEUTIC EXERCISES: CPT | Mod: GO

## 2022-05-29 PROCEDURE — 250N000011 HC RX IP 250 OP 636: Performed by: PHYSICIAN ASSISTANT

## 2022-05-29 PROCEDURE — 85025 COMPLETE CBC W/AUTO DIFF WBC: CPT | Performed by: PHYSICIAN ASSISTANT

## 2022-05-29 PROCEDURE — 84100 ASSAY OF PHOSPHORUS: CPT | Performed by: INTERNAL MEDICINE

## 2022-05-29 PROCEDURE — 87081 CULTURE SCREEN ONLY: CPT | Performed by: PHYSICIAN ASSISTANT

## 2022-05-29 PROCEDURE — 250N000011 HC RX IP 250 OP 636: Performed by: INTERNAL MEDICINE

## 2022-05-29 PROCEDURE — 250N000009 HC RX 250: Performed by: INTERNAL MEDICINE

## 2022-05-29 PROCEDURE — 250N000013 HC RX MED GY IP 250 OP 250 PS 637: Performed by: INTERNAL MEDICINE

## 2022-05-29 PROCEDURE — 87493 C DIFF AMPLIFIED PROBE: CPT | Performed by: STUDENT IN AN ORGANIZED HEALTH CARE EDUCATION/TRAINING PROGRAM

## 2022-05-29 PROCEDURE — 80048 BASIC METABOLIC PNL TOTAL CA: CPT | Performed by: PHYSICIAN ASSISTANT

## 2022-05-29 PROCEDURE — 83735 ASSAY OF MAGNESIUM: CPT | Performed by: INTERNAL MEDICINE

## 2022-05-29 PROCEDURE — 206N000001 HC R&B BMT UMMC

## 2022-05-29 PROCEDURE — 258N000003 HC RX IP 258 OP 636: Performed by: INTERNAL MEDICINE

## 2022-05-29 RX ORDER — AMOXICILLIN 250 MG
1-2 CAPSULE ORAL 2 TIMES DAILY PRN
Status: DISCONTINUED | OUTPATIENT
Start: 2022-05-29 | End: 2022-05-30

## 2022-05-29 RX ORDER — POTASSIUM CHLORIDE 7.45 MG/ML
10 INJECTION INTRAVENOUS ONCE
Status: COMPLETED | OUTPATIENT
Start: 2022-05-29 | End: 2022-05-29

## 2022-05-29 RX ADMIN — ACYCLOVIR 800 MG: 800 TABLET ORAL at 19:02

## 2022-05-29 RX ADMIN — OXYCODONE HYDROCHLORIDE 10 MG: 10 TABLET ORAL at 21:57

## 2022-05-29 RX ADMIN — ONDANSETRON HYDROCHLORIDE 8 MG: 8 TABLET, FILM COATED ORAL at 03:00

## 2022-05-29 RX ADMIN — Medication 5 ML: at 14:31

## 2022-05-29 RX ADMIN — ACYCLOVIR 800 MG: 800 TABLET ORAL at 08:05

## 2022-05-29 RX ADMIN — PROCHLORPERAZINE MALEATE 5 MG: 5 TABLET ORAL at 21:57

## 2022-05-29 RX ADMIN — AMLODIPINE BESYLATE 10 MG: 5 TABLET ORAL at 08:06

## 2022-05-29 RX ADMIN — VENLAFAXINE 75 MG: 75 TABLET ORAL at 19:02

## 2022-05-29 RX ADMIN — Medication 1000 UNITS: at 08:05

## 2022-05-29 RX ADMIN — VENLAFAXINE 75 MG: 75 TABLET ORAL at 08:06

## 2022-05-29 RX ADMIN — BUPRENORPHINE AND NALOXONE 1 FILM: 8; 2 FILM, SOLUBLE BUCCAL; SUBLINGUAL at 08:04

## 2022-05-29 RX ADMIN — RIVAROXABAN 20 MG: 20 TABLET, FILM COATED ORAL at 08:05

## 2022-05-29 RX ADMIN — POTASSIUM CHLORIDE 10 MEQ: 7.46 INJECTION, SOLUTION INTRAVENOUS at 06:38

## 2022-05-29 RX ADMIN — LEVOTHYROXINE SODIUM 112 MCG: 0.11 TABLET ORAL at 08:06

## 2022-05-29 RX ADMIN — VENLAFAXINE 75 MG: 75 TABLET ORAL at 14:21

## 2022-05-29 RX ADMIN — Medication 5 ML: at 10:27

## 2022-05-29 RX ADMIN — SUCRALFATE 1 G: 1 SUSPENSION ORAL at 21:57

## 2022-05-29 RX ADMIN — PANTOPRAZOLE SODIUM 40 MG: 40 TABLET, DELAYED RELEASE ORAL at 08:04

## 2022-05-29 RX ADMIN — PROCHLORPERAZINE EDISYLATE 5 MG: 5 INJECTION INTRAMUSCULAR; INTRAVENOUS at 10:27

## 2022-05-29 RX ADMIN — POTASSIUM PHOSPHATE, MONOBASIC AND POTASSIUM PHOSPHATE, DIBASIC 9 MMOL: 224; 236 INJECTION, SOLUTION, CONCENTRATE INTRAVENOUS at 10:16

## 2022-05-29 RX ADMIN — Medication 5 ML: at 08:10

## 2022-05-29 RX ADMIN — GUAIFENESIN 1200 MG: 600 TABLET ORAL at 19:02

## 2022-05-29 RX ADMIN — DOCUSATE SODIUM 50 MG AND SENNOSIDES 8.6 MG 2 TABLET: 8.6; 5 TABLET, FILM COATED ORAL at 10:16

## 2022-05-29 RX ADMIN — Medication 5 ML: at 03:01

## 2022-05-29 RX ADMIN — LORAZEPAM 0.5 MG: 0.5 TABLET ORAL at 15:08

## 2022-05-29 RX ADMIN — GUAIFENESIN 1200 MG: 600 TABLET ORAL at 08:07

## 2022-05-29 RX ADMIN — LEVOFLOXACIN 250 MG: 250 TABLET, FILM COATED ORAL at 10:17

## 2022-05-29 RX ADMIN — ONDANSETRON HYDROCHLORIDE 8 MG: 8 TABLET, FILM COATED ORAL at 18:28

## 2022-05-29 RX ADMIN — SUCRALFATE 1 G: 1 SUSPENSION ORAL at 08:09

## 2022-05-29 RX ADMIN — OXYCODONE HYDROCHLORIDE 10 MG: 10 TABLET ORAL at 14:27

## 2022-05-29 RX ADMIN — FLUCONAZOLE 200 MG: 200 TABLET ORAL at 08:05

## 2022-05-29 RX ADMIN — BUPRENORPHINE AND NALOXONE 1 FILM: 8; 2 FILM, SOLUBLE BUCCAL; SUBLINGUAL at 19:02

## 2022-05-29 RX ADMIN — ONDANSETRON HYDROCHLORIDE 8 MG: 8 TABLET, FILM COATED ORAL at 08:04

## 2022-05-29 RX ADMIN — SUCRALFATE 1 G: 1 SUSPENSION ORAL at 10:30

## 2022-05-29 RX ADMIN — OXYCODONE HYDROCHLORIDE 10 MG: 10 TABLET ORAL at 06:48

## 2022-05-29 RX ADMIN — METOPROLOL SUCCINATE 25 MG: 25 TABLET, EXTENDED RELEASE ORAL at 08:05

## 2022-05-29 ASSESSMENT — ACTIVITIES OF DAILY LIVING (ADL)
ADLS_ACUITY_SCORE: 27

## 2022-05-29 NOTE — PLAN OF CARE
"      Afebrile. O2 stable on RA. BP hypertensive but remains within parameters. HR stable. Pt is up independent in room. Continues to have chronic pain, PRN oxy x 2 along with schedule suboxone. Nausea is intermittent but scheduled Zofran appears to be providing relief. Appetite and oral intake are reduced, only had a few bites of chili for dinner and a cookie. Pt voiding appears reduced, only had about 650 ml out overnight and is syeda in color. Pt has not had a BM since 5/25 but is refusing any bowel regimen. Supplemental nutrition drink at bedside for a snack. PRN ativan given x 1 for sleep but unfortunately pt was not able to fall asleep and was up until about 0400. K+ came back 3.6, replacement ordered and started with recheck in am. Phos is 2.0, replacement ordered with recheck also in am. No other replacements needed. Continue plan of care.               Problem: Plan of Care - These are the overarching goals to be used throughout the patient stay.    Goal: Plan of Care Review/Shift Note  Description: The Plan of Care Review/Shift note should be completed every shift.  The Outcome Evaluation is a brief statement about your assessment that the patient is improving, declining, or no change.  This information will be displayed automatically on your shift note.  Outcome: Ongoing, Progressing  Goal: Patient-Specific Goal (Individualized)  Description: You can add care plan individualizations to a care plan. Examples of Individualization might be:  \"Parent requests to be called daily at 9am for status\", \"I have a hard time hearing out of my right ear\", or \"Do not touch me to wake me up as it startles me\".  Outcome: Ongoing, Progressing  Goal: Absence of Hospital-Acquired Illness or Injury  Outcome: Ongoing, Progressing  Intervention: Identify and Manage Fall Risk  Recent Flowsheet Documentation  Taken 5/28/2022 2140 by Sarita Whiting RN  Safety Promotion/Fall Prevention:    assistive device/personal items " within reach    clutter free environment maintained    fall prevention program maintained    nonskid shoes/slippers when out of bed    patient and family education  Intervention: Prevent Skin Injury  Recent Flowsheet Documentation  Taken 5/28/2022 2140 by Sarita Whiting RN  Body Position: position changed independently  Intervention: Prevent and Manage VTE (Venous Thromboembolism) Risk  Recent Flowsheet Documentation  Taken 5/28/2022 2140 by Sarita Whiting RN  VTE Prevention/Management: compression stockings off  Activity Management:    activity adjusted per tolerance    activity encouraged  Goal: Optimal Comfort and Wellbeing  Outcome: Ongoing, Progressing  Goal: Readiness for Transition of Care  Outcome: Ongoing, Progressing     Problem: Adjustment to Transplant (Stem Cell/Bone Marrow Transplant)  Goal: Optimal Coping with Transplant  Outcome: Ongoing, Progressing     Problem: Bladder Irritation (Stem Cell/Bone Marrow Transplant)  Goal: Symptom-Free Urinary Elimination  Outcome: Ongoing, Progressing     Problem: Diarrhea (Stem Cell/Bone Marrow Transplant)  Goal: Diarrhea Symptom Control  Outcome: Ongoing, Progressing     Problem: Fatigue (Stem Cell/Bone Marrow Transplant)  Goal: Improved Activity Tolerance  Outcome: Ongoing, Progressing  Intervention: Promote Improved Energy  Recent Flowsheet Documentation  Taken 5/28/2022 2140 by Sarita Whiting RN  Activity Management:    activity adjusted per tolerance    activity encouraged     Problem: Hematologic Alteration (Stem Cell/Bone Marrow Transplant)  Goal: Blood Counts Within Acceptable Range  Outcome: Ongoing, Progressing  Intervention: Monitor and Manage Hematologic Symptoms  Recent Flowsheet Documentation  Taken 5/28/2022 2140 by Sarita Whiting RN  Medication Review/Management: medications reviewed     Problem: Hypersensitivity Reaction (Stem Cell/Bone Marrow Transplant)  Goal: Absence of Hypersensitivity Reaction  Outcome: Ongoing,  Progressing     Problem: Infection (Stem Cell/Bone Marrow Transplant)  Goal: Absence of Infection  Outcome: Ongoing, Progressing     Problem: Mucositis (Stem Cell/Bone Marrow Transplant)  Goal: Improved Oral Mucous Membrane Health and Integrity  Outcome: Ongoing, Progressing  Intervention: Promote Oral Comfort and Health  Recent Flowsheet Documentation  Taken 5/28/2022 2140 by Sarita Whiting, RN  Oral Care:    oral rinse provided    teeth brushed     Problem: Nausea and Vomiting (Stem Cell/Bone Marrow Transplant)  Goal: Nausea and Vomiting Symptom Relief  Outcome: Ongoing, Progressing  Intervention: Prevent and Manage Nausea and Vomiting  Recent Flowsheet Documentation  Taken 5/28/2022 2140 by Sarita Whiting, RN  Nausea/Vomiting Interventions: antiemetic     Problem: Nutrition Intake Altered (Stem Cell/Bone Marrow Transplant)  Goal: Optimal Nutrition Intake  Outcome: Ongoing, Progressing   Goal Outcome Evaluation:

## 2022-05-29 NOTE — PROGRESS NOTES
"  BMT Progress note  BMT Physician: Dr. 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     INTERIM HISTORY: Libby's nausea is better controlled. She is eating and drinking okay- though maybe slightly less than usual. SHe hasn't had been BM since 5/25 but hopeful BM will come today- encouraged senna but would like to hold off until tomorrow. No infectous symptoms, no mouth sores.       REVIEW OF SYSTEMS: Otherwise unremarkable other than what is noted in the Interim History.     PHYSICAL EXAM:   Vitals:  /71 (BP Location: Right arm)   Pulse 87   Temp 98.5  F (36.9  C) (Oral)   Resp 16   Ht 1.57 m (5' 1.81\")   Wt 59.4 kg (131 lb)   SpO2 95%   BMI 24.11 kg/m      General Appearance: NAD  HEENT: large pupils; ERRLA; sclera anicteric. Moist mucus membranes, no ulcerations.  CV: RRR. no murmur or rub.   RESP: CTA bilaterally; no rales or wheezes.   GI: thin; +BS, soft, nontender, nondistended.   EXT: No edema. No cyanosis/clubbing.   SKIN: no lesions or rash  NEURO: A&O x3   PSYCH: Appropriate affect   VASCULAR ACCESS: R port, not accessed; PICC c/d/i    ROUTINE LABS:    Lab Results   Component Value Date    WBC 4.3 05/29/2022    ANEU 5.3 02/25/2022    HGB 9.4 (L) 05/29/2022    HCT 29.1 (L) 05/29/2022    PLT 93 (L) 05/29/2022     05/29/2022    POTASSIUM 3.6 05/29/2022    CHLORIDE 110 (H) 05/29/2022    CO2 28 05/29/2022    GLC 94 05/29/2022    BUN 12 05/29/2022    CR 0.70 05/29/2022    MAG 2.2 05/29/2022    INR 1.28 (H) 05/25/2022          ASSESSMENT AND PLAN:   Libby Grimaldo is a 71 year old female, currently day +2 of Auto 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      BMT/IEC PROTOCOL for 2016-35  - Chemo protocol: HD Melphalan 140mg/m2  Day -1: rest day, 5/26  Day 0: Cell dose: 3.94x10^6 (s/p cytoxan chemo priming 2/1/2022 and subsequent stem cell collection).   - Restaging plan: per protocol  Day+5 gcsf " starts (6/1/22).      HEME/COAG  - Risk of cytopenias due to chemotherapy and radiation  - Transfusion parameters: hemoglobin <7g/dL, platelets <10k  - Relevant thrombosis or bleeding history:   2/15: new non-occlusive LUE DVT.    - on xarelto 20mg daily. Given line associated DVT, consider stopping xeralto when plts <50k. Plts 93 today-- continue xeralto     IMMUNOCOMPROMISED  - Relevant infection history: Recurrent PNA and UTIs   - Vaccination status: Influenza vaccination after day +60, COVID vaccination after day +100, followed by remaining vaccinations at 12, 14, 24, and 26 months.  - Prophylaxis plan: ACV 800mg PO BID, levaquin 250mg daily, fungal prophy: fluconazole 200mg po daily.   - Active infections: None     CARDIOVASCULAR  - Risk of cardiomyopathy:  Baseline EF 56%  - known hyperlipidemia- hold crestor until day +60 post transplant.   Hypertension: admitted norvasc 5mg PO daily., 5/28  Increased to 10mg PO dialy- BP improving. Monitor.   History of Afib - now in NSR. On Metoprolol 25mg PO daily.      RESPIRATORY  - hx of COPD, continue breo-ellipa inhaler   - monitor vitals while inpatient.      GI/NUTRITION  - Ulcer prophylaxis: protonix 40mg daily.   - Risk of nausea/vomiting due to chemo/radiation: prophy dex and zofran. Prn ativan and compazine available.  -Narcotic/zofran induced constipation- recommend senna 1-2 BID, ordered prn currently per patient request.   - Risk of malnutrition: moderate given systemic chemotherapy.      RENAL/ELECTROLYTES/  - Risk of renal injury: IV hydration 125cc/hr 2 hours pre and 12 hours post transplant. Monitor for DMSO toxicity.   - Electrolyte management: replace per sliding scale     DIABETES/ENDOCRINE  - Risk of steroid-induced hyperglyemia: Monitor BG is not higher.      MUSCULOSKELETAL/FRAILTY  - Baseline Frailty Score: 4  - Patient with substantial risk of sarcopenia  - Daily PT/OT as needed while inpatient  - Cancer Rehab as needed outpatient     SYMPTOM  MANAGEMENT  - Nausea from chemo/radiation: Prochlorperazine, ondansetron, lorazepam.  - # Pain Assessment:  - Libby is experiencing pain due to chronic pain. Pain management was discussed with Libby and her spouse and the plan was created in a collaborative fashion.  Libby's response to the current recommendations: engaged  - Continue suboxone and prn oxycodone.   - Pain medicine consult- no change to regimen currently. Appreciate their follow along with us, anticipate increased pain issues next week when neutropenia occurs.     SOCIAL DETERMINANTS  - Caregiver: Tres. Plan to remain admitted through engraftment, although will discuss this with pt today    DAVID Trinh-C  [phone]

## 2022-05-29 NOTE — PLAN OF CARE
"/72 (BP Location: Right arm)   Pulse 75   Temp 98.5  F (36.9  C) (Oral)   Resp 16   Ht 1.57 m (5' 1.81\")   Wt 59.4 kg (131 lb)   SpO2 95%   BMI 24.11 kg/m      Afebrile; VSS on RA. A&Ox4, though forgetful and mumbling at times. PRN Oxycodone x1 for chronic pain with relief. Refused afternoon Suboxone. Persistent nausea despite scheduled Zofran and PRN Compazine x1. Short-lived relief with PRN Ativan x1. Poor appetite, but eating small meals throughout the day. No emesis during shift. Pt had a formed BM this afternoon after receiving Senna, followed by 3 loose stools. MD paged at pt request for C.diff testing and PRN antidiarrheal; awaiting response. Up independently in room. Worked with PT this afternoon. Pt and  inquiring about possibility of discharge tomorrow.     Problem: Nausea and Vomiting (Stem Cell/Bone Marrow Transplant)  Goal: Nausea and Vomiting Symptom Relief  Outcome: Ongoing, Not Progressing  Intervention: Prevent and Manage Nausea and Vomiting  Recent Flowsheet Documentation  Taken 5/29/2022 0900 by Therese Quiros RN  Nausea/Vomiting Interventions: antiemetic     Problem: Nutrition Intake Altered (Stem Cell/Bone Marrow Transplant)  Goal: Optimal Nutrition Intake  Outcome: Ongoing, Not Progressing     Problem: Plan of Care - These are the overarching goals to be used throughout the patient stay.    Goal: Plan of Care Review/Shift Note  Description: The Plan of Care Review/Shift note should be completed every shift.  The Outcome Evaluation is a brief statement about your assessment that the patient is improving, declining, or no change.  This information will be displayed automatically on your shift note.  Outcome: Ongoing, Progressing  Flowsheets (Taken 5/29/2022 9727)  Plan of Care Reviewed With:    patient    caregiver  Overall Patient Progress: no change  Goal: Patient-Specific Goal (Individualized)  Description: You can add care plan individualizations to a care plan. " "Examples of Individualization might be:  \"Parent requests to be called daily at 9am for status\", \"I have a hard time hearing out of my right ear\", or \"Do not touch me to wake me up as it startles me\".  Outcome: Ongoing, Progressing  Goal: Absence of Hospital-Acquired Illness or Injury  Outcome: Ongoing, Progressing  Intervention: Identify and Manage Fall Risk  Recent Flowsheet Documentation  Taken 5/29/2022 0900 by Therese Quiros RN  Safety Promotion/Fall Prevention:    assistive device/personal items within reach    clutter free environment maintained    fall prevention program maintained    nonskid shoes/slippers when out of bed    patient and family education    room organization consistent    safety round/check completed  Intervention: Prevent Skin Injury  Recent Flowsheet Documentation  Taken 5/29/2022 0900 by Therese Quiros RN  Body Position: position changed independently  Intervention: Prevent and Manage VTE (Venous Thromboembolism) Risk  Recent Flowsheet Documentation  Taken 5/29/2022 0900 by Therese Quiros RN  Activity Management:    activity adjusted per tolerance    activity encouraged  Goal: Optimal Comfort and Wellbeing  Outcome: Ongoing, Progressing  Intervention: Monitor Pain and Promote Comfort  Recent Flowsheet Documentation  Taken 5/29/2022 0900 by Therese Quiros RN  Pain Management Interventions:    medication (see MAR)    rest  Goal: Readiness for Transition of Care  Outcome: Ongoing, Progressing     Problem: Adjustment to Transplant (Stem Cell/Bone Marrow Transplant)  Goal: Optimal Coping with Transplant  Outcome: Ongoing, Progressing     Problem: Bladder Irritation (Stem Cell/Bone Marrow Transplant)  Goal: Symptom-Free Urinary Elimination  Outcome: Ongoing, Progressing  Intervention: Prevent or Manage Bladder Irritation  Recent Flowsheet Documentation  Taken 5/29/2022 0900 by Therese Quiros RN  Pain Management Interventions:    medication (see MAR)    rest     Problem: Diarrhea " (Stem Cell/Bone Marrow Transplant)  Goal: Diarrhea Symptom Control  Outcome: Ongoing, Progressing     Problem: Fatigue (Stem Cell/Bone Marrow Transplant)  Goal: Improved Activity Tolerance  Outcome: Ongoing, Progressing  Intervention: Promote Improved Energy  Recent Flowsheet Documentation  Taken 5/29/2022 0900 by Therese Quiros RN  Activity Management:    activity adjusted per tolerance    activity encouraged     Problem: Hematologic Alteration (Stem Cell/Bone Marrow Transplant)  Goal: Blood Counts Within Acceptable Range  Outcome: Ongoing, Progressing  Intervention: Monitor and Manage Hematologic Symptoms  Recent Flowsheet Documentation  Taken 5/29/2022 0900 by Therese Quiros RN  Medication Review/Management:    medications reviewed    high-risk medications identified     Problem: Hypersensitivity Reaction (Stem Cell/Bone Marrow Transplant)  Goal: Absence of Hypersensitivity Reaction  Outcome: Ongoing, Progressing     Problem: Infection (Stem Cell/Bone Marrow Transplant)  Goal: Absence of Infection  Outcome: Ongoing, Progressing     Problem: Mucositis (Stem Cell/Bone Marrow Transplant)  Goal: Improved Oral Mucous Membrane Health and Integrity  Outcome: Ongoing, Progressing  Intervention: Promote Oral Comfort and Health  Recent Flowsheet Documentation  Taken 5/29/2022 0900 by Therese Quiros RN  Oral Care:    oral rinse provided    teeth brushed

## 2022-05-29 NOTE — PROVIDER NOTIFICATION
MD paged at [phone] re:     Pt had 3 loose/watery stools in the past hour. C.diff testing and imodium?

## 2022-05-30 LAB
ALBUMIN SERPL-MCNC: 2.6 G/DL (ref 3.4–5)
ALP SERPL-CCNC: 131 U/L (ref 40–150)
ALT SERPL W P-5'-P-CCNC: 38 U/L (ref 0–50)
ANION GAP SERPL CALCULATED.3IONS-SCNC: 4 MMOL/L (ref 3–14)
AST SERPL W P-5'-P-CCNC: 50 U/L (ref 0–45)
BASOPHILS # BLD AUTO: 0 10E3/UL (ref 0–0.2)
BASOPHILS NFR BLD AUTO: 0 %
BILIRUB DIRECT SERPL-MCNC: 0.2 MG/DL (ref 0–0.2)
BILIRUB SERPL-MCNC: 0.6 MG/DL (ref 0.2–1.3)
BUN SERPL-MCNC: 11 MG/DL (ref 7–30)
C DIFF TOX B STL QL: NEGATIVE
CA-I BLD-MCNC: 3.9 MG/DL (ref 4.4–5.2)
CALCIUM SERPL-MCNC: 7 MG/DL (ref 8.5–10.1)
CHLORIDE BLD-SCNC: 112 MMOL/L (ref 94–109)
CO2 SERPL-SCNC: 26 MMOL/L (ref 20–32)
CREAT SERPL-MCNC: 0.66 MG/DL (ref 0.52–1.04)
EOSINOPHIL # BLD AUTO: 0 10E3/UL (ref 0–0.7)
EOSINOPHIL NFR BLD AUTO: 1 %
ERYTHROCYTE [DISTWIDTH] IN BLOOD BY AUTOMATED COUNT: 12.5 % (ref 10–15)
GFR SERPL CREATININE-BSD FRML MDRD: >90 ML/MIN/1.73M2
GLUCOSE BLD-MCNC: 85 MG/DL (ref 70–99)
HCT VFR BLD AUTO: 28.5 % (ref 35–47)
HGB BLD-MCNC: 9.3 G/DL (ref 11.7–15.7)
IMM GRANULOCYTES # BLD: 0 10E3/UL
IMM GRANULOCYTES NFR BLD: 1 %
INR PPP: 1.4 (ref 0.85–1.15)
LYMPHOCYTES # BLD AUTO: 0 10E3/UL (ref 0.8–5.3)
LYMPHOCYTES NFR BLD AUTO: 1 %
MAGNESIUM SERPL-MCNC: 2 MG/DL (ref 1.6–2.3)
MCH RBC QN AUTO: 32.7 PG (ref 26.5–33)
MCHC RBC AUTO-ENTMCNC: 32.6 G/DL (ref 31.5–36.5)
MCV RBC AUTO: 100 FL (ref 78–100)
MONOCYTES # BLD AUTO: 0 10E3/UL (ref 0–1.3)
MONOCYTES NFR BLD AUTO: 1 %
NEUTROPHILS # BLD AUTO: 2 10E3/UL (ref 1.6–8.3)
NEUTROPHILS NFR BLD AUTO: 96 %
NRBC # BLD AUTO: 0 10E3/UL
NRBC BLD AUTO-RTO: 0 /100
PHOSPHATE SERPL-MCNC: 2 MG/DL (ref 2.5–4.5)
PLATELET # BLD AUTO: 69 10E3/UL (ref 150–450)
POTASSIUM BLD-SCNC: 3.8 MMOL/L (ref 3.4–5.3)
PROT SERPL-MCNC: 4.6 G/DL (ref 6.8–8.8)
RBC # BLD AUTO: 2.84 10E6/UL (ref 3.8–5.2)
SODIUM SERPL-SCNC: 142 MMOL/L (ref 133–144)
WBC # BLD AUTO: 2.1 10E3/UL (ref 4–11)

## 2022-05-30 PROCEDURE — 80053 COMPREHEN METABOLIC PANEL: CPT | Performed by: PHYSICIAN ASSISTANT

## 2022-05-30 PROCEDURE — 84100 ASSAY OF PHOSPHORUS: CPT | Performed by: INTERNAL MEDICINE

## 2022-05-30 PROCEDURE — 250N000013 HC RX MED GY IP 250 OP 250 PS 637: Performed by: INTERNAL MEDICINE

## 2022-05-30 PROCEDURE — 85025 COMPLETE CBC W/AUTO DIFF WBC: CPT | Performed by: PHYSICIAN ASSISTANT

## 2022-05-30 PROCEDURE — 85610 PROTHROMBIN TIME: CPT | Performed by: PHYSICIAN ASSISTANT

## 2022-05-30 PROCEDURE — 250N000011 HC RX IP 250 OP 636: Performed by: PHYSICIAN ASSISTANT

## 2022-05-30 PROCEDURE — 99233 SBSQ HOSP IP/OBS HIGH 50: CPT | Mod: FS | Performed by: PHYSICIAN ASSISTANT

## 2022-05-30 PROCEDURE — 258N000003 HC RX IP 258 OP 636: Performed by: INTERNAL MEDICINE

## 2022-05-30 PROCEDURE — 250N000009 HC RX 250: Performed by: INTERNAL MEDICINE

## 2022-05-30 PROCEDURE — 82330 ASSAY OF CALCIUM: CPT | Performed by: PHYSICIAN ASSISTANT

## 2022-05-30 PROCEDURE — 206N000001 HC R&B BMT UMMC

## 2022-05-30 PROCEDURE — 250N000013 HC RX MED GY IP 250 OP 250 PS 637: Performed by: PHYSICIAN ASSISTANT

## 2022-05-30 PROCEDURE — 250N000011 HC RX IP 250 OP 636: Performed by: INTERNAL MEDICINE

## 2022-05-30 PROCEDURE — 83735 ASSAY OF MAGNESIUM: CPT | Performed by: PHYSICIAN ASSISTANT

## 2022-05-30 PROCEDURE — 82248 BILIRUBIN DIRECT: CPT | Performed by: PHYSICIAN ASSISTANT

## 2022-05-30 RX ORDER — LOPERAMIDE HCL 2 MG
2 CAPSULE ORAL 4 TIMES DAILY PRN
Qty: 30 CAPSULE | Refills: 0 | Status: SHIPPED | OUTPATIENT
Start: 2022-05-30 | End: 2022-06-03

## 2022-05-30 RX ORDER — METOPROLOL SUCCINATE 25 MG/1
25 TABLET, EXTENDED RELEASE ORAL DAILY
Qty: 30 TABLET | Refills: 0 | Status: SHIPPED | OUTPATIENT
Start: 2022-05-30 | End: 2022-09-01

## 2022-05-30 RX ORDER — ACYCLOVIR 800 MG/1
800 TABLET ORAL EVERY 12 HOURS
Qty: 60 TABLET | Refills: 0 | Status: SHIPPED | OUTPATIENT
Start: 2022-05-30 | End: 2022-07-01

## 2022-05-30 RX ORDER — MAGNESIUM SULFATE HEPTAHYDRATE 40 MG/ML
2 INJECTION, SOLUTION INTRAVENOUS ONCE
Status: COMPLETED | OUTPATIENT
Start: 2022-05-30 | End: 2022-05-30

## 2022-05-30 RX ORDER — LEVOTHYROXINE SODIUM 112 UG/1
112 TABLET ORAL DAILY
Qty: 30 TABLET | Refills: 0 | Status: SHIPPED | OUTPATIENT
Start: 2022-05-30 | End: 2022-11-03

## 2022-05-30 RX ORDER — ONDANSETRON 8 MG/1
8 TABLET, ORALLY DISINTEGRATING ORAL EVERY 8 HOURS
Qty: 30 TABLET | Refills: 0 | Status: SHIPPED | OUTPATIENT
Start: 2022-05-30 | End: 2022-07-01

## 2022-05-30 RX ORDER — PANTOPRAZOLE SODIUM 40 MG/1
40 TABLET, DELAYED RELEASE ORAL DAILY
Qty: 30 TABLET | Refills: 0 | Status: SHIPPED | OUTPATIENT
Start: 2022-05-30

## 2022-05-30 RX ORDER — HYDROMORPHONE HYDROCHLORIDE 1 MG/ML
0.5 INJECTION, SOLUTION INTRAMUSCULAR; INTRAVENOUS; SUBCUTANEOUS ONCE
Status: COMPLETED | OUTPATIENT
Start: 2022-05-30 | End: 2022-05-30

## 2022-05-30 RX ORDER — LOPERAMIDE HCL 2 MG
2 CAPSULE ORAL ONCE
Status: COMPLETED | OUTPATIENT
Start: 2022-05-30 | End: 2022-05-30

## 2022-05-30 RX ORDER — GUAIFENESIN 600 MG/1
1200 TABLET, EXTENDED RELEASE ORAL 2 TIMES DAILY PRN
Status: ON HOLD | COMMUNITY
Start: 2022-05-30 | End: 2023-01-01

## 2022-05-30 RX ORDER — LEVOFLOXACIN 250 MG/1
250 TABLET, FILM COATED ORAL DAILY
Qty: 15 TABLET | Refills: 0 | Status: SHIPPED | OUTPATIENT
Start: 2022-05-30 | End: 2022-06-08

## 2022-05-30 RX ORDER — LOPERAMIDE HCL 2 MG
4 CAPSULE ORAL 4 TIMES DAILY
Status: DISCONTINUED | OUTPATIENT
Start: 2022-05-30 | End: 2022-05-31 | Stop reason: HOSPADM

## 2022-05-30 RX ORDER — LOPERAMIDE HCL 2 MG
2 CAPSULE ORAL 4 TIMES DAILY PRN
Status: DISCONTINUED | OUTPATIENT
Start: 2022-05-30 | End: 2022-05-31 | Stop reason: HOSPADM

## 2022-05-30 RX ORDER — SULFAMETHOXAZOLE/TRIMETHOPRIM 800-160 MG
1 TABLET ORAL
Qty: 16 TABLET | Refills: 0 | Status: SHIPPED | OUTPATIENT
Start: 2022-06-27 | End: 2022-06-22

## 2022-05-30 RX ORDER — OLANZAPINE 5 MG/1
5 TABLET, ORALLY DISINTEGRATING ORAL AT BEDTIME
Status: DISCONTINUED | OUTPATIENT
Start: 2022-05-30 | End: 2022-05-31 | Stop reason: HOSPADM

## 2022-05-30 RX ORDER — LOPERAMIDE HCL 2 MG
2 CAPSULE ORAL 4 TIMES DAILY PRN
Qty: 30 CAPSULE | Refills: 0 | Status: CANCELLED | OUTPATIENT
Start: 2022-05-30

## 2022-05-30 RX ORDER — POTASSIUM CHLORIDE 7.45 MG/ML
10 INJECTION INTRAVENOUS ONCE
Status: COMPLETED | OUTPATIENT
Start: 2022-05-30 | End: 2022-05-30

## 2022-05-30 RX ORDER — FLUCONAZOLE 200 MG/1
200 TABLET ORAL DAILY
Qty: 30 TABLET | Refills: 0 | Status: SHIPPED | OUTPATIENT
Start: 2022-05-30 | End: 2022-06-16

## 2022-05-30 RX ORDER — AMLODIPINE BESYLATE 5 MG/1
5 TABLET ORAL DAILY
Qty: 30 TABLET | Refills: 0 | Status: SHIPPED | OUTPATIENT
Start: 2022-05-30 | End: 2022-06-03

## 2022-05-30 RX ORDER — PROCHLORPERAZINE MALEATE 5 MG
5 TABLET ORAL EVERY 6 HOURS PRN
Qty: 30 TABLET | Refills: 0 | Status: SHIPPED | OUTPATIENT
Start: 2022-05-30 | End: 2022-06-06

## 2022-05-30 RX ADMIN — LOPERAMIDE HYDROCHLORIDE 4 MG: 2 CAPSULE ORAL at 15:18

## 2022-05-30 RX ADMIN — BUPRENORPHINE AND NALOXONE 1 FILM: 8; 2 FILM, SOLUBLE BUCCAL; SUBLINGUAL at 22:06

## 2022-05-30 RX ADMIN — RIVAROXABAN 20 MG: 20 TABLET, FILM COATED ORAL at 08:08

## 2022-05-30 RX ADMIN — LEVOFLOXACIN 250 MG: 250 TABLET, FILM COATED ORAL at 09:46

## 2022-05-30 RX ADMIN — PROCHLORPERAZINE MALEATE 5 MG: 5 TABLET ORAL at 06:13

## 2022-05-30 RX ADMIN — ONDANSETRON HYDROCHLORIDE 8 MG: 8 TABLET, FILM COATED ORAL at 00:30

## 2022-05-30 RX ADMIN — METOPROLOL SUCCINATE 25 MG: 25 TABLET, EXTENDED RELEASE ORAL at 08:09

## 2022-05-30 RX ADMIN — FLUCONAZOLE 200 MG: 200 TABLET ORAL at 08:08

## 2022-05-30 RX ADMIN — SUCRALFATE 1 G: 1 SUSPENSION ORAL at 22:06

## 2022-05-30 RX ADMIN — OXYCODONE HYDROCHLORIDE 10 MG: 10 TABLET ORAL at 20:37

## 2022-05-30 RX ADMIN — LOPERAMIDE HYDROCHLORIDE 2 MG: 2 CAPSULE ORAL at 00:21

## 2022-05-30 RX ADMIN — VENLAFAXINE 75 MG: 75 TABLET ORAL at 15:18

## 2022-05-30 RX ADMIN — AMLODIPINE BESYLATE 10 MG: 5 TABLET ORAL at 08:08

## 2022-05-30 RX ADMIN — ACYCLOVIR 800 MG: 800 TABLET ORAL at 08:08

## 2022-05-30 RX ADMIN — METHOCARBAMOL 500 MG: 500 TABLET ORAL at 00:28

## 2022-05-30 RX ADMIN — BUPRENORPHINE AND NALOXONE 1 FILM: 8; 2 FILM, SOLUBLE BUCCAL; SUBLINGUAL at 09:00

## 2022-05-30 RX ADMIN — ACYCLOVIR 800 MG: 800 TABLET ORAL at 20:37

## 2022-05-30 RX ADMIN — LOPERAMIDE HYDROCHLORIDE 4 MG: 2 CAPSULE ORAL at 20:37

## 2022-05-30 RX ADMIN — SUCRALFATE 1 G: 1 SUSPENSION ORAL at 16:32

## 2022-05-30 RX ADMIN — BUPRENORPHINE AND NALOXONE 1 FILM: 8; 2 FILM, SOLUBLE BUCCAL; SUBLINGUAL at 15:18

## 2022-05-30 RX ADMIN — METHOCARBAMOL 500 MG: 500 TABLET ORAL at 08:18

## 2022-05-30 RX ADMIN — VENLAFAXINE 75 MG: 75 TABLET ORAL at 20:37

## 2022-05-30 RX ADMIN — Medication 5 ML: at 03:52

## 2022-05-30 RX ADMIN — PROCHLORPERAZINE MALEATE 5 MG: 5 TABLET ORAL at 20:37

## 2022-05-30 RX ADMIN — ONDANSETRON HYDROCHLORIDE 8 MG: 8 TABLET, FILM COATED ORAL at 16:32

## 2022-05-30 RX ADMIN — SUCRALFATE 1 G: 1 SUSPENSION ORAL at 08:08

## 2022-05-30 RX ADMIN — OXYCODONE HYDROCHLORIDE 10 MG: 10 TABLET ORAL at 12:14

## 2022-05-30 RX ADMIN — POTASSIUM PHOSPHATE, MONOBASIC AND POTASSIUM PHOSPHATE, DIBASIC 9 MMOL: 224; 236 INJECTION, SOLUTION, CONCENTRATE INTRAVENOUS at 09:46

## 2022-05-30 RX ADMIN — POTASSIUM CHLORIDE 10 MEQ: 7.46 INJECTION, SOLUTION INTRAVENOUS at 08:07

## 2022-05-30 RX ADMIN — Medication 1000 UNITS: at 08:09

## 2022-05-30 RX ADMIN — OLANZAPINE 5 MG: 5 TABLET, ORALLY DISINTEGRATING ORAL at 22:06

## 2022-05-30 RX ADMIN — PANTOPRAZOLE SODIUM 40 MG: 40 TABLET, DELAYED RELEASE ORAL at 08:09

## 2022-05-30 RX ADMIN — VENLAFAXINE 75 MG: 75 TABLET ORAL at 08:08

## 2022-05-30 RX ADMIN — GUAIFENESIN 1200 MG: 600 TABLET ORAL at 20:37

## 2022-05-30 RX ADMIN — HYDROMORPHONE HYDROCHLORIDE 0.5 MG: 1 INJECTION, SOLUTION INTRAMUSCULAR; INTRAVENOUS; SUBCUTANEOUS at 15:18

## 2022-05-30 RX ADMIN — Medication 2.5 MG: at 16:32

## 2022-05-30 RX ADMIN — LEVOTHYROXINE SODIUM 112 MCG: 0.11 TABLET ORAL at 08:08

## 2022-05-30 RX ADMIN — MAGNESIUM SULFATE IN WATER 2 G: 40 INJECTION, SOLUTION INTRAVENOUS at 06:10

## 2022-05-30 RX ADMIN — Medication 10 ML: at 15:18

## 2022-05-30 RX ADMIN — GUAIFENESIN 1200 MG: 600 TABLET ORAL at 08:08

## 2022-05-30 RX ADMIN — ONDANSETRON HYDROCHLORIDE 8 MG: 8 TABLET, FILM COATED ORAL at 09:00

## 2022-05-30 RX ADMIN — PROCHLORPERAZINE MALEATE 5 MG: 5 TABLET ORAL at 12:14

## 2022-05-30 RX ADMIN — LOPERAMIDE HYDROCHLORIDE 2 MG: 2 CAPSULE ORAL at 09:46

## 2022-05-30 RX ADMIN — OXYCODONE HYDROCHLORIDE 10 MG: 10 TABLET ORAL at 06:11

## 2022-05-30 ASSESSMENT — ACTIVITIES OF DAILY LIVING (ADL)
ADLS_ACUITY_SCORE: 25

## 2022-05-30 NOTE — PROGRESS NOTES
Patient having loose stools and incontinence after 1 dose senna (was struggling with constipation prior). C.diff was collected at previous shift. Low likelihood of C.diff given diarrhea less than 24 hours in context of recent senna use. Per patient request, will give 1x dose of loperamide.    Anthony Bach MD

## 2022-05-30 NOTE — DISCHARGE SUMMARY
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  3.) Chemotherapy induced pancytopenia  4.) chemotherapy induced diarrhea  5.) Chronic pain due #1 and other  6.) Steroid exacerbated essential hypertension  7.) Hypophosphatemia   8.) Non acute line associated DVT  9.) Depression  10.) Chemotherapy induced diarrhea  11.) Moderate malnutrition due to acute on chronic illness  12.) Hypokalemia secondary to diarrhea      Discharge Medications:         Medication List      Started    levofloxacin 250 MG tablet  Commonly known as: LEVAQUIN  250 mg, Oral, DAILY     loperamide 2 MG capsule  Commonly known as: IMODIUM  2 mg, Oral, 4 TIMES DAILY PRN     ondansetron 8 MG ODT tab  Commonly known as: ZOFRAN ODT  8 mg, Oral, EVERY 8 HOURS     sulfamethoxazole-trimethoprim 800-160 MG tablet  Commonly known as: BACTRIM DS  1 tablet, Oral, EVERY MONDAY TUESDAY BID  Start taking on: June 27, 2022        Modified    fluconazole 200 MG tablet  Commonly known as: DIFLUCAN  200 mg, Oral, DAILY  What changed:     medication strength    how much to take        Discontinued    atorvastatin 40 MG tablet  Commonly known as: LIPITOR     cholecalciferol 25 MCG (1000 UT) Tabs     ondansetron 8 MG tablet  Commonly known as: ZOFRAN     polyethylene glycol 17 GM/Dose powder  Commonly known as: MIRALAX     rivaroxaban ANTICOAGULANT 20 MG Tabs tablet  Commonly known as: XARELTO          Brief History of Illness:    Libby Grimaldo is a 71 year old female referred for Dr. Nichols for high risk (+1q) IgG lambda light chain multiple myeloma.  She has already collected stem cells February of this year but had her transplant delayed due to multiple complications including  "urinary tract infection and elevated LFTs of unclear etiology.  She presents for hospital admission for stem cell transplant.She reports she is not great- ongoing pain, fatigue though no concern for new/active infection.  She denies urinary frequency/pain, fevers/chills, abdominal pain, skin changes, rashes, nausea/vomiting, diarrhea/constipation, shortness of breath or other active issues.  She does endorse moderate fatigue but notes this is chronic and unchanged over the past several months Given her comorbities and issues with chronic pain Dr Guillory  considers Libby to be higher risk of complications based on the difficulties proceeding to transplant as outlined above.  We will therefore plan to keep Libby inpatient for closer monitoring until she engrafts. Libby and Tres were in agreement with this plan.   Physical Exam:    /64 (BP Location: Right arm)   Pulse 87   Temp 98.5  F (36.9  C) (Oral)   Resp 16   Ht 1.57 m (5' 1.81\")   Wt 58.4 kg (128 lb 12.8 oz)   SpO2 97%   BMI 23.70 kg/m    General Appearance: well appearing, NAD.   HEENT: sclera anicteric   RESP: Crackles bilateral bases, right > left  GI: +BS, soft, nontender, no masses    EXT: no edema tere LE's  SKIN:  No rash or lesions on exposed areas.  NEURO: A&O x3; CN II-XII grossly intact.  PSYCH: Appropriate affect  VASCULAR ACCESS: right chest PAC; left upper extremity PICC line  Hospital Course:    Libby tolerated HD melphalan well, completed cryotherapy without incident. She did have mild nausea starting day +1 with completion of scheduled zofran, due to this scheduled zofran was continued. She has been able to eat and drinking throughout hospital stay however admits that appetite really down trended last 24-48 hours, estimates she is eating a little less than 50% of her baseline. She is drinking fluid well. She has hx of chronic pain which as been managed per her home pain regimen of suboxone TID and 10mg Oxycodone QID prn, she will be " discharged on this regimen without new refills as she has pain med supply at home. She did request all other meds refilled. She has been mildly constipated throughout admission- no BM since 5/25 until 5/29 - given senna-s x1, then diarrhea yesterday and again early this morning. Likely chemotherapy induced okay to take prn imodium for diarrhea. No fevers or infectious issues arose this hospitalization. Plts are now just starting to downtrend, will be given xeralto today prior to hospital discharge then will hold until plts recover >50k.     Please see routine daily progress notes for routine post transplant care plan.   CODE STATUS: FULL CODE  Discharge Instructions and Follow-Up:    Discharge diet: Regular diet as tolerated  Discharge activity: Activity as tolerated   Discharge follow-up: Follow up with BMT Clinic as follows: daily starting 6/1/22.     Discharge Disposition:    Discharged to home.    Nidia Littlejohn PA-C  5/31/2022

## 2022-05-30 NOTE — PROVIDER NOTIFICATION
Paged provider [phone]. Pt is having episodes of diarrhea/stool incontinence. Is upset and wants imodium ASAP. Is is possible to get an order?

## 2022-05-30 NOTE — SUMMARY OF CARE
[facility] Summary of Care   & Survivorship Care Plan    Treatment Team:  Patient Care Team:  Leah Gustafson as PCP - General (Family Practice)  Kennedi Nichols DO as Referring Physician (Hematology & Oncology)  Julio C Guillory MD as Assigned Cancer Care Provider  Julio C Guillory MD as BMT Physician (Hematology)  Roro Goddard LICSW as  (BMT - Adult)  Tamra Garrett, PhD as Assigned Behavioral Health Provider  Rashad Chen, RN as Specialty Care Coordinator (BMT - Adult)  Discharge Diagnosis: S/P BMT    Transplant Essential Data:  Diagnosis MM Multiple myeloma  HCT Type Autologous    Prep Regimen Melphalan   Donor Source Self     GVHD Prophylaxis No  Clinical Trials 2016-35       Hospital Discharge on 05/31/2022  Discharge Location Home   Activity at Discharge As tolerated    Nutrition Regular diet as tolerated     Blood Transfusion Parameters Transfuse if Hemoglobin < or equal 7.0 g/dL  Red Blood Cell Order: 1 units, irradiated and leukoreduced   Transfuse if Platelet count < or equal 1 uL  Platelet order: 1 adult dose, irradiated and leukoreduced  Transfusion Pre-meds:  None     IV Medications Outpatient Pharmacy G-CSF to be given in clinic: (dose) 300mcg subcutaneous daily starting 6/1/22 and continue daily until ANC >2500.      Home Infusion  IV Medications through home infusion: None   Line Care Care: PICC - Flush each line with 5mL of 10 unit/mL heparin every 24 hours  Supplies Through: [facility]  Fax: [phone]  Ph: [phone]      Physical Therapy & Support Services Occupational Therapy Consult (Evaluate and Treat)-separate order must be sent to department   Physical Therapy Consult  (Evaluate and Treat)-separate order must be sent to department  OP cancer rehab    Laboratory Tests at Next Clinic Visit Hemogram (CBC) differential, platelet count  Basic Metabolic Panel     Restrictions Immediately Post-Transplant   Always wear your mask in  public.    No driving until cleared by your physician    Continue dietary precautions as discussed at your pre-BMT teaching session   When to Call  (Refer to Discharge Teaching Materials)   Fever > 100.5 F    Bleeding that does not stop after a few minutes    Severe nausea, vomiting, or diarrhea     New fall or injury    Change in designated caregiver    Medication question    Any other urgent concern   Follow Up Visits Clinic Appointment Date/Time:   BMT Clinic (date, time, provider): 6/1/2022, check in at 11:30am for labs, provider and BMT pharmacist.  Please bring all medications and med box to the 6/1/22 visit.    See calendar for additional f/u visits.     Survivorship Visits:     You will have a 60-minute provider visit dedicated to cancer Survivorship one week before your scheduled anniversary visit on day + 100, 1 year, and 2 years.     After 2 years, or if you received an allogeneic transplant and you develop a condition called chronic GVHD, you can continue to receive comprehensive Survivorship care in our Transplant Late-Effects Clinic (TLC) annually by calling [phone] to schedule an appointment    Your labs will be drawn after your survivorship appointments to allow your provider to order additional tests as needed.     BMT Contact Information  For issues including fevers 100.5 or uncontrolled n/v/d:  Please call during the week: Monday through Friday between hours of 8:00 am and 4:30 pm- Call BMT office- [phone]  After hours/Weekends: Please call [facility]  and ask for BMT Physician on call and the  will have the BMT Physician call you back: [phone]    Regarding COVID-19 Pandemic  Advice for Patients:  a.       Avoid contact with individuals:   i.     Who are sick or have recently been sick  ii.      Have traveled to high risk areas (per CDC guidelines) or have been on a cruise in the last 14 days  iii.      Who were or could have been  exposed to COVID-19   b.       If experiencing symptoms such as: Fever, cough or shortness of breath contact BMT at [phone] Mon-Fri 8am-4:30pm or After Hours at [phone] (ask to speak to a BMT Fellow) for guidance on need for clinical assessment  c.      Avoid all non- essential travel at this time; if traveling is necessary use mask (N-95)   d.    Wear a mask when in public areas  d.       Avoid crowded places, if possible  f.        Follow CDC advice https://www.cdc.gov/coronavirus/2019-ncov/index.html and travel guidelines https://www.cdc.gov/coronavirus/2019-ncov/travelers/index.html

## 2022-05-30 NOTE — PLAN OF CARE
"Goal Outcome Evaluation:    Plan of Care Reviewed With: patient     Overall Patient Progress: improving         /66   Pulse 88   Temp 98.7  F (37.1  C) (Oral)   Resp 16   Ht 1.57 m (5' 1.81\")   Wt 58.4 kg (128 lb 12.8 oz)   SpO2 98%   BMI 23.70 kg/m         S: patient anticipated to discharge today (5/30) however, not discharging due to new onset shoulder pain.     B: 71 year old female, currently day +3 of Auto 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       A: Afebrile, VSS, sats 97% on RA. Endorses pain, nausea, diarrhea. Denies vomiting. Up independently. Patient received 10 K+ and 9 mmol phos. Oxy x2, to intermediate effect. Robaxin x1 to intermediate effect. PRN imodium x1, loose stools continue, imodium now scheduled. At 1400 patient reported severe \"rump roast pain\" in left shoulder, patient clarified that this was deep, aching, pain. Patient advised that she did not want to take her suboxone or Tylenol per pain management plan.   Provider notified, provider placed a 1x order for dilaudid and started scheduled zyprexa for patient. Patient's sister Woodrow would also like to be part of discharge teaching tomorrow. Ionized calcium collected, results, 3.9. Provider notified.       R: Discharge paperwork in chart, discharge medications in bin, pill box in bin in anticipation of discharge tomorrow. Follow up with woodrow to be part of discharge teaching.   "

## 2022-05-30 NOTE — PROGRESS NOTES
"  BMT Progress note  BMT Physician: Dr. Guillory/Rashad Chen    Chief complaint:  Libby Grimaldo is a 71 year old female, currently day +3 of Auto 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     INTERIM HISTORY: Libby's nausea is okay- no emesis would liek to stay on zofran 8mg q8. Had been constipated, last BM 5/25, took senna-s x1 had diarrhea yesterday and again this morning. She would to start imodium. She denies abdominal pain or mouth pain. She feels like symptoms are well controlled, she would like to be discharged home and clinic daily follow up. She notes that she is comfortable with discharge- Inpatient RN will call and do DIshcarge teaching over the phone. We discussed if n/v/d uncontrolled or if unable to take meds, or fevers she would call BMT clinic on call numbers or present to ED. She feels like she comfortable with plan, comfortable with discharge today.      REVIEW OF SYSTEMS: Otherwise unremarkable other than what is noted in the Interim History.     PHYSICAL EXAM:   Vitals:  /64 (BP Location: Right arm)   Pulse 87   Temp 98.5  F (36.9  C) (Oral)   Resp 16   Ht 1.57 m (5' 1.81\")   Wt 58.4 kg (128 lb 12.8 oz)   SpO2 97%   BMI 23.70 kg/m      General Appearance: NAD  HEENT: large pupils; ERRLA; sclera anicteric. Moist mucus membranes, no ulcerations.  CV: RRR. no murmur or rub.   RESP: CTA bilaterally; no rales or wheezes.   GI: thin; +BS, soft, nontender, nondistended.   EXT: No edema. No cyanosis/clubbing.   SKIN: no lesions or rash  NEURO: A&O x3   PSYCH: Appropriate affect   VASCULAR ACCESS: R port, not accessed; PICC c/d/i    ROUTINE LABS:    Lab Results   Component Value Date    WBC 2.1 (L) 05/30/2022    ANEU 5.3 02/25/2022    HGB 9.3 (L) 05/30/2022    HCT 28.5 (L) 05/30/2022    PLT 69 (L) 05/30/2022     05/30/2022    POTASSIUM 3.8 05/30/2022    CHLORIDE 112 (H) 05/30/2022    CO2 26 05/30/2022    GLC 85 05/30/2022    BUN 11 05/30/2022    CR 0.66 05/30/2022    MAG 2.0 05/30/2022    " INR 1.40 (H) 05/30/2022          ASSESSMENT AND PLAN:   Libby Grimaldo is a 71 year old female, currently day +3 of Auto 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      BMT/IEC PROTOCOL for 2016-35  - Chemo protocol: HD Melphalan 140mg/m2  Day -1: rest day, 5/26  Day 0: Cell dose: 3.94x10^6 (s/p cytoxan chemo priming 2/1/2022 and subsequent stem cell collection).   - Restaging plan: per protocol  Day+5 gcsf starts (6/1/22) continue until ANC >2500 x 2 days.      HEME/COAG  - Risk of cytopenias due to chemotherapy and radiation  - Transfusion parameters: hemoglobin <7g/dL, platelets <10k  - Relevant thrombosis or bleeding history:   2/15: new non-occlusive LUE DVT.    - on xarelto 20mg daily. Given line associated DVT, consider stopping xeralto when plts <50k. Plts 69 today. Give xeralto today prior to discharge then stop as anticipate plts <50 tomorrow or following daily.   - Once plts recover remove PICC. Resume xeralto when plts recover >50k (after picc pulled), continue anticoagulation for 1 month then stop.      ID  - Relevant infection history: Recurrent PNA and UTIs - No issues this hospitalization.   - Prophylaxis plan: ACV 800mg PO BID, levaquin 250mg daily, fungal prophy: fluconazole 200mg po daily.   - PJP Prophy: HD bactrim qMon/Tues BID to start day+28 (script given on discharge).   - Vaccination status: Influenza vaccination after day +60, COVID vaccination after day +100, followed by remaining vaccinations at 12, 14, 24, and 26 months.     CARDIOVASCULAR  - Risk of cardiomyopathy:  Baseline EF 56%  - known hyperlipidemia- hold crestor until day +60 post transplant.   Hypertension: dex/fluid exacerbated hypertnesion - Norvasc increased to 10mg daily while inpatient- decrease back to 5mg daily on discharge as anticipate lower BP with decreaesd intake and GI losses in coming daily.   History of Afib - now in NSR. On Metoprolol 25mg PO daily.      RESPIRATORY  - hx of COPD, continue breo-ellipa inhaler       GI/NUTRITION  - Ulcer prophylaxis: protonix 40mg daily.   - Risk of nausea/vomiting due to chemo/radiation: prophy dex and zofran.  - Nausea persistent- continued scheduled zofran starting day+2. Will dicharge with zofran 8mg [id number]. Refilled prn compazine 5mg PO q6hrs prn on discharge.  -Narcotic/zofran induced constipation- recommend senna-x x1- Diarrhea x2 overnight. PRN imodium started just before dishcarge. Monitor (not cdiff colonized).      RENAL/ELECTROLYTES/  - Cr great 0.66. Given rest day before stem cell infusion as baseline Cr has been excellent ~0.8 in recent months.   - Electrolyte management: replace per sliding scale     DIABETES/ENDOCRINE  - BS okay - no need for insulin while inpatient.      MUSCULOSKELETAL/FRAILTY  - Baseline Frailty Score: 4  - Patient with substantial risk of sarcopenia. Held daily calcium to decrease bill burden on discharge.   - Daily PT/OT as needed while inpatient  - Cancer Rehab as needed outpatient- ordered.      SYMPTOM MANAGEMENT  - Nausea from chemo/radiation: Prochlorperazine, ondansetron, lorazepam.  - # Pain Assessment:  - Libby is experiencing pain due to chronic pain. Pain management was discussed with Libby and her spouse and the plan was created in a collaborative fashion.  Libby's response to the current recommendations: engaged  - Continue suboxone and prn oxycodone.   - Pain medicine consult- no change to regimen currently. Appreciate their follow along with us, anticipate increased pain issues next week when neutropenia occurs.     SOCIAL DETERMINANTS  - Caregiver: Tres, spouse. Had planned on admission through engraftment but Libby feels like she cna manage symptosm at home. Kiara Stewart will call pt tomorrow for telephone dishcarge teaching. Reiterated if n/v/d uncontrolled, unable to take meds or fevers she would call BMT clinic [phone] request BMT PA triage pager to notified and we will call her back. IF after hours [facility] can call  [phone] and request BMT fellow on call to call her back, otherwise present to [facility].   Bedside nurse very thorough also called her sister so she could review discharge plan/meds with her as well as Libby and Tres prior to hospital discharge.     rtC daily    Anna Naylor PA-C  [phone]

## 2022-05-30 NOTE — PLAN OF CARE
"      AVSS. Pt is up independent in room. PRN oxy x 2 for chronic pain along with her scheduled suboxone. Pt's nausea appears to be worse then the previous days, PRN compazine x 2 along with scheduled Zofran but no episodes of vomiting. Pt received senna yesterday for constipation which resulted in one normal bowel movement. Unfortunately pt then began having frequent episodes of diarrhea/stool incontinence. Per pt she is having stomach cramping as well but it is relieved after diarrhea episode. Stool sample sent to test for c-diff and came back negative. Provider paged and 1 time dose of imodium ordered and given. Pt requested to have additional doses for today. Stool appears to have slowed down a little. Pt refused to save urine or stool overnight. Appetite and oral intake are reduced, pt is working on eating small frequent meals/snacks throughout the day. K+ came back 3.8, replacement ordered. Mag was 2.0, replacement ordered and started. Phos is 2.0 replacement also ordered. All electrolyte replacement rechecks are in am. Continue plan of care.               Problem: Plan of Care - These are the overarching goals to be used throughout the patient stay.    Goal: Plan of Care Review/Shift Note  Description: The Plan of Care Review/Shift note should be completed every shift.  The Outcome Evaluation is a brief statement about your assessment that the patient is improving, declining, or no change.  This information will be displayed automatically on your shift note.  Outcome: Ongoing, Progressing  Goal: Patient-Specific Goal (Individualized)  Description: You can add care plan individualizations to a care plan. Examples of Individualization might be:  \"Parent requests to be called daily at 9am for status\", \"I have a hard time hearing out of my right ear\", or \"Do not touch me to wake me up as it startles me\".  Outcome: Ongoing, Progressing  Goal: Absence of Hospital-Acquired Illness or Injury  Outcome: Ongoing, " Progressing  Intervention: Identify and Manage Fall Risk  Recent Flowsheet Documentation  Taken 5/29/2022 2200 by Sarita Whiting RN  Safety Promotion/Fall Prevention:    assistive device/personal items within reach    clutter free environment maintained    fall prevention program maintained    nonskid shoes/slippers when out of bed    patient and family education  Intervention: Prevent Skin Injury  Recent Flowsheet Documentation  Taken 5/29/2022 2200 by Sarita Whiting RN  Body Position: position changed independently  Intervention: Prevent and Manage VTE (Venous Thromboembolism) Risk  Recent Flowsheet Documentation  Taken 5/29/2022 2200 by Sarita Whiting RN  VTE Prevention/Management: compression stockings off  Activity Management: activity adjusted per tolerance  Intervention: Prevent Infection  Recent Flowsheet Documentation  Taken 5/29/2022 2200 by Sarita Whiting RN  Infection Prevention:    cohorting utilized    equipment surfaces disinfected    hand hygiene promoted  Goal: Optimal Comfort and Wellbeing  Outcome: Ongoing, Progressing  Goal: Readiness for Transition of Care  Outcome: Ongoing, Progressing     Problem: Adjustment to Transplant (Stem Cell/Bone Marrow Transplant)  Goal: Optimal Coping with Transplant  Outcome: Ongoing, Progressing     Problem: Bladder Irritation (Stem Cell/Bone Marrow Transplant)  Goal: Symptom-Free Urinary Elimination  Outcome: Ongoing, Progressing     Problem: Diarrhea (Stem Cell/Bone Marrow Transplant)  Goal: Diarrhea Symptom Control  Outcome: Ongoing, Progressing     Problem: Fatigue (Stem Cell/Bone Marrow Transplant)  Goal: Improved Activity Tolerance  Outcome: Ongoing, Progressing  Intervention: Promote Improved Energy  Recent Flowsheet Documentation  Taken 5/29/2022 2200 by Sarita Whiting RN  Activity Management: activity adjusted per tolerance     Problem: Hematologic Alteration (Stem Cell/Bone Marrow Transplant)  Goal: Blood Counts Within Acceptable  Range  Outcome: Ongoing, Progressing  Intervention: Monitor and Manage Hematologic Symptoms  Recent Flowsheet Documentation  Taken 5/29/2022 2200 by Sarita Whiting RN  Medication Review/Management: medications reviewed     Problem: Hypersensitivity Reaction (Stem Cell/Bone Marrow Transplant)  Goal: Absence of Hypersensitivity Reaction  Outcome: Ongoing, Progressing     Problem: Infection (Stem Cell/Bone Marrow Transplant)  Goal: Absence of Infection  Outcome: Ongoing, Progressing  Intervention: Prevent and Manage Infection  Recent Flowsheet Documentation  Taken 5/29/2022 2200 by Sarita Whiting RN  Infection Prevention:    cohorting utilized    equipment surfaces disinfected    hand hygiene promoted  Isolation Precautions: enteric precautions initiated     Problem: Mucositis (Stem Cell/Bone Marrow Transplant)  Goal: Improved Oral Mucous Membrane Health and Integrity  Outcome: Ongoing, Progressing  Intervention: Promote Oral Comfort and Health  Recent Flowsheet Documentation  Taken 5/29/2022 2200 by Sarita Whiting RN  Oral Care: oral rinse provided     Problem: Nausea and Vomiting (Stem Cell/Bone Marrow Transplant)  Goal: Nausea and Vomiting Symptom Relief  Outcome: Ongoing, Progressing  Intervention: Prevent and Manage Nausea and Vomiting  Recent Flowsheet Documentation  Taken 5/29/2022 2200 by Sarita Whiting RN  Nausea/Vomiting Interventions: antiemetic     Problem: Nutrition Intake Altered (Stem Cell/Bone Marrow Transplant)  Goal: Optimal Nutrition Intake  Outcome: Ongoing, Progressing   Goal Outcome Evaluation:

## 2022-05-31 ENCOUNTER — APPOINTMENT (OUTPATIENT)
Dept: OCCUPATIONAL THERAPY | Facility: CLINIC | Age: 72
DRG: 016 | End: 2022-05-31
Attending: INTERNAL MEDICINE
Payer: MEDICARE

## 2022-05-31 ENCOUNTER — CARE COORDINATION (OUTPATIENT)
Dept: TRANSPLANT | Facility: CLINIC | Age: 72
End: 2022-05-31
Payer: MEDICARE

## 2022-05-31 VITALS
HEART RATE: 90 BPM | SYSTOLIC BLOOD PRESSURE: 122 MMHG | DIASTOLIC BLOOD PRESSURE: 65 MMHG | WEIGHT: 126.6 LBS | TEMPERATURE: 98.7 F | RESPIRATION RATE: 16 BRPM | OXYGEN SATURATION: 96 % | HEIGHT: 62 IN | BODY MASS INDEX: 23.3 KG/M2

## 2022-05-31 LAB
ANION GAP SERPL CALCULATED.3IONS-SCNC: 5 MMOL/L (ref 3–14)
BACTERIA SPEC CULT: NORMAL
BASOPHILS # BLD AUTO: 0 10E3/UL (ref 0–0.2)
BASOPHILS NFR BLD AUTO: 0 %
BUN SERPL-MCNC: 9 MG/DL (ref 7–30)
CALCIUM SERPL-MCNC: 7.1 MG/DL (ref 8.5–10.1)
CHLORIDE BLD-SCNC: 113 MMOL/L (ref 94–109)
CO2 SERPL-SCNC: 24 MMOL/L (ref 20–32)
CREAT SERPL-MCNC: 0.67 MG/DL (ref 0.52–1.04)
EOSINOPHIL # BLD AUTO: 0 10E3/UL (ref 0–0.7)
EOSINOPHIL NFR BLD AUTO: 2 %
ERYTHROCYTE [DISTWIDTH] IN BLOOD BY AUTOMATED COUNT: 12.3 % (ref 10–15)
GFR SERPL CREATININE-BSD FRML MDRD: >90 ML/MIN/1.73M2
GLUCOSE BLD-MCNC: 84 MG/DL (ref 70–99)
HCT VFR BLD AUTO: 29.3 % (ref 35–47)
HGB BLD-MCNC: 9.8 G/DL (ref 11.7–15.7)
IMM GRANULOCYTES # BLD: 0 10E3/UL
IMM GRANULOCYTES NFR BLD: 2 %
LYMPHOCYTES # BLD AUTO: 0 10E3/UL (ref 0.8–5.3)
LYMPHOCYTES NFR BLD AUTO: 1 %
MAGNESIUM SERPL-MCNC: 2.1 MG/DL (ref 1.6–2.3)
MCH RBC QN AUTO: 33.2 PG (ref 26.5–33)
MCHC RBC AUTO-ENTMCNC: 33.4 G/DL (ref 31.5–36.5)
MCV RBC AUTO: 99 FL (ref 78–100)
MONOCYTES # BLD AUTO: 0 10E3/UL (ref 0–1.3)
MONOCYTES NFR BLD AUTO: 2 %
NEUTROPHILS # BLD AUTO: 1.2 10E3/UL (ref 1.6–8.3)
NEUTROPHILS NFR BLD AUTO: 93 %
NRBC # BLD AUTO: 0 10E3/UL
NRBC BLD AUTO-RTO: 0 /100
PHOSPHATE SERPL-MCNC: 2.5 MG/DL (ref 2.5–4.5)
PLATELET # BLD AUTO: 67 10E3/UL (ref 150–450)
POTASSIUM BLD-SCNC: 4.2 MMOL/L (ref 3.4–5.3)
RBC # BLD AUTO: 2.95 10E6/UL (ref 3.8–5.2)
SODIUM SERPL-SCNC: 142 MMOL/L (ref 133–144)
WBC # BLD AUTO: 1.3 10E3/UL (ref 4–11)

## 2022-05-31 PROCEDURE — 250N000013 HC RX MED GY IP 250 OP 250 PS 637: Performed by: PHYSICIAN ASSISTANT

## 2022-05-31 PROCEDURE — 250N000011 HC RX IP 250 OP 636: Performed by: PHYSICIAN ASSISTANT

## 2022-05-31 PROCEDURE — 258N000003 HC RX IP 258 OP 636: Performed by: PHYSICIAN ASSISTANT

## 2022-05-31 PROCEDURE — 85025 COMPLETE CBC W/AUTO DIFF WBC: CPT | Performed by: PHYSICIAN ASSISTANT

## 2022-05-31 PROCEDURE — 97535 SELF CARE MNGMENT TRAINING: CPT | Mod: GO

## 2022-05-31 PROCEDURE — 83735 ASSAY OF MAGNESIUM: CPT | Performed by: INTERNAL MEDICINE

## 2022-05-31 PROCEDURE — 250N000011 HC RX IP 250 OP 636: Performed by: INTERNAL MEDICINE

## 2022-05-31 PROCEDURE — 99239 HOSP IP/OBS DSCHRG MGMT >30: CPT | Mod: FS | Performed by: PHYSICIAN ASSISTANT

## 2022-05-31 PROCEDURE — 250N000013 HC RX MED GY IP 250 OP 250 PS 637: Performed by: INTERNAL MEDICINE

## 2022-05-31 PROCEDURE — 80048 BASIC METABOLIC PNL TOTAL CA: CPT | Performed by: PHYSICIAN ASSISTANT

## 2022-05-31 PROCEDURE — 84100 ASSAY OF PHOSPHORUS: CPT | Performed by: INTERNAL MEDICINE

## 2022-05-31 RX ADMIN — GUAIFENESIN 1200 MG: 600 TABLET ORAL at 07:52

## 2022-05-31 RX ADMIN — PANTOPRAZOLE SODIUM 40 MG: 40 TABLET, DELAYED RELEASE ORAL at 07:53

## 2022-05-31 RX ADMIN — ONDANSETRON HYDROCHLORIDE 8 MG: 8 TABLET, FILM COATED ORAL at 00:31

## 2022-05-31 RX ADMIN — ACETAMINOPHEN 325MG 650 MG: 325 TABLET ORAL at 04:16

## 2022-05-31 RX ADMIN — Medication 1000 UNITS: at 07:54

## 2022-05-31 RX ADMIN — LOPERAMIDE HYDROCHLORIDE 4 MG: 2 CAPSULE ORAL at 11:41

## 2022-05-31 RX ADMIN — METHOCARBAMOL 500 MG: 500 TABLET ORAL at 00:31

## 2022-05-31 RX ADMIN — LOPERAMIDE HYDROCHLORIDE 4 MG: 2 CAPSULE ORAL at 07:54

## 2022-05-31 RX ADMIN — SUCRALFATE 1 G: 1 SUSPENSION ORAL at 07:56

## 2022-05-31 RX ADMIN — ACYCLOVIR 800 MG: 800 TABLET ORAL at 07:52

## 2022-05-31 RX ADMIN — CALCIUM GLUCONATE 3 G: 98 INJECTION, SOLUTION INTRAVENOUS at 09:57

## 2022-05-31 RX ADMIN — OXYCODONE HYDROCHLORIDE 10 MG: 10 TABLET ORAL at 08:05

## 2022-05-31 RX ADMIN — ONDANSETRON HYDROCHLORIDE 8 MG: 8 TABLET, FILM COATED ORAL at 09:57

## 2022-05-31 RX ADMIN — Medication 5 ML: at 12:12

## 2022-05-31 RX ADMIN — LEVOTHYROXINE SODIUM 112 MCG: 0.11 TABLET ORAL at 07:54

## 2022-05-31 RX ADMIN — AMLODIPINE BESYLATE 10 MG: 5 TABLET ORAL at 07:55

## 2022-05-31 RX ADMIN — LEVOFLOXACIN 250 MG: 250 TABLET, FILM COATED ORAL at 09:57

## 2022-05-31 RX ADMIN — METOPROLOL SUCCINATE 25 MG: 25 TABLET, EXTENDED RELEASE ORAL at 07:54

## 2022-05-31 RX ADMIN — OXYCODONE HYDROCHLORIDE 10 MG: 10 TABLET ORAL at 02:44

## 2022-05-31 RX ADMIN — Medication 5 ML: at 02:51

## 2022-05-31 RX ADMIN — PROCHLORPERAZINE MALEATE 5 MG: 5 TABLET ORAL at 03:01

## 2022-05-31 RX ADMIN — BUPRENORPHINE AND NALOXONE 1 FILM: 8; 2 FILM, SOLUBLE BUCCAL; SUBLINGUAL at 07:51

## 2022-05-31 RX ADMIN — FLUCONAZOLE 200 MG: 200 TABLET ORAL at 07:52

## 2022-05-31 RX ADMIN — RIVAROXABAN 20 MG: 20 TABLET, FILM COATED ORAL at 07:55

## 2022-05-31 RX ADMIN — VENLAFAXINE 75 MG: 75 TABLET ORAL at 07:54

## 2022-05-31 ASSESSMENT — ACTIVITIES OF DAILY LIVING (ADL)
ADLS_ACUITY_SCORE: 26

## 2022-05-31 NOTE — PLAN OF CARE
"Care Coordination - Discharge Planning    Line Company:  [facility] [phone] for line care supplies   Referral made for line care:  Yes   IV medications needed at discharge:  None   Pharmacy concerns:  None. (Of note, vori requires prior auth)     PT/OT/therapies recommended:  Home PT/OT recommended - however, pt declining both home and/or OP therapies   (Of note: was previously on service with [facility] for home [phone])  Referral made for PT/OT/therapies:  No - pt declining therapies at this time; will continue home exercise program    D/c location:  Home - [place]  Has placement need been communicated to Social Work?  NA    NC Teaching time arranged with patient/caregiver:  Completed 5/31 with pt and spouse (and pt's sister-Leisa, via phone)  Notify nurse to schedule line care class/ DM teaching, prior to d/c:  Patient and caregiver previously received teaching, and decline further need     Caregiver:  Tres \"Jerrica\" Linda () [phone]    Leisa Grimaldo (sister) [phone]      Kiara Stewart, RN, BSN  RN Care Coordinator - BMT  Ph [phone]  Pg [phone]   "

## 2022-05-31 NOTE — PLAN OF CARE
"      Tmax 99. O2 stable on RA. BP stable. HR in low 90s. Pt is up independent in room. Appeared more restless, anxious and over all more uncomfortable overnight. Pt was unable to stay asleep and was up on and off throughout the night. Complained of pain more than previous nights, PRN oxy given x 2 and Robaxin x 1. Still endorsed pain in hips and low back, PRN tylenol x 1 as well. Tried also repositioning pt to help relieve some low back pain but pt declined other options like ice or hot packs. Pt was eventually able to fall back asleep around 0430. Continues to have nausea and some diarrhea, with 1 episode of stool incontinence. Scheduled imodium seems to be helping some. PRN compazine given x 2 along with scheduled zofran and the zyprexa. Appetite and oral intake are reduced, pt agreed to have some chicken broth and a few crackers for a late dinner. No replacements needed. Checked in with pt around 0700 and she stated she was feeling better. Plan for the day is possible discharge home. Continue plan of care.         Problem: Plan of Care - These are the overarching goals to be used throughout the patient stay.    Goal: Plan of Care Review/Shift Note  Description: The Plan of Care Review/Shift note should be completed every shift.  The Outcome Evaluation is a brief statement about your assessment that the patient is improving, declining, or no change.  This information will be displayed automatically on your shift note.  5/31/2022 0615 by Sarita Whiting, RN  Outcome: Ongoing, Progressing  5/31/2022 0549 by Sarita Whiting, RN  Outcome: Ongoing, Progressing  Goal: Patient-Specific Goal (Individualized)  Description: You can add care plan individualizations to a care plan. Examples of Individualization might be:  \"Parent requests to be called daily at 9am for status\", \"I have a hard time hearing out of my right ear\", or \"Do not touch me to wake me up as it startles me\".  5/31/2022 0615 by Sarita Whiting, " RN  Outcome: Ongoing, Progressing  5/31/2022 0549 by Sarita Whiting RN  Outcome: Ongoing, Progressing  Goal: Absence of Hospital-Acquired Illness or Injury  5/31/2022 0615 by Sarita Whiting RN  Outcome: Ongoing, Progressing  5/31/2022 0549 by Sarita Whiting RN  Outcome: Ongoing, Progressing  Intervention: Identify and Manage Fall Risk  Recent Flowsheet Documentation  Taken 5/30/2022 2200 by Sarita Whiting RN  Safety Promotion/Fall Prevention:    assistive device/personal items within reach    clutter free environment maintained    fall prevention program maintained    nonskid shoes/slippers when out of bed    patient and family education  Intervention: Prevent Skin Injury  Recent Flowsheet Documentation  Taken 5/30/2022 2200 by Sarita Whiting RN  Body Position: position changed independently  Intervention: Prevent and Manage VTE (Venous Thromboembolism) Risk  Recent Flowsheet Documentation  Taken 5/30/2022 2200 by Sarita Whiting RN  VTE Prevention/Management: compression stockings off  Activity Management:    activity adjusted per tolerance    activity encouraged  Intervention: Prevent Infection  Recent Flowsheet Documentation  Taken 5/30/2022 2200 by Sarita Whiting RN  Infection Prevention:    cohorting utilized    equipment surfaces disinfected  Goal: Optimal Comfort and Wellbeing  5/31/2022 0615 by Sarita Whiting RN  Outcome: Ongoing, Progressing  5/31/2022 0549 by Sarita Whiting RN  Outcome: Ongoing, Progressing  Intervention: Monitor Pain and Promote Comfort  Recent Flowsheet Documentation  Taken 5/31/2022 0421 by Sarita Whiting RN  Pain Management Interventions: medication (see MAR)  Taken 5/31/2022 0329 by Sarita Whiting RN  Pain Management Interventions: medication (see MAR)  Goal: Readiness for Transition of Care  5/31/2022 0615 by Sarita Whiting RN  Outcome: Ongoing, Progressing  5/31/2022 0549 by Sarita Whiting RN  Outcome: Ongoing,  Progressing     Problem: Adjustment to Transplant (Stem Cell/Bone Marrow Transplant)  Goal: Optimal Coping with Transplant  5/31/2022 0615 by Sarita Whiting RN  Outcome: Ongoing, Progressing  5/31/2022 0549 by Sarita Whiting RN  Outcome: Ongoing, Progressing     Problem: Bladder Irritation (Stem Cell/Bone Marrow Transplant)  Goal: Symptom-Free Urinary Elimination  5/31/2022 0615 by Sarita Whiting RN  Outcome: Ongoing, Progressing  5/31/2022 0549 by Sarita Whiting RN  Outcome: Ongoing, Progressing  Intervention: Prevent or Manage Bladder Irritation  Recent Flowsheet Documentation  Taken 5/31/2022 0421 by Sarita Whiting RN  Pain Management Interventions: medication (see MAR)  Taken 5/31/2022 0329 by Sarita Whiting RN  Pain Management Interventions: medication (see MAR)     Problem: Diarrhea (Stem Cell/Bone Marrow Transplant)  Goal: Diarrhea Symptom Control  5/31/2022 0615 by Sarita Whiting RN  Outcome: Ongoing, Progressing  5/31/2022 0549 by Sarita Whiting RN  Outcome: Ongoing, Progressing     Problem: Fatigue (Stem Cell/Bone Marrow Transplant)  Goal: Improved Activity Tolerance  5/31/2022 0615 by Sarita Whtiing RN  Outcome: Ongoing, Progressing  5/31/2022 0549 by Sarita Whiting RN  Outcome: Ongoing, Progressing  Intervention: Promote Improved Energy  Recent Flowsheet Documentation  Taken 5/30/2022 2200 by Sarita Whiting RN  Activity Management:    activity adjusted per tolerance    activity encouraged     Problem: Hematologic Alteration (Stem Cell/Bone Marrow Transplant)  Goal: Blood Counts Within Acceptable Range  5/31/2022 0615 by Sarita Whiting RN  Outcome: Ongoing, Progressing  5/31/2022 0549 by Sarita Whiting RN  Outcome: Ongoing, Progressing  Intervention: Monitor and Manage Hematologic Symptoms  Recent Flowsheet Documentation  Taken 5/30/2022 2200 by Sarita Whiting RN  Medication Review/Management: medications reviewed     Problem:  Hypersensitivity Reaction (Stem Cell/Bone Marrow Transplant)  Goal: Absence of Hypersensitivity Reaction  5/31/2022 0615 by Sarita Whiting RN  Outcome: Ongoing, Progressing  5/31/2022 0549 by Sarita Whiting RN  Outcome: Ongoing, Progressing     Problem: Infection (Stem Cell/Bone Marrow Transplant)  Goal: Absence of Infection  5/31/2022 0615 by Sarita Whiting RN  Outcome: Ongoing, Progressing  5/31/2022 0549 by Sarita Whiting RN  Outcome: Ongoing, Progressing  Intervention: Prevent and Manage Infection  Recent Flowsheet Documentation  Taken 5/30/2022 2200 by Sarita Whiting RN  Infection Prevention:    cohorting utilized    equipment surfaces disinfected  Isolation Precautions: protective environment maintained     Problem: Mucositis (Stem Cell/Bone Marrow Transplant)  Goal: Improved Oral Mucous Membrane Health and Integrity  5/31/2022 0615 by Sarita Whiting RN  Outcome: Ongoing, Progressing  5/31/2022 0549 by Sarita Whiting RN  Outcome: Ongoing, Progressing  Intervention: Promote Oral Comfort and Health  Recent Flowsheet Documentation  Taken 5/30/2022 2200 by Sarita Whiting RN  Oral Care:    teeth brushed    oral rinse provided     Problem: Nausea and Vomiting (Stem Cell/Bone Marrow Transplant)  Goal: Nausea and Vomiting Symptom Relief  5/31/2022 0615 by Sarita Whiting RN  Outcome: Ongoing, Progressing  5/31/2022 0549 by Sarita Whiting RN  Outcome: Ongoing, Progressing  Intervention: Prevent and Manage Nausea and Vomiting  Recent Flowsheet Documentation  Taken 5/30/2022 2200 by Sarita Whiting RN  Nausea/Vomiting Interventions: antiemetic     Problem: Nutrition Intake Altered (Stem Cell/Bone Marrow Transplant)  Goal: Optimal Nutrition Intake  5/31/2022 0615 by Sarita Whiting RN  Outcome: Ongoing, Progressing  5/31/2022 0549 by Sarita Whiting RN  Outcome: Ongoing, Progressing   Goal Outcome Evaluation:

## 2022-05-31 NOTE — PROGRESS NOTES
"Discharge SBAR:    Situation:  Libby Grimaldo is a 71 year old being discharged to: Home  Admission reason: Scheduled Admission  Is this a readmission? No  Discharge time: 1330  Discharge barrier if discharged after 11AM: unsure of discharge this morning     Background:  Primary diagnosis: Multiple Myeloma  /65 (BP Location: Right arm)   Pulse 90   Temp 98.7  F (37.1  C) (Axillary)   Resp 16   Ht 1.57 m (5' 1.81\")   Wt 57.4 kg (126 lb 9.6 oz)   SpO2 96%   BMI 23.30 kg/m    Type of donor: Autologous  Type of stem cells: PBSC  Relapsed? No  Falls Precautions? No  Isolation? No  DNR? No  DNI? No  Confidential Patient? No  Positive blood cultures? No    Assessment:  Discharge teaching    Review discharge medications and schedule: Yes    Set up pill box: Yes-with  present and sister on the phone    Discharge instructions reviewed: Yes-with  present and sister on the phone    Special considerations (think about previous reactions, issues with flushing CVC, premedication needs, etc): No    Patient Concerns: No    Recommendations:  Anticipated needs:    Daily infusions: No    Daily transfusions: No    G-CSF: Yes starting d+5    Other: Not Applicable  Verbal report called to clinic: No      "

## 2022-05-31 NOTE — PROGRESS NOTES
BMT Teaching Flowsheet    Libby Grimaldo is a 71 year old female  The primary encounter diagnosis was Multiple myeloma not having achieved remission (H). Diagnoses of Stem cells transplant status (H) and Admission for chemotherapy were also pertinent to this visit.    Teaching Topic: Autologous stem cell transplant discharge teaching     Person(s) involved in teaching: Patient and Spouse (and pt's sister-Leisa, via phone)  Motivation Level  Asks Questions: Yes  Eager to Learn: Yes  Cooperative: Yes  Receptive (willing/able to accept information): Yes  Any cultural factors/Judaism beliefs that may influence understanding or compliance? No    Patient and Caregiver demonstrates understanding of the following:  - Reason for the appointment, diagnosis and treatment plan: Yes  - Knowledge of proper use of medications and conditions for which they are ordered (with special attention to potential side effects or drug interactions): No, explain: Bedside RN to complete medication teaching with patient and caregiver prior to discharge.   - Which situations necessitate calling provider and whom to contact: Yes    Teaching concerns addressed: None identified    Proper use and care of (medical equipment, care aids, etc.) Yes  Pain management techniques: Yes  Patient instructed on hand hygiene: Yes  How and/when to access community resources: Yes    Infection Control:  Patient and Caregivers demonstrate understanding of the following:  Surgical procedure site care taught NA  Signs and symptoms of infection taught Yes  Wound care taught NA  Central venous catheter care taught No, explain: Patient and caregiver previously received teaching, and decline further need     Instructional Materials Used/Given:   Discharge teaching completed with patient and family. Written guidelines provided including clinic follow up, signs and symptoms to report, infection prevention, and contact list. Reviewed potential side effects s/p transplant  and what to expect during recovery period. Discussed clinic routines. Discussed activities to avoid to prevent infections and mask guidelines. [facility] for line care supplies. Pt and family verbalize understanding of material via teach back and all questions have been answered.    Time spent with patient: 30 minutes.    Specific Concerns: NA

## 2022-05-31 NOTE — PLAN OF CARE
Occupational Therapy Discharge Summary    Reason for therapy discharge:    Discharged to home.    Progress towards therapy goal(s). See goals on Care Plan in [facility] electronic health record for goal details.  Goals not met.  Barriers to achieving goals:   limited tolerance for therapy and cognitive status.    Therapy recommendation(s):    Continue home exercise program.  Pt declining all home or OP therapy (OT and PT) despite likely benefit for higher level balance and cognition. Recommend home with increased assist, due to post BMT precautions and higher level concerns.

## 2022-05-31 NOTE — PROGRESS NOTES
"  BMT Progress note  BMT Physician: Dr. 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     INTERIM HISTORY: Libby still has nausea and diarrhea.  She had an episode of stool incontinence.  She was up a lot of the night with pain in her back, hips and her right shoulder.  She describes it as a \"hollow\" pain inside the bone of her shoulder/arm.       REVIEW OF SYSTEMS: Otherwise unremarkable other than what is noted in the Interim History.     PHYSICAL EXAM:   Vitals:  /65 (BP Location: Right arm)   Pulse 90   Temp 98.7  F (37.1  C) (Axillary)   Resp 16   Ht 1.57 m (5' 1.81\")   Wt 57.4 kg (126 lb 9.6 oz)   SpO2 96%   BMI 23.30 kg/m      General Appearance: NAD  HEENT: large pupils; PERRL; sclera anicteric.    CV: RRR. no murmur or rub.   RESP:  Crackles bases, right > left; otherwise clear.   GI: thin; +BS, soft, nontender, nondistended.   EXT: No edema. No cyanosis/clubbing.   SKIN: no lesions or rash  NEURO: A&O x3   PSYCH: Appropriate affect   VASCULAR ACCESS: R port, not accessed; PICC c/d/i    ROUTINE LABS:    Lab Results   Component Value Date    WBC 1.3 (L) 05/31/2022    ANEU 5.3 02/25/2022    HGB 9.8 (L) 05/31/2022    HCT 29.3 (L) 05/31/2022    PLT 67 (L) 05/31/2022     05/31/2022    POTASSIUM 4.2 05/31/2022    CHLORIDE 113 (H) 05/31/2022    CO2 24 05/31/2022    GLC 84 05/31/2022    BUN 9 05/31/2022    CR 0.67 05/31/2022    MAG 2.1 05/31/2022    INR 1.40 (H) 05/30/2022          ASSESSMENT AND PLAN:   Libby Grimaldo is a 71 year old female, currently day +4 of Auto 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      BMT/IEC PROTOCOL for 2016-35  - Chemo protocol: HD Melphalan 140mg/m2  Day -1: rest day, 5/26  Day 0: Cell dose: 3.94x10^6 (s/p cytoxan chemo priming 2/1/2022 and subsequent stem cell collection).   - Restaging plan: per protocol  Day+5 gcsf starts (6/1/22) continue until ANC >2500 x 2 days. "      HEME/COAG  - Risk of cytopenias due to chemotherapy and radiation  - Transfusion parameters: hemoglobin <7g/dL, platelets <10k  - Relevant thrombosis or bleeding history:   2/15: new non-occlusive LUE DVT.    - on xarelto 20mg daily for line associated DVT.  Plts 67 today. Dropping plts; xarelto now on hold (last dose 5/30).   - Once plts recover remove PICC. Resume xeralto when plts recover >50k (after picc pulled), continue anticoagulation for 1 month then stop.      ID  - Relevant infection history: Recurrent PNA and UTIs - No issues this hospitalization.   - Prophylaxis plan: ACV 800mg PO BID, levaquin 250mg daily, fungal prophy: fluconazole 200mg po daily.   - PJP Prophy: HD bactrim qMon/Tues BID to start day+28 (script given on discharge).   - Vaccination status: Influenza vaccination after day +60, COVID vaccination after day +100, followed by remaining vaccinations at 12, 14, 24, and 26 months.     CARDIOVASCULAR  - Risk of cardiomyopathy:  Baseline EF 56%  - known hyperlipidemia- hold crestor until day +60 post transplant.   Hypertension: dex/fluid exacerbated hypertnesion - Norvasc increased to 10mg daily while inpatient- decrease back to 5mg daily on discharge as anticipate lower BP with decreaesd intake and GI losses in coming daily.   History of Afib - now in NSR. On Metoprolol 25mg PO daily.      RESPIRATORY  - hx of COPD, continue breo-ellipa inhaler      GI/NUTRITION  - Ulcer prophylaxis: protonix 40mg daily.   - Risk of nausea/vomiting due to chemo/radiation: prophy dex and zofran.  - Nausea persistent- continued scheduled zofran starting day+2. Will dicharge with zofran 8mg [id number]. Refilled prn compazine 5mg PO q6hrs prn on discharge.  -Narcotic/zofran induced constipation- recommend senna-x x1- Diarrhea x2 overnight. PRN imodium started just before dishcarge. Monitor (not cdiff colonized).      RENAL/ELECTROLYTES/  - Electrolyte management: replace per sliding scale  - Hypocalcemia;  repleted with 3g calcium gluconate IV today.   - Hypokalemia secondary to diarrhea; replete per unit policy     DIABETES/ENDOCRINE  - BS okay - no need for insulin while inpatient.      MUSCULOSKELETAL/FRAILTY  - Baseline Frailty Score: 4  - Patient with substantial risk of sarcopenia. Held daily calcium to decrease bill burden on discharge.   - Daily PT/OT as needed while inpatient  - Cancer Rehab as needed outpatient- ordered.      SYMPTOM MANAGEMENT  - Nausea from chemo/radiation: Prochlorperazine, ondansetron, lorazepam.  - # Pain Assessment:  - Libby is experiencing pain due to chronic pain. Pain management was discussed with Libby and her spouse and the plan was created in a collaborative fashion.  Libby's response to the current recommendations: engaged  - Continue suboxone and prn oxycodone.   - Pain medicine consult- no change to regimen currently. Appreciate their follow along with us, anticipate increased pain issues next week when neutropenia occurs.     SOCIAL DETERMINANTS  - Caregiver: Tres, spouse. Had planned on admission through engraftment but Libby feels like she cna manage symptosm at home. Kiara Johnjada will call pt tomorrow for telephone dishcarge teaching. Reiterated if n/v/d uncontrolled, unable to take meds or fevers she would call BMT clinic [phone] request BMT PA triage pager to notified and we will call her back. IF after hours [facility] can call [phone] and request BMT fellow on call to call her back, otherwise present to [facility] [facility].   Bedside nurse very thorough also called her sister so she could review discharge plan/meds with her as well as Libby and Tres prior to hospital discharge.     Dispo: possible discharge later today if her nausea/pain/diarrhea are manageable.    Nidia Littlejohn PA-C  5/31/2022

## 2022-06-01 ENCOUNTER — OFFICE VISIT (OUTPATIENT)
Dept: TRANSPLANT | Facility: CLINIC | Age: 72
DRG: 016 | End: 2022-06-01
Attending: INTERNAL MEDICINE
Payer: MEDICARE

## 2022-06-01 ENCOUNTER — TELEPHONE (OUTPATIENT)
Dept: TRANSPLANT | Facility: CLINIC | Age: 72
End: 2022-06-01
Payer: MEDICARE

## 2022-06-01 ENCOUNTER — ONCOLOGY VISIT (OUTPATIENT)
Dept: TRANSPLANT | Facility: CLINIC | Age: 72
End: 2022-06-01
Payer: MEDICARE

## 2022-06-01 ENCOUNTER — APPOINTMENT (OUTPATIENT)
Dept: LAB | Facility: CLINIC | Age: 72
End: 2022-06-01
Payer: MEDICARE

## 2022-06-01 ENCOUNTER — ALLIED HEALTH/NURSE VISIT (OUTPATIENT)
Dept: TRANSPLANT | Facility: CLINIC | Age: 72
End: 2022-06-01
Payer: MEDICARE

## 2022-06-01 VITALS
HEART RATE: 108 BPM | TEMPERATURE: 97.9 F | DIASTOLIC BLOOD PRESSURE: 73 MMHG | OXYGEN SATURATION: 98 % | SYSTOLIC BLOOD PRESSURE: 133 MMHG | RESPIRATION RATE: 18 BRPM

## 2022-06-01 DIAGNOSIS — C91.00 ALL (ACUTE LYMPHOBLASTIC LEUKEMIA) (H): Primary | ICD-10-CM

## 2022-06-01 DIAGNOSIS — C90.00 MULTIPLE MYELOMA NOT HAVING ACHIEVED REMISSION (H): Primary | ICD-10-CM

## 2022-06-01 DIAGNOSIS — Z94.84 STEM CELLS TRANSPLANT STATUS (H): ICD-10-CM

## 2022-06-01 DIAGNOSIS — F32.89 OTHER DEPRESSION: ICD-10-CM

## 2022-06-01 LAB
ANION GAP SERPL CALCULATED.3IONS-SCNC: 9 MMOL/L (ref 3–14)
BUN SERPL-MCNC: 7 MG/DL (ref 7–30)
CALCIUM SERPL-MCNC: 8 MG/DL (ref 8.5–10.1)
CHLORIDE BLD-SCNC: 110 MMOL/L (ref 94–109)
CO2 SERPL-SCNC: 23 MMOL/L (ref 20–32)
CREAT SERPL-MCNC: 0.63 MG/DL (ref 0.52–1.04)
ERYTHROCYTE [DISTWIDTH] IN BLOOD BY AUTOMATED COUNT: 12.2 % (ref 10–15)
GFR SERPL CREATININE-BSD FRML MDRD: >90 ML/MIN/1.73M2
GLUCOSE BLD-MCNC: 143 MG/DL (ref 70–99)
HCT VFR BLD AUTO: 34.8 % (ref 35–47)
HGB BLD-MCNC: 11.5 G/DL (ref 11.7–15.7)
MCH RBC QN AUTO: 32.9 PG (ref 26.5–33)
MCHC RBC AUTO-ENTMCNC: 33 G/DL (ref 31.5–36.5)
MCV RBC AUTO: 99 FL (ref 78–100)
PLATELET # BLD AUTO: 51 10E3/UL (ref 150–450)
POTASSIUM BLD-SCNC: 3.6 MMOL/L (ref 3.4–5.3)
RBC # BLD AUTO: 3.5 10E6/UL (ref 3.8–5.2)
SODIUM SERPL-SCNC: 142 MMOL/L (ref 133–144)
WBC # BLD AUTO: 0.1 10E3/UL (ref 4–11)

## 2022-06-01 PROCEDURE — 96372 THER/PROPH/DIAG INJ SC/IM: CPT | Performed by: PHYSICIAN ASSISTANT

## 2022-06-01 PROCEDURE — 250N000011 HC RX IP 250 OP 636: Performed by: INTERNAL MEDICINE

## 2022-06-01 PROCEDURE — 250N000011 HC RX IP 250 OP 636: Performed by: PHYSICIAN ASSISTANT

## 2022-06-01 PROCEDURE — 85027 COMPLETE CBC AUTOMATED: CPT

## 2022-06-01 PROCEDURE — 82310 ASSAY OF CALCIUM: CPT

## 2022-06-01 PROCEDURE — 36592 COLLECT BLOOD FROM PICC: CPT

## 2022-06-01 PROCEDURE — 99214 OFFICE O/P EST MOD 30 MIN: CPT

## 2022-06-01 PROCEDURE — G0463 HOSPITAL OUTPT CLINIC VISIT: HCPCS

## 2022-06-01 RX ORDER — HEPARIN SODIUM (PORCINE) LOCK FLUSH IV SOLN 100 UNIT/ML 100 UNIT/ML
5 SOLUTION INTRAVENOUS
Status: CANCELLED | OUTPATIENT
Start: 2022-06-02

## 2022-06-01 RX ORDER — HEPARIN SODIUM,PORCINE 10 UNIT/ML
5 VIAL (ML) INTRAVENOUS
Status: CANCELLED | OUTPATIENT
Start: 2022-06-02

## 2022-06-01 RX ORDER — HEPARIN SODIUM,PORCINE 10 UNIT/ML
5 VIAL (ML) INTRAVENOUS
Status: DISCONTINUED | OUTPATIENT
Start: 2022-06-01 | End: 2022-06-01 | Stop reason: HOSPADM

## 2022-06-01 RX ORDER — VENLAFAXINE 75 MG/1
75 TABLET ORAL 3 TIMES DAILY
Qty: 90 TABLET | Refills: 3 | Status: ON HOLD | OUTPATIENT
Start: 2022-06-01 | End: 2023-01-01

## 2022-06-01 RX ADMIN — SODIUM CHLORIDE, PRESERVATIVE FREE 5 ML: 5 INJECTION INTRAVENOUS at 13:27

## 2022-06-01 RX ADMIN — SODIUM CHLORIDE, PRESERVATIVE FREE 5 ML: 5 INJECTION INTRAVENOUS at 13:28

## 2022-06-01 RX ADMIN — FILGRASTIM 300 MCG: 300 INJECTION, SOLUTION INTRAVENOUS; SUBCUTANEOUS at 13:14

## 2022-06-01 ASSESSMENT — PAIN SCALES - GENERAL: PAINLEVEL: SEVERE PAIN (6)

## 2022-06-01 NOTE — PROGRESS NOTES
BMT Progress note  BMT Physician: Dr. 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      INTERIM HISTORY: Libby still has nausea, vomiting and diarrhea.  No throat pain/mouth sores.  Thinks she is feeling better this afternoon.  Taking scheduled zofran.  Pushing fluid intake.  Notes that her right shoulder continues to be painful. No dysuria.      REVIEW OF SYSTEMS: Otherwise unremarkable other than what is noted in the Interim History.      PHYSICAL EXAM:   Vitals:  /73 (BP Location: Left arm, Patient Position: Sitting, Cuff Size: Adult Small)   Pulse 108   Temp 97.9  F (36.6  C) (Oral)   Resp 18   SpO2 98%        General Appearance: NAD  HEENT:  PERRL; sclera anicteric.    CV: RRR. no murmur or rub.   RESP: clear lungs  GI: thin; +BS, soft; slightly tender LLQ.   EXT: No edema. No cyanosis/clubbing.   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      BMT/IEC PROTOCOL for 2016-35  - Chemo protocol: HD Melphalan 140mg/m2  Day -1: rest day, 5/26  Day 0: Cell dose: 3.94x10^6 (s/p cytoxan chemo priming 2/1/2022 and subsequent stem cell collection).   - Restaging plan: per protocol  Day+5 gcsf starts (6/1/22) continue until ANC >2500 x 2 days.      HEME/COAG  - Pancytopenia due to chemotherapy and radiation  - Transfusion parameters: hemoglobin <7g/dL, platelets <10k  - Relevant thrombosis or bleeding history:   2/15: new non-occlusive LUE DVT.    - Dropping plts; xarelto now on hold (last dose 5/30).   - Once plts recover remove PICC. Resume xeralto when plts recover >50k (after picc pulled), continue anticoagulation for 1 month then stop.      ID  - Relevant infection history: Recurrent PNA and UTIs - No  issues this hospitalization.   - Prophylaxis plan: ACV 800mg PO BID, levaquin 250mg daily, fungal prophy: fluconazole 200mg po daily.   - PJP Prophy: HD bactrim qMon/Tues BID to start day+28 (script given on discharge).   - Vaccination status: Influenza vaccination after day +60, COVID vaccination after day +100, followed by remaining vaccinations at 12, 14, 24, and 26 months.     CARDIOVASCULAR  - Risk of cardiomyopathy:  Baseline EF 56%  - known hyperlipidemia- hold crestor until day +60 post transplant.   Hypertension: dex/fluid exacerbated hypertnesion - Norvasc increased to 10mg daily while inpatient- decrease back to 5mg daily on discharge as anticipate lower BP with decreaesd intake and GI losses in coming daily.   History of Afib - now in NSR. On Metoprolol 25mg PO daily.      RESPIRATORY  - hx of COPD, continue breo-ellipa inhaler      GI/NUTRITION  - Ulcer prophylaxis: protonix 40mg daily.   - Risk of nausea/vomiting due to chemo/radiation: prophy dex and zofran.  - Nausea persistent- continued scheduled zofran starting day+2. Will dicharge with zofran 8mg [id number]. Refilled prn compazine 5mg PO q6hrs prn on discharge.  -Narcotic/zofran induced constipation- recommend senna-x x1- Diarrhea x2 overnight. PRN imodium started just before dishcarge. Monitor (not cdiff colonized).      RENAL/ELECTROLYTES/  - Electrolyte management: replace per sliding scale  - Hypocalcemia; repleted with 3g calcium gluconate IV today.   - Hypokalemia secondary to diarrhea; replete per unit policy     DIABETES/ENDOCRINE  - BS okay - no need for insulin while inpatient.      MUSCULOSKELETAL/FRAILTY  - Baseline Frailty Score: 4  - Patient with substantial risk of sarcopenia. Held daily calcium to decrease bill burden on discharge.   - Daily PT/OT as needed while inpatient  - Cancer Rehab as needed outpatient- ordered.     PSYCH  - continue effexor 75mg tid     SYMPTOM MANAGEMENT  - Nausea from chemo/radiation: Prochlorperazine,  ondansetron, lorazepam.  - # Pain Assessment:  - Libby is experiencing pain due to chronic pain.  - Continue suboxone and prn oxycodone.     I spent 30 minutes in the care of this patient today, which included time necessary for preparation for the visit, obtaining history, ordering medications/tests/procedures as medically indicated, review of pertinent medical literature, counseling of the patient, communication of recommendations to the care team, and documentation time.    Nidia Littlejohn PA-C  6/1/2022

## 2022-06-01 NOTE — LETTER
6/1/2022         RE: Libby Grimaldo  [address] [facility] [address]        Dear Colleague,    Thank you for referring your patient, Libby Grimaldo, to the [facility] [place]. Please see a copy of my visit note below.    I reviewed the discharge medications, med box and administration times with Libby and her caregiver. The medication list was complete per protocol. Amlodipine, synthroid and effexor were missing from the med box. I added amlodipine from her home medication supply. She did not have effexor which the eh will order. She keeps the synthroid separate from the med box as she takes this a 0600 by itself. We discussed her nausea regimen and discussed what to do for breakthrough nausea. Also, she keeps her suboxone at home and separate from the med box.    Current Outpatient Medications   Medication Sig Dispense Refill     acetaminophen (TYLENOL) 500 MG tablet Take 500-1,000 mg by mouth every 6 hours as needed for mild pain        acyclovir (ZOVIRAX) 800 MG tablet Take 1 tablet (800 mg) by mouth every 12 hours 60 tablet 0     albuterol (PROAIR HFA/PROVENTIL HFA/VENTOLIN HFA) 108 (90 Base) MCG/ACT inhaler Inhale 2 puffs into the lungs every 6 hours as needed for shortness of breath / dyspnea        amLODIPine (NORVASC) 5 MG tablet Take 1 tablet (5 mg) by mouth daily 30 tablet 0     buprenorphine HCl-naloxone HCl (SUBOXONE) 8-2 MG per film Place 1 Film under the tongue 3 times daily        fluconazole (DIFLUCAN) 200 MG tablet Take 1 tablet (200 mg) by mouth daily 30 tablet 0     Fluticasone-Umeclidin-Vilanterol (TRELEGY ELLIPTA) 100-62.5-25 MCG/INH oral inhaler Inhale 1 puff into the lungs daily        guaiFENesin (MUCINEX) 600 MG 12 hr tablet Take 2 tablets (1,200 mg) by mouth 2 times daily as needed       levofloxacin (LEVAQUIN) 250 MG tablet Take 1 tablet (250 mg) by mouth daily 15 tablet 0     levothyroxine (SYNTHROID/LEVOTHROID) 112 MCG tablet Take  1 tablet (112 mcg) by mouth daily 30 tablet 0     loperamide (IMODIUM) 2 MG capsule Take 1 capsule (2 mg) by mouth 4 times daily as needed for diarrhea 30 capsule 0     methocarbamol (ROBAXIN) 500 MG tablet Take 500 mg by mouth 3 times daily as needed for muscle spasms       metoprolol succinate ER (TOPROL XL) 25 MG 24 hr tablet Take 1 tablet (25 mg) by mouth daily 30 tablet 0     ondansetron (ZOFRAN ODT) 8 MG ODT tab Take 1 tablet (8 mg) by mouth every 8 hours 30 tablet 0     oxyCODONE IR (ROXICODONE) 10 MG tablet Take 10 mg by mouth every 6 hours as needed for severe pain       pantoprazole (PROTONIX) 40 MG EC tablet Take 1 tablet (40 mg) by mouth daily 30 tablet 0     prochlorperazine (COMPAZINE) 5 MG tablet Take 1 tablet (5 mg) by mouth every 6 hours as needed for nausea or vomiting 30 tablet 0     sucralfate (CARAFATE) 1 GM/10ML suspension Take 10 mLs (1 g) by mouth 4 times daily (before meals and nightly) 420 mL 4     [START ON 6/27/2022] sulfamethoxazole-trimethoprim (BACTRIM DS) 800-160 MG tablet Take 1 tablet by mouth Every Mon, Tues two times daily 16 tablet 0     venlafaxine (EFFEXOR) 75 MG tablet Take 75 mg by mouth 3 times daily          Pertinent labs considered today:  Lab Results   Component Value Date     06/01/2022                 normal sodium 136-148  Lab Results   Component Value Date    POTASSIUM 3.6 06/01/2022       normal potassium 3.5-5.2   Lab Results   Component Value Date    CHLORIDE 110 06/01/2022           normal chloride    Lab Results   Component Value Date    CO2 24 05/31/2022                normal CO2 20-32  Lab Results   Component Value Date    BUN 9 05/31/2022                 normal BUN 5-24  Lab Results   Component Value Date    GLC 84 05/31/2022                 normal glucose   Lab Results   Component Value Date    CR 0.67 05/31/2022                 normal cr 0.8-1.5  Lab Results   Component Value Date    DIANDRA 7.1 05/31/2022               normal calcium   8.5-10.4  Lab Results   Component Value Date    BILITOTAL 0.6 05/30/2022          normal bilirubin 0.2-1.3  Lab Results   Component Value Date    PROTTOTAL 4.6 05/30/2022          normal total protein 6.0-8.2  Lab Results   Component Value Date    ALBUMIN 2.6 05/30/2022             normal albumin 3.2-4.5  Lab Results   Component Value Date    ALKPHOS 131 05/30/2022            normal alkphos   Lab Results   Component Value Date    ALT 38 05/30/2022         normal ALT 0-65  Lab Results   Component Value Date    AST 50 05/30/2022         normal AST 0-37    Lab Results   Component Value Date    HGB 11.5 06/01/2022     Lab Results   Component Value Date    WBC 0.1 06/01/2022      Lab Results   Component Value Date    PLT 51 06/01/2022       Estimated Creatinine Clearance: 71.8 mL/min (based on SCr of 0.67 mg/dL).    Changes made to scheduled medication list today include:  Added: amlodipine to the med box, Rivaroxaban is on hold until plt recovery.         Time spent in this activity: 15 minutes        Flakito Celestin [facility], PharmD

## 2022-06-01 NOTE — PROGRESS NOTES
I reviewed the discharge medications, med box and administration times with Libby and her caregiver. The medication list was complete per protocol. Amlodipine, synthroid and effexor were missing from the med box. I added amlodipine from her home medication supply. She did not have effexor which the eh will order. She keeps the synthroid separate from the med box as she takes this a 0600 by itself. We discussed her nausea regimen and discussed what to do for breakthrough nausea. Also, she keeps her suboxone at home and separate from the med box.    Current Outpatient Medications   Medication Sig Dispense Refill     acetaminophen (TYLENOL) 500 MG tablet Take 500-1,000 mg by mouth every 6 hours as needed for mild pain        acyclovir (ZOVIRAX) 800 MG tablet Take 1 tablet (800 mg) by mouth every 12 hours 60 tablet 0     albuterol (PROAIR HFA/PROVENTIL HFA/VENTOLIN HFA) 108 (90 Base) MCG/ACT inhaler Inhale 2 puffs into the lungs every 6 hours as needed for shortness of breath / dyspnea        amLODIPine (NORVASC) 5 MG tablet Take 1 tablet (5 mg) by mouth daily 30 tablet 0     buprenorphine HCl-naloxone HCl (SUBOXONE) 8-2 MG per film Place 1 Film under the tongue 3 times daily        fluconazole (DIFLUCAN) 200 MG tablet Take 1 tablet (200 mg) by mouth daily 30 tablet 0     Fluticasone-Umeclidin-Vilanterol (TRELEGY ELLIPTA) 100-62.5-25 MCG/INH oral inhaler Inhale 1 puff into the lungs daily        guaiFENesin (MUCINEX) 600 MG 12 hr tablet Take 2 tablets (1,200 mg) by mouth 2 times daily as needed       levofloxacin (LEVAQUIN) 250 MG tablet Take 1 tablet (250 mg) by mouth daily 15 tablet 0     levothyroxine (SYNTHROID/LEVOTHROID) 112 MCG tablet Take 1 tablet (112 mcg) by mouth daily 30 tablet 0     loperamide (IMODIUM) 2 MG capsule Take 1 capsule (2 mg) by mouth 4 times daily as needed for diarrhea 30 capsule 0     methocarbamol (ROBAXIN) 500 MG tablet Take 500 mg by mouth 3 times daily as needed for muscle spasms        metoprolol succinate ER (TOPROL XL) 25 MG 24 hr tablet Take 1 tablet (25 mg) by mouth daily 30 tablet 0     ondansetron (ZOFRAN ODT) 8 MG ODT tab Take 1 tablet (8 mg) by mouth every 8 hours 30 tablet 0     oxyCODONE IR (ROXICODONE) 10 MG tablet Take 10 mg by mouth every 6 hours as needed for severe pain       pantoprazole (PROTONIX) 40 MG EC tablet Take 1 tablet (40 mg) by mouth daily 30 tablet 0     prochlorperazine (COMPAZINE) 5 MG tablet Take 1 tablet (5 mg) by mouth every 6 hours as needed for nausea or vomiting 30 tablet 0     sucralfate (CARAFATE) 1 GM/10ML suspension Take 10 mLs (1 g) by mouth 4 times daily (before meals and nightly) 420 mL 4     [START ON 6/27/2022] sulfamethoxazole-trimethoprim (BACTRIM DS) 800-160 MG tablet Take 1 tablet by mouth Every Mon, Tues two times daily 16 tablet 0     venlafaxine (EFFEXOR) 75 MG tablet Take 75 mg by mouth 3 times daily          Pertinent labs considered today:  Lab Results   Component Value Date     06/01/2022                 normal sodium 136-148  Lab Results   Component Value Date    POTASSIUM 3.6 06/01/2022       normal potassium 3.5-5.2   Lab Results   Component Value Date    CHLORIDE 110 06/01/2022           normal chloride    Lab Results   Component Value Date    CO2 24 05/31/2022                normal CO2 20-32  Lab Results   Component Value Date    BUN 9 05/31/2022                 normal BUN 5-24  Lab Results   Component Value Date    GLC 84 05/31/2022                 normal glucose   Lab Results   Component Value Date    CR 0.67 05/31/2022                 normal cr 0.8-1.5  Lab Results   Component Value Date    DIANDRA 7.1 05/31/2022               normal calcium  8.5-10.4  Lab Results   Component Value Date    BILITOTAL 0.6 05/30/2022          normal bilirubin 0.2-1.3  Lab Results   Component Value Date    PROTTOTAL 4.6 05/30/2022          normal total protein 6.0-8.2  Lab Results   Component Value Date    ALBUMIN 2.6 05/30/2022              normal albumin 3.2-4.5  Lab Results   Component Value Date    ALKPHOS 131 05/30/2022            normal alkphos   Lab Results   Component Value Date    ALT 38 05/30/2022         normal ALT 0-65  Lab Results   Component Value Date    AST 50 05/30/2022         normal AST 0-37    Lab Results   Component Value Date    HGB 11.5 06/01/2022     Lab Results   Component Value Date    WBC 0.1 06/01/2022      Lab Results   Component Value Date    PLT 51 06/01/2022       Estimated Creatinine Clearance: 71.8 mL/min (based on SCr of 0.67 mg/dL).    Changes made to scheduled medication list today include:  Added: amlodipine to the med box, Rivaroxaban is on hold until plt recovery.         Time spent in this activity: 15 minutes        Flakito Celestin [facility], PharmD

## 2022-06-01 NOTE — NURSING NOTE
GCSF administered subcutaneously in the abdominal tissue.     Patient tolerated well and had no issues.    Osman Langley LPN

## 2022-06-01 NOTE — NURSING NOTE
"Oncology Rooming Note    June 1, 2022 1:29 PM   Libby Grimaldo is a 71 year old female who presents for:    Chief Complaint   Patient presents with     Oncology Clinic Visit     Labs and md visit with GCSF inj post bmt for MM     Initial Vitals: There were no vitals taken for this visit. Estimated body mass index is 23.3 kg/m  as calculated from the following:    Height as of 5/25/22: 1.57 m (5' 1.81\").    Weight as of 5/31/22: 57.4 kg (126 lb 9.6 oz). There is no height or weight on file to calculate BSA.  Data Unavailable Comment: Data Unavailable   No LMP recorded. Patient is postmenopausal.  Allergies reviewed: Yes  Medications reviewed: Yes    Medications: Medication refills not needed today.  Pharmacy name entered into [facility]: [facility] #[id number] - [place], [place] - [address] AT [facility] [address]    Clinical concerns: none     Christen Kong RN              "

## 2022-06-01 NOTE — TELEPHONE ENCOUNTER
Received VM from Libby early this afternoon mentioning that she is feeling awful, having large amounts of vomiting and diarrhea and that she won't be coming to her appointments today. Attempted callback but call went straight to . Left  requesting callback as soon as possible.     Spoke with Libby's sister Leisa regarding the call. Emphasized the importance of seeing Libby when she is this ill so she can be properly evaluated. Advised that this timeframe is when most of our BMT pt's become ill and are at significant risk for infection/post transplant complications. Leisa acknowledged information and will go over to Libby's home and get her into the clinic ASAP.She will callback when pt is on her way.

## 2022-06-02 ENCOUNTER — ALLIED HEALTH/NURSE VISIT (OUTPATIENT)
Dept: TRANSPLANT | Facility: CLINIC | Age: 72
End: 2022-06-02
Payer: MEDICARE

## 2022-06-02 ENCOUNTER — APPOINTMENT (OUTPATIENT)
Dept: LAB | Facility: CLINIC | Age: 72
End: 2022-06-02
Payer: MEDICARE

## 2022-06-02 ENCOUNTER — ONCOLOGY VISIT (OUTPATIENT)
Dept: TRANSPLANT | Facility: CLINIC | Age: 72
End: 2022-06-02
Payer: MEDICARE

## 2022-06-02 ENCOUNTER — HOME INFUSION (PRE-WILLOW HOME INFUSION) (OUTPATIENT)
Dept: PHARMACY | Facility: CLINIC | Age: 72
End: 2022-06-02

## 2022-06-02 VITALS
RESPIRATION RATE: 16 BRPM | TEMPERATURE: 97.8 F | WEIGHT: 122 LBS | OXYGEN SATURATION: 98 % | HEART RATE: 73 BPM | BODY MASS INDEX: 22.45 KG/M2 | DIASTOLIC BLOOD PRESSURE: 70 MMHG | SYSTOLIC BLOOD PRESSURE: 110 MMHG

## 2022-06-02 DIAGNOSIS — Z94.84 STEM CELLS TRANSPLANT STATUS (H): Primary | ICD-10-CM

## 2022-06-02 DIAGNOSIS — C90.00 MULTIPLE MYELOMA NOT HAVING ACHIEVED REMISSION (H): Primary | ICD-10-CM

## 2022-06-02 DIAGNOSIS — C90.00 MULTIPLE MYELOMA NOT HAVING ACHIEVED REMISSION (H): ICD-10-CM

## 2022-06-02 LAB
ANION GAP SERPL CALCULATED.3IONS-SCNC: 9 MMOL/L (ref 3–14)
BLD PROD TYP BPU: NORMAL
BLOOD COMPONENT TYPE: NORMAL
BUN SERPL-MCNC: 8 MG/DL (ref 7–30)
CALCIUM SERPL-MCNC: 8.1 MG/DL (ref 8.5–10.1)
CHLORIDE BLD-SCNC: 110 MMOL/L (ref 94–109)
CO2 SERPL-SCNC: 23 MMOL/L (ref 20–32)
CODING SYSTEM: NORMAL
CREAT SERPL-MCNC: 0.67 MG/DL (ref 0.52–1.04)
ERYTHROCYTE [DISTWIDTH] IN BLOOD BY AUTOMATED COUNT: 11.9 % (ref 10–15)
GFR SERPL CREATININE-BSD FRML MDRD: >90 ML/MIN/1.73M2
GLUCOSE BLD-MCNC: 112 MG/DL (ref 70–99)
HCT VFR BLD AUTO: 34.2 % (ref 35–47)
HGB BLD-MCNC: 11.3 G/DL (ref 11.7–15.7)
ISSUE DATE AND TIME: NORMAL
MCH RBC QN AUTO: 32.5 PG (ref 26.5–33)
MCHC RBC AUTO-ENTMCNC: 33 G/DL (ref 31.5–36.5)
MCV RBC AUTO: 98 FL (ref 78–100)
PLATELET # BLD AUTO: 30 10E3/UL (ref 150–450)
POTASSIUM BLD-SCNC: 3.4 MMOL/L (ref 3.4–5.3)
RBC # BLD AUTO: 3.48 10E6/UL (ref 3.8–5.2)
SODIUM SERPL-SCNC: 142 MMOL/L (ref 133–144)
UNIT ABO/RH: NORMAL
UNIT NUMBER: NORMAL
UNIT STATUS: NORMAL
UNIT TYPE ISBT: 6200
WBC # BLD AUTO: 0.1 10E3/UL (ref 4–11)

## 2022-06-02 PROCEDURE — 250N000011 HC RX IP 250 OP 636: Performed by: INTERNAL MEDICINE

## 2022-06-02 PROCEDURE — 36592 COLLECT BLOOD FROM PICC: CPT

## 2022-06-02 PROCEDURE — 99214 OFFICE O/P EST MOD 30 MIN: CPT

## 2022-06-02 PROCEDURE — 80048 BASIC METABOLIC PNL TOTAL CA: CPT

## 2022-06-02 PROCEDURE — G0463 HOSPITAL OUTPT CLINIC VISIT: HCPCS

## 2022-06-02 PROCEDURE — 250N000011 HC RX IP 250 OP 636: Performed by: PHYSICIAN ASSISTANT

## 2022-06-02 PROCEDURE — 85027 COMPLETE CBC AUTOMATED: CPT

## 2022-06-02 PROCEDURE — 96372 THER/PROPH/DIAG INJ SC/IM: CPT | Performed by: PHYSICIAN ASSISTANT

## 2022-06-02 RX ORDER — HEPARIN SODIUM,PORCINE 10 UNIT/ML
5 VIAL (ML) INTRAVENOUS
Status: DISCONTINUED | OUTPATIENT
Start: 2022-06-02 | End: 2022-06-02 | Stop reason: HOSPADM

## 2022-06-02 RX ORDER — HEPARIN SODIUM (PORCINE) LOCK FLUSH IV SOLN 100 UNIT/ML 100 UNIT/ML
5 SOLUTION INTRAVENOUS
Status: CANCELLED | OUTPATIENT
Start: 2022-06-02

## 2022-06-02 RX ORDER — HEPARIN SODIUM,PORCINE 10 UNIT/ML
5 VIAL (ML) INTRAVENOUS
Status: CANCELLED | OUTPATIENT
Start: 2022-06-02

## 2022-06-02 RX ORDER — HEPARIN SODIUM (PORCINE) LOCK FLUSH IV SOLN 100 UNIT/ML 100 UNIT/ML
5 SOLUTION INTRAVENOUS
Status: CANCELLED | OUTPATIENT
Start: 2022-06-03

## 2022-06-02 RX ORDER — HEPARIN SODIUM,PORCINE 10 UNIT/ML
5 VIAL (ML) INTRAVENOUS
Status: CANCELLED | OUTPATIENT
Start: 2022-06-03

## 2022-06-02 RX ADMIN — FILGRASTIM 300 MCG: 300 INJECTION, SOLUTION INTRAVENOUS; SUBCUTANEOUS at 13:20

## 2022-06-02 RX ADMIN — Medication 5 ML: at 12:24

## 2022-06-02 ASSESSMENT — PAIN SCALES - GENERAL: PAINLEVEL: NO PAIN (0)

## 2022-06-02 NOTE — PROGRESS NOTES
BMT Progress note  BMT Physician: Dr. 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      INTERIM HISTORY: Libby isn't having n/v, but she continues to have diarrhea with incontinence.  She is able to manage this at home.  She is not dizzy upon standing, and she is hydrating with great purpose.  She notes that eating food is more difficult, so she is sticking with oatmeal.  Noting some heartburn today.      REVIEW OF SYSTEMS: Otherwise unremarkable other than what is noted in the Interim History.      PHYSICAL EXAM:   Vitals:  /70   Pulse 73   Temp 97.8  F (36.6  C) (Oral)   Resp 16   Wt 55.3 kg (122 lb)   SpO2 98%   BMI 22.45 kg/m         General Appearance: NAD  HEENT:  PERRL; sclera anicteric.    CV: RRR. no murmur or rub.   RESP: clear lungs  GI: thin; +BS, some tenderness over epigastrum  EXT: No edema. No cyanosis.  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      BMT/IEC PROTOCOL for 2016-35  - Chemo protocol: HD Melphalan 140mg/m2  Day -1: rest day, 5/26  Day 0: Cell dose: 3.94x10^6 (s/p cytoxan chemo priming 2/1/2022 and subsequent stem cell collection).   - Restaging plan: per protocol  Day+5 gcsf started; continue until ANC >2500 x 2 days.      HEME/COAG  - Pancytopenia due to chemotherapy and radiation  - Transfusion parameters: hemoglobin <7g/dL, platelets <10k  - Relevant thrombosis or bleeding history:   2/15: new non-occlusive LUE DVT.    - Dropping plts; xarelto now on hold (last dose 5/30).   - Once plts recover remove PICC. Resume xeralto when plts recover >50k (after picc pulled), continue anticoagulation for 1 month then stop.      ID  - Relevant infection history: Recurrent PNA  and UTIs - No issues this hospitalization.   - Prophylaxis plan: ACV 800mg PO BID, levaquin 250mg daily, fungal prophy: fluconazole 200mg po daily.   - PJP Prophy: HD bactrim qMon/Tues BID to start day+28 (script given on discharge).   - Vaccination status: Influenza vaccination after day +60, COVID vaccination after day +100, followed by remaining vaccinations at 12, 14, 24, and 26 months.     CARDIOVASCULAR  - Risk of cardiomyopathy:  Baseline EF 56%  - known hyperlipidemia- hold crestor until day +60 post transplant.   Hypertension: dex/fluid exacerbated hypertnesion - Norvasc increased to 10mg daily while inpatient- decrease back to 5mg daily on discharge as anticipate lower BP with decreaesd intake and GI losses in coming daily.   History of Afib - now in NSR. On Metoprolol 25mg PO daily.      RESPIRATORY  - hx of COPD, continue breo-ellipa inhaler      GI/NUTRITION  - Ulcer prophylaxis: protonix 40mg daily. Okay to use TUMS prn; aware to not take within 2 hours of levaquin.   - Risk of nausea/vomiting due to chemo/radiation: prophy dex and zofran.  - Nausea well controlled on scheduled zofran. Has prn compazine 5mg PO q6hrs prn if needed.  - Diarrhea secondary to chemotherapy; if it worsens and/or she gets fevers/abdominal pain, check a c diff (she was not c diff colonized on hospital admission).      RENAL/ELECTROLYTES/  - Electrolyte management: replace per sliding scale       MUSCULOSKELETAL/FRAILTY  - Baseline Frailty Score: 4  - Patient with substantial risk of sarcopenia. Held daily calcium to decrease bill burden on discharge.   - Cancer Rehab as needed outpatient- ordered upon discharge.     PSYCH  - continue effexor 75mg tid     SYMPTOM MANAGEMENT.  - # Pain Assessment:  - Libby is experiencing pain due to chronic pain.  - Continue suboxone and prn oxycodone.     Plan:  GCSF  TUMS prn  Remove PICC (use PAC)    I spent 30 minutes in the care of this patient today, which included time necessary for  preparation for the visit, obtaining history, ordering medications/tests/procedures as medically indicated, review of pertinent medical literature, counseling of the patient, communication of recommendations to the care team, and documentation time.    Nidia Littlejohn PA-C  6/2/2022

## 2022-06-02 NOTE — PATIENT INSTRUCTIONS
Do not take TUMS within 2 hours of taking levaquin.  Otherwise, okay to take TUMS for heartburn.     Please arrive at 12:30pm on 6/3 for your labs and provider visit.

## 2022-06-02 NOTE — NURSING NOTE
"Oncology Rooming Note    June 2, 2022 1:01 PM   Libby Grimaldo is a 71 year old female who presents for:    Chief Complaint   Patient presents with     Blood Draw     Labs drawn via PICC by RN in lab. VS taken.        Oncology Clinic Visit     Multiple myeloma     Initial Vitals: /70   Pulse 73   Temp 97.8  F (36.6  C) (Oral)   Resp 16   Wt 55.3 kg (122 lb)   SpO2 98%   BMI 22.45 kg/m   Estimated body mass index is 22.45 kg/m  as calculated from the following:    Height as of 5/25/22: 1.57 m (5' 1.81\").    Weight as of this encounter: 55.3 kg (122 lb). Body surface area is 1.55 meters squared.  No Pain (0) Comment: Data Unavailable   No LMP recorded. Patient is postmenopausal.  Allergies reviewed: Yes  Medications reviewed: Yes    Medications: Medication refills not needed today.  Pharmacy name entered into [facility]: [facility] #[id number] - [facility] [address] AT [facility] [address]    Clinical concerns: none       Troy Scales            "

## 2022-06-02 NOTE — NURSING NOTE
Chief Complaint   Patient presents with     Blood Draw     Labs drawn via PICC by RN in lab. VS taken.        Rashawn Rebolledo RN

## 2022-06-03 ENCOUNTER — INFUSION THERAPY VISIT (OUTPATIENT)
Dept: TRANSPLANT | Facility: CLINIC | Age: 72
End: 2022-06-03
Payer: MEDICARE

## 2022-06-03 ENCOUNTER — ONCOLOGY VISIT (OUTPATIENT)
Dept: TRANSPLANT | Facility: CLINIC | Age: 72
End: 2022-06-03
Payer: MEDICARE

## 2022-06-03 ENCOUNTER — APPOINTMENT (OUTPATIENT)
Dept: TRANSPLANT | Facility: CLINIC | Age: 72
End: 2022-06-03
Payer: MEDICARE

## 2022-06-03 ENCOUNTER — APPOINTMENT (OUTPATIENT)
Dept: LAB | Facility: CLINIC | Age: 72
End: 2022-06-03
Payer: MEDICARE

## 2022-06-03 VITALS
OXYGEN SATURATION: 98 % | DIASTOLIC BLOOD PRESSURE: 71 MMHG | TEMPERATURE: 98.8 F | HEART RATE: 88 BPM | RESPIRATION RATE: 16 BRPM | SYSTOLIC BLOOD PRESSURE: 116 MMHG

## 2022-06-03 VITALS
OXYGEN SATURATION: 100 % | TEMPERATURE: 97.7 F | BODY MASS INDEX: 22.52 KG/M2 | SYSTOLIC BLOOD PRESSURE: 108 MMHG | WEIGHT: 122.4 LBS | RESPIRATION RATE: 16 BRPM | HEART RATE: 109 BPM | DIASTOLIC BLOOD PRESSURE: 72 MMHG

## 2022-06-03 DIAGNOSIS — Z94.84 STEM CELLS TRANSPLANT STATUS (H): ICD-10-CM

## 2022-06-03 DIAGNOSIS — C90.00 MULTIPLE MYELOMA NOT HAVING ACHIEVED REMISSION (H): Primary | ICD-10-CM

## 2022-06-03 DIAGNOSIS — Z51.11 ADMISSION FOR CHEMOTHERAPY: ICD-10-CM

## 2022-06-03 LAB
ANION GAP SERPL CALCULATED.3IONS-SCNC: 8 MMOL/L (ref 3–14)
BUN SERPL-MCNC: 8 MG/DL (ref 7–30)
CALCIUM SERPL-MCNC: 8.3 MG/DL (ref 8.5–10.1)
CHLORIDE BLD-SCNC: 112 MMOL/L (ref 94–109)
CO2 SERPL-SCNC: 22 MMOL/L (ref 20–32)
CREAT SERPL-MCNC: 0.71 MG/DL (ref 0.52–1.04)
ERYTHROCYTE [DISTWIDTH] IN BLOOD BY AUTOMATED COUNT: 11.9 % (ref 10–15)
GFR SERPL CREATININE-BSD FRML MDRD: >90 ML/MIN/1.73M2
GLUCOSE BLD-MCNC: 128 MG/DL (ref 70–99)
HCT VFR BLD AUTO: 34.1 % (ref 35–47)
HGB BLD-MCNC: 11.2 G/DL (ref 11.7–15.7)
MCH RBC QN AUTO: 32.4 PG (ref 26.5–33)
MCHC RBC AUTO-ENTMCNC: 32.8 G/DL (ref 31.5–36.5)
MCV RBC AUTO: 99 FL (ref 78–100)
PLAT MORPH BLD: NORMAL
PLATELET # BLD AUTO: 9 10E3/UL (ref 150–450)
POTASSIUM BLD-SCNC: 3.3 MMOL/L (ref 3.4–5.3)
RBC # BLD AUTO: 3.46 10E6/UL (ref 3.8–5.2)
RBC MORPH BLD: NORMAL
SODIUM SERPL-SCNC: 142 MMOL/L (ref 133–144)
WBC # BLD AUTO: <0.1 10E3/UL (ref 4–11)

## 2022-06-03 PROCEDURE — 250N000011 HC RX IP 250 OP 636: Performed by: INTERNAL MEDICINE

## 2022-06-03 PROCEDURE — 86922 COMPATIBILITY TEST ANTIGLOB: CPT | Performed by: PHYSICIAN ASSISTANT

## 2022-06-03 PROCEDURE — 96365 THER/PROPH/DIAG IV INF INIT: CPT

## 2022-06-03 PROCEDURE — P9037 PLATE PHERES LEUKOREDU IRRAD: HCPCS | Performed by: NURSE PRACTITIONER

## 2022-06-03 PROCEDURE — 250N000011 HC RX IP 250 OP 636: Performed by: PHYSICIAN ASSISTANT

## 2022-06-03 PROCEDURE — 96366 THER/PROPH/DIAG IV INF ADDON: CPT

## 2022-06-03 PROCEDURE — 99214 OFFICE O/P EST MOD 30 MIN: CPT

## 2022-06-03 PROCEDURE — 36591 DRAW BLOOD OFF VENOUS DEVICE: CPT

## 2022-06-03 PROCEDURE — 96372 THER/PROPH/DIAG INJ SC/IM: CPT | Mod: XU | Performed by: PHYSICIAN ASSISTANT

## 2022-06-03 PROCEDURE — 82310 ASSAY OF CALCIUM: CPT

## 2022-06-03 PROCEDURE — G0463 HOSPITAL OUTPT CLINIC VISIT: HCPCS

## 2022-06-03 PROCEDURE — 36430 TRANSFUSION BLD/BLD COMPNT: CPT

## 2022-06-03 PROCEDURE — 85027 COMPLETE CBC AUTOMATED: CPT

## 2022-06-03 PROCEDURE — 86901 BLOOD TYPING SEROLOGIC RH(D): CPT

## 2022-06-03 PROCEDURE — 86850 RBC ANTIBODY SCREEN: CPT

## 2022-06-03 PROCEDURE — 258N000003 HC RX IP 258 OP 636: Performed by: PHYSICIAN ASSISTANT

## 2022-06-03 RX ORDER — HEPARIN SODIUM (PORCINE) LOCK FLUSH IV SOLN 100 UNIT/ML 100 UNIT/ML
5 SOLUTION INTRAVENOUS DAILY PRN
Status: DISCONTINUED | OUTPATIENT
Start: 2022-06-03 | End: 2022-06-03 | Stop reason: HOSPADM

## 2022-06-03 RX ORDER — HEPARIN SODIUM,PORCINE 10 UNIT/ML
5 VIAL (ML) INTRAVENOUS
Status: CANCELLED | OUTPATIENT
Start: 2022-06-04

## 2022-06-03 RX ORDER — POTASSIUM CHLORIDE 29.8 MG/ML
20 INJECTION INTRAVENOUS ONCE
Status: COMPLETED | OUTPATIENT
Start: 2022-06-03 | End: 2022-06-03

## 2022-06-03 RX ORDER — LOPERAMIDE HCL 2 MG
2-4 CAPSULE ORAL 4 TIMES DAILY PRN
Qty: 60 CAPSULE | Refills: 0 | Status: SHIPPED | OUTPATIENT
Start: 2022-06-03 | End: 2022-06-06

## 2022-06-03 RX ORDER — HEPARIN SODIUM (PORCINE) LOCK FLUSH IV SOLN 100 UNIT/ML 100 UNIT/ML
5 SOLUTION INTRAVENOUS
Status: CANCELLED | OUTPATIENT
Start: 2022-06-04

## 2022-06-03 RX ORDER — HEPARIN SODIUM (PORCINE) LOCK FLUSH IV SOLN 100 UNIT/ML 100 UNIT/ML
5 SOLUTION INTRAVENOUS
Status: DISCONTINUED | OUTPATIENT
Start: 2022-06-03 | End: 2022-06-03 | Stop reason: HOSPADM

## 2022-06-03 RX ADMIN — Medication 5 ML: at 16:56

## 2022-06-03 RX ADMIN — SODIUM CHLORIDE 1000 ML: 9 INJECTION, SOLUTION INTRAVENOUS at 14:44

## 2022-06-03 RX ADMIN — POTASSIUM CHLORIDE 20 MEQ: 400 INJECTION, SOLUTION INTRAVENOUS at 14:44

## 2022-06-03 RX ADMIN — FILGRASTIM 300 MCG: 300 INJECTION, SOLUTION INTRAVENOUS; SUBCUTANEOUS at 15:39

## 2022-06-03 RX ADMIN — Medication 5 ML: at 12:44

## 2022-06-03 RX ADMIN — POTASSIUM CHLORIDE 20 MEQ: 400 INJECTION, SOLUTION INTRAVENOUS at 15:40

## 2022-06-03 ASSESSMENT — PAIN SCALES - GENERAL: PAINLEVEL: SEVERE PAIN (7)

## 2022-06-03 NOTE — PROGRESS NOTES
BMT Progress note    BMT Physician: Dr. 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      INTERIM HISTORY: Diarrhea. Tired. Weak. No fevers. Some nausea. Trying to eat/drink.      REVIEW OF SYSTEMS: Otherwise unremarkable other than what is noted in the Interim History.      PHYSICAL EXAM:   Vitals:  /72   Pulse 109   Temp 97.7  F (36.5  C) (Oral)   Resp 16   Wt 55.5 kg (122 lb 6.4 oz)   SpO2 100%   BMI 22.52 kg/m      General Appearance: NAD  HEENT: PERRL; sclera anicteric.    CV: RRR. no murmur or rub.   RESP: clear lungs  GI: thin; +BS  EXT: No edema. No cyanosis.  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      BMT/IEC PROTOCOL for 2016-35  - Chemo protocol: HD Melphalan 140mg/m2  Day -1: rest day, 5/26  Day 0: Cell dose: 3.94x10^6 (s/p cytoxan chemo priming 2/1/2022 and subsequent stem cell collection).   - Restaging plan: per protocol  Day+5 gcsf started; continue until ANC >2500 x 2 days.      HEME/COAG  - Pancytopenia due to chemotherapy and radiation  - Transfusion parameters: hemoglobin <7g/dL, platelets <10k  - Relevant thrombosis or bleeding history:   2/15: new non-occlusive LUE DVT.    - Dropping plts; xarelto now on hold (last dose 5/30).   - Once plts recover remove PICC. Resume xarelto when plts recover >50k (after picc pulled), continue anticoagulation for 1 month then stop.     - plts today. Possible tomorrow.     ID  - Prophylaxis plan: ACV 800mg PO BID, levaquin 250mg daily, fungal prophy: fluconazole 200mg po daily.   - PJP Prophy: HD bactrim qMon/Tues BID to start day+28 (script given on discharge).   - Vaccination status: Influenza vaccination after day +60, COVID vaccination after  day +100, followed by remaining vaccinations at 12, 14, 24, and 26 months.     CARDIOVASCULAR  - Risk of cardiomyopathy:  Baseline EF 56%  - known hyperlipidemia- hold crestor until day +60 post transplant.   Hypertension: norvasc 5mg. Will hold for now as BP running on low side now and diarrhea.   History of Afib - now in NSR. On Metoprolol 25mg PO daily.      RESPIRATORY  - hx of COPD, continue breo-ellipa inhaler      GI/NUTRITION  - Ulcer prophylaxis: protonix 40mg daily. Okay to use TUMS prn; aware to not take within 2 hours of levaquin.   - Risk of nausea/vomiting due to chemo/radiation: prophy dex and zofran.  - Nausea well controlled on scheduled zofran. Has prn compazine 5mg PO q6hrs prn if needed.  - Diarrhea secondary to chemotherapy:  C.diff neg 5/29. Imodium PRN. IL IVF today.     RENAL/ELECTROLYTES/:  - Electrolyte management: replace per sliding scale  - replace K    MUSCULOSKELETAL/FRAILTY:  - Baseline Frailty Score: 4  - Patient with substantial risk of sarcopenia. Held daily calcium to decrease bill burden on discharge.   - Cancer Rehab as needed outpatient- ordered upon discharge.     PSYCH  - continue effexor 75mg tid     SYMPTOM MANAGEMENT.  - # Pain Assessment:  - Libby is experiencing pain due to chronic pain.  - Continue suboxone and prn oxycodone.     RTC: Daily    I spent 30 minutes in the care of this patient today, which included time necessary for preparation for the visit, obtaining history, ordering medications/tests/procedures as medically indicated, review of pertinent medical literature, counseling of the patient, communication of recommendations to the care team, and documentation time.    CAROLINA MejíaC  [id number]

## 2022-06-03 NOTE — PROGRESS NOTES
Infusion Nursing Note:  Libby Grimaldo presents today for add-on infusion.    Patient seen by provider today: Yes: Jenna Peñaloza RADHA   present during visit today: Not Applicable.    Note: VSS. Pt assessed by provider. Labs monitored, pt met parameters for K+ replacement (K+ 3.3), platelet transfusion (Plt 9), and GCSF for ANC <2500 (Differential not done d/t WBC 0.1).      Intravenous Access:  Implanted Port.    Treatment Conditions:  Lab Results   Component Value Date    HGB 11.2 (L) 06/03/2022    WBC <0.1 (LL) 06/03/2022    ANEU 5.3 02/25/2022    ANEUTAUTO 1.2 (L) 05/31/2022    PLT 9 (LL) 06/03/2022      Lab Results   Component Value Date     06/03/2022    POTASSIUM 3.3 (L) 06/03/2022    MAG 2.1 05/31/2022    CR 0.71 06/03/2022    DIANDRA 8.3 (L) 06/03/2022    BILITOTAL 0.6 05/30/2022    ALBUMIN 2.6 (L) 05/30/2022    ALT 38 05/30/2022    AST 50 (H) 05/30/2022     Pt received 40 mEq K+ IV, 1 unit platelets, 1L IVF, and 300 mcg Neupogen subcutaneously into left abdomen.      Post Infusion Assessment:  Patient tolerated infusion without incident.  Patient tolerated injection without incident.       Discharge Plan:   Patient discharged in stable condition accompanied by: .  Departure Mode: Wheelchair.      Mirta Noland RN

## 2022-06-03 NOTE — NURSING NOTE
"Oncology Rooming Note    Jena 3, 2022 12:57 PM   Libby Grimaldo is a 71 year old female who presents for:    Chief Complaint   Patient presents with     Oncology Clinic Visit     Multiple Myeloma     Port Draw     Labs drawn via port by RN in lab. VS taken      Initial Vitals: /72   Pulse 109   Temp 97.7  F (36.5  C) (Oral)   Resp 16   Wt 55.5 kg (122 lb 6.4 oz)   SpO2 100%   BMI 22.52 kg/m   Estimated body mass index is 22.52 kg/m  as calculated from the following:    Height as of 5/25/22: 1.57 m (5' 1.81\").    Weight as of this encounter: 55.5 kg (122 lb 6.4 oz). Body surface area is 1.56 meters squared.  Severe Pain (7) Comment: Data Unavailable   No LMP recorded. Patient is postmenopausal.  Allergies reviewed: Yes  Medications reviewed: Yes    Medications: Medication refills not needed today.  Pharmacy name entered into [employer]: [facility] #[id number] [facility] [address] AT [facility] [address]    Clinical concerns: None       Krista Alberto LPN            "

## 2022-06-03 NOTE — NURSING NOTE
Chief Complaint   Patient presents with     Oncology Clinic Visit     Multiple Myeloma     Port Draw     Labs drawn via port by RN in lab. VS taken      Port accessed with 20 gauge 3/4 in gripper needle by RN, labs collected, line flushed with saline and heparin.  Vitals taken. Pt checked in for next appointment.    Shanita Skelton RN

## 2022-06-04 ENCOUNTER — TELEPHONE (OUTPATIENT)
Dept: NURSING | Facility: CLINIC | Age: 72
End: 2022-06-04

## 2022-06-04 DIAGNOSIS — N30.00 ACUTE CYSTITIS WITHOUT HEMATURIA: Primary | ICD-10-CM

## 2022-06-04 LAB
ABO/RH(D): ABNORMAL
ANTIBODY SCREEN, TUBE: NORMAL
ANTIBODY SCREEN: POSITIVE
BLD PROD TYP BPU: NORMAL
BLOOD COMPONENT TYPE: NORMAL
CODING SYSTEM: NORMAL
SPECIMEN EXPIRATION DATE: ABNORMAL
SPECIMEN EXPIRATION DATE: NORMAL
UNIT ABO/RH: NORMAL
UNIT NUMBER: NORMAL
UNIT STATUS: NORMAL
UNIT TYPE ISBT: 6200

## 2022-06-04 RX ORDER — HEPARIN SODIUM,PORCINE 10 UNIT/ML
5 VIAL (ML) INTRAVENOUS
Status: CANCELLED | OUTPATIENT
Start: 2022-06-04

## 2022-06-04 RX ORDER — HEPARIN SODIUM (PORCINE) LOCK FLUSH IV SOLN 100 UNIT/ML 100 UNIT/ML
5 SOLUTION INTRAVENOUS
Status: CANCELLED | OUTPATIENT
Start: 2022-06-04

## 2022-06-04 NOTE — TELEPHONE ENCOUNTER
of patient called to say Libby will not be in for her appointment today. He said she just couldn't make it.    Routed: P [id number] BMT Oncology  Jennifer BACON RN [facility]

## 2022-06-04 NOTE — ADDENDUM NOTE
Addended by: CARSON COHEN on: 6/4/2022 10:42 AM     Modules accepted: Orders     

## 2022-06-05 ENCOUNTER — INFUSION THERAPY VISIT (OUTPATIENT)
Dept: TRANSPLANT | Facility: CLINIC | Age: 72
End: 2022-06-05
Payer: MEDICARE

## 2022-06-05 ENCOUNTER — APPOINTMENT (OUTPATIENT)
Dept: LAB | Facility: CLINIC | Age: 72
End: 2022-06-05
Payer: MEDICARE

## 2022-06-05 ENCOUNTER — ONCOLOGY VISIT (OUTPATIENT)
Dept: TRANSPLANT | Facility: CLINIC | Age: 72
End: 2022-06-05
Payer: MEDICARE

## 2022-06-05 VITALS
TEMPERATURE: 98.3 F | WEIGHT: 120.6 LBS | DIASTOLIC BLOOD PRESSURE: 63 MMHG | HEART RATE: 95 BPM | SYSTOLIC BLOOD PRESSURE: 93 MMHG | OXYGEN SATURATION: 96 % | RESPIRATION RATE: 18 BRPM | BODY MASS INDEX: 22.19 KG/M2

## 2022-06-05 DIAGNOSIS — K12.31 MUCOSITIS DUE TO CHEMOTHERAPY: ICD-10-CM

## 2022-06-05 DIAGNOSIS — Z94.84 STEM CELLS TRANSPLANT STATUS (H): ICD-10-CM

## 2022-06-05 DIAGNOSIS — Z51.11 ADMISSION FOR CHEMOTHERAPY: ICD-10-CM

## 2022-06-05 DIAGNOSIS — C90.00 MULTIPLE MYELOMA NOT HAVING ACHIEVED REMISSION (H): Primary | ICD-10-CM

## 2022-06-05 PROCEDURE — 258N000003 HC RX IP 258 OP 636

## 2022-06-05 PROCEDURE — 250N000011 HC RX IP 250 OP 636: Performed by: INTERNAL MEDICINE

## 2022-06-05 PROCEDURE — 80048 BASIC METABOLIC PNL TOTAL CA: CPT

## 2022-06-05 PROCEDURE — 96360 HYDRATION IV INFUSION INIT: CPT

## 2022-06-05 PROCEDURE — 99214 OFFICE O/P EST MOD 30 MIN: CPT

## 2022-06-05 PROCEDURE — G0463 HOSPITAL OUTPT CLINIC VISIT: HCPCS | Mod: 25

## 2022-06-05 PROCEDURE — 96372 THER/PROPH/DIAG INJ SC/IM: CPT | Performed by: PHYSICIAN ASSISTANT

## 2022-06-05 PROCEDURE — 85027 COMPLETE CBC AUTOMATED: CPT

## 2022-06-05 PROCEDURE — 250N000013 HC RX MED GY IP 250 OP 250 PS 637

## 2022-06-05 PROCEDURE — 250N000011 HC RX IP 250 OP 636

## 2022-06-05 PROCEDURE — 250N000011 HC RX IP 250 OP 636: Performed by: PHYSICIAN ASSISTANT

## 2022-06-05 RX ORDER — HEPARIN SODIUM (PORCINE) LOCK FLUSH IV SOLN 100 UNIT/ML 100 UNIT/ML
5 SOLUTION INTRAVENOUS ONCE
Status: COMPLETED | OUTPATIENT
Start: 2022-06-05 | End: 2022-06-05

## 2022-06-05 RX ORDER — POTASSIUM CHLORIDE 750 MG/1
40 TABLET, EXTENDED RELEASE ORAL ONCE
Status: COMPLETED | OUTPATIENT
Start: 2022-06-05 | End: 2022-06-05

## 2022-06-05 RX ORDER — HEPARIN SODIUM (PORCINE) LOCK FLUSH IV SOLN 100 UNIT/ML 100 UNIT/ML
5 SOLUTION INTRAVENOUS
Status: DISCONTINUED | OUTPATIENT
Start: 2022-06-05 | End: 2022-06-05 | Stop reason: HOSPADM

## 2022-06-05 RX ORDER — POTASSIUM CHLORIDE 1500 MG/1
40 TABLET, EXTENDED RELEASE ORAL ONCE
Status: DISCONTINUED | OUTPATIENT
Start: 2022-06-05 | End: 2022-06-05 | Stop reason: HOSPADM

## 2022-06-05 RX ORDER — HEPARIN SODIUM (PORCINE) LOCK FLUSH IV SOLN 100 UNIT/ML 100 UNIT/ML
5 SOLUTION INTRAVENOUS
Status: CANCELLED | OUTPATIENT
Start: 2022-06-06

## 2022-06-05 RX ORDER — HEPARIN SODIUM,PORCINE 10 UNIT/ML
5 VIAL (ML) INTRAVENOUS
Status: CANCELLED | OUTPATIENT
Start: 2022-06-06

## 2022-06-05 RX ADMIN — FILGRASTIM 300 MCG: 300 INJECTION, SOLUTION INTRAVENOUS; SUBCUTANEOUS at 10:06

## 2022-06-05 RX ADMIN — POTASSIUM CHLORIDE 40 MEQ: 1500 TABLET, EXTENDED RELEASE ORAL at 10:30

## 2022-06-05 RX ADMIN — Medication 5 ML: at 10:43

## 2022-06-05 RX ADMIN — Medication 5 ML: at 09:28

## 2022-06-05 RX ADMIN — SODIUM CHLORIDE 1000 ML: 9 INJECTION, SOLUTION INTRAVENOUS at 09:39

## 2022-06-05 ASSESSMENT — PAIN SCALES - GENERAL: PAINLEVEL: SEVERE PAIN (7)

## 2022-06-05 NOTE — NURSING NOTE
Chief Complaint   Patient presents with     Port Draw     Labs drawn via port by RN in lab. VS taken.      Port accessed with 20 gauge 3/4 inch flat needle and labs drawn by RN.  Port flushed with saline and heparin.  Pt tolerated well.  VS taken.    Elaina Lewis RN

## 2022-06-05 NOTE — NURSING NOTE
"Oncology Rooming Note    June 5, 2022 9:32 AM   Libby Grimaldo is a 71 year old female who presents for:    Chief Complaint   Patient presents with     Port Draw     Labs drawn via port by RN in lab. VS taken.      Oncology Clinic Visit     Provider visit; hx MM s/p BMT     Initial Vitals: BP 93/63 (BP Location: Right arm, Patient Position: Sitting, Cuff Size: Adult Regular)   Pulse 95   Temp 98.3  F (36.8  C) (Oral)   Resp 18   Wt 54.7 kg (120 lb 9.6 oz)   SpO2 96%   BMI 22.19 kg/m   Estimated body mass index is 22.19 kg/m  as calculated from the following:    Height as of 5/25/22: 1.57 m (5' 1.81\").    Weight as of this encounter: 54.7 kg (120 lb 9.6 oz). Body surface area is 1.54 meters squared.  Severe Pain (7) Comment: Data Unavailable   No LMP recorded. Patient is postmenopausal.  Allergies reviewed: Yes  Medications reviewed: Yes    Medications: MEDICATION REFILLS NEEDED TODAY. Provider was notified.  Pharmacy name entered into [employer]: [facility] #[id number] - [place], [place] - [address] [place] [facility] [place] [address]    Clinical concerns: Pt reporting continuous nausea, gagging, and burning in esophagus. Also reports diarrhea at least 8x/day.  Dr. Quevedo was notified.      Mirta Noland RN              "

## 2022-06-05 NOTE — PROGRESS NOTES
BMT Progress note    BMT Physician: Dr. 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      INTERIM HISTORY:   Diarrhea/loos BM up to 5 times a day, nausea but no emesis. Tired. Weak. No fevers. Some nausea. Trying to eat/drink. Bony aches more pronounced.     REVIEW OF SYSTEMS: Otherwise unremarkable other than what is noted in the Interim History.      PHYSICAL EXAM:   Vitals:  BP 93/63 (BP Location: Right arm, Patient Position: Sitting, Cuff Size: Adult Regular)   Pulse 95   Temp 98.3  F (36.8  C) (Oral)   Resp 18   Wt 54.7 kg (120 lb 9.6 oz)   SpO2 96%   BMI 22.19 kg/m      General Appearance: NAD, tired appearing,  HEENT: PERRL; sclera anicteric.  mouth without mucositis   CV: RRR. no murmur or rub.   RESP: clear lungs  GI: thin; +BS  EXT: No edema. No cyanosis.  SKIN: no lesions or rash  NEURO: A&O x3   PSYCH: Appropriate affect   VASCULAR ACCESS: R port, not accessed; PICC LUE    Lab Results   Component Value Date    WBC 0.1 (LL) 06/05/2022    ANEU 5.3 02/25/2022    HGB 10.1 (L) 06/05/2022    HCT 31.2 (L) 06/05/2022    PLT 21 (LL) 06/05/2022     06/05/2022    POTASSIUM 3.5 06/05/2022    CHLORIDE 114 (H) 06/05/2022    CO2 21 06/05/2022    GLC 96 06/05/2022    BUN 8 06/05/2022    CR 0.72 06/05/2022    MAG 2.1 05/31/2022    INR 1.40 (H) 05/30/2022    AST 50 (H) 05/30/2022    ALT 38 05/30/2022          ASSESSMENT AND PLAN:   Libby Grimaldo is a 71 year old female, currently day +9 of Auto 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      BMT/IEC PROTOCOL for 2016-35  - Chemo protocol: HD Melphalan 140mg/m2  Day -1: rest day, 5/26  Day 0: Cell dose: 3.94x10^6 (s/p cytoxan chemo priming 2/1/2022 and subsequent stem cell collection).   - Restaging plan: per protocol  Day+5 gcsf started; continue until ANC >2500 x 2 days.      HEME/COAG  - Pancytopenia due to chemotherapy and radiation  - Transfusion parameters:  hemoglobin <7g/dL, platelets <10k  - Relevant thrombosis or bleeding history:   2/15: new non-occlusive LUE DVT.    - Dropping plts; xarelto now on hold (last dose 5/30).   - Once plts recover remove PICC. Resume xarelto when plts recover >50k (after picc pulled), continue anticoagulation for 1 month then stop.     - ptl 15K no transfusion today     ID  - Prophylaxis plan: ACV 800mg PO BID, levaquin 250mg daily, fungal prophy: fluconazole 200mg po daily.   - PJP Prophy: HD bactrim qMon/Tues BID to start day+28 (script given on discharge).   - Vaccination status: Influenza vaccination after day +60, COVID vaccination after day +100, followed by remaining vaccinations at 12, 14, 24, and 26 months.     CARDIOVASCULAR  - Risk of cardiomyopathy:  Baseline EF 56%  - known hyperlipidemia- hold crestor until day +60 post transplant.   Hypertension: norvasc 5mg. Will hold for now as BP running on low side now and diarrhea.   History of Afib - now in NSR. On Metoprolol 25mg PO daily. Hold metoprolol given low BP today (6/4)     RESPIRATORY  - hx of COPD, continue breo-ellipa inhaler      GI/NUTRITION - give IL NS today with low BP + KCl 40meQ now.   - Ulcer prophylaxis: protonix 40mg daily. Okay to use TUMS prn; aware to not take within 2 hours of levaquin.   - Risk of nausea/vomiting due to chemo/radiation: prophy dex and zofran.  - Nausea well controlled on scheduled zofran. Has prn compazine 5mg PO q6hrs prn if needed.  - Diarrhea secondary to chemotherapy:  C.diff neg 5/29. Imodium PRN. IL IVF today.     RENAL/ELECTROLYTES/:  - Electrolyte management: replace per sliding scale  - replace K    MUSCULOSKELETAL/FRAILTY:  - Baseline Frailty Score: 4  - Patient with substantial risk of sarcopenia. Held daily calcium to decrease pill burden on discharge.   - Cancer Rehab as needed outpatient- ordered upon discharge.     PSYCH  - continue effexor 75mg tid     SYMPTOM MANAGEMENT.  - # Pain Assessment:  - Libby is experiencing  pain due to chronic pain.  - Continue suboxone and prn oxycodone.     RTC: Daily    I spent 30 minutes in the care of this patient today, which included time necessary for preparation for the visit, obtaining history, ordering medications/tests/procedures as medically indicated, review of pertinent medical literature, counseling of the patient, communication of recommendations to the care team, and documentation time.    Che Quevedo MD

## 2022-06-06 ENCOUNTER — APPOINTMENT (OUTPATIENT)
Dept: TRANSPLANT | Facility: CLINIC | Age: 72
End: 2022-06-06
Payer: MEDICARE

## 2022-06-06 ENCOUNTER — ONCOLOGY VISIT (OUTPATIENT)
Dept: TRANSPLANT | Facility: CLINIC | Age: 72
End: 2022-06-06
Payer: MEDICARE

## 2022-06-06 ENCOUNTER — INFUSION THERAPY VISIT (OUTPATIENT)
Dept: TRANSPLANT | Facility: CLINIC | Age: 72
End: 2022-06-06
Payer: MEDICARE

## 2022-06-06 ENCOUNTER — APPOINTMENT (OUTPATIENT)
Dept: LAB | Facility: CLINIC | Age: 72
End: 2022-06-06
Payer: MEDICARE

## 2022-06-06 VITALS
RESPIRATION RATE: 18 BRPM | DIASTOLIC BLOOD PRESSURE: 75 MMHG | TEMPERATURE: 98.8 F | HEART RATE: 86 BPM | WEIGHT: 119.6 LBS | OXYGEN SATURATION: 98 % | BODY MASS INDEX: 22.01 KG/M2 | SYSTOLIC BLOOD PRESSURE: 121 MMHG

## 2022-06-06 VITALS
OXYGEN SATURATION: 97 % | RESPIRATION RATE: 14 BRPM | DIASTOLIC BLOOD PRESSURE: 61 MMHG | SYSTOLIC BLOOD PRESSURE: 103 MMHG | HEART RATE: 94 BPM | TEMPERATURE: 97.5 F

## 2022-06-06 DIAGNOSIS — C90.00 MULTIPLE MYELOMA NOT HAVING ACHIEVED REMISSION (H): ICD-10-CM

## 2022-06-06 DIAGNOSIS — Z51.11 ADMISSION FOR CHEMOTHERAPY: ICD-10-CM

## 2022-06-06 DIAGNOSIS — Z94.84 STEM CELLS TRANSPLANT STATUS (H): Primary | ICD-10-CM

## 2022-06-06 DIAGNOSIS — C90.00 MULTIPLE MYELOMA NOT HAVING ACHIEVED REMISSION (H): Primary | ICD-10-CM

## 2022-06-06 LAB
ANION GAP SERPL CALCULATED.3IONS-SCNC: 9 MMOL/L (ref 3–14)
BLD PROD TYP BPU: NORMAL
BLD PROD TYP BPU: NORMAL
BLOOD COMPONENT TYPE: NORMAL
BLOOD COMPONENT TYPE: NORMAL
BUN SERPL-MCNC: 5 MG/DL (ref 7–30)
CALCIUM SERPL-MCNC: 7.6 MG/DL (ref 8.5–10.1)
CHLORIDE BLD-SCNC: 114 MMOL/L (ref 94–109)
CO2 SERPL-SCNC: 20 MMOL/L (ref 20–32)
CODING SYSTEM: NORMAL
CODING SYSTEM: NORMAL
CREAT SERPL-MCNC: 0.62 MG/DL (ref 0.52–1.04)
ERYTHROCYTE [DISTWIDTH] IN BLOOD BY AUTOMATED COUNT: 11.6 % (ref 10–15)
GFR SERPL CREATININE-BSD FRML MDRD: >90 ML/MIN/1.73M2
GLUCOSE BLD-MCNC: 127 MG/DL (ref 70–99)
HCT VFR BLD AUTO: 31.7 % (ref 35–47)
HGB BLD-MCNC: 10.5 G/DL (ref 11.7–15.7)
ISSUE DATE AND TIME: NORMAL
ISSUE DATE AND TIME: NORMAL
MCH RBC QN AUTO: 32.3 PG (ref 26.5–33)
MCHC RBC AUTO-ENTMCNC: 33.1 G/DL (ref 31.5–36.5)
MCV RBC AUTO: 98 FL (ref 78–100)
PLATELET # BLD AUTO: 8 10E3/UL (ref 150–450)
POTASSIUM BLD-SCNC: 3.1 MMOL/L (ref 3.4–5.3)
RBC # BLD AUTO: 3.25 10E6/UL (ref 3.8–5.2)
SODIUM SERPL-SCNC: 143 MMOL/L (ref 133–144)
UNIT ABO/RH: NORMAL
UNIT ABO/RH: NORMAL
UNIT NUMBER: NORMAL
UNIT NUMBER: NORMAL
UNIT STATUS: NORMAL
UNIT STATUS: NORMAL
UNIT TYPE ISBT: 6200
UNIT TYPE ISBT: 6200
WBC # BLD AUTO: 0.3 10E3/UL (ref 4–11)

## 2022-06-06 PROCEDURE — 250N000013 HC RX MED GY IP 250 OP 250 PS 637: Performed by: PHYSICIAN ASSISTANT

## 2022-06-06 PROCEDURE — 96372 THER/PROPH/DIAG INJ SC/IM: CPT | Performed by: PHYSICIAN ASSISTANT

## 2022-06-06 PROCEDURE — G0463 HOSPITAL OUTPT CLINIC VISIT: HCPCS

## 2022-06-06 PROCEDURE — P9037 PLATE PHERES LEUKOREDU IRRAD: HCPCS | Performed by: NURSE PRACTITIONER

## 2022-06-06 PROCEDURE — 36591 DRAW BLOOD OFF VENOUS DEVICE: CPT

## 2022-06-06 PROCEDURE — G0463 HOSPITAL OUTPT CLINIC VISIT: HCPCS | Mod: 25

## 2022-06-06 PROCEDURE — 99214 OFFICE O/P EST MOD 30 MIN: CPT

## 2022-06-06 PROCEDURE — 250N000011 HC RX IP 250 OP 636: Performed by: INTERNAL MEDICINE

## 2022-06-06 PROCEDURE — 250N000011 HC RX IP 250 OP 636: Mod: JB | Performed by: PHYSICIAN ASSISTANT

## 2022-06-06 PROCEDURE — 82310 ASSAY OF CALCIUM: CPT

## 2022-06-06 PROCEDURE — 85027 COMPLETE CBC AUTOMATED: CPT

## 2022-06-06 PROCEDURE — 36430 TRANSFUSION BLD/BLD COMPNT: CPT

## 2022-06-06 RX ORDER — POTASSIUM CHLORIDE 1.5 G/1.58G
40 POWDER, FOR SOLUTION ORAL ONCE
Status: COMPLETED | OUTPATIENT
Start: 2022-06-06 | End: 2022-06-06

## 2022-06-06 RX ORDER — HEPARIN SODIUM,PORCINE 10 UNIT/ML
5 VIAL (ML) INTRAVENOUS
Status: CANCELLED | OUTPATIENT
Start: 2022-06-07

## 2022-06-06 RX ORDER — HEPARIN SODIUM (PORCINE) LOCK FLUSH IV SOLN 100 UNIT/ML 100 UNIT/ML
500 SOLUTION INTRAVENOUS ONCE
Status: COMPLETED | OUTPATIENT
Start: 2022-06-06 | End: 2022-06-06

## 2022-06-06 RX ORDER — HEPARIN SODIUM,PORCINE 10 UNIT/ML
5 VIAL (ML) INTRAVENOUS
Status: CANCELLED | OUTPATIENT
Start: 2022-06-06

## 2022-06-06 RX ORDER — LOPERAMIDE HCL 2 MG
2-4 CAPSULE ORAL 4 TIMES DAILY PRN
Qty: 60 CAPSULE | Refills: 0 | Status: SHIPPED | OUTPATIENT
Start: 2022-06-06 | End: 2022-06-10

## 2022-06-06 RX ORDER — PROCHLORPERAZINE MALEATE 5 MG
5 TABLET ORAL EVERY 6 HOURS PRN
Qty: 30 TABLET | Refills: 0 | Status: SHIPPED | OUTPATIENT
Start: 2022-06-06 | End: 2022-08-01

## 2022-06-06 RX ORDER — HEPARIN SODIUM (PORCINE) LOCK FLUSH IV SOLN 100 UNIT/ML 100 UNIT/ML
5 SOLUTION INTRAVENOUS
Status: CANCELLED | OUTPATIENT
Start: 2022-06-07

## 2022-06-06 RX ORDER — HEPARIN SODIUM (PORCINE) LOCK FLUSH IV SOLN 100 UNIT/ML 100 UNIT/ML
5 SOLUTION INTRAVENOUS
Status: CANCELLED | OUTPATIENT
Start: 2022-06-06

## 2022-06-06 RX ADMIN — POTASSIUM CHLORIDE 40 MEQ: 1.5 POWDER, FOR SOLUTION ORAL at 13:55

## 2022-06-06 RX ADMIN — Medication 500 UNITS: at 12:50

## 2022-06-06 RX ADMIN — FILGRASTIM 300 MCG: 300 INJECTION, SOLUTION INTRAVENOUS; SUBCUTANEOUS at 13:41

## 2022-06-06 ASSESSMENT — PAIN SCALES - GENERAL: PAINLEVEL: MODERATE PAIN (5)

## 2022-06-06 NOTE — PROGRESS NOTES
BMT Progress note    BMT Physician: Dr. 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      INTERIM HISTORY:   Diarrhea ongoing, nausea but no emesis. Tired. Weak. No fevers. Some nausea.  Eating/drinking better. Bony aches more pronounced.     REVIEW OF SYSTEMS: Otherwise unremarkable other than what is noted in the Interim History.      PHYSICAL EXAM:   Vitals:  /74 (BP Location: Right arm)   Pulse 102   Temp 98  F (36.7  C) (Oral)   Resp 16   Wt 54.3 kg (119 lb 9.6 oz)   SpO2 100%   BMI 22.01 kg/m      General Appearance: NAD, tired appearing,  HEENT: PERRL; sclera anicteric.  mouth without mucositis   CV: RRR. no murmur or rub.   RESP: clear lungs  GI: thin; +BS  EXT: No edema. No cyanosis.  SKIN: no lesions or rash  NEURO: A&O x3   PSYCH: Appropriate affect   VASCULAR ACCESS: R port, not accessed; PICC LUE    Lab Results   Component Value Date    WBC 0.1 (LL) 06/05/2022    ANEU 5.3 02/25/2022    HGB 10.1 (L) 06/05/2022    HCT 31.2 (L) 06/05/2022    PLT 21 (LL) 06/05/2022     06/05/2022    POTASSIUM 3.5 06/05/2022    CHLORIDE 114 (H) 06/05/2022    CO2 21 06/05/2022    GLC 96 06/05/2022    BUN 8 06/05/2022    CR 0.72 06/05/2022    MAG 2.1 05/31/2022    INR 1.40 (H) 05/30/2022    AST 50 (H) 05/30/2022    ALT 38 05/30/2022          ASSESSMENT AND PLAN:   Libby Grimaldo is a 71 year old female, currently day +10 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      BMT/IEC PROTOCOL for 2016-35  - Chemo protocol: HD Melphalan 140mg/m2  Day -1: rest day, 5/26  Day 0: Cell dose: 3.94x10^6 (s/p cytoxan chemo priming 2/1/2022 and subsequent stem cell collection).   - Restaging plan: per protocol  Day+5 gcsf started; continue until ANC >2500 x 2 days.      HEME/COAG  - Pancytopenia due to chemotherapy and radiation  - Transfusion parameters: hemoglobin <7g/dL, platelets <10k  - Relevant thrombosis or  bleeding history:   2/15: new non-occlusive LUE DVT.    - Dropping plts; xarelto now on hold (last dose 5/30).  plt transfusion today.  - Once plts recover remove PICC. Resume xarelto when plts recover >50k (after picc pulled), continue anticoagulation for 1 month then stop.      ID  - Prophylaxis plan: ACV 800mg PO BID, levaquin 250mg daily, fungal prophy: fluconazole 200mg po daily.   - PJP Prophy: HD bactrim qMon/Tues BID to start day+28 (script given on discharge).   - Vaccination status: Influenza vaccination after day +60, COVID vaccination after day +100, followed by remaining vaccinations at 12, 14, 24, and 26 months.     CARDIOVASCULAR  - Risk of cardiomyopathy:  Baseline EF 56%  - known hyperlipidemia- hold crestor until day +60 post transplant.   Hypertension: norvasc 5mg. Will continue to hold for lowish BP.    History of Afib - now in NSR. On Metoprolol 25mg PO daily. Hold metoprolol given low BP today (6/4)     RESPIRATORY  - hx of COPD, continue breo-ellipa inhaler      GI/NUTRITION - give IL NS today with low BP + KCl 40meQ now.   - Ulcer prophylaxis: protonix 40mg daily. Okay to use TUMS prn; aware to not take within 2 hours of levaquin.   - Risk of nausea/vomiting due to chemo/radiation: prophy dex and zofran.  - Nausea well controlled on scheduled zofran. Has prn compazine 5mg PO q6hrs prn if needed.  - Diarrhea secondary to chemotherapy:  C.diff neg 5/29. Imodium PRN--have encouraged her to take more frequently     RENAL/ELECTROLYTES/:  - Electrolyte management: replace per sliding scale  - hypokalemia: replace K today    MUSCULOSKELETAL/FRAILTY:  - Baseline Frailty Score: 4  - Patient with substantial risk of sarcopenia. Held daily calcium to decrease pill burden on discharge.   - Cancer Rehab as needed outpatient- ordered upon discharge.     PSYCH  - continue effexor 75mg tid     SYMPTOM MANAGEMENT.  - # Pain Assessment:  - Libby is experiencing pain due to chronic pain.  - Continue suboxone  and prn oxycodone.     RTC: Daily, add infusion to 6/7, 6/8 for poss plt    I spent 30 minutes in the care of this patient today, which included time necessary for preparation for the visit, obtaining history, ordering medications/tests/procedures as medically indicated, review of pertinent medical literature, counseling of the patient, communication of recommendations to the care team, and documentation time.    Sarita Jimenez pa-c  [phone]

## 2022-06-06 NOTE — NURSING NOTE
"Oncology Rooming Note    June 6, 2022 1:45 PM   Libby Grimaldo is a 71 year old female who presents for:    Chief Complaint   Patient presents with     Port Draw     Labs drawn via port by RN. Vitals taken.     Oncology Clinic Visit     Rtn MM      Initial Vitals: /74 (BP Location: Right arm)   Pulse 102   Temp 98  F (36.7  C) (Oral)   Resp 16   Wt 54.3 kg (119 lb 9.6 oz)   SpO2 100%   BMI 22.01 kg/m   Estimated body mass index is 22.01 kg/m  as calculated from the following:    Height as of 5/25/22: 1.57 m (5' 1.81\").    Weight as of this encounter: 54.3 kg (119 lb 9.6 oz). Body surface area is 1.54 meters squared.  Moderate Pain (5)  No LMP recorded. Patient is postmenopausal.  Allergies reviewed: Yes  Medications reviewed: Yes    Medications: MEDICATION REFILLS NEEDED TODAY. Provider was notified. Compazine   Pharmacy name entered into [employer] [facility] #[id number] [place] [id number] [place] [facility] [place] [address]    Clinical concerns: Pt has no concerns for their provider on June 6, 2022 and would like to discuss the original chief complaint of the appointment.     Bhumi Ortiz, EMT on June 6, 2022 at 1:45 PM            "

## 2022-06-06 NOTE — LETTER
6/6/2022         RE: Libby Grimaldo  [address] [facility] [address]        Dear Colleague,    Thank you for referring your patient, Libby Grimaldo, to the [facility] [place]. Please see a copy of my visit note below.    Infusion Nursing Note:  Libby Grimaldo presents today for platelet transfusion, neupogen, and PO potassium.    Patient seen by provider today: Yes: Sarita Jimenez   present during visit today: Not Applicable.    Note: Libby here today for 1 unit(s) platelets, 300mcg neupogen, and 40mEq PO potassium. No additional infusion needs identified. Pt tolerated infusion without side effect or symptoms.      Intravenous Access:  Implanted Port.    Treatment Conditions:  Lab Results   Component Value Date    HGB 10.5 (L) 06/06/2022    WBC 0.3 (LL) 06/06/2022    ANEU 5.3 02/25/2022    ANEUTAUTO 1.2 (L) 05/31/2022    PLT 8 (LL) 06/06/2022      Lab Results   Component Value Date     06/06/2022    POTASSIUM 3.1 (L) 06/06/2022    MAG 2.1 05/31/2022    CR 0.62 06/06/2022    DIANDRA 7.6 (L) 06/06/2022    BILITOTAL 0.6 05/30/2022    ALBUMIN 2.6 (L) 05/30/2022    ALT 38 05/30/2022    AST 50 (H) 05/30/2022         Post Infusion Assessment:  Patient tolerated infusion without incident.  Blood return noted pre and post infusion.  Site patent and intact, free from redness, edema or discomfort.  No evidence of extravasations.  Access discontinued per protocol.       Discharge Plan:   Patient discharged in stable condition accompanied by: self.  Departure Mode: Wheelchair.      Sarahy Cuevas RN

## 2022-06-06 NOTE — PROGRESS NOTES
Infusion Nursing Note:  Libby Grimaldo presents today for platelet transfusion, neupogen, and PO potassium.    Patient seen by provider today: Yes: Sarita Jimenez   present during visit today: Not Applicable.    Note: Libby here today for 1 unit(s) platelets, 300mcg neupogen, and 40mEq PO potassium. No additional infusion needs identified. Pt tolerated infusion without side effect or symptoms.      Intravenous Access:  Implanted Port.    Treatment Conditions:  Lab Results   Component Value Date    HGB 10.5 (L) 06/06/2022    WBC 0.3 (LL) 06/06/2022    ANEU 5.3 02/25/2022    ANEUTAUTO 1.2 (L) 05/31/2022    PLT 8 (LL) 06/06/2022      Lab Results   Component Value Date     06/06/2022    POTASSIUM 3.1 (L) 06/06/2022    MAG 2.1 05/31/2022    CR 0.62 06/06/2022    DIANDRA 7.6 (L) 06/06/2022    BILITOTAL 0.6 05/30/2022    ALBUMIN 2.6 (L) 05/30/2022    ALT 38 05/30/2022    AST 50 (H) 05/30/2022         Post Infusion Assessment:  Patient tolerated infusion without incident.  Blood return noted pre and post infusion.  Site patent and intact, free from redness, edema or discomfort.  No evidence of extravasations.  Access discontinued per protocol.       Discharge Plan:   Patient discharged in stable condition accompanied by: self.  Departure Mode: Wheelchair.      Sarahy Cuevas RN

## 2022-06-06 NOTE — PROGRESS NOTES
This is a recent snapshot of the patient's [facility] medical record.  For current drug dose and complete information and questions, call [phone] or In Basket pool, [facility] infusion ([id number])  CSN Number:  [id number]

## 2022-06-06 NOTE — NURSING NOTE
"Chief Complaint   Patient presents with     Port Draw     Labs drawn via port by RN. Vitals taken.     Port accessed with 20G 3/4\" flat needle by RN, labs collected, line flushed with saline and heparin.  Vitals taken. Pt checked in for appointment(s).    Martha Thompson RN    "

## 2022-06-07 ENCOUNTER — APPOINTMENT (OUTPATIENT)
Dept: LAB | Facility: CLINIC | Age: 72
End: 2022-06-07
Payer: MEDICARE

## 2022-06-07 ENCOUNTER — APPOINTMENT (OUTPATIENT)
Dept: TRANSPLANT | Facility: CLINIC | Age: 72
End: 2022-06-07
Payer: MEDICARE

## 2022-06-07 ENCOUNTER — ONCOLOGY VISIT (OUTPATIENT)
Dept: TRANSPLANT | Facility: CLINIC | Age: 72
End: 2022-06-07
Payer: MEDICARE

## 2022-06-07 ENCOUNTER — MYC MEDICAL ADVICE (OUTPATIENT)
Dept: GASTROENTEROLOGY | Facility: CLINIC | Age: 72
End: 2022-06-07

## 2022-06-07 VITALS
HEART RATE: 118 BPM | BODY MASS INDEX: 22.54 KG/M2 | OXYGEN SATURATION: 97 % | TEMPERATURE: 97.8 F | WEIGHT: 122.5 LBS | DIASTOLIC BLOOD PRESSURE: 66 MMHG | SYSTOLIC BLOOD PRESSURE: 123 MMHG | RESPIRATION RATE: 16 BRPM

## 2022-06-07 DIAGNOSIS — Z94.84 STEM CELLS TRANSPLANT STATUS (H): ICD-10-CM

## 2022-06-07 DIAGNOSIS — C90.00 MULTIPLE MYELOMA NOT HAVING ACHIEVED REMISSION (H): Primary | ICD-10-CM

## 2022-06-07 PROCEDURE — 250N000011 HC RX IP 250 OP 636: Performed by: INTERNAL MEDICINE

## 2022-06-07 PROCEDURE — G0463 HOSPITAL OUTPT CLINIC VISIT: HCPCS

## 2022-06-07 PROCEDURE — 99214 OFFICE O/P EST MOD 30 MIN: CPT

## 2022-06-07 PROCEDURE — 250N000011 HC RX IP 250 OP 636: Performed by: PHYSICIAN ASSISTANT

## 2022-06-07 PROCEDURE — 96372 THER/PROPH/DIAG INJ SC/IM: CPT | Performed by: PHYSICIAN ASSISTANT

## 2022-06-07 PROCEDURE — 250N000013 HC RX MED GY IP 250 OP 250 PS 637: Performed by: PHYSICIAN ASSISTANT

## 2022-06-07 RX ORDER — HEPARIN SODIUM (PORCINE) LOCK FLUSH IV SOLN 100 UNIT/ML 100 UNIT/ML
5 SOLUTION INTRAVENOUS ONCE
Status: COMPLETED | OUTPATIENT
Start: 2022-06-07 | End: 2022-06-07

## 2022-06-07 RX ORDER — HEPARIN SODIUM,PORCINE 10 UNIT/ML
5 VIAL (ML) INTRAVENOUS
Status: CANCELLED | OUTPATIENT
Start: 2022-06-08

## 2022-06-07 RX ORDER — HEPARIN SODIUM (PORCINE) LOCK FLUSH IV SOLN 100 UNIT/ML 100 UNIT/ML
5 SOLUTION INTRAVENOUS
Status: CANCELLED | OUTPATIENT
Start: 2022-06-08

## 2022-06-07 RX ORDER — POTASSIUM CHLORIDE 1500 MG/1
20 TABLET, EXTENDED RELEASE ORAL ONCE
Status: COMPLETED | OUTPATIENT
Start: 2022-06-07 | End: 2022-06-07

## 2022-06-07 RX ADMIN — POTASSIUM CHLORIDE 20 MEQ: 1500 TABLET, EXTENDED RELEASE ORAL at 13:52

## 2022-06-07 RX ADMIN — FILGRASTIM 300 MCG: 300 INJECTION, SOLUTION INTRAVENOUS; SUBCUTANEOUS at 13:52

## 2022-06-07 RX ADMIN — Medication 5 ML: at 13:03

## 2022-06-07 ASSESSMENT — PAIN SCALES - GENERAL: PAINLEVEL: SEVERE PAIN (7)

## 2022-06-07 NOTE — NURSING NOTE
"Chief Complaint   Patient presents with     Oncology Clinic Visit     Multiple myeloma      Port Draw     Labs drawn via port by RN. Vitals taken.     Labs drawn via port by RN. Port accessed with 20G 3/4\" gripper needle. Flushed with NS and heparin. Pt tolerated well. Vitals taken. Pt checked in for next appointment.    Noelle Weber, RN  "

## 2022-06-07 NOTE — PROGRESS NOTES
BMT Progress note    BMT Physician: Dr. 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      INTERIM HISTORY:   Maybe overall improving  Still tired/weak  Some diarrhea  Doesn't feel like she needs IVF.  No new issues.      REVIEW OF SYSTEMS: Otherwise unremarkable other than what is noted in the Interim History.      PHYSICAL EXAM:   Vitals:  /66 (BP Location: Right arm, Patient Position: Sitting, Cuff Size: Adult Regular)   Pulse 118   Temp 97.8  F (36.6  C) (Oral)   Resp 16   Wt 55.6 kg (122 lb 8 oz)   SpO2 97%   BMI 22.54 kg/m      General Appearance: NAD, tired appearing  HEENT: PERRL; sclera anicteric.    CV: RRR. no murmur or rub.   RESP: clear lungs  GI: thin; +BS  EXT: No edema. No cyanosis.  SKIN: no lesions or rash  NEURO: A&O x3   PSYCH: Appropriate affect   VASCULAR ACCESS: R port, not accessed; PICC LUE    Lab Results   Component Value Date    WBC 0.3 (LL) 06/06/2022    ANEU 5.3 02/25/2022    HGB 10.5 (L) 06/06/2022    HCT 31.7 (L) 06/06/2022    PLT 8 (LL) 06/06/2022     06/06/2022    POTASSIUM 3.1 (L) 06/06/2022    CHLORIDE 114 (H) 06/06/2022    CO2 20 06/06/2022     (H) 06/06/2022    BUN 5 (L) 06/06/2022    CR 0.62 06/06/2022    MAG 2.1 05/31/2022    INR 1.40 (H) 05/30/2022    AST 50 (H) 05/30/2022    ALT 38 05/30/2022          ASSESSMENT AND PLAN:   Libby Grimaldo is a 71 year old female, currently day +11 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      BMT/IEC PROTOCOL for 2016-35  - Chemo protocol: HD Melphalan 140mg/m2  Day -1: rest day, 5/26  Day 0: Cell dose: 3.94x10^6 (s/p cytoxan chemo priming 2/1/2022 and subsequent stem cell collection).   - Restaging plan: per protocol.  - Day+5 gcsf started; continue until ANC >2500 x 2 days.      HEME/COAG  - Pancytopenia due to chemotherapy and radiation  - Transfusion parameters: hemoglobin <7g/dL, platelets <10k  - Relevant  thrombosis or bleeding history:   2/15: new non-occlusive LUE DVT.    - Dropping plts; xarelto now on hold.    - Once plts recover remove PICC. Resume xarelto when plts recover >50k (after picc pulled), continue anticoagulation for 1 month then stop.      ID  - Prophylaxis plan: ACV 800mg PO BID, levaquin 250mg daily, fungal prophy: fluconazole 200mg po daily.   - PJP Prophy: HD bactrim qMon/Tues BID to start day+28 (script given on discharge).      CARDIOVASCULAR  - Risk of cardiomyopathy:  Baseline EF 56%  - known hyperlipidemia- hold crestor until day +60 post transplant.   Hypertension: norvasc 5mg. Will continue to hold for lowish BP.    History of Afib - now in NSR. On Metoprolol 25mg PO daily. Hold metoprolol given low BP today (6/4)     RESPIRATORY  - hx of COPD, continue breo-ellipa inhaler      GI/NUTRITION  - Ulcer prophylaxis: protonix 40mg daily.  - Risk of nausea/vomiting due to chemo/radiation: prophy dex and zofran.  - Nausea well controlled on scheduled zofran. Has prn compazine 5mg PO q6hrs prn if needed.  - Diarrhea secondary to chemotherapy:  C.diff neg 5/29. Imodium PRN     RENAL/ELECTROLYTES/:  - Electrolyte management: replace per sliding scale  - hypokalemia: replace K today PO.    PSYCH  - continue effexor 75mg tid     SYMPTOM MANAGEMENT.  - # Pain Assessment:  - Libby is experiencing pain due to chronic pain.  - Continue suboxone and prn oxycodone.     RTC: daily    I spent 30 minutes in the care of this patient today, which included time necessary for preparation for the visit, obtaining history, ordering medications/tests/procedures as medically indicated, review of pertinent medical literature, counseling of the patient, communication of recommendations to the care team, and documentation time.    DAVID Mejía-C  [id number]

## 2022-06-07 NOTE — NURSING NOTE
Clinic RN gave patient 20 mEq PO K+ and 300 mcg subcutaneous Neupogen in RLQ of abdomen. No bleeding at site. Pt tolerated injection well.     Ana Hickey RN     Recommended OBSERVATION and continued MONITORING for progression.

## 2022-06-08 ENCOUNTER — APPOINTMENT (OUTPATIENT)
Dept: LAB | Facility: CLINIC | Age: 72
End: 2022-06-08
Payer: MEDICARE

## 2022-06-08 ENCOUNTER — ONCOLOGY VISIT (OUTPATIENT)
Dept: TRANSPLANT | Facility: CLINIC | Age: 72
End: 2022-06-08
Payer: MEDICARE

## 2022-06-08 VITALS
OXYGEN SATURATION: 98 % | TEMPERATURE: 98 F | RESPIRATION RATE: 16 BRPM | DIASTOLIC BLOOD PRESSURE: 70 MMHG | SYSTOLIC BLOOD PRESSURE: 107 MMHG | HEART RATE: 120 BPM

## 2022-06-08 DIAGNOSIS — C90.00 MULTIPLE MYELOMA NOT HAVING ACHIEVED REMISSION (H): Primary | ICD-10-CM

## 2022-06-08 DIAGNOSIS — Z94.84 STEM CELLS TRANSPLANT STATUS (H): ICD-10-CM

## 2022-06-08 LAB
ANION GAP SERPL CALCULATED.3IONS-SCNC: 10 MMOL/L (ref 3–14)
BUN SERPL-MCNC: 4 MG/DL (ref 7–30)
CALCIUM SERPL-MCNC: 7.8 MG/DL (ref 8.5–10.1)
CHLORIDE BLD-SCNC: 114 MMOL/L (ref 94–109)
CO2 SERPL-SCNC: 21 MMOL/L (ref 20–32)
CREAT SERPL-MCNC: 0.71 MG/DL (ref 0.52–1.04)
GFR SERPL CREATININE-BSD FRML MDRD: >90 ML/MIN/1.73M2
GLUCOSE BLD-MCNC: 114 MG/DL (ref 70–99)
POTASSIUM BLD-SCNC: 3.2 MMOL/L (ref 3.4–5.3)
SODIUM SERPL-SCNC: 145 MMOL/L (ref 133–144)

## 2022-06-08 PROCEDURE — 99214 OFFICE O/P EST MOD 30 MIN: CPT

## 2022-06-08 PROCEDURE — 250N000011 HC RX IP 250 OP 636: Performed by: INTERNAL MEDICINE

## 2022-06-08 PROCEDURE — 82310 ASSAY OF CALCIUM: CPT

## 2022-06-08 PROCEDURE — 250N000013 HC RX MED GY IP 250 OP 250 PS 637: Performed by: PHYSICIAN ASSISTANT

## 2022-06-08 PROCEDURE — 250N000011 HC RX IP 250 OP 636: Performed by: PHYSICIAN ASSISTANT

## 2022-06-08 PROCEDURE — 36591 DRAW BLOOD OFF VENOUS DEVICE: CPT

## 2022-06-08 PROCEDURE — 96372 THER/PROPH/DIAG INJ SC/IM: CPT | Performed by: PHYSICIAN ASSISTANT

## 2022-06-08 RX ORDER — POTASSIUM CHLORIDE 1500 MG/1
40 TABLET, EXTENDED RELEASE ORAL ONCE
Status: COMPLETED | OUTPATIENT
Start: 2022-06-08 | End: 2022-06-08

## 2022-06-08 RX ORDER — HEPARIN SODIUM (PORCINE) LOCK FLUSH IV SOLN 100 UNIT/ML 100 UNIT/ML
5 SOLUTION INTRAVENOUS
Status: CANCELLED | OUTPATIENT
Start: 2022-06-09

## 2022-06-08 RX ORDER — HEPARIN SODIUM,PORCINE 10 UNIT/ML
5 VIAL (ML) INTRAVENOUS
Status: CANCELLED | OUTPATIENT
Start: 2022-06-09

## 2022-06-08 RX ORDER — HEPARIN SODIUM (PORCINE) LOCK FLUSH IV SOLN 100 UNIT/ML 100 UNIT/ML
5 SOLUTION INTRAVENOUS
Status: DISCONTINUED | OUTPATIENT
Start: 2022-06-08 | End: 2022-06-08 | Stop reason: HOSPADM

## 2022-06-08 RX ADMIN — POTASSIUM CHLORIDE 40 MEQ: 20 TABLET, EXTENDED RELEASE ORAL at 14:01

## 2022-06-08 RX ADMIN — SODIUM CHLORIDE, PRESERVATIVE FREE 5 ML: 5 INJECTION INTRAVENOUS at 14:10

## 2022-06-08 RX ADMIN — FILGRASTIM 300 MCG: 300 INJECTION, SOLUTION INTRAVENOUS; SUBCUTANEOUS at 14:01

## 2022-06-08 ASSESSMENT — PAIN SCALES - GENERAL: PAINLEVEL: NO PAIN (0)

## 2022-06-08 NOTE — NURSING NOTE
Clinic RN gave pt 40 mEq PO K+ and 300 mcg subcutaneous Neupogen in LLQ abdomen. Pt tolerated well. No bleeding at site.     Ana Hickey, RN

## 2022-06-08 NOTE — PROGRESS NOTES
BMT Progress note    BMT Physician: Dr. 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      INTERIM HISTORY:   Maybe overall improving  Ate better last night.  Still tired/weak  Some diarrhea  Doesn't feel like she needs IVF.  No new issues.      REVIEW OF SYSTEMS: Otherwise unremarkable other than what is noted in the Interim History.      PHYSICAL EXAM:   Vitals:  /70   Pulse 120   Temp 98  F (36.7  C) (Oral)   Resp 16   SpO2 98%     General Appearance: NAD, tired appearing  HEENT: PERRL; sclera anicteric.    CV: +tachy but regular.   RESP: clear lungs  GI: thin; +BS  EXT: No edema. No cyanosis.  SKIN: no lesions or rash  NEURO: A&O x3   PSYCH: Appropriate affect   VASCULAR ACCESS: R port accessed.    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      BMT/IEC PROTOCOL for 2016-35  - Chemo protocol: HD Melphalan 140mg/m2  Day -1: rest day, 5/26  Day 0: Cell dose: 3.94x10^6 (s/p cytoxan chemo priming 2/1/2022 and subsequent stem cell collection).   - Restaging plan: per protocol.  - Day+5 gcsf started; continue until ANC >2500 x 2 days. ANC pending today     HEME/COAG  - Pancytopenia due to chemotherapy and radiation  - Transfusion parameters: hemoglobin <7g/dL, platelets <10k  - Relevant thrombosis or bleeding history:   2/15: new non-occlusive LUE DVT.    - Dropping plts; xarelto  now on hold.    - Resume xarelto when plts recover >50k, continue anticoagulation for 1 month then stop.   - No transfusions today.      ID  - Prophylaxis plan: ACV 800mg PO BID, levaquin 250mg daily--She can stop this with wbc recovery, fungal prophy: fluconazole 200mg po daily.   - PJP Prophy: HD bactrim qMon/Tues BID to start day+28 (script given on discharge).      CARDIOVASCULAR  - Risk of cardiomyopathy:  Baseline EF 56%  - known hyperlipidemia- hold crestor until day +60 post transplant.   Hypertension: norvasc 5mg. Will continue to hold for lowish BP.    History of Afib - now in NSR. On Metoprolol 25mg PO daily. Hold metoprolol given low BP today (6/4)     RESPIRATORY  - hx of COPD, continue breo-ellipa inhaler      GI/NUTRITION  - Ulcer prophylaxis: protonix 40mg daily.  - Risk of nausea/vomiting due to chemo/radiation: prophy dex and zofran.  - Nausea well controlled on scheduled zofran. Has prn compazine 5mg PO q6hrs prn if needed.  - Diarrhea secondary to chemotherapy:  C.diff neg 5/29. Imodium PRN     RENAL/ELECTROLYTES/:  - Electrolyte management: replace per sliding scale  - hypokalemia: replace K today PO.    PSYCH  - continue effexor 75mg tid     SYMPTOM MANAGEMENT.  - # Pain Assessment:  - Libby is experiencing pain due to chronic pain.  - Continue suboxone and prn oxycodone.     RTC: daily    I spent 30 minutes in the care of this patient today, which included time necessary for preparation for the visit, obtaining history, ordering medications/tests/procedures as medically indicated, review of pertinent medical literature, counseling of the patient, communication of recommendations to the care team, and documentation time.    DAVID Mejía-C  [id number]

## 2022-06-08 NOTE — NURSING NOTE
Chief Complaint   Patient presents with     [facility] Care Coordination - Transportation/Appointments     Labs drawn via PORT by RN in lab. VS taken.      Rashawn Rebolledo RN

## 2022-06-09 ENCOUNTER — ONCOLOGY VISIT (OUTPATIENT)
Dept: TRANSPLANT | Facility: CLINIC | Age: 72
End: 2022-06-09
Attending: PHYSICIAN ASSISTANT
Payer: MEDICARE

## 2022-06-09 ENCOUNTER — APPOINTMENT (OUTPATIENT)
Dept: LAB | Facility: CLINIC | Age: 72
End: 2022-06-09
Attending: PHYSICIAN ASSISTANT
Payer: MEDICARE

## 2022-06-09 VITALS
WEIGHT: 121.4 LBS | OXYGEN SATURATION: 99 % | RESPIRATION RATE: 16 BRPM | HEART RATE: 118 BPM | BODY MASS INDEX: 22.34 KG/M2 | DIASTOLIC BLOOD PRESSURE: 79 MMHG | SYSTOLIC BLOOD PRESSURE: 127 MMHG | TEMPERATURE: 98.1 F

## 2022-06-09 DIAGNOSIS — C90.01 MULTIPLE MYELOMA IN REMISSION (H): Primary | ICD-10-CM

## 2022-06-09 DIAGNOSIS — Z94.81 STATUS POST BONE MARROW TRANSPLANT (H): ICD-10-CM

## 2022-06-09 PROCEDURE — 250N000011 HC RX IP 250 OP 636: Performed by: PHYSICIAN ASSISTANT

## 2022-06-09 PROCEDURE — 250N000013 HC RX MED GY IP 250 OP 250 PS 637: Performed by: PHYSICIAN ASSISTANT

## 2022-06-09 PROCEDURE — G0463 HOSPITAL OUTPT CLINIC VISIT: HCPCS

## 2022-06-09 PROCEDURE — 99214 OFFICE O/P EST MOD 30 MIN: CPT

## 2022-06-09 RX ORDER — HEPARIN SODIUM (PORCINE) LOCK FLUSH IV SOLN 100 UNIT/ML 100 UNIT/ML
500 SOLUTION INTRAVENOUS ONCE
Status: COMPLETED | OUTPATIENT
Start: 2022-06-09 | End: 2022-06-09

## 2022-06-09 RX ORDER — POTASSIUM CHLORIDE 1500 MG/1
40 TABLET, EXTENDED RELEASE ORAL ONCE
Status: COMPLETED | OUTPATIENT
Start: 2022-06-09 | End: 2022-06-09

## 2022-06-09 RX ADMIN — POTASSIUM CHLORIDE 40 MEQ: 20 TABLET, EXTENDED RELEASE ORAL at 08:33

## 2022-06-09 RX ADMIN — Medication 500 UNITS: at 07:38

## 2022-06-09 ASSESSMENT — PAIN SCALES - GENERAL: PAINLEVEL: NO PAIN (0)

## 2022-06-09 NOTE — NURSING NOTE
"Oncology Rooming Note    June 9, 2022 7:55 AM   Libby Grimaldo is a 71 year old female who presents for:    Chief Complaint   Patient presents with     Port Draw     Labs drawn via port by RN. Vitals taken.     Oncology Clinic Visit     Provider visit; hx MM s/p BMT     Initial Vitals: /79 (BP Location: Right arm)   Pulse 118   Temp 98.1  F (36.7  C) (Oral)   Resp 16   Wt 55.1 kg (121 lb 6.4 oz)   SpO2 99%   BMI 22.34 kg/m   Estimated body mass index is 22.34 kg/m  as calculated from the following:    Height as of 5/25/22: 1.57 m (5' 1.81\").    Weight as of this encounter: 55.1 kg (121 lb 6.4 oz). Body surface area is 1.55 meters squared.  No Pain (0) Comment: Data Unavailable   No LMP recorded. Patient is postmenopausal.  Allergies reviewed: Yes  Medications reviewed: Yes    Medications: Medication refills not needed today.  Pharmacy name entered into [employer]: [id number] - [place], [place] - [address] AT [facility] [address]    Clinical concerns: None.      Mirta Noland RN              "

## 2022-06-09 NOTE — LETTER
6/9/2022         RE: Libby Grimaldo  [address]  [place] [address]        Dear Colleague,    Thank you for referring your patient, Libby Grimaldo, to the [facility] [place]. Please see a copy of my visit note below.    BMT Progress Note    BMT Physician: Dr. 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      INTERIM HISTORY:   Feels a bit better. Nausea without emesis, taking Zofran tid and prn Compazine. Loose stools improved. Mild bony pain possibly from GCSF. No fevers or bleeding. No dizziness or CP/palpitations.      ROS: 10 point ROS neg other than the symptoms noted above in the HPI.     PHYSICAL EXAM:   Vitals:  /79 (BP Location: Right arm)   Pulse 118   Temp 98.1  F (36.7  C) (Oral)   Resp 16   Wt 55.1 kg (121 lb 6.4 oz)   SpO2 99%   BMI 22.34 kg/m      General Appearance: NAD, tired appearing  HEENT: PERRL; sclera anicteric.    CV: +tachy but regular.   RESP: crackles R base otherwise clear  GI: thin; +BS  EXT: No edema. No cyanosis.  SKIN: no lesions or rash  NEURO: A&O x3   PSYCH: Appropriate affect   VASCULAR ACCESS: R port a cath.    Labs:  Lab Results   Component Value Date    WBC 12.4 (H) 06/09/2022    ANEU 9.9 (H) 06/09/2022    HGB 9.9 (L) 06/09/2022    HCT 29.9 (L) 06/09/2022    PLT 33 (LL) 06/09/2022     06/09/2022    POTASSIUM 3.7 06/09/2022    CHLORIDE 114 (H) 06/09/2022    CO2 21 06/08/2022     (H) 06/08/2022    BUN 4 (L) 06/08/2022    CR 0.71 06/08/2022    MAG 2.1 05/31/2022    INR 1.40 (H) 05/30/2022    BILITOTAL 0.6 05/30/2022    AST 50 (H) 05/30/2022    ALT 38 05/30/2022    ALKPHOS 131 05/30/2022    PROTTOTAL 4.6 (L) 05/30/2022    ALBUMIN 2.6 (L) 05/30/2022          ASSESSMENT AND PLAN:   Libby ARRIETA Dillan is a 71 year old female, currently day +13 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      BMT/IEC  PROTOCOL for 2016-35  - Chemo protocol: HD Melphalan 140mg/m2  Day -1: rest day, 5/26  Day 0: Cell dose: 3.94x10^6 (s/p cytoxan chemo priming 2/1/2022 and subsequent stem cell collection).   - Restaging plan: per protocol.  - Day+5 gcsf started; ANC recovered; last dose GCSF 6/8.      HEME/COAG  - anemia, thrombocytopenia 2/2 chemo/BMT  - Transfusion parameters: hemoglobin <7g/dL, platelets <10k  - Relevant thrombosis or bleeding history:   2/15: new non-occlusive LUE DVT.    - Dropping plts; xarelto now on hold.    - Resume xarelto when plts recover >50k, continue anticoagulation for 1 month then stop.   - No transfusions today.      ID  - Prophylaxis plan: ACV, fluconazole.  - PJP Prophy: HD bactrim qMon/Tues BID to start day+28 (script given on discharge).      CARDIOVASCULAR  - Risk of cardiomyopathy:  Baseline EF 56%  - known hyperlipidemia- hold crestor until day +60 post transplant.   Hypertension: norvasc 5mg. Will continue to hold for lowish BP.    History of Afib - now in NSR but tachy. Resume metoprolol XL 25mg/d (6/9; held since 6/4).     RESPIRATORY  - hx of COPD, continue breo-ellipa inhaler      GI/NUTRITION  - Ulcer prophylaxis: protonix 40mg daily.  - Nausea well controlled on scheduled zofran. Compazine prn.  - Diarrhea secondary to chemotherapy:  C.diff neg 5/29. Imodium PRN     RENAL/ELECTROLYTES/:  - Electrolyte management: replace per sliding scale  - recent hypokalemia: chem analyzer down so empirically give [id number] PO today (K 3/2 yesterday). If K still low tomorrow consider KCl Rx for weekend.     PSYCH  - continue effexor 75mg tid     SYMPTOM MANAGEMENT.  - # Pain Assessment:  - Libby is experiencing pain due to chronic pain +- GCSF  - Continue suboxone and prn oxycodone.     Summary  Done with GCSF (last dose 6/8)  KCl 40mEq PO x1  Resume metoprolol  RTC tomorrow for labs, follow-up.  Likely off over weekend unless new issues; needs appts for next week tbd.    I spent 30 minutes in the  care of this patient today, which included time necessary for preparation for the visit, obtaining history, ordering medications/tests/procedures as medically indicated, review of pertinent medical literature, counseling of the patient, communication of recommendations to the care team, and documentation time.      Stephie Ayers PA-C  [phone]

## 2022-06-09 NOTE — PROGRESS NOTES
BMT Progress Note    BMT Physician: Dr. 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      INTERIM HISTORY:   Feels a bit better. Nausea without emesis, taking Zofran tid and prn Compazine. Loose stools improved. Mild bony pain possibly from GCSF. No fevers or bleeding. No dizziness or CP/palpitations.      ROS: 10 point ROS neg other than the symptoms noted above in the HPI.     PHYSICAL EXAM:   Vitals:  /79 (BP Location: Right arm)   Pulse 118   Temp 98.1  F (36.7  C) (Oral)   Resp 16   Wt 55.1 kg (121 lb 6.4 oz)   SpO2 99%   BMI 22.34 kg/m      General Appearance: NAD, tired appearing  HEENT: PERRL; sclera anicteric.    CV: +tachy but regular.   RESP: crackles R base otherwise clear  GI: thin; +BS  EXT: No edema. No cyanosis.  SKIN: no lesions or rash  NEURO: A&O x3   PSYCH: Appropriate affect   VASCULAR ACCESS: R port a cath.    Labs:  Lab Results   Component Value Date    WBC 12.4 (H) 06/09/2022    ANEU 9.9 (H) 06/09/2022    HGB 9.9 (L) 06/09/2022    HCT 29.9 (L) 06/09/2022    PLT 33 (LL) 06/09/2022     06/09/2022    POTASSIUM 3.7 06/09/2022    CHLORIDE 114 (H) 06/09/2022    CO2 21 06/08/2022     (H) 06/08/2022    BUN 4 (L) 06/08/2022    CR 0.71 06/08/2022    MAG 2.1 05/31/2022    INR 1.40 (H) 05/30/2022    BILITOTAL 0.6 05/30/2022    AST 50 (H) 05/30/2022    ALT 38 05/30/2022    ALKPHOS 131 05/30/2022    PROTTOTAL 4.6 (L) 05/30/2022    ALBUMIN 2.6 (L) 05/30/2022          ASSESSMENT AND PLAN:   Libby Grimaldo is a 71 year old female, currently day +13 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      BMT/IEC PROTOCOL for 2016-35  - Chemo protocol: HD Melphalan 140mg/m2  Day -1: rest day, 5/26  Day 0: Cell dose: 3.94x10^6 (s/p cytoxan chemo priming 2/1/2022 and subsequent stem cell collection).   - Restaging plan: per protocol.  - Day+5 gcsf started; ANC recovered; last dose GCSF 6/8.       HEME/COAG  - anemia, thrombocytopenia 2/2 chemo/BMT  - Transfusion parameters: hemoglobin <7g/dL, platelets <10k  - Relevant thrombosis or bleeding history:   2/15: new non-occlusive LUE DVT.    - Dropping plts; xarelto now on hold.    - Resume xarelto when plts recover >50k, continue anticoagulation for 1 month then stop.   - No transfusions today.      ID  - Prophylaxis plan: ACV, fluconazole.  - PJP Prophy: HD bactrim qMon/Tues BID to start day+28 (script given on discharge).      CARDIOVASCULAR  - Risk of cardiomyopathy:  Baseline EF 56%  - known hyperlipidemia- hold crestor until day +60 post transplant.   Hypertension: norvasc 5mg. Will continue to hold for lowish BP.    History of Afib - now in NSR but tachy. Resume metoprolol XL 25mg/d (6/9; held since 6/4).     RESPIRATORY  - hx of COPD, continue breo-ellipa inhaler      GI/NUTRITION  - Ulcer prophylaxis: protonix 40mg daily.  - Nausea well controlled on scheduled zofran. Compazine prn.  - Diarrhea secondary to chemotherapy:  C.diff neg 5/29. Imodium PRN     RENAL/ELECTROLYTES/:  - Electrolyte management: replace per sliding scale  - recent hypokalemia: chem analyzer down so empirically give [id number] PO today (K 3/2 yesterday). If K still low tomorrow consider KCl Rx for weekend.     PSYCH  - continue effexor 75mg tid     SYMPTOM MANAGEMENT.  - # Pain Assessment:  - Libby is experiencing pain due to chronic pain +- GCSF  - Continue suboxone and prn oxycodone.     Summary  Done with GCSF (last dose 6/8)  KCl 40mEq PO x1  Resume metoprolol  RTC tomorrow for labs, follow-up.  Likely off over weekend unless new issues; needs appts for next week tbd.    I spent 30 minutes in the care of this patient today, which included time necessary for preparation for the visit, obtaining history, ordering medications/tests/procedures as medically indicated, review of pertinent medical literature, counseling of the patient, communication of recommendations to the care  team, and documentation time.    Stephie Ayers PA-C  [phone]

## 2022-06-10 ENCOUNTER — ONCOLOGY VISIT (OUTPATIENT)
Dept: TRANSPLANT | Facility: CLINIC | Age: 72
End: 2022-06-10
Attending: PHYSICIAN ASSISTANT
Payer: MEDICARE

## 2022-06-10 ENCOUNTER — APPOINTMENT (OUTPATIENT)
Dept: LAB | Facility: CLINIC | Age: 72
End: 2022-06-10
Attending: PHYSICIAN ASSISTANT
Payer: MEDICARE

## 2022-06-10 VITALS
WEIGHT: 120.5 LBS | RESPIRATION RATE: 18 BRPM | OXYGEN SATURATION: 97 % | BODY MASS INDEX: 22.17 KG/M2 | SYSTOLIC BLOOD PRESSURE: 114 MMHG | TEMPERATURE: 98 F | HEART RATE: 136 BPM | DIASTOLIC BLOOD PRESSURE: 72 MMHG

## 2022-06-10 DIAGNOSIS — Z94.81 STATUS POST BONE MARROW TRANSPLANT (H): Primary | ICD-10-CM

## 2022-06-10 DIAGNOSIS — C90.00 MULTIPLE MYELOMA NOT HAVING ACHIEVED REMISSION (H): ICD-10-CM

## 2022-06-10 PROCEDURE — 250N000011 HC RX IP 250 OP 636: Performed by: PHYSICIAN ASSISTANT

## 2022-06-10 PROCEDURE — 99214 OFFICE O/P EST MOD 30 MIN: CPT

## 2022-06-10 PROCEDURE — G0463 HOSPITAL OUTPT CLINIC VISIT: HCPCS

## 2022-06-10 RX ORDER — HEPARIN SODIUM (PORCINE) LOCK FLUSH IV SOLN 100 UNIT/ML 100 UNIT/ML
5 SOLUTION INTRAVENOUS EVERY 8 HOURS
Status: DISCONTINUED | OUTPATIENT
Start: 2022-06-10 | End: 2022-06-10 | Stop reason: HOSPADM

## 2022-06-10 RX ORDER — LOPERAMIDE HCL 2 MG
2-4 CAPSULE ORAL 4 TIMES DAILY PRN
Qty: 60 CAPSULE | Refills: 0 | Status: SHIPPED | OUTPATIENT
Start: 2022-06-10 | End: 2022-07-01

## 2022-06-10 RX ADMIN — Medication 5 ML: at 10:52

## 2022-06-10 ASSESSMENT — PAIN SCALES - GENERAL: PAINLEVEL: NO PAIN (0)

## 2022-06-10 NOTE — NURSING NOTE
Chief Complaint   Patient presents with     Oncology Clinic Visit     Multiple Myeloma      Port Jeison Kong RN on 6/10/2022 at 10:58 AM

## 2022-06-10 NOTE — LETTER
6/10/2022         RE: Libby Grimaldo  [address] [address]        Dear Colleague,    Thank you for referring your patient, Libby Grimaldo, to the [facility] PRO[facility] [place]. Please see a copy of my visit note below.        Again, thank you for allowing me to participate in the care of your patient.      Sincerely,    BMT Advanced Practice Provider

## 2022-06-10 NOTE — NURSING NOTE
"Oncology Rooming Note    Jena 10, 2022 11:02 AM   Libby Grimaldo is a 71 year old female who presents for:    Chief Complaint   Patient presents with     Oncology Clinic Visit     Multiple Myeloma      Port Draw     Initial Vitals: /72   Pulse (!) 136   Temp 98  F (36.7  C)   Resp 18   Wt 54.7 kg (120 lb 8 oz)   SpO2 97%   BMI 22.17 kg/m   Estimated body mass index is 22.17 kg/m  as calculated from the following:    Height as of 5/25/22: 1.57 m (5' 1.81\").    Weight as of this encounter: 54.7 kg (120 lb 8 oz). Body surface area is 1.54 meters squared.  No Pain (0) Comment: Data Unavailable   No LMP recorded. Patient is postmenopausal.  Allergies reviewed: Yes  Medications reviewed: Yes    Medications: MEDICATION REFILLS NEEDED TODAY. Provider was notified.  Pharmacy name entered into [employer]: [id number] [address] AT [facility] [address]    Clinical concerns: Medication refill: loperamide(Imodium)       Krista Alberto LPN            "

## 2022-06-10 NOTE — NURSING NOTE
Port de-access per provider. Site WNL. Bandage placed over site. Patient tolerated well.     Osman Langley LPN

## 2022-06-10 NOTE — PROGRESS NOTES
BMT Progress Note    BMT Physician: Dr. 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      INTERIM HISTORY:   Feels better each day. Still with loose stool a few times a day but this is improved with imodium. Nausea is controlled for the most part. No palpitations, lightheadedness, or bleeding. Eating is going pretty well.      ROS: 10 point ROS neg other than the symptoms noted above in the HPI.     PHYSICAL EXAM:   Vitals:  /72   Pulse (!) 136   Temp 98  F (36.7  C)   Resp 18   Wt 54.7 kg (120 lb 8 oz)   SpO2 97%   BMI 22.17 kg/m      General Appearance: NAD, tired appearing  HEENT: PERRL; sclera anicteric.    CV: +tachy but regular.   RESP: CTAB without crackles or wheezes   GI: thin; +BS  EXT: No edema. No cyanosis.  SKIN: no lesions or rash  NEURO: A&O x3   PSYCH: Appropriate affect   VASCULAR ACCESS: R port a cath.    Labs:  Lab Results   Component Value Date    WBC 10.6 06/10/2022    ANEU 6.4 06/10/2022    HGB 10.7 (L) 06/10/2022    HCT 32.2 (L) 06/10/2022    PLT 42 (LL) 06/10/2022     06/10/2022    POTASSIUM 3.6 06/10/2022    CHLORIDE 114 (H) 06/10/2022    CO2 22 06/10/2022     (H) 06/10/2022    BUN 4 (L) 06/10/2022    CR 0.61 06/10/2022    MAG 2.1 05/31/2022    INR 1.40 (H) 05/30/2022    BILITOTAL 0.3 06/10/2022    AST 21 06/10/2022    ALT 10 06/10/2022    ALKPHOS 200 (H) 06/10/2022    PROTTOTAL 5.4 (L) 06/10/2022    ALBUMIN 2.8 (L) 06/10/2022          ASSESSMENT AND PLAN:   Libby Grimaldo is a 71 year old female, currently day +14 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      BMT/IEC PROTOCOL for 2016-35  - Chemo protocol: HD Melphalan 140mg/m2  Day -1: rest day, 5/26  Day 0: Cell dose: 3.94x10^6 (s/p cytoxan chemo priming 2/1/2022 and subsequent stem cell collection).   - Restaging plan: per protocol.  - Day+5 gcsf started; ANC recovered; last dose GCSF 6/8.      HEME/COAG  -  anemia, thrombocytopenia 2/2 chemo/BMT  - Transfusion parameters: hemoglobin <7g/dL, platelets <10k  - Relevant thrombosis or bleeding history:   2/15: new non-occlusive LUE DVT.    - Dropping plts; xarelto now on hold.    - Resume xarelto when plts recover >50k, continue anticoagulation for 1 month then stop. Can likely resume on Monday as plts are starting to recover.      ID  - Prophylaxis plan: ACV, fluconazole.  - PJP Prophy: HD bactrim qMon/Tues BID to start day+28 (script given on discharge).      CARDIOVASCULAR  - Risk of cardiomyopathy:  Baseline EF 56%  - known hyperlipidemia- hold crestor until day +60 post transplant.   Hypertension: norvasc 5mg. Will continue to hold for lowish BP.    History of Afib - now in NSR but tachy. Resumed metoprolol XL 25mg/d 6/9.     RESPIRATORY  - hx of COPD, continue breo-ellipa inhaler      GI/NUTRITION  - Ulcer prophylaxis: protonix 40mg daily.  - Nausea well controlled on scheduled zofran. Compazine prn.  - Diarrhea secondary to chemotherapy:  C.diff neg 5/29. Imodium PRN     RENAL/ELECTROLYTES/:  - Electrolyte management: replace per sliding scale  - recent hypokalemia: resolved 6/9, pt to eat potassium rich foods over the weekend.     PSYCH  - continue effexor 75mg tid     SYMPTOM MANAGEMENT.  - # Pain Assessment:  - Libby is experiencing pain due to chronic pain +- GCSF  - Continue suboxone and prn oxycodone.       RTC: Mon/Thurs next week. Can likely resume xarelto 6/13 and if clinically well consider line removal in the upcoming 1-2 weeks.     I spent 30 minutes in the care of this patient today, which included time necessary for preparation for the visit, obtaining history, ordering medications/tests/procedures as medically indicated, review of pertinent medical literature, counseling of the patient, communication of recommendations to the care team, and documentation time.    Yuniel Tineo PA-C  [phone]

## 2022-06-13 ENCOUNTER — ONCOLOGY VISIT (OUTPATIENT)
Dept: TRANSPLANT | Facility: CLINIC | Age: 72
End: 2022-06-13
Attending: PHYSICIAN ASSISTANT
Payer: MEDICARE

## 2022-06-13 ENCOUNTER — APPOINTMENT (OUTPATIENT)
Dept: LAB | Facility: CLINIC | Age: 72
End: 2022-06-13
Attending: PHYSICIAN ASSISTANT
Payer: MEDICARE

## 2022-06-13 VITALS
OXYGEN SATURATION: 99 % | DIASTOLIC BLOOD PRESSURE: 81 MMHG | WEIGHT: 129.3 LBS | RESPIRATION RATE: 14 BRPM | SYSTOLIC BLOOD PRESSURE: 128 MMHG | BODY MASS INDEX: 23.79 KG/M2 | TEMPERATURE: 98.2 F | HEIGHT: 62 IN | HEART RATE: 137 BPM

## 2022-06-13 DIAGNOSIS — C90.01 MULTIPLE MYELOMA IN REMISSION (H): Primary | ICD-10-CM

## 2022-06-13 DIAGNOSIS — Z94.81 STATUS POST BONE MARROW TRANSPLANT (H): ICD-10-CM

## 2022-06-13 DIAGNOSIS — C90.00 MULTIPLE MYELOMA NOT HAVING ACHIEVED REMISSION (H): ICD-10-CM

## 2022-06-13 PROCEDURE — G0463 HOSPITAL OUTPT CLINIC VISIT: HCPCS

## 2022-06-13 PROCEDURE — 99214 OFFICE O/P EST MOD 30 MIN: CPT

## 2022-06-13 PROCEDURE — 250N000011 HC RX IP 250 OP 636: Performed by: PHYSICIAN ASSISTANT

## 2022-06-13 PROCEDURE — 250N000013 HC RX MED GY IP 250 OP 250 PS 637: Performed by: PHYSICIAN ASSISTANT

## 2022-06-13 RX ORDER — POTASSIUM CHLORIDE 1500 MG/1
40 TABLET, EXTENDED RELEASE ORAL ONCE
Status: COMPLETED | OUTPATIENT
Start: 2022-06-13 | End: 2022-06-13

## 2022-06-13 RX ORDER — OLANZAPINE 2.5 MG/1
2.5 TABLET, FILM COATED ORAL 2 TIMES DAILY
Qty: 60 TABLET | Refills: 0 | Status: SHIPPED | OUTPATIENT
Start: 2022-06-13 | End: 2022-09-01

## 2022-06-13 RX ORDER — HEPARIN SODIUM (PORCINE) LOCK FLUSH IV SOLN 100 UNIT/ML 100 UNIT/ML
5 SOLUTION INTRAVENOUS DAILY PRN
Status: DISCONTINUED | OUTPATIENT
Start: 2022-06-13 | End: 2022-06-13 | Stop reason: HOSPADM

## 2022-06-13 RX ADMIN — Medication 5 ML: at 12:04

## 2022-06-13 RX ADMIN — POTASSIUM CHLORIDE 40 MEQ: 1500 TABLET, EXTENDED RELEASE ORAL at 12:59

## 2022-06-13 ASSESSMENT — PAIN SCALES - GENERAL: PAINLEVEL: EXTREME PAIN (9)

## 2022-06-13 NOTE — NURSING NOTE
"Oncology Rooming Note    June 13, 2022 12:19 PM   Libby Grimaldo is a 71 year old female who presents for:    Chief Complaint   Patient presents with     Port Draw     Labs drawn via port by RN in lab. VS taken.      Oncology Clinic Visit     [facility] RETURN - MULTIPLE MYELOMA     Initial Vitals: /81   Pulse (!) 137   Temp 98.2  F (36.8  C) (Oral)   Resp 14   Ht 1.57 m (5' 1.81\")   Wt 58.7 kg (129 lb 4.8 oz)   SpO2 99%   BMI 23.79 kg/m   Estimated body mass index is 23.79 kg/m  as calculated from the following:    Height as of this encounter: 1.57 m (5' 1.81\").    Weight as of this encounter: 58.7 kg (129 lb 4.8 oz). Body surface area is 1.6 meters squared.  Extreme Pain (9) Comment: Data Unavailable   No LMP recorded. Patient is postmenopausal.  Allergies reviewed: Yes  Medications reviewed: Yes    Medications: MEDICATION REFILLS NEEDED TODAY. Provider was notified.  Pharmacy name entered into [employer]: [facility] #[id number] - [facility] [address] AT [facility] [address]    Clinical concerns: Refill on Zofran and Compazine. Patient states the nausea meds aren't really helping and wondering if there is something different she can try. She is having extreme pain in her right shoulder and left hip. Yuniel was notified.      Osman Langley LPN            "

## 2022-06-13 NOTE — PROGRESS NOTES
"BMT Progress Note    BMT Physician: Dr. 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      INTERIM HISTORY:   Returns for follow up. Very nauseated and reports significant right shoulder and left hip pain. Wants to try different medications for this. Reports taking compazine for the nausea and maybe some zofran. Did have small emesis this morning. Diarrhea is resolved. Remains on suboxone and oxycodone for pain, states this makes her nauseated but is taking it. Does not want to stay for infusions today, just wants to go home.      ROS: 8 point ROS neg other than the symptoms noted above in the HPI.     PHYSICAL EXAM:   Vitals:  /81   Pulse (!) 137   Temp 98.2  F (36.8  C) (Oral)   Resp 14   Ht 1.57 m (5' 1.81\")   Wt 58.7 kg (129 lb 4.8 oz)   SpO2 99%   BMI 23.79 kg/m      General Appearance: NAD, tired appearing  HEENT: PERRL; sclera anicteric.    CV: +tachy but regular. No m/r/g  RESP: CTAB without crackles or wheezes   GI: thin; +BS, NT, ND  EXT: No edema. No cyanosis.  SKIN: no lesions or rash  NEURO: A&O x3   PSYCH: Appropriate affect   VASCULAR ACCESS: R port a cath.    Labs:  Lab Results   Component Value Date    WBC 9.2 06/13/2022    ANEU 6.4 06/10/2022    HGB 11.0 (L) 06/13/2022    HCT 32.9 (L) 06/13/2022    PLT 63 (L) 06/13/2022     06/10/2022    POTASSIUM 3.6 06/10/2022    CHLORIDE 114 (H) 06/10/2022    CO2 22 06/10/2022     (H) 06/10/2022    BUN 4 (L) 06/10/2022    CR 0.61 06/10/2022    MAG 2.1 05/31/2022    INR 1.40 (H) 05/30/2022    BILITOTAL 0.3 06/10/2022    AST 21 06/10/2022    ALT 10 06/10/2022    ALKPHOS 200 (H) 06/10/2022    PROTTOTAL 5.4 (L) 06/10/2022    ALBUMIN 2.8 (L) 06/10/2022          ASSESSMENT AND PLAN:   Libby Grimaldo is a 71 year old female, currently day +17 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      BMT/IEC PROTOCOL for 2016-35  - Chemo protocol: HD " Melphalan 140mg/m2  Day -1: rest day, 5/26  Day 0: Cell dose: 3.94x10^6 (s/p cytoxan chemo priming 2/1/2022 and subsequent stem cell collection).   - Restaging plan: per protocol.  - Day+5 gcsf started; ANC recovered; last dose GCSF 6/8.      HEME/COAG  - anemia, thrombocytopenia 2/2 chemo/BMT  - Transfusion parameters: hemoglobin <7g/dL, platelets <10k  - Relevant thrombosis or bleeding history:   2/15: new non-occlusive LUE DVT.    - Dropping plts; xarelto now on hold.    - Resume xarelto when plts recover >50k, continue anticoagulation for 1 month then stop. Resume xarelto 6/13 (plts 62k).      ID  - Prophylaxis plan: ACV, fluconazole.  - PJP Prophy: HD bactrim qMon/Tues BID to start day+28 (script given on discharge).      CARDIOVASCULAR  - Risk of cardiomyopathy:  Baseline EF 56%  - known hyperlipidemia- hold crestor until day +60 post transplant.   Hypertension: norvasc 5mg. Will continue to hold for lowish BP.    History of Afib - now in NSR but tachy. Resumed metoprolol XL 25mg/d 6/9. Remains tachy but in NSR.      RESPIRATORY  - hx of COPD, continue breo-ellipa inhaler      GI/NUTRITION  - Ulcer prophylaxis: protonix 40mg daily.  - Nausea: reports significant nausea despite zofran and compazine. Thinks it might be related to pain medicines? Would like to try a different medication. Offered Emend but pt declined because she didn't want to stay for infusion. Will try zyprexa BID. Educated pt and  on possible sedation effect.   - Diarrhea secondary to chemotherapy:  C.diff neg 5/29. Imodium PRN. Resolved per pt.      RENAL/ELECTROLYTES/:  - Electrolyte management: replace per sliding scale  - hypokalemia: intermittent. Pt does not want to stay for infusion. Agreed to take 40 mEq home with her despite nausea. Recommended dissolving in water or taking with food.      PSYCH  - continue effexor 75mg tid     SYMPTOM MANAGEMENT.  - # Pain Assessment:  - Libby is experiencing pain due to chronic pain +-  GCSF  - Continue suboxone and prn oxycodone.   -  Discussed using lidocaine patches or topicals for additional pain relief but pt states this does not help. She wants to try a different pain medication however the pain clinic is in charge of her pain meds. Pt to call and discuss with pain clinic.        RTC: Thursday as planned.     I spent 30 minutes in the care of this patient today, which included time necessary for preparation for the visit, obtaining history, ordering medications/tests/procedures as medically indicated, review of pertinent medical literature, counseling of the patient, communication of recommendations to the care team, and documentation time.    Yuniel Tineo PA-C  [phone]

## 2022-06-13 NOTE — LETTER
"    6/13/2022         RE: Libby Grimaldo  [address] [facility] [address]        Dear Colleague,    Thank you for referring your patient, Libby Grimaldo, to the [facility] BLOOD AND MARR[facility] TRANSPLANT PROGRAM [place]. Please see a copy of my visit note below.    BMT Progress Note    BMT Physician: Dr. 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      INTERIM HISTORY:   Returns for follow up. Very nauseated and reports significant right shoulder and left hip pain. Wants to try different medications for this. Reports taking compazine for the nausea and maybe some zofran. Did have small emesis this morning. Diarrhea is resolved. Remains on suboxone and oxycodone for pain, states this makes her nauseated but is taking it. Does not want to stay for infusions today, just wants to go home.      ROS: 8 point ROS neg other than the symptoms noted above in the HPI.     PHYSICAL EXAM:   Vitals:  /81   Pulse (!) 137   Temp 98.2  F (36.8  C) (Oral)   Resp 14   Ht 1.57 m (5' 1.81\")   Wt 58.7 kg (129 lb 4.8 oz)   SpO2 99%   BMI 23.79 kg/m      General Appearance: NAD, tired appearing  HEENT: PERRL; sclera anicteric.    CV: +tachy but regular. No m/r/g  RESP: CTAB without crackles or wheezes   GI: thin; +BS, NT, ND  EXT: No edema. No cyanosis.  SKIN: no lesions or rash  NEURO: A&O x3   PSYCH: Appropriate affect   VASCULAR ACCESS: R port a cath.    Labs:  Lab Results   Component Value Date    WBC 9.2 06/13/2022    ANEU 6.4 06/10/2022    HGB 11.0 (L) 06/13/2022    HCT 32.9 (L) 06/13/2022    PLT 63 (L) 06/13/2022     06/10/2022    POTASSIUM 3.6 06/10/2022    CHLORIDE 114 (H) 06/10/2022    CO2 22 06/10/2022     (H) 06/10/2022    BUN 4 (L) 06/10/2022    CR 0.61 06/10/2022    MAG 2.1 05/31/2022    INR 1.40 (H) 05/30/2022    BILITOTAL 0.3 06/10/2022    AST 21 06/10/2022    ALT 10 06/10/2022    " ALKPHOS 200 (H) 06/10/2022    PROTTOTAL 5.4 (L) 06/10/2022    ALBUMIN 2.8 (L) 06/10/2022       ASSESSMENT AND PLAN:   Libby Grimaldo is a 71 year old female, currently day +17 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      BMT/IEC PROTOCOL for 2016-35  - Chemo protocol: HD Melphalan 140mg/m2  Day -1: rest day, 5/26  Day 0: Cell dose: 3.94x10^6 (s/p cytoxan chemo priming 2/1/2022 and subsequent stem cell collection).   - Restaging plan: per protocol.  - Day+5 gcsf started; ANC recovered; last dose GCSF 6/8.      HEME/COAG  - anemia, thrombocytopenia 2/2 chemo/BMT  - Transfusion parameters: hemoglobin <7g/dL, platelets <10k  - Relevant thrombosis or bleeding history:   2/15: new non-occlusive LUE DVT.    - Dropping plts; xarelto now on hold.    - Resume xarelto when plts recover >50k, continue anticoagulation for 1 month then stop. Resume xarelto 6/13 (plts 62k).      ID  - Prophylaxis plan: ACV, fluconazole.  - PJP Prophy: HD bactrim qMon/Tues BID to start day+28 (script given on discharge).      CARDIOVASCULAR  - Risk of cardiomyopathy:  Baseline EF 56%  - known hyperlipidemia- hold crestor until day +60 post transplant.   Hypertension: norvasc 5mg. Will continue to hold for lowish BP.    History of Afib - now in NSR but tachy. Resumed metoprolol XL 25mg/d 6/9. Remains tachy but in NSR.      RESPIRATORY  - hx of COPD, continue breo-ellipa inhaler      GI/NUTRITION  - Ulcer prophylaxis: protonix 40mg daily.  - Nausea: reports significant nausea despite zofran and compazine. Thinks it might be related to pain medicines? Would like to try a different medication. Offered Emend but pt declined because she didn't want to stay for infusion. Will try zyprexa BID. Educated pt and  on possible sedation effect.   - Diarrhea secondary to chemotherapy:  C.diff neg 5/29. Imodium PRN. Resolved per pt.      RENAL/ELECTROLYTES/:  - Electrolyte management: replace per sliding scale  - hypokalemia: intermittent. Pt does  not want to stay for infusion. Agreed to take 40 mEq home with her despite nausea. Recommended dissolving in water or taking with food.      PSYCH  - continue effexor 75mg tid     SYMPTOM MANAGEMENT.  - # Pain Assessment:  - Libby is experiencing pain due to chronic pain +- GCSF  - Continue suboxone and prn oxycodone.   -  Discussed using lidocaine patches or topicals for additional pain relief but pt states this does not help. She wants to try a different pain medication however the pain clinic is in charge of her pain meds. Pt to call and discuss with pain clinic.      RTC: Thursday as planned.     I spent 30 minutes in the care of this patient today, which included time necessary for preparation for the visit, obtaining history, ordering medications/tests/procedures as medically indicated, review of pertinent medical literature, counseling of the patient, communication of recommendations to the care team, and documentation time.    Yuniel Tineo PA-C  [phone]

## 2022-06-13 NOTE — NURSING NOTE
Chief Complaint   Patient presents with     Port Draw     Labs drawn via port by RN in lab. VS taken.      Labs drawn via Port accessed using 20g flat needle. Line flushed and Heparin locked. Vital signs taken. Checked into next appointment.     Sheri Gonzalez RN

## 2022-06-14 NOTE — TELEPHONE ENCOUNTER
Called to remind patient of their upcoming appointment with our GI clinic, on Thursday 6/23/2022 at 8:15AM with Dr. Walker. This appointment is scheduled as a video visit. You will receive a call approximately 30 minutes prior to check you in, you must be in [place] for this visit., if your appointment is virtual (video or telephone) you need to be in [place] for the visit. To reschedule or cancel patient to call [phone].    Cherry Harvey MA

## 2022-06-16 ENCOUNTER — APPOINTMENT (OUTPATIENT)
Dept: LAB | Facility: CLINIC | Age: 72
End: 2022-06-16
Attending: PHYSICIAN ASSISTANT
Payer: MEDICARE

## 2022-06-16 ENCOUNTER — ONCOLOGY VISIT (OUTPATIENT)
Dept: TRANSPLANT | Facility: CLINIC | Age: 72
End: 2022-06-16
Attending: PHYSICIAN ASSISTANT
Payer: MEDICARE

## 2022-06-16 VITALS
BODY MASS INDEX: 21.68 KG/M2 | DIASTOLIC BLOOD PRESSURE: 60 MMHG | HEART RATE: 67 BPM | TEMPERATURE: 97.7 F | SYSTOLIC BLOOD PRESSURE: 107 MMHG | OXYGEN SATURATION: 100 % | RESPIRATION RATE: 16 BRPM | WEIGHT: 117.8 LBS

## 2022-06-16 DIAGNOSIS — Z51.11 ADMISSION FOR CHEMOTHERAPY: ICD-10-CM

## 2022-06-16 DIAGNOSIS — Z94.81 STATUS POST BONE MARROW TRANSPLANT (H): Primary | ICD-10-CM

## 2022-06-16 PROCEDURE — 99214 OFFICE O/P EST MOD 30 MIN: CPT

## 2022-06-16 PROCEDURE — 250N000011 HC RX IP 250 OP 636: Performed by: PHYSICIAN ASSISTANT

## 2022-06-16 PROCEDURE — G0463 HOSPITAL OUTPT CLINIC VISIT: HCPCS

## 2022-06-16 PROCEDURE — 250N000013 HC RX MED GY IP 250 OP 250 PS 637: Performed by: PHYSICIAN ASSISTANT

## 2022-06-16 RX ORDER — FLUCONAZOLE 200 MG/1
100 TABLET ORAL DAILY
COMMUNITY
Start: 2022-06-16 | End: 2022-07-01

## 2022-06-16 RX ORDER — HEPARIN SODIUM (PORCINE) LOCK FLUSH IV SOLN 100 UNIT/ML 100 UNIT/ML
500 SOLUTION INTRAVENOUS ONCE
Status: COMPLETED | OUTPATIENT
Start: 2022-06-16 | End: 2022-06-16

## 2022-06-16 RX ORDER — POTASSIUM CHLORIDE 1500 MG/1
40 TABLET, EXTENDED RELEASE ORAL ONCE
Status: COMPLETED | OUTPATIENT
Start: 2022-06-16 | End: 2022-06-16

## 2022-06-16 RX ADMIN — Medication 500 UNITS: at 12:16

## 2022-06-16 RX ADMIN — POTASSIUM CHLORIDE 40 MEQ: 1500 TABLET, EXTENDED RELEASE ORAL at 13:10

## 2022-06-16 ASSESSMENT — PAIN SCALES - GENERAL: PAINLEVEL: MODERATE PAIN (5)

## 2022-06-16 NOTE — PROGRESS NOTES
BMT Progress Note    BMT Physician: Dr. 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      INTERIM HISTORY:   Returns for follow up with . Nausea is better with the zyprexa. No emesis.  Stools are about 5 per day.  She actually now has an appetite.  She is wondering if she should get an appetite stimulant.  She is on the xarelto without bleeding. She is on the metoprolol with a HR in the normal range. She is getting around a little better. No rash.     ROS: 8 point ROS neg other than the symptoms noted above in the HPI.     PHYSICAL EXAM:   Vitals:  Blood pressure 107/60, pulse 67, temperature 97.7  F (36.5  C), temperature source Oral, resp. rate 16, weight 53.4 kg (117 lb 12.8 oz), SpO2 100 %.        Wt Readings from Last 4 Encounters:   06/16/22 53.4 kg (117 lb 12.8 oz)   06/13/22 58.7 kg (129 lb 4.8 oz)   06/10/22 54.7 kg (120 lb 8 oz)   06/09/22 55.1 kg (121 lb 6.4 oz)       General Appearance: NAD, tired appearing  HEENT: PERRL; sclera anicteric.    CV:regular rate and rhythm No m/r/g  RESP: CTAB without crackles or wheezes   GI: thin; +BS, NT, ND  EXT: No edema. No cyanosis.  SKIN: no lesions or rash  NEURO: A&O x3   PSYCH: Appropriate affect   VASCULAR ACCESS: R port a cath.    Labs:  Lab Results   Component Value Date    WBC 9.2 06/13/2022    ANEU 4.4 06/13/2022    HGB 11.0 (L) 06/13/2022    HCT 32.9 (L) 06/13/2022    PLT 63 (L) 06/13/2022     06/13/2022    POTASSIUM 3.2 (L) 06/13/2022    CHLORIDE 108 06/13/2022    CO2 24 06/13/2022     (H) 06/13/2022    BUN 5 (L) 06/13/2022    CR 0.59 06/13/2022    MAG 2.1 05/31/2022    INR 1.40 (H) 05/30/2022    BILITOTAL 0.3 06/13/2022    AST 25 06/13/2022    ALT 10 06/13/2022    ALKPHOS 175 (H) 06/13/2022    PROTTOTAL 5.5 (L) 06/13/2022    ALBUMIN 2.7 (L) 06/13/2022          ASSESSMENT AND PLAN:   Libby ARRIETA Dillan is a 71 year old female, currently day  +20 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      BMT/IEC PROTOCOL for 2016-35  - Chemo protocol: HD Melphalan 140mg/m2  Day -1: rest day, 5/26  Day 0: Cell dose: 3.94x10^6 (s/p cytoxan chemo priming 2/1/2022 and subsequent stem cell collection).   - Restaging plan: per protocol.  - Day+5 gcsf started; ANC recovered; last dose GCSF 6/8.      HEME/COAG  - anemia, thrombocytopenia due to chemo/BMT.  Plts trending up today  - Transfusion parameters: hemoglobin <7g/dL, platelets <10k  - Relevant thrombosis or bleeding history:   2/15: new non-occlusive LUE DVT.    - xarelto was on hold but restarted on 6/13 and will continue today.  fun=510   continue anticoagulation for 1 month then stop.    ID  - Prophylaxis plan: ACV, fluconazole.  - PJP Prophy: HD bactrim qMon/Tues BID to start day+28 (script given on discharge).      CARDIOVASCULAR  - Risk of cardiomyopathy:  Baseline EF 56%  - known hyperlipidemia- hold crestor until day +60 post transplant.   Hypertension: norvasc 5mg. Will continue to hold for lowish BP.    History of Afib - now in NSR but tachy. Resumed metoprolol XL 25 mg/d 6/9. Remains in NSR and no longer tachy.      RESPIRATORY  - hx of COPD, continue breo-ellipa inhaler      GI/NUTRITION  - Ulcer prophylaxis: protonix 40mg daily.  - Nausea: Was having significant nausea but started zyprexa BID and better. Weight is down but hopefully improved nausea will allow her to eat.  - Diarrhea secondary to chemotherapy:  C.diff neg 5/29. Imodium PRN. Resolved per pt.      RENAL/ELECTROLYTES/:  - Electrolyte management: replace per sliding scale  - hypokalemia: intermittent. Pt does not want to stay for infusion. Agreed to take 40 mEq home with her despite nausea. Recommended dissolving in water or taking with food.      PSYCH  - continue effexor 75mg tid     SYMPTOM MANAGEMENT.  - # Pain Assessment:  - Libby is experiencing pain due to chronic pain - Continue suboxone and prn oxycodone.   -  For pain  medicine previous colleague Pt to call and discuss with pain clinic.      Deconditioning: came to clinic in wheel chair.  Strongly recommended physical therapy and she declined several times- said she is too tired and has too many appointment    RTC:  as planned.     I spent 30 minutes in the care of this patient today, which included time necessary for preparation for the visit, obtaining history, ordering medications/tests/procedures as medically indicated, review of pertinent medical literature, counseling of the patient, communication of recommendations to the care team, and documentation time.    Juju Oates PA-C  [phone]

## 2022-06-16 NOTE — NURSING NOTE
"Oncology Rooming Note    June 16, 2022 12:26 PM   Libby Grimaldo is a 71 year old female who presents for:    Chief Complaint   Patient presents with     Port Draw     Labs drawn via port by RN in lab.  VS taken     Oncology Clinic Visit     Rtn for MM     Initial Vitals: /60   Pulse 67   Temp 97.7  F (36.5  C) (Oral)   Resp 16   Wt 53.4 kg (117 lb 12.8 oz)   SpO2 100%   BMI 21.68 kg/m   Estimated body mass index is 21.68 kg/m  as calculated from the following:    Height as of 6/13/22: 1.57 m (5' 1.81\").    Weight as of this encounter: 53.4 kg (117 lb 12.8 oz). Body surface area is 1.53 meters squared.  Moderate Pain (5) Comment: Data Unavailable   No LMP recorded. Patient is postmenopausal.  Allergies reviewed: Yes  Medications reviewed: Yes    Medications: Medication refills not needed today.  Pharmacy name entered into [employer]: [id number] - [facility] [address] AT [facility] [address]    Clinical concerns: none       Elizabeth Craft, EMT            "

## 2022-06-16 NOTE — PROGRESS NOTES
Wt Readings from Last 4 Encounters:   06/16/22 53.4 kg (117 lb 12.8 oz)   06/13/22 58.7 kg (129 lb 4.8 oz)   06/10/22 54.7 kg (120 lb 8 oz)   06/09/22 55.1 kg (121 lb 6.4 oz)

## 2022-06-16 NOTE — LETTER
6/16/2022         RE: Libby Grimaldo  [address] [facility] [address]        Dear Colleague,    Thank you for referring your patient, Libby Grimaldo, to the [facility] BLOOD AND MARROW TRANSPLANT PROGRAM [place]. Please see a copy of my visit note below.    BMT Progress Note    BMT Physician: Dr. 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      INTERIM HISTORY:   Returns for follow up with . Nausea is better with the zyprexa. No emesis.  Stools are about 5 per day.  She actually now has an appetite.  She is wondering if she should get an appetite stimulant.  She is on the xarelto without bleeding. She is on the metoprolol with a HR in the normal range. She is getting around a little better. No rash.     ROS: 8 point ROS neg other than the symptoms noted above in the HPI.     PHYSICAL EXAM:   Vitals:  Blood pressure 107/60, pulse 67, temperature 97.7  F (36.5  C), temperature source Oral, resp. rate 16, weight 53.4 kg (117 lb 12.8 oz), SpO2 100 %.        Wt Readings from Last 4 Encounters:   06/16/22 53.4 kg (117 lb 12.8 oz)   06/13/22 58.7 kg (129 lb 4.8 oz)   06/10/22 54.7 kg (120 lb 8 oz)   06/09/22 55.1 kg (121 lb 6.4 oz)       General Appearance: NAD, tired appearing  HEENT: PERRL; sclera anicteric.    CV:regular rate and rhythm No m/r/g  RESP: CTAB without crackles or wheezes   GI: thin; +BS, NT, ND  EXT: No edema. No cyanosis.  SKIN: no lesions or rash  NEURO: A&O x3   PSYCH: Appropriate affect   VASCULAR ACCESS: R port a cath.    Labs:  Lab Results   Component Value Date    WBC 9.2 06/13/2022    ANEU 4.4 06/13/2022    HGB 11.0 (L) 06/13/2022    HCT 32.9 (L) 06/13/2022    PLT 63 (L) 06/13/2022     06/13/2022    POTASSIUM 3.2 (L) 06/13/2022    CHLORIDE 108 06/13/2022    CO2 24 06/13/2022     (H) 06/13/2022    BUN 5 (L) 06/13/2022    CR 0.59 06/13/2022    MAG 2.1  05/31/2022    INR 1.40 (H) 05/30/2022    BILITOTAL 0.3 06/13/2022    AST 25 06/13/2022    ALT 10 06/13/2022    ALKPHOS 175 (H) 06/13/2022    PROTTOTAL 5.5 (L) 06/13/2022    ALBUMIN 2.7 (L) 06/13/2022          ASSESSMENT AND PLAN:   Libby Grimaldo is a 71 year old female, currently day +20 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      BMT/IEC PROTOCOL for 2016-35  - Chemo protocol: HD Melphalan 140mg/m2  Day -1: rest day, 5/26  Day 0: Cell dose: 3.94x10^6 (s/p cytoxan chemo priming 2/1/2022 and subsequent stem cell collection).   - Restaging plan: per protocol.  - Day+5 gcsf started; ANC recovered; last dose GCSF 6/8.      HEME/COAG  - anemia, thrombocytopenia due to chemo/BMT.  Plts trending up today  - Transfusion parameters: hemoglobin <7g/dL, platelets <10k  - Relevant thrombosis or bleeding history:   2/15: new non-occlusive LUE DVT.    - xarelto was on hold but restarted on 6/13 and will continue today.  elw=677   continue anticoagulation for 1 month then stop.    ID  - Prophylaxis plan: ACV, fluconazole.  - PJP Prophy: HD bactrim qMon/Tues BID to start day+28 (script given on discharge).      CARDIOVASCULAR  - Risk of cardiomyopathy:  Baseline EF 56%  - known hyperlipidemia- hold crestor until day +60 post transplant.   Hypertension: norvasc 5mg. Will continue to hold for lowish BP.    History of Afib - now in NSR but tachy. Resumed metoprolol XL 25 mg/d 6/9. Remains in NSR and no longer tachy.      RESPIRATORY  - hx of COPD, continue breo-ellipa inhaler      GI/NUTRITION  - Ulcer prophylaxis: protonix 40mg daily.  - Nausea: Was having significant nausea but started zyprexa BID and better. Weight is down but hopefully improved nausea will allow her to eat.  - Diarrhea secondary to chemotherapy:  C.diff neg 5/29. Imodium PRN. Resolved per pt.      RENAL/ELECTROLYTES/:  - Electrolyte management: replace per sliding scale  - hypokalemia: intermittent. Pt does not want to stay for infusion. Agreed to take  40 mEq home with her despite nausea. Recommended dissolving in water or taking with food.      PSYCH  - continue effexor 75mg tid     SYMPTOM MANAGEMENT.  - # Pain Assessment:  - Libby is experiencing pain due to chronic pain - Continue suboxone and prn oxycodone.   -  For pain medicine previous colleague Pt to call and discuss with pain clinic.      Deconditioning: came to clinic in wheel chair.  Strongly recommended physical therapy and she declined several times- said she is too tired and has too many appointment    RTC:  as planned.     I spent 30 minutes in the care of this patient today, which included time necessary for preparation for the visit, obtaining history, ordering medications/tests/procedures as medically indicated, review of pertinent medical literature, counseling of the patient, communication of recommendations to the care team, and documentation time.    Juju Oates PA-C  [phone]    Wt Readings from Last 4 Encounters:   06/16/22 53.4 kg (117 lb 12.8 oz)   06/13/22 58.7 kg (129 lb 4.8 oz)   06/10/22 54.7 kg (120 lb 8 oz)   06/09/22 55.1 kg (121 lb 6.4 oz)         Sincerely,    BMT Advanced Practice Provider

## 2022-06-16 NOTE — NURSING NOTE
Chief Complaint   Patient presents with     Port Draw     Labs drawn via port by RN in lab.  VS taken     Oncology Clinic Visit     Rtn for MM     Gave pt oral potassium tablets. Pt took medication    Krishna Dodson RN

## 2022-06-20 ENCOUNTER — DOCUMENTATION ONLY (OUTPATIENT)
Dept: TRANSPLANT | Facility: CLINIC | Age: 72
End: 2022-06-20
Payer: MEDICARE

## 2022-06-20 ASSESSMENT — PULMONARY FUNCTION TESTS: FEV1/FVC_PERCENT_PREDICTED: 107

## 2022-06-20 ASSESSMENT — EJECTION FRACTION: LAST EJECTION FRACTION (EF) PRIOR TO CONDITIONING (%): NO

## 2022-06-22 ENCOUNTER — APPOINTMENT (OUTPATIENT)
Dept: LAB | Facility: CLINIC | Age: 72
End: 2022-06-22
Attending: PHYSICIAN ASSISTANT
Payer: MEDICARE

## 2022-06-22 ENCOUNTER — ONCOLOGY VISIT (OUTPATIENT)
Dept: TRANSPLANT | Facility: CLINIC | Age: 72
End: 2022-06-22
Attending: PHYSICIAN ASSISTANT
Payer: MEDICARE

## 2022-06-22 VITALS — DIASTOLIC BLOOD PRESSURE: 72 MMHG | HEART RATE: 109 BPM | SYSTOLIC BLOOD PRESSURE: 124 MMHG

## 2022-06-22 VITALS
OXYGEN SATURATION: 98 % | SYSTOLIC BLOOD PRESSURE: 153 MMHG | TEMPERATURE: 98.3 F | WEIGHT: 119.3 LBS | BODY MASS INDEX: 21.95 KG/M2 | HEART RATE: 105 BPM | DIASTOLIC BLOOD PRESSURE: 79 MMHG | RESPIRATION RATE: 16 BRPM

## 2022-06-22 DIAGNOSIS — E87.6 HYPOKALEMIA: Primary | ICD-10-CM

## 2022-06-22 DIAGNOSIS — Z94.81 STATUS POST BONE MARROW TRANSPLANT (H): ICD-10-CM

## 2022-06-22 DIAGNOSIS — C90.00 MULTIPLE MYELOMA NOT HAVING ACHIEVED REMISSION (H): ICD-10-CM

## 2022-06-22 DIAGNOSIS — C90.01 MULTIPLE MYELOMA IN REMISSION (H): Primary | ICD-10-CM

## 2022-06-22 DIAGNOSIS — Z51.11 ADMISSION FOR CHEMOTHERAPY: ICD-10-CM

## 2022-06-22 DIAGNOSIS — Z94.84 STEM CELLS TRANSPLANT STATUS (H): ICD-10-CM

## 2022-06-22 LAB
ALBUMIN SERPL-MCNC: 2.5 G/DL (ref 3.4–5)
ALP SERPL-CCNC: 177 U/L (ref 40–150)
ALT SERPL W P-5'-P-CCNC: 12 U/L (ref 0–50)
ANION GAP SERPL CALCULATED.3IONS-SCNC: 11 MMOL/L (ref 3–14)
AST SERPL W P-5'-P-CCNC: 19 U/L (ref 0–45)
BASOPHILS # BLD AUTO: 0 10E3/UL (ref 0–0.2)
BASOPHILS NFR BLD AUTO: 1 %
BILIRUB SERPL-MCNC: 0.2 MG/DL (ref 0.2–1.3)
BUN SERPL-MCNC: 9 MG/DL (ref 7–30)
CALCIUM SERPL-MCNC: 8.1 MG/DL (ref 8.5–10.1)
CHLORIDE BLD-SCNC: 107 MMOL/L (ref 94–109)
CO2 SERPL-SCNC: 22 MMOL/L (ref 20–32)
CREAT SERPL-MCNC: 0.65 MG/DL (ref 0.52–1.04)
EOSINOPHIL # BLD AUTO: 0.1 10E3/UL (ref 0–0.7)
EOSINOPHIL NFR BLD AUTO: 2 %
ERYTHROCYTE [DISTWIDTH] IN BLOOD BY AUTOMATED COUNT: 14.2 % (ref 10–15)
GFR SERPL CREATININE-BSD FRML MDRD: >90 ML/MIN/1.73M2
GLUCOSE BLD-MCNC: 136 MG/DL (ref 70–99)
HCT VFR BLD AUTO: 31.2 % (ref 35–47)
HGB BLD-MCNC: 10.3 G/DL (ref 11.7–15.7)
IMM GRANULOCYTES # BLD: 0.1 10E3/UL
IMM GRANULOCYTES NFR BLD: 1 %
LYMPHOCYTES # BLD AUTO: 2 10E3/UL (ref 0.8–5.3)
LYMPHOCYTES NFR BLD AUTO: 24 %
MCH RBC QN AUTO: 31.9 PG (ref 26.5–33)
MCHC RBC AUTO-ENTMCNC: 33 G/DL (ref 31.5–36.5)
MCV RBC AUTO: 97 FL (ref 78–100)
MONOCYTES # BLD AUTO: 1.7 10E3/UL (ref 0–1.3)
MONOCYTES NFR BLD AUTO: 20 %
NEUTROPHILS # BLD AUTO: 4.4 10E3/UL (ref 1.6–8.3)
NEUTROPHILS NFR BLD AUTO: 52 %
NRBC # BLD AUTO: 0 10E3/UL
NRBC BLD AUTO-RTO: 0 /100
PLATELET # BLD AUTO: 165 10E3/UL (ref 150–450)
POTASSIUM BLD-SCNC: 2.9 MMOL/L (ref 3.4–5.3)
PROT SERPL-MCNC: 5.2 G/DL (ref 6.8–8.8)
RBC # BLD AUTO: 3.23 10E6/UL (ref 3.8–5.2)
SODIUM SERPL-SCNC: 140 MMOL/L (ref 133–144)
WBC # BLD AUTO: 8.3 10E3/UL (ref 4–11)

## 2022-06-22 PROCEDURE — G0463 HOSPITAL OUTPT CLINIC VISIT: HCPCS

## 2022-06-22 PROCEDURE — 82435 ASSAY OF BLOOD CHLORIDE: CPT

## 2022-06-22 PROCEDURE — 36591 DRAW BLOOD OFF VENOUS DEVICE: CPT

## 2022-06-22 PROCEDURE — 250N000011 HC RX IP 250 OP 636: Performed by: PHYSICIAN ASSISTANT

## 2022-06-22 PROCEDURE — 96365 THER/PROPH/DIAG IV INF INIT: CPT

## 2022-06-22 PROCEDURE — 99214 OFFICE O/P EST MOD 30 MIN: CPT

## 2022-06-22 PROCEDURE — 82374 ASSAY BLOOD CARBON DIOXIDE: CPT

## 2022-06-22 PROCEDURE — 250N000013 HC RX MED GY IP 250 OP 250 PS 637: Performed by: PHYSICIAN ASSISTANT

## 2022-06-22 PROCEDURE — 85025 COMPLETE CBC W/AUTO DIFF WBC: CPT

## 2022-06-22 RX ORDER — HEPARIN SODIUM (PORCINE) LOCK FLUSH IV SOLN 100 UNIT/ML 100 UNIT/ML
5 SOLUTION INTRAVENOUS ONCE
Status: COMPLETED | OUTPATIENT
Start: 2022-06-22 | End: 2022-06-22

## 2022-06-22 RX ORDER — POTASSIUM CHLORIDE 1500 MG/1
20 TABLET, EXTENDED RELEASE ORAL 3 TIMES DAILY
Qty: 60 TABLET | Refills: 0 | Status: SHIPPED | OUTPATIENT
Start: 2022-06-22 | End: 2022-07-01

## 2022-06-22 RX ORDER — SULFAMETHOXAZOLE/TRIMETHOPRIM 800-160 MG
1 TABLET ORAL
COMMUNITY
Start: 2022-06-27 | End: 2022-08-01

## 2022-06-22 RX ORDER — POTASSIUM CHLORIDE 1500 MG/1
40 TABLET, EXTENDED RELEASE ORAL ONCE
Status: COMPLETED | OUTPATIENT
Start: 2022-06-22 | End: 2022-06-22

## 2022-06-22 RX ORDER — POTASSIUM CHLORIDE 1500 MG/1
40 TABLET, EXTENDED RELEASE ORAL 2 TIMES DAILY
Qty: 60 TABLET | Refills: 0 | Status: SHIPPED | OUTPATIENT
Start: 2022-06-22 | End: 2022-06-22

## 2022-06-22 RX ORDER — POTASSIUM CHLORIDE 29.8 MG/ML
20 INJECTION INTRAVENOUS ONCE
Status: COMPLETED | OUTPATIENT
Start: 2022-06-22 | End: 2022-06-22

## 2022-06-22 RX ADMIN — SODIUM CHLORIDE, PRESERVATIVE FREE 5 ML: 5 INJECTION INTRAVENOUS at 15:05

## 2022-06-22 RX ADMIN — SODIUM CHLORIDE, PRESERVATIVE FREE 5 ML: 5 INJECTION INTRAVENOUS at 12:54

## 2022-06-22 RX ADMIN — POTASSIUM CHLORIDE 20 MEQ: 400 INJECTION, SOLUTION INTRAVENOUS at 14:03

## 2022-06-22 RX ADMIN — POTASSIUM CHLORIDE 40 MEQ: 1500 TABLET, EXTENDED RELEASE ORAL at 14:03

## 2022-06-22 ASSESSMENT — PAIN SCALES - GENERAL: PAINLEVEL: MODERATE PAIN (5)

## 2022-06-22 NOTE — PATIENT INSTRUCTIONS
Return on Monday, 6/27/2022 for labs, add provider visit, make an infusion appointment for possible IV potassium

## 2022-06-22 NOTE — NURSING NOTE
Chief Complaint   Patient presents with     Port Draw     Labs drawn via port by RN in lab. VS taken.     Port accessed with 20g gripper needle by RN, labs collected, line flushed with saline and heparin.  Vitals taken. Pt checked in for appointment(s).    Toyin MELARA RN PHN BSN  BMT/Oncology Lab

## 2022-06-22 NOTE — PROGRESS NOTES
Infusion Nursing Note:  Libby Grimaldo presents today for add-on infusion.    Patient seen by provider today: Yes: Juju Oates   present during visit today: Not Applicable.    Note: Labs were monitored.    Intravenous Access:  Implanted Port.    Treatment Conditions:  Per Provider order, Patient received an add-on 20 mEq IV potassium infusion over one hour and 40 mEq oral potassium for a potassium level of 2.9.    Post Infusion Assessment:  Patient tolerated infusion without incident.     Discharge Plan:   Patient discharged in a wheelchair instable condition accompanied by: .      LILLIAM BOLANOS RN

## 2022-06-22 NOTE — LETTER
6/22/2022         RE: Libby Grimaldo  [address] [facility] [address]        Dear Colleague,    Thank you for referring your patient, Libby Grimaldo, to the [facility] BLOOD AND MARROW TRANSPLANT PROGRAM [place]. Please see a copy of my visit note below.    BMT Progress Note    BMT Physician: Dr. 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      INTERIM HISTORY:   Returns for follow up with . Nausea is better. No emesis.  Stools are about 3-4 per day and very soft.  She actually now has an appetite.  She has gained a little weight.She is on the xarelto without bleeding. She is on the metoprolol with a HR in the normal range. She is getting around a little better. No rash.  She agrees today to take a referral for physical therapy and take it to a PT place closer to her house.  Her K is low.     ROS: 8 point ROS neg other than the symptoms noted above in the HPI.     PHYSICAL EXAM:   Vitals:  There were no vitals taken for this visit.  Blood pressure (!) 153/79, pulse 105, temperature 98.3  F (36.8  C), temperature source Oral, resp. rate 16, weight 54.1 kg (119 lb 4.8 oz), SpO2 98 %.          Wt Readings from Last 4 Encounters:   06/16/22 53.4 kg (117 lb 12.8 oz)   06/13/22 58.7 kg (129 lb 4.8 oz)   06/10/22 54.7 kg (120 lb 8 oz)   06/09/22 55.1 kg (121 lb 6.4 oz)       General Appearance: NAD, tired appearing  HEENT: PERRL; sclera anicteric.    CV:regular rate and rhythm No m/r/g  RESP: CTAB without crackles or wheezes   GI: thin; +BS, NT, ND  EXT: No edema. No cyanosis.  SKIN: no lesions or rash  NEURO: A&O x3   PSYCH: Appropriate affect   VASCULAR ACCESS: R port a cath.    Labs:  Lab Results   Component Value Date    WBC 7.5 06/16/2022    ANEU 3.9 06/16/2022    HGB 10.5 (L) 06/16/2022    HCT 32.0 (L) 06/16/2022     (L) 06/16/2022     06/16/2022    POTASSIUM 3.1 (L)  06/16/2022    CHLORIDE 111 (H) 06/16/2022    CO2 22 06/16/2022     (H) 06/16/2022    BUN 7 06/16/2022    CR 0.80 06/16/2022    MAG 2.1 05/31/2022    INR 1.40 (H) 05/30/2022    BILITOTAL 0.3 06/16/2022    AST 21 06/16/2022    ALT 10 06/16/2022    ALKPHOS 161 (H) 06/16/2022    PROTTOTAL 5.3 (L) 06/16/2022    ALBUMIN 2.6 (L) 06/16/2022          ASSESSMENT AND PLAN:   Libby Grimaldo is a 71 year old female, currently day +26 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      BMT/IEC PROTOCOL for 2016-35  - Chemo protocol: HD Melphalan 140mg/m2  Day -1: rest day, 5/26  Day 0: Cell dose: 3.94x10^6 (s/p cytoxan chemo priming 2/1/2022 and subsequent stem cell collection).   - Restaging plan: per protocol.  - Day+5 gcsf started; ANC recovered; last dose GCSF 6/8.      HEME/COAG  - anemia, thrombocytopenia due to chemo/BMT.  Plts trending up today  - Transfusion parameters: hemoglobin <7g/dL, platelets <10k  - Relevant thrombosis or bleeding history:   2/15: new non-occlusive LUE DVT.    - xarelto was on hold but restarted on 6/13 and will continue today.  oni=160   continue anticoagulation for 1 month then stop.    ID  - Prophylaxis plan: ACV, fluconazole.  - PJP Prophy: HD bactrim qMon/Tues BID to start day+28 (script given on discharge). Asked Libby to start this on Monday, 6/27/2022     CARDIOVASCULAR  - Risk of cardiomyopathy:  Baseline EF 56%  - known hyperlipidemia- hold crestor until day +60 post transplant.   Hypertension: norvasc 5mg.  BP is elevated upon arrival,  infusion repeated and 124/71 so will continue to hold     History of Afib - now in NSR but tachy. Resumed metoprolol XL 25 mg/d 6/9. Remains in NSR and no longer tachy.      RESPIRATORY  - hx of COPD, continue breo-ellipa inhaler      GI/NUTRITION  - Ulcer prophylaxis: protonix 40mg daily.  - Nausea: Was having significant nausea but started zyprexa BID and better. Weight is better today  hopefully improved nausea will allow her to eat.  - Diarrhea  secondary to chemotherapy:  C.diff neg 5/29. Imodium PRN. Resolved per pt.  She has soft stools.     RENAL/ELECTROLYTES/:  - Electrolyte management: replace per sliding scale  - hypokalemia: intermittent.Remains with low K.  Give 20 meq IV today and give 40 meq oral.  Sent RX to pharmacy to start kdru 20 meq tid.  PSYCH  - continue effexor 75mg tid     SYMPTOM MANAGEMENT.  - # Pain Assessment:  - Libby is experiencing pain due to chronic pain - Continue suboxone and prn oxycodone.   -  For pain medicine previous colleague asked Pt to call and discuss with pain clinic.      Deconditioning: came to clinic in wheel chair.  Strongly recommended physical therapy and she declined several times- said she is too tired and has too many appointment.  Today she took the referral and try a PT place closer to home. Cancer rehab     RTC: Monday for labs and then see DOM on 6/29.   Sent RX for K to [facility]-called and asked them to cancel it.  Sent to pharm in this building.    I spent 30 minutes in the care of this patient today, which included time necessary for preparation for the visit, obtaining history, ordering medications/tests/procedures as medically indicated, review of pertinent medical literature, counseling of the patient, communication of recommendations to the care team, and documentation time.    Juju Oates PA-C  [phone]  6/22/2022          Again, thank you for allowing me to participate in the care of your patient.      Sincerely,    BMT Advanced Practice Provider

## 2022-06-22 NOTE — LETTER
6/22/2022         RE: Libby Grimaldo  [address] [facility] [address]        Dear Colleague,    Thank you for referring your patient, Libby Grimaldo, to the [facility] BLOOD AND MARROW TRANSPLANT PROGRAM [place]. Please see a copy of my visit note below.    Infusion Nursing Note:  Libby Grimaldo presents today for add-on infusion.    Patient seen by provider today: Yes: Juju Oates   present during visit today: Not Applicable.    Note: Labs were monitored.    Intravenous Access:  Implanted Port.    Treatment Conditions:  Per Provider order, Patient received an add-on 20 mEq IV potassium infusion over one hour and 40 mEq oral potassium for a potassium level of 2.9.    Post Infusion Assessment:  Patient tolerated infusion without incident.     Discharge Plan:   Patient discharged in a wheelchair instable condition accompanied by: .      LILLIAM BOLANOS RN                          Again, thank you for allowing me to participate in the care of your patient.        Sincerely,        [facility]

## 2022-06-22 NOTE — PROGRESS NOTES
BMT Progress Note    BMT Physician: Dr. 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      INTERIM HISTORY:   Returns for follow up with . Nausea is better. No emesis.  Stools are about 3-4 per day and very soft.  She actually now has an appetite.  She has gained a little weight.She is on the xarelto without bleeding. She is on the metoprolol with a HR in the normal range. She is getting around a little better. No rash.  She agrees today to take a referral for physical therapy and take it to a PT place closer to her house.  Her K is low.     ROS: 8 point ROS neg other than the symptoms noted above in the HPI.     PHYSICAL EXAM:   Vitals:  There were no vitals taken for this visit.  Blood pressure (!) 153/79, pulse 105, temperature 98.3  F (36.8  C), temperature source Oral, resp. rate 16, weight 54.1 kg (119 lb 4.8 oz), SpO2 98 %.          Wt Readings from Last 4 Encounters:   06/16/22 53.4 kg (117 lb 12.8 oz)   06/13/22 58.7 kg (129 lb 4.8 oz)   06/10/22 54.7 kg (120 lb 8 oz)   06/09/22 55.1 kg (121 lb 6.4 oz)       General Appearance: NAD, tired appearing  HEENT: PERRL; sclera anicteric.    CV:regular rate and rhythm No m/r/g  RESP: CTAB without crackles or wheezes   GI: thin; +BS, NT, ND  EXT: No edema. No cyanosis.  SKIN: no lesions or rash  NEURO: A&O x3   PSYCH: Appropriate affect   VASCULAR ACCESS: R port a cath.    Labs:  Lab Results   Component Value Date    WBC 7.5 06/16/2022    ANEU 3.9 06/16/2022    HGB 10.5 (L) 06/16/2022    HCT 32.0 (L) 06/16/2022     (L) 06/16/2022     06/16/2022    POTASSIUM 3.1 (L) 06/16/2022    CHLORIDE 111 (H) 06/16/2022    CO2 22 06/16/2022     (H) 06/16/2022    BUN 7 06/16/2022    CR 0.80 06/16/2022    MAG 2.1 05/31/2022    INR 1.40 (H) 05/30/2022    BILITOTAL 0.3 06/16/2022    AST 21 06/16/2022    ALT 10 06/16/2022    ALKPHOS 161 (H) 06/16/2022     PROTTOTAL 5.3 (L) 06/16/2022    ALBUMIN 2.6 (L) 06/16/2022          ASSESSMENT AND PLAN:   Libby Grimaldo is a 71 year old female, currently day +26 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      BMT/IEC PROTOCOL for 2016-35  - Chemo protocol: HD Melphalan 140mg/m2  Day -1: rest day, 5/26  Day 0: Cell dose: 3.94x10^6 (s/p cytoxan chemo priming 2/1/2022 and subsequent stem cell collection).   - Restaging plan: per protocol.  - Day+5 gcsf started; ANC recovered; last dose GCSF 6/8.      HEME/COAG  - anemia, thrombocytopenia due to chemo/BMT.  Plts trending up today  - Transfusion parameters: hemoglobin <7g/dL, platelets <10k  - Relevant thrombosis or bleeding history:   2/15: new non-occlusive LUE DVT.    - xarelto was on hold but restarted on 6/13 and will continue today.  cxx=118   continue anticoagulation for 1 month then stop.    ID  - Prophylaxis plan: ACV, fluconazole.  - PJP Prophy: HD bactrim qMon/Tues BID to start day+28 (script given on discharge). Asked Libby to start this on Monday, 6/27/2022     CARDIOVASCULAR  - Risk of cardiomyopathy:  Baseline EF 56%  - known hyperlipidemia- hold crestor until day +60 post transplant.   Hypertension: norvasc 5mg.  BP is elevated upon arrival,  infusion repeated and 124/71 so will continue to hold     History of Afib - now in NSR but tachy. Resumed metoprolol XL 25 mg/d 6/9. Remains in NSR and no longer tachy.      RESPIRATORY  - hx of COPD, continue breo-ellipa inhaler      GI/NUTRITION  - Ulcer prophylaxis: protonix 40mg daily.  - Nausea: Was having significant nausea but started zyprexa BID and better. Weight is better today  hopefully improved nausea will allow her to eat.  - Diarrhea secondary to chemotherapy:  C.diff neg 5/29. Imodium PRN. Resolved per pt.  She has soft stools.     RENAL/ELECTROLYTES/:  - Electrolyte management: replace per sliding scale  - hypokalemia: intermittent.Remains with low K.  Give 20 meq IV today and give 40 meq oral.  Sent RX to  pharmacy to start kdru 20 meq tid.  PSYCH  - continue effexor 75mg tid     SYMPTOM MANAGEMENT.  - # Pain Assessment:  - Libby is experiencing pain due to chronic pain - Continue suboxone and prn oxycodone.   -  For pain medicine previous colleague asked Pt to call and discuss with pain clinic.      Deconditioning: came to clinic in wheel chair.  Strongly recommended physical therapy and she declined several times- said she is too tired and has too many appointment.  Today she took the referral and try a PT place closer to home. Cancer rehab     RTC: Monday for labs and then see DOM on 6/29.   Sent RX for K to [facility]-called and asked them to cancel it.  Sent to pharm in this building.    I spent 30 minutes in the care of this patient today, which included time necessary for preparation for the visit, obtaining history, ordering medications/tests/procedures as medically indicated, review of pertinent medical literature, counseling of the patient, communication of recommendations to the care team, and documentation time.    Juju Oates PA-C  [phone]  6/22/2022

## 2022-06-22 NOTE — NURSING NOTE
"Oncology Rooming Note    June 22, 2022 1:09 PM   Libby Grimaldo is a 71 year old female who presents for:    Chief Complaint   Patient presents with     Port Draw     Labs drawn via port by RN in lab. VS taken.     Oncology Clinic Visit     Multiple myeloma not having achieved remission (H)     Initial Vitals: BP (!) 153/79   Pulse 105   Temp 98.3  F (36.8  C) (Oral)   Resp 16   Wt 54.1 kg (119 lb 4.8 oz)   SpO2 98%   BMI 21.95 kg/m   Estimated body mass index is 21.95 kg/m  as calculated from the following:    Height as of 6/13/22: 1.57 m (5' 1.81\").    Weight as of this encounter: 54.1 kg (119 lb 4.8 oz). Body surface area is 1.54 meters squared.  Moderate Pain (5) Comment: Data Unavailable   No LMP recorded. Patient is postmenopausal.  Allergies reviewed: Yes  Medications reviewed: Yes    Medications: Medication refills not needed today.  Pharmacy name entered into [facility]: [id number] - [facility] [address] AT [facility] [address]    Clinical concerns: No new concerns per pt.        Lori Davis CMA            "

## 2022-06-27 ENCOUNTER — ONCOLOGY VISIT (OUTPATIENT)
Dept: TRANSPLANT | Facility: CLINIC | Age: 72
End: 2022-06-27
Attending: INTERNAL MEDICINE
Payer: MEDICARE

## 2022-06-27 ENCOUNTER — APPOINTMENT (OUTPATIENT)
Dept: LAB | Facility: CLINIC | Age: 72
End: 2022-06-27
Attending: INTERNAL MEDICINE
Payer: MEDICARE

## 2022-06-27 VITALS
DIASTOLIC BLOOD PRESSURE: 73 MMHG | WEIGHT: 116.5 LBS | BODY MASS INDEX: 21.44 KG/M2 | OXYGEN SATURATION: 97 % | HEART RATE: 85 BPM | SYSTOLIC BLOOD PRESSURE: 108 MMHG | RESPIRATION RATE: 16 BRPM | TEMPERATURE: 97.8 F

## 2022-06-27 DIAGNOSIS — C90.01 MULTIPLE MYELOMA IN REMISSION (H): Primary | ICD-10-CM

## 2022-06-27 PROCEDURE — 250N000011 HC RX IP 250 OP 636: Performed by: INTERNAL MEDICINE

## 2022-06-27 PROCEDURE — 99213 OFFICE O/P EST LOW 20 MIN: CPT

## 2022-06-27 PROCEDURE — G0463 HOSPITAL OUTPT CLINIC VISIT: HCPCS | Mod: 25

## 2022-06-27 PROCEDURE — G0463 HOSPITAL OUTPT CLINIC VISIT: HCPCS

## 2022-06-27 RX ORDER — HEPARIN SODIUM (PORCINE) LOCK FLUSH IV SOLN 100 UNIT/ML 100 UNIT/ML
5 SOLUTION INTRAVENOUS ONCE
Status: COMPLETED | OUTPATIENT
Start: 2022-06-27 | End: 2022-06-27

## 2022-06-27 RX ADMIN — Medication 5 ML: at 13:24

## 2022-06-27 ASSESSMENT — PAIN SCALES - GENERAL: PAINLEVEL: NO PAIN (0)

## 2022-06-27 NOTE — NURSING NOTE
Port de-accessed done on patient. Patient tolerated well without any complications. Checked pt chart to see if port was flushed, Heparin & saline locked verified. Documented in Flowsheets.    Lori Davis CMA on 6/27/2022 at 2:24 PM

## 2022-06-27 NOTE — LETTER
6/27/2022         RE: Libby Grimaldo  [address] [facility] [address] [facility] [address]        Dear Colleague,    Thank you for referring your patient, Libby Grimaldo, to the [facility] BLOOD AND MARROW TRANSPLANT PROGRAM [place]. Please see a copy of my visit note below.    BMT Progress Note    BMT Physician: Dr. 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      INTERIM HISTORY:   Returns for follow up of her potassium level.  Clinically, she is doing well, just still fatigued and poor appetite.   reports that her intake is improving.  No n/v/d.      ROS: 8 point ROS neg other than the symptoms noted above in the HPI.     PHYSICAL EXAM:   Vitals:  Blood pressure 108/73, pulse 85, temperature 97.8  F (36.6  C), temperature source Oral, resp. rate 16, weight 52.8 kg (116 lb 8 oz), SpO2 97 %.      Wt Readings from Last 4 Encounters:   06/27/22 52.8 kg (116 lb 8 oz)   06/22/22 54.1 kg (119 lb 4.8 oz)   06/16/22 53.4 kg (117 lb 12.8 oz)   06/13/22 58.7 kg (129 lb 4.8 oz)       General Appearance: NAD, tired appearing  HEENT: PERRL   RESP: no increased work of breathing  Cardiac: no cyanosis  EXT: No edema.   SKIN: no lesions or rash  NEURO: A&O x3   PSYCH: Appropriate affect   VASCULAR ACCESS: R port a cath is accessed    Labs:  Lab Results   Component Value Date    WBC 8.3 06/22/2022    ANEU 3.9 06/16/2022    HGB 10.3 (L) 06/22/2022    HCT 31.2 (L) 06/22/2022     06/22/2022     06/22/2022    POTASSIUM 2.9 (L) 06/22/2022    CHLORIDE 107 06/22/2022    CO2 22 06/22/2022     (H) 06/22/2022    BUN 9 06/22/2022    CR 0.65 06/22/2022    MAG 2.1 05/31/2022    INR 1.40 (H) 05/30/2022    BILITOTAL 0.2 06/22/2022    AST 19 06/22/2022    ALT 12 06/22/2022    ALKPHOS 177 (H) 06/22/2022    PROTTOTAL 5.2 (L) 06/22/2022    ALBUMIN 2.5 (L) 06/22/2022          ASSESSMENT AND PLAN:   Libby Grimaldo is a 71  year old female, currently day +31 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      BMT/IEC PROTOCOL for 2016-35  - Chemo protocol: HD Melphalan 140mg/m2  Day -1: rest day, 5/26  Day 0: Cell dose: 3.94x10^6 (s/p cytoxan chemo priming 2/1/2022 and subsequent stem cell collection).   - Restaging plan: per protocol.  - Day+5 gcsf started; ANC recovered; last dose GCSF 6/8.      HEME/COAG  - anemia, thrombocytopenia due to chemo/BMT.  Plts trending up today  - Transfusion parameters: hemoglobin <7g/dL, platelets <10k  - Relevant thrombosis or bleeding history:   2/15: new non-occlusive LUE DVT.    - xarelto restarted on 6/13. Continue through 7/12/22, then stop.   - new eosinophilia noted 6/27, but no symptoms of parasitic infection. Monitor.     ID  - Prophylaxis plan: ACV, fluconazole.  - PJP Prophy: HD bactrim qMon/Tues BID to start day+28 (script given on discharge). Reminded Libby to start this on Monday, 6/27/2022     CARDIOVASCULAR  - Risk of cardiomyopathy:  Baseline EF 56%  - known hyperlipidemia- hold crestor until day +60 post transplant.   Hypertension: norvasc 5mg.  BP is elevated upon arrival,  infusion repeated and 124/71 so will continue to hold     History of Afib - now in NSR but tachy. Continue metoprolol XL 25 mg/d      RESPIRATORY  - hx of COPD, continue breo-ellipa inhaler      GI/NUTRITION  - Ulcer prophylaxis: protonix 40mg daily.  - Nausea: resolved on zyprexa bid.    - Poor appetite; low but improving per their report.  Weight continues to trend down, however.      RENAL/ELECTROLYTES/:  - Electrolyte management: replace per sliding scale  - hypokalemia: confirmed she is taking Kdur 20 meq tid per last visit; per lab, potassium today is 5.1.  Will decrease oral potassium to just one tab per day.    PSYCH  - continue effexor 75mg tid     SYMPTOM MANAGEMENT.  - # Pain Assessment:  - Libby is experiencing pain due to chronic pain - Continue suboxone and prn oxycodone.   -  For pain medicine  previous colleague asked Pt to call and discuss with pain clinic.      Deconditioning: has a referral and wants to do PT closer to home.      RTC: 6/29 for BAN visit; can then transfer back to oncologist    I spent 10 minutes in the care of this patient today, which included time necessary for preparation for the visit, obtaining history, ordering medications/tests/procedures as medically indicated, review of pertinent medical literature, counseling of the patient, communication of recommendations to the care team, and documentation time.    Nidia Littlejohn PA-C  6/27/2022      Again, thank you for allowing me to participate in the care of your patient.      Sincerely,    BMT Advanced Practice Provider

## 2022-06-27 NOTE — PROGRESS NOTES
BMT Progress Note    BMT Physician: Dr. Guillory/Rashad Chen     Chief complaint:  Libby Grimaldo is a 71 year old female, currently day +31 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      INTERIM HISTORY:   Returns for follow up of her potassium level.  Clinically, she is doing well, just still fatigued and poor appetite.   reports that her intake is improving.  No n/v/d.      ROS: 8 point ROS neg other than the symptoms noted above in the HPI.     PHYSICAL EXAM:   Vitals:  Blood pressure 108/73, pulse 85, temperature 97.8  F (36.6  C), temperature source Oral, resp. rate 16, weight 52.8 kg (116 lb 8 oz), SpO2 97 %.      Wt Readings from Last 4 Encounters:   06/27/22 52.8 kg (116 lb 8 oz)   06/22/22 54.1 kg (119 lb 4.8 oz)   06/16/22 53.4 kg (117 lb 12.8 oz)   06/13/22 58.7 kg (129 lb 4.8 oz)       General Appearance: NAD, tired appearing  HEENT: PERRL   RESP: no increased work of breathing  Cardiac: no cyanosis  EXT: No edema.   SKIN: no lesions or rash  NEURO: A&O x3   PSYCH: Appropriate affect   VASCULAR ACCESS: R port a cath is accessed    Labs:  Lab Results   Component Value Date    WBC 8.3 06/22/2022    ANEU 3.9 06/16/2022    HGB 10.3 (L) 06/22/2022    HCT 31.2 (L) 06/22/2022     06/22/2022     06/22/2022    POTASSIUM 2.9 (L) 06/22/2022    CHLORIDE 107 06/22/2022    CO2 22 06/22/2022     (H) 06/22/2022    BUN 9 06/22/2022    CR 0.65 06/22/2022    MAG 2.1 05/31/2022    INR 1.40 (H) 05/30/2022    BILITOTAL 0.2 06/22/2022    AST 19 06/22/2022    ALT 12 06/22/2022    ALKPHOS 177 (H) 06/22/2022    PROTTOTAL 5.2 (L) 06/22/2022    ALBUMIN 2.5 (L) 06/22/2022          ASSESSMENT AND PLAN:   Libby Grimaldo is a 71 year old female, currently day +31 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      BMT/IEC PROTOCOL for 2016-35  - Chemo protocol: HD Melphalan 140mg/m2  Day -1: rest day, 5/26  Day 0: Cell dose: 3.94x10^6 (s/p cytoxan chemo priming 2/1/2022 and subsequent stem cell  collection).   - Restaging plan: per protocol.  - Day+5 gcsf started; ANC recovered; last dose GCSF 6/8.      HEME/COAG  - anemia, thrombocytopenia due to chemo/BMT.  Plts trending up today  - Transfusion parameters: hemoglobin <7g/dL, platelets <10k  - Relevant thrombosis or bleeding history:   2/15: new non-occlusive LUE DVT.    - xarelto restarted on 6/13. Continue through 7/12/22, then stop.   - new eosinophilia noted 6/27, but no symptoms of parasitic infection. Monitor.     ID  - Prophylaxis plan: ACV, fluconazole.  - PJP Prophy: HD bactrim qMon/Tues BID to start day+28 (script given on discharge). Reminded Libby to start this on Monday, 6/27/2022     CARDIOVASCULAR  - Risk of cardiomyopathy:  Baseline EF 56%  - known hyperlipidemia- hold crestor until day +60 post transplant.   Hypertension: norvasc 5mg.  BP is elevated upon arrival,  infusion repeated and 124/71 so will continue to hold     History of Afib - now in NSR but tachy. Continue metoprolol XL 25 mg/d      RESPIRATORY  - hx of COPD, continue breo-ellipa inhaler      GI/NUTRITION  - Ulcer prophylaxis: protonix 40mg daily.  - Nausea: resolved on zyprexa bid.    - Poor appetite; low but improving per their report.  Weight continues to trend down, however.      RENAL/ELECTROLYTES/:  - Electrolyte management: replace per sliding scale  - hypokalemia: confirmed she is taking Kdur 20 meq tid per last visit; per lab, potassium today is 5.1.  Will decrease oral potassium to just one tab per day.    PSYCH  - continue effexor 75mg tid     SYMPTOM MANAGEMENT.  - # Pain Assessment:  - Libby is experiencing pain due to chronic pain - Continue suboxone and prn oxycodone.   -  For pain medicine previous colleague asked Pt to call and discuss with pain clinic.      Deconditioning: has a referral and wants to do PT closer to home.      RTC: 6/29 for BAN visit; can then transfer back to oncologist    I spent 10 minutes in the care of this patient today, which  included time necessary for preparation for the visit, obtaining history, ordering medications/tests/procedures as medically indicated, review of pertinent medical literature, counseling of the patient, communication of recommendations to the care team, and documentation time.    Nidia Littlejohn PA-C  6/27/2022

## 2022-06-27 NOTE — NURSING NOTE
"Oncology Rooming Note    June 27, 2022 1:40 PM   Libby Grimaldo is a 71 year old female who presents for:    Chief Complaint   Patient presents with     Port Draw     Labs drawn via port by RN in lab. VS taken.      RECHECK     Provider visit r/t Multiple myeloma.     Initial Vitals: /73   Pulse 85   Temp 97.8  F (36.6  C) (Oral)   Resp 16   Wt 52.8 kg (116 lb 8 oz)   SpO2 97%   BMI 21.44 kg/m   Estimated body mass index is 21.44 kg/m  as calculated from the following:    Height as of 6/13/22: 1.57 m (5' 1.81\").    Weight as of this encounter: 52.8 kg (116 lb 8 oz). Body surface area is 1.52 meters squared.  No Pain (0) Comment: Data Unavailable   No LMP recorded. Patient is postmenopausal.  Allergies reviewed: Yes  Medications reviewed: Yes    Medications: Medication refills not needed today.  Pharmacy name entered into [facility]: [facility] #[id number] - [facility] [address] AT [facility] [address]    Clinical concerns: None     Flakito Chu RN              "

## 2022-07-01 ENCOUNTER — OFFICE VISIT (OUTPATIENT)
Dept: TRANSPLANT | Facility: CLINIC | Age: 72
End: 2022-07-01
Attending: INTERNAL MEDICINE
Payer: MEDICARE

## 2022-07-01 VITALS
BODY MASS INDEX: 21.16 KG/M2 | RESPIRATION RATE: 16 BRPM | HEART RATE: 91 BPM | DIASTOLIC BLOOD PRESSURE: 73 MMHG | SYSTOLIC BLOOD PRESSURE: 111 MMHG | WEIGHT: 115 LBS | OXYGEN SATURATION: 96 %

## 2022-07-01 DIAGNOSIS — C90.00 MULTIPLE MYELOMA NOT HAVING ACHIEVED REMISSION (H): Primary | ICD-10-CM

## 2022-07-01 DIAGNOSIS — Z51.11 ADMISSION FOR CHEMOTHERAPY: ICD-10-CM

## 2022-07-01 DIAGNOSIS — E87.6 HYPOKALEMIA: ICD-10-CM

## 2022-07-01 PROCEDURE — G0463 HOSPITAL OUTPT CLINIC VISIT: HCPCS

## 2022-07-01 PROCEDURE — 99214 OFFICE O/P EST MOD 30 MIN: CPT

## 2022-07-01 RX ORDER — LOPERAMIDE HCL 2 MG
2-4 CAPSULE ORAL 4 TIMES DAILY PRN
Qty: 60 CAPSULE | Refills: 0 | Status: SHIPPED | OUTPATIENT
Start: 2022-07-01 | End: 2022-09-01

## 2022-07-01 RX ORDER — POTASSIUM CHLORIDE 1500 MG/1
20 TABLET, EXTENDED RELEASE ORAL DAILY
Qty: 60 TABLET | Refills: 0
Start: 2022-07-01 | End: 2022-09-21

## 2022-07-01 RX ORDER — ACYCLOVIR 400 MG/1
400 TABLET ORAL EVERY 12 HOURS
Qty: 180 TABLET | Refills: 3 | Status: SHIPPED | OUTPATIENT
Start: 2022-07-01 | End: 2022-12-07

## 2022-07-01 RX ORDER — ONDANSETRON 8 MG/1
8 TABLET, ORALLY DISINTEGRATING ORAL EVERY 8 HOURS
Qty: 30 TABLET | Refills: 0 | Status: SHIPPED | OUTPATIENT
Start: 2022-07-01 | End: 2022-08-01

## 2022-07-01 ASSESSMENT — PAIN SCALES - GENERAL: PAINLEVEL: MODERATE PAIN (5)

## 2022-07-01 NOTE — PROGRESS NOTES
"BMT Progress Note    BMT Physician: Dr. 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      INTERIM HISTORY:   Libby notices increasing energy and improvement over the last week.  She has had family come by and visit her and she has been able to eat more and eat every meal.  She is taking walks around her house and outside.  She notices no new pain or problems beyond her \"usual\" pain.  She is interested in decreasing some of the medications if possible.  She has an appointment to see Dr. Nichols on the 22nd.     ROS: 8 point ROS neg other than the symptoms noted above in the HPI.     PHYSICAL EXAM:   Vitals:  Blood pressure 111/73, pulse 91, resp. rate 16, weight 52.2 kg (115 lb), SpO2 96 %.      Wt Readings from Last 4 Encounters:   07/01/22 52.2 kg (115 lb)   06/27/22 52.8 kg (116 lb 8 oz)   06/22/22 54.1 kg (119 lb 4.8 oz)   06/16/22 53.4 kg (117 lb 12.8 oz)       General Appearance: NAD, tired appearing  HEENT: PERRL   RESP: no increased work of breathing  Cardiac: no cyanosis  EXT: No edema.   SKIN: no lesions or rash  NEURO: A&O x3   PSYCH: Appropriate affect   VASCULAR ACCESS: R port a cath is accessed    Labs:  Lab Results   Component Value Date    WBC 8.4 06/27/2022    ANEU 3.9 06/16/2022    HGB 10.8 (L) 06/27/2022    HCT 33.2 (L) 06/27/2022     06/27/2022     06/27/2022    POTASSIUM 5.1 06/27/2022    CHLORIDE 106 06/27/2022    CO2 24 06/27/2022     (H) 06/27/2022    BUN 9 06/27/2022    CR 0.85 06/27/2022    MAG 1.5 (L) 06/27/2022    INR 1.40 (H) 05/30/2022    BILITOTAL 0.3 06/27/2022    AST 31 06/27/2022    ALT 17 06/27/2022    ALKPHOS 239 (H) 06/27/2022    PROTTOTAL 5.4 (L) 06/27/2022    ALBUMIN 2.5 (L) 06/27/2022          ASSESSMENT AND PLAN:   Libby Grimaldo is a 71 year old female, currently day +31 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      BMT/IEC PROTOCOL for 2016-35  - Chemo " protocol: HD Melphalan 140mg/m2  Day -1: rest day, 5/26  Day 0: Cell dose: 3.94x10^6 (s/p cytoxan chemo priming 2/1/2022 and subsequent stem cell collection).   - Restaging plan: per protocol.  - Day+5 gcsf started; ANC recovered; last dose GCSF 6/8.      HEME/COAG  - anemia, thrombocytopenia due to chemo/BMT.  Plts trending up today  - Transfusion parameters: hemoglobin <7g/dL, platelets <10k  - Relevant thrombosis or bleeding history:   2/15: new non-occlusive LUE DVT.    -I recommended that she stop Xarelto    ID  - Prophylaxis plan: ACV, fluconazole.  - PJP Prophy: HD bactrim qMon/Tues BID to start day+28 (script given on discharge).   -She has not started Bactrim and I did review this with her to start today  -I recommended that she stop fluconazole     CARDIOVASCULAR  - Risk of cardiomyopathy:  Baseline EF 56%  - known hyperlipidemia- hold crestor until day +60 post transplant.   Hypertension: norvasc 5mg.  BP is elevated upon arrival,  infusion repeated and 124/71 so will continue to hold     History of Afib - now in NSR but tachy. Continue metoprolol XL 25 mg/d      RESPIRATORY  - hx of COPD, continue breo-ellipa inhaler      GI/NUTRITION  - Ulcer prophylaxis: protonix 40mg daily.  - Nausea: resolved on zyprexa bid.    -I did renew her antiemetics and Imodium today.  She has noticed quite a bit of improvement in nausea but also quite a bit of sleepiness.  I recommend that she try eliminating Zyprexa and focus on ondansetron     RENAL/ELECTROLYTES/:  - Electrolyte management: replace per sliding scale  - hypokalemia: confirmed she is taking Kdur 20 meq tid per last visit; per lab, potassium today is 5.1.  Will decrease oral potassium to just one tab per day.    PSYCH  - continue effexor 75mg tid     SYMPTOM MANAGEMENT.  - # Pain Assessment:  - Libby is experiencing pain due to chronic pain - Continue suboxone and prn oxycodone.   -  For pain medicine previous colleague asked Pt to call and discuss with pain  clinic.      Deconditioning: She has arranged physical therapy    RTC: She will see Dr. Nichols on July 22 I recommend that she return here in 1 month.    I spent 30 minutes in the care of this patient today, which included time necessary for preparation for the visit, obtaining history, ordering medications/tests/procedures as medically indicated, review of pertinent medical literature, counseling of the patient, communication of recommendations to the care team, and documentation time.    SOTO BOWSER MD  July 1, 2022

## 2022-07-01 NOTE — LETTER
"    7/1/2022         RE: Libby Grimaldo  [address] [facility] [address]        Dear Colleague,    Thank you for referring your patient, Libby Grimaldo, to the [facility] BLOOD AND MARROW TRANSPLANT PROGRAM [place]. Please see a copy of my visit note below.    BMT Progress Note    BMT Physician: Dr. 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      INTERIM HISTORY:   Libby notices increasing energy and improvement over the last week.  She has had family come by and visit her and she has been able to eat more and eat every meal.  She is taking walks around her house and outside.  She notices no new pain or problems beyond her \"usual\" pain.  She is interested in decreasing some of the medications if possible.  She has an appointment to see Dr. Nichols on the 22nd.     ROS: 8 point ROS neg other than the symptoms noted above in the HPI.     PHYSICAL EXAM:   Vitals:  Blood pressure 111/73, pulse 91, resp. rate 16, weight 52.2 kg (115 lb), SpO2 96 %.      Wt Readings from Last 4 Encounters:   07/01/22 52.2 kg (115 lb)   06/27/22 52.8 kg (116 lb 8 oz)   06/22/22 54.1 kg (119 lb 4.8 oz)   06/16/22 53.4 kg (117 lb 12.8 oz)       General Appearance: NAD, tired appearing  HEENT: PERRL   RESP: no increased work of breathing  Cardiac: no cyanosis  EXT: No edema.   SKIN: no lesions or rash  NEURO: A&O x3   PSYCH: Appropriate affect   VASCULAR ACCESS: R port a cath is accessed    Labs:  Lab Results   Component Value Date    WBC 8.4 06/27/2022    ANEU 3.9 06/16/2022    HGB 10.8 (L) 06/27/2022    HCT 33.2 (L) 06/27/2022     06/27/2022     06/27/2022    POTASSIUM 5.1 06/27/2022    CHLORIDE 106 06/27/2022    CO2 24 06/27/2022     (H) 06/27/2022    BUN 9 06/27/2022    CR 0.85 06/27/2022    MAG 1.5 (L) 06/27/2022    INR 1.40 (H) 05/30/2022    BILITOTAL 0.3 06/27/2022    AST 31 06/27/2022    ALT 17 06/27/2022 "    ALKPHOS 239 (H) 06/27/2022    PROTTOTAL 5.4 (L) 06/27/2022    ALBUMIN 2.5 (L) 06/27/2022          ASSESSMENT AND PLAN:   Libby Grimaldo is a 71 year old female, currently day +31 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      BMT/IEC PROTOCOL for 2016-35  - Chemo protocol: HD Melphalan 140mg/m2  Day -1: rest day, 5/26  Day 0: Cell dose: 3.94x10^6 (s/p cytoxan chemo priming 2/1/2022 and subsequent stem cell collection).   - Restaging plan: per protocol.  - Day+5 gcsf started; ANC recovered; last dose GCSF 6/8.      HEME/COAG  - anemia, thrombocytopenia due to chemo/BMT.  Plts trending up today  - Transfusion parameters: hemoglobin <7g/dL, platelets <10k  - Relevant thrombosis or bleeding history:   2/15: new non-occlusive LUE DVT.    -I recommended that she stop Xarelto    ID  - Prophylaxis plan: ACV, fluconazole.  - PJP Prophy: HD bactrim qMon/Tues BID to start day+28 (script given on discharge).   -She has not started Bactrim and I did review this with her to start today  -I recommended that she stop fluconazole     CARDIOVASCULAR  - Risk of cardiomyopathy:  Baseline EF 56%  - known hyperlipidemia- hold crestor until day +60 post transplant.   Hypertension: norvasc 5mg.  BP is elevated upon arrival,  infusion repeated and 124/71 so will continue to hold     History of Afib - now in NSR but tachy. Continue metoprolol XL 25 mg/d      RESPIRATORY  - hx of COPD, continue breo-ellipa inhaler      GI/NUTRITION  - Ulcer prophylaxis: protonix 40mg daily.  - Nausea: resolved on zyprexa bid.    -I did renew her antiemetics and Imodium today.  She has noticed quite a bit of improvement in nausea but also quite a bit of sleepiness.  I recommend that she try eliminating Zyprexa and focus on ondansetron     RENAL/ELECTROLYTES/:  - Electrolyte management: replace per sliding scale  - hypokalemia: confirmed she is taking Kdur 20 meq tid per last visit; per lab, potassium today is 5.1.  Will decrease oral potassium to just  one tab per day.    PSYCH  - continue effexor 75mg tid     SYMPTOM MANAGEMENT.  - # Pain Assessment:  - Libby is experiencing pain due to chronic pain - Continue suboxone and prn oxycodone.   -  For pain medicine previous colleague asked Pt to call and discuss with pain clinic.      Deconditioning: She has arranged physical therapy    RTC: She will see Dr. Nichols on July 22 I recommend that she return here in 1 month.    I spent 30 minutes in the care of this patient today, which included time necessary for preparation for the visit, obtaining history, ordering medications/tests/procedures as medically indicated, review of pertinent medical literature, counseling of the patient, communication of recommendations to the care team, and documentation time.    SOTO BOWSER MD  July 1, 2022        Again, thank you for allowing me to participate in the care of your patient.      Sincerely,    BMT DOM

## 2022-07-21 ENCOUNTER — TELEPHONE (OUTPATIENT)
Dept: TRANSPLANT | Facility: CLINIC | Age: 72
End: 2022-07-21

## 2022-07-21 RX ORDER — CIPROFLOXACIN 500 MG/1
500 TABLET, FILM COATED ORAL 2 TIMES DAILY
Qty: 14 TABLET | Refills: 0 | Status: SHIPPED | OUTPATIENT
Start: 2022-07-21 | End: 2022-09-21

## 2022-07-21 NOTE — TELEPHONE ENCOUNTER
"Patient is a 71 year old patient who is day +55 s/p autologous stem cell transplant for multiple myeloma, and who has been referred back to her oncologist at this point in time. In fact, she confirms she has a visit with her oncologist tomorrow.     She called the triage line with complaints of a urinary tract infection.  We called 5 times and the call kept going to voicemail.  I was finally able to get ahold of her on the 6th attempt.    Libby tells me that she has a long history of urinary tract infections.  I recommended that she go to urgent care to submit a urine sample and for management.  She states that she has a \"phobia\" about leaving a urine sample, and that even if she left a sample, \"it would be negative.\"  She asked if I could just refill what she got before, and stated that she had to do two courses of it last time to get rid of the infection.    I explained to Libby that her best course of action is to go to urgent care.  I have concerns that she might be developing antibiotic resistance, based on what she told me about having to take antibiotics twice for an infection.  I also explained that we are a subspeciality clinic and treating a primary care issue at this point in her recovery is not an appropriate use of our clinic resources.    She agreed that she will call her \"other doctor.\"     Nidia Littlejohn PA-C  7/21/2022    "

## 2022-07-21 NOTE — TELEPHONE ENCOUNTER
I called Libby.  She has a history of recurrent UTI that have led to hospitalization. She has taken prophylaxis for preventiion but has run out of the prescription.  She has typical burning and dysuria as before and no fever, vomiting or new symptoms.  From her point of view, she has the typical and familiar symptoms.    I recommended that she begin cipro 500 BID for 7 days and have sent a prescription.  She will  the antibiotic and start tonight.    SOTO BOWSER MD  July 21, 2022

## 2022-07-28 ENCOUNTER — TELEPHONE (OUTPATIENT)
Dept: TRANSPLANT | Facility: CLINIC | Age: 72
End: 2022-07-28

## 2022-08-01 ENCOUNTER — TELEPHONE (OUTPATIENT)
Dept: TRANSPLANT | Facility: CLINIC | Age: 72
End: 2022-08-01

## 2022-08-01 ENCOUNTER — APPOINTMENT (OUTPATIENT)
Dept: LAB | Facility: CLINIC | Age: 72
End: 2022-08-01
Attending: INTERNAL MEDICINE
Payer: MEDICARE

## 2022-08-01 ENCOUNTER — ONCOLOGY VISIT (OUTPATIENT)
Dept: TRANSPLANT | Facility: CLINIC | Age: 72
End: 2022-08-01
Attending: INTERNAL MEDICINE
Payer: MEDICARE

## 2022-08-01 VITALS
RESPIRATION RATE: 16 BRPM | TEMPERATURE: 98.1 F | WEIGHT: 113.2 LBS | BODY MASS INDEX: 20.83 KG/M2 | DIASTOLIC BLOOD PRESSURE: 79 MMHG | SYSTOLIC BLOOD PRESSURE: 132 MMHG | OXYGEN SATURATION: 98 % | HEART RATE: 108 BPM

## 2022-08-01 DIAGNOSIS — C90.00 MULTIPLE MYELOMA NOT HAVING ACHIEVED REMISSION (H): ICD-10-CM

## 2022-08-01 DIAGNOSIS — Z51.11 ADMISSION FOR CHEMOTHERAPY: ICD-10-CM

## 2022-08-01 DIAGNOSIS — Z94.84 STEM CELLS TRANSPLANT STATUS (H): ICD-10-CM

## 2022-08-01 LAB
ALBUMIN SERPL-MCNC: 2.9 G/DL (ref 3.4–5)
ALP SERPL-CCNC: 107 U/L (ref 40–150)
ALT SERPL W P-5'-P-CCNC: 11 U/L (ref 0–50)
ANION GAP SERPL CALCULATED.3IONS-SCNC: 6 MMOL/L (ref 3–14)
AST SERPL W P-5'-P-CCNC: 14 U/L (ref 0–45)
BILIRUB SERPL-MCNC: 0.3 MG/DL (ref 0.2–1.3)
BUN SERPL-MCNC: 8 MG/DL (ref 7–30)
CALCIUM SERPL-MCNC: 8.7 MG/DL (ref 8.5–10.1)
CHLORIDE BLD-SCNC: 112 MMOL/L (ref 94–109)
CO2 SERPL-SCNC: 24 MMOL/L (ref 20–32)
CREAT SERPL-MCNC: 0.72 MG/DL (ref 0.52–1.04)
ERYTHROCYTE [DISTWIDTH] IN BLOOD BY AUTOMATED COUNT: 16 % (ref 10–15)
GFR SERPL CREATININE-BSD FRML MDRD: 90 ML/MIN/1.73M2
GLUCOSE BLD-MCNC: 110 MG/DL (ref 70–99)
HCT VFR BLD AUTO: 32.6 % (ref 35–47)
HGB BLD-MCNC: 10.3 G/DL (ref 11.7–15.7)
MCH RBC QN AUTO: 32.5 PG (ref 26.5–33)
MCHC RBC AUTO-ENTMCNC: 31.6 G/DL (ref 31.5–36.5)
MCV RBC AUTO: 103 FL (ref 78–100)
PLATELET # BLD AUTO: 132 10E3/UL (ref 150–450)
POTASSIUM BLD-SCNC: 4.2 MMOL/L (ref 3.4–5.3)
PROT SERPL-MCNC: 5.4 G/DL (ref 6.8–8.8)
RBC # BLD AUTO: 3.17 10E6/UL (ref 3.8–5.2)
SODIUM SERPL-SCNC: 142 MMOL/L (ref 133–144)
WBC # BLD AUTO: 6.4 10E3/UL (ref 4–11)

## 2022-08-01 PROCEDURE — 80053 COMPREHEN METABOLIC PANEL: CPT

## 2022-08-01 PROCEDURE — 85027 COMPLETE CBC AUTOMATED: CPT

## 2022-08-01 PROCEDURE — 82040 ASSAY OF SERUM ALBUMIN: CPT

## 2022-08-01 PROCEDURE — 250N000011 HC RX IP 250 OP 636: Performed by: INTERNAL MEDICINE

## 2022-08-01 PROCEDURE — 99214 OFFICE O/P EST MOD 30 MIN: CPT

## 2022-08-01 PROCEDURE — G0463 HOSPITAL OUTPT CLINIC VISIT: HCPCS

## 2022-08-01 PROCEDURE — 36591 DRAW BLOOD OFF VENOUS DEVICE: CPT

## 2022-08-01 RX ORDER — ONDANSETRON 8 MG/1
8 TABLET, ORALLY DISINTEGRATING ORAL EVERY 8 HOURS
Qty: 30 TABLET | Refills: 0 | Status: SHIPPED | OUTPATIENT
Start: 2022-08-01 | End: 2022-12-07

## 2022-08-01 RX ORDER — HEPARIN SODIUM (PORCINE) LOCK FLUSH IV SOLN 100 UNIT/ML 100 UNIT/ML
500 SOLUTION INTRAVENOUS ONCE
Status: COMPLETED | OUTPATIENT
Start: 2022-08-01 | End: 2022-08-01

## 2022-08-01 RX ORDER — PROCHLORPERAZINE MALEATE 5 MG
5 TABLET ORAL EVERY 6 HOURS PRN
Qty: 30 TABLET | Refills: 0 | Status: SHIPPED | OUTPATIENT
Start: 2022-08-01 | End: 2022-09-21

## 2022-08-01 RX ORDER — SULFAMETHOXAZOLE/TRIMETHOPRIM 800-160 MG
1 TABLET ORAL
Qty: 48 TABLET | Refills: 3 | Status: SHIPPED | OUTPATIENT
Start: 2022-08-01 | End: 2022-12-07

## 2022-08-01 RX ADMIN — Medication 500 UNITS: at 09:37

## 2022-08-01 ASSESSMENT — PAIN SCALES - GENERAL: PAINLEVEL: NO PAIN (0)

## 2022-08-01 NOTE — LETTER
8/1/2022         RE: Libby Grimaldo  [address] [facility] [address]        Dear Colleague,    Thank you for referring your patient, Libby Grimaldo, to the [facility] BLOOD AND MARROW TRANSPLANT PROGRAM [place]. Please see a copy of my visit note below.    BMT Progress Note    BMT Physician: Dr. 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      INTERIM HISTORY:   Libby was recently prescribed cipro 500mg BID for 7 days after calling triage with UTI sxs. She also went to her primary care with continued UTI sxs and switched off cipro to cephalexin based on sensitivities-completed antibiotic yesterday. Still c/o some residual nausea. Eating okay. No fevers.      ROS: 8 point ROS neg other than the symptoms noted above in the HPI.     PHYSICAL EXAM:   Vitals:  Blood pressure 132/79, pulse 108, temperature 98.1  F (36.7  C), temperature source Oral, resp. rate 16, weight 51.3 kg (113 lb 3.2 oz), SpO2 98 %.      Wt Readings from Last 4 Encounters:   08/01/22 51.3 kg (113 lb 3.2 oz)   07/01/22 52.2 kg (115 lb)   06/27/22 52.8 kg (116 lb 8 oz)   06/22/22 54.1 kg (119 lb 4.8 oz)       General Appearance: NAD  HEENT: PERRL   RESP: CTA b/l  Cardiac: RRR  EXT: No edema.   SKIN: no lesions or rash  NEURO: A&O x3   PSYCH: Appropriate affect   VASCULAR ACCESS: R port a cath is accessed    Labs:  Lab Results   Component Value Date    WBC 6.4 08/01/2022    ANEU 3.9 06/16/2022    HGB 10.3 (L) 08/01/2022    HCT 32.6 (L) 08/01/2022     (L) 08/01/2022     08/01/2022    POTASSIUM 4.2 08/01/2022    CHLORIDE 112 (H) 08/01/2022    CO2 24 08/01/2022     (H) 08/01/2022    BUN 8 08/01/2022    CR 0.72 08/01/2022    MAG 1.5 (L) 06/27/2022    INR 1.40 (H) 05/30/2022    BILITOTAL 0.3 08/01/2022    AST 14 08/01/2022    ALT 11 08/01/2022    ALKPHOS 107 08/01/2022    PROTTOTAL 5.4 (L) 08/01/2022    ALBUMIN 2.9 (L)  08/01/2022          ASSESSMENT AND PLAN:   Libby Grimaldo is a 71 year old female, currently day +66 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      BMT/IEC PROTOCOL for 2016-35  - Chemo protocol: HD Melphalan 140mg/m2  Day -1: rest day, 5/26  Day 0: Cell dose: 3.94x10^6 (s/p cytoxan chemo priming 2/1/2022 and subsequent stem cell collection).   - Restaging plan: per protocol.  - Day+5 gcsf started; ANC recovered; last dose GCSF 6/8.      HEME/COAG  - anemia, thrombocytopenia due to chemo/BMT.  Plts trending up today  - Transfusion parameters: hemoglobin <7g/dL, platelets <10k  - Relevant thrombosis or bleeding history:   2/15: new non-occlusive LUE DVT. No longer on xarelto        ID  - Prophylaxis plan: ACV, fluc stopped  - PJP Prophy: HD bactrim qMon/Tues BID         CARDIOVASCULAR  - Risk of cardiomyopathy:  Baseline EF 56%  - known hyperlipidemia- hold crestor until day +60 post transplant.   Hypertension: norvasc 5mg.      History of Afib - now in NSR but tachy. Continue metoprolol XL 25 mg/d      RESPIRATORY  - hx of COPD, continue breo-ellipa inhaler      GI/NUTRITION  - Ulcer prophylaxis: protonix 40mg daily.  - Nausea: resolved on zyprexa bid.          RENAL/ELECTROLYTES/:  - Electrolyte management: replace per sliding scale  - hypokalemia: confirmed she is taking Kdur 20 meq qd    PSYCH  - continue effexor 75mg tid     SYMPTOM MANAGEMENT.  - # Pain Assessment:  - Libby is experiencing pain due to chronic pain - Continue suboxone and prn oxycodone.   -  For pain medicine previous colleague asked Pt to call and discuss with pain clinic.      Deconditioning: She has arranged physical therapy    RTC:   1mo with Dr. Guillory    I spent 30 minutes in the care of this patient today, which included time necessary for preparation for the visit, obtaining history, ordering medications/tests/procedures as medically indicated, review of pertinent medical literature, counseling of the patient, communication of  recommendations to the care team, and documentation time.    Yesenia Butler NP        Again, thank you for allowing me to participate in the care of your patient.      Sincerely,    BMT Advanced Practice Provider

## 2022-08-01 NOTE — PROGRESS NOTES
BMT Progress Note    BMT Physician: Dr. 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      INTERIM HISTORY:   Libby was recently prescribed cipro 500mg BID for 7 days after calling triage with UTI sxs. She also went to her primary care with continued UTI sxs and switched off cipro to cephalexin based on sensitivities-completed antibiotic yesterday. Still c/o some residual nausea. Eating okay. No fevers.      ROS: 8 point ROS neg other than the symptoms noted above in the HPI.     PHYSICAL EXAM:   Vitals:  Blood pressure 132/79, pulse 108, temperature 98.1  F (36.7  C), temperature source Oral, resp. rate 16, weight 51.3 kg (113 lb 3.2 oz), SpO2 98 %.      Wt Readings from Last 4 Encounters:   08/01/22 51.3 kg (113 lb 3.2 oz)   07/01/22 52.2 kg (115 lb)   06/27/22 52.8 kg (116 lb 8 oz)   06/22/22 54.1 kg (119 lb 4.8 oz)       General Appearance: NAD  HEENT: PERRL   RESP: CTA b/l  Cardiac: RRR  EXT: No edema.   SKIN: no lesions or rash  NEURO: A&O x3   PSYCH: Appropriate affect   VASCULAR ACCESS: R port a cath is accessed    Labs:  Lab Results   Component Value Date    WBC 6.4 08/01/2022    ANEU 3.9 06/16/2022    HGB 10.3 (L) 08/01/2022    HCT 32.6 (L) 08/01/2022     (L) 08/01/2022     08/01/2022    POTASSIUM 4.2 08/01/2022    CHLORIDE 112 (H) 08/01/2022    CO2 24 08/01/2022     (H) 08/01/2022    BUN 8 08/01/2022    CR 0.72 08/01/2022    MAG 1.5 (L) 06/27/2022    INR 1.40 (H) 05/30/2022    BILITOTAL 0.3 08/01/2022    AST 14 08/01/2022    ALT 11 08/01/2022    ALKPHOS 107 08/01/2022    PROTTOTAL 5.4 (L) 08/01/2022    ALBUMIN 2.9 (L) 08/01/2022          ASSESSMENT AND PLAN:   Libby Grimaldo is a 71 year old female, currently day +66 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      BMT/IEC PROTOCOL for 2016-35  - Chemo protocol: HD Melphalan 140mg/m2  Day -1: rest day, 5/26  Day 0: Cell dose: 3.94x10^6 (s/p  cytoxan chemo priming 2/1/2022 and subsequent stem cell collection).   - Restaging plan: per protocol.  - Day+5 gcsf started; ANC recovered; last dose GCSF 6/8.      HEME/COAG  - anemia, thrombocytopenia due to chemo/BMT.  Plts trending up today  - Transfusion parameters: hemoglobin <7g/dL, platelets <10k  - Relevant thrombosis or bleeding history:   2/15: new non-occlusive LUE DVT. No longer on xarelto        ID  - Prophylaxis plan: ACV, fluc stopped  - PJP Prophy: HD bactrim qMon/Tues BID         CARDIOVASCULAR  - Risk of cardiomyopathy:  Baseline EF 56%  - known hyperlipidemia- hold crestor until day +60 post transplant.   Hypertension: norvasc 5mg.      History of Afib - now in NSR but tachy. Continue metoprolol XL 25 mg/d      RESPIRATORY  - hx of COPD, continue breo-ellipa inhaler      GI/NUTRITION  - Ulcer prophylaxis: protonix 40mg daily.  - Nausea: resolved on zyprexa bid.          RENAL/ELECTROLYTES/:  - Electrolyte management: replace per sliding scale  - hypokalemia: confirmed she is taking Kdur 20 meq qd    PSYCH  - continue effexor 75mg tid     SYMPTOM MANAGEMENT.  - # Pain Assessment:  - Libby is experiencing pain due to chronic pain - Continue suboxone and prn oxycodone.   -  For pain medicine previous colleague asked Pt to call and discuss with pain clinic.      Deconditioning: She has arranged physical therapy    RTC:   1mo with Dr. Guillory    I spent 30 minutes in the care of this patient today, which included time necessary for preparation for the visit, obtaining history, ordering medications/tests/procedures as medically indicated, review of pertinent medical literature, counseling of the patient, communication of recommendations to the care team, and documentation time.    Yesenia Butler NP

## 2022-08-01 NOTE — NURSING NOTE
"Oncology Rooming Note    August 1, 2022 9:57 AM   Libby Grimaldo is a 71 year old female who presents for:    Chief Complaint   Patient presents with     Port Draw     Labs drawn via port by RN. Vitals taken.     Oncology Clinic Visit     Multiple myeloma not having achieved remission     Initial Vitals: /79 (BP Location: Right arm)   Pulse 108   Temp 98.1  F (36.7  C) (Oral)   Resp 16   Wt 51.3 kg (113 lb 3.2 oz)   SpO2 98%   BMI 20.83 kg/m   Estimated body mass index is 20.83 kg/m  as calculated from the following:    Height as of 6/13/22: 1.57 m (5' 1.81\").    Weight as of this encounter: 51.3 kg (113 lb 3.2 oz). Body surface area is 1.5 meters squared.  No Pain (0) Comment: Data Unavailable   No LMP recorded. Patient is postmenopausal.  Allergies reviewed: Yes  Medications reviewed: Yes    Medications: MEDICATION REFILLS NEEDED TODAY. Provider was notified. Pt would like Compazine and Zofran refilled.    Pharmacy name entered into [employer]: [id number] - [place], [place] - [address] [facility] [address]    Clinical concerns: none       Keila Gustafson"

## 2022-08-16 ENCOUNTER — TRANSFERRED RECORDS (OUTPATIENT)
Dept: HEALTH INFORMATION MANAGEMENT | Facility: CLINIC | Age: 72
End: 2022-08-16

## 2022-08-26 ENCOUNTER — APPOINTMENT (OUTPATIENT)
Dept: LAB | Facility: CLINIC | Age: 72
End: 2022-08-26
Attending: PHYSICIAN ASSISTANT
Payer: MEDICARE

## 2022-08-26 ENCOUNTER — OFFICE VISIT (OUTPATIENT)
Dept: TRANSPLANT | Facility: CLINIC | Age: 72
End: 2022-08-26
Attending: PHYSICIAN ASSISTANT
Payer: MEDICARE

## 2022-08-26 DIAGNOSIS — C90.00 MULTIPLE MYELOMA NOT HAVING ACHIEVED REMISSION (H): Primary | ICD-10-CM

## 2022-08-26 NOTE — NURSING NOTE
Pt arrived for bone marrow biopsy- upon entering the room patient stated she would like to be completely asleep for biopsy. Rn explained use of versed in clinic but that pt would be awake for procedure. Pt felt strongly she needed the biopsy under sedation. Provider was made aware, nurse coordinator contacted to reschedule. Pt and spouse were in agreement with plan to reschedule. Questions answered and pt was discharged.    Sarahy Cuevas RN

## 2022-08-26 NOTE — LETTER
Date:August 26, 2022      Provider requested that no letter be sent. Do not send.       [facility]

## 2022-08-26 NOTE — LETTER
8/26/2022         RE: Libby Grimaldo  [address] [facility] [address]        Dear Colleague,    Thank you for referring your patient, Libby Grimaldo, to the [facility] BLOOD AND MARROW TRANSPLANT PROGRAM [place]. Please see a copy of my visit note below.    Erroneous - patient wants bmbx under sedation so appt rescheduled for 9/1      Again, thank you for allowing me to participate in the care of your patient.        Sincerely,        [facility] BONE MARROW BIOPSY     Subjective:      Patient ID: Betzaida Mariscal is a 57 y.o. female.    Chief Complaint: Establish Care    HPI     Past Medical History:   Diagnosis Date    Diabetes     Hypothyroid      .  Social History     Socioeconomic History    Marital status: Single   Tobacco Use    Smoking status: Never Smoker    Smokeless tobacco: Never Used   Substance and Sexual Activity    Alcohol use: Never    Drug use: Never       No family history on file.    No past surgical history on file.         Review of Systems   Constitutional: Negative for chills and fever.   HENT: Negative for hearing loss.    Eyes: Negative for blurred vision.   Respiratory: Negative for cough, shortness of breath and wheezing.    Cardiovascular: Negative for chest pain, palpitations and leg swelling.   Gastrointestinal: Negative for abdominal pain, constipation, diarrhea, nausea and vomiting.   Genitourinary: Negative for dysuria, frequency and urgency.   Musculoskeletal: Negative for falls.   Neurological: Negative for dizziness and headaches.   Psychiatric/Behavioral: Negative for depression, substance abuse and suicidal ideas. The patient is not nervous/anxious.      Objective:     Physical Exam  Vitals reviewed.   Constitutional:       Appearance: Normal appearance. She is obese.   HENT:      Head: Normocephalic.   Eyes:      Extraocular Movements: Extraocular movements intact.      Conjunctiva/sclera: Conjunctivae normal.      Pupils: Pupils are equal, round, and reactive to light.   Cardiovascular:      Rate and Rhythm: Normal rate and regular rhythm.   Pulmonary:      Effort: Pulmonary effort is normal.      Breath sounds: Normal breath sounds.   Abdominal:      General: Bowel sounds are normal.   Musculoskeletal:         General: Normal range of motion.      Right lower leg: No edema.      Left lower leg: No edema.   Skin:     General: Skin is warm.      Capillary Refill: Capillary refill takes less than 2 seconds.   Neurological:       "General: No focal deficit present.      Mental Status: She is alert and oriented to person, place, and time.   Psychiatric:         Mood and Affect: Mood normal.        /82 (BP Location: Left arm, Patient Position: Sitting, BP Method: Large (Automatic))   Pulse (!) 52   Ht 5' 8" (1.727 m)   Wt 96.4 kg (212 lb 9.6 oz)   SpO2 98%   BMI 32.33 kg/m²     Assessment:       ICD-10-CM ICD-9-CM   1. Tinea  B35.9 110.9   2. H/O: hysterectomy  Z90.710 V88.01   3. Diabetes mellitus type 2 in obese  E11.69 250.00    E66.9 278.00   4. Acquired hypothyroidism  E03.9 244.9   5. Callus of foot  L84 700       Plan:     Medication List with Changes/Refills   New Medications    NYSTATIN-TRIAMCINOLONE (MYCOLOG II) CREAM    Apply topically 2 (two) times daily.   Current Medications    ERGOCALCIFEROL (VITAMIN D2) 50,000 UNIT CAP    Take 1 capsule (50,000 Units total) by mouth every 7 days.   Changed and/or Refilled Medications    Modified Medication Previous Medication    METFORMIN (GLUCOPHAGE) 500 MG TABLET metFORMIN (GLUCOPHAGE) 500 MG tablet       Take 1 tablet (500 mg total) by mouth 2 (two) times daily with meals.    Take 500 mg by mouth 2 (two) times daily with meals.    SYNTHROID 50 MCG TABLET SYNTHROID 50 mcg tablet       Take 1 tablet (50 mcg total) by mouth every morning.    Take 1 tablet (50 mcg total) by mouth every morning.   Discontinued Medications    ATORVASTATIN (LIPITOR) 10 MG TABLET    TAKE 1 TABLET BY MOUTH ONCE DAILY AT BEDTIME FOR HIGH CHOLESTEROL    IBUPROFEN (ADVIL,MOTRIN) 800 MG TABLET    Take 1 tablet (800 mg total) by mouth every 8 (eight) hours as needed for Pain.    LEVOTHYROXINE (SYNTHROID) 50 MCG TABLET    Take 50 mcg by mouth before breakfast.    LEVOTHYROXINE (SYNTHROID) 50 MCG TABLET    Take 1 tablet (50 mcg total) by mouth before breakfast.    OLMESARTAN (BENICAR) 40 MG TABLET    Take 40 mg by mouth once daily.    ONDANSETRON (ZOFRAN-ODT) 4 MG TBDL    Take 1 tablet (4 mg total) by mouth every " 12 (twelve) hours as needed (Nausea).        Tinea  -     nystatin-triamcinolone (MYCOLOG II) cream; Apply topically 2 (two) times daily.  Dispense: 30 g; Refill: 1    H/O: hysterectomy    Diabetes mellitus type 2 in obese  -     metFORMIN (GLUCOPHAGE) 500 MG tablet; Take 1 tablet (500 mg total) by mouth 2 (two) times daily with meals.  Dispense: 180 tablet; Refill: 3  -     Ambulatory referral/consult to Ophthalmology; Future; Expected date: 02/07/2022  -     CBC Auto Differential; Future; Expected date: 01/31/2022  -     Comprehensive Metabolic Panel; Future; Expected date: 01/31/2022  -     Lipid Panel; Future; Expected date: 01/31/2022  -     TSH; Future; Expected date: 01/31/2022  -     T4, Free; Future; Expected date: 01/31/2022  -     Hemoglobin A1C; Future; Expected date: 01/31/2022    Acquired hypothyroidism  -     SYNTHROID 50 mcg tablet; Take 1 tablet (50 mcg total) by mouth every morning.  Dispense: 90 tablet; Refill: 3  -     TSH; Future; Expected date: 01/31/2022  -     T4, Free; Future; Expected date: 01/31/2022    Callus of foot         40-minute visit. 5 minutes spent counseling patient on diet, exercise, and weight loss.       May take vit D 2,000 IU daily    She goes to [place] to get her mammogram

## 2022-08-31 ENCOUNTER — ANESTHESIA EVENT (OUTPATIENT)
Dept: SURGERY | Facility: AMBULATORY SURGERY CENTER | Age: 72
End: 2022-08-31
Payer: MEDICARE

## 2022-09-01 ENCOUNTER — ANESTHESIA (OUTPATIENT)
Dept: SURGERY | Facility: AMBULATORY SURGERY CENTER | Age: 72
End: 2022-09-01
Payer: MEDICARE

## 2022-09-01 ENCOUNTER — ONCOLOGY VISIT (OUTPATIENT)
Dept: TRANSPLANT | Facility: CLINIC | Age: 72
End: 2022-09-01
Attending: PHYSICIAN ASSISTANT
Payer: MEDICARE

## 2022-09-01 ENCOUNTER — APPOINTMENT (OUTPATIENT)
Dept: LAB | Facility: CLINIC | Age: 72
End: 2022-09-01
Attending: PHYSICIAN ASSISTANT
Payer: MEDICARE

## 2022-09-01 ENCOUNTER — HOSPITAL ENCOUNTER (OUTPATIENT)
Facility: AMBULATORY SURGERY CENTER | Age: 72
Discharge: HOME | End: 2022-09-01
Attending: PHYSICIAN ASSISTANT
Payer: MEDICARE

## 2022-09-01 VITALS
WEIGHT: 109.1 LBS | RESPIRATION RATE: 18 BRPM | DIASTOLIC BLOOD PRESSURE: 65 MMHG | HEIGHT: 65 IN | BODY MASS INDEX: 18.18 KG/M2 | TEMPERATURE: 98.1 F | SYSTOLIC BLOOD PRESSURE: 126 MMHG | HEART RATE: 76 BPM | OXYGEN SATURATION: 95 %

## 2022-09-01 VITALS
WEIGHT: 109.1 LBS | BODY MASS INDEX: 20.08 KG/M2 | HEART RATE: 86 BPM | SYSTOLIC BLOOD PRESSURE: 128 MMHG | TEMPERATURE: 98.4 F | DIASTOLIC BLOOD PRESSURE: 68 MMHG | RESPIRATION RATE: 18 BRPM | OXYGEN SATURATION: 98 %

## 2022-09-01 DIAGNOSIS — C90.00 MULTIPLE MYELOMA NOT HAVING ACHIEVED REMISSION (H): Primary | ICD-10-CM

## 2022-09-01 DIAGNOSIS — C90.01 MULTIPLE MYELOMA IN REMISSION (H): Primary | ICD-10-CM

## 2022-09-01 DIAGNOSIS — Z94.81 STATUS POST BONE MARROW TRANSPLANT (H): ICD-10-CM

## 2022-09-01 DIAGNOSIS — C90.01 MULTIPLE MYELOMA IN REMISSION (H): ICD-10-CM

## 2022-09-01 DIAGNOSIS — C90.00 MULTIPLE MYELOMA NOT HAVING ACHIEVED REMISSION (H): ICD-10-CM

## 2022-09-01 LAB
ALBUMIN SERPL BCG-MCNC: 3.6 G/DL (ref 3.5–5.2)
ALP SERPL-CCNC: 146 U/L (ref 35–104)
ALT SERPL W P-5'-P-CCNC: 7 U/L (ref 10–35)
ANION GAP SERPL CALCULATED.3IONS-SCNC: 12 MMOL/L (ref 7–15)
AST SERPL W P-5'-P-CCNC: 15 U/L (ref 10–35)
BASOPHILS # BLD AUTO: 0 10E3/UL (ref 0–0.2)
BASOPHILS NFR BLD AUTO: 0 %
BILIRUB SERPL-MCNC: <0.2 MG/DL
BUN SERPL-MCNC: 12.4 MG/DL (ref 8–23)
CALCIUM SERPL-MCNC: 9.8 MG/DL (ref 8.8–10.2)
CD19 CELLS # BLD: 114 CELLS/UL (ref 107–698)
CD19 CELLS NFR BLD: 6 % (ref 6–27)
CD3 CELLS # BLD: 1436 CELLS/UL (ref 603–2990)
CD3 CELLS NFR BLD: 81 % (ref 49–84)
CD3+CD4+ CELLS # BLD: 508 CELLS/UL (ref 441–2156)
CD3+CD4+ CELLS NFR BLD: 29 % (ref 28–63)
CD3+CD4+ CELLS/CD3+CD8+ CLL BLD: 0.54 % (ref 1.4–2.6)
CD3+CD8+ CELLS # BLD: 947 CELLS/UL (ref 125–1312)
CD3+CD8+ CELLS NFR BLD: 53 % (ref 10–40)
CD3-CD16+CD56+ CELLS # BLD: 221 CELLS/UL (ref 95–640)
CD3-CD16+CD56+ CELLS NFR BLD: 12 % (ref 4–25)
CHLORIDE SERPL-SCNC: 105 MMOL/L (ref 98–107)
CREAT SERPL-MCNC: 0.74 MG/DL (ref 0.51–0.95)
DEPRECATED HCO3 PLAS-SCNC: 22 MMOL/L (ref 22–29)
EOSINOPHIL # BLD AUTO: 0.1 10E3/UL (ref 0–0.7)
EOSINOPHIL NFR BLD AUTO: 2 %
ERYTHROCYTE [DISTWIDTH] IN BLOOD BY AUTOMATED COUNT: 13.1 % (ref 10–15)
GFR SERPL CREATININE-BSD FRML MDRD: 87 ML/MIN/1.73M2
GLUCOSE SERPL-MCNC: 116 MG/DL (ref 70–99)
HCT VFR BLD AUTO: 32.7 % (ref 35–47)
HGB BLD-MCNC: 10.6 G/DL (ref 11.7–15.7)
IGA SERPL-MCNC: 29 MG/DL (ref 84–499)
IGG SERPL-MCNC: 513 MG/DL (ref 610–1616)
IGM SERPL-MCNC: 35 MG/DL (ref 35–242)
IMM GRANULOCYTES # BLD: 0 10E3/UL
IMM GRANULOCYTES NFR BLD: 0 %
KAPPA LC FREE SER-MCNC: 1.3 MG/DL (ref 0.33–1.94)
KAPPA LC FREE/LAMBDA FREE SER NEPH: 0.46 {RATIO} (ref 0.26–1.65)
LAMBDA LC FREE SERPL-MCNC: 2.82 MG/DL (ref 0.57–2.63)
LDH SERPL L TO P-CCNC: 171 U/L (ref 0–250)
LYMPHOCYTES # BLD AUTO: 1.7 10E3/UL (ref 0.8–5.3)
LYMPHOCYTES NFR BLD AUTO: 27 %
MAGNESIUM SERPL-MCNC: 1.5 MG/DL (ref 1.7–2.3)
MCH RBC QN AUTO: 33.8 PG (ref 26.5–33)
MCHC RBC AUTO-ENTMCNC: 32.4 G/DL (ref 31.5–36.5)
MCV RBC AUTO: 104 FL (ref 78–100)
MONOCYTES # BLD AUTO: 0.6 10E3/UL (ref 0–1.3)
MONOCYTES NFR BLD AUTO: 10 %
NEUTROPHILS # BLD AUTO: 3.8 10E3/UL (ref 1.6–8.3)
NEUTROPHILS NFR BLD AUTO: 61 %
NRBC # BLD AUTO: 0 10E3/UL
NRBC BLD AUTO-RTO: 0 /100
PHOSPHATE SERPL-MCNC: 4.8 MG/DL (ref 2.5–4.5)
PLATELET # BLD AUTO: 163 10E3/UL (ref 150–450)
POTASSIUM SERPL-SCNC: 3.8 MMOL/L (ref 3.4–5.3)
PROT SERPL-MCNC: 5.7 G/DL (ref 6.4–8.3)
RBC # BLD AUTO: 3.14 10E6/UL (ref 3.8–5.2)
SODIUM SERPL-SCNC: 139 MMOL/L (ref 136–145)
T CELL EXTENDED COMMENT: ABNORMAL
TOTAL PROTEIN SERUM FOR ELP: 5.3 G/DL (ref 6.4–8.3)
URATE SERPL-MCNC: 5.6 MG/DL (ref 2.4–5.7)
WBC # BLD AUTO: 6.3 10E3/UL (ref 4–11)

## 2022-09-01 PROCEDURE — 83615 LACTATE (LD) (LDH) ENZYME: CPT | Performed by: INTERNAL MEDICINE

## 2022-09-01 PROCEDURE — 83521 IG LIGHT CHAINS FREE EACH: CPT | Performed by: INTERNAL MEDICINE

## 2022-09-01 PROCEDURE — 38222 DX BONE MARROW BX & ASPIR: CPT

## 2022-09-01 PROCEDURE — 86334 IMMUNOFIX E-PHORESIS SERUM: CPT | Performed by: PATHOLOGY

## 2022-09-01 PROCEDURE — 250N000011 HC RX IP 250 OP 636: Performed by: PHYSICIAN ASSISTANT

## 2022-09-01 PROCEDURE — 85025 COMPLETE CBC W/AUTO DIFF WBC: CPT | Performed by: INTERNAL MEDICINE

## 2022-09-01 PROCEDURE — 80053 COMPREHEN METABOLIC PANEL: CPT | Performed by: INTERNAL MEDICINE

## 2022-09-01 PROCEDURE — 82784 ASSAY IGA/IGD/IGG/IGM EACH: CPT | Performed by: INTERNAL MEDICINE

## 2022-09-01 PROCEDURE — 88188 FLOWCYTOMETRY/READ 9-15: CPT | Performed by: PATHOLOGY

## 2022-09-01 PROCEDURE — 84155 ASSAY OF PROTEIN SERUM: CPT | Mod: 91 | Performed by: INTERNAL MEDICINE

## 2022-09-01 PROCEDURE — 84100 ASSAY OF PHOSPHORUS: CPT | Performed by: INTERNAL MEDICINE

## 2022-09-01 PROCEDURE — 83735 ASSAY OF MAGNESIUM: CPT | Performed by: INTERNAL MEDICINE

## 2022-09-01 PROCEDURE — 88237 TISSUE CULTURE BONE MARROW: CPT

## 2022-09-01 PROCEDURE — 88185 FLOWCYTOMETRY/TC ADD-ON: CPT

## 2022-09-01 PROCEDURE — G0463 HOSPITAL OUTPT CLINIC VISIT: HCPCS

## 2022-09-01 PROCEDURE — 88264 CHROMOSOME ANALYSIS 20-25: CPT

## 2022-09-01 PROCEDURE — 84165 PROTEIN E-PHORESIS SERUM: CPT | Mod: TC | Performed by: PATHOLOGY

## 2022-09-01 PROCEDURE — 86355 B CELLS TOTAL COUNT: CPT | Performed by: INTERNAL MEDICINE

## 2022-09-01 PROCEDURE — 88305 TISSUE EXAM BY PATHOLOGIST: CPT | Mod: TC

## 2022-09-01 PROCEDURE — 88271 CYTOGENETICS DNA PROBE: CPT

## 2022-09-01 PROCEDURE — 88184 FLOWCYTOMETRY/ TC 1 MARKER: CPT

## 2022-09-01 PROCEDURE — 84550 ASSAY OF BLOOD/URIC ACID: CPT | Performed by: INTERNAL MEDICINE

## 2022-09-01 RX ORDER — HEPARIN SODIUM (PORCINE) LOCK FLUSH IV SOLN 100 UNIT/ML 100 UNIT/ML
5 SOLUTION INTRAVENOUS ONCE
Status: COMPLETED | OUTPATIENT
Start: 2022-09-01 | End: 2022-09-01

## 2022-09-01 RX ORDER — ONDANSETRON 4 MG/1
4 TABLET, ORALLY DISINTEGRATING ORAL EVERY 30 MIN PRN
Status: DISCONTINUED | OUTPATIENT
Start: 2022-09-01 | End: 2022-09-02 | Stop reason: HOSPADM

## 2022-09-01 RX ORDER — ACETAMINOPHEN 325 MG/1
975 TABLET ORAL ONCE
Status: DISCONTINUED | OUTPATIENT
Start: 2022-09-01 | End: 2022-09-02 | Stop reason: HOSPADM

## 2022-09-01 RX ORDER — MEPERIDINE HYDROCHLORIDE 25 MG/ML
12.5 INJECTION INTRAMUSCULAR; INTRAVENOUS; SUBCUTANEOUS
Status: DISCONTINUED | OUTPATIENT
Start: 2022-09-01 | End: 2022-09-02 | Stop reason: HOSPADM

## 2022-09-01 RX ORDER — HEPARIN SODIUM,PORCINE 10 UNIT/ML
5-10 VIAL (ML) INTRAVENOUS EVERY 24 HOURS
Status: DISCONTINUED | OUTPATIENT
Start: 2022-09-01 | End: 2022-09-02 | Stop reason: HOSPADM

## 2022-09-01 RX ORDER — SODIUM CHLORIDE, SODIUM LACTATE, POTASSIUM CHLORIDE, CALCIUM CHLORIDE 600; 310; 30; 20 MG/100ML; MG/100ML; MG/100ML; MG/100ML
INJECTION, SOLUTION INTRAVENOUS CONTINUOUS
Status: DISCONTINUED | OUTPATIENT
Start: 2022-09-01 | End: 2022-09-02 | Stop reason: HOSPADM

## 2022-09-01 RX ORDER — HEPARIN SODIUM (PORCINE) LOCK FLUSH IV SOLN 100 UNIT/ML 100 UNIT/ML
5-10 SOLUTION INTRAVENOUS
Status: DISCONTINUED | OUTPATIENT
Start: 2022-09-01 | End: 2022-09-02 | Stop reason: HOSPADM

## 2022-09-01 RX ORDER — OLANZAPINE 2.5 MG/1
2.5 TABLET, FILM COATED ORAL 2 TIMES DAILY
Qty: 60 TABLET | Refills: 3 | Status: SHIPPED | OUTPATIENT
Start: 2022-09-01 | End: 2022-09-21

## 2022-09-01 RX ORDER — PROPOFOL 10 MG/ML
INJECTION, EMULSION INTRAVENOUS PRN
Status: DISCONTINUED | OUTPATIENT
Start: 2022-09-01 | End: 2022-09-01

## 2022-09-01 RX ORDER — LIDOCAINE HYDROCHLORIDE 20 MG/ML
INJECTION, SOLUTION INFILTRATION; PERINEURAL PRN
Status: DISCONTINUED | OUTPATIENT
Start: 2022-09-01 | End: 2022-09-01

## 2022-09-01 RX ORDER — FENTANYL CITRATE 50 UG/ML
25 INJECTION, SOLUTION INTRAMUSCULAR; INTRAVENOUS EVERY 5 MIN PRN
Status: DISCONTINUED | OUTPATIENT
Start: 2022-09-01 | End: 2022-09-02 | Stop reason: HOSPADM

## 2022-09-01 RX ORDER — LIDOCAINE HYDROCHLORIDE 10 MG/ML
8-10 INJECTION, SOLUTION EPIDURAL; INFILTRATION; INTRACAUDAL; PERINEURAL
Status: DISCONTINUED | OUTPATIENT
Start: 2022-09-01 | End: 2022-09-02 | Stop reason: HOSPADM

## 2022-09-01 RX ORDER — LOPERAMIDE HCL 2 MG
2-4 CAPSULE ORAL 4 TIMES DAILY PRN
Qty: 60 CAPSULE | Refills: 0 | Status: SHIPPED | OUTPATIENT
Start: 2022-09-01 | End: 2022-09-21

## 2022-09-01 RX ORDER — FENTANYL CITRATE 50 UG/ML
25 INJECTION, SOLUTION INTRAMUSCULAR; INTRAVENOUS
Status: DISCONTINUED | OUTPATIENT
Start: 2022-09-01 | End: 2022-09-02 | Stop reason: HOSPADM

## 2022-09-01 RX ORDER — HEPARIN SODIUM,PORCINE 10 UNIT/ML
5-10 VIAL (ML) INTRAVENOUS
Status: DISCONTINUED | OUTPATIENT
Start: 2022-09-01 | End: 2022-09-02 | Stop reason: HOSPADM

## 2022-09-01 RX ORDER — OXYCODONE HYDROCHLORIDE 5 MG/1
5 TABLET ORAL EVERY 4 HOURS PRN
Status: DISCONTINUED | OUTPATIENT
Start: 2022-09-01 | End: 2022-09-02 | Stop reason: HOSPADM

## 2022-09-01 RX ORDER — LIDOCAINE 40 MG/G
CREAM TOPICAL
Status: DISCONTINUED | OUTPATIENT
Start: 2022-09-01 | End: 2022-09-02 | Stop reason: HOSPADM

## 2022-09-01 RX ORDER — METOPROLOL SUCCINATE 25 MG/1
25 TABLET, EXTENDED RELEASE ORAL DAILY
Qty: 30 TABLET | Refills: 3 | Status: SHIPPED | OUTPATIENT
Start: 2022-09-01 | End: 2022-09-21

## 2022-09-01 RX ORDER — SUCRALFATE ORAL 1 G/10ML
1 SUSPENSION ORAL
Qty: 420 ML | Refills: 4 | Status: SHIPPED | OUTPATIENT
Start: 2022-09-01 | End: 2023-01-01

## 2022-09-01 RX ORDER — HYDROMORPHONE HYDROCHLORIDE 1 MG/ML
0.2 INJECTION, SOLUTION INTRAMUSCULAR; INTRAVENOUS; SUBCUTANEOUS EVERY 5 MIN PRN
Status: DISCONTINUED | OUTPATIENT
Start: 2022-09-01 | End: 2022-09-02 | Stop reason: HOSPADM

## 2022-09-01 RX ORDER — ONDANSETRON 2 MG/ML
4 INJECTION INTRAMUSCULAR; INTRAVENOUS EVERY 30 MIN PRN
Status: DISCONTINUED | OUTPATIENT
Start: 2022-09-01 | End: 2022-09-02 | Stop reason: HOSPADM

## 2022-09-01 RX ADMIN — Medication 5 ML: at 07:54

## 2022-09-01 RX ADMIN — SODIUM CHLORIDE, SODIUM LACTATE, POTASSIUM CHLORIDE, CALCIUM CHLORIDE: 600; 310; 30; 20 INJECTION, SOLUTION INTRAVENOUS at 12:03

## 2022-09-01 RX ADMIN — PROPOFOL 40 MG: 10 INJECTION, EMULSION INTRAVENOUS at 12:06

## 2022-09-01 RX ADMIN — PROPOFOL 50 MG: 10 INJECTION, EMULSION INTRAVENOUS at 12:12

## 2022-09-01 RX ADMIN — HEPARIN SODIUM (PORCINE) LOCK FLUSH IV SOLN 100 UNIT/ML 5 ML: 100 SOLUTION at 13:00

## 2022-09-01 RX ADMIN — LIDOCAINE HYDROCHLORIDE 50 MG: 20 INJECTION, SOLUTION INFILTRATION; PERINEURAL at 12:06

## 2022-09-01 ASSESSMENT — LIFESTYLE VARIABLES: TOBACCO_USE: 1

## 2022-09-01 ASSESSMENT — COPD QUESTIONNAIRES: COPD: 1

## 2022-09-01 ASSESSMENT — PAIN SCALES - GENERAL: PAINLEVEL: NO PAIN (0)

## 2022-09-01 NOTE — ANESTHESIA CARE TRANSFER NOTE
Patient: Libby Grimaldo    Procedure: Procedure(s):  BIOPSY, BONE MARROW       Diagnosis: Multiple myeloma not having achieved remission (H) [C90.00]  Diagnosis Additional Information: No value filed.    Anesthesia Type:   MAC     Note:    Oropharynx: oropharynx clear of all foreign objects  Level of Consciousness: awake  Oxygen Supplementation: room air    Independent Airway: airway patency satisfactory and stable  Dentition: dentition unchanged  Vital Signs Stable: post-procedure vital signs reviewed and stable  Report to RN Given: handoff report given  Patient transferred to: Phase II  Comments: VSS and WNL, comfortable, no PONV, report to Juma VALIENTE  Handoff Report: Identifed the Patient, Identified the Reponsible Provider, Reviewed the pertinent medical history, Discussed the surgical course, Reviewed Intra-OP anesthesia mangement and issues during anesthesia, Set expectations for post-procedure period and Allowed opportunity for questions and acknowledgement of understanding      Vitals:  Vitals Value Taken Time   BP     Temp     Pulse     Resp     SpO2         Electronically Signed By: QUAN Powell CRNA  September 1, 2022  12:29 PM

## 2022-09-01 NOTE — LETTER
9/1/2022         RE: Libby Grimaldo  [address] [facility] [address]        Dear Colleague,    Thank you for referring your patient, Libby Grimaldo, to the [facility] BLOOD AND MARROW TRANSPLANT PROGRAM [place]. Please see a copy of my visit note below.    +97 days 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    See OP note       Again, thank you for allowing me to participate in the care of your patient.      Sincerely,    [facility] BONE MARROW BIOPSY

## 2022-09-01 NOTE — NURSING NOTE
"Oncology Rooming Note    September 1, 2022 8:12 AM   Libby Grimaldo is a 71 year old female who presents for:    Chief Complaint   Patient presents with     Blood Draw     Port blood draw with heparin flush by lab RN. Vitals taken and appointment arrived     Oncology Clinic Visit     Rtn for MM     Initial Vitals: /68   Pulse 86   Temp 98.4  F (36.9  C) (Oral)   Resp 18   Wt 49.5 kg (109 lb 1.6 oz)   SpO2 98%   BMI 20.08 kg/m   Estimated body mass index is 20.08 kg/m  as calculated from the following:    Height as of 6/13/22: 1.57 m (5' 1.81\").    Weight as of this encounter: 49.5 kg (109 lb 1.6 oz). Body surface area is 1.47 meters squared.  No Pain (0) Comment: uncomfortable   No LMP recorded. Patient is postmenopausal.  Allergies reviewed: Yes  Medications reviewed: Yes    Medications: MEDICATION REFILLS NEEDED TODAY. Provider was notified.     Loperamide  Methocarbamol  Metoprolol  Olanzapine  Potassium  Prochlorperazine  Sucralfate  1st floor pharmacy      Pharmacy name entered into [employer]: [id number] - [place], [place] - [address] AT [facility] [address]    Clinical concerns: Patient has no specific questions or clinical concerns outside of the reason for the visit.       Elizabeth Craft, EMT            "

## 2022-09-01 NOTE — NURSING NOTE
Chief Complaint   Patient presents with     Blood Draw     Port blood draw with heparin flush by lab RN. Vitals taken and appointment arrived     Elly Pena RN

## 2022-09-01 NOTE — OP NOTE
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     PROCEDURE: Unilateral Bone Marrow Biopsy and Unilateral Aspirate    LOCATION: [facility] 5th floor-Procedure Room    Patient s identification was positively verified by verbal identification and invasive procedure safety checklist was completed. Informed consent was obtained. Following the administration of Propofol as pre-medication, patient was placed in the prone position and prepped and draped in a sterile manner. Approximately 10 cc of 1% Lidocaine was used over the right posterior iliac spine. Following this a 3 mm incision was made. Trephine bone marrow core(s) was (were) obtained from the [facility]. Bone marrow aspirates were obtained from the [facility]. Aspirates were sent for morphology, immunophenotyping, cytogenetics and molecular diagnostics [facility]. A total of approximately 16 ml of marrow was aspirated. Following this procedure a sterile dressing was applied to the bone marrow biopsy site(s). The patient was placed in the supine position to maintain pressure on the biopsy site. Post-procedure wound care instructions were given.     Complications: NO    Pre-procedural pain: 0 out of 10 on the numeric pain rating scale.     Procedural pain: sedated out of 10 on the numeric pain rating scale.     Post-procedural pain assessment: 0 out of 10 on the numeric pain rating scale.     Interventions: NO    Length of procedure:20 minutes or less      Procedure performed by: FERNANDO Ramsey  [phone]  9/1/2022    "

## 2022-09-01 NOTE — DISCHARGE INSTRUCTIONS
How to Care for your Bone Marrow Biopsy    Activity  Relax and take it easy for the next 24 hours.   Resume regular activity after 24 hours.    Diet   Resume pre-procedure diet and drink plenty of fluids.    If you received sedation, you may feel a little nauseated so start with a clear liquid diet until the nausea passes.    Do Not Immerse Bone Marrow Biopsy Puncture Site in Water  Do not take a bath until the puncture site has healed.  Do not sit in a hot tub or spa until the puncture site has healed.  Do not swim until the puncture site has healed.  Wait 24 hours before taking a shower.    Drainage  Drainage should be minimal.  IF bleeding should occur and soaks through the dressing, lie down and put pressure on the puncture site.    IF bleeding persists, apply gentle pressure with your hand over the dressing for 5 minutes.    IF the pressure doesn't stop the bleeding, contact your provider immediately.    Dressing  Keep the dressing dry and in place for 24 hours, unless instructed otherwise.    IF bleeding soaks through the dressing in the first 24 hours do NOT remove the dressing as you may pull off any scab that has formed.  Instead, reinforce the dressing with extra gauze and tape.    No Alcohol  Do not drink alcoholic beverages for the next 24 hours.    No Driving or Operating Machinery  No driving or operating machinery for the next 24 hours.    Notify your provider IF:    Excessive bleeding or drainage at the puncture site    Excessive swelling, redness or tenderness at the puncture site    Fever above 100.5 degrees taken orally    Severe pain    Drainage that is green, yellow, thick white or has a bad odor    Telephone Numbers  Bone Marrow transplant clinic:  [phone] (Monday thru Friday, 8:00 am to 4:00 pm)  After business hours call the [facility]:  [phone] and ask for the Hematology/BMT doctor on call.  Or call the Emergency Room at the [facility]:  [phone].

## 2022-09-01 NOTE — ANESTHESIA POSTPROCEDURE EVALUATION
Patient: Libby Grimaldo    Procedure: Procedure(s):  BIOPSY, BONE MARROW       Anesthesia Type:  MAC    Note:  Disposition: Outpatient   Postop Pain Control: Uneventful            Sign Out: Well controlled pain   PONV: No   Neuro/Psych: Uneventful            Sign Out: Acceptable/Baseline neuro status   Airway/Respiratory: Uneventful            Sign Out: Acceptable/Baseline resp. status   CV/Hemodynamics: Uneventful            Sign Out: Acceptable CV status; No obvious hypovolemia; No obvious fluid overload   Other NRE: NONE   DID A NON-ROUTINE EVENT OCCUR? No           Last vitals:  Vitals Value Taken Time   /65 09/01/22 1245   Temp 36.7  C (98.1  F) 09/01/22 1228   Pulse 76 09/01/22 1245   Resp 18 09/01/22 1245   SpO2 95 % 09/01/22 1245       Electronically Signed By: Camilo Nelson MD  September 1, 2022  1:37 PM

## 2022-09-01 NOTE — ANESTHESIA PREPROCEDURE EVALUATION
Anesthesia Pre-Procedure Evaluation    Patient: Libby Grimaldo   MRN: [id number] : 1950        Procedure : Procedure(s):  BIOPSY, BONE MARROW          Past Medical History:   Diagnosis Date     Cervical radiculopathy      COPD (chronic obstructive pulmonary disease) (H)      Double vision      Febrile seizure (H)     Per patient. Once while a toddler, once while in highschool, and once while in college.     Floaters      Graves disease      HLD (hyperlipidemia)      HTN (hypertension)      IPF (idiopathic pulmonary fibrosis) (H)      Lumbar radiculopathy      Multiple myeloma in relapse (H)      Osteoporosis      Pneumonia     Per patient. Complicated by sepsis.     Recurrent UTI     Per patient. Has required prophylactic antibiotcs.     Vitamin B12 deficiency (non anemic)       Past Surgical History:   Procedure Laterality Date     BONE MARROW BIOPSY, BONE SPECIMEN, NEEDLE/TROCAR Right 2022    Procedure: BIOPSY, BONE MARROW;  Surgeon: Yuniel Tineo;  Location: [facility] OR     CERVICAL FUSION       CHOLECYSTECTOMY       CYSTECTOMY OVARIAN BENIGN       EYE SURGERY      Per patient.     IR CVC TUNNEL PLACEMENT > 5 YRS OF AGE  2022     IR CVC TUNNEL REMOVAL LEFT  2022     PICC DOUBLE LUMEN PLACEMENT Left 2022    Left basilic, 44 cm, 2 cm external length     TONSILLECTOMY       WRIST SURGERY Left       Allergies   Allergen Reactions     Bupropion      Other reaction(s): Seizures      Social History     Tobacco Use     Smoking status: Former Smoker     Packs/day: 1.00     Years: 40.00     Pack years: 40.00     Smokeless tobacco: Former User     Quit date:    Substance Use Topics     Alcohol use: Not Currently      Wt Readings from Last 1 Encounters:   22 49.5 kg (109 lb 1.6 oz)        Anesthesia Evaluation   Pt has had prior anesthetic.     No history of anesthetic complications       ROS/MED HX  ENT/Pulmonary: Comment: IPF (idiopathic pulmonary fibrosis) (H)    (+) tobacco use,  Past use, COPD,     Neurologic:  - neg neurologic ROS     Cardiovascular:     (+) Dyslipidemia hypertension-----    METS/Exercise Tolerance: 4 - Raking leaves, gardening    Hematologic:  - neg hematologic  ROS     Musculoskeletal:  - neg musculoskeletal ROS     GI/Hepatic:  - neg GI/hepatic ROS     Renal/Genitourinary:  - neg Renal ROS     Endo:     (+) thyroid problem,     Psychiatric/Substance Use:  - neg psychiatric ROS     Infectious Disease:  - neg infectious disease ROS     Malignancy:   (+) Malignancy, History of Other.Other CA Multiple myeloma status post.    Other:  - neg other ROS          Physical Exam    Airway  airway exam normal           Respiratory Devices and Support         Dental       (+) upper dentures      Cardiovascular   cardiovascular exam normal          Pulmonary   pulmonary exam normal                OUTSIDE LABS:  CBC:   Lab Results   Component Value Date    WBC 6.3 09/01/2022    WBC 6.4 08/01/2022    HGB 10.6 (L) 09/01/2022    HGB 10.3 (L) 08/01/2022    HCT 32.7 (L) 09/01/2022    HCT 32.6 (L) 08/01/2022     09/01/2022     (L) 08/01/2022     BMP:   Lab Results   Component Value Date     09/01/2022     08/01/2022    POTASSIUM 3.8 09/01/2022    POTASSIUM 4.2 08/01/2022    CHLORIDE 105 09/01/2022    CHLORIDE 112 (H) 08/01/2022    CO2 22 09/01/2022    CO2 24 08/01/2022    BUN 12.4 09/01/2022    BUN 8 08/01/2022    CR 0.74 09/01/2022    CR 0.72 08/01/2022     (H) 09/01/2022     (H) 08/01/2022     COAGS:   Lab Results   Component Value Date    PTT 30 05/25/2022    INR 1.40 (H) 05/30/2022     POC:   Lab Results   Component Value Date    HCGS Negative 01/20/2022     HEPATIC:   Lab Results   Component Value Date    ALBUMIN 3.6 09/01/2022    PROTTOTAL 5.7 (L) 09/01/2022    ALT 7 (L) 09/01/2022    AST 15 09/01/2022    ALKPHOS 146 (H) 09/01/2022    BILITOTAL <0.2 09/01/2022     OTHER:   Lab Results   Component Value Date    LACT 1.9 02/20/2022    DIANDRA 9.8  09/01/2022    PHOS 4.8 (H) 09/01/2022    MAG 1.5 (L) 09/01/2022    .0 (H) 02/13/2022       Anesthesia Plan    ASA Status:  3   NPO Status:  NPO Appropriate    Anesthesia Type: MAC.     - Reason for MAC: immobility needed   Induction: Intravenous.   Maintenance: TIVA.        Consents    Anesthesia Plan(s) and associated risks, benefits, and realistic alternatives discussed. Questions answered and patient/representative(s) expressed understanding.    - Discussed:     - Discussed with:  Patient      - Extended Intubation/Ventilatory Support Discussed: No.      - Patient is DNR/DNI Status: No    Use of blood products discussed: No .     Postoperative Care       PONV prophylaxis: Background Propofol Infusion     Comments:           H&P reviewed: Unable to attach H&P to encounter due to EHR limitations. H&P Update: appropriate H&P reviewed, patient examined. No interval changes since H&P (within 30 days).         Camilo Nelson MD

## 2022-09-01 NOTE — LETTER
2022         RE: Libby Grimaldo  [address] [address]        Dear Colleague,    Thank you for referring your patient, Libby Grimaldo, to the [facility] BLOOD [facility] MARROW TRANSPLANT [facility]. Please see a copy of my visit note below.    Patient Name: Libby Grimaldo  Patient MRN: [id number]  Patient : 1950    Abbreviated H&P and Pre-sedation Assessment for bone marrow biopsy (procedure name) with sedation    Chief complaint and/or reason for Procedure: multiple myeloma    Planned level of sedation: Minimal - moderate sedation    History of problems with sedation: (patient or family hx): No    ASA Assessment Category: 2 - Mild systemic disease    History of sleep apnea: No    History of blood thinners: No     Appropriate NPO status: Last food was yesterday; sips of water this morning    Current tobacco use: No    No recent fever, chest or sinus congestion, SOB, chest pain.  Some slight cough and some exertional shortness of breath.     Medications   Currently Scheduled Medications       Home Med List)  (Not in a hospital admission)      Allergies  Bupropion    PMH:  Past Medical History:   Diagnosis Date     Cervical radiculopathy      COPD (chronic obstructive pulmonary disease) (H)      Double vision      Febrile seizure (H)     Per patient. Once while a toddler, once while in highschool, and once while in college.     Floaters      Graves disease      HLD (hyperlipidemia)      HTN (hypertension)      IPF (idiopathic pulmonary fibrosis) (H)      Lumbar radiculopathy      Multiple myeloma in relapse (H)      Osteoporosis      Pneumonia     Per patient. Complicated by sepsis.     Recurrent UTI     Per patient. Has required prophylactic antibiotcs.     Vitamin B12 deficiency (non anemic)        Past Surgical History:   Procedure Laterality Date     BONE MARROW BIOPSY, BONE SPECIMEN, NEEDLE/TROCAR Right 2022    Procedure: BIOPSY, BONE MARROW;  Surgeon: Yuniel Tineo  E;  Location: [facility] OR     CERVICAL FUSION       CHOLECYSTECTOMY       CYSTECTOMY OVARIAN BENIGN       EYE SURGERY      Per patient.     IR CVC TUNNEL PLACEMENT > 5 YRS OF AGE  02/01/2022     IR CVC TUNNEL REMOVAL LEFT  02/22/2022     PICC DOUBLE LUMEN PLACEMENT Left 05/25/2022    Left basilic, 44 cm, 2 cm external length     TONSILLECTOMY       WRIST SURGERY Left        Focused Physical exam pertinent to procedure:          (Details of heart, lung, ASA assessment and mallampati assessment in pre procedure assessment flowsheet)  General- healthy,alert,no distress   Recent vital signs-  /68   Pulse 86   Temp 98.4  F (36.9  C) (Oral)   Resp 18   Wt 49.5 kg (109 lb 1.6 oz)   SpO2 98%   BMI 20.08 kg/m    HEART-regular rate and rhythm and no murmurs, gallops, or rub  LUNGS-Clear to Ausculation  OROPHARYNGEAL - MALLAMPATTI- has upper denture    A/P:Reviewed history, medications, allergies, clinical information and pre procedure assessment. The patient was informed of the risks and benefits of the procedure.  They would like to proceed.  Libby Grimaldo is approved for use of sedation during their procedure as noted above.      Nidia Littlejohn PA-C        Again, thank you for allowing me to participate in the care of your patient.      Sincerely,    [facility] Advanced Practice Provider

## 2022-09-02 LAB
ALBUMIN SERPL ELPH-MCNC: 3.3 G/DL (ref 3.7–5.1)
ALPHA1 GLOB SERPL ELPH-MCNC: 0.3 G/DL (ref 0.2–0.4)
ALPHA2 GLOB SERPL ELPH-MCNC: 0.7 G/DL (ref 0.5–0.9)
B-GLOBULIN SERPL ELPH-MCNC: 0.5 G/DL (ref 0.6–1)
GAMMA GLOB SERPL ELPH-MCNC: 0.5 G/DL (ref 0.7–1.6)
INTERPRETATION: NORMAL
M PROTEIN SERPL ELPH-MCNC: 0.1 G/DL
PATH REPORT.COMMENTS IMP SPEC: NORMAL
PATH REPORT.FINAL DX SPEC: NORMAL
PATH REPORT.FINAL DX SPEC: NORMAL
PATH REPORT.GROSS SPEC: NORMAL
PATH REPORT.MICROSCOPIC SPEC OTHER STN: NORMAL
PATH REPORT.RELEVANT HX SPEC: NORMAL
PATH REPORT.RELEVANT HX SPEC: NORMAL
PROT PATTERN SERPL ELPH-IMP: ABNORMAL
PROT PATTERN SERPL IFE-IMP: NORMAL

## 2022-09-02 PROCEDURE — 88341 IMHCHEM/IMCYTCHM EA ADD ANTB: CPT | Mod: 26 | Performed by: PATHOLOGY

## 2022-09-02 PROCEDURE — 88305 TISSUE EXAM BY PATHOLOGIST: CPT | Mod: 26 | Performed by: PATHOLOGY

## 2022-09-02 PROCEDURE — 85097 BONE MARROW INTERPRETATION: CPT | Performed by: PATHOLOGY

## 2022-09-02 PROCEDURE — 88342 IMHCHEM/IMCYTCHM 1ST ANTB: CPT | Mod: 26 | Performed by: PATHOLOGY

## 2022-09-02 PROCEDURE — 84165 PROTEIN E-PHORESIS SERUM: CPT | Mod: 26 | Performed by: PATHOLOGY

## 2022-09-02 PROCEDURE — 88311 DECALCIFY TISSUE: CPT | Mod: 26 | Performed by: PATHOLOGY

## 2022-09-02 PROCEDURE — 86334 IMMUNOFIX E-PHORESIS SERUM: CPT | Mod: 26 | Performed by: PATHOLOGY

## 2022-09-14 LAB
CULTURE HARVEST COMPLETE DATE: NORMAL
CULTURE HARVEST COMPLETE DATE: NORMAL
INTERPRETATION: NORMAL

## 2022-09-14 PROCEDURE — 88368 INSITU HYBRIDIZATION MANUAL: CPT | Mod: 26 | Performed by: MEDICAL GENETICS

## 2022-09-20 NOTE — PROGRESS NOTES
BMT Progress Note    BMT Physician: Dr. Guillory/Rashad Chen     Oncology History   Multiple myeloma not having achieved remission (H)   11/7/2018 -  Cancer Staged    Staging form: Plasma Cell Myeloma and Disorders, AJCC 8th Edition  - Clinical stage from 11/7/2018: Albumin (g/dL): 3.4, ISS: Stage II, High-risk cytogenetics: Absent, LDH: Unknown     11/7/2018 Initial Diagnosis    Multiple myeloma not having achieved remission (H)     1/23/2019 - 11/14/2019 Chemotherapy    KRD x 6 cycles     11/4/2019 - 2/14/2021 Chemotherapy    Rev/Dex(20)      4/3/2020 - 7/17/2020 Chemotherapy    Velcade maintenance - dose reductions for orthostatic hypotension     7/17/2020 - 12/15/2020 Chemotherapy    Revlimid maintenance     11/23/2020 Progression    Bone marrow 35% plasma cells, PET CT demonstrating new lytic lesions     12/15/2020 - 6/11/2021 Chemotherapy    Elsy, Kd x 6 cycles     7/7/2021 -  Cancer Staged    PET/CT - CR     7/21/2021 -  Chemotherapy    Subcutaneous Daratumumab, IVIG, denosumab monthly     11/22/2021 -  Cancer Staged    Bone marrow aspirate - MRD 0.0022% positive     2/1/2022 - 2/5/2022 Chemotherapy    IP BMT Chemotherapy For Mobilization - High Dose Cyclophosphamide  Plan Provider: Julio C Guillory MD  Treatment goal: Other  Line of treatment: [No plan line of treatment]     5/25/2022 -  Chemotherapy    IP - OP BMT 2016-35 Auto Multiple Myeloma - Melphalan (CrCL < 30 mL/min, >/= 2 comorbidities, OR age > 75yrs) - Adult (Version: 7/13/21)  Plan Provider: Julio C Guillory MD  Treatment goal: Other  Line of treatment: [No plan line of treatment]         Chief complaint:  Libby Grimaldo is a 71 year old female, 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      INTERIM HISTORY:   Libby returns feeling pretty well.  She continues to have diarrhea, occasional nausea, although she is eating much better, if not normally and she has improved energy.  She has no follow up with her RMD who has left the cities  and no appt scheduled.  She is not taking maintenance.  She required 3 antibiotic regimens for a UTI but is now better without symptoms.  She remains on the TMP/sulfa and acyclovir     ROS: 8 point ROS neg other than the symptoms noted above in the HPI.     PHYSICAL EXAM:   Vitals:  Blood pressure 119/61, pulse 82, temperature 98  F (36.7  C), temperature source Oral, weight 52.2 kg (115 lb), SpO2 100 %.      Wt Readings from Last 4 Encounters:   09/21/22 52.2 kg (115 lb)   09/01/22 49.5 kg (109 lb 1.6 oz)   09/01/22 49.5 kg (109 lb 1.6 oz)   08/01/22 51.3 kg (113 lb 3.2 oz)       General Appearance: NAD  HEENT: PERRL   RESP: CTA b/l  Cardiac: RRR  EXT: No edema.   SKIN: no lesions or rash  NEURO: A&O x3   PSYCH: Appropriate affect   VASCULAR ACCESS: R port a cath is accessed    Labs:  Lab Results   Component Value Date    WBC 6.3 09/01/2022    ANEU 3.9 06/16/2022    HGB 10.6 (L) 09/01/2022    HCT 32.7 (L) 09/01/2022     09/01/2022     09/01/2022    POTASSIUM 3.8 09/01/2022    CHLORIDE 105 09/01/2022    CO2 22 09/01/2022     (H) 09/01/2022    BUN 12.4 09/01/2022    CR 0.74 09/01/2022    MAG 1.5 (L) 09/01/2022    INR 1.40 (H) 05/30/2022    BILITOTAL <0.2 09/01/2022    AST 15 09/01/2022    ALT 7 (L) 09/01/2022    ALKPHOS 146 (H) 09/01/2022    PROTTOTAL 5.7 (L) 09/01/2022    ALBUMIN 3.6 09/01/2022       Immunoglobulins     Recent Labs   Lab Test 09/01/22  0803 06/27/22  1332 04/11/22  1101 03/28/22  1005 03/21/22  0927 01/20/22  1619   * 384* 497* 442* 477* 725       Recent Labs   Lab Test 09/01/22  0803 06/27/22  1332 04/11/22  1101 03/28/22  1005 03/21/22  0927 01/20/22  1619   IGA 29* 8* 3* 3* 4* 3*       Recent Labs   Lab Test 09/01/22  0803 06/27/22  1332 04/11/22  1101 03/28/22  1005 03/21/22  0927 01/20/22  1619   IGM 35 <10* <10* <10* <10* <10*         Monocloncal Protein Studies     M spike    Recent Labs   Lab Test 09/01/22  0803 06/27/22  1332 05/17/22  1202 03/28/22  1005  03/21/22  0927 01/20/22  1618   ELPM 0.1* 0.1* 0.2* 0.1* 0.1* 0.1*       Kappa FLC    Recent Labs   Lab Test 09/01/22  0803 06/27/22  1332 05/17/22  1202 04/11/22  1101 03/28/22  1005 03/21/22  0927   KFLCA 1.30 0.66 0.14* 0.11* 0.84 0.10*       Lambda FLC    Recent Labs   Lab Test 09/01/22  0803 06/27/22  1332 05/17/22  1202 04/11/22  1101 03/28/22  1005 03/21/22  0927   LFLCA 2.82* 3.32* 10.61* 8.97* 8.68* 6.61*       FLC Ratio    Recent Labs   Lab Test 09/01/22  0803 06/27/22  1332 05/17/22  1202 04/11/22  1101 03/28/22  1005 03/21/22  0927   KLRA 0.46 0.20* 0.01* 0.01* 0.10* 0.02*           Bone Marrow Biopsy     Lab Results   Component Value Date    FINALDX  09/01/2022     Bone marrow, posterior iliac crest, right decalcified trephine biopsy and touch imprint; right particle crush, direct aspirate smear, concentrate aspirate smear, and peripheral blood smear:    - No morphologic or immunophenotypic evidence of plasma cell myeloma  - Normocellular marrow (20-30% estimated cellularity), with trilineage hematopoiesis, no overt dysplasia, no increase in blasts, and less than 1% plasma cells  - Peripheral blood showing slight normochromic, macrocytic anemia  - See comment.        COMDX  09/01/2022     There is no immunophenotypic evidence for plasma cell myeloma. Final interpretation requires correlation with results of other ancillary studies, morphologic, and clinical features.            Lab Results   Component Value Date    FLINTERP  09/01/2022     A. Iliac Crest, Bone Marrow Aspirate, Right:  -Polytypic plasma cells  See comment        COMDX  09/01/2022     There is no immunophenotypic evidence for plasma cell myeloma. Final interpretation requires correlation with results of other ancillary studies, morphologic, and clinical features.               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      Myeloma/BMT/IEC PROTOCOL  for 2016-35  - Chemo protocol: HD Melphalan 140mg/m2  Day -1: rest day, 5/26  Day 0: Cell dose: 3.94x10^6 (s/p cytoxan chemo priming 2/1/2022 and subsequent stem cell collection).   - Restaging plan: per protocol.  - Day+5 gcsf started; ANC recovered; last dose GCSF 6/8.      HEME/COAG  - anemia, thrombocytopenia due to chemo/BMT.  Plts trending up today  - Transfusion parameters: hemoglobin <7g/dL, platelets <10k  - Relevant thrombosis or bleeding history:   2/15: new non-occlusive LUE DVT. No longer on xarelto        ID  - Prophylaxis plan: ACV, fluc stopped  - PJP Prophy: HD bactrim qMon/Tues BID         CARDIOVASCULAR  - Risk of cardiomyopathy:  Baseline EF 56%  - known hyperlipidemia- hold crestor until day +60 post transplant.   Hypertension: norvasc 5mg.      History of Afib - now in NSR but tachy. Continue metoprolol XL 25 mg/d      RESPIRATORY  - hx of COPD, continue breo-ellipa inhaler      GI/NUTRITION  - Ulcer prophylaxis: protonix 40mg daily.  - Nausea: resolved on zyprexa bid.          RENAL/ELECTROLYTES/:  - Electrolyte management: replace per sliding scale  - hypokalemia: confirmed she is taking Kdur 20 meq qd    PSYCH  - continue effexor 75mg tid     SYMPTOM MANAGEMENT.  - # Pain Assessment:  - Libby is experiencing pain due to chronic pain - Continue suboxone and prn oxycodone.   -  For pain medicine previous colleague asked Pt to call and discuss with pain clinic.      Deconditioning: She has arranged physical therapy    Summary: I reviewed the various maintenance options and she wishes to try.  She has a positive finding on a preBMT PET scan warranting   Plan  - start ninlaro  - arrange follow up after starting  - RADHA visit one month, MD 3 months  - she will schedule colonoscopy    I spent 30 minutes in the care of this patient today, which included time necessary for preparation for the visit, obtaining history, ordering medications/tests/procedures as medically indicated, review of pertinent  medical literature, counseling of the patient, communication of recommendations to the care team, and documentation time.    SOTO BOWSER MD  September 21, 2022

## 2022-09-21 ENCOUNTER — ONCOLOGY VISIT (OUTPATIENT)
Dept: TRANSPLANT | Facility: CLINIC | Age: 72
End: 2022-09-21
Attending: NURSE PRACTITIONER
Payer: MEDICARE

## 2022-09-21 VITALS
HEART RATE: 82 BPM | TEMPERATURE: 98 F | BODY MASS INDEX: 19.14 KG/M2 | WEIGHT: 115 LBS | SYSTOLIC BLOOD PRESSURE: 119 MMHG | DIASTOLIC BLOOD PRESSURE: 61 MMHG | OXYGEN SATURATION: 100 %

## 2022-09-21 DIAGNOSIS — C90.00 MULTIPLE MYELOMA NOT HAVING ACHIEVED REMISSION (H): ICD-10-CM

## 2022-09-21 DIAGNOSIS — Z94.81 STATUS POST BONE MARROW TRANSPLANT (H): ICD-10-CM

## 2022-09-21 DIAGNOSIS — Z51.11 ADMISSION FOR CHEMOTHERAPY: ICD-10-CM

## 2022-09-21 DIAGNOSIS — C90.01 MULTIPLE MYELOMA IN REMISSION (H): Primary | ICD-10-CM

## 2022-09-21 PROCEDURE — 99214 OFFICE O/P EST MOD 30 MIN: CPT

## 2022-09-21 PROCEDURE — G0463 HOSPITAL OUTPT CLINIC VISIT: HCPCS

## 2022-09-21 RX ORDER — LOPERAMIDE HCL 2 MG
2-4 CAPSULE ORAL 4 TIMES DAILY PRN
Qty: 60 CAPSULE | Refills: 0 | Status: SHIPPED | OUTPATIENT
Start: 2022-09-21 | End: 2022-10-21

## 2022-09-21 RX ORDER — PROCHLORPERAZINE MALEATE 5 MG
5 TABLET ORAL EVERY 6 HOURS PRN
Qty: 30 TABLET | Refills: 0 | Status: SHIPPED | OUTPATIENT
Start: 2022-09-21 | End: 2022-10-21

## 2022-09-21 RX ORDER — OLANZAPINE 2.5 MG/1
2.5 TABLET, FILM COATED ORAL 2 TIMES DAILY
Qty: 60 TABLET | Refills: 3 | Status: SHIPPED | OUTPATIENT
Start: 2022-09-21 | End: 2023-01-01

## 2022-09-21 RX ORDER — LEVOTHYROXINE SODIUM 112 UG/1
112 TABLET ORAL DAILY
COMMUNITY
Start: 2022-08-16

## 2022-09-21 RX ORDER — METOPROLOL SUCCINATE 25 MG/1
25 TABLET, EXTENDED RELEASE ORAL DAILY
Qty: 30 TABLET | Refills: 3 | Status: SHIPPED | OUTPATIENT
Start: 2022-09-21 | End: 2023-01-01

## 2022-09-21 ASSESSMENT — PAIN SCALES - GENERAL: PAINLEVEL: NO PAIN (0)

## 2022-09-21 NOTE — LETTER
9/21/2022         RE: Libby Grimaldo  [address] [facility] [address]        Dear Colleague,    Thank you for referring your patient, Libyb Grimaldo, to the [facility] AND [facility]ARR[facility]ANSPL[facility] [place]. Please see a copy of my visit note below.    BMT Progress Note    BMT Physician: Dr. Guillory/Rashad Chen     Oncology History   Multiple myeloma not having achieved remission (H)   11/7/2018 -  Cancer Staged    Staging form: Plasma Cell Myeloma and Disorders, AJCC 8th Edition  - Clinical stage from 11/7/2018: Albumin (g/dL): 3.4, ISS: Stage II, High-risk cytogenetics: Absent, LDH: Unknown     11/7/2018 Initial Diagnosis    Multiple myeloma not having achieved remission (H)     1/23/2019 - 11/14/2019 Chemotherapy    KRD x 6 cycles     11/4/2019 - 2/14/2021 Chemotherapy    Rev/Dex(20)      4/3/2020 - 7/17/2020 Chemotherapy    Velcade maintenance - dose reductions for orthostatic hypotension     7/17/2020 - 12/15/2020 Chemotherapy    Revlimid maintenance     11/23/2020 Progression    Bone marrow 35% plasma cells, PET CT demonstrating new lytic lesions     12/15/2020 - 6/11/2021 Chemotherapy    Elsy, Kd x 6 cycles     7/7/2021 -  Cancer Staged    PET/CT - CR     7/21/2021 -  Chemotherapy    Subcutaneous Daratumumab, IVIG, denosumab monthly     11/22/2021 -  Cancer Staged    Bone marrow aspirate - MRD 0.0022% positive     2/1/2022 - 2/5/2022 Chemotherapy    IP BMT Chemotherapy For Mobilization - High Dose Cyclophosphamide  Plan Provider: Julio C Guillory MD  Treatment goal: Other  Line of treatment: [No plan line of treatment]     5/25/2022 -  Chemotherapy    IP - OP BMT 2016-35 Auto Multiple Myeloma - Melphalan (CrCL < 30 mL/min, >/= 2 comorbidities, OR age > 75yrs) - Adult (Version: 7/13/21)  Plan Provider: Julio C Guillory MD  Treatment goal: Other  Line of treatment: [No plan line of treatment]         Chief complaint:  Libby Grimaldo is a 71 year old female, 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      INTERIM HISTORY:   Libby returns feeling pretty well.  She continues to have diarrhea, occasional nausea, although she is eating much better, if not normally and she has improved energy.  She has no follow up with her RMD who has left the cities and no appt scheduled.  She is not taking maintenance.  She required 3 antibiotic regimens for a UTI but is now better without symptoms.  She remains on the TMP/sulfa and acyclovir     ROS: 8 point ROS neg other than the symptoms noted above in the HPI.     PHYSICAL EXAM:   Vitals:  Blood pressure 119/61, pulse 82, temperature 98  F (36.7  C), temperature source Oral, weight 52.2 kg (115 lb), SpO2 100 %.      Wt Readings from Last 4 Encounters:   09/21/22 52.2 kg (115 lb)   09/01/22 49.5 kg (109 lb 1.6 oz)   09/01/22 49.5 kg (109 lb 1.6 oz)   08/01/22 51.3 kg (113 lb 3.2 oz)       General Appearance: NAD  HEENT: PERRL   RESP: CTA b/l  Cardiac: RRR  EXT: No edema.   SKIN: no lesions or rash  NEURO: A&O x3   PSYCH: Appropriate affect   VASCULAR ACCESS: R port a cath is accessed    Labs:  Lab Results   Component Value Date    WBC 6.3 09/01/2022    ANEU 3.9 06/16/2022    HGB 10.6 (L) 09/01/2022    HCT 32.7 (L) 09/01/2022     09/01/2022     09/01/2022    POTASSIUM 3.8 09/01/2022    CHLORIDE 105 09/01/2022    CO2 22 09/01/2022     (H) 09/01/2022    BUN 12.4 09/01/2022    CR 0.74 09/01/2022    MAG 1.5 (L) 09/01/2022    INR 1.40 (H) 05/30/2022    BILITOTAL <0.2 09/01/2022    AST 15 09/01/2022    ALT 7 (L) 09/01/2022    ALKPHOS 146 (H) 09/01/2022    PROTTOTAL 5.7 (L) 09/01/2022    ALBUMIN 3.6 09/01/2022       Immunoglobulins     Recent Labs   Lab Test 09/01/22  0803 06/27/22  1332 04/11/22  1101 03/28/22  1005 03/21/22  0927 01/20/22  1619   * 384* 497* 442* 477* 725       Recent Labs   Lab Test 09/01/22  0803 06/27/22  1332 04/11/22  1101 03/28/22  1005 03/21/22  0927 01/20/22  1619   IGA 29* 8* 3* 3* 4* 3*       Recent  Labs   Lab Test 09/01/22  0803 06/27/22  1332 04/11/22  1101 03/28/22  1005 03/21/22  0927 01/20/22  1619   IGM 35 <10* <10* <10* <10* <10*         Monocloncal Protein Studies     M spike    Recent Labs   Lab Test 09/01/22  0803 06/27/22  1332 05/17/22  1202 03/28/22  1005 03/21/22  0927 01/20/22  1618   ELPM 0.1* 0.1* 0.2* 0.1* 0.1* 0.1*       Kappa FLC    Recent Labs   Lab Test 09/01/22  0803 06/27/22  1332 05/17/22  1202 04/11/22  1101 03/28/22  1005 03/21/22  0927   KFLCA 1.30 0.66 0.14* 0.11* 0.84 0.10*       Lambda FLC    Recent Labs   Lab Test 09/01/22  0803 06/27/22  1332 05/17/22  1202 04/11/22  1101 03/28/22  1005 03/21/22  0927   LFLCA 2.82* 3.32* 10.61* 8.97* 8.68* 6.61*       FLC Ratio    Recent Labs   Lab Test 09/01/22  0803 06/27/22  1332 05/17/22  1202 04/11/22  1101 03/28/22  1005 03/21/22  0927   KLRA 0.46 0.20* 0.01* 0.01* 0.10* 0.02*           Bone Marrow Biopsy     Lab Results   Component Value Date    FINALDX  09/01/2022     Bone marrow, posterior iliac crest, right decalcified trephine biopsy and touch imprint; right particle crush, direct aspirate smear, concentrate aspirate smear, and peripheral blood smear:    - No morphologic or immunophenotypic evidence of plasma cell myeloma  - Normocellular marrow (20-30% estimated cellularity), with trilineage hematopoiesis, no overt dysplasia, no increase in blasts, and less than 1% plasma cells  - Peripheral blood showing slight normochromic, macrocytic anemia  - See comment.        COMDX  09/01/2022     There is no immunophenotypic evidence for plasma cell myeloma. Final interpretation requires correlation with results of other ancillary studies, morphologic, and clinical features.            Lab Results   Component Value Date    FLINTERP  09/01/2022     A. Iliac Crest, Bone Marrow Aspirate, Right:  -Polytypic plasma cells  See comment        COMDX  09/01/2022     There is no immunophenotypic evidence for plasma cell myeloma. Final interpretation  requires correlation with results of other ancillary studies, morphologic, and clinical features.               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      Myeloma/BMT/IEC PROTOCOL for 2016-35  - Chemo protocol: HD Melphalan 140mg/m2  Day -1: rest day, 5/26  Day 0: Cell dose: 3.94x10^6 (s/p cytoxan chemo priming 2/1/2022 and subsequent stem cell collection).   - Restaging plan: per protocol.  - Day+5 gcsf started; ANC recovered; last dose GCSF 6/8.      HEME/COAG  - anemia, thrombocytopenia due to chemo/BMT.  Plts trending up today  - Transfusion parameters: hemoglobin <7g/dL, platelets <10k  - Relevant thrombosis or bleeding history:   2/15: new non-occlusive LUE DVT. No longer on xarelto        ID  - Prophylaxis plan: ACV, fluc stopped  - PJP Prophy: HD bactrim qMon/Tues BID         CARDIOVASCULAR  - Risk of cardiomyopathy:  Baseline EF 56%  - known hyperlipidemia- hold crestor until day +60 post transplant.   Hypertension: norvasc 5mg.      History of Afib - now in NSR but tachy. Continue metoprolol XL 25 mg/d      RESPIRATORY  - hx of COPD, continue breo-ellipa inhaler      GI/NUTRITION  - Ulcer prophylaxis: protonix 40mg daily.  - Nausea: resolved on zyprexa bid.          RENAL/ELECTROLYTES/:  - Electrolyte management: replace per sliding scale  - hypokalemia: confirmed she is taking Kdur 20 meq qd    PSYCH  - continue effexor 75mg tid     SYMPTOM MANAGEMENT.  - # Pain Assessment:  - Libby is experiencing pain due to chronic pain - Continue suboxone and prn oxycodone.   -  For pain medicine previous colleague asked Pt to call and discuss with pain clinic.      Deconditioning: She has arranged physical therapy    Summary: I reviewed the various maintenance options and she wishes to try.  She has a positive finding on a preBMT PET scan warranting   Plan  - start ninlaro  - arrange follow up after starting  - RADHA visit one  MD viridiana 3 months  - she will schedule colonoscopy    I spent 30 minutes in the care of this patient today, which included time necessary for preparation for the visit, obtaining history, ordering medications/tests/procedures as medically indicated, review of pertinent medical literature, counseling of the patient, communication of recommendations to the care team, and documentation time.    SOTO BOWSER MD  September 21, 2022      Sincerely,    BMT DOM

## 2022-09-21 NOTE — NURSING NOTE
"Oncology Rooming Note    September 21, 2022 1:46 PM   Libby Grimaldo is a 71 year old female who presents for:    Chief Complaint   Patient presents with     Oncology Clinic Visit     Multiple Myeloma      Initial Vitals: /61 (BP Location: Right arm, Patient Position: Sitting, Cuff Size: Adult Regular)   Pulse 82   Temp 98  F (36.7  C) (Oral)   Wt 52.2 kg (115 lb)   SpO2 100%   BMI 19.14 kg/m   Estimated body mass index is 19.14 kg/m  as calculated from the following:    Height as of 9/1/22: 1.651 m (5' 5\").    Weight as of this encounter: 52.2 kg (115 lb). Body surface area is 1.55 meters squared.  No Pain (0) Comment: Data Unavailable   No LMP recorded. Patient is postmenopausal.  Allergies reviewed: Yes  Medications reviewed: Yes    Medications: MEDICATION REFILLS NEEDED TODAY. Provider was notified.  Pharmacy name entered into [employer]: [id number] [address] AT [facility] [address]    Clinical concerns: would like AVS printed. Wonders if she could see when her scan is, and if she could still se AVS from last month. needs refill for potassium chloride, imodium, Zofran, and compazine        Rob Carl            "

## 2022-09-26 ENCOUNTER — TELEPHONE (OUTPATIENT)
Dept: PHARMACY | Facility: CLINIC | Age: 72
End: 2022-09-26

## 2022-09-26 ENCOUNTER — TELEPHONE (OUTPATIENT)
Dept: ONCOLOGY | Facility: CLINIC | Age: 72
End: 2022-09-26

## 2022-09-26 NOTE — ORAL ONC MGMT
"Oral Chemotherapy Monitoring Program    Lab Monitoring Plan  CMP/CBC monthly  Subjective/Objective:  Libby Grimaldo is a 71 year old female seen in clinic for an initial visit for oral chemotherapy education. Plan is to start on the ninlaro following appointment with Dr. Guillory on 10/3/22.     ORAL CHEMOTHERAPY 9/26/2022   Assessment Type New Teach   Diagnosis Code Multiple Myeloma   Providers Kahlil   Clinic Name/Location [facility]   Drug Name Ninlaro (ixazomib)   Dose 3 mg   Current Schedule Weekly   Cycle Details 3 weeks on, 1 week off   Any new drug interactions? No   Is the dose as ordered appropriate for the patient? Yes   Has the patient missed any days of school, work, or other routine activity? No   Since the last time we talked, have you been hospitalized or used the emergency room? No       Last PHQ-2 Score on record:   PHQ-2 ( 1999 [employer]) 1/31/2022   Q1: Little interest or pleasure in doing things 0   Q2: Feeling down, depressed or hopeless 0   PHQ-2 Score 0       Vitals:  BP:   BP Readings from Last 1 Encounters:   09/21/22 119/61     Wt Readings from Last 1 Encounters:   09/21/22 52.2 kg (115 lb)     Estimated body surface area is 1.55 meters squared as calculated from the following:    Height as of 9/1/22: 1.651 m (5' 5\").    Weight as of 9/21/22: 52.2 kg (115 lb).    Labs:  _  Result Component Current Result Ref Range   Sodium 139 (9/1/2022) 136 - 145 mmol/L     _  Result Component Current Result Ref Range   Potassium 3.8 (9/1/2022) 3.4 - 5.3 mmol/L     _  Result Component Current Result Ref Range   Calcium 9.8 (9/1/2022) 8.8 - 10.2 mg/dL     _  Result Component Current Result Ref Range   Magnesium 1.5 (L) (9/1/2022) 1.7 - 2.3 mg/dL     _  Result Component Current Result Ref Range   Phosphorus 4.8 (H) (9/1/2022) 2.5 - 4.5 mg/dL     _  Result Component Current Result Ref Range   Albumin 3.6 (9/1/2022) 3.5 - 5.2 g/dL     _  Result Component Current Result Ref Range   Urea Nitrogen 12.4 (9/1/2022) 8.0 - " 23.0 mg/dL     _  Result Component Current Result Ref Range   Creatinine 0.74 (9/1/2022) 0.51 - 0.95 mg/dL     _  Result Component Current Result Ref Range   AST 15 (9/1/2022) 10 - 35 U/L     _  Result Component Current Result Ref Range   ALT 7 (L) (9/1/2022) 10 - 35 U/L     _  Result Component Current Result Ref Range   Bilirubin Total <0.2 (9/1/2022) <=1.2 mg/dL     _  Result Component Current Result Ref Range   WBC Count 6.3 (9/1/2022) 4.0 - 11.0 10e3/uL     _  Result Component Current Result Ref Range   Hemoglobin 10.6 (L) (9/1/2022) 11.7 - 15.7 g/dL     _  Result Component Current Result Ref Range   Platelet Count 163 (9/1/2022) 150 - 450 10e3/uL     No results found for ANC within last 30 days.     _  Result Component Current Result Ref Range   Absolute Neutrophils 3.8 (9/1/2022) 1.6 - 8.3 10e3/uL        Assessment:  Patient is appropriate to start therapy. Libby knows to not start the medication prior to appt with Dr. Guillory on 10/3/22    Plan:  Basic chemotherapy teaching was reviewed with the patient including indication, start date of therapy, dose, administration, adverse effects, missed doses, food and drug interactions, monitoring, side effect management, office contact information, and safe handling. Written materials were provided and all questions answered.    Follow-Up:  10/3: baseline lab review     Alex Dye, PharmD, MS  Hematology/Oncology Clinical Pharmacist  [facility]  [phone] (oral chemotherapy)  [phone] (infusion)

## 2022-09-26 NOTE — TELEPHONE ENCOUNTER
PA Initiation    Medication: Ninlaro - PA initiated   Insurance Company: [email or web address] - Phone [phone] Fax [phone]  Pharmacy Filling the Rx:    Filling Pharmacy Phone:    Filling Pharmacy Fax:    Start Date: 9/26/2022

## 2022-09-26 NOTE — TELEPHONE ENCOUNTER
Prior Authorization Approval    Authorization Effective Date: 9/26/2022  Authorization Expiration Date: 3/25/2023  Medication: Ninlaro - PA Approved   Approved Dose/Quantity: 3/28 ds  Reference #: Key: [id number]   Insurance Company: [employer] - Phone [phone] Fax [phone]  Expected CoPay: $4     CoPay Card Available:      Foundation Assistance Needed:    Which Pharmacy is filling the prescription (Not needed for infusion/clinic administered): [facility] - [facility] [address]  Pharmacy Notified: No  Patient Notified: Yes

## 2022-09-27 DIAGNOSIS — C90.00 MULTIPLE MYELOMA NOT HAVING ACHIEVED REMISSION (H): Primary | ICD-10-CM

## 2022-09-29 LAB — INTERPRETATION: NORMAL

## 2022-10-03 ENCOUNTER — ONCOLOGY VISIT (OUTPATIENT)
Dept: TRANSPLANT | Facility: CLINIC | Age: 72
End: 2022-10-03
Attending: INTERNAL MEDICINE
Payer: MEDICARE

## 2022-10-03 ENCOUNTER — APPOINTMENT (OUTPATIENT)
Dept: LAB | Facility: CLINIC | Age: 72
End: 2022-10-03
Attending: INTERNAL MEDICINE
Payer: MEDICARE

## 2022-10-03 VITALS
RESPIRATION RATE: 16 BRPM | TEMPERATURE: 98.1 F | HEART RATE: 81 BPM | BODY MASS INDEX: 19.84 KG/M2 | OXYGEN SATURATION: 95 % | WEIGHT: 119.2 LBS | DIASTOLIC BLOOD PRESSURE: 56 MMHG | SYSTOLIC BLOOD PRESSURE: 105 MMHG

## 2022-10-03 DIAGNOSIS — C90.01 MULTIPLE MYELOMA IN REMISSION (H): ICD-10-CM

## 2022-10-03 LAB
ALBUMIN SERPL BCG-MCNC: 3.3 G/DL (ref 3.5–5.2)
ALP SERPL-CCNC: 155 U/L (ref 35–104)
ALT SERPL W P-5'-P-CCNC: 5 U/L (ref 10–35)
ANION GAP SERPL CALCULATED.3IONS-SCNC: 9 MMOL/L (ref 7–15)
AST SERPL W P-5'-P-CCNC: 14 U/L (ref 10–35)
BASOPHILS # BLD AUTO: 0 10E3/UL (ref 0–0.2)
BASOPHILS NFR BLD AUTO: 0 %
BILIRUB SERPL-MCNC: 0.3 MG/DL
BUN SERPL-MCNC: 9.6 MG/DL (ref 8–23)
CALCIUM SERPL-MCNC: 8.9 MG/DL (ref 8.8–10.2)
CHLORIDE SERPL-SCNC: 108 MMOL/L (ref 98–107)
CREAT SERPL-MCNC: 0.61 MG/DL (ref 0.51–0.95)
DEPRECATED HCO3 PLAS-SCNC: 25 MMOL/L (ref 22–29)
EOSINOPHIL # BLD AUTO: 0.2 10E3/UL (ref 0–0.7)
EOSINOPHIL NFR BLD AUTO: 3 %
ERYTHROCYTE [DISTWIDTH] IN BLOOD BY AUTOMATED COUNT: 13 % (ref 10–15)
GFR SERPL CREATININE-BSD FRML MDRD: >90 ML/MIN/1.73M2
GLUCOSE SERPL-MCNC: 99 MG/DL (ref 70–99)
HCT VFR BLD AUTO: 28.9 % (ref 35–47)
HGB BLD-MCNC: 9.5 G/DL (ref 11.7–15.7)
IMM GRANULOCYTES # BLD: 0 10E3/UL
IMM GRANULOCYTES NFR BLD: 0 %
LYMPHOCYTES # BLD AUTO: 2.5 10E3/UL (ref 0.8–5.3)
LYMPHOCYTES NFR BLD AUTO: 48 %
MCH RBC QN AUTO: 33.1 PG (ref 26.5–33)
MCHC RBC AUTO-ENTMCNC: 32.9 G/DL (ref 31.5–36.5)
MCV RBC AUTO: 101 FL (ref 78–100)
MONOCYTES # BLD AUTO: 0.6 10E3/UL (ref 0–1.3)
MONOCYTES NFR BLD AUTO: 11 %
NEUTROPHILS # BLD AUTO: 2 10E3/UL (ref 1.6–8.3)
NEUTROPHILS NFR BLD AUTO: 38 %
NRBC # BLD AUTO: 0 10E3/UL
NRBC BLD AUTO-RTO: 0 /100
PLATELET # BLD AUTO: 137 10E3/UL (ref 150–450)
POTASSIUM SERPL-SCNC: 3.3 MMOL/L (ref 3.4–5.3)
PROT SERPL-MCNC: 5.3 G/DL (ref 6.4–8.3)
RBC # BLD AUTO: 2.87 10E6/UL (ref 3.8–5.2)
SODIUM SERPL-SCNC: 142 MMOL/L (ref 136–145)
WBC # BLD AUTO: 5.3 10E3/UL (ref 4–11)

## 2022-10-03 PROCEDURE — 80053 COMPREHEN METABOLIC PANEL: CPT | Performed by: INTERNAL MEDICINE

## 2022-10-03 PROCEDURE — 90662 IIV NO PRSV INCREASED AG IM: CPT | Performed by: INTERNAL MEDICINE

## 2022-10-03 PROCEDURE — 0051A HC ADMIN COVID VAC PFIZER TRS-SUCR, 1ST DOSE: CPT | Performed by: INTERNAL MEDICINE

## 2022-10-03 PROCEDURE — 99214 OFFICE O/P EST MOD 30 MIN: CPT | Performed by: INTERNAL MEDICINE

## 2022-10-03 PROCEDURE — 36591 DRAW BLOOD OFF VENOUS DEVICE: CPT | Performed by: INTERNAL MEDICINE

## 2022-10-03 PROCEDURE — 85025 COMPLETE CBC W/AUTO DIFF WBC: CPT | Performed by: INTERNAL MEDICINE

## 2022-10-03 PROCEDURE — G0008 ADMIN INFLUENZA VIRUS VAC: HCPCS | Performed by: INTERNAL MEDICINE

## 2022-10-03 PROCEDURE — 83521 IG LIGHT CHAINS FREE EACH: CPT | Performed by: INTERNAL MEDICINE

## 2022-10-03 PROCEDURE — 250N000011 HC RX IP 250 OP 636: Performed by: INTERNAL MEDICINE

## 2022-10-03 PROCEDURE — 91305 HC RX IP 250 OP 636: CPT | Performed by: INTERNAL MEDICINE

## 2022-10-03 PROCEDURE — G0463 HOSPITAL OUTPT CLINIC VISIT: HCPCS

## 2022-10-03 RX ORDER — HEPARIN SODIUM (PORCINE) LOCK FLUSH IV SOLN 100 UNIT/ML 100 UNIT/ML
5 SOLUTION INTRAVENOUS ONCE
Status: COMPLETED | OUTPATIENT
Start: 2022-10-03 | End: 2022-10-03

## 2022-10-03 RX ORDER — PANTOPRAZOLE SODIUM 40 MG/1
1 TABLET, DELAYED RELEASE ORAL DAILY
COMMUNITY
Start: 2022-05-30 | End: 2022-11-03

## 2022-10-03 RX ADMIN — Medication 5 ML: at 10:36

## 2022-10-03 RX ADMIN — BNT162B2 30 MCG: 0.23 INJECTION, SUSPENSION INTRAMUSCULAR at 12:13

## 2022-10-03 RX ADMIN — INFLUENZA A VIRUS A/VICTORIA/2570/2019 IVR-215 (H1N1) ANTIGEN (FORMALDEHYDE INACTIVATED), INFLUENZA A VIRUS A/DARWIN/9/2021 SAN-010 (H3N2) ANTIGEN (FORMALDEHYDE INACTIVATED), INFLUENZA B VIRUS B/PHUKET/3073/2013 ANTIGEN (FORMALDEHYDE INACTIVATED), AND INFLUENZA B VIRUS B/MICHIGAN/01/2021 ANTIGEN (FORMALDEHYDE INACTIVATED) 0.7 ML: 60; 60; 60; 60 INJECTION, SUSPENSION INTRAMUSCULAR at 12:03

## 2022-10-03 ASSESSMENT — PAIN SCALES - GENERAL: PAINLEVEL: NO PAIN (0)

## 2022-10-03 NOTE — PROGRESS NOTES
BMT Progress Note    BMT Physician: Dr. Guillory/Rashad Chen     Oncology History   Multiple myeloma not having achieved remission (H)   11/7/2018 -  Cancer Staged    Staging form: Plasma Cell Myeloma and Disorders, AJCC 8th Edition  - Clinical stage from 11/7/2018: Albumin (g/dL): 3.4, ISS: Stage II, High-risk cytogenetics: Absent, LDH: Unknown     11/7/2018 Initial Diagnosis    Multiple myeloma not having achieved remission (H)     1/23/2019 - 11/14/2019 Chemotherapy    KRD x 6 cycles     11/4/2019 - 2/14/2021 Chemotherapy    Rev/Dex(20)      4/3/2020 - 7/17/2020 Chemotherapy    Velcade maintenance - dose reductions for orthostatic hypotension     7/17/2020 - 12/15/2020 Chemotherapy    Revlimid maintenance     11/23/2020 Progression    Bone marrow 35% plasma cells, PET CT demonstrating new lytic lesions     12/15/2020 - 6/11/2021 Chemotherapy    Elsy, Kd x 6 cycles     7/7/2021 -  Cancer Staged    PET/CT - CR     7/21/2021 -  Chemotherapy    Subcutaneous Daratumumab, IVIG, denosumab monthly     11/22/2021 -  Cancer Staged    Bone marrow aspirate - MRD 0.0022% positive     2/1/2022 - 2/5/2022 Chemotherapy    IP BMT Chemotherapy For Mobilization - High Dose Cyclophosphamide  Plan Provider: Julio C Guillory MD  Treatment goal: Other  Line of treatment: [No plan line of treatment]     5/25/2022 -  Chemotherapy    IP - OP BMT 2016-35 Auto Multiple Myeloma - Melphalan (CrCL < 30 mL/min, >/= 2 comorbidities, OR age > 75yrs) - Adult (Version: 7/13/21)  Plan Provider: Julio C Guillory MD  Treatment goal: Other  Line of treatment: [No plan line of treatment]     9/29/2022 -  Supportive Treatment    Oral ONC - ixazomib maintenance post-transplant  Plan Provider: Julio C Guillory MD  Treatment goal: Curative  Line of treatment: [No plan line of treatment]         Chief complaint:  Libby Grimaldo is a 71 year old female, 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      INTERIM HISTORY:   Libby returns feeling pretty  well.  She is ready to start ninlaro.  She has questions today about EOL care if her myeloma progresses.       ROS: 8 point ROS neg other than the symptoms noted above in the HPI.     PHYSICAL EXAM:   Vitals:  Blood pressure 105/56, pulse 81, temperature 98.1  F (36.7  C), temperature source Oral, resp. rate 16, weight 54.1 kg (119 lb 3.2 oz), SpO2 95 %.      Wt Readings from Last 4 Encounters:   10/03/22 54.1 kg (119 lb 3.2 oz)   09/21/22 52.2 kg (115 lb)   09/01/22 49.5 kg (109 lb 1.6 oz)   09/01/22 49.5 kg (109 lb 1.6 oz)       General Appearance: NAD  HEENT: PERRL   EXT: No edema.   SKIN: no lesions or rash  NEURO: A&O x3   PSYCH: Appropriate affect   VASCULAR ACCESS: R port a cath is accessed    Labs:  Lab Results   Component Value Date    WBC 5.3 10/03/2022    ANEU 3.9 06/16/2022    HGB 9.5 (L) 10/03/2022    HCT 28.9 (L) 10/03/2022     (L) 10/03/2022     09/01/2022    POTASSIUM 3.8 09/01/2022    CHLORIDE 105 09/01/2022    CO2 22 09/01/2022     (H) 09/01/2022    BUN 12.4 09/01/2022    CR 0.74 09/01/2022    MAG 1.5 (L) 09/01/2022    INR 1.40 (H) 05/30/2022    BILITOTAL <0.2 09/01/2022    AST 15 09/01/2022    ALT 7 (L) 09/01/2022    ALKPHOS 146 (H) 09/01/2022    PROTTOTAL 5.7 (L) 09/01/2022    ALBUMIN 3.6 09/01/2022       Immunoglobulins     Recent Labs   Lab Test 09/01/22  0803 06/27/22  1332 04/11/22  1101 03/28/22  1005 03/21/22  0927 01/20/22  1619   * 384* 497* 442* 477* 725       Recent Labs   Lab Test 09/01/22  0803 06/27/22  1332 04/11/22  1101 03/28/22  1005 03/21/22  0927 01/20/22  1619   IGA 29* 8* 3* 3* 4* 3*       Recent Labs   Lab Test 09/01/22  0803 06/27/22  1332 04/11/22  1101 03/28/22  1005 03/21/22  0927 01/20/22  1619   IGM 35 <10* <10* <10* <10* <10*         Monocloncal Protein Studies     M spike    Recent Labs   Lab Test 09/01/22  0803 06/27/22  1332 05/17/22  1202 03/28/22  1005 03/21/22  0927 01/20/22  1618   ELPM 0.1* 0.1* 0.2* 0.1* 0.1* 0.1*       Kappa  FLC    Recent Labs   Lab Test 09/01/22  0803 06/27/22  1332 05/17/22  1202 04/11/22  1101 03/28/22  1005 03/21/22  0927   KFLCA 1.30 0.66 0.14* 0.11* 0.84 0.10*       Lambda FLC    Recent Labs   Lab Test 09/01/22  0803 06/27/22  1332 05/17/22  1202 04/11/22  1101 03/28/22  1005 03/21/22  0927   LFLCA 2.82* 3.32* 10.61* 8.97* 8.68* 6.61*       FLC Ratio    Recent Labs   Lab Test 09/01/22  0803 06/27/22  1332 05/17/22  1202 04/11/22  1101 03/28/22  1005 03/21/22  0927   KLRA 0.46 0.20* 0.01* 0.01* 0.10* 0.02*           Bone Marrow Biopsy     Lab Results   Component Value Date    FINALDX  09/01/2022     Bone marrow, posterior iliac crest, right decalcified trephine biopsy and touch imprint; right particle crush, direct aspirate smear, concentrate aspirate smear, and peripheral blood smear:    - No morphologic or immunophenotypic evidence of plasma cell myeloma  - Normocellular marrow (20-30% estimated cellularity), with trilineage hematopoiesis, no overt dysplasia, no increase in blasts, and less than 1% plasma cells  - Peripheral blood showing slight normochromic, macrocytic anemia  - See comment.        COMDX  09/01/2022     There is no immunophenotypic evidence for plasma cell myeloma. Final interpretation requires correlation with results of other ancillary studies, morphologic, and clinical features.            Lab Results   Component Value Date    FLINTERP  09/01/2022     A. Iliac Crest, Bone Marrow Aspirate, Right:  -Polytypic plasma cells  See comment        COMDX  09/01/2022     There is no immunophenotypic evidence for plasma cell myeloma. Final interpretation requires correlation with results of other ancillary studies, morphologic, and clinical features.               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      Myeloma/BMT/IEC PROTOCOL for 2016-35  - Chemo protocol: HD Melphalan 140mg/m2  Day -1: rest day, 5/26  Day 0:  Cell dose: 3.94x10^6 (s/p cytoxan chemo priming 2/1/2022 and subsequent stem cell collection).   - Restaging plan: per protocol.  - Day+5 gcsf started; ANC recovered; last dose GCSF 6/8.      HEME/COAG  - anemia, thrombocytopenia due to chemo/BMT.  Plts trending up today  - Transfusion parameters: hemoglobin <7g/dL, platelets <10k  - Relevant thrombosis or bleeding history:   2/15: new non-occlusive LUE DVT. No longer on xarelto        ID  - Prophylaxis plan: ACV, fluc stopped  - PJP Prophy: HD bactrim qMon/Tues BID         CARDIOVASCULAR  - Risk of cardiomyopathy:  Baseline EF 56%  - known hyperlipidemia- hold crestor until day +60 post transplant.   Hypertension: norvasc 5mg.      History of Afib - now in NSR but tachy. Continue metoprolol XL 25 mg/d      RESPIRATORY  - hx of COPD, continue breo-ellipa inhaler      GI/NUTRITION  - Ulcer prophylaxis: protonix 40mg daily.  - Nausea: resolved on zyprexa bid.          RENAL/ELECTROLYTES/:  - hypokalemia - she will increase her K rich foods and start metamucil for her loose stool    PSYCH  - continue effexor 75mg tid     SYMPTOM MANAGEMENT.  - # Pain Assessment:  - Libby is experiencing pain due to chronic pain - Continue suboxone and prn oxycodone.   -  For pain medicine previous colleague asked Pt to call and discuss with pain clinic.      Deconditioning: She has arranged physical therapy    Plan  - start ninlaro on Friday  - RADHA visit one month to check for tolerance and cytopenias  - COVID and Flu vaccines today  - MD day 180 (not DOM)    I spent 30 minutes in the care of this patient today, which included time necessary for preparation for the visit, obtaining history, ordering medications/tests/procedures as medically indicated, review of pertinent medical literature, counseling of the patient, communication of recommendations to the care team, and documentation time.    SOTO BOWSER MD  October 3, 2022

## 2022-10-03 NOTE — NURSING NOTE
"Chief Complaint   Patient presents with     Port Draw     Labs drawn via port by RN. Vitals taken.     Labs drawn via port by RN. Port accessed with 20G 3/4\" gripper needle. Flushed with NS and heparin. De-accessed. Pt tolerated well. Vitals taken. Pt checked in for next appointment.    Noelle Weber, RN  "

## 2022-10-03 NOTE — NURSING NOTE
Influenza vaccine given to patient in Right Deltoid . Patient tolerated injection without any incidents.     Has the patient received the information for the injectable influenza vaccine? YES    Elizabeth Craft, EMT on 10/3/2022 at 12:21 PM

## 2022-10-03 NOTE — LETTER
10/3/2022         RE: Libby Grimaldo  [address] [facility] [address]        Dear Colleague,    Thank you for referring your patient, Libby Grimaldo, to the [facility] BLOOD AND MARROW TRANSPLANT PROGRAM [place]. Please see a copy of my visit note below.    BMT Progress Note    BMT Physician: Dr. Guillory/Rashad Chen     Oncology History   Multiple myeloma not having achieved remission (H)   11/7/2018 -  Cancer Staged    Staging form: Plasma Cell Myeloma and Disorders, AJCC 8th Edition  - Clinical stage from 11/7/2018: Albumin (g/dL): 3.4, ISS: Stage II, High-risk cytogenetics: Absent, LDH: Unknown     11/7/2018 Initial Diagnosis    Multiple myeloma not having achieved remission (H)     1/23/2019 - 11/14/2019 Chemotherapy    KRD x 6 cycles     11/4/2019 - 2/14/2021 Chemotherapy    Rev/Dex(20)      4/3/2020 - 7/17/2020 Chemotherapy    Velcade maintenance - dose reductions for orthostatic hypotension     7/17/2020 - 12/15/2020 Chemotherapy    Revlimid maintenance     11/23/2020 Progression    Bone marrow 35% plasma cells, PET CT demonstrating new lytic lesions     12/15/2020 - 6/11/2021 Chemotherapy    Elsy, Kd x 6 cycles     7/7/2021 -  Cancer Staged    PET/CT - CR     7/21/2021 -  Chemotherapy    Subcutaneous Daratumumab, IVIG, denosumab monthly     11/22/2021 -  Cancer Staged    Bone marrow aspirate - MRD 0.0022% positive     2/1/2022 - 2/5/2022 Chemotherapy    IP BMT Chemotherapy For Mobilization - High Dose Cyclophosphamide  Plan Provider: Julio C Guillory MD  Treatment goal: Other  Line of treatment: [No plan line of treatment]     5/25/2022 -  Chemotherapy    IP - OP BMT 2016-35 Auto Multiple Myeloma - Melphalan (CrCL < 30 mL/min, >/= 2 comorbidities, OR age > 75yrs) - Adult (Version: 7/13/21)  Plan Provider: Julio C Guillory MD  Treatment goal: Other  Line of treatment: [No plan line of treatment]     9/29/2022 -  Supportive Treatment    Oral ONC - ixazomib maintenance post-transplant  Plan  Provider: Julio C Guillory MD  Treatment goal: Curative  Line of treatment: [No plan line of treatment]         Chief complaint:  Libby Grimaldo is a 71 year old female, 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      INTERIM HISTORY:   Libby returns feeling pretty well.  She is ready to start ninlaro.  She has questions today about EOL care if her myeloma progresses.       ROS: 8 point ROS neg other than the symptoms noted above in the HPI.     PHYSICAL EXAM:   Vitals:  Blood pressure 105/56, pulse 81, temperature 98.1  F (36.7  C), temperature source Oral, resp. rate 16, weight 54.1 kg (119 lb 3.2 oz), SpO2 95 %.      Wt Readings from Last 4 Encounters:   10/03/22 54.1 kg (119 lb 3.2 oz)   09/21/22 52.2 kg (115 lb)   09/01/22 49.5 kg (109 lb 1.6 oz)   09/01/22 49.5 kg (109 lb 1.6 oz)       General Appearance: NAD  HEENT: PERRL   EXT: No edema.   SKIN: no lesions or rash  NEURO: A&O x3   PSYCH: Appropriate affect   VASCULAR ACCESS: R port a cath is accessed    Labs:  Lab Results   Component Value Date    WBC 5.3 10/03/2022    ANEU 3.9 06/16/2022    HGB 9.5 (L) 10/03/2022    HCT 28.9 (L) 10/03/2022     (L) 10/03/2022     09/01/2022    POTASSIUM 3.8 09/01/2022    CHLORIDE 105 09/01/2022    CO2 22 09/01/2022     (H) 09/01/2022    BUN 12.4 09/01/2022    CR 0.74 09/01/2022    MAG 1.5 (L) 09/01/2022    INR 1.40 (H) 05/30/2022    BILITOTAL <0.2 09/01/2022    AST 15 09/01/2022    ALT 7 (L) 09/01/2022    ALKPHOS 146 (H) 09/01/2022    PROTTOTAL 5.7 (L) 09/01/2022    ALBUMIN 3.6 09/01/2022       Immunoglobulins     Recent Labs   Lab Test 09/01/22  0803 06/27/22  1332 04/11/22  1101 03/28/22  1005 03/21/22  0927 01/20/22  1619   * 384* 497* 442* 477* 725       Recent Labs   Lab Test 09/01/22  0803 06/27/22  1332 04/11/22  1101 03/28/22  1005 03/21/22  0927 01/20/22  1619   IGA 29* 8* 3* 3* 4* 3*       Recent Labs   Lab Test 09/01/22  0803 06/27/22  1332 04/11/22  1101 03/28/22  1005  03/21/22  0927 01/20/22  1619   IGM 35 <10* <10* <10* <10* <10*         Monocloncal Protein Studies     M spike    Recent Labs   Lab Test 09/01/22  0803 06/27/22  1332 05/17/22  1202 03/28/22  1005 03/21/22  0927 01/20/22  1618   ELPM 0.1* 0.1* 0.2* 0.1* 0.1* 0.1*       Kappa FLC    Recent Labs   Lab Test 09/01/22  0803 06/27/22  1332 05/17/22  1202 04/11/22  1101 03/28/22  1005 03/21/22  0927   KFLCA 1.30 0.66 0.14* 0.11* 0.84 0.10*       Lambda FLC    Recent Labs   Lab Test 09/01/22  0803 06/27/22  1332 05/17/22  1202 04/11/22  1101 03/28/22  1005 03/21/22  0927   LFLCA 2.82* 3.32* 10.61* 8.97* 8.68* 6.61*       FLC Ratio    Recent Labs   Lab Test 09/01/22  0803 06/27/22  1332 05/17/22  1202 04/11/22  1101 03/28/22  1005 03/21/22  0927   KLRA 0.46 0.20* 0.01* 0.01* 0.10* 0.02*           Bone Marrow Biopsy     Lab Results   Component Value Date    FINALDX  09/01/2022     Bone marrow, posterior iliac crest, right decalcified trephine biopsy and touch imprint; right particle crush, direct aspirate smear, concentrate aspirate smear, and peripheral blood smear:    - No morphologic or immunophenotypic evidence of plasma cell myeloma  - Normocellular marrow (20-30% estimated cellularity), with trilineage hematopoiesis, no overt dysplasia, no increase in blasts, and less than 1% plasma cells  - Peripheral blood showing slight normochromic, macrocytic anemia  - See comment.        COMDX  09/01/2022     There is no immunophenotypic evidence for plasma cell myeloma. Final interpretation requires correlation with results of other ancillary studies, morphologic, and clinical features.            Lab Results   Component Value Date    FLINTERP  09/01/2022     A. Iliac Crest, Bone Marrow Aspirate, Right:  -Polytypic plasma cells  See comment        COMDX  09/01/2022     There is no immunophenotypic evidence for plasma cell myeloma. Final interpretation requires correlation with results of other ancillary studies, morphologic, and  clinical features.               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      Myeloma/BMT/IEC PROTOCOL for 2016-35  - Chemo protocol: HD Melphalan 140mg/m2  Day -1: rest day, 5/26  Day 0: Cell dose: 3.94x10^6 (s/p cytoxan chemo priming 2/1/2022 and subsequent stem cell collection).   - Restaging plan: per protocol.  - Day+5 gcsf started; ANC recovered; last dose GCSF 6/8.      HEME/COAG  - anemia, thrombocytopenia due to chemo/BMT.  Plts trending up today  - Transfusion parameters: hemoglobin <7g/dL, platelets <10k  - Relevant thrombosis or bleeding history:   2/15: new non-occlusive LUE DVT. No longer on xarelto        ID  - Prophylaxis plan: ACV, fluc stopped  - PJP Prophy: HD bactrim qMon/Tues BID         CARDIOVASCULAR  - Risk of cardiomyopathy:  Baseline EF 56%  - known hyperlipidemia- hold crestor until day +60 post transplant.   Hypertension: norvasc 5mg.      History of Afib - now in NSR but tachy. Continue metoprolol XL 25 mg/d      RESPIRATORY  - hx of COPD, continue breo-ellipa inhaler      GI/NUTRITION  - Ulcer prophylaxis: protonix 40mg daily.  - Nausea: resolved on zyprexa bid.          RENAL/ELECTROLYTES/:  - hypokalemia - she will increase her K rich foods and start metamucil for her loose stool    PSYCH  - continue effexor 75mg tid     SYMPTOM MANAGEMENT.  - # Pain Assessment:  - Libby is experiencing pain due to chronic pain - Continue suboxone and prn oxycodone.   -  For pain medicine previous colleague asked Pt to call and discuss with pain clinic.      Deconditioning: She has arranged physical therapy    Plan  - start ninlaro on Friday  - RADHA visit one month to check for tolerance and cytopenias  - COVID and Flu vaccines today  - MD day 180 (not DOM)    I spent 30 minutes in the care of this patient today, which included time necessary for preparation for the visit, obtaining history, ordering  medications/tests/procedures as medically indicated, review of pertinent medical literature, counseling of the patient, communication of recommendations to the care team, and documentation time.        Again, thank you for allowing me to participate in the care of your patient.      Sincerely,    SOTO BOWSER MD

## 2022-10-03 NOTE — NURSING NOTE
Pfizer vaccine - post-transplant 1st dose -- for COVID-19 administered in left deltoid. Patient tolerated well and was discharged after 15 minute observation period. VIS provided. Consent form scanned to chart.    VERA Michael on 10/3/2022 at 12:21 PM

## 2022-10-03 NOTE — NURSING NOTE
"Oncology Rooming Note    October 3, 2022 10:50 AM   Libby Grimaldo is a 71 year old female who presents for:    Chief Complaint   Patient presents with     Port Draw     Labs drawn via port by RN. Vitals taken.     Oncology Clinic Visit     Multiple myeloma in remission      Initial Vitals: /56 (BP Location: Right arm, Patient Position: Sitting, Cuff Size: Adult Regular)   Pulse 81   Temp 98.1  F (36.7  C) (Oral)   Resp 16   Wt 54.1 kg (119 lb 3.2 oz)   SpO2 95%   BMI 19.84 kg/m   Estimated body mass index is 19.84 kg/m  as calculated from the following:    Height as of 9/1/22: 1.651 m (5' 5\").    Weight as of this encounter: 54.1 kg (119 lb 3.2 oz). Body surface area is 1.58 meters squared.  No Pain (0) Comment: Data Unavailable   No LMP recorded. Patient is postmenopausal.  Allergies reviewed: Yes  Medications reviewed: Yes    Medications: Medication refills not needed today.  Pharmacy name entered into [employer]:    [id number] - [place], [place] - [address] AT [facility] [address] [place] [address] [facility] - [place], [place] - [address]    Clinical concerns: loperamide, Metoprolol, Acyclovir, venlaflaxine, and buprenorphine HCl-naloxone HCl     Elly was notified.      Rob Henry"

## 2022-10-04 LAB
KAPPA LC FREE SER-MCNC: 1.32 MG/DL (ref 0.33–1.94)
KAPPA LC FREE/LAMBDA FREE SER NEPH: 0.34 {RATIO} (ref 0.26–1.65)
LAMBDA LC FREE SERPL-MCNC: 3.85 MG/DL (ref 0.57–2.63)

## 2022-10-13 ENCOUNTER — TELEPHONE (OUTPATIENT)
Dept: PHARMACY | Facility: CLINIC | Age: 72
End: 2022-10-13

## 2022-10-13 NOTE — ORAL ONC MGMT
Oral Chemotherapy Monitoring Program     Placed call to patient in follow up of oral chemotherapy. Left message requesting call back. Will update when response received.     Alex Dye, SrikanthD, MS  Hematology/Oncology Clinical Pharmacist  [facility]

## 2022-10-17 ENCOUNTER — TELEPHONE (OUTPATIENT)
Dept: ONCOLOGY | Facility: CLINIC | Age: 72
End: 2022-10-17

## 2022-10-17 NOTE — ORAL ONC MGMT
Oral Chemotherapy Monitoring Program    Subjective/Objective:  Libby Grimaldo is a 72 year old female contacted by phone for a follow-up visit for oral chemotherapy.  Libby said she has had some nausea and one episode of vomiting. She noted the the vomiting episode happened after eating some wild rice soup, which she said she took about 2 hours, (maybe a little less than 2 hours) after her Ninlaro dose. She takes Ninlaro every Friday. She uses prochlorperazine as needed for nausea, and she uses loperamide as needed for loose stools. Both have been effective for her. She also noted that she had a headache one day and acetaminophen was effective in managing that. She said she drinks about 48 ounces of water per day.    ORAL CHEMOTHERAPY 9/26/2022 10/13/2022 10/17/2022   Assessment Type New Teach Left Voicemail Initial Follow up   Diagnosis Code Multiple Myeloma Multiple Myeloma Multiple Myeloma   Providers Kahlil Guillory   [facility]   Drug Name Ninlaro (ixazomib) Ninlaro (ixazomib) Ninlaro (ixazomib)   Dose 3 mg 3 mg 3 mg   Current Schedule Weekly Weekly Weekly   Cycle Details 3 weeks on, 1 week off 3 weeks on, 1 week off 3 weeks on, 1 week off   Start Date of Last Cycle - - 10/7/2022   Planned next cycle start date - - 11/4/2022   Doses missed in last 2 weeks - - 0   Adherence Assessment - - Adherent   Adverse Effects - - Nausea;Diarrhea   Nausea - - Grade 1   Pharmacist Intervention(nausea) - - Yes   Intervention(s) - - Patient education;Rx medication recommendation   Diarrhea - - Grade 1   Pharmacist Intervention(diarrhea) - - Yes   Intervention(s) - - Patient education;OTC recommendation   Any new drug interactions? No - No   Is the dose as ordered appropriate for the patient? Yes - -   Has the patient missed any days of school, work, or other routine activity? No - -   Since the last time we talked, have you been hospitalized or used the emergency room? No - -       Last  "PHQ-2 Score on record:   PHQ-2 ( 1999 Pfizer) 1/31/2022   Q1: Little interest or pleasure in doing things 0   Q2: Feeling down, depressed or hopeless 0   PHQ-2 Score 0       Vitals:  BP:   BP Readings from Last 1 Encounters:   10/03/22 105/56     Wt Readings from Last 1 Encounters:   10/03/22 54.1 kg (119 lb 3.2 oz)     Estimated body surface area is 1.58 meters squared as calculated from the following:    Height as of 9/1/22: 1.651 m (5' 5\").    Weight as of 10/3/22: 54.1 kg (119 lb 3.2 oz).    Labs:  _  Result Component Current Result Ref Range   Sodium 142 (10/3/2022) 136 - 145 mmol/L     _  Result Component Current Result Ref Range   Potassium 3.3 (L) (10/3/2022) 3.4 - 5.3 mmol/L     _  Result Component Current Result Ref Range   Calcium 8.9 (10/3/2022) 8.8 - 10.2 mg/dL     No results found for Mag within last 30 days.     No results found for Phos within last 30 days.     _  Result Component Current Result Ref Range   Albumin 3.3 (L) (10/3/2022) 3.5 - 5.2 g/dL     _  Result Component Current Result Ref Range   Urea Nitrogen 9.6 (10/3/2022) 8.0 - 23.0 mg/dL     _  Result Component Current Result Ref Range   Creatinine 0.61 (10/3/2022) 0.51 - 0.95 mg/dL     _  Result Component Current Result Ref Range   AST 14 (10/3/2022) 10 - 35 U/L     _  Result Component Current Result Ref Range   ALT 5 (L) (10/3/2022) 10 - 35 U/L     _  Result Component Current Result Ref Range   Bilirubin Total 0.3 (10/3/2022) <=1.2 mg/dL     _  Result Component Current Result Ref Range   WBC Count 5.3 (10/3/2022) 4.0 - 11.0 10e3/uL     _  Result Component Current Result Ref Range   Hemoglobin 9.5 (L) (10/3/2022) 11.7 - 15.7 g/dL     _  Result Component Current Result Ref Range   Platelet Count 137 (L) (10/3/2022) 150 - 450 10e3/uL     No results found for ANC within last 30 days.     _  Result Component Current Result Ref Range   Absolute Neutrophils 2.0 (10/3/2022) 1.6 - 8.3 10e3/uL          Assessment/Plan:  Libby is doing pretty well in " her first couple of weeks on Ninlaro. She is using medications properly to manage side effects. Recommended increased water intake to 64 ounces per day. Recommended waiting at least two hours after she takes Ninlaro before eating, and take it on an empty stomach. Continue Ninlaro and recheck labs before next cycle. Libby expressed understanding and agreement with this plan. She thanked me for the call and care.    Follow-Up:  Labs on 10/26/2022    Refill Due:  By 11/4/2022    Rigoberto DukeD  [facility]  [phone]  October 17, 2022

## 2022-10-21 DIAGNOSIS — Z51.11 ADMISSION FOR CHEMOTHERAPY: ICD-10-CM

## 2022-10-21 DIAGNOSIS — C90.00 MULTIPLE MYELOMA NOT HAVING ACHIEVED REMISSION (H): ICD-10-CM

## 2022-10-21 RX ORDER — PROCHLORPERAZINE MALEATE 5 MG
5 TABLET ORAL EVERY 6 HOURS PRN
Qty: 30 TABLET | Refills: 1 | Status: SHIPPED | OUTPATIENT
Start: 2022-10-21 | End: 2023-01-09

## 2022-10-21 RX ORDER — LOPERAMIDE HCL 2 MG
2-4 CAPSULE ORAL 4 TIMES DAILY PRN
Qty: 60 CAPSULE | Refills: 1 | Status: ON HOLD | OUTPATIENT
Start: 2022-10-21 | End: 2023-01-01

## 2022-10-28 DIAGNOSIS — C90.00 MULTIPLE MYELOMA NOT HAVING ACHIEVED REMISSION (H): Primary | ICD-10-CM

## 2022-11-03 ENCOUNTER — ONCOLOGY VISIT (OUTPATIENT)
Dept: TRANSPLANT | Facility: CLINIC | Age: 72
End: 2022-11-03
Attending: INTERNAL MEDICINE
Payer: MEDICARE

## 2022-11-03 ENCOUNTER — APPOINTMENT (OUTPATIENT)
Dept: LAB | Facility: CLINIC | Age: 72
End: 2022-11-03
Attending: INTERNAL MEDICINE
Payer: MEDICARE

## 2022-11-03 VITALS
WEIGHT: 116.4 LBS | SYSTOLIC BLOOD PRESSURE: 133 MMHG | OXYGEN SATURATION: 98 % | RESPIRATION RATE: 16 BRPM | HEART RATE: 131 BPM | BODY MASS INDEX: 19.37 KG/M2 | DIASTOLIC BLOOD PRESSURE: 81 MMHG | TEMPERATURE: 98.8 F

## 2022-11-03 DIAGNOSIS — Z94.81 STATUS POST BONE MARROW TRANSPLANT (H): Primary | ICD-10-CM

## 2022-11-03 DIAGNOSIS — C90.00 MULTIPLE MYELOMA NOT HAVING ACHIEVED REMISSION (H): ICD-10-CM

## 2022-11-03 LAB
ALBUMIN SERPL BCG-MCNC: 3.7 G/DL (ref 3.5–5.2)
ALP SERPL-CCNC: 129 U/L (ref 35–104)
ALT SERPL W P-5'-P-CCNC: 15 U/L (ref 10–35)
ANION GAP SERPL CALCULATED.3IONS-SCNC: 13 MMOL/L (ref 7–15)
AST SERPL W P-5'-P-CCNC: 21 U/L (ref 10–35)
BASOPHILS # BLD AUTO: 0 10E3/UL (ref 0–0.2)
BASOPHILS NFR BLD AUTO: 0 %
BILIRUB SERPL-MCNC: 0.2 MG/DL
BUN SERPL-MCNC: 10.1 MG/DL (ref 8–23)
CALCIUM SERPL-MCNC: 9.3 MG/DL (ref 8.8–10.2)
CHLORIDE SERPL-SCNC: 108 MMOL/L (ref 98–107)
CREAT SERPL-MCNC: 0.73 MG/DL (ref 0.51–0.95)
DEPRECATED HCO3 PLAS-SCNC: 22 MMOL/L (ref 22–29)
EOSINOPHIL # BLD AUTO: 0.2 10E3/UL (ref 0–0.7)
EOSINOPHIL NFR BLD AUTO: 3 %
ERYTHROCYTE [DISTWIDTH] IN BLOOD BY AUTOMATED COUNT: 13.3 % (ref 10–15)
GFR SERPL CREATININE-BSD FRML MDRD: 88 ML/MIN/1.73M2
GLUCOSE SERPL-MCNC: 112 MG/DL (ref 70–99)
HCT VFR BLD AUTO: 34 % (ref 35–47)
HGB BLD-MCNC: 11 G/DL (ref 11.7–15.7)
IMM GRANULOCYTES # BLD: 0 10E3/UL
IMM GRANULOCYTES NFR BLD: 0 %
LYMPHOCYTES # BLD AUTO: 2.5 10E3/UL (ref 0.8–5.3)
LYMPHOCYTES NFR BLD AUTO: 49 %
MCH RBC QN AUTO: 32.6 PG (ref 26.5–33)
MCHC RBC AUTO-ENTMCNC: 32.4 G/DL (ref 31.5–36.5)
MCV RBC AUTO: 101 FL (ref 78–100)
MONOCYTES # BLD AUTO: 0.6 10E3/UL (ref 0–1.3)
MONOCYTES NFR BLD AUTO: 12 %
NEUTROPHILS # BLD AUTO: 1.9 10E3/UL (ref 1.6–8.3)
NEUTROPHILS NFR BLD AUTO: 36 %
NRBC # BLD AUTO: 0 10E3/UL
NRBC BLD AUTO-RTO: 0 /100
PLATELET # BLD AUTO: 128 10E3/UL (ref 150–450)
POTASSIUM SERPL-SCNC: 3.3 MMOL/L (ref 3.4–5.3)
PROT SERPL-MCNC: 6 G/DL (ref 6.4–8.3)
RBC # BLD AUTO: 3.37 10E6/UL (ref 3.8–5.2)
SODIUM SERPL-SCNC: 143 MMOL/L (ref 136–145)
WBC # BLD AUTO: 5.2 10E3/UL (ref 4–11)

## 2022-11-03 PROCEDURE — 99215 OFFICE O/P EST HI 40 MIN: CPT

## 2022-11-03 PROCEDURE — 250N000011 HC RX IP 250 OP 636: Performed by: INTERNAL MEDICINE

## 2022-11-03 PROCEDURE — G0463 HOSPITAL OUTPT CLINIC VISIT: HCPCS

## 2022-11-03 PROCEDURE — 82040 ASSAY OF SERUM ALBUMIN: CPT

## 2022-11-03 PROCEDURE — 80053 COMPREHEN METABOLIC PANEL: CPT

## 2022-11-03 PROCEDURE — 85004 AUTOMATED DIFF WBC COUNT: CPT

## 2022-11-03 PROCEDURE — 36591 DRAW BLOOD OFF VENOUS DEVICE: CPT

## 2022-11-03 RX ORDER — HEPARIN SODIUM (PORCINE) LOCK FLUSH IV SOLN 100 UNIT/ML 100 UNIT/ML
5 SOLUTION INTRAVENOUS ONCE
Status: COMPLETED | OUTPATIENT
Start: 2022-11-03 | End: 2022-11-03

## 2022-11-03 RX ORDER — METHADONE HYDROCHLORIDE 5 MG/1
TABLET ORAL
COMMUNITY
Start: 2022-11-02 | End: 2023-01-01

## 2022-11-03 RX ORDER — ONDANSETRON 8 MG/1
8 TABLET, FILM COATED ORAL EVERY 8 HOURS PRN
Qty: 40 TABLET | Refills: 0 | Status: SHIPPED | OUTPATIENT
Start: 2022-11-03 | End: 2023-01-09

## 2022-11-03 RX ADMIN — Medication 5 ML: at 15:20

## 2022-11-03 NOTE — NURSING NOTE
"Oncology Rooming Note    November 3, 2022 3:49 PM   Libby Grimaldo is a 72 year old female who presents for:    Chief Complaint   Patient presents with     Port Draw     Labs drawn via port by RN. Vitals taken.     Oncology Clinic Visit     Multiple myeloma not having achieved remission     Initial Vitals: /81 (BP Location: Right arm, Patient Position: Sitting, Cuff Size: Adult Regular)   Pulse (!) 131   Temp 98.8  F (37.1  C) (Oral)   Resp 16   Wt 52.8 kg (116 lb 6.4 oz)   SpO2 98%   BMI 19.37 kg/m   Estimated body mass index is 19.37 kg/m  as calculated from the following:    Height as of 9/1/22: 1.651 m (5' 5\").    Weight as of this encounter: 52.8 kg (116 lb 6.4 oz). Body surface area is 1.56 meters squared.  Data Unavailable Comment: generalized achiness   No LMP recorded. Patient is postmenopausal.  Allergies reviewed: Yes  Medications reviewed: Yes    Medications: Medication refills not needed today.  Pharmacy name entered into [employer]:    [facility][id number] - [place], [place] - [address] AT [facility] [address] & [address] [place] [address] [facility] - [place] - [address]    Clinical concerns: pt would like to discuss possible alternatives to prochlorperazine, and has questions about chemotherapy treatment.  gi  was notified.      Rob Henry"

## 2022-11-03 NOTE — LETTER
11/3/2022         RE: Libby Grimaldo  [address] [facility] [address]        Dear Colleague,    Thank you for referring your patient, Libby Grimaldo, to the [facility] BLOOD AND MARROW TRANSPLANT PROGRAM [place]. Please see a copy of my visit note below.    BMT Progress Note    BMT Physician: Dr. Guillory/Rashad Chen     Oncology History   Multiple myeloma not having achieved remission (H)   11/7/2018 -  Cancer Staged    Staging form: Plasma Cell Myeloma and Disorders, AJCC 8th Edition  - Clinical stage from 11/7/2018: Albumin (g/dL): 3.4, ISS: Stage II, High-risk cytogenetics: Absent, LDH: Unknown     11/7/2018 Initial Diagnosis    Multiple myeloma not having achieved remission (H)     1/23/2019 - 11/14/2019 Chemotherapy    KRD x 6 cycles     11/4/2019 - 2/14/2021 Chemotherapy    Rev/Dex(20)      4/3/2020 - 7/17/2020 Chemotherapy    Velcade maintenance - dose reductions for orthostatic hypotension     7/17/2020 - 12/15/2020 Chemotherapy    Revlimid maintenance     11/23/2020 Progression    Bone marrow 35% plasma cells, PET CT demonstrating new lytic lesions     12/15/2020 - 6/11/2021 Chemotherapy    Elsy, Kd x 6 cycles     7/7/2021 -  Cancer Staged    PET/CT - CR     7/21/2021 -  Chemotherapy    Subcutaneous Daratumumab, IVIG, denosumab monthly     11/22/2021 -  Cancer Staged    Bone marrow aspirate - MRD 0.0022% positive     2/1/2022 - 2/5/2022 Chemotherapy    IP BMT Chemotherapy For Mobilization - High Dose Cyclophosphamide  Plan Provider: Julio C Guillory MD  Treatment goal: Other  Line of treatment: [No plan line of treatment]     5/25/2022 -  Chemotherapy    IP - OP BMT 2016-35 Auto Multiple Myeloma - Melphalan (CrCL < 30 mL/min, >/= 2 comorbidities, OR age > 75yrs) - Adult (Version: 7/13/21)  Plan Provider: Julio C Guillory MD  Treatment goal: Other  Line of treatment: [No plan line of treatment]     9/29/2022 -  Supportive Treatment    Oral ONC - ixazomib maintenance post-transplant  Plan  Provider: Julio C Guillory MD  Treatment goal: Curative  Line of treatment: [No plan line of treatment]         ID: Libby Grimaldo is a 71 yo woman D+160 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 Now on ninlaro maintenance.     INTERIM HISTORY:   Here with her . She reports n/v for a couple of days after last 2 doses of ninlaro (D8 % D15). Appetite is otherwise good. She admits she could drink more fluids. Reports soft/loose stools, chronic. She feels a little more weak and unsteady the past couple of days. Uses cane at home. Stable neuropathy. Not dizzy. No fevers.      ROS: 8 point ROS neg other than the symptoms noted above in the HPI.     PHYSICAL EXAM:   Vitals:  Blood pressure 133/81, pulse (!) 131, temperature 98.8  F (37.1  C), temperature source Oral, resp. rate 16, weight 52.8 kg (116 lb 6.4 oz), SpO2 98 %.      Wt Readings from Last 4 Encounters:   11/03/22 52.8 kg (116 lb 6.4 oz)   10/03/22 54.1 kg (119 lb 3.2 oz)   09/21/22 52.2 kg (115 lb)   09/01/22 49.5 kg (109 lb 1.6 oz)       General Appearance: NAD  HEENT: PERRL; OP somewhat dry, no ulcerations. Dentures.  CV: mildly tachy (~100 at rest in clinic)  Lungs: CTAB  EXT: No LE edema.   SKIN: no lesions or rash  NEURO: A&O x3   PSYCH: Appropriate affect   VASCULAR ACCESS: R port a cath not accessed    Labs:  Lab Results   Component Value Date    WBC 5.2 11/03/2022    ANEU 3.9 06/16/2022    HGB 11.0 (L) 11/03/2022    HCT 34.0 (L) 11/03/2022     (L) 11/03/2022     10/03/2022    POTASSIUM 3.3 (L) 10/03/2022    CHLORIDE 108 (H) 10/03/2022    CO2 25 10/03/2022    GLC 99 10/03/2022    BUN 9.6 10/03/2022    CR 0.61 10/03/2022    MAG 1.5 (L) 09/01/2022    INR 1.40 (H) 05/30/2022    BILITOTAL 0.3 10/03/2022    AST 14 10/03/2022    ALT 5 (L) 10/03/2022    ALKPHOS 155 (H) 10/03/2022    PROTTOTAL 5.3 (L) 10/03/2022    ALBUMIN 3.3 (L) 10/03/2022        ASSESSMENT AND PLAN:   Libby ARRIETA Dillan is a 71 year old female, currently day +160 s/p Auto for  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      Myeloma/BMT/IEC PROTOCOL for 2016-35  - Chemo protocol: HD Melphalan 140mg/m2  Day 0: Cell dose: 3.94x10^6 (s/p cytoxan chemo priming 2/1/2022 and subsequent stem cell collection).   - Restaging plan: per protocol.  - Maintenance Ninlaro started early Oct 2022; Day 28 (11/4)     HEME/COAG  - anemia, thrombocytopenia due to chemo/BMT. Counts stable today; monitor on Ninlaro.  - Relevant thrombosis or bleeding history:   2/15/22: new non-occlusive LUE DVT. No longer on xarelto      ID  - Prophylaxis plan: ACV, Bactrim    - Covid vacc #1 (post BMT) and flu shot 10/3/22     CARDIOVASCULAR  - Risk of cardiomyopathy:  Baseline EF 56%  - known hyperlipidemia- per notes previously on Crestor but not on med list - follow-up with PCP if needed    History of Afib - now in NSR but tachy. Continue metoprolol XL 25 mg/d      RESPIRATORY  - hx of COPD, continue breo-ellipa inhaler      GI/NUTRITION  - CINV: continues on Zyprexa bid. Rec Zofran tid day of and day after Ninlaro with next doses. Compazine prn.     - Ulcer prophylaxis: protonix 40mg daily.        RENAL/ELECTROLYTES/:  - hypokalemia - she will increase her K rich foods. Add metamucil.     PSYCH  - continue effexor 75mg tid     SYMPTOM MANAGEMENT.  - # Pain Assessment:  - Libby is experiencing pain due to chronic pain - Continue suboxone and prn oxycodone.      Deconditioning: Call if ongoing weakness and we can consider Cancer Rehab.    Summary:  Cont ninlaro   add Zofran tid day of and day after Ninlaro doses  11/28 with Dr. Guillory; sooner prn.  Due for Covid #2 (missed 11/3; she can do this here or elsewhere)    I spent 40 minutes in the care of this patient today, which included time necessary for preparation for the visit, obtaining history, ordering medications/tests/procedures as medically indicated, review of pertinent medical literature, counseling of the patient, communication of recommendations to the care team, and  documentation time.      Stephie Ayers PA-C  [phone]

## 2022-11-03 NOTE — NURSING NOTE
"Chief Complaint   Patient presents with     Port Draw     Labs drawn via port by RN. Vitals taken.     Labs drawn via port by RN. Port accessed with 20G 3/4\" power needle. Flushed with NS and heparin. De-accessed. Pt tolerated well. Vitals taken. Pt checked in for next appointment.    Noelle Weber, RN  "

## 2022-11-03 NOTE — PROGRESS NOTES
BMT Progress Note    BMT Physician: Dr. Guillory/Rashad Chen     Oncology History   Multiple myeloma not having achieved remission (H)   11/7/2018 -  Cancer Staged    Staging form: Plasma Cell Myeloma and Disorders, AJCC 8th Edition  - Clinical stage from 11/7/2018: Albumin (g/dL): 3.4, ISS: Stage II, High-risk cytogenetics: Absent, LDH: Unknown     11/7/2018 Initial Diagnosis    Multiple myeloma not having achieved remission (H)     1/23/2019 - 11/14/2019 Chemotherapy    KRD x 6 cycles     11/4/2019 - 2/14/2021 Chemotherapy    Rev/Dex(20)      4/3/2020 - 7/17/2020 Chemotherapy    Velcade maintenance - dose reductions for orthostatic hypotension     7/17/2020 - 12/15/2020 Chemotherapy    Revlimid maintenance     11/23/2020 Progression    Bone marrow 35% plasma cells, PET CT demonstrating new lytic lesions     12/15/2020 - 6/11/2021 Chemotherapy    Elsy, Kd x 6 cycles     7/7/2021 -  Cancer Staged    PET/CT - CR     7/21/2021 -  Chemotherapy    Subcutaneous Daratumumab, IVIG, denosumab monthly     11/22/2021 -  Cancer Staged    Bone marrow aspirate - MRD 0.0022% positive     2/1/2022 - 2/5/2022 Chemotherapy    IP BMT Chemotherapy For Mobilization - High Dose Cyclophosphamide  Plan Provider: Julio C Guillory MD  Treatment goal: Other  Line of treatment: [No plan line of treatment]     5/25/2022 -  Chemotherapy    IP - OP BMT 2016-35 Auto Multiple Myeloma - Melphalan (CrCL < 30 mL/min, >/= 2 comorbidities, OR age > 75yrs) - Adult (Version: 7/13/21)  Plan Provider: Julio C Guillory MD  Treatment goal: Other  Line of treatment: [No plan line of treatment]     9/29/2022 -  Supportive Treatment    Oral ONC - ixazomib maintenance post-transplant  Plan Provider: Julio C Guillory MD  Treatment goal: Curative  Line of treatment: [No plan line of treatment]         ID: Libby Grimaldo is a 73 yo woman D+160 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 Now on ninlaro maintenance.     INTERIM HISTORY:   Here with her . She  reports n/v for a couple of days after last 2 doses of ninlaro (D8 % D15). Appetite is otherwise good. She admits she could drink more fluids. Reports soft/loose stools, chronic. She feels a little more weak and unsteady the past couple of days. Uses cane at home. Stable neuropathy. Not dizzy. No fevers.      ROS: 8 point ROS neg other than the symptoms noted above in the HPI.     PHYSICAL EXAM:   Vitals:  Blood pressure 133/81, pulse (!) 131, temperature 98.8  F (37.1  C), temperature source Oral, resp. rate 16, weight 52.8 kg (116 lb 6.4 oz), SpO2 98 %.      Wt Readings from Last 4 Encounters:   11/03/22 52.8 kg (116 lb 6.4 oz)   10/03/22 54.1 kg (119 lb 3.2 oz)   09/21/22 52.2 kg (115 lb)   09/01/22 49.5 kg (109 lb 1.6 oz)       General Appearance: NAD  HEENT: PERRL; OP somewhat dry, no ulcerations. Dentures.  CV: mildly tachy (~100 at rest in clinic)  Lungs: CTAB  EXT: No LE edema.   SKIN: no lesions or rash  NEURO: A&O x3   PSYCH: Appropriate affect   VASCULAR ACCESS: R port a cath not accessed    Labs:  Lab Results   Component Value Date    WBC 5.2 11/03/2022    ANEU 3.9 06/16/2022    HGB 11.0 (L) 11/03/2022    HCT 34.0 (L) 11/03/2022     (L) 11/03/2022     10/03/2022    POTASSIUM 3.3 (L) 10/03/2022    CHLORIDE 108 (H) 10/03/2022    CO2 25 10/03/2022    GLC 99 10/03/2022    BUN 9.6 10/03/2022    CR 0.61 10/03/2022    MAG 1.5 (L) 09/01/2022    INR 1.40 (H) 05/30/2022    BILITOTAL 0.3 10/03/2022    AST 14 10/03/2022    ALT 5 (L) 10/03/2022    ALKPHOS 155 (H) 10/03/2022    PROTTOTAL 5.3 (L) 10/03/2022    ALBUMIN 3.3 (L) 10/03/2022        ASSESSMENT AND PLAN:   Libby Grimaldo is a 71 year old female, currently day +160 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      Myeloma/BMT/IEC PROTOCOL for 2016-35  - Chemo protocol: HD Melphalan 140mg/m2  Day 0: Cell dose: 3.94x10^6 (s/p cytoxan chemo priming 2/1/2022 and subsequent stem cell collection).   - Restaging plan: per protocol.  - Maintenance  Ninlaro started early Oct 2022; Day 28 (11/4)     HEME/COAG  - anemia, thrombocytopenia due to chemo/BMT. Counts stable today; monitor on Ninlaro.  - Relevant thrombosis or bleeding history:   2/15/22: new non-occlusive LUE DVT. No longer on xarelto      ID  - Prophylaxis plan: ACV, Bactrim    - Covid vacc #1 (post BMT) and flu shot 10/3/22     CARDIOVASCULAR  - Risk of cardiomyopathy:  Baseline EF 56%  - known hyperlipidemia- per notes previously on Crestor but not on med list - follow-up with PCP if needed    History of Afib - now in NSR but tachy. Continue metoprolol XL 25 mg/d      RESPIRATORY  - hx of COPD, continue breo-ellipa inhaler      GI/NUTRITION  - CINV: continues on Zyprexa bid. Rec Zofran tid day of and day after Ninlaro with next doses. Compazine prn.     - Ulcer prophylaxis: protonix 40mg daily.        RENAL/ELECTROLYTES/:  - hypokalemia - she will increase her K rich foods. Add metamucil.     PSYCH  - continue effexor 75mg tid     SYMPTOM MANAGEMENT.  - # Pain Assessment:  - Libby is experiencing pain due to chronic pain - Continue suboxone and prn oxycodone.      Deconditioning: Call if ongoing weakness and we can consider Cancer Rehab.    Summary:  Cont ninlaro   add Zofran tid day of and day after Ninlaro doses  11/28 with Dr. Guillory; sooner prn.  Due for Covid #2 (missed 11/3; she can do this here or elsewhere)    I spent 40 minutes in the care of this patient today, which included time necessary for preparation for the visit, obtaining history, ordering medications/tests/procedures as medically indicated, review of pertinent medical literature, counseling of the patient, communication of recommendations to the care team, and documentation time.      DAVID Hernandez-C  [phone]

## 2022-11-28 DIAGNOSIS — C90.00 MULTIPLE MYELOMA NOT HAVING ACHIEVED REMISSION (H): Primary | ICD-10-CM

## 2022-12-01 NOTE — PROGRESS NOTES
[facility]  BMTCT OPEN VISIT    January 20, 2022        Libby Grimaldo is a 71 year old female undergoing evaluation prior to hematopoietic cell transplant or immune effector cell therapy.    Reason for BMTCT: multiple myeloma    Recent chemotherapy: through first week of January     Recent infections: current UTI    Blood thinner use? If yes, why? No    Treatment for diabetes? No          Today, the patient notes the following symptoms:  Review Of Systems  Skin: positive for easy to bruise, easy to cut skin  Eyes: negative  Ears/Nose/Throat: positive for thrush symptoms with current antibiotic  Respiratory: No shortness of breath, dyspnea on exertion, cough, or hemoptysis  Cardiovascular: negative, palpitations, tachycardia and dyspnea on exertion  Gastrointestinal: positive for poor appetite, nausea and diarrhea  Genitourinary: negative, dysuria, frequency and urgency  Musculoskeletal: positive for neck pain, joint pain and joint stiffness  Neurologic: negative  Psychiatric: negative  Hematologic/Lymphatic/Immunologic: negative  Endocrine: negative      Libby Grimaldo's History    Past Medical History:   Diagnosis Date     Cervical radiculopathy      COPD (chronic obstructive pulmonary disease) (H)      Double vision      Febrile seizure (H)     Per patient. Once while a toddler, once while in highschool, and once while in college.     Floaters      Graves disease      HLD (hyperlipidemia)      HTN (hypertension)      IPF (idiopathic pulmonary fibrosis) (H)      Lumbar radiculopathy      Multiple myeloma in relapse (H)      Osteoporosis      Pneumonia     Per patient. Complicated by sepsis.     Recurrent UTI     Per patient. Has required prophylactic antibiotcs.     Vitamin B12 deficiency (non anemic)        Past Surgical History:   Procedure Laterality Date     CERVICAL FUSION       CHOLECYSTECTOMY       CYSTECTOMY OVARIAN BENIGN       EYE SURGERY      Per patient.     TONSILLECTOMY       WRIST SURGERY  Assumed care at 0200.   "Left        Family History   Problem Relation Age of Onset     Heart Disease Mother      Cancer Mother         \"Around lungs\" - she explicitly said it was *not* lung cancer.     Diabetes Father      Heart Disease Father        Social History     Tobacco Use     Smoking status: Former Smoker     Packs/day: 1.00     Years: 40.00     Pack years: 40.00     Smokeless tobacco: Former User     Quit date: 2010   Substance Use Topics     Alcohol use: Not Currently           Libby Grimaldo's Medications and Allergies    Current Outpatient Medications   Medication     acetaminophen (TYLENOL) 500 MG tablet     acyclovir (ZOVIRAX) 400 MG tablet     albuterol (PROAIR HFA/PROVENTIL HFA/VENTOLIN HFA) 108 (90 Base) MCG/ACT inhaler     allopurinol (ZYLOPRIM) 300 MG tablet     aspirin (ASA) 81 MG chewable tablet     atorvastatin (LIPITOR) 40 MG tablet     buprenorphine HCl-naloxone HCl (SUBOXONE) 8-2 MG per film     Butalbital-APAP-Caffeine -40 MG CAPS     cholecalciferol (VITAMIN D-1000 MAX ST) 25 MCG (1000 UT) TABS     cyclobenzaprine (FLEXERIL) 10 MG tablet     dexamethasone (DECADRON) 4 MG tablet     fentaNYL (DURAGESIC) 25 mcg/hr 72 hr patch     Fluticasone-Umeclidin-Vilanterol (TRELEGY ELLIPTA) 100-62.5-25 MCG/INH oral inhaler     ipratropium - albuterol 0.5 mg/2.5 mg/3 mL (DUONEB) 0.5-2.5 (3) MG/3ML neb solution     levofloxacin (LEVAQUIN) 250 MG tablet     levothyroxine (SYNTHROID/LEVOTHROID) 112 MCG tablet     lidocaine-prilocaine (EMLA) 2.5-2.5 % external cream     LORazepam (ATIVAN) 0.5 MG tablet     metoprolol succinate ER (TOPROL-XL) 25 MG 24 hr tablet     ondansetron (ZOFRAN) 8 MG tablet     ondansetron (ZOFRAN-ODT) 4 MG ODT tab     oxyCODONE IR (ROXICODONE) 10 MG tablet     polyethylene glycol (MIRALAX/GLYCOLAX) powder     promethazine (PHENERGAN) 25 MG tablet     REVLIMID 25 MG CAPS capsule     sulfamethoxazole-trimethoprim (BACTRIM DS/SEPTRA DS) 800-160 MG tablet     venlafaxine (EFFEXOR) 75 MG tablet     No " "current facility-administered medications for this visit.          Allergies   Allergen Reactions     Bupropion      Other reaction(s): Seizures           Physical Examination    /72   Pulse 78   Temp 98.2  F (36.8  C) (Oral)   Ht 1.61 m (5' 3.39\")   Wt 62.6 kg (138 lb 0.1 oz)   SpO2 98%   BMI 24.15 kg/m      Exam:  Constitutional: healthy, alert and no distress  Head: Normocephalic. No masses, lesions, tenderness or abnormalities  ENT: ENT exam normal, no neck nodes or sinus tenderness  Cardiovascular: negative, PMI normal. No lifts, heaves, or thrills. RRR. No murmurs, clicks gallops or rub  Respiratory: negative, Percussion normal. Good diaphragmatic excursion. Lungs clear  Gastrointestinal: Abdomen soft, non-tender. BS normal. No masses, organomegaly  : Deferred  Musculoskeletal: extremities normal- no gross deformities noted, gait normal and normal muscle tone  Skin: no suspicious lesions or rashes  Neurologic: Gait normal. Reflexes normal and symmetric. Sensation grossly WNL.  Psychiatric: mentation appears normal and affect normal/bright  Hematologic/Lymphatic/Immunologic: Normal cervical lymph nodes        Frailty Screening    1. Weight loss: Have you lost >10 pounds (or >5% body weight) unintentionally over the last year? No      2. Exhaustion: How often in the past week did you feel that:  o I feel that everything I do is an effort : .Exhaustion: 1 = some of the time (1-2 days)  o I feel I cannot get going: Exhaustion: 1 = some of the time (1-2 days)    3. Weakness:  Hand  strength (measured by MA; calculate average): 10     Male BMI Frailty Criteria for  Male  Strength Female BMI Frailty Criteria for  Female  Strength   ?24 ?29 ?23 ?17   24.1 - 26 ?30 23.1 - 26 ?17.3   26.1 - 28 ?30 26.1 - 29 ?18   >28 ?32 >29 ?21     4. Slowness:  15 foot walk time (measured by MA):  6    Height Frailty Criteria for  15 Foot Walk Time   Men   ?173 cm ? 7 seconds    >173 cm  ? 6 seconds   Women "   ?159 cm ? 7 seconds   >159 cm  ? 6 seconds     5. Physical activity:     *Please complete 2 calculations for kcal (see frailty worksheet for equations)     Energy expenditure for frailty: 465 kcal expended per week     Gender Frailty Criteria for Low Physical Activity   Male <383 kcal/week   Female <270 kcal/weel     IPAQ score: 450 MET minutes per week       Libby Grimaldo met the following criteria for prefrailty (score 1-2) or frailty (score 3+): Frailty: Weakness  Frailty Score is: 1      Additional assessments not to be used in frailty calculation:   What types of physical activity can you tolerate? Walking around house, laundry, some house chores    Sit to stand test (time to complete 5 reps): 16 seconds    Standing balance in 10 seconds:  Record the Total number of seconds(0-30)--add a+b+c ( take best attempt for each)    First attempt: 10 seconds    Second attempt: 10 seconds        I have reviewed the diagnostic data, medications, frailty screening, and general processes prior to BMTCT.  I have notified the Primary BMT Physician/and or Attending Physician in the clinic of any issues. We also discussed in detail the database and biorepository research for which Libby Grimaldo is eligible. We discussed the potential risks and potential benefits of each of these protocols individually. We explained potential alternatives to the protocols discussed. We explained to the patient that participation is voluntary and that consent may be withdrawn at any time.     Her zofran was refilled today. I deferred the renewal of any ativan or other controlled substances.      Consents Signed:    Blood transfusion consent form    Ethnicity form    [facility] database    [facility] BMTCT Database    Present during the discussion were Libby and her sister. Copies of the signed consent forms will be provided to the patient on admission. No procedures specific to any studies were performed prior to the patient signing the consent  form.    Libby Grimaldo had the opportunity to ask questions, and I answered all of the questions to the best of my ability.      Wendi Davis PA-C

## 2022-12-06 ENCOUNTER — TELEPHONE (OUTPATIENT)
Dept: TRANSPLANT | Facility: CLINIC | Age: 72
End: 2022-12-06

## 2022-12-06 NOTE — TELEPHONE ENCOUNTER
BMT CLINICAL SOCIAL WORK NOTE:    Focus:Resources    Data: Pt is a 72 year old female, currently day +193 s/p allo BMT.     Interventions: Clinical  (CSW) received a call from Pt to assist with concerns regarding financial support.   CSW called the pt back and left a message to discuss her concerns.    Plan: CSW will continue to work with Pt and family to provide supportive counseling and assist with resources as needed. CSW will continue to collaborate with multidisciplinary team regarding Pt's plan of care.     ANDREW Bangura, [facility]  Pager: [phone]  Phone: [phone]

## 2022-12-06 NOTE — PROGRESS NOTES
BMT Progress Note    BMT Physician: Dr. Guillory/Rashad Chen     Oncology History   Multiple myeloma not having achieved remission (H)   11/7/2018 -  Cancer Staged    Staging form: Plasma Cell Myeloma and Disorders, AJCC 8th Edition  - Clinical stage from 11/7/2018: Albumin (g/dL): 3.4, ISS: Stage II, High-risk cytogenetics: Absent, LDH: Unknown     11/7/2018 Initial Diagnosis    Multiple myeloma not having achieved remission (H)     1/23/2019 - 11/14/2019 Chemotherapy    KRD x 6 cycles     11/4/2019 - 2/14/2021 Chemotherapy    Rev/Dex(20)      4/3/2020 - 7/17/2020 Chemotherapy    Velcade maintenance - dose reductions for orthostatic hypotension     7/17/2020 - 12/15/2020 Chemotherapy    Revlimid maintenance     11/23/2020 Progression    Bone marrow 35% plasma cells, PET CT demonstrating new lytic lesions     12/15/2020 - 6/11/2021 Chemotherapy    Elsy, Kd x 6 cycles     7/7/2021 -  Cancer Staged    PET/CT - CR     7/21/2021 -  Chemotherapy    Subcutaneous Daratumumab, IVIG, denosumab monthly     11/22/2021 -  Cancer Staged    Bone marrow aspirate - MRD 0.0022% positive     2/1/2022 - 2/5/2022 Chemotherapy    IP BMT Chemotherapy For Mobilization - High Dose Cyclophosphamide  Plan Provider: Julio C Guillory MD  Treatment goal: Other  Line of treatment: [No plan line of treatment]     5/25/2022 -  Chemotherapy    IP - OP BMT 2016-35 Auto Multiple Myeloma - Melphalan (CrCL < 30 mL/min, >/= 2 comorbidities, OR age > 75yrs) - Adult (Version: 7/13/21)  Plan Provider: Julio C Guillory MD  Treatment goal: Other  Line of treatment: [No plan line of treatment]     9/29/2022 -  Supportive Treatment    Oral ONC - ixazomib maintenance post-transplant  Plan Provider: Julio C Guillory MD  Treatment goal: Curative  Line of treatment: [No plan line of treatment]         ID: Libby Grimaldo is a 71 yo woman D+160 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 Now on ninlaro maintenance.     INTERIM HISTORY:   Currently returns to clinic  today feeling poorly.  She has had a 1 month history of a worsening cough, URI symptoms initially, then more productive cough that has led to more malaise, mild shortness of breath, and some weight loss.  She is now in her fourth week of a severe productive cough.  She has had no fever, nausea, vomiting, although she feels quite fatigued.  She does continue to take her Ninlaro although is tolerating it poorly with nausea and malaise the day or 2 after she takes it.  She is wondering about other options.  She does continue to have back pain which is chronic for her and has no new symptoms related to myeloma.     ROS: 8 point ROS neg other than the symptoms noted above in the HPI.     PHYSICAL EXAM:   Vitals:  Blood pressure 130/66, pulse 69, temperature 98.4  F (36.9  C), temperature source Oral, resp. rate 16, weight 53.1 kg (117 lb), SpO2 96 %.      Wt Readings from Last 4 Encounters:   12/07/22 53.1 kg (117 lb)   11/03/22 52.8 kg (116 lb 6.4 oz)   10/03/22 54.1 kg (119 lb 3.2 oz)   09/21/22 52.2 kg (115 lb)       General Appearance: she appears ill  HEENT: PERRL; OP somewhat dry, no ulcerations. Dentures.  CV: mildly tachy (~100 at rest in clinic)  Lungs: CTAB  EXT: No LE edema.   SKIN: no lesions or rash  NEURO: A&O x3   PSYCH: Appropriate affect   VASCULAR ACCESS: R port a cath not accessed    Labs:  Lab Results   Component Value Date    WBC 4.3 12/07/2022    ANEU 3.9 06/16/2022    HGB 9.5 (L) 12/07/2022    HCT 30.4 (L) 12/07/2022     (L) 12/07/2022     12/07/2022    POTASSIUM 3.7 12/07/2022    CHLORIDE 106 12/07/2022    CO2 29 12/07/2022    GLC 83 12/07/2022    BUN 8.7 12/07/2022    CR 0.75 12/07/2022    MAG 1.6 (L) 12/07/2022    INR 1.40 (H) 05/30/2022    BILITOTAL 0.3 12/07/2022    AST 15 12/07/2022    ALT 11 12/07/2022    ALKPHOS 143 (H) 12/07/2022    PROTTOTAL 5.6 (L) 12/07/2022    ALBUMIN 3.5 12/07/2022        ASSESSMENT AND PLAN:   Libby Grimaldo is a 71 year old female with high risk IgG  lambda MM.      Myeloma/BMT/IEC PROTOCOL for 2016-35  - Chemo protocol: HD Melphalan 140mg/m2  Day 0: Cell dose: 3.94x10^6 (s/p cytoxan chemo priming 2/1/2022 and subsequent stem cell collection).   - Restaging plan: per protocol.  - Maintenance Ninlaro started early Oct 2022; Day 28 (11/4)  -I recommended that she hold the Ninlaro for now until improved.  We will meet again in 3 to 4 weeks to revisit continuing Ninlaro versus a change to pomalidomide dexamethasone.     HEME/COAG  - anemia, thrombocytopenia due to chemo/BMT. Counts stable today; monitor on Ninlaro.  - Relevant thrombosis or bleeding history:   2/15/22: new non-occlusive LUE DVT. No longer on xarelto      ID  - Prophylaxis plan: ACV, Bactrim    - Covid vacc #1 (post BMT) and flu shot 10/3/22     CARDIOVASCULAR  - Risk of cardiomyopathy:  Baseline EF 56%  - known hyperlipidemia- per notes previously on Crestor but not on med list - follow-up with PCP if needed    History of Afib - now in NSR but tachy. Continue metoprolol XL 25 mg/d      RESPIRATORY  - hx of COPD, continue breo-ellipa inhaler      GI/NUTRITION  - CINV: continues on Zyprexa bid. Rec Zofran tid day of and day after Ninlaro with next doses. Compazine prn.     - Ulcer prophylaxis: protonix 40mg daily.        RENAL/ELECTROLYTES/:  - hypokalemia - she will increase her K rich foods. Add metamucil.     PSYCH  - continue effexor 75mg tid     SYMPTOM MANAGEMENT.  - # Pain Assessment:  - Libby is experiencing pain due to chronic pain - Continue suboxone and prn oxycodone.      Respiratory infection: She has what was likely initially a viral URI but now the persistent and worsening cough suggest bacterial infection.  She is currently afebrile, eating and drinking okay, and is not hypoxic.  I recommended a course of Levaquin.  If she has not improved in 7 days, I recommend that she return to see her PCP, or urgent care for more testing and chest x-ray.    Plan  -hold ninlaro  -Levaquin x 7  days  - RTC 3-4 weeks    I spent 40 minutes in the care of this patient today, which included time necessary for preparation for the visit, obtaining history, ordering medications/tests/procedures as medically indicated, review of pertinent medical literature, counseling of the patient, communication of recommendations to the care team, and documentation time.    SOTO BOWSER MD  December 7, 2022

## 2022-12-07 ENCOUNTER — LAB (OUTPATIENT)
Dept: LAB | Facility: CLINIC | Age: 72
End: 2022-12-07
Attending: INTERNAL MEDICINE
Payer: MEDICARE

## 2022-12-07 ENCOUNTER — OFFICE VISIT (OUTPATIENT)
Dept: TRANSPLANT | Facility: CLINIC | Age: 72
End: 2022-12-07
Attending: INTERNAL MEDICINE
Payer: MEDICARE

## 2022-12-07 VITALS
RESPIRATION RATE: 16 BRPM | BODY MASS INDEX: 19.47 KG/M2 | HEART RATE: 69 BPM | OXYGEN SATURATION: 96 % | SYSTOLIC BLOOD PRESSURE: 130 MMHG | WEIGHT: 117 LBS | DIASTOLIC BLOOD PRESSURE: 66 MMHG | TEMPERATURE: 98.4 F

## 2022-12-07 DIAGNOSIS — Z51.11 ADMISSION FOR CHEMOTHERAPY: ICD-10-CM

## 2022-12-07 DIAGNOSIS — C90.00 MULTIPLE MYELOMA NOT HAVING ACHIEVED REMISSION (H): Primary | ICD-10-CM

## 2022-12-07 DIAGNOSIS — Z94.81 STATUS POST BONE MARROW TRANSPLANT (H): ICD-10-CM

## 2022-12-07 DIAGNOSIS — Z94.84 STEM CELLS TRANSPLANT STATUS (H): ICD-10-CM

## 2022-12-07 LAB
ALBUMIN SERPL BCG-MCNC: 3.5 G/DL (ref 3.5–5.2)
ALP SERPL-CCNC: 143 U/L (ref 35–104)
ALT SERPL W P-5'-P-CCNC: 11 U/L (ref 10–35)
ANION GAP SERPL CALCULATED.3IONS-SCNC: 8 MMOL/L (ref 7–15)
AST SERPL W P-5'-P-CCNC: 15 U/L (ref 10–35)
BASOPHILS # BLD AUTO: 0 10E3/UL (ref 0–0.2)
BASOPHILS NFR BLD AUTO: 1 %
BILIRUB SERPL-MCNC: 0.3 MG/DL
BUN SERPL-MCNC: 8.7 MG/DL (ref 8–23)
CALCIUM SERPL-MCNC: 9 MG/DL (ref 8.8–10.2)
CD19 CELLS # BLD: 105 CELLS/UL (ref 107–698)
CD19 CELLS NFR BLD: 8 % (ref 6–27)
CD3 CELLS # BLD: 941 CELLS/UL (ref 603–2990)
CD3 CELLS NFR BLD: 76 % (ref 49–84)
CD3+CD4+ CELLS # BLD: 334 CELLS/UL (ref 441–2156)
CD3+CD4+ CELLS NFR BLD: 27 % (ref 28–63)
CD3+CD4+ CELLS/CD3+CD8+ CLL BLD: 0.53 % (ref 1.4–2.6)
CD3+CD8+ CELLS # BLD: 629 CELLS/UL (ref 125–1312)
CD3+CD8+ CELLS NFR BLD: 51 % (ref 10–40)
CD3-CD16+CD56+ CELLS # BLD: 176 CELLS/UL (ref 95–640)
CD3-CD16+CD56+ CELLS NFR BLD: 14 % (ref 4–25)
CHLORIDE SERPL-SCNC: 106 MMOL/L (ref 98–107)
CREAT SERPL-MCNC: 0.75 MG/DL (ref 0.51–0.95)
DEPRECATED HCO3 PLAS-SCNC: 29 MMOL/L (ref 22–29)
EOSINOPHIL # BLD AUTO: 0.1 10E3/UL (ref 0–0.7)
EOSINOPHIL NFR BLD AUTO: 2 %
ERYTHROCYTE [DISTWIDTH] IN BLOOD BY AUTOMATED COUNT: 13.5 % (ref 10–15)
GFR SERPL CREATININE-BSD FRML MDRD: 85 ML/MIN/1.73M2
GLUCOSE SERPL-MCNC: 83 MG/DL (ref 70–99)
HCT VFR BLD AUTO: 30.4 % (ref 35–47)
HGB BLD-MCNC: 9.5 G/DL (ref 11.7–15.7)
IMM GRANULOCYTES # BLD: 0 10E3/UL
IMM GRANULOCYTES NFR BLD: 0 %
LDH SERPL L TO P-CCNC: 188 U/L (ref 0–250)
LYMPHOCYTES # BLD AUTO: 1.3 10E3/UL (ref 0.8–5.3)
LYMPHOCYTES NFR BLD AUTO: 31 %
MAGNESIUM SERPL-MCNC: 1.6 MG/DL (ref 1.7–2.3)
MCH RBC QN AUTO: 31.9 PG (ref 26.5–33)
MCHC RBC AUTO-ENTMCNC: 31.3 G/DL (ref 31.5–36.5)
MCV RBC AUTO: 102 FL (ref 78–100)
MONOCYTES # BLD AUTO: 0.6 10E3/UL (ref 0–1.3)
MONOCYTES NFR BLD AUTO: 14 %
NEUTROPHILS # BLD AUTO: 2.2 10E3/UL (ref 1.6–8.3)
NEUTROPHILS NFR BLD AUTO: 52 %
NRBC # BLD AUTO: 0 10E3/UL
NRBC BLD AUTO-RTO: 0 /100
PHOSPHATE SERPL-MCNC: 4.1 MG/DL (ref 2.5–4.5)
PLATELET # BLD AUTO: 147 10E3/UL (ref 150–450)
POTASSIUM SERPL-SCNC: 3.7 MMOL/L (ref 3.4–5.3)
PROT SERPL-MCNC: 5.6 G/DL (ref 6.4–8.3)
RBC # BLD AUTO: 2.98 10E6/UL (ref 3.8–5.2)
SODIUM SERPL-SCNC: 143 MMOL/L (ref 136–145)
T CELL EXTENDED COMMENT: ABNORMAL
URATE SERPL-MCNC: 5.1 MG/DL (ref 2.4–5.7)
WBC # BLD AUTO: 4.3 10E3/UL (ref 4–11)

## 2022-12-07 PROCEDURE — G0463 HOSPITAL OUTPT CLINIC VISIT: HCPCS

## 2022-12-07 PROCEDURE — 83735 ASSAY OF MAGNESIUM: CPT | Performed by: INTERNAL MEDICINE

## 2022-12-07 PROCEDURE — 99215 OFFICE O/P EST HI 40 MIN: CPT

## 2022-12-07 PROCEDURE — 86355 B CELLS TOTAL COUNT: CPT | Performed by: INTERNAL MEDICINE

## 2022-12-07 PROCEDURE — 84550 ASSAY OF BLOOD/URIC ACID: CPT | Performed by: INTERNAL MEDICINE

## 2022-12-07 PROCEDURE — 80053 COMPREHEN METABOLIC PANEL: CPT | Performed by: INTERNAL MEDICINE

## 2022-12-07 PROCEDURE — 250N000011 HC RX IP 250 OP 636: Performed by: INTERNAL MEDICINE

## 2022-12-07 PROCEDURE — 85025 COMPLETE CBC W/AUTO DIFF WBC: CPT | Performed by: INTERNAL MEDICINE

## 2022-12-07 PROCEDURE — 82306 VITAMIN D 25 HYDROXY: CPT | Performed by: INTERNAL MEDICINE

## 2022-12-07 PROCEDURE — 83615 LACTATE (LD) (LDH) ENZYME: CPT | Performed by: INTERNAL MEDICINE

## 2022-12-07 PROCEDURE — 84100 ASSAY OF PHOSPHORUS: CPT | Performed by: INTERNAL MEDICINE

## 2022-12-07 RX ORDER — SULFAMETHOXAZOLE/TRIMETHOPRIM 800-160 MG
1 TABLET ORAL
Qty: 48 TABLET | Refills: 3 | Status: SHIPPED | OUTPATIENT
Start: 2022-12-07

## 2022-12-07 RX ORDER — HEPARIN SODIUM (PORCINE) LOCK FLUSH IV SOLN 100 UNIT/ML 100 UNIT/ML
5 SOLUTION INTRAVENOUS ONCE
Status: COMPLETED | OUTPATIENT
Start: 2022-12-07 | End: 2022-12-07

## 2022-12-07 RX ORDER — ONDANSETRON 8 MG/1
8 TABLET, ORALLY DISINTEGRATING ORAL EVERY 8 HOURS
Qty: 30 TABLET | Refills: 0 | Status: SHIPPED | OUTPATIENT
Start: 2022-12-07 | End: 2023-01-09

## 2022-12-07 RX ORDER — ACYCLOVIR 400 MG/1
400 TABLET ORAL EVERY 12 HOURS
Qty: 180 TABLET | Refills: 3 | Status: SHIPPED | OUTPATIENT
Start: 2022-12-07 | End: 2023-01-01

## 2022-12-07 RX ORDER — LEVOFLOXACIN 500 MG/1
500 TABLET, FILM COATED ORAL DAILY
Qty: 7 TABLET | Refills: 0 | Status: SHIPPED | OUTPATIENT
Start: 2022-12-07 | End: 2023-01-01

## 2022-12-07 RX ADMIN — Medication 5 ML: at 11:54

## 2022-12-07 ASSESSMENT — PAIN SCALES - GENERAL: PAINLEVEL: MILD PAIN (2)

## 2022-12-07 NOTE — NURSING NOTE
Chief Complaint   Patient presents with     Port Draw     Port accessed with 20g gripper needle by RN, labs collected, line flushed with saline and heparin.  Vitals taken. Pt checked in for appointment(s).      Toyin MELARA RN PHN BSN  BMT/Oncology Lab     everted (after stimulation)

## 2022-12-07 NOTE — LETTER
12/7/2022         RE: Libby Grimaldo  [address] [facility] [address]        Dear Colleague,    Thank you for referring your patient, Libby Grimaldo, to the [facility] BLOOD AND MARROW TRANSPLANT PROGRAM [place]. Please see a copy of my visit note below.    BMT Progress Note    BMT Physician: Dr. Guillory/Rashad Chen     Oncology History   Multiple myeloma not having achieved remission (H)   11/7/2018 -  Cancer Staged    Staging form: Plasma Cell Myeloma and Disorders, AJCC 8th Edition  - Clinical stage from 11/7/2018: Albumin (g/dL): 3.4, ISS: Stage II, High-risk cytogenetics: Absent, LDH: Unknown     11/7/2018 Initial Diagnosis    Multiple myeloma not having achieved remission (H)     1/23/2019 - 11/14/2019 Chemotherapy    KRD x 6 cycles     11/4/2019 - 2/14/2021 Chemotherapy    Rev/Dex(20)      4/3/2020 - 7/17/2020 Chemotherapy    Velcade maintenance - dose reductions for orthostatic hypotension     7/17/2020 - 12/15/2020 Chemotherapy    Revlimid maintenance     11/23/2020 Progression    Bone marrow 35% plasma cells, PET CT demonstrating new lytic lesions     12/15/2020 - 6/11/2021 Chemotherapy    Elsy, Kd x 6 cycles     7/7/2021 -  Cancer Staged    PET/CT - CR     7/21/2021 -  Chemotherapy    Subcutaneous Daratumumab, IVIG, denosumab monthly     11/22/2021 -  Cancer Staged    Bone marrow aspirate - MRD 0.0022% positive     2/1/2022 - 2/5/2022 Chemotherapy    IP BMT Chemotherapy For Mobilization - High Dose Cyclophosphamide  Plan Provider: Julio C Guillory MD  Treatment goal: Other  Line of treatment: [No plan line of treatment]     5/25/2022 -  Chemotherapy    IP - OP BMT 2016-35 Auto Multiple Myeloma - Melphalan (CrCL < 30 mL/min, >/= 2 comorbidities, OR age > 75yrs) - Adult (Version: 7/13/21)  Plan Provider: Julio C Guillory MD  Treatment goal: Other  Line of treatment: [No plan line of treatment]     9/29/2022 -  Supportive Treatment    Oral ONC - ixazomib maintenance post-transplant  Plan  Provider: Julio C Guillory MD  Treatment goal: Curative  Line of treatment: [No plan line of treatment]         ID: Libby Grimaldo is a 71 yo woman D+160 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 Now on ninlaro maintenance.     INTERIM HISTORY:   Currently returns to clinic today feeling poorly.  She has had a 1 month history of a worsening cough, URI symptoms initially, then more productive cough that has led to more malaise, mild shortness of breath, and some weight loss.  She is now in her fourth week of a severe productive cough.  She has had no fever, nausea, vomiting, although she feels quite fatigued.  She does continue to take her Ninlaro although is tolerating it poorly with nausea and malaise the day or 2 after she takes it.  She is wondering about other options.  She does continue to have back pain which is chronic for her and has no new symptoms related to myeloma.     ROS: 8 point ROS neg other than the symptoms noted above in the HPI.     PHYSICAL EXAM:   Vitals:  Blood pressure 130/66, pulse 69, temperature 98.4  F (36.9  C), temperature source Oral, resp. rate 16, weight 53.1 kg (117 lb), SpO2 96 %.      Wt Readings from Last 4 Encounters:   12/07/22 53.1 kg (117 lb)   11/03/22 52.8 kg (116 lb 6.4 oz)   10/03/22 54.1 kg (119 lb 3.2 oz)   09/21/22 52.2 kg (115 lb)       General Appearance: she appears ill  HEENT: PERRL; OP somewhat dry, no ulcerations. Dentures.  CV: mildly tachy (~100 at rest in clinic)  Lungs: CTAB  EXT: No LE edema.   SKIN: no lesions or rash  NEURO: A&O x3   PSYCH: Appropriate affect   VASCULAR ACCESS: R port a cath not accessed    Labs:  Lab Results   Component Value Date    WBC 4.3 12/07/2022    ANEU 3.9 06/16/2022    HGB 9.5 (L) 12/07/2022    HCT 30.4 (L) 12/07/2022     (L) 12/07/2022     12/07/2022    POTASSIUM 3.7 12/07/2022    CHLORIDE 106 12/07/2022    CO2 29 12/07/2022    GLC 83 12/07/2022    BUN 8.7 12/07/2022    CR 0.75 12/07/2022    MAG 1.6 (L)  12/07/2022    INR 1.40 (H) 05/30/2022    BILITOTAL 0.3 12/07/2022    AST 15 12/07/2022    ALT 11 12/07/2022    ALKPHOS 143 (H) 12/07/2022    PROTTOTAL 5.6 (L) 12/07/2022    ALBUMIN 3.5 12/07/2022        ASSESSMENT AND PLAN:   Libby Grimaldo is a 71 year old female with 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      Myeloma/BMT/IEC PROTOCOL for 2016-35  - Chemo protocol: HD Melphalan 140mg/m2  Day 0: Cell dose: 3.94x10^6 (s/p cytoxan chemo priming 2/1/2022 and subsequent stem cell collection).   - Restaging plan: per protocol.  - Maintenance Ninlaro started early Oct 2022; Day 28 (11/4)  -I recommended that she hold the Ninlaro for now until improved.  We will meet again in 3 to 4 weeks to revisit continuing Ninlaro versus a change to pomalidomide dexamethasone.     HEME/COAG  - anemia, thrombocytopenia due to chemo/BMT. Counts stable today; monitor on Ninlaro.  - Relevant thrombosis or bleeding history:   2/15/22: new non-occlusive LUE DVT. No longer on xarelto      ID  - Prophylaxis plan: ACV, Bactrim    - Covid vacc #1 (post BMT) and flu shot 10/3/22     CARDIOVASCULAR  - Risk of cardiomyopathy:  Baseline EF 56%  - known hyperlipidemia- per notes previously on Crestor but not on med list - follow-up with PCP if needed    History of Afib - now in NSR but tachy. Continue metoprolol XL 25 mg/d      RESPIRATORY  - hx of COPD, continue breo-ellipa inhaler      GI/NUTRITION  - CINV: continues on Zyprexa bid. Rec Zofran tid day of and day after Ninlaro with next doses. Compazine prn.     - Ulcer prophylaxis: protonix 40mg daily.        RENAL/ELECTROLYTES/:  - hypokalemia - she will increase her K rich foods. Add metamucil.     PSYCH  - continue effexor 75mg tid     SYMPTOM MANAGEMENT.  - # Pain Assessment:  - Libby is experiencing pain due to chronic pain - Continue suboxone and prn oxycodone.      Respiratory infection: She has what was likely initially a viral URI but now the persistent and worsening cough suggest bacterial infection.   She is currently afebrile, eating and drinking okay, and is not hypoxic.  I recommended a course of Levaquin.  If she has not improved in 7 days, I recommend that she return to see her PCP, or urgent care for more testing and chest x-ray.    Plan  -hold ninlaro  -Levaquin x 7 days  - RTC 3-4 weeks    I spent 40 minutes in the care of this patient today, which included time necessary for preparation for the visit, obtaining history, ordering medications/tests/procedures as medically indicated, review of pertinent medical literature, counseling of the patient, communication of recommendations to the care team, and documentation time.    SOTO BOWSER MD  December 7, 2022

## 2022-12-07 NOTE — NURSING NOTE
"Oncology Rooming Note    December 7, 2022 12:58 PM   Libby Grimaldo is a 72 year old female who presents for:    Chief Complaint   Patient presents with     Port Draw     Port accessed with 20g gripper needle by RN, labs collected, line flushed with saline and heparin.  Vitals taken. Pt checked in for appointment(s).      Oncology Clinic Visit     Multiple myeloma; Stem cells transplant status (H)     Initial Vitals: /66   Pulse 69   Temp 98.4  F (36.9  C) (Oral)   Resp 16   Wt 53.1 kg (117 lb)   SpO2 96%   BMI 19.47 kg/m   Estimated body mass index is 19.47 kg/m  as calculated from the following:    Height as of 9/1/22: 1.651 m (5' 5\").    Weight as of this encounter: 53.1 kg (117 lb). Body surface area is 1.56 meters squared.  Mild Pain (2) Comment: Data Unavailable   No LMP recorded. Patient is postmenopausal.  Allergies reviewed: Yes  Medications reviewed: Yes    Medications: Medication refills not needed today.  Pharmacy name entered into [employer]:    [id number] - [place], [place] - [address] AT [facility] OF [address] & [address] [place] [address] [facility]CIALTY [facility]ACY - [place], [place] - [address]    Clinical concerns: Patient reports left hip issues, and new respiratory difficulties-cough-no mucous production-disrupts sleep.        Aniyah Key LPN December 7, 2022 12:59 PM              "

## 2022-12-08 ENCOUNTER — DOCUMENTATION ONLY (OUTPATIENT)
Dept: PHARMACY | Facility: CLINIC | Age: 72
End: 2022-12-08

## 2022-12-12 LAB
DEPRECATED CALCIDIOL+CALCIFEROL SERPL-MC: <71 UG/L (ref 20–75)
VITAMIN D2 SERPL-MCNC: <5 UG/L
VITAMIN D3 SERPL-MCNC: 66 UG/L

## 2023-01-01 ENCOUNTER — PATIENT OUTREACH (OUTPATIENT)
Dept: CARE COORDINATION | Facility: CLINIC | Age: 73
End: 2023-01-01
Payer: MEDICARE

## 2023-01-01 ENCOUNTER — PATIENT OUTREACH (OUTPATIENT)
Dept: ONCOLOGY | Facility: CLINIC | Age: 73
End: 2023-01-01
Payer: MEDICARE

## 2023-01-01 ENCOUNTER — TELEPHONE (OUTPATIENT)
Dept: ONCOLOGY | Facility: CLINIC | Age: 73
End: 2023-01-01
Payer: MEDICARE

## 2023-01-01 ENCOUNTER — APPOINTMENT (OUTPATIENT)
Dept: LAB | Facility: CLINIC | Age: 73
End: 2023-01-01
Attending: INTERNAL MEDICINE
Payer: MEDICARE

## 2023-01-01 ENCOUNTER — APPOINTMENT (OUTPATIENT)
Dept: NEUROLOGY | Facility: CLINIC | Age: 73
DRG: 846 | End: 2023-01-01
Payer: MEDICARE

## 2023-01-01 ENCOUNTER — ANCILLARY PROCEDURE (OUTPATIENT)
Dept: GENERAL RADIOLOGY | Facility: CLINIC | Age: 73
End: 2023-01-01
Attending: INTERNAL MEDICINE
Payer: MEDICARE

## 2023-01-01 ENCOUNTER — MYC MEDICAL ADVICE (OUTPATIENT)
Dept: INTERVENTIONAL RADIOLOGY/VASCULAR | Facility: CLINIC | Age: 73
End: 2023-01-01
Payer: MEDICARE

## 2023-01-01 ENCOUNTER — APPOINTMENT (OUTPATIENT)
Dept: MRI IMAGING | Facility: CLINIC | Age: 73
DRG: 846 | End: 2023-01-01
Attending: STUDENT IN AN ORGANIZED HEALTH CARE EDUCATION/TRAINING PROGRAM
Payer: MEDICARE

## 2023-01-01 ENCOUNTER — ONCOLOGY VISIT (OUTPATIENT)
Dept: TRANSPLANT | Facility: CLINIC | Age: 73
End: 2023-01-01
Attending: STUDENT IN AN ORGANIZED HEALTH CARE EDUCATION/TRAINING PROGRAM
Payer: MEDICARE

## 2023-01-01 ENCOUNTER — TELEPHONE (OUTPATIENT)
Dept: TRANSPLANT | Facility: CLINIC | Age: 73
End: 2023-01-01
Payer: MEDICARE

## 2023-01-01 ENCOUNTER — APPOINTMENT (OUTPATIENT)
Dept: LAB | Facility: CLINIC | Age: 73
DRG: 871 | End: 2023-01-01
Attending: INTERNAL MEDICINE
Payer: MEDICARE

## 2023-01-01 ENCOUNTER — ONCOLOGY VISIT (OUTPATIENT)
Dept: TRANSPLANT | Facility: CLINIC | Age: 73
DRG: 871 | End: 2023-01-01
Attending: INTERNAL MEDICINE
Payer: MEDICARE

## 2023-01-01 ENCOUNTER — ONCOLOGY VISIT (OUTPATIENT)
Dept: TRANSPLANT | Facility: CLINIC | Age: 73
End: 2023-01-01
Attending: INTERNAL MEDICINE
Payer: MEDICARE

## 2023-01-01 ENCOUNTER — PRE VISIT (OUTPATIENT)
Dept: OTOLARYNGOLOGY | Facility: CLINIC | Age: 73
End: 2023-01-01

## 2023-01-01 ENCOUNTER — INFUSION THERAPY VISIT (OUTPATIENT)
Dept: ONCOLOGY | Facility: CLINIC | Age: 73
End: 2023-01-01
Payer: MEDICARE

## 2023-01-01 ENCOUNTER — APPOINTMENT (OUTPATIENT)
Dept: PHYSICAL THERAPY | Facility: CLINIC | Age: 73
DRG: 846 | End: 2023-01-01
Attending: INTERNAL MEDICINE
Payer: MEDICARE

## 2023-01-01 ENCOUNTER — APPOINTMENT (OUTPATIENT)
Dept: CT IMAGING | Facility: CLINIC | Age: 73
DRG: 871 | End: 2023-01-01
Attending: STUDENT IN AN ORGANIZED HEALTH CARE EDUCATION/TRAINING PROGRAM
Payer: MEDICARE

## 2023-01-01 ENCOUNTER — APPOINTMENT (OUTPATIENT)
Dept: LAB | Facility: CLINIC | Age: 73
End: 2023-01-01
Payer: MEDICARE

## 2023-01-01 ENCOUNTER — APPOINTMENT (OUTPATIENT)
Dept: CT IMAGING | Facility: CLINIC | Age: 73
DRG: 846 | End: 2023-01-01
Payer: MEDICARE

## 2023-01-01 ENCOUNTER — APPOINTMENT (OUTPATIENT)
Dept: MRI IMAGING | Facility: CLINIC | Age: 73
DRG: 871 | End: 2023-01-01
Attending: STUDENT IN AN ORGANIZED HEALTH CARE EDUCATION/TRAINING PROGRAM
Payer: MEDICARE

## 2023-01-01 ENCOUNTER — HOSPITAL ENCOUNTER (OUTPATIENT)
Facility: CLINIC | Age: 73
Discharge: STILL ADMITTED OR ADMINISTRATIVE | DRG: 871 | End: 2023-11-22
Attending: RADIOLOGY | Admitting: RADIOLOGY
Payer: MEDICARE

## 2023-01-01 ENCOUNTER — HOSPITAL ENCOUNTER (INPATIENT)
Facility: CLINIC | Age: 73
LOS: 3 days | Discharge: HOME | DRG: 871 | End: 2023-11-25
Attending: STUDENT IN AN ORGANIZED HEALTH CARE EDUCATION/TRAINING PROGRAM | Admitting: INTERNAL MEDICINE
Payer: MEDICARE

## 2023-01-01 ENCOUNTER — APPOINTMENT (OUTPATIENT)
Dept: GENERAL RADIOLOGY | Facility: CLINIC | Age: 73
DRG: 871 | End: 2023-01-01
Attending: EMERGENCY MEDICINE
Payer: MEDICARE

## 2023-01-01 ENCOUNTER — TELEPHONE (OUTPATIENT)
Dept: GASTROENTEROLOGY | Facility: CLINIC | Age: 73
End: 2023-01-01
Payer: MEDICARE

## 2023-01-01 ENCOUNTER — APPOINTMENT (OUTPATIENT)
Dept: PHYSICAL THERAPY | Facility: CLINIC | Age: 73
DRG: 871 | End: 2023-01-01
Payer: MEDICARE

## 2023-01-01 ENCOUNTER — HOSPITAL ENCOUNTER (INPATIENT)
Facility: CLINIC | Age: 73
LOS: 12 days | Discharge: HOME | DRG: 846 | End: 2023-11-05
Attending: INTERNAL MEDICINE | Admitting: INTERNAL MEDICINE
Payer: MEDICARE

## 2023-01-01 ENCOUNTER — DOCUMENTATION ONLY (OUTPATIENT)
Dept: ONCOLOGY | Facility: CLINIC | Age: 73
End: 2023-01-01

## 2023-01-01 ENCOUNTER — TELEPHONE (OUTPATIENT)
Dept: ONCOLOGY | Facility: CLINIC | Age: 73
End: 2023-01-01

## 2023-01-01 ENCOUNTER — ONCOLOGY VISIT (OUTPATIENT)
Dept: ONCOLOGY | Facility: CLINIC | Age: 73
End: 2023-01-01
Payer: MEDICARE

## 2023-01-01 ENCOUNTER — APPOINTMENT (OUTPATIENT)
Dept: CT IMAGING | Facility: CLINIC | Age: 73
DRG: 846 | End: 2023-01-01
Attending: STUDENT IN AN ORGANIZED HEALTH CARE EDUCATION/TRAINING PROGRAM
Payer: MEDICARE

## 2023-01-01 ENCOUNTER — ONCOLOGY VISIT (OUTPATIENT)
Dept: ONCOLOGY | Facility: CLINIC | Age: 73
DRG: 846 | End: 2023-01-01
Attending: INTERNAL MEDICINE
Payer: MEDICARE

## 2023-01-01 ENCOUNTER — APPOINTMENT (OUTPATIENT)
Dept: NEUROLOGY | Facility: CLINIC | Age: 73
DRG: 871 | End: 2023-01-01
Attending: STUDENT IN AN ORGANIZED HEALTH CARE EDUCATION/TRAINING PROGRAM
Payer: MEDICARE

## 2023-01-01 ENCOUNTER — HOSPITAL ENCOUNTER (INPATIENT)
Facility: CLINIC | Age: 73
LOS: 3 days | Discharge: HOME | DRG: 871 | End: 2023-11-11
Attending: EMERGENCY MEDICINE | Admitting: STUDENT IN AN ORGANIZED HEALTH CARE EDUCATION/TRAINING PROGRAM
Payer: MEDICARE

## 2023-01-01 ENCOUNTER — APPOINTMENT (OUTPATIENT)
Dept: OCCUPATIONAL THERAPY | Facility: CLINIC | Age: 73
DRG: 871 | End: 2023-01-01
Attending: STUDENT IN AN ORGANIZED HEALTH CARE EDUCATION/TRAINING PROGRAM
Payer: MEDICARE

## 2023-01-01 ENCOUNTER — APPOINTMENT (OUTPATIENT)
Dept: SPEECH THERAPY | Facility: CLINIC | Age: 73
DRG: 871 | End: 2023-01-01
Attending: STUDENT IN AN ORGANIZED HEALTH CARE EDUCATION/TRAINING PROGRAM
Payer: MEDICARE

## 2023-01-01 ENCOUNTER — APPOINTMENT (OUTPATIENT)
Dept: MEDSURG UNIT | Facility: CLINIC | Age: 73
DRG: 871 | End: 2023-01-01
Attending: RADIOLOGY
Payer: MEDICARE

## 2023-01-01 ENCOUNTER — ANCILLARY PROCEDURE (OUTPATIENT)
Dept: MRI IMAGING | Facility: CLINIC | Age: 73
End: 2023-01-01
Attending: INTERNAL MEDICINE
Payer: MEDICARE

## 2023-01-01 ENCOUNTER — INFUSION THERAPY VISIT (OUTPATIENT)
Dept: ONCOLOGY | Facility: CLINIC | Age: 73
End: 2023-01-01
Attending: NURSE PRACTITIONER
Payer: MEDICARE

## 2023-01-01 ENCOUNTER — DOCUMENTATION ONLY (OUTPATIENT)
Dept: ONCOLOGY | Facility: CLINIC | Age: 73
End: 2023-01-01
Payer: MEDICARE

## 2023-01-01 ENCOUNTER — APPOINTMENT (OUTPATIENT)
Dept: PHYSICAL THERAPY | Facility: CLINIC | Age: 73
DRG: 846 | End: 2023-01-01
Payer: MEDICARE

## 2023-01-01 ENCOUNTER — DOCUMENTATION ONLY (OUTPATIENT)
Dept: GASTROENTEROLOGY | Facility: CLINIC | Age: 73
End: 2023-01-01

## 2023-01-01 ENCOUNTER — APPOINTMENT (OUTPATIENT)
Dept: CT IMAGING | Facility: CLINIC | Age: 73
DRG: 871 | End: 2023-01-01
Attending: EMERGENCY MEDICINE
Payer: MEDICARE

## 2023-01-01 ENCOUNTER — APPOINTMENT (OUTPATIENT)
Dept: GENERAL RADIOLOGY | Facility: CLINIC | Age: 73
DRG: 846 | End: 2023-01-01
Payer: MEDICARE

## 2023-01-01 ENCOUNTER — ANCILLARY PROCEDURE (OUTPATIENT)
Dept: GENERAL RADIOLOGY | Facility: CLINIC | Age: 73
End: 2023-01-01
Payer: MEDICARE

## 2023-01-01 ENCOUNTER — APPOINTMENT (OUTPATIENT)
Dept: PHYSICAL THERAPY | Facility: CLINIC | Age: 73
DRG: 871 | End: 2023-01-01
Attending: PHYSICIAN ASSISTANT
Payer: MEDICARE

## 2023-01-01 ENCOUNTER — LAB (OUTPATIENT)
Dept: LAB | Facility: CLINIC | Age: 73
DRG: 846 | End: 2023-01-01
Attending: INTERNAL MEDICINE
Payer: MEDICARE

## 2023-01-01 ENCOUNTER — APPOINTMENT (OUTPATIENT)
Dept: OCCUPATIONAL THERAPY | Facility: CLINIC | Age: 73
DRG: 846 | End: 2023-01-01
Attending: INTERNAL MEDICINE
Payer: MEDICARE

## 2023-01-01 ENCOUNTER — VIRTUAL VISIT (OUTPATIENT)
Dept: GASTROENTEROLOGY | Facility: CLINIC | Age: 73
End: 2023-01-01
Payer: MEDICARE

## 2023-01-01 ENCOUNTER — APPOINTMENT (OUTPATIENT)
Dept: OCCUPATIONAL THERAPY | Facility: CLINIC | Age: 73
DRG: 846 | End: 2023-01-01
Payer: MEDICARE

## 2023-01-01 ENCOUNTER — APPOINTMENT (OUTPATIENT)
Dept: OCCUPATIONAL THERAPY | Facility: CLINIC | Age: 73
DRG: 871 | End: 2023-01-01
Attending: RADIOLOGY
Payer: MEDICARE

## 2023-01-01 ENCOUNTER — HOSPITAL ENCOUNTER (INPATIENT)
Facility: CLINIC | Age: 73
LOS: 3 days | Discharge: HOME | DRG: 871 | End: 2023-08-11
Attending: EMERGENCY MEDICINE | Admitting: STUDENT IN AN ORGANIZED HEALTH CARE EDUCATION/TRAINING PROGRAM
Payer: MEDICARE

## 2023-01-01 ENCOUNTER — NURSE TRIAGE (OUTPATIENT)
Dept: NURSING | Facility: CLINIC | Age: 73
End: 2023-01-01
Payer: MEDICARE

## 2023-01-01 VITALS
OXYGEN SATURATION: 97 % | TEMPERATURE: 98.1 F | SYSTOLIC BLOOD PRESSURE: 110 MMHG | HEART RATE: 90 BPM | BODY MASS INDEX: 19.85 KG/M2 | DIASTOLIC BLOOD PRESSURE: 58 MMHG | RESPIRATION RATE: 20 BRPM | HEIGHT: 62 IN | WEIGHT: 107.9 LBS

## 2023-01-01 VITALS
RESPIRATION RATE: 16 BRPM | WEIGHT: 107.3 LBS | OXYGEN SATURATION: 97 % | SYSTOLIC BLOOD PRESSURE: 139 MMHG | HEART RATE: 73 BPM | DIASTOLIC BLOOD PRESSURE: 74 MMHG | TEMPERATURE: 97.9 F | BODY MASS INDEX: 17.86 KG/M2

## 2023-01-01 VITALS
SYSTOLIC BLOOD PRESSURE: 103 MMHG | RESPIRATION RATE: 16 BRPM | DIASTOLIC BLOOD PRESSURE: 68 MMHG | HEART RATE: 82 BPM | TEMPERATURE: 99.7 F | BODY MASS INDEX: 18.14 KG/M2 | WEIGHT: 109 LBS | OXYGEN SATURATION: 95 %

## 2023-01-01 VITALS
RESPIRATION RATE: 18 BRPM | WEIGHT: 116.8 LBS | SYSTOLIC BLOOD PRESSURE: 130 MMHG | OXYGEN SATURATION: 92 % | HEART RATE: 80 BPM | DIASTOLIC BLOOD PRESSURE: 56 MMHG | TEMPERATURE: 98.7 F | BODY MASS INDEX: 21.71 KG/M2

## 2023-01-01 VITALS
TEMPERATURE: 99.6 F | BODY MASS INDEX: 18.17 KG/M2 | HEART RATE: 97 BPM | WEIGHT: 109.2 LBS | OXYGEN SATURATION: 97 % | DIASTOLIC BLOOD PRESSURE: 59 MMHG | RESPIRATION RATE: 16 BRPM | SYSTOLIC BLOOD PRESSURE: 103 MMHG

## 2023-01-01 VITALS
BODY MASS INDEX: 19.74 KG/M2 | OXYGEN SATURATION: 97 % | RESPIRATION RATE: 18 BRPM | HEART RATE: 80 BPM | TEMPERATURE: 98.6 F | SYSTOLIC BLOOD PRESSURE: 115 MMHG | WEIGHT: 118.6 LBS | DIASTOLIC BLOOD PRESSURE: 72 MMHG

## 2023-01-01 VITALS
OXYGEN SATURATION: 90 % | SYSTOLIC BLOOD PRESSURE: 131 MMHG | RESPIRATION RATE: 16 BRPM | DIASTOLIC BLOOD PRESSURE: 72 MMHG | WEIGHT: 110.8 LBS | HEART RATE: 92 BPM | TEMPERATURE: 97.9 F | BODY MASS INDEX: 18.44 KG/M2

## 2023-01-01 VITALS
HEIGHT: 65 IN | OXYGEN SATURATION: 96 % | TEMPERATURE: 98.7 F | WEIGHT: 109.8 LBS | DIASTOLIC BLOOD PRESSURE: 90 MMHG | SYSTOLIC BLOOD PRESSURE: 153 MMHG | BODY MASS INDEX: 18.29 KG/M2 | HEART RATE: 88 BPM | RESPIRATION RATE: 17 BRPM

## 2023-01-01 VITALS
SYSTOLIC BLOOD PRESSURE: 161 MMHG | DIASTOLIC BLOOD PRESSURE: 100 MMHG | WEIGHT: 106.7 LBS | TEMPERATURE: 99.6 F | HEART RATE: 80 BPM | OXYGEN SATURATION: 95 % | BODY MASS INDEX: 19.83 KG/M2 | RESPIRATION RATE: 16 BRPM

## 2023-01-01 VITALS
DIASTOLIC BLOOD PRESSURE: 95 MMHG | OXYGEN SATURATION: 100 % | TEMPERATURE: 98 F | SYSTOLIC BLOOD PRESSURE: 173 MMHG | HEART RATE: 79 BPM | RESPIRATION RATE: 18 BRPM

## 2023-01-01 VITALS
SYSTOLIC BLOOD PRESSURE: 169 MMHG | RESPIRATION RATE: 16 BRPM | TEMPERATURE: 98.7 F | OXYGEN SATURATION: 95 % | WEIGHT: 108 LBS | HEART RATE: 94 BPM | BODY MASS INDEX: 19.87 KG/M2 | DIASTOLIC BLOOD PRESSURE: 94 MMHG

## 2023-01-01 VITALS
BODY MASS INDEX: 17.46 KG/M2 | DIASTOLIC BLOOD PRESSURE: 78 MMHG | RESPIRATION RATE: 16 BRPM | TEMPERATURE: 99.5 F | HEART RATE: 99 BPM | WEIGHT: 104.9 LBS | SYSTOLIC BLOOD PRESSURE: 133 MMHG | OXYGEN SATURATION: 97 %

## 2023-01-01 VITALS
HEART RATE: 75 BPM | RESPIRATION RATE: 18 BRPM | WEIGHT: 105.1 LBS | SYSTOLIC BLOOD PRESSURE: 112 MMHG | OXYGEN SATURATION: 96 % | DIASTOLIC BLOOD PRESSURE: 70 MMHG | TEMPERATURE: 98.3 F | BODY MASS INDEX: 17.49 KG/M2

## 2023-01-01 VITALS
SYSTOLIC BLOOD PRESSURE: 101 MMHG | OXYGEN SATURATION: 97 % | TEMPERATURE: 98.7 F | RESPIRATION RATE: 18 BRPM | BODY MASS INDEX: 17.89 KG/M2 | HEART RATE: 72 BPM | WEIGHT: 107.5 LBS | DIASTOLIC BLOOD PRESSURE: 69 MMHG

## 2023-01-01 VITALS
HEART RATE: 116 BPM | OXYGEN SATURATION: 96 % | RESPIRATION RATE: 16 BRPM | TEMPERATURE: 98.8 F | BODY MASS INDEX: 18.65 KG/M2 | DIASTOLIC BLOOD PRESSURE: 85 MMHG | SYSTOLIC BLOOD PRESSURE: 166 MMHG | WEIGHT: 112.1 LBS

## 2023-01-01 VITALS
OXYGEN SATURATION: 98 % | TEMPERATURE: 99.2 F | WEIGHT: 115 LBS | RESPIRATION RATE: 16 BRPM | DIASTOLIC BLOOD PRESSURE: 73 MMHG | SYSTOLIC BLOOD PRESSURE: 116 MMHG | BODY MASS INDEX: 21.38 KG/M2 | HEART RATE: 78 BPM

## 2023-01-01 VITALS
SYSTOLIC BLOOD PRESSURE: 158 MMHG | BODY MASS INDEX: 17.96 KG/M2 | WEIGHT: 107.9 LBS | TEMPERATURE: 98.3 F | OXYGEN SATURATION: 99 % | HEART RATE: 77 BPM | DIASTOLIC BLOOD PRESSURE: 111 MMHG

## 2023-01-01 VITALS
DIASTOLIC BLOOD PRESSURE: 64 MMHG | WEIGHT: 113.8 LBS | SYSTOLIC BLOOD PRESSURE: 90 MMHG | TEMPERATURE: 98.2 F | OXYGEN SATURATION: 97 % | RESPIRATION RATE: 16 BRPM | HEART RATE: 96 BPM | BODY MASS INDEX: 18.94 KG/M2

## 2023-01-01 VITALS
HEART RATE: 89 BPM | RESPIRATION RATE: 16 BRPM | SYSTOLIC BLOOD PRESSURE: 127 MMHG | BODY MASS INDEX: 17.81 KG/M2 | TEMPERATURE: 99.5 F | WEIGHT: 107 LBS | OXYGEN SATURATION: 97 % | DIASTOLIC BLOOD PRESSURE: 72 MMHG

## 2023-01-01 VITALS
TEMPERATURE: 99.3 F | RESPIRATION RATE: 16 BRPM | HEART RATE: 89 BPM | DIASTOLIC BLOOD PRESSURE: 80 MMHG | BODY MASS INDEX: 17.97 KG/M2 | SYSTOLIC BLOOD PRESSURE: 158 MMHG | OXYGEN SATURATION: 94 % | WEIGHT: 108 LBS

## 2023-01-01 VITALS
RESPIRATION RATE: 16 BRPM | WEIGHT: 108.7 LBS | OXYGEN SATURATION: 100 % | DIASTOLIC BLOOD PRESSURE: 62 MMHG | SYSTOLIC BLOOD PRESSURE: 146 MMHG | TEMPERATURE: 99.4 F | BODY MASS INDEX: 18.09 KG/M2 | HEART RATE: 75 BPM

## 2023-01-01 VITALS
HEART RATE: 106 BPM | RESPIRATION RATE: 16 BRPM | WEIGHT: 112 LBS | OXYGEN SATURATION: 96 % | SYSTOLIC BLOOD PRESSURE: 142 MMHG | TEMPERATURE: 98.9 F | BODY MASS INDEX: 18.64 KG/M2 | DIASTOLIC BLOOD PRESSURE: 83 MMHG

## 2023-01-01 VITALS — BODY MASS INDEX: 19.34 KG/M2 | HEIGHT: 62 IN

## 2023-01-01 VITALS
TEMPERATURE: 98.7 F | OXYGEN SATURATION: 96 % | DIASTOLIC BLOOD PRESSURE: 68 MMHG | HEART RATE: 81 BPM | SYSTOLIC BLOOD PRESSURE: 107 MMHG | RESPIRATION RATE: 20 BRPM

## 2023-01-01 DIAGNOSIS — C90.00 MULTIPLE MYELOMA NOT HAVING ACHIEVED REMISSION (H): ICD-10-CM

## 2023-01-01 DIAGNOSIS — F11.20 UNCOMPLICATED OPIOID DEPENDENCE (H): ICD-10-CM

## 2023-01-01 DIAGNOSIS — D70.9 NEUTROPENIC FEVER (H): ICD-10-CM

## 2023-01-01 DIAGNOSIS — C90.00 MULTIPLE MYELOMA NOT HAVING ACHIEVED REMISSION (H): Primary | ICD-10-CM

## 2023-01-01 DIAGNOSIS — R50.9 FEVER AND CHILLS: ICD-10-CM

## 2023-01-01 DIAGNOSIS — R41.82 ALTERED MENTAL STATUS, UNSPECIFIED ALTERED MENTAL STATUS TYPE: ICD-10-CM

## 2023-01-01 DIAGNOSIS — D70.9 NEUTROPENIC FEVER (H): Primary | ICD-10-CM

## 2023-01-01 DIAGNOSIS — R65.20 SEPSIS WITH ENCEPHALOPATHY WITHOUT SEPTIC SHOCK, DUE TO UNSPECIFIED ORGANISM (H): ICD-10-CM

## 2023-01-01 DIAGNOSIS — Z51.11 ADMISSION FOR CHEMOTHERAPY: ICD-10-CM

## 2023-01-01 DIAGNOSIS — R93.3 ABNORMAL FINDING ON GI TRACT IMAGING: Primary | ICD-10-CM

## 2023-01-01 DIAGNOSIS — A41.9 SEPSIS WITH ENCEPHALOPATHY WITHOUT SEPTIC SHOCK, DUE TO UNSPECIFIED ORGANISM (H): ICD-10-CM

## 2023-01-01 DIAGNOSIS — A41.9 SEPSIS WITHOUT ACUTE ORGAN DYSFUNCTION, DUE TO UNSPECIFIED ORGANISM (H): ICD-10-CM

## 2023-01-01 DIAGNOSIS — G93.41 SEPSIS WITH ENCEPHALOPATHY WITHOUT SEPTIC SHOCK, DUE TO UNSPECIFIED ORGANISM (H): ICD-10-CM

## 2023-01-01 DIAGNOSIS — I10 HYPERTENSION, UNSPECIFIED TYPE: ICD-10-CM

## 2023-01-01 DIAGNOSIS — C90.00 MULTIPLE MYELOMA WITHOUT REMISSION (H): Primary | ICD-10-CM

## 2023-01-01 DIAGNOSIS — R29.6 FALLS FREQUENTLY: ICD-10-CM

## 2023-01-01 DIAGNOSIS — Z94.84 HISTORY OF STEM CELL TRANSPLANT (H): ICD-10-CM

## 2023-01-01 DIAGNOSIS — E87.6 HYPOKALEMIA: ICD-10-CM

## 2023-01-01 DIAGNOSIS — C90.01 MULTIPLE MYELOMA IN REMISSION (H): Primary | ICD-10-CM

## 2023-01-01 DIAGNOSIS — R50.81 NEUTROPENIC FEVER (H): Primary | ICD-10-CM

## 2023-01-01 DIAGNOSIS — G93.40 ACUTE ENCEPHALOPATHY: ICD-10-CM

## 2023-01-01 DIAGNOSIS — G62.9 NEUROPATHY: ICD-10-CM

## 2023-01-01 DIAGNOSIS — C90.01 MULTIPLE MYELOMA IN REMISSION (H): ICD-10-CM

## 2023-01-01 DIAGNOSIS — T45.1X5A CHEMOTHERAPY-INDUCED NEUTROPENIA (H): ICD-10-CM

## 2023-01-01 DIAGNOSIS — R06.02 SHORTNESS OF BREATH: ICD-10-CM

## 2023-01-01 DIAGNOSIS — R50.81 NEUTROPENIC FEVER (H): ICD-10-CM

## 2023-01-01 DIAGNOSIS — Z94.84 STEM CELLS TRANSPLANT STATUS (H): ICD-10-CM

## 2023-01-01 DIAGNOSIS — M62.81 GENERALIZED MUSCLE WEAKNESS: ICD-10-CM

## 2023-01-01 DIAGNOSIS — R79.89 ELEVATED LACTIC ACID LEVEL: ICD-10-CM

## 2023-01-01 DIAGNOSIS — R53.81 PHYSICAL DECONDITIONING: ICD-10-CM

## 2023-01-01 DIAGNOSIS — H61.21 IMPACTED CERUMEN OF RIGHT EAR: Primary | ICD-10-CM

## 2023-01-01 DIAGNOSIS — R93.3 ABNORMAL FINDING ON GI TRACT IMAGING: ICD-10-CM

## 2023-01-01 DIAGNOSIS — G93.40 ENCEPHALOPATHY: ICD-10-CM

## 2023-01-01 DIAGNOSIS — C90.02 MULTIPLE MYELOMA IN RELAPSE (H): Primary | ICD-10-CM

## 2023-01-01 DIAGNOSIS — D70.1 CHEMOTHERAPY-INDUCED NEUTROPENIA (H): ICD-10-CM

## 2023-01-01 DIAGNOSIS — D70.9 NEUTROPENIA, UNSPECIFIED TYPE (H): ICD-10-CM

## 2023-01-01 DIAGNOSIS — R50.9 FEVER AND CHILLS: Primary | ICD-10-CM

## 2023-01-01 DIAGNOSIS — E83.42 HYPOMAGNESEMIA: ICD-10-CM

## 2023-01-01 LAB
1,3 BETA GLUCAN SER-MCNC: <31 PG/ML
ABO/RH TYPE: NORMAL
ABO/RH TYPE: NORMAL
ABO/RH(D): NORMAL
ACANTHOCYTES BLD QL SMEAR: ABNORMAL
ALBUMIN SERPL BCG-MCNC: 2.5 G/DL (ref 3.5–5.2)
ALBUMIN SERPL BCG-MCNC: 2.6 G/DL (ref 3.5–5.2)
ALBUMIN SERPL BCG-MCNC: 2.8 G/DL (ref 3.5–5.2)
ALBUMIN SERPL BCG-MCNC: 2.9 G/DL (ref 3.5–5.2)
ALBUMIN SERPL BCG-MCNC: 2.9 G/DL (ref 3.5–5.2)
ALBUMIN SERPL BCG-MCNC: 3 G/DL (ref 3.5–5.2)
ALBUMIN SERPL BCG-MCNC: 3.1 G/DL (ref 3.5–5.2)
ALBUMIN SERPL BCG-MCNC: 3.2 G/DL (ref 3.5–5.2)
ALBUMIN SERPL BCG-MCNC: 3.3 G/DL (ref 3.5–5.2)
ALBUMIN SERPL BCG-MCNC: 3.4 G/DL (ref 3.5–5.2)
ALBUMIN SERPL BCG-MCNC: 4.3 G/DL (ref 3.5–5.2)
ALBUMIN SERPL ELPH-MCNC: 2.6 G/DL (ref 3.7–5.1)
ALBUMIN SERPL ELPH-MCNC: 2.8 G/DL (ref 3.7–5.1)
ALBUMIN SERPL ELPH-MCNC: 2.9 G/DL (ref 3.7–5.1)
ALBUMIN SERPL ELPH-MCNC: 3.3 G/DL (ref 3.7–5.1)
ALBUMIN SERPL ELPH-MCNC: 3.8 G/DL (ref 3.7–5.1)
ALBUMIN UR-MCNC: 20 MG/DL
ALBUMIN UR-MCNC: 20 MG/DL
ALBUMIN UR-MCNC: 30 MG/DL
ALBUMIN UR-MCNC: 30 MG/DL
ALP SERPL-CCNC: 101 U/L (ref 35–104)
ALP SERPL-CCNC: 101 U/L (ref 35–104)
ALP SERPL-CCNC: 102 U/L (ref 35–104)
ALP SERPL-CCNC: 104 U/L (ref 35–104)
ALP SERPL-CCNC: 116 U/L (ref 35–104)
ALP SERPL-CCNC: 118 U/L (ref 35–104)
ALP SERPL-CCNC: 124 U/L (ref 35–104)
ALP SERPL-CCNC: 127 U/L (ref 35–104)
ALP SERPL-CCNC: 132 U/L (ref 35–104)
ALP SERPL-CCNC: 155 U/L (ref 35–104)
ALP SERPL-CCNC: 156 U/L (ref 35–104)
ALP SERPL-CCNC: 162 U/L (ref 35–104)
ALP SERPL-CCNC: 178 U/L (ref 35–104)
ALP SERPL-CCNC: 293 U/L (ref 35–104)
ALP SERPL-CCNC: 297 U/L (ref 40–150)
ALP SERPL-CCNC: 305 U/L (ref 40–150)
ALP SERPL-CCNC: 415 U/L (ref 35–104)
ALP SERPL-CCNC: 61 U/L (ref 35–104)
ALP SERPL-CCNC: 65 U/L (ref 35–104)
ALP SERPL-CCNC: 66 U/L (ref 35–104)
ALP SERPL-CCNC: 67 U/L (ref 35–104)
ALP SERPL-CCNC: 67 U/L (ref 35–104)
ALP SERPL-CCNC: 74 U/L (ref 35–104)
ALP SERPL-CCNC: 76 U/L (ref 35–104)
ALP SERPL-CCNC: 82 U/L (ref 35–104)
ALP SERPL-CCNC: 84 U/L (ref 35–104)
ALP SERPL-CCNC: 84 U/L (ref 35–104)
ALP SERPL-CCNC: 87 U/L (ref 35–104)
ALP SERPL-CCNC: 88 U/L (ref 35–104)
ALP SERPL-CCNC: 88 U/L (ref 35–104)
ALP SERPL-CCNC: 94 U/L (ref 35–104)
ALP SERPL-CCNC: 99 U/L (ref 35–104)
ALPHA1 GLOB SERPL ELPH-MCNC: 0.3 G/DL (ref 0.2–0.4)
ALPHA1 GLOB SERPL ELPH-MCNC: 0.4 G/DL (ref 0.2–0.4)
ALPHA2 GLOB SERPL ELPH-MCNC: 0.6 G/DL (ref 0.5–0.9)
ALPHA2 GLOB SERPL ELPH-MCNC: 0.7 G/DL (ref 0.5–0.9)
ALPHA2 GLOB SERPL ELPH-MCNC: 0.8 G/DL (ref 0.5–0.9)
ALT SERPL W P-5'-P-CCNC: 10 U/L (ref 0–50)
ALT SERPL W P-5'-P-CCNC: 10 U/L (ref 10–35)
ALT SERPL W P-5'-P-CCNC: 10 U/L (ref 10–35)
ALT SERPL W P-5'-P-CCNC: 11 U/L (ref 0–50)
ALT SERPL W P-5'-P-CCNC: 18 U/L (ref 0–50)
ALT SERPL W P-5'-P-CCNC: 18 U/L (ref 0–50)
ALT SERPL W P-5'-P-CCNC: 19 U/L (ref 0–50)
ALT SERPL W P-5'-P-CCNC: 19 U/L (ref 0–50)
ALT SERPL W P-5'-P-CCNC: 20 U/L (ref 0–50)
ALT SERPL W P-5'-P-CCNC: 20 U/L (ref 0–50)
ALT SERPL W P-5'-P-CCNC: 21 U/L (ref 0–50)
ALT SERPL W P-5'-P-CCNC: 21 U/L (ref 0–50)
ALT SERPL W P-5'-P-CCNC: 22 U/L (ref 0–50)
ALT SERPL W P-5'-P-CCNC: 24 U/L (ref 0–50)
ALT SERPL W P-5'-P-CCNC: 25 U/L (ref 0–50)
ALT SERPL W P-5'-P-CCNC: 26 U/L (ref 0–50)
ALT SERPL W P-5'-P-CCNC: 27 U/L (ref 0–50)
ALT SERPL W P-5'-P-CCNC: 287 U/L (ref 10–35)
ALT SERPL W P-5'-P-CCNC: 29 U/L (ref 0–50)
ALT SERPL W P-5'-P-CCNC: 29 U/L (ref 0–50)
ALT SERPL W P-5'-P-CCNC: 31 U/L (ref 0–50)
ALT SERPL W P-5'-P-CCNC: 36 U/L (ref 0–50)
ALT SERPL W P-5'-P-CCNC: 38 U/L (ref 0–50)
ALT SERPL W P-5'-P-CCNC: 5 U/L (ref 0–50)
ALT SERPL W P-5'-P-CCNC: 7 U/L (ref 10–35)
ALT SERPL W P-5'-P-CCNC: 78 U/L (ref 0–50)
ALT SERPL W P-5'-P-CCNC: 8 U/L (ref 0–50)
ALT SERPL W P-5'-P-CCNC: 8 U/L (ref 10–35)
ALT SERPL W P-5'-P-CCNC: 83 U/L (ref 0–50)
ALT SERPL W P-5'-P-CCNC: 9 U/L (ref 0–50)
AMMONIA PLAS-SCNC: 19 UMOL/L (ref 11–51)
AMMONIA PLAS-SCNC: 27 UMOL/L (ref 11–51)
AMMONIA PLAS-SCNC: 30 UMOL/L (ref 11–51)
AMMONIA PLAS-SCNC: 30 UMOL/L (ref 11–51)
AMMONIA PLAS-SCNC: 57 UMOL/L (ref 11–51)
AMMONIA PLAS-SCNC: 81 UMOL/L (ref 11–51)
AMPHETAMINES UR QL SCN: NORMAL
ANION GAP SERPL CALCULATED.3IONS-SCNC: 10 MMOL/L (ref 7–15)
ANION GAP SERPL CALCULATED.3IONS-SCNC: 10 MMOL/L (ref 7–15)
ANION GAP SERPL CALCULATED.3IONS-SCNC: 11 MMOL/L (ref 7–15)
ANION GAP SERPL CALCULATED.3IONS-SCNC: 11 MMOL/L (ref 7–15)
ANION GAP SERPL CALCULATED.3IONS-SCNC: 12 MMOL/L (ref 7–15)
ANION GAP SERPL CALCULATED.3IONS-SCNC: 21 MMOL/L (ref 7–15)
ANION GAP SERPL CALCULATED.3IONS-SCNC: 4 MMOL/L (ref 7–15)
ANION GAP SERPL CALCULATED.3IONS-SCNC: 5 MMOL/L (ref 7–15)
ANION GAP SERPL CALCULATED.3IONS-SCNC: 6 MMOL/L (ref 7–15)
ANION GAP SERPL CALCULATED.3IONS-SCNC: 7 MMOL/L (ref 7–15)
ANION GAP SERPL CALCULATED.3IONS-SCNC: 8 MMOL/L (ref 7–15)
ANION GAP SERPL CALCULATED.3IONS-SCNC: 9 MMOL/L (ref 7–15)
ANTIBODY SCREEN: NEGATIVE
APPEARANCE CSF: CLEAR
APPEARANCE UR: CLEAR
APTT PPP: 20 SECONDS (ref 22–38)
APTT PPP: 22 SECONDS (ref 22–38)
APTT PPP: 22 SECONDS (ref 22–38)
APTT PPP: 23 SECONDS (ref 22–38)
APTT PPP: 25 SECONDS (ref 22–38)
APTT PPP: 25 SECONDS (ref 22–38)
APTT PPP: 26 SECONDS (ref 22–38)
APTT PPP: 27 SECONDS (ref 22–38)
APTT PPP: 28 SECONDS (ref 22–38)
APTT PPP: 28 SECONDS (ref 22–38)
APTT PPP: 31 SECONDS (ref 22–38)
AST SERPL W P-5'-P-CCNC: 10 U/L (ref 10–35)
AST SERPL W P-5'-P-CCNC: 16 U/L (ref 0–45)
AST SERPL W P-5'-P-CCNC: 16 U/L (ref 0–45)
AST SERPL W P-5'-P-CCNC: 18 U/L (ref 0–45)
AST SERPL W P-5'-P-CCNC: 18 U/L (ref 0–45)
AST SERPL W P-5'-P-CCNC: 20 U/L (ref 0–45)
AST SERPL W P-5'-P-CCNC: 21 U/L (ref 0–45)
AST SERPL W P-5'-P-CCNC: 22 U/L (ref 0–45)
AST SERPL W P-5'-P-CCNC: 23 U/L (ref 0–45)
AST SERPL W P-5'-P-CCNC: 24 U/L (ref 0–45)
AST SERPL W P-5'-P-CCNC: 26 U/L (ref 10–35)
AST SERPL W P-5'-P-CCNC: 27 U/L (ref 0–45)
AST SERPL W P-5'-P-CCNC: 27 U/L (ref 10–35)
AST SERPL W P-5'-P-CCNC: 27 U/L (ref 10–35)
AST SERPL W P-5'-P-CCNC: 28 U/L (ref 0–45)
AST SERPL W P-5'-P-CCNC: 29 U/L (ref 0–45)
AST SERPL W P-5'-P-CCNC: 30 U/L (ref 0–45)
AST SERPL W P-5'-P-CCNC: 32 U/L (ref 0–45)
AST SERPL W P-5'-P-CCNC: 32 U/L (ref 0–45)
AST SERPL W P-5'-P-CCNC: 34 U/L (ref 0–45)
AST SERPL W P-5'-P-CCNC: 40 U/L (ref 0–45)
AST SERPL W P-5'-P-CCNC: 511 U/L (ref 10–35)
AST SERPL W P-5'-P-CCNC: 53 U/L (ref 0–45)
AST SERPL W P-5'-P-CCNC: 54 U/L (ref 0–45)
AST SERPL W P-5'-P-CCNC: 55 U/L (ref 0–45)
AST SERPL W P-5'-P-CCNC: 63 U/L (ref 0–45)
AST SERPL W P-5'-P-CCNC: 68 U/L (ref 0–45)
AST SERPL W P-5'-P-CCNC: 95 U/L (ref 0–45)
ATRIAL RATE - MUSE: 103 BPM
ATRIAL RATE - MUSE: 103 BPM
ATRIAL RATE - MUSE: 81 BPM
ATRIAL RATE - MUSE: 94 BPM
AUER BODIES BLD QL SMEAR: ABNORMAL
B-GLOBULIN SERPL ELPH-MCNC: 0.5 G/DL (ref 0.6–1)
B-GLOBULIN SERPL ELPH-MCNC: 0.6 G/DL (ref 0.6–1)
BACTERIA BLD CULT: NO GROWTH
BACTERIA CSF CULT: NO GROWTH
BACTERIA SPEC CULT: NORMAL
BACTERIA UR CULT: NORMAL
BACTERIA UR CULT: NORMAL
BARBITURATES UR QL SCN: NORMAL
BASE EXCESS BLDV CALC-SCNC: -3 MMOL/L (ref -7.7–1.9)
BASO STIPL BLD QL SMEAR: ABNORMAL
BASOPHILS # BLD AUTO: 0 10E3/UL (ref 0–0.2)
BASOPHILS # BLD AUTO: 0.1 10E3/UL (ref 0–0.2)
BASOPHILS # BLD AUTO: ABNORMAL 10*3/UL
BASOPHILS # BLD MANUAL: 0 10E3/UL (ref 0–0.2)
BASOPHILS # BLD MANUAL: 0.1 10E3/UL (ref 0–0.2)
BASOPHILS # BLD MANUAL: 0.1 10E3/UL (ref 0–0.2)
BASOPHILS NFR BLD AUTO: 0 %
BASOPHILS NFR BLD AUTO: 1 %
BASOPHILS NFR BLD AUTO: 2 %
BASOPHILS NFR BLD AUTO: 3 %
BASOPHILS NFR BLD AUTO: ABNORMAL %
BASOPHILS NFR BLD MANUAL: 0 %
BASOPHILS NFR BLD MANUAL: 1 %
BASOPHILS NFR BLD MANUAL: 1 %
BASOPHILS NFR BLD MANUAL: 4 %
BASOPHILS NFR BLD MANUAL: 6 %
BENZODIAZ UR QL SCN: NORMAL
BILIRUB DIRECT SERPL-MCNC: <0.2 MG/DL (ref 0–0.3)
BILIRUB SERPL-MCNC: 0.2 MG/DL
BILIRUB SERPL-MCNC: 0.3 MG/DL
BILIRUB SERPL-MCNC: 0.4 MG/DL
BILIRUB SERPL-MCNC: 0.5 MG/DL
BILIRUB SERPL-MCNC: 0.6 MG/DL
BILIRUB SERPL-MCNC: 0.7 MG/DL
BILIRUB UR QL STRIP: NEGATIVE
BITE CELLS BLD QL SMEAR: ABNORMAL
BLISTER CELLS BLD QL SMEAR: ABNORMAL
BUN SERPL-MCNC: 10.2 MG/DL (ref 8–23)
BUN SERPL-MCNC: 10.3 MG/DL (ref 8–23)
BUN SERPL-MCNC: 10.5 MG/DL (ref 8–23)
BUN SERPL-MCNC: 10.5 MG/DL (ref 8–23)
BUN SERPL-MCNC: 10.9 MG/DL (ref 8–23)
BUN SERPL-MCNC: 11.1 MG/DL (ref 8–23)
BUN SERPL-MCNC: 11.4 MG/DL (ref 8–23)
BUN SERPL-MCNC: 11.6 MG/DL (ref 8–23)
BUN SERPL-MCNC: 11.7 MG/DL (ref 8–23)
BUN SERPL-MCNC: 11.8 MG/DL (ref 8–23)
BUN SERPL-MCNC: 12.4 MG/DL (ref 8–23)
BUN SERPL-MCNC: 12.5 MG/DL (ref 8–23)
BUN SERPL-MCNC: 13.2 MG/DL (ref 8–23)
BUN SERPL-MCNC: 13.3 MG/DL (ref 8–23)
BUN SERPL-MCNC: 13.3 MG/DL (ref 8–23)
BUN SERPL-MCNC: 13.9 MG/DL (ref 8–23)
BUN SERPL-MCNC: 14 MG/DL (ref 8–23)
BUN SERPL-MCNC: 14.6 MG/DL (ref 8–23)
BUN SERPL-MCNC: 15 MG/DL (ref 8–23)
BUN SERPL-MCNC: 15 MG/DL (ref 8–23)
BUN SERPL-MCNC: 15.3 MG/DL (ref 8–23)
BUN SERPL-MCNC: 15.7 MG/DL (ref 8–23)
BUN SERPL-MCNC: 16 MG/DL (ref 8–23)
BUN SERPL-MCNC: 17.8 MG/DL (ref 8–23)
BUN SERPL-MCNC: 19.3 MG/DL (ref 8–23)
BUN SERPL-MCNC: 22.2 MG/DL (ref 8–23)
BUN SERPL-MCNC: 23.2 MG/DL (ref 8–23)
BUN SERPL-MCNC: 3.4 MG/DL (ref 8–23)
BUN SERPL-MCNC: 4.5 MG/DL (ref 8–23)
BUN SERPL-MCNC: 5.8 MG/DL (ref 8–23)
BUN SERPL-MCNC: 6.6 MG/DL (ref 8–23)
BUN SERPL-MCNC: 7 MG/DL (ref 8–23)
BUN SERPL-MCNC: 7.9 MG/DL (ref 8–23)
BUN SERPL-MCNC: 7.9 MG/DL (ref 8–23)
BUN SERPL-MCNC: 8.6 MG/DL (ref 8–23)
BUN SERPL-MCNC: 8.7 MG/DL (ref 8–23)
BUN SERPL-MCNC: 9.9 MG/DL (ref 8–23)
BUN SERPL-MCNC: 9.9 MG/DL (ref 8–23)
BURR CELLS BLD QL SMEAR: ABNORMAL
BURR CELLS BLD QL SMEAR: ABNORMAL
BZE UR QL SCN: NORMAL
C GATTII+NEOFOR DNA CSF QL NAA+NON-PROBE: NEGATIVE
C PNEUM DNA SPEC QL NAA+PROBE: NOT DETECTED
CALCIUM SERPL-MCNC: 10.1 MG/DL (ref 8.8–10.2)
CALCIUM SERPL-MCNC: 10.3 MG/DL (ref 8.8–10.2)
CALCIUM SERPL-MCNC: 10.3 MG/DL (ref 8.8–10.2)
CALCIUM SERPL-MCNC: 7.6 MG/DL (ref 8.8–10.2)
CALCIUM SERPL-MCNC: 7.7 MG/DL (ref 8.8–10.2)
CALCIUM SERPL-MCNC: 7.8 MG/DL (ref 8.8–10.2)
CALCIUM SERPL-MCNC: 7.9 MG/DL (ref 8.8–10.2)
CALCIUM SERPL-MCNC: 7.9 MG/DL (ref 8.8–10.2)
CALCIUM SERPL-MCNC: 8 MG/DL (ref 8.8–10.2)
CALCIUM SERPL-MCNC: 8 MG/DL (ref 8.8–10.2)
CALCIUM SERPL-MCNC: 8.1 MG/DL (ref 8.8–10.2)
CALCIUM SERPL-MCNC: 8.2 MG/DL (ref 8.8–10.2)
CALCIUM SERPL-MCNC: 8.3 MG/DL (ref 8.8–10.2)
CALCIUM SERPL-MCNC: 8.4 MG/DL (ref 8.8–10.2)
CALCIUM SERPL-MCNC: 8.4 MG/DL (ref 8.8–10.2)
CALCIUM SERPL-MCNC: 8.5 MG/DL (ref 8.8–10.2)
CALCIUM SERPL-MCNC: 8.6 MG/DL (ref 8.8–10.2)
CALCIUM SERPL-MCNC: 8.6 MG/DL (ref 8.8–10.2)
CALCIUM SERPL-MCNC: 8.8 MG/DL (ref 8.8–10.2)
CALCIUM SERPL-MCNC: 8.8 MG/DL (ref 8.8–10.2)
CALCIUM SERPL-MCNC: 9 MG/DL (ref 8.8–10.2)
CALCIUM SERPL-MCNC: 9.2 MG/DL (ref 8.8–10.2)
CALCIUM SERPL-MCNC: 9.3 MG/DL (ref 8.8–10.2)
CALCIUM SERPL-MCNC: 9.4 MG/DL (ref 8.8–10.2)
CALCIUM SERPL-MCNC: 9.4 MG/DL (ref 8.8–10.2)
CALCIUM SERPL-MCNC: 9.5 MG/DL (ref 8.8–10.2)
CALCIUM SERPL-MCNC: 9.6 MG/DL (ref 8.8–10.2)
CALCIUM SERPL-MCNC: 9.6 MG/DL (ref 8.8–10.2)
CALCIUM SERPL-MCNC: 9.7 MG/DL (ref 8.8–10.2)
CALCIUM SERPL-MCNC: 9.7 MG/DL (ref 8.8–10.2)
CANNABINOIDS UR QL SCN: NORMAL
CHLORIDE SERPL-SCNC: 101 MMOL/L (ref 98–107)
CHLORIDE SERPL-SCNC: 102 MMOL/L (ref 98–107)
CHLORIDE SERPL-SCNC: 103 MMOL/L (ref 98–107)
CHLORIDE SERPL-SCNC: 104 MMOL/L (ref 98–107)
CHLORIDE SERPL-SCNC: 104 MMOL/L (ref 98–107)
CHLORIDE SERPL-SCNC: 105 MMOL/L (ref 98–107)
CHLORIDE SERPL-SCNC: 106 MMOL/L (ref 98–107)
CHLORIDE SERPL-SCNC: 107 MMOL/L (ref 98–107)
CHLORIDE SERPL-SCNC: 108 MMOL/L (ref 98–107)
CHLORIDE SERPL-SCNC: 109 MMOL/L (ref 98–107)
CHLORIDE SERPL-SCNC: 110 MMOL/L (ref 98–107)
CHLORIDE SERPL-SCNC: 111 MMOL/L (ref 98–107)
CHLORIDE SERPL-SCNC: 115 MMOL/L (ref 98–107)
CHLORIDE SERPL-SCNC: 115 MMOL/L (ref 98–107)
CMV DNA CSF QL NAA+NON-PROBE: NEGATIVE
CMV DNA SPEC NAA+PROBE-ACNC: NOT DETECTED IU/ML
CMV DNA SPEC NAA+PROBE-ACNC: NOT DETECTED IU/ML
COHGB MFR BLD: 55 % (ref 92–100)
COLOR CSF: COLORLESS
COLOR UR AUTO: ABNORMAL
COLOR UR AUTO: ABNORMAL
COLOR UR AUTO: YELLOW
COLOR UR AUTO: YELLOW
CORTIS SERPL-MCNC: 7.2 UG/DL
CREAT SERPL-MCNC: 0.72 MG/DL (ref 0.51–0.95)
CREAT SERPL-MCNC: 0.76 MG/DL (ref 0.51–0.95)
CREAT SERPL-MCNC: 0.77 MG/DL (ref 0.51–0.95)
CREAT SERPL-MCNC: 0.78 MG/DL (ref 0.51–0.95)
CREAT SERPL-MCNC: 0.8 MG/DL (ref 0.51–0.95)
CREAT SERPL-MCNC: 0.82 MG/DL (ref 0.51–0.95)
CREAT SERPL-MCNC: 0.82 MG/DL (ref 0.51–0.95)
CREAT SERPL-MCNC: 0.85 MG/DL (ref 0.51–0.95)
CREAT SERPL-MCNC: 0.86 MG/DL (ref 0.51–0.95)
CREAT SERPL-MCNC: 0.89 MG/DL (ref 0.51–0.95)
CREAT SERPL-MCNC: 0.89 MG/DL (ref 0.51–0.95)
CREAT SERPL-MCNC: 0.91 MG/DL (ref 0.51–0.95)
CREAT SERPL-MCNC: 0.92 MG/DL (ref 0.51–0.95)
CREAT SERPL-MCNC: 0.93 MG/DL (ref 0.51–0.95)
CREAT SERPL-MCNC: 0.93 MG/DL (ref 0.51–0.95)
CREAT SERPL-MCNC: 0.94 MG/DL (ref 0.51–0.95)
CREAT SERPL-MCNC: 0.95 MG/DL (ref 0.51–0.95)
CREAT SERPL-MCNC: 0.95 MG/DL (ref 0.51–0.95)
CREAT SERPL-MCNC: 0.96 MG/DL (ref 0.51–0.95)
CREAT SERPL-MCNC: 0.96 MG/DL (ref 0.51–0.95)
CREAT SERPL-MCNC: 0.97 MG/DL (ref 0.51–0.95)
CREAT SERPL-MCNC: 0.97 MG/DL (ref 0.51–0.95)
CREAT SERPL-MCNC: 1 MG/DL (ref 0.51–0.95)
CREAT SERPL-MCNC: 1 MG/DL (ref 0.51–0.95)
CREAT SERPL-MCNC: 1.01 MG/DL (ref 0.51–0.95)
CREAT SERPL-MCNC: 1.02 MG/DL (ref 0.51–0.95)
CREAT SERPL-MCNC: 1.03 MG/DL (ref 0.51–0.95)
CREAT SERPL-MCNC: 1.04 MG/DL (ref 0.51–0.95)
CREAT SERPL-MCNC: 1.06 MG/DL (ref 0.51–0.95)
CREAT SERPL-MCNC: 1.08 MG/DL (ref 0.51–0.95)
CREAT SERPL-MCNC: 1.1 MG/DL (ref 0.51–0.95)
CREAT SERPL-MCNC: 1.12 MG/DL (ref 0.51–0.95)
CREAT SERPL-MCNC: 1.16 MG/DL (ref 0.51–0.95)
CREAT SERPL-MCNC: 1.17 MG/DL (ref 0.51–0.95)
CREAT SERPL-MCNC: 1.19 MG/DL (ref 0.51–0.95)
CREAT SERPL-MCNC: 1.21 MG/DL (ref 0.51–0.95)
CREAT SERPL-MCNC: 1.23 MG/DL (ref 0.51–0.95)
CREAT SERPL-MCNC: 1.29 MG/DL (ref 0.51–0.95)
CRP SERPL-MCNC: 11.8 MG/L
CRP SERPL-MCNC: 22 MG/L
CRP SERPL-MCNC: 25.7 MG/L
CRP SERPL-MCNC: 3.06 MG/L
CRP SERPL-MCNC: 3.9 MG/L
CRP SERPL-MCNC: 31.1 MG/L
CRP SERPL-MCNC: 34.1 MG/L
CRP SERPL-MCNC: 47.5 MG/L
CRP SERPL-MCNC: 8.79 MG/L
CRP SERPL-MCNC: 94 MG/L
CRP SERPL-MCNC: <3 MG/L
D DIMER PPP FEU-MCNC: 2.38 UG/ML FEU (ref 0–0.5)
DACRYOCYTES BLD QL SMEAR: ABNORMAL
DACRYOCYTES BLD QL SMEAR: ABNORMAL
DACRYOCYTES BLD QL SMEAR: SLIGHT
DEPRECATED HCO3 PLAS-SCNC: 12 MMOL/L (ref 22–29)
DEPRECATED HCO3 PLAS-SCNC: 19 MMOL/L (ref 22–29)
DEPRECATED HCO3 PLAS-SCNC: 20 MMOL/L (ref 22–29)
DEPRECATED HCO3 PLAS-SCNC: 20 MMOL/L (ref 22–29)
DEPRECATED HCO3 PLAS-SCNC: 22 MMOL/L (ref 22–29)
DEPRECATED HCO3 PLAS-SCNC: 23 MMOL/L (ref 22–29)
DEPRECATED HCO3 PLAS-SCNC: 23 MMOL/L (ref 22–29)
DEPRECATED HCO3 PLAS-SCNC: 24 MMOL/L (ref 22–29)
DEPRECATED HCO3 PLAS-SCNC: 25 MMOL/L (ref 22–29)
DEPRECATED HCO3 PLAS-SCNC: 26 MMOL/L (ref 22–29)
DEPRECATED HCO3 PLAS-SCNC: 26 MMOL/L (ref 22–29)
DEPRECATED HCO3 PLAS-SCNC: 27 MMOL/L (ref 22–29)
DEPRECATED HCO3 PLAS-SCNC: 28 MMOL/L (ref 22–29)
DEPRECATED HCO3 PLAS-SCNC: 28 MMOL/L (ref 22–29)
DEPRECATED HCO3 PLAS-SCNC: 29 MMOL/L (ref 22–29)
DEPRECATED HCO3 PLAS-SCNC: 30 MMOL/L (ref 22–29)
DIASTOLIC BLOOD PRESSURE - MUSE: NORMAL MMHG
E COLI K1 AG CSF QL: NEGATIVE
EBV DNA # SPEC NAA+PROBE: NOT DETECTED COPIES/ML
EGFRCR SERPLBLD CKD-EPI 2021: 47 ML/MIN/1.73M2
EGFRCR SERPLBLD CKD-EPI 2021: 48 ML/MIN/1.73M2
EGFRCR SERPLBLD CKD-EPI 2021: 49 ML/MIN/1.73M2
EGFRCR SERPLBLD CKD-EPI 2021: 50 ML/MIN/1.73M2
EGFRCR SERPLBLD CKD-EPI 2021: 52 ML/MIN/1.73M2
EGFRCR SERPLBLD CKD-EPI 2021: 55 ML/MIN/1.73M2
EGFRCR SERPLBLD CKD-EPI 2021: 57 ML/MIN/1.73M2
EGFRCR SERPLBLD CKD-EPI 2021: 60 ML/MIN/1.73M2
EGFRCR SERPLBLD CKD-EPI 2021: 60 ML/MIN/1.73M2
EGFRCR SERPLBLD CKD-EPI 2021: 62 ML/MIN/1.73M2
EGFRCR SERPLBLD CKD-EPI 2021: 63 ML/MIN/1.73M2
EGFRCR SERPLBLD CKD-EPI 2021: 64 ML/MIN/1.73M2
EGFRCR SERPLBLD CKD-EPI 2021: 65 ML/MIN/1.73M2
EGFRCR SERPLBLD CKD-EPI 2021: 66 ML/MIN/1.73M2
EGFRCR SERPLBLD CKD-EPI 2021: 69 ML/MIN/1.73M2
EGFRCR SERPLBLD CKD-EPI 2021: 69 ML/MIN/1.73M2
EGFRCR SERPLBLD CKD-EPI 2021: 72 ML/MIN/1.73M2
EGFRCR SERPLBLD CKD-EPI 2021: 73 ML/MIN/1.73M2
EGFRCR SERPLBLD CKD-EPI 2021: 76 ML/MIN/1.73M2
EGFRCR SERPLBLD CKD-EPI 2021: 78 ML/MIN/1.73M2
EGFRCR SERPLBLD CKD-EPI 2021: 81 ML/MIN/1.73M2
EGFRCR SERPLBLD CKD-EPI 2021: 82 ML/MIN/1.73M2
EGFRCR SERPLBLD CKD-EPI 2021: 89 ML/MIN/1.73M2
ELLIPTOCYTES BLD QL SMEAR: ABNORMAL
ELLIPTOCYTES BLD QL SMEAR: SLIGHT
EOSINOPHIL # BLD AUTO: 0 10E3/UL (ref 0–0.7)
EOSINOPHIL # BLD AUTO: 0.1 10E3/UL (ref 0–0.7)
EOSINOPHIL # BLD AUTO: 0.2 10E3/UL (ref 0–0.7)
EOSINOPHIL # BLD AUTO: 0.3 10E3/UL (ref 0–0.7)
EOSINOPHIL # BLD AUTO: 0.4 10E3/UL (ref 0–0.7)
EOSINOPHIL # BLD AUTO: 0.4 10E3/UL (ref 0–0.7)
EOSINOPHIL # BLD AUTO: 0.5 10E3/UL (ref 0–0.7)
EOSINOPHIL # BLD AUTO: 0.7 10E3/UL (ref 0–0.7)
EOSINOPHIL # BLD AUTO: ABNORMAL 10*3/UL
EOSINOPHIL # BLD MANUAL: 0 10E3/UL (ref 0–0.7)
EOSINOPHIL # BLD MANUAL: 0.1 10E3/UL (ref 0–0.7)
EOSINOPHIL # BLD MANUAL: 0.1 10E3/UL (ref 0–0.7)
EOSINOPHIL # BLD MANUAL: 0.3 10E3/UL (ref 0–0.7)
EOSINOPHIL # BLD MANUAL: 0.5 10E3/UL (ref 0–0.7)
EOSINOPHIL # BLD MANUAL: 1 10E3/UL (ref 0–0.7)
EOSINOPHIL # BLD MANUAL: 1.1 10E3/UL (ref 0–0.7)
EOSINOPHIL NFR BLD AUTO: 0 %
EOSINOPHIL NFR BLD AUTO: 1 %
EOSINOPHIL NFR BLD AUTO: 11 %
EOSINOPHIL NFR BLD AUTO: 12 %
EOSINOPHIL NFR BLD AUTO: 15 %
EOSINOPHIL NFR BLD AUTO: 15 %
EOSINOPHIL NFR BLD AUTO: 16 %
EOSINOPHIL NFR BLD AUTO: 19 %
EOSINOPHIL NFR BLD AUTO: 2 %
EOSINOPHIL NFR BLD AUTO: 3 %
EOSINOPHIL NFR BLD AUTO: 4 %
EOSINOPHIL NFR BLD AUTO: 4 %
EOSINOPHIL NFR BLD AUTO: 5 %
EOSINOPHIL NFR BLD AUTO: 5 %
EOSINOPHIL NFR BLD AUTO: 6 %
EOSINOPHIL NFR BLD AUTO: 8 %
EOSINOPHIL NFR BLD AUTO: ABNORMAL %
EOSINOPHIL NFR BLD MANUAL: 0 %
EOSINOPHIL NFR BLD MANUAL: 0 %
EOSINOPHIL NFR BLD MANUAL: 1 %
EOSINOPHIL NFR BLD MANUAL: 1 %
EOSINOPHIL NFR BLD MANUAL: 13 %
EOSINOPHIL NFR BLD MANUAL: 14 %
EOSINOPHIL NFR BLD MANUAL: 17 %
EOSINOPHIL NFR BLD MANUAL: 31 %
EOSINOPHIL NFR BLD MANUAL: 6 %
EOSINOPHIL NFR BLD MANUAL: 8 %
ERYTHROCYTE [DISTWIDTH] IN BLOOD BY AUTOMATED COUNT: 14.1 % (ref 10–15)
ERYTHROCYTE [DISTWIDTH] IN BLOOD BY AUTOMATED COUNT: 14.5 % (ref 10–15)
ERYTHROCYTE [DISTWIDTH] IN BLOOD BY AUTOMATED COUNT: 14.6 % (ref 10–15)
ERYTHROCYTE [DISTWIDTH] IN BLOOD BY AUTOMATED COUNT: 14.9 % (ref 10–15)
ERYTHROCYTE [DISTWIDTH] IN BLOOD BY AUTOMATED COUNT: 15.1 % (ref 10–15)
ERYTHROCYTE [DISTWIDTH] IN BLOOD BY AUTOMATED COUNT: 15.2 % (ref 10–15)
ERYTHROCYTE [DISTWIDTH] IN BLOOD BY AUTOMATED COUNT: 15.2 % (ref 10–15)
ERYTHROCYTE [DISTWIDTH] IN BLOOD BY AUTOMATED COUNT: 15.3 % (ref 10–15)
ERYTHROCYTE [DISTWIDTH] IN BLOOD BY AUTOMATED COUNT: 15.4 % (ref 10–15)
ERYTHROCYTE [DISTWIDTH] IN BLOOD BY AUTOMATED COUNT: 15.5 % (ref 10–15)
ERYTHROCYTE [DISTWIDTH] IN BLOOD BY AUTOMATED COUNT: 15.6 % (ref 10–15)
ERYTHROCYTE [DISTWIDTH] IN BLOOD BY AUTOMATED COUNT: 15.7 % (ref 10–15)
ERYTHROCYTE [DISTWIDTH] IN BLOOD BY AUTOMATED COUNT: 15.8 % (ref 10–15)
ERYTHROCYTE [DISTWIDTH] IN BLOOD BY AUTOMATED COUNT: 15.9 % (ref 10–15)
ERYTHROCYTE [DISTWIDTH] IN BLOOD BY AUTOMATED COUNT: 16 % (ref 10–15)
ERYTHROCYTE [DISTWIDTH] IN BLOOD BY AUTOMATED COUNT: 16.2 % (ref 10–15)
ERYTHROCYTE [DISTWIDTH] IN BLOOD BY AUTOMATED COUNT: 16.2 % (ref 10–15)
ERYTHROCYTE [DISTWIDTH] IN BLOOD BY AUTOMATED COUNT: 16.3 % (ref 10–15)
ERYTHROCYTE [DISTWIDTH] IN BLOOD BY AUTOMATED COUNT: 16.3 % (ref 10–15)
ERYTHROCYTE [DISTWIDTH] IN BLOOD BY AUTOMATED COUNT: 16.5 % (ref 10–15)
ERYTHROCYTE [DISTWIDTH] IN BLOOD BY AUTOMATED COUNT: 16.9 % (ref 10–15)
ERYTHROCYTE [DISTWIDTH] IN BLOOD BY AUTOMATED COUNT: 17 % (ref 10–15)
ERYTHROCYTE [DISTWIDTH] IN BLOOD BY AUTOMATED COUNT: 17 % (ref 10–15)
ERYTHROCYTE [DISTWIDTH] IN BLOOD BY AUTOMATED COUNT: 17.1 % (ref 10–15)
ERYTHROCYTE [DISTWIDTH] IN BLOOD BY AUTOMATED COUNT: 17.3 % (ref 10–15)
ERYTHROCYTE [DISTWIDTH] IN BLOOD BY AUTOMATED COUNT: 17.3 % (ref 10–15)
ERYTHROCYTE [DISTWIDTH] IN BLOOD BY AUTOMATED COUNT: 18 % (ref 10–15)
ERYTHROCYTE [DISTWIDTH] IN BLOOD BY AUTOMATED COUNT: 18.5 % (ref 10–15)
ERYTHROCYTE [DISTWIDTH] IN BLOOD BY AUTOMATED COUNT: 18.5 % (ref 10–15)
ERYTHROCYTE [DISTWIDTH] IN BLOOD BY AUTOMATED COUNT: 18.6 % (ref 10–15)
ERYTHROCYTE [DISTWIDTH] IN BLOOD BY AUTOMATED COUNT: 18.9 % (ref 10–15)
EV RNA SPEC QL NAA+PROBE: NEGATIVE
FENTANYL UR QL: NORMAL
FERRITIN SERPL-MCNC: 127 NG/ML (ref 11–328)
FERRITIN SERPL-MCNC: 138 NG/ML (ref 11–328)
FERRITIN SERPL-MCNC: 139 NG/ML (ref 11–328)
FERRITIN SERPL-MCNC: 147 NG/ML (ref 11–328)
FERRITIN SERPL-MCNC: 151 NG/ML (ref 11–328)
FERRITIN SERPL-MCNC: 160 NG/ML (ref 11–328)
FERRITIN SERPL-MCNC: 166 NG/ML (ref 11–328)
FERRITIN SERPL-MCNC: 169 NG/ML (ref 11–328)
FIBRINOGEN PPP-MCNC: 215 MG/DL (ref 170–490)
FIBRINOGEN PPP-MCNC: 228 MG/DL (ref 170–490)
FIBRINOGEN PPP-MCNC: 247 MG/DL (ref 170–490)
FIBRINOGEN PPP-MCNC: 257 MG/DL (ref 170–490)
FIBRINOGEN PPP-MCNC: 279 MG/DL (ref 170–490)
FIBRINOGEN PPP-MCNC: 291 MG/DL (ref 170–490)
FIBRINOGEN PPP-MCNC: 303 MG/DL (ref 170–490)
FIBRINOGEN PPP-MCNC: 303 MG/DL (ref 170–490)
FIBRINOGEN PPP-MCNC: 308 MG/DL (ref 170–490)
FIBRINOGEN PPP-MCNC: 319 MG/DL (ref 170–490)
FIBRINOGEN PPP-MCNC: 321 MG/DL (ref 170–490)
FIBRINOGEN PPP-MCNC: 326 MG/DL (ref 170–490)
FIBRINOGEN PPP-MCNC: 330 MG/DL (ref 170–490)
FIBRINOGEN PPP-MCNC: 331 MG/DL (ref 170–490)
FIBRINOGEN PPP-MCNC: 375 MG/DL (ref 170–490)
FIBRINOGEN PPP-MCNC: 401 MG/DL (ref 170–490)
FLUAV H1 2009 PAND RNA SPEC QL NAA+PROBE: NOT DETECTED
FLUAV H1 RNA SPEC QL NAA+PROBE: NOT DETECTED
FLUAV H3 RNA SPEC QL NAA+PROBE: NOT DETECTED
FLUAV RNA SPEC QL NAA+PROBE: NEGATIVE
FLUAV RNA SPEC QL NAA+PROBE: NOT DETECTED
FLUBV RNA RESP QL NAA+PROBE: NEGATIVE
FLUBV RNA SPEC QL NAA+PROBE: NOT DETECTED
FOLATE SERPL-MCNC: 26.7 NG/ML (ref 4.6–34.8)
FRAGMENTS BLD QL SMEAR: ABNORMAL
FRAGMENTS BLD QL SMEAR: SLIGHT
GAMMA GLOB SERPL ELPH-MCNC: 1 G/DL (ref 0.7–1.6)
GAMMA GLOB SERPL ELPH-MCNC: 1.6 G/DL (ref 0.7–1.6)
GAMMA GLOB SERPL ELPH-MCNC: 1.8 G/DL (ref 0.7–1.6)
GFR SERPL CREATININE-BSD FRML MDRD: 44 ML/MIN/1.73M2
GFR SERPL CREATININE-BSD FRML MDRD: 50 ML/MIN/1.73M2
GFR SERPL CREATININE-BSD FRML MDRD: 54 ML/MIN/1.73M2
GFR SERPL CREATININE-BSD FRML MDRD: 56 ML/MIN/1.73M2
GFR SERPL CREATININE-BSD FRML MDRD: 58 ML/MIN/1.73M2
GFR SERPL CREATININE-BSD FRML MDRD: 59 ML/MIN/1.73M2
GFR SERPL CREATININE-BSD FRML MDRD: 60 ML/MIN/1.73M2
GFR SERPL CREATININE-BSD FRML MDRD: 63 ML/MIN/1.73M2
GFR SERPL CREATININE-BSD FRML MDRD: 63 ML/MIN/1.73M2
GFR SERPL CREATININE-BSD FRML MDRD: 64 ML/MIN/1.73M2
GFR SERPL CREATININE-BSD FRML MDRD: 68 ML/MIN/1.73M2
GFR SERPL CREATININE-BSD FRML MDRD: 77 ML/MIN/1.73M2
GFR SERPL CREATININE-BSD FRML MDRD: 84 ML/MIN/1.73M2
GLUCOSE BLDC GLUCOMTR-MCNC: 106 MG/DL (ref 70–99)
GLUCOSE BLDC GLUCOMTR-MCNC: 108 MG/DL (ref 70–99)
GLUCOSE BLDC GLUCOMTR-MCNC: 109 MG/DL (ref 70–99)
GLUCOSE BLDC GLUCOMTR-MCNC: 128 MG/DL (ref 70–99)
GLUCOSE BLDC GLUCOMTR-MCNC: 128 MG/DL (ref 70–99)
GLUCOSE BLDC GLUCOMTR-MCNC: 129 MG/DL (ref 70–99)
GLUCOSE BLDC GLUCOMTR-MCNC: 63 MG/DL (ref 70–99)
GLUCOSE BLDC GLUCOMTR-MCNC: 66 MG/DL (ref 70–99)
GLUCOSE BLDC GLUCOMTR-MCNC: 68 MG/DL (ref 70–99)
GLUCOSE BLDC GLUCOMTR-MCNC: 72 MG/DL (ref 70–99)
GLUCOSE BLDC GLUCOMTR-MCNC: 78 MG/DL (ref 70–99)
GLUCOSE BLDC GLUCOMTR-MCNC: 78 MG/DL (ref 70–99)
GLUCOSE BLDC GLUCOMTR-MCNC: 82 MG/DL (ref 70–99)
GLUCOSE BLDC GLUCOMTR-MCNC: 85 MG/DL (ref 70–99)
GLUCOSE BLDC GLUCOMTR-MCNC: 90 MG/DL (ref 70–99)
GLUCOSE CSF-MCNC: 47 MG/DL (ref 40–70)
GLUCOSE SERPL-MCNC: 102 MG/DL (ref 70–99)
GLUCOSE SERPL-MCNC: 104 MG/DL (ref 70–99)
GLUCOSE SERPL-MCNC: 104 MG/DL (ref 70–99)
GLUCOSE SERPL-MCNC: 106 MG/DL (ref 70–99)
GLUCOSE SERPL-MCNC: 108 MG/DL (ref 70–99)
GLUCOSE SERPL-MCNC: 108 MG/DL (ref 70–99)
GLUCOSE SERPL-MCNC: 109 MG/DL (ref 70–99)
GLUCOSE SERPL-MCNC: 116 MG/DL (ref 70–99)
GLUCOSE SERPL-MCNC: 118 MG/DL (ref 70–99)
GLUCOSE SERPL-MCNC: 119 MG/DL (ref 70–99)
GLUCOSE SERPL-MCNC: 123 MG/DL (ref 70–99)
GLUCOSE SERPL-MCNC: 124 MG/DL (ref 70–99)
GLUCOSE SERPL-MCNC: 127 MG/DL (ref 70–99)
GLUCOSE SERPL-MCNC: 131 MG/DL (ref 70–99)
GLUCOSE SERPL-MCNC: 136 MG/DL (ref 70–99)
GLUCOSE SERPL-MCNC: 138 MG/DL (ref 70–99)
GLUCOSE SERPL-MCNC: 139 MG/DL (ref 70–99)
GLUCOSE SERPL-MCNC: 139 MG/DL (ref 70–99)
GLUCOSE SERPL-MCNC: 58 MG/DL (ref 70–99)
GLUCOSE SERPL-MCNC: 62 MG/DL (ref 70–99)
GLUCOSE SERPL-MCNC: 64 MG/DL (ref 70–99)
GLUCOSE SERPL-MCNC: 71 MG/DL (ref 70–99)
GLUCOSE SERPL-MCNC: 71 MG/DL (ref 70–99)
GLUCOSE SERPL-MCNC: 74 MG/DL (ref 70–99)
GLUCOSE SERPL-MCNC: 76 MG/DL (ref 70–99)
GLUCOSE SERPL-MCNC: 76 MG/DL (ref 70–99)
GLUCOSE SERPL-MCNC: 77 MG/DL (ref 70–99)
GLUCOSE SERPL-MCNC: 80 MG/DL (ref 70–99)
GLUCOSE SERPL-MCNC: 81 MG/DL (ref 70–99)
GLUCOSE SERPL-MCNC: 81 MG/DL (ref 70–99)
GLUCOSE SERPL-MCNC: 82 MG/DL (ref 70–99)
GLUCOSE SERPL-MCNC: 85 MG/DL (ref 70–99)
GLUCOSE SERPL-MCNC: 90 MG/DL (ref 70–99)
GLUCOSE SERPL-MCNC: 91 MG/DL (ref 70–99)
GLUCOSE SERPL-MCNC: 92 MG/DL (ref 70–99)
GLUCOSE SERPL-MCNC: 93 MG/DL (ref 70–99)
GLUCOSE UR STRIP-MCNC: NEGATIVE MG/DL
GP B STREP DNA CSF QL NAA+NON-PROBE: NEGATIVE
GRAM STAIN RESULT: NORMAL
GRAM STAIN RESULT: NORMAL
HADV DNA SPEC QL NAA+PROBE: NOT DETECTED
HAEM INFLU DNA CSF QL NAA+NON-PROBE: NEGATIVE
HCG UR QL: NEGATIVE
HCO3 BLDA-SCNC: 25 MMOL/L (ref 21–28)
HCO3 BLDV-SCNC: 24 MMOL/L (ref 21–28)
HCOV PNL SPEC NAA+PROBE: NOT DETECTED
HCT VFR BLD AUTO: 23.5 % (ref 35–47)
HCT VFR BLD AUTO: 24.1 % (ref 35–47)
HCT VFR BLD AUTO: 24.8 % (ref 35–47)
HCT VFR BLD AUTO: 24.9 % (ref 35–47)
HCT VFR BLD AUTO: 25.6 % (ref 35–47)
HCT VFR BLD AUTO: 25.8 % (ref 35–47)
HCT VFR BLD AUTO: 25.9 % (ref 35–47)
HCT VFR BLD AUTO: 25.9 % (ref 35–47)
HCT VFR BLD AUTO: 26.1 % (ref 35–47)
HCT VFR BLD AUTO: 26.1 % (ref 35–47)
HCT VFR BLD AUTO: 26.3 % (ref 35–47)
HCT VFR BLD AUTO: 27.1 % (ref 35–47)
HCT VFR BLD AUTO: 27.1 % (ref 35–47)
HCT VFR BLD AUTO: 27.4 % (ref 35–47)
HCT VFR BLD AUTO: 27.7 % (ref 35–47)
HCT VFR BLD AUTO: 27.8 % (ref 35–47)
HCT VFR BLD AUTO: 27.9 % (ref 35–47)
HCT VFR BLD AUTO: 28.2 % (ref 35–47)
HCT VFR BLD AUTO: 28.7 % (ref 35–47)
HCT VFR BLD AUTO: 29.7 % (ref 35–47)
HCT VFR BLD AUTO: 29.9 % (ref 35–47)
HCT VFR BLD AUTO: 30 % (ref 35–47)
HCT VFR BLD AUTO: 30.2 % (ref 35–47)
HCT VFR BLD AUTO: 30.8 % (ref 35–47)
HCT VFR BLD AUTO: 30.9 % (ref 35–47)
HCT VFR BLD AUTO: 31.8 % (ref 35–47)
HCT VFR BLD AUTO: 32 % (ref 35–47)
HCT VFR BLD AUTO: 32 % (ref 35–47)
HCT VFR BLD AUTO: 32.1 % (ref 35–47)
HCT VFR BLD AUTO: 32.2 % (ref 35–47)
HCT VFR BLD AUTO: 32.5 % (ref 35–47)
HCT VFR BLD AUTO: 32.8 % (ref 35–47)
HCT VFR BLD AUTO: 33.3 % (ref 35–47)
HCT VFR BLD AUTO: 33.5 % (ref 35–47)
HCT VFR BLD AUTO: 34.6 % (ref 35–47)
HCT VFR BLD AUTO: 34.8 % (ref 35–47)
HCT VFR BLD AUTO: 34.8 % (ref 35–47)
HCT VFR BLD AUTO: 34.9 % (ref 35–47)
HCT VFR BLD AUTO: 37.4 % (ref 35–47)
HCT VFR BLD AUTO: 37.7 % (ref 35–47)
HCT VFR BLD AUTO: 39.2 % (ref 35–47)
HGB BLD-MCNC: 10 G/DL (ref 11.7–15.7)
HGB BLD-MCNC: 10.2 G/DL (ref 11.7–15.7)
HGB BLD-MCNC: 10.3 G/DL (ref 11.7–15.7)
HGB BLD-MCNC: 10.4 G/DL (ref 11.7–15.7)
HGB BLD-MCNC: 10.5 G/DL (ref 11.7–15.7)
HGB BLD-MCNC: 10.5 G/DL (ref 11.7–15.7)
HGB BLD-MCNC: 10.6 G/DL (ref 11.7–15.7)
HGB BLD-MCNC: 11.1 G/DL (ref 11.7–15.7)
HGB BLD-MCNC: 11.3 G/DL (ref 11.7–15.7)
HGB BLD-MCNC: 11.8 G/DL (ref 11.7–15.7)
HGB BLD-MCNC: 12 G/DL (ref 11.7–15.7)
HGB BLD-MCNC: 12.4 G/DL (ref 11.7–15.7)
HGB BLD-MCNC: 7.4 G/DL (ref 11.7–15.7)
HGB BLD-MCNC: 7.6 G/DL (ref 11.7–15.7)
HGB BLD-MCNC: 7.7 G/DL (ref 11.7–15.7)
HGB BLD-MCNC: 7.8 G/DL (ref 11.7–15.7)
HGB BLD-MCNC: 8 G/DL (ref 11.7–15.7)
HGB BLD-MCNC: 8 G/DL (ref 11.7–15.7)
HGB BLD-MCNC: 8.1 G/DL (ref 11.7–15.7)
HGB BLD-MCNC: 8.2 G/DL (ref 11.7–15.7)
HGB BLD-MCNC: 8.4 G/DL (ref 11.7–15.7)
HGB BLD-MCNC: 8.4 G/DL (ref 11.7–15.7)
HGB BLD-MCNC: 8.5 G/DL (ref 11.7–15.7)
HGB BLD-MCNC: 8.6 G/DL (ref 11.7–15.7)
HGB BLD-MCNC: 8.7 G/DL (ref 11.7–15.7)
HGB BLD-MCNC: 8.7 G/DL (ref 11.7–15.7)
HGB BLD-MCNC: 8.8 G/DL (ref 11.7–15.7)
HGB BLD-MCNC: 9.1 G/DL (ref 11.7–15.7)
HGB BLD-MCNC: 9.1 G/DL (ref 11.7–15.7)
HGB BLD-MCNC: 9.2 G/DL (ref 11.7–15.7)
HGB BLD-MCNC: 9.4 G/DL (ref 11.7–15.7)
HGB BLD-MCNC: 9.5 G/DL (ref 11.7–15.7)
HGB BLD-MCNC: 9.6 G/DL (ref 11.7–15.7)
HGB BLD-MCNC: 9.8 G/DL (ref 11.7–15.7)
HGB BLD-MCNC: 9.9 G/DL (ref 11.7–15.7)
HGB C CRYSTALS: ABNORMAL
HGB UR QL STRIP: NEGATIVE
HHV6 DNA # SPEC NAA+PROBE: NOT DETECTED COPIES/ML
HHV6 DNA CSF QL NAA+NON-PROBE: NEGATIVE
HHV8 AB SER QL: NOT DETECTED
HHV8 DNA # SERPL NAA+PROBE: NORMAL CPY/ML
HHV8 QUANT PCR LOG COPY/ML: <3.8 LOG CPY/ML
HMPV RNA SPEC QL NAA+PROBE: NOT DETECTED
HOLD SPECIMEN: NORMAL
HOWELL-JOLLY BOD BLD QL SMEAR: ABNORMAL
HPIV1 RNA SPEC QL NAA+PROBE: NOT DETECTED
HPIV2 RNA SPEC QL NAA+PROBE: NOT DETECTED
HPIV3 RNA SPEC QL NAA+PROBE: NOT DETECTED
HPIV4 RNA SPEC QL NAA+PROBE: NOT DETECTED
HSV1 DNA CSF QL NAA+NON-PROBE: NEGATIVE
HSV1 DNA SPEC QL NAA+PROBE: NEGATIVE
HSV2 DNA CSF QL NAA+NON-PROBE: NEGATIVE
HSV2 DNA SPEC QL NAA+PROBE: NEGATIVE
IGA SERPL-MCNC: 25 MG/DL (ref 84–499)
IGA SERPL-MCNC: 42 MG/DL (ref 84–499)
IGA SERPL-MCNC: 5 MG/DL (ref 84–499)
IGA SERPL-MCNC: 5 MG/DL (ref 84–499)
IGA SERPL-MCNC: 61 MG/DL (ref 84–499)
IGG SERPL-MCNC: 1133 MG/DL (ref 610–1616)
IGG SERPL-MCNC: 1197 MG/DL (ref 610–1616)
IGG SERPL-MCNC: 1219 MG/DL (ref 610–1616)
IGG SERPL-MCNC: 1616 MG/DL (ref 610–1616)
IGG SERPL-MCNC: 1679 MG/DL (ref 610–1616)
IGG SERPL-MCNC: 1843 MG/DL (ref 610–1616)
IGG SERPL-MCNC: 2056 MG/DL (ref 610–1616)
IGM SERPL-MCNC: 11 MG/DL (ref 35–242)
IGM SERPL-MCNC: 12 MG/DL (ref 35–242)
IGM SERPL-MCNC: 28 MG/DL (ref 35–242)
IGM SERPL-MCNC: 47 MG/DL (ref 35–242)
IGM SERPL-MCNC: 77 MG/DL (ref 35–242)
IMM GRANULOCYTES # BLD: 0 10E3/UL
IMM GRANULOCYTES # BLD: 0.1 10E3/UL
IMM GRANULOCYTES # BLD: ABNORMAL 10*3/UL
IMM GRANULOCYTES NFR BLD: 0 %
IMM GRANULOCYTES NFR BLD: 1 %
IMM GRANULOCYTES NFR BLD: 2 %
IMM GRANULOCYTES NFR BLD: 3 %
IMM GRANULOCYTES NFR BLD: 3 %
IMM GRANULOCYTES NFR BLD: 4 %
IMM GRANULOCYTES NFR BLD: ABNORMAL %
INR PPP: 0.98 (ref 0.85–1.15)
INR PPP: 0.99 (ref 0.85–1.15)
INR PPP: 1.03 (ref 0.85–1.15)
INR PPP: 1.04 (ref 0.85–1.15)
INR PPP: 1.07 (ref 0.85–1.15)
INR PPP: 1.07 (ref 0.85–1.15)
INR PPP: 1.08 (ref 0.85–1.15)
INR PPP: 1.1 (ref 0.85–1.15)
INR PPP: 1.12 (ref 0.85–1.15)
INR PPP: 1.14 (ref 0.85–1.15)
INTERPRETATION ECG - MUSE: NORMAL
JCPYV DNA SPEC QL NAA+PROBE: NOT DETECTED
KAPPA LC FREE SER-MCNC: 0.14 MG/DL (ref 0.33–1.94)
KAPPA LC FREE SER-MCNC: 0.15 MG/DL (ref 0.33–1.94)
KAPPA LC FREE SER-MCNC: 0.41 MG/DL (ref 0.33–1.94)
KAPPA LC FREE SER-MCNC: 0.42 MG/DL (ref 0.33–1.94)
KAPPA LC FREE SER-MCNC: 0.44 MG/DL (ref 0.33–1.94)
KAPPA LC FREE SER-MCNC: 0.73 MG/DL (ref 0.33–1.94)
KAPPA LC FREE SER-MCNC: 1.24 MG/DL (ref 0.33–1.94)
KAPPA LC FREE SER-MCNC: 1.71 MG/DL (ref 0.33–1.94)
KAPPA LC FREE/LAMBDA FREE SER NEPH: 0 {RATIO} (ref 0.26–1.65)
KAPPA LC FREE/LAMBDA FREE SER NEPH: 0.01 {RATIO} (ref 0.26–1.65)
KAPPA LC FREE/LAMBDA FREE SER NEPH: 0.01 {RATIO} (ref 0.26–1.65)
KAPPA LC FREE/LAMBDA FREE SER NEPH: 0.03 {RATIO} (ref 0.26–1.65)
KAPPA LC UR-MCNC: <0.9 MG/DL
KAPPA LC/LAMBDA UR: <0.1969 {RATIO} (ref 0.7–6.2)
KETONES UR STRIP-MCNC: NEGATIVE MG/DL
L MONOCYTOG DNA CSF QL NAA+NON-PROBE: NEGATIVE
L PNEUMO1 AG UR QL IA: NEGATIVE
LACTATE BLD-SCNC: 4.9 MMOL/L
LACTATE SERPL-SCNC: 1 MMOL/L (ref 0.7–2)
LACTATE SERPL-SCNC: 1.3 MMOL/L (ref 0.7–2)
LACTATE SERPL-SCNC: 1.3 MMOL/L (ref 0.7–2)
LACTATE SERPL-SCNC: 1.6 MMOL/L (ref 0.7–2)
LACTATE SERPL-SCNC: 2.3 MMOL/L (ref 0.7–2)
LACTATE SERPL-SCNC: 3 MMOL/L (ref 0.7–2)
LACTATE SERPL-SCNC: 5.1 MMOL/L (ref 0.7–2)
LAMBDA LC FREE SERPL-MCNC: 143.29 MG/DL (ref 0.57–2.63)
LAMBDA LC FREE SERPL-MCNC: 145.82 MG/DL (ref 0.57–2.63)
LAMBDA LC FREE SERPL-MCNC: 194.53 MG/DL (ref 0.57–2.63)
LAMBDA LC FREE SERPL-MCNC: 222.55 MG/DL (ref 0.57–2.63)
LAMBDA LC FREE SERPL-MCNC: 259.22 MG/DL (ref 0.57–2.63)
LAMBDA LC FREE SERPL-MCNC: 289.64 MG/DL (ref 0.57–2.63)
LAMBDA LC FREE SERPL-MCNC: 45.49 MG/DL (ref 0.57–2.63)
LAMBDA LC FREE SERPL-MCNC: 70.18 MG/DL (ref 0.57–2.63)
LAMBDA LC UR-MCNC: 4.57 MG/DL
LDH SERPL L TO P-CCNC: 199 U/L (ref 0–250)
LEUKOCYTE ESTERASE UR QL STRIP: NEGATIVE
LEVETIRACETAM SERPL-MCNC: 36.1 ΜG/ML (ref 10–40)
LEVETIRACETAM SERPL-MCNC: 71.4 ΜG/ML (ref 10–40)
LIPASE SERPL-CCNC: 134 U/L (ref 13–60)
LIPASE SERPL-CCNC: 15 U/L (ref 13–60)
LIPASE SERPL-CCNC: 16 U/L (ref 13–60)
LIPASE SERPL-CCNC: 32 U/L (ref 13–60)
LIPASE SERPL-CCNC: 37 U/L (ref 13–60)
LYMPHOCYTES # BLD AUTO: 0.3 10E3/UL (ref 0.8–5.3)
LYMPHOCYTES # BLD AUTO: 0.4 10E3/UL (ref 0.8–5.3)
LYMPHOCYTES # BLD AUTO: 0.5 10E3/UL (ref 0.8–5.3)
LYMPHOCYTES # BLD AUTO: 0.6 10E3/UL (ref 0.8–5.3)
LYMPHOCYTES # BLD AUTO: 0.7 10E3/UL (ref 0.8–5.3)
LYMPHOCYTES # BLD AUTO: 0.7 10E3/UL (ref 0.8–5.3)
LYMPHOCYTES # BLD AUTO: 0.8 10E3/UL (ref 0.8–5.3)
LYMPHOCYTES # BLD AUTO: 1 10E3/UL (ref 0.8–5.3)
LYMPHOCYTES # BLD AUTO: 1.1 10E3/UL (ref 0.8–5.3)
LYMPHOCYTES # BLD AUTO: 1.2 10E3/UL (ref 0.8–5.3)
LYMPHOCYTES # BLD AUTO: 1.3 10E3/UL (ref 0.8–5.3)
LYMPHOCYTES # BLD AUTO: 1.5 10E3/UL (ref 0.8–5.3)
LYMPHOCYTES # BLD AUTO: 1.7 10E3/UL (ref 0.8–5.3)
LYMPHOCYTES # BLD AUTO: 1.8 10E3/UL (ref 0.8–5.3)
LYMPHOCYTES # BLD AUTO: 2.1 10E3/UL (ref 0.8–5.3)
LYMPHOCYTES # BLD AUTO: 2.3 10E3/UL (ref 0.8–5.3)
LYMPHOCYTES # BLD AUTO: ABNORMAL 10*3/UL
LYMPHOCYTES # BLD MANUAL: 0.1 10E3/UL (ref 0.8–5.3)
LYMPHOCYTES # BLD MANUAL: 0.3 10E3/UL (ref 0.8–5.3)
LYMPHOCYTES # BLD MANUAL: 0.3 10E3/UL (ref 0.8–5.3)
LYMPHOCYTES # BLD MANUAL: 0.4 10E3/UL (ref 0.8–5.3)
LYMPHOCYTES # BLD MANUAL: 0.5 10E3/UL (ref 0.8–5.3)
LYMPHOCYTES # BLD MANUAL: 0.8 10E3/UL (ref 0.8–5.3)
LYMPHOCYTES # BLD MANUAL: 0.9 10E3/UL (ref 0.8–5.3)
LYMPHOCYTES # BLD MANUAL: 1.1 10E3/UL (ref 0.8–5.3)
LYMPHOCYTES # BLD MANUAL: 1.1 10E3/UL (ref 0.8–5.3)
LYMPHOCYTES # BLD MANUAL: 1.4 10E3/UL (ref 0.8–5.3)
LYMPHOCYTES NFR BLD AUTO: 10 %
LYMPHOCYTES NFR BLD AUTO: 10 %
LYMPHOCYTES NFR BLD AUTO: 11 %
LYMPHOCYTES NFR BLD AUTO: 12 %
LYMPHOCYTES NFR BLD AUTO: 13 %
LYMPHOCYTES NFR BLD AUTO: 16 %
LYMPHOCYTES NFR BLD AUTO: 16 %
LYMPHOCYTES NFR BLD AUTO: 17 %
LYMPHOCYTES NFR BLD AUTO: 18 %
LYMPHOCYTES NFR BLD AUTO: 20 %
LYMPHOCYTES NFR BLD AUTO: 21 %
LYMPHOCYTES NFR BLD AUTO: 22 %
LYMPHOCYTES NFR BLD AUTO: 22 %
LYMPHOCYTES NFR BLD AUTO: 24 %
LYMPHOCYTES NFR BLD AUTO: 27 %
LYMPHOCYTES NFR BLD AUTO: 28 %
LYMPHOCYTES NFR BLD AUTO: 28 %
LYMPHOCYTES NFR BLD AUTO: 29 %
LYMPHOCYTES NFR BLD AUTO: 31 %
LYMPHOCYTES NFR BLD AUTO: 35 %
LYMPHOCYTES NFR BLD AUTO: 35 %
LYMPHOCYTES NFR BLD AUTO: 37 %
LYMPHOCYTES NFR BLD AUTO: 41 %
LYMPHOCYTES NFR BLD AUTO: 47 %
LYMPHOCYTES NFR BLD AUTO: 48 %
LYMPHOCYTES NFR BLD AUTO: 5 %
LYMPHOCYTES NFR BLD AUTO: 8 %
LYMPHOCYTES NFR BLD AUTO: ABNORMAL %
LYMPHOCYTES NFR BLD MANUAL: 15 %
LYMPHOCYTES NFR BLD MANUAL: 17 %
LYMPHOCYTES NFR BLD MANUAL: 20 %
LYMPHOCYTES NFR BLD MANUAL: 23 %
LYMPHOCYTES NFR BLD MANUAL: 25 %
LYMPHOCYTES NFR BLD MANUAL: 25 %
LYMPHOCYTES NFR BLD MANUAL: 4 %
LYMPHOCYTES NFR BLD MANUAL: 48 %
LYMPHOCYTES NFR BLD MANUAL: 60 %
LYMPHOCYTES NFR BLD MANUAL: 9 %
M PNEUMO DNA SPEC QL NAA+PROBE: NOT DETECTED
M PROTEIN SERPL ELPH-MCNC: 0.4 G/DL
M PROTEIN SERPL ELPH-MCNC: 0.7 G/DL
M PROTEIN SERPL ELPH-MCNC: 0.7 G/DL
M PROTEIN SERPL ELPH-MCNC: 1.3 G/DL
M PROTEIN SERPL ELPH-MCNC: 1.4 G/DL
MAGNESIUM SERPL-MCNC: 1.4 MG/DL (ref 1.7–2.3)
MAGNESIUM SERPL-MCNC: 1.5 MG/DL (ref 1.7–2.3)
MAGNESIUM SERPL-MCNC: 1.5 MG/DL (ref 1.7–2.3)
MAGNESIUM SERPL-MCNC: 1.6 MG/DL (ref 1.7–2.3)
MAGNESIUM SERPL-MCNC: 1.7 MG/DL (ref 1.7–2.3)
MAGNESIUM SERPL-MCNC: 1.7 MG/DL (ref 1.7–2.3)
MAGNESIUM SERPL-MCNC: 1.8 MG/DL (ref 1.7–2.3)
MAGNESIUM SERPL-MCNC: 1.9 MG/DL (ref 1.7–2.3)
MAGNESIUM SERPL-MCNC: 2 MG/DL (ref 1.7–2.3)
MAGNESIUM SERPL-MCNC: 2 MG/DL (ref 1.7–2.3)
MAGNESIUM SERPL-MCNC: 2.1 MG/DL (ref 1.7–2.3)
MCH RBC QN AUTO: 31.9 PG (ref 26.5–33)
MCH RBC QN AUTO: 32.3 PG (ref 26.5–33)
MCH RBC QN AUTO: 32.3 PG (ref 26.5–33)
MCH RBC QN AUTO: 32.4 PG (ref 26.5–33)
MCH RBC QN AUTO: 32.6 PG (ref 26.5–33)
MCH RBC QN AUTO: 32.7 PG (ref 26.5–33)
MCH RBC QN AUTO: 32.8 PG (ref 26.5–33)
MCH RBC QN AUTO: 33.2 PG (ref 26.5–33)
MCH RBC QN AUTO: 33.4 PG (ref 26.5–33)
MCH RBC QN AUTO: 33.5 PG (ref 26.5–33)
MCH RBC QN AUTO: 33.6 PG (ref 26.5–33)
MCH RBC QN AUTO: 33.7 PG (ref 26.5–33)
MCH RBC QN AUTO: 33.7 PG (ref 26.5–33)
MCH RBC QN AUTO: 33.8 PG (ref 26.5–33)
MCH RBC QN AUTO: 33.8 PG (ref 26.5–33)
MCH RBC QN AUTO: 33.9 PG (ref 26.5–33)
MCH RBC QN AUTO: 34 PG (ref 26.5–33)
MCH RBC QN AUTO: 34 PG (ref 26.5–33)
MCH RBC QN AUTO: 34.1 PG (ref 26.5–33)
MCH RBC QN AUTO: 34.2 PG (ref 26.5–33)
MCH RBC QN AUTO: 34.3 PG (ref 26.5–33)
MCH RBC QN AUTO: 34.4 PG (ref 26.5–33)
MCH RBC QN AUTO: 34.5 PG (ref 26.5–33)
MCH RBC QN AUTO: 34.6 PG (ref 26.5–33)
MCH RBC QN AUTO: 34.7 PG (ref 26.5–33)
MCH RBC QN AUTO: 34.8 PG (ref 26.5–33)
MCH RBC QN AUTO: 34.9 PG (ref 26.5–33)
MCH RBC QN AUTO: 35 PG (ref 26.5–33)
MCH RBC QN AUTO: 35.4 PG (ref 26.5–33)
MCHC RBC AUTO-ENTMCNC: 29.9 G/DL (ref 31.5–36.5)
MCHC RBC AUTO-ENTMCNC: 30.7 G/DL (ref 31.5–36.5)
MCHC RBC AUTO-ENTMCNC: 30.9 G/DL (ref 31.5–36.5)
MCHC RBC AUTO-ENTMCNC: 31 G/DL (ref 31.5–36.5)
MCHC RBC AUTO-ENTMCNC: 31 G/DL (ref 31.5–36.5)
MCHC RBC AUTO-ENTMCNC: 31.1 G/DL (ref 31.5–36.5)
MCHC RBC AUTO-ENTMCNC: 31.3 G/DL (ref 31.5–36.5)
MCHC RBC AUTO-ENTMCNC: 31.3 G/DL (ref 31.5–36.5)
MCHC RBC AUTO-ENTMCNC: 31.4 G/DL (ref 31.5–36.5)
MCHC RBC AUTO-ENTMCNC: 31.5 G/DL (ref 31.5–36.5)
MCHC RBC AUTO-ENTMCNC: 31.6 G/DL (ref 31.5–36.5)
MCHC RBC AUTO-ENTMCNC: 31.6 G/DL (ref 31.5–36.5)
MCHC RBC AUTO-ENTMCNC: 31.7 G/DL (ref 31.5–36.5)
MCHC RBC AUTO-ENTMCNC: 31.8 G/DL (ref 31.5–36.5)
MCHC RBC AUTO-ENTMCNC: 31.9 G/DL (ref 31.5–36.5)
MCHC RBC AUTO-ENTMCNC: 31.9 G/DL (ref 31.5–36.5)
MCHC RBC AUTO-ENTMCNC: 32 G/DL (ref 31.5–36.5)
MCHC RBC AUTO-ENTMCNC: 32.1 G/DL (ref 31.5–36.5)
MCHC RBC AUTO-ENTMCNC: 32.2 G/DL (ref 31.5–36.5)
MCHC RBC AUTO-ENTMCNC: 32.3 G/DL (ref 31.5–36.5)
MCHC RBC AUTO-ENTMCNC: 32.3 G/DL (ref 31.5–36.5)
MCHC RBC AUTO-ENTMCNC: 32.5 G/DL (ref 31.5–36.5)
MCHC RBC AUTO-ENTMCNC: 32.5 G/DL (ref 31.5–36.5)
MCHC RBC AUTO-ENTMCNC: 32.8 G/DL (ref 31.5–36.5)
MCV RBC AUTO: 100 FL (ref 78–100)
MCV RBC AUTO: 101 FL (ref 78–100)
MCV RBC AUTO: 102 FL (ref 78–100)
MCV RBC AUTO: 103 FL (ref 78–100)
MCV RBC AUTO: 105 FL (ref 78–100)
MCV RBC AUTO: 106 FL (ref 78–100)
MCV RBC AUTO: 106 FL (ref 78–100)
MCV RBC AUTO: 107 FL (ref 78–100)
MCV RBC AUTO: 108 FL (ref 78–100)
MCV RBC AUTO: 109 FL (ref 78–100)
MCV RBC AUTO: 110 FL (ref 78–100)
MCV RBC AUTO: 112 FL (ref 78–100)
MCV RBC AUTO: 113 FL (ref 78–100)
METAMYELOCYTES # BLD MANUAL: 0 10E3/UL
METAMYELOCYTES # BLD MANUAL: 0.1 10E3/UL
METAMYELOCYTES NFR BLD MANUAL: 1 %
METAMYELOCYTES NFR BLD MANUAL: 1 %
METAMYELOCYTES NFR BLD MANUAL: 3 %
METAMYELOCYTES NFR BLD MANUAL: 3 %
MONOCYTES # BLD AUTO: 0.1 10E3/UL (ref 0–1.3)
MONOCYTES # BLD AUTO: 0.2 10E3/UL (ref 0–1.3)
MONOCYTES # BLD AUTO: 0.3 10E3/UL (ref 0–1.3)
MONOCYTES # BLD AUTO: 0.4 10E3/UL (ref 0–1.3)
MONOCYTES # BLD AUTO: 0.5 10E3/UL (ref 0–1.3)
MONOCYTES # BLD AUTO: 0.6 10E3/UL (ref 0–1.3)
MONOCYTES # BLD AUTO: 0.7 10E3/UL (ref 0–1.3)
MONOCYTES # BLD AUTO: 0.8 10E3/UL (ref 0–1.3)
MONOCYTES # BLD AUTO: 0.9 10E3/UL (ref 0–1.3)
MONOCYTES # BLD AUTO: 0.9 10E3/UL (ref 0–1.3)
MONOCYTES # BLD AUTO: 1.5 10E3/UL (ref 0–1.3)
MONOCYTES # BLD AUTO: ABNORMAL 10*3/UL
MONOCYTES # BLD MANUAL: 0 10E3/UL (ref 0–1.3)
MONOCYTES # BLD MANUAL: 0.1 10E3/UL (ref 0–1.3)
MONOCYTES # BLD MANUAL: 0.2 10E3/UL (ref 0–1.3)
MONOCYTES # BLD MANUAL: 0.2 10E3/UL (ref 0–1.3)
MONOCYTES # BLD MANUAL: 0.3 10E3/UL (ref 0–1.3)
MONOCYTES # BLD MANUAL: 0.3 10E3/UL (ref 0–1.3)
MONOCYTES # BLD MANUAL: 0.4 10E3/UL (ref 0–1.3)
MONOCYTES # BLD MANUAL: 0.4 10E3/UL (ref 0–1.3)
MONOCYTES # BLD MANUAL: 0.6 10E3/UL (ref 0–1.3)
MONOCYTES # BLD MANUAL: 2 10E3/UL (ref 0–1.3)
MONOCYTES NFR BLD AUTO: 10 %
MONOCYTES NFR BLD AUTO: 11 %
MONOCYTES NFR BLD AUTO: 12 %
MONOCYTES NFR BLD AUTO: 13 %
MONOCYTES NFR BLD AUTO: 13 %
MONOCYTES NFR BLD AUTO: 14 %
MONOCYTES NFR BLD AUTO: 16 %
MONOCYTES NFR BLD AUTO: 16 %
MONOCYTES NFR BLD AUTO: 18 %
MONOCYTES NFR BLD AUTO: 18 %
MONOCYTES NFR BLD AUTO: 22 %
MONOCYTES NFR BLD AUTO: 23 %
MONOCYTES NFR BLD AUTO: 4 %
MONOCYTES NFR BLD AUTO: 4 %
MONOCYTES NFR BLD AUTO: 5 %
MONOCYTES NFR BLD AUTO: 5 %
MONOCYTES NFR BLD AUTO: 6 %
MONOCYTES NFR BLD AUTO: 6 %
MONOCYTES NFR BLD AUTO: 7 %
MONOCYTES NFR BLD AUTO: 8 %
MONOCYTES NFR BLD AUTO: 9 %
MONOCYTES NFR BLD AUTO: ABNORMAL %
MONOCYTES NFR BLD MANUAL: 1 %
MONOCYTES NFR BLD MANUAL: 10 %
MONOCYTES NFR BLD MANUAL: 11 %
MONOCYTES NFR BLD MANUAL: 12 %
MONOCYTES NFR BLD MANUAL: 28 %
MONOCYTES NFR BLD MANUAL: 33 %
MONOCYTES NFR BLD MANUAL: 4 %
MONOCYTES NFR BLD MANUAL: 9 %
MRSA DNA SPEC QL NAA+PROBE: NEGATIVE
MYELOCYTES # BLD MANUAL: 0 10E3/UL
MYELOCYTES # BLD MANUAL: 0.1 10E3/UL
MYELOCYTES # BLD MANUAL: 0.2 10E3/UL
MYELOCYTES NFR BLD MANUAL: 1 %
MYELOCYTES NFR BLD MANUAL: 1 %
MYELOCYTES NFR BLD MANUAL: 3 %
MYELOCYTES NFR BLD MANUAL: 3 %
MYELOCYTES NFR BLD MANUAL: 6 %
N MEN DNA CSF QL NAA+NON-PROBE: NEGATIVE
NEUTROPHILS # BLD AUTO: 0.5 10E3/UL (ref 1.6–8.3)
NEUTROPHILS # BLD AUTO: 0.6 10E3/UL (ref 1.6–8.3)
NEUTROPHILS # BLD AUTO: 0.6 10E3/UL (ref 1.6–8.3)
NEUTROPHILS # BLD AUTO: 1.1 10E3/UL (ref 1.6–8.3)
NEUTROPHILS # BLD AUTO: 1.3 10E3/UL (ref 1.6–8.3)
NEUTROPHILS # BLD AUTO: 1.5 10E3/UL (ref 1.6–8.3)
NEUTROPHILS # BLD AUTO: 1.5 10E3/UL (ref 1.6–8.3)
NEUTROPHILS # BLD AUTO: 1.7 10E3/UL (ref 1.6–8.3)
NEUTROPHILS # BLD AUTO: 1.9 10E3/UL (ref 1.6–8.3)
NEUTROPHILS # BLD AUTO: 2 10E3/UL (ref 1.6–8.3)
NEUTROPHILS # BLD AUTO: 2.1 10E3/UL (ref 1.6–8.3)
NEUTROPHILS # BLD AUTO: 2.4 10E3/UL (ref 1.6–8.3)
NEUTROPHILS # BLD AUTO: 2.5 10E3/UL (ref 1.6–8.3)
NEUTROPHILS # BLD AUTO: 2.8 10E3/UL (ref 1.6–8.3)
NEUTROPHILS # BLD AUTO: 2.9 10E3/UL (ref 1.6–8.3)
NEUTROPHILS # BLD AUTO: 3.4 10E3/UL (ref 1.6–8.3)
NEUTROPHILS # BLD AUTO: 3.6 10E3/UL (ref 1.6–8.3)
NEUTROPHILS # BLD AUTO: 4.1 10E3/UL (ref 1.6–8.3)
NEUTROPHILS # BLD AUTO: 4.2 10E3/UL (ref 1.6–8.3)
NEUTROPHILS # BLD AUTO: 4.5 10E3/UL (ref 1.6–8.3)
NEUTROPHILS # BLD AUTO: 4.5 10E3/UL (ref 1.6–8.3)
NEUTROPHILS # BLD AUTO: 4.9 10E3/UL (ref 1.6–8.3)
NEUTROPHILS # BLD AUTO: 5.2 10E3/UL (ref 1.6–8.3)
NEUTROPHILS # BLD AUTO: 5.5 10E3/UL (ref 1.6–8.3)
NEUTROPHILS # BLD AUTO: 5.7 10E3/UL (ref 1.6–8.3)
NEUTROPHILS # BLD AUTO: 7.2 10E3/UL (ref 1.6–8.3)
NEUTROPHILS # BLD AUTO: 8.2 10E3/UL (ref 1.6–8.3)
NEUTROPHILS # BLD AUTO: ABNORMAL 10*3/UL
NEUTROPHILS # BLD MANUAL: 0.3 10E3/UL (ref 1.6–8.3)
NEUTROPHILS # BLD MANUAL: 0.6 10E3/UL (ref 1.6–8.3)
NEUTROPHILS # BLD MANUAL: 0.6 10E3/UL (ref 1.6–8.3)
NEUTROPHILS # BLD MANUAL: 0.8 10E3/UL (ref 1.6–8.3)
NEUTROPHILS # BLD MANUAL: 1.2 10E3/UL (ref 1.6–8.3)
NEUTROPHILS # BLD MANUAL: 1.6 10E3/UL (ref 1.6–8.3)
NEUTROPHILS # BLD MANUAL: 1.9 10E3/UL (ref 1.6–8.3)
NEUTROPHILS # BLD MANUAL: 2.7 10E3/UL (ref 1.6–8.3)
NEUTROPHILS # BLD MANUAL: 2.8 10E3/UL (ref 1.6–8.3)
NEUTROPHILS # BLD MANUAL: 4.2 10E3/UL (ref 1.6–8.3)
NEUTROPHILS NFR BLD AUTO: 19 %
NEUTROPHILS NFR BLD AUTO: 30 %
NEUTROPHILS NFR BLD AUTO: 36 %
NEUTROPHILS NFR BLD AUTO: 39 %
NEUTROPHILS NFR BLD AUTO: 40 %
NEUTROPHILS NFR BLD AUTO: 45 %
NEUTROPHILS NFR BLD AUTO: 47 %
NEUTROPHILS NFR BLD AUTO: 48 %
NEUTROPHILS NFR BLD AUTO: 48 %
NEUTROPHILS NFR BLD AUTO: 49 %
NEUTROPHILS NFR BLD AUTO: 54 %
NEUTROPHILS NFR BLD AUTO: 58 %
NEUTROPHILS NFR BLD AUTO: 61 %
NEUTROPHILS NFR BLD AUTO: 63 %
NEUTROPHILS NFR BLD AUTO: 65 %
NEUTROPHILS NFR BLD AUTO: 66 %
NEUTROPHILS NFR BLD AUTO: 67 %
NEUTROPHILS NFR BLD AUTO: 68 %
NEUTROPHILS NFR BLD AUTO: 70 %
NEUTROPHILS NFR BLD AUTO: 70 %
NEUTROPHILS NFR BLD AUTO: 74 %
NEUTROPHILS NFR BLD AUTO: 74 %
NEUTROPHILS NFR BLD AUTO: 75 %
NEUTROPHILS NFR BLD AUTO: 82 %
NEUTROPHILS NFR BLD AUTO: 82 %
NEUTROPHILS NFR BLD AUTO: 83 %
NEUTROPHILS NFR BLD AUTO: 88 %
NEUTROPHILS NFR BLD AUTO: ABNORMAL %
NEUTROPHILS NFR BLD MANUAL: 18 %
NEUTROPHILS NFR BLD MANUAL: 26 %
NEUTROPHILS NFR BLD MANUAL: 27 %
NEUTROPHILS NFR BLD MANUAL: 33 %
NEUTROPHILS NFR BLD MANUAL: 37 %
NEUTROPHILS NFR BLD MANUAL: 61 %
NEUTROPHILS NFR BLD MANUAL: 66 %
NEUTROPHILS NFR BLD MANUAL: 70 %
NEUTROPHILS NFR BLD MANUAL: 80 %
NEUTROPHILS NFR BLD MANUAL: 81 %
NEUTS HYPERSEG BLD QL SMEAR: ABNORMAL
NITRATE UR QL: NEGATIVE
NRBC # BLD AUTO: 0 10E3/UL
NRBC BLD AUTO-RTO: 0 /100
NRBC BLD AUTO-RTO: 1 /100
NRBC BLD AUTO-RTO: 3 /100
NRBC BLD AUTO-RTO: 3 /100
NRBC BLD MANUAL-RTO: 1 %
O2/TOTAL GAS SETTING VFR VENT: 3 %
OBSERVATION IMP: NEGATIVE
OPIATES UR QL SCN: NORMAL
P AXIS - MUSE: 80 DEGREES
P AXIS - MUSE: 81 DEGREES
P AXIS - MUSE: 82 DEGREES
P AXIS - MUSE: 82 DEGREES
PARECHOVIRUS A RNA CSF QL NAA+NON-PROBE: NEGATIVE
PATH REPORT.COMMENTS IMP SPEC: NORMAL
PATH REPORT.FINAL DX SPEC: NORMAL
PATH REPORT.MICROSCOPIC SPEC OTHER STN: NORMAL
PATH REPORT.RELEVANT HX SPEC: NORMAL
PATH REV: NORMAL
PCO2 BLDA: 44 MM HG (ref 35–45)
PCO2 BLDV: 49 MM HG (ref 40–50)
PCP QUAL URINE (ROCHE): NORMAL
PH BLDA: 7.37 [PH] (ref 7.35–7.45)
PH BLDV: 7.3 [PH] (ref 7.32–7.43)
PH UR STRIP: 6 [PH] (ref 5–7)
PH UR STRIP: 6.5 [PH] (ref 5–7)
PH UR STRIP: 7 [PH] (ref 5–7)
PH UR STRIP: 7 [PH] (ref 5–7)
PHOSPHATE SERPL-MCNC: 2 MG/DL (ref 2.5–4.5)
PHOSPHATE SERPL-MCNC: 2.2 MG/DL (ref 2.5–4.5)
PHOSPHATE SERPL-MCNC: 2.4 MG/DL (ref 2.5–4.5)
PHOSPHATE SERPL-MCNC: 2.7 MG/DL (ref 2.5–4.5)
PHOSPHATE SERPL-MCNC: 2.9 MG/DL (ref 2.5–4.5)
PHOSPHATE SERPL-MCNC: 3 MG/DL (ref 2.5–4.5)
PHOSPHATE SERPL-MCNC: 3.2 MG/DL (ref 2.5–4.5)
PHOSPHATE SERPL-MCNC: 3.4 MG/DL (ref 2.5–4.5)
PHOSPHATE SERPL-MCNC: 3.4 MG/DL (ref 2.5–4.5)
PHOSPHATE SERPL-MCNC: 3.5 MG/DL (ref 2.5–4.5)
PHOSPHATE SERPL-MCNC: 3.6 MG/DL (ref 2.5–4.5)
PHOSPHATE SERPL-MCNC: 3.8 MG/DL (ref 2.5–4.5)
PHOSPHATE SERPL-MCNC: 3.8 MG/DL (ref 2.5–4.5)
PHOSPHATE SERPL-MCNC: 3.9 MG/DL (ref 2.5–4.5)
PHOSPHATE SERPL-MCNC: 4 MG/DL (ref 2.5–4.5)
PHOSPHATE SERPL-MCNC: 4.2 MG/DL (ref 2.5–4.5)
PHOSPHATE SERPL-MCNC: 4.6 MG/DL (ref 2.5–4.5)
PLAT MORPH BLD: ABNORMAL
PLAT MORPH BLD: NORMAL
PLATELET # BLD AUTO: 102 10E3/UL (ref 150–450)
PLATELET # BLD AUTO: 103 10E3/UL (ref 150–450)
PLATELET # BLD AUTO: 107 10E3/UL (ref 150–450)
PLATELET # BLD AUTO: 111 10E3/UL (ref 150–450)
PLATELET # BLD AUTO: 112 10E3/UL (ref 150–450)
PLATELET # BLD AUTO: 113 10E3/UL (ref 150–450)
PLATELET # BLD AUTO: 115 10E3/UL (ref 150–450)
PLATELET # BLD AUTO: 120 10E3/UL (ref 150–450)
PLATELET # BLD AUTO: 122 10E3/UL (ref 150–450)
PLATELET # BLD AUTO: 126 10E3/UL (ref 150–450)
PLATELET # BLD AUTO: 127 10E3/UL (ref 150–450)
PLATELET # BLD AUTO: 128 10E3/UL (ref 150–450)
PLATELET # BLD AUTO: 128 10E3/UL (ref 150–450)
PLATELET # BLD AUTO: 130 10E3/UL (ref 150–450)
PLATELET # BLD AUTO: 130 10E3/UL (ref 150–450)
PLATELET # BLD AUTO: 132 10E3/UL (ref 150–450)
PLATELET # BLD AUTO: 133 10E3/UL (ref 150–450)
PLATELET # BLD AUTO: 136 10E3/UL (ref 150–450)
PLATELET # BLD AUTO: 136 10E3/UL (ref 150–450)
PLATELET # BLD AUTO: 137 10E3/UL (ref 150–450)
PLATELET # BLD AUTO: 138 10E3/UL (ref 150–450)
PLATELET # BLD AUTO: 140 10E3/UL (ref 150–450)
PLATELET # BLD AUTO: 151 10E3/UL (ref 150–450)
PLATELET # BLD AUTO: 152 10E3/UL (ref 150–450)
PLATELET # BLD AUTO: 157 10E3/UL (ref 150–450)
PLATELET # BLD AUTO: 157 10E3/UL (ref 150–450)
PLATELET # BLD AUTO: 158 10E3/UL (ref 150–450)
PLATELET # BLD AUTO: 163 10E3/UL (ref 150–450)
PLATELET # BLD AUTO: 171 10E3/UL (ref 150–450)
PLATELET # BLD AUTO: 173 10E3/UL (ref 150–450)
PLATELET # BLD AUTO: 173 10E3/UL (ref 150–450)
PLATELET # BLD AUTO: 183 10E3/UL (ref 150–450)
PLATELET # BLD AUTO: 192 10E3/UL (ref 150–450)
PLATELET # BLD AUTO: 209 10E3/UL (ref 150–450)
PLATELET # BLD AUTO: 217 10E3/UL (ref 150–450)
PLATELET # BLD AUTO: 233 10E3/UL (ref 150–450)
PLATELET # BLD AUTO: 86 10E3/UL (ref 150–450)
PLATELET # BLD AUTO: 86 10E3/UL (ref 150–450)
PLATELET # BLD AUTO: 87 10E3/UL (ref 150–450)
PLATELET # BLD AUTO: 92 10E3/UL (ref 150–450)
PLATELET # BLD AUTO: 98 10E3/UL (ref 150–450)
PO2 BLDA: 30 MM HG (ref 80–105)
PO2 BLDV: 43 MM HG (ref 25–47)
POLYCHROMASIA BLD QL SMEAR: ABNORMAL
POLYCHROMASIA BLD QL SMEAR: SLIGHT
POTASSIUM SERPL-SCNC: 3 MMOL/L (ref 3.4–5.3)
POTASSIUM SERPL-SCNC: 3.2 MMOL/L (ref 3.4–5.3)
POTASSIUM SERPL-SCNC: 3.2 MMOL/L (ref 3.4–5.3)
POTASSIUM SERPL-SCNC: 3.3 MMOL/L (ref 3.4–5.3)
POTASSIUM SERPL-SCNC: 3.4 MMOL/L (ref 3.4–5.3)
POTASSIUM SERPL-SCNC: 3.5 MMOL/L (ref 3.4–5.3)
POTASSIUM SERPL-SCNC: 3.6 MMOL/L (ref 3.4–5.3)
POTASSIUM SERPL-SCNC: 3.7 MMOL/L (ref 3.4–5.3)
POTASSIUM SERPL-SCNC: 3.8 MMOL/L (ref 3.4–5.3)
POTASSIUM SERPL-SCNC: 3.9 MMOL/L (ref 3.4–5.3)
POTASSIUM SERPL-SCNC: 4 MMOL/L (ref 3.4–5.3)
POTASSIUM SERPL-SCNC: 4.2 MMOL/L (ref 3.4–5.3)
POTASSIUM SERPL-SCNC: 4.2 MMOL/L (ref 3.4–5.3)
POTASSIUM SERPL-SCNC: 4.3 MMOL/L (ref 3.4–5.3)
POTASSIUM SERPL-SCNC: 4.4 MMOL/L (ref 3.4–5.3)
POTASSIUM SERPL-SCNC: 4.5 MMOL/L (ref 3.4–5.3)
POTASSIUM SERPL-SCNC: 4.8 MMOL/L (ref 3.4–5.3)
POTASSIUM SERPL-SCNC: 4.8 MMOL/L (ref 3.4–5.3)
POTASSIUM SERPL-SCNC: 5.1 MMOL/L (ref 3.4–5.3)
POTASSIUM SERPL-SCNC: 5.1 MMOL/L (ref 3.4–5.3)
PR INTERVAL - MUSE: 154 MS
PR INTERVAL - MUSE: 162 MS
PR INTERVAL - MUSE: 172 MS
PR INTERVAL - MUSE: 176 MS
PROCALCITONIN SERPL IA-MCNC: 0.29 NG/ML
PROCALCITONIN SERPL IA-MCNC: 0.99 NG/ML
PROCALCITONIN SERPL IA-MCNC: 1.27 NG/ML
PROCALCITONIN SERPL IA-MCNC: 3.39 NG/ML
PROLACTIN SERPL 3RD IS-MCNC: 14 NG/ML (ref 5–23)
PROT CSF-MCNC: 24.9 MG/DL (ref 15–45)
PROT PATTERN SERPL ELPH-IMP: ABNORMAL
PROT PATTERN SERPL IFE-IMP: NORMAL
PROT SERPL-MCNC: 4.5 G/DL (ref 6.4–8.3)
PROT SERPL-MCNC: 4.6 G/DL (ref 6.4–8.3)
PROT SERPL-MCNC: 4.6 G/DL (ref 6.4–8.3)
PROT SERPL-MCNC: 4.9 G/DL (ref 6.4–8.3)
PROT SERPL-MCNC: 5 G/DL (ref 6.4–8.3)
PROT SERPL-MCNC: 5.1 G/DL (ref 6.4–8.3)
PROT SERPL-MCNC: 5.1 G/DL (ref 6.4–8.3)
PROT SERPL-MCNC: 5.2 G/DL (ref 6.4–8.3)
PROT SERPL-MCNC: 5.3 G/DL (ref 6.4–8.3)
PROT SERPL-MCNC: 5.3 G/DL (ref 6.4–8.3)
PROT SERPL-MCNC: 5.4 G/DL (ref 6.4–8.3)
PROT SERPL-MCNC: 5.5 G/DL (ref 6.4–8.3)
PROT SERPL-MCNC: 5.5 G/DL (ref 6.4–8.3)
PROT SERPL-MCNC: 5.6 G/DL (ref 6.4–8.3)
PROT SERPL-MCNC: 5.7 G/DL (ref 6.4–8.3)
PROT SERPL-MCNC: 5.7 G/DL (ref 6.4–8.3)
PROT SERPL-MCNC: 5.8 G/DL (ref 6.4–8.3)
PROT SERPL-MCNC: 5.9 G/DL (ref 6.4–8.3)
PROT SERPL-MCNC: 6 G/DL (ref 6.4–8.3)
PROT SERPL-MCNC: 6.1 G/DL (ref 6.4–8.3)
PROT SERPL-MCNC: 6.2 G/DL (ref 6.4–8.3)
PROT SERPL-MCNC: 6.3 G/DL (ref 6.4–8.3)
PROT SERPL-MCNC: 7.2 G/DL (ref 6.4–8.3)
QRS DURATION - MUSE: 76 MS
QRS DURATION - MUSE: 78 MS
QT - MUSE: 326 MS
QT - MUSE: 338 MS
QT - MUSE: 352 MS
QT - MUSE: 378 MS
QTC - MUSE: 427 MS
QTC - MUSE: 439 MS
QTC - MUSE: 440 MS
QTC - MUSE: 442 MS
R AXIS - MUSE: 29 DEGREES
R AXIS - MUSE: 36 DEGREES
R AXIS - MUSE: 39 DEGREES
R AXIS - MUSE: 47 DEGREES
RADIOLOGIST FLAGS: ABNORMAL
RADIOLOGIST FLAGS: NORMAL
RBC # BLD AUTO: 2.2 10E6/UL (ref 3.8–5.2)
RBC # BLD AUTO: 2.23 10E6/UL (ref 3.8–5.2)
RBC # BLD AUTO: 2.28 10E6/UL (ref 3.8–5.2)
RBC # BLD AUTO: 2.32 10E6/UL (ref 3.8–5.2)
RBC # BLD AUTO: 2.35 10E6/UL (ref 3.8–5.2)
RBC # BLD AUTO: 2.37 10E6/UL (ref 3.8–5.2)
RBC # BLD AUTO: 2.38 10E6/UL (ref 3.8–5.2)
RBC # BLD AUTO: 2.38 10E6/UL (ref 3.8–5.2)
RBC # BLD AUTO: 2.39 10E6/UL (ref 3.8–5.2)
RBC # BLD AUTO: 2.43 10E6/UL (ref 3.8–5.2)
RBC # BLD AUTO: 2.45 10E6/UL (ref 3.8–5.2)
RBC # BLD AUTO: 2.45 10E6/UL (ref 3.8–5.2)
RBC # BLD AUTO: 2.46 10E6/UL (ref 3.8–5.2)
RBC # BLD AUTO: 2.5 10E6/UL (ref 3.8–5.2)
RBC # BLD AUTO: 2.53 10E6/UL (ref 3.8–5.2)
RBC # BLD AUTO: 2.57 10E6/UL (ref 3.8–5.2)
RBC # BLD AUTO: 2.57 10E6/UL (ref 3.8–5.2)
RBC # BLD AUTO: 2.58 10E6/UL (ref 3.8–5.2)
RBC # BLD AUTO: 2.64 10E6/UL (ref 3.8–5.2)
RBC # BLD AUTO: 2.64 10E6/UL (ref 3.8–5.2)
RBC # BLD AUTO: 2.72 10E6/UL (ref 3.8–5.2)
RBC # BLD AUTO: 2.77 10E6/UL (ref 3.8–5.2)
RBC # BLD AUTO: 2.8 10E6/UL (ref 3.8–5.2)
RBC # BLD AUTO: 2.84 10E6/UL (ref 3.8–5.2)
RBC # BLD AUTO: 2.84 10E6/UL (ref 3.8–5.2)
RBC # BLD AUTO: 2.91 10E6/UL (ref 3.8–5.2)
RBC # BLD AUTO: 2.97 10E6/UL (ref 3.8–5.2)
RBC # BLD AUTO: 3.03 10E6/UL (ref 3.8–5.2)
RBC # BLD AUTO: 3.1 10E6/UL (ref 3.8–5.2)
RBC # BLD AUTO: 3.11 10E6/UL (ref 3.8–5.2)
RBC # BLD AUTO: 3.18 10E6/UL (ref 3.8–5.2)
RBC # BLD AUTO: 3.2 10E6/UL (ref 3.8–5.2)
RBC # BLD AUTO: 3.24 10E6/UL (ref 3.8–5.2)
RBC # BLD AUTO: 3.25 10E6/UL (ref 3.8–5.2)
RBC # BLD AUTO: 3.25 10E6/UL (ref 3.8–5.2)
RBC # BLD AUTO: 3.29 10E6/UL (ref 3.8–5.2)
RBC # BLD AUTO: 3.4 10E6/UL (ref 3.8–5.2)
RBC # BLD AUTO: 3.4 10E6/UL (ref 3.8–5.2)
RBC # BLD AUTO: 3.44 10E6/UL (ref 3.8–5.2)
RBC # BLD AUTO: 3.7 10E6/UL (ref 3.8–5.2)
RBC # BLD AUTO: 3.89 10E6/UL (ref 3.8–5.2)
RBC # CSF MANUAL: 0 /UL (ref 0–2)
RBC AGGLUT BLD QL: ABNORMAL
RBC MORPH BLD: ABNORMAL
RBC MORPH BLD: NORMAL
RBC URINE: 2 /HPF
RBC URINE: <1 /HPF
RETICS # AUTO: 0.07 10E6/UL (ref 0.03–0.1)
RETICS/RBC NFR AUTO: 2.8 % (ref 0.5–2)
ROULEAUX BLD QL SMEAR: ABNORMAL
RSV RNA SPEC NAA+PROBE: NEGATIVE
RSV RNA SPEC QL NAA+PROBE: NOT DETECTED
RSV RNA SPEC QL NAA+PROBE: NOT DETECTED
RV+EV RNA SPEC QL NAA+PROBE: NOT DETECTED
S PNEUM AG SPEC QL: NEGATIVE
S PNEUM DNA CSF QL NAA+NON-PROBE: NEGATIVE
SA 1 CELL: NORMAL
SA 1 TEST METHOD: NORMAL
SA 2 CELL: NORMAL
SA 2 TEST METHOD: NORMAL
SA TARGET DNA: NEGATIVE
SA1 HI RISK ABY: NORMAL
SA1 MOD RISK ABY: NORMAL
SA2 HI RISK ABY: NORMAL
SA2 MOD RISK ABY: NORMAL
SARS-COV-2 RNA RESP QL NAA+PROBE: NEGATIVE
SICKLE CELLS BLD QL SMEAR: ABNORMAL
SMUDGE CELLS BLD QL SMEAR: ABNORMAL
SODIUM SERPL-SCNC: 132 MMOL/L (ref 136–145)
SODIUM SERPL-SCNC: 135 MMOL/L (ref 136–145)
SODIUM SERPL-SCNC: 136 MMOL/L (ref 135–145)
SODIUM SERPL-SCNC: 136 MMOL/L (ref 135–145)
SODIUM SERPL-SCNC: 137 MMOL/L (ref 135–145)
SODIUM SERPL-SCNC: 137 MMOL/L (ref 136–145)
SODIUM SERPL-SCNC: 138 MMOL/L (ref 135–145)
SODIUM SERPL-SCNC: 138 MMOL/L (ref 136–145)
SODIUM SERPL-SCNC: 138 MMOL/L (ref 136–145)
SODIUM SERPL-SCNC: 139 MMOL/L (ref 135–145)
SODIUM SERPL-SCNC: 139 MMOL/L (ref 136–145)
SODIUM SERPL-SCNC: 140 MMOL/L (ref 135–145)
SODIUM SERPL-SCNC: 140 MMOL/L (ref 136–145)
SODIUM SERPL-SCNC: 141 MMOL/L (ref 135–145)
SODIUM SERPL-SCNC: 141 MMOL/L (ref 136–145)
SODIUM SERPL-SCNC: 142 MMOL/L (ref 135–145)
SODIUM SERPL-SCNC: 142 MMOL/L (ref 136–145)
SODIUM SERPL-SCNC: 142 MMOL/L (ref 136–145)
SODIUM SERPL-SCNC: 143 MMOL/L (ref 135–145)
SODIUM SERPL-SCNC: 143 MMOL/L (ref 135–145)
SODIUM SERPL-SCNC: 145 MMOL/L (ref 135–145)
SP GR UR STRIP: 1 (ref 1–1.03)
SP GR UR STRIP: 1.01 (ref 1–1.03)
SP GR UR STRIP: 1.01 (ref 1–1.03)
SP GR UR STRIP: 1.02 (ref 1–1.03)
SPECIMEN EXPIRATION DATE: NORMAL
SPHEROCYTES BLD QL SMEAR: ABNORMAL
SQUAMOUS EPITHELIAL: 1 /HPF
SQUAMOUS EPITHELIAL: <1 /HPF
STOMATOCYTES BLD QL SMEAR: ABNORMAL
SYSTOLIC BLOOD PRESSURE - MUSE: NORMAL MMHG
T AXIS - MUSE: 66 DEGREES
T AXIS - MUSE: 72 DEGREES
T AXIS - MUSE: 83 DEGREES
T AXIS - MUSE: 85 DEGREES
T4 FREE SERPL-MCNC: 1.06 NG/DL (ref 0.9–1.7)
TARGETS BLD QL SMEAR: ABNORMAL
TOTAL PROTEIN SERUM FOR ELP: 5.2 G/DL (ref 6.4–8.3)
TOTAL PROTEIN SERUM FOR ELP: 5.2 G/DL (ref 6.4–8.3)
TOTAL PROTEIN SERUM FOR ELP: 6.3 G/DL (ref 6.4–8.3)
TOTAL PROTEIN SERUM FOR ELP: 6.5 G/DL (ref 6.4–8.3)
TOTAL PROTEIN SERUM FOR ELP: 6.6 G/DL (ref 6.4–8.3)
TOXIC GRANULES BLD QL SMEAR: ABNORMAL
TRANSITIONAL EPI: <1 /HPF
TRANSITIONAL EPI: <1 /HPF
TROPONIN T SERPL HS-MCNC: 24 NG/L
TROPONIN T SERPL HS-MCNC: 26 NG/L
TROPONIN T SERPL HS-MCNC: 27 NG/L
TROPONIN T SERPL HS-MCNC: 32 NG/L
TROPONIN T SERPL HS-MCNC: 32 NG/L
TROPONIN T SERPL HS-MCNC: 45 NG/L
TSH SERPL DL<=0.005 MIU/L-ACNC: 0.89 UIU/ML (ref 0.3–4.2)
TSH SERPL DL<=0.005 MIU/L-ACNC: 1.26 UIU/ML (ref 0.3–4.2)
TSH SERPL DL<=0.005 MIU/L-ACNC: 11.3 UIU/ML (ref 0.3–4.2)
TSH SERPL DL<=0.005 MIU/L-ACNC: 2.76 UIU/ML (ref 0.3–4.2)
TUBE # CSF: 4
URATE SERPL-MCNC: 1.5 MG/DL (ref 2.4–5.7)
URATE SERPL-MCNC: 1.6 MG/DL (ref 2.4–5.7)
URATE SERPL-MCNC: 1.7 MG/DL (ref 2.4–5.7)
URATE SERPL-MCNC: 1.9 MG/DL (ref 2.4–5.7)
URATE SERPL-MCNC: 2.2 MG/DL (ref 2.4–5.7)
URATE SERPL-MCNC: 2.3 MG/DL (ref 2.4–5.7)
URATE SERPL-MCNC: 2.6 MG/DL (ref 2.4–5.7)
URATE SERPL-MCNC: 2.7 MG/DL (ref 2.4–5.7)
URATE SERPL-MCNC: 2.8 MG/DL (ref 2.4–5.7)
URATE SERPL-MCNC: 3.3 MG/DL (ref 2.4–5.7)
URATE SERPL-MCNC: 3.6 MG/DL (ref 2.4–5.7)
URATE SERPL-MCNC: 3.8 MG/DL (ref 2.4–5.7)
URATE SERPL-MCNC: 4.7 MG/DL (ref 2.4–5.7)
URATE SERPL-MCNC: 5.2 MG/DL (ref 2.4–5.7)
URATE SERPL-MCNC: 6.5 MG/DL (ref 2.4–5.7)
UROBILINOGEN UR STRIP-MCNC: NORMAL MG/DL
VARIANT LYMPHS BLD QL SMEAR: ABNORMAL
VENTRICULAR RATE- MUSE: 103 BPM
VENTRICULAR RATE- MUSE: 103 BPM
VENTRICULAR RATE- MUSE: 81 BPM
VENTRICULAR RATE- MUSE: 94 BPM
VIT B1 PYROPHOSHATE BLD-SCNC: 171 NMOL/L
VIT B12 SERPL-MCNC: 480 PG/ML (ref 232–1245)
VIT B12 SERPL-MCNC: 603 PG/ML (ref 232–1245)
VZV DNA CSF QL NAA+NON-PROBE: NEGATIVE
VZV DNA SPEC QL NAA+PROBE: NOT DETECTED
WBC # BLD AUTO: 1.3 10E3/UL (ref 4–11)
WBC # BLD AUTO: 1.5 10E3/UL (ref 4–11)
WBC # BLD AUTO: 1.6 10E3/UL (ref 4–11)
WBC # BLD AUTO: 1.9 10E3/UL (ref 4–11)
WBC # BLD AUTO: 2 10E3/UL (ref 4–11)
WBC # BLD AUTO: 2.1 10E3/UL (ref 4–11)
WBC # BLD AUTO: 2.3 10E3/UL (ref 4–11)
WBC # BLD AUTO: 2.4 10E3/UL (ref 4–11)
WBC # BLD AUTO: 2.7 10E3/UL (ref 4–11)
WBC # BLD AUTO: 2.9 10E3/UL (ref 4–11)
WBC # BLD AUTO: 2.9 10E3/UL (ref 4–11)
WBC # BLD AUTO: 3 10E3/UL (ref 4–11)
WBC # BLD AUTO: 3 10E3/UL (ref 4–11)
WBC # BLD AUTO: 3.1 10E3/UL (ref 4–11)
WBC # BLD AUTO: 3.5 10E3/UL (ref 4–11)
WBC # BLD AUTO: 3.6 10E3/UL (ref 4–11)
WBC # BLD AUTO: 3.6 10E3/UL (ref 4–11)
WBC # BLD AUTO: 3.8 10E3/UL (ref 4–11)
WBC # BLD AUTO: 4.1 10E3/UL (ref 4–11)
WBC # BLD AUTO: 4.2 10E3/UL (ref 4–11)
WBC # BLD AUTO: 4.2 10E3/UL (ref 4–11)
WBC # BLD AUTO: 4.3 10E3/UL (ref 4–11)
WBC # BLD AUTO: 4.6 10E3/UL (ref 4–11)
WBC # BLD AUTO: 4.8 10E3/UL (ref 4–11)
WBC # BLD AUTO: 5.2 10E3/UL (ref 4–11)
WBC # BLD AUTO: 5.9 10E3/UL (ref 4–11)
WBC # BLD AUTO: 6 10E3/UL (ref 4–11)
WBC # BLD AUTO: 6 10E3/UL (ref 4–11)
WBC # BLD AUTO: 6.2 10E3/UL (ref 4–11)
WBC # BLD AUTO: 6.2 10E3/UL (ref 4–11)
WBC # BLD AUTO: 6.5 10E3/UL (ref 4–11)
WBC # BLD AUTO: 6.6 10E3/UL (ref 4–11)
WBC # BLD AUTO: 7.6 10E3/UL (ref 4–11)
WBC # BLD AUTO: 7.9 10E3/UL (ref 4–11)
WBC # BLD AUTO: 8.1 10E3/UL (ref 4–11)
WBC # BLD AUTO: 8.2 10E3/UL (ref 4–11)
WBC # BLD AUTO: 8.2 10E3/UL (ref 4–11)
WBC # BLD AUTO: 8.7 10E3/UL (ref 4–11)
WBC # BLD AUTO: 9.9 10E3/UL (ref 4–11)
WBC # CSF MANUAL: 1 /UL (ref 0–5)
WBC URINE: 1 /HPF
WBC URINE: 1 /HPF
WBC URINE: 2 /HPF
WBC URINE: 3 /HPF
ZZZSA 1  COMMENTS: NORMAL
ZZZSA 2 COMMENTS: NORMAL

## 2023-01-01 PROCEDURE — 85025 COMPLETE CBC W/AUTO DIFF WBC: CPT

## 2023-01-01 PROCEDURE — 97112 NEUROMUSCULAR REEDUCATION: CPT | Mod: GP

## 2023-01-01 PROCEDURE — 84155 ASSAY OF PROTEIN SERUM: CPT

## 2023-01-01 PROCEDURE — G0463 HOSPITAL OUTPT CLINIC VISIT: HCPCS | Mod: 25

## 2023-01-01 PROCEDURE — 99214 OFFICE O/P EST MOD 30 MIN: CPT | Performed by: INTERNAL MEDICINE

## 2023-01-01 PROCEDURE — 74176 CT ABD & PELVIS W/O CONTRAST: CPT | Mod: 26 | Performed by: RADIOLOGY

## 2023-01-01 PROCEDURE — 86833 HLA CLASS II HIGH DEFIN QUAL: CPT | Performed by: STUDENT IN AN ORGANIZED HEALTH CARE EDUCATION/TRAINING PROGRAM

## 2023-01-01 PROCEDURE — 85384 FIBRINOGEN ACTIVITY: CPT

## 2023-01-01 PROCEDURE — 250N000013 HC RX MED GY IP 250 OP 250 PS 637: Performed by: INTERNAL MEDICINE

## 2023-01-01 PROCEDURE — 84484 ASSAY OF TROPONIN QUANT: CPT | Performed by: INTERNAL MEDICINE

## 2023-01-01 PROCEDURE — 82607 VITAMIN B-12: CPT

## 2023-01-01 PROCEDURE — 84550 ASSAY OF BLOOD/URIC ACID: CPT | Performed by: STUDENT IN AN ORGANIZED HEALTH CARE EDUCATION/TRAINING PROGRAM

## 2023-01-01 PROCEDURE — 71250 CT THORAX DX C-: CPT | Mod: 26 | Performed by: RADIOLOGY

## 2023-01-01 PROCEDURE — 97530 THERAPEUTIC ACTIVITIES: CPT | Mod: GP

## 2023-01-01 PROCEDURE — 85610 PROTHROMBIN TIME: CPT

## 2023-01-01 PROCEDURE — 36591 DRAW BLOOD OFF VENOUS DEVICE: CPT | Mod: GT | Performed by: INTERNAL MEDICINE

## 2023-01-01 PROCEDURE — 82310 ASSAY OF CALCIUM: CPT | Performed by: INTERNAL MEDICINE

## 2023-01-01 PROCEDURE — 82728 ASSAY OF FERRITIN: CPT

## 2023-01-01 PROCEDURE — 258N000003 HC RX IP 258 OP 636

## 2023-01-01 PROCEDURE — 250N000009 HC RX 250: Performed by: RADIOLOGY

## 2023-01-01 PROCEDURE — 99239 HOSP IP/OBS DSCHRG MGMT >30: CPT | Mod: FS | Performed by: PHYSICIAN ASSISTANT

## 2023-01-01 PROCEDURE — 93005 ELECTROCARDIOGRAM TRACING: CPT | Performed by: STUDENT IN AN ORGANIZED HEALTH CARE EDUCATION/TRAINING PROGRAM

## 2023-01-01 PROCEDURE — 84155 ASSAY OF PROTEIN SERUM: CPT | Mod: GT,91 | Performed by: PATHOLOGY

## 2023-01-01 PROCEDURE — 86334 IMMUNOFIX E-PHORESIS SERUM: CPT | Mod: 26

## 2023-01-01 PROCEDURE — 70450 CT HEAD/BRAIN W/O DYE: CPT | Mod: 26 | Performed by: RADIOLOGY

## 2023-01-01 PROCEDURE — 84100 ASSAY OF PHOSPHORUS: CPT | Performed by: INTERNAL MEDICINE

## 2023-01-01 PROCEDURE — 120N000002 HC R&B MED SURG/OB UMMC

## 2023-01-01 PROCEDURE — 84100 ASSAY OF PHOSPHORUS: CPT

## 2023-01-01 PROCEDURE — 250N000013 HC RX MED GY IP 250 OP 250 PS 637: Performed by: STUDENT IN AN ORGANIZED HEALTH CARE EDUCATION/TRAINING PROGRAM

## 2023-01-01 PROCEDURE — 85004 AUTOMATED DIFF WBC COUNT: CPT | Performed by: INTERNAL MEDICINE

## 2023-01-01 PROCEDURE — 82728 ASSAY OF FERRITIN: CPT | Performed by: STUDENT IN AN ORGANIZED HEALTH CARE EDUCATION/TRAINING PROGRAM

## 2023-01-01 PROCEDURE — 99285 EMERGENCY DEPT VISIT HI MDM: CPT | Performed by: EMERGENCY MEDICINE

## 2023-01-01 PROCEDURE — 85025 COMPLETE CBC W/AUTO DIFF WBC: CPT | Performed by: INTERNAL MEDICINE

## 2023-01-01 PROCEDURE — 74177 CT ABD & PELVIS W/CONTRAST: CPT | Mod: 26 | Performed by: RADIOLOGY

## 2023-01-01 PROCEDURE — 96413 CHEMO IV INFUSION 1 HR: CPT

## 2023-01-01 PROCEDURE — 250N000013 HC RX MED GY IP 250 OP 250 PS 637

## 2023-01-01 PROCEDURE — 95718 EEG PHYS/QHP 2-12 HR W/VEEG: CPT | Performed by: PSYCHIATRY & NEUROLOGY

## 2023-01-01 PROCEDURE — 250N000011 HC RX IP 250 OP 636: Mod: JZ

## 2023-01-01 PROCEDURE — G1010 CDSM STANSON: HCPCS | Performed by: RADIOLOGY

## 2023-01-01 PROCEDURE — 86334 IMMUNOFIX E-PHORESIS SERUM: CPT | Performed by: PATHOLOGY

## 2023-01-01 PROCEDURE — 87899 AGENT NOS ASSAY W/OPTIC: CPT

## 2023-01-01 PROCEDURE — 85007 BL SMEAR W/DIFF WBC COUNT: CPT

## 2023-01-01 PROCEDURE — 250N000011 HC RX IP 250 OP 636

## 2023-01-01 PROCEDURE — 99239 HOSP IP/OBS DSCHRG MGMT >30: CPT | Mod: FS | Performed by: INTERNAL MEDICINE

## 2023-01-01 PROCEDURE — 96415 CHEMO IV INFUSION ADDL HR: CPT

## 2023-01-01 PROCEDURE — 81025 URINE PREGNANCY TEST: CPT

## 2023-01-01 PROCEDURE — 84550 ASSAY OF BLOOD/URIC ACID: CPT

## 2023-01-01 PROCEDURE — 80053 COMPREHEN METABOLIC PANEL: CPT | Performed by: INTERNAL MEDICINE

## 2023-01-01 PROCEDURE — 255N000002 HC RX 255 OP 636: Mod: JZ | Performed by: EMERGENCY MEDICINE

## 2023-01-01 PROCEDURE — 99214 OFFICE O/P EST MOD 30 MIN: CPT

## 2023-01-01 PROCEDURE — 84165 PROTEIN E-PHORESIS SERUM: CPT | Mod: 26

## 2023-01-01 PROCEDURE — 83521 IG LIGHT CHAINS FREE EACH: CPT | Performed by: INTERNAL MEDICINE

## 2023-01-01 PROCEDURE — 70496 CT ANGIOGRAPHY HEAD: CPT | Mod: MG

## 2023-01-01 PROCEDURE — 83735 ASSAY OF MAGNESIUM: CPT

## 2023-01-01 PROCEDURE — 36415 COLL VENOUS BLD VENIPUNCTURE: CPT | Performed by: EMERGENCY MEDICINE

## 2023-01-01 PROCEDURE — 71046 X-RAY EXAM CHEST 2 VIEWS: CPT | Performed by: RADIOLOGY

## 2023-01-01 PROCEDURE — 85027 COMPLETE CBC AUTOMATED: CPT

## 2023-01-01 PROCEDURE — 36415 COLL VENOUS BLD VENIPUNCTURE: CPT | Performed by: STUDENT IN AN ORGANIZED HEALTH CARE EDUCATION/TRAINING PROGRAM

## 2023-01-01 PROCEDURE — 80048 BASIC METABOLIC PNL TOTAL CA: CPT

## 2023-01-01 PROCEDURE — 82310 ASSAY OF CALCIUM: CPT

## 2023-01-01 PROCEDURE — 87637 SARSCOV2&INF A&B&RSV AMP PRB: CPT

## 2023-01-01 PROCEDURE — 86334 IMMUNOFIX E-PHORESIS SERUM: CPT | Performed by: STUDENT IN AN ORGANIZED HEALTH CARE EDUCATION/TRAINING PROGRAM

## 2023-01-01 PROCEDURE — 250N000011 HC RX IP 250 OP 636: Mod: JZ | Performed by: INTERNAL MEDICINE

## 2023-01-01 PROCEDURE — 85610 PROTHROMBIN TIME: CPT | Performed by: INTERNAL MEDICINE

## 2023-01-01 PROCEDURE — 87798 DETECT AGENT NOS DNA AMP: CPT | Performed by: STUDENT IN AN ORGANIZED HEALTH CARE EDUCATION/TRAINING PROGRAM

## 2023-01-01 PROCEDURE — G1010 CDSM STANSON: HCPCS | Mod: GC | Performed by: RADIOLOGY

## 2023-01-01 PROCEDURE — 36591 DRAW BLOOD OFF VENOUS DEVICE: CPT | Performed by: INTERNAL MEDICINE

## 2023-01-01 PROCEDURE — 93005 ELECTROCARDIOGRAM TRACING: CPT

## 2023-01-01 PROCEDURE — 86140 C-REACTIVE PROTEIN: CPT | Performed by: STUDENT IN AN ORGANIZED HEALTH CARE EDUCATION/TRAINING PROGRAM

## 2023-01-01 PROCEDURE — 82140 ASSAY OF AMMONIA: CPT

## 2023-01-01 PROCEDURE — 36591 DRAW BLOOD OFF VENOUS DEVICE: CPT

## 2023-01-01 PROCEDURE — 83735 ASSAY OF MAGNESIUM: CPT | Performed by: PHYSICIAN ASSISTANT

## 2023-01-01 PROCEDURE — 97116 GAIT TRAINING THERAPY: CPT | Mod: GP

## 2023-01-01 PROCEDURE — 258N000003 HC RX IP 258 OP 636: Performed by: EMERGENCY MEDICINE

## 2023-01-01 PROCEDURE — 84484 ASSAY OF TROPONIN QUANT: CPT | Performed by: EMERGENCY MEDICINE

## 2023-01-01 PROCEDURE — 71046 X-RAY EXAM CHEST 2 VIEWS: CPT | Mod: 26 | Performed by: RADIOLOGY

## 2023-01-01 PROCEDURE — 85027 COMPLETE CBC AUTOMATED: CPT | Performed by: STUDENT IN AN ORGANIZED HEALTH CARE EDUCATION/TRAINING PROGRAM

## 2023-01-01 PROCEDURE — 87641 MR-STAPH DNA AMP PROBE: CPT | Performed by: INTERNAL MEDICINE

## 2023-01-01 PROCEDURE — 96374 THER/PROPH/DIAG INJ IV PUSH: CPT

## 2023-01-01 PROCEDURE — 83605 ASSAY OF LACTIC ACID: CPT

## 2023-01-01 PROCEDURE — 250N000013 HC RX MED GY IP 250 OP 250 PS 637: Performed by: PHYSICIAN ASSISTANT

## 2023-01-01 PROCEDURE — 83735 ASSAY OF MAGNESIUM: CPT | Performed by: EMERGENCY MEDICINE

## 2023-01-01 PROCEDURE — 97165 OT EVAL LOW COMPLEX 30 MIN: CPT | Mod: GO

## 2023-01-01 PROCEDURE — 250N000011 HC RX IP 250 OP 636: Mod: JZ | Performed by: PHYSICIAN ASSISTANT

## 2023-01-01 PROCEDURE — 250N000011 HC RX IP 250 OP 636: Performed by: INTERNAL MEDICINE

## 2023-01-01 PROCEDURE — 96375 TX/PRO/DX INJ NEW DRUG ADDON: CPT

## 2023-01-01 PROCEDURE — 99215 OFFICE O/P EST HI 40 MIN: CPT

## 2023-01-01 PROCEDURE — 82607 VITAMIN B-12: CPT | Performed by: INTERNAL MEDICINE

## 2023-01-01 PROCEDURE — 84157 ASSAY OF PROTEIN OTHER: CPT

## 2023-01-01 PROCEDURE — 85610 PROTHROMBIN TIME: CPT | Performed by: STUDENT IN AN ORGANIZED HEALTH CARE EDUCATION/TRAINING PROGRAM

## 2023-01-01 PROCEDURE — 80053 COMPREHEN METABOLIC PANEL: CPT

## 2023-01-01 PROCEDURE — 82248 BILIRUBIN DIRECT: CPT | Performed by: PHYSICIAN ASSISTANT

## 2023-01-01 PROCEDURE — 258N000003 HC RX IP 258 OP 636: Performed by: INTERNAL MEDICINE

## 2023-01-01 PROCEDURE — 70553 MRI BRAIN STEM W/O & W/DYE: CPT | Mod: 26 | Performed by: RADIOLOGY

## 2023-01-01 PROCEDURE — 97161 PT EVAL LOW COMPLEX 20 MIN: CPT | Mod: GP

## 2023-01-01 PROCEDURE — 84443 ASSAY THYROID STIM HORMONE: CPT

## 2023-01-01 PROCEDURE — 99207 PR NO BILLABLE SERVICE THIS VISIT: CPT | Performed by: PEDIATRICS

## 2023-01-01 PROCEDURE — 83521 IG LIGHT CHAINS FREE EACH: CPT

## 2023-01-01 PROCEDURE — 83690 ASSAY OF LIPASE: CPT | Mod: GT | Performed by: PATHOLOGY

## 2023-01-01 PROCEDURE — 96367 TX/PROPH/DG ADDL SEQ IV INF: CPT

## 2023-01-01 PROCEDURE — 87103 BLOOD FUNGUS CULTURE: CPT | Performed by: STUDENT IN AN ORGANIZED HEALTH CARE EDUCATION/TRAINING PROGRAM

## 2023-01-01 PROCEDURE — 99215 OFFICE O/P EST HI 40 MIN: CPT | Mod: VID | Performed by: INTERNAL MEDICINE

## 2023-01-01 PROCEDURE — 86140 C-REACTIVE PROTEIN: CPT

## 2023-01-01 PROCEDURE — 82040 ASSAY OF SERUM ALBUMIN: CPT | Performed by: INTERNAL MEDICINE

## 2023-01-01 PROCEDURE — 250N000011 HC RX IP 250 OP 636: Mod: JZ | Performed by: EMERGENCY MEDICINE

## 2023-01-01 PROCEDURE — 83690 ASSAY OF LIPASE: CPT | Performed by: STUDENT IN AN ORGANIZED HEALTH CARE EDUCATION/TRAINING PROGRAM

## 2023-01-01 PROCEDURE — 250N000011 HC RX IP 250 OP 636: Mod: JZ | Performed by: STUDENT IN AN ORGANIZED HEALTH CARE EDUCATION/TRAINING PROGRAM

## 2023-01-01 PROCEDURE — 82784 ASSAY IGA/IGD/IGG/IGM EACH: CPT

## 2023-01-01 PROCEDURE — 71046 X-RAY EXAM CHEST 2 VIEWS: CPT | Mod: GC | Performed by: RADIOLOGY

## 2023-01-01 PROCEDURE — G1010 CDSM STANSON: HCPCS

## 2023-01-01 PROCEDURE — 99207 PR NO BILLABLE SERVICE THIS VISIT: CPT | Performed by: INTERNAL MEDICINE

## 2023-01-01 PROCEDURE — G0463 HOSPITAL OUTPT CLINIC VISIT: HCPCS | Mod: 25 | Performed by: INTERNAL MEDICINE

## 2023-01-01 PROCEDURE — 97535 SELF CARE MNGMENT TRAINING: CPT | Mod: GO

## 2023-01-01 PROCEDURE — 94640 AIRWAY INHALATION TREATMENT: CPT

## 2023-01-01 PROCEDURE — 250N000012 HC RX MED GY IP 250 OP 636 PS 637: Performed by: INTERNAL MEDICINE

## 2023-01-01 PROCEDURE — 70498 CT ANGIOGRAPHY NECK: CPT | Mod: 26 | Performed by: RADIOLOGY

## 2023-01-01 PROCEDURE — 99233 SBSQ HOSP IP/OBS HIGH 50: CPT | Mod: FS | Performed by: INTERNAL MEDICINE

## 2023-01-01 PROCEDURE — 85730 THROMBOPLASTIN TIME PARTIAL: CPT

## 2023-01-01 PROCEDURE — 85025 COMPLETE CBC W/AUTO DIFF WBC: CPT | Performed by: STUDENT IN AN ORGANIZED HEALTH CARE EDUCATION/TRAINING PROGRAM

## 2023-01-01 PROCEDURE — 85007 BL SMEAR W/DIFF WBC COUNT: CPT | Performed by: INTERNAL MEDICINE

## 2023-01-01 PROCEDURE — 85610 PROTHROMBIN TIME: CPT | Performed by: PHYSICIAN ASSISTANT

## 2023-01-01 PROCEDURE — 86901 BLOOD TYPING SEROLOGIC RH(D): CPT

## 2023-01-01 PROCEDURE — 85014 HEMATOCRIT: CPT

## 2023-01-01 PROCEDURE — 83883 ASSAY NEPHELOMETRY NOT SPEC: CPT | Performed by: STUDENT IN AN ORGANIZED HEALTH CARE EDUCATION/TRAINING PROGRAM

## 2023-01-01 PROCEDURE — 82962 GLUCOSE BLOOD TEST: CPT

## 2023-01-01 PROCEDURE — 85384 FIBRINOGEN ACTIVITY: CPT | Performed by: INTERNAL MEDICINE

## 2023-01-01 PROCEDURE — 250N000011 HC RX IP 250 OP 636: Performed by: STUDENT IN AN ORGANIZED HEALTH CARE EDUCATION/TRAINING PROGRAM

## 2023-01-01 PROCEDURE — 92610 EVALUATE SWALLOWING FUNCTION: CPT | Mod: GN

## 2023-01-01 PROCEDURE — 87529 HSV DNA AMP PROBE: CPT | Performed by: STUDENT IN AN ORGANIZED HEALTH CARE EDUCATION/TRAINING PROGRAM

## 2023-01-01 PROCEDURE — 93010 ELECTROCARDIOGRAM REPORT: CPT | Performed by: INTERNAL MEDICINE

## 2023-01-01 PROCEDURE — G0463 HOSPITAL OUTPT CLINIC VISIT: HCPCS | Performed by: INTERNAL MEDICINE

## 2023-01-01 PROCEDURE — 99233 SBSQ HOSP IP/OBS HIGH 50: CPT | Mod: GC | Performed by: INTERNAL MEDICINE

## 2023-01-01 PROCEDURE — 83605 ASSAY OF LACTIC ACID: CPT | Performed by: EMERGENCY MEDICINE

## 2023-01-01 PROCEDURE — 250N000011 HC RX IP 250 OP 636: Mod: JW | Performed by: INTERNAL MEDICINE

## 2023-01-01 PROCEDURE — 83735 ASSAY OF MAGNESIUM: CPT | Performed by: INTERNAL MEDICINE

## 2023-01-01 PROCEDURE — 00JU3ZZ INSPECTION OF SPINAL CANAL, PERCUTANEOUS APPROACH: ICD-10-PCS | Performed by: INTERNAL MEDICINE

## 2023-01-01 PROCEDURE — 82140 ASSAY OF AMMONIA: CPT | Performed by: INTERNAL MEDICINE

## 2023-01-01 PROCEDURE — 83735 ASSAY OF MAGNESIUM: CPT | Performed by: STUDENT IN AN ORGANIZED HEALTH CARE EDUCATION/TRAINING PROGRAM

## 2023-01-01 PROCEDURE — 87899 AGENT NOS ASSAY W/OPTIC: CPT | Performed by: STUDENT IN AN ORGANIZED HEALTH CARE EDUCATION/TRAINING PROGRAM

## 2023-01-01 PROCEDURE — 83690 ASSAY OF LIPASE: CPT | Performed by: EMERGENCY MEDICINE

## 2023-01-01 PROCEDURE — 85027 COMPLETE CBC AUTOMATED: CPT | Performed by: INTERNAL MEDICINE

## 2023-01-01 PROCEDURE — 84425 ASSAY OF VITAMIN B-1: CPT | Performed by: INTERNAL MEDICINE

## 2023-01-01 PROCEDURE — 85048 AUTOMATED LEUKOCYTE COUNT: CPT

## 2023-01-01 PROCEDURE — 258N000003 HC RX IP 258 OP 636: Performed by: STUDENT IN AN ORGANIZED HEALTH CARE EDUCATION/TRAINING PROGRAM

## 2023-01-01 PROCEDURE — 83521 IG LIGHT CHAINS FREE EACH: CPT | Mod: 59

## 2023-01-01 PROCEDURE — 250N000009 HC RX 250

## 2023-01-01 PROCEDURE — 95714 VEEG EA 12-26 HR UNMNTR: CPT

## 2023-01-01 PROCEDURE — 99418 PROLNG IP/OBS E/M EA 15 MIN: CPT | Performed by: INTERNAL MEDICINE

## 2023-01-01 PROCEDURE — 87533 HHV-6 DNA QUANT: CPT | Performed by: STUDENT IN AN ORGANIZED HEALTH CARE EDUCATION/TRAINING PROGRAM

## 2023-01-01 PROCEDURE — 87040 BLOOD CULTURE FOR BACTERIA: CPT | Performed by: STUDENT IN AN ORGANIZED HEALTH CARE EDUCATION/TRAINING PROGRAM

## 2023-01-01 PROCEDURE — 87486 CHLMYD PNEUM DNA AMP PROBE: CPT

## 2023-01-01 PROCEDURE — 87799 DETECT AGENT NOS DNA QUANT: CPT | Performed by: STUDENT IN AN ORGANIZED HEALTH CARE EDUCATION/TRAINING PROGRAM

## 2023-01-01 PROCEDURE — 87086 URINE CULTURE/COLONY COUNT: CPT

## 2023-01-01 PROCEDURE — 99233 SBSQ HOSP IP/OBS HIGH 50: CPT | Mod: FS

## 2023-01-01 PROCEDURE — 81001 URINALYSIS AUTO W/SCOPE: CPT

## 2023-01-01 PROCEDURE — 87040 BLOOD CULTURE FOR BACTERIA: CPT

## 2023-01-01 PROCEDURE — 85025 COMPLETE CBC W/AUTO DIFF WBC: CPT | Performed by: EMERGENCY MEDICINE

## 2023-01-01 PROCEDURE — 83605 ASSAY OF LACTIC ACID: CPT | Performed by: STUDENT IN AN ORGANIZED HEALTH CARE EDUCATION/TRAINING PROGRAM

## 2023-01-01 PROCEDURE — 87633 RESP VIRUS 12-25 TARGETS: CPT

## 2023-01-01 PROCEDURE — 258N000001 HC RX 258: Performed by: PHYSICIAN ASSISTANT

## 2023-01-01 PROCEDURE — 88187 FLOWCYTOMETRY/READ 2-8: CPT | Mod: GC | Performed by: PATHOLOGY

## 2023-01-01 PROCEDURE — 95711 VEEG 2-12 HR UNMONITORED: CPT

## 2023-01-01 PROCEDURE — 85018 HEMOGLOBIN: CPT | Performed by: PHYSICIAN ASSISTANT

## 2023-01-01 PROCEDURE — 99215 OFFICE O/P EST HI 40 MIN: CPT | Performed by: INTERNAL MEDICINE

## 2023-01-01 PROCEDURE — 86850 RBC ANTIBODY SCREEN: CPT

## 2023-01-01 PROCEDURE — 97535 SELF CARE MNGMENT TRAINING: CPT | Mod: GO | Performed by: OCCUPATIONAL THERAPIST

## 2023-01-01 PROCEDURE — 87040 BLOOD CULTURE FOR BACTERIA: CPT | Performed by: EMERGENCY MEDICINE

## 2023-01-01 PROCEDURE — 96361 HYDRATE IV INFUSION ADD-ON: CPT | Performed by: STUDENT IN AN ORGANIZED HEALTH CARE EDUCATION/TRAINING PROGRAM

## 2023-01-01 PROCEDURE — 99207 EEG VIDEO 2-12 HRS UNMONITORED: CPT | Performed by: PSYCHIATRY & NEUROLOGY

## 2023-01-01 PROCEDURE — 84443 ASSAY THYROID STIM HORMONE: CPT | Performed by: STUDENT IN AN ORGANIZED HEALTH CARE EDUCATION/TRAINING PROGRAM

## 2023-01-01 PROCEDURE — 71045 X-RAY EXAM CHEST 1 VIEW: CPT

## 2023-01-01 PROCEDURE — 71250 CT THORAX DX C-: CPT | Mod: MG

## 2023-01-01 PROCEDURE — 85379 FIBRIN DEGRADATION QUANT: CPT

## 2023-01-01 PROCEDURE — 84450 TRANSFERASE (AST) (SGOT): CPT | Performed by: STUDENT IN AN ORGANIZED HEALTH CARE EDUCATION/TRAINING PROGRAM

## 2023-01-01 PROCEDURE — 999N000147 HC STATISTIC PT IP EVAL DEFER

## 2023-01-01 PROCEDURE — 83615 LACTATE (LD) (LDH) ENZYME: CPT

## 2023-01-01 PROCEDURE — 82140 ASSAY OF AMMONIA: CPT | Performed by: EMERGENCY MEDICINE

## 2023-01-01 PROCEDURE — 82784 ASSAY IGA/IGD/IGG/IGM EACH: CPT | Mod: GT | Performed by: PATHOLOGY

## 2023-01-01 PROCEDURE — 82784 ASSAY IGA/IGD/IGG/IGM EACH: CPT | Performed by: STUDENT IN AN ORGANIZED HEALTH CARE EDUCATION/TRAINING PROGRAM

## 2023-01-01 PROCEDURE — 82140 ASSAY OF AMMONIA: CPT | Performed by: STUDENT IN AN ORGANIZED HEALTH CARE EDUCATION/TRAINING PROGRAM

## 2023-01-01 PROCEDURE — 36415 COLL VENOUS BLD VENIPUNCTURE: CPT | Performed by: PHYSICIAN ASSISTANT

## 2023-01-01 PROCEDURE — A9585 GADOBUTROL INJECTION: HCPCS | Performed by: RADIOLOGY

## 2023-01-01 PROCEDURE — 80053 COMPREHEN METABOLIC PANEL: CPT | Performed by: STUDENT IN AN ORGANIZED HEALTH CARE EDUCATION/TRAINING PROGRAM

## 2023-01-01 PROCEDURE — 71260 CT THORAX DX C+: CPT | Mod: 26 | Performed by: RADIOLOGY

## 2023-01-01 PROCEDURE — 85025 COMPLETE CBC W/AUTO DIFF WBC: CPT | Performed by: PHYSICIAN ASSISTANT

## 2023-01-01 PROCEDURE — 84155 ASSAY OF PROTEIN SERUM: CPT | Performed by: INTERNAL MEDICINE

## 2023-01-01 PROCEDURE — 81001 URINALYSIS AUTO W/SCOPE: CPT | Performed by: STUDENT IN AN ORGANIZED HEALTH CARE EDUCATION/TRAINING PROGRAM

## 2023-01-01 PROCEDURE — 99222 1ST HOSP IP/OBS MODERATE 55: CPT | Mod: GC | Performed by: INTERNAL MEDICINE

## 2023-01-01 PROCEDURE — 70551 MRI BRAIN STEM W/O DYE: CPT | Mod: 26 | Performed by: RADIOLOGY

## 2023-01-01 PROCEDURE — 84146 ASSAY OF PROLACTIN: CPT | Performed by: STUDENT IN AN ORGANIZED HEALTH CARE EDUCATION/TRAINING PROGRAM

## 2023-01-01 PROCEDURE — XW01348 INTRODUCTION OF TECLISTAMAB ANTINEOPLASTIC INTO SUBCUTANEOUS TISSUE, PERCUTANEOUS APPROACH, NEW TECHNOLOGY GROUP 8: ICD-10-PCS | Performed by: STUDENT IN AN ORGANIZED HEALTH CARE EDUCATION/TRAINING PROGRAM

## 2023-01-01 PROCEDURE — 99495 TRANSJ CARE MGMT MOD F2F 14D: CPT

## 2023-01-01 PROCEDURE — 84155 ASSAY OF PROTEIN SERUM: CPT | Mod: 91

## 2023-01-01 PROCEDURE — 84165 PROTEIN E-PHORESIS SERUM: CPT | Mod: TC | Performed by: PATHOLOGY

## 2023-01-01 PROCEDURE — 95720 EEG PHY/QHP EA INCR W/VEEG: CPT | Performed by: PSYCHIATRY & NEUROLOGY

## 2023-01-01 PROCEDURE — 36415 COLL VENOUS BLD VENIPUNCTURE: CPT

## 2023-01-01 PROCEDURE — 84100 ASSAY OF PHOSPHORUS: CPT | Performed by: STUDENT IN AN ORGANIZED HEALTH CARE EDUCATION/TRAINING PROGRAM

## 2023-01-01 PROCEDURE — 85007 BL SMEAR W/DIFF WBC COUNT: CPT | Performed by: STUDENT IN AN ORGANIZED HEALTH CARE EDUCATION/TRAINING PROGRAM

## 2023-01-01 PROCEDURE — 82728 ASSAY OF FERRITIN: CPT | Performed by: INTERNAL MEDICINE

## 2023-01-01 PROCEDURE — 86140 C-REACTIVE PROTEIN: CPT | Performed by: EMERGENCY MEDICINE

## 2023-01-01 PROCEDURE — 86850 RBC ANTIBODY SCREEN: CPT | Performed by: INTERNAL MEDICINE

## 2023-01-01 PROCEDURE — 999N000142 HC STATISTIC PROCEDURE PREP ONLY

## 2023-01-01 PROCEDURE — 80177 DRUG SCRN QUAN LEVETIRACETAM: CPT | Performed by: STUDENT IN AN ORGANIZED HEALTH CARE EDUCATION/TRAINING PROGRAM

## 2023-01-01 PROCEDURE — 82746 ASSAY OF FOLIC ACID SERUM: CPT

## 2023-01-01 PROCEDURE — 85730 THROMBOPLASTIN TIME PARTIAL: CPT | Performed by: STUDENT IN AN ORGANIZED HEALTH CARE EDUCATION/TRAINING PROGRAM

## 2023-01-01 PROCEDURE — 84484 ASSAY OF TROPONIN QUANT: CPT | Performed by: STUDENT IN AN ORGANIZED HEALTH CARE EDUCATION/TRAINING PROGRAM

## 2023-01-01 PROCEDURE — 250N000013 HC RX MED GY IP 250 OP 250 PS 637: Performed by: NURSE PRACTITIONER

## 2023-01-01 PROCEDURE — 93010 ELECTROCARDIOGRAM REPORT: CPT | Performed by: EMERGENCY MEDICINE

## 2023-01-01 PROCEDURE — 87205 SMEAR GRAM STAIN: CPT

## 2023-01-01 PROCEDURE — 999N000176 HC STATISTIC STROKE CODE W/O ACCESS

## 2023-01-01 PROCEDURE — 84165 PROTEIN E-PHORESIS SERUM: CPT | Mod: TC,GT | Performed by: PATHOLOGY

## 2023-01-01 PROCEDURE — 85045 AUTOMATED RETICULOCYTE COUNT: CPT

## 2023-01-01 PROCEDURE — 62328 DX LMBR SPI PNXR W/FLUOR/CT: CPT

## 2023-01-01 PROCEDURE — 84145 PROCALCITONIN (PCT): CPT | Performed by: STUDENT IN AN ORGANIZED HEALTH CARE EDUCATION/TRAINING PROGRAM

## 2023-01-01 PROCEDURE — 84100 ASSAY OF PHOSPHORUS: CPT | Performed by: PHYSICIAN ASSISTANT

## 2023-01-01 PROCEDURE — 70553 MRI BRAIN STEM W/O & W/DYE: CPT | Mod: MG

## 2023-01-01 PROCEDURE — 86850 RBC ANTIBODY SCREEN: CPT | Performed by: STUDENT IN AN ORGANIZED HEALTH CARE EDUCATION/TRAINING PROGRAM

## 2023-01-01 PROCEDURE — 92526 ORAL FUNCTION THERAPY: CPT | Mod: GN

## 2023-01-01 PROCEDURE — 82803 BLOOD GASES ANY COMBINATION: CPT | Performed by: PHYSICIAN ASSISTANT

## 2023-01-01 PROCEDURE — G0463 HOSPITAL OUTPT CLINIC VISIT: HCPCS

## 2023-01-01 PROCEDURE — 250N000011 HC RX IP 250 OP 636: Mod: JZ | Performed by: NURSE PRACTITIONER

## 2023-01-01 PROCEDURE — 81001 URINALYSIS AUTO W/SCOPE: CPT | Performed by: EMERGENCY MEDICINE

## 2023-01-01 PROCEDURE — 36591 DRAW BLOOD OFF VENOUS DEVICE: CPT | Performed by: STUDENT IN AN ORGANIZED HEALTH CARE EDUCATION/TRAINING PROGRAM

## 2023-01-01 PROCEDURE — 97166 OT EVAL MOD COMPLEX 45 MIN: CPT | Mod: GO

## 2023-01-01 PROCEDURE — 84165 PROTEIN E-PHORESIS SERUM: CPT | Mod: 26 | Performed by: PATHOLOGY

## 2023-01-01 PROCEDURE — 73502 X-RAY EXAM HIP UNI 2-3 VIEWS: CPT | Mod: LT | Performed by: RADIOLOGY

## 2023-01-01 PROCEDURE — 84550 ASSAY OF BLOOD/URIC ACID: CPT | Performed by: PHYSICIAN ASSISTANT

## 2023-01-01 PROCEDURE — 71045 X-RAY EXAM CHEST 1 VIEW: CPT | Mod: 26 | Performed by: RADIOLOGY

## 2023-01-01 PROCEDURE — 99285 EMERGENCY DEPT VISIT HI MDM: CPT | Mod: 25 | Performed by: STUDENT IN AN ORGANIZED HEALTH CARE EDUCATION/TRAINING PROGRAM

## 2023-01-01 PROCEDURE — 99223 1ST HOSP IP/OBS HIGH 75: CPT | Mod: AI | Performed by: STUDENT IN AN ORGANIZED HEALTH CARE EDUCATION/TRAINING PROGRAM

## 2023-01-01 PROCEDURE — 83690 ASSAY OF LIPASE: CPT | Performed by: PHYSICIAN ASSISTANT

## 2023-01-01 PROCEDURE — 70450 CT HEAD/BRAIN W/O DYE: CPT | Mod: MG

## 2023-01-01 PROCEDURE — 999N000111 HC STATISTIC OT IP EVAL DEFER

## 2023-01-01 PROCEDURE — 99285 EMERGENCY DEPT VISIT HI MDM: CPT | Mod: 25 | Performed by: EMERGENCY MEDICINE

## 2023-01-01 PROCEDURE — 96365 THER/PROPH/DIAG IV INF INIT: CPT | Mod: 59 | Performed by: EMERGENCY MEDICINE

## 2023-01-01 PROCEDURE — 84132 ASSAY OF SERUM POTASSIUM: CPT | Performed by: INTERNAL MEDICINE

## 2023-01-01 PROCEDURE — 99233 SBSQ HOSP IP/OBS HIGH 50: CPT | Performed by: PHYSICIAN ASSISTANT

## 2023-01-01 PROCEDURE — 85384 FIBRINOGEN ACTIVITY: CPT | Performed by: PHYSICIAN ASSISTANT

## 2023-01-01 PROCEDURE — 88185 FLOWCYTOMETRY/TC ADD-ON: CPT

## 2023-01-01 PROCEDURE — 82784 ASSAY IGA/IGD/IGG/IGM EACH: CPT | Performed by: INTERNAL MEDICINE

## 2023-01-01 PROCEDURE — 70496 CT ANGIOGRAPHY HEAD: CPT | Mod: 26 | Performed by: RADIOLOGY

## 2023-01-01 PROCEDURE — 80053 COMPREHEN METABOLIC PANEL: CPT | Performed by: PHYSICIAN ASSISTANT

## 2023-01-01 PROCEDURE — 99207 EEG VIDEO 12-26 HR UNMONITORED: CPT | Performed by: PSYCHIATRY & NEUROLOGY

## 2023-01-01 PROCEDURE — 99223 1ST HOSP IP/OBS HIGH 75: CPT | Mod: FS | Performed by: INTERNAL MEDICINE

## 2023-01-01 PROCEDURE — 99291 CRITICAL CARE FIRST HOUR: CPT | Mod: GC | Performed by: STUDENT IN AN ORGANIZED HEALTH CARE EDUCATION/TRAINING PROGRAM

## 2023-01-01 PROCEDURE — 74177 CT ABD & PELVIS W/CONTRAST: CPT | Mod: MG

## 2023-01-01 PROCEDURE — 99291 CRITICAL CARE FIRST HOUR: CPT | Mod: 25 | Performed by: EMERGENCY MEDICINE

## 2023-01-01 PROCEDURE — 96372 THER/PROPH/DIAG INJ SC/IM: CPT

## 2023-01-01 PROCEDURE — 250N000011 HC RX IP 250 OP 636: Performed by: EMERGENCY MEDICINE

## 2023-01-01 PROCEDURE — 80053 COMPREHEN METABOLIC PANEL: CPT | Performed by: EMERGENCY MEDICINE

## 2023-01-01 PROCEDURE — 86832 HLA CLASS I HIGH DEFIN QUAL: CPT | Performed by: STUDENT IN AN ORGANIZED HEALTH CARE EDUCATION/TRAINING PROGRAM

## 2023-01-01 PROCEDURE — 83521 IG LIGHT CHAINS FREE EACH: CPT | Mod: GT | Performed by: INTERNAL MEDICINE

## 2023-01-01 PROCEDURE — 87040 BLOOD CULTURE FOR BACTERIA: CPT | Performed by: PHYSICIAN ASSISTANT

## 2023-01-01 PROCEDURE — 71046 X-RAY EXAM CHEST 2 VIEWS: CPT

## 2023-01-01 PROCEDURE — 80048 BASIC METABOLIC PNL TOTAL CA: CPT | Performed by: STUDENT IN AN ORGANIZED HEALTH CARE EDUCATION/TRAINING PROGRAM

## 2023-01-01 PROCEDURE — 86901 BLOOD TYPING SEROLOGIC RH(D): CPT | Performed by: STUDENT IN AN ORGANIZED HEALTH CARE EDUCATION/TRAINING PROGRAM

## 2023-01-01 PROCEDURE — 87637 SARSCOV2&INF A&B&RSV AMP PRB: CPT | Performed by: EMERGENCY MEDICINE

## 2023-01-01 PROCEDURE — 84439 ASSAY OF FREE THYROXINE: CPT

## 2023-01-01 PROCEDURE — 84132 ASSAY OF SERUM POTASSIUM: CPT | Performed by: PHYSICIAN ASSISTANT

## 2023-01-01 PROCEDURE — 87641 MR-STAPH DNA AMP PROBE: CPT | Performed by: STUDENT IN AN ORGANIZED HEALTH CARE EDUCATION/TRAINING PROGRAM

## 2023-01-01 PROCEDURE — 99207 PR SERVICE NOT STAFFED W/SUPERV PROV: CPT | Performed by: INTERNAL MEDICINE

## 2023-01-01 PROCEDURE — 86140 C-REACTIVE PROTEIN: CPT | Performed by: INTERNAL MEDICINE

## 2023-01-01 PROCEDURE — 96366 THER/PROPH/DIAG IV INF ADDON: CPT | Performed by: STUDENT IN AN ORGANIZED HEALTH CARE EDUCATION/TRAINING PROGRAM

## 2023-01-01 PROCEDURE — 80307 DRUG TEST PRSMV CHEM ANLYZR: CPT

## 2023-01-01 PROCEDURE — 99205 OFFICE O/P NEW HI 60 MIN: CPT | Mod: VID | Performed by: INTERNAL MEDICINE

## 2023-01-01 PROCEDURE — 70450 CT HEAD/BRAIN W/O DYE: CPT | Mod: MA

## 2023-01-01 PROCEDURE — 258N000003 HC RX IP 258 OP 636: Performed by: NURSE PRACTITIONER

## 2023-01-01 PROCEDURE — 84145 PROCALCITONIN (PCT): CPT | Performed by: EMERGENCY MEDICINE

## 2023-01-01 PROCEDURE — 73030 X-RAY EXAM OF SHOULDER: CPT | Mod: LT | Performed by: RADIOLOGY

## 2023-01-01 PROCEDURE — 86334 IMMUNOFIX E-PHORESIS SERUM: CPT | Mod: 26 | Performed by: PATHOLOGY

## 2023-01-01 PROCEDURE — 85025 COMPLETE CBC W/AUTO DIFF WBC: CPT | Mod: GT | Performed by: INTERNAL MEDICINE

## 2023-01-01 PROCEDURE — 83521 IG LIGHT CHAINS FREE EACH: CPT | Performed by: STUDENT IN AN ORGANIZED HEALTH CARE EDUCATION/TRAINING PROGRAM

## 2023-01-01 PROCEDURE — 250N000009 HC RX 250: Performed by: INTERNAL MEDICINE

## 2023-01-01 PROCEDURE — 82945 GLUCOSE OTHER FLUID: CPT

## 2023-01-01 PROCEDURE — 99223 1ST HOSP IP/OBS HIGH 75: CPT | Mod: GC | Performed by: INTERNAL MEDICINE

## 2023-01-01 PROCEDURE — 85730 THROMBOPLASTIN TIME PARTIAL: CPT | Performed by: PHYSICIAN ASSISTANT

## 2023-01-01 PROCEDURE — 93010 ELECTROCARDIOGRAM REPORT: CPT | Performed by: STUDENT IN AN ORGANIZED HEALTH CARE EDUCATION/TRAINING PROGRAM

## 2023-01-01 PROCEDURE — 85004 AUTOMATED DIFF WBC COUNT: CPT | Performed by: STUDENT IN AN ORGANIZED HEALTH CARE EDUCATION/TRAINING PROGRAM

## 2023-01-01 PROCEDURE — 87637 SARSCOV2&INF A&B&RSV AMP PRB: CPT | Performed by: STUDENT IN AN ORGANIZED HEALTH CARE EDUCATION/TRAINING PROGRAM

## 2023-01-01 PROCEDURE — A9585 GADOBUTROL INJECTION: HCPCS | Mod: JZ | Performed by: EMERGENCY MEDICINE

## 2023-01-01 PROCEDURE — 62328 DX LMBR SPI PNXR W/FLUOR/CT: CPT | Mod: GC | Performed by: RADIOLOGY

## 2023-01-01 PROCEDURE — 84165 PROTEIN E-PHORESIS SERUM: CPT | Mod: TC | Performed by: STUDENT IN AN ORGANIZED HEALTH CARE EDUCATION/TRAINING PROGRAM

## 2023-01-01 PROCEDURE — 85018 HEMOGLOBIN: CPT | Performed by: INTERNAL MEDICINE

## 2023-01-01 PROCEDURE — 80053 COMPREHEN METABOLIC PANEL: CPT | Mod: GT | Performed by: INTERNAL MEDICINE

## 2023-01-01 PROCEDURE — 97166 OT EVAL MOD COMPLEX 45 MIN: CPT | Mod: GO | Performed by: OCCUPATIONAL THERAPIST

## 2023-01-01 PROCEDURE — 82533 TOTAL CORTISOL: CPT

## 2023-01-01 PROCEDURE — 258N000003 HC RX IP 258 OP 636: Performed by: PHYSICIAN ASSISTANT

## 2023-01-01 PROCEDURE — 70491 CT SOFT TISSUE NECK W/DYE: CPT | Mod: 26 | Performed by: RADIOLOGY

## 2023-01-01 PROCEDURE — 99233 SBSQ HOSP IP/OBS HIGH 50: CPT | Mod: GC | Performed by: STUDENT IN AN ORGANIZED HEALTH CARE EDUCATION/TRAINING PROGRAM

## 2023-01-01 PROCEDURE — 89050 BODY FLUID CELL COUNT: CPT

## 2023-01-01 PROCEDURE — 84443 ASSAY THYROID STIM HORMONE: CPT | Performed by: EMERGENCY MEDICINE

## 2023-01-01 PROCEDURE — 96360 HYDRATION IV INFUSION INIT: CPT | Performed by: EMERGENCY MEDICINE

## 2023-01-01 PROCEDURE — 84155 ASSAY OF PROTEIN SERUM: CPT | Performed by: STUDENT IN AN ORGANIZED HEALTH CARE EDUCATION/TRAINING PROGRAM

## 2023-01-01 PROCEDURE — 87086 URINE CULTURE/COLONY COUNT: CPT | Performed by: EMERGENCY MEDICINE

## 2023-01-01 PROCEDURE — 82803 BLOOD GASES ANY COMBINATION: CPT

## 2023-01-01 PROCEDURE — 99232 SBSQ HOSP IP/OBS MODERATE 35: CPT | Mod: FS

## 2023-01-01 PROCEDURE — 96367 TX/PROPH/DG ADDL SEQ IV INF: CPT | Performed by: STUDENT IN AN ORGANIZED HEALTH CARE EDUCATION/TRAINING PROGRAM

## 2023-01-01 PROCEDURE — 36592 COLLECT BLOOD FROM PICC: CPT | Performed by: STUDENT IN AN ORGANIZED HEALTH CARE EDUCATION/TRAINING PROGRAM

## 2023-01-01 PROCEDURE — 83605 ASSAY OF LACTIC ACID: CPT | Performed by: PHYSICIAN ASSISTANT

## 2023-01-01 PROCEDURE — 99232 SBSQ HOSP IP/OBS MODERATE 35: CPT | Mod: GC | Performed by: PSYCHIATRY & NEUROLOGY

## 2023-01-01 PROCEDURE — 250N000011 HC RX IP 250 OP 636: Mod: GT | Performed by: INTERNAL MEDICINE

## 2023-01-01 PROCEDURE — 80177 DRUG SCRN QUAN LEVETIRACETAM: CPT | Performed by: PHYSICIAN ASSISTANT

## 2023-01-01 PROCEDURE — 70498 CT ANGIOGRAPHY NECK: CPT | Mod: MG

## 2023-01-01 PROCEDURE — 86901 BLOOD TYPING SEROLOGIC RH(D): CPT | Performed by: INTERNAL MEDICINE

## 2023-01-01 PROCEDURE — 93005 ELECTROCARDIOGRAM TRACING: CPT | Performed by: EMERGENCY MEDICINE

## 2023-01-01 PROCEDURE — 96365 THER/PROPH/DIAG IV INF INIT: CPT | Performed by: STUDENT IN AN ORGANIZED HEALTH CARE EDUCATION/TRAINING PROGRAM

## 2023-01-01 PROCEDURE — 87081 CULTURE SCREEN ONLY: CPT | Performed by: STUDENT IN AN ORGANIZED HEALTH CARE EDUCATION/TRAINING PROGRAM

## 2023-01-01 PROCEDURE — 250N000013 HC RX MED GY IP 250 OP 250 PS 637: Performed by: EMERGENCY MEDICINE

## 2023-01-01 PROCEDURE — 87483 CNS DNA AMP PROBE TYPE 12-25: CPT

## 2023-01-01 PROCEDURE — 87449 NOS EACH ORGANISM AG IA: CPT | Performed by: STUDENT IN AN ORGANIZED HEALTH CARE EDUCATION/TRAINING PROGRAM

## 2023-01-01 PROCEDURE — 999N000157 HC STATISTIC RCP TIME EA 10 MIN

## 2023-01-01 PROCEDURE — 74183 MRI ABD W/O CNTR FLWD CNTR: CPT | Mod: MG | Performed by: RADIOLOGY

## 2023-01-01 RX ORDER — HEPARIN SODIUM (PORCINE) LOCK FLUSH IV SOLN 100 UNIT/ML 100 UNIT/ML
5 SOLUTION INTRAVENOUS ONCE
Status: COMPLETED | OUTPATIENT
Start: 2023-01-01 | End: 2023-01-01

## 2023-01-01 RX ORDER — ALBUTEROL SULFATE 90 UG/1
2 AEROSOL, METERED RESPIRATORY (INHALATION) EVERY 6 HOURS PRN
Status: DISCONTINUED | OUTPATIENT
Start: 2023-01-01 | End: 2023-01-01 | Stop reason: HOSPADM

## 2023-01-01 RX ORDER — SODIUM CHLORIDE 9 MG/ML
INJECTION, SOLUTION INTRAVENOUS CONTINUOUS
Status: DISCONTINUED | OUTPATIENT
Start: 2023-01-01 | End: 2023-01-01 | Stop reason: HOSPADM

## 2023-01-01 RX ORDER — OXYCODONE HYDROCHLORIDE 10 MG/1
10 TABLET ORAL EVERY 6 HOURS PRN
Qty: 30 TABLET | Refills: 0 | Status: SHIPPED | OUTPATIENT
Start: 2023-01-01

## 2023-01-01 RX ORDER — NALOXONE HYDROCHLORIDE 0.4 MG/ML
0.2 INJECTION, SOLUTION INTRAMUSCULAR; INTRAVENOUS; SUBCUTANEOUS
Status: DISCONTINUED | OUTPATIENT
Start: 2023-01-01 | End: 2023-01-01 | Stop reason: HOSPADM

## 2023-01-01 RX ORDER — POLYETHYLENE GLYCOL 3350 17 G/17G
17 POWDER, FOR SOLUTION ORAL DAILY
Qty: 510 G | Refills: 0 | Status: SHIPPED | OUTPATIENT
Start: 2023-01-01

## 2023-01-01 RX ORDER — LORAZEPAM 0.5 MG/1
0.25 TABLET ORAL ONCE
Status: COMPLETED | OUTPATIENT
Start: 2023-01-01 | End: 2023-01-01

## 2023-01-01 RX ORDER — LIDOCAINE 40 MG/G
CREAM TOPICAL
Status: DISCONTINUED | OUTPATIENT
Start: 2023-01-01 | End: 2023-01-01 | Stop reason: HOSPADM

## 2023-01-01 RX ORDER — OXYCODONE HYDROCHLORIDE 10 MG/1
10 TABLET ORAL ONCE
Status: COMPLETED | OUTPATIENT
Start: 2023-01-01 | End: 2023-01-01

## 2023-01-01 RX ORDER — POTASSIUM CHLORIDE 750 MG/1
20 TABLET, EXTENDED RELEASE ORAL ONCE
Status: COMPLETED | OUTPATIENT
Start: 2023-01-01 | End: 2023-01-01

## 2023-01-01 RX ORDER — SULFAMETHOXAZOLE/TRIMETHOPRIM 800-160 MG
1 TABLET ORAL
Status: DISCONTINUED | OUTPATIENT
Start: 2023-01-01 | End: 2023-01-01 | Stop reason: HOSPADM

## 2023-01-01 RX ORDER — MAGNESIUM SULFATE HEPTAHYDRATE 40 MG/ML
2 INJECTION, SOLUTION INTRAVENOUS ONCE
Status: COMPLETED | OUTPATIENT
Start: 2023-01-01 | End: 2023-01-01

## 2023-01-01 RX ORDER — ACETAMINOPHEN 325 MG/1
650 TABLET ORAL ONCE
Status: CANCELLED | OUTPATIENT
Start: 2023-01-01

## 2023-01-01 RX ORDER — MEPERIDINE HYDROCHLORIDE 25 MG/ML
25 INJECTION INTRAMUSCULAR; INTRAVENOUS; SUBCUTANEOUS EVERY 30 MIN PRN
Status: CANCELLED | OUTPATIENT
Start: 2023-01-01

## 2023-01-01 RX ORDER — DIPHENHYDRAMINE HCL 25 MG
50 CAPSULE ORAL ONCE
Status: CANCELLED
Start: 2023-01-01

## 2023-01-01 RX ORDER — NALOXONE HYDROCHLORIDE 0.4 MG/ML
0.4 INJECTION, SOLUTION INTRAMUSCULAR; INTRAVENOUS; SUBCUTANEOUS
Status: DISCONTINUED | OUTPATIENT
Start: 2023-01-01 | End: 2023-01-01 | Stop reason: HOSPADM

## 2023-01-01 RX ORDER — NALOXONE HYDROCHLORIDE 0.4 MG/ML
0.2 INJECTION, SOLUTION INTRAMUSCULAR; INTRAVENOUS; SUBCUTANEOUS ONCE
Qty: 0.5 ML | Refills: 0 | Status: CANCELLED | OUTPATIENT
Start: 2023-01-01 | End: 2023-01-01

## 2023-01-01 RX ORDER — AMLODIPINE BESYLATE 5 MG/1
10 TABLET ORAL DAILY
Status: DISCONTINUED | OUTPATIENT
Start: 2023-01-01 | End: 2023-01-01 | Stop reason: HOSPADM

## 2023-01-01 RX ORDER — LEVOFLOXACIN 750 MG/1
750 TABLET, FILM COATED ORAL DAILY
Qty: 4 TABLET | Refills: 0 | Status: SHIPPED | OUTPATIENT
Start: 2023-01-01 | End: 2023-01-01

## 2023-01-01 RX ORDER — POTASSIUM CHLORIDE 1500 MG/1
40 TABLET, EXTENDED RELEASE ORAL ONCE
Status: COMPLETED | OUTPATIENT
Start: 2023-01-01 | End: 2023-01-01

## 2023-01-01 RX ORDER — HEPARIN SODIUM (PORCINE) LOCK FLUSH IV SOLN 100 UNIT/ML 100 UNIT/ML
5 SOLUTION INTRAVENOUS
Status: CANCELLED | OUTPATIENT
Start: 2023-01-01

## 2023-01-01 RX ORDER — PIPERACILLIN SODIUM, TAZOBACTAM SODIUM 4; .5 G/20ML; G/20ML
4.5 INJECTION, POWDER, LYOPHILIZED, FOR SOLUTION INTRAVENOUS ONCE
Status: COMPLETED | OUTPATIENT
Start: 2023-01-01 | End: 2023-01-01

## 2023-01-01 RX ORDER — GADOBUTROL 604.72 MG/ML
7.5 INJECTION INTRAVENOUS ONCE
Status: COMPLETED | OUTPATIENT
Start: 2023-01-01 | End: 2023-01-01

## 2023-01-01 RX ORDER — DIPHENHYDRAMINE HYDROCHLORIDE 50 MG/ML
50 INJECTION INTRAMUSCULAR; INTRAVENOUS
Status: CANCELLED
Start: 2023-01-01

## 2023-01-01 RX ORDER — ALBUTEROL SULFATE 0.83 MG/ML
2.5 SOLUTION RESPIRATORY (INHALATION)
Status: CANCELLED | OUTPATIENT
Start: 2023-01-01

## 2023-01-01 RX ORDER — LORAZEPAM 0.5 MG/1
.5-1 TABLET ORAL EVERY 4 HOURS PRN
Status: DISCONTINUED | OUTPATIENT
Start: 2023-01-01 | End: 2023-01-01 | Stop reason: HOSPADM

## 2023-01-01 RX ORDER — METHYLPREDNISOLONE SODIUM SUCCINATE 125 MG/2ML
125 INJECTION, POWDER, LYOPHILIZED, FOR SOLUTION INTRAMUSCULAR; INTRAVENOUS
Status: CANCELLED
Start: 2023-01-01

## 2023-01-01 RX ORDER — EPINEPHRINE 1 MG/ML
0.3 INJECTION, SOLUTION INTRAMUSCULAR; SUBCUTANEOUS EVERY 5 MIN PRN
Status: CANCELLED | OUTPATIENT
Start: 2023-01-01

## 2023-01-01 RX ORDER — ACYCLOVIR 400 MG/1
400 TABLET ORAL 2 TIMES DAILY
Status: DISCONTINUED | OUTPATIENT
Start: 2023-01-01 | End: 2023-01-01 | Stop reason: HOSPADM

## 2023-01-01 RX ORDER — ONDANSETRON 4 MG/1
4 TABLET, FILM COATED ORAL EVERY 6 HOURS PRN
Status: DISCONTINUED | OUTPATIENT
Start: 2023-01-01 | End: 2023-01-01 | Stop reason: HOSPADM

## 2023-01-01 RX ORDER — ALBUTEROL SULFATE 90 UG/1
1-2 AEROSOL, METERED RESPIRATORY (INHALATION)
Status: CANCELLED
Start: 2023-01-01

## 2023-01-01 RX ORDER — LEVETIRACETAM 750 MG/1
750 TABLET ORAL 2 TIMES DAILY
Status: DISCONTINUED | OUTPATIENT
Start: 2023-01-01 | End: 2023-01-01 | Stop reason: HOSPADM

## 2023-01-01 RX ORDER — LOPERAMIDE HCL 2 MG
2 CAPSULE ORAL 4 TIMES DAILY PRN
Status: DISCONTINUED | OUTPATIENT
Start: 2023-01-01 | End: 2023-01-01 | Stop reason: HOSPADM

## 2023-01-01 RX ORDER — LORAZEPAM 0.5 MG/1
0.25 TABLET ORAL
Status: COMPLETED | OUTPATIENT
Start: 2023-01-01 | End: 2023-01-01

## 2023-01-01 RX ORDER — VITAMIN B COMPLEX
25 TABLET ORAL DAILY
Status: DISCONTINUED | OUTPATIENT
Start: 2023-01-01 | End: 2023-01-01 | Stop reason: HOSPADM

## 2023-01-01 RX ORDER — PANTOPRAZOLE SODIUM 40 MG/1
40 TABLET, DELAYED RELEASE ORAL DAILY
Status: DISCONTINUED | OUTPATIENT
Start: 2023-01-01 | End: 2023-01-01 | Stop reason: HOSPADM

## 2023-01-01 RX ORDER — LORAZEPAM 2 MG/ML
0.5 INJECTION INTRAMUSCULAR EVERY 4 HOURS PRN
Status: CANCELLED | OUTPATIENT
Start: 2023-01-01

## 2023-01-01 RX ORDER — FERROUS GLUCONATE 324(38)MG
324 TABLET ORAL
Status: ON HOLD | COMMUNITY
End: 2023-01-01

## 2023-01-01 RX ORDER — DIPHENHYDRAMINE HCL 25 MG
50 CAPSULE ORAL ONCE
Status: COMPLETED | OUTPATIENT
Start: 2023-01-01 | End: 2023-01-01

## 2023-01-01 RX ORDER — VENLAFAXINE 75 MG/1
75 TABLET ORAL 3 TIMES DAILY
Status: CANCELLED | OUTPATIENT
Start: 2023-01-01

## 2023-01-01 RX ORDER — HEPARIN SODIUM,PORCINE 10 UNIT/ML
5 VIAL (ML) INTRAVENOUS
Status: CANCELLED | OUTPATIENT
Start: 2023-01-01

## 2023-01-01 RX ORDER — PIPERACILLIN SODIUM, TAZOBACTAM SODIUM 3; .375 G/15ML; G/15ML
3.38 INJECTION, POWDER, LYOPHILIZED, FOR SOLUTION INTRAVENOUS EVERY 6 HOURS
Status: DISCONTINUED | OUTPATIENT
Start: 2023-01-01 | End: 2023-01-01

## 2023-01-01 RX ORDER — VENLAFAXINE 75 MG/1
75 TABLET ORAL 2 TIMES DAILY
COMMUNITY
Start: 2023-01-01

## 2023-01-01 RX ORDER — DIPHENHYDRAMINE HYDROCHLORIDE 50 MG/ML
50 INJECTION INTRAMUSCULAR; INTRAVENOUS
Status: DISCONTINUED | OUTPATIENT
Start: 2023-01-01 | End: 2023-01-01 | Stop reason: HOSPADM

## 2023-01-01 RX ORDER — FLUCONAZOLE 200 MG/1
200 TABLET ORAL DAILY
Qty: 30 TABLET | Refills: 0 | Status: SHIPPED | OUTPATIENT
Start: 2023-01-01

## 2023-01-01 RX ORDER — LORAZEPAM 2 MG/ML
.5-1 INJECTION INTRAMUSCULAR EVERY 6 HOURS PRN
Status: CANCELLED | OUTPATIENT
Start: 2023-01-01

## 2023-01-01 RX ORDER — ONDANSETRON 8 MG/1
8 TABLET, FILM COATED ORAL EVERY 8 HOURS PRN
Qty: 30 TABLET | Refills: 0 | Status: SHIPPED | OUTPATIENT
Start: 2023-01-01

## 2023-01-01 RX ORDER — LORAZEPAM 2 MG/ML
.5-1 INJECTION INTRAMUSCULAR EVERY 4 HOURS PRN
Status: DISCONTINUED | OUTPATIENT
Start: 2023-01-01 | End: 2023-01-01 | Stop reason: HOSPADM

## 2023-01-01 RX ORDER — IOPAMIDOL 755 MG/ML
69 INJECTION, SOLUTION INTRAVASCULAR ONCE
Status: COMPLETED | OUTPATIENT
Start: 2023-01-01 | End: 2023-01-01

## 2023-01-01 RX ORDER — PROCHLORPERAZINE 25 MG
12.5 SUPPOSITORY, RECTAL RECTAL EVERY 12 HOURS PRN
Status: DISCONTINUED | OUTPATIENT
Start: 2023-01-01 | End: 2023-01-01 | Stop reason: HOSPADM

## 2023-01-01 RX ORDER — HEPARIN SODIUM (PORCINE) LOCK FLUSH IV SOLN 100 UNIT/ML 100 UNIT/ML
5 SOLUTION INTRAVENOUS
Status: DISCONTINUED | OUTPATIENT
Start: 2023-01-01 | End: 2023-01-01 | Stop reason: HOSPADM

## 2023-01-01 RX ORDER — PROCHLORPERAZINE MALEATE 5 MG
5-10 TABLET ORAL EVERY 6 HOURS PRN
Status: CANCELLED
Start: 2023-01-01

## 2023-01-01 RX ORDER — LEVOTHYROXINE SODIUM 112 UG/1
112 TABLET ORAL DAILY
Status: DISCONTINUED | OUTPATIENT
Start: 2023-01-01 | End: 2023-01-01 | Stop reason: HOSPADM

## 2023-01-01 RX ORDER — VITAMIN B COMPLEX
1 TABLET ORAL DAILY
COMMUNITY

## 2023-01-01 RX ORDER — ONDANSETRON 4 MG/1
4 TABLET, FILM COATED ORAL EVERY 6 HOURS PRN
Status: DISCONTINUED | OUTPATIENT
Start: 2023-01-01 | End: 2023-01-01

## 2023-01-01 RX ORDER — LEVOFLOXACIN 750 MG/1
750 TABLET, FILM COATED ORAL DAILY
Status: DISCONTINUED | OUTPATIENT
Start: 2023-01-01 | End: 2023-01-01

## 2023-01-01 RX ORDER — GABAPENTIN 100 MG/1
100 CAPSULE ORAL 2 TIMES DAILY
Status: DISCONTINUED | OUTPATIENT
Start: 2023-01-01 | End: 2023-01-01

## 2023-01-01 RX ORDER — POLYETHYLENE GLYCOL 3350 17 G/17G
17 POWDER, FOR SOLUTION ORAL DAILY
Status: DISCONTINUED | OUTPATIENT
Start: 2023-01-01 | End: 2023-01-01 | Stop reason: HOSPADM

## 2023-01-01 RX ORDER — AZITHROMYCIN 250 MG/1
500 TABLET, FILM COATED ORAL DAILY
Qty: 6 TABLET | Refills: 0 | Status: DISCONTINUED | OUTPATIENT
Start: 2023-01-01 | End: 2023-01-01

## 2023-01-01 RX ORDER — AMLODIPINE BESYLATE 10 MG/1
10 TABLET ORAL DAILY
Qty: 30 TABLET | Refills: 0 | Status: SHIPPED | OUTPATIENT
Start: 2023-01-01

## 2023-01-01 RX ORDER — ACETAMINOPHEN 325 MG/1
650 TABLET ORAL ONCE
Status: COMPLETED | OUTPATIENT
Start: 2023-01-01 | End: 2023-01-01

## 2023-01-01 RX ORDER — CEFEPIME HYDROCHLORIDE 2 G/1
2 INJECTION, POWDER, FOR SOLUTION INTRAVENOUS EVERY 8 HOURS
Status: CANCELLED | OUTPATIENT
Start: 2023-01-01

## 2023-01-01 RX ORDER — HEPARIN SODIUM (PORCINE) LOCK FLUSH IV SOLN 100 UNIT/ML 100 UNIT/ML
5 SOLUTION INTRAVENOUS DAILY PRN
Status: DISCONTINUED | OUTPATIENT
Start: 2023-01-01 | End: 2023-01-01 | Stop reason: HOSPADM

## 2023-01-01 RX ORDER — VENLAFAXINE 75 MG/1
75 TABLET ORAL 2 TIMES DAILY
Status: DISCONTINUED | OUTPATIENT
Start: 2023-01-01 | End: 2023-01-01 | Stop reason: HOSPADM

## 2023-01-01 RX ORDER — METOPROLOL SUCCINATE 25 MG/1
25 TABLET, EXTENDED RELEASE ORAL DAILY
Status: DISCONTINUED | OUTPATIENT
Start: 2023-01-01 | End: 2023-01-01 | Stop reason: HOSPADM

## 2023-01-01 RX ORDER — ZOLEDRONIC ACID 0.04 MG/ML
4 INJECTION, SOLUTION INTRAVENOUS ONCE
Status: COMPLETED | OUTPATIENT
Start: 2023-01-01 | End: 2023-01-01

## 2023-01-01 RX ORDER — OXYCODONE HYDROCHLORIDE 10 MG/1
10 TABLET ORAL EVERY 6 HOURS PRN
Qty: 16 TABLET | Refills: 0 | Status: SHIPPED | OUTPATIENT
Start: 2023-01-01 | End: 2023-01-01

## 2023-01-01 RX ORDER — POLYETHYLENE GLYCOL 3350 17 G/17G
17 POWDER, FOR SOLUTION ORAL DAILY
Status: DISCONTINUED | OUTPATIENT
Start: 2023-01-01 | End: 2023-01-01

## 2023-01-01 RX ORDER — HEPARIN SODIUM (PORCINE) LOCK FLUSH IV SOLN 100 UNIT/ML 100 UNIT/ML
500 SOLUTION INTRAVENOUS ONCE
Status: COMPLETED | OUTPATIENT
Start: 2023-01-01 | End: 2023-01-01

## 2023-01-01 RX ORDER — POTASSIUM CHLORIDE 750 MG/1
40 TABLET, EXTENDED RELEASE ORAL ONCE
Status: COMPLETED | OUTPATIENT
Start: 2023-01-01 | End: 2023-01-01

## 2023-01-01 RX ORDER — FLUCONAZOLE 200 MG/1
200 TABLET ORAL DAILY
Status: DISCONTINUED | OUTPATIENT
Start: 2023-01-01 | End: 2023-01-01 | Stop reason: HOSPADM

## 2023-01-01 RX ORDER — METHADONE HYDROCHLORIDE 10 MG/1
10 TABLET ORAL EVERY 8 HOURS
Status: DISCONTINUED | OUTPATIENT
Start: 2023-01-01 | End: 2023-01-01 | Stop reason: HOSPADM

## 2023-01-01 RX ORDER — DEXTROSE MONOHYDRATE 25 G/50ML
25-50 INJECTION, SOLUTION INTRAVENOUS
Status: DISCONTINUED | OUTPATIENT
Start: 2023-01-01 | End: 2023-01-01 | Stop reason: HOSPADM

## 2023-01-01 RX ORDER — LEVOFLOXACIN 750 MG/1
750 TABLET, FILM COATED ORAL
Qty: 3 TABLET | Refills: 0 | Status: SHIPPED | OUTPATIENT
Start: 2023-01-01 | End: 2023-01-01

## 2023-01-01 RX ORDER — PIPERACILLIN SODIUM, TAZOBACTAM SODIUM 4; .5 G/20ML; G/20ML
4.5 INJECTION, POWDER, LYOPHILIZED, FOR SOLUTION INTRAVENOUS EVERY 6 HOURS
Status: DISCONTINUED | OUTPATIENT
Start: 2023-01-01 | End: 2023-01-01

## 2023-01-01 RX ORDER — LOPERAMIDE HCL 2 MG
2-4 CAPSULE ORAL 4 TIMES DAILY PRN
Status: DISCONTINUED | OUTPATIENT
Start: 2023-01-01 | End: 2023-01-01 | Stop reason: HOSPADM

## 2023-01-01 RX ORDER — VENLAFAXINE 75 MG/1
75 TABLET ORAL 3 TIMES DAILY
Status: DISCONTINUED | OUTPATIENT
Start: 2023-01-01 | End: 2023-01-01 | Stop reason: HOSPADM

## 2023-01-01 RX ORDER — DEXTROSE MONOHYDRATE 25 G/50ML
25 INJECTION, SOLUTION INTRAVENOUS ONCE
Status: COMPLETED | OUTPATIENT
Start: 2023-01-01 | End: 2023-01-01

## 2023-01-01 RX ORDER — AMLODIPINE BESYLATE 10 MG/1
10 TABLET ORAL DAILY
Status: DISCONTINUED | OUTPATIENT
Start: 2023-01-01 | End: 2023-01-01 | Stop reason: HOSPADM

## 2023-01-01 RX ORDER — ASPIRIN 325 MG
325 TABLET ORAL DAILY
Qty: 30 TABLET | Refills: 5 | Status: SHIPPED | OUTPATIENT
Start: 2023-01-01 | End: 2023-01-01

## 2023-01-01 RX ORDER — ONDANSETRON 8 MG/1
8 TABLET, ORALLY DISINTEGRATING ORAL EVERY 8 HOURS PRN
Qty: 30 TABLET | Refills: 0 | Status: ON HOLD | OUTPATIENT
Start: 2023-01-01 | End: 2023-01-01

## 2023-01-01 RX ORDER — DEXAMETHASONE 4 MG/1
TABLET ORAL
Qty: 37 TABLET | Refills: 0 | Status: SHIPPED | OUTPATIENT
Start: 2023-01-01 | End: 2023-01-01

## 2023-01-01 RX ORDER — ACYCLOVIR 400 MG/1
400 TABLET ORAL EVERY 12 HOURS
Status: CANCELLED | OUTPATIENT
Start: 2023-01-01

## 2023-01-01 RX ORDER — IOPAMIDOL 755 MG/ML
90 INJECTION, SOLUTION INTRAVASCULAR ONCE
Status: COMPLETED | OUTPATIENT
Start: 2023-01-01 | End: 2023-01-01

## 2023-01-01 RX ORDER — LOPERAMIDE HCL 2 MG
2-4 CAPSULE ORAL 4 TIMES DAILY PRN
Qty: 60 CAPSULE | Refills: 1 | COMMUNITY
Start: 2023-01-01 | End: 2023-01-01

## 2023-01-01 RX ORDER — ACYCLOVIR 400 MG/1
400 TABLET ORAL DAILY
Status: DISCONTINUED | OUTPATIENT
Start: 2023-01-01 | End: 2023-01-01

## 2023-01-01 RX ORDER — ONDANSETRON 2 MG/ML
4 INJECTION INTRAMUSCULAR; INTRAVENOUS EVERY 6 HOURS PRN
Status: DISCONTINUED | OUTPATIENT
Start: 2023-01-01 | End: 2023-01-01 | Stop reason: HOSPADM

## 2023-01-01 RX ORDER — SULFAMETHOXAZOLE/TRIMETHOPRIM 800-160 MG
1 TABLET ORAL
Status: CANCELLED | OUTPATIENT
Start: 2023-01-01

## 2023-01-01 RX ORDER — AMOXICILLIN 250 MG
1 CAPSULE ORAL 2 TIMES DAILY PRN
Status: DISCONTINUED | OUTPATIENT
Start: 2023-01-01 | End: 2023-01-01 | Stop reason: HOSPADM

## 2023-01-01 RX ORDER — ZOLEDRONIC ACID 0.04 MG/ML
4 INJECTION, SOLUTION INTRAVENOUS ONCE
Status: CANCELLED | OUTPATIENT
Start: 2023-01-01 | End: 2023-01-01

## 2023-01-01 RX ORDER — AMLODIPINE BESYLATE 5 MG/1
5 TABLET ORAL DAILY
Status: DISCONTINUED | OUTPATIENT
Start: 2023-01-01 | End: 2023-01-01

## 2023-01-01 RX ORDER — ASPIRIN 81 MG/1
81 TABLET, CHEWABLE ORAL DAILY
Status: DISCONTINUED | OUTPATIENT
Start: 2023-01-01 | End: 2023-01-01 | Stop reason: HOSPADM

## 2023-01-01 RX ORDER — METHADONE HYDROCHLORIDE 10 MG/1
10 TABLET ORAL 3 TIMES DAILY
Status: ON HOLD | COMMUNITY
End: 2023-01-01

## 2023-01-01 RX ORDER — OXYCODONE HYDROCHLORIDE 5 MG/1
5 TABLET ORAL EVERY 4 HOURS PRN
Status: DISCONTINUED | OUTPATIENT
Start: 2023-01-01 | End: 2023-01-01 | Stop reason: HOSPADM

## 2023-01-01 RX ORDER — FLUTICASONE FUROATE AND VILANTEROL 100; 25 UG/1; UG/1
1 POWDER RESPIRATORY (INHALATION) DAILY
Status: DISCONTINUED | OUTPATIENT
Start: 2023-01-01 | End: 2023-01-01 | Stop reason: HOSPADM

## 2023-01-01 RX ORDER — LEVOFLOXACIN 750 MG/1
750 TABLET, FILM COATED ORAL DAILY
Qty: 7 TABLET | Refills: 0 | Status: SHIPPED | OUTPATIENT
Start: 2023-01-01 | End: 2023-01-01

## 2023-01-01 RX ORDER — LORAZEPAM 2 MG/ML
0.5 INJECTION INTRAMUSCULAR EVERY 4 HOURS PRN
Status: DISCONTINUED | OUTPATIENT
Start: 2023-01-01 | End: 2023-01-01 | Stop reason: HOSPADM

## 2023-01-01 RX ORDER — ASCORBIC ACID 500 MG
1000 TABLET ORAL DAILY
Status: DISCONTINUED | OUTPATIENT
Start: 2023-01-01 | End: 2023-01-01 | Stop reason: HOSPADM

## 2023-01-01 RX ORDER — VANCOMYCIN HYDROCHLORIDE 1 G/200ML
1000 INJECTION, SOLUTION INTRAVENOUS EVERY 24 HOURS
Status: DISCONTINUED | OUTPATIENT
Start: 2023-01-01 | End: 2023-01-01

## 2023-01-01 RX ORDER — LIDOCAINE HYDROCHLORIDE 10 MG/ML
10 INJECTION, SOLUTION EPIDURAL; INFILTRATION; INTRACAUDAL; PERINEURAL ONCE
Status: COMPLETED | OUTPATIENT
Start: 2023-01-01 | End: 2023-01-01

## 2023-01-01 RX ORDER — NICOTINE POLACRILEX 4 MG
15-30 LOZENGE BUCCAL
Status: DISCONTINUED | OUTPATIENT
Start: 2023-01-01 | End: 2023-01-01 | Stop reason: HOSPADM

## 2023-01-01 RX ORDER — OXYCODONE HYDROCHLORIDE 10 MG/1
10 TABLET ORAL EVERY 6 HOURS PRN
Status: DISCONTINUED | OUTPATIENT
Start: 2023-01-01 | End: 2023-01-01 | Stop reason: HOSPADM

## 2023-01-01 RX ORDER — IPRATROPIUM BROMIDE AND ALBUTEROL SULFATE 2.5; .5 MG/3ML; MG/3ML
3 SOLUTION RESPIRATORY (INHALATION)
Status: DISCONTINUED | OUTPATIENT
Start: 2023-01-01 | End: 2023-01-01

## 2023-01-01 RX ORDER — ONDANSETRON 2 MG/ML
4 INJECTION INTRAMUSCULAR; INTRAVENOUS EVERY 30 MIN PRN
Status: DISCONTINUED | OUTPATIENT
Start: 2023-01-01 | End: 2023-01-01

## 2023-01-01 RX ORDER — AMOXICILLIN 250 MG
2 CAPSULE ORAL 2 TIMES DAILY PRN
Status: DISCONTINUED | OUTPATIENT
Start: 2023-01-01 | End: 2023-01-01 | Stop reason: HOSPADM

## 2023-01-01 RX ORDER — VANCOMYCIN HYDROCHLORIDE 1 G/200ML
1000 INJECTION, SOLUTION INTRAVENOUS EVERY 12 HOURS
Status: DISCONTINUED | OUTPATIENT
Start: 2023-01-01 | End: 2023-01-01

## 2023-01-01 RX ORDER — PROCHLORPERAZINE MALEATE 10 MG
5 TABLET ORAL EVERY 6 HOURS PRN
Qty: 30 TABLET | Refills: 5 | Status: SHIPPED | OUTPATIENT
Start: 2023-01-01 | End: 2023-01-01

## 2023-01-01 RX ORDER — POLYETHYLENE GLYCOL 3350 17 G/17G
17 POWDER, FOR SOLUTION ORAL 2 TIMES DAILY PRN
Status: DISCONTINUED | OUTPATIENT
Start: 2023-01-01 | End: 2023-01-01 | Stop reason: HOSPADM

## 2023-01-01 RX ORDER — CEFEPIME HYDROCHLORIDE 2 G/1
2 INJECTION, POWDER, FOR SOLUTION INTRAVENOUS ONCE
Status: DISCONTINUED | OUTPATIENT
Start: 2023-01-01 | End: 2023-01-01

## 2023-01-01 RX ORDER — ONDANSETRON 4 MG/1
8 TABLET, FILM COATED ORAL
Status: CANCELLED | OUTPATIENT
Start: 2023-01-01

## 2023-01-01 RX ORDER — DEXAMETHASONE SODIUM PHOSPHATE 10 MG/ML
10 INJECTION, SOLUTION INTRAMUSCULAR; INTRAVENOUS ONCE
Status: COMPLETED | OUTPATIENT
Start: 2023-01-01 | End: 2023-01-01

## 2023-01-01 RX ORDER — ONDANSETRON 8 MG/1
8 TABLET, FILM COATED ORAL EVERY 8 HOURS PRN
Qty: 15 TABLET | Refills: 0 | Status: ON HOLD | OUTPATIENT
Start: 2023-01-01 | End: 2023-01-01

## 2023-01-01 RX ORDER — VENLAFAXINE 25 MG/1
75 TABLET ORAL 2 TIMES DAILY
Status: DISCONTINUED | OUTPATIENT
Start: 2023-01-01 | End: 2023-01-01 | Stop reason: HOSPADM

## 2023-01-01 RX ORDER — CARBOXYMETHYLCELLULOSE SODIUM 5 MG/ML
1 SOLUTION/ DROPS OPHTHALMIC 3 TIMES DAILY PRN
Status: DISCONTINUED | OUTPATIENT
Start: 2023-01-01 | End: 2023-01-01 | Stop reason: HOSPADM

## 2023-01-01 RX ORDER — HEPARIN SODIUM (PORCINE) LOCK FLUSH IV SOLN 100 UNIT/ML 100 UNIT/ML
5 SOLUTION INTRAVENOUS EVERY 8 HOURS
Status: DISCONTINUED | OUTPATIENT
Start: 2023-01-01 | End: 2023-01-01 | Stop reason: HOSPADM

## 2023-01-01 RX ORDER — LORAZEPAM 2 MG/ML
.5-1 INJECTION INTRAMUSCULAR EVERY 6 HOURS PRN
Status: DISCONTINUED | OUTPATIENT
Start: 2023-01-01 | End: 2023-01-01 | Stop reason: HOSPADM

## 2023-01-01 RX ORDER — ACYCLOVIR 400 MG/1
400 TABLET ORAL 2 TIMES DAILY
Status: DISCONTINUED | OUTPATIENT
Start: 2023-01-01 | End: 2023-01-01

## 2023-01-01 RX ORDER — ACETAMINOPHEN 325 MG/1
650 TABLET ORAL
Qty: 6 TABLET | Refills: 0 | Status: DISCONTINUED | OUTPATIENT
Start: 2023-01-01 | End: 2023-01-01

## 2023-01-01 RX ORDER — METHADONE HYDROCHLORIDE 10 MG/1
10 TABLET ORAL 3 TIMES DAILY
COMMUNITY
Start: 2023-01-01

## 2023-01-01 RX ORDER — METHOCARBAMOL 500 MG/1
500 TABLET, FILM COATED ORAL 3 TIMES DAILY PRN
Status: DISCONTINUED | OUTPATIENT
Start: 2023-01-01 | End: 2023-01-01 | Stop reason: HOSPADM

## 2023-01-01 RX ORDER — ENOXAPARIN SODIUM 100 MG/ML
40 INJECTION SUBCUTANEOUS EVERY 24 HOURS
Status: DISCONTINUED | OUTPATIENT
Start: 2023-01-01 | End: 2023-01-01 | Stop reason: HOSPADM

## 2023-01-01 RX ORDER — DEXAMETHASONE SODIUM PHOSPHATE 10 MG/ML
5 INJECTION, SOLUTION INTRAMUSCULAR; INTRAVENOUS EVERY 6 HOURS
Status: DISCONTINUED | OUTPATIENT
Start: 2023-01-01 | End: 2023-01-01

## 2023-01-01 RX ORDER — METHADONE HYDROCHLORIDE 10 MG/1
10 TABLET ORAL 3 TIMES DAILY
Status: DISCONTINUED | OUTPATIENT
Start: 2023-01-01 | End: 2023-01-01 | Stop reason: HOSPADM

## 2023-01-01 RX ORDER — ACETAMINOPHEN 325 MG/1
650 TABLET ORAL EVERY 6 HOURS PRN
Status: DISCONTINUED | OUTPATIENT
Start: 2023-01-01 | End: 2023-01-01 | Stop reason: HOSPADM

## 2023-01-01 RX ORDER — DEXAMETHASONE SODIUM PHOSPHATE 10 MG/ML
10 INJECTION, SOLUTION INTRAMUSCULAR; INTRAVENOUS EVERY 6 HOURS
Status: DISCONTINUED | OUTPATIENT
Start: 2023-01-01 | End: 2023-01-01

## 2023-01-01 RX ORDER — ONDANSETRON 4 MG/1
8 TABLET, FILM COATED ORAL EVERY 8 HOURS PRN
Status: DISCONTINUED | OUTPATIENT
Start: 2023-01-01 | End: 2023-01-01

## 2023-01-01 RX ORDER — HEPARIN SODIUM (PORCINE) LOCK FLUSH IV SOLN 100 UNIT/ML 100 UNIT/ML
5 SOLUTION INTRAVENOUS ONCE
Status: DISCONTINUED | OUTPATIENT
Start: 2023-01-01 | End: 2023-01-01 | Stop reason: HOSPADM

## 2023-01-01 RX ORDER — ONDANSETRON 8 MG/1
8 TABLET, FILM COATED ORAL EVERY 8 HOURS PRN
Qty: 15 TABLET | Refills: 0 | Status: SHIPPED | OUTPATIENT
Start: 2023-01-01 | End: 2023-01-01

## 2023-01-01 RX ORDER — FENTANYL CITRATE 50 UG/ML
25-50 INJECTION, SOLUTION INTRAMUSCULAR; INTRAVENOUS EVERY 5 MIN PRN
Status: DISCONTINUED | OUTPATIENT
Start: 2023-01-01 | End: 2023-01-01 | Stop reason: HOSPADM

## 2023-01-01 RX ORDER — AMLODIPINE BESYLATE 10 MG/1
10 TABLET ORAL DAILY
Qty: 30 TABLET | Refills: 0 | Status: SHIPPED | OUTPATIENT
Start: 2023-01-01 | End: 2023-01-01

## 2023-01-01 RX ORDER — POTASSIUM CHLORIDE 1500 MG/1
20 TABLET, EXTENDED RELEASE ORAL 2 TIMES DAILY
Qty: 4 TABLET | Refills: 0 | Status: ON HOLD | OUTPATIENT
Start: 2023-01-01 | End: 2023-01-01

## 2023-01-01 RX ORDER — PROCHLORPERAZINE MALEATE 5 MG
5-10 TABLET ORAL EVERY 6 HOURS PRN
Status: DISCONTINUED | OUTPATIENT
Start: 2023-01-01 | End: 2023-01-01 | Stop reason: HOSPADM

## 2023-01-01 RX ORDER — METHADONE HYDROCHLORIDE 10 MG/1
10 TABLET ORAL 3 TIMES DAILY
Qty: 12 TABLET | Refills: 0 | Status: SHIPPED | OUTPATIENT
Start: 2023-01-01 | End: 2023-01-01

## 2023-01-01 RX ORDER — PROCHLORPERAZINE MALEATE 5 MG
5-10 TABLET ORAL EVERY 6 HOURS PRN
Status: DISCONTINUED | OUTPATIENT
Start: 2023-01-01 | End: 2023-01-01

## 2023-01-01 RX ORDER — CARBOXYMETHYLCELLULOSE SODIUM 5 MG/ML
1 SOLUTION/ DROPS OPHTHALMIC 3 TIMES DAILY PRN
Qty: 1 EACH | Refills: 0 | Status: CANCELLED | OUTPATIENT
Start: 2023-01-01

## 2023-01-01 RX ORDER — LEVOFLOXACIN 750 MG/1
750 TABLET, FILM COATED ORAL DAILY
Status: DISCONTINUED | OUTPATIENT
Start: 2023-01-01 | End: 2023-01-01 | Stop reason: HOSPADM

## 2023-01-01 RX ORDER — ACETAMINOPHEN 325 MG/1
325-650 TABLET ORAL EVERY 4 HOURS PRN
Status: DISCONTINUED | OUTPATIENT
Start: 2023-01-01 | End: 2023-01-01 | Stop reason: HOSPADM

## 2023-01-01 RX ORDER — ACETAMINOPHEN 325 MG/1
650 TABLET ORAL
Status: CANCELLED
Start: 2023-01-01

## 2023-01-01 RX ORDER — CEFEPIME HYDROCHLORIDE 2 G/1
2 INJECTION, POWDER, FOR SOLUTION INTRAVENOUS
Status: DISCONTINUED | OUTPATIENT
Start: 2023-01-01 | End: 2023-01-01 | Stop reason: HOSPADM

## 2023-01-01 RX ORDER — ONDANSETRON 4 MG/1
4 TABLET, ORALLY DISINTEGRATING ORAL EVERY 6 HOURS PRN
Status: DISCONTINUED | OUTPATIENT
Start: 2023-01-01 | End: 2023-01-01 | Stop reason: HOSPADM

## 2023-01-01 RX ORDER — ALBUTEROL SULFATE 90 UG/1
1-2 AEROSOL, METERED RESPIRATORY (INHALATION)
Status: DISCONTINUED | OUTPATIENT
Start: 2023-01-01 | End: 2023-01-01 | Stop reason: HOSPADM

## 2023-01-01 RX ORDER — VENLAFAXINE 75 MG/1
75 TABLET ORAL 2 TIMES DAILY
Status: CANCELLED | COMMUNITY
Start: 2023-01-01

## 2023-01-01 RX ORDER — SULFAMETHOXAZOLE/TRIMETHOPRIM 800-160 MG
1 TABLET ORAL
Status: DISCONTINUED | OUTPATIENT
Start: 2023-01-01 | End: 2023-01-01

## 2023-01-01 RX ORDER — ACYCLOVIR 400 MG/1
400 TABLET ORAL 2 TIMES DAILY
Status: CANCELLED | OUTPATIENT
Start: 2023-01-01

## 2023-01-01 RX ORDER — HEPARIN SODIUM,PORCINE 10 UNIT/ML
3 VIAL (ML) INTRAVENOUS
Status: DISCONTINUED | OUTPATIENT
Start: 2023-01-01 | End: 2023-01-01 | Stop reason: HOSPADM

## 2023-01-01 RX ORDER — HEPARIN SODIUM (PORCINE) LOCK FLUSH IV SOLN 100 UNIT/ML 100 UNIT/ML
5-10 SOLUTION INTRAVENOUS
Status: DISCONTINUED | OUTPATIENT
Start: 2023-01-01 | End: 2023-01-01 | Stop reason: HOSPADM

## 2023-01-01 RX ORDER — LEVOFLOXACIN 250 MG/1
250 TABLET, FILM COATED ORAL DAILY
Status: DISCONTINUED | OUTPATIENT
Start: 2023-01-01 | End: 2023-01-01

## 2023-01-01 RX ORDER — PROCHLORPERAZINE MALEATE 5 MG
5 TABLET ORAL EVERY 6 HOURS PRN
Status: DISCONTINUED | OUTPATIENT
Start: 2023-01-01 | End: 2023-01-01 | Stop reason: HOSPADM

## 2023-01-01 RX ORDER — EPINEPHRINE 1 MG/ML
0.3 INJECTION, SOLUTION, CONCENTRATE INTRAVENOUS EVERY 5 MIN PRN
Status: DISCONTINUED | OUTPATIENT
Start: 2023-01-01 | End: 2023-01-01 | Stop reason: HOSPADM

## 2023-01-01 RX ORDER — PIPERACILLIN SODIUM, TAZOBACTAM SODIUM 4; .5 G/20ML; G/20ML
4.5 INJECTION, POWDER, LYOPHILIZED, FOR SOLUTION INTRAVENOUS ONCE
Status: DISCONTINUED | OUTPATIENT
Start: 2023-01-01 | End: 2023-01-01

## 2023-01-01 RX ORDER — LEVOFLOXACIN 750 MG/1
750 TABLET, FILM COATED ORAL DAILY
Qty: 4 TABLET | Refills: 0 | Status: CANCELLED | OUTPATIENT
Start: 2023-01-01

## 2023-01-01 RX ORDER — IPRATROPIUM BROMIDE AND ALBUTEROL SULFATE 2.5; .5 MG/3ML; MG/3ML
3 SOLUTION RESPIRATORY (INHALATION) 4 TIMES DAILY PRN
Status: DISCONTINUED | OUTPATIENT
Start: 2023-01-01 | End: 2023-01-01 | Stop reason: HOSPADM

## 2023-01-01 RX ORDER — ACYCLOVIR 400 MG/1
TABLET ORAL
Qty: 180 TABLET | Refills: 3 | Status: SHIPPED | OUTPATIENT
Start: 2023-01-01

## 2023-01-01 RX ORDER — DEXAMETHASONE 4 MG/1
16 TABLET ORAL
Qty: 12 TABLET | Refills: 0 | Status: DISCONTINUED | OUTPATIENT
Start: 2023-01-01 | End: 2023-01-01

## 2023-01-01 RX ORDER — DEXAMETHASONE 4 MG/1
TABLET ORAL
Qty: 28 TABLET | Refills: 0 | Status: SHIPPED | OUTPATIENT
Start: 2023-01-01 | End: 2023-01-01

## 2023-01-01 RX ORDER — OXYCODONE HYDROCHLORIDE 10 MG/1
10 TABLET ORAL EVERY 6 HOURS PRN
Status: DISCONTINUED | OUTPATIENT
Start: 2023-01-01 | End: 2023-01-01

## 2023-01-01 RX ORDER — GUAIFENESIN 600 MG/1
1200 TABLET, EXTENDED RELEASE ORAL 2 TIMES DAILY PRN
Status: ON HOLD | COMMUNITY
Start: 2023-01-01 | End: 2023-01-01

## 2023-01-01 RX ORDER — POLYETHYLENE GLYCOL 3350 17 G/17G
17 POWDER, FOR SOLUTION ORAL DAILY
Qty: 510 G | Refills: 0 | Status: SHIPPED | OUTPATIENT
Start: 2023-01-01 | End: 2023-01-01

## 2023-01-01 RX ORDER — FLUTICASONE FUROATE AND VILANTEROL 100; 25 UG/1; UG/1
1 POWDER RESPIRATORY (INHALATION) DAILY
Status: CANCELLED | OUTPATIENT
Start: 2023-01-01

## 2023-01-01 RX ORDER — ACETAMINOPHEN 325 MG/1
650 TABLET ORAL EVERY 4 HOURS PRN
Status: DISCONTINUED | OUTPATIENT
Start: 2023-01-01 | End: 2023-01-01 | Stop reason: HOSPADM

## 2023-01-01 RX ORDER — HEPARIN SODIUM,PORCINE 10 UNIT/ML
5-10 VIAL (ML) INTRAVENOUS
Status: DISCONTINUED | OUTPATIENT
Start: 2023-01-01 | End: 2023-01-01 | Stop reason: HOSPADM

## 2023-01-01 RX ORDER — VANCOMYCIN HYDROCHLORIDE 500 MG/10ML
500 INJECTION, POWDER, LYOPHILIZED, FOR SOLUTION INTRAVENOUS EVERY 12 HOURS
Status: DISCONTINUED | OUTPATIENT
Start: 2023-01-01 | End: 2023-01-01

## 2023-01-01 RX ORDER — ONDANSETRON 4 MG/1
8 TABLET, ORALLY DISINTEGRATING ORAL EVERY 8 HOURS PRN
Status: DISCONTINUED | OUTPATIENT
Start: 2023-01-01 | End: 2023-01-01 | Stop reason: HOSPADM

## 2023-01-01 RX ORDER — LEVOTHYROXINE SODIUM 112 UG/1
112 TABLET ORAL DAILY
Status: CANCELLED | OUTPATIENT
Start: 2023-01-01

## 2023-01-01 RX ORDER — LEVETIRACETAM 750 MG/1
750 TABLET ORAL 2 TIMES DAILY
Qty: 60 TABLET | Refills: 0 | Status: SHIPPED | OUTPATIENT
Start: 2023-01-01

## 2023-01-01 RX ORDER — HEPARIN SODIUM,PORCINE 10 UNIT/ML
5-10 VIAL (ML) INTRAVENOUS EVERY 24 HOURS
Status: DISCONTINUED | OUTPATIENT
Start: 2023-01-01 | End: 2023-01-01 | Stop reason: HOSPADM

## 2023-01-01 RX ORDER — ENOXAPARIN SODIUM 100 MG/ML
40 INJECTION SUBCUTANEOUS EVERY EVENING
Status: DISCONTINUED | OUTPATIENT
Start: 2023-01-01 | End: 2023-01-01 | Stop reason: HOSPADM

## 2023-01-01 RX ORDER — ACETAMINOPHEN 500 MG
500-1000 TABLET ORAL EVERY 6 HOURS PRN
Status: DISCONTINUED | OUTPATIENT
Start: 2023-01-01 | End: 2023-01-01

## 2023-01-01 RX ORDER — FLUMAZENIL 0.1 MG/ML
0.2 INJECTION, SOLUTION INTRAVENOUS
Status: DISCONTINUED | OUTPATIENT
Start: 2023-01-01 | End: 2023-01-01 | Stop reason: HOSPADM

## 2023-01-01 RX ORDER — METOPROLOL SUCCINATE 25 MG/1
25 TABLET, EXTENDED RELEASE ORAL DAILY
Qty: 30 TABLET | Refills: 3 | Status: SHIPPED | OUTPATIENT
Start: 2023-01-01

## 2023-01-01 RX ORDER — ONDANSETRON 2 MG/ML
8 INJECTION INTRAMUSCULAR; INTRAVENOUS EVERY 8 HOURS PRN
Status: DISCONTINUED | OUTPATIENT
Start: 2023-01-01 | End: 2023-01-01 | Stop reason: HOSPADM

## 2023-01-01 RX ORDER — DEXAMETHASONE 4 MG/1
TABLET ORAL
Qty: 21 TABLET | Refills: 0 | Status: SHIPPED | OUTPATIENT
Start: 2023-01-01 | End: 2023-01-01

## 2023-01-01 RX ORDER — IOPAMIDOL 755 MG/ML
67 INJECTION, SOLUTION INTRAVASCULAR ONCE
Status: COMPLETED | OUTPATIENT
Start: 2023-01-01 | End: 2023-01-01

## 2023-01-01 RX ORDER — METOPROLOL SUCCINATE 25 MG/1
25 TABLET, EXTENDED RELEASE ORAL DAILY
Status: CANCELLED | OUTPATIENT
Start: 2023-01-01

## 2023-01-01 RX ORDER — ONDANSETRON 8 MG/1
8 TABLET, FILM COATED ORAL
Qty: 3 TABLET | Refills: 0 | Status: DISCONTINUED | OUTPATIENT
Start: 2023-01-01 | End: 2023-01-01

## 2023-01-01 RX ORDER — ONDANSETRON 8 MG/1
8 TABLET, FILM COATED ORAL EVERY 8 HOURS PRN
Status: DISCONTINUED | OUTPATIENT
Start: 2023-01-01 | End: 2023-01-01 | Stop reason: HOSPADM

## 2023-01-01 RX ORDER — GABAPENTIN 100 MG/1
100 CAPSULE ORAL 2 TIMES DAILY
Qty: 60 CAPSULE | Refills: 0 | Status: ON HOLD | OUTPATIENT
Start: 2023-01-01 | End: 2023-01-01

## 2023-01-01 RX ORDER — PIPERACILLIN SODIUM, TAZOBACTAM SODIUM 3; .375 G/15ML; G/15ML
3.38 INJECTION, POWDER, LYOPHILIZED, FOR SOLUTION INTRAVENOUS EVERY 6 HOURS
Status: DISCONTINUED | OUTPATIENT
Start: 2023-01-01 | End: 2023-01-01 | Stop reason: HOSPADM

## 2023-01-01 RX ORDER — ACETAMINOPHEN 325 MG/1
650 TABLET ORAL EVERY 4 HOURS PRN
Status: DISCONTINUED | OUTPATIENT
Start: 2023-01-01 | End: 2023-01-01

## 2023-01-01 RX ORDER — ACYCLOVIR 400 MG/1
400 TABLET ORAL EVERY 12 HOURS
Status: DISCONTINUED | OUTPATIENT
Start: 2023-01-01 | End: 2023-01-01 | Stop reason: HOSPADM

## 2023-01-01 RX ORDER — ACYCLOVIR 400 MG/1
400 TABLET ORAL EVERY 12 HOURS
Qty: 180 TABLET | Refills: 3 | Status: SHIPPED | OUTPATIENT
Start: 2023-01-01 | End: 2023-01-01

## 2023-01-01 RX ORDER — LEVOFLOXACIN 750 MG/1
750 TABLET, FILM COATED ORAL DAILY
Qty: 6 TABLET | Refills: 0 | Status: SHIPPED | OUTPATIENT
Start: 2023-01-01

## 2023-01-01 RX ORDER — OXYCODONE HYDROCHLORIDE 5 MG/1
5-10 TABLET ORAL EVERY 6 HOURS PRN
Status: DISCONTINUED | OUTPATIENT
Start: 2023-01-01 | End: 2023-01-01 | Stop reason: HOSPADM

## 2023-01-01 RX ORDER — LEVOFLOXACIN 750 MG/1
750 TABLET, FILM COATED ORAL
Status: DISCONTINUED | OUTPATIENT
Start: 2023-01-01 | End: 2023-01-01 | Stop reason: HOSPADM

## 2023-01-01 RX ORDER — METHYLPREDNISOLONE SODIUM SUCCINATE 125 MG/2ML
125 INJECTION, POWDER, LYOPHILIZED, FOR SOLUTION INTRAMUSCULAR; INTRAVENOUS
Status: DISCONTINUED | OUTPATIENT
Start: 2023-01-01 | End: 2023-01-01 | Stop reason: HOSPADM

## 2023-01-01 RX ORDER — POLYETHYLENE GLYCOL 3350 17 G/17G
17 POWDER, FOR SOLUTION ORAL DAILY PRN
Status: DISCONTINUED | OUTPATIENT
Start: 2023-01-01 | End: 2023-01-01 | Stop reason: HOSPADM

## 2023-01-01 RX ORDER — ONDANSETRON 2 MG/ML
4 INJECTION INTRAMUSCULAR; INTRAVENOUS ONCE
Status: COMPLETED | OUTPATIENT
Start: 2023-01-01 | End: 2023-01-01

## 2023-01-01 RX ORDER — DEXAMETHASONE 4 MG/1
16 TABLET ORAL
Status: CANCELLED | OUTPATIENT
Start: 2023-01-01

## 2023-01-01 RX ORDER — OXYCODONE HYDROCHLORIDE 10 MG/1
10 TABLET ORAL EVERY 6 HOURS PRN
Qty: 4 TABLET | Refills: 0 | Status: ON HOLD | OUTPATIENT
Start: 2023-01-01 | End: 2023-01-01

## 2023-01-01 RX ORDER — NALOXONE HYDROCHLORIDE 0.4 MG/ML
0.4 INJECTION, SOLUTION INTRAMUSCULAR; INTRAVENOUS; SUBCUTANEOUS ONCE
Qty: 1 ML | Refills: 0 | Status: CANCELLED | OUTPATIENT
Start: 2023-01-01 | End: 2023-01-01

## 2023-01-01 RX ORDER — METOPROLOL SUCCINATE 25 MG/1
25 TABLET, EXTENDED RELEASE ORAL DAILY
Qty: 30 TABLET | Refills: 3 | Status: SHIPPED | OUTPATIENT
Start: 2023-01-01 | End: 2023-01-01

## 2023-01-01 RX ORDER — DIPHENHYDRAMINE HCL 25 MG
50 CAPSULE ORAL
Status: CANCELLED | OUTPATIENT
Start: 2023-01-01

## 2023-01-01 RX ORDER — ACETAMINOPHEN 650 MG/1
650 SUPPOSITORY RECTAL EVERY 4 HOURS PRN
Status: DISCONTINUED | OUTPATIENT
Start: 2023-01-01 | End: 2023-01-01

## 2023-01-01 RX ORDER — GADOBUTROL 604.72 MG/ML
5 INJECTION INTRAVENOUS ONCE
Status: COMPLETED | OUTPATIENT
Start: 2023-01-01 | End: 2023-01-01

## 2023-01-01 RX ORDER — MEPERIDINE HYDROCHLORIDE 25 MG/ML
25 INJECTION INTRAMUSCULAR; INTRAVENOUS; SUBCUTANEOUS EVERY 30 MIN PRN
Status: DISCONTINUED | OUTPATIENT
Start: 2023-01-01 | End: 2023-01-01 | Stop reason: HOSPADM

## 2023-01-01 RX ORDER — OXYCODONE HYDROCHLORIDE 10 MG/1
10 TABLET ORAL EVERY 6 HOURS PRN
Qty: 6 TABLET | Refills: 0 | Status: SHIPPED | OUTPATIENT
Start: 2023-01-01 | End: 2023-01-01

## 2023-01-01 RX ORDER — ALLOPURINOL 300 MG/1
300 TABLET ORAL DAILY
Status: DISCONTINUED | OUTPATIENT
Start: 2023-01-01 | End: 2023-01-01

## 2023-01-01 RX ORDER — LEVETIRACETAM 500 MG/1
1000 TABLET ORAL 2 TIMES DAILY
Status: DISCONTINUED | OUTPATIENT
Start: 2023-01-01 | End: 2023-01-01

## 2023-01-01 RX ORDER — LEVOFLOXACIN 750 MG/1
750 TABLET, FILM COATED ORAL EVERY OTHER DAY
Status: DISCONTINUED | OUTPATIENT
Start: 2023-01-01 | End: 2023-01-01

## 2023-01-01 RX ORDER — LEVOFLOXACIN 250 MG/1
250 TABLET, FILM COATED ORAL DAILY
Qty: 30 TABLET | Refills: 0 | Status: ON HOLD | OUTPATIENT
Start: 2023-01-01 | End: 2023-01-01

## 2023-01-01 RX ORDER — ALBUTEROL SULFATE 0.83 MG/ML
2.5 SOLUTION RESPIRATORY (INHALATION)
Status: DISCONTINUED | OUTPATIENT
Start: 2023-01-01 | End: 2023-01-01 | Stop reason: HOSPADM

## 2023-01-01 RX ORDER — LORAZEPAM 0.5 MG/1
.5-1 TABLET ORAL EVERY 6 HOURS PRN
Status: CANCELLED
Start: 2023-01-01

## 2023-01-01 RX ORDER — LORAZEPAM 0.5 MG/1
.5-1 TABLET ORAL EVERY 6 HOURS PRN
Status: DISCONTINUED | OUTPATIENT
Start: 2023-01-01 | End: 2023-01-01 | Stop reason: HOSPADM

## 2023-01-01 RX ORDER — ONDANSETRON 8 MG/1
TABLET, ORALLY DISINTEGRATING ORAL
Qty: 30 TABLET | Refills: 0 | Status: SHIPPED | OUTPATIENT
Start: 2023-01-01 | End: 2023-01-01

## 2023-01-01 RX ORDER — OXYCODONE HYDROCHLORIDE 10 MG/1
1 TABLET ORAL EVERY 6 HOURS PRN
Status: ON HOLD | COMMUNITY
End: 2023-01-01

## 2023-01-01 RX ORDER — HYDRALAZINE HYDROCHLORIDE 20 MG/ML
10 INJECTION INTRAMUSCULAR; INTRAVENOUS EVERY 6 HOURS PRN
Status: DISCONTINUED | OUTPATIENT
Start: 2023-01-01 | End: 2023-01-01 | Stop reason: HOSPADM

## 2023-01-01 RX ORDER — PROCHLORPERAZINE MALEATE 5 MG
5 TABLET ORAL EVERY 6 HOURS PRN
Qty: 15 TABLET | Refills: 0 | Status: CANCELLED | OUTPATIENT
Start: 2023-01-01

## 2023-01-01 RX ORDER — OXYCODONE HYDROCHLORIDE 5 MG/1
5 TABLET ORAL EVERY 6 HOURS PRN
Status: DISCONTINUED | OUTPATIENT
Start: 2023-01-01 | End: 2023-01-01

## 2023-01-01 RX ORDER — VANCOMYCIN HYDROCHLORIDE 1 G/200ML
1000 INJECTION, SOLUTION INTRAVENOUS ONCE
Qty: 200 ML | Refills: 0 | Status: COMPLETED | OUTPATIENT
Start: 2023-01-01 | End: 2023-01-01

## 2023-01-01 RX ORDER — LORAZEPAM 0.5 MG/1
0.25 TABLET ORAL ONCE
Status: DISCONTINUED | OUTPATIENT
Start: 2023-01-01 | End: 2023-01-01

## 2023-01-01 RX ORDER — DIPHENHYDRAMINE HCL 25 MG
50 CAPSULE ORAL
Qty: 6 CAPSULE | Refills: 0 | Status: DISCONTINUED | OUTPATIENT
Start: 2023-01-01 | End: 2023-01-01

## 2023-01-01 RX ORDER — CARBOXYMETHYLCELLULOSE SODIUM 5 MG/ML
1 SOLUTION/ DROPS OPHTHALMIC
Status: DISCONTINUED | OUTPATIENT
Start: 2023-01-01 | End: 2023-01-01 | Stop reason: HOSPADM

## 2023-01-01 RX ORDER — SULFAMETHOXAZOLE AND TRIMETHOPRIM 400; 80 MG/1; MG/1
1 TABLET ORAL DAILY
Status: DISCONTINUED | OUTPATIENT
Start: 2023-01-01 | End: 2023-01-01 | Stop reason: HOSPADM

## 2023-01-01 RX ADMIN — DIPHENHYDRAMINE HYDROCHLORIDE 50 MG: 25 CAPSULE ORAL at 11:00

## 2023-01-01 RX ADMIN — DEXAMETHASONE SODIUM PHOSPHATE 8 MG: 10 INJECTION, SOLUTION INTRAMUSCULAR; INTRAVENOUS at 13:26

## 2023-01-01 RX ADMIN — OXYCODONE HYDROCHLORIDE 10 MG: 5 TABLET ORAL at 01:29

## 2023-01-01 RX ADMIN — ACYCLOVIR 400 MG: 400 TABLET ORAL at 19:28

## 2023-01-01 RX ADMIN — Medication 1 TABLET: at 09:15

## 2023-01-01 RX ADMIN — ALLOPURINOL 300 MG: 300 TABLET ORAL at 09:15

## 2023-01-01 RX ADMIN — OXYCODONE HYDROCHLORIDE AND ACETAMINOPHEN 1000 MG: 500 TABLET ORAL at 10:17

## 2023-01-01 RX ADMIN — LEVETIRACETAM 750 MG: 750 TABLET, FILM COATED ORAL at 08:30

## 2023-01-01 RX ADMIN — SODIUM CHLORIDE, PRESERVATIVE FREE 3 ML: 5 INJECTION INTRAVENOUS at 04:24

## 2023-01-01 RX ADMIN — ONDANSETRON HYDROCHLORIDE 8 MG: 8 TABLET, FILM COATED ORAL at 20:36

## 2023-01-01 RX ADMIN — PANTOPRAZOLE SODIUM 40 MG: 40 TABLET, DELAYED RELEASE ORAL at 09:26

## 2023-01-01 RX ADMIN — ACETAMINOPHEN 650 MG: 325 TABLET, FILM COATED ORAL at 16:31

## 2023-01-01 RX ADMIN — OXYCODONE HYDROCHLORIDE 10 MG: 5 TABLET ORAL at 16:17

## 2023-01-01 RX ADMIN — VENLAFAXINE 75 MG: 75 TABLET ORAL at 20:33

## 2023-01-01 RX ADMIN — SODIUM CHLORIDE 250 ML: 9 INJECTION, SOLUTION INTRAVENOUS at 09:13

## 2023-01-01 RX ADMIN — DIPHENHYDRAMINE HYDROCHLORIDE 50 MG: 25 CAPSULE ORAL at 11:08

## 2023-01-01 RX ADMIN — LEVETIRACETAM 750 MG: 500 TABLET, FILM COATED ORAL at 19:37

## 2023-01-01 RX ADMIN — ACETAMINOPHEN 650 MG: 325 TABLET, FILM COATED ORAL at 06:18

## 2023-01-01 RX ADMIN — METHOCARBAMOL TABLETS 500 MG: 500 TABLET, COATED ORAL at 16:44

## 2023-01-01 RX ADMIN — Medication 500 UNITS: at 15:20

## 2023-01-01 RX ADMIN — METHADONE HYDROCHLORIDE 10 MG: 10 TABLET ORAL at 19:38

## 2023-01-01 RX ADMIN — SODIUM CHLORIDE 1000 ML: 9 INJECTION, SOLUTION INTRAVENOUS at 16:13

## 2023-01-01 RX ADMIN — DEXAMETHASONE SODIUM PHOSPHATE 5 MG: 10 INJECTION, SOLUTION INTRAMUSCULAR; INTRAVENOUS at 01:30

## 2023-01-01 RX ADMIN — LEVETIRACETAM 750 MG: 500 TABLET, FILM COATED ORAL at 20:34

## 2023-01-01 RX ADMIN — METHADONE HYDROCHLORIDE 10 MG: 10 TABLET ORAL at 08:11

## 2023-01-01 RX ADMIN — LEVETIRACETAM 750 MG: 500 TABLET, FILM COATED ORAL at 09:15

## 2023-01-01 RX ADMIN — VENLAFAXINE 75 MG: 75 TABLET ORAL at 20:06

## 2023-01-01 RX ADMIN — ACYCLOVIR 400 MG: 400 TABLET ORAL at 19:59

## 2023-01-01 RX ADMIN — METHADONE HYDROCHLORIDE 10 MG: 10 TABLET ORAL at 09:02

## 2023-01-01 RX ADMIN — OXYCODONE HYDROCHLORIDE AND ACETAMINOPHEN 1000 MG: 500 TABLET ORAL at 08:25

## 2023-01-01 RX ADMIN — Medication 5 ML: at 09:52

## 2023-01-01 RX ADMIN — METHADONE HYDROCHLORIDE 10 MG: 10 TABLET ORAL at 19:59

## 2023-01-01 RX ADMIN — PANTOPRAZOLE SODIUM 40 MG: 40 TABLET, DELAYED RELEASE ORAL at 08:22

## 2023-01-01 RX ADMIN — METHADONE HYDROCHLORIDE 10 MG: 10 TABLET ORAL at 14:15

## 2023-01-01 RX ADMIN — Medication 1 TABLET: at 09:07

## 2023-01-01 RX ADMIN — SODIUM CHLORIDE, PRESERVATIVE FREE 3 ML: 5 INJECTION INTRAVENOUS at 22:22

## 2023-01-01 RX ADMIN — ZOLEDRONIC ACID 4 MG: 0.04 INJECTION, SOLUTION INTRAVENOUS at 14:55

## 2023-01-01 RX ADMIN — ONDANSETRON HYDROCHLORIDE 8 MG: 8 TABLET, FILM COATED ORAL at 16:32

## 2023-01-01 RX ADMIN — FLUTICASONE FUROATE AND VILANTEROL TRIFENATATE 1 PUFF: 100; 25 POWDER RESPIRATORY (INHALATION) at 09:24

## 2023-01-01 RX ADMIN — METHADONE HYDROCHLORIDE 10 MG: 10 TABLET ORAL at 08:32

## 2023-01-01 RX ADMIN — OXYCODONE HYDROCHLORIDE 10 MG: 10 TABLET ORAL at 21:28

## 2023-01-01 RX ADMIN — OXYCODONE HYDROCHLORIDE 10 MG: 5 TABLET ORAL at 12:37

## 2023-01-01 RX ADMIN — ACETAMINOPHEN 650 MG: 325 TABLET, FILM COATED ORAL at 18:20

## 2023-01-01 RX ADMIN — ACYCLOVIR 400 MG: 400 TABLET ORAL at 20:18

## 2023-01-01 RX ADMIN — Medication 25 MCG: at 08:50

## 2023-01-01 RX ADMIN — METHADONE HYDROCHLORIDE 10 MG: 10 TABLET ORAL at 19:37

## 2023-01-01 RX ADMIN — ACETAMINOPHEN 650 MG: 325 TABLET ORAL at 12:04

## 2023-01-01 RX ADMIN — ACYCLOVIR 400 MG: 400 TABLET ORAL at 08:55

## 2023-01-01 RX ADMIN — AMLODIPINE BESYLATE 10 MG: 5 TABLET ORAL at 08:20

## 2023-01-01 RX ADMIN — OXYCODONE HYDROCHLORIDE 10 MG: 10 TABLET ORAL at 01:12

## 2023-01-01 RX ADMIN — LEVOTHYROXINE SODIUM 112 MCG: 0.11 TABLET ORAL at 09:08

## 2023-01-01 RX ADMIN — POTASSIUM & SODIUM PHOSPHATES POWDER PACK 280-160-250 MG 1 PACKET: 280-160-250 PACK at 18:13

## 2023-01-01 RX ADMIN — ZOLEDRONIC ACID 4 MG: 0.04 INJECTION, SOLUTION INTRAVENOUS at 10:58

## 2023-01-01 RX ADMIN — SODIUM CHLORIDE, PRESERVATIVE FREE 5 ML: 5 INJECTION INTRAVENOUS at 09:11

## 2023-01-01 RX ADMIN — FLUTICASONE FUROATE AND VILANTEROL TRIFENATATE 1 PUFF: 100; 25 POWDER RESPIRATORY (INHALATION) at 15:09

## 2023-01-01 RX ADMIN — ACETAMINOPHEN 650 MG: 325 TABLET ORAL at 10:55

## 2023-01-01 RX ADMIN — FLUTICASONE FUROATE AND VILANTEROL TRIFENATATE 1 PUFF: 100; 25 POWDER RESPIRATORY (INHALATION) at 12:10

## 2023-01-01 RX ADMIN — Medication 1 TABLET: at 08:58

## 2023-01-01 RX ADMIN — ENOXAPARIN SODIUM 40 MG: 40 INJECTION SUBCUTANEOUS at 20:33

## 2023-01-01 RX ADMIN — ENOXAPARIN SODIUM 40 MG: 40 INJECTION SUBCUTANEOUS at 19:57

## 2023-01-01 RX ADMIN — PANTOPRAZOLE SODIUM 40 MG: 40 TABLET, DELAYED RELEASE ORAL at 08:20

## 2023-01-01 RX ADMIN — DEXAMETHASONE SODIUM PHOSPHATE 10 MG: 10 INJECTION, SOLUTION INTRAMUSCULAR; INTRAVENOUS at 02:06

## 2023-01-01 RX ADMIN — VANCOMYCIN HYDROCHLORIDE 1000 MG: 1 INJECTION, SOLUTION INTRAVENOUS at 21:14

## 2023-01-01 RX ADMIN — ALLOPURINOL 300 MG: 300 TABLET ORAL at 08:55

## 2023-01-01 RX ADMIN — UMECLIDINIUM 1 PUFF: 62.5 AEROSOL, POWDER ORAL at 08:21

## 2023-01-01 RX ADMIN — LOPERAMIDE HYDROCHLORIDE 2 MG: 2 CAPSULE ORAL at 22:24

## 2023-01-01 RX ADMIN — METOPROLOL SUCCINATE 25 MG: 25 TABLET, EXTENDED RELEASE ORAL at 08:57

## 2023-01-01 RX ADMIN — OXYCODONE HYDROCHLORIDE 10 MG: 5 TABLET ORAL at 18:10

## 2023-01-01 RX ADMIN — OXYCODONE HYDROCHLORIDE 10 MG: 10 TABLET ORAL at 01:34

## 2023-01-01 RX ADMIN — LEVOTHYROXINE SODIUM 112 MCG: 0.11 TABLET ORAL at 08:51

## 2023-01-01 RX ADMIN — OXYCODONE HYDROCHLORIDE 10 MG: 10 TABLET ORAL at 14:13

## 2023-01-01 RX ADMIN — PANTOPRAZOLE SODIUM 40 MG: 40 TABLET, DELAYED RELEASE ORAL at 08:54

## 2023-01-01 RX ADMIN — SODIUM CHLORIDE 250 ML: 9 INJECTION, SOLUTION INTRAVENOUS at 11:20

## 2023-01-01 RX ADMIN — SENNOSIDES AND DOCUSATE SODIUM 2 TABLET: 8.6; 5 TABLET ORAL at 14:24

## 2023-01-01 RX ADMIN — LEVOFLOXACIN 250 MG: 250 TABLET, FILM COATED ORAL at 08:55

## 2023-01-01 RX ADMIN — SODIUM CHLORIDE 50 MG: 9 INJECTION, SOLUTION INTRAVENOUS at 17:49

## 2023-01-01 RX ADMIN — OXYCODONE HYDROCHLORIDE 10 MG: 5 TABLET ORAL at 12:36

## 2023-01-01 RX ADMIN — SODIUM CHLORIDE, PRESERVATIVE FREE 3 ML: 5 INJECTION INTRAVENOUS at 16:44

## 2023-01-01 RX ADMIN — VENLAFAXINE 75 MG: 75 TABLET ORAL at 07:50

## 2023-01-01 RX ADMIN — DEXAMETHASONE SODIUM PHOSPHATE 10 MG: 10 INJECTION, SOLUTION INTRAMUSCULAR; INTRAVENOUS at 13:32

## 2023-01-01 RX ADMIN — PIPERACILLIN AND TAZOBACTAM 3.38 G: 3; .375 INJECTION, POWDER, LYOPHILIZED, FOR SOLUTION INTRAVENOUS at 06:01

## 2023-01-01 RX ADMIN — METHADONE HYDROCHLORIDE 10 MG: 10 TABLET ORAL at 19:58

## 2023-01-01 RX ADMIN — FLUTICASONE FUROATE AND VILANTEROL TRIFENATATE 1 PUFF: 100; 25 POWDER RESPIRATORY (INHALATION) at 12:15

## 2023-01-01 RX ADMIN — ASPIRIN 81 MG CHEWABLE TABLET 81 MG: 81 TABLET CHEWABLE at 09:02

## 2023-01-01 RX ADMIN — DEXTROSE MONOHYDRATE 25 ML: 25 INJECTION, SOLUTION INTRAVENOUS at 15:19

## 2023-01-01 RX ADMIN — METHOCARBAMOL TABLETS 500 MG: 500 TABLET, COATED ORAL at 14:03

## 2023-01-01 RX ADMIN — VENLAFAXINE 75 MG: 75 TABLET ORAL at 19:58

## 2023-01-01 RX ADMIN — ACETAMINOPHEN 650 MG: 325 TABLET, FILM COATED ORAL at 04:24

## 2023-01-01 RX ADMIN — METHADONE HYDROCHLORIDE 10 MG: 10 TABLET ORAL at 20:01

## 2023-01-01 RX ADMIN — OXYCODONE HYDROCHLORIDE 10 MG: 10 TABLET ORAL at 07:44

## 2023-01-01 RX ADMIN — Medication 25 MCG: at 08:48

## 2023-01-01 RX ADMIN — VANCOMYCIN HYDROCHLORIDE 1250 MG: 10 INJECTION, POWDER, LYOPHILIZED, FOR SOLUTION INTRAVENOUS at 18:03

## 2023-01-01 RX ADMIN — LEVOTHYROXINE SODIUM 112 MCG: 0.11 TABLET ORAL at 08:42

## 2023-01-01 RX ADMIN — FLUTICASONE FUROATE AND VILANTEROL TRIFENATATE 1 PUFF: 100; 25 POWDER RESPIRATORY (INHALATION) at 09:00

## 2023-01-01 RX ADMIN — PIPERACILLIN SODIUM AND TAZOBACTAM SODIUM 4.5 G: 4; .5 INJECTION, POWDER, LYOPHILIZED, FOR SOLUTION INTRAVENOUS at 05:17

## 2023-01-01 RX ADMIN — Medication 5 ML: at 10:26

## 2023-01-01 RX ADMIN — OXYCODONE HYDROCHLORIDE 10 MG: 5 TABLET ORAL at 22:50

## 2023-01-01 RX ADMIN — OXYCODONE HYDROCHLORIDE 10 MG: 10 TABLET ORAL at 12:08

## 2023-01-01 RX ADMIN — UMECLIDINIUM 1 PUFF: 62.5 AEROSOL, POWDER ORAL at 09:05

## 2023-01-01 RX ADMIN — PIPERACILLIN AND TAZOBACTAM 4.5 G: 4; .5 INJECTION, POWDER, FOR SOLUTION INTRAVENOUS at 19:14

## 2023-01-01 RX ADMIN — METOPROLOL SUCCINATE 25 MG: 25 TABLET, EXTENDED RELEASE ORAL at 15:09

## 2023-01-01 RX ADMIN — FAMOTIDINE 20 MG: 10 INJECTION INTRAVENOUS at 10:56

## 2023-01-01 RX ADMIN — PANTOPRAZOLE SODIUM 40 MG: 40 TABLET, DELAYED RELEASE ORAL at 08:29

## 2023-01-01 RX ADMIN — ACETAMINOPHEN 650 MG: 325 TABLET, FILM COATED ORAL at 13:25

## 2023-01-01 RX ADMIN — ACYCLOVIR 400 MG: 400 TABLET ORAL at 08:24

## 2023-01-01 RX ADMIN — Medication 5 ML: at 11:29

## 2023-01-01 RX ADMIN — OXYCODONE HYDROCHLORIDE 10 MG: 5 TABLET ORAL at 18:13

## 2023-01-01 RX ADMIN — LEVETIRACETAM 750 MG: 750 TABLET, FILM COATED ORAL at 08:54

## 2023-01-01 RX ADMIN — OXYCODONE HYDROCHLORIDE 5 MG: 5 TABLET ORAL at 06:18

## 2023-01-01 RX ADMIN — SODIUM CHLORIDE, PRESERVATIVE FREE 3 ML: 5 INJECTION INTRAVENOUS at 13:01

## 2023-01-01 RX ADMIN — ENOXAPARIN SODIUM 40 MG: 40 INJECTION SUBCUTANEOUS at 14:20

## 2023-01-01 RX ADMIN — PANTOPRAZOLE SODIUM 40 MG: 40 TABLET, DELAYED RELEASE ORAL at 09:08

## 2023-01-01 RX ADMIN — OXYCODONE HYDROCHLORIDE AND ACETAMINOPHEN 1000 MG: 500 TABLET ORAL at 08:43

## 2023-01-01 RX ADMIN — METOPROLOL SUCCINATE 25 MG: 25 TABLET, EXTENDED RELEASE ORAL at 08:48

## 2023-01-01 RX ADMIN — ACYCLOVIR 400 MG: 400 TABLET ORAL at 07:50

## 2023-01-01 RX ADMIN — Medication 5 ML: at 13:02

## 2023-01-01 RX ADMIN — SODIUM CHLORIDE 510 MG: 9 INJECTION, SOLUTION INTRAVENOUS at 13:19

## 2023-01-01 RX ADMIN — AMLODIPINE BESYLATE 10 MG: 10 TABLET ORAL at 08:55

## 2023-01-01 RX ADMIN — METHADONE HYDROCHLORIDE 10 MG: 10 TABLET ORAL at 08:24

## 2023-01-01 RX ADMIN — SODIUM CHLORIDE, PRESERVATIVE FREE 3 ML: 5 INJECTION INTRAVENOUS at 01:06

## 2023-01-01 RX ADMIN — ACYCLOVIR 400 MG: 400 TABLET ORAL at 20:38

## 2023-01-01 RX ADMIN — AMLODIPINE BESYLATE 10 MG: 10 TABLET ORAL at 08:49

## 2023-01-01 RX ADMIN — DEXAMETHASONE SODIUM PHOSPHATE 5 MG: 10 INJECTION, SOLUTION INTRAMUSCULAR; INTRAVENOUS at 08:58

## 2023-01-01 RX ADMIN — UMECLIDINIUM 1 PUFF: 62.5 AEROSOL, POWDER ORAL at 09:00

## 2023-01-01 RX ADMIN — UMECLIDINIUM 1 PUFF: 62.5 AEROSOL, POWDER ORAL at 19:31

## 2023-01-01 RX ADMIN — ACYCLOVIR 400 MG: 400 TABLET ORAL at 19:51

## 2023-01-01 RX ADMIN — TECLISTAMAB 3.1 MG: 10 INJECTION SUBCUTANEOUS at 17:24

## 2023-01-01 RX ADMIN — FLUTICASONE FUROATE AND VILANTEROL TRIFENATATE 1 PUFF: 100; 25 POWDER RESPIRATORY (INHALATION) at 19:32

## 2023-01-01 RX ADMIN — VENLAFAXINE 75 MG: 75 TABLET ORAL at 09:15

## 2023-01-01 RX ADMIN — ACYCLOVIR 400 MG: 400 TABLET ORAL at 19:37

## 2023-01-01 RX ADMIN — POLYETHYLENE GLYCOL 3350 17 G: 17 POWDER, FOR SOLUTION ORAL at 16:14

## 2023-01-01 RX ADMIN — DEXAMETHASONE SODIUM PHOSPHATE 10 MG: 10 INJECTION, SOLUTION INTRAMUSCULAR; INTRAVENOUS at 19:56

## 2023-01-01 RX ADMIN — METHADONE HYDROCHLORIDE 10 MG: 10 TABLET ORAL at 14:10

## 2023-01-01 RX ADMIN — Medication 500 UNITS: at 10:32

## 2023-01-01 RX ADMIN — LORAZEPAM 0.25 MG: 0.5 TABLET ORAL at 14:50

## 2023-01-01 RX ADMIN — OXYCODONE HYDROCHLORIDE 10 MG: 10 TABLET ORAL at 19:39

## 2023-01-01 RX ADMIN — METHADONE HYDROCHLORIDE 10 MG: 10 TABLET ORAL at 08:48

## 2023-01-01 RX ADMIN — FLUTICASONE FUROATE AND VILANTEROL TRIFENATATE 1 PUFF: 100; 25 POWDER RESPIRATORY (INHALATION) at 08:23

## 2023-01-01 RX ADMIN — ACYCLOVIR 400 MG: 400 TABLET ORAL at 20:11

## 2023-01-01 RX ADMIN — FLUTICASONE FUROATE AND VILANTEROL TRIFENATATE 1 PUFF: 100; 25 POWDER RESPIRATORY (INHALATION) at 09:11

## 2023-01-01 RX ADMIN — METHADONE HYDROCHLORIDE 10 MG: 10 TABLET ORAL at 20:02

## 2023-01-01 RX ADMIN — UMECLIDINIUM 1 PUFF: 62.5 AEROSOL, POWDER ORAL at 11:17

## 2023-01-01 RX ADMIN — SODIUM CHLORIDE, PRESERVATIVE FREE 3 ML: 5 INJECTION INTRAVENOUS at 19:39

## 2023-01-01 RX ADMIN — SODIUM CHLORIDE 1000 ML: 9 INJECTION, SOLUTION INTRAVENOUS at 10:41

## 2023-01-01 RX ADMIN — Medication 1 TABLET: at 08:33

## 2023-01-01 RX ADMIN — Medication 500 UNITS: at 13:52

## 2023-01-01 RX ADMIN — Medication 5 ML: at 20:12

## 2023-01-01 RX ADMIN — DEXAMETHASONE 16 MG: 4 TABLET ORAL at 16:32

## 2023-01-01 RX ADMIN — SULFAMETHOXAZOLE AND TRIMETHOPRIM 1 TABLET: 800; 160 TABLET ORAL at 19:58

## 2023-01-01 RX ADMIN — UMECLIDINIUM 1 PUFF: 62.5 AEROSOL, POWDER ORAL at 09:16

## 2023-01-01 RX ADMIN — METHADONE HYDROCHLORIDE 10 MG: 10 TABLET ORAL at 08:29

## 2023-01-01 RX ADMIN — PIPERACILLIN AND TAZOBACTAM 3.38 G: 3; .375 INJECTION, POWDER, LYOPHILIZED, FOR SOLUTION INTRAVENOUS at 18:41

## 2023-01-01 RX ADMIN — METHADONE HYDROCHLORIDE 10 MG: 10 TABLET ORAL at 08:23

## 2023-01-01 RX ADMIN — UMECLIDINIUM 1 PUFF: 62.5 AEROSOL, POWDER ORAL at 10:16

## 2023-01-01 RX ADMIN — METHADONE HYDROCHLORIDE 10 MG: 10 TABLET ORAL at 14:36

## 2023-01-01 RX ADMIN — PIPERACILLIN SODIUM AND TAZOBACTAM SODIUM 4.5 G: 4; .5 INJECTION, POWDER, LYOPHILIZED, FOR SOLUTION INTRAVENOUS at 23:23

## 2023-01-01 RX ADMIN — LEVETIRACETAM 750 MG: 500 TABLET, FILM COATED ORAL at 20:06

## 2023-01-01 RX ADMIN — OXYCODONE HYDROCHLORIDE 10 MG: 5 TABLET ORAL at 03:10

## 2023-01-01 RX ADMIN — LEVETIRACETAM 750 MG: 500 TABLET, FILM COATED ORAL at 20:18

## 2023-01-01 RX ADMIN — OXYCODONE HYDROCHLORIDE AND ACETAMINOPHEN 1000 MG: 500 TABLET ORAL at 10:52

## 2023-01-01 RX ADMIN — ACETAMINOPHEN 650 MG: 325 TABLET, FILM COATED ORAL at 21:23

## 2023-01-01 RX ADMIN — VENLAFAXINE 75 MG: 75 TABLET ORAL at 19:39

## 2023-01-01 RX ADMIN — OXYCODONE HYDROCHLORIDE 10 MG: 5 TABLET ORAL at 21:36

## 2023-01-01 RX ADMIN — VENLAFAXINE 75 MG: 75 TABLET ORAL at 19:56

## 2023-01-01 RX ADMIN — PROCHLORPERAZINE MALEATE 5 MG: 5 TABLET ORAL at 16:24

## 2023-01-01 RX ADMIN — SODIUM CHLORIDE, PRESERVATIVE FREE 69 ML: 5 INJECTION INTRAVENOUS at 15:26

## 2023-01-01 RX ADMIN — METHOCARBAMOL TABLETS 500 MG: 500 TABLET, COATED ORAL at 15:41

## 2023-01-01 RX ADMIN — OXYCODONE HYDROCHLORIDE 10 MG: 10 TABLET ORAL at 21:06

## 2023-01-01 RX ADMIN — LEVOTHYROXINE SODIUM 112 MCG: 0.11 TABLET ORAL at 07:49

## 2023-01-01 RX ADMIN — Medication 1 TABLET: at 08:46

## 2023-01-01 RX ADMIN — OXYCODONE HYDROCHLORIDE AND ACETAMINOPHEN 1000 MG: 500 TABLET ORAL at 11:26

## 2023-01-01 RX ADMIN — DIPHENHYDRAMINE HYDROCHLORIDE 50 MG: 25 CAPSULE ORAL at 12:04

## 2023-01-01 RX ADMIN — POTASSIUM & SODIUM PHOSPHATES POWDER PACK 280-160-250 MG 1 PACKET: 280-160-250 PACK at 11:53

## 2023-01-01 RX ADMIN — ACYCLOVIR 400 MG: 400 TABLET ORAL at 20:33

## 2023-01-01 RX ADMIN — ACETAMINOPHEN 650 MG: 325 TABLET, FILM COATED ORAL at 08:23

## 2023-01-01 RX ADMIN — ACYCLOVIR 400 MG: 400 TABLET ORAL at 19:39

## 2023-01-01 RX ADMIN — VENLAFAXINE 75 MG: 75 TABLET ORAL at 08:20

## 2023-01-01 RX ADMIN — METHADONE HYDROCHLORIDE 10 MG: 10 TABLET ORAL at 20:35

## 2023-01-01 RX ADMIN — LEVETIRACETAM 750 MG: 750 TABLET, FILM COATED ORAL at 20:02

## 2023-01-01 RX ADMIN — SODIUM CHLORIDE 50 MG: 9 INJECTION, SOLUTION INTRAVENOUS at 17:59

## 2023-01-01 RX ADMIN — ALLOPURINOL 300 MG: 300 TABLET ORAL at 10:17

## 2023-01-01 RX ADMIN — AMLODIPINE BESYLATE 10 MG: 10 TABLET ORAL at 09:00

## 2023-01-01 RX ADMIN — ACYCLOVIR 400 MG: 400 TABLET ORAL at 08:03

## 2023-01-01 RX ADMIN — PROCHLORPERAZINE EDISYLATE 5 MG: 5 INJECTION, SOLUTION INTRAMUSCULAR; INTRAVENOUS at 00:24

## 2023-01-01 RX ADMIN — ACYCLOVIR 400 MG: 400 TABLET ORAL at 09:00

## 2023-01-01 RX ADMIN — LEVOTHYROXINE SODIUM 112 MCG: 0.11 TABLET ORAL at 08:11

## 2023-01-01 RX ADMIN — VENLAFAXINE 75 MG: 75 TABLET ORAL at 19:51

## 2023-01-01 RX ADMIN — OXYCODONE HYDROCHLORIDE 5 MG: 5 TABLET ORAL at 04:50

## 2023-01-01 RX ADMIN — SODIUM CHLORIDE, PRESERVATIVE FREE 3 ML: 5 INJECTION INTRAVENOUS at 00:52

## 2023-01-01 RX ADMIN — GADOBUTROL 5 ML: 604.72 INJECTION INTRAVENOUS at 16:16

## 2023-01-01 RX ADMIN — LEVOFLOXACIN 250 MG: 250 TABLET, FILM COATED ORAL at 08:50

## 2023-01-01 RX ADMIN — LORAZEPAM 0.25 MG: 0.5 TABLET ORAL at 09:07

## 2023-01-01 RX ADMIN — METHADONE HYDROCHLORIDE 10 MG: 10 TABLET ORAL at 00:02

## 2023-01-01 RX ADMIN — ACETAMINOPHEN 650 MG: 325 TABLET, FILM COATED ORAL at 00:56

## 2023-01-01 RX ADMIN — HEPARIN SODIUM (PORCINE) LOCK FLUSH IV SOLN 100 UNIT/ML 500 UNITS: 100 SOLUTION at 16:18

## 2023-01-01 RX ADMIN — ACYCLOVIR 400 MG: 400 TABLET ORAL at 20:02

## 2023-01-01 RX ADMIN — DIPHENHYDRAMINE HYDROCHLORIDE 50 MG: 25 CAPSULE ORAL at 13:30

## 2023-01-01 RX ADMIN — OXYCODONE HYDROCHLORIDE 10 MG: 5 TABLET ORAL at 20:33

## 2023-01-01 RX ADMIN — ACYCLOVIR 400 MG: 400 TABLET ORAL at 08:22

## 2023-01-01 RX ADMIN — OXYCODONE HYDROCHLORIDE 10 MG: 5 TABLET ORAL at 01:20

## 2023-01-01 RX ADMIN — METOPROLOL SUCCINATE 25 MG: 25 TABLET, EXTENDED RELEASE ORAL at 08:20

## 2023-01-01 RX ADMIN — SODIUM CHLORIDE, PRESERVATIVE FREE 3 ML: 5 INJECTION INTRAVENOUS at 13:32

## 2023-01-01 RX ADMIN — PIPERACILLIN AND TAZOBACTAM 3.38 G: 3; .375 INJECTION, POWDER, LYOPHILIZED, FOR SOLUTION INTRAVENOUS at 08:47

## 2023-01-01 RX ADMIN — VENLAFAXINE 75 MG: 75 TABLET ORAL at 20:17

## 2023-01-01 RX ADMIN — OXYCODONE HYDROCHLORIDE 10 MG: 10 TABLET ORAL at 15:03

## 2023-01-01 RX ADMIN — Medication 5 ML: at 12:07

## 2023-01-01 RX ADMIN — OXYCODONE HYDROCHLORIDE 5 MG: 5 TABLET ORAL at 08:08

## 2023-01-01 RX ADMIN — FLUCONAZOLE 200 MG: 200 TABLET ORAL at 08:02

## 2023-01-01 RX ADMIN — SODIUM CHLORIDE, PRESERVATIVE FREE 3 ML: 5 INJECTION INTRAVENOUS at 08:52

## 2023-01-01 RX ADMIN — LEVETIRACETAM 750 MG: 500 TABLET, FILM COATED ORAL at 20:33

## 2023-01-01 RX ADMIN — VENLAFAXINE 75 MG: 75 TABLET ORAL at 17:29

## 2023-01-01 RX ADMIN — SODIUM CHLORIDE 50 MG: 9 INJECTION, SOLUTION INTRAVENOUS at 18:28

## 2023-01-01 RX ADMIN — METHOCARBAMOL TABLETS 500 MG: 500 TABLET, COATED ORAL at 12:19

## 2023-01-01 RX ADMIN — Medication 5 ML: at 11:40

## 2023-01-01 RX ADMIN — DEXAMETHASONE SODIUM PHOSPHATE 8 MG: 10 INJECTION, SOLUTION INTRAMUSCULAR; INTRAVENOUS at 09:15

## 2023-01-01 RX ADMIN — SODIUM CHLORIDE, PRESERVATIVE FREE 3 ML: 5 INJECTION INTRAVENOUS at 21:53

## 2023-01-01 RX ADMIN — FAMOTIDINE 20 MG: 10 INJECTION INTRAVENOUS at 12:04

## 2023-01-01 RX ADMIN — OXYCODONE HYDROCHLORIDE 10 MG: 5 TABLET ORAL at 00:17

## 2023-01-01 RX ADMIN — ACETAMINOPHEN 650 MG: 325 TABLET ORAL at 11:27

## 2023-01-01 RX ADMIN — SULFAMETHOXAZOLE AND TRIMETHOPRIM 1 TABLET: 400; 80 TABLET ORAL at 08:29

## 2023-01-01 RX ADMIN — SODIUM CHLORIDE 1000 ML: 9 INJECTION, SOLUTION INTRAVENOUS at 21:42

## 2023-01-01 RX ADMIN — METOPROLOL SUCCINATE 25 MG: 25 TABLET, EXTENDED RELEASE ORAL at 07:35

## 2023-01-01 RX ADMIN — VENLAFAXINE 75 MG: 25 TABLET ORAL at 21:14

## 2023-01-01 RX ADMIN — ACYCLOVIR 400 MG: 400 TABLET ORAL at 08:49

## 2023-01-01 RX ADMIN — SODIUM CHLORIDE: 9 INJECTION, SOLUTION INTRAVENOUS at 22:25

## 2023-01-01 RX ADMIN — HYDRALAZINE HYDROCHLORIDE 10 MG: 20 INJECTION INTRAMUSCULAR; INTRAVENOUS at 04:41

## 2023-01-01 RX ADMIN — Medication 25 MCG: at 11:26

## 2023-01-01 RX ADMIN — Medication 1 DROP: at 15:52

## 2023-01-01 RX ADMIN — METOPROLOL SUCCINATE 25 MG: 25 TABLET, EXTENDED RELEASE ORAL at 08:29

## 2023-01-01 RX ADMIN — Medication 5 ML: at 10:32

## 2023-01-01 RX ADMIN — AMLODIPINE BESYLATE 10 MG: 5 TABLET ORAL at 07:35

## 2023-01-01 RX ADMIN — LEVOFLOXACIN 750 MG: 750 TABLET, FILM COATED ORAL at 12:38

## 2023-01-01 RX ADMIN — OXYCODONE HYDROCHLORIDE AND ACETAMINOPHEN 1000 MG: 500 TABLET ORAL at 08:30

## 2023-01-01 RX ADMIN — VENLAFAXINE 75 MG: 25 TABLET ORAL at 08:03

## 2023-01-01 RX ADMIN — ASPIRIN 81 MG CHEWABLE TABLET 81 MG: 81 TABLET CHEWABLE at 08:54

## 2023-01-01 RX ADMIN — FLUTICASONE FUROATE AND VILANTEROL TRIFENATATE 1 PUFF: 100; 25 POWDER RESPIRATORY (INHALATION) at 08:44

## 2023-01-01 RX ADMIN — PANTOPRAZOLE SODIUM 40 MG: 40 TABLET, DELAYED RELEASE ORAL at 08:11

## 2023-01-01 RX ADMIN — Medication 5 ML: at 14:04

## 2023-01-01 RX ADMIN — SODIUM CHLORIDE 1000 ML: 9 INJECTION, SOLUTION INTRAVENOUS at 19:13

## 2023-01-01 RX ADMIN — METHADONE HYDROCHLORIDE 10 MG: 10 TABLET ORAL at 20:12

## 2023-01-01 RX ADMIN — SODIUM CHLORIDE, PRESERVATIVE FREE 3 ML: 5 INJECTION INTRAVENOUS at 05:22

## 2023-01-01 RX ADMIN — METHADONE HYDROCHLORIDE 10 MG: 10 TABLET ORAL at 14:19

## 2023-01-01 RX ADMIN — OXYCODONE HYDROCHLORIDE 10 MG: 10 TABLET ORAL at 13:16

## 2023-01-01 RX ADMIN — OXYCODONE HYDROCHLORIDE 10 MG: 5 TABLET ORAL at 04:41

## 2023-01-01 RX ADMIN — LEVETIRACETAM 750 MG: 500 TABLET, FILM COATED ORAL at 08:11

## 2023-01-01 RX ADMIN — IOPAMIDOL 90 ML: 755 INJECTION, SOLUTION INTRAVENOUS at 11:15

## 2023-01-01 RX ADMIN — METHADONE HYDROCHLORIDE 10 MG: 10 TABLET ORAL at 09:26

## 2023-01-01 RX ADMIN — VENLAFAXINE 75 MG: 75 TABLET ORAL at 08:46

## 2023-01-01 RX ADMIN — PIPERACILLIN AND TAZOBACTAM 3.38 G: 3; .375 INJECTION, POWDER, LYOPHILIZED, FOR SOLUTION INTRAVENOUS at 00:39

## 2023-01-01 RX ADMIN — METHADONE HYDROCHLORIDE 10 MG: 10 TABLET ORAL at 08:20

## 2023-01-01 RX ADMIN — SODIUM CHLORIDE 1000 ML: 9 INJECTION, SOLUTION INTRAVENOUS at 13:36

## 2023-01-01 RX ADMIN — Medication 25 MCG: at 08:33

## 2023-01-01 RX ADMIN — METOPROLOL SUCCINATE 25 MG: 25 TABLET, EXTENDED RELEASE ORAL at 09:28

## 2023-01-01 RX ADMIN — UMECLIDINIUM 1 PUFF: 62.5 AEROSOL, POWDER ORAL at 08:23

## 2023-01-01 RX ADMIN — VENLAFAXINE 75 MG: 75 TABLET ORAL at 08:41

## 2023-01-01 RX ADMIN — POTASSIUM & SODIUM PHOSPHATES POWDER PACK 280-160-250 MG 1 PACKET: 280-160-250 PACK at 16:01

## 2023-01-01 RX ADMIN — OXYCODONE HYDROCHLORIDE AND ACETAMINOPHEN 1000 MG: 500 TABLET ORAL at 09:31

## 2023-01-01 RX ADMIN — LEVETIRACETAM 750 MG: 500 TABLET, FILM COATED ORAL at 08:46

## 2023-01-01 RX ADMIN — METOPROLOL SUCCINATE 25 MG: 25 TABLET, EXTENDED RELEASE ORAL at 08:22

## 2023-01-01 RX ADMIN — Medication 5 ML: at 14:36

## 2023-01-01 RX ADMIN — OXYCODONE HYDROCHLORIDE 10 MG: 5 TABLET ORAL at 20:17

## 2023-01-01 RX ADMIN — DEXAMETHASONE SODIUM PHOSPHATE 8 MG: 10 INJECTION, SOLUTION INTRAMUSCULAR; INTRAVENOUS at 12:16

## 2023-01-01 RX ADMIN — DEXAMETHASONE SODIUM PHOSPHATE 10 MG: 10 INJECTION, SOLUTION INTRAMUSCULAR; INTRAVENOUS at 02:35

## 2023-01-01 RX ADMIN — SODIUM CHLORIDE 510 MG: 9 INJECTION, SOLUTION INTRAVENOUS at 12:36

## 2023-01-01 RX ADMIN — OXYCODONE HYDROCHLORIDE 10 MG: 5 TABLET ORAL at 14:24

## 2023-01-01 RX ADMIN — VENLAFAXINE 75 MG: 75 TABLET ORAL at 08:11

## 2023-01-01 RX ADMIN — VENLAFAXINE 75 MG: 75 TABLET ORAL at 08:56

## 2023-01-01 RX ADMIN — DEXAMETHASONE SODIUM PHOSPHATE 10 MG: 10 INJECTION, SOLUTION INTRAMUSCULAR; INTRAVENOUS at 00:39

## 2023-01-01 RX ADMIN — OXYCODONE HYDROCHLORIDE 10 MG: 5 TABLET ORAL at 08:14

## 2023-01-01 RX ADMIN — ACETAMINOPHEN 650 MG: 325 TABLET ORAL at 16:32

## 2023-01-01 RX ADMIN — Medication 5 ML: at 09:30

## 2023-01-01 RX ADMIN — POTASSIUM CHLORIDE 40 MEQ: 1500 TABLET, EXTENDED RELEASE ORAL at 13:30

## 2023-01-01 RX ADMIN — OXYCODONE HYDROCHLORIDE 10 MG: 10 TABLET ORAL at 08:20

## 2023-01-01 RX ADMIN — POLYETHYLENE GLYCOL 400 AND PROPYLENE GLYCOL 1 DROP: 4; 3 SOLUTION/ DROPS OPHTHALMIC at 14:05

## 2023-01-01 RX ADMIN — POTASSIUM CHLORIDE 20 MEQ: 750 TABLET, EXTENDED RELEASE ORAL at 08:54

## 2023-01-01 RX ADMIN — METHADONE HYDROCHLORIDE 10 MG: 10 TABLET ORAL at 14:03

## 2023-01-01 RX ADMIN — OXYCODONE HYDROCHLORIDE AND ACETAMINOPHEN 1000 MG: 500 TABLET ORAL at 08:50

## 2023-01-01 RX ADMIN — FLUTICASONE FUROATE AND VILANTEROL TRIFENATATE 1 PUFF: 100; 25 POWDER RESPIRATORY (INHALATION) at 11:07

## 2023-01-01 RX ADMIN — FLUTICASONE FUROATE AND VILANTEROL TRIFENATATE 1 PUFF: 100; 25 POWDER RESPIRATORY (INHALATION) at 08:55

## 2023-01-01 RX ADMIN — OXYCODONE HYDROCHLORIDE 10 MG: 10 TABLET ORAL at 06:58

## 2023-01-01 RX ADMIN — DIPHENHYDRAMINE HYDROCHLORIDE 50 MG: 25 CAPSULE ORAL at 09:04

## 2023-01-01 RX ADMIN — OXYCODONE HYDROCHLORIDE 10 MG: 10 TABLET ORAL at 03:10

## 2023-01-01 RX ADMIN — Medication 1 TABLET: at 08:29

## 2023-01-01 RX ADMIN — SODIUM CHLORIDE 50 MG: 9 INJECTION, SOLUTION INTRAVENOUS at 18:40

## 2023-01-01 RX ADMIN — FAMOTIDINE 20 MG: 10 INJECTION INTRAVENOUS at 11:08

## 2023-01-01 RX ADMIN — FLUTICASONE FUROATE AND VILANTEROL TRIFENATATE 1 PUFF: 100; 25 POWDER RESPIRATORY (INHALATION) at 14:20

## 2023-01-01 RX ADMIN — Medication 25 MCG: at 10:17

## 2023-01-01 RX ADMIN — LEVOTHYROXINE SODIUM 112 MCG: 0.11 TABLET ORAL at 08:48

## 2023-01-01 RX ADMIN — ACETAMINOPHEN 650 MG: 325 TABLET ORAL at 11:18

## 2023-01-01 RX ADMIN — PANTOPRAZOLE SODIUM 40 MG: 40 TABLET, DELAYED RELEASE ORAL at 08:33

## 2023-01-01 RX ADMIN — LEVOTHYROXINE SODIUM 112 MCG: 0.11 TABLET ORAL at 08:30

## 2023-01-01 RX ADMIN — METHADONE HYDROCHLORIDE 10 MG: 10 TABLET ORAL at 08:55

## 2023-01-01 RX ADMIN — PIPERACILLIN AND TAZOBACTAM 3.38 G: 3; .375 INJECTION, POWDER, LYOPHILIZED, FOR SOLUTION INTRAVENOUS at 08:17

## 2023-01-01 RX ADMIN — ACETAMINOPHEN 650 MG: 325 TABLET, FILM COATED ORAL at 00:48

## 2023-01-01 RX ADMIN — SODIUM CHLORIDE 50 MG: 9 INJECTION, SOLUTION INTRAVENOUS at 18:52

## 2023-01-01 RX ADMIN — UMECLIDINIUM 1 PUFF: 62.5 AEROSOL, POWDER ORAL at 12:10

## 2023-01-01 RX ADMIN — METHADONE HYDROCHLORIDE 10 MG: 10 TABLET ORAL at 16:47

## 2023-01-01 RX ADMIN — SODIUM CHLORIDE, PRESERVATIVE FREE 3 ML: 5 INJECTION INTRAVENOUS at 05:39

## 2023-01-01 RX ADMIN — OXYCODONE HYDROCHLORIDE 10 MG: 5 TABLET ORAL at 10:52

## 2023-01-01 RX ADMIN — OXYCODONE HYDROCHLORIDE 10 MG: 5 TABLET ORAL at 06:35

## 2023-01-01 RX ADMIN — POTASSIUM CHLORIDE 20 MEQ: 750 TABLET, EXTENDED RELEASE ORAL at 15:53

## 2023-01-01 RX ADMIN — LEVETIRACETAM 750 MG: 500 TABLET, FILM COATED ORAL at 19:56

## 2023-01-01 RX ADMIN — SODIUM CHLORIDE, PRESERVATIVE FREE 3 ML: 5 INJECTION INTRAVENOUS at 05:41

## 2023-01-01 RX ADMIN — SODIUM CHLORIDE, PRESERVATIVE FREE 3 ML: 5 INJECTION INTRAVENOUS at 08:31

## 2023-01-01 RX ADMIN — MAGNESIUM SULFATE HEPTAHYDRATE 2 G: 2 INJECTION, SOLUTION INTRAVENOUS at 08:56

## 2023-01-01 RX ADMIN — LEVETIRACETAM 750 MG: 750 TABLET, FILM COATED ORAL at 19:39

## 2023-01-01 RX ADMIN — FLUTICASONE FUROATE AND VILANTEROL TRIFENATATE 1 PUFF: 100; 25 POWDER RESPIRATORY (INHALATION) at 08:21

## 2023-01-01 RX ADMIN — PIPERACILLIN AND TAZOBACTAM 4.5 G: 4; .5 INJECTION, POWDER, FOR SOLUTION INTRAVENOUS at 18:32

## 2023-01-01 RX ADMIN — LEVETIRACETAM 750 MG: 750 TABLET, FILM COATED ORAL at 19:28

## 2023-01-01 RX ADMIN — LEVOFLOXACIN 750 MG: 750 TABLET, FILM COATED ORAL at 17:34

## 2023-01-01 RX ADMIN — SODIUM CHLORIDE 510 MG: 9 INJECTION, SOLUTION INTRAVENOUS at 13:02

## 2023-01-01 RX ADMIN — PANTOPRAZOLE SODIUM 40 MG: 40 TABLET, DELAYED RELEASE ORAL at 08:42

## 2023-01-01 RX ADMIN — VENLAFAXINE 75 MG: 75 TABLET ORAL at 20:21

## 2023-01-01 RX ADMIN — ACYCLOVIR 400 MG: 400 TABLET ORAL at 20:06

## 2023-01-01 RX ADMIN — METHADONE HYDROCHLORIDE 10 MG: 10 TABLET ORAL at 21:53

## 2023-01-01 RX ADMIN — PIPERACILLIN AND TAZOBACTAM 3.38 G: 3; .375 INJECTION, POWDER, LYOPHILIZED, FOR SOLUTION INTRAVENOUS at 12:09

## 2023-01-01 RX ADMIN — Medication 5 ML: at 12:40

## 2023-01-01 RX ADMIN — OXYCODONE HYDROCHLORIDE 10 MG: 5 TABLET ORAL at 12:11

## 2023-01-01 RX ADMIN — AZITHROMYCIN 500 MG: 250 TABLET, FILM COATED ORAL at 08:20

## 2023-01-01 RX ADMIN — OXYCODONE HYDROCHLORIDE 10 MG: 5 TABLET ORAL at 00:56

## 2023-01-01 RX ADMIN — SODIUM CHLORIDE, PRESERVATIVE FREE 5 ML: 5 INJECTION INTRAVENOUS at 19:56

## 2023-01-01 RX ADMIN — VENLAFAXINE 75 MG: 75 TABLET ORAL at 09:07

## 2023-01-01 RX ADMIN — METOPROLOL SUCCINATE 25 MG: 25 TABLET, EXTENDED RELEASE ORAL at 04:54

## 2023-01-01 RX ADMIN — METOPROLOL SUCCINATE 25 MG: 25 TABLET, EXTENDED RELEASE ORAL at 07:04

## 2023-01-01 RX ADMIN — METHADONE HYDROCHLORIDE 10 MG: 10 TABLET ORAL at 15:49

## 2023-01-01 RX ADMIN — IOPAMIDOL 67 ML: 755 INJECTION, SOLUTION INTRAVENOUS at 14:16

## 2023-01-01 RX ADMIN — LEVOTHYROXINE SODIUM 112 MCG: 0.11 TABLET ORAL at 08:20

## 2023-01-01 RX ADMIN — LEVOTHYROXINE SODIUM 112 MCG: 0.11 TABLET ORAL at 15:09

## 2023-01-01 RX ADMIN — PIPERACILLIN AND TAZOBACTAM 4.5 G: 4; .5 INJECTION, POWDER, FOR SOLUTION INTRAVENOUS at 16:14

## 2023-01-01 RX ADMIN — AMLODIPINE BESYLATE 10 MG: 10 TABLET ORAL at 09:08

## 2023-01-01 RX ADMIN — METHADONE HYDROCHLORIDE 10 MG: 10 TABLET ORAL at 14:09

## 2023-01-01 RX ADMIN — SODIUM CHLORIDE 50 MG: 9 INJECTION, SOLUTION INTRAVENOUS at 17:36

## 2023-01-01 RX ADMIN — LOPERAMIDE HYDROCHLORIDE 4 MG: 2 CAPSULE ORAL at 14:19

## 2023-01-01 RX ADMIN — VENLAFAXINE 75 MG: 75 TABLET ORAL at 20:12

## 2023-01-01 RX ADMIN — METOPROLOL SUCCINATE 25 MG: 25 TABLET, EXTENDED RELEASE ORAL at 08:51

## 2023-01-01 RX ADMIN — ACETAMINOPHEN 650 MG: 325 TABLET, FILM COATED ORAL at 16:14

## 2023-01-01 RX ADMIN — LEVOTHYROXINE SODIUM 112 MCG: 0.11 TABLET ORAL at 08:33

## 2023-01-01 RX ADMIN — SODIUM CHLORIDE 510 MG: 9 INJECTION, SOLUTION INTRAVENOUS at 12:31

## 2023-01-01 RX ADMIN — Medication 25 MCG: at 08:42

## 2023-01-01 RX ADMIN — OXYCODONE HYDROCHLORIDE 10 MG: 5 TABLET ORAL at 18:02

## 2023-01-01 RX ADMIN — FLUTICASONE FUROATE AND VILANTEROL TRIFENATATE 1 PUFF: 100; 25 POWDER RESPIRATORY (INHALATION) at 08:38

## 2023-01-01 RX ADMIN — Medication 25 MCG: at 08:54

## 2023-01-01 RX ADMIN — ENOXAPARIN SODIUM 40 MG: 40 INJECTION SUBCUTANEOUS at 20:34

## 2023-01-01 RX ADMIN — DEXAMETHASONE SODIUM PHOSPHATE 8 MG: 10 INJECTION, SOLUTION INTRAMUSCULAR; INTRAVENOUS at 11:55

## 2023-01-01 RX ADMIN — OXYCODONE HYDROCHLORIDE 5 MG: 5 TABLET ORAL at 09:27

## 2023-01-01 RX ADMIN — Medication 1 DROP: at 13:01

## 2023-01-01 RX ADMIN — AMLODIPINE BESYLATE 5 MG: 5 TABLET ORAL at 08:14

## 2023-01-01 RX ADMIN — FAMOTIDINE 20 MG: 10 INJECTION INTRAVENOUS at 11:21

## 2023-01-01 RX ADMIN — SODIUM CHLORIDE 1000 MG: 9 INJECTION, SOLUTION INTRAVENOUS at 14:51

## 2023-01-01 RX ADMIN — OXYCODONE HYDROCHLORIDE AND ACETAMINOPHEN 1000 MG: 500 TABLET ORAL at 08:31

## 2023-01-01 RX ADMIN — Medication 25 MCG: at 09:07

## 2023-01-01 RX ADMIN — SODIUM CHLORIDE, PRESERVATIVE FREE 3 ML: 5 INJECTION INTRAVENOUS at 03:05

## 2023-01-01 RX ADMIN — ENOXAPARIN SODIUM 40 MG: 40 INJECTION SUBCUTANEOUS at 20:16

## 2023-01-01 RX ADMIN — ASPIRIN 81 MG CHEWABLE TABLET 81 MG: 81 TABLET CHEWABLE at 08:51

## 2023-01-01 RX ADMIN — Medication 25 MCG: at 08:24

## 2023-01-01 RX ADMIN — LEVOFLOXACIN 250 MG: 250 TABLET, FILM COATED ORAL at 08:33

## 2023-01-01 RX ADMIN — LEVOFLOXACIN 250 MG: 250 TABLET, FILM COATED ORAL at 09:15

## 2023-01-01 RX ADMIN — ACYCLOVIR 400 MG: 400 TABLET ORAL at 08:34

## 2023-01-01 RX ADMIN — LEVETIRACETAM 750 MG: 750 TABLET, FILM COATED ORAL at 07:50

## 2023-01-01 RX ADMIN — SULFAMETHOXAZOLE AND TRIMETHOPRIM 1 TABLET: 800; 160 TABLET ORAL at 20:12

## 2023-01-01 RX ADMIN — DIPHENHYDRAMINE HYDROCHLORIDE 50 MG: 25 CAPSULE ORAL at 11:18

## 2023-01-01 RX ADMIN — METOPROLOL SUCCINATE 25 MG: 25 TABLET, EXTENDED RELEASE ORAL at 08:39

## 2023-01-01 RX ADMIN — POTASSIUM CHLORIDE 40 MEQ: 1500 TABLET, EXTENDED RELEASE ORAL at 11:27

## 2023-01-01 RX ADMIN — ACETAMINOPHEN 650 MG: 325 TABLET, FILM COATED ORAL at 01:11

## 2023-01-01 RX ADMIN — FLUCONAZOLE 200 MG: 200 TABLET ORAL at 08:55

## 2023-01-01 RX ADMIN — PIPERACILLIN AND TAZOBACTAM 4.5 G: 4; .5 INJECTION, POWDER, FOR SOLUTION INTRAVENOUS at 11:33

## 2023-01-01 RX ADMIN — DEXAMETHASONE SODIUM PHOSPHATE 10 MG: 10 INJECTION, SOLUTION INTRAMUSCULAR; INTRAVENOUS at 13:00

## 2023-01-01 RX ADMIN — FLUTICASONE FUROATE AND VILANTEROL TRIFENATATE 1 PUFF: 100; 25 POWDER RESPIRATORY (INHALATION) at 07:49

## 2023-01-01 RX ADMIN — POLYETHYLENE GLYCOL 400 AND PROPYLENE GLYCOL 1 DROP: 4; 3 SOLUTION/ DROPS OPHTHALMIC at 10:59

## 2023-01-01 RX ADMIN — ZOLEDRONIC ACID 3 MG: 4 INJECTION, SOLUTION, CONCENTRATE INTRAVENOUS at 11:20

## 2023-01-01 RX ADMIN — SODIUM CHLORIDE, PRESERVATIVE FREE 3 ML: 5 INJECTION INTRAVENOUS at 05:09

## 2023-01-01 RX ADMIN — METHADONE HYDROCHLORIDE 10 MG: 10 TABLET ORAL at 17:29

## 2023-01-01 RX ADMIN — UMECLIDINIUM 1 PUFF: 62.5 AEROSOL, POWDER ORAL at 09:24

## 2023-01-01 RX ADMIN — LEVETIRACETAM 750 MG: 750 TABLET, FILM COATED ORAL at 21:14

## 2023-01-01 RX ADMIN — VANCOMYCIN HYDROCHLORIDE 500 MG: 500 INJECTION, POWDER, LYOPHILIZED, FOR SOLUTION INTRAVENOUS at 08:32

## 2023-01-01 RX ADMIN — SULFAMETHOXAZOLE AND TRIMETHOPRIM 1 TABLET: 400; 80 TABLET ORAL at 08:20

## 2023-01-01 RX ADMIN — SODIUM CHLORIDE 1000 MG: 9 INJECTION, SOLUTION INTRAVENOUS at 10:13

## 2023-01-01 RX ADMIN — METHADONE HYDROCHLORIDE 10 MG: 10 TABLET ORAL at 14:01

## 2023-01-01 RX ADMIN — AMLODIPINE BESYLATE 10 MG: 10 TABLET ORAL at 08:02

## 2023-01-01 RX ADMIN — ACETAMINOPHEN 650 MG: 325 TABLET, FILM COATED ORAL at 13:10

## 2023-01-01 RX ADMIN — LEVETIRACETAM 750 MG: 500 TABLET, FILM COATED ORAL at 09:28

## 2023-01-01 RX ADMIN — OXYCODONE HYDROCHLORIDE 10 MG: 10 TABLET ORAL at 05:12

## 2023-01-01 RX ADMIN — AMLODIPINE BESYLATE 10 MG: 10 TABLET ORAL at 08:48

## 2023-01-01 RX ADMIN — METHADONE HYDROCHLORIDE 10 MG: 10 TABLET ORAL at 21:22

## 2023-01-01 RX ADMIN — METHADONE HYDROCHLORIDE 10 MG: 10 TABLET ORAL at 14:05

## 2023-01-01 RX ADMIN — SODIUM CHLORIDE, PRESERVATIVE FREE 3 ML: 5 INJECTION INTRAVENOUS at 20:35

## 2023-01-01 RX ADMIN — LEVOFLOXACIN 250 MG: 250 TABLET, FILM COATED ORAL at 09:29

## 2023-01-01 RX ADMIN — IPRATROPIUM BROMIDE AND ALBUTEROL SULFATE 3 ML: .5; 3 SOLUTION RESPIRATORY (INHALATION) at 07:56

## 2023-01-01 RX ADMIN — Medication 1 TABLET: at 10:52

## 2023-01-01 RX ADMIN — SODIUM CHLORIDE 510 MG: 9 INJECTION, SOLUTION INTRAVENOUS at 12:00

## 2023-01-01 RX ADMIN — SULFAMETHOXAZOLE AND TRIMETHOPRIM 1 TABLET: 400; 80 TABLET ORAL at 07:35

## 2023-01-01 RX ADMIN — Medication 25 MCG: at 08:20

## 2023-01-01 RX ADMIN — AMLODIPINE BESYLATE 10 MG: 10 TABLET ORAL at 08:22

## 2023-01-01 RX ADMIN — LEVETIRACETAM 750 MG: 500 TABLET, FILM COATED ORAL at 08:56

## 2023-01-01 RX ADMIN — Medication 25 MCG: at 09:30

## 2023-01-01 RX ADMIN — OXYCODONE HYDROCHLORIDE AND ACETAMINOPHEN 1000 MG: 500 TABLET ORAL at 07:51

## 2023-01-01 RX ADMIN — LEVOFLOXACIN 750 MG: 750 TABLET, FILM COATED ORAL at 14:20

## 2023-01-01 RX ADMIN — UMECLIDINIUM 1 PUFF: 62.5 AEROSOL, POWDER ORAL at 07:51

## 2023-01-01 RX ADMIN — Medication 5 ML: at 09:59

## 2023-01-01 RX ADMIN — ALLOPURINOL 300 MG: 300 TABLET ORAL at 09:00

## 2023-01-01 RX ADMIN — METOPROLOL SUCCINATE 25 MG: 25 TABLET, EXTENDED RELEASE ORAL at 08:14

## 2023-01-01 RX ADMIN — OXYCODONE HYDROCHLORIDE AND ACETAMINOPHEN 1000 MG: 500 TABLET ORAL at 08:20

## 2023-01-01 RX ADMIN — METHADONE HYDROCHLORIDE 10 MG: 10 TABLET ORAL at 06:48

## 2023-01-01 RX ADMIN — POLYETHYLENE GLYCOL 3350 17 G: 17 POWDER, FOR SOLUTION ORAL at 08:25

## 2023-01-01 RX ADMIN — OXYCODONE HYDROCHLORIDE 5 MG: 5 TABLET ORAL at 20:39

## 2023-01-01 RX ADMIN — ACYCLOVIR 400 MG: 400 TABLET ORAL at 21:22

## 2023-01-01 RX ADMIN — ALLOPURINOL 300 MG: 300 TABLET ORAL at 17:36

## 2023-01-01 RX ADMIN — PIPERACILLIN SODIUM AND TAZOBACTAM SODIUM 4.5 G: 4; .5 INJECTION, POWDER, LYOPHILIZED, FOR SOLUTION INTRAVENOUS at 16:48

## 2023-01-01 RX ADMIN — ACETAMINOPHEN 650 MG: 325 TABLET, FILM COATED ORAL at 16:43

## 2023-01-01 RX ADMIN — METOPROLOL SUCCINATE 25 MG: 25 TABLET, EXTENDED RELEASE ORAL at 08:32

## 2023-01-01 RX ADMIN — GADOBUTROL 5 ML: 604.72 INJECTION INTRAVENOUS at 02:06

## 2023-01-01 RX ADMIN — DEXAMETHASONE SODIUM PHOSPHATE 5 MG: 10 INJECTION, SOLUTION INTRAMUSCULAR; INTRAVENOUS at 20:06

## 2023-01-01 RX ADMIN — METOPROLOL SUCCINATE 25 MG: 25 TABLET, EXTENDED RELEASE ORAL at 08:55

## 2023-01-01 RX ADMIN — PANTOPRAZOLE SODIUM 40 MG: 40 TABLET, DELAYED RELEASE ORAL at 07:35

## 2023-01-01 RX ADMIN — ACYCLOVIR 400 MG: 400 TABLET ORAL at 20:16

## 2023-01-01 RX ADMIN — METHADONE HYDROCHLORIDE 10 MG: 10 TABLET ORAL at 13:56

## 2023-01-01 RX ADMIN — Medication 5 ML: at 10:41

## 2023-01-01 RX ADMIN — IOPAMIDOL 69 ML: 755 INJECTION, SOLUTION INTRAVENOUS at 15:26

## 2023-01-01 RX ADMIN — FLUTICASONE FUROATE AND VILANTEROL TRIFENATATE 1 PUFF: 100; 25 POWDER RESPIRATORY (INHALATION) at 10:17

## 2023-01-01 RX ADMIN — LOPERAMIDE HYDROCHLORIDE 2 MG: 2 CAPSULE ORAL at 15:01

## 2023-01-01 RX ADMIN — Medication 5 ML: at 15:30

## 2023-01-01 RX ADMIN — PANTOPRAZOLE SODIUM 40 MG: 40 TABLET, DELAYED RELEASE ORAL at 09:00

## 2023-01-01 RX ADMIN — MAGNESIUM SULFATE HEPTAHYDRATE 2 G: 40 INJECTION, SOLUTION INTRAVENOUS at 20:28

## 2023-01-01 RX ADMIN — METHOCARBAMOL 500 MG: 500 TABLET ORAL at 22:15

## 2023-01-01 RX ADMIN — ACETAMINOPHEN 650 MG: 325 TABLET, FILM COATED ORAL at 12:26

## 2023-01-01 RX ADMIN — METOPROLOL SUCCINATE 25 MG: 25 TABLET, EXTENDED RELEASE ORAL at 09:00

## 2023-01-01 RX ADMIN — LEVOTHYROXINE SODIUM 112 MCG: 0.11 TABLET ORAL at 09:15

## 2023-01-01 RX ADMIN — VENLAFAXINE 75 MG: 75 TABLET ORAL at 15:53

## 2023-01-01 RX ADMIN — OXYCODONE HYDROCHLORIDE 10 MG: 10 TABLET ORAL at 16:24

## 2023-01-01 RX ADMIN — VENLAFAXINE 75 MG: 75 TABLET ORAL at 20:36

## 2023-01-01 RX ADMIN — ACYCLOVIR 400 MG: 400 TABLET ORAL at 19:56

## 2023-01-01 RX ADMIN — Medication 5 ML: at 10:56

## 2023-01-01 RX ADMIN — LEVOFLOXACIN 250 MG: 250 TABLET, FILM COATED ORAL at 15:09

## 2023-01-01 RX ADMIN — SODIUM CHLORIDE 1000 MG: 9 INJECTION, SOLUTION INTRAVENOUS at 11:53

## 2023-01-01 RX ADMIN — HYDRALAZINE HYDROCHLORIDE 10 MG: 20 INJECTION INTRAMUSCULAR; INTRAVENOUS at 00:39

## 2023-01-01 RX ADMIN — SODIUM CHLORIDE 510 MG: 9 INJECTION, SOLUTION INTRAVENOUS at 12:07

## 2023-01-01 RX ADMIN — ENOXAPARIN SODIUM 40 MG: 40 INJECTION SUBCUTANEOUS at 19:56

## 2023-01-01 RX ADMIN — PANTOPRAZOLE SODIUM 40 MG: 40 TABLET, DELAYED RELEASE ORAL at 15:09

## 2023-01-01 RX ADMIN — ACYCLOVIR 400 MG: 400 TABLET ORAL at 08:51

## 2023-01-01 RX ADMIN — DIPHENHYDRAMINE HYDROCHLORIDE 50 MG: 25 CAPSULE ORAL at 10:55

## 2023-01-01 RX ADMIN — Medication 5 ML: at 09:01

## 2023-01-01 RX ADMIN — LOPERAMIDE HYDROCHLORIDE 2 MG: 2 CAPSULE ORAL at 10:16

## 2023-01-01 RX ADMIN — OXYCODONE HYDROCHLORIDE 10 MG: 10 TABLET ORAL at 19:31

## 2023-01-01 RX ADMIN — METHADONE HYDROCHLORIDE 10 MG: 10 TABLET ORAL at 20:06

## 2023-01-01 RX ADMIN — ACYCLOVIR 400 MG: 400 TABLET ORAL at 08:20

## 2023-01-01 RX ADMIN — LEVETIRACETAM 750 MG: 500 TABLET, FILM COATED ORAL at 08:41

## 2023-01-01 RX ADMIN — Medication 25 MCG: at 09:15

## 2023-01-01 RX ADMIN — Medication 25 MCG: at 10:52

## 2023-01-01 RX ADMIN — SODIUM CHLORIDE 50 MG: 9 INJECTION, SOLUTION INTRAVENOUS at 18:19

## 2023-01-01 RX ADMIN — SODIUM CHLORIDE, POTASSIUM CHLORIDE, SODIUM LACTATE AND CALCIUM CHLORIDE 1500 ML: 600; 310; 30; 20 INJECTION, SOLUTION INTRAVENOUS at 16:51

## 2023-01-01 RX ADMIN — LEVOTHYROXINE SODIUM 112 MCG: 0.11 TABLET ORAL at 08:03

## 2023-01-01 RX ADMIN — AMLODIPINE BESYLATE 5 MG: 5 TABLET ORAL at 08:11

## 2023-01-01 RX ADMIN — Medication 5 ML: at 13:31

## 2023-01-01 RX ADMIN — SODIUM CHLORIDE 50 MG: 9 INJECTION, SOLUTION INTRAVENOUS at 20:39

## 2023-01-01 RX ADMIN — OXYCODONE HYDROCHLORIDE 10 MG: 10 TABLET ORAL at 20:17

## 2023-01-01 RX ADMIN — FLUTICASONE FUROATE AND VILANTEROL TRIFENATATE 1 PUFF: 100; 25 POWDER RESPIRATORY (INHALATION) at 08:34

## 2023-01-01 RX ADMIN — METHADONE HYDROCHLORIDE 10 MG: 10 TABLET ORAL at 09:08

## 2023-01-01 RX ADMIN — Medication 25 MCG: at 08:58

## 2023-01-01 RX ADMIN — OXYCODONE HYDROCHLORIDE AND ACETAMINOPHEN 1000 MG: 500 TABLET ORAL at 09:15

## 2023-01-01 RX ADMIN — ONDANSETRON HYDROCHLORIDE 4 MG: 4 TABLET, FILM COATED ORAL at 13:25

## 2023-01-01 RX ADMIN — ACETAMINOPHEN 650 MG: 325 TABLET, FILM COATED ORAL at 12:08

## 2023-01-01 RX ADMIN — ACYCLOVIR 400 MG: 400 TABLET ORAL at 09:30

## 2023-01-01 RX ADMIN — ACYCLOVIR 400 MG: 400 TABLET ORAL at 08:48

## 2023-01-01 RX ADMIN — SODIUM CHLORIDE 50 MG: 9 INJECTION, SOLUTION INTRAVENOUS at 18:21

## 2023-01-01 RX ADMIN — ACYCLOVIR 400 MG: 400 TABLET ORAL at 19:58

## 2023-01-01 RX ADMIN — LEVOFLOXACIN 250 MG: 250 TABLET, FILM COATED ORAL at 08:11

## 2023-01-01 RX ADMIN — Medication 5 ML: at 19:59

## 2023-01-01 RX ADMIN — UMECLIDINIUM 1 PUFF: 62.5 AEROSOL, POWDER ORAL at 08:58

## 2023-01-01 RX ADMIN — ACYCLOVIR 400 MG: 400 TABLET ORAL at 08:39

## 2023-01-01 RX ADMIN — LORAZEPAM 0.25 MG: 0.5 TABLET ORAL at 12:46

## 2023-01-01 RX ADMIN — PANTOPRAZOLE SODIUM 40 MG: 40 TABLET, DELAYED RELEASE ORAL at 09:15

## 2023-01-01 RX ADMIN — DIPHENHYDRAMINE HYDROCHLORIDE 50 MG: 25 CAPSULE ORAL at 11:27

## 2023-01-01 RX ADMIN — ACETAMINOPHEN 650 MG: 325 TABLET, FILM COATED ORAL at 18:21

## 2023-01-01 RX ADMIN — Medication 5 ML: at 16:41

## 2023-01-01 RX ADMIN — POTASSIUM & SODIUM PHOSPHATES POWDER PACK 280-160-250 MG 1 PACKET: 280-160-250 PACK at 14:10

## 2023-01-01 RX ADMIN — OXYCODONE HYDROCHLORIDE 10 MG: 10 TABLET ORAL at 09:19

## 2023-01-01 RX ADMIN — SODIUM CHLORIDE, PRESERVATIVE FREE 3 ML: 5 INJECTION INTRAVENOUS at 01:30

## 2023-01-01 RX ADMIN — METHADONE HYDROCHLORIDE 10 MG: 10 TABLET ORAL at 19:51

## 2023-01-01 RX ADMIN — PIPERACILLIN AND TAZOBACTAM 3.38 G: 3; .375 INJECTION, POWDER, LYOPHILIZED, FOR SOLUTION INTRAVENOUS at 12:44

## 2023-01-01 RX ADMIN — ACETAMINOPHEN 650 MG: 325 TABLET ORAL at 13:31

## 2023-01-01 RX ADMIN — LEVOTHYROXINE SODIUM 112 MCG: 0.11 TABLET ORAL at 09:26

## 2023-01-01 RX ADMIN — FAMOTIDINE 20 MG: 10 INJECTION, SOLUTION INTRAVENOUS at 09:04

## 2023-01-01 RX ADMIN — LEVOTHYROXINE SODIUM 112 MCG: 0.11 TABLET ORAL at 08:53

## 2023-01-01 RX ADMIN — POTASSIUM CHLORIDE 40 MEQ: 1500 TABLET, EXTENDED RELEASE ORAL at 14:27

## 2023-01-01 RX ADMIN — VANCOMYCIN HYDROCHLORIDE 500 MG: 500 INJECTION, POWDER, LYOPHILIZED, FOR SOLUTION INTRAVENOUS at 20:02

## 2023-01-01 RX ADMIN — DEXAMETHASONE SODIUM PHOSPHATE 8 MG: 10 INJECTION, SOLUTION INTRAMUSCULAR; INTRAVENOUS at 11:28

## 2023-01-01 RX ADMIN — METHADONE HYDROCHLORIDE 10 MG: 10 TABLET ORAL at 21:43

## 2023-01-01 RX ADMIN — Medication 25 MCG: at 08:02

## 2023-01-01 RX ADMIN — POTASSIUM CHLORIDE 40 MEQ: 750 TABLET, EXTENDED RELEASE ORAL at 08:48

## 2023-01-01 RX ADMIN — DEXAMETHASONE SODIUM PHOSPHATE 10 MG: 10 INJECTION, SOLUTION INTRAMUSCULAR; INTRAVENOUS at 08:50

## 2023-01-01 RX ADMIN — PIPERACILLIN AND TAZOBACTAM 3.38 G: 3; .375 INJECTION, POWDER, LYOPHILIZED, FOR SOLUTION INTRAVENOUS at 00:02

## 2023-01-01 RX ADMIN — SULFAMETHOXAZOLE AND TRIMETHOPRIM 1 TABLET: 800; 160 TABLET ORAL at 08:56

## 2023-01-01 RX ADMIN — Medication 5 ML: at 11:47

## 2023-01-01 RX ADMIN — OXYCODONE HYDROCHLORIDE 10 MG: 10 TABLET ORAL at 09:21

## 2023-01-01 RX ADMIN — PANTOPRAZOLE SODIUM 40 MG: 40 TABLET, DELAYED RELEASE ORAL at 10:17

## 2023-01-01 RX ADMIN — PIPERACILLIN AND TAZOBACTAM 4.5 G: 4; .5 INJECTION, POWDER, FOR SOLUTION INTRAVENOUS at 08:25

## 2023-01-01 RX ADMIN — LEVETIRACETAM 750 MG: 750 TABLET, FILM COATED ORAL at 08:02

## 2023-01-01 RX ADMIN — FLUCONAZOLE 200 MG: 200 TABLET ORAL at 08:48

## 2023-01-01 RX ADMIN — PIPERACILLIN AND TAZOBACTAM 4.5 G: 4; .5 INJECTION, POWDER, FOR SOLUTION INTRAVENOUS at 01:28

## 2023-01-01 RX ADMIN — LEVETIRACETAM 750 MG: 500 TABLET, FILM COATED ORAL at 08:24

## 2023-01-01 RX ADMIN — METHOCARBAMOL TABLETS 500 MG: 500 TABLET, COATED ORAL at 18:28

## 2023-01-01 RX ADMIN — OXYCODONE HYDROCHLORIDE 5 MG: 5 TABLET ORAL at 17:18

## 2023-01-01 RX ADMIN — LEVOTHYROXINE SODIUM 112 MCG: 0.11 TABLET ORAL at 08:24

## 2023-01-01 RX ADMIN — LEVETIRACETAM 750 MG: 500 TABLET, FILM COATED ORAL at 08:31

## 2023-01-01 RX ADMIN — ONDANSETRON HYDROCHLORIDE 8 MG: 8 TABLET, FILM COATED ORAL at 10:16

## 2023-01-01 RX ADMIN — ACYCLOVIR 400 MG: 400 TABLET ORAL at 09:02

## 2023-01-01 RX ADMIN — ALLOPURINOL 300 MG: 300 TABLET ORAL at 10:52

## 2023-01-01 RX ADMIN — LEVOFLOXACIN 750 MG: 750 TABLET, FILM COATED ORAL at 11:17

## 2023-01-01 RX ADMIN — POTASSIUM & SODIUM PHOSPHATES POWDER PACK 280-160-250 MG 1 PACKET: 280-160-250 PACK at 09:00

## 2023-01-01 RX ADMIN — FLUTICASONE FUROATE AND VILANTEROL TRIFENATATE 1 PUFF: 100; 25 POWDER RESPIRATORY (INHALATION) at 08:10

## 2023-01-01 RX ADMIN — FLUTICASONE FUROATE AND VILANTEROL TRIFENATATE 1 PUFF: 100; 25 POWDER RESPIRATORY (INHALATION) at 08:50

## 2023-01-01 RX ADMIN — PIPERACILLIN AND TAZOBACTAM 3.38 G: 3; .375 INJECTION, POWDER, LYOPHILIZED, FOR SOLUTION INTRAVENOUS at 00:28

## 2023-01-01 RX ADMIN — UMECLIDINIUM 1 PUFF: 62.5 AEROSOL, POWDER ORAL at 08:44

## 2023-01-01 RX ADMIN — METHOCARBAMOL 500 MG: 500 TABLET ORAL at 14:49

## 2023-01-01 RX ADMIN — ACETAMINOPHEN 650 MG: 325 TABLET, FILM COATED ORAL at 03:26

## 2023-01-01 RX ADMIN — LEVETIRACETAM 750 MG: 500 TABLET, FILM COATED ORAL at 09:00

## 2023-01-01 RX ADMIN — FILGRASTIM-SNDZ 300 MCG: 300 INJECTION, SOLUTION INTRAVENOUS; SUBCUTANEOUS at 12:52

## 2023-01-01 RX ADMIN — Medication 1 DROP: at 16:31

## 2023-01-01 RX ADMIN — FAMOTIDINE 20 MG: 10 INJECTION INTRAVENOUS at 11:29

## 2023-01-01 RX ADMIN — SODIUM CHLORIDE, PRESERVATIVE FREE 3 ML: 5 INJECTION INTRAVENOUS at 14:15

## 2023-01-01 RX ADMIN — VANCOMYCIN HYDROCHLORIDE 1250 MG: 10 INJECTION, POWDER, LYOPHILIZED, FOR SOLUTION INTRAVENOUS at 17:31

## 2023-01-01 RX ADMIN — METOPROLOL SUCCINATE 25 MG: 25 TABLET, EXTENDED RELEASE ORAL at 08:54

## 2023-01-01 RX ADMIN — UMECLIDINIUM 1 PUFF: 62.5 AEROSOL, POWDER ORAL at 08:34

## 2023-01-01 RX ADMIN — OXYCODONE HYDROCHLORIDE 5 MG: 5 TABLET ORAL at 09:01

## 2023-01-01 RX ADMIN — FLUTICASONE FUROATE AND VILANTEROL TRIFENATATE 1 PUFF: 100; 25 POWDER RESPIRATORY (INHALATION) at 08:58

## 2023-01-01 RX ADMIN — PIPERACILLIN AND TAZOBACTAM 3.38 G: 3; .375 INJECTION, POWDER, LYOPHILIZED, FOR SOLUTION INTRAVENOUS at 17:30

## 2023-01-01 RX ADMIN — Medication 5 ML: at 10:05

## 2023-01-01 RX ADMIN — METHADONE HYDROCHLORIDE 10 MG: 10 TABLET ORAL at 20:38

## 2023-01-01 RX ADMIN — DEXAMETHASONE SODIUM PHOSPHATE 5 MG: 10 INJECTION, SOLUTION INTRAMUSCULAR; INTRAVENOUS at 14:09

## 2023-01-01 RX ADMIN — SODIUM CHLORIDE, PRESERVATIVE FREE 3 ML: 5 INJECTION INTRAVENOUS at 02:35

## 2023-01-01 RX ADMIN — Medication 500 UNITS: at 11:28

## 2023-01-01 RX ADMIN — Medication 1 TABLET: at 08:20

## 2023-01-01 RX ADMIN — VENLAFAXINE 75 MG: 75 TABLET ORAL at 08:32

## 2023-01-01 RX ADMIN — SULFAMETHOXAZOLE AND TRIMETHOPRIM 1 TABLET: 800; 160 TABLET ORAL at 20:01

## 2023-01-01 RX ADMIN — AZITHROMYCIN 500 MG: 250 TABLET, FILM COATED ORAL at 11:17

## 2023-01-01 RX ADMIN — ZOLEDRONIC ACID 3 MG: 4 INJECTION, SOLUTION, CONCENTRATE INTRAVENOUS at 13:54

## 2023-01-01 RX ADMIN — ACYCLOVIR 400 MG: 400 TABLET ORAL at 08:54

## 2023-01-01 RX ADMIN — METHOCARBAMOL TABLETS 500 MG: 500 TABLET, COATED ORAL at 17:14

## 2023-01-01 RX ADMIN — OXYCODONE HYDROCHLORIDE 10 MG: 5 TABLET ORAL at 11:59

## 2023-01-01 RX ADMIN — DIPHENHYDRAMINE HYDROCHLORIDE 50 MG: 25 CAPSULE ORAL at 11:29

## 2023-01-01 RX ADMIN — Medication 1 TABLET: at 08:41

## 2023-01-01 RX ADMIN — VENLAFAXINE 75 MG: 75 TABLET ORAL at 08:33

## 2023-01-01 RX ADMIN — VENLAFAXINE 75 MG: 75 TABLET ORAL at 10:00

## 2023-01-01 RX ADMIN — OXYCODONE HYDROCHLORIDE 10 MG: 5 TABLET ORAL at 11:54

## 2023-01-01 RX ADMIN — ACETAMINOPHEN 650 MG: 325 TABLET, FILM COATED ORAL at 04:58

## 2023-01-01 RX ADMIN — AMLODIPINE BESYLATE 5 MG: 5 TABLET ORAL at 08:33

## 2023-01-01 RX ADMIN — ACETAMINOPHEN 650 MG: 325 TABLET, FILM COATED ORAL at 00:17

## 2023-01-01 RX ADMIN — LEVOFLOXACIN 250 MG: 250 TABLET, FILM COATED ORAL at 08:24

## 2023-01-01 RX ADMIN — LIDOCAINE HYDROCHLORIDE 30 ML: 10 INJECTION, SOLUTION EPIDURAL; INFILTRATION; INTRACAUDAL; PERINEURAL at 09:34

## 2023-01-01 RX ADMIN — SODIUM CHLORIDE 50 MG: 9 INJECTION, SOLUTION INTRAVENOUS at 18:48

## 2023-01-01 RX ADMIN — Medication 1 TABLET: at 09:00

## 2023-01-01 RX ADMIN — Medication 25 MCG: at 07:35

## 2023-01-01 RX ADMIN — OXYCODONE HYDROCHLORIDE AND ACETAMINOPHEN 1000 MG: 500 TABLET ORAL at 08:58

## 2023-01-01 RX ADMIN — OXYCODONE HYDROCHLORIDE AND ACETAMINOPHEN 1000 MG: 500 TABLET ORAL at 09:00

## 2023-01-01 RX ADMIN — VENLAFAXINE 75 MG: 75 TABLET ORAL at 21:22

## 2023-01-01 RX ADMIN — SODIUM CHLORIDE 510 MG: 9 INJECTION, SOLUTION INTRAVENOUS at 12:03

## 2023-01-01 RX ADMIN — METHADONE HYDROCHLORIDE 10 MG: 10 TABLET ORAL at 20:18

## 2023-01-01 RX ADMIN — UMECLIDINIUM 1 PUFF: 62.5 AEROSOL, POWDER ORAL at 08:10

## 2023-01-01 RX ADMIN — LOPERAMIDE HYDROCHLORIDE 2 MG: 2 CAPSULE ORAL at 09:46

## 2023-01-01 RX ADMIN — ACYCLOVIR 400 MG: 400 TABLET ORAL at 08:32

## 2023-01-01 RX ADMIN — IPRATROPIUM BROMIDE AND ALBUTEROL SULFATE 3 ML: .5; 3 SOLUTION RESPIRATORY (INHALATION) at 20:05

## 2023-01-01 RX ADMIN — METHADONE HYDROCHLORIDE 10 MG: 10 TABLET ORAL at 20:07

## 2023-01-01 RX ADMIN — OXYCODONE HYDROCHLORIDE 5 MG: 5 TABLET ORAL at 22:14

## 2023-01-01 RX ADMIN — METOPROLOL SUCCINATE 25 MG: 25 TABLET, EXTENDED RELEASE ORAL at 08:02

## 2023-01-01 RX ADMIN — OXYCODONE HYDROCHLORIDE 10 MG: 5 TABLET ORAL at 14:05

## 2023-01-01 RX ADMIN — DEXAMETHASONE SODIUM PHOSPHATE 10 MG: 10 INJECTION, SOLUTION INTRAMUSCULAR; INTRAVENOUS at 19:57

## 2023-01-01 RX ADMIN — ACETAMINOPHEN 650 MG: 325 TABLET ORAL at 11:29

## 2023-01-01 RX ADMIN — IOPAMIDOL 67 ML: 755 INJECTION, SOLUTION INTRAVENOUS at 19:55

## 2023-01-01 RX ADMIN — Medication 1 TABLET: at 08:24

## 2023-01-01 RX ADMIN — POLYETHYLENE GLYCOL 400 AND PROPYLENE GLYCOL 1 DROP: 4; 3 SOLUTION/ DROPS OPHTHALMIC at 08:29

## 2023-01-01 RX ADMIN — VENLAFAXINE 75 MG: 75 TABLET ORAL at 08:55

## 2023-01-01 RX ADMIN — METHADONE HYDROCHLORIDE 10 MG: 10 TABLET ORAL at 20:16

## 2023-01-01 RX ADMIN — METHADONE HYDROCHLORIDE 10 MG: 10 TABLET ORAL at 08:51

## 2023-01-01 RX ADMIN — AMLODIPINE BESYLATE 10 MG: 10 TABLET ORAL at 09:26

## 2023-01-01 RX ADMIN — OXYCODONE HYDROCHLORIDE 10 MG: 5 TABLET ORAL at 15:45

## 2023-01-01 RX ADMIN — METHADONE HYDROCHLORIDE 10 MG: 10 TABLET ORAL at 08:42

## 2023-01-01 RX ADMIN — LEVETIRACETAM 750 MG: 500 TABLET, FILM COATED ORAL at 20:16

## 2023-01-01 RX ADMIN — PIPERACILLIN AND TAZOBACTAM 4.5 G: 4; .5 INJECTION, POWDER, FOR SOLUTION INTRAVENOUS at 06:42

## 2023-01-01 RX ADMIN — OXYCODONE HYDROCHLORIDE AND ACETAMINOPHEN 1000 MG: 500 TABLET ORAL at 09:08

## 2023-01-01 RX ADMIN — SODIUM CHLORIDE: 9 INJECTION, SOLUTION INTRAVENOUS at 08:52

## 2023-01-01 RX ADMIN — VENLAFAXINE 75 MG: 25 TABLET ORAL at 19:28

## 2023-01-01 RX ADMIN — LEVOFLOXACIN 250 MG: 250 TABLET, FILM COATED ORAL at 08:39

## 2023-01-01 RX ADMIN — PIPERACILLIN AND TAZOBACTAM 3.38 G: 3; .375 INJECTION, POWDER, LYOPHILIZED, FOR SOLUTION INTRAVENOUS at 12:07

## 2023-01-01 RX ADMIN — LEVETIRACETAM 750 MG: 500 TABLET, FILM COATED ORAL at 19:51

## 2023-01-01 RX ADMIN — ACYCLOVIR 400 MG: 400 TABLET ORAL at 21:53

## 2023-01-01 RX ADMIN — METHADONE HYDROCHLORIDE 10 MG: 10 TABLET ORAL at 07:35

## 2023-01-01 RX ADMIN — SENNOSIDES AND DOCUSATE SODIUM 1 TABLET: 50; 8.6 TABLET ORAL at 10:52

## 2023-01-01 RX ADMIN — Medication 1 TABLET: at 10:17

## 2023-01-01 RX ADMIN — POLYETHYLENE GLYCOL 400 AND PROPYLENE GLYCOL 1 DROP: 4; 3 SOLUTION/ DROPS OPHTHALMIC at 10:18

## 2023-01-01 RX ADMIN — VENLAFAXINE 75 MG: 75 TABLET ORAL at 08:30

## 2023-01-01 RX ADMIN — LORAZEPAM 0.5 MG: 2 INJECTION INTRAMUSCULAR; INTRAVENOUS at 12:32

## 2023-01-01 RX ADMIN — SODIUM CHLORIDE, PRESERVATIVE FREE 3 ML: 5 INJECTION INTRAVENOUS at 05:17

## 2023-01-01 RX ADMIN — METHADONE HYDROCHLORIDE 10 MG: 10 TABLET ORAL at 23:46

## 2023-01-01 RX ADMIN — VANCOMYCIN HYDROCHLORIDE 1000 MG: 1 INJECTION, SOLUTION INTRAVENOUS at 21:42

## 2023-01-01 RX ADMIN — METOPROLOL SUCCINATE 25 MG: 25 TABLET, EXTENDED RELEASE ORAL at 08:33

## 2023-01-01 RX ADMIN — UMECLIDINIUM 1 PUFF: 62.5 AEROSOL, POWDER ORAL at 08:51

## 2023-01-01 RX ADMIN — LEVETIRACETAM 750 MG: 750 TABLET, FILM COATED ORAL at 08:20

## 2023-01-01 RX ADMIN — ONDANSETRON 4 MG: 2 INJECTION INTRAMUSCULAR; INTRAVENOUS at 15:09

## 2023-01-01 RX ADMIN — Medication 25 MCG: at 08:29

## 2023-01-01 RX ADMIN — DIPHENHYDRAMINE HYDROCHLORIDE 50 MG: 25 CAPSULE ORAL at 16:32

## 2023-01-01 RX ADMIN — SULFAMETHOXAZOLE AND TRIMETHOPRIM 1 TABLET: 800; 160 TABLET ORAL at 09:00

## 2023-01-01 RX ADMIN — OXYCODONE HYDROCHLORIDE 10 MG: 5 TABLET ORAL at 16:31

## 2023-01-01 RX ADMIN — VENLAFAXINE 75 MG: 25 TABLET ORAL at 23:32

## 2023-01-01 RX ADMIN — SODIUM CHLORIDE, POTASSIUM CHLORIDE, SODIUM LACTATE AND CALCIUM CHLORIDE 1000 ML: 600; 310; 30; 20 INJECTION, SOLUTION INTRAVENOUS at 15:21

## 2023-01-01 RX ADMIN — LEVETIRACETAM 750 MG: 500 TABLET, FILM COATED ORAL at 08:23

## 2023-01-01 RX ADMIN — SODIUM CHLORIDE, PRESERVATIVE FREE 3 ML: 5 INJECTION INTRAVENOUS at 06:13

## 2023-01-01 RX ADMIN — Medication 1 TABLET: at 07:49

## 2023-01-01 RX ADMIN — SODIUM CHLORIDE: 9 INJECTION, SOLUTION INTRAVENOUS at 23:29

## 2023-01-01 RX ADMIN — PROCHLORPERAZINE MALEATE 5 MG: 5 TABLET ORAL at 20:17

## 2023-01-01 RX ADMIN — UMECLIDINIUM 1 PUFF: 62.5 AEROSOL, POWDER ORAL at 14:20

## 2023-01-01 RX ADMIN — METHADONE HYDROCHLORIDE 10 MG: 10 TABLET ORAL at 13:14

## 2023-01-01 RX ADMIN — Medication 1 DROP: at 12:08

## 2023-01-01 RX ADMIN — OXYCODONE HYDROCHLORIDE 10 MG: 5 TABLET ORAL at 04:58

## 2023-01-01 RX ADMIN — LEVETIRACETAM 750 MG: 500 TABLET, FILM COATED ORAL at 20:11

## 2023-01-01 RX ADMIN — VENLAFAXINE 75 MG: 75 TABLET ORAL at 21:53

## 2023-01-01 RX ADMIN — PANTOPRAZOLE SODIUM 40 MG: 40 TABLET, DELAYED RELEASE ORAL at 08:48

## 2023-01-01 RX ADMIN — OXYCODONE HYDROCHLORIDE 10 MG: 5 TABLET ORAL at 08:46

## 2023-01-01 RX ADMIN — ACETAMINOPHEN 325 MG: 325 TABLET, FILM COATED ORAL at 17:28

## 2023-01-01 RX ADMIN — DEXAMETHASONE SODIUM PHOSPHATE 10 MG: 10 INJECTION, SOLUTION INTRAMUSCULAR; INTRAVENOUS at 08:27

## 2023-01-01 RX ADMIN — LEVETIRACETAM 500 MG: 500 TABLET, FILM COATED ORAL at 20:38

## 2023-01-01 RX ADMIN — LEVOTHYROXINE SODIUM 112 MCG: 0.11 TABLET ORAL at 08:55

## 2023-01-01 RX ADMIN — PANTOPRAZOLE SODIUM 40 MG: 40 TABLET, DELAYED RELEASE ORAL at 08:02

## 2023-01-01 RX ADMIN — SODIUM CHLORIDE, PRESERVATIVE FREE 500 UNITS: 5 INJECTION INTRAVENOUS at 13:12

## 2023-01-01 RX ADMIN — ACYCLOVIR 400 MG: 400 TABLET ORAL at 09:06

## 2023-01-01 RX ADMIN — VENLAFAXINE 75 MG: 75 TABLET ORAL at 10:17

## 2023-01-01 RX ADMIN — Medication 5 ML: at 14:16

## 2023-01-01 RX ADMIN — METHADONE HYDROCHLORIDE 10 MG: 10 TABLET ORAL at 14:25

## 2023-01-01 RX ADMIN — POLYETHYLENE GLYCOL 3350 17 G: 17 POWDER, FOR SOLUTION ORAL at 10:06

## 2023-01-01 RX ADMIN — ONDANSETRON 8 MG: 4 TABLET, ORALLY DISINTEGRATING ORAL at 10:54

## 2023-01-01 RX ADMIN — PANTOPRAZOLE SODIUM 40 MG: 40 TABLET, DELAYED RELEASE ORAL at 08:53

## 2023-01-01 RX ADMIN — DEXAMETHASONE SODIUM PHOSPHATE 8 MG: 10 INJECTION, SOLUTION INTRAMUSCULAR; INTRAVENOUS at 11:21

## 2023-01-01 RX ADMIN — Medication 5 ML: at 10:02

## 2023-01-01 RX ADMIN — METHADONE HYDROCHLORIDE 10 MG: 10 TABLET ORAL at 19:56

## 2023-01-01 RX ADMIN — Medication 25 MCG: at 09:00

## 2023-01-01 RX ADMIN — ALLOPURINOL 300 MG: 300 TABLET ORAL at 11:26

## 2023-01-01 RX ADMIN — ACYCLOVIR 400 MG: 400 TABLET ORAL at 08:30

## 2023-01-01 RX ADMIN — METHADONE HYDROCHLORIDE 10 MG: 10 TABLET ORAL at 08:53

## 2023-01-01 RX ADMIN — FLUTICASONE FUROATE AND VILANTEROL TRIFENATATE 1 PUFF: 100; 25 POWDER RESPIRATORY (INHALATION) at 09:05

## 2023-01-01 RX ADMIN — VENLAFAXINE 75 MG: 75 TABLET ORAL at 20:16

## 2023-01-01 RX ADMIN — LOPERAMIDE HYDROCHLORIDE 2 MG: 2 CAPSULE ORAL at 20:40

## 2023-01-01 RX ADMIN — VENLAFAXINE 75 MG: 25 TABLET ORAL at 08:48

## 2023-01-01 RX ADMIN — UMECLIDINIUM 1 PUFF: 62.5 AEROSOL, POWDER ORAL at 08:38

## 2023-01-01 RX ADMIN — LEVOFLOXACIN 750 MG: 750 TABLET, FILM COATED ORAL at 08:03

## 2023-01-01 RX ADMIN — MAGNESIUM SULFATE IN WATER 2 G: 40 INJECTION, SOLUTION INTRAVENOUS at 11:01

## 2023-01-01 RX ADMIN — FAMOTIDINE 20 MG: 10 INJECTION INTRAVENOUS at 11:00

## 2023-01-01 RX ADMIN — FAMOTIDINE 20 MG: 10 INJECTION, SOLUTION INTRAVENOUS at 11:33

## 2023-01-01 RX ADMIN — VENLAFAXINE 75 MG: 75 TABLET ORAL at 09:30

## 2023-01-01 RX ADMIN — VENLAFAXINE 75 MG: 75 TABLET ORAL at 08:51

## 2023-01-01 RX ADMIN — Medication 1 TABLET: at 11:26

## 2023-01-01 RX ADMIN — METHADONE HYDROCHLORIDE 10 MG: 10 TABLET ORAL at 09:21

## 2023-01-01 RX ADMIN — UMECLIDINIUM 1 PUFF: 62.5 AEROSOL, POWDER ORAL at 08:55

## 2023-01-01 RX ADMIN — LEVETIRACETAM 750 MG: 750 TABLET, FILM COATED ORAL at 21:53

## 2023-01-01 RX ADMIN — PANTOPRAZOLE SODIUM 40 MG: 40 TABLET, DELAYED RELEASE ORAL at 10:52

## 2023-01-01 RX ADMIN — LEVETIRACETAM 750 MG: 500 TABLET, FILM COATED ORAL at 09:06

## 2023-01-01 RX ADMIN — POTASSIUM & SODIUM PHOSPHATES POWDER PACK 280-160-250 MG 1 PACKET: 280-160-250 PACK at 10:07

## 2023-01-01 RX ADMIN — VENLAFAXINE 75 MG: 75 TABLET ORAL at 20:02

## 2023-01-01 RX ADMIN — ACETAMINOPHEN 650 MG: 325 TABLET ORAL at 11:08

## 2023-01-01 RX ADMIN — POTASSIUM CHLORIDE 40 MEQ: 1500 TABLET, EXTENDED RELEASE ORAL at 10:55

## 2023-01-01 RX ADMIN — UMECLIDINIUM 1 PUFF: 62.5 AEROSOL, POWDER ORAL at 15:12

## 2023-01-01 RX ADMIN — METHADONE HYDROCHLORIDE 10 MG: 10 TABLET ORAL at 13:32

## 2023-01-01 RX ADMIN — METOPROLOL SUCCINATE 25 MG: 25 TABLET, EXTENDED RELEASE ORAL at 09:08

## 2023-01-01 RX ADMIN — VENLAFAXINE 75 MG: 75 TABLET ORAL at 19:37

## 2023-01-01 RX ADMIN — METHADONE HYDROCHLORIDE 10 MG: 10 TABLET ORAL at 16:14

## 2023-01-01 RX ADMIN — LEVOFLOXACIN 250 MG: 250 TABLET, FILM COATED ORAL at 09:00

## 2023-01-01 RX ADMIN — PIPERACILLIN AND TAZOBACTAM 4.5 G: 4; .5 INJECTION, POWDER, FOR SOLUTION INTRAVENOUS at 00:52

## 2023-01-01 RX ADMIN — DIPHENHYDRAMINE HYDROCHLORIDE 50 MG: 25 CAPSULE ORAL at 11:22

## 2023-01-01 RX ADMIN — METHOCARBAMOL 500 MG: 500 TABLET ORAL at 11:33

## 2023-01-01 RX ADMIN — OXYCODONE HYDROCHLORIDE 10 MG: 5 TABLET ORAL at 11:37

## 2023-01-01 RX ADMIN — VENLAFAXINE 75 MG: 75 TABLET ORAL at 09:00

## 2023-01-01 RX ADMIN — ACETAMINOPHEN 650 MG: 325 TABLET, FILM COATED ORAL at 17:14

## 2023-01-01 RX ADMIN — METHADONE HYDROCHLORIDE 10 MG: 10 TABLET ORAL at 13:58

## 2023-01-01 RX ADMIN — ENOXAPARIN SODIUM 40 MG: 40 INJECTION SUBCUTANEOUS at 19:58

## 2023-01-01 RX ADMIN — AMLODIPINE BESYLATE 5 MG: 5 TABLET ORAL at 10:54

## 2023-01-01 RX ADMIN — OXYCODONE HYDROCHLORIDE 10 MG: 10 TABLET ORAL at 13:10

## 2023-01-01 RX ADMIN — SULFAMETHOXAZOLE AND TRIMETHOPRIM 1 TABLET: 800; 160 TABLET ORAL at 20:06

## 2023-01-01 RX ADMIN — ACETAMINOPHEN 650 MG: 325 TABLET, FILM COATED ORAL at 15:31

## 2023-01-01 RX ADMIN — OXYCODONE HYDROCHLORIDE 10 MG: 5 TABLET ORAL at 05:30

## 2023-01-01 RX ADMIN — POTASSIUM CHLORIDE 20 MEQ: 750 TABLET, EXTENDED RELEASE ORAL at 12:09

## 2023-01-01 RX ADMIN — VENLAFAXINE 75 MG: 25 TABLET ORAL at 08:55

## 2023-01-01 RX ADMIN — Medication 1 TABLET: at 09:30

## 2023-01-01 RX ADMIN — AMLODIPINE BESYLATE 10 MG: 10 TABLET ORAL at 08:39

## 2023-01-01 RX ADMIN — ACYCLOVIR 400 MG: 400 TABLET ORAL at 09:15

## 2023-01-01 RX ADMIN — Medication 1 DROP: at 17:30

## 2023-01-01 RX ADMIN — OXYCODONE HYDROCHLORIDE 10 MG: 10 TABLET ORAL at 15:01

## 2023-01-01 RX ADMIN — FAMOTIDINE 20 MG: 10 INJECTION INTRAVENOUS at 13:26

## 2023-01-01 RX ADMIN — ACYCLOVIR 400 MG: 400 TABLET ORAL at 20:35

## 2023-01-01 RX ADMIN — VENLAFAXINE 75 MG: 75 TABLET ORAL at 08:23

## 2023-01-01 RX ADMIN — AMLODIPINE BESYLATE 10 MG: 10 TABLET ORAL at 08:54

## 2023-01-01 RX ADMIN — ACETAMINOPHEN 650 MG: 325 TABLET ORAL at 09:04

## 2023-01-01 RX ADMIN — LEVOTHYROXINE SODIUM 112 MCG: 0.11 TABLET ORAL at 09:02

## 2023-01-01 RX ADMIN — LEVETIRACETAM 750 MG: 750 TABLET, FILM COATED ORAL at 08:48

## 2023-01-01 RX ADMIN — LEVOTHYROXINE SODIUM 112 MCG: 0.11 TABLET ORAL at 09:00

## 2023-01-01 ASSESSMENT — ACTIVITIES OF DAILY LIVING (ADL)
ADLS_ACUITY_SCORE: 22
ADLS_ACUITY_SCORE: 24
ADLS_ACUITY_SCORE: 22
ADLS_ACUITY_SCORE: 38
ADLS_ACUITY_SCORE: 38
ADLS_ACUITY_SCORE: 40
ADLS_ACUITY_SCORE: 22
ADLS_ACUITY_SCORE: 35
ADLS_ACUITY_SCORE: 40
ADLS_ACUITY_SCORE: 22
ADLS_ACUITY_SCORE: 40
ADLS_ACUITY_SCORE: 40
ADLS_ACUITY_SCORE: 23
ADLS_ACUITY_SCORE: 40
ADLS_ACUITY_SCORE: 22
ADLS_ACUITY_SCORE: 20
ADLS_ACUITY_SCORE: 40
ADLS_ACUITY_SCORE: 35
ADLS_ACUITY_SCORE: 22
ADLS_ACUITY_SCORE: 40
ADLS_ACUITY_SCORE: 20
ADLS_ACUITY_SCORE: 33
ADLS_ACUITY_SCORE: 22
ADLS_ACUITY_SCORE: 22
WEAR_GLASSES_OR_BLIND: NO
ADLS_ACUITY_SCORE: 40
DRESSING/BATHING_DIFFICULTY: NO
ADLS_ACUITY_SCORE: 23
ADLS_ACUITY_SCORE: 35
ADLS_ACUITY_SCORE: 22
ADLS_ACUITY_SCORE: 20
ADLS_ACUITY_SCORE: 35
ADLS_ACUITY_SCORE: 40
ADLS_ACUITY_SCORE: 20
ADLS_ACUITY_SCORE: 22
ADLS_ACUITY_SCORE: 22
ADLS_ACUITY_SCORE: 35
ADLS_ACUITY_SCORE: 24
ADLS_ACUITY_SCORE: 22
ADLS_ACUITY_SCORE: 20
ADLS_ACUITY_SCORE: 33
ADLS_ACUITY_SCORE: 22
ADLS_ACUITY_SCORE: 40
ADLS_ACUITY_SCORE: 35
ADLS_ACUITY_SCORE: 22
ADLS_ACUITY_SCORE: 23
ADLS_ACUITY_SCORE: 33
ADLS_ACUITY_SCORE: 22
ADLS_ACUITY_SCORE: 28
ADLS_ACUITY_SCORE: 35
ADLS_ACUITY_SCORE: 23
ADLS_ACUITY_SCORE: 20
ADLS_ACUITY_SCORE: 22
ADLS_ACUITY_SCORE: 24
ADLS_ACUITY_SCORE: 22
ADLS_ACUITY_SCORE: 22
DIFFICULTY_EATING/SWALLOWING: NO
ADLS_ACUITY_SCORE: 23
ADLS_ACUITY_SCORE: 35
ADLS_ACUITY_SCORE: 33
ADLS_ACUITY_SCORE: 35
ADLS_ACUITY_SCORE: 35
ADLS_ACUITY_SCORE: 22
ADLS_ACUITY_SCORE: 38
ADLS_ACUITY_SCORE: 38
ADLS_ACUITY_SCORE: 24
ADLS_ACUITY_SCORE: 40
ADLS_ACUITY_SCORE: 23
ADLS_ACUITY_SCORE: 28
DIFFICULTY_COMMUNICATING: NO
ADLS_ACUITY_SCORE: 18
ADLS_ACUITY_SCORE: 22
ADLS_ACUITY_SCORE: 33
WALKING_OR_CLIMBING_STAIRS_DIFFICULTY: NO
ADLS_ACUITY_SCORE: 40
ADLS_ACUITY_SCORE: 35
ADLS_ACUITY_SCORE: 35
ADLS_ACUITY_SCORE: 38
ADLS_ACUITY_SCORE: 33
ADLS_ACUITY_SCORE: 22
DRESSING/BATHING_DIFFICULTY: YES
ADLS_ACUITY_SCORE: 23
ADLS_ACUITY_SCORE: 22
ADLS_ACUITY_SCORE: 22
ADLS_ACUITY_SCORE: 40
TOILETING_ISSUES: NO
ADLS_ACUITY_SCORE: 40
ADLS_ACUITY_SCORE: 33
ADLS_ACUITY_SCORE: 35
ADLS_ACUITY_SCORE: 22
FALL_HISTORY_WITHIN_LAST_SIX_MONTHS: NO
ADLS_ACUITY_SCORE: 26
ADLS_ACUITY_SCORE: 22
ADLS_ACUITY_SCORE: 22
ADLS_ACUITY_SCORE: 33
ADLS_ACUITY_SCORE: 22
ADLS_ACUITY_SCORE: 26
ADLS_ACUITY_SCORE: 24
ADLS_ACUITY_SCORE: 22
ADLS_ACUITY_SCORE: 33
ADLS_ACUITY_SCORE: 24
ADLS_ACUITY_SCORE: 35
ADLS_ACUITY_SCORE: 23
ADLS_ACUITY_SCORE: 20
ADLS_ACUITY_SCORE: 22
ADLS_ACUITY_SCORE: 20
ADLS_ACUITY_SCORE: 24
ADLS_ACUITY_SCORE: 40
ADLS_ACUITY_SCORE: 22
ADLS_ACUITY_SCORE: 24
ADLS_ACUITY_SCORE: 28
ADLS_ACUITY_SCORE: 22
ADLS_ACUITY_SCORE: 33
ADLS_ACUITY_SCORE: 26
ADLS_ACUITY_SCORE: 40
ADLS_ACUITY_SCORE: 33
ADLS_ACUITY_SCORE: 23
ADLS_ACUITY_SCORE: 26
ADLS_ACUITY_SCORE: 38
ADLS_ACUITY_SCORE: 22
ADLS_ACUITY_SCORE: 20
ADLS_ACUITY_SCORE: 35
ADLS_ACUITY_SCORE: 24
ADLS_ACUITY_SCORE: 35
ADLS_ACUITY_SCORE: 22
ADLS_ACUITY_SCORE: 20
ADLS_ACUITY_SCORE: 35
ADLS_ACUITY_SCORE: 23
CONCENTRATING,_REMEMBERING_OR_MAKING_DECISIONS_DIFFICULTY: NO
ADLS_ACUITY_SCORE: 35
ADLS_ACUITY_SCORE: 22
ADLS_ACUITY_SCORE: 26
ADLS_ACUITY_SCORE: 22
ADLS_ACUITY_SCORE: 35
DRESSING/BATHING: BATHING DIFFICULTY, REQUIRES EQUIPMENT
ADLS_ACUITY_SCORE: 24
ADLS_ACUITY_SCORE: 40
ADLS_ACUITY_SCORE: 20
ADLS_ACUITY_SCORE: 40
ADLS_ACUITY_SCORE: 40
ADLS_ACUITY_SCORE: 35
ADLS_ACUITY_SCORE: 22
ADLS_ACUITY_SCORE: 40
ADLS_ACUITY_SCORE: 22
ADLS_ACUITY_SCORE: 23
ADLS_ACUITY_SCORE: 40
ADLS_ACUITY_SCORE: 38
ADLS_ACUITY_SCORE: 35
ADLS_ACUITY_SCORE: 38
ADLS_ACUITY_SCORE: 23
ADLS_ACUITY_SCORE: 23
ADLS_ACUITY_SCORE: 35
ADLS_ACUITY_SCORE: 24
ADLS_ACUITY_SCORE: 22
ADLS_ACUITY_SCORE: 22
ADLS_ACUITY_SCORE: 35
ADLS_ACUITY_SCORE: 22
ADLS_ACUITY_SCORE: 38
DRESSING/BATHING_MANAGEMENT: SHOWER CHAIR
ADLS_ACUITY_SCORE: 22
ADLS_ACUITY_SCORE: 22
ADLS_ACUITY_SCORE: 38
ADLS_ACUITY_SCORE: 35
ADLS_ACUITY_SCORE: 22
ADLS_ACUITY_SCORE: 26
ADLS_ACUITY_SCORE: 23
ADLS_ACUITY_SCORE: 38
ADLS_ACUITY_SCORE: 35
ADLS_ACUITY_SCORE: 33
ADLS_ACUITY_SCORE: 38
ADLS_ACUITY_SCORE: 35
ADLS_ACUITY_SCORE: 24
ADLS_ACUITY_SCORE: 22
ADLS_ACUITY_SCORE: 35
ADLS_ACUITY_SCORE: 40
ADLS_ACUITY_SCORE: 23
ADLS_ACUITY_SCORE: 40
ADLS_ACUITY_SCORE: 20
ADLS_ACUITY_SCORE: 26
ADLS_ACUITY_SCORE: 38
DEPENDENT_IADLS:: TRANSPORTATION
ADLS_ACUITY_SCORE: 22
ADLS_ACUITY_SCORE: 40
ADLS_ACUITY_SCORE: 33
ADLS_ACUITY_SCORE: 38
ADLS_ACUITY_SCORE: 26
ADLS_ACUITY_SCORE: 33
ADLS_ACUITY_SCORE: 22
ADLS_ACUITY_SCORE: 18
ADLS_ACUITY_SCORE: 26
ADLS_ACUITY_SCORE: 23
ADLS_ACUITY_SCORE: 22
ADLS_ACUITY_SCORE: 38
ADLS_ACUITY_SCORE: 20
ADLS_ACUITY_SCORE: 35
ADLS_ACUITY_SCORE: 22
ADLS_ACUITY_SCORE: 23
ADLS_ACUITY_SCORE: 40
ADLS_ACUITY_SCORE: 22
ADLS_ACUITY_SCORE: 35
ADLS_ACUITY_SCORE: 35
ADLS_ACUITY_SCORE: 26
ADLS_ACUITY_SCORE: 22
ADLS_ACUITY_SCORE: 35
ADLS_ACUITY_SCORE: 20
ADLS_ACUITY_SCORE: 22
ADLS_ACUITY_SCORE: 23
ADLS_ACUITY_SCORE: 20
ADLS_ACUITY_SCORE: 22
ADLS_ACUITY_SCORE: 35
ADLS_ACUITY_SCORE: 38
ADLS_ACUITY_SCORE: 38
ADLS_ACUITY_SCORE: 22
ADLS_ACUITY_SCORE: 22
PREVIOUS_RESPONSIBILITIES: MEDICATION MANAGEMENT
ADLS_ACUITY_SCORE: 28
ADLS_ACUITY_SCORE: 22
ADLS_ACUITY_SCORE: 38
ADLS_ACUITY_SCORE: 22
ADLS_ACUITY_SCORE: 24
ADLS_ACUITY_SCORE: 22
ADLS_ACUITY_SCORE: 20
ADLS_ACUITY_SCORE: 22
ADLS_ACUITY_SCORE: 22
ADLS_ACUITY_SCORE: 23
ADLS_ACUITY_SCORE: 18
ADLS_ACUITY_SCORE: 38
ADLS_ACUITY_SCORE: 26
ADLS_ACUITY_SCORE: 35
ADLS_ACUITY_SCORE: 38
ADLS_ACUITY_SCORE: 22
ADLS_ACUITY_SCORE: 40
ADLS_ACUITY_SCORE: 35
ADLS_ACUITY_SCORE: 24
ADLS_ACUITY_SCORE: 35
ADLS_ACUITY_SCORE: 22
ADLS_ACUITY_SCORE: 28
ADLS_ACUITY_SCORE: 35
ADLS_ACUITY_SCORE: 22
ADLS_ACUITY_SCORE: 20
ADLS_ACUITY_SCORE: 23
ADLS_ACUITY_SCORE: 40
HEARING_DIFFICULTY_OR_DEAF: NO
ADLS_ACUITY_SCORE: 22
ADLS_ACUITY_SCORE: 23
ADLS_ACUITY_SCORE: 40
ADLS_ACUITY_SCORE: 22
ADLS_ACUITY_SCORE: 35
ADLS_ACUITY_SCORE: 22
ADLS_ACUITY_SCORE: 22
ADLS_ACUITY_SCORE: 40
CHANGE_IN_FUNCTIONAL_STATUS_SINCE_ONSET_OF_CURRENT_ILLNESS/INJURY: YES
ADLS_ACUITY_SCORE: 24
ADLS_ACUITY_SCORE: 35
ADLS_ACUITY_SCORE: 24
ADLS_ACUITY_SCORE: 24
ADLS_ACUITY_SCORE: 33
ADLS_ACUITY_SCORE: 38
ADLS_ACUITY_SCORE: 23
DEPENDENT_IADLS:: TRANSPORTATION;LAUNDRY
DOING_ERRANDS_INDEPENDENTLY_DIFFICULTY: NO
DEPENDENT_IADLS:: TRANSPORTATION
ADLS_ACUITY_SCORE: 23
ADLS_ACUITY_SCORE: 35

## 2023-01-01 ASSESSMENT — PAIN SCALES - GENERAL
PAINLEVEL: NO PAIN (0)
PAINLEVEL: MODERATE PAIN (4)
PAINLEVEL: NO PAIN (0)
PAINLEVEL: MODERATE PAIN (5)
PAINLEVEL: MODERATE PAIN (5)
PAINLEVEL: NO PAIN (0)
PAINLEVEL: SEVERE PAIN (6)
PAINLEVEL: MODERATE PAIN (4)
PAINLEVEL: MILD PAIN (2)
PAINLEVEL: NO PAIN (0)
PAINLEVEL: MODERATE PAIN (5)
PAINLEVEL: NO PAIN (0)

## 2023-01-09 ENCOUNTER — ONCOLOGY VISIT (OUTPATIENT)
Dept: TRANSPLANT | Facility: CLINIC | Age: 73
End: 2023-01-09
Attending: INTERNAL MEDICINE
Payer: MEDICARE

## 2023-01-09 ENCOUNTER — APPOINTMENT (OUTPATIENT)
Dept: LAB | Facility: CLINIC | Age: 73
End: 2023-01-09
Attending: INTERNAL MEDICINE
Payer: MEDICARE

## 2023-01-09 VITALS
WEIGHT: 117.9 LBS | OXYGEN SATURATION: 100 % | HEART RATE: 95 BPM | TEMPERATURE: 99.1 F | SYSTOLIC BLOOD PRESSURE: 98 MMHG | DIASTOLIC BLOOD PRESSURE: 60 MMHG | RESPIRATION RATE: 16 BRPM | BODY MASS INDEX: 19.62 KG/M2

## 2023-01-09 DIAGNOSIS — C90.00 MULTIPLE MYELOMA NOT HAVING ACHIEVED REMISSION (H): ICD-10-CM

## 2023-01-09 DIAGNOSIS — Z51.11 ADMISSION FOR CHEMOTHERAPY: ICD-10-CM

## 2023-01-09 LAB
ALBUMIN SERPL BCG-MCNC: 4.1 G/DL (ref 3.5–5.2)
ALP SERPL-CCNC: 270 U/L (ref 35–104)
ALT SERPL W P-5'-P-CCNC: 51 U/L (ref 10–35)
ANION GAP SERPL CALCULATED.3IONS-SCNC: 12 MMOL/L (ref 7–15)
AST SERPL W P-5'-P-CCNC: 26 U/L (ref 10–35)
BILIRUB SERPL-MCNC: 0.3 MG/DL
BUN SERPL-MCNC: 16.4 MG/DL (ref 8–23)
CALCIUM SERPL-MCNC: 9.4 MG/DL (ref 8.8–10.2)
CHLORIDE SERPL-SCNC: 104 MMOL/L (ref 98–107)
CREAT SERPL-MCNC: 0.92 MG/DL (ref 0.51–0.95)
DEPRECATED HCO3 PLAS-SCNC: 23 MMOL/L (ref 22–29)
ERYTHROCYTE [DISTWIDTH] IN BLOOD BY AUTOMATED COUNT: 14 % (ref 10–15)
GFR SERPL CREATININE-BSD FRML MDRD: 67 ML/MIN/1.73M2
GLUCOSE SERPL-MCNC: 91 MG/DL (ref 70–99)
HCT VFR BLD AUTO: 35.9 % (ref 35–47)
HGB BLD-MCNC: 11.2 G/DL (ref 11.7–15.7)
IGG SERPL-MCNC: 914 MG/DL (ref 610–1616)
KAPPA LC FREE SER-MCNC: 1.79 MG/DL (ref 0.33–1.94)
KAPPA LC FREE/LAMBDA FREE SER NEPH: 0.07 {RATIO} (ref 0.26–1.65)
LAMBDA LC FREE SERPL-MCNC: 27.1 MG/DL (ref 0.57–2.63)
MCH RBC QN AUTO: 31.2 PG (ref 26.5–33)
MCHC RBC AUTO-ENTMCNC: 31.2 G/DL (ref 31.5–36.5)
MCV RBC AUTO: 100 FL (ref 78–100)
PLATELET # BLD AUTO: 191 10E3/UL (ref 150–450)
POTASSIUM SERPL-SCNC: 3.9 MMOL/L (ref 3.4–5.3)
PROT SERPL-MCNC: 6.7 G/DL (ref 6.4–8.3)
RBC # BLD AUTO: 3.59 10E6/UL (ref 3.8–5.2)
SODIUM SERPL-SCNC: 139 MMOL/L (ref 136–145)
WBC # BLD AUTO: 5.4 10E3/UL (ref 4–11)

## 2023-01-09 PROCEDURE — 83521 IG LIGHT CHAINS FREE EACH: CPT | Performed by: INTERNAL MEDICINE

## 2023-01-09 PROCEDURE — 80053 COMPREHEN METABOLIC PANEL: CPT | Performed by: INTERNAL MEDICINE

## 2023-01-09 PROCEDURE — 82784 ASSAY IGA/IGD/IGG/IGM EACH: CPT | Performed by: INTERNAL MEDICINE

## 2023-01-09 PROCEDURE — G0463 HOSPITAL OUTPT CLINIC VISIT: HCPCS | Performed by: INTERNAL MEDICINE

## 2023-01-09 PROCEDURE — 99215 OFFICE O/P EST HI 40 MIN: CPT | Performed by: INTERNAL MEDICINE

## 2023-01-09 PROCEDURE — 36591 DRAW BLOOD OFF VENOUS DEVICE: CPT | Performed by: INTERNAL MEDICINE

## 2023-01-09 PROCEDURE — 250N000011 HC RX IP 250 OP 636: Performed by: INTERNAL MEDICINE

## 2023-01-09 PROCEDURE — 85027 COMPLETE CBC AUTOMATED: CPT | Performed by: INTERNAL MEDICINE

## 2023-01-09 RX ORDER — ONDANSETRON 8 MG/1
8 TABLET, ORALLY DISINTEGRATING ORAL EVERY 8 HOURS PRN
Qty: 30 TABLET | Refills: 1 | Status: SHIPPED | OUTPATIENT
Start: 2023-01-09 | End: 2023-01-01

## 2023-01-09 RX ORDER — HEPARIN SODIUM (PORCINE) LOCK FLUSH IV SOLN 100 UNIT/ML 100 UNIT/ML
500 SOLUTION INTRAVENOUS ONCE
Status: COMPLETED | OUTPATIENT
Start: 2023-01-09 | End: 2023-01-09

## 2023-01-09 RX ORDER — PROCHLORPERAZINE MALEATE 5 MG
5 TABLET ORAL EVERY 6 HOURS PRN
Qty: 30 TABLET | Refills: 1 | Status: SHIPPED | OUTPATIENT
Start: 2023-01-09 | End: 2023-01-01

## 2023-01-09 RX ADMIN — Medication 500 UNITS: at 09:12

## 2023-01-09 ASSESSMENT — PAIN SCALES - GENERAL: PAINLEVEL: NO PAIN (0)

## 2023-01-09 NOTE — PROGRESS NOTES
BMT Progress Note    BMT Physician: Dr. Guillory/Rashad Chen     Oncology History   Multiple myeloma not having achieved remission (H)   11/7/2018 -  Cancer Staged    Staging form: Plasma Cell Myeloma and Disorders, AJCC 8th Edition  - Clinical stage from 11/7/2018: Albumin (g/dL): 3.4, ISS: Stage II, High-risk cytogenetics: Absent, LDH: Unknown     11/7/2018 Initial Diagnosis    Multiple myeloma not having achieved remission (H)     1/23/2019 - 11/14/2019 Chemotherapy    KRD x 6 cycles     11/4/2019 - 2/14/2021 Chemotherapy    Rev/Dex(20)      4/3/2020 - 7/17/2020 Chemotherapy    Velcade maintenance - dose reductions for orthostatic hypotension     7/17/2020 - 12/15/2020 Chemotherapy    Revlimid maintenance     11/23/2020 Progression    Bone marrow 35% plasma cells, PET CT demonstrating new lytic lesions     12/15/2020 - 6/11/2021 Chemotherapy    Elsy, Kd x 6 cycles     7/7/2021 -  Cancer Staged    PET/CT - CR     7/21/2021 -  Chemotherapy    Subcutaneous Daratumumab, IVIG, denosumab monthly     11/22/2021 -  Cancer Staged    Bone marrow aspirate - MRD 0.0022% positive     2/1/2022 - 2/5/2022 Chemotherapy    IP BMT Chemotherapy For Mobilization - High Dose Cyclophosphamide  Plan Provider: Julio C Guillory MD  Treatment goal: Other  Line of treatment: [No plan line of treatment]     5/25/2022 -  Chemotherapy    IP - OP BMT 2016-35 Auto Multiple Myeloma - Melphalan (CrCL < 30 mL/min, >/= 2 comorbidities, OR age > 75yrs) - Adult (Version: 7/13/21)  Plan Provider: Julio C Guillory MD  Treatment goal: Other  Line of treatment: [No plan line of treatment]     9/29/2022 -  Supportive Treatment    Oral ONC - ixazomib maintenance post-transplant  Plan Provider: Julio C Guillory MD  Treatment goal: Curative  Line of treatment: [No plan line of treatment]         ID: Libby Grimaldo is a 73 yo woman D+160 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 Now on ninlaro maintenance.     INTERIM HISTORY:   Patrick returns to clinic today  feeling improved compared to the last time I saw her.  Before, she was quite fatigued with loss of appetite, malaise, and some weight loss.  She also had significant worsening cough with some suggestion of pneumonia.  We had held her Ninlaro in addition to giving her a course of antibiotics and over the next few weeks she improved steadily.  She comes to clinic today feeling quite a bit better than a month ago.  She has no energy, she is breathing easily, she does continue to have a cough that is more dry.  She has no new pain but she does continue to have pain in both arms and shoulders which she says have been present for a year or longer and have continued to bother her from time to time.  She is otherwise getting along through the day, living with her , and has no specific problems today.    ROS: 8 point ROS neg other than the symptoms noted above in the HPI.     PHYSICAL EXAM:   Vitals:  Blood pressure 98/60, pulse 95, temperature 99.1  F (37.3  C), temperature source Oral, resp. rate 16, weight 53.5 kg (117 lb 14.4 oz), SpO2 100 %.      Wt Readings from Last 4 Encounters:   01/09/23 53.5 kg (117 lb 14.4 oz)   12/07/22 53.1 kg (117 lb)   11/03/22 52.8 kg (116 lb 6.4 oz)   10/03/22 54.1 kg (119 lb 3.2 oz)       General Appearance: she appears ill  HEENT: PERRL; OP somewhat dry, no ulcerations. Dentures.  CV: mildly tachy (~100 at rest in clinic)  Lungs: CTAB  EXT: No LE edema.   SKIN: no lesions or rash  NEURO: A&O x3   PSYCH: Appropriate affect   VASCULAR ACCESS: R port a cath not accessed    Labs:  Lab Results   Component Value Date    WBC 5.4 01/09/2023    ANEU 3.9 06/16/2022    HGB 11.2 (L) 01/09/2023    HCT 35.9 01/09/2023     01/09/2023     01/09/2023    POTASSIUM 3.9 01/09/2023    CHLORIDE 104 01/09/2023    CO2 23 01/09/2023    GLC 91 01/09/2023    BUN 16.4 01/09/2023    CR 0.92 01/09/2023    MAG 1.6 (L) 12/07/2022    INR 1.40 (H) 05/30/2022    BILITOTAL 0.3 01/09/2023    AST 26  01/09/2023    ALT 51 (H) 01/09/2023    ALKPHOS 270 (H) 01/09/2023    PROTTOTAL 6.7 01/09/2023    ALBUMIN 4.1 01/09/2023        ASSESSMENT AND PLAN:   Libby Grimaldo is a 71 year old female with 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      Myeloma/BMT/IEC PROTOCOL for 2016-35  - Chemo protocol: HD Melphalan 140mg/m2  Day 0: Cell dose: 3.94x10^6 (s/p cytoxan chemo priming 2/1/2022 and subsequent stem cell collection).   - Restaging plan: per protocol.  - Maintenance Ninlaro started early Oct 2022; Day 28 (11/4)  - Dose reduction due to GI toxicity, fatigue, malaise  - start ninlaro 2.3 mg weekly 3/4 weeks.       HEME/COAG  - anemia, thrombocytopenia due to chemo/BMT. Counts stable today; monitor on Ninlaro.  - Relevant thrombosis or bleeding history:   2/15/22: new non-occlusive LUE DVT. No longer on xarelto      ID  - Prophylaxis plan: ACV, Bactrim    - Covid vacc #1 (post BMT) and flu shot 10/3/22     CARDIOVASCULAR  - Risk of cardiomyopathy:  Baseline EF 56%  - known hyperlipidemia- per notes previously on Crestor but not on med list - follow-up with PCP if needed    History of Afib - now in NSR but tachy. Continue metoprolol XL 25 mg/d      RESPIRATORY  - hx of COPD, continue breo-ellipa inhaler      GI/NUTRITION  - CINV: continues on Zyprexa bid. Rec Zofran tid day of and day after Ninlaro with next doses. Compazine prn.     - Ulcer prophylaxis: protonix 40mg daily.        RENAL/ELECTROLYTES/:  - hypokalemia - she will increase her K rich foods. Add metamucil.     PSYCH  - continue effexor 75mg tid     SYMPTOM MANAGEMENT.  - # Pain Assessment:  - Libby is experiencing pain due to chronic pain - Continue suboxone and prn oxycodone.      Respiratory infection  - much improved after abx    Summary: Generally she seems much improved now off of the Ninlaro for a month.  There are no signs of disease progression.  We talked about the options and I recommended a dose reduction of the Ninlaro to 2.3 mg.  We will revisit how she is  tolerating the drug in 4 weeks    Plan  - dose reduction of Ninlaro to 2.3 mg weekly 3/4  - RTC 4 weeks    I spent 40 minutes in the care of this patient today, which included time necessary for preparation for the visit, obtaining history, ordering medications/tests/procedures as medically indicated, review of pertinent medical literature, counseling of the patient, communication of recommendations to the care team, and documentation time.    SOTO BOWSER MD  January 9, 2023

## 2023-01-09 NOTE — LETTER
1/9/2023         RE: Libby Grimaldo  [address] [facility] [address]        Dear Colleague,    Thank you for referring your patient, Libby Grimaldo, to the [facility] BLOOD AND MARROW TRANSPLANT PROGRAM [place]. Please see a copy of my visit note below.    BMT Progress Note    BMT Physician: Dr. Guillory/Rashad Chen     Oncology History   Multiple myeloma not having achieved remission (H)   11/7/2018 -  Cancer Staged    Staging form: Plasma Cell Myeloma and Disorders, AJCC 8th Edition  - Clinical stage from 11/7/2018: Albumin (g/dL): 3.4, ISS: Stage II, High-risk cytogenetics: Absent, LDH: Unknown     11/7/2018 Initial Diagnosis    Multiple myeloma not having achieved remission (H)     1/23/2019 - 11/14/2019 Chemotherapy    KRD x 6 cycles     11/4/2019 - 2/14/2021 Chemotherapy    Rev/Dex(20)      4/3/2020 - 7/17/2020 Chemotherapy    Velcade maintenance - dose reductions for orthostatic hypotension     7/17/2020 - 12/15/2020 Chemotherapy    Revlimid maintenance     11/23/2020 Progression    Bone marrow 35% plasma cells, PET CT demonstrating new lytic lesions     12/15/2020 - 6/11/2021 Chemotherapy    Elsy, Kd x 6 cycles     7/7/2021 -  Cancer Staged    PET/CT - CR     7/21/2021 -  Chemotherapy    Subcutaneous Daratumumab, IVIG, denosumab monthly     11/22/2021 -  Cancer Staged    Bone marrow aspirate - MRD 0.0022% positive     2/1/2022 - 2/5/2022 Chemotherapy    IP BMT Chemotherapy For Mobilization - High Dose Cyclophosphamide  Plan Provider: Julio C Guillory MD  Treatment goal: Other  Line of treatment: [No plan line of treatment]     5/25/2022 -  Chemotherapy    IP - OP BMT 2016-35 Auto Multiple Myeloma - Melphalan (CrCL < 30 mL/min, >/= 2 comorbidities, OR age > 75yrs) - Adult (Version: 7/13/21)  Plan Provider: Julio C Guillory MD  Treatment goal: Other  Line of treatment: [No plan line of treatment]     9/29/2022 -  Supportive Treatment    Oral ONC - ixazomib maintenance post-transplant  Plan  Provider: Julio C Guillory MD  Treatment goal: Curative  Line of treatment: [No plan line of treatment]         ID: Libby Grimaldo is a 71 yo woman D+160 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 Now on ninlaro maintenance.     INTERIM HISTORY:   Patrick returns to clinic today feeling improved compared to the last time I saw her.  Before, she was quite fatigued with loss of appetite, malaise, and some weight loss.  She also had significant worsening cough with some suggestion of pneumonia.  We had held her Ninlaro in addition to giving her a course of antibiotics and over the next few weeks she improved steadily.  She comes to clinic today feeling quite a bit better than a month ago.  She has no energy, she is breathing easily, she does continue to have a cough that is more dry.  She has no new pain but she does continue to have pain in both arms and shoulders which she says have been present for a year or longer and have continued to bother her from time to time.  She is otherwise getting along through the day, living with her , and has no specific problems today.    ROS: 8 point ROS neg other than the symptoms noted above in the HPI.     PHYSICAL EXAM:   Vitals:  Blood pressure 98/60, pulse 95, temperature 99.1  F (37.3  C), temperature source Oral, resp. rate 16, weight 53.5 kg (117 lb 14.4 oz), SpO2 100 %.      Wt Readings from Last 4 Encounters:   01/09/23 53.5 kg (117 lb 14.4 oz)   12/07/22 53.1 kg (117 lb)   11/03/22 52.8 kg (116 lb 6.4 oz)   10/03/22 54.1 kg (119 lb 3.2 oz)       General Appearance: she appears ill  HEENT: PERRL; OP somewhat dry, no ulcerations. Dentures.  CV: mildly tachy (~100 at rest in clinic)  Lungs: CTAB  EXT: No LE edema.   SKIN: no lesions or rash  NEURO: A&O x3   PSYCH: Appropriate affect   VASCULAR ACCESS: R port a cath not accessed    Labs:  Lab Results   Component Value Date    WBC 5.4 01/09/2023    ANEU 3.9 06/16/2022    HGB 11.2 (L) 01/09/2023    HCT 35.9 01/09/2023      01/09/2023     01/09/2023    POTASSIUM 3.9 01/09/2023    CHLORIDE 104 01/09/2023    CO2 23 01/09/2023    GLC 91 01/09/2023    BUN 16.4 01/09/2023    CR 0.92 01/09/2023    MAG 1.6 (L) 12/07/2022    INR 1.40 (H) 05/30/2022    BILITOTAL 0.3 01/09/2023    AST 26 01/09/2023    ALT 51 (H) 01/09/2023    ALKPHOS 270 (H) 01/09/2023    PROTTOTAL 6.7 01/09/2023    ALBUMIN 4.1 01/09/2023        ASSESSMENT AND PLAN:   Libby Grimaldo is a 71 year old female with 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      Myeloma/BMT/IEC PROTOCOL for 2016-35  - Chemo protocol: HD Melphalan 140mg/m2  Day 0: Cell dose: 3.94x10^6 (s/p cytoxan chemo priming 2/1/2022 and subsequent stem cell collection).   - Restaging plan: per protocol.  - Maintenance Ninlaro started early Oct 2022; Day 28 (11/4)  - Dose reduction due to GI toxicity, fatigue, malaise  - start ninlaro 2.3 mg weekly 3/4 weeks.       HEME/COAG  - anemia, thrombocytopenia due to chemo/BMT. Counts stable today; monitor on Ninlaro.  - Relevant thrombosis or bleeding history:   2/15/22: new non-occlusive LUE DVT. No longer on xarelto      ID  - Prophylaxis plan: ACV, Bactrim    - Covid vacc #1 (post BMT) and flu shot 10/3/22     CARDIOVASCULAR  - Risk of cardiomyopathy:  Baseline EF 56%  - known hyperlipidemia- per notes previously on Crestor but not on med list - follow-up with PCP if needed    History of Afib - now in NSR but tachy. Continue metoprolol XL 25 mg/d      RESPIRATORY  - hx of COPD, continue breo-ellipa inhaler      GI/NUTRITION  - CINV: continues on Zyprexa bid. Rec Zofran tid day of and day after Ninlaro with next doses. Compazine prn.     - Ulcer prophylaxis: protonix 40mg daily.        RENAL/ELECTROLYTES/:  - hypokalemia - she will increase her K rich foods. Add metamucil.     PSYCH  - continue effexor 75mg tid     SYMPTOM MANAGEMENT.  - # Pain Assessment:  - Libby is experiencing pain due to chronic pain - Continue suboxone and prn oxycodone.      Respiratory infection  -  much improved after abx    Summary: Generally she seems much improved now off of the Ninlaro for a month.  There are no signs of disease progression.  We talked about the options and I recommended a dose reduction of the Ninlaro to 2.3 mg.  We will revisit how she is tolerating the drug in 4 weeks    Plan  - dose reduction of Ninlaro to 2.3 mg weekly 3/4  - RTC 4 weeks    I spent 40 minutes in the care of this patient today, which included time necessary for preparation for the visit, obtaining history, ordering medications/tests/procedures as medically indicated, review of pertinent medical literature, counseling of the patient, communication of recommendations to the care team, and documentation time.    SOTO BOWSER MD  January 9, 2023

## 2023-01-10 ENCOUNTER — TELEPHONE (OUTPATIENT)
Dept: ONCOLOGY | Facility: CLINIC | Age: 73
End: 2023-01-10

## 2023-01-10 DIAGNOSIS — C90.00 MULTIPLE MYELOMA NOT HAVING ACHIEVED REMISSION (H): Primary | ICD-10-CM

## 2023-01-10 NOTE — NURSING NOTE
"Oncology Rooming Note    January 10, 2023 3:01 PM   Libby Grimaldo is a 72 year old female who presents for:    Chief Complaint   Patient presents with     Port Draw     Labs drawn via port by RN in lab.  VS taken      Oncology Clinic Visit     Multiple Myeloma     Initial Vitals: BP 98/60   Pulse 95   Temp 99.1  F (37.3  C) (Oral)   Resp 16   Wt 53.5 kg (117 lb 14.4 oz)   SpO2 100%   BMI 19.62 kg/m   Estimated body mass index is 19.62 kg/m  as calculated from the following:    Height as of 9/1/22: 1.651 m (5' 5\").    Weight as of this encounter: 53.5 kg (117 lb 14.4 oz). Body surface area is 1.57 meters squared.  No Pain (0) Comment: Data Unavailable   No LMP recorded. Patient is postmenopausal.  Allergies reviewed: Yes  Medications reviewed: Yes    Medications: Medication refills not needed today.  Pharmacy name entered into [employer]:    [id number] - [place], [place] - [address] AT [facility] OF [address] & [facility] - [place], [place] - [address]    Clinical concerns: Pt presents today for f/u.       Beverley St LPN  01/09/2023            "

## 2023-01-10 NOTE — TELEPHONE ENCOUNTER
Jason/ Assistance Approved    Medication Ninlaro  Amount/ $ 11,000  [facility]  Phone # [phone]  Fax #   Effective Dates 03/02/22-03/01/23  Additional Information   Patient notified? Yes

## 2023-01-13 ENCOUNTER — TELEPHONE (OUTPATIENT)
Dept: TRANSPLANT | Facility: CLINIC | Age: 73
End: 2023-01-13

## 2023-01-13 NOTE — TELEPHONE ENCOUNTER
BMT CSW Telephone Encounter  Clinical Social Work  [facility]      Focus: Supportive Counseling /Resources    Data: Pt is a 72 year old female, currently day +231 s/p allo BMT.     Interventions: Clinical  (CSW) spoke w/ Pt via phone on 1/13/23 to assist with financial concerns.  Pt shared that she and her  have financial concerns related to a $700 heat bill. Pt indicated that they received a notice that their heat is at risk of being turned off without payment. Pt processed that she and her  are on a fixed income and are unable to afford this bill.  SW reviewed Pt's chart and discussed several grants with Pt. Pt believes she received the [employer] gris in the past. Pt has not yet applied for the [facility] Local Financial Assistance Fund ([place] fund currently open) and she states she feels comfortable calling and applying over the phone. SW applied for the [facility] Urgent Need Adult Fund via online portal on 1/13/23. CSW assessed coping, provide supportive counseling and assist with resources as needed. Pt expressed appreciation for the support. Pt declines additional needs at this time. SW encouraged Pt to contact CSW for support, questions and/or resources.    Assessment: Pt presented as pleasant, calm, and engaged.  Pt appears to be coping appropriately at this time. Pt continues to be supported by her .     Plan: CSW will continue to work with Pt and family to provide supportive counseling and assist with resources as needed. CSW will continue to collaborate with multidisciplinary team regarding Pt's plan of care.     ANDREW Oliva, [facility]  Adult Blood & Marrow Transplant   Phone: [phone]  Pager: [phone]

## 2023-01-18 ENCOUNTER — TELEPHONE (OUTPATIENT)
Dept: ONCOLOGY | Facility: CLINIC | Age: 73
End: 2023-01-18
Payer: MEDICARE

## 2023-01-18 NOTE — ORAL ONC MGMT
Oral Chemotherapy Monitoring Program     Placed call to patient in follow up of oral chemotherapy. Left message requesting call back. No drug names were mentioned. Will update when response received.     Janeth Vasquez, PharmD, BCPS  Hematology/Oncology Clinical Pharmacist  Oral Chemotherapy Monitoring Program  [facility]  [phone]

## 2023-01-20 NOTE — ORAL ONC MGMT
Oral Chemotherapy Monitoring Program    Subjective/Objective:  Libby Grimaldo is a 72 year old female contacted by phone for a follow-up visit for oral chemotherapy.  Libby resumed the Ninlaro at a dose of 2.3 mg weekly (most recent dose last Friday) and she reports things are going really well. She has been waiting for adverse effects to return but is happy to report they have not. Libby has not started any new medications or supplements recently and has no other concerns at this time. Reviewed upcoming lab and Dr Guillory appointment on 2/9/23.    ORAL CHEMOTHERAPY 10/13/2022 10/17/2022 10/28/2022 11/28/2022 12/8/2022 1/10/2023 1/18/2023   Assessment Type Left Voicemail Initial Follow up Refill Refill Chart Review Refill Left Voicemail   Diagnosis Code Multiple Myeloma Multiple Myeloma Multiple Myeloma Multiple Myeloma Multiple Myeloma Multiple Myeloma Multiple Myeloma   Providers Kahlil Guilolry   [facility]   Drug Name Ninlaro (ixazomib) Ninlaro (ixazomib) Ninlaro (ixazomib) Ninlaro (ixazomib) Ninlaro (ixazomib) Ninlaro (ixazomib) Ninlaro (ixazomib)   Dose 3 mg 3 mg 3 mg 3 mg 3 mg 2.3 mg 2.3 mg   Current Schedule Weekly Weekly Weekly Weekly Weekly Weekly Weekly   Cycle Details 3 weeks on, 1 week off 3 weeks on, 1 week off 3 weeks on, 1 week off 3 weeks on, 1 week off Drug on Hold 3 weeks on, 1 week off 3 weeks on, 1 week off   Start Date of Last Cycle - 10/7/2022 - - - - -   Planned next cycle start date - 11/4/2022 - - - - -   Doses missed in last 2 weeks - 0 - - - - -   Adherence Assessment - Adherent - - - - -   Adverse Effects - Nausea;Diarrhea - - - - -   Nausea - Grade 1 - - - - -   Pharmacist Intervention(nausea) - Yes - - - - -   Intervention(s) - Patient education;Rx medication recommendation - - - - -   Diarrhea - Grade 1 - - - - -   Pharmacist Intervention(diarrhea) - Yes - - - - -  "  Intervention(s) - Patient education;OTC recommendation - - - - -   Any new drug interactions? - No - - - - -   Is the dose as ordered appropriate for the patient? - - - - - Yes -   Has the patient missed any days of school, work, or other routine activity? - - - - - - -   Since the last time we talked, have you been hospitalized or used the emergency room? - - - - - - -       Last PHQ-2 Score on record:   PHQ-2 ( 1999 [employer]) 1/31/2022   Q1: Little interest or pleasure in doing things 0   Q2: Feeling down, depressed or hopeless 0   PHQ-2 Score 0       Vitals:  BP:   BP Readings from Last 1 Encounters:   01/09/23 98/60     Wt Readings from Last 1 Encounters:   01/09/23 53.5 kg (117 lb 14.4 oz)     Estimated body surface area is 1.57 meters squared as calculated from the following:    Height as of 9/1/22: 1.651 m (5' 5\").    Weight as of 1/9/23: 53.5 kg (117 lb 14.4 oz).    Labs:  _  Result Component Current Result Ref Range   Sodium 139 (1/9/2023) 136 - 145 mmol/L     _  Result Component Current Result Ref Range   Potassium 3.9 (1/9/2023) 3.4 - 5.3 mmol/L     _  Result Component Current Result Ref Range   Calcium 9.4 (1/9/2023) 8.8 - 10.2 mg/dL     No results found for Mag within last 30 days.     No results found for Phos within last 30 days.     _  Result Component Current Result Ref Range   Albumin 4.1 (1/9/2023) 3.5 - 5.2 g/dL     _  Result Component Current Result Ref Range   Urea Nitrogen 16.4 (1/9/2023) 8.0 - 23.0 mg/dL     _  Result Component Current Result Ref Range   Creatinine 0.92 (1/9/2023) 0.51 - 0.95 mg/dL     _  Result Component Current Result Ref Range   AST 26 (1/9/2023) 10 - 35 U/L     _  Result Component Current Result Ref Range   ALT 51 (H) (1/9/2023) 10 - 35 U/L     _  Result Component Current Result Ref Range   Bilirubin Total 0.3 (1/9/2023) <=1.2 mg/dL     _  Result Component Current Result Ref Range   WBC Count 5.4 (1/9/2023) 4.0 - 11.0 10e3/uL     _  Result Component Current Result Ref " Range   Hemoglobin 11.2 (L) (1/9/2023) 11.7 - 15.7 g/dL     _  Result Component Current Result Ref Range   Platelet Count 191 (1/9/2023) 150 - 450 10e3/uL     No results found for ANC within last 30 days.     No results found for ANC within last 30 days.          Assessment/Plan:  Continue Ninlaro as planned. Libby aware she can reach out if anything comes up prior to next appointment.    Follow-Up:  2/9 Labs and appt with Dr Guillory.    Refill Due:  Due 2/9/23    Sarahy Saunders, PharmD, BCPS  Oral Chemotherapy Monitoring Program  [facility]  [phone]

## 2023-02-09 NOTE — NURSING NOTE
Is the patient currently in the state of [place]? YES    Visit mode:VIDEO    If the visit is dropped, the patient can be reconnected by: VIDEO VISIT: Text to cell phone: [phone]    Will anyone else be joining the visit? NO      How would you like to obtain your AVS? MyChart    Are changes needed to the allergy or medication list? YES: pt refills    Comments or concerns regarding today's visit: Pt reports needing refills of Ondansetron 8 mg ODT. Pt reports taking vit d 3

## 2023-02-09 NOTE — PROGRESS NOTES
Video-Visit Details    Type of service:  Video Visit    Video Start Time (time video started): 1700    Video End Time (time video stopped): 1735    Originating Location (pt. Location): Home        Distant Location (provider location):  Off-site    Mode of Communication:  Video Conference via [facility]      BMT Progress Note    BMT Physician: Dr. Guillory/Rashad Chen     Oncology History   Multiple myeloma not having achieved remission (H)   11/7/2018 -  Cancer Staged    Staging form: Plasma Cell Myeloma and Disorders, AJCC 8th Edition  - Clinical stage from 11/7/2018: Albumin (g/dL): 3.4, ISS: Stage II, High-risk cytogenetics: Absent, LDH: Unknown     11/7/2018 Initial Diagnosis    Multiple myeloma not having achieved remission (H)     1/23/2019 - 11/14/2019 Chemotherapy    KRD x 6 cycles     11/4/2019 - 2/14/2021 Chemotherapy    Rev/Dex(20)      4/3/2020 - 7/17/2020 Chemotherapy    Velcade maintenance - dose reductions for orthostatic hypotension     7/17/2020 - 12/15/2020 Chemotherapy    Revlimid maintenance     11/23/2020 Progression    Bone marrow 35% plasma cells, PET CT demonstrating new lytic lesions     12/15/2020 - 6/11/2021 Chemotherapy    Elsy, Kd x 6 cycles     7/7/2021 -  Cancer Staged    PET/CT - CR     7/21/2021 -  Chemotherapy    Subcutaneous Daratumumab, IVIG, denosumab monthly     11/22/2021 -  Cancer Staged    Bone marrow aspirate - MRD 0.0022% positive     2/1/2022 - 2/5/2022 Chemotherapy    IP BMT Chemotherapy For Mobilization - High Dose Cyclophosphamide  Plan Provider: Julio C Guillory MD  Treatment goal: Other  Line of treatment: [No plan line of treatment]     5/25/2022 -  Chemotherapy    IP - OP BMT 2016-35 Auto Multiple Myeloma - Melphalan (CrCL < 30 mL/min, >/= 2 comorbidities, OR age > 75yrs) - Adult (Version: 7/13/21)  Plan Provider: Julio C Guillory MD  Treatment goal: Other  Line of treatment: [No plan line of treatment]     9/29/2022 -  Supportive Treatment    Oral ONC - ixazomib  maintenance post-transplant  Plan Provider: Julio C Guillory MD  Treatment goal: Curative  Line of treatment: [No plan line of treatment]         ID: Libby Grimaldo is a 73 yo woman D+160 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 Now on ninlaro maintenance.     INTERIM HISTORY:   Libby is not feeling well today.  She had a difficult last month with the development of a significant urinary tract infection that led her to seek urgent care on January 23.  The culture result demonstrated an E. coli that was sensitive to cefazolin.  She did receive cephalexin for treatment which she said worked relatively quickly and she finished the antibiotics about a week ago.  She also notes increasing cough, sputum production, and the feeling of a pulmonary infection.  She thinks that maybe this is improved somewhat with the antibiotic she received but feels that the symptoms are worse.  During this time.  She has been feeling poorly and not eating well according to her .  She has not had nausea, abdominal pain, or back pain, although she has been eating relatively little and does believe she has lost some weight.  She has a history of pancreatitis in the past but has not had any symptoms consistent with this that she reports.  She is not having fevers that she is aware of.  She does feel that her left shoulder and left hip are bothering her more than before which worries her for myeloma progression.    ROS: 8 point ROS neg other than the symptoms noted above in the HPI.     PHYSICAL EXAM:   Vitals:  Blood pressure 115/72, pulse 80, temperature 98.6  F (37  C), temperature source Oral, resp. rate 18, weight 53.8 kg (118 lb 9.6 oz), SpO2 97 %.      Wt Readings from Last 4 Encounters:   02/09/23 53.8 kg (118 lb 9.6 oz)   01/09/23 53.5 kg (117 lb 14.4 oz)   12/07/22 53.1 kg (117 lb)   11/03/22 52.8 kg (116 lb 6.4 oz)       General Appearance: she appears ill      Labs:  Lab Results   Component Value Date    WBC 8.2 02/09/2023     ANEU 3.9 06/16/2022    HGB 12.4 02/09/2023    HCT 39.2 02/09/2023     02/09/2023     02/09/2023    POTASSIUM 4.8 02/09/2023    CHLORIDE 102 02/09/2023    CO2 27 02/09/2023     (H) 02/09/2023    BUN 22.2 02/09/2023    CR 1.00 (H) 02/09/2023    MAG 1.6 (L) 12/07/2022    INR 1.40 (H) 05/30/2022    BILITOTAL 0.6 02/09/2023     (HH) 02/09/2023     (H) 02/09/2023    ALKPHOS 293 (H) 02/09/2023    PROTTOTAL 7.2 02/09/2023    ALBUMIN 4.3 02/09/2023        ASSESSMENT AND PLAN:   Libby Grimaldo is a 71 year old female with 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      Myeloma/BMT/IEC PROTOCOL for 2016-35  - Chemo protocol: HD Melphalan 140mg/m2  Day 0: Cell dose: 3.94x10^6 (s/p cytoxan chemo priming 2/1/2022 and subsequent stem cell collection).   - Restaging plan: per protocol.  - Maintenance Ninlaro started early Oct 2022; Day 28 (11/4)  - Dose reduction due to GI toxicity, fatigue, malaise  - start ninlaro 2.3 mg weekly 3/4 weeks.       HEME/COAG  - anemia, thrombocytopenia due to chemo/BMT. Counts stable today; monitor on Ninlaro.  - Relevant thrombosis or bleeding history:   2/15/22: new non-occlusive LUE DVT. No longer on xarelto      ID  - Prophylaxis plan: ACV, Bactrim    - Covid vacc #1 (post BMT) and flu shot 10/3/22     CARDIOVASCULAR  - Risk of cardiomyopathy:  Baseline EF 56%  - known hyperlipidemia- per notes previously on Crestor but not on med list - follow-up with PCP if needed    History of Afib - now in NSR but tachy. Continue metoprolol XL 25 mg/d      RESPIRATORY  - hx of COPD, continue breo-ellipa inhaler      GI/NUTRITION  - CINV: continues on Zyprexa bid. Rec Zofran tid day of and day after Ninlaro with next doses. Compazine prn.     - Ulcer prophylaxis: protonix 40mg daily.        RENAL/ELECTROLYTES/:  - hypokalemia - she will increase her K rich foods. Add metamucil.     PSYCH  - continue effexor 75mg tid     SYMPTOM MANAGEMENT.  - # Pain Assessment:  - Libby is experiencing pain  due to chronic pain - Continue suboxone and prn oxycodone.      Respiratory infection  - much improved after abx    Summary: She appears ill and has elevated LFTs today without clear etiology.  The differential could include her recent infection, the recent antibiotics, or possibly a flare of chronic pancreatitis.  I will add a lipase today, and we will also bring her back for a chest CT and x-rays of her left shoulder and hip given the worsening pain consistent with myeloma.  Her light chain had increased during the month off maintenance, and the studies are pending currently.    Plan  -She will continue Ninlaro weekly 3 out of 4 weeks  -Follow-up on pending myeloma labs  -Schedule chest CT without contrast and plain films of her left shoulder and left hip    I spent 40 minutes in the care of this patient today, which included time necessary for preparation for the visit, obtaining history, ordering medications/tests/procedures as medically indicated, review of pertinent medical literature, counseling of the patient, communication of recommendations to the care team, and documentation time.    SOTO BOWSER MD  February 9, 2023

## 2023-02-09 NOTE — NURSING NOTE
Chief Complaint   Patient presents with     Port Draw     Labs drawn via port by RN in lab. VS taken.      Port accessed and labs drawn by RN. Pt tolerated well.  VS taken.      Elaina Lewis RN

## 2023-02-09 NOTE — LETTER
2/9/2023         RE: Libby Grimaldo  [address] [address]        Dear Colleague,    Thank you for referring your patient, Libby Grimaldo, to the [facility] BLOOD AND MARROW TRANSPLANT PRO[facility] [place]. Please see a copy of my visit note below.    Video-Visit Details    Type of service:  Video Visit    Video Start Time (time video started): 1700    Video End Time (time video stopped): 1735    Originating Location (pt. Location): Home        Distant Location (provider location):  Off-site    Mode of Communication:  Video Conference via [employer]      [facility] Progress Note    BMT Physician: Dr. Guillory/Rashad Chen     Oncology History   Multiple myeloma not having achieved remission (H)   11/7/2018 -  Cancer Staged    Staging form: Plasma Cell Myeloma and Disorders, AJCC 8th Edition  - Clinical stage from 11/7/2018: Albumin (g/dL): 3.4, ISS: Stage II, High-risk cytogenetics: Absent, LDH: Unknown     11/7/2018 Initial Diagnosis    Multiple myeloma not having achieved remission (H)     1/23/2019 - 11/14/2019 Chemotherapy    KRD x 6 cycles     11/4/2019 - 2/14/2021 Chemotherapy    Rev/Dex(20)      4/3/2020 - 7/17/2020 Chemotherapy    Velcade maintenance - dose reductions for orthostatic hypotension     7/17/2020 - 12/15/2020 Chemotherapy    Revlimid maintenance     11/23/2020 Progression    Bone marrow 35% plasma cells, PET CT demonstrating new lytic lesions     12/15/2020 - 6/11/2021 Chemotherapy    Elsy, Kd x 6 cycles     7/7/2021 -  Cancer Staged    PET/CT - CR     7/21/2021 -  Chemotherapy    Subcutaneous Daratumumab, IVIG, denosumab monthly     11/22/2021 -  Cancer Staged    Bone marrow aspirate - MRD 0.0022% positive     2/1/2022 - 2/5/2022 Chemotherapy    IP BMT Chemotherapy For Mobilization - High Dose Cyclophosphamide  Plan Provider: Julio C Guillory MD  Treatment goal: Other  Line of treatment: [No plan line of treatment]     5/25/2022 -  Chemotherapy    IP - OP BMT 2016-35 Auto  Multiple Myeloma - Melphalan (CrCL < 30 mL/min, >/= 2 comorbidities, OR age > 75yrs) - Adult (Version: 7/13/21)  Plan Provider: Julio C Guillory MD  Treatment goal: Other  Line of treatment: [No plan line of treatment]     9/29/2022 -  Supportive Treatment    Oral ONC - ixazomib maintenance post-transplant  Plan Provider: Julio C Guillory MD  Treatment goal: Curative  Line of treatment: [No plan line of treatment]         ID: Libby Grimaldo is a 71 yo woman D+160 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 Now on ninlaro maintenance.     INTERIM HISTORY:   Libby is not feeling well today.  She had a difficult last month with the development of a significant urinary tract infection that led her to seek urgent care on January 23.  The culture result demonstrated an E. coli that was sensitive to cefazolin.  She did receive cephalexin for treatment which she said worked relatively quickly and she finished the antibiotics about a week ago.  She also notes increasing cough, sputum production, and the feeling of a pulmonary infection.  She thinks that maybe this is improved somewhat with the antibiotic she received but feels that the symptoms are worse.  During this time.  She has been feeling poorly and not eating well according to her .  She has not had nausea, abdominal pain, or back pain, although she has been eating relatively little and does believe she has lost some weight.  She has a history of pancreatitis in the past but has not had any symptoms consistent with this that she reports.  She is not having fevers that she is aware of.  She does feel that her left shoulder and left hip are bothering her more than before which worries her for myeloma progression.    ROS: 8 point ROS neg other than the symptoms noted above in the HPI.     PHYSICAL EXAM:   Vitals:  Blood pressure 115/72, pulse 80, temperature 98.6  F (37  C), temperature source Oral, resp. rate 18, weight 53.8 kg (118 lb 9.6 oz), SpO2 97  %.      Wt Readings from Last 4 Encounters:   02/09/23 53.8 kg (118 lb 9.6 oz)   01/09/23 53.5 kg (117 lb 14.4 oz)   12/07/22 53.1 kg (117 lb)   11/03/22 52.8 kg (116 lb 6.4 oz)       General Appearance: she appears ill      Labs:  Lab Results   Component Value Date    WBC 8.2 02/09/2023    ANEU 3.9 06/16/2022    HGB 12.4 02/09/2023    HCT 39.2 02/09/2023     02/09/2023     02/09/2023    POTASSIUM 4.8 02/09/2023    CHLORIDE 102 02/09/2023    CO2 27 02/09/2023     (H) 02/09/2023    BUN 22.2 02/09/2023    CR 1.00 (H) 02/09/2023    MAG 1.6 (L) 12/07/2022    INR 1.40 (H) 05/30/2022    BILITOTAL 0.6 02/09/2023     (HH) 02/09/2023     (H) 02/09/2023    ALKPHOS 293 (H) 02/09/2023    PROTTOTAL 7.2 02/09/2023    ALBUMIN 4.3 02/09/2023        ASSESSMENT AND PLAN:   Libby Grimaldo is a 71 year old female with 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      Myeloma/BMT/IEC PROTOCOL for 2016-35  - Chemo protocol: HD Melphalan 140mg/m2  Day 0: Cell dose: 3.94x10^6 (s/p cytoxan chemo priming 2/1/2022 and subsequent stem cell collection).   - Restaging plan: per protocol.  - Maintenance Ninlaro started early Oct 2022; Day 28 (11/4)  - Dose reduction due to GI toxicity, fatigue, malaise  - start ninlaro 2.3 mg weekly 3/4 weeks.       HEME/COAG  - anemia, thrombocytopenia due to chemo/BMT. Counts stable today; monitor on Ninlaro.  - Relevant thrombosis or bleeding history:   2/15/22: new non-occlusive LUE DVT. No longer on xarelto      ID  - Prophylaxis plan: ACV, Bactrim    - Covid vacc #1 (post BMT) and flu shot 10/3/22     CARDIOVASCULAR  - Risk of cardiomyopathy:  Baseline EF 56%  - known hyperlipidemia- per notes previously on Crestor but not on med list - follow-up with PCP if needed    History of Afib - now in NSR but tachy. Continue metoprolol XL 25 mg/d      RESPIRATORY  - hx of COPD, continue breo-ellipa inhaler      GI/NUTRITION  - CINV: continues on Zyprexa bid. Rec Zofran tid day of and day after Ninlaro  with next doses. Compazine prn.     - Ulcer prophylaxis: protonix 40mg daily.        RENAL/ELECTROLYTES/:  - hypokalemia - she will increase her K rich foods. Add metamucil.     PSYCH  - continue effexor 75mg tid     SYMPTOM MANAGEMENT.  - # Pain Assessment:  - Libby is experiencing pain due to chronic pain - Continue suboxone and prn oxycodone.      Respiratory infection  - much improved after abx    Summary: She appears ill and has elevated LFTs today without clear etiology.  The differential could include her recent infection, the recent antibiotics, or possibly a flare of chronic pancreatitis.  I will add a lipase today, and we will also bring her back for a chest CT and x-rays of her left shoulder and hip given the worsening pain consistent with myeloma.  Her light chain had increased during the month off maintenance, and the studies are pending currently.    Plan  -She will continue Ninlaro weekly 3 out of 4 weeks  -Follow-up on pending myeloma labs  -Schedule chest CT without contrast and plain films of her left shoulder and left hip    I spent 40 minutes in the care of this patient today, which included time necessary for preparation for the visit, obtaining history, ordering medications/tests/procedures as medically indicated, review of pertinent medical literature, counseling of the patient, communication of recommendations to the care team, and documentation time.    SOTO BOWSER MD  February 9, 2023

## 2023-02-22 NOTE — TELEPHONE ENCOUNTER
I spoke to Libby to discuss two issues.  When I saw her, the LFTs were increased and her lipase as well suggesting pancreatitis.  A previous MRI 5/6/22 demonstrated changes consistent with chronic pancreatitis.  At the time, she was feeling improved after a UTI, so I called her today to see how she was doing.  She stated that she has abdominal pain, nausea, and difficulty eating.  This has been going on for about a week.  I also spoke to her about the myeloma labs that indicate disease progression.  I discussed options and recommended teclistimab, but after she is evaluated for her abd pain and possible pancreatitis.  She may not be a candidate for treatment otherwise.  She will contact her PCP to arrange followup.  Also, she was evaluated at [facility] and I will send consutation request.      SOTO BOWSER MD  February 22, 2023

## 2023-02-23 NOTE — TELEPHONE ENCOUNTER
[facility]     Referring provider:  Julio C Guillory MD    Referred to: [facility]     Provider Requested:  NA     Referral Received: 02/23/23       Records received: EPIC     Images received: PACS    Evaluation for: concern for pancreatitis in the setting of multiple myeloma     Clinical History (per RN review):     Per referring MD:    When I saw her, the LFTs were increased and her lipase as well suggesting pancreatitis.  A previous MRI 5/6/22 demonstrated changes consistent with chronic pancreatitis.  At the time, she was feeling improved after a UTI, so I called her today to see how she was doing.  She stated that she has abdominal pain, nausea, and difficulty eating.  This has been going on for about a week.  I also spoke to her about the myeloma labs that indicate disease progression.  I discussed options and recommended teclistimab, but after she is evaluated for her abd pain and possible pancreatitis.  She may not be a candidate for treatment otherwise.  She will contact her PCP to arrange followup.  Also, she was evaluated at [facility] and I will send consutation request.       Latest Reference Range & Units 02/09/23 11:53   Lipase 13 - 60 U/L 134 (H)   (H): Data is abnormally high     Latest Reference Range & Units 02/09/23 11:53   Alkaline Phosphatase 35 - 104 U/L 293 (H)   ALT 10 - 35 U/L 287 (H)   AST 10 - 35 U/L 511 (HH)   Bilirubin Total <=1.2 mg/dL 0.6   (HH): Data is critically high  (H): Data is abnormally high    MRCP 5/6/23                                                        IMPRESSION:   1. MRI findings concerning for sequela of chronic pancreatitis as  described above. Cystic pancreatic foci which could represent dilated  side branches versus small intraductal papillary mucinous neoplasm  with no convincing worrisome features. Pancreas divisum. Recommend  follow-up according to the [facility] guidelines criteria  as described below.  2. When comparing  to exam 2/11/2022 there is slightly increased  moderate intrahepatic biliary tree dilation with associated dilation  of the common bile duct however with no convincing choledocholithiasis  or obstructive masses, likely related to reservoir effect from prior  cholecystectomy.  3. Soft tissue mass along the right anterior lateral aspect of the  vertebral body T10 as described above, likely related to the patient's  history of multiple myeloma according to medical record. There was  prior hypermetabolic activity on PET CT 11/23/2021.  4. Additional incidental findings as described above.       MD review date:   MD Decision for clinic consultation/Orders:            Referral updates/Patient contacted:

## 2023-02-27 NOTE — TELEPHONE ENCOUNTER
Per Dr. Walker  Lets get an interval MRI/MRCP with crissy and a clinic visit; I believe Florence seen this patient before     Imaging ordered, messages sent to clinic coordinators  ML

## 2023-03-03 NOTE — TELEPHONE ENCOUNTER
Pt's spouse answered when called, stated that they were in the hospital. Told him we would call back sometime next week for scheduling GI Referral:    New Patient Clinic Visit   MD:Dr. Walker   Virtual/In person:pt preference   Referring MD: Julio C Guillory MD   Reason for visit:follow up after MRI   Link Referral:yes   PLEASE also help pt to schedule MRI/MRCP prior to visit with Dr. Walker

## 2023-03-09 NOTE — TELEPHONE ENCOUNTER
Free Drug Application Initiated  Medication:  Ninlaro  Sponsor:  [employer]  Phone #:  [phone]  Fax #: [phone]  Additional Information:   03/09/23 emailed PAP application, enrollment form, & precription hardcopy to Dr Guillory, also Epic messaged him letting him know I emailed it. Needs MD Signatures before we can submit.

## 2023-03-13 NOTE — ORAL ONC MGMT
[facility] Oral Chemotherapy Monitoring Program    Thank you for the opportunity to be a part in the care of this patient's oral chemotherapy. The oncology pharmacy will no longer be following this patient for oral chemotherapy. If there are any questions or the plan changes, feel free to contact us.    ORAL CHEMOTHERAPY 1/10/2023 1/18/2023 1/20/2023 2/2/2023 2/6/2023 3/9/2023 3/13/2023   Assessment Type Refill Left Voicemail Initial Follow up Other Refill Refill Discontinuation   Stop Date - - - - - - 3/13/2023   Reason for Discontinuation - - - - - - Disease progression   Diagnosis Code Multiple Myeloma Multiple Myeloma Multiple Myeloma Multiple Myeloma Multiple Myeloma Multiple Myeloma Multiple Myeloma   Providers Dr. Kahlil Guillory   [facility]   Drug Name Ninlaro (ixazomib) Ninlaro (ixazomib) Ninlaro (ixazomib) Ninlaro (ixazomib) Ninlaro (ixazomib) Ninlaro (ixazomib) Ninlaro (ixazomib)   Dose 2.3 mg 2.3 mg 2.3 mg 2.3 mg - 2.3 mg -   Current Schedule Weekly Weekly Weekly Weekly - Weekly -   Cycle Details 3 weeks on, 1 week off 3 weeks on, 1 week off 3 weeks on, 1 week off 3 weeks on, 1 week off - 3 weeks on, 1 week off -   Start Date of Last Cycle - - 1/13/2023 - - - -   Planned next cycle start date - - - - - - -   Doses missed in last 2 weeks - - 0 - - - -   Adherence Assessment - - Adherent - - - -   Adverse Effects - - No AE identified during assessment - - - -   Nausea - - - - - - -   Pharmacist Intervention(nausea) - - - - - - -   Intervention(s) - - - - - - -   Diarrhea - - - - - - -   Pharmacist Intervention(diarrhea) - - - - - - -   Intervention(s) - - - - - - -   Any new drug interactions? - - No - - - -   Is the dose as ordered appropriate for the patient? Yes - - - - - -   Has the patient missed any days of school, work, or other  routine activity? - - - - - - -   Since the last time we talked, have you been hospitalized or used the emergency room? - - - - - - -

## 2023-03-28 NOTE — TELEPHONE ENCOUNTER
I spoke to Dr. Villa at [facility].  He let me know that Libby was discharged following a diagnosis of pneumona.   While hospitalized, they found a large retroperitoneal mass.  The report from the Chest CT on 3/6 states: 5.6 x 2.6 cm paraspinal mass anterior to the T10 and T11 vertebral bodies is unchanged and better visualized on the previous contrast-enhanced CT scan.    She has evidence of progressive myeloma and also chronic pancreatitis.  She has MRI abd coming and I will plan to meet with her after the GI evaluation to discuss options.    SOTO BOWSER MD  March 28, 2023

## 2023-04-16 NOTE — PROGRESS NOTES
BMT Progress Note    BMT Physician: Dr. Guillory/Rashad Chen     Oncology History   Multiple myeloma not having achieved remission (H)   11/7/2018 -  Cancer Staged    Staging form: Plasma Cell Myeloma and Disorders, AJCC 8th Edition  - Clinical stage from 11/7/2018: Albumin (g/dL): 3.4, ISS: Stage II, High-risk cytogenetics: Absent, LDH: Unknown     11/7/2018 Initial Diagnosis    Multiple myeloma not having achieved remission (H)     1/23/2019 - 11/14/2019 Chemotherapy    KRD x 6 cycles     11/4/2019 - 2/14/2021 Chemotherapy    Rev/Dex(20)      4/3/2020 - 7/17/2020 Chemotherapy    Velcade maintenance - dose reductions for orthostatic hypotension     7/17/2020 - 12/15/2020 Chemotherapy    Revlimid maintenance     11/23/2020 Progression    Bone marrow 35% plasma cells, PET CT demonstrating new lytic lesions     12/15/2020 - 6/11/2021 Chemotherapy    Elsy, Kd x 6 cycles     7/7/2021 -  Cancer Staged    PET/CT - CR     7/21/2021 -  Chemotherapy    Subcutaneous Daratumumab, IVIG, denosumab monthly     11/22/2021 -  Cancer Staged    Bone marrow aspirate - MRD 0.0022% positive     2/1/2022 - 2/5/2022 Chemotherapy    IP BMT Chemotherapy For Mobilization - High Dose Cyclophosphamide  Plan Provider: Julio C Guillory MD  Treatment goal: Other  Line of treatment: [No plan line of treatment]     5/25/2022 - 11/23/2022 Chemotherapy    IP - OP BMT 2016-35 Auto Multiple Myeloma - Melphalan (CrCL < 30 mL/min, >/= 2 comorbidities, OR age > 75yrs) - Adult (Version: 7/13/21)  Plan Provider: Julio C Guillory MD  Treatment goal: Other  Line of treatment: [No plan line of treatment]     9/29/2022 - 3/9/2023 Supportive Treatment    Oral ONC - ixazomib maintenance post-transplant  Plan Provider: Julio C Guillory MD  Treatment goal: Curative  Line of treatment: [No plan line of treatment]     4/17/2023 -  Chemotherapy    OP ONC Multiple Myeloma - Elotuzumab / Pomalidomide / Dexamethasone (<75 years old)  Plan Provider: Julio C Guillory  MD  Treatment goal: Palliative  Line of treatment: Third Line     4/17/2023 -  Supportive Treatment    OP ONC Zoledronic Acid (Zometa) MONTHLY  Plan Provider: Julio C Guillory MD  Treatment goal: Palliative  Line of treatment: Maintenance         ID: Libby Grimaldo is a 71 yo woman D 326 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 Now on ninlaro maintenance.     INTERIM HISTORY:   Libby is not feeling well today and recently was discharged from the hospital after requiring treatment for pneumonia.  She has diffuse bone pain and is seeing a pain doctor to help manage her medications.  She continues to have difficulty breathing, but this is improved since hospitalization.  She is not having fevers, and her cough remains although better.  She also has chronic nausea and difficulty eating.    ROS: 8 point ROS neg other than the symptoms noted above in the HPI.     PHYSICAL EXAM:   Vitals:  Blood pressure (!) 142/83, pulse 106, temperature 98.9  F (37.2  C), temperature source Oral, resp. rate 16, weight 50.8 kg (112 lb), SpO2 96 %.      Wt Readings from Last 4 Encounters:   04/17/23 50.8 kg (112 lb)   02/09/23 53.8 kg (118 lb 9.6 oz)   01/09/23 53.5 kg (117 lb 14.4 oz)   12/07/22 53.1 kg (117 lb)       General Appearance: she appears ill      Labs:  Lab Results   Component Value Date    WBC 7.9 04/17/2023    ANEU 3.9 06/16/2022    HGB 10.5 (L) 04/17/2023    HCT 33.5 (L) 04/17/2023     04/17/2023     04/17/2023    POTASSIUM 3.4 04/17/2023    CHLORIDE 103 04/17/2023    CO2 30 (H) 04/17/2023     (H) 04/17/2023    BUN 8.6 04/17/2023    CR 0.97 (H) 04/17/2023    MAG 2.1 04/17/2023    INR 1.40 (H) 05/30/2022    BILITOTAL 0.4 04/17/2023    AST 27 04/17/2023    ALT 7 (L) 04/17/2023    ALKPHOS 101 04/17/2023    PROTTOTAL 6.3 (L) 04/17/2023    ALBUMIN 3.3 (L) 04/17/2023        ASSESSMENT AND PLAN:   Libby Grimaldo is a 71 year old female with 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      Myeloma/BMT/IEC PROTOCOL for 2016-35  -  Chemo protocol: HD Melphalan 140mg/m2  Day 0: Cell dose: 3.94x10^6 (s/p cytoxan chemo priming 2/1/2022 and subsequent stem cell collection).   - Restaging plan: per protocol.  - Maintenance Ninlaro started early Oct 2022; Day 28 (11/4) and stopped in January 2023        HEME/COAG  - anemia, thrombocytopenia due to chemo/BMT. Counts stable today; monitor on Ninlaro.  - Relevant thrombosis or bleeding history:   2/15/22: new non-occlusive LUE DVT. No longer on xarelto      ID  - Prophylaxis plan: ACV, Bactrim    - Covid vacc #1 (post BMT) and flu shot 10/3/22     CARDIOVASCULAR  - Risk of cardiomyopathy:  Baseline EF 56%  - known hyperlipidemia- per notes previously on Crestor but not on med list - follow-up with PCP if needed    History of Afib - now in NSR but tachy. Continue metoprolol XL 25 mg/d      RESPIRATORY  - hx of COPD, continue breo-ellipa inhaler      GI/NUTRITION  - CINV: continues on Zyprexa bid. Rec Zofran tid day of and day after Ninlaro with next doses. Compazine prn.     - Ulcer prophylaxis: protonix 40mg daily.        RENAL/ELECTROLYTES/:  - hypokalemia - she will increase her K rich foods. Add metamucil.     PSYCH  - continue effexor 75mg tid     SYMPTOM MANAGEMENT.  - # Pain Assessment:  - iLbby is experiencing pain due to chronic pain - Continue suboxone and prn oxycodone.      Respiratory infection  - much improved after abx    Summary: She has progressive myeloma with increased pain, rising calcium, and worsening anemia.  I discussed treatment options, including no treatment given her declining performance status.  I recommended isaias/pom/dex and have entered the plan for pre-approval.  I also will bring her back for zometa.      Plan  -Preauth for zometa, isaias/pom/dex      30 minutes spent on the date of the encounter doing chart review, history and exam, lab review, documentation and coordniating care.    SOTO BOWSER MD  April 18, 2023    40

## 2023-04-17 NOTE — NURSING NOTE
"Oncology Rooming Note    April 17, 2023 9:27 AM   Libby Grimaldo is a 72 year old female who presents for:    Chief Complaint   Patient presents with     Blood Draw     Labs drawn via port by RN. VS taken.     Oncology Clinic Visit     Multiple myeloma      Initial Vitals: BP (!) 142/83   Pulse 106   Temp 98.9  F (37.2  C) (Oral)   Resp 16   Wt 50.8 kg (112 lb)   SpO2 96%   BMI 18.64 kg/m   Estimated body mass index is 18.64 kg/m  as calculated from the following:    Height as of 9/1/22: 1.651 m (5' 5\").    Weight as of this encounter: 50.8 kg (112 lb). Body surface area is 1.53 meters squared.  Moderate Pain (4) Comment: Data Unavailable   No LMP recorded. Patient is postmenopausal.  Allergies reviewed: Yes  Medications reviewed: Yes    Medications: MEDICATION REFILLS NEEDED TODAY. Provider was NOT notified.   Pt req refills of Zofran, Compazine, Vitamin C + Levothyroxine.        Pharmacy name entered into [facility]:    [id number] - [place], [place] - [address] [facility] [address] [place] [address] [facility] - [place], [place] - [address]    Clinical concerns: No new clinical concerns other than reason for visit today.     Jody Alegria, EMT    "

## 2023-04-17 NOTE — LETTER
4/17/2023         RE: Libby Grimaldo  [address] [facility] [address]        Dear Colleague,    Thank you for referring your patient, Libby Grimaldo, to the [facility]LOOD [facility]ARROW [facility]ANSPLANT [facility] [place]. Please see a copy of my visit note below.        BMT Progress Note    BMT Physician: Dr. Guillory/Rashad Chen     Oncology History   Multiple myeloma not having achieved remission (H)   11/7/2018 -  Cancer Staged    Staging form: Plasma Cell Myeloma and Disorders, AJCC 8th Edition  - Clinical stage from 11/7/2018: Albumin (g/dL): 3.4, ISS: Stage II, High-risk cytogenetics: Absent, LDH: Unknown     11/7/2018 Initial Diagnosis    Multiple myeloma not having achieved remission (H)     1/23/2019 - 11/14/2019 Chemotherapy    KRD x 6 cycles     11/4/2019 - 2/14/2021 Chemotherapy    Rev/Dex(20)      4/3/2020 - 7/17/2020 Chemotherapy    Velcade maintenance - dose reductions for orthostatic hypotension     7/17/2020 - 12/15/2020 Chemotherapy    Revlimid maintenance     11/23/2020 Progression    Bone marrow 35% plasma cells, PET CT demonstrating new lytic lesions     12/15/2020 - 6/11/2021 Chemotherapy    Elsy, Kd x 6 cycles     7/7/2021 -  Cancer Staged    PET/CT - CR     7/21/2021 -  Chemotherapy    Subcutaneous Daratumumab, IVIG, denosumab monthly     11/22/2021 -  Cancer Staged    Bone marrow aspirate - MRD 0.0022% positive     2/1/2022 - 2/5/2022 Chemotherapy    IP BMT Chemotherapy For Mobilization - High Dose Cyclophosphamide  Plan Provider: Julio C Guillory MD  Treatment goal: Other  Line of treatment: [No plan line of treatment]     5/25/2022 - 11/23/2022 Chemotherapy    IP - OP BMT 2016-35 Auto Multiple Myeloma - Melphalan (CrCL < 30 mL/min, >/= 2 comorbidities, OR age > 75yrs) - Adult (Version: 7/13/21)  Plan Provider: Julio C Guillory MD  Treatment goal: Other  Line of treatment: [No plan line of treatment]     9/29/2022 - 3/9/2023 Supportive Treatment    Oral ONC - ixazomib maintenance  post-transplant  Plan Provider: Julio C Guillory MD  Treatment goal: Curative  Line of treatment: [No plan line of treatment]     4/17/2023 -  Chemotherapy    OP ONC Multiple Myeloma - Elotuzumab / Pomalidomide / Dexamethasone (<75 years old)  Plan Provider: Julio C Guillory MD  Treatment goal: Palliative  Line of treatment: Third Line     4/17/2023 -  Supportive Treatment    OP ONC Zoledronic Acid (Zometa) MONTHLY  Plan Provider: Julio C Guillory MD  Treatment goal: Palliative  Line of treatment: Maintenance         ID: Libby Grimaldo is a 73 yo woman D 326 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 Now on ninlaro maintenance.     INTERIM HISTORY:   Libby is not feeling well today and recently was discharged from the hospital after requiring treatment for pneumonia.  She has diffuse bone pain and is seeing a pain doctor to help manage her medications.  She continues to have difficulty breathing, but this is improved since hospitalization.  She is not having fevers, and her cough remains although better.  She also has chronic nausea and difficulty eating.    ROS: 8 point ROS neg other than the symptoms noted above in the HPI.     PHYSICAL EXAM:   Vitals:  Blood pressure (!) 142/83, pulse 106, temperature 98.9  F (37.2  C), temperature source Oral, resp. rate 16, weight 50.8 kg (112 lb), SpO2 96 %.      Wt Readings from Last 4 Encounters:   04/17/23 50.8 kg (112 lb)   02/09/23 53.8 kg (118 lb 9.6 oz)   01/09/23 53.5 kg (117 lb 14.4 oz)   12/07/22 53.1 kg (117 lb)       General Appearance: she appears ill      Labs:  Lab Results   Component Value Date    WBC 7.9 04/17/2023    ANEU 3.9 06/16/2022    HGB 10.5 (L) 04/17/2023    HCT 33.5 (L) 04/17/2023     04/17/2023     04/17/2023    POTASSIUM 3.4 04/17/2023    CHLORIDE 103 04/17/2023    CO2 30 (H) 04/17/2023     (H) 04/17/2023    BUN 8.6 04/17/2023    CR 0.97 (H) 04/17/2023    MAG 2.1 04/17/2023    INR 1.40 (H) 05/30/2022    BILITOTAL 0.4 04/17/2023     AST 27 04/17/2023    ALT 7 (L) 04/17/2023    ALKPHOS 101 04/17/2023    PROTTOTAL 6.3 (L) 04/17/2023    ALBUMIN 3.3 (L) 04/17/2023        ASSESSMENT AND PLAN:   Libby Grimaldo is a 71 year old female with 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      Myeloma/BMT/IEC PROTOCOL for 2016-35  - Chemo protocol: HD Melphalan 140mg/m2  Day 0: Cell dose: 3.94x10^6 (s/p cytoxan chemo priming 2/1/2022 and subsequent stem cell collection).   - Restaging plan: per protocol.  - Maintenance Ninlaro started early Oct 2022; Day 28 (11/4) and stopped in January 2023        HEME/COAG  - anemia, thrombocytopenia due to chemo/BMT. Counts stable today; monitor on Ninlaro.  - Relevant thrombosis or bleeding history:   2/15/22: new non-occlusive LUE DVT. No longer on xarelto      ID  - Prophylaxis plan: ACV, Bactrim    - Covid vacc #1 (post BMT) and flu shot 10/3/22     CARDIOVASCULAR  - Risk of cardiomyopathy:  Baseline EF 56%  - known hyperlipidemia- per notes previously on Crestor but not on med list - follow-up with PCP if needed    History of Afib - now in NSR but tachy. Continue metoprolol XL 25 mg/d      RESPIRATORY  - hx of COPD, continue breo-ellipa inhaler      GI/NUTRITION  - CINV: continues on Zyprexa bid. Rec Zofran tid day of and day after Ninlaro with next doses. Compazine prn.     - Ulcer prophylaxis: protonix 40mg daily.        RENAL/ELECTROLYTES/:  - hypokalemia - she will increase her K rich foods. Add metamucil.     PSYCH  - continue effexor 75mg tid     SYMPTOM MANAGEMENT.  - # Pain Assessment:  - Libby is experiencing pain due to chronic pain - Continue suboxone and prn oxycodone.      Respiratory infection  - much improved after abx    Summary: She has progressive myeloma with increased pain, rising calcium, and worsening anemia.  I discussed treatment options, including no treatment given her declining performance status.  I recommended isaias/pom/dex and have entered the plan for pre-approval.  I also will bring her back for  zometa.      Plan  -Preauth for zometa, isaias/pom/dex      30 minutes spent on the date of the encounter doing chart review, history and exam, lab review, documentation and coordniating care.    SOTO BOWSER MD  April 18, 2023    40

## 2023-04-17 NOTE — NURSING NOTE
"Chief Complaint   Patient presents with     Blood Draw     Labs drawn via port by RN. VS taken.     Port accessed with 20g 3/4\" power needle and labs drawn by rn.  Port flushed with NS and heparin.  Pt tolerated well.  VS taken. De-accessed.  Pt checked in for next appt.    Krishna Dodson RN  "

## 2023-04-18 NOTE — TELEPHONE ENCOUNTER
Prior Authorization Approval    Authorization Effective Date: 4/18/2023  Authorization Expiration Date: 10/15/2023  Medication: Pomalyst PA approved   Approved Dose/Quantity: 21/28 ds  Reference #: Key: [id number]   Insurance Company: [employer] - Phone [phone] Fax [phone]  Expected CoPay: $1147.58     CoPay Card Available: No    Foundation Assistance Needed: HW on file  Which Pharmacy is filling the prescription (Not needed for infusion/clinic administered): [facility] [place] [address]   Pharmacy Notified: No  Patient Notified: Yes

## 2023-04-21 NOTE — NURSING NOTE
"Chief Complaint   Patient presents with     Port Draw     Labs drawn via port by RN in lab.  VS taken        Port accessed with 20 gauge 3/4\" gripper needle by RN, labs collected, line flushed with saline and heparin.  Vitals taken. Pt checked in for appointment(s).    Pt began to feel SOB and clammy upon return to lobby.  Infusion RN aware.  Brought pt to infusion via WC.    Linda Mcrae RN    "

## 2023-04-21 NOTE — PROGRESS NOTES
Infusion Nursing Note:  Libby Grimaldo presents today for scheduled infusion.    Patient seen by provider today: No   present during visit today: Not Applicable.    Note: Pt here today for scheduled Zometa. Pt pale and lightheaded after lab draw so was brought directly to infusion via wheelchair by lab RN. VS checked again upon arrival to infusion and were: /90, HR 81, and O2 93% on room air. Patient reports that her SBP is frequently elevated to 150-160s. Pt also reports she was recently discharged from the hospital for pneumonia and is on intermittent supplemental oxygen that she is trying to wean off. Pt did not wear supplemental oxygen to infusion appt today. O2 sats >92% and pt denies increase in dyspnea from baseline. Pt also reports that she has not eaten today as she was concerned that the Zometa would make her nauseated. RN encouraged PO intake and apple juice was provided at patient's request. Pt reported feeling better after 2 apple juices. VS checked again prior to Zometa and were as follows: /83, HR 85, and O2 96% on room air. Labs monitored and pt met parameters for Zometa (corrected calcium = 9.98). Pt received 4 mg Zometa IV over 15 minutes. Approximately alf through Zometa infusion pt reported mild nausea and requested antiemetic. Juju Mcdowell CNP notified. TORB 4/21/23 1501 Juju Mcdowell CNP/Mirta Noland RN- Administer 4 mg Zofran IVP. Zofran administered and pt reported relief from nausea at time of discharge. Pt monitored for 15 minutes post Zometa as it was her first dose and VS remained stable.      Intravenous Access:  Implanted Port.    Treatment Conditions:  Lab Results   Component Value Date     04/21/2023    POTASSIUM 3.7 04/21/2023    MAG 2.1 04/17/2023    CR 0.82 04/21/2023    DIANDRA 9.5 04/21/2023    DIANDRA 9.5 04/21/2023    BILITOTAL 0.3 04/21/2023    ALBUMIN 3.4 (L) 04/21/2023    ALT 8 (L) 04/21/2023    AST 26 04/21/2023     Results reviewed, labs  MET treatment parameters, ok to proceed with treatment.      Post Infusion Assessment:  Patient tolerated infusion without incident.  Blood return noted pre and post infusion.  Site patent and intact, free from redness, edema or discomfort.  No evidence of extravasations.  Access discontinued per protocol.       Discharge Plan:   Prescription refills given for Acyclovir and Bactrim.  Copy of AVS reviewed with patient and/or family.  Patient will return Monday 4/24/23 for next appointment. No upcoming provider visits scheduled- IB sent to Dr. Guillory to clarify when patient should have next provider visit.   Patient discharged in stable condition accompanied by: .  Departure Mode: Wheelchair.      Mirta Noland RN

## 2023-04-24 NOTE — PROGRESS NOTES
Infusion Nursing Note:  Libby Grimaldo presents today for Day 1, Cycle 1 of Elotuzumab.    Patient seen by provider today: No   present during visit today: Not Applicable.    Note: Lab results monitored; met treatment parameters. Given 50mg PO Benadryl, 20mg IVP Pepcid, and 8mg IV Decadron as premeds 45 minutes prior to elotuzumab administration. Patient states she took 500mg PO Tylenol this morning, so no additional Tylenol was given as premed. Drug checked by 2nd RN prior to administration, infused at 30mL/hr for the first 30 minutes, then titrated up to 60mL/hr for 1 hour, then the remainder of the infusion completed at 120mL/hr. Infusion tolerated well and completed without complication.    Intravenous Access:  Implanted Port.  +BR noted pre and post infusion  De-accessed prior to discharge.    Treatment Conditions:  Results reviewed, labs MET treatment parameters, ok to proceed with treatment.      Post Infusion Assessment:  Patient tolerated infusion without incident.       Discharge Plan:   Patient discharged in stable condition accompanied by: caregiver.      Sarahy Fatima RN

## 2023-04-24 NOTE — TELEPHONE ENCOUNTER
Free Drug Application Initiated  Medication:  Pomalyst  Sponsor: MARIA LUZ  Phone #: [phone]  Fax #: [phone]  Additional Information:

## 2023-04-24 NOTE — TELEPHONE ENCOUNTER
Oral Chemotherapy Monitoring Program    Medication: Pomalyst  Rx:  4mg PO daily 21/28 ds    Auth #: [id number]  Risk Category: Adult female NOT of reproductive capacity    Routine survey questions reviewed. Yes

## 2023-04-24 NOTE — NURSING NOTE
"Chief Complaint   Patient presents with     Port Draw     Labs drawn via port by RN. Vitals taken.     Labs drawn via port by RN. Port accessed with 20G 3/4\" power needle. Flushed with NS and heparin. Pt tolerated well. Vitals taken. Pt checked in for next appointment.    Noelle Weber, RN  "

## 2023-04-24 NOTE — PROGRESS NOTES
Oral Chemotherapy Monitoring Program    Lab Monitoring Plan  Labs with infusion  Subjective/Objective:  Libby Grimaldo is a 72 year old female seen in clinic for an initial visit for oral chemotherapy education.          1/18/2023    11:00 AM 1/20/2023     9:00 AM 2/2/2023    10:00 AM 2/6/2023     2:00 PM 3/9/2023     8:00 AM 3/13/2023    11:00 AM 4/24/2023     2:00 PM   ORAL CHEMOTHERAPY   Assessment Type Left Voicemail Initial Follow up Other Refill Refill Discontinuation New Teach   Stop Date      3/13/2023    Reason for Discontinuation      Disease progression    Diagnosis Code Multiple Myeloma Multiple Myeloma Multiple Myeloma Multiple Myeloma Multiple Myeloma Multiple Myeloma Multiple Myeloma   Providers Dr. Kahlil Guillory   [facility]   Drug Name Ninlaro (ixazomib) Ninlaro (ixazomib) Ninlaro (ixazomib) Ninlaro (ixazomib) Ninlaro (ixazomib) Ninlaro (ixazomib) Pomalyst (pomalidomide)   Dose 2.3 mg 2.3 mg 2.3 mg  2.3 mg  4 mg   Current Schedule Weekly Weekly Weekly  Weekly  Daily   Cycle Details 3 weeks on, 1 week off 3 weeks on, 1 week off 3 weeks on, 1 week off  3 weeks on, 1 week off  3 weeks on, 1 week off   Start Date of Last Cycle  1/13/2023        Doses missed in last 2 weeks  0        Adherence Assessment  Adherent        Adverse Effects  No AE identified during assessment        Any new drug interactions?  No     Yes   Is the dose as ordered appropriate for the patient?       Yes       Last PHQ-2 Score on record:       1/31/2022     9:57 AM   PHQ-2 ( 1999 Pf[employer])   Q1: Little interest or pleasure in doing things 0   Q2: Feeling down, depressed or hopeless 0   PHQ-2 Score 0       Vitals:  BP:   BP Readings from Last 1 Encounters:   04/24/23 121/70     Wt Readings from Last 1 Encounters:   04/24/23 50.3 kg (110 lb 12.8 oz)     Estimated body surface area is 1.52 meters squared  "as calculated from the following:    Height as of 9/1/22: 1.651 m (5' 5\").    Weight as of an earlier encounter on 4/24/23: 50.3 kg (110 lb 12.8 oz).    Labs:  _  Result Component Current Result Ref Range   Sodium 142 (4/24/2023) 136 - 145 mmol/L     _  Result Component Current Result Ref Range   Potassium 3.5 (4/24/2023) 3.4 - 5.3 mmol/L     _  Result Component Current Result Ref Range   Calcium 8.5 (L) (4/24/2023) 8.8 - 10.2 mg/dL          _  Result Component Current Result Ref Range   Magnesium 2.1 (4/17/2023) 1.7 - 2.3 mg/dL     _  Result Component Current Result Ref Range   Phosphorus 4.6 (H) (4/17/2023) 2.5 - 4.5 mg/dL     _  Result Component Current Result Ref Range   Albumin 3.2 (L) (4/24/2023) 3.5 - 5.2 g/dL     _  Result Component Current Result Ref Range   Urea Nitrogen 7.9 (L) (4/24/2023) 8.0 - 23.0 mg/dL     _  Result Component Current Result Ref Range   Creatinine 0.76 (4/24/2023) 0.51 - 0.95 mg/dL     _  Result Component Current Result Ref Range   AST 27 (4/24/2023) 10 - 35 U/L     _  Result Component Current Result Ref Range   ALT 10 (4/24/2023) 10 - 35 U/L     _  Result Component Current Result Ref Range   Bilirubin Total 0.3 (4/24/2023) <=1.2 mg/dL     _  Result Component Current Result Ref Range   WBC Count 7.6 (4/24/2023) 4.0 - 11.0 10e3/uL     _  Result Component Current Result Ref Range   Hemoglobin 10.6 (L) (4/24/2023) 11.7 - 15.7 g/dL     _  Result Component Current Result Ref Range   Platelet Count 192 (4/24/2023) 150 - 450 10e3/uL     No results found for ANC within last 30 days.     _  Result Component Current Result Ref Range   Absolute Neutrophils 4.5 (4/24/2023) 1.6 - 8.3 10e3/uL        Assessment:  Patient is appropriate to start therapy.    Plan:  Basic chemotherapy teaching was reviewed with the patient including indication, start date of therapy, dose, administration, adverse effects, missed doses, food and drug interactions, monitoring, side effect management, office contact " information, and safe handling. Written materials were provided and all questions answered.    Follow-Up:  ~1 week after starting pomalidomide. Free drug was approved and prescription was sent to [facility] today.     Eugenia Manriquez, PharmD, [facility]  Oral Chemotherapy Monitoring Program  [facility]  [phone]

## 2023-04-24 NOTE — TELEPHONE ENCOUNTER
Free Drug Application Approved  Medication: Pomalyst  Effective Dates: 4/21/2023-12/31/2023  Filling Pharmacy: [facility]  Patient notified: yes- sister Leisa  Additional Information:

## 2023-04-26 NOTE — TELEPHONE ENCOUNTER
Jason  Approved    Medication: Pomalyst     Amount/ $: 12,000.00    [facility]    Phone # [phone]    Fax #     Effective Dates 10/28/2022-04/25/2024    Additional Information    Patient notified Message left

## 2023-04-27 NOTE — TELEPHONE ENCOUNTER
Called and removed Libby from the [employer] free drug program,   She will be filling now with [facility] Novak   [facility] Pharmacy Liaison, [facility] R[facility]  Phone: [phone]  Fax: [phone]  Email: [email or web address]

## 2023-05-01 NOTE — PROGRESS NOTES
"Infusion Nursing Note:  Libby Grimaldo presents today for Day Cycle 1 Elotuzumab.    Patient seen by provider today: No   present during visit today: Not Applicable.    Note: Pt feeling well today. Pt told RN that she just received pomalyst in mail over the weekend. RN talked with Dr. Guillory who was made aware patient has not been taking medication. MD okayed patient to start taking medication and will \"adjust schedule\" Patient also disclosed she is still taking methadone./ Pt was instructed to stop medication along with oxycodone as it interacts with pomalyst.  Dr. Guillory made aware of this and told nurse to have patient teach out to pain doctor at [facility] for guidance on medication weaning, interactions, etc. RN told patient. Patient and significant other verbalized they would reach out to pain doctor this afternoon.       Intravenous Access:  Implanted Port.  Flushes well, +BR    Treatment Conditions:  Pt received  mg Tylenol and 50 mg Benadryl along with 20 mg IV Pepcid premedications. Dr. Guillory had RN hold IV dexamethasone as patient is taking PO Dexamethasone prior to infusion appt at home. Pt received Elotuzumab per plan (180 ml/hr for 30 and 240 mL/hr for rest of infusion). VSS throughout.     Post Infusion Assessment:  Patient tolerated infusion without incident.       Discharge Plan:   Patient discharged in stable condition accompanied by: significant other.      Ana Hickey RN    "

## 2023-05-01 NOTE — NURSING NOTE
"Oncology Rooming Note    May 1, 2023 2:02 PM   Libby Grimaldo is a 72 year old female who presents for:    Chief Complaint   Patient presents with     Port Draw     Labs drawn via port by RN in lab. VS Taken.      Infusion     Scheduled Infusion r/t MM     Initial Vitals: /84   Pulse 74   Temp 98.9  F (37.2  C) (Oral)   Resp 18   Wt 48.8 kg (107 lb 8 oz)   SpO2 94%   BMI 17.89 kg/m   Estimated body mass index is 17.89 kg/m  as calculated from the following:    Height as of 9/1/22: 1.651 m (5' 5\").    Weight as of this encounter: 48.8 kg (107 lb 8 oz). Body surface area is 1.5 meters squared.  No Pain (0) Comment: Data Unavailable   No LMP recorded. Patient is postmenopausal.  Allergies reviewed: Yes  Medications reviewed: Yes    Medications: Medication refills not needed today.  Pharmacy name entered into [facility]:    [id number] - [place], [place] - [address] AT [facility] [address] [facility] - [place], [place] - [address]              "

## 2023-05-01 NOTE — NURSING NOTE
Chief Complaint   Patient presents with     Port Draw     Labs drawn via port by RN in lab. VS Taken.      Port accessed and labs drawn by RN. Pt tolerated well.  VS taken.  Pt checked in for next appt.    Elaina Lewis RN     Abdomen soft, non-tender, no guarding. Abdomen soft, non-tender, no guarding. No CVA tenderness.

## 2023-05-02 NOTE — PROGRESS NOTES
Oral Chemotherapy Monitoring Program  Lab Follow Up    Reviewed lab results from 5/1.         1/20/2023     9:00 AM 2/2/2023    10:00 AM 2/6/2023     2:00 PM 3/9/2023     8:00 AM 3/13/2023    11:00 AM 4/24/2023     2:00 PM 5/2/2023    11:00 AM   ORAL CHEMOTHERAPY   Assessment Type Initial Follow up Other Refill Refill Discontinuation New Teach Lab Monitoring   Stop Date     3/13/2023     Reason for Discontinuation     Disease progression     Diagnosis Code Multiple Myeloma Multiple Myeloma Multiple Myeloma Multiple Myeloma Multiple Myeloma Multiple Myeloma Multiple Myeloma   Providers Dr. Kahlil Guillory   [facility]   Drug Name Ninlaro (ixazomib) Ninlaro (ixazomib) Ninlaro (ixazomib) Ninlaro (ixazomib) Ninlaro (ixazomib) Pomalyst (pomalidomide) Pomalyst (pomalidomide)   Dose 2.3 mg 2.3 mg  2.3 mg  4 mg 4 mg   Current Schedule Weekly Weekly  Weekly  Daily Daily   Cycle Details 3 weeks on, 1 week off 3 weeks on, 1 week off  3 weeks on, 1 week off  3 weeks on, 1 week off 3 weeks on, 1 week off   Start Date of Last Cycle 1/13/2023         Doses missed in last 2 weeks 0         Adherence Assessment Adherent         Adverse Effects No AE identified during assessment         Any new drug interactions? No     Yes    Is the dose as ordered appropriate for the patient?      Yes        Labs:  _  Result Component Current Result Ref Range   Sodium 142 (4/24/2023) 136 - 145 mmol/L     _  Result Component Current Result Ref Range   Potassium 3.5 (4/24/2023) 3.4 - 5.3 mmol/L     _  Result Component Current Result Ref Range   Calcium 8.5 (L) (4/24/2023) 8.8 - 10.2 mg/dL          _  Result Component Current Result Ref Range   Magnesium 2.1 (4/17/2023) 1.7 - 2.3 mg/dL     _  Result Component Current Result Ref Range   Phosphorus 4.6 (H) (4/17/2023) 2.5 - 4.5 mg/dL     _  Result Component Current Result Ref  Range   Albumin 3.2 (L) (4/24/2023) 3.5 - 5.2 g/dL     _  Result Component Current Result Ref Range   Urea Nitrogen 7.9 (L) (4/24/2023) 8.0 - 23.0 mg/dL     _  Result Component Current Result Ref Range   Creatinine 0.76 (4/24/2023) 0.51 - 0.95 mg/dL     _  Result Component Current Result Ref Range   AST 27 (4/24/2023) 10 - 35 U/L     _  Result Component Current Result Ref Range   ALT 10 (4/24/2023) 10 - 35 U/L     _  Result Component Current Result Ref Range   Bilirubin Total 0.3 (4/24/2023) <=1.2 mg/dL     _  Result Component Current Result Ref Range   WBC Count 8.1 (5/1/2023) 4.0 - 11.0 10e3/uL     _  Result Component Current Result Ref Range   Hemoglobin 12.0 (5/1/2023) 11.7 - 15.7 g/dL     _  Result Component Current Result Ref Range   Platelet Count 233 (5/1/2023) 150 - 450 10e3/uL     No results found for ANC within last 30 days.     _  Result Component Current Result Ref Range   Absolute Neutrophils 5.2 (5/1/2023) 1.6 - 8.3 10e3/uL        Assessment & Plan:  No concerning abnormalities. Patient received infusion yesterday. Per 5/1 Ana Hickey notes, patient received pomalidomide in the mail over the weekend and Dr Guillory OK'd patient to start pomalidomide. Our oral chemo pharmacists team will call the patient on Friday 5/5 to conduct an initial assessment to follow up on how she's doing with pomalidomide.    Follow-Up:  Will plan for initial assessment phone call planned for 5/5    Shira Oro  Pharmacy Intern  Oral Chemotherapy Monitoring Program   [facility]  [phone]

## 2023-05-03 NOTE — ORAL ONC MGMT
Oral Chemotherapy Monitoring Program     Received a call from Dr. Flor patient in follow up of Pomalyst and Methadone therapy. We discussed FDA safety communication which says patents requiring methadone for treatment of opioid dependence should not be excluded from such treatment due to the concomitant use of a CNS depressant. Therefore it is OK to take Methadone together with Pomalyst.    Dr. Flor agreed with this assessment and plan and he has instructed Libby to continue Methadone. He would appreciate it if we reinforce this with her during our follow up assessment next week. He thanked me for the call and care.    Will copy Dr. Guillory on this message to facilitate communication and coordination of care.    Rigoberto Wise PharmD  [facility]  [phone]  May 3, 2023

## 2023-05-08 NOTE — LETTER
5/8/2023         RE: Libby Grimaldo  [address] [address]        Dear Colleague,    Thank you for referring your patient, Libby Grimaldo, to the [facility]ARR[facility]PL[facility] [place]. Please see a copy of my visit note below.        BMT Progress Note    BMT Physician: Dr. Guillory/Rashad Chen     Oncology History   Multiple myeloma not having achieved remission (H)   11/7/2018 -  Cancer Staged    Staging form: Plasma Cell Myeloma and Disorders, AJCC 8th Edition  - Clinical stage from 11/7/2018: Albumin (g/dL): 3.4, ISS: Stage II, High-risk cytogenetics: Absent, LDH: Unknown     11/7/2018 Initial Diagnosis    Multiple myeloma not having achieved remission (H)     1/23/2019 - 11/14/2019 Chemotherapy    KRD x 6 cycles     11/4/2019 - 2/14/2021 Chemotherapy    Rev/Dex(20)      4/3/2020 - 7/17/2020 Chemotherapy    Velcade maintenance - dose reductions for orthostatic hypotension     7/17/2020 - 12/15/2020 Chemotherapy    Revlimid maintenance     11/23/2020 Progression    Bone marrow 35% plasma cells, PET CT demonstrating new lytic lesions     12/15/2020 - 6/11/2021 Chemotherapy    Elsy, Kd x 6 cycles     7/7/2021 -  Cancer Staged    PET/CT - CR     7/21/2021 -  Chemotherapy    Subcutaneous Daratumumab, IVIG, denosumab monthly     11/22/2021 -  Cancer Staged    Bone marrow aspirate - MRD 0.0022% positive     2/1/2022 - 2/5/2022 Chemotherapy    IP BMT Chemotherapy For Mobilization - High Dose Cyclophosphamide  Plan Provider: Julio C Guillory MD  Treatment goal: Other  Line of treatment: [No plan line of treatment]     5/25/2022 - 11/23/2022 Chemotherapy    IP - OP BMT 2016-35 Auto Multiple Myeloma - Melphalan (CrCL < 30 mL/min, >/= 2 comorbidities, OR age > 75yrs) - Adult (Version: 7/13/21)  Plan Provider: Julio C Guillory MD  Treatment goal: Other  Line of treatment: [No plan line of treatment]     9/29/2022 - 3/9/2023 Supportive Treatment    Oral ONC - ixazomib maintenance  post-transplant  Plan Provider: Julio C Guillory MD  Treatment goal: Curative  Line of treatment: [No plan line of treatment]     4/21/2023 -  Supportive Treatment    OP ONC Zoledronic Acid (Zometa) MONTHLY  Plan Provider: Julio C Guillory MD  Treatment goal: Palliative  Line of treatment: Maintenance     4/24/2023 -  Chemotherapy    OP ONC Multiple Myeloma - Elotuzumab / Pomalidomide / Dexamethasone (<75 years old)  Plan Provider: Julio C Guillory MD  Treatment goal: Palliative  Line of treatment: Third Line         ID: Libby Grimaldo is a 71 yo woman, day 346 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 Now on ninlaro maintenance.     INTERIM HISTORY:   Libby comes to the clinic for her elotuzumab infusion. She is feeling well today. She endorses mild nausea prior to the infusion and a cough. Her breathing is better. She denies infectious symptoms.     ROS: 8 point ROS neg other than the symptoms noted above in the HPI.     PHYSICAL EXAM:   Vitals:  Blood pressure 103/68, pulse 82, temperature 99.7  F (37.6  C), temperature source Oral, resp. rate 16, weight 49.4 kg (109 lb), SpO2 95 %.      Wt Readings from Last 4 Encounters:   05/08/23 49.4 kg (109 lb)   05/01/23 48.8 kg (107 lb 8 oz)   04/24/23 50.3 kg (110 lb 12.8 oz)   04/21/23 49 kg (108 lb)     General: NAD   Eyes: GREGG, sclera anicteric   Lungs: CTA bilaterally  Cardiovascular: RRR, no M/R/G   Abd: soft, non tender, non distended  Lymphatics: no edema  Skin: no rashes or petechaie. No thickening/hidebound changes  Neuro: A&O   Additional Findings: Port site NT, no drainage.      Labs:  Lab Results   Component Value Date    WBC 6.0 05/08/2023    ANEU 3.9 06/16/2022    HGB 10.5 (L) 05/08/2023    HCT 32.0 (L) 05/08/2023     (L) 05/08/2023     05/08/2023    POTASSIUM 3.2 (L) 05/08/2023    CHLORIDE 104 05/08/2023    CO2 26 05/08/2023     (H) 05/08/2023    BUN 12.6 05/08/2023    CR 0.99 (H) 05/08/2023    MAG 2.1 04/17/2023    INR 1.40 (H)  05/30/2022    BILITOTAL 0.4 05/08/2023    AST 16 05/08/2023    ALT 22 05/08/2023    ALKPHOS 153 (H) 05/08/2023    PROTTOTAL 5.6 (L) 05/08/2023    ALBUMIN 3.2 (L) 05/08/2023        ASSESSMENT AND PLAN:   Libby Grimaldo is a 71 year old female with 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      Myeloma/BMT/IEC PROTOCOL for 2016-35  - Chemo protocol: HD Melphalan 140mg/m2  Day 0: Cell dose: 3.94x10^6 (s/p cytoxan chemo priming 2/1/2022 and subsequent stem cell collection).   - Restaging plan: per protocol.  - Maintenance Ninlaro started early Oct 2022; Day 28 (11/4) and stopped in January 2023        HEME/COAG  - anemia, thrombocytopenia due to chemo/BMT. Counts stable today; monitor on Ninlaro.  - Relevant thrombosis or bleeding history:   2/15/22: new non-occlusive LUE DVT. No longer on xarelto      ID  - Prophylaxis plan: ACV, Bactrim    - Covid vacc #1 (post BMT) and flu shot 10/3/22     CARDIOVASCULAR  - Risk of cardiomyopathy:  Baseline EF 56%  - known hyperlipidemia- per notes previously on Crestor but not on med list - follow-up with PCP if needed    History of Afib - now in NSR but tachy. Continue metoprolol XL 25 mg/d      RESPIRATORY  - hx of COPD, continue breo-ellipa inhaler      GI/NUTRITION  - CINV: continues on Zyprexa bid, Zofran PRN & Compazine prn.     - Ulcer prophylaxis: protonix 40mg daily.        RENAL/ELECTROLYTES/:  - hypokalemia - she will increase her K rich foods. Add metamucil.     PSYCH  - continue effexor 75mg tid     SYMPTOM MANAGEMENT.  - # Pain Assessment:  - Libby is experiencing pain due to chronic pain - Continue suboxone and prn oxycodone.      Respiratory infection  - much improved after abx    Plan  - RTC on 5/15 for labs and elotuzumab infusion, appointment with Dr. Guillory 5/22       30 minutes spent on the date of the encounter doing chart review, history and exam, lab review, documentation and coordniating care.    DAVID Leblanc-C  [phone]

## 2023-05-08 NOTE — PROGRESS NOTES
BMT Progress Note    BMT Physician: Dr. Guillory/Rashad Chen     Oncology History   Multiple myeloma not having achieved remission (H)   11/7/2018 -  Cancer Staged    Staging form: Plasma Cell Myeloma and Disorders, AJCC 8th Edition  - Clinical stage from 11/7/2018: Albumin (g/dL): 3.4, ISS: Stage II, High-risk cytogenetics: Absent, LDH: Unknown     11/7/2018 Initial Diagnosis    Multiple myeloma not having achieved remission (H)     1/23/2019 - 11/14/2019 Chemotherapy    KRD x 6 cycles     11/4/2019 - 2/14/2021 Chemotherapy    Rev/Dex(20)      4/3/2020 - 7/17/2020 Chemotherapy    Velcade maintenance - dose reductions for orthostatic hypotension     7/17/2020 - 12/15/2020 Chemotherapy    Revlimid maintenance     11/23/2020 Progression    Bone marrow 35% plasma cells, PET CT demonstrating new lytic lesions     12/15/2020 - 6/11/2021 Chemotherapy    Elsy, Kd x 6 cycles     7/7/2021 -  Cancer Staged    PET/CT - CR     7/21/2021 -  Chemotherapy    Subcutaneous Daratumumab, IVIG, denosumab monthly     11/22/2021 -  Cancer Staged    Bone marrow aspirate - MRD 0.0022% positive     2/1/2022 - 2/5/2022 Chemotherapy    IP BMT Chemotherapy For Mobilization - High Dose Cyclophosphamide  Plan Provider: Julio C Giullory MD  Treatment goal: Other  Line of treatment: [No plan line of treatment]     5/25/2022 - 11/23/2022 Chemotherapy    IP - OP BMT 2016-35 Auto Multiple Myeloma - Melphalan (CrCL < 30 mL/min, >/= 2 comorbidities, OR age > 75yrs) - Adult (Version: 7/13/21)  Plan Provider: Julio C Guillory MD  Treatment goal: Other  Line of treatment: [No plan line of treatment]     9/29/2022 - 3/9/2023 Supportive Treatment    Oral ONC - ixazomib maintenance post-transplant  Plan Provider: Julio C Guillory MD  Treatment goal: Curative  Line of treatment: [No plan line of treatment]     4/21/2023 -  Supportive Treatment    OP ONC Zoledronic Acid (Zometa) MONTHLY  Plan Provider: Julio C Guillory MD  Treatment goal: Palliative  Line of  treatment: Maintenance     4/24/2023 -  Chemotherapy    OP ONC Multiple Myeloma - Elotuzumab / Pomalidomide / Dexamethasone (<75 years old)  Plan Provider: Julio C Guillory MD  Treatment goal: Palliative  Line of treatment: Third Line         ID: Libby Grimaldo is a 73 yo woman, day 346 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 Now on ninlaro maintenance.     INTERIM HISTORY:   Libby comes to the clinic for her elotuzumab infusion. She is feeling well today. She endorses mild nausea prior to the infusion and a cough. Her breathing is better. She denies infectious symptoms.     ROS: 8 point ROS neg other than the symptoms noted above in the HPI.     PHYSICAL EXAM:   Vitals:  Blood pressure 103/68, pulse 82, temperature 99.7  F (37.6  C), temperature source Oral, resp. rate 16, weight 49.4 kg (109 lb), SpO2 95 %.      Wt Readings from Last 4 Encounters:   05/08/23 49.4 kg (109 lb)   05/01/23 48.8 kg (107 lb 8 oz)   04/24/23 50.3 kg (110 lb 12.8 oz)   04/21/23 49 kg (108 lb)     General: NAD   Eyes: GREGG, sclera anicteric   Lungs: CTA bilaterally  Cardiovascular: RRR, no M/R/G   Abd: soft, non tender, non distended  Lymphatics: no edema  Skin: no rashes or petechaie. No thickening/hidebound changes  Neuro: A&O   Additional Findings: Port site NT, no drainage.      Labs:  Lab Results   Component Value Date    WBC 6.0 05/08/2023    ANEU 3.9 06/16/2022    HGB 10.5 (L) 05/08/2023    HCT 32.0 (L) 05/08/2023     (L) 05/08/2023     05/08/2023    POTASSIUM 3.2 (L) 05/08/2023    CHLORIDE 104 05/08/2023    CO2 26 05/08/2023     (H) 05/08/2023    BUN 12.6 05/08/2023    CR 0.99 (H) 05/08/2023    MAG 2.1 04/17/2023    INR 1.40 (H) 05/30/2022    BILITOTAL 0.4 05/08/2023    AST 16 05/08/2023    ALT 22 05/08/2023    ALKPHOS 153 (H) 05/08/2023    PROTTOTAL 5.6 (L) 05/08/2023    ALBUMIN 3.2 (L) 05/08/2023        ASSESSMENT AND PLAN:   Libby Grimaldo is a 71 year old female with high risk IgG lambda  MM.      Myeloma/BMT/IEC PROTOCOL for 2016-35  - Chemo protocol: HD Melphalan 140mg/m2  Day 0: Cell dose: 3.94x10^6 (s/p cytoxan chemo priming 2/1/2022 and subsequent stem cell collection).   - Restaging plan: per protocol.  - Maintenance Ninlaro started early Oct 2022; Day 28 (11/4) and stopped in January 2023        HEME/COAG  - anemia, thrombocytopenia due to chemo/BMT. Counts stable today; monitor on Ninlaro.  - Relevant thrombosis or bleeding history:   2/15/22: new non-occlusive LUE DVT. No longer on xarelto      ID  - Prophylaxis plan: ACV, Bactrim    - Covid vacc #1 (post BMT) and flu shot 10/3/22     CARDIOVASCULAR  - Risk of cardiomyopathy:  Baseline EF 56%  - known hyperlipidemia- per notes previously on Crestor but not on med list - follow-up with PCP if needed    History of Afib - now in NSR but tachy. Continue metoprolol XL 25 mg/d      RESPIRATORY  - hx of COPD, continue breo-ellipa inhaler      GI/NUTRITION  - CINV: continues on Zyprexa bid, Zofran PRN & Compazine prn.     - Ulcer prophylaxis: protonix 40mg daily.        RENAL/ELECTROLYTES/:  - hypokalemia - she will increase her K rich foods. Add metamucil.     PSYCH  - continue effexor 75mg tid     SYMPTOM MANAGEMENT.  - # Pain Assessment:  - Libby is experiencing pain due to chronic pain - Continue suboxone and prn oxycodone.      Respiratory infection  - much improved after abx    Plan  - RTC on 5/15 for labs and elotuzumab infusion, appointment with Dr. Guillory 5/22       30 minutes spent on the date of the encounter doing chart review, history and exam, lab review, documentation and coordniating care.    DAVID Leblanc-C  [phone]

## 2023-05-08 NOTE — NURSING NOTE
"Oncology Rooming Note    May 8, 2023 11:29 AM   Libby Grimaldo is a 72 year old female who presents for:    Chief Complaint   Patient presents with     Blood Draw     Port blood draw with heparin flush by lab RN. Vitals taken and appointment arrived     RECHECK     MM here for provider visit     Initial Vitals: /68   Pulse 82   Temp 99.7  F (37.6  C) (Oral)   Resp 16   Wt 49.4 kg (109 lb)   SpO2 95%   BMI 18.14 kg/m   Estimated body mass index is 18.14 kg/m  as calculated from the following:    Height as of 9/1/22: 1.651 m (5' 5\").    Weight as of this encounter: 49.4 kg (109 lb). Body surface area is 1.51 meters squared.  No Pain (0) Comment: Data Unavailable   No LMP recorded. Patient is postmenopausal.  Allergies reviewed: Yes  Medications reviewed: Yes    Medications: Medication refills not needed today.  Pharmacy name entered into [facility]:    [id number] - [place], [place] - [address] AT [facility] [address]  [facility] - [place], [place] - [address]  [facility] - [place], [place] - [phone]    Clinical concerns: None      Sai Forrest RN              "

## 2023-05-08 NOTE — PROGRESS NOTES
Infusion Nursing Note:  Libby Grimaldo presents today for elotuzumab infusion.    Patient seen by provider today: Yes: Wendi   present during visit today: Not Applicable.    Note: Labs monitored, Vss, Premeds administered. Cycle 1 day 15 administered at 300cc/hr over 1hr. Pt tolerated infusion. 40meq K+ given to patient to take with meal. Pt c/o nausea. 0.5mg IV lorazepam administered. Antiemetics refilled for pickup.       Intravenous Access:  Implanted Port.    Treatment Conditions:  Results reviewed, labs MET treatment parameters, ok to proceed with treatment.      Post Infusion Assessment:  Patient tolerated infusion without incident.  Access discontinued per protocol.       Discharge Plan:   [facility]  Patient will return May 15th for next appointment.   Patient discharged in stable condition accompanied by: .  Departure Mode: wheelchair.      Sai Forrest RN

## 2023-05-09 NOTE — ORAL ONC MGMT
Oral Chemotherapy Monitoring Program    Subjective/Objective:  Libby Grimaldo is a 72 year old female contacted by phone for a follow-up visit for oral chemotherapy. Libby confirms taking the appropriate dose of pomalidamide, 4 mg daily on days 1-21 of each 28 day cycle. She denies new or worsening side effects, missed doses, and recent hospital or ED visits. She has not had any recent medication changes, but did ask about starting a B complex vitamin and whether this would be reasonable to start.          2/6/2023     2:00 PM 3/9/2023     8:00 AM 3/13/2023    11:00 AM 4/24/2023     2:00 PM 5/2/2023    11:00 AM 5/3/2023    10:00 AM 5/9/2023     1:00 PM   ORAL CHEMOTHERAPY   Assessment Type Refill Refill Discontinuation New Teach Lab Monitoring Incoming phone call Initial Follow up   Stop Date   3/13/2023       Reason for Discontinuation   Disease progression       Diagnosis Code Multiple Myeloma Multiple Myeloma Multiple Myeloma Multiple Myeloma Multiple Myeloma Multiple Myeloma Multiple Myeloma   Providers Dr. Kahlil Guillory   [facility]   Drug Name Ninlaro (ixazomib) Ninlaro (ixazomib) Ninlaro (ixazomib) Pomalyst (pomalidomide) Pomalyst (pomalidomide) Pomalyst (pomalidomide) Pomalyst (pomalidomide)   Dose  2.3 mg  4 mg 4 mg  4 mg   Current Schedule  Weekly  Daily Daily  Daily   Cycle Details  3 weeks on, 1 week off  3 weeks on, 1 week off 3 weeks on, 1 week off  3 weeks on, 1 week off   Start Date of Last Cycle       5/1/2023   Planned next cycle start date       5/29/2023   Doses missed in last 2 weeks       0   Adherence Assessment       Adherent   Adverse Effects       No AE identified during assessment   Any new drug interactions?    Yes   No   Is the dose as ordered appropriate for the patient?    Yes   Yes       Last PHQ-2 Score on record:       1/31/2022     9:57 AM   PHQ-2 ( 1999  "[employer])   Q1: Little interest or pleasure in doing things 0   Q2: Feeling down, depressed or hopeless 0   PHQ-2 Score 0       Vitals:  BP:   BP Readings from Last 1 Encounters:   05/08/23 103/68     Wt Readings from Last 1 Encounters:   05/08/23 49.4 kg (109 lb)     Estimated body surface area is 1.51 meters squared as calculated from the following:    Height as of 9/1/22: 1.651 m (5' 5\").    Weight as of 5/8/23: 49.4 kg (109 lb).    Labs:  _  Result Component Current Result Ref Range   Sodium 140 (5/8/2023) 136 - 145 mmol/L     _  Result Component Current Result Ref Range   Potassium 3.2 (L) (5/8/2023) 3.4 - 5.3 mmol/L     _  Result Component Current Result Ref Range   Calcium 8.8 (5/8/2023) 8.8 - 10.2 mg/dL     _  Result Component Current Result Ref Range   Magnesium 2.1 (4/17/2023) 1.7 - 2.3 mg/dL     _  Result Component Current Result Ref Range   Phosphorus 4.6 (H) (4/17/2023) 2.5 - 4.5 mg/dL     _  Result Component Current Result Ref Range   Albumin 3.2 (L) (5/8/2023) 3.5 - 5.2 g/dL     _  Result Component Current Result Ref Range   Urea Nitrogen 12.6 (5/8/2023) 8.0 - 23.0 mg/dL     _  Result Component Current Result Ref Range   Creatinine 0.99 (H) (5/8/2023) 0.51 - 0.95 mg/dL     _  Result Component Current Result Ref Range   AST 16 (5/8/2023) 10 - 35 U/L     _  Result Component Current Result Ref Range   ALT 22 (5/8/2023) 10 - 35 U/L     _  Result Component Current Result Ref Range   Bilirubin Total 0.4 (5/8/2023) <=1.2 mg/dL     _  Result Component Current Result Ref Range   WBC Count 6.0 (5/8/2023) 4.0 - 11.0 10e3/uL     _  Result Component Current Result Ref Range   Hemoglobin 10.5 (L) (5/8/2023) 11.7 - 15.7 g/dL     _  Result Component Current Result Ref Range   Platelet Count 137 (L) (5/8/2023) 150 - 450 10e3/uL     No results found for ANC within last 30 days.     _  Result Component Current Result Ref Range   Absolute Neutrophils 4.2 (5/8/2023) 1.6 - 8.3 10e3/uL        Assessment/Plan:  Libby vivas " tolerating therapy with Pomalyst at this time with minimal side effects. She will continue to take as prescribed. I reviewed her medication list and let her know that she can start a B complex vitamin as well if desired; there are no concerning drug interactions with this addition.    Follow-Up:  5/15 next labs in infusion    Gilda Hernandez, PharmD, [facility]  Oral Chemotherapy Monitoring Program  [facility]  [phone]

## 2023-05-15 NOTE — NURSING NOTE
"Oncology Rooming Note    May 15, 2023 2:07 PM   Libby Grimaldo is a 72 year old female who presents for:    Chief Complaint   Patient presents with     Port Draw     Labs drawn via port by RN in lab. VS taken.      Infusion     Scheduled Infusion r/t MM     Initial Vitals: /74 (BP Location: Left arm, Patient Position: Sitting)   Pulse 73   Temp 97.9  F (36.6  C) (Oral)   Resp 16   Wt 48.7 kg (107 lb 4.8 oz)   SpO2 97%   BMI 17.86 kg/m   Estimated body mass index is 17.86 kg/m  as calculated from the following:    Height as of 9/1/22: 1.651 m (5' 5\").    Weight as of this encounter: 48.7 kg (107 lb 4.8 oz). Body surface area is 1.49 meters squared.  No Pain (0) Comment: Data Unavailable   No LMP recorded. Patient is postmenopausal.  Allergies reviewed: Yes  Medications reviewed: Yes    Medications: Medication refills not needed today.  Pharmacy name entered into [facility]:    [id number] - [place], [place] - [address] AT [facility] [address] [facility] - [place], [place] - [address]  [facility] - [place], [place] - [address] [facility] [facility] [phone]      Ana Hickey RN              "

## 2023-05-15 NOTE — PROGRESS NOTES
Infusion Nursing Note:  Libby ARRIETA Harpreetzaid presents today for Elotuzumab and 40 mEq PO K+   Patient seen by provider today: No   present during visit today: Not Applicable.    Note: Per Treatment Plan.      Intravenous Access:  Port  Flushes well, +BR.     Treatment Conditions:  Lab Results   Component Value Date     05/15/2023    POTASSIUM 3.3 (L) 05/15/2023    MAG 2.1 04/17/2023    CR 1.10 (H) 05/15/2023    DIANDRA 8.4 (L) 05/15/2023    DIANDRA 8.4 (L) 05/15/2023    BILITOTAL 0.4 05/08/2023    ALBUMIN 3.3 (L) 05/15/2023    ALT 22 05/08/2023    AST 16 05/08/2023     Pt received Elotuzumab per Treatment plan. Premeds given per plan (no dexamethasone d/t pt taking medication at home--okay per Dr. Guillory). VSS. Pt also received 40 mEq PO K+ per electrolyte protocol.     Post Infusion Assessment:  Patient tolerated infusion without incident.       Discharge Plan:   Patient discharged in stable condition accompanied by: family member.      Ana Hickey RN

## 2023-05-22 NOTE — PROGRESS NOTES
BMT Progress Note    BMT Physician: Dr. Guillory/Rashad Chen     Oncology History   Multiple myeloma not having achieved remission (H)   11/7/2018 -  Cancer Staged    Staging form: Plasma Cell Myeloma and Disorders, AJCC 8th Edition  - Clinical stage from 11/7/2018: Albumin (g/dL): 3.4, ISS: Stage II, High-risk cytogenetics: Absent, LDH: Unknown     11/7/2018 Initial Diagnosis    Multiple myeloma not having achieved remission (H)     1/23/2019 - 11/14/2019 Chemotherapy    KRD x 6 cycles     11/4/2019 - 2/14/2021 Chemotherapy    Rev/Dex(20)      4/3/2020 - 7/17/2020 Chemotherapy    Velcade maintenance - dose reductions for orthostatic hypotension     7/17/2020 - 12/15/2020 Chemotherapy    Revlimid maintenance     11/23/2020 Progression    Bone marrow 35% plasma cells, PET CT demonstrating new lytic lesions     12/15/2020 - 6/11/2021 Chemotherapy    Elsy, Kd x 6 cycles     7/7/2021 -  Cancer Staged    PET/CT - CR     7/21/2021 -  Chemotherapy    Subcutaneous Daratumumab, IVIG, denosumab monthly     11/22/2021 -  Cancer Staged    Bone marrow aspirate - MRD 0.0022% positive     2/1/2022 - 2/5/2022 Chemotherapy    IP BMT Chemotherapy For Mobilization - High Dose Cyclophosphamide  Plan Provider: Julio C Guillory MD  Treatment goal: Other  Line of treatment: [No plan line of treatment]     5/25/2022 - 11/23/2022 Chemotherapy    IP - OP BMT 2016-35 Auto Multiple Myeloma - Melphalan (CrCL < 30 mL/min, >/= 2 comorbidities, OR age > 75yrs) - Adult (Version: 7/13/21)  Plan Provider: Julio C Guillory MD  Treatment goal: Other  Line of treatment: [No plan line of treatment]     9/29/2022 - 3/9/2023 Supportive Treatment    Oral ONC - ixazomib maintenance post-transplant  Plan Provider: Julio C Guilolry MD  Treatment goal: Curative  Line of treatment: [No plan line of treatment]     4/21/2023 -  Supportive Treatment    OP ONC Zoledronic Acid (Zometa) MONTHLY  Plan Provider: Julio C Guillory MD  Treatment goal: Palliative  Line of  treatment: Maintenance     4/24/2023 -  Chemotherapy    OP ONC Multiple Myeloma - Elotuzumab / Pomalidomide / Dexamethasone (<75 years old)  Plan Provider: Julio C Guillory MD  Treatment goal: Palliative  Line of treatment: Third Line         ID: Libby Grimaldo is a 71 yo woman, 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 Now on ninlaro maintenance.     INTERIM HISTORY:   Libby returns to clinic after completing her first month of treatment with elotuzumab, pomalidomide, dexamethasone.  She states for the most part the treatment went well although she did have some fatigue that she attributes to the pomalidomide.  She had no reactions to elotuzumab.  She has no distinct myeloma related symptoms to report.    ROS: 8 point ROS neg other than the symptoms noted above in the HPI.     PHYSICAL EXAM:   Vitals:  Blood pressure 103/59, pulse 97, temperature 99.6  F (37.6  C), resp. rate 16, weight 49.5 kg (109 lb 3.2 oz), SpO2 97 %.      Wt Readings from Last 4 Encounters:   05/22/23 49.5 kg (109 lb 3.2 oz)   05/15/23 48.7 kg (107 lb 4.8 oz)   05/08/23 49.4 kg (109 lb)   05/01/23 48.8 kg (107 lb 8 oz)     General: NAD   Eyes: GREGG, sclera anicteric   Lymphatics: no edema  Skin: no rashes or petechaie. No thickening/hidebound changes  Neuro: A&O   Additional Findings: Port site NT, no drainage.      Labs:  Lab Results   Component Value Date    WBC 2.3 (L) 05/22/2023    ANEU 3.9 06/16/2022    HGB 10.2 (L) 05/22/2023    HCT 31.4 (L) 05/22/2023     (L) 05/22/2023     05/22/2023    POTASSIUM 3.3 (L) 05/22/2023    CHLORIDE 105 05/22/2023    CO2 28 05/22/2023     (H) 05/22/2023    BUN 12.5 05/22/2023    CR 0.96 (H) 05/22/2023    MAG 2.1 04/17/2023    INR 1.40 (H) 05/30/2022    BILITOTAL 0.4 05/22/2023    AST 10 05/22/2023    ALT 10 05/22/2023    ALKPHOS 127 (H) 05/22/2023    PROTTOTAL 5.2 (L) 05/22/2023    ALBUMIN 3.1 (L) 05/22/2023        ASSESSMENT AND PLAN:   Libby Grimaldo is a 71 year old female with high  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      Myeloma/BMT/IEC PROTOCOL for 2016-35  - Chemo protocol: HD Melphalan 140mg/m2  Day 0: Cell dose: 3.94x10^6 (s/p cytoxan chemo priming 2/1/2022 and subsequent stem cell collection).   - Restaging plan: per protocol.  - Maintenance Ninlaro started early Oct 2022; Day 28 (11/4) and stopped in January 2023  -Elotuzumab, pomalidomide, dexamethasone initiated April 2023        HEME/COAG  - anemia, thrombocytopenia due to chemo/BMT. Counts stable today; monitor on Ninlaro.  - Relevant thrombosis or bleeding history:   2/15/22: new non-occlusive LUE DVT. No longer on xarelto      ID  - Prophylaxis plan: ACV, Bactrim    - Covid vacc #1 (post BMT) and flu shot 10/3/22     CARDIOVASCULAR  - Risk of cardiomyopathy:  Baseline EF 56%  - known hyperlipidemia- per notes previously on Crestor but not on med list - follow-up with PCP if needed    History of Afib - now in NSR but tachy. Continue metoprolol XL 25 mg/d      RESPIRATORY  - hx of COPD, continue breo-ellipa inhaler      GI/NUTRITION  - CINV: continues on Zyprexa bid, Zofran PRN & Compazine prn.     - Ulcer prophylaxis: protonix 40mg daily.        RENAL/ELECTROLYTES/:  - hypokalemia - she will increase her K rich foods. Add metamucil.     PSYCH  - continue effexor 75mg tid     SYMPTOM MANAGEMENT.  - # Pain Assessment:  - Libby is experiencing pain due to chronic pain - Continue suboxone and prn oxycodone.      Respiratory infection  - much improved after abx    Plan  - Hold pomalidomide until 5/29 (patient started pom late so schedule shifted to day 7-28)  - continue isaias/pom/dex   - ophtho referral    35 minutes spent on the date of the encounter doing chart review, history and exam, lab review, documentation and coordniating care.    SOTO BOWSER MD  May 22, 2023

## 2023-05-22 NOTE — NURSING NOTE
"Oncology Rooming Note    May 22, 2023 10:05 AM   Libby Grimaldo is a 72 year old female who presents for:    Chief Complaint   Patient presents with     Port Draw     Power needle. Heparin locked, vitals checked     Oncology Clinic Visit     RTN for S/P BMT for MM     Initial Vitals: Blood Pressure 103/59   Pulse 97   Temperature 99.6  F (37.6  C)   Respiration 16   Weight 49.5 kg (109 lb 3.2 oz)   Oxygen Saturation 97%   Body Mass Index 18.17 kg/m   Estimated body mass index is 18.17 kg/m  as calculated from the following:    Height as of 9/1/22: 1.651 m (5' 5\").    Weight as of this encounter: 49.5 kg (109 lb 3.2 oz). Body surface area is 1.51 meters squared.  No Pain (0) Comment: Data Unavailable   No LMP recorded. Patient is postmenopausal.  Allergies reviewed: Yes  Medications reviewed: Yes    Medications: Medication refills not needed today.  Pharmacy name entered into [facility]:    [id number] - [place], [place] - [address] AT [facility] [address]  [facility] - [place], [place] - [address]  [facility] [place], [place] - [phone]    Clinical concerns: none       Jeanine Weaver MA            "

## 2023-05-22 NOTE — PROGRESS NOTES
Called PT and left VM.    Called to remind patient of their upcoming appointment with our GI clinic, on 06/01/23 at 9:30 AM with Dr. Pelon Walker. This appointment is scheduled as a video visit. You will receive a call approximately 30 minutes prior to check you in, you must be in [place] for this visit., if your appointment is virtual (video or telephone) you need to be in [place] for the visit. To reschedule or cancel patient to call [phone].      SK

## 2023-05-22 NOTE — PROGRESS NOTES
Infusion Nursing Note:  Libby Grimaldo presents today for zometa and elotuzumab.    Patient seen by provider today: Yes: Dr. Guillory   present during visit today: Not Applicable.    Note: Libby here today for scheduled zometa and C2D1 Elotuzumab. Parameters monitored, per Dr. Guillory, ok to proceed with elotuzumab despite ANC below parameter. Pt has not had time off from pomalyst and will now hold pomalyst x1wk. Pt was premedicated with tylenol, benadryl, pepcid, took decadron at home. Also received 40mEQ KCL. Zometa administered via port per orders, parameters observed.      Intravenous Access:  Implanted Port.    Treatment Conditions:  Lab Results   Component Value Date    HGB 10.2 (L) 05/22/2023    WBC 2.3 (L) 05/22/2023    ANEU 3.9 06/16/2022    ANEUTAUTO 0.5 (L) 05/22/2023     (L) 05/22/2023      Lab Results   Component Value Date     05/22/2023    POTASSIUM 3.3 (L) 05/22/2023    MAG 2.1 04/17/2023    CR 0.96 (H) 05/22/2023    DIANDRA 8.5 (L) 05/22/2023    BILITOTAL 0.4 05/22/2023    ALBUMIN 3.1 (L) 05/22/2023    ALT 10 05/22/2023    AST 10 05/22/2023     Results reviewed, labs MET treatment parameters for Zometa, ok to proceed with treatment.  Results reviewed, labs did NOT meet treatment parameters for Elotuzumab: ANC 0.5, ok'd to proceed per Dr. Guillory.      Post Infusion Assessment:  Patient tolerated infusion without incident.  Blood return noted pre and post infusion.  Site patent and intact, free from redness, edema or discomfort.  No evidence of extravasations.  Access discontinued per protocol.       Discharge Plan:   Patient discharged in stable condition accompanied by: .  Departure Mode: Ambulatory.      Sarahy Cuevas RN

## 2023-05-22 NOTE — LETTER
5/22/2023         RE: Libby Grimaldo  [address] [facility] [address]        Dear Colleague,    Thank you for referring your patient, Libby Grimaldo, to the [facility] [place]. Please see a copy of my visit note below.      BMT Progress Note    BMT Physician: Dr. Guillory/Rashad Chen     Oncology History   Multiple myeloma not having achieved remission (H)   11/7/2018 -  Cancer Staged    Staging form: Plasma Cell Myeloma and Disorders, AJCC 8th Edition  - Clinical stage from 11/7/2018: Albumin (g/dL): 3.4, ISS: Stage II, High-risk cytogenetics: Absent, LDH: Unknown     11/7/2018 Initial Diagnosis    Multiple myeloma not having achieved remission (H)     1/23/2019 - 11/14/2019 Chemotherapy    KRD x 6 cycles     11/4/2019 - 2/14/2021 Chemotherapy    Rev/Dex(20)      4/3/2020 - 7/17/2020 Chemotherapy    Velcade maintenance - dose reductions for orthostatic hypotension     7/17/2020 - 12/15/2020 Chemotherapy    Revlimid maintenance     11/23/2020 Progression    Bone marrow 35% plasma cells, PET CT demonstrating new lytic lesions     12/15/2020 - 6/11/2021 Chemotherapy    Elsy, Kd x 6 cycles     7/7/2021 -  Cancer Staged    PET/CT - CR     7/21/2021 -  Chemotherapy    Subcutaneous Daratumumab, IVIG, denosumab monthly     11/22/2021 -  Cancer Staged    Bone marrow aspirate - MRD 0.0022% positive     2/1/2022 - 2/5/2022 Chemotherapy    IP BMT Chemotherapy For Mobilization - High Dose Cyclophosphamide  Plan Provider: Julio C Guillory MD  Treatment goal: Other  Line of treatment: [No plan line of treatment]     5/25/2022 - 11/23/2022 Chemotherapy    IP - OP BMT 2016-35 Auto Multiple Myeloma - Melphalan (CrCL < 30 mL/min, >/= 2 comorbidities, OR age > 75yrs) - Adult (Version: 7/13/21)  Plan Provider: Julio C Guillory MD  Treatment goal: Other  Line of treatment: [No plan line of treatment]     9/29/2022 - 3/9/2023 Supportive Treatment    Oral ONC - ixazomib maintenance  post-transplant  Plan Provider: Julio C Guillory MD  Treatment goal: Curative  Line of treatment: [No plan line of treatment]     4/21/2023 -  Supportive Treatment    OP ONC Zoledronic Acid (Zometa) MONTHLY  Plan Provider: Julio C Guillory MD  Treatment goal: Palliative  Line of treatment: Maintenance     4/24/2023 -  Chemotherapy    OP ONC Multiple Myeloma - Elotuzumab / Pomalidomide / Dexamethasone (<75 years old)  Plan Provider: Julio C Guillory MD  Treatment goal: Palliative  Line of treatment: Third Line         ID: Libby Grimaldo is a 71 yo woman, 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 Now on ninlaro maintenance.     INTERIM HISTORY:   Libby returns to clinic after completing her first month of treatment with elotuzumab, pomalidomide, dexamethasone.  She states for the most part the treatment went well although she did have some fatigue that she attributes to the pomalidomide.  She had no reactions to elotuzumab.  She has no distinct myeloma related symptoms to report.    ROS: 8 point ROS neg other than the symptoms noted above in the HPI.     PHYSICAL EXAM:   Vitals:  Blood pressure 103/59, pulse 97, temperature 99.6  F (37.6  C), resp. rate 16, weight 49.5 kg (109 lb 3.2 oz), SpO2 97 %.      Wt Readings from Last 4 Encounters:   05/22/23 49.5 kg (109 lb 3.2 oz)   05/15/23 48.7 kg (107 lb 4.8 oz)   05/08/23 49.4 kg (109 lb)   05/01/23 48.8 kg (107 lb 8 oz)     General: NAD   Eyes: GREGG, sclera anicteric   Lymphatics: no edema  Skin: no rashes or petechaie. No thickening/hidebound changes  Neuro: A&O   Additional Findings: Port site NT, no drainage.      Labs:  Lab Results   Component Value Date    WBC 2.3 (L) 05/22/2023    ANEU 3.9 06/16/2022    HGB 10.2 (L) 05/22/2023    HCT 31.4 (L) 05/22/2023     (L) 05/22/2023     05/22/2023    POTASSIUM 3.3 (L) 05/22/2023    CHLORIDE 105 05/22/2023    CO2 28 05/22/2023     (H) 05/22/2023    BUN 12.5 05/22/2023    CR 0.96 (H) 05/22/2023    MAG 2.1  04/17/2023    INR 1.40 (H) 05/30/2022    BILITOTAL 0.4 05/22/2023    AST 10 05/22/2023    ALT 10 05/22/2023    ALKPHOS 127 (H) 05/22/2023    PROTTOTAL 5.2 (L) 05/22/2023    ALBUMIN 3.1 (L) 05/22/2023        ASSESSMENT AND PLAN:   Libby Grimaldo is a 71 year old female with 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      Myeloma/BMT/IEC PROTOCOL for 2016-35  - Chemo protocol: HD Melphalan 140mg/m2  Day 0: Cell dose: 3.94x10^6 (s/p cytoxan chemo priming 2/1/2022 and subsequent stem cell collection).   - Restaging plan: per protocol.  - Maintenance Ninlaro started early Oct 2022; Day 28 (11/4) and stopped in January 2023  -Elotuzumab, pomalidomide, dexamethasone initiated April 2023        HEME/COAG  - anemia, thrombocytopenia due to chemo/BMT. Counts stable today; monitor on Ninlaro.  - Relevant thrombosis or bleeding history:   2/15/22: new non-occlusive LUE DVT. No longer on xarelto      ID  - Prophylaxis plan: ACV, Bactrim    - Covid vacc #1 (post BMT) and flu shot 10/3/22     CARDIOVASCULAR  - Risk of cardiomyopathy:  Baseline EF 56%  - known hyperlipidemia- per notes previously on Crestor but not on med list - follow-up with PCP if needed    History of Afib - now in NSR but tachy. Continue metoprolol XL 25 mg/d      RESPIRATORY  - hx of COPD, continue breo-ellipa inhaler      GI/NUTRITION  - CINV: continues on Zyprexa bid, Zofran PRN & Compazine prn.     - Ulcer prophylaxis: protonix 40mg daily.        RENAL/ELECTROLYTES/:  - hypokalemia - she will increase her K rich foods. Add metamucil.     PSYCH  - continue effexor 75mg tid     SYMPTOM MANAGEMENT.  - # Pain Assessment:  - Libby is experiencing pain due to chronic pain - Continue suboxone and prn oxycodone.      Respiratory infection  - much improved after abx    Plan  - Hold pomalidomide until 5/29 (patient started pom late so schedule shifted to day 7-28)  - continue isaias/pom/dex   - ophtho referral    35 minutes spent on the date of the encounter doing chart review, history  and exam, lab review, documentation and coordniating care.    SOTO BOWSER MD  May 22, 2023

## 2023-05-22 NOTE — TELEPHONE ENCOUNTER
Oral Chemotherapy Monitoring Program    Medication: Pomalyst  Rx:  4mg PO daily 21/28 ds    Auth #: [id number]  Risk Category: Adult female NOT of reproductive capacity        Routine survey questions reviewed. yes

## 2023-05-22 NOTE — NURSING NOTE
Chief Complaint   Patient presents with     Port Draw     Power needle. Heparin locked, vitals checked     Christen Knog RN on 5/22/2023 at 9:54 AM

## 2023-05-24 NOTE — PROGRESS NOTES
Called PT and left VM.     Called to remind patient of their upcoming appointment with our GI clinic, on 06/01/23 at 9:30 AM with Dr. Pelon Walker. This appointment is scheduled as a video visit. You will receive a call approximately 30 minutes prior to check you in, you must be in [place] for this visit., if your appointment is virtual (video or telephone) you need to be in [place] for the visit. To reschedule or cancel patient to call [phone].        SK

## 2023-06-01 NOTE — PATIENT INSTRUCTIONS
Follow up:    Dr. Walker has outlined the following steps after your recent clinic visit:      Plan: repeat MRI/MRCP with IV constrast of the pancreas/abdoimen in one year; follow up with oncology regarding the paraspinal mass enlargement      Please call with any questions or concerns regarding your clinic visit today.    It is a pleasure being involved in your health care.    Contacts post-consultation depending on your need:    Schedule Clinic Appointments            [phone] # 1   M-F 7:30 - 5 pm    Kendy Murphy, RN Care Coordinator (Dr. Walker/Dr. Castaneda)  [phone]    Pat Rolon, RN Care Coordinator (Dr. Dawn)   [phone]    Meredith Ramsey, RN Care Coordinator (Dr. Fernandez/Dr. Skinner)  [phone]      For urgent/emergent questions after business hours, you may reach the on-call GI Fellow by contacting the [facility]  at [phone].    How do I schedule labs, imaging studies, or procedures that were ordered in clinic today?     Labs: To schedule lab appointment at the [facility], use my chart or call [phone]. If you have a [place] lab closer to home where you are regularly seen you can give them a call.     Procedures: If a colonoscopy, upper endoscopy, breath test, esophageal manometry, or pH impedence was ordered today, our endoscopy team will call you to schedule this. If you have not heard from our endoscopy team within a week, please call [phone] to schedule.     Imaging Studies: If you were scheduled for a CT scan, X-ray, MRI, ultrasound, HIDA scan or other imaging study, please call [phone] to have this scheduled.     Referral: If a referral to another specialty was ordered, expect a phone call or follow instructions above. If you have not heard from anyone regarding your referral in a week, please call our clinic to check the status.     How to I schedule a follow-up visit?  If you did not schedule a follow-up visit today,  please call [phone] to schedule a follow-up office visit.

## 2023-06-01 NOTE — NURSING NOTE
Is the patient currently in the state of [place]? YES    Visit mode:VIDEO    If the visit is dropped, the patient can be reconnected by: VIDEO VISIT: Text to cell phone: [phone]    Will anyone else be joining the visit? NO      How would you like to obtain your AVS? MyChart    Are changes needed to the allergy or medication list? NO    Reason for visit: Consult

## 2023-06-01 NOTE — PROGRESS NOTES
"    [facility]    The patient has been notified of following:     \"This video visit will be conducted via a call between you and your physician/provider. We have found that certain health care needs can be provided without the need for an in-person physical exam.  This service lets us provide the care you need with a video conversation.  If a prescription is necessary we can send it directly to your pharmacy.  If lab work is needed we can place an order for that and you can then stop by our lab to have the test done at a later time.    Video visits are billed at different rates depending on your insurance coverage.  Please reach out to your insurance provider with any questions.    If during the course of the call the physician/provider feels a video visit is not appropriate, you will not be charged for this service.\"    Patient has given verbal consent for Video visit? Yes  How would you like to obtain your AVS? My Chart  If you are dropped from the video visit, the video invite should be resent to: Cell phone  Will anyone else be joining your video visit? No  {If patient encounters technical issues they should call [phone]     Video-Visit Details    Type of service:  Video Visit    Video Start Time: 0930  Video End Time: 1015    Originating Location (pt. Location): Home    Distant Location (provider location):  [facility] [place]     Platform used for Video Visit: [facility]    Referring provider  Leah Edwards  Query Pancreatic cysts, dilated pancreaticobiliary ducts    HPI  Ms Grimaldo is a 73yo woman with multiple myeloma status post peripheral blood stem cell transplant on chemotherapy found incidentally to have diffuse biliary dilation. We organized a repeat MRI/MRCP which confirmed these findings and excluded any obstructing lesion such as mass or stone. 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 The main duct also remains dilated to 7mm though is also stable and without associated mass. There is also a paraspinal mass that is thought to be related to her MM. Her liver studies are essentially normal save for a mild isolated elevation of AP.    She is a former tobacco user and has never abused alcohol. She has no family history of pancreatic disease. She is not obese.    She notes intermittent, mild lower right sided pain, improved with time. This does appears to be related to constipation. She has just initiated Miralax with improvement. Her appetite is poor as everything tastes like cardboard.    Review of Systems   ROS COMP: Constitutional, HEENT, cardiovascular, pulmonary, GI, , musculoskeletal, neuro, skin, endocrine and psych systems are negative, except as otherwise noted.    Medications  Current Outpatient Medications   Medication     acetaminophen (TYLENOL) 500 MG tablet     acyclovir (ZOVIRAX) 400 MG tablet     albuterol (PROAIR HFA/PROVENTIL HFA/VENTOLIN HFA) 108 (90 Base) MCG/ACT inhaler     ascorbic acid 1000 MG TABS tablet     aspirin (ASA) 325 MG tablet     dexamethasone (DECADRON) 4 MG tablet     dexamethasone (DECADRON) 4 MG tablet     Fluticasone-Umeclidin-Vilanterol (TRELEGY ELLIPTA) 100-62.5-25 MCG/INH oral inhaler     guaiFENesin (MUCINEX) 600 MG 12 hr tablet     levothyroxine (SYNTHROID/LEVOTHROID) 112 MCG tablet     loperamide (IMODIUM) 2 MG capsule     methadone (DOLOPHINE) 5 MG tablet     methocarbamol (ROBAXIN) 500 MG tablet     metoprolol succinate ER (TOPROL XL) 25 MG 24 hr tablet     ondansetron (ZOFRAN ODT) 8 MG ODT tab     oxyCODONE IR (ROXICODONE) 10 MG tablet     pantoprazole (PROTONIX) 40 MG EC tablet     pomalidomide (POMALYST) 4 MG capsule     prochlorperazine (COMPAZINE) 10 MG tablet     sulfamethoxazole-trimethoprim (BACTRIM DS) 800-160 MG  tablet     venlafaxine (EFFEXOR) 75 MG tablet     buprenorphine HCl-naloxone HCl (SUBOXONE) 8-2 MG per film     levofloxacin (LEVAQUIN) 500 MG tablet     OLANZapine (ZYPREXA) 2.5 MG tablet     pomalidomide (POMALYST) 4 MG capsule     sucralfate (CARAFATE) 1 GM/10ML suspension     No current facility-administered medications for this visit.     Past Medical  Past Medical History:   Diagnosis Date     Cervical radiculopathy      COPD (chronic obstructive pulmonary disease) (H)      Double vision      Febrile seizure (H)     Per patient. Once while a toddler, once while in highschool, and once while in college.     Floaters      Graves disease      HLD (hyperlipidemia)      HTN (hypertension)      IPF (idiopathic pulmonary fibrosis) (H)      Lumbar radiculopathy      Multiple myeloma in relapse (H)      Osteoporosis      Pneumonia     Per patient. Complicated by sepsis.     Recurrent UTI     Per patient. Has required prophylactic antibiotcs.     Vitamin B12 deficiency (non anemic)      Past Surgical  Past Surgical History:   Procedure Laterality Date     BONE MARROW BIOPSY, BONE SPECIMEN, NEEDLE/TROCAR Right 01/24/2022    Procedure: BIOPSY, BONE MARROW;  Surgeon: Yuniel Tineo;  Location: [facility] OR     BONE MARROW BIOPSY, BONE SPECIMEN, NEEDLE/TROCAR Right 9/1/2022    Procedure: BIOPSY, BONE MARROW;  Surgeon: Juju Oates PA-C;  Location: [facility] OR     CERVICAL FUSION       CHOLECYSTECTOMY       CYSTECTOMY OVARIAN BENIGN       EYE SURGERY      Per patient.     IR CVC TUNNEL PLACEMENT > 5 YRS OF AGE  02/01/2022     IR CVC TUNNEL REMOVAL LEFT  02/22/2022     PICC DOUBLE LUMEN PLACEMENT Left 05/25/2022    Left basilic, 44 cm, 2 cm external length     TONSILLECTOMY       WRIST SURGERY Left      Social History  Social History     Socioeconomic History     Marital status:      Spouse name: None     Number of children: None     Years of education: None     Highest education level: None   Tobacco Use     Smoking  "status: Former     Packs/day: 1.00     Years: 40.00     Pack years: 40.00     Types: Cigarettes     Smokeless tobacco: Former     Quit date: 2010   Substance and Sexual Activity     Alcohol use: Not Currently     Drug use: Not Currently     Types: Marijuana     Social Determinants of Health     Intimate Partner Violence: Not At Risk (1/24/2022)    Humiliation, Afraid, Rape, and Kick questionnaire      Fear of Current or Ex-Partner: No      Emotionally Abused: No      Physically Abused: No      Sexually Abused: No     Family History  Family History   Problem Relation Age of Onset     Heart Disease Mother      Cancer Mother         \"Around lungs\" - she explicitly said it was *not* lung cancer.     Diabetes Father      Heart Disease Father      Objective:  Reported vitals:  There were no vitals taken for this visit.   GEN: Healthy, alert and no distress  PSYCH: Alert and oriented times 3; coherent speech, normal rate and volume, able to articulate logical thoughts, able to abstract reason, no tangential thoughts, no hallucinations or delusions, affect seems normal  RESP: No cough, no audible wheezing, able to talk in full sentences  Remainder of exam unable to be completed due to virtual visit     Outside Imaging summaries:    Assessment and plan:  Ms Grimaldo is a 73yo high risk multiple myeloma found to have stable, nonprogressive, diffuse dilation of her biliary system and main pancreatic duct without evidence of a related obstructing stone or mass on high quality cross sectional imaging. Moreover, she has stable, multifocal subcentimeter cystic lesions consistent with side branched intraductal papillary mucinous neoplasms without concerning feature. She deserves continued surveillance by noninvasive MRI/MRCP with IV contrast in one year to ensure lack of growth of the cystic lesions. We reviewed the natural history of mucinous lesions and that they have a low malignant potential, hence the surveillance. We also took " this opportunity to discuss the techniques of endoscopic ultrasound in the event we will need to sample one of the lesions in the future. This included the risks of pancreatitis, bleeding and perforation.    Plan: repeat MRI/MRCP with IV constrast of the pancreas/abdoimen in one year; follow up with oncology regarding the paraspinal mass enlargement    She takes ensure to help with nutrition; we discussed the possibility of a gastrostomy tube for tube feeds if that was ever desired.    It was a pleasure to participate in the care of this patient; please contact us with any further questions.  A total of at least 60 minutes was spent in evaluation of this patient today, >50% of which was discussion and counseling regarding the above delineated issues.      Pelon Walker MD PhD FACG VIKASH GILBERT  Professor of Medicine, Surgery and Pediatrics  Interventional and Therapeutic Endoscopy  Chief, Division of Gastroenterology and Hepatology and Nutrition  GI Service Line Medical Director    [facility] [address] [place], [place] [address]    New Consultations  [phone]  Procedure Scheduling  [phone]  Clinical Nurse Coordinator     [phone]  Clinical Fax   [phone]  Administrative   [phone]  Administrative Fax  [phone]

## 2023-06-01 NOTE — LETTER
"    6/1/2023         RE: Libby Grimaldo  [address] [facility] [address]        Dear Colleague,    Thank you for referring your patient, Libby Grimaldo, to the [facility]. Please see a copy of my visit note below.    Virtual Visit Details    Type of service:  Video Visit         [facility]    The patient has been notified of following:     \"This video visit will be conducted via a call between you and your physician/provider. We have found that certain health care needs can be provided without the need for an in-person physical exam.  This service lets us provide the care you need with a video conversation.  If a prescription is necessary we can send it directly to your pharmacy.  If lab work is needed we can place an order for that and you can then stop by our lab to have the test done at a later time.    Video visits are billed at different rates depending on your insurance coverage.  Please reach out to your insurance provider with any questions.    If during the course of the call the physician/provider feels a video visit is not appropriate, you will not be charged for this service.\"    Patient has given verbal consent for Video visit? Yes  How would you like to obtain your AVS? My Chart  If you are dropped from the video visit, the video invite should be resent to: Cell phone  Will anyone else be joining your video visit? No  {If patient encounters technical issues they should call [phone]     Video-Visit Details    Type of service:  Video Visit    Video Start Time: 0930  Video End Time: 1015    Originating Location (pt. Location): Home    Distant Location (provider location):  [facility]     Platform used for Video Visit: well    Referring provider  Leah Edwards  Query Pancreatic cysts, dilated pancreaticobiliary ducts    HPI  Ms Grimaldo is a 71yo woman " with multiple myeloma status post peripheral blood stem cell transplant on chemotherapy found incidentally to have diffuse biliary dilation. We organized a repeat MRI/MRCP which confirmed these findings and excluded any obstructing lesion such as mass or stone. 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 The main duct also remains dilated to 7mm though is also stable and without associated mass. There is also a paraspinal mass that is thought to be related to her MM. Her liver studies are essentially normal save for a mild isolated elevation of AP.    She is a former tobacco user and has never abused alcohol. She has no family history of pancreatic disease. She is not obese.    She notes intermittent, mild lower right sided pain, improved with time. This does appears to be related to constipation. She has just initiated Miralax with improvement. Her appetite is poor as everything tastes like cardboard.    Review of Systems   ROS COMP: Constitutional, HEENT, cardiovascular, pulmonary, GI, , musculoskeletal, neuro, skin, endocrine and psych systems are negative, except as otherwise noted.    Medications  Current Outpatient Medications   Medication    acetaminophen (TYLENOL) 500 MG tablet    acyclovir (ZOVIRAX) 400 MG tablet    albuterol (PROAIR HFA/PROVENTIL HFA/VENTOLIN HFA) 108 (90 Base) MCG/ACT inhaler    ascorbic acid 1000 MG TABS tablet    aspirin (ASA) 325 MG tablet    dexamethasone (DECADRON) 4 MG tablet    dexamethasone (DECADRON) 4 MG tablet    Fluticasone-Umeclidin-Vilanterol (TRELEGY ELLIPTA) 100-62.5-25 MCG/INH oral inhaler    guaiFENesin (MUCINEX) 600 MG 12 hr tablet    levothyroxine (SYNTHROID/LEVOTHROID) 112 MCG tablet    loperamide (IMODIUM) 2 MG capsule    methadone (DOLOPHINE) 5 MG tablet    methocarbamol (ROBAXIN) 500 MG tablet    metoprolol succinate ER  (TOPROL XL) 25 MG 24 hr tablet    ondansetron (ZOFRAN ODT) 8 MG ODT tab    oxyCODONE IR (ROXICODONE) 10 MG tablet    pantoprazole (PROTONIX) 40 MG EC tablet    pomalidomide (POMALYST) 4 MG capsule    prochlorperazine (COMPAZINE) 10 MG tablet    sulfamethoxazole-trimethoprim (BACTRIM DS) 800-160 MG tablet    venlafaxine (EFFEXOR) 75 MG tablet    buprenorphine HCl-naloxone HCl (SUBOXONE) 8-2 MG per film    levofloxacin (LEVAQUIN) 500 MG tablet    OLANZapine (ZYPREXA) 2.5 MG tablet    pomalidomide (POMALYST) 4 MG capsule    sucralfate (CARAFATE) 1 GM/10ML suspension     No current facility-administered medications for this visit.     Past Medical  Past Medical History:   Diagnosis Date    Cervical radiculopathy     COPD (chronic obstructive pulmonary disease) (H)     Double vision     Febrile seizure (H)     Per patient. Once while a toddler, once while in highschool, and once while in college.    Floaters     Graves disease     HLD (hyperlipidemia)     HTN (hypertension)     IPF (idiopathic pulmonary fibrosis) (H)     Lumbar radiculopathy     Multiple myeloma in relapse (H)     Osteoporosis     Pneumonia     Per patient. Complicated by sepsis.    Recurrent UTI     Per patient. Has required prophylactic antibiotcs.    Vitamin B12 deficiency (non anemic)      Past Surgical  Past Surgical History:   Procedure Laterality Date    BONE MARROW BIOPSY, BONE SPECIMEN, NEEDLE/TROCAR Right 01/24/2022    Procedure: BIOPSY, BONE MARROW;  Surgeon: Yuniel Tineo;  Location: [facility] OR    BONE MARROW BIOPSY, BONE SPECIMEN, NEEDLE/TROCAR Right 9/1/2022    Procedure: BIOPSY, BONE MARROW;  Surgeon: Juju Oates PA-C;  Location: [facility] OR    CERVICAL FUSION      CHOLECYSTECTOMY      CYSTECTOMY OVARIAN BENIGN      EYE SURGERY      Per patient.    IR CVC TUNNEL PLACEMENT > 5 YRS OF AGE  02/01/2022    IR CVC TUNNEL REMOVAL LEFT  02/22/2022    PICC DOUBLE LUMEN PLACEMENT Left 05/25/2022    Left basilic, 44 cm, 2 cm external length     "TONSILLECTOMY      WRIST SURGERY Left      Social History  Social History     Socioeconomic History    Marital status:      Spouse name: None    Number of children: None    Years of education: None    Highest education level: None   Tobacco Use    Smoking status: Former     Packs/day: 1.00     Years: 40.00     Pack years: 40.00     Types: Cigarettes    Smokeless tobacco: Former     Quit date: 2010   Substance and Sexual Activity    Alcohol use: Not Currently    Drug use: Not Currently     Types: Marijuana     Social Determinants of Health     Intimate Partner Violence: Not At Risk (1/24/2022)    Humiliation, Afraid, Rape, and Kick questionnaire     Fear of Current or Ex-Partner: No     Emotionally Abused: No     Physically Abused: No     Sexually Abused: No     Family History  Family History   Problem Relation Age of Onset    Heart Disease Mother     Cancer Mother         \"Around lungs\" - she explicitly said it was *not* lung cancer.    Diabetes Father     Heart Disease Father      Objective:  Reported vitals:  There were no vitals taken for this visit.   GEN: Healthy, alert and no distress  PSYCH: Alert and oriented times 3; coherent speech, normal rate and volume, able to articulate logical thoughts, able to abstract reason, no tangential thoughts, no hallucinations or delusions, affect seems normal  RESP: No cough, no audible wheezing, able to talk in full sentences  Remainder of exam unable to be completed due to virtual visit     Outside Imaging summaries:    Assessment and plan:  Ms Grimaldo is a 71yo high risk multiple myeloma found to have stable, nonprogressive, diffuse dilation of her biliary system and main pancreatic duct without evidence of a related obstructing stone or mass on high quality cross sectional imaging. Moreover, she has stable, multifocal subcentimeter cystic lesions consistent with side branched intraductal papillary mucinous neoplasms without concerning feature. She deserves continued " surveillance by noninvasive MRI/MRCP with IV contrast in one year to ensure lack of growth of the cystic lesions. We reviewed the natural history of mucinous lesions and that they have a low malignant potential, hence the surveillance. We also took this opportunity to discuss the techniques of endoscopic ultrasound in the event we will need to sample one of the lesions in the future. This included the risks of pancreatitis, bleeding and perforation.    Plan: repeat MRI/MRCP with IV constrast of the pancreas/abdoimen in one year; follow up with oncology regarding the paraspinal mass enlargement    She takes ensure to help with nutrition; we discussed the possibility of a gastrostomy tube for tube feeds if that was ever desired.    It was a pleasure to participate in the care of this patient; please contact us with any further questions.  A total of at least 60 minutes was spent in evaluation of this patient today, >50% of which was discussion and counseling regarding the above delineated issues.      Pelon Walker MD PhD FACG VIKASH GILBERT  Professor of Medicine, Surgery and Pediatrics  Interventional and Therapeutic Endoscopy  Chief, Division of Gastroenterology and Hepatology and Nutrition  GI Service Line Medical Director    [facility] [address], [place] [address]    New Consultations  [phone]  Procedure Scheduling  [phone]  Clinical Nurse Coordinator     [phone]  Clinical Fax   [phone]  Administrative   [phone]  Administrative Fax  [phone]

## 2023-06-05 NOTE — PROGRESS NOTES
Notified by scheduling that Libby did not get scheduled for her Day 8 or Day 15 Lucia infusions. Per a discussion with Dr. Guadarrama, okay to reschedule Day 8 on 6/7/23 and schedule Day 15 on 6/14, and Day 22 on 6/21. Libby reports that she unfortunately misplaced her bottle of decadron. Reordered 21 tablets and let her know that she is to take 28mg (7 tablets) 24 hours prior to her Lucia infusions. Libby has no further questions or concerns at this time.

## 2023-06-07 NOTE — PROGRESS NOTES
Infusion Nursing Note:  Libby Grimaldo presents today for scheduled infusion    Patient seen by provider today: No   present during visit today: Not Applicable.    Note: Patient received elotuzumab per treatment plan. Patient met parameters. Patient was premedicated with tylenol, benadryl, and decadron (patient did not take home supply prior to coming to infusion); no reaction noted. Blood return noted before and after infusion.      Intravenous Access:  Implanted Port.    Treatment Conditions:  Results reviewed, labs MET treatment parameters, ok to proceed with treatment.      Post Infusion Assessment:  Patient tolerated infusion without incident.       Discharge Plan:   Patient and/or family verbalized understanding of discharge instructions and all questions answered.      Elham Casiano RN

## 2023-06-07 NOTE — NURSING NOTE
"Chief Complaint   Patient presents with     Port Draw     Labs drawn via port by RN in lab.  VS taken        Port accessed with 20 gauge 3/4\" gripper needle by RN, labs collected, line flushed with saline and heparin.  Vitals taken. Pt checked in for appointment(s).    Linda Mcrae RN    "

## 2023-06-16 NOTE — TELEPHONE ENCOUNTER
Oral Chemotherapy Monitoring Program    Placed call to patient in follow up of pomalidomide therapy.    Left message to please call back in follow up of therapy. No patient or drug names were mentioned.    Katina Gonsalves  Pharmacy Intern  [facility]  [phone]

## 2023-06-19 NOTE — NURSING NOTE
"Oncology Rooming Note    June 19, 2023 10:03 AM   Libby Grimaldo is a 72 year old female who presents for:    Chief Complaint   Patient presents with     Oncology Clinic Visit     Multiple myeloma not having achieved      Initial Vitals: /78 (BP Location: Left arm, Patient Position: Sitting, Cuff Size: Adult Regular)   Pulse 99   Temp 99.5  F (37.5  C) (Oral)   Resp 16   Wt 47.6 kg (104 lb 14.4 oz)   SpO2 97%   BMI 17.46 kg/m   Estimated body mass index is 17.46 kg/m  as calculated from the following:    Height as of 9/1/22: 1.651 m (5' 5\").    Weight as of this encounter: 47.6 kg (104 lb 14.4 oz). Body surface area is 1.48 meters squared.  Severe Pain (6) Comment: ribs   No LMP recorded. Patient is postmenopausal.  Allergies reviewed: Yes  Medications reviewed: Yes    Medications: MEDICATION REFILLS NEEDED TODAY. Provider was notified.  Pharmacy name entered into [facility]:    [id number] - [place], [place] - [address] AT [facility] [address] - [place], [place] - [address]  [facility] - [place], [place] - [phone]    Clinical concerns: needs refills for an unspecified medication, patient says its dissolvable tablet for nausea        Rob Henry            "

## 2023-06-19 NOTE — PROGRESS NOTES
BMT Progress Note    BMT Physician: Dr. Guillory/Rashad Chen     Oncology History   Multiple myeloma not having achieved remission (H)   11/7/2018 -  Cancer Staged    Staging form: Plasma Cell Myeloma and Disorders, AJCC 8th Edition  - Clinical stage from 11/7/2018: Albumin (g/dL): 3.4, ISS: Stage II, High-risk cytogenetics: Absent, LDH: Unknown     11/7/2018 Initial Diagnosis    Multiple myeloma not having achieved remission (H)     1/23/2019 - 11/14/2019 Chemotherapy    KRD x 6 cycles     11/4/2019 - 2/14/2021 Chemotherapy    Rev/Dex(20)      4/3/2020 - 7/17/2020 Chemotherapy    Velcade maintenance - dose reductions for orthostatic hypotension     7/17/2020 - 12/15/2020 Chemotherapy    Revlimid maintenance     11/23/2020 Progression    Bone marrow 35% plasma cells, PET CT demonstrating new lytic lesions     12/15/2020 - 6/11/2021 Chemotherapy    Elsy, Kd x 6 cycles     7/7/2021 -  Cancer Staged    PET/CT - CR     7/21/2021 -  Chemotherapy    Subcutaneous Daratumumab, IVIG, denosumab monthly     11/22/2021 -  Cancer Staged    Bone marrow aspirate - MRD 0.0022% positive     2/1/2022 - 2/5/2022 Chemotherapy    IP BMT Chemotherapy For Mobilization - High Dose Cyclophosphamide  Plan Provider: Julio C Guillory MD  Treatment goal: Other  Line of treatment: [No plan line of treatment]     5/25/2022 - 11/23/2022 Chemotherapy    IP - OP BMT 2016-35 Auto Multiple Myeloma - Melphalan (CrCL < 30 mL/min, >/= 2 comorbidities, OR age > 75yrs) - Adult (Version: 7/13/21)  Plan Provider: Julio C Guillory MD  Treatment goal: Other  Line of treatment: [No plan line of treatment]     9/29/2022 - 3/9/2023 Supportive Treatment    Oral ONC - ixazomib maintenance post-transplant  Plan Provider: Julio C Guillory MD  Treatment goal: Curative  Line of treatment: [No plan line of treatment]     4/21/2023 -  Supportive Treatment    OP ONC Zoledronic Acid (Zometa) MONTHLY  Plan Provider: Julio C Guillory MD  Treatment goal: Palliative  Line of  treatment: Maintenance     4/24/2023 -  Chemotherapy    OP ONC Multiple Myeloma - Elotuzumab / Pomalidomide / Dexamethasone (<75 years old)  Plan Provider: Julio C Guillory MD  Treatment goal: Palliative  Line of treatment: Third Line         ID: Libby Grimaldo is a 73 yo woman, 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 Now on ninlaro maintenance.     INTERIM HISTORY:   Libby returns to clinic for a dose of elotuzumab, (along w/ taking pomalidomide, dexamethasone)--C2D22.  She has fatigue and n/v and wonders about a dose reduction w/ next cycle.  She had no reactions to elotuzumab.  No other new complaints today.    ROS: 8 point ROS neg other than the symptoms noted above in the HPI.     PHYSICAL EXAM:   Vitals:  Blood pressure 133/78, pulse 99, temperature 99.5  F (37.5  C), temperature source Oral, resp. rate 16, weight 47.6 kg (104 lb 14.4 oz), SpO2 97 %.      Wt Readings from Last 4 Encounters:   06/19/23 47.6 kg (104 lb 14.4 oz)   06/07/23 48.5 kg (107 lb)   05/22/23 49.5 kg (109 lb 3.2 oz)   05/15/23 48.7 kg (107 lb 4.8 oz)     General: NAD   Eyes: GREGG, sclera anicteric; exopthalmous  Lymphatics: no edema  Lungs:  Few coarse BS w/ cough during exam; otherwise clear  CV:  RRR  Skin: no rashes or petechaie. No thickening/hidebound changes  Neuro: A&O   Additional Findings: Port site NT, no drainage.      Labs:  Lab Results   Component Value Date    WBC 6.2 06/07/2023    ANEU 3.9 06/16/2022    HGB 10.4 (L) 06/07/2023    HCT 32.8 (L) 06/07/2023     06/07/2023     05/22/2023    POTASSIUM 3.3 (L) 05/22/2023    CHLORIDE 105 05/22/2023    CO2 28 05/22/2023     (H) 05/22/2023    BUN 12.5 05/22/2023    CR 0.96 (H) 05/22/2023    MAG 2.1 04/17/2023    INR 1.40 (H) 05/30/2022    BILITOTAL 0.4 05/22/2023    AST 10 05/22/2023    ALT 10 05/22/2023    ALKPHOS 127 (H) 05/22/2023    PROTTOTAL 5.2 (L) 05/22/2023    ALBUMIN 3.1 (L) 05/22/2023        ASSESSMENT AND PLAN:   Libby Grimaldo is a 71 year old  female with 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      Myeloma/BMT/IEC PROTOCOL for 2016-35  - Chemo protocol: HD Melphalan 140mg/m2  Day 0: Cell dose: 3.94x10^6 (s/p cytoxan chemo priming 2/1/2022 and subsequent stem cell collection).   - Restaging plan: per protocol.  - Maintenance Ninlaro started early Oct 2022; Day 28 (11/4) and stopped in January 2023  - Elotuzumab, pomalidomide, dexamethasone initiated April 2023  - C2 D22 today (6/19):  elotuzumab today  - next infusion 6/26: C3, D1--have messaged Dr. Guillory regarding dosing of this cycle        HEME/COAG  - anemia, thrombocytopenia due to chemo.  monitor on current regimen  - Relevant thrombosis or bleeding history:   2/15/22: new non-occlusive LUE DVT. No longer on xarelto      ID  - Prophylaxis plan: ACV, Bactrim    - Covid vacc #1 (post BMT) and flu shot 10/3/22     CARDIOVASCULAR  - Risk of cardiomyopathy:  Baseline EF 56%  - known hyperlipidemia- per notes previously on Crestor but not on med list - follow-up with PCP if needed    History of Afib - now in NSR but tachy. Continue metoprolol XL 25 mg/d      RESPIRATORY  - hx of COPD, continue breo-ellipa inhaler      GI/NUTRITION  - CINV: continues on Zyprexa bid, Zofran PRN & Compazine prn.     - Ulcer prophylaxis: protonix 40mg daily.  - note of LFT abnormality today, ? Chemo effect        RENAL/ELECTROLYTES/:  - hypokalemia - she will increase her K rich foods. Add metamucil.   - hypoNa:  Free water restriction; pharm review for hypoNa: lucia: 40%; polalidomide 11%; effexor up to 39%.  No med change today; may be dose adjusting next cycle.    PSYCH  - continue effexor 75mg tid     SYMPTOM MANAGEMENT.  - # Pain Assessment:  - Libby is experiencing pain due to chronic pain - Continue suboxone and prn oxycodone.      Respiratory infection  - much improved after abx    Plan  - patient started pom late so schedule shifted to day 7-28  - continue lucia/pom/dex -- Lucia today  - 6/21 ophtho referral  - 6/26 C3D1 (msjamison Koehler  Kahlil as above for dosing rec)    35 minutes spent on the date of the encounter doing chart review, history and exam, lab review, documentation and coordniating care.    Sarita Jimenez pa-c  [phone]

## 2023-06-19 NOTE — PROGRESS NOTES
Infusion Nursing Note:  Libby Grimaldo presents today for elotuzumab infusion.    Patient seen by provider today: Yes: Sarita Jimenez   present during visit today: Not Applicable.    Note: Labs monitored, VSS. Premeds administered. elotuzumab administered over 1hr. Pt tolerated infusion.       Intravenous Access:  Implanted Port.    Treatment Conditions:  Results reviewed, labs MET treatment parameters, ok to proceed with treatment.      Post Infusion Assessment:  Patient tolerated infusion without incident.  Access discontinued per protocol.       Discharge Plan:   Patient discharged in stable condition accompanied by: .  Departure Mode: Wheelchair.      Sai Forrest RN

## 2023-06-19 NOTE — NURSING NOTE
.  Chief Complaint   Patient presents with     Oncology Clinic Visit     Multiple myeloma not having achieved      Port Draw     Vitals taken, port accessed, labs drawn, heparin locked, checked into next appt     /78 (BP Location: Left arm, Patient Position: Sitting, Cuff Size: Adult Regular)   Pulse 99   Temp 99.5  F (37.5  C) (Oral)   Resp 16   Wt 47.6 kg (104 lb 14.4 oz)   SpO2 97%   BMI 17.46 kg/m    Jace Don RN on 6/19/2023 at 10:07 AM

## 2023-06-20 NOTE — TELEPHONE ENCOUNTER
Oral Chemotherapy Monitoring Program    Medication: Pomalyst  Rx:  4mg PO daily 21/.28 ds    Auth #: [id number]  Risk Category: Adult female NOT of reproductive capacity        Routine survey questions reviewed. yes

## 2023-06-26 NOTE — PROGRESS NOTES
BMT Progress Note    BMT Physician: Dr. Guillory/Rashad Chen     Oncology History   Multiple myeloma not having achieved remission (H)   11/7/2018 -  Cancer Staged    Staging form: Plasma Cell Myeloma and Disorders, AJCC 8th Edition  - Clinical stage from 11/7/2018: Albumin (g/dL): 3.4, ISS: Stage II, High-risk cytogenetics: Absent, LDH: Unknown     11/7/2018 Initial Diagnosis    Multiple myeloma not having achieved remission (H)     1/23/2019 - 11/14/2019 Chemotherapy    KRD x 6 cycles     11/4/2019 - 2/14/2021 Chemotherapy    Rev/Dex(20)      4/3/2020 - 7/17/2020 Chemotherapy    Velcade maintenance - dose reductions for orthostatic hypotension     7/17/2020 - 12/15/2020 Chemotherapy    Revlimid maintenance     11/23/2020 Progression    Bone marrow 35% plasma cells, PET CT demonstrating new lytic lesions     12/15/2020 - 6/11/2021 Chemotherapy    Elsy, Kd x 6 cycles     7/7/2021 -  Cancer Staged    PET/CT - CR     7/21/2021 -  Chemotherapy    Subcutaneous Daratumumab, IVIG, denosumab monthly     11/22/2021 -  Cancer Staged    Bone marrow aspirate - MRD 0.0022% positive     2/1/2022 - 2/5/2022 Chemotherapy    IP BMT Chemotherapy For Mobilization - High Dose Cyclophosphamide  Plan Provider: Julio C Guillory MD  Treatment goal: Other  Line of treatment: [No plan line of treatment]     5/25/2022 - 11/23/2022 Chemotherapy    IP - OP BMT 2016-35 Auto Multiple Myeloma - Melphalan (CrCL < 30 mL/min, >/= 2 comorbidities, OR age > 75yrs) - Adult (Version: 7/13/21)  Plan Provider: Julio C Guillory MD  Treatment goal: Other  Line of treatment: [No plan line of treatment]     9/29/2022 - 3/9/2023 Supportive Treatment    Oral ONC - ixazomib maintenance post-transplant  Plan Provider: Julio C Guillory MD  Treatment goal: Curative  Line of treatment: [No plan line of treatment]     4/21/2023 -  Supportive Treatment    OP ONC Zoledronic Acid (Zometa) MONTHLY  Plan Provider: Julio C Guillory MD  Treatment goal: Palliative  Line of  treatment: Maintenance     4/24/2023 -  Chemotherapy    OP ONC Multiple Myeloma - Elotuzumab / Pomalidomide / Dexamethasone (<75 years old)  Plan Provider: Julio C Guillory MD  Treatment goal: Palliative  Line of treatment: Third Line         ID: Libby Grimaldo is a 73 yo woman, 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     INTERIM HISTORY:   Libby returns to clinic for a dose of elotuzumab, (along w/ taking pomalidomide, dexamethasone)--C3D1.    She had no reactions to elotuzumab.  No other new complaints today.    ROS: 8 point ROS neg other than the symptoms noted above in the HPI.     PHYSICAL EXAM:   Vitals:  Blood pressure 112/70, pulse 75, temperature 98.3  F (36.8  C), temperature source Oral, resp. rate 18, weight 47.7 kg (105 lb 1.6 oz), SpO2 96 %.      Wt Readings from Last 4 Encounters:   06/26/23 47.7 kg (105 lb 1.6 oz)   06/19/23 47.6 kg (104 lb 14.4 oz)   06/07/23 48.5 kg (107 lb)   05/22/23 49.5 kg (109 lb 3.2 oz)     General: NAD   Eyes: GREGG, sclera anicteric; exopthalmous  Lymphatics: no edema  Lungs:  Few coarse BS w/ cough during exam; otherwise clear  CV:  RRR  Skin: no rashes or petechaie. No thickening/hidebound changes  Neuro: A&O   Additional Findings: Port site NT, no drainage.      Labs:  Lab Results   Component Value Date    WBC 6.0 06/19/2023    ANEU 1.6 06/19/2023    HGB 11.3 (L) 06/19/2023    HCT 34.8 (L) 06/19/2023     (L) 06/19/2023     (L) 06/19/2023    POTASSIUM 3.4 06/19/2023    CHLORIDE 101 06/19/2023    CO2 22 06/19/2023     (H) 06/19/2023    BUN 23.2 (H) 06/19/2023    CR 1.16 (H) 06/19/2023    MAG 2.1 04/17/2023    INR 1.40 (H) 05/30/2022    BILITOTAL 0.6 06/19/2023    AST 29 06/19/2023    ALT 78 (H) 06/19/2023    ALKPHOS 178 (H) 06/19/2023    PROTTOTAL 6.1 (L) 06/19/2023    ALBUMIN 3.3 (L) 06/19/2023        ASSESSMENT AND PLAN:   Libby Grimaldo is a 71 year old female with 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      Myeloma/BMT/IEC PROTOCOL for 2016-35  - Chemo  protocol: HD Melphalan 140mg/m2  Day 0: Cell dose: 3.94x10^6 (s/p cytoxan chemo priming 2/1/2022 and subsequent stem cell collection).   - Restaging plan: per protocol.  - Maintenance Ninlaro started early Oct 2022; Day 28 (11/4) and stopped in January 2023  - Elotuzumab, pomalidomide, dexamethasone initiated April 2023  - C2 D22 (6/19):  elotuzumab   - next infusion 6/26: C3, D1 today. There was initial confusion over what day/cycle this is. This was addressed by infusion RN with Dr. Guillory. Dr. Guillory will request future cycle days and appts. I will put in for day +8 though to avoid delay in scheduling. Dr. Guillory cleared to proceed today with LFT's.      HEME/COAG  - anemia, thrombocytopenia due to chemo.  monitor on current regimen  - Relevant thrombosis or bleeding history:   2/15/22: new non-occlusive LUE DVT. No longer on xarelto. On ASA.      ID  - Prophylaxis plan: ACV, Bactrim       CARDIOVASCULAR  - Risk of cardiomyopathy:  Baseline EF 56%  - known hyperlipidemia- per notes previously on Crestor but not on med list - follow-up with PCP if needed    History of Afib - now in NSR but tachy. Continue metoprolol XL 25 mg/d      RESPIRATORY  - hx of COPD, continue breo-ellipa inhaler      GI/NUTRITION  - CINV: continues on Zyprexa bid, Zofran PRN & Compazine prn.     - Ulcer prophylaxis: protonix 40mg daily.     RENAL/ELECTROLYTES/:  - K replaced for 3.3.   - hypoNa: pharm review for hypoNa: isaias: 40%; polalidomide 11%; effexor up to 39%. Normal today.    PSYCH  - continue effexor 75mg tid     SYMPTOM MANAGEMENT.  - # Pain Assessment:  - Libby is experiencing pain due to chronic pain - Continue suboxone and prn oxycodone.      Respiratory infection  - much improved after abx    RTC: Day +8.    40 minutes spent on the date of the encounter doing chart review, history and exam, lab review, documentation and coordniating care.    CAROLINA MejíaC  [phone]

## 2023-06-26 NOTE — PROGRESS NOTES
Infusion Nursing Note:  Libby Grimaldo presents today for Day 1, Cycle 3 of Elotuzumab and monthly Zometa.    Patient seen by provider today: Yes: Jenna Peñaloza   present during visit today: No    Note: Lab results monitored; met all treatment parameters for both drugs today, and Elotuzomab administration ok'd by Dr. Guillory despite elevation in chem labs. K level 3.3, given 40mEq of PO Potassium. Given 8mg IV Dex, 650mg PO Tylenol and 50mg PO Benadryl 1 hour prior to infusion. Zometa infused over 15 minutes without complication. Elotuzumab checked by 2nd RN prior to administration; infused for 30 minutes at a rate of 180mL/hr, then for 1 hour at 240mL/hr. Infusion completed without complication.    Intravenous Access:  Implanted Port.  +BR noted pre and post infusion  De-accessed prior to discharge.    Treatment Conditions:  Results reviewed, labs MET treatment parameters, ok to proceed with treatment.      Post Infusion Assessment:  Patient tolerated infusion without incident.       Discharge Plan:   Patient discharged in stable condition accompanied by: . Given copy of AVS with follow up visit and next infusion appointment scheduled for 7/3.      Sarahy Fatima RN

## 2023-06-26 NOTE — LETTER
6/26/2023         RE: Libby Grimaldo  [address] [facility] [address]        Dear Colleague,    Thank you for referring your patient, Libby Grimaldo, to the [facility] [place]. Please see a copy of my visit note below.      BMT Progress Note    BMT Physician: Dr. Guillory/Rashad Chen     Oncology History   Multiple myeloma not having achieved remission (H)   11/7/2018 -  Cancer Staged    Staging form: Plasma Cell Myeloma and Disorders, AJCC 8th Edition  - Clinical stage from 11/7/2018: Albumin (g/dL): 3.4, ISS: Stage II, High-risk cytogenetics: Absent, LDH: Unknown     11/7/2018 Initial Diagnosis    Multiple myeloma not having achieved remission (H)     1/23/2019 - 11/14/2019 Chemotherapy    KRD x 6 cycles     11/4/2019 - 2/14/2021 Chemotherapy    Rev/Dex(20)      4/3/2020 - 7/17/2020 Chemotherapy    Velcade maintenance - dose reductions for orthostatic hypotension     7/17/2020 - 12/15/2020 Chemotherapy    Revlimid maintenance     11/23/2020 Progression    Bone marrow 35% plasma cells, PET CT demonstrating new lytic lesions     12/15/2020 - 6/11/2021 Chemotherapy    Elsy, Kd x 6 cycles     7/7/2021 -  Cancer Staged    PET/CT - CR     7/21/2021 -  Chemotherapy    Subcutaneous Daratumumab, IVIG, denosumab monthly     11/22/2021 -  Cancer Staged    Bone marrow aspirate - MRD 0.0022% positive     2/1/2022 - 2/5/2022 Chemotherapy    IP BMT Chemotherapy For Mobilization - High Dose Cyclophosphamide  Plan Provider: Julio C Guillory MD  Treatment goal: Other  Line of treatment: [No plan line of treatment]     5/25/2022 - 11/23/2022 Chemotherapy    IP - OP BMT 2016-35 Auto Multiple Myeloma - Melphalan (CrCL < 30 mL/min, >/= 2 comorbidities, OR age > 75yrs) - Adult (Version: 7/13/21)  Plan Provider: Julio C Guillory MD  Treatment goal: Other  Line of treatment: [No plan line of treatment]     9/29/2022 - 3/9/2023 Supportive Treatment    Oral ONC - ixazomib maintenance  post-transplant  Plan Provider: Julio C Guillory MD  Treatment goal: Curative  Line of treatment: [No plan line of treatment]     4/21/2023 -  Supportive Treatment    OP ONC Zoledronic Acid (Zometa) MONTHLY  Plan Provider: Julio C Guillory MD  Treatment goal: Palliative  Line of treatment: Maintenance     4/24/2023 -  Chemotherapy    OP ONC Multiple Myeloma - Elotuzumab / Pomalidomide / Dexamethasone (<75 years old)  Plan Provider: Julio C Guillory MD  Treatment goal: Palliative  Line of treatment: Third Line         ID: Libby Grimaldo is a 73 yo woman, 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     INTERIM HISTORY:   Libby returns to clinic for a dose of elotuzumab, (along w/ taking pomalidomide, dexamethasone)--C3D1.    She had no reactions to elotuzumab.  No other new complaints today.    ROS: 8 point ROS neg other than the symptoms noted above in the HPI.     PHYSICAL EXAM:   Vitals:  Blood pressure 112/70, pulse 75, temperature 98.3  F (36.8  C), temperature source Oral, resp. rate 18, weight 47.7 kg (105 lb 1.6 oz), SpO2 96 %.      Wt Readings from Last 4 Encounters:   06/26/23 47.7 kg (105 lb 1.6 oz)   06/19/23 47.6 kg (104 lb 14.4 oz)   06/07/23 48.5 kg (107 lb)   05/22/23 49.5 kg (109 lb 3.2 oz)     General: NAD   Eyes: GREGG, sclera anicteric; exopthalmous  Lymphatics: no edema  Lungs:  Few coarse BS w/ cough during exam; otherwise clear  CV:  RRR  Skin: no rashes or petechaie. No thickening/hidebound changes  Neuro: A&O   Additional Findings: Port site NT, no drainage.      Labs:  Lab Results   Component Value Date    WBC 6.0 06/19/2023    ANEU 1.6 06/19/2023    HGB 11.3 (L) 06/19/2023    HCT 34.8 (L) 06/19/2023     (L) 06/19/2023     (L) 06/19/2023    POTASSIUM 3.4 06/19/2023    CHLORIDE 101 06/19/2023    CO2 22 06/19/2023     (H) 06/19/2023    BUN 23.2 (H) 06/19/2023    CR 1.16 (H) 06/19/2023    MAG 2.1 04/17/2023    INR 1.40 (H) 05/30/2022    BILITOTAL 0.6 06/19/2023    AST 29  06/19/2023    ALT 78 (H) 06/19/2023    ALKPHOS 178 (H) 06/19/2023    PROTTOTAL 6.1 (L) 06/19/2023    ALBUMIN 3.3 (L) 06/19/2023        ASSESSMENT AND PLAN:   Libby Grimaldo is a 71 year old female with 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      Myeloma/BMT/IEC PROTOCOL for 2016-35  - Chemo protocol: HD Melphalan 140mg/m2  Day 0: Cell dose: 3.94x10^6 (s/p cytoxan chemo priming 2/1/2022 and subsequent stem cell collection).   - Restaging plan: per protocol.  - Maintenance Ninlaro started early Oct 2022; Day 28 (11/4) and stopped in January 2023  - Elotuzumab, pomalidomide, dexamethasone initiated April 2023  - C2 D22 (6/19):  elotuzumab   - next infusion 6/26: C3, D1 today. There was initial confusion over what day/cycle this is. This was addressed by infusion RN with Dr. Guillory. Dr. Guillory will request future cycle days and appts. I will put in for day +8 though to avoid delay in scheduling. Dr. Guillory cleared to proceed today with LFT's.      HEME/COAG  - anemia, thrombocytopenia due to chemo.  monitor on current regimen  - Relevant thrombosis or bleeding history:   2/15/22: new non-occlusive LUE DVT. No longer on xarelto. On ASA.      ID  - Prophylaxis plan: ACV, Bactrim       CARDIOVASCULAR  - Risk of cardiomyopathy:  Baseline EF 56%  - known hyperlipidemia- per notes previously on Crestor but not on med list - follow-up with PCP if needed    History of Afib - now in NSR but tachy. Continue metoprolol XL 25 mg/d      RESPIRATORY  - hx of COPD, continue breo-ellipa inhaler      GI/NUTRITION  - CINV: continues on Zyprexa bid, Zofran PRN & Compazine prn.     - Ulcer prophylaxis: protonix 40mg daily.     RENAL/ELECTROLYTES/:  - K replaced for 3.3.   - hypoNa: pharm review for hypoNa: isaias: 40%; polalidomide 11%; effexor up to 39%. Normal today.    PSYCH  - continue effexor 75mg tid     SYMPTOM MANAGEMENT.  - # Pain Assessment:  - Libby is experiencing pain due to chronic pain - Continue suboxone and prn oxycodone.       Respiratory infection  - much improved after abx    RTC: Day +8.    40 minutes spent on the date of the encounter doing chart review, history and exam, lab review, documentation and coordniating care.    Jenna Peñaloza PA-C  [phone]

## 2023-06-26 NOTE — NURSING NOTE
Chief Complaint   Patient presents with     Port Draw     Labs drawn via port by RN in lab. VS taken.      Port accessed and labs drawn by RN. Pt tolerated well.  VS taken.  Pt checked in for next appt.    Elaina Lewis RN

## 2023-06-26 NOTE — NURSING NOTE
"Oncology Rooming Note    June 26, 2023 12:39 PM   Libby Grimaldo is a 72 year old female who presents for:    Chief Complaint   Patient presents with     Port Draw     Labs drawn via port by RN in lab. VS taken.      Oncology Clinic Visit     Multiple myeloma     Initial Vitals: /70 (BP Location: Right arm, Patient Position: Sitting, Cuff Size: Adult Regular)   Pulse 75   Temp 98.3  F (36.8  C) (Oral)   Resp 18   Wt 47.7 kg (105 lb 1.6 oz)   SpO2 96%   BMI 17.49 kg/m   Estimated body mass index is 17.49 kg/m  as calculated from the following:    Height as of 9/1/22: 1.651 m (5' 5\").    Weight as of this encounter: 47.7 kg (105 lb 1.6 oz). Body surface area is 1.48 meters squared.  No Pain (0) Comment: Data Unavailable   No LMP recorded. Patient is postmenopausal.  Allergies reviewed: Yes  Medications reviewed: Yes    Medications: Medication refills not needed today.  Pharmacy name entered into [facility]:    [id number] - [place], [place] - [address] AT [facility] [address] [place] [address] [facility]IL[facility]SPECIALTY [facility] - [place], [place] - [address]  [facility] - [place], [place] - [phone]    Clinical concerns: Pt believes she may have took her pills incorrect, has less pills than she should. It caused the pt to have nausea.    Jody Alegria, EMT    "

## 2023-07-07 NOTE — PROGRESS NOTES
Received message that Pt's caregiver had questions about infusion. Per demo note called Pt's sister, VM appears not to be functioning.

## 2023-07-18 NOTE — ORAL ONC MGMT
Oral Chemotherapy Monitoring Program   Medication: Pomalyst  Rx: 4mg PO daily on days 1 through 21 of 28 day cycle   Auth #: [id number]  Risk Category: Adult Female not of reproductive potential  Routine survey questions reviewed.   Rx to be Escribed to SP

## 2023-07-20 NOTE — TELEPHONE ENCOUNTER
Oral Chemotherapy Monitoring Program     Placed call to patient in follow up of pomalidomide therapy.     Left message to please call back in follow up of therapy. No patient or drug names were mentioned.    Roya Zamudio  Pharmacy Intern  Oral Chemotherapy Monitoring Program  [facility]   [phone]

## 2023-08-08 PROBLEM — R50.9 FEVER AND CHILLS: Status: ACTIVE | Noted: 2023-01-01

## 2023-08-08 PROBLEM — R41.82 ALTERED MENTAL STATUS, UNSPECIFIED ALTERED MENTAL STATUS TYPE: Status: ACTIVE | Noted: 2023-01-01

## 2023-08-08 NOTE — NURSING NOTE
Chief Complaint   Patient presents with    Port Draw     Labs drawn via port by RN in lab.  VS taken    Oncology Clinic Visit     MM     [facility] VS taken.     Bp was elevated unknown if cause from rigors. O2 sats were low 90's and pt was experiencing SOB per provider.     Krishna Dodson RN

## 2023-08-08 NOTE — NURSING NOTE
"Oncology Rooming Note    August 8, 2023 10:40 AM   Libby Grimaldo is a 72 year old female who presents for:    Chief Complaint   Patient presents with    Port Draw     Labs drawn via port by RN in lab.  VS taken    Oncology Clinic Visit     MM     Initial Vitals: BP (!) 156/80   Pulse 80   Temp 98.7  F (37.1  C) (Oral)   Resp 16   Wt 49 kg (108 lb)   SpO2 95%   BMI 19.87 kg/m   Estimated body mass index is 19.87 kg/m  as calculated from the following:    Height as of 6/26/23: 1.57 m (5' 1.81\").    Weight as of this encounter: 49 kg (108 lb). Body surface area is 1.46 meters squared.  Moderate Pain (5) Comment: Data Unavailable   No LMP recorded. Patient is postmenopausal.  Allergies reviewed: Yes  Medications reviewed: Yes    Medications: Medication refills not needed today.  Pharmacy name entered into [employer]:    [id number] - [place], [place] - [address] AT [facility] OF [address] - [place], [place] - [address]  [facility] [place], [place] - [phone]    Clinical concerns:        Jeni Lema            +  "

## 2023-08-08 NOTE — PHARMACY-VANCOMYCIN DOSING SERVICE
"Pharmacy Vancomycin Initial Note  Date of Service 2023  Patient's  1950  72 year old, female    Indication: Sepsis    Current estimated CrCl = Estimated Creatinine Clearance: 42.6 mL/min (based on SCr of 0.95 mg/dL).    Creatinine for last 3 days  2023: 10:26 AM Creatinine 0.95 mg/dL    Recent Vancomycin Level(s) for last 3 days  No results found for requested labs within last 3 days.      Vancomycin IV Administrations (past 72 hours)        No vancomycin orders with administrations in past 72 hours.                    Nephrotoxins and other renal medications (From now, onward)      Start     Dose/Rate Route Frequency Ordered Stop    23 1530  vancomycin (VANCOCIN) 1000 mg in dextrose 5% 200 mL PREMIX         1,000 mg  200 mL/hr over 1 Hours Intravenous EVERY 24 HOURS 23 1524      23 1530  vancomycin (VANCOCIN) 1,250 mg in 0.9% NaCl 250 mL intermittent infusion         1,250 mg  over 90 Minutes Intravenous ONCE 23 1509      23 1450  piperacillin-tazobactam (ZOSYN) 4.5 g vial to attach to  mL bag        Note to Pharmacy: For [facility], [facility] and [facility]: For Zosyn-naive patients, use the \"Zosyn initial dose + extended infusion\" order panel.    4.5 g  over 30 Minutes Intravenous ONCE 23 1446              Contrast Orders - past 72 hours (72h ago, onward)      None            [employer] Prediction of Planned Initial Vancomycin Regimen  Loading dose: 1250 mg at 15:30 2023.  Regimen: 1000 mg IV every 24 hours.  Start time: 15:23 on 2023  Exposure target: AUC24 (range)400-600 mg/L.hr   AUC24,ss: 488 mg/L.hr  Probability of AUC24 > 400: 73 %  Ctrough,ss: 14.2 mg/L  Probability of Ctrough,ss > 20: 20 %  Probability of nephrotoxicity (Lodise CHEL ): 9 %          Plan:  Give vancomycin 1250mg IV x1 now, then start vancomycin 1000mg IV q24 hours  Vancomycin monitoring method: AUC  Vancomycin therapeutic monitoring goal: 400-600 mg*h/L  Pharmacy will check " vancomycin levels as appropriate in 1-3 Days.    Serum creatinine levels will be ordered daily for the first week of therapy and at least twice weekly for subsequent weeks.      Jace Minor, PharmD, BCPS

## 2023-08-08 NOTE — PROGRESS NOTES
BMT Progress Note    BMT Physician: Dr. Guillory/Rashad Chen     Oncology History   Multiple myeloma not having achieved remission (H)   11/7/2018 -  Cancer Staged    Staging form: Plasma Cell Myeloma and Disorders, AJCC 8th Edition  - Clinical stage from 11/7/2018: Albumin (g/dL): 3.4, ISS: Stage II, High-risk cytogenetics: Absent, LDH: Unknown     11/7/2018 Initial Diagnosis    Multiple myeloma not having achieved remission (H)     1/23/2019 - 11/14/2019 Chemotherapy    KRD x 6 cycles     11/4/2019 - 2/14/2021 Chemotherapy    Rev/Dex(20)      4/3/2020 - 7/17/2020 Chemotherapy    Velcade maintenance - dose reductions for orthostatic hypotension     7/17/2020 - 12/15/2020 Chemotherapy    Revlimid maintenance     11/23/2020 Progression    Bone marrow 35% plasma cells, PET CT demonstrating new lytic lesions     12/15/2020 - 6/11/2021 Chemotherapy    Elsy, Kd x 6 cycles     7/7/2021 -  Cancer Staged    PET/CT - CR     7/21/2021 -  Chemotherapy    Subcutaneous Daratumumab, IVIG, denosumab monthly     11/22/2021 -  Cancer Staged    Bone marrow aspirate - MRD 0.0022% positive     2/1/2022 - 2/5/2022 Chemotherapy    IP BMT Chemotherapy For Mobilization - High Dose Cyclophosphamide  Plan Provider: Julio C Guillory MD  Treatment goal: Other  Line of treatment: [No plan line of treatment]     5/25/2022 - 11/23/2022 Chemotherapy    IP - OP BMT 2016-35 Auto Multiple Myeloma - Melphalan (CrCL < 30 mL/min, >/= 2 comorbidities, OR age > 75yrs) - Adult (Version: 7/13/21)  Plan Provider: Julio C Guillory MD  Treatment goal: Other  Line of treatment: [No plan line of treatment]     9/29/2022 - 3/9/2023 Supportive Treatment    Oral ONC - ixazomib maintenance post-transplant  Plan Provider: Julio C Guillory MD  Treatment goal: Curative  Line of treatment: [No plan line of treatment]     4/21/2023 -  Supportive Treatment    OP ONC Zoledronic Acid (Zometa) MONTHLY  Plan Provider: Julio C Guillory MD  Treatment goal: Palliative  Line of  "treatment: Maintenance     4/24/2023 -  Chemotherapy    OP ONC Multiple Myeloma - Elotuzumab / Pomalidomide / Dexamethasone (<75 years old)  Plan Provider: Julio C Guillory MD  Treatment goal: Palliative  Line of treatment: Third Line         ID: Libby Grimaldo is a 73 yo woman, 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  Libby returns today to re-establish a treatment schedule.  She has been feeling poorly and said that last week she had the \"flu\".  While she was being checked in, she began to have rigors and became incontinent.  After cleaning herself, she became weak and required a wheelchair to be brought to the exam room.  There she again developed rigors that continued off and on while I was present.  She had some difficulty answering questions and resorted to yes no answers.  She was no SOB, she had body aches but no discreet pain.  She had some difficulty following commands such as \"raise your hands\"  She appears cyanotic although O2 sats and vitals were initially stable.       ROS: 8 point ROS neg other than the symptoms noted above in the HPI.     PHYSICAL EXAM:   Vitals:  Blood pressure (!) 169/94, pulse 94, temperature 98.7  F (37.1  C), resp. rate 16, weight 49 kg (108 lb), SpO2 95 %.      Wt Readings from Last 4 Encounters:   08/08/23 49 kg (108 lb)   08/08/23 49 kg (108 lb)   06/26/23 47.7 kg (105 lb 1.6 oz)   06/19/23 47.6 kg (104 lb 14.4 oz)     General:appears ill with rigors  Eyes: GREGG, sclera anicteric; exopthalmous  Lymphatics: no edema  Lungs:  Few coarse BS w/ cough during exam; otherwise clear  CV:  tachy  Abd: diffuse tenderness  Skin: no rashes or petechaie. No thickening/hidebound changes  Neuro: some difficulty answering questions and following commands   Additional Findings: Port site NT, no drainage.      Labs:  Lab Results   Component Value Date    WBC 6.5 08/08/2023    ANEU 1.2 (L) 06/26/2023    HGB 9.9 (L) 08/08/2023    HCT 30.8 (L) 08/08/2023     (L) 08/08/2023     " (L) 08/08/2023    POTASSIUM 3.6 08/08/2023    CHLORIDE 102 08/08/2023    CO2 25 08/08/2023    GLC 90 08/08/2023    BUN 14.0 08/08/2023    CR 0.95 08/08/2023    MAG 2.1 04/17/2023    INR 1.40 (H) 05/30/2022    BILITOTAL 0.7 08/08/2023    AST 27 08/08/2023    ALT 25 08/08/2023    ALKPHOS 124 (H) 08/08/2023    PROTTOTAL 5.1 (L) 08/08/2023    ALBUMIN 3.0 (L) 08/08/2023        ASSESSMENT AND PLAN:   Libby Grimaldo is a 71 year old female with 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      Myeloma/BMT/IEC PROTOCOL for 2016-35  - Chemo protocol: HD Melphalan 140mg/m2  Day 0: Cell dose: 3.94x10^6 (s/p cytoxan chemo priming 2/1/2022 and subsequent stem cell collection).   - Restaging plan: per protocol.  - Maintenance Ninlaro started early Oct 2022; Day 28 (11/4) and stopped in January 2023  - Elotuzumab, pomalidomide, dexamethasone initiated April 2023  - C2 D22 (6/19):  elotuzumab   - next infusion 6/26: C3, D1 today. There was initial confusion over what day/cycle this is. This was addressed by infusion RN with Dr. Guillory. Dr. Guillory will request future cycle days and appts. I will put in for day +8 though to avoid delay in scheduling. Dr. Guillory cleared to proceed today with LFT's.      HEME/COAG  - anemia, thrombocytopenia due to chemo.  monitor on current regimen  - Relevant thrombosis or bleeding history:   2/15/22: new non-occlusive LUE DVT. No longer on xarelto. On ASA.      ID  - Prophylaxis plan: ACV, Bactrim       CARDIOVASCULAR  - Risk of cardiomyopathy:  Baseline EF 56%  - known hyperlipidemia- per notes previously on Crestor but not on med list - follow-up with PCP if needed    History of Afib - now in NSR but tachy. Continue metoprolol XL 25 mg/d      RESPIRATORY  - hx of COPD, continue breo-ellipa inhaler      GI/NUTRITION  - CINV: continues on Zyprexa bid, Zofran PRN & Compazine prn.     - Ulcer prophylaxis: protonix 40mg daily.     RENAL/ELECTROLYTES/:  - K replaced for 3.3.   - hypoNa: pharm review for hypoNa: isaias:  40%; polalidomide 11%; effexor up to 39%. Normal today.    PSYCH  - continue effexor 75mg tid     SYMPTOM MANAGEMENT.  - # Pain Assessment:  - Libby is experiencing pain due to chronic pain - Continue suboxone and prn oxycodone.      Respiratory infection  - much improved after abx    After observing her for 30 min, rigors and difficulty answering questions continued.  Transfer to ED for further evaluation    30 minutes spent on the date of the encounter doing chart review, history and exam, lab review, documentation and coordniating care.    SOTO BOWSER MD  August 8, 2023

## 2023-08-08 NOTE — ED NOTES
Pt brought home medication Methadone 10 mg tab. Writer count total tablets 43 with second  RN  AK. Pt agreed and signed. Original copy kept on pt's chart.  Writer gave to the ED pharmacist to drop off.

## 2023-08-08 NOTE — LETTER
Libby Grimaldo MRN# [id number]   YOB: 1950 Age: 72 year old     Date of Admission:  8/8/23  Date of Discharge:  8/11/2023 12:34 PM  Admitting Physician:  Brant Mullen MD  Discharging Physician: Dr. Vargas   Discharging Service:  Hematology / Oncology  Hospitalization Status: Inpatient     Primary Care Clinic:  [facility]  Primary Care Provider: Leah Wilkes     Dear Dr. Gustafson:            You have been identified as the Primary Care Provider for Libby Grimaldo, who was recently admitted to the [facility].  Thank you for the referral to our hospital.  It is our goal to provide the highest quality of care for our patients, including planning for seamless continuity of care by providing you with timely, accurate and concise information.  After reviewing the following combined discharge summary and final progress note, please contact us if you have any remaining questions.  The Discharging Physician will be the best informed, with their contact information listed above.  If unable to reach them, or if you have received this letter in error, please call [phone] and someone will try to help you.

## 2023-08-08 NOTE — H&P
[facility]    History and Physical - Medicine Service, GAUDENCIO TEAM 3       Date of Admission:  8/8/2023    Assessment & Plan      Libby Grimaldo is a 72 year old female who has a history of multiple myeloma (2018) s/p autologous peripheral blood stem cell transplant (2022) on palliative chemotherapy, Graves disease, ILD/COPD, HTN, and HLD who was admitted on 8/8 with sepsis.     Sepsis  History of immunosuppression 2/2 palliative chemotherapy with recurrent UTIs and recent repeat pneumonia requiring. Presented with one day of diarrhea and suprapubic tenderness. In ED, had fever (102), tachycardia (108), elevated lactic acid (2.3), and elevated procal (1.27). Chest xray concerning for possible atypical pneumonia. Will continue broad spectrum antibiotics pending workup.   - Vancomycin (8/8-present)  - Zosyn (8/8-present)  - Follow-up broad workup  Blood cultures    UA/Ucx  C difficile, Enteric Panel  Influenza, RSV, COVID swab    Multiple myeloma on palliative chemotherapy  S/p APBST 2022  Pancytopenia  History of multiple myeloma (2018)/ S/p autologous peripheral blood stem cell transplant (2022). Receiving  palliative chemotherapy with most recent dose on 6/26 (elotuzumab, pomolidomide, and dexamethasone).  - Hematology consult  - Continue PTA ppx bactrim, acyclovir    COPD  COPD with 40pyr, quit 2010. Previously been documented to have idiopathic pulmonary fibrosis, however most recent PFTs (1/26/22) were normal. Has previously required treatment for COPD exacerbation with pneumonia. Some expiratory wheezing on exam.   - Start DuoNebs QID per RT  - PTA Trelegy Ellipta inhaler qd  - PTA Albuterol, 2 puffs BID PRN    Other/Chronic/Resolved Problems  Opiate dependence  Confirmed methadone regimen with pharmacy.  - Methadone 10mg TID    Generalized Anxiety Disorder  - PTA Venlafaxine 75mg TID    Chronic anemia  Chronic thrombocytopenia  Baseline pancytopenia in the  setting of chemotherapy. Baseline Hgb ~10-11. Baseline Plt ~163.   - Monitor CBC      HTN  - HOLD metoprolol XR 25 every day    Graves Disease  - PTA Levothyroxine 112mcg qd    Diet:  Regular diet  DVT Prophylaxis: Pneumatic Compression Devices  Raya Catheter: Not present  Fluids: Received 1L bolus in ED  Lines: PRESENT             Cardiac Monitoring: None  Code Status:  Full code    Clinically Significant Risk Factors Present on Admission              # Hypoalbuminemia: Lowest albumin = 3 g/dL at 8/8/2023 10:26 AM, will monitor as appropriate   # Drug Induced Platelet Defect: home medication list includes an antiplatelet medication                 Disposition Plan      Expected Discharge Date: 08/10/2023                The patient's care was discussed with the Attending Physician, Dr. Shanita Watters. .      Amanda Christensen MD  Medicine Service, [address]  Securely message with [employer] (more info)  Text page via [facility] Paging/Directory   See signed in provider for up to date coverage information  ______________________________________________________________________    Chief Complaint     Chills with rigors, AMS, diarrhea, suprapubic tenderness    History is obtained from the patient, patients spouse, and medical records.    History of Present Illness   Libby Grimaldo is a 72 year old female who has a history of multiple myeloma (2018) s/p autologous peripheral blood stem cell transplant (2022) on palliative chemotherapy, Graves disease, ILD/COPD, HTN, and HLD who presented to the ED on 8/8 with one day of chills with rigors, diarrhea, body aches, weakness, and altered mental status. In the ED, she was febrile (102 F) with tachycardia (), elevated lactic acid (2.3), CRP (94), procalcitonin (1.27) and normal WBC (8.2). Chest xray demonstrated increased bilateral upper and lower lobe interstitial reticular/reticulonodular patterns consistent with  "interstitial lung disease with potential superimposed atypical pneumonia. She was given 1L NS bolus, started on Vancomycin and Zosyn, and admitted to medicine with sepsis.     Libby notes that she first had an episode of diarrhea last night accompanied by fever and chills. Today, she has felt \"crappy\" and has had several additional episodes of diarrhea. Her  also noted significant rigors, fatigue, and altered mental status which usually occurs when Libby has an infection. Libby does not report a recent runny nose or sore throat, but has had several days of a productive cough. She additionally endorses suprapubic tenderness without burning with urination or reduced urine volume.    Libby has a history of recurrent urinary tract infections, with most recent E coli UTI 1/23/23 treated with a 7 day course of cephalexin. She also has a history of recurrent pneumonia. She was recently admitted to OSH on 2/23/23-2/26/23 for haemophilus influenza pneumonia and treated with a 14 day course of cefuroxime. She was readmitted to OSH on 3/3/23-3/11/23 with recurrent pneumonia with right pleural effusion requiring chest tube drainage and discharged with a 14 day course of levofloxacin.     Libby has a history of multiple myeloma on palliative chemotherapy, with most recent chemotherapy dose on 06/26/23 with elotuzumab, pomolidomide, and dexamethasone. She is on bactrim and acyclovir infection prophylaxis.  Past Medical History    Past Medical History:   Diagnosis Date    Cervical radiculopathy     COPD (chronic obstructive pulmonary disease) (H)     Double vision     Febrile seizure (H)     Per patient. Once while a toddler, once while in highschool, and once while in college.    Floaters     Graves disease     HLD (hyperlipidemia)     HTN (hypertension)     IPF (idiopathic pulmonary fibrosis) (H)     Lumbar radiculopathy     Multiple myeloma in relapse (H)     Osteoporosis     Pneumonia     Per patient. Complicated by " sepsis.    Recurrent UTI     Per patient. Has required prophylactic antibiotcs.    Vitamin B12 deficiency (non anemic)        Past Surgical History   Past Surgical History:   Procedure Laterality Date    BONE MARROW BIOPSY, BONE SPECIMEN, NEEDLE/TROCAR Right 01/24/2022    Procedure: BIOPSY, BONE MARROW;  Surgeon: Yuniel iTneo;  Location: [facility] OR    BONE MARROW BIOPSY, BONE SPECIMEN, NEEDLE/TROCAR Right 9/1/2022    Procedure: BIOPSY, BONE MARROW;  Surgeon: Juju Oates PA-C;  Location: [facility] OR    CERVICAL FUSION      CHOLECYSTECTOMY      CYSTECTOMY OVARIAN BENIGN      EYE SURGERY      Per patient.    IR CVC TUNNEL PLACEMENT > 5 YRS OF AGE  02/01/2022    IR CVC TUNNEL REMOVAL LEFT  02/22/2022    PICC DOUBLE LUMEN PLACEMENT Left 05/25/2022    Left basilic, 44 cm, 2 cm external length    TONSILLECTOMY      WRIST SURGERY Left        Prior to Admission Medications   Prior to Admission Medications   Prescriptions Last Dose Informant Patient Reported? Taking?   Fluticasone-Umeclidin-Vilanterol (TRELEGY ELLIPTA) 100-62.5-25 MCG/INH oral inhaler  Self Yes No   Sig: Inhale 1 puff into the lungs daily    acetaminophen (TYLENOL) 500 MG tablet  Self Yes No   Sig: Take 500-1,000 mg by mouth every 6 hours as needed for mild pain    acyclovir (ZOVIRAX) 400 MG tablet   No No   Sig: Take 1 tablet (400 mg) by mouth every 12 hours   albuterol (PROAIR HFA/PROVENTIL HFA/VENTOLIN HFA) 108 (90 Base) MCG/ACT inhaler  Self Yes No   Sig: Inhale 2 puffs into the lungs every 6 hours as needed for shortness of breath   ascorbic acid 1000 MG TABS tablet   Yes No   Sig: Take 1,000 mg by mouth   aspirin (ASA) 325 MG tablet   No No   Sig: Take 1 tablet (325 mg) by mouth daily for 180 days   buprenorphine HCl-naloxone HCl (SUBOXONE) 8-2 MG per film  Self Yes No   Sig: Place 1 Film under the tongue 3 times daily    Patient not taking: Reported on 4/17/2023   dexamethasone (DECADRON) 4 MG tablet   No No   Sig: On Day 1, take 28 mg (7 tablets) 3 to  24 hours prior to elotuzumab infusion. On Day 8, 15, and 22, take 40 mg (10 tablets). Take with food.   dexamethasone (DECADRON) 4 MG tablet   No No   Sig: On Day 1, take 28 mg (7 tablets) 3 to 24 hours prior to elotuzumab infusion. On Day 8, 15, and 22, take 40 mg (10 tablets). Take with food.   guaiFENesin (MUCINEX) 600 MG 12 hr tablet   Yes No   Sig: Take 2 tablets (1,200 mg) by mouth 2 times daily as needed   levothyroxine (SYNTHROID/LEVOTHROID) 112 MCG tablet   Yes No   Sig: Take 112 mcg by mouth   loperamide (IMODIUM) 2 MG capsule   No No   Sig: Take 1-2 capsules (2-4 mg) by mouth 4 times daily as needed for diarrhea   methadone (DOLOPHINE) 5 MG tablet   Yes No   methocarbamol (ROBAXIN) 500 MG tablet  Self Yes No   Sig: Take 500 mg by mouth 3 times daily as needed for muscle spasms   metoprolol succinate ER (TOPROL XL) 25 MG 24 hr tablet   No No   Sig: Take 1 tablet (25 mg) by mouth daily   ondansetron (ZOFRAN ODT) 8 MG ODT tab   No No   Sig: Take 1 tablet (8 mg) by mouth every 8 hours as needed for nausea   pantoprazole (PROTONIX) 40 MG EC tablet   No No   Sig: Take 1 tablet (40 mg) by mouth daily   pomalidomide (POMALYST) 4 MG capsule   No No   Sig: Take 1 capsule (4 mg) by mouth daily for 21 days Swallow whole, do NOT break, crush, chew or open capsule. Take with water. Take on days 1 through 21 of repeated 28 day cycles.   pomalidomide (POMALYST) 4 MG capsule   No No   Sig: Take 1 capsule (4 mg) by mouth daily for 21 days Swallow whole, do NOT break, crush, chew or open capsule. Take with water. Take on days 1 through 21 of repeated 28 day cycles.   pomalidomide (POMALYST) 4 MG capsule   No No   Sig: Take 1 capsule (4 mg) by mouth daily for 21 days Swallow whole, do NOT break, crush, chew or open capsule. Take with water. Take on days 1 through 21 of repeated 28 day cycles.   prochlorperazine (COMPAZINE) 10 MG tablet   No No   Sig: Take 0.5 tablets (5 mg) by mouth every 6 hours as needed (Nausea/Vomiting)    sulfamethoxazole-trimethoprim (BACTRIM DS) 800-160 MG tablet   No No   Sig: Take 1 tablet by mouth Every Mon, Tues two times daily Start on Monday 6/27/2022   venlafaxine (EFFEXOR) 75 MG tablet   No No   Sig: Take 1 tablet (75 mg) by mouth 3 times daily      Facility-Administered Medications: None        Social History   I have reviewed this patient's social history and updated it with pertinent information if needed.  Social History     Tobacco Use    Smoking status: Former     Packs/day: 1.00     Years: 40.00     Pack years: 40.00     Types: Cigarettes     Passive exposure: Past    Smokeless tobacco: Former     Quit date: 2010   Substance Use Topics    Alcohol use: Not Currently    Drug use: Not Currently     Types: Marijuana        Physical Exam   Vital Signs: Temp: (!) 102  F (38.9  C) Temp src: Axillary BP: 115/61 Pulse: 78   Resp: 16 SpO2: 96 % O2 Device: Nasal cannula Oxygen Delivery: 4 LPM  Weight: 108 lbs 0 oz    Constitutional: Sitting comfortably in bed,   ENT: Atraumatic, moist mucus membranes, PERRL  Respiratory: Unlabored breathing on room air, bibasilar crackles L>R, mild expiratory wheezing  Cardiovascular: RRR, warm extremities, no peripheral edema  GI/: Abdomen non-distended, non-tender, some suprapubic tenderness  Skin: No rashes, bruises, or lesions on exposed surfaces.   Neurologic: Disoriented; able to name president but unable to identify age or location. Cranial nerves grossly intact. No focal deficits.    Medical Decision Making       Please see A&P for additional details of medical decision making.      Data     I have personally reviewed the following data over the past 24 hrs:    8.2  \   9.5 (L)   / 107 (L)     135 (L) 102 14.0 /  90   3.6 25 0.95 \     ALT: 25 AST: 27 AP: 124 (H) TBILI: 0.7   ALB: 3.0 (L) TOT PROTEIN: 5.1 (L) LIPASE: 16     Trop: 24 (H) BNP: N/A     TSH: 11.30 (H) T4: N/A A1C: N/A     Procal: 1.27 (H) CRP: 94.00 (H) Lactic Acid: 1.3         Imaging results reviewed  over the past 24 hrs:   Recent Results (from the past 24 hour(s))   CT Head w/o Contrast    Narrative    EXAM: CT HEAD W/O CONTRAST  8/8/2023 1:44 PM     HISTORY:  AMS       COMPARISON:  Head CT 2/14/2022     TECHNIQUE: Using multidetector thin collimation helical acquisition  technique, axial, coronal and sagittal CT images from the skull base  to the vertex were obtained without intravenous contrast.   (topogram) image(s) also obtained and reviewed.    FINDINGS:  Motion degraded examination, particularly near the cranial vertex. No  acute intracranial hemorrhage, mass effect, or midline shift. No acute  loss of gray-white matter differentiation in the cerebral hemispheres.  Ventricles are proportionate to the cerebral sulci. Unchanged mild  diffuse cerebral parenchymal volume loss and moderate periventricular  and scattered white matter changes likely representing chronic small  vessel ischemic disease. Clear basal cisterns. Mild right basal  ganglia calcification.    The bony calvaria and the bones of the skull base are normal.  Hyperostosis frontalis. The visualized portions of the paranasal  sinuses are clear. Mild dependent right mastoid effusion. Grossly  normal orbits.       Impression    IMPRESSION:  1. No acute intracranial pathology.  2. Unchanged mild diffuse cerebral parenchymal volume loss and  moderate chronic small vessel ischemic disease.    I have personally reviewed the examination and initial interpretation  and I agree with the findings.    CARL IRAHETA MD         SYSTEM ID:  [id number]   XR Chest Port 1 View    Narrative    Examination: XR CHEST PORT 1 VIEW 8/8/2023 2:59 PM    Indication: AMS; concern for pneumonia    Comparison: X-ray 2/13/2023. CT chest abdomen and pelvis 4/28/2022    Findings:  AP semiupright portable chest. Right Port-A-Cath tip terminates over  the low SVC. Bilateral breast implants. Prior cholecystectomy.  Partially visualized cervical fusion hardware. Trachea is  midline.  Cardiac silhouette and pulmonary vascularity are within normal limits.  No significant pleural effusion or cerebral pneumothorax. No focal  consolidation. Mild increase in the bilateral upper and lower  interstitial reticular and bibasilar subpleural reticular nodular lung  pattern. No acute osseous abnormality. Unremarkable visualized upper  abdomen.      Impression    Impression:   Increase in the upper and lower interstitial reticular/reticulonodular  patterns, which may be seen with interstitial lung disease, a degree  of interstitial edema/atelectasis, or potential atypical superimposed  infection. No focal pneumonia/consolidation.    I have personally reviewed the examination and initial interpretation  and I agree with the findings.    LAWRENCE CARSON MD         SYSTEM ID:  [id number]

## 2023-08-08 NOTE — LETTER
8/8/2023         RE: Libby Grimaldo  [address] [facility] [address]        Dear Colleague,    Thank you for referring your patient, Libby Grimaldo, to the [facility] [place]. Please see a copy of my visit note below.      BMT Progress Note    BMT Physician: Dr. Guillory/Rashad Chen     Oncology History   Multiple myeloma not having achieved remission (H)   11/7/2018 -  Cancer Staged    Staging form: Plasma Cell Myeloma and Disorders, AJCC 8th Edition  - Clinical stage from 11/7/2018: Albumin (g/dL): 3.4, ISS: Stage II, High-risk cytogenetics: Absent, LDH: Unknown     11/7/2018 Initial Diagnosis    Multiple myeloma not having achieved remission (H)     1/23/2019 - 11/14/2019 Chemotherapy    KRD x 6 cycles     11/4/2019 - 2/14/2021 Chemotherapy    Rev/Dex(20)      4/3/2020 - 7/17/2020 Chemotherapy    Velcade maintenance - dose reductions for orthostatic hypotension     7/17/2020 - 12/15/2020 Chemotherapy    Revlimid maintenance     11/23/2020 Progression    Bone marrow 35% plasma cells, PET CT demonstrating new lytic lesions     12/15/2020 - 6/11/2021 Chemotherapy    Elsy, Kd x 6 cycles     7/7/2021 -  Cancer Staged    PET/CT - CR     7/21/2021 -  Chemotherapy    Subcutaneous Daratumumab, IVIG, denosumab monthly     11/22/2021 -  Cancer Staged    Bone marrow aspirate - MRD 0.0022% positive     2/1/2022 - 2/5/2022 Chemotherapy    IP BMT Chemotherapy For Mobilization - High Dose Cyclophosphamide  Plan Provider: Julio C Guillory MD  Treatment goal: Other  Line of treatment: [No plan line of treatment]     5/25/2022 - 11/23/2022 Chemotherapy    IP - OP BMT 2016-35 Auto Multiple Myeloma - Melphalan (CrCL < 30 mL/min, >/= 2 comorbidities, OR age > 75yrs) - Adult (Version: 7/13/21)  Plan Provider: Julio C Guillory MD  Treatment goal: Other  Line of treatment: [No plan line of treatment]     9/29/2022 - 3/9/2023 Supportive Treatment    Oral ONC - ixazomib maintenance  "post-transplant  Plan Provider: Julio C Guillory MD  Treatment goal: Curative  Line of treatment: [No plan line of treatment]     4/21/2023 -  Supportive Treatment    OP ONC Zoledronic Acid (Zometa) MONTHLY  Plan Provider: Julio C Guillory MD  Treatment goal: Palliative  Line of treatment: Maintenance     4/24/2023 -  Chemotherapy    OP ONC Multiple Myeloma - Elotuzumab / Pomalidomide / Dexamethasone (<75 years old)  Plan Provider: Julio C Guillory MD  Treatment goal: Palliative  Line of treatment: Third Line         ID: Libby Grimaldo is a 71 yo woman, 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  Libby returns today to re-establish a treatment schedule.  She has been feeling poorly and said that last week she had the \"flu\".  While she was being checked in, she began to have rigors and became incontinent.  After cleaning herself, she became weak and required a wheelchair to be brought to the exam room.  There she again developed rigors that continued off and on while I was present.  She had some difficulty answering questions and resorted to yes no answers.  She was no SOB, she had body aches but no discreet pain.  She had some difficulty following commands such as \"raise your hands\"  She appears cyanotic although O2 sats and vitals were initially stable.       ROS: 8 point ROS neg other than the symptoms noted above in the HPI.     PHYSICAL EXAM:   Vitals:  Blood pressure (!) 169/94, pulse 94, temperature 98.7  F (37.1  C), resp. rate 16, weight 49 kg (108 lb), SpO2 95 %.      Wt Readings from Last 4 Encounters:   08/08/23 49 kg (108 lb)   08/08/23 49 kg (108 lb)   06/26/23 47.7 kg (105 lb 1.6 oz)   06/19/23 47.6 kg (104 lb 14.4 oz)     General:appears ill with rigors  Eyes: GREGG, sclera anicteric; exopthalmous  Lymphatics: no edema  Lungs:  Few coarse BS w/ cough during exam; otherwise clear  CV:  tachy  Abd: diffuse tenderness  Skin: no rashes or petechaie. No thickening/hidebound changes  Neuro: some difficulty " answering questions and following commands   Additional Findings: Port site NT, no drainage.      Labs:  Lab Results   Component Value Date    WBC 6.5 08/08/2023    ANEU 1.2 (L) 06/26/2023    HGB 9.9 (L) 08/08/2023    HCT 30.8 (L) 08/08/2023     (L) 08/08/2023     (L) 08/08/2023    POTASSIUM 3.6 08/08/2023    CHLORIDE 102 08/08/2023    CO2 25 08/08/2023    GLC 90 08/08/2023    BUN 14.0 08/08/2023    CR 0.95 08/08/2023    MAG 2.1 04/17/2023    INR 1.40 (H) 05/30/2022    BILITOTAL 0.7 08/08/2023    AST 27 08/08/2023    ALT 25 08/08/2023    ALKPHOS 124 (H) 08/08/2023    PROTTOTAL 5.1 (L) 08/08/2023    ALBUMIN 3.0 (L) 08/08/2023        ASSESSMENT AND PLAN:   Libby Grimaldo is a 71 year old female with 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      Myeloma/BMT/IEC PROTOCOL for 2016-35  - Chemo protocol: HD Melphalan 140mg/m2  Day 0: Cell dose: 3.94x10^6 (s/p cytoxan chemo priming 2/1/2022 and subsequent stem cell collection).   - Restaging plan: per protocol.  - Maintenance Ninlaro started early Oct 2022; Day 28 (11/4) and stopped in January 2023  - Elotuzumab, pomalidomide, dexamethasone initiated April 2023  - C2 D22 (6/19):  elotuzumab   - next infusion 6/26: C3, D1 today. There was initial confusion over what day/cycle this is. This was addressed by infusion RN with Dr. Guillory. Dr. Guillory will request future cycle days and appts. I will put in for day +8 though to avoid delay in scheduling. Dr. Guillory cleared to proceed today with LFT's.      HEME/COAG  - anemia, thrombocytopenia due to chemo.  monitor on current regimen  - Relevant thrombosis or bleeding history:   2/15/22: new non-occlusive LUE DVT. No longer on xarelto. On ASA.      ID  - Prophylaxis plan: ACV, Bactrim       CARDIOVASCULAR  - Risk of cardiomyopathy:  Baseline EF 56%  - known hyperlipidemia- per notes previously on Crestor but not on med list - follow-up with PCP if needed    History of Afib - now in NSR but tachy. Continue metoprolol XL 25 mg/d       RESPIRATORY  - hx of COPD, continue breo-ellipa inhaler      GI/NUTRITION  - CINV: continues on Zyprexa bid, Zofran PRN & Compazine prn.     - Ulcer prophylaxis: protonix 40mg daily.     RENAL/ELECTROLYTES/:  - K replaced for 3.3.   - hypoNa: pharm review for hypoNa: isaias: 40%; polalidomide 11%; effexor up to 39%. Normal today.    PSYCH  - continue effexor 75mg tid     SYMPTOM MANAGEMENT.  - # Pain Assessment:  - Libby is experiencing pain due to chronic pain - Continue suboxone and prn oxycodone.      Respiratory infection  - much improved after abx    After observing her for 30 min, rigors and difficulty answering questions continued.  Transfer to ED for further evaluation    30 minutes spent on the date of the encounter doing chart review, history and exam, lab review, documentation and coordniating care.    SOTO BOWSER MD  August 8, 2023

## 2023-08-08 NOTE — ED PROVIDER NOTES
ED Provider Note  [facility]      History     Chief Complaint   Patient presents with    Altered Mental Status     HPI  Libby Grimaldo is a 72 year old female with a past medical history of multiple myeloma, IPF, Graves disease, COPD, HTN, and seizures who presents to the Emergency Department seeking evaluation of sudden onset of altered mental status. Patient presents with her  who serves as the historian for this encounter. When asked what her 's name is, the patient was unable to answer. The patient's  reports that they were at the BMT oncology clinic earlier today when the patient suddenly had diarrhea, became cold and tremulous, and confused. She had previously taken a few weeks off of her infusions, and was looking to restart today. Her  reports that he is concerned that she may have a UTI or infection of some sort, and states that she has a history of getting confused when she gets UTIs. She was not acting confused yesterday.       Past Medical and Surgical History, Medications, Allergies, and Social History were reviewed in the electronic medical record. Review with the patient was attempted but limited due to altered mental status.      A complete review of systems was attempted but limited due to altered mental status.    Physical Exam      Physical Exam  Constitutional:       Appearance: She is ill-appearing.      Comments: Febrile; confused; alert and oriented x 1;    HENT:      Head: Normocephalic.   Cardiovascular:      Rate and Rhythm: Normal rate and regular rhythm.   Pulmonary:      Effort: Pulmonary effort is normal.      Breath sounds: No stridor.   Abdominal:      General: Bowel sounds are normal.      Palpations: Abdomen is soft.   Skin:     General: Skin is warm.   Neurological:      Mental Status: She is disoriented and confused.           ED Course, Procedures, & Data      Procedures       Results for orders placed or performed during the  hospital encounter of 08/08/23   CT Head w/o Contrast     Status: None    Narrative    EXAM: CT HEAD W/O CONTRAST  8/8/2023 1:44 PM     HISTORY:  AMS       COMPARISON:  Head CT 2/14/2022     TECHNIQUE: Using multidetector thin collimation helical acquisition  technique, axial, coronal and sagittal CT images from the skull base  to the vertex were obtained without intravenous contrast.   (topogram) image(s) also obtained and reviewed.    FINDINGS:  Motion degraded examination, particularly near the cranial vertex. No  acute intracranial hemorrhage, mass effect, or midline shift. No acute  loss of gray-white matter differentiation in the cerebral hemispheres.  Ventricles are proportionate to the cerebral sulci. Unchanged mild  diffuse cerebral parenchymal volume loss and moderate periventricular  and scattered white matter changes likely representing chronic small  vessel ischemic disease. Clear basal cisterns. Mild right basal  ganglia calcification.    The bony calvaria and the bones of the skull base are normal.  Hyperostosis frontalis. The visualized portions of the paranasal  sinuses are clear. Mild dependent right mastoid effusion. Grossly  normal orbits.       Impression    IMPRESSION:  1. No acute intracranial pathology.  2. Unchanged mild diffuse cerebral parenchymal volume loss and  moderate chronic small vessel ischemic disease.    I have personally reviewed the examination and initial interpretation  and I agree with the findings.    CARL IRAHETA MD         SYSTEM ID:  [id number]   XR Chest Port 1 View     Status: None (Preliminary result)    Impression    RESIDENT PRELIMINARY INTERPRETATION  Impression:   Increase in the upper and lower interstitial reticular/reticulonodular  patterns, which may be seen with interstitial lung disease, a degree  of interstitial edema/atelectasis, or potential atypical superimposed  infection. No focal pneumonia/consolidation.   [place] Draw     Status: None     Narrative    The following orders were created for panel order [place] Draw.  Procedure                               Abnormality         Status                     ---------                               -----------         ------                     Extra Blue Top Tube[[id number]]                              Final result               Extra Red Top Tube[[id number]]                               Final result               Extra Green Top (Lithium...[[id number]]                      Final result               Extra Purple Top Tube[[id number]]                            Final result                 Please view results for these tests on the individual orders.   Extra Blue Top Tube     Status: None   Result Value Ref Range    Hold Specimen JIC    Extra Red Top Tube     Status: None   Result Value Ref Range    Hold Specimen JIC    Extra Green Top (Lithium Heparin) Tube     Status: None   Result Value Ref Range    Hold Specimen JIC    Extra Purple Top Tube     Status: None   Result Value Ref Range    Hold Specimen JIC    Lactic acid whole blood     Status: Abnormal   Result Value Ref Range    Lactic Acid 2.3 (H) 0.7 - 2.0 mmol/L   Lipase     Status: Normal   Result Value Ref Range    Lipase 16 13 - 60 U/L   Troponin T, High Sensitivity     Status: Abnormal   Result Value Ref Range    Troponin T, High Sensitivity 24 (H) <=14 ng/L   Procalcitonin     Status: Abnormal   Result Value Ref Range    Procalcitonin 1.27 (H) <0.05 ng/mL   CRP inflammation     Status: Abnormal   Result Value Ref Range    CRP Inflammation 94.00 (H) <5.00 mg/L   Ammonia (on ice)     Status: Normal   Result Value Ref Range    Ammonia 30 11 - 51 umol/L   CBC with platelets and differential     Status: Abnormal   Result Value Ref Range    WBC Count 8.2 4.0 - 11.0 10e3/uL    RBC Count 2.72 (L) 3.80 - 5.20 10e6/uL    Hemoglobin 9.5 (L) 11.7 - 15.7 g/dL    Hematocrit 30.0 (L) 35.0 - 47.0 %     (H) 78 - 100 fL    MCH 34.9 (H) 26.5 - 33.0 pg    MCHC  31.7 31.5 - 36.5 g/dL    RDW 18.5 (H) 10.0 - 15.0 %    Platelet Count 107 (L) 150 - 450 10e3/uL    % Neutrophils 88 %    % Lymphocytes 5 %    % Monocytes 4 %    % Eosinophils 1 %    % Basophils 0 %    % Immature Granulocytes 2 %    NRBCs per 100 WBC 0 <1 /100    Absolute Neutrophils 7.2 1.6 - 8.3 10e3/uL    Absolute Lymphocytes 0.4 (L) 0.8 - 5.3 10e3/uL    Absolute Monocytes 0.3 0.0 - 1.3 10e3/uL    Absolute Eosinophils 0.1 0.0 - 0.7 10e3/uL    Absolute Basophils 0.0 0.0 - 0.2 10e3/uL    Absolute Immature Granulocytes 0.1 <=0.4 10e3/uL    Absolute NRBCs 0.0 10e3/uL   CBC with platelets differential     Status: Abnormal    Narrative    The following orders were created for panel order CBC with platelets differential.  Procedure                               Abnormality         Status                     ---------                               -----------         ------                     CBC with platelets and d...[[id number]]  Abnormal            Final result                 Please view results for these tests on the individual orders.   Results for orders placed or performed in visit on 08/08/23   Kappa and lambda light chain     Status: Abnormal   Result Value Ref Range    Kappa Free Light Chains 0.44 0.33 - 1.94 mg/dL    Lambda Free Light Chains 145.82 (H) 0.57 - 2.63 mg/dL    Kappa /Lambda Ratio 0.00 (L) 0.26 - 1.65   Comprehensive metabolic panel     Status: Abnormal   Result Value Ref Range    Sodium 135 (L) 136 - 145 mmol/L    Potassium 3.6 3.4 - 5.3 mmol/L    Chloride 102 98 - 107 mmol/L    Carbon Dioxide (CO2) 25 22 - 29 mmol/L    Anion Gap 8 7 - 15 mmol/L    Urea Nitrogen 14.0 8.0 - 23.0 mg/dL    Creatinine 0.95 0.51 - 0.95 mg/dL    Calcium 8.1 (L) 8.8 - 10.2 mg/dL    Glucose 90 70 - 99 mg/dL    Alkaline Phosphatase 124 (H) 35 - 104 U/L    AST 27 0 - 45 U/L    ALT 25 0 - 50 U/L    Protein Total 5.1 (L) 6.4 - 8.3 g/dL    Albumin 3.0 (L) 3.5 - 5.2 g/dL    Bilirubin Total 0.7 <=1.2 mg/dL    GFR Estimate 64  >60 mL/min/1.73m2   IgG     Status: Normal   Result Value Ref Range    Immunoglobulin G 1,219 610 - 1,616 mg/dL   CBC with platelets and differential     Status: Abnormal   Result Value Ref Range    WBC Count 6.5 4.0 - 11.0 10e3/uL    RBC Count 2.84 (L) 3.80 - 5.20 10e6/uL    Hemoglobin 9.9 (L) 11.7 - 15.7 g/dL    Hematocrit 30.8 (L) 35.0 - 47.0 %     (H) 78 - 100 fL    MCH 34.9 (H) 26.5 - 33.0 pg    MCHC 32.1 31.5 - 36.5 g/dL    RDW 18.5 (H) 10.0 - 15.0 %    Platelet Count 132 (L) 150 - 450 10e3/uL    % Neutrophils 75 %    % Lymphocytes 8 %    % Monocytes 4 %    % Eosinophils 11 %    % Basophils 1 %    % Immature Granulocytes 1 %    NRBCs per 100 WBC 0 <1 /100    Absolute Neutrophils 4.9 1.6 - 8.3 10e3/uL    Absolute Lymphocytes 0.5 (L) 0.8 - 5.3 10e3/uL    Absolute Monocytes 0.3 0.0 - 1.3 10e3/uL    Absolute Eosinophils 0.7 0.0 - 0.7 10e3/uL    Absolute Basophils 0.1 0.0 - 0.2 10e3/uL    Absolute Immature Granulocytes 0.1 <=0.4 10e3/uL    Absolute NRBCs 0.0 10e3/uL   Total Protein, Serum for ELP     Status: Abnormal   Result Value Ref Range    Total Protein Serum for ELP 5.2 (L) 6.4 - 8.3 g/dL   CBC with Platelets & Differential     Status: Abnormal    Narrative    The following orders were created for panel order CBC with Platelets & Differential.  Procedure                               Abnormality         Status                     ---------                               -----------         ------                     CBC with platelets and d...[[id number]]  Abnormal            Final result                 Please view results for these tests on the individual orders.   Protein electrophoresis     Status: Abnormal (In process)    Narrative    The following orders were created for panel order Protein electrophoresis.  Procedure                               Abnormality         Status                     ---------                               -----------         ------                     Total Protein,  Serum for...[[id number]]  Abnormal            Final result               Protein Electrophoresis,...[[id number]]                      In process                   Please view results for these tests on the individual orders.     Medications   piperacillin-tazobactam (ZOSYN) 4.5 g vial to attach to  mL bag (has no administration in time range)   vancomycin (VANCOCIN) 1,250 mg in 0.9% NaCl 250 mL intermittent infusion (has no administration in time range)   0.9% sodium chloride BOLUS (1,000 mLs Intravenous $New Bag 8/8/23 9353)                    Results for orders placed or performed in visit on 08/08/23   Comprehensive metabolic panel     Status: Abnormal   Result Value Ref Range    Sodium 135 (L) 136 - 145 mmol/L    Potassium 3.6 3.4 - 5.3 mmol/L    Chloride 102 98 - 107 mmol/L    Carbon Dioxide (CO2) 25 22 - 29 mmol/L    Anion Gap 8 7 - 15 mmol/L    Urea Nitrogen 14.0 8.0 - 23.0 mg/dL    Creatinine 0.95 0.51 - 0.95 mg/dL    Calcium 8.1 (L) 8.8 - 10.2 mg/dL    Glucose 90 70 - 99 mg/dL    Alkaline Phosphatase 124 (H) 35 - 104 U/L    AST 27 0 - 45 U/L    ALT 25 0 - 50 U/L    Protein Total 5.1 (L) 6.4 - 8.3 g/dL    Albumin 3.0 (L) 3.5 - 5.2 g/dL    Bilirubin Total 0.7 <=1.2 mg/dL    GFR Estimate 64 >60 mL/min/1.73m2   CBC with platelets and differential     Status: Abnormal   Result Value Ref Range    WBC Count 6.5 4.0 - 11.0 10e3/uL    RBC Count 2.84 (L) 3.80 - 5.20 10e6/uL    Hemoglobin 9.9 (L) 11.7 - 15.7 g/dL    Hematocrit 30.8 (L) 35.0 - 47.0 %     (H) 78 - 100 fL    MCH 34.9 (H) 26.5 - 33.0 pg    MCHC 32.1 31.5 - 36.5 g/dL    RDW 18.5 (H) 10.0 - 15.0 %    Platelet Count 132 (L) 150 - 450 10e3/uL    % Neutrophils 75 %    % Lymphocytes 8 %    % Monocytes 4 %    % Eosinophils 11 %    % Basophils 1 %    % Immature Granulocytes 1 %    NRBCs per 100 WBC 0 <1 /100    Absolute Neutrophils 4.9 1.6 - 8.3 10e3/uL    Absolute Lymphocytes 0.5 (L) 0.8 - 5.3 10e3/uL    Absolute Monocytes 0.3 0.0 - 1.3 10e3/uL     Absolute Eosinophils 0.7 0.0 - 0.7 10e3/uL    Absolute Basophils 0.1 0.0 - 0.2 10e3/uL    Absolute Immature Granulocytes 0.1 <=0.4 10e3/uL    Absolute NRBCs 0.0 10e3/uL   CBC with Platelets & Differential     Status: Abnormal    Narrative    The following orders were created for panel order CBC with Platelets & Differential.  Procedure                               Abnormality         Status                     ---------                               -----------         ------                     CBC with platelets and d...[[id number]]  Abnormal            Final result                 Please view results for these tests on the individual orders.   Protein electrophoresis     Status: None (In process)    Narrative    The following orders were created for panel order Protein electrophoresis.  Procedure                               Abnormality         Status                     ---------                               -----------         ------                     Total Protein, Serum for...[[id number]]                      In process                 Protein Electrophoresis,...[[id number]]                      In process                   Please view results for these tests on the individual orders.     Medications - No data to display  Labs Ordered and Resulted from Time of ED Arrival to Time of ED Departure - No data to display  No orders to display          Critical care was not performed.     Medical Decision Making  The patient's presentation was of high complexity (an acute health issue posing potential threat to life or bodily function).    The patient's evaluation involved:  review of external note(s) from 3+ sources (see separate area of note for details)  ordering and/or review of 3+ test(s) in this encounter (see separate area of note for details)  review of 3+ test result(s) ordered prior to this encounter (see separate area of note for details)  independent interpretation of testing performed by another  health professional (cxr shows left lobe opacity)  discussion of management or test interpretation with another health professional (IM hospitalist)    The patient's management necessitated high risk (a decision regarding hospitalization).    Assessment & Plan    Ms. Libby Grimaldo is a female with a history of multiple myeloma who receives regular infusions of palliative chemotherapy who presents to the ER due to fever and rigors.  Patient presented here and is ANO x1.  Patient was unable to to say where specific pain was coming from.  Patient was found to be febrile to 102.  Patient's blood pressure and heart rate were normal.  Patient is found to have a normal WBC.  Patient CRP however is elevated to 94 and her Pro-Kevin is elevated at 1.27.  Patient CT head is normal.  Patient's chest x-ray shows concern for possible left lower lobe infiltrate.  Patient's urine is pending.  Patient's lactic acid was mildly elevated 2.3.  Patient needs to be admitted for concern for sepsis and pneumonia.  Patient will be given vancomycin and Zosyn.  Report was given to internal medicine triage.  Patient remains in guarded condition due to severe fever.  I did review patient's CODE STATUS and she is a full code.  I will discuss this with the family.  Patient has a terminal illness.  Patient will be admitted in the hospital for further care.    This part of the medical record was transcribed by Porfirio Cooper Scribe, from a dictation done by Taylor Gomez MD.       Discussed CODE STATUS with the patient and the family.  They are unable to engage in conversation currently to see if they want to change anything.  We will continue to full code at this time.    I have reviewed the nursing notes. I have reviewed the findings, diagnosis, plan and need for follow up with the patient.    New Prescriptions    No medications on file       Final diagnoses:   Altered mental status, unspecified altered mental status type   Fever and  chills   Multiple myeloma not having achieved remission (H)   I, Candido Carrasco, am serving as a trained medical scribe to document services personally performed by Taylor Gomez MD, based on the provider's statements to me.     I, Taylor Gomez MD, was physically present and have reviewed and verified the accuracy of this note documented by Candido Carrasco.      Taylor Gomez MD  [facility] EMERGENCY DEPARTMENT  8/8/2023     Taylor Gomez MD  08/08/23 1522

## 2023-08-08 NOTE — MEDICATION SCRIBE - ADMISSION MEDICATION HISTORY
Medication Scribe Admission Medication History    Admission medication history is complete. The information provided in this note is only as accurate as the sources available at the time of the update.    Medication reconciliation/reorder completed by provider prior to medication history? No    Information Source(s): Clinic records via in-person    Pertinent Information:   Patient is on a chemotherapy regimen  of elotuzumab infusions last one was 7/26/2023, dexamethasone 4 mg tablet taken day (1) 28 mg 3 to 24 hours before elotuzumab infusion on days 8 15 and 22  patient is to take 40 mg of dexamethasone, and oral chemotherapy drug pomalidomide 4 mg capsule taken daily 1-21 day cycle repeated every 28 days. Patient also receives zoledronic acid every month as supportive care for osteoarthritis. Patient prescribed methodone 10 mg. Writer contacted patient's pharmacy for instructions on taking the methodone which were missing in the PTA list.  There was only the name of drug and dosage of 5 mg which was wrong.  [id number] confirmed that the methadone dosage was 10 mg and last filled 7/13/2023 dispensed 84 tablets which are to be taken 3 times a day morning afternoon and night. Pharmacist also confirmed that patient was prescribed suboxone 8-2 MG per film, but is no longer taking the medication. Patient reported not taking acyclovir 400 mg tablet every 12 hours.    Changes made to PTA medication list:  Added: None  Deleted: acyclovir 400 mg tablet, suboxone 8-2 MG per film, methadone 5 mg tablet, 2 old pomalidomide 4 mg 21 day cycle prescriptions,duplicate dexamethasone prescription  Changed:  from methadone 5 mg tablet the route and,frequency not recorded to methadone 10 mg tablet  by mouth 3 times a day    Medication Affordability:  Not including over the counter (OTC) medications, was there a time in the past 3 months when you did not take your medications as prescribed because  of cost?: No    Allergies reviewed with patient and updates made in EHR: yes    Medication History Completed By: Luna Howard 8/8/2023 5:28 PM    Prior to Admission medications    Medication Sig Last Dose Taking? Auth Provider Long Term End Date   acetaminophen (TYLENOL) 500 MG tablet Take 500-1,000 mg by mouth every 6 hours as needed for mild pain  Unknown at as needed Yes Reported, Patient     acyclovir (ZOVIRAX) 400 MG tablet Take 1 tablet (400 mg) by mouth every 12 hours 8/8/2023 at unknown Yes Julio C Guillory MD Yes    albuterol (PROAIR HFA/PROVENTIL HFA/VENTOLIN HFA) 108 (90 Base) MCG/ACT inhaler Inhale 2 puffs into the lungs every 6 hours as needed for shortness of breath Unknown at as needed Yes Reported, Patient Yes    ascorbic acid 1000 MG TABS tablet Take 1,000 mg by mouth 8/8/2023 at unknown Yes Reported, Patient     aspirin (ASA) 325 MG tablet Take 1 tablet (325 mg) by mouth daily for 180 days 8/8/2023 at am Yes Julio C Guillory MD  10/20/23   Fluticasone-Umeclidin-Vilanterol (TRELEGY ELLIPTA) 100-62.5-25 MCG/INH oral inhaler Inhale 1 puff into the lungs daily  8/8/2023 at am Yes Reported, Patient     guaiFENesin (MUCINEX) 600 MG 12 hr tablet Take 2 tablets (1,200 mg) by mouth 2 times daily as needed Unknown at as needeed Yes Shanita Naylor PA-C     levothyroxine (SYNTHROID/LEVOTHROID) 112 MCG tablet Take 112 mcg by mouth 8/8/2023 at am Yes Reported, Patient Yes    loperamide (IMODIUM) 2 MG capsule Take 1-2 capsules (2-4 mg) by mouth 4 times daily as needed for diarrhea Unknown at as needed Yes Julio C Guillory MD     methocarbamol (ROBAXIN) 500 MG tablet Take 500 mg by mouth 3 times daily as needed for muscle spasms Unknown at as needed Yes Unknown, Entered By History     metoprolol succinate ER (TOPROL XL) 25 MG 24 hr tablet Take 1 tablet (25 mg) by mouth daily 8/8/2023 at am Yes Julio C Guillory MD Yes    ondansetron (ZOFRAN ODT) 8 MG ODT tab Take 1 tablet (8 mg) by mouth every 8 hours  as needed for nausea Unknown at as needed Yes Ta Chang PA-C No    pomalidomide (POMALYST) 4 MG capsule Take 1 capsule (4 mg) by mouth daily for 21 days Swallow whole, do NOT break, crush, chew or open capsule. Take with water. Take on days 1 through 21 of repeated 28 day cycles. 8/8/2023 at unknown Yes Julio C Guillory MD Yes 8/16/23   prochlorperazine (COMPAZINE) 10 MG tablet Take 0.5 tablets (5 mg) by mouth every 6 hours as needed (Nausea/Vomiting) Unknown at as needed Yes Ta Chang PA-C     sulfamethoxazole-trimethoprim (BACTRIM DS) 800-160 MG tablet Take 1 tablet by mouth Every Mon, Tues two times daily Start on Monday 6/27/2022 8/8/2023 at am Yes Julio C Guillory MD     venlafaxine (EFFEXOR) 75 MG tablet Take 1 tablet (75 mg) by mouth 3 times daily 8/8/2023 at am Yes Nidia Littlejohn PA-C Yes    dexamethasone (DECADRON) 4 MG tablet On Day 1, take 28 mg (7 tablets) 3 to 24 hours prior to elotuzumab infusion. On Day 8, 15, and 22, take 40 mg (10 tablets). Take with food.  Patient not taking: Reported on 8/8/2023 Not Taking  Julio C Guillory MD Yes    dexamethasone (DECADRON) 4 MG tablet On Day 1, take 28 mg (7 tablets) 3 to 24 hours prior to elotuzumab infusion. On Day 8, 15, and 22, take 40 mg (10 tablets). Take with food. 7/26/2023 at unknown  Julio C Guillory MD Yes    methadone (DOLOPHINE) 5 MG tablet  Not Taking  Reported, Patient     pantoprazole (PROTONIX) 40 MG EC tablet Take 1 tablet (40 mg) by mouth daily   Shanita Naylor PA-C     pomalidomide (POMALYST) 4 MG capsule Take 1 capsule (4 mg) by mouth daily for 21 days Swallow whole, do NOT break, crush, chew or open capsule. Take with water. Take on days 1 through 21 of repeated 28 day cycles.   Julio C Guillory MD Yes 7/17/23   pomalidomide (POMALYST) 4 MG capsule Take 1 capsule (4 mg) by mouth daily for 21 days Swallow whole, do NOT break, crush, chew or open capsule. Take with water. Take on days 1 through 21 of repeated 28  day cycles.   Julio C Guillory MD Yes 5/22/23   atorvastatin (LIPITOR) 40 MG tablet Take 40 mg by mouth daily    Reported, Patient Yes 5/30/22

## 2023-08-08 NOTE — ED TRIAGE NOTES
Libby Grimaldo 72 yrs old F. Came to ED via EMS from her oncology clinic sent her to ED due to altered mental status. Pt c/o chills , diarrhea started today X1. Denied N/V no chest pain or SOB  .

## 2023-08-09 NOTE — CONSULTS
"  Malignant Hematology  Consult Note   Date of Service: 08/09/2023    Patient: Libby Grimaldo  MRN: [id number]  Admission Date: 8/8/2023  Hospital Day # 1  Cancer Diagnosis: High Risk IgG Lambda MM   Primary Outpatient Oncologist: Dr. 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 At this time there seems to be a pulmonary etiology for her symptoms and is being further investigated by the hospitalist team.  Her most recent dose of Elotuzumab was on 6/26/23       # High Risk IgG Lambda MM s/p Auto-PBSCT in 2022.  # Sepsis 2/2 to presumed pulmonary infection, improving  # Pancytopenia, near baseline, non-neutropenic. Slightly worsened thrombocytopenia likely from sepsis  # COPD   # Grave's disease     # HTN , antihypertensives on hold in setting of sepsis          Recommendations:   - With her current condition, we would advise to hold her current chemotherapy regimen  - Would further investigate for a pulmonary etiology for her symptoms. Recommend RVP, Strep/[place]   [Especially given diarrhea episode] urinary antigen, sputum gram stain/culture  - Agree with blood culture, UA/UrineCx, Cdiff/enteric panel and will await findings  - Continue with Vanc/Zosyn and await MRSA PCR (if negative can plan to discontinue Vancomycin)  - Continue with prophylactic Bactrim and acyclovir for MM  - Consider CT Chest if not improving (Although patient seems much improved during our encounter)  - We will plan to become primary service for the patient tomorrow and can determine on " "timing of restarting her regimen at that time)  - [facility] contacted to schedule close oncologic follow up within 1 week after expected discharge.     Thank you for this consult and the opportunity to treat this pleasant patient. We will continue to follow this patient. Please do not hesitate to page with any questions or concerns.    Patient was seen and plan of care was discussed with attending physician Dr. Vargas. Attestation to follow.     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 She mentions that she originally had a pneumonia a few months ago and that her symptoms had resolved from that infection. However, recently she had guests staying at her house and a few days ago she then started to develop fevers and diarrhea. She reports that the diarrhea episodes would happen once daily. She doesn't feel like she had much new respiratory symptoms but does have a persistent cough. She denies n/v, chest pain, dyspnea, abdominal pain, hematochezia, dysuria, or other symptoms. She had originally described her symptoms as \"Flu-like\" to her primary oncologist. She had a clinic appointment with her primary oncologist, Dr. Guillory, yesterday where she was noted to have rigors and difficulty answering questions during the encounter. She does recall having these bouts of confusion yesterday as well. She feels this has resolved. Her sister in the room mentions anytime the patient has an infection that she becomes confused. Therefore, due to the symptoms during her clinic visit she was sent " to the ED from clinic.     Oncologic History:  Multiple myeloma not having achieved remission (H)   11/7/2018 -  Cancer Staged     Staging form: Plasma Cell Myeloma and Disorders, AJCC 8th Edition  - Clinical stage from 11/7/2018: Albumin (g/dL): 3.4, ISS: Stage II, High-risk cytogenetics: Absent, LDH: Unknown      11/7/2018 Initial Diagnosis     Multiple myeloma not having achieved remission (H)      1/23/2019 - 11/14/2019 Chemotherapy     KRD x 6 cycles      11/4/2019 - 2/14/2021 Chemotherapy     Rev/Dex(20)       4/3/2020 - 7/17/2020 Chemotherapy     Velcade maintenance - dose reductions for orthostatic hypotension      7/17/2020 - 12/15/2020 Chemotherapy     Revlimid maintenance      11/23/2020 Progression     Bone marrow 35% plasma cells, PET CT demonstrating new lytic lesions      12/15/2020 - 6/11/2021 Chemotherapy     Elsy, Kd x 6 cycles      7/7/2021 -  Cancer Staged     PET/CT - CR      7/21/2021 -  Chemotherapy     Subcutaneous Daratumumab, IVIG, denosumab monthly      11/22/2021 -  Cancer Staged     Bone marrow aspirate - MRD 0.0022% positive      2/1/2022 - 2/5/2022 Chemotherapy     IP BMT Chemotherapy For Mobilization - High Dose Cyclophosphamide  Plan Provider: Julio C Guillory MD  Treatment goal: Other  Line of treatment: [No plan line of treatment]      5/25/2022 - 11/23/2022 Chemotherapy     IP - OP BMT 2016-35 Auto Multiple Myeloma - Melphalan (CrCL < 30 mL/min, >/= 2 comorbidities, OR age > 75yrs) - Adult (Version: 7/13/21)  Plan Provider: Julio C Guillory MD  Treatment goal: Other  Line of treatment: [No plan line of treatment]      9/29/2022 - 3/9/2023 Supportive Treatment     Oral ONC - ixazomib maintenance post-transplant  Plan Provider: Julio C Guillory MD  Treatment goal: Curative  Line of treatment: [No plan line of treatment]      4/21/2023 -  Supportive Treatment     OP ONC Zoledronic Acid (Zometa) MONTHLY  Plan Provider: Julio C Guillory MD  Treatment goal: Palliative  Line of treatment:  Maintenance      4/24/2023 -  Chemotherapy     OP ONC Multiple Myeloma - Elotuzumab / Pomalidomide / Dexamethasone (<75 years old)  Plan Provider: Julio C Guillory MD  Treatment goal: Palliative  Line of treatment: Third Line       Review of Systems:  A comprehensive ROS was performed and found to be negative or non-contributory with the exception of that noted in the HPI above.    Past Medical History:  Past Medical History:   Diagnosis Date    Cervical radiculopathy     COPD (chronic obstructive pulmonary disease) (H)     Double vision     Febrile seizure (H)     Per patient. Once while a toddler, once while in highschool, and once while in college.    Floaters     Graves disease     HLD (hyperlipidemia)     HTN (hypertension)     IPF (idiopathic pulmonary fibrosis) (H)     Lumbar radiculopathy     Multiple myeloma in relapse (H)     Osteoporosis     Pneumonia     Per patient. Complicated by sepsis.    Recurrent UTI     Per patient. Has required prophylactic antibiotcs.    Vitamin B12 deficiency (non anemic)        Past Surgical History:  Past Surgical History:   Procedure Laterality Date    BONE MARROW BIOPSY, BONE SPECIMEN, NEEDLE/TROCAR Right 01/24/2022    Procedure: BIOPSY, BONE MARROW;  Surgeon: Yuniel Tineo;  Location: [facility] OR    BONE MARROW BIOPSY, BONE SPECIMEN, NEEDLE/TROCAR Right 9/1/2022    Procedure: BIOPSY, BONE MARROW;  Surgeon: Juju Oates PA-C;  Location: [facility] OR    CERVICAL FUSION      CHOLECYSTECTOMY      CYSTECTOMY OVARIAN BENIGN      EYE SURGERY      Per patient.    IR CVC TUNNEL PLACEMENT > 5 YRS OF AGE  02/01/2022    IR CVC TUNNEL REMOVAL LEFT  02/22/2022    PICC DOUBLE LUMEN PLACEMENT Left 05/25/2022    Left basilic, 44 cm, 2 cm external length    TONSILLECTOMY      WRIST SURGERY Left        Social History:  Social History     Socioeconomic History    Marital status:    Tobacco Use    Smoking status: Former     Packs/day: 1.00     Years: 40.00     Pack years: 40.00     Types:  "Cigarettes     Passive exposure: Past    Smokeless tobacco: Former     Quit date: 2010   Substance and Sexual Activity    Alcohol use: Not Currently    Drug use: Not Currently     Types: Marijuana     Social Determinants of Health     Intimate Partner Violence: Not At Risk (1/24/2022)    Humiliation, Afraid, Rape, and Kick questionnaire     Fear of Current or Ex-Partner: No     Emotionally Abused: No     Physically Abused: No     Sexually Abused: No        Family History  Family History   Problem Relation Age of Onset    Heart Disease Mother     Cancer Mother         \"Around lungs\" - she explicitly said it was *not* lung cancer.    Diabetes Father     Heart Disease Father        Outpatient Medications:  No current facility-administered medications on file prior to encounter.  acetaminophen (TYLENOL) 500 MG tablet, Take 500-1,000 mg by mouth every 6 hours as needed for mild pain   acyclovir (ZOVIRAX) 400 MG tablet, Take 1 tablet (400 mg) by mouth every 12 hours  albuterol (PROAIR HFA/PROVENTIL HFA/VENTOLIN HFA) 108 (90 Base) MCG/ACT inhaler, Inhale 2 puffs into the lungs every 6 hours as needed for shortness of breath  ascorbic acid 1000 MG TABS tablet, Take 1,000 mg by mouth  aspirin (ASA) 325 MG tablet, Take 1 tablet (325 mg) by mouth daily for 180 days  dexamethasone (DECADRON) 4 MG tablet, On Day 1, take 28 mg (7 tablets) 3 to 24 hours prior to elotuzumab infusion. On Day 8, 15, and 22, take 40 mg (10 tablets). Take with food.  Fluticasone-Umeclidin-Vilanterol (TRELEGY ELLIPTA) 100-62.5-25 MCG/INH oral inhaler, Inhale 1 puff into the lungs daily   guaiFENesin (MUCINEX) 600 MG 12 hr tablet, Take 2 tablets (1,200 mg) by mouth 2 times daily as needed  levothyroxine (SYNTHROID/LEVOTHROID) 112 MCG tablet, Take 112 mcg by mouth  loperamide (IMODIUM) 2 MG capsule, Take 1-2 capsules (2-4 mg) by mouth 4 times daily as needed for diarrhea  methadone (DOLOPHINE) 10 MG tablet, Take 10 mg by mouth 3 times daily Morning, " "afternoon, and night  methocarbamol (ROBAXIN) 500 MG tablet, Take 500 mg by mouth 3 times daily as needed for muscle spasms  metoprolol succinate ER (TOPROL XL) 25 MG 24 hr tablet, Take 1 tablet (25 mg) by mouth daily  ondansetron (ZOFRAN ODT) 8 MG ODT tab, Take 1 tablet (8 mg) by mouth every 8 hours as needed for nausea  pantoprazole (PROTONIX) 40 MG EC tablet, Take 1 tablet (40 mg) by mouth daily  pomalidomide (POMALYST) 4 MG capsule, Take 1 capsule (4 mg) by mouth daily for 21 days Swallow whole, do NOT break, crush, chew or open capsule. Take with water. Take on days 1 through 21 of repeated 28 day cycles.  prochlorperazine (COMPAZINE) 10 MG tablet, Take 0.5 tablets (5 mg) by mouth every 6 hours as needed (Nausea/Vomiting)  sulfamethoxazole-trimethoprim (BACTRIM DS) 800-160 MG tablet, Take 1 tablet by mouth Every Mon, Tues two times daily Start on Monday 6/27/2022  venlafaxine (EFFEXOR) 75 MG tablet, Take 1 tablet (75 mg) by mouth 3 times daily  [DISCONTINUED] atorvastatin (LIPITOR) 40 MG tablet, Take 40 mg by mouth daily          Physical Exam:    Blood pressure (!) 146/69, pulse 69, temperature 97.9  F (36.6  C), temperature source Oral, resp. rate 16, height 1.6 m (5' 2.99\"), weight 49 kg (108 lb), SpO2 99 %.  General: alert and cooperative, lying in bed, no acute distress  HEENT: sclera anicteric, EOMI, MMM  Neck: supple, normal ROM  CV: RRR, no murmurs  Resp: mild crackles in RLL. normal respiratory effort on ambient air  GI: soft, non-tender, non-distended, bowel sounds present and normoactive  MSK: warm and well-perfused, normal tone  Skin: no rashes on limited exam, no jaundice  Neuro: Alert and interactive, moves all extremities equally, no focal deficits    Labs & Studies: I personally reviewed the following studies:  ROUTINE LABS (Last four results):  CMP  Recent Labs   Lab 08/09/23  0902 08/09/23  0627 08/08/23  1026   NA  --  138 135*   POTASSIUM  --  3.6 3.6   CHLORIDE  --  109* 102   CO2  --  19* " 25   ANIONGAP  --  10 8   GLC 66* 58* 90   BUN  --  13.2 14.0   CR  --  1.03* 0.95   GFRESTIMATED  --  58* 64   DIANDRA  --  8.1* 8.1*   PROTTOTAL  --  4.5* 5.1*   ALBUMIN  --  2.5* 3.0*   BILITOTAL  --  0.4 0.7   ALKPHOS  --  101 124*   AST  --  32 27   ALT  --  22 25     CBC  Recent Labs   Lab 08/09/23  0627 08/08/23  1309 08/08/23  1026   WBC 4.6 8.2 6.5   RBC 2.45* 2.72* 2.84*   HGB 8.2* 9.5* 9.9*   HCT 27.4* 30.0* 30.8*   * 110* 109*   MCH 33.5* 34.9* 34.9*   MCHC 29.9* 31.7 32.1   RDW 18.9* 18.5* 18.5*   PLT 87* 107* 132*     INRNo lab results found in last 7 days.

## 2023-08-10 NOTE — PLAN OF CARE
"Goal Outcome Evaluation:    Neuro: A&Ox2-3- disoriented to time and sometimes situation. Able to make needs known and follow commands. Bed alarm on.  Cardiac: No tele orders. VSS.   Respiratory: Sating >90% on RA. RVP sent. Sputum needed. RLL crackles.  GI/: Adequate urine output. LBM 8/8. Stool sample needed for r/o cdiff.  Diet/appetite: Tolerating regular diet. Eating well.  Activity:  Assist of sba, up to chair and in halls.  Pain: At acceptable level on current regimen.   Skin: No new deficits noted.  LDA's: Portx1- abx continue.     Plan: Continue with POC. Notify primary team with changes.      Problem: Plan of Care - These are the overarching goals to be used throughout the patient stay.    Goal: Plan of Care Review  Description: The Plan of Care Review/Shift note should be completed every shift.  The Outcome Evaluation is a brief statement about your assessment that the patient is improving, declining, or no change.  This information will be displayed automatically on your shift note.  Outcome: Progressing  Goal: Patient-Specific Goal (Individualized)  Description: You can add care plan individualizations to a care plan. Examples of Individualization might be:  \"Parent requests to be called daily at 9am for status\", \"I have a hard time hearing out of my right ear\", or \"Do not touch me to wake me up as it startles me\".  Outcome: Progressing  Goal: Absence of Hospital-Acquired Illness or Injury  Outcome: Progressing  Intervention: Identify and Manage Fall Risk  Recent Flowsheet Documentation  Taken 8/10/2023 0025 by Elly Gambino RN  Safety Promotion/Fall Prevention:   assistive device/personal items within reach   increased rounding and observation   increase visualization of patient   lighting adjusted   mobility aid in reach   nonskid shoes/slippers when out of bed   patient and family education   room near nurse's station  Intervention: Prevent Skin Injury  Recent Flowsheet Documentation  Taken " 8/10/2023 0025 by Elly Gambino RN  Body Position: position changed independently  Intervention: Prevent Infection  Recent Flowsheet Documentation  Taken 8/10/2023 0025 by Elly Gambino RN  Infection Prevention:   rest/sleep promoted   personal protective equipment utilized   hand hygiene promoted   single patient room provided  Goal: Optimal Comfort and Wellbeing  Outcome: Progressing  Goal: Readiness for Transition of Care  Outcome: Progressing     Problem: Infection  Goal: Absence of Infection Signs and Symptoms  Outcome: Progressing  Intervention: Prevent or Manage Infection  Recent Flowsheet Documentation  Taken 8/10/2023 0025 by Elly Gambino RN  Isolation Precautions:   droplet precautions initiated   enteric precautions initiated

## 2023-08-10 NOTE — CONSULTS
Care Management Initial Consult    General Information  Assessment completed with: Libby Nichols  Type of CM/SW Visit: Initial Assessment    Primary Care Provider verified and updated as needed: Yes   Readmission within the last 30 days: no previous admission in last 30 days   Reason for Consult: discharge planning and elevated risk score  Advance Care Planning: Advance Care Planning Reviewed:  has paperwork she is planning to fill out          Communication Assessment  Patient's communication style: spoken language (English or Bilingual)    Hearing Difficulty or Deaf: no   Wear Glasses or Blind: no    Cognitive  Cognitive/Neuro/Behavioral: WDL  Level of Consciousness: confused  Arousal Level: opens eyes spontaneously  Orientation: oriented x 4  Mood/Behavior: calm, cooperative  Best Language: 0 - No aphasia  Speech: clear, spontaneous    Living Environment:   People in home: spouse, Tres  Current living Arrangements: house (side by side duplex)      Able to return to prior arrangements: yes       Family/Social Support:  Care provided by: self, spouse/significant other  Provides care for: grandchild(samantha) (8 year old granddaughter visits often)  Marital Status:   Description of Support System: , Tres, Children, Sibling(s); Supportive, Involved    Support Assessment: Adequate family and caregiver support, Adequate social supports    Current Resources:   Patient receiving home care services: No     Community Resources:  out patient PT  Equipment currently used at home: walker, standard (patient has, but doesn't use.  Feels it gets in the way more than it helps.)  Supplies currently used at home:  Patient has a blood glucose monitor as she tends to have low blood sugars.    Employment/Financial:  Employment Status: retired        Financial Concerns: No concerns identified   Referral to Financial Worker: No       Does the patient's insurance plan have a 3 day qualifying hospital stay waiver?   "No    Lifestyle & Psychosocial Needs:  Social Determinants of Health     Tobacco Use: Medium Risk (8/8/2023)    Patient History     Smoking Tobacco Use: Former     Smokeless Tobacco Use: Former     Passive Exposure: Past   Alcohol Use: Not on file   Financial Resource Strain: Not on file   Food Insecurity: Not on file   Transportation Needs: Not on file   Physical Activity: Not on file   Stress: Not on file   Social Connections: Not on file   Intimate Partner Violence: Not At Risk (1/24/2022)    Humiliation, Afraid, Rape, and Kick questionnaire     Fear of Current or Ex-Partner: No     Emotionally Abused: No     Physically Abused: No     Sexually Abused: No   Depression: Not at risk (6/1/2023)    PHQ-2     PHQ-2 Score: 1   Housing Stability: Not on file       Functional Status:  Prior to admission patient needed assistance:   Dependent ADLs:: Independent  Dependent IADLs:: Transportation, Laundry ( helps with house work as a shared responsibility, but patient cannot do laundry on her own though as it requires carrying things up & down stairs).  Assesssment of Functional Status: At functional baseline    Mental Health Status:  Mental Health Status: No Current Concerns       Chemical Dependency Status:  Chemical Dependency Status: No Current Concerns             Values/Beliefs:  Spiritual, Cultural Beliefs, Bahai Practices, Values that affect care: no               Additional Information:  Per H&P:  \"Libby Grimaldo is a 72 year old female who has a history of multiple myeloma (2018) s/p autologous peripheral blood stem cell transplant (2022) on palliative chemotherapy, Graves disease, ILD/COPD, HTN, and HLD who was admitted on 8/8 with sepsis.\"    Patient recommended for home PT, but declines home PT, wants outpatient PT. Writer asked RADHA Ramirez, to order outpatient PT referral.  Patient has been on zosyn in hospital, writer paged Ashley, RADHA, inquiring if IV antibiotics are needed at discharge.      Initial " assessment completed due to high risk readmission score. See details above. Care management will follow for any discharge needs.           Celsa Darby, RN, BSN  RN Care Coordinator    [address] [address]  [email or web address]  Gender pronouns: she/her  Pager: [phone]

## 2023-08-10 NOTE — PROGRESS NOTES
"   08/10/23 1000   Appointment Info   Signing Clinician's Name / Credentials (PT) Alejandra Iraheta, SERGE   Student Supervision Direct supervision provided;Direct Patient Contact Provided;Therapy services provided with the co-signing licensed therapist guiding and directing the services, and providing the skilled judgement and assessment throughout the session   Rehab Comments (PT) PT only, monitor confusion       Present no   Living Environment   People in Home spouse   Current Living Arrangements house   Home Accessibility stairs to enter home   Number of Stairs, Main Entrance 4   Stair Railings, Main Entrance railings safe and in good condition   Transportation Anticipated family or friend will provide   Living Environment Comments Pt reports she lives in a one-level home with her  who is present/available to provide assistance. 4 NAKIA with handrails. Family provides transportation.   Self-Care   Usual Activity Tolerance good   Current Activity Tolerance fair   Regular Exercise No   Equipment Currently Used at Home cane, quad;raised toilet seat;shower chair   Fall history within last six months no   Activity/Exercise/Self-Care Comment Reports Jojo with ADL's and mobility using quad cane. No falls reported.  provides assistance for IADLs. Also owns raised toilet seat and shower chair.   General Information   Onset of Illness/Injury or Date of Surgery 08/08/23   Referring Physician Ashley Low PA-C   Patient/Family Therapy Goals Statement (PT) return home   Pertinent History of Current Problem (include personal factors and/or comorbidities that impact the POC) per EMR: \"Libby Grimaldo is a 72 year old female who has a history of multiple myeloma (2018) s/p autologous peripheral blood stem cell transplant (2022) on palliative chemotherapy, Graves disease, ILD/COPD, HTN, and HLD who was admitted on 8/8 with sepsis\"   Existing Precautions/Restrictions immunosuppressed   Weight-Bearing " Status - LUE full weight-bearing   Weight-Bearing Status - RUE full weight-bearing   Weight-Bearing Status - LLE full weight-bearing   Weight-Bearing Status - RLE full weight-bearing   General Observations Activity: up w/ assist, chair 3x per day   Cognition   Orientation Status (Cognition) oriented to;person;place;situation;disoriented to;time   Follows Commands (Cognition) WFL   Pain Assessment   Patient Currently in Pain No   Integumentary/Edema   Integumentary/Edema no deficits were identifed   Posture    Posture Forward head position;Protracted shoulders   Range of Motion (ROM)   Range of Motion ROM is WFL   Strength (Manual Muscle Testing)   Strength (Manual Muscle Testing) strength is WFL   Bed Mobility   Comment, (Bed Mobility) supine > sit with IND   Transfers   Comment, (Transfers) sit > stand with IND   Gait/Stairs (Locomotion)   Distance in Feet 20ft   Distance in Feet (Gait) 70ft   Comment, (Gait/Stairs) Ambulated 20ft with IV pole and SBA/CGA   Balance   Balance other (describe)   Balance Comments   (fair(+) sitting balance; fair standing balance)   Sensory Examination   Sensory Perception other (describe)   Sensory Perception Comments Pt reports BUE neuropathy during chemo treatments   Coordination   Coordination no deficits were identified   Muscle Tone   Muscle Tone no deficits were identified   Clinical Impression   Criteria for Skilled Therapeutic Intervention Yes, treatment indicated   PT Diagnosis (PT) impaired functional mobility; at risk for deconditioning   Influenced by the following impairments decreased balance, strength, and endurance   Functional limitations due to impairments difficulty with functional mobility and at risk for deconditioning 2/2 prolonged hospital stay   Clinical Presentation (PT Evaluation Complexity) Stable/Uncomplicated   Clinical Presentation Rationale per clinical judgment   Clinical Decision Making (Complexity) low complexity   Planned Therapy Interventions (PT)  balance training;bed mobility training;gait training;home exercise program;motor coordination training;neuromuscular re-education;patient/family education;postural re-education;ROM (range of motion);stair training;strengthening;stretching;transfer training;progressive activity/exercise;risk factor education;home program guidelines   Anticipated Equipment Needs at Discharge (PT)   (tbd)   Risk & Benefits of therapy have been explained evaluation/treatment results reviewed;care plan/treatment goals reviewed;risks/benefits reviewed;current/potential barriers reviewed;participants voiced agreement with care plan;participants included;patient   Physical Therapy Goals   PT Frequency 6x/week   PT Predicted Duration/Target Date for Goal Attainment 08/24/23   PT Goals Gait;Stairs;Aerobic Activity   PT: Gait Modified independent;Greater than 200 feet  (with LRAD)   PT: Stairs Modified independent;4 stairs;Rail on left;Rail on right   PT: Perform aerobic activity with stable cardiovascular response continuous activity;10 minutes;15 minutes;ambulation;intermittent activity;NuStep  (w/ LRAD)   Interventions   Interventions Quick Adds Gait Training;Therapeutic Activity   PT Discharge Planning   PT Plan progress gait with LRAD, stairs when appropriate, endurance, BLE HEP   PT Discharge Recommendation (DC Rec) home with assist;home with home care physical therapy   PT Rationale for DC Rec Pt presents functioning below baseline level of function. Primarily limited by decreased endurance/activity tolerance. Previously Jojo with ADL's and mobility; 24/7 support available from  at home. Will continue to benefit from IP PT during LOS. Once medically appropriate, anticipated to d/c home with assist from SO as needed. Will also benefit from HH PT to improve safety and IND and reduce risk for falls and caregiver burden. Pt in agreement to plan; will update as appropriate.   PT Brief overview of current status Ax1 with IV pole and  gait belt; encourage up to chair and hallway walking 3-4x/day; increased risk for falls 2/2 cognition

## 2023-08-10 NOTE — PROGRESS NOTES
[facility]    Hematology / Oncology Progress Note    Patient: Libby Grimaldo  MRN: [id number]  Admission Date: 8/8/2023  Hospital Day # 2  Cancer Diagnosis: High Risk IgG Lambda MM    Primary Outpatient Oncologist: Dr. Guillory/Rashad Chen   Current Treatment Plan: Elotuzumab / Pomalidomide / Dexamethasone      Date of Service (when I saw the patient): 08/10/2023     Assessment & Plan   Libby Grimaldo is a 72 year old female with a medical history of high risk IgG Lambda MM s/p Auto-PBSCT in 2022 (on palliative Erlo/Pom/Dex), Grave's disease, ILD/COPD, HTN, and HLD who presented with rigors/confusion/pulmonary symptoms and was ultimately admitted 8/10/2023 for sepsis.  He was initially admitted to medicine service, but with history of multiple myeloma on active chemotherapy (most recent dose elotuzumab 6/26/2023), care was transferred to heme malignancy service 8/9/23. Chest x-ray noting increased upper and lower interstitial reticular/nodular patterns raising concern for atypical infection, infectious workup otherwise unrevealing.  She was started on Zosyn and vancomycin with improvement in symptoms, and transition to oral Levaquin 8/10.       Today:  - Transitioned from medicine to heme malignancy primary service today  - Patient reports she continues to feel better everyday  - BP trends consistently hypertensive (-160 over past 24 hours); restart PTA metoprolol 25 mg daily  - Last documented fever to 102F on 8/8 ~1300  - Zosyn 8/8-8/10  - Vanco 8/8  - Per Dr. Vargas, with improving symptoms and fever curve as above, will plan to transition to oral levofloxacin 750 mg q48h (renally dosed) 8/10/2023 - plan for total of 7 day abx course (thru ~8/15)  - Has not had a bowel movement in 2 days (since admission), unable to obtain ordered stool studies  - Anemia noted to be downtrending today with hgb to 7.4 (from 9.9 on admission)  - No signs/symptoms of bleeding-  "denies hematemesis, melena, bloody stools, BRBPR   - HDS  - Benign exam- abdomen appears normal without bruising. soft, normal active bowel sounds, nontender throughout to superficial and deep palpation; no RBG or peritoneal sign  - Per Dr. Vargas, will continue to monitor with daily labs  - PT ordered for inpatient eval of deconditioning, appreciate recs  - CTM and provide best supportive cares      # Sepsis, 2/2 to presumed pulmonary infection   Presented to heme/onc clinic 8/8 to reestablish for treatment schedule, noted to be feeling poorly with the \"flu \"x 1 week.  She was referred to the emergency department for further evaluation given ongoing rigors, difficulty answering questions/confusion, overall unwell after 30 minutes of monitoring.  High risk for infection given immunosuppressed state with multiple myeloma and palliative chemotherapy.  History of recurrent UTIs and repeat pneumonia.  Per report presented with diarrhea/suprapubic tenderness, productive cough.  Diarrhea has resolved.  Found to be febrile to 102, elevated lactate (2.3), and elevated procalcitonin (1.27) without clear source of infection.  Chest x-ray concerning for possible atypical pneumonia.  Additional infectious workup including MRSA, RSV, influenza A/B, COVID unremarkable.  - Last documented fever to 102F on 8/8 at ~1300  - Started on broad-spectrum antibiotics  - Zosyn 8/8-8/10  - Vancomycin 8/8 - 8/9  - Per Dr. Vargas, with improving symptoms and fever curve as above, will plan to transition to oral levofloxacin 750 mg q48h (renally dosed) 8/10/2023 - plan for total of 7 day abx course (thru ~8/15)  - BCx 8/8 NGTD  - UCx 8/8 with < 10,000 CFU/mL urogenital cricket  - C dif, enteric panel not yet collected (has not had bowel movement since admission)  - Sputum cx and gram stain not yet collected  - RVP, legionella Ag, MRSA, strep pneumo Ag negative  - Port to right upper chest, accessed on admission. CDI. No surrounding erythema, " "warmth, tenderness to palpation. Continue to monitor      HEME  #Multiple myeloma on palliative chemotherapy  # S/p APBST 2022  # Pancytopenia  Follows with Dr. Guillory.  Initially diagnosed in 11/2018, completed KRD x 6 cycles, rev/Dex (20), Velcade maintenance, revlimid maintenance, with evidence of disease BmBx 11/2020 (35% plasma cells) and PET CT with new lytic lesions. Then treated with kameron, Kd x 6 cycles, subcutaneous kameron/IVIG/denosumab monthly. S/p autologous peripheral blood stem cell transplant in 2022. Followed by melphalan, ixazomib, and then started on palliative elotuzumab, pomolidomide, and dexamethasone chemotherapy 4/2023 with most recent dose (C3D1) 6/26. Was planned for C4 on 7/26, however does not appear nohemi was started per chart review for unclear reason. Ultimately presented to heme/onc clinic 8/8 to re-establish treatment schedule and sent to ED (as above)  - chemotherapy currently held on admission in setting of fever and infectious workup  - per patient report, she \"believes\" she has been taking pomalidomide daily since C3D1 up until day of admission (8/8)  - plan to hold pomolidomide at time of discharge and schedule follow up appointment in clinic within 1 week to re-evaluate    Treatment Plan: Elotuzumab, Pomalidomide, Dexamethasone (C3D1=6/26/23)  - Elotuzumab 20 mg.kg IV x 1 dose (D1)  - Pomalidomide 4 mg daily x 21 days (D1-D21 of 28 day cycle)  - Dexamethasone 4mg 3-24 hours prior to elotuzumab infusion (D1)  - Dexamethasone 40 mg on D8, D15, D22  - Pre-meds: decadron, benadryl, tylenol, pepcid    # Pancytopenia   2/2 chemotherapy and underlying disease. Near baseline, non-neutropenic. Slightly worsened thrombocytopenia likely from sepsis  - Transfuse to maintain Hgb >7, Plt >10K  - Type & Screen MWF    # Macrocytic anemia  # Chronic anemia  # Chronic thrombocytopenia  Baseline Hgb ~10-11; baseline Plt ~163 in setting of palliative chemotherapy. Admission with Hgb ~9 with MCV " 112.  - B12 wnl, Reticulocyte count slightly elevated 2.8  - Anemia downtrending today 8/10 with hgb to 7.4 (from 9.9 on admission)  - No signs/symptoms of bleeding- denies hematemesis, melena, bloody stools, BRBPR, bruising; benign physical exam   - HDS  - Per discussion with Dr. Vargas, will continue to monitor with daily labs. May be related to hemoconcentration vs infection vs cytopenias related to chemotherapy    ID  # Prophylaxis  Patient is not currently neutropenic.  - PTA Acyclovir 400 mg BID  - PJP ppx bactrim BID on Monday/Tuesdays  - Hold antibiotic/antifungal ppx, start if ANC is <1000    OTHER  # Hypoglycemia  Noted on daily labs 8/9 to have low blood glucose 58; recheck with POCT 66; asymptomatic. Treated with dextrose. Will continue to monitor.  - Daily BMP    # Elevated creatinine   Noted in chart review to be elevated since 5/2023 (0.95-1.29).   - Cr 1.06 today 8/10  - Avoid nephrotoxins  - Continue to monitor with daily labs    CHRONIC   # COPD  COPD with 40pyr, quit 2010. Previously documented to have idiopathic pulmonary fibrosis, however most recent PFTs (1/26/22) were normal. Has previously required treatment for COPD exacerbation with pneumonia. Some expiratory wheezing on exam improved with Duonebs; continue home inhaler regimen.  - PTA Trelegy Ellipta inhaler qd  - PTA Albuterol BID PRN    # HTN  PTA metoprolol XR 25mg daily initially held on admission in setting of sepsis  - Hypertensive trend ~24 hours (-160 over past 24 hours), will restart PTA metoprolol 8/10/2023      # Opiate dependence  Per notes, Methadone regimen was confirmed with pharmacy on admission  - Methadone 10mg TID    # Hypothyroidism  # History of Graves Disease  History of hypothyroidism 2/2 treatment for Graves Disease (patient reports surgery in [place], however not thyroidectomy). Stable dose of levothyroxine over the past four years. Upon admission, elevated TSH (11.3) with normal T4 (1.07) consistent with  subclinical hypothyroidism. Following recovery, recommend outpatient follow-up with PCP to retest TSH/T4, consider dose adjustment.  - PTA Levothyroxine 112mcg every day     # Generalized Anxiety Disorder  - PTA Venlafaxine 75mg TID       Fluids/Electrolytes/Nutrition   - IVF bolus prn  - PRN lyte replacement per standing protocol  - Regular diet as tolerated     Lines: port R chest    PPX  VTE: PTA aspirin 81 mg  GI: none indicated  Bowels: Senna/Miralax PRN    Code Full    Dispo 2-3 days pending workup, treatment, clinical improvement    I spent >90  minutes face-to-face and/or coordinating or discussing care plan. Over 50% of our time on the unit was spent counseling the patient and/or coordinating care    Patient is staffed and seen in conjunction with attending physician Dr. Vargas and I.  Attestation to follow.       Ashley Low PA-C   Hematology/Oncology   Pager: [phone]    ___________________________________________________________________    Interval History   History obtained from: patient    Nursing notes reviewed, no acute events noted overnight.    Libby is feeling well today and overall much improved from time of admission.  She notes greater energy, no longer having fevers/chills.  She has been mild cough which is nonproductive, states it may be worse in the last few days from baseline.  Additionally notes mild intermittent sore throat in the last few days.  Denies runny nose, chest pain, shortness of breath, pleuritic pain, abdominal pain, nausea, vomiting, hematemesis, bloody stools, bruising, skin rashes, bright red blood per rectum, numbness tingling weakness, headaches, dizziness.  She states she is feeling much better and she is hopeful she will be able to get out of the hospital in the next few days.  When discussing her chemotherapy regimen, she states the last cycle was missed because she never received a call from the clinic to get started.  She believes she has been taking oral  pomalidomide daily since her last chemotherapy cycle (June 2023).    Of note, Libby lives in a home independently with her .  She does not use a walker, cane, or ambulatory assistance.  PT ordered for inpatient evaluation    Complete and Comprehensive review of systems review and negative other than noted here or in the HPI.       Physical Exam   Temp: 97.5  F (36.4  C) Temp src: Oral BP: (!) 158/74 (after activity) Pulse: 78   Resp: 16 SpO2: 96 % O2 Device: None (Room air)    Vitals:    08/08/23 1235 08/09/23 1859 08/10/23 0810   Weight: 49 kg (108 lb) 50.3 kg (110 lb 12.8 oz) 49.8 kg (109 lb 12.8 oz)     Vital Signs with Ranges  Temp:  [97.5  F (36.4  C)-98.9  F (37.2  C)] 97.5  F (36.4  C)  Pulse:  [] 78  Resp:  [16-18] 16  BP: (147-160)/(52-86) 158/74  SpO2:  [95 %-98 %] 96 %  I/O last 3 completed shifts:  In: 440 [P.O.:240; I.V.:200]  Out: 450 [Urine:450]      Constitutional: Awake and conversational. Non- toxic appearing. No acute distress. Well developed, hydrated, and nourished.   HEENT: Normocephalic, atraumatic without tenderness or palpable masses/depressions. Sclerae anicteric. PERRLA. EOM intact. Moist mucus membranes without lesions, thrush, or exudates appreciated. Edentulous mandible  Lymph: Neck supple, no ridigity. No significant adenopathy noted.   Respiratory: Breathing comfortably on room air with no accessory muscle use. Speaking in full sentences, no evidence of respiratory distress. Lungs CTAB without stridor, wheeze, rhonchi, or rales.   Cardiovascular: Regular rate and rhythm. No murmurs. 2+ radial pulses bilaterally. No peripheral edema.    GI: Abdomen with normoactive bowel sounds, soft, nondistended, and non-tender throughout. No rebound, guarding, or peritoneal sign. No bruising noted  Skin: Skin is clean, dry, intact. No jaundice or significant rashes appreciated.   Musculoskeletal/ Extremities: No redness, warmth, or swelling of the joints appreciated. Distal pulses  palpable. Nailbeds pink and without cyanosis or clubbing.   Neurologic: GCS 15, alert and oriented x4 with normal speech. Grossly nonfocal. Memory and thought process preserved. Motor function normal with 5/5 muscle strength bilaterally to UE and LE. Sensation intact to light touch bilaterally.  Neuropsychiatric: Calm, affect congruent to situation. Appropriate mood and affect. Good judgment and insight.        Labs & Studies: I personally reviewed the following studies:  Data     Results for orders placed or performed during the hospital encounter of 08/08/23 (from the past 24 hour(s))   Folate   Result Value Ref Range    Folic Acid 26.7 4.6 - 34.8 ng/mL   Legionella pneumophila antigen urine    Specimen: Urine, Midstream   Result Value Ref Range    Legionella pneumophila serogroup 1 urinary antigen Negative Negative   Strep pneumo Agn Urine or CSF    Specimen: Urine, Midstream   Result Value Ref Range    Streptococcus pneumoniae antigen Negative Negative   Respiratory Panel PCR    Specimen: Nasopharyngeal; Swab   Result Value Ref Range    Adenovirus Not Detected Not Detected    Coronavirus Not Detected Not Detected    Human Metapneumovirus Not Detected Not Detected    Human Rhin/Enterovirus Not Detected Not Detected    Influenza A Not Detected Not Detected    Influenza A, H1 Not Detected Not Detected    Influenza A 2009 H1N1 Not Detected Not Detected    Influenza A, H3 Not Detected Not Detected    Influenza B Not Detected Not Detected    Parainfluenza Virus 1 Not Detected Not Detected    Parainfluenza Virus 2 Not Detected Not Detected    Parainfluenza Virus 3 Not Detected Not Detected    Parainfluenza Virus 4 Not Detected Not Detected    Respiratory Syncytial Virus A Not Detected Not Detected    Respiratory Syncytial Virus B Not Detected Not Detected    Chlamydia Pneumoniae Not Detected Not Detected    Mycoplasma Pneumoniae Not Detected Not Detected    Narrative    The [facility] Respiratory Panel is a qualitative  nucleic acid, multiplex, in vitro diagnostic test for the simultaneous detection and identification of multiple respiratory viral and bacterial nucleic acids in nasopharyngeal swabs collected in viral transport media from individual exhibiting signs and symptoms of respiratory infection. The assay has received FDA approval for the testing of nasopharyngeal (NP) swabs only. The [facility] at [facility] has validated the performance characteristics for bronchial alveolar lavage specimens. This test is used for clinical purposes and should not be regarded as investigational or for research. This laboratory is certified under the Clinical Laboratory Improvement Amendments of 1988 (CLIA-88) as qualified to perform high complexity clinical laboratory testing.    CBC with Platelets & Differential    Narrative    The following orders were created for panel order CBC with Platelets & Differential.  Procedure                               Abnormality         Status                     ---------                               -----------         ------                     CBC with platelets and d...[[id number]]  Abnormal            Final result               Manual Differential[[id number]]          Abnormal            Final result                 Please view results for these tests on the individual orders.   Basic metabolic panel   Result Value Ref Range    Sodium 142 136 - 145 mmol/L    Potassium 3.2 (L) 3.4 - 5.3 mmol/L    Chloride 111 (H) 98 - 107 mmol/L    Carbon Dioxide (CO2) 22 22 - 29 mmol/L    Anion Gap 9 7 - 15 mmol/L    Urea Nitrogen 7.0 (L) 8.0 - 23.0 mg/dL    Creatinine 1.06 (H) 0.51 - 0.95 mg/dL    Calcium 8.1 (L) 8.8 - 10.2 mg/dL    Glucose 74 70 - 99 mg/dL    GFR Estimate 56 (L) >60 mL/min/1.73m2   CRP inflammation   Result Value Ref Range    CRP Inflammation 47.50 (H) <5.00 mg/L   CBC with platelets and differential   Result Value Ref Range    WBC Count 3.5 (L) 4.0 - 11.0  10e3/uL    RBC Count 2.20 (L) 3.80 - 5.20 10e6/uL    Hemoglobin 7.4 (L) 11.7 - 15.7 g/dL    Hematocrit 24.1 (L) 35.0 - 47.0 %     (H) 78 - 100 fL    MCH 33.6 (H) 26.5 - 33.0 pg    MCHC 30.7 (L) 31.5 - 36.5 g/dL    RDW 18.6 (H) 10.0 - 15.0 %    Platelet Count 111 (L) 150 - 450 10e3/uL   Manual Differential   Result Value Ref Range    % Neutrophils 81 %    % Lymphocytes 4 %    % Monocytes 1 %    % Eosinophils 13 %    % Basophils 0 %    % Metamyelocytes 1 %    Absolute Neutrophils 2.8 1.6 - 8.3 10e3/uL    Absolute Lymphocytes 0.1 (L) 0.8 - 5.3 10e3/uL    Absolute Monocytes 0.0 0.0 - 1.3 10e3/uL    Absolute Eosinophils 0.5 0.0 - 0.7 10e3/uL    Absolute Basophils 0.0 0.0 - 0.2 10e3/uL    Absolute Metamyelocytes 0.0 <=0.0 10e3/uL    RBC Morphology Confirmed RBC Indices     Platelet Assessment  Automated Count Confirmed. Platelet morphology is normal.     Automated Count Confirmed. Platelet morphology is normal.   Uric acid   Result Value Ref Range    Uric Acid 1.7 (L) 2.4 - 5.7 mg/dL   Magnesium   Result Value Ref Range    Magnesium 1.6 (L) 1.7 - 2.3 mg/dL   Phosphorus   Result Value Ref Range    Phosphorus 3.4 2.5 - 4.5 mg/dL   Hepatic panel   Result Value Ref Range    Protein Total 4.6 (L) 6.4 - 8.3 g/dL    Albumin 2.6 (L) 3.5 - 5.2 g/dL    Bilirubin Total 0.3 <=1.2 mg/dL    Alkaline Phosphatase 87 35 - 104 U/L    AST 24 0 - 45 U/L    ALT 21 0 - 50 U/L    Bilirubin Direct <0.20 0.00 - 0.30 mg/dL   Lipase   Result Value Ref Range    Lipase 15 13 - 60 U/L   INR   Result Value Ref Range    INR 1.08 0.85 - 1.15   Partial thromboplastin time   Result Value Ref Range    aPTT 27 22 - 38 Seconds   Fibrinogen activity   Result Value Ref Range    Fibrinogen Activity 330 170 - 490 mg/dL           Medications list for reference:    Medications      acyclovir  400 mg Oral Q12H    aspirin  81 mg Oral Daily    fluticasone-vilanterol  1 puff Inhalation Daily    And    umeclidinium  1 puff Inhalation Daily    levofloxacin  750 mg  Oral Q48H    levothyroxine  112 mcg Oral Daily    methadone  10 mg Oral TID    metoprolol succinate ER  25 mg Oral Daily    [Held by provider] piperacillin-tazobactam  3.375 g Intravenous Q6H    sodium chloride (PF)  3 mL Intracatheter Q8H    sulfamethoxazole-trimethoprim  1 tablet Oral Q Mon Tues BID    venlafaxine  75 mg Oral TID

## 2023-08-10 NOTE — PLAN OF CARE
Nursing Focus: Admission    D: Patient admitted/transferred from ED via stretcher for admitted on 8/8 with sepsis.    I: Upon arrival to the unit patient was oriented to room, unit, and call light. Patient s height, weight, and vital signs were obtained. Allergies reviewed and allergy band applied. MD notified of patient s arrival on the unit. Adult AVS completed. Head to toe assessment completed. Education assessment completed. Care plan initiated.     A: Vital signs stable upon admission. Patient rates pain at 4/10. Two RN skin assessment completed Yes.  Second RN was Chetna,. Significant Skin Findings include skin intact and no concern. [employer] Nurse Consult Ordered No. Bed Algorithm can be found in PCS flow sheets (Support Surface Algorithm) and on IP [facility] NURSE RESOURCE TAB, was this used during this assessment?  Yes. Was a pulsate mattress ordered No.     P: Continue to monitor patient s pain and intervene as needed. Continue with plan of care. Notify MD with any concerns or changes in patient status.  AVSS, alert and oriented x 4, pain is at 4/10 located at back and gets her schedule methadone. Pt refuse to check respiratory panel check, its done at ED per pt. Up with sba, voiding adequately, LBM 8/9 Needs c-diff sample for rule out. On bed alarm is on for safety.  Continue to monitor care.

## 2023-08-10 NOTE — PLAN OF CARE
Goal Outcome Evaluation:      Plan of Care Reviewed With: patient      AVSS, alert and oriented x 4. Last BM was 8/8/2023. Had pain controlled by scheduled Dolphine and Robaxin. Potassium replaced today, redraw due in the morning. Stand by assist, ate 100% of breakfast. No acute events this shift, continue with care as planned.

## 2023-08-11 NOTE — DISCHARGE SUMMARY
[facility]    Discharge Summary  Hematology / Oncology    Date of Admission:  8/8/2023  Date of Discharge:  8/11/2023  Discharging Provider: Ashley Low PA-C  Date of Service (when I saw the patient): 08/11/23    Discharge Diagnoses   Patient Active Problem List   Diagnosis    Multiple myeloma not having achieved remission (H)    Admission for chemotherapy    Stem cells transplant status (H)    Neutropenic fever (H)    Fever and chills    Altered mental status, unspecified altered mental status type       History of Present Illness   Libby Grimaldo is a 72 year old female with a medical history of high risk IgG Lambda MM s/p Auto-PBSCT in 2022 (on palliative Erlo/Pom/Dex), Grave's disease, ILD/COPD, HTN, and HLD who presented with rigors/confusion/pulmonary symptoms and was ultimately admitted 8/8/2023 for sepsis.  She was initially admitted to the medicine service, but with history of multiple myeloma on active chemotherapy, care was transferred to heme malignancy service 8/9/23. Chest x-ray noting increased upper and lower interstitial reticular/nodular patterns raising concern for atypical infection, infectious workup otherwise unrevealing.  She was started on Zosyn and vancomycin with improvement in symptoms, and transitioned to oral Levaquin 8/10/23. She remained afebrile, hemodynamically stable, and symptoms significantly improved. She was evaluated by physical therapy and deemed appropriate for discharge home with assist, home PT, which patient declined but agreeable to outpatient clinic PT, and thus referral was placed. On the day of discharge, patient is feeling well at baseline health, alert and oriented x 3 and appropriately engaged in her cares. She is seen ambulatory with steady gate. She is feeling comfortable with the plan and eager/ready for discharge. Prior to discharge, I reviewed with Libby the plan of care, including upcoming follow-up appointments  "and new medications. Appropriate prescriptions were sent to the discharge pharmacy, as needed. We reviewed strict discharge precautions, including reasons to call clinic triage or present to the ED, and she voiced understanding. She was provided with the clinic triage number, as well as written discharge instructions, in their discharge paperwork. Patient had an opportunity to ask questions, all of which were answered to their stated satisfaction. On the day of discharge, patient was overall well-appearing, hemodynamically stable, and felt safe and comfortable with the plans for discharge to home with follow-up as described.      Outpatient follow-up issues:  - Chemotherapy currently held on admission and at time of discharge. Treatment plan will need to be addressed in follow up. Of note, she missed most recent cycle 7/26, which was then deferred at clinic visit 8/8 due to feeling unwell. It is unclear how patient had been taking oral chemotherapy, though her per report, she thinks that she has been taking pomalidomide daily since 6/2023.   - Follow up labs ordered - CBC with differential, CMP, Mg, Ph  - Referral for outpatient physical therapy placed; if she has not received a scheduling call, patient may call [phone] for [facility] PT      New discharge medications:  - Levaquin 750 mg q 48 hours (renally dose adjusted) x 3 doses  - Hold pomalidomide until outpatient follow up  - Remainder of PTA medications continued without change     Upcoming follow-up:  - Labs and post-hospital discharge follow up on 8/17    Hospital Course   Libby Grimaldo was admitted on 8/8/2023.  The following problems were addressed during her hospitalization:    # Sepsis, 2/2 to presumed pulmonary infection   # Atypical pneumonia  # Underlying COPD  Presented to heme/onc clinic 8/8 to reestablish for treatment schedule, noted to be feeling poorly with the \"flu \"x 1 week.  She was referred to the emergency department for further " evaluation given ongoing rigors, difficulty answering questions/confusion, and overall unwell appearance after 30 minutes of monitoring.  High risk for infection given immunosuppressed state with multiple myeloma and palliative chemotherapy.  History of recurrent UTIs and repeat pneumonia.  Per report presented with diarrhea/suprapubic tenderness, productive cough.  Diarrhea has resolved.  Found to be febrile to 102, elevated lactate (2.3), and elevated procalcitonin (1.27) without clear source of infection.  Chest x-ray noting increased upper and lower interstitial reticular/nodular patterns raising concern for atypical infection.  Additional infectious workup including RSV, influenza A/B, COVID unremarkable.  - Last documented fever to 102F on 8/8 at ~1300  - Started on broad-spectrum antibiotics  - Zosyn 8/8-8/10  - Vancomycin 8/8 - 8/9  - Per Dr. Vargas, with improving symptoms and fever curve as above, will plan to transition to oral levofloxacin 750 mg q48h (renally dosed) 8/10/2023 - plan for total of 7 day abx course (thru ~8/16)  - BCx 8/8 NGTD  - UCx 8/8 with < 10,000 CFU/mL urogenital cricket  - C dif, enteric panel not yet collected (had not had bowel movement since admission)  - RVP, legionella Ag, MRSA, strep pneumo Ag negative  - Port to right upper chest, accessed on admission. CDI. No surrounding erythema, warmth, tenderness to palpation. Continue to monitor        HEME  #Multiple myeloma on palliative chemotherapy  # S/p APBST 2022  # Pancytopenia  Follows with Dr. Guillory.  Initially diagnosed in 11/2018, completed KRD x 6 cycles, rev/Dex (20), Velcade maintenance, revlimid maintenance, with evidence of disease BmBx 11/2020 (35% plasma cells) and PET CT with new lytic lesions. Then treated with kameron, Kd x 6 cycles, subcutaneous kameron/IVIG/denosumab monthly. S/p autologous peripheral blood stem cell transplant in 2022. Followed by melphalan, ixazomib, and then started on palliative elotuzumab,  "pomolidomide, and dexamethasone chemotherapy 4/2023 with most recent dose (C3D1) 6/26. Was planned for C4 on 7/26, however does not appear nohemi was started per chart review for unclear reason. Ultimately presented to heme/onc clinic 8/8 to re-establish treatment schedule and sent to ED (as above)  - chemotherapy currently held on admission in setting of fever and infectious workup - held on discharge and deferred to outpatient team  - per patient report, she \"believes\" she has been taking pomalidomide daily since C3D1 up until day of admission (8/8)  - plan to hold pomolidomide at time of discharge and schedule follow up appointment in clinic within 1 week to re-evaluate     Treatment Plan: Elotuzumab, Pomalidomide, Dexamethasone (C3D1=6/26/23)  - Elotuzumab 20 mg.kg IV x 1 dose (D1)  - Pomalidomide 4 mg daily x 21 days (D1-D21 of 28 day cycle)  - Dexamethasone 4mg 3-24 hours prior to elotuzumab infusion (D1)  - Dexamethasone 40 mg on D8, D15, D22  - Pre-meds: decadron, benadryl, tylenol, pepcid     # Pancytopenia   2/2 chemotherapy and underlying disease. Near baseline, non-neutropenic. Slightly worsened thrombocytopenia likely from sepsis  - Transfuse to maintain Hgb >7, Plt >10K  - Type & Screen MWF     # Macrocytic anemia  # Chronic anemia  # Chronic thrombocytopenia  Baseline Hgb ~10-11; baseline Plt ~163 in setting of palliative chemotherapy. Admission with Hgb ~9 with .  - B12 wnl, Reticulocyte count slightly elevated 2.8  - Anemia downtrending 8/10 with hgb to 7.4 (from 9.9 on admission); stable 8/11 at 7.7  - No signs/symptoms of bleeding- denies hematemesis, melena, bloody stools, BRBPR, bruising; benign physical exam   - HDS  - Per discussion with Dr. Vargas, will continue to monitor with daily labs inpatient. May be related to hemoconcentration vs infection vs cytopenias related to chemotherapy     ID  # Prophylaxis  Patient is not currently neutropenic.  - PTA Acyclovir 400 mg BID  - PJP ppx " bactrim BID on Monday/Tuesdays  - Hold antibiotic/antifungal ppx, start if ANC is <1000     OTHER  # Hypoglycemia  Noted on daily labs 8/9 to have low blood glucose 58; recheck with POCT 66; asymptomatic. Treated with dextrose. Will continue to monitor.  - Daily BMP     # Elevated creatinine   Noted in chart review to be elevated since 5/2023 (0.95-1.29).   - Cr 1.06 8/10; normalized to 0.91 8/11  - Avoid nephrotoxins  - Continue to monitor      CHRONIC   # COPD  COPD with 40pyr, quit 2010. Previously documented to have idiopathic pulmonary fibrosis, however most recent PFTs (1/26/22) were normal. Has previously required treatment for COPD exacerbation with pneumonia. Some expiratory wheezing on exam improved with Duonebs; continue home inhaler regimen.  - PTA Trelegy Ellipta inhaler qd  - PTA Albuterol BID PRN     # HTN  PTA metoprolol XR 25mg daily initially held on admission in setting of sepsis  - Hypertensive trend ~24 hours (-160 over past 24 hours), will restart PTA metoprolol 8/10/2023   - continue PTA metoprolol on discharge      # Opiate dependence  Per notes, Methadone regimen was confirmed with pharmacy on admission  - Methadone 10mg TID     # Hypothyroidism  # History of Graves Disease  History of hypothyroidism 2/2 treatment for Graves Disease (patient reports surgery in [place], however not thyroidectomy). Stable dose of levothyroxine over the past four years. Upon admission, elevated TSH (11.3) with normal T4 (1.07) consistent with subclinical hypothyroidism. Following recovery, recommend outpatient follow-up with PCP to retest TSH/T4, consider dose adjustment.  - PTA Levothyroxine 112mcg every day      # Generalized Anxiety Disorder  - PTA Venlafaxine 75mg TID        Fluids/Electrolytes/Nutrition   - IVF bolus prn  - PRN lyte replacement per standing protocol  - Regular diet as tolerated      Lines: port R chest     PPX  VTE: PTA aspirin 81 mg  GI: none indicated  Bowels: Senna/Miralax  "PRN      Patient was staffed and seen in conjunction with attending physician Dr. Vargas; attestation to follow.    Ashley Low PA-C  Hematology/Oncology  Page: #[phone]    I spent >90 minutes in the care of this patient today, which included time necessary for review of interval events, obtaining history and physical exam, ordering medication(s)/test(s) as medically indicated, discussion with interdisciplinary/consult team(s), and documentation time. Over 50% of time was spent face-to-face and/or coordinating care.      Significant Results and Procedures   NA      Pending Results   These results will be followed up by heme/onc outpatient team  Unresulted Labs Ordered in the Past 30 Days of this Admission       Date and Time Order Name Status Description    8/8/2023  3:52 PM Blood Culture Peripheral Blood Preliminary     8/8/2023  1:10 PM Blood Culture Peripheral Blood Preliminary             Code Status   Full Code    Primary Care Physician   Leah Gustafson    BP (!) 153/90 (BP Location: Right arm, Cuff Size: Adult Regular)   Pulse 88   Temp 98.7  F (37.1  C) (Oral)   Resp 17   Ht 1.651 m (5' 5\")   Wt 49.8 kg (109 lb 12.8 oz)   SpO2 96%   BMI 18.27 kg/m      Constitutional: Awake and conversational. Non- toxic appearing. No acute distress. Well developed, hydrated, and nourished.   HEENT: Normocephalic, atraumatic without tenderness or palpable masses/depressions. Sclerae anicteric. PERRLA. EOM intact. Moist mucus membranes without lesions, thrush, or exudates appreciated. Edentulous mandible  Lymph: Neck supple, no ridigity. No significant adenopathy noted.   Respiratory: Breathing comfortably on room air with no accessory muscle use. Speaking in full sentences, no evidence of respiratory distress. Lungs CTAB without stridor, wheeze, rhonchi, or rales.   Cardiovascular: Regular rate and rhythm. No murmurs. 2+ radial pulses bilaterally. No peripheral edema.    GI: Abdomen with normoactive bowel sounds, " soft, nondistended, and non-tender throughout. No rebound, guarding, or peritoneal sign. No bruising noted  Skin: Skin is clean, dry, intact. No jaundice or significant rashes appreciated.   Musculoskeletal/ Extremities: No redness, warmth, or swelling of the joints appreciated. Distal pulses palpable. Nailbeds pink and without cyanosis or clubbing.   Neurologic: GCS 15, alert and oriented x3 with normal speech. Grossly nonfocal. Memory and thought process preserved. Motor function normal with 5/5 muscle strength bilaterally to UE and LE. Sensation intact to light touch bilaterally.  Neuropsychiatric: Calm, affect congruent to situation. Appropriate mood and affect. Good judgment and insight.   Vascular: port to right upper chest, dressing CDI. No surrounding erythema      Time Spent on this Encounter   IAshley PA-C, personally saw the patient today and spent greater than 90 minutes discharging this patient.    Discharge Disposition   Discharged to home  Condition at discharge: Stable    Consultations This Hospital Stay   PHARMACY TO DOSE VANCO  HEMATOLOGY ADULT IP CONSULT  PHYSICAL THERAPY ADULT IP CONSULT  CARE MANAGEMENT / SOCIAL WORK IP CONSULT    Discharge Orders      Comprehensive metabolic panel     Magnesium     Phosphorus     Physical Therapy Referral      Reason for your hospital stay    You were in the hospital for fever.  You were treated with IV antibiotics transition to oral antibiotics yesterday.  We are happy you are feeling better and fever has resolved.     Activity    Your activity upon discharge: activity as tolerated     Adult [facility] Follow-up and recommended labs and tests    Request placed for labs and clinic follow up within 1 week of discharge. They will call you when scheduled and you may check MyChart for upcoming appointment times.     Appointments on [place] and/or [facility] (with [facility] or [facility] provider or service). Call [phone] if you haven't heard regarding  these appointments within 7 days of discharge.     When to contact your care team    Please call the [facility] at [phone] if you develop temperature above 100.4, shortness of breath, chest pain, headaches, vision changes, bleeding, uncontrolled nausea, vomiting, diarrhea, pain, or any other signs or symptoms of concern. If you are concerned that your symptoms are life-threatening, don't hesitate to call 911 or go to the nearest Emergency Room.     Discharge Instructions    You were in the hospital for fever.  You were treated with IV antibiotics transition to oral antibiotics yesterday.  We are happy you are feeling better and fever has resolved.    Please take your medications as prescribed.   - Stop taking pomalidomide (oral chemotherapy) and dexamethasone until your cancer appointment follow up  - Start taking levofloxacin 750 mg every other day (starting tomorrow) for 3 doses (8/12, 8/14, 8/16)  - Continue taking the remainder of your medications as previously prescribed      You may take tylenol (acetaminophen) 325 mg every 6-8 hours as needed for pain. Maximum dose of 4000 mg in a 24 hour period. Do not take ibuprofen (advil/motrin), aspirin, or any other NSAIDs (due to low blood counts)    Follow up appointments:  - Labs and clinic visit request for scheduling sent. They will call you with appointment times. You may also check [facility] for upcoming appointments    Contact the [facility] ([phone]) Monday - Friday during regular business hours (8-4:30), or call the main hospital number ([phone]) and request to speak with the on-call hematology/oncology physician (after hours and weekends)  for temperature > or = 100.4, shortness of breath, chest pain, headaches, dizziness, loss of consciousness, vision changes, bleeding, uncontrolled nausea, vomiting, diarrhea, pain, or any other new or concerning symptoms.    It was a pleasure being a  part of your care team.     Ashley Correa PA-C     Full Code     Diet    Follow this diet upon discharge: Regular     Check Out Appointment Request    Please schedule follow up labs and RADHA visit for mid/end of next week ~8/16-8/17     CBC with platelets and differential     Discharge Medications   Current Discharge Medication List        START taking these medications    Details   levofloxacin (LEVAQUIN) 750 MG tablet Take 1 tablet (750 mg) by mouth every 48 hours Take on 8/12, 8/14, 8/16  Qty: 3 tablet, Refills: 0    Associated Diagnoses: Multiple myeloma not having achieved remission (H); Neutropenic fever (H)           CONTINUE these medications which have CHANGED    Details   guaiFENesin (MUCINEX) 600 MG 12 hr tablet Take 2 tablets (1,200 mg) by mouth 2 times daily as needed    Associated Diagnoses: Multiple myeloma not having achieved remission (H)      loperamide (IMODIUM) 2 MG capsule Take 1-2 capsules (2-4 mg) by mouth 4 times daily as needed for diarrhea  Qty: 60 capsule, Refills: 1    Associated Diagnoses: Multiple myeloma not having achieved remission (H)      pomalidomide (POMALYST) 4 MG capsule Take 1 capsule (4 mg) by mouth daily Swallow whole, do NOT break, crush, chew or open capsule. Take with water. Take on days 1 through 21 of repeated 28 day cycles. DO NOT TAKE after hospital discharge (8/11) until discussion with your cancer team in clinic  Qty: 21 capsule, Refills: 0    Associated Diagnoses: Multiple myeloma not having achieved remission (H)           CONTINUE these medications which have NOT CHANGED    Details   acetaminophen (TYLENOL) 500 MG tablet Take 500-1,000 mg by mouth every 6 hours as needed for mild pain       acyclovir (ZOVIRAX) 400 MG tablet Take 1 tablet (400 mg) by mouth every 12 hours  Qty: 180 tablet, Refills: 3    Associated Diagnoses: Multiple myeloma not having achieved remission (H); Admission for chemotherapy      albuterol (PROAIR HFA/PROVENTIL HFA/VENTOLIN HFA) 108  (90 Base) MCG/ACT inhaler Inhale 2 puffs into the lungs every 6 hours as needed for shortness of breath    Comments: Pharmacy may dispense brand covered by insurance (Proair, or proventil or ventolin or generic albuterol inhaler)      ascorbic acid 1000 MG TABS tablet Take 1,000 mg by mouth      aspirin (ASA) 325 MG tablet Take 1 tablet (325 mg) by mouth daily for 180 days  Qty: 30 tablet, Refills: 5    Associated Diagnoses: Multiple myeloma not having achieved remission (H)      dexamethasone (DECADRON) 4 MG tablet On Day 1, take 28 mg (7 tablets) 3 to 24 hours prior to elotuzumab infusion. On Day 8, 15, and 22, take 40 mg (10 tablets). Take with food.  Qty: 37 tablet, Refills: 0    Associated Diagnoses: Multiple myeloma not having achieved remission (H)      Fluticasone-Umeclidin-Vilanterol (TRELEGY ELLIPTA) 100-62.5-25 MCG/INH oral inhaler Inhale 1 puff into the lungs daily       levothyroxine (SYNTHROID/LEVOTHROID) 112 MCG tablet Take 112 mcg by mouth      methadone (DOLOPHINE) 10 MG tablet Take 10 mg by mouth 3 times daily Morning, afternoon, and night      methocarbamol (ROBAXIN) 500 MG tablet Take 500 mg by mouth 3 times daily as needed for muscle spasms      metoprolol succinate ER (TOPROL XL) 25 MG 24 hr tablet Take 1 tablet (25 mg) by mouth daily  Qty: 30 tablet, Refills: 3    Associated Diagnoses: Multiple myeloma not having achieved remission (H)      ondansetron (ZOFRAN ODT) 8 MG ODT tab Take 1 tablet (8 mg) by mouth every 8 hours as needed for nausea  Qty: 30 tablet, Refills: 0    Associated Diagnoses: Multiple myeloma not having achieved remission (H)      pantoprazole (PROTONIX) 40 MG EC tablet Take 1 tablet (40 mg) by mouth daily  Qty: 30 tablet, Refills: 0    Associated Diagnoses: Multiple myeloma not having achieved remission (H); Stem cells transplant status (H)      prochlorperazine (COMPAZINE) 10 MG tablet Take 0.5 tablets (5 mg) by mouth every 6 hours as needed (Nausea/Vomiting)  Qty: 30  tablet, Refills: 5    Associated Diagnoses: Multiple myeloma not having achieved remission (H)      sulfamethoxazole-trimethoprim (BACTRIM DS) 800-160 MG tablet Take 1 tablet by mouth Every Mon, Tues two times daily Start on Monday 6/27/2022  Qty: 48 tablet, Refills: 3    Associated Diagnoses: Multiple myeloma not having achieved remission (H); Stem cells transplant status (H)      venlafaxine (EFFEXOR) 75 MG tablet Take 1 tablet (75 mg) by mouth 3 times daily  Qty: 90 tablet, Refills: 3    Associated Diagnoses: Other depression           Allergies   Allergies   Allergen Reactions    Bupropion      Other reaction(s): Seizures     Data   Most Recent 3 CBC's:  Recent Labs   Lab Test 08/11/23  0458 08/10/23  0619 08/09/23  0627   WBC 2.4* 3.5* 4.6   HGB 7.7* 7.4* 8.2*   * 110* 112*   PLT 92* 111* 87*      Most Recent 3 BMP's:  Recent Labs   Lab Test 08/11/23  0458 08/10/23  2001 08/10/23  1501 08/10/23  0619 08/09/23  1006 08/09/23  0902 08/09/23 0627     --   --  142  --   --  138   POTASSIUM 3.4 3.5 3.2* 3.2*  --   --  3.6   CHLORIDE 109*  --   --  111*  --   --  109*   CO2 22  --   --  22  --   --  19*   BUN 4.5*  --   --  7.0*  --   --  13.2   CR 0.91  --   --  1.06*  --   --  1.03*   ANIONGAP 9  --   --  9  --   --  10   DIANDRA 8.3*  --   --  8.1*  --   --  8.1*   GLC 92  --   --  74 129*   < > 58*    < > = values in this interval not displayed.     Most Recent 2 LFT's:  Recent Labs   Lab Test 08/11/23  0458 08/10/23  0619   AST 22 24   ALT 20 21   ALKPHOS 84 87   BILITOTAL 0.3 0.3     Most Recent INR's and Anticoagulation Dosing History:  Anticoagulation Dose History          Latest Ref Rng & Units 1/20/2022 2/1/2022 2/11/2022 5/25/2022   Recent Dosing and Labs   INR 0.85 - 1.15 0.94  1.06  1.29  1.28          5/30/2022 8/10/2023   Recent Dosing and Labs   INR 1.40  1.08      Most Recent 3 Troponin's:No lab results found.  Most Recent Cholesterol Panel:No lab results found.  Most Recent 6 Bacteria  Isolates From Any Culture (See EPIC Reports for Culture Details):No lab results found.  Most Recent TSH, T4 and A1c Labs:  Recent Labs   Lab Test 08/08/23  1309   TSH 11.30*   T4 1.06     Results for orders placed or performed during the hospital encounter of 08/08/23   CT Head w/o Contrast    Narrative    EXAM: CT HEAD W/O CONTRAST  8/8/2023 1:44 PM     HISTORY:  AMS       COMPARISON:  Head CT 2/14/2022     TECHNIQUE: Using multidetector thin collimation helical acquisition  technique, axial, coronal and sagittal CT images from the skull base  to the vertex were obtained without intravenous contrast.   (topogram) image(s) also obtained and reviewed.    FINDINGS:  Motion degraded examination, particularly near the cranial vertex. No  acute intracranial hemorrhage, mass effect, or midline shift. No acute  loss of gray-white matter differentiation in the cerebral hemispheres.  Ventricles are proportionate to the cerebral sulci. Unchanged mild  diffuse cerebral parenchymal volume loss and moderate periventricular  and scattered white matter changes likely representing chronic small  vessel ischemic disease. Clear basal cisterns. Mild right basal  ganglia calcification.    The bony calvaria and the bones of the skull base are normal.  Hyperostosis frontalis. The visualized portions of the paranasal  sinuses are clear. Mild dependent right mastoid effusion. Grossly  normal orbits.       Impression    IMPRESSION:  1. No acute intracranial pathology.  2. Unchanged mild diffuse cerebral parenchymal volume loss and  moderate chronic small vessel ischemic disease.    I have personally reviewed the examination and initial interpretation  and I agree with the findings.    CARL IRAHETA MD         SYSTEM ID:  [id number]   XR Chest Port 1 View    Narrative    Examination: XR CHEST PORT 1 VIEW 8/8/2023 2:59 PM    Indication: AMS; concern for pneumonia    Comparison: X-ray 2/13/2023. CT chest abdomen and pelvis  4/28/2022    Findings:  AP semiupright portable chest. Right Port-A-Cath tip terminates over  the low SVC. Bilateral breast implants. Prior cholecystectomy.  Partially visualized cervical fusion hardware. Trachea is midline.  Cardiac silhouette and pulmonary vascularity are within normal limits.  No significant pleural effusion or cerebral pneumothorax. No focal  consolidation. Mild increase in the bilateral upper and lower  interstitial reticular and bibasilar subpleural reticular nodular lung  pattern. No acute osseous abnormality. Unremarkable visualized upper  abdomen.      Impression    Impression:   Increase in the upper and lower interstitial reticular/reticulonodular  patterns, which may be seen with interstitial lung disease, a degree  of interstitial edema/atelectasis, or potential atypical superimposed  infection. No focal pneumonia/consolidation.    I have personally reviewed the examination and initial interpretation  and I agree with the findings.    LAWRENCE CARSON MD         SYSTEM ID:  [id number]

## 2023-08-11 NOTE — PROGRESS NOTES
CLINICAL NUTRITION SERVICES - ASSESSMENT NOTE     Nutrition Prescription      Recommendations already ordered by Registered Dietitian (RD):  None    Future/Additional Recommendations:  Monitor nutrition-related findings and follow pt per protocol     REASON FOR ASSESSMENT  Libby Grimaldo is a/an 72 year old female assessed by the dietitian for Positive Admission Nutrition Risk Screen     Patient admitted for sepsis.     MEDICAL HISTORY  PMH of multiple myeloma (2018) s/p autologous peripheral blood stem cell transplant (2022) on palliative chemotherapy, Graves disease, ILD/COPD, HTN, and HLD     NUTRITION HISTORY  JESSICA, pt discharged prior to assessment     CURRENT NUTRITION ORDERS  Diet:  Regular  Intake/Tolerance: No intakes documented 8/8 - 8/9; last night ate 100% of diner (630 kcal, 20 g protein)     GI  Last BM 8/8      LABS  Electrolytes  Potassium (mmol/L)   Date Value   08/11/2023 3.4   08/10/2023 3.5   08/10/2023 3.2 (L)   08/01/2022 4.2   06/27/2022 5.1   06/22/2022 2.9 (L)     Phosphorus (mg/dL)   Date Value   08/11/2023 3.5   08/10/2023 3.4   04/17/2023 4.6 (H)   12/07/2022 4.1   09/01/2022 4.8 (H)    Blood Glucose  Glucose (mg/dL)   Date Value   08/11/2023 92   08/10/2023 74   08/09/2023 58 (L)   08/08/2023 90   06/26/2023 108 (H)   08/01/2022 110 (H)   06/27/2022 104 (H)   06/22/2022 136 (H)   06/16/2022 122 (H)   06/13/2022 115 (H)     GLUCOSE BY METER POCT (mg/dL)   Date Value   08/09/2023 129 (H)   08/09/2023 66 (L)    Inflammatory Markers  CRP Inflammation (mg/L)   Date Value   02/13/2022 180.0 (H)     WBC Count (10e3/uL)   Date Value   08/11/2023 2.4 (L)   08/10/2023 3.5 (L)   08/09/2023 4.6     Albumin (g/dL)   Date Value   08/11/2023 2.8 (L)   08/10/2023 2.6 (L)   08/09/2023 2.5 (L)   08/01/2022 2.9 (L)   06/27/2022 2.5 (L)   06/22/2022 2.5 (L)      Magnesium (mg/dL)   Date Value   08/11/2023 1.4 (L)   08/10/2023 1.6 (L)   04/17/2023 2.1     Sodium (mmol/L)   Date Value   08/11/2023 140  "  08/10/2023 142   08/09/2023 138    Renal  Urea Nitrogen (mg/dL)   Date Value   08/11/2023 4.5 (L)   08/10/2023 7.0 (L)   08/09/2023 13.2   08/01/2022 8   06/27/2022 9   06/22/2022 9     Creatinine (mg/dL)   Date Value   08/11/2023 0.91   08/10/2023 1.06 (H)   08/09/2023 1.03 (H)     Additional  Ketones Urine (mg/dL)   Date Value   08/08/2023 Negative        MEDICATIONS  Reviewed     SKIN  No documented pressure injuries at this time.       ANTHROPOMETRICS  Height: 165.1 cm (5' 5\")  Most Recent Weight: 49.8 kg (109 lb 12.8 oz)    IBW: 56.8 kg   88%IBW   Body mass index is 18.27 kg/m . BMI Category: Underweight BMI <18.5    Weight History:   Wt up/stable from admission, wt up x3 months, 7.4% wt loss x6 months, 2.9% wt loss x1 year   Wt Readings from Last 15 Encounters:   08/10/23 49.8 kg (109 lb 12.8 oz)   08/08/23 49 kg (108 lb)   06/26/23 47.7 kg (105 lb 1.6 oz)   06/19/23 47.6 kg (104 lb 14.4 oz)   06/07/23 48.5 kg (107 lb)   05/22/23 49.5 kg (109 lb 3.2 oz)   05/15/23 48.7 kg (107 lb 4.8 oz)   05/08/23 49.4 kg (109 lb)   05/01/23 48.8 kg (107 lb 8 oz)   04/24/23 50.3 kg (110 lb 12.8 oz)   04/21/23 49 kg (108 lb)   04/17/23 50.8 kg (112 lb)   02/09/23 53.8 kg (118 lb 9.6 oz)   01/09/23 53.5 kg (117 lb 14.4 oz)   12/07/22 53.1 kg (117 lb)   1/03/22 : 52.8 kg (116 lb 6.4 oz)   10/03/22 : 54.1 kg (119 lb 3.2 oz)   09/21/22 : 52.2 kg (115 lb)   09/01/22 : 49.5 kg (109 lb 1.6 oz)   09/01/22 : 49.5 kg (109 lb 1.6 oz)   08/01/22 : 51.3 kg (113 lb 3.2 oz)       ASSESSED NUTRITION NEEDS  Dosing Weight: 50 kg (Actual BW)   Estimated Energy Needs: 1500 - 1750 kcals/day (30 - 35 kcals/kg )  Justification: Increased needs  Estimated Protein Needs: 60 - 75 grams protein/day (1.2 - 1.5 grams of pro/kg)  Justification: Increased needs  Estimated Fluid Needs: 1500 - 1750 mL/day (1 mL/kcal)   Justification: Maintenance    PHYSICAL FINDINGS  See malnutrition section below.    MALNUTRITION  % Intake: Unable to assess  % Weight Loss: " Weight loss does not meet criteria for malnutrition   Subcutaneous Fat Loss: Unable to assess   Muscle Loss: Unable to assess  Fluid Accumulation/Edema: Does not meet criteria  Malnutrition Diagnosis: Unable to determine due to      NUTRITION DIAGNOSIS  No nutrition diagnosis at this time  - pt discharged     INTERVENTIONS  Implementation - none      Monitoring/Evaluation  Progress toward goals will be monitored and evaluated per protocol.    Barb Tolentino, MPH, RDN, LD  6A/5B RD pager: [phone]  Weekend/Holiday RD pager: [phone]

## 2023-08-11 NOTE — DISCHARGE INSTRUCTIONS
++++++++++++++  Physical Therapy Referral  +++++++++++++++++++++    Please be aware that coverage of these services is subject to the terms and limitations of your health insurance plan.  Call member services at your health plan with any benefit or coverage questions.    **********************If you have not heard from the scheduling office within 2 business days, please call [phone] for [facility].*****************

## 2023-08-11 NOTE — PROGRESS NOTES
"[id number]    Blood pressure (!) 167/80, pulse 90, temperature 98.7  F (37.1  C), temperature source Oral, resp. rate 16, height 1.651 m (5' 5\"), weight 49.8 kg (109 lb 12.8 oz), SpO2 96 %.      Reason for admission: 8/8 for sepsis workup and treatment  Vitals: Hypertensive within parameters. Afebrile  Activity: SBA with BA on  Pain: 10/10 back, left arm and neck pain managed by x1 prn oxycodone  Neuro: AOX4. Intermittently forgetful. Pt was getting aggressive declining bed alarm or assistance ambulating to bathroom. Provider paged, education provided.   Cardiac: WDL  Respiratory: RA, denies cough. No accessory muscle use.   GI/: Voiding spontaneously. No BM this shift. Denies nausea  Diet: Regular.   Lines: Port saline locked. Site CDI  Wounds: No new skin issues  Labs/imaging: Still needs stool sample to rule out c-diff and sputum sample.      No new changes this shift, pt sleeping between cares      Continue to monitor and follow POC    "

## 2023-08-11 NOTE — PROGRESS NOTES
Spoke to RN who states that the patient is resting comfortably at the time of our conversation. I let the RN know that we need to leave the bed alarm on for safety reasons despite patient's request. RN verbalized agreement with this plan. No further interventions at this time. Per RN, no need for bedside assessment or need for restraints or medications at this time

## 2023-08-11 NOTE — PROGRESS NOTES
"Care Management Discharge Note    Discharge Date: 08/11/2023       Discharge Disposition: Home    Discharge Services:  outpatient PT    Discharge DME: None    Discharge Transportation: family or friend will provide    Private pay costs discussed: Not applicable    Does the patient's insurance plan have a 3 day qualifying hospital stay waiver?  No    PAS Confirmation Code: n/a  Patient/family educated on Medicare website which has current facility and service quality ratings: no    Education Provided on the Discharge Plan: Yes  Persons Notified of Discharge Plans: patient  Patient/Family in Agreement with the Plan: yes    Handoff Referral Completed: Yes    Additional Information:  Per H&P:  \"Libby Grimaldo is a 72 year old female who has a history of multiple myeloma (2018) s/p autologous peripheral blood stem cell transplant (2022) on palliative chemotherapy, Graves disease, ILD/COPD, HTN, and HLD who was admitted on 8/8 with sepsis.\"     Patient recommended for home PT, but declines home PT, wants outpatient PT.  Ashley, RADHA ordered outpatient PT referral.  Patient has been on zosyn in hospital, writer called Ashley, RADHA, inquiring if IV antibiotics are needed at discharge.  Per Ashley, no IV antibiotics for discharge.       All discharge needs met.           Celsa Darby, RN, BSN  RN Care Coordinator     [address] [address]  [email or web address]  Gender pronouns: she/her  Pager: [phone]         "

## 2023-08-11 NOTE — PLAN OF CARE
Physical Therapy Discharge Summary    Reason for therapy discharge:    Discharged to home with home therapy.    Progress towards therapy goal(s). See goals on Care Plan in [facility] electronic health record for goal details.  Goals partially met.  Barriers to achieving goals:   discharge from facility and Pt declining therapy on day or discharge.    Therapy recommendation(s):    Continued therapy is recommended.  Rationale/Recommendations:  fall prevention,strength, endurance.

## 2023-08-11 NOTE — PROVIDER NOTIFICATION
SARITHA HEARD MD notified via web based paging at [phone] # [phone]  Pt getting aggressive, declining bed alarm or assistance ambulating to bathroom. Education provided. Doesn't want staff in her room. Melonie VALIENTE #[id number]

## 2023-08-11 NOTE — PLAN OF CARE
Goal Outcome Evaluation: [id number]      Plan of Care Reviewed With: patient    Overall Patient Progress: improving    Outcome Evaluation: Hypertensive w/in parameters, OVSS on RA. A&Ox4, although intermittently forgetful. Reports pain in back and legs, PRN oxy given x2 with effectiveness. Denies N/V, SOB. K replaced for level of 3.2, recheck 3.5. Voiding spontaneously. Still needs stool sample, LBM 8/8. Tolerating regular diet. Up SBA. Able to make needs known. Continue w/ POC.

## 2023-08-12 NOTE — TELEPHONE ENCOUNTER
The patient is calling and says she was supposed to be prescribed levofloxacin but has not received it during her discharge from the [facility].   The patient was advised that the prescription is at the [facility] pharmacy at [address] [place] [address].  Caller verbalized and understands directives

## 2023-08-14 NOTE — PROGRESS NOTES
[facility]    Background: Transitional Care Management program auto-identified and prompting a chart review by [facility] team.    Assessment: Upon chart review, CCR Team member will Enroll this episode of Transitional Care Management program due to reason below:    Upon chart review, patient is followed by Bone Marrow Transplant team who follow their patients closely. [facility] will not conduct outreach to avoid duplication of outreach to patient.     Plan: Transitional Care Management episode enrolled per reason above.      *Connected Care Resource Team does NOT follow patient ongoing. Referrals are identified based on internal discharge reports and the outreach is to ensure patient has an understanding of their discharge instructions.

## 2023-08-21 NOTE — ORAL ONC MGMT
Oral Chemotherapy Monitoring Program   Medication: pomalyst  Rx: 4mg PO daily on days 1 through 21 of 28 day cycle   Auth #: [id number]   Risk Category: Adult Female not of reproductive potentail  Routine survey questions reviewed.   Rx to be Escribed to SP

## 2023-08-21 NOTE — NURSING NOTE
"Oncology Rooming Note    August 21, 2023 12:56 PM   Libby Grimaldo is a 72 year old female who presents for:    Chief Complaint   Patient presents with    Port Draw     Labs drawn via port by RN. VS taken.    Oncology Clinic Visit     Multiple myeloma not having achieved remission     Initial Vitals: BP (!) 146/62 (BP Location: Right arm, Patient Position: Sitting, Cuff Size: Adult Regular)   Pulse 75   Temp 99.4  F (37.4  C) (Oral)   Resp 16   Wt 49.3 kg (108 lb 11.2 oz)   SpO2 100%   BMI 18.09 kg/m   Estimated body mass index is 18.09 kg/m  as calculated from the following:    Height as of 8/9/23: 1.651 m (5' 5\").    Weight as of this encounter: 49.3 kg (108 lb 11.2 oz). Body surface area is 1.5 meters squared.  Mild Pain (2) Comment: Data Unavailable   No LMP recorded. Patient is postmenopausal.  Allergies reviewed: Yes  Medications reviewed: Yes    Medications: Medication refills not needed today.  Pharmacy name entered into [employer]:    [id number] - [facility] [address] [facility] [address] - [place], [place] - [address]  [facility] [place] [place] - [address] [facility] [phone]    Clinical concerns: pt would like to know if she should continue antibacterials.    Would like to know the name of chewable nausea pills she was taking prior.       Rob Henry"

## 2023-08-21 NOTE — LETTER
8/21/2023         RE: Libby Grimaldo  [address] [address]        Dear Colleague,    Thank you for referring your patient, Libby Grimaldo, to the [facility]. Please see a copy of my visit note below.    [facility] [place]  Date of visit: 08/21/2023      Reason for Visit: Follow up for Mutliple Myeloma     Oncology History   Multiple myeloma not having achieved remission (H)   11/7/2018 -  Cancer Staged    Staging form: Plasma Cell Myeloma and Disorders, AJCC 8th Edition  - Clinical stage from 11/7/2018: Albumin (g/dL): 3.4, ISS: Stage II, High-risk cytogenetics: Absent, LDH: Unknown     11/7/2018 Initial Diagnosis    Multiple myeloma not having achieved remission (H)     1/23/2019 - 11/14/2019 Chemotherapy    KRD x 6 cycles     11/4/2019 - 2/14/2021 Chemotherapy    Rev/Dex(20)      4/3/2020 - 7/17/2020 Chemotherapy    Velcade maintenance - dose reductions for orthostatic hypotension     7/17/2020 - 12/15/2020 Chemotherapy    Revlimid maintenance     11/23/2020 Progression    Bone marrow 35% plasma cells, PET CT demonstrating new lytic lesions     12/15/2020 - 6/11/2021 Chemotherapy    Elsy, Kd x 6 cycles     7/7/2021 -  Cancer Staged    PET/CT - CR     7/21/2021 -  Chemotherapy    Subcutaneous Daratumumab, IVIG, denosumab monthly     11/22/2021 -  Cancer Staged    Bone marrow aspirate - MRD 0.0022% positive     2/1/2022 - 2/5/2022 Chemotherapy    IP BMT Chemotherapy For Mobilization - High Dose Cyclophosphamide  Plan Provider: Julio C Guillory MD  Treatment goal: Other  Line of treatment: [No plan line of treatment]     5/25/2022 - 11/23/2022 Chemotherapy    IP - OP BMT 2016-35 Auto Multiple Myeloma - Melphalan (CrCL < 30 mL/min, >/= 2 comorbidities, OR age > 75yrs) - Adult (Version: 7/13/21)  Plan Provider: Julio C Guillory MD  Treatment goal: Other  Line of treatment: [No plan line of treatment]     9/29/2022 - 3/9/2023 Supportive Treatment     Oral ONC - ixazomib maintenance post-transplant  Plan Provider: Julio C Guillory MD  Treatment goal: Curative  Line of treatment: [No plan line of treatment]     4/21/2023 -  Supportive Treatment    OP ONC Zoledronic Acid (Zometa) MONTHLY  Plan Provider: Julio C Guillory MD  Treatment goal: Palliative  Line of treatment: Maintenance     4/24/2023 -  Chemotherapy    OP ONC Multiple Myeloma - Elotuzumab / Pomalidomide / Dexamethasone (<75 years old)  Plan Provider: Julio C Guillory MD  Treatment goal: Palliative  Line of treatment: Third Line       Interval History:  Libby presents to clinic for follow up post-hospitalization.  She is overall doing well, continues to feel fatigued.  She no longer is feeling flu-like, she completed her antibiotics this weekend.  Her appetite is slowly improving, taste/appetite waxes and wanes.    Reports having diarrhea last night, had two episodes of loose/watery stools. She took an Imodium, denies any diarrhea or abdominal pain.      Denies fevers/chills, incontinence, urinary changes, N/V, recent night sweats, SOB/cough, dental pain, runny nose, sore throat, rashes/sores, new pains.      She would like to have dental implants on her bottom teeth to help improve her ability to chew and eat. Discussed would not recommend while neutropenic.     ROS: 8 point ROS neg other than the symptoms noted above in the HPI.     PHYSICAL EXAM:   Vitals:  Blood pressure (!) 146/62, pulse 75, temperature 99.4  F (37.4  C), temperature source Oral, resp. rate 16, weight 49.3 kg (108 lb 11.2 oz), SpO2 100 %.      Wt Readings from Last 4 Encounters:   08/21/23 49.3 kg (108 lb 11.2 oz)   08/10/23 49.8 kg (109 lb 12.8 oz)   08/08/23 49 kg (108 lb)   06/26/23 47.7 kg (105 lb 1.6 oz)     General: No acute distress  HEENT: Sclera anicteric. Oral mucosa pink and moist.  No mucositis or thrush  Heart: Regular, rate, and rhythm  Lungs: Clear to ascultation bilaterally  Abdomen: Positive bowel sounds. Soft,  non-distended, non-tender. No organomegaly or mass.   Extremities: no lower extremity edema  Neuro: Cranial nerves grossly intact  Rash: none on exposed skin  Vascular access: port    Labs:  Most Recent 3 CBC's:  Recent Labs   Lab Test 08/21/23  1236 08/11/23 0458 08/10/23  0619 08/09/23  0627 08/08/23  1309   WBC 2.9* 2.4* 3.5*   < > 8.2   HGB 9.8* 7.7* 7.4*   < > 9.5*   * 109* 110*   < > 110*    92* 111*   < > 107*   ANEUTAUTO 0.5* 1.1*  --   --  7.2    < > = values in this interval not displayed.     Most Recent 3 BMP's:  Recent Labs   Lab Test 08/21/23  1236 08/11/23  0458 08/10/23  2001 08/10/23  1501 08/10/23  0619    140  --   --  142   POTASSIUM 3.3* 3.4 3.5   < > 3.2*   CHLORIDE 107 109*  --   --  111*   CO2 26 22  --   --  22   BUN 10.5 4.5*  --   --  7.0*   CR 1.02* 0.91  --   --  1.06*   ANIONGAP 8 9  --   --  9   DIANDRA 8.8 8.3*  --   --  8.1*   * 92  --   --  74   PROTTOTAL 5.6* 4.6*  --   --  4.6*   ALBUMIN 3.4* 2.8*  --   --  2.6*    < > = values in this interval not displayed.    Most Recent 3 LFT's:  Recent Labs   Lab Test 08/21/23  1236 08/11/23  0458 08/10/23  0619   AST 21 22 24   ALT 5 20 21   ALKPHOS 84 84 87   BILITOTAL 0.3 0.3 0.3    Most Recent 2 TSH and T4:  Recent Labs   Lab Test 08/08/23  1309   TSH 11.30*   T4 1.06     I reviewed the above labs today.     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      Myeloma/BMT/IEC PROTOCOL for 2016-35  - Chemo protocol: HD Melphalan 140mg/m2  Day 0: Cell dose: 3.94x10^6 (s/p cytoxan chemo priming 2/1/2022 and subsequent stem cell collection).   - Restaging plan: per protocol.  - Maintenance Ninlaro started early Oct 2022; Day 28 (11/4) and stopped in January 2023  - Elotuzumab, pomalidomide, dexamethasone initiated April 2023  - Most recent dose (C3D1) 6/26.   - Was planned for C4 on 7/26, however dose was not given for unclear reasons.  - Libby presented to clinic for C4 8/8 to  "re-establish treatment schedule and treatment was held and patient was sent to the ED due to rigors, difficulty answering questions/confusion, and overall unwell appearance  - Per patient report, she \"believes\" she has been taking pomalidomide daily since C3D1 up until day of admission (8/8)  - Pomolidomide was held at the time of discharge  - 8/21 Discussed rising FLC and neutropenia with Dr. Guillory- will set patient up for Elotuzumab ASAP and get her back on treatment    Pancytopenia   2/2 chemotherapy and underlying disease. Near baseline, non-neutropenic. Slightly worsened thrombocytopenia likely from sepsis  - Transfuse to maintain Hgb >7, Plt >10K  - Type & Screen MWF    Neutropenia:  8/21 ANC 0.5- reinforced neutropenic precautions and if patient develops a fever >/= 100.4 F she needs to be seen in the ED, patient verbalized understanding  - Discussed with Dr. Guillory- plan to treat patient even if neutropenic  - If continues with cytopenias could consider a bone marrow      HEME/COAG  - Relevant thrombosis or bleeding history:   2/15/22: new non-occlusive LUE DVT. No longer on xarelto. On ASA.     Ppx:  - PTA Acyclovir 400 mg BID- patient has not been taking, script sent today (8/21)  - PJP ppx bactrim BID on Monday/Tuesdays  - Levofloxacin 250 mg PO- if ANC <1000  - Antifungal ppx, held d/t interaction with Methadone    Sepsis, 2/2 to presumed pulmonary infection   Atypical pneumonia  Underlying COPD  Presented to heme/onc clinic 8/8 to reestablish for treatment schedule, noted to be feeling poorly with the \"flu \"x 1 week.  She was referred to the emergency department for further evaluation given ongoing rigors, difficulty answering questions/confusion, and overall unwell appearance after 30 minutes of monitoring.  High risk for infection given immunosuppressed state with multiple myeloma and palliative chemotherapy.  History of recurrent UTIs and repeat pneumonia.  Per report presented with " diarrhea/suprapubic tenderness, productive cough.  Found to be febrile to 102, elevated lactate (2.3), and elevated procalcitonin (1.27) without clear source of infection.  Chest x-ray noting increased upper and lower interstitial reticular/nodular patterns raising concern for atypical infection.  Additional infectious workup including RSV, influenza A/B, COVID unremarkable  - Started on broad-spectrum antibiotics  - Zosyn 8/8-8/10  - Vancomycin 8/8 - 8/9  - Transitioned to oral levofloxacin 750 mg q48h (renally dosed) 8/10/2023 - plan for total of 7 day abx course (thru ~8/16)  - BCx 8/8 NGTD  - UCx 8/8 with < 10,000 CFU/mL urogenital cricket  - RVP, legionella Ag, MRSA, strep pneumo Ag negative  - Completed antibiotics, denies s/s of infection       CARDIOVASCULAR  - Risk of cardiomyopathy:  Baseline EF 56%  - known hyperlipidemia- per notes previously on Crestor but not on med list - follow-up with PCP if needed  - History of Afib - now in NSR but tachy. Continue metoprolol XL 25 mg/d      RESPIRATORY  COPD  COPD with 40pyr, quit 2010. Previously documented to have idiopathic pulmonary fibrosis, however most recent PFTs (1/26/22) were normal. Has previously required treatment for COPD exacerbation with pneumonia. Some expiratory wheezing on exam improved with Duonebs; continue home inhaler regimen.  - PTA Trelegy Ellipta inhaler qd  - PTA Albuterol BID PRN     Fluids/Electrolytes/Nutrition   - IVF bolus prn  - PRN lyte replacement per standing protocol  - Regular diet as tolerated   - K + 3.3- 20 mEq BID x2 days    PSYCH  Generalized Anxiety Disorder  - PTA Venlafaxine 75mg TID     Opiate dependence  - Methadone 10mg TID    ENDO  Hypothyroidism  History of Graves Disease  History of hypothyroidism 2/2 treatment for Graves Disease (patient reports surgery in [place], however not thyroidectomy). Stable dose of levothyroxine over the past four years. Upon admission, elevated TSH (11.3) with normal T4 (1.07) consistent  with subclinical hypothyroidism. Following recovery, recommend outpatient follow-up with PCP to retest TSH/T4, consider dose adjustment.  - PTA Levothyroxine 112mcg every day      SYMPTOM MANAGEMENT.  Pain Assessment:  - Libby is experiencing pain due to chronic pain - Continue suboxone and prn oxycodone.   - 8/21 Denies pain at today's visit      Transition Care Management Services  Admit Date: 8/8/23  Discharge Date: 8/11/23  Interactive contact date: 8/14/2023  Face-to-face visit date: 8/21/2023        Medical complexity decision making: Moderate complexity ([id number])    QUAN Turner CNP

## 2023-08-21 NOTE — NURSING NOTE
"Chief Complaint   Patient presents with    Port Draw     Labs drawn via port by RN. VS taken.     Port accessed with 20 gauge, 3/4\" power needle by RN, labs collected, line flushed with saline and heparin, then de-accessed.  Vitals taken. Pt checked in for appointment(s).     Evelina Lucero, RN    "

## 2023-08-23 NOTE — PATIENT INSTRUCTIONS
[facility] Triage and after hours / weekends / holidays:  [phone]    Please call the triage or after hours line if you experience a temperature greater than or equal to 100.4, shaking chills, have uncontrolled nausea, vomiting and/or diarrhea, dizziness, shortness of breath, chest pain, bleeding, unexplained bruising, or if you have any other new/concerning symptoms, questions or concerns.      If you are having any concerning symptoms or wish to speak to a provider before your next infusion visit, please call triage to notify them so we can adequately serve you.     If you need a refill on a narcotic prescription or other medication, please call before your infusion appointment.                August 2023 Sunday Monday Tuesday Wednesday Thursday Friday Saturday             1     2     3     4     5       6     7     8    LAB CENTRAL   9:45 AM   (15 min.)   [facility] LAB DRAW   [facility]    RETURN CCSL  10:15 AM   (30 min.)   Julio C Guillory MD   [facility] [place]    Admission  12:24 PM   Shanita Watters MD   [facility] 5B Oncology   (Discharge: 8/11/2023)    CT HEAD WO   1:05 PM   (20 min.)   [id number]   [facility] Imaging    XR CHEST PORT 1 VIEW   2:30 PM   (20 min.)   [id number]   [facility] 9     10    IP PT EVALUATION   8:15 AM   (45 min.)   Alva Trevizo, PT   [facility] Rehabilitation 11    IP PT TREATMENT  10:00 AM   (30 min.)   Loulou Estrella, PT   [facility] 12       13     14     15     16     17    LAB CENTRAL  12:15 PM   (15 min.)   [facility] LAB DRAW   [facility]    RETURN CCSL  12:45 PM   (45 min.)   Keila Quinones APRN CNP   [facility] 18     19       20     21    LAB CENTRAL  12:15 PM   (15 min.)   [facility] LAB DRAW   [facility]  Clinic    RETURN CCSL  12:45 PM   (45 min.)   Keila Quinones APRN CNP   [facility] 22     23    ONC INFUSION 3 HR (180 MIN)   7:00 AM   (180 min.)    ONC INFUSION NURSE   [facility] 24     25     26       27     28     29    LAB CENTRAL  10:45 AM   (15 min.)    [facility] DRAW   [facility]    RETURN CCSL  11:15 AM   (45 min.)   Keila Quinones APRN CNP   [facility] 30 31 September 2023 Sunday Monday Tuesday Wednesday Thursday Friday Saturday                            1     2       3     4     5     6     7     8     9       10     11     12     13     14     15     16       17     18     19     20     21     22     23       24     25     26     27     28     29     30                     Lab Results:  No results found for this or any previous visit (from the past 12 hour(s)).

## 2023-08-23 NOTE — PROGRESS NOTES
"Infusion Nursing Note:  Libby Grimaldo presents today for cycle 4 day 1 elotuzumab and zometa.    Patient seen by provider today: No   present during visit today: Not Applicable.    Note: Pt presents today generally feeling well. She has no new symptoms since her provider visit with Keila Quinones CNP on 8/21/23. Per Keila's note on 8/21, \"ANC 0.5- reinforced neutropenic precautions and if patient develops a fever >/= 100.4 F she needs to be seen in the ED, patient verbalized understanding. Discussed with Dr. Guillory- plan to treat patient even if neutropenic.\" Confirmed with Dr. Guillory. Patient wishes to proceed with planned treatment.    TORB Dr. Guillory/Emily Meese, RN 08/23/23 0841  - Please proceed with labs from 8/21 with ANC 0.5    Confirmed yamila Jovel elotuzumab is not titrated today, administered at 300 mL/hr per order.    TORB Keila Quinones CNP/Emily Meese, RN 08/23/23 1025  -Please give zometa today    Intravenous Access:  Implanted Port.    Treatment Conditions:  Lab Results   Component Value Date    HGB 9.8 (L) 08/21/2023    WBC 2.9 (L) 08/21/2023    ANEU 2.8 08/10/2023    ANEUTAUTO 0.5 (L) 08/21/2023     08/21/2023        Lab Results   Component Value Date     08/21/2023    POTASSIUM 3.3 (L) 08/21/2023    MAG 1.8 08/21/2023    CR 1.02 (H) 08/21/2023    DIANDRA 8.8 08/21/2023    BILITOTAL 0.3 08/21/2023    ALBUMIN 3.4 (L) 08/21/2023    ALT 5 08/21/2023    AST 21 08/21/2023       Results reviewed, labs did NOT meet treatment parameters: see TORB above.    Post Infusion Assessment:  Patient tolerated infusion without incident.  Blood return noted pre and post infusion.  Site patent and intact, free from redness, edema or discomfort.  No evidence of extravasations.  Access discontinued per protocol.     Discharge Plan:   Patient declined prescription refills.  Discharge instructions reviewed with: Patient and Family.  Patient and/or family verbalized understanding of discharge " instructions and all questions answered.  Copy of AVS reviewed with patient and/or family.  Patient will return 08/29/23 for next appointment.  Patient discharged in stable condition accompanied by: self and .  Departure Mode: Ambulatory.      Emily Meese, RN

## 2023-09-12 NOTE — TELEPHONE ENCOUNTER
Oral Chemotherapy Monitoring Program    Medication: Pomalyst  Rx:  4mg PO daily 21/28 DS    Auth #: [id number]  Risk Category: Adult female NOT of reproductive capacity        Routine survey questions reviewed. YES

## 2023-09-21 NOTE — TELEPHONE ENCOUNTER
Fell and bruised head, they are going to [facility].   They would like to cancel appointment for today They wish to cancel labs and provider appt along with infusion appointment,     Sent message to [facility] to call patient to reschedule appointments.     Updated Infusion nurses and Keila Quinones.

## 2023-09-22 NOTE — PROGRESS NOTES
[facility]  Date of visit: 09/27/2023      Reason for Visit: Follow up for Mutliple Myeloma     Oncology History   Multiple myeloma not having achieved remission (H)   11/7/2018 -  Cancer Staged    Staging form: Plasma Cell Myeloma and Disorders, AJCC 8th Edition  - Clinical stage from 11/7/2018: Albumin (g/dL): 3.4, ISS: Stage II, High-risk cytogenetics: Absent, LDH: Unknown     11/7/2018 Initial Diagnosis    Multiple myeloma not having achieved remission (H)     1/23/2019 - 11/14/2019 Chemotherapy    KRD x 6 cycles     11/4/2019 - 2/14/2021 Chemotherapy    Rev/Dex(20)      4/3/2020 - 7/17/2020 Chemotherapy    Velcade maintenance - dose reductions for orthostatic hypotension     7/17/2020 - 12/15/2020 Chemotherapy    Revlimid maintenance     11/23/2020 Progression    Bone marrow 35% plasma cells, PET CT demonstrating new lytic lesions     12/15/2020 - 6/11/2021 Chemotherapy    Elsy, Kd x 6 cycles     7/7/2021 -  Cancer Staged    PET/CT - CR     7/21/2021 -  Chemotherapy    Subcutaneous Daratumumab, IVIG, denosumab monthly     11/22/2021 -  Cancer Staged    Bone marrow aspirate - MRD 0.0022% positive     2/1/2022 - 2/5/2022 Chemotherapy    IP BMT Chemotherapy For Mobilization - High Dose Cyclophosphamide  Plan Provider: Julio C Guillory MD  Treatment goal: Other  Line of treatment: [No plan line of treatment]     5/25/2022 - 11/23/2022 Chemotherapy    IP - OP BMT 2016-35 Auto Multiple Myeloma - Melphalan (CrCL < 30 mL/min, >/= 2 comorbidities, OR age > 75yrs) - Adult (Version: 7/13/21)  Plan Provider: Julio C Guillory MD  Treatment goal: Other  Line of treatment: [No plan line of treatment]     9/29/2022 - 3/9/2023 Supportive Treatment    Oral ONC - ixazomib maintenance post-transplant  Plan Provider: Julio C Guillory MD  Treatment goal: Curative  Line of treatment: [No plan line of treatment]     4/21/2023 -  Supportive Treatment    OP ONC Zoledronic Acid (Zometa) MONTHLY  Plan  Provider: Julio C Guillory MD  Treatment goal: Palliative  Line of treatment: Maintenance     4/24/2023 -  Chemotherapy    OP ONC Multiple Myeloma - Elotuzumab / Pomalidomide / Dexamethasone (<75 years old)  Plan Provider: Julio C Guillory MD  Treatment goal: Palliative  Line of treatment: Third Line       Interval History:  Libby presents to clinic for follow up prior to C5D1.  Her energy levels are fair, is able to perform ADLs and do small tasks around the house.  She had a fall last week due to dragging her foot and getting caught in a crack.  Reports she is tripping frequently but denies actually falling.  Reports that she is feeling weaker in her arms and legs.    Reports dizziness when looking down at the ground. Denies dizziness when going from sitting to standing or lying to sitting.  She is eating and drinking better then previously and is doing Ensure protein shakes.  Reports she is drinking a lot of A&W root beer but not drinking a lot of water.     She had a couple episodes of diarrhea last week but had had one episode this week.  Denies nausea or vomiting.  She does have some intermittent abdominal pain after eating.    She is having drenching night sweats about once a week.     She continues with neuropathy in bilateral feet that does impact her walking.    Denies fevers/chills, incontinence, urinary changes, SOB/cough, dental pain, runny nose, sore throat, rashes/sores, new pains.       PHYSICAL EXAM:   Vitals:  Blood pressure 90/64, pulse 96, temperature 98.2  F (36.8  C), temperature source Oral, resp. rate 16, weight 51.6 kg (113 lb 12.8 oz), SpO2 97 %.      Wt Readings from Last 4 Encounters:   09/27/23 51.6 kg (113 lb 12.8 oz)   08/23/23 48.9 kg (107 lb 14.4 oz)   08/21/23 49.3 kg (108 lb 11.2 oz)   08/10/23 49.8 kg (109 lb 12.8 oz)     General: No acute distress   HEENT: Sclera anicteric. Oral exam deferred  Lymph: No MATIAS in cervical, supraclavicular, and axillary areas  Heart: Regular, rate, and  rhythm  Lungs: Clear to ascultation bilaterally  Abdomen: Positive bowel sounds.   Extremities: no lower extremity edema  Neuro: Cranial nerves grossly intact  Rash: none on exposed skin    Labs:  Most Recent 3 CBC's:  Recent Labs   Lab Test 09/27/23  0935 08/21/23  1236 08/11/23  0458 08/09/23  0627 08/08/23  1309   WBC 4.1 2.9* 2.4*   < > 8.2   HGB 10.2* 9.8* 7.7*   < > 9.5*   * 110* 109*   < > 110*    173 92*   < > 107*   ANEUTAUTO  --  0.5* 1.1*  --  7.2    < > = values in this interval not displayed.     Most Recent 3 BMP's:  Recent Labs   Lab Test 09/27/23  0935 08/21/23  1236 08/11/23  0458    141 140   POTASSIUM 3.9 3.3* 3.4   CHLORIDE 108* 107 109*   CO2 22 26 22   BUN 10.2 10.5 4.5*   CR 1.01* 1.02* 0.91   ANIONGAP 8 8 9   DIANDRA 8.5* 8.8 8.3*   GLC 93 139* 92   PROTTOTAL 5.6* 5.6* 4.6*   ALBUMIN 3.4* 3.4* 2.8*    Most Recent 3 LFT's:  Recent Labs   Lab Test 09/27/23  0935 08/21/23  1236 08/11/23  0458   AST 22 21 22   ALT 10 5 20   ALKPHOS 76 84 84   BILITOTAL 0.2 0.3 0.3    Most Recent 2 TSH and T4:  Recent Labs   Lab Test 08/08/23  1309   TSH 11.30*   T4 1.06     I reviewed the above labs today.     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      Myeloma/BMT/IEC PROTOCOL for 2016-35  - Chemo protocol: HD Melphalan 140mg/m2  Day 0: Cell dose: 3.94x10^6 (s/p cytoxan chemo priming 2/1/2022 and subsequent stem cell collection).   - Restaging plan: per protocol.  - Maintenance Ninlaro started early Oct 2022; Day 28 (11/4) and stopped in January 2023  - Elotuzumab, pomalidomide, dexamethasone initiated April 2023  - Most recent dose (C3D1) 6/26.   - Was planned for C4 on 7/26, however dose was not given for unclear reasons.  - Libby presented to clinic for C4 8/8 to re-establish treatment schedule and treatment was held and patient was sent to the ED due to rigors, difficulty answering questions/confusion, and overall unwell appearance  - Per  "patient report, she \"believes\" she has been taking pomalidomide daily since C3D1 up until day of admission (8/8)  - Pomolidomide was held at the time of discharge  - 8/21 Discussed rising FLC and neutropenia with Dr. Guillory- will set patient up for Elotuzumab ASAP and get her back on treatment.  Received C4 Elotuzumab 8/23   - Was due for C5 9/20 but due to a fall she cancelled appt and was seen in urgent care  - Proceed with C5D1 today (9/27)  - Discussed elevated FLC 8/21 and importance of being consistent with treatment regimen, patient verbalized understanding.     Pancytopenia   2/2 chemotherapy and underlying disease. Near baseline, non-neutropenic. Slightly worsened thrombocytopenia likely from sepsis  - Transfuse to maintain Hgb >7, Plt >10K  - Type & Screen MWF    Neutropenia:  Patient is aware if she develops a fever >/= 100.4 F she needs to be seen in the ED  - If continues with cytopenias could consider a bone marrow   - 9/27 ANC 1.7     HEME/COAG  - Relevant thrombosis or bleeding history:   2/15/22: new non-occlusive LUE DVT. No longer on xarelto. On ASA.     Ppx:  - PTA Acyclovir 400 mg BID- patient has not been taking, script sent today (8/21)  - PJP ppx bactrim BID on Monday/Tuesdays  - Levofloxacin 250 mg PO- if ANC <1000  - Antifungal ppx, held d/t interaction with Methadone    Sepsis, 2/2 to presumed pulmonary infection   Atypical pneumonia  Underlying COPD  Presented to heme/onc clinic 8/8 to reestablish for treatment schedule, noted to be feeling poorly with the \"flu \"x 1 week.  She was referred to the emergency department for further evaluation given ongoing rigors, difficulty answering questions/confusion, and overall unwell appearance after 30 minutes of monitoring.  High risk for infection given immunosuppressed state with multiple myeloma and palliative chemotherapy.  History of recurrent UTIs and repeat pneumonia.  Per report presented with diarrhea/suprapubic tenderness, productive " cough.  Found to be febrile to 102, elevated lactate (2.3), and elevated procalcitonin (1.27) without clear source of infection.  Chest x-ray noting increased upper and lower interstitial reticular/nodular patterns raising concern for atypical infection.  Additional infectious workup including RSV, influenza A/B, COVID unremarkable  - Started on broad-spectrum antibiotics  - Zosyn 8/8-8/10  - Vancomycin 8/8 - 8/9  - Transitioned to oral levofloxacin 750 mg q48h (renally dosed) 8/10/2023 - plan for total of 7 day abx course (thru ~8/16)  - BCx 8/8 NGTD  - UCx 8/8 with < 10,000 CFU/mL urogenital cricket  - RVP, legionella Ag, MRSA, strep pneumo Ag negative  - Completed antibiotics, denies s/s of infection       CARDIOVASCULAR  - Risk of cardiomyopathy:  Baseline EF 56%  - known hyperlipidemia- per notes previously on Crestor but not on med list - follow-up with PCP if needed  - History of Afib - now in NSR but tachy. Continue metoprolol XL 25 mg/d      RESPIRATORY  COPD  COPD with 40pyr, quit 2010. Previously documented to have idiopathic pulmonary fibrosis, however most recent PFTs (1/26/22) were normal. Has previously required treatment for COPD exacerbation with pneumonia. Some expiratory wheezing on exam improved with Duonebs; continue home inhaler regimen.  - PTA Trelegy Ellipta inhaler qd  - PTA Albuterol BID PRN     Fluids/Electrolytes/Nutrition   - IVF bolus prn  - PRN lyte replacement per standing protocol  - Regular diet as tolerated   - 9/27 K + 3.9    PSYCH  Generalized Anxiety Disorder  - PTA Venlafaxine 75mg TID     Opiate dependence  - Methadone 10mg TID    ENDO  Hypothyroidism  History of Graves Disease  History of hypothyroidism 2/2 treatment for Graves Disease (patient reports surgery in [place], however not thyroidectomy). Stable dose of levothyroxine over the past four years. Upon admission, elevated TSH (11.3) with normal T4 (1.07) consistent with subclinical hypothyroidism. Following recovery,  recommend outpatient follow-up with PCP to retest TSH/T4, consider dose adjustment.  - PTA Levothyroxine 112mcg every day      SYMPTOM MANAGEMENT.  Pain Assessment:  - Libby is experiencing pain due to chronic pain   - Continue suboxone and prn oxycodone.        30 minutes spent on the date of the encounter doing chart review, review of test results, interpretation of tests, patient visit, and documentation       QUAN Turner CNP

## 2023-09-27 NOTE — NURSING NOTE
Chief Complaint   Patient presents with    Port Draw     Port accessed with 20g flat needle by RN, labs collected, line flushed with saline and heparin.  Vitals taken. Pt checked in for appointment(s).      Toyin MELARA RN PHN BSN  BMT/Oncology Lab

## 2023-09-27 NOTE — LETTER
9/27/2023         RE: Libby Grimaldo  [address] [facility] [address]        Dear Colleague,    Thank you for referring your patient, Libby Grimaldo, to the [facility]. Please see a copy of my visit note below.    [facility]  Date of visit: 09/27/2023      Reason for Visit: Follow up for Mutliple Myeloma     Oncology History   Multiple myeloma not having achieved remission (H)   11/7/2018 -  Cancer Staged    Staging form: Plasma Cell Myeloma and Disorders, AJCC 8th Edition  - Clinical stage from 11/7/2018: Albumin (g/dL): 3.4, ISS: Stage II, High-risk cytogenetics: Absent, LDH: Unknown     11/7/2018 Initial Diagnosis    Multiple myeloma not having achieved remission (H)     1/23/2019 - 11/14/2019 Chemotherapy    KRD x 6 cycles     11/4/2019 - 2/14/2021 Chemotherapy    Rev/Dex(20)      4/3/2020 - 7/17/2020 Chemotherapy    Velcade maintenance - dose reductions for orthostatic hypotension     7/17/2020 - 12/15/2020 Chemotherapy    Revlimid maintenance     11/23/2020 Progression    Bone marrow 35% plasma cells, PET CT demonstrating new lytic lesions     12/15/2020 - 6/11/2021 Chemotherapy    Elsy, Kd x 6 cycles     7/7/2021 -  Cancer Staged    PET/CT - CR     7/21/2021 -  Chemotherapy    Subcutaneous Daratumumab, IVIG, denosumab monthly     11/22/2021 -  Cancer Staged    Bone marrow aspirate - MRD 0.0022% positive     2/1/2022 - 2/5/2022 Chemotherapy    IP BMT Chemotherapy For Mobilization - High Dose Cyclophosphamide  Plan Provider: Julio C Guillory MD  Treatment goal: Other  Line of treatment: [No plan line of treatment]     5/25/2022 - 11/23/2022 Chemotherapy    IP - OP BMT 2016-35 Auto Multiple Myeloma - Melphalan (CrCL < 30 mL/min, >/= 2 comorbidities, OR age > 75yrs) - Adult (Version: 7/13/21)  Plan Provider: Julio C Guillory MD  Treatment goal: Other  Line of treatment: [No plan line of treatment]     9/29/2022 - 3/9/2023 Supportive Treatment     Oral ONC - ixazomib maintenance post-transplant  Plan Provider: Julio C Guillory MD  Treatment goal: Curative  Line of treatment: [No plan line of treatment]     4/21/2023 -  Supportive Treatment    OP ONC Zoledronic Acid (Zometa) MONTHLY  Plan Provider: Julio C Guillory MD  Treatment goal: Palliative  Line of treatment: Maintenance     4/24/2023 -  Chemotherapy    OP ONC Multiple Myeloma - Elotuzumab / Pomalidomide / Dexamethasone (<75 years old)  Plan Provider: Julio C Guillory MD  Treatment goal: Palliative  Line of treatment: Third Line       Interval History:  Libby presents to clinic for follow up prior to C5D1.  Her energy levels are fair, is able to perform ADLs and do small tasks around the house.  She had a fall last week due to dragging her foot and getting caught in a crack.  Reports she is tripping frequently but denies actually falling.  Reports that she is feeling weaker in her arms and legs.    Reports dizziness when looking down at the ground. Denies dizziness when going from sitting to standing or lying to sitting.  She is eating and drinking better then previously and is doing Ensure protein shakes.  Reports she is drinking a lot of A&W root beer but not drinking a lot of water.     She had a couple episodes of diarrhea last week but had had one episode this week.  Denies nausea or vomiting.  She does have some intermittent abdominal pain after eating.    She is having drenching night sweats about once a week.     She continues with neuropathy in bilateral feet that does impact her walking.    Denies fevers/chills, incontinence, urinary changes, SOB/cough, dental pain, runny nose, sore throat, rashes/sores, new pains.       PHYSICAL EXAM:   Vitals:  Blood pressure 90/64, pulse 96, temperature 98.2  F (36.8  C), temperature source Oral, resp. rate 16, weight 51.6 kg (113 lb 12.8 oz), SpO2 97 %.      Wt Readings from Last 4 Encounters:   09/27/23 51.6 kg (113 lb 12.8 oz)   08/23/23 48.9 kg (107 lb  14.4 oz)   08/21/23 49.3 kg (108 lb 11.2 oz)   08/10/23 49.8 kg (109 lb 12.8 oz)     General: No acute distress   HEENT: Sclera anicteric. Oral exam deferred  Lymph: No MATIAS in cervical, supraclavicular, and axillary areas  Heart: Regular, rate, and rhythm  Lungs: Clear to ascultation bilaterally  Abdomen: Positive bowel sounds.   Extremities: no lower extremity edema  Neuro: Cranial nerves grossly intact  Rash: none on exposed skin    Labs:  Most Recent 3 CBC's:  Recent Labs   Lab Test 09/27/23  0935 08/21/23  1236 08/11/23  0458 08/09/23  0627 08/08/23  1309   WBC 4.1 2.9* 2.4*   < > 8.2   HGB 10.2* 9.8* 7.7*   < > 9.5*   * 110* 109*   < > 110*    173 92*   < > 107*   ANEUTAUTO  --  0.5* 1.1*  --  7.2    < > = values in this interval not displayed.     Most Recent 3 BMP's:  Recent Labs   Lab Test 09/27/23  0935 08/21/23  1236 08/11/23  0458    141 140   POTASSIUM 3.9 3.3* 3.4   CHLORIDE 108* 107 109*   CO2 22 26 22   BUN 10.2 10.5 4.5*   CR 1.01* 1.02* 0.91   ANIONGAP 8 8 9   DIANDRA 8.5* 8.8 8.3*   GLC 93 139* 92   PROTTOTAL 5.6* 5.6* 4.6*   ALBUMIN 3.4* 3.4* 2.8*    Most Recent 3 LFT's:  Recent Labs   Lab Test 09/27/23  0935 08/21/23  1236 08/11/23  0458   AST 22 21 22   ALT 10 5 20   ALKPHOS 76 84 84   BILITOTAL 0.2 0.3 0.3    Most Recent 2 TSH and T4:  Recent Labs   Lab Test 08/08/23  1309   TSH 11.30*   T4 1.06     I reviewed the above labs today.     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      Myeloma/BMT/IEC PROTOCOL for 2016-35  - Chemo protocol: HD Melphalan 140mg/m2  Day 0: Cell dose: 3.94x10^6 (s/p cytoxan chemo priming 2/1/2022 and subsequent stem cell collection).   - Restaging plan: per protocol.  - Maintenance Ninlaro started early Oct 2022; Day 28 (11/4) and stopped in January 2023  - Elotuzumab, pomalidomide, dexamethasone initiated April 2023  - Most recent dose (C3D1) 6/26.   - Was planned for C4 on 7/26, however dose was not  "given for unclear reasons.  - Libby presented to clinic for C4 8/8 to re-establish treatment schedule and treatment was held and patient was sent to the ED due to rigors, difficulty answering questions/confusion, and overall unwell appearance  - Per patient report, she \"believes\" she has been taking pomalidomide daily since C3D1 up until day of admission (8/8)  - Pomolidomide was held at the time of discharge  - 8/21 Discussed rising FLC and neutropenia with Dr. Guillory- will set patient up for Elotuzumab ASAP and get her back on treatment.  Received C4 Elotuzumab 8/23   - Was due for C5 9/20 but due to a fall she cancelled appt and was seen in urgent care  - Proceed with C5D1 today (9/27)  - Discussed elevated FLC 8/21 and importance of being consistent with treatment regimen, patient verbalized understanding.     Pancytopenia   2/2 chemotherapy and underlying disease. Near baseline, non-neutropenic. Slightly worsened thrombocytopenia likely from sepsis  - Transfuse to maintain Hgb >7, Plt >10K  - Type & Screen MWF    Neutropenia:  Patient is aware if she develops a fever >/= 100.4 F she needs to be seen in the ED  - If continues with cytopenias could consider a bone marrow   - 9/27 ANC 1.7     HEME/COAG  - Relevant thrombosis or bleeding history:   2/15/22: new non-occlusive LUE DVT. No longer on xarelto. On ASA.     Ppx:  - PTA Acyclovir 400 mg BID- patient has not been taking, script sent today (8/21)  - PJP ppx bactrim BID on Monday/Tuesdays  - Levofloxacin 250 mg PO- if ANC <1000  - Antifungal ppx, held d/t interaction with Methadone    Sepsis, 2/2 to presumed pulmonary infection   Atypical pneumonia  Underlying COPD  Presented to heme/onc clinic 8/8 to reestablish for treatment schedule, noted to be feeling poorly with the \"flu \"x 1 week.  She was referred to the emergency department for further evaluation given ongoing rigors, difficulty answering questions/confusion, and overall unwell appearance after 30 " minutes of monitoring.  High risk for infection given immunosuppressed state with multiple myeloma and palliative chemotherapy.  History of recurrent UTIs and repeat pneumonia.  Per report presented with diarrhea/suprapubic tenderness, productive cough.  Found to be febrile to 102, elevated lactate (2.3), and elevated procalcitonin (1.27) without clear source of infection.  Chest x-ray noting increased upper and lower interstitial reticular/nodular patterns raising concern for atypical infection.  Additional infectious workup including RSV, influenza A/B, COVID unremarkable  - Started on broad-spectrum antibiotics  - Zosyn 8/8-8/10  - Vancomycin 8/8 - 8/9  - Transitioned to oral levofloxacin 750 mg q48h (renally dosed) 8/10/2023 - plan for total of 7 day abx course (thru ~8/16)  - BCx 8/8 NGTD  - UCx 8/8 with < 10,000 CFU/mL urogenital cricket  - RVP, legionella Ag, MRSA, strep pneumo Ag negative  - Completed antibiotics, denies s/s of infection       CARDIOVASCULAR  - Risk of cardiomyopathy:  Baseline EF 56%  - known hyperlipidemia- per notes previously on Crestor but not on med list - follow-up with PCP if needed  - History of Afib - now in NSR but tachy. Continue metoprolol XL 25 mg/d      RESPIRATORY  COPD  COPD with 40pyr, quit 2010. Previously documented to have idiopathic pulmonary fibrosis, however most recent PFTs (1/26/22) were normal. Has previously required treatment for COPD exacerbation with pneumonia. Some expiratory wheezing on exam improved with Duonebs; continue home inhaler regimen.  - PTA Trelegy Ellipta inhaler qd  - PTA Albuterol BID PRN     Fluids/Electrolytes/Nutrition   - IVF bolus prn  - PRN lyte replacement per standing protocol  - Regular diet as tolerated   - 9/27 K + 3.9    PSYCH  Generalized Anxiety Disorder  - PTA Venlafaxine 75mg TID     Opiate dependence  - Methadone 10mg TID    ENDO  Hypothyroidism  History of Graves Disease  History of hypothyroidism 2/2 treatment for Graves Disease  (patient reports surgery in [place], however not thyroidectomy). Stable dose of levothyroxine over the past four years. Upon admission, elevated TSH (11.3) with normal T4 (1.07) consistent with subclinical hypothyroidism. Following recovery, recommend outpatient follow-up with PCP to retest TSH/T4, consider dose adjustment.  - PTA Levothyroxine 112mcg every day      SYMPTOM MANAGEMENT.  Pain Assessment:  - Libby is experiencing pain due to chronic pain   - Continue suboxone and prn oxycodone.        30 minutes spent on the date of the encounter doing chart review, review of test results, interpretation of tests, patient visit, and documentation       QUAN Turner CNP

## 2023-09-27 NOTE — PROGRESS NOTES
Infusion Nursing Note:  Libby Grimaldo presents today for Cycle 5 Day 1 elotuzumab.    Patient seen by provider today: Yes: Keila Quinones, CNP   present during visit today: Not Applicable.    Note: Libby presents today feeling okay. Generalized body aches, denies need for intervention at today's visit. Offers no concerns since visit with Keila Quinones prior to infusion.    Libby confirms that she has received her decadron and pomalidomide and is taking as prescribed.      Intravenous Access:  Implanted Port.    Treatment Conditions:     Latest Reference Range & Units 09/27/23 09:35   Sodium 135 - 145 mmol/L 138   Potassium 3.4 - 5.3 mmol/L 3.9   Chloride 98 - 107 mmol/L 108 (H)   Carbon Dioxide (CO2) 22 - 29 mmol/L 22   Urea Nitrogen 8.0 - 23.0 mg/dL 10.2   Creatinine 0.51 - 0.95 mg/dL 1.01 (H)   GFR Estimate >60 mL/min/1.73m2 60 (L)   Calcium 8.8 - 10.2 mg/dL 8.5 (L)   Anion Gap 7 - 15 mmol/L 8   Albumin 3.5 - 5.2 g/dL 3.4 (L)   Protein Total 6.4 - 8.3 g/dL 5.6 (L)   Alkaline Phosphatase 35 - 104 U/L 76   ALT 0 - 50 U/L 10   AST 0 - 45 U/L 22   Bilirubin Total <=1.2 mg/dL 0.2   Glucose 70 - 99 mg/dL 93   WBC 4.0 - 11.0 10e3/uL 4.1   Hemoglobin 11.7 - 15.7 g/dL 10.2 (L)   Hematocrit 35.0 - 47.0 % 32.1 (L)   Platelet Count 150 - 450 10e3/uL 173   RBC Count 3.80 - 5.20 10e6/uL 2.97 (L)   MCV 78 - 100 fL 108 (H)   MCH 26.5 - 33.0 pg 34.3 (H)   MCHC 31.5 - 36.5 g/dL 31.8   RDW 10.0 - 15.0 % 14.5   % Neutrophils % 40   % Lymphocytes % 28   % Monocytes % 22   % Eosinophils % 4   % Basophils % 3   Absolute Basophils 0.0 - 0.2 10e3/uL 0.1   Absolute Eosinophils 0.0 - 0.7 10e3/uL 0.2   Absolute Immature Granulocytes <=0.4 10e3/uL 0.1   Absolute Lymphocytes 0.8 - 5.3 10e3/uL 1.2   Absolute Monocytes 0.0 - 1.3 10e3/uL 0.9   % Immature Granulocytes % 3   Absolute Neutrophils 1.6 - 8.3 10e3/uL 1.7   Absolute NRBCs 10e3/uL 0.0   NRBCs per 100 WBC <1 /100 0   RBC Morphology  Confirmed RBC Indices   Platelet Morphology  Automated Count Confirmed. Platelet morphology is normal.  Automated Count Confirmed. Platelet morphology is normal.     Results reviewed, labs MET treatment parameters, ok to proceed with treatment.      Post Infusion Assessment:  Patient tolerated infusion without incident.  Blood return noted pre and post infusion.  Site patent and intact, free from redness, edema or discomfort.  No evidence of extravasations.  Access discontinued per protocol.       Discharge Plan:   Patient declined prescription refills.  Discharge instructions reviewed with: Patient.  Patient and/or family verbalized understanding of discharge instructions and all questions answered.  AVS to patient via [employer].  Patient will return 10/25 for next infusion appointment.   Patient discharged in stable condition accompanied by: .  Departure Mode: Wheelchair.      Ava Zavala RN

## 2023-09-27 NOTE — PATIENT INSTRUCTIONS
[facility] Triage and after hours / weekends / holidays:  [phone]    Please call the triage or after hours line if you experience a temperature greater than or equal to 100.4, shaking chills, have uncontrolled nausea, vomiting and/or diarrhea, dizziness, shortness of breath, chest pain, bleeding, unexplained bruising, or if you have any other new/concerning symptoms, questions or concerns.      If you are having any concerning symptoms or wish to speak to a provider before your next infusion visit, please call your care coordinator or triage to notify them so we can adequately serve you.     If you need a refill on a narcotic prescription or other medication, please call before your infusion appointment.

## 2023-09-27 NOTE — NURSING NOTE
"Oncology Rooming Note    September 27, 2023 10:06 AM   Libby Grimaldo is a 72 year old female who presents for:    Chief Complaint   Patient presents with    Port Draw     Port accessed with 20g flat needle by RN, labs collected, line flushed with saline and heparin.  Vitals taken. Pt checked in for appointment(s).     Oncology Clinic Visit     Multiple Myeloma     Initial Vitals: BP 90/64   Pulse 96   Temp 98.2  F (36.8  C) (Oral)   Resp 16   Wt 51.6 kg (113 lb 12.8 oz)   SpO2 97%   BMI 18.94 kg/m   Estimated body mass index is 18.94 kg/m  as calculated from the following:    Height as of 8/9/23: 1.651 m (5' 5\").    Weight as of this encounter: 51.6 kg (113 lb 12.8 oz). Body surface area is 1.54 meters squared.  Moderate Pain (5) Comment: Data Unavailable   No LMP recorded. Patient is postmenopausal.  Allergies reviewed: Yes  Medications reviewed: Pt and  are unable to review meds    Medications: Medication refills not needed today.  Pharmacy name entered into [employer]:    [id number] - [place], [place] - [address] [facility] [address] [place] [address] [facility] - [place], [place] - [address]  [facility] [place], [place] - [phone]    Clinical concerns: Pt presents today for follow up.       Beverley St LPN  9/27/2023              "

## 2023-10-02 NOTE — TELEPHONE ENCOUNTER
Prior Authorization Not Needed per Insurance    Medication: POMALYST 4 MG PO CAPS  Insurance Company: [employer] - Phone [phone] Fax [phone]  Expected CoPay: $    Pharmacy Filling the Rx: [facility] - [place], [place] - [address]  Pharmacy Notified: no  Patient Notified: no

## 2023-10-09 NOTE — TELEPHONE ENCOUNTER
Oncology History   Multiple myeloma not having achieved remission (H)   11/7/2018 -  Cancer Staged    Staging form: Plasma Cell Myeloma and Disorders, AJCC 8th Edition  - Clinical stage from 11/7/2018: Albumin (g/dL): 3.4, ISS: Stage II, High-risk cytogenetics: Absent, LDH: Unknown     11/7/2018 Initial Diagnosis    Multiple myeloma not having achieved remission (H)     1/23/2019 - 11/14/2019 Chemotherapy    KRD x 6 cycles     11/4/2019 - 2/14/2021 Chemotherapy    Rev/Dex(20)      4/3/2020 - 7/17/2020 Chemotherapy    Velcade maintenance - dose reductions for orthostatic hypotension     7/17/2020 - 12/15/2020 Chemotherapy    Revlimid maintenance     11/23/2020 Progression    Bone marrow 35% plasma cells, PET CT demonstrating new lytic lesions     12/15/2020 - 6/11/2021 Chemotherapy    Elsy, Kd x 6 cycles     7/7/2021 -  Cancer Staged    PET/CT - CR     7/21/2021 -  Chemotherapy    Subcutaneous Daratumumab, IVIG, denosumab monthly     11/22/2021 -  Cancer Staged    Bone marrow aspirate - MRD 0.0022% positive     2/1/2022 - 2/5/2022 Chemotherapy    IP BMT Chemotherapy For Mobilization - High Dose Cyclophosphamide  Plan Provider: Julio C Guillory MD  Treatment goal: Other  Line of treatment: [No plan line of treatment]     5/25/2022 - 11/23/2022 Chemotherapy    IP - OP BMT 2016-35 Auto Multiple Myeloma - Melphalan (CrCL < 30 mL/min, >/= 2 comorbidities, OR age > 75yrs) - Adult (Version: 7/13/21)  Plan Provider: Julio C Guillory MD  Treatment goal: Other  Line of treatment: [No plan line of treatment]     9/29/2022 - 3/9/2023 Supportive Treatment    Oral ONC - ixazomib maintenance post-transplant  Plan Provider: Julio C Guillory MD  Treatment goal: Curative  Line of treatment: [No plan line of treatment]     4/21/2023 -  Supportive Treatment    OP ONC Zoledronic Acid (Zometa) MONTHLY  Plan Provider: Julio C Guillory MD  Treatment goal: Palliative  Line of treatment: Maintenance     4/24/2023 - 9/27/2023 Chemotherapy    OP  ONC Multiple Myeloma - Elotuzumab / Pomalidomide / Dexamethasone (<75 years old)  Plan Provider: Julio C Guillory MD  Treatment goal: Palliative  Line of treatment: Third Line     10/9/2023 -  Chemotherapy    IP - OP ONC Multiple Myeloma - Teclistamab  Plan Provider: Julio C Guillory MD  Treatment goal: Palliative  Line of treatment: [No plan line of treatment]           I called Libby at home to discuss the results of her recent lab testing that demonstrates rising lambda chain consistent with disease progression.  Fortunately, she feels well and feels that she has been tolerating the treatment well.  She is not currently symptomatic, has no pain, and is eating and drinking well.  She has had many therapies in the past and specifically I recommended that we treat her with daclizumab.  This would be a novel medication for her and 1 that I would hope she would respond to.  I discussed how the treatment would necessarily need to be given to her in the in the hospital to start.  Once she reaches steady dosing, then we could translate the treatment to the clinic setting where she would continue to be treated over time.  I reviewed the risk and benefits.  I discussed cytokine release syndrome, inflammatory cell associated neuro toxicity syndrome, cytopenias, and other side effects.  After reviewing the risk and benefits, she wishes to proceed.      JULIO C GUILLORY MD  October 9, 2023

## 2023-10-10 NOTE — PROGRESS NOTES
[facility] Oral Chemotherapy Monitoring Program    Thank you for the opportunity to be a part in the care of this patient's oral chemotherapy. The oncology pharmacy will no longer be following this patient for oral chemotherapy. If there are any questions or the plan changes, feel free to contact us.        5/9/2023     1:00 PM 6/16/2023     9:00 AM 6/20/2023     8:00 AM 7/17/2023     4:00 PM 8/21/2023     2:00 PM 9/12/2023     9:00 AM 10/10/2023     1:00 PM   ORAL CHEMOTHERAPY   Assessment Type Initial Follow up Left Voicemail Refill Refill Refill Refill Discontinuation   Stop Date       10/9/2023   Reason for Discontinuation       Disease progression   Diagnosis Code Multiple Myeloma Multiple Myeloma Multiple Myeloma Multiple Myeloma Multiple Myeloma Multiple Myeloma Multiple Myeloma   Providers Dr. Kahlil Guillory   [facility]   Drug Name Pomalyst (pomalidomide) Pomalyst (pomalidomide) Pomalyst (pomalidomide) Pomalyst (pomalidomide) Pomalyst (pomalidomide) Pomalyst (pomalidomide) Pomalyst (pomalidomide)   Dose 4 mg 4 mg  4 mg 4 mg 4 mg    Current Schedule Daily Daily  Daily Daily Daily    Cycle Details 3 weeks on, 1 week off 3 weeks on, 1 week off  3 weeks on, 1 week off 3 weeks on, 1 week off 3 weeks on, 1 week off    Start Date of Last Cycle 5/1/2023         Planned next cycle start date 5/29/2023 8/22/2023 9/19/2023    Doses missed in last 2 weeks 0         Adherence Assessment Adherent         Adverse Effects No AE identified during assessment         Any new drug interactions? No         Is the dose as ordered appropriate for the patient? Yes             Gilda Hernandez, PharmD, [facility]  Oral Chemotherapy Monitoring Program  [facility]  [phone]

## 2023-10-20 NOTE — PROGRESS NOTES
[facility]  Date of visit: 10/23/2023      Reason for Visit: Follow up for Mutliple Myeloma     Oncology History   Multiple myeloma not having achieved remission (H)   11/7/2018 -  Cancer Staged    Staging form: Plasma Cell Myeloma and Disorders, AJCC 8th Edition  - Clinical stage from 11/7/2018: Albumin (g/dL): 3.4, ISS: Stage II, High-risk cytogenetics: Absent, LDH: Unknown     11/7/2018 Initial Diagnosis    Multiple myeloma not having achieved remission (H)     1/23/2019 - 11/14/2019 Chemotherapy    KRD x 6 cycles     11/4/2019 - 2/14/2021 Chemotherapy    Rev/Dex(20)      4/3/2020 - 7/17/2020 Chemotherapy    Velcade maintenance - dose reductions for orthostatic hypotension     7/17/2020 - 12/15/2020 Chemotherapy    Revlimid maintenance     11/23/2020 Progression    Bone marrow 35% plasma cells, PET CT demonstrating new lytic lesions     12/15/2020 - 6/11/2021 Chemotherapy    Elsy, Kd x 6 cycles     7/7/2021 -  Cancer Staged    PET/CT - CR     7/21/2021 -  Chemotherapy    Subcutaneous Daratumumab, IVIG, denosumab monthly     11/22/2021 -  Cancer Staged    Bone marrow aspirate - MRD 0.0022% positive     2/1/2022 - 2/5/2022 Chemotherapy    IP BMT Chemotherapy For Mobilization - High Dose Cyclophosphamide  Plan Provider: Julio C Guillory MD  Treatment goal: Other  Line of treatment: [No plan line of treatment]     5/25/2022 - 11/23/2022 Chemotherapy    IP - OP BMT 2016-35 Auto Multiple Myeloma - Melphalan (CrCL < 30 mL/min, >/= 2 comorbidities, OR age > 75yrs) - Adult (Version: 7/13/21)  Plan Provider: Julio C Guillory MD  Treatment goal: Other  Line of treatment: [No plan line of treatment]     9/29/2022 - 3/9/2023 Supportive Treatment    Oral ONC - ixazomib maintenance post-transplant  Plan Provider: Julio C Guillory MD  Treatment goal: Curative  Line of treatment: [No plan line of treatment]     4/21/2023 -  Supportive Treatment    OP ONC Zoledronic Acid (Zometa) MONTHLY  Plan  "Provider: Julio C Guillory MD  Treatment goal: Palliative  Line of treatment: Maintenance     4/24/2023 - 9/27/2023 Chemotherapy    OP ONC Multiple Myeloma - Elotuzumab / Pomalidomide / Dexamethasone (<75 years old)  Plan Provider: Julio C Guillory MD  Treatment goal: Palliative  Line of treatment: Third Line     10/9/2023 -  Chemotherapy    IP - OP ONC Multiple Myeloma - Teclistamab  Plan Provider: Julio C Guillory MD  Treatment goal: Palliative  Line of treatment: [No plan line of treatment]       Interval History:  Libby presents to clinic for toxicity check prior to admission for C1D1 Teclistamab.  She is overall doing fair.  She continues with low energy levels and overall weakness.  She has become \"homebound\" due to not feeling comfortable or strong enough to go out and do things.  When walking into clinic today she became short of breath and worried she \"wasn't going to make it.\"  Previously when she had pneumonia she had pain in the R diaphragm/rib area which she has developed again. She is also having increased dall     Increased falls due to balance issues.  She is unsure if this is due to neuropathy and her feet feeling heavy or deconditioning.  She continues with intermittent dizziness especially when changing positions.  She reports she is eating 3 small meals per day and drinking well.  Denies persistent HA or vision changes.     She has also developed bilateral hand tremors with movements.  At rest she does not have tremors but when holding her hands out or writing she had significant hand tremors that impact her writing.      She continues with drenching night sweats about once a week.     Denies fevers/chills, incontinence, urinary changes, cough, dental pain, runny nose, sore throat, rashes/sores, new pains.       PHYSICAL EXAM:   Vitals:  Blood pressure (!) 166/85, pulse 116, temperature 98.8  F (37.1  C), temperature source Oral, resp. rate 16, weight 50.8 kg (112 lb 1.6 oz), SpO2 96%.      Wt " Readings from Last 4 Encounters:   10/23/23 50.8 kg (112 lb 1.6 oz)   09/27/23 51.6 kg (113 lb 12.8 oz)   08/23/23 48.9 kg (107 lb 14.4 oz)   08/21/23 49.3 kg (108 lb 11.2 oz)     General: No acute distress    HEENT: Sclera anicteric. Oral exam deferred  Lymph: No lymphadenopathy in the  cervical and supraclavicular areas   Heart: Regular, rate, and rhythm  Lungs: Clear to ascultation bilaterally  Abdomen: Positive bowel sounds.   Extremities: no lower extremity edema  Neuro: Cranial nerves grossly intact  Rash: none on exposed skin    Labs:  Most Recent 3 CBC's:  Recent Labs   Lab Test 10/23/23  1025 09/27/23  0935 08/21/23  1236 08/11/23  0458   WBC 1.9* 4.1 2.9* 2.4*   HGB 9.6* 10.2* 9.8* 7.7*   * 108* 110* 109*    173 173 92*   ANEUTAUTO  --  1.7 0.5* 1.1*     Most Recent 3 BMP's:  Recent Labs   Lab Test 10/23/23  1025 09/27/23  0935 08/21/23  1236    138 141   POTASSIUM 3.9 3.9 3.3*   CHLORIDE 106 108* 107   CO2 27 22 26   BUN 10.5 10.2 10.5   CR 0.93 1.01* 1.02*   ANIONGAP 7 8 8   DIANDRA 9.4 8.5* 8.8   * 93 139*   PROTTOTAL 5.8* 5.6* 5.6*   ALBUMIN 3.2* 3.4* 3.4*    Most Recent 3 LFT's:  Recent Labs   Lab Test 10/23/23  1025 09/27/23  0935 08/21/23  1236   AST 20 22 21   ALT 8 10 5   ALKPHOS 88 76 84   BILITOTAL 0.3 0.2 0.3    Most Recent 2 TSH and T4:  Recent Labs   Lab Test 08/08/23  1309   TSH 11.30*   T4 1.06     I reviewed the above labs today.     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      Myeloma/BMT/IEC PROTOCOL for 2016-35  - Chemo protocol: HD Melphalan 140mg/m2  Day 0: Cell dose: 3.94x10^6 (s/p cytoxan chemo priming 2/1/2022 and subsequent stem cell collection).   - Restaging plan: per protocol.  - Maintenance Ninlaro started early Oct 2022; Day 28 (11/4) and stopped in January 2023  - Elotuzumab, pomalidomide, dexamethasone initiated April 2023  - Most recent dose (C3D1) 6/26.   - Was planned for C4 on 7/26, however dose  was not given for unclear reasons.  - Libby presented to clinic for C4 8/8 to re-establish treatment schedule and treatment was held and patient was sent to the ED due to rigors, difficulty answering questions/confusion, and overall unwell appearance.  Pomolidomide was held at the time of discharge  - 8/21 Discussed rising FLC and neutropenia with Dr. Guillory- planned to set her up with Elotuzumab ASAP and get her back on treatment.  Received C4 Elotuzumab 8/23   - Was due for C5 9/20 but due to a fall she cancelled appt and was seen in urgent care  - Received C5 Elotuzumab 9/27/23.  Due to rising lambda chain consistent with disease progression we recommended that we treat her with daclizumab.  This would be a novel medication for her and one we would hope she would respond to.  Treatment will need to be started inpatient and once she reaches steady dosing, then we could translate the treatment to the clinic setting where she would continue to be treated over time.  Toxicities include cytokine release syndrome, inflammatory cell associated neuro toxicity syndrome, cytopenias, and other side effects.  We discussed potential side effects during today's visit and reviewed labs.   - Proceed with admit for C1D1 Teclistamab tomorrow (10/24) with ANC 0.3 per Dr. Guillory.  Received Zarxio in clinic today (10/23)      Ppx:  - PTA Acyclovir 400 mg BID  - PJP ppx bactrim BID on Monday/Tuesdays  - Levofloxacin 250 mg PO- if ANC <1000  - Antifungal ppx, held d/t interaction with Methadone    Pancytopenia   - Transfuse to maintain Hgb >7, Plt >10K    Neutropenia:  Patient is aware if she develops a fever >/= 100.4 F she needs to be seen in the ED  - If continues with cytopenias could consider a bone marrow   - 10/23 Zarxio administered    Sepsis- resolved, 2/2 to presumed pulmonary infection   Atypical pneumonia- resolved  Underlying COPD  Presented to heme/onc clinic 8/8 to reestablish for treatment schedule, noted to be  "feeling poorly with the \"flu \"x 1 week.  She was referred to the emergency department for further evaluation given ongoing rigors, difficulty answering questions/confusion, and overall unwell appearance after 30 minutes of monitoring.  High risk for infection given immunosuppressed state with multiple myeloma and palliative chemotherapy.  History of recurrent UTIs and repeat pneumonia.  Per report presented with diarrhea/suprapubic tenderness, productive cough.  Found to be febrile to 102, elevated lactate (2.3), and elevated procalcitonin (1.27) without clear source of infection.  Chest x-ray noting increased upper and lower interstitial reticular/nodular patterns raising concern for atypical infection.  Additional infectious workup including RSV, influenza A/B, COVID unremarkable  - Started on broad-spectrum antibiotics  - Zosyn 8/8-8/10  - Vancomycin 8/8 - 8/9  - Transitioned to oral levofloxacin 750 mg q48h (renally dosed) 8/10/2023 - plan for total of 7 day abx course (thru ~8/16)  - BCx 8/8 NGTD  - UCx 8/8 with < 10,000 CFU/mL urogenital cricket  - RVP, legionella Ag, MRSA, strep pneumo Ag negative  - Repeat chest xray today (10/23) due to increased SOB and right rib/diaphragm pain.  Chest xray demonstrated no evidence of infection    Neuropathy  Patient reports increased falls due to feeling her feet are \"heavy\" and feel like there is something on her feet.    - Script sent for Gabapentin 100 mg BID  - Script sent for wheeled walker  - Referral place for PT to improve strength to reduce falls      CARDIOVASCULAR  - Risk of cardiomyopathy:  Baseline EF 56%  - known hyperlipidemia- per notes previously on Crestor but not on med list - follow-up with PCP if needed  - History of Afib - now in NSR but tachy. Continue metoprolol XL 25 mg/d        COPD  COPD with 40pyr, quit 2010. Previously documented to have idiopathic pulmonary fibrosis, however most recent PFTs (1/26/22) were normal. Has previously required " treatment for COPD exacerbation with pneumonia. Some expiratory wheezing on exam improved with Duonebs; continue home inhaler regimen.  - PTA Trelegy Ellipta inhaler qd  - PTA Albuterol BID PRN       Hypothyroidism  History of Graves Disease  History of hypothyroidism 2/2 treatment for Graves Disease (patient reports surgery in [place], however not thyroidectomy). Stable dose of levothyroxine over the past four years. Upon admission, elevated TSH (11.3) with normal T4 (1.07) consistent with subclinical hypothyroidism. Following recovery, recommend outpatient follow-up with PCP to retest TSH/T4, consider dose adjustment.  - PTA Levothyroxine 112mcg every day       HEME/COAG  - Relevant thrombosis or bleeding history:   2/15/22: new non-occlusive LUE DVT. No longer on xarelto. On ASA.     Generalized Anxiety Disorder  - PTA Venlafaxine 75mg TID     Pain Assessment:  - Continue suboxone and prn oxycodone.      Opiate dependence  - Methadone 10mg TID    44 minutes spent on the date of the encounter doing chart review, review of test results, interpretation of tests, patient visit, and documentation       QUAN Turner CNP

## 2023-10-23 PROBLEM — Z94.84 HISTORY OF STEM CELL TRANSPLANT (H): Status: ACTIVE | Noted: 2023-01-01

## 2023-10-23 NOTE — NURSING NOTE
Patient was given Zarxio 300 mcg in the Right Lower Abdomen without complication. Patient tolerated well and was discharged. See MAR for details.    Beverley St LPN   on 10/23/2023 at 12:55 PM

## 2023-10-23 NOTE — LETTER
10/23/2023         RE: Libby Grimaldo  [address] [address]        Dear Colleague,    Thank you for referring your patient, Libby Grimlado, to the [facility]. Please see a copy of my visit note below.    [facility]  Date of visit: 10/23/2023      Reason for Visit: Follow up for Mutliple Myeloma     Oncology History   Multiple myeloma not having achieved remission (H)   11/7/2018 -  Cancer Staged    Staging form: Plasma Cell Myeloma and Disorders, AJCC 8th Edition  - Clinical stage from 11/7/2018: Albumin (g/dL): 3.4, ISS: Stage II, High-risk cytogenetics: Absent, LDH: Unknown     11/7/2018 Initial Diagnosis    Multiple myeloma not having achieved remission (H)     1/23/2019 - 11/14/2019 Chemotherapy    KRD x 6 cycles     11/4/2019 - 2/14/2021 Chemotherapy    Rev/Dex(20)      4/3/2020 - 7/17/2020 Chemotherapy    Velcade maintenance - dose reductions for orthostatic hypotension     7/17/2020 - 12/15/2020 Chemotherapy    Revlimid maintenance     11/23/2020 Progression    Bone marrow 35% plasma cells, PET CT demonstrating new lytic lesions     12/15/2020 - 6/11/2021 Chemotherapy    Elsy, Kd x 6 cycles     7/7/2021 -  Cancer Staged    PET/CT - CR     7/21/2021 -  Chemotherapy    Subcutaneous Daratumumab, IVIG, denosumab monthly     11/22/2021 -  Cancer Staged    Bone marrow aspirate - MRD 0.0022% positive     2/1/2022 - 2/5/2022 Chemotherapy    IP BMT Chemotherapy For Mobilization - High Dose Cyclophosphamide  Plan Provider: Julio C Guillory MD  Treatment goal: Other  Line of treatment: [No plan line of treatment]     5/25/2022 - 11/23/2022 Chemotherapy    IP - OP BMT 2016-35 Auto Multiple Myeloma - Melphalan (CrCL < 30 mL/min, >/= 2 comorbidities, OR age > 75yrs) - Adult (Version: 7/13/21)  Plan Provider: Julio C Guillory MD  Treatment goal: Other  Line of treatment: [No plan line of treatment]     9/29/2022 - 3/9/2023 Supportive  "Treatment    Oral ONC - ixazomib maintenance post-transplant  Plan Provider: Julio C Guillory MD  Treatment goal: Curative  Line of treatment: [No plan line of treatment]     4/21/2023 -  Supportive Treatment    OP ONC Zoledronic Acid (Zometa) MONTHLY  Plan Provider: Julio C Guillory MD  Treatment goal: Palliative  Line of treatment: Maintenance     4/24/2023 - 9/27/2023 Chemotherapy    OP ONC Multiple Myeloma - Elotuzumab / Pomalidomide / Dexamethasone (<75 years old)  Plan Provider: Julio C Guillory MD  Treatment goal: Palliative  Line of treatment: Third Line     10/9/2023 -  Chemotherapy    IP - OP ONC Multiple Myeloma - Teclistamab  Plan Provider: Julio C Guillory MD  Treatment goal: Palliative  Line of treatment: [No plan line of treatment]       Interval History:  Libby presents to clinic for toxicity check prior to admission for C1D1 Teclistamab.  She is overall doing fair.  She continues with low energy levels and overall weakness.  She has become \"homebound\" due to not feeling comfortable or strong enough to go out and do things.  When walking into clinic today she became short of breath and worried she \"wasn't going to make it.\"  Previously when she had pneumonia she had pain in the R diaphragm/rib area which she has developed again. She is also having increased dall     Increased falls due to balance issues.  She is unsure if this is due to neuropathy and her feet feeling heavy or deconditioning.  She continues with intermittent dizziness especially when changing positions.  She reports she is eating 3 small meals per day and drinking well.  Denies persistent HA or vision changes.     She has also developed bilateral hand tremors with movements.  At rest she does not have tremors but when holding her hands out or writing she had significant hand tremors that impact her writing.      She continues with drenching night sweats about once a week.     Denies fevers/chills, incontinence, urinary changes, cough, " dental pain, runny nose, sore throat, rashes/sores, new pains.       PHYSICAL EXAM:   Vitals:  Blood pressure (!) 166/85, pulse 116, temperature 98.8  F (37.1  C), temperature source Oral, resp. rate 16, weight 50.8 kg (112 lb 1.6 oz), SpO2 96%.      Wt Readings from Last 4 Encounters:   10/23/23 50.8 kg (112 lb 1.6 oz)   09/27/23 51.6 kg (113 lb 12.8 oz)   08/23/23 48.9 kg (107 lb 14.4 oz)   08/21/23 49.3 kg (108 lb 11.2 oz)     General: No acute distress    HEENT: Sclera anicteric. Oral exam deferred  Lymph: No lymphadenopathy in the  cervical and supraclavicular areas   Heart: Regular, rate, and rhythm  Lungs: Clear to ascultation bilaterally  Abdomen: Positive bowel sounds.   Extremities: no lower extremity edema  Neuro: Cranial nerves grossly intact  Rash: none on exposed skin    Labs:  Most Recent 3 CBC's:  Recent Labs   Lab Test 10/23/23  1025 09/27/23  0935 08/21/23  1236 08/11/23  0458   WBC 1.9* 4.1 2.9* 2.4*   HGB 9.6* 10.2* 9.8* 7.7*   * 108* 110* 109*    173 173 92*   ANEUTAUTO  --  1.7 0.5* 1.1*     Most Recent 3 BMP's:  Recent Labs   Lab Test 10/23/23  1025 09/27/23  0935 08/21/23  1236    138 141   POTASSIUM 3.9 3.9 3.3*   CHLORIDE 106 108* 107   CO2 27 22 26   BUN 10.5 10.2 10.5   CR 0.93 1.01* 1.02*   ANIONGAP 7 8 8   DIANDRA 9.4 8.5* 8.8   * 93 139*   PROTTOTAL 5.8* 5.6* 5.6*   ALBUMIN 3.2* 3.4* 3.4*    Most Recent 3 LFT's:  Recent Labs   Lab Test 10/23/23  1025 09/27/23  0935 08/21/23  1236   AST 20 22 21   ALT 8 10 5   ALKPHOS 88 76 84   BILITOTAL 0.3 0.2 0.3    Most Recent 2 TSH and T4:  Recent Labs   Lab Test 08/08/23  1309   TSH 11.30*   T4 1.06     I reviewed the above labs today.     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      Myeloma/BMT/IEC PROTOCOL for 2016-35  - Chemo protocol: HD Melphalan 140mg/m2  Day 0: Cell dose: 3.94x10^6 (s/p cytoxan chemo priming 2/1/2022 and subsequent stem cell collection).   - Restaging  plan: per protocol.  - Maintenance Ninlaro started early Oct 2022; Day 28 (11/4) and stopped in January 2023  - Elotuzumab, pomalidomide, dexamethasone initiated April 2023  - Most recent dose (C3D1) 6/26.   - Was planned for C4 on 7/26, however dose was not given for unclear reasons.  - Libby presented to clinic for C4 8/8 to re-establish treatment schedule and treatment was held and patient was sent to the ED due to rigors, difficulty answering questions/confusion, and overall unwell appearance.  Pomolidomide was held at the time of discharge  - 8/21 Discussed rising FLC and neutropenia with Dr. Guillory- planned to set her up with Elotuzumab ASAP and get her back on treatment.  Received C4 Elotuzumab 8/23   - Was due for C5 9/20 but due to a fall she cancelled appt and was seen in urgent care  - Received C5 Elotuzumab 9/27/23.  Due to rising lambda chain consistent with disease progression we recommended that we treat her with daclizumab.  This would be a novel medication for her and one we would hope she would respond to.  Treatment will need to be started inpatient and once she reaches steady dosing, then we could translate the treatment to the clinic setting where she would continue to be treated over time.  Toxicities include cytokine release syndrome, inflammatory cell associated neuro toxicity syndrome, cytopenias, and other side effects.  We discussed potential side effects during today's visit and reviewed labs.   - Proceed with admit for C1D1 Teclistamab tomorrow (10/24) with ANC 0.3 per Dr. Guillory.  Received Zarxio in clinic today (10/23)      Ppx:  - PTA Acyclovir 400 mg BID  - PJP ppx bactrim BID on Monday/Tuesdays  - Levofloxacin 250 mg PO- if ANC <1000  - Antifungal ppx, held d/t interaction with Methadone    Pancytopenia   - Transfuse to maintain Hgb >7, Plt >10K    Neutropenia:  Patient is aware if she develops a fever >/= 100.4 F she needs to be seen in the ED  - If continues with cytopenias  "could consider a bone marrow   - 10/23 Zarxio administered    Sepsis- resolved, 2/2 to presumed pulmonary infection   Atypical pneumonia- resolved  Underlying COPD  Presented to heme/onc clinic 8/8 to reestablish for treatment schedule, noted to be feeling poorly with the \"flu \"x 1 week.  She was referred to the emergency department for further evaluation given ongoing rigors, difficulty answering questions/confusion, and overall unwell appearance after 30 minutes of monitoring.  High risk for infection given immunosuppressed state with multiple myeloma and palliative chemotherapy.  History of recurrent UTIs and repeat pneumonia.  Per report presented with diarrhea/suprapubic tenderness, productive cough.  Found to be febrile to 102, elevated lactate (2.3), and elevated procalcitonin (1.27) without clear source of infection.  Chest x-ray noting increased upper and lower interstitial reticular/nodular patterns raising concern for atypical infection.  Additional infectious workup including RSV, influenza A/B, COVID unremarkable  - Started on broad-spectrum antibiotics  - Zosyn 8/8-8/10  - Vancomycin 8/8 - 8/9  - Transitioned to oral levofloxacin 750 mg q48h (renally dosed) 8/10/2023 - plan for total of 7 day abx course (thru ~8/16)  - BCx 8/8 NGTD  - UCx 8/8 with < 10,000 CFU/mL urogenital cricket  - RVP, legionella Ag, MRSA, strep pneumo Ag negative  - Repeat chest xray today (10/23) due to increased SOB and right rib/diaphragm pain.  Chest xray demonstrated no evidence of infection    Neuropathy  Patient reports increased falls due to feeling her feet are \"heavy\" and feel like there is something on her feet.    - Script sent for Gabapentin 100 mg BID  - Script sent for wheeled walker  - Referral place for PT to improve strength to reduce falls      CARDIOVASCULAR  - Risk of cardiomyopathy:  Baseline EF 56%  - known hyperlipidemia- per notes previously on Crestor but not on med list - follow-up with PCP if needed  - " History of Afib - now in NSR but tachy. Continue metoprolol XL 25 mg/d        COPD  COPD with 40pyr, quit 2010. Previously documented to have idiopathic pulmonary fibrosis, however most recent PFTs (1/26/22) were normal. Has previously required treatment for COPD exacerbation with pneumonia. Some expiratory wheezing on exam improved with Duonebs; continue home inhaler regimen.  - PTA Trelegy Ellipta inhaler qd  - PTA Albuterol BID PRN     Hypothyroidism  History of Graves Disease  History of hypothyroidism 2/2 treatment for Graves Disease (patient reports surgery in [place], however not thyroidectomy). Stable dose of levothyroxine over the past four years. Upon admission, elevated TSH (11.3) with normal T4 (1.07) consistent with subclinical hypothyroidism. Following recovery, recommend outpatient follow-up with PCP to retest TSH/T4, consider dose adjustment.  - PTA Levothyroxine 112mcg every day     HEME/COAG  - Relevant thrombosis or bleeding history:   2/15/22: new non-occlusive LUE DVT. No longer on xarelto. On ASA.     Generalized Anxiety Disorder  - PTA Venlafaxine 75mg TID     Pain Assessment:  - Continue suboxone and prn oxycodone.      Opiate dependence  - Methadone 10mg TID    44 minutes spent on the date of the encounter doing chart review, review of test results, interpretation of tests, patient visit, and documentation     QUAN Turner CNP

## 2023-10-23 NOTE — NURSING NOTE
"Chief Complaint   Patient presents with    Oncology Clinic Visit     Multiple myeloma not having achieved remission    Port Draw     Labs drawn via port by RN. Port accessed with 20g 3/4\" gripper needle. Vitals taken. Flushed with saline and heparin. Pt tolerated well. Patient checked into next appointment.       /85 notified clinic staff to recheck.     Jeni Lund RN    "

## 2023-10-23 NOTE — NURSING NOTE
"Oncology Rooming Note    October 23, 2023 10:42 AM   Libby Grimaldo is a 73 year old female who presents for:    Chief Complaint   Patient presents with    Oncology Clinic Visit     Multiple myeloma not having achieved remission    Port Draw     Labs drawn via port by RN. Port accessed with 20g 3/4\" gripper needle. Vitals taken. Flushed with saline and heparin. Pt tolerated well. Patient checked into next appointment.       Initial Vitals: BP (!) 166/85 (BP Location: Right arm, Patient Position: Sitting, Cuff Size: Adult Regular)   Pulse 116   Temp 98.8  F (37.1  C) (Oral)   Resp 16   Wt 50.8 kg (112 lb 1.6 oz)   SpO2 96%   BMI 18.65 kg/m   Estimated body mass index is 18.65 kg/m  as calculated from the following:    Height as of 8/9/23: 1.651 m (5' 5\").    Weight as of this encounter: 50.8 kg (112 lb 1.6 oz). Body surface area is 1.53 meters squared.  Moderate Pain (5) Comment: Data Unavailable   No LMP recorded. Patient is postmenopausal.  Allergies reviewed: No  Medications reviewed: No    Medications: Medication refills not needed today.  Pharmacy name entered into [employer]:    [id number] - [facility] [address] AT [facility] [address]  [facility] - [place], [place] - [address]  [facility] [place], [place] - [address] [facility] [phone]    Clinical concerns: Provider came in during rooming process. Unable to complete charting/rooming process with the patient.       Ana Chang"

## 2023-10-24 PROBLEM — C90.00 MULTIPLE MYELOMA WITHOUT REMISSION (H): Status: ACTIVE | Noted: 2023-01-01

## 2023-10-24 NOTE — PLAN OF CARE
"Admitted at noon for Teclistimab administration (D1).  Vitals: Slightly hypertensive (SBP 140s), below notifying parameters. Other VSS on room air.   Neuro: A&Ox4. History of falls with the last 6 months, bed alarm on when spouse leaves.  Pain/Nausea: Back and hip pain managed with PRN Oxycodone and scheduled methadone. No nausea.  Activity: SBA  Skin: WDL. Small bruise on right ankle from previous fall.  GI/: WDL  Diet: Regular, good appetite  IV: Port HL   Labs: Magnesium, Phosphorus, and Potassium rechecks tomorrow a.m. Hgb 9.1, Platelets 130k. ANC 0.3 - Neutropenic precautions  New changes this shift: Teclistimab given at 1730, continuous monitoring for 1 hour after - no s/s of reaction.  Plan: Teclistimab on days 4 and 7, continue POC    Admitted/transferred from: Home  2 RN full   skin assessment completed by Estefanía Novak RN and Bhumi SOUZA RN.  Skin assessment finding: issues found : Small bruise on right ankle    Interventions/actions: other : None      Will continue to monitor.                                                      Teclistamab Assessment  Patient is Alert & Oriented x4. There are signs of headache, muscle spasms or rigidity, or vision changes. Patient admitted with headache and has baseline tremors - will continue to monitor.    ICANS score     Score:   4   Orientation: Orientation to year, month, city, hospital: 4 points (1 point each)   3   Identify: Name 3 objects that nurse points to (e.g. clock, pen, button): 3 points (1 point each)   1   Following Commands: (e.g. show me two fingers or close your eyes and stick out your tongue): 1 point   1   Writing: Ability to write a standard sentence (see writing page \"The nichols jumped over the log.\"): 1 point   1   Attention: Count backwards from 100 by 10s: 1 point     10   Total: Max 10, no impairment, reassess next shift                   **9 or below Notify MD and reassess in 4 hours       Cytokine Release Syndrome Assessment    Cytokine " Release Syndrome: a potentially life threatening toxicity after receiving immune based therapy for cancer treatment. Severity varies from grade 1 to grade 4.      There are NO current signs of CRS (pyrexia/fever, headache, nausea, hypotension, dyspnea, anxiety, increased liver enzymes, increased bilirubin).    **Will continue to monitor for s/sx of neurotoxicity and CRS.  If these occur, please call the Attending MD for recommendations and further instruction. CRS and neurotoxicity adverse reaction protocols/grading per package insert guidelines.

## 2023-10-24 NOTE — H&P
[facility]    History & Physical  Hematology / Oncology     Date of Admission:  10/24/2023  Date of Service (when I saw the patient):  10/24/2023    Assessment & Plan   Libby Grimaldo is a 73 year old female with a medical history of high risk IgG Lambda multiple myeloma not achieving remission s/p Auto-PBSCT in 2022 (on palliative Erlo/Pom/Dex), Grave's disease, ILD/COPD, HTN, and HLD. Her last therapy was Elotuzumab where she received 5 cycles, but due to rising lambda chains that were consistent with progression, Teclistamab recommended. She is being admitted for cycle 1 day 1 of Teclistamab. She received Zarxio in clinic yesterday (10/24).     HEME  #IgG Lambda Multiple Myeloma, High-risk. S/p APBST 2022  Follows with Dr. Guillory.  Initially diagnosed in 11/2018, completed KRD x 6 cycles, rev/Dex (20), Velcade maintenance, revlimid maintenance, with evidence of disease BmBx 11/2020 (35% plasma cells) and PET CT with new lytic lesions. Then treated with kameron, Kd x 6 cycles, subcutaneous kameron/IVIG/denosumab monthly. S/p autologous peripheral blood stem cell transplant in 2022. Followed by melphalan, ixazomib, and then started on palliative elotuzumab, pomolidomide, and dexamethasone chemotherapy 4/2023 with most recent dose (C3D1) 6/26. She presented to heme/onc clinic 8/8 to re-establish treatment schedule and sent to ED and admitted for sepsis, confusion, and weakness. She was started on Zosyn and vancomycin with improvement in symptoms, and transitioned to oral Levaquin 8/10/23. She was discharged with outpatient PT. C5 was schedukled for 9/20 but patient cancelled appt due to a fall; started C5D1 9/27. Due to rising lambda chains that were consistent with progression, Teclistamab recommended. Admitted for C1D1 of Teclistamab 10/24. Received Zarxio in clinic 10/23.     Teclistimab guidelines:    - Dosing as per treatment plan below.   - Pre-medications with zofran 8  mg, dexamethasone 16 mg, benadryl 50 mg and APAP 650 mg    - Nursing monitoring:     - Vitals q1h before and after administration and q4h thereafter    - ICANS monitoring qshift (order on admission)   - Start Allopurinol for TLS ppx.    - Start  mg BID. Additional antimicrobial ppx if neutropenic.    - CRS and neurotoxicity adverse reaction protocols/grading per package insert guidelines.    - In summary for CRS, for grades 1 (fever alone) and 2 (fever + hypotension responsive to fluids and/or low-flow O2 requirement) pursue supportive management, infectious workup and hold subsequent dose until resolution. With grade 3 (single pressor requirement and/or high-flow O2) or grade 4 (multiple pressors and/or biPAP/intubation), will consider tocilizumab after discussion with on-call staff.    - In summary for neurotoxicity, grade 1 (ICANS 7-9), hold subsequent dose until resolution and consider keppra ppx/further intervention after discussion with staff on-call. For grade 2 (ICANS 3-6), start dexamethasone 10 mg q6h and discuss with on-call staff.                  Treatment Plan: Teclistimab (C1D1 = 10/24/2023)               - Teclistimab 6.2 mg (0.06 mg/kg) subcutaneous injection - D1               - Teclistimab 31 mg (0.3 mg/kg) subcutaneous injection  - D4               - Teclistimab 154 mg (1.5 mg/kg) subcutaneous injection - D7               - Pre-meds (D1, 4, 7): Zofran 8 mg, dex 16 mg, benadryl 50 mg, APAP 650 mg     Ppx:  - PTA Acyclovir 400 mg BID  - PJP ppx bactrim BID on Monday/Tuesdays  - Levofloxacin 250 mg PO- if ANC <1000  - Antifungal ppx, held d/t interaction with Methadone, Micafungin while admitted     #Pancytopenia   - Transfuse to maintain Hgb >7, Plt >10K  - Blood consent signed and in patient chart.     #Neutropenia:  Patient is aware if she develops a fever >/= 100.4 F she needs to be seen in the ED  - If continues with cytopenias could consider a bone marrow   - 10/23 Zarxio  "administered    #Relevant thrombosis or bleeding history     2/15/22: new non-occlusive LUE DVT. No longer on xarelto. On ASA.      NEURO  #Neuropathy  Patient reports increased falls due to feeling her feet are \"heavy\" and feel like there is something on her feet.    - PTA Gabapentin 100 mg BID     CARDIOVASCULAR  #Risk of cardiomyopathy    Baseline EF 56%    #Hyperlipidemia   Previously on Crestor but not on med list, patient confirmed - follow-up with PCP if needed    #History of Afib   Now in NSR but tachy. PTA metoprolol XL 25 mg/d         PULM  #COPD  COPD with 40pyr, quit 2010. Previously documented to have idiopathic pulmonary fibrosis, however most recent PFTs (1/26/22) were normal. Has previously required treatment for COPD exacerbation with pneumonia. Some expiratory wheezing on exam improved with Duonebs; continue home inhaler regimen.  - PTA Trelegy Ellipta inhaler qd  - PTA Albuterol BID PRN     ENDO  #Hypothyroidism  #History of Graves Disease  History of hypothyroidism 2/2 treatment for Graves Disease (patient reports surgery in [place], however not thyroidectomy). Stable dose of levothyroxine over the past four years. Upon admission, elevated TSH (11.3) with normal T4 (1.07) consistent with subclinical hypothyroidism. Following recovery, recommend outpatient follow-up with PCP to retest TSH/T4, consider dose adjustment.  - PTA Levothyroxine 112mcg every day     MISC.  #Generalized Anxiety Disorder  - PTA Venlafaxine 75mg TID      #Pain Assessment  - PTA prn oxycodone.      #Opiate dependence  - PTA Methadone 10mg TID    FEN:  -Lyte replacement per protocol  - Encourage oral fluids as tolerated  -Regular diet as tolerated    Prophy/Misc:  - GI: none indicted at this time.   - Bowels: Senna and Miralax PRN  -DVT: Enoxaparin 40 mg every day. Hold if plts < 50k    Clinically Significant Risk Factors Present on Admission                                     Disposition: Admit to hospital for High risk IgG " Lambda Multiple Myeloma. Discharge pending completion of chemotherapy and clinical course.    I spent >45 minutes face-to-face and/or coordinating or discussing care plan. Over 50% of our time on the unit was spent counseling the patient and/or coordinating care    Patient is seen and examined in conjunction with Dr. Dhaliwal and SYDNIE.  Assessment and plan are discussed and delivered to the patient.    Carline Gonzáles PA-C  Hematology/Oncology  Pager: [phone]    Code Status : Full Code    Primary Care Physician   Leah Gustafson    History of Present Illness   History obtained from chart and discussed with the patient.    Libby Grimaldo is a 73 year old female with a medical history of high risk IgG Lambda multiple myeloma not achieving remission s/p Auto-PBSCT in 2022 (on palliative Erlo/Pom/Dex), Grave's disease, ILD/COPD, HTN, and HLD. She follows with Dr. Guillory.  Initially diagnosed in 11/2018. Evidence of disease BmBx 11/2020 (35% plasma cells) and PET CT with new lytic lesions. S/p autologous peripheral blood stem cell transplant in 2022. She most recently completed 5 cycles of elotuzumab, pomolidomide, and dexamethasone. Due to rising lambda chains that were consistent with progression, Teclistamab recommended. Admitted for C1D1 of Teclistamab 10/24. Received Zarxio in clinic 10/23.     She is sitting comfortably in her bed and is in a pleasant mood. Her  is sitting in a chair next to her. She was admitted in 8/8 for sepsis and weakness. Before her admission she mentions that she has noticed a progressive decrease in her balance and has had an increase in falls. She discussed this yesterday at her outpatient oncology clinic and was prescribed a walker. She mentions that she has tried physical therapy after her discharge in August, but not consistently. She agrees to work with them during her stay here. Denies fever, chills, mouth sores, SOB, cough, abdominal pain, diarrhea, constipation, nausea,  vomiting, dysuria, hematuria, numbness, tingling, swelling. All questions and concerns of patient and family were addressed at bedside.     A comprehensive review of systems was preformed and is otherwise negative unless mentioned above in the HPI.     Past Medical History    Past Medical History:   Diagnosis Date    Cervical radiculopathy     COPD (chronic obstructive pulmonary disease) (H)     Double vision     Febrile seizure (H)     Per patient. Once while a toddler, once while in highschool, and once while in college.    Floaters     Graves disease     HLD (hyperlipidemia)     HTN (hypertension)     IPF (idiopathic pulmonary fibrosis) (H)     Lumbar radiculopathy     Multiple myeloma in relapse (H)     Osteoporosis     Pneumonia     Per patient. Complicated by sepsis.    Recurrent UTI     Per patient. Has required prophylactic antibiotcs.    Vitamin B12 deficiency (non anemic)        Past Surgical History   Past Surgical History:   Procedure Laterality Date    BONE MARROW BIOPSY, BONE SPECIMEN, NEEDLE/TROCAR Right 01/24/2022    Procedure: BIOPSY, BONE MARROW;  Surgeon: Yuniel Tineo;  Location: [facility] OR    BONE MARROW BIOPSY, BONE SPECIMEN, NEEDLE/TROCAR Right 9/1/2022    Procedure: BIOPSY, BONE MARROW;  Surgeon: Juju Oates PA-C;  Location: [facility] OR    CERVICAL FUSION      CHOLECYSTECTOMY      CYSTECTOMY OVARIAN BENIGN      EYE SURGERY      Per patient.    IR CVC TUNNEL PLACEMENT > 5 YRS OF AGE  02/01/2022    IR CVC TUNNEL REMOVAL LEFT  02/22/2022    PICC DOUBLE LUMEN PLACEMENT Left 05/25/2022    Left basilic, 44 cm, 2 cm external length    TONSILLECTOMY      WRIST SURGERY Left        Prior to Admission Medications   Cannot display prior to admission medications because the patient has not been admitted in this contact.     Allergies   Allergies   Allergen Reactions    Bupropion      Other reaction(s): Seizures       Social History   Social History     Socioeconomic History    Marital status:       "Spouse name: Not on file    Number of children: Not on file    Years of education: Not on file    Highest education level: Not on file   Occupational History    Not on file   Tobacco Use    Smoking status: Former     Packs/day: 1.00     Years: 40.00     Additional pack years: 0.00     Total pack years: 40.00     Types: Cigarettes     Passive exposure: Past    Smokeless tobacco: Former     Quit date: 2010   Substance and Sexual Activity    Alcohol use: Not Currently    Drug use: Not Currently     Types: Marijuana    Sexual activity: Not on file   Other Topics Concern    Not on file   Social History Narrative    Not on file     Social Determinants of Health     Financial Resource Strain: Not on file   Food Insecurity: Not on file   Transportation Needs: Not on file   Physical Activity: Not on file   Stress: Not on file   Social Connections: Not on file   Interpersonal Safety: Not At Risk (1/24/2022)    Humiliation, Afraid, Rape, and Kick questionnaire     Fear of Current or Ex-Partner: No     Emotionally Abused: No     Physically Abused: No     Sexually Abused: No   Housing Stability: Not on file       Family History   Family History   Problem Relation Age of Onset    Heart Disease Mother     Cancer Mother         \"Around lungs\" - she explicitly said it was *not* lung cancer.    Diabetes Father     Heart Disease Father        Review of Systems   A 14-point ROS is negative unless otherwise noted above in the HPI.    Physical Exam   Vital Signs with Ranges  Temp:  [98.8  F (37.1  C)] 98.8  F (37.1  C)  Pulse:  [116] 116  Resp:  [16] 16  BP: (166)/(85) 166/85  SpO2:  [96 %] 96 %  0 lbs 0 oz    Constitutional: Pleasant and cooperative female. Awake, alert, NAD.  HEENT: NC/AT, EOMI, sclera clear, conjunctiva normal, OP with MMM  Respiratory: No increased work of breathing, CTAB, no crackles or wheezing.  Cardiovascular: RRR, no murmur noted. No peripheral edema.  GI: Normal bowel sounds, soft, non-distended and " non-tender.  MSK: No large joint effusions or gross deformities.  Skin: Warm, dry, well-perfused. No bruising, bleeding, rashes, or lesions on limited exam.  Neurologic: A&O. Answers questions appropriately. Moves all extremities spontaneously.  Psych: Calm, appropriate affect  Vascular access:  Port on right chest. CDI, non-tender, no surrounding erythema.      Recent Labs  CBC   Recent Labs   Lab 10/23/23  1025   WBC 1.9*   RBC 2.84*   HGB 9.6*   HCT 29.9*   *   MCH 33.8*   MCHC 32.1   RDW 14.1          CMP   Recent Labs   Lab 10/23/23  1025      POTASSIUM 3.9   CHLORIDE 106   CO2 27   ANIONGAP 7   *   BUN 10.5   CR 0.93   GFRESTIMATED 65   DIANDRA 9.4   PROTTOTAL 5.8*   ALBUMIN 3.2*   BILITOTAL 0.3   ALKPHOS 88   AST 20   ALT 8       LFTs:   Recent Labs   Lab 10/23/23  1025   PROTTOTAL 5.8*   ALBUMIN 3.2*   BILITOTAL 0.3   ALKPHOS 88   AST 20   ALT 8       Coagulation Studies:   Recent Labs   Lab 10/23/23  1025   INR 1.14   PTT 25

## 2023-10-24 NOTE — PHARMACY-ADMISSION MEDICATION HISTORY
Pharmacist Admission Medication History    Admission medication history is complete. The information provided in this note is only as accurate as the sources available at the time of the update.    Information Source(s): Spoke  with patient and family in person; Reviewed dispense history; Reviewed [place]  search     Changes made to PTA medication list:  Added: Vit D, B-complex vitamin (per patient)  Deleted: Potassium (course completed), iron (not taking per patient)  Changed:  Venlafaxine 75 mg TID --> 75 mg BID (per patient and fill history)   Oxycodone 10 mg tablets --> added frequency (per fill history)     Pertinent Information:  Methadone - per patient currently taking 10 mg TID; appears dose was increased to 10 mg AM, 10 mg afternoon, and 15 mg PM in past   Gabapentin - never started per patient   Results of  Search:  Methadone 10 mg #72 for 18ds last picked up 10/13  Oxycodone 10 mg #72 for 18ds last picked up 10/13    Prior to Admission medications    Medication Sig Last Dose Taking? Auth Provider   acetaminophen (TYLENOL) 500 MG tablet Take 500-1,000 mg by mouth every 6 hours as needed for mild pain  Past Month at PRN Yes Reported, Patient   acyclovir (ZOVIRAX) 400 MG tablet TAKE 1 TABLET(400 MG) BY MOUTH EVERY 12 HOURS 10/23/2023 Yes Julio C Guillory MD   albuterol (PROAIR HFA/PROVENTIL HFA/VENTOLIN HFA) 108 (90 Base) MCG/ACT inhaler Inhale 2 puffs into the lungs every 6 hours as needed for shortness of breath Unknown at PRN Yes Reported, Patient   ascorbic acid 1000 MG TABS tablet Take 1,000 mg by mouth daily Past Week Yes Reported, Patient   B Complex-C (VITAMIN B COMPLEX W/VITAMIN C) TABS tablet Take 1 tablet by mouth daily Past Week Yes Unknown, Entered By History   Fluticasone-Umeclidin-Vilanterol (TRELEGY ELLIPTA) 100-62.5-25 MCG/INH oral inhaler Inhale 1 puff into the lungs daily  10/23/2023 Yes Reported, Patient   guaiFENesin (MUCINEX) 600 MG 12 hr tablet Take 2 tablets (1,200 mg) by mouth 2  times daily as needed Unknown at PRN Yes Ashley Low PA-C   levofloxacin (LEVAQUIN) 250 MG tablet Take 1 tablet (250 mg) by mouth daily 10/23/2023 Yes Keila Quinones APRN CNP   levothyroxine (SYNTHROID/LEVOTHROID) 112 MCG tablet Take 112 mcg by mouth daily 10/23/2023 Yes Reported, Patient   loperamide (IMODIUM) 2 MG capsule Take 1-2 capsules (2-4 mg) by mouth 4 times daily as needed for diarrhea 10/24/2023 Yes Ashley Low PA-C   methadone (DOLOPHINE) 10 MG tablet Take 10 mg by mouth 3 times daily Morning, afternoon, and night 10/23/2023 Yes Reported, Patient   methocarbamol (ROBAXIN) 500 MG tablet Take 500 mg by mouth 3 times daily as needed for muscle spasms Unknown at PRN Yes Unknown, Entered By History   metoprolol succinate ER (TOPROL XL) 25 MG 24 hr tablet TAKE 1 TABLET(25 MG) BY MOUTH DAILY 10/23/2023 Yes Julio C Guillory MD   ondansetron (ZOFRAN ODT) 8 MG ODT tab Take 1 tablet (8 mg) by mouth every 8 hours as needed for nausea Past Month at PRN Yes Ta Chang PA-C   oxyCODONE IR (ROXICODONE) 10 MG tablet Take 1 tablet by mouth every 6 hours as needed for pain 10/23/2023 at PRN Yes Reported, Patient   pantoprazole (PROTONIX) 40 MG EC tablet Take 1 tablet (40 mg) by mouth daily Unknown Yes Shanita Naylor PA-C   sulfamethoxazole-trimethoprim (BACTRIM DS) 800-160 MG tablet Take 1 tablet by mouth Every Mon, Tues two times daily Start on Monday 6/27/2022 10/23/2023 Yes Julio C Guillory MD   venlafaxine (EFFEXOR) 75 MG tablet Take 1 tablet (75 mg) by mouth 3 times daily  Patient taking differently: Take 75 mg by mouth 2 times daily 10/23/2023 Yes Nidia Littlejohn PA-C   Vitamin D3 (VITAMIN D, CHOLECALCIFEROL,) 25 mcg (1000 units) tablet Take 1 tablet by mouth daily Past Week Yes Unknown, Entered By History   gabapentin (NEURONTIN) 100 MG capsule Take 1 capsule (100 mg) by mouth 2 times daily Has not started  Keila uQinones APRN CNP     Medication History Completed By: Winston Jacome RP  10/24/2023 12:50 PM

## 2023-10-25 NOTE — PROGRESS NOTES
"CLINICAL NUTRITION SERVICES - ASSESSMENT NOTE     Nutrition Prescription    RECOMMENDATIONS FOR MDs/PROVIDERS TO ORDER:  None at this time     Recommendations already ordered by Registered Dietitian (RD):  None at this time     Future/Additional Recommendations:  Monitor PO intakes, wt trends; if either decline start nilay counts.  Inpatient RD will continue to follow per protocol.     REASON FOR ASSESSMENT  Libby Grimaldo is a/an 73 year old female assessed by the dietitian for Provider Order - \"Dietician to Assess and Order per Nutrition Protocol.  Provider is responsible for nutrition overnight     NUTRITION HISTORY  Per OP provider note on 10/23, \"She reports she is eating 3 small meals per day and drinking well \"    Per provider note, PMH includes \"high risk IgG Lambda multiple myeloma not achieving remission s/p Auto-PBSCT in 2022 (on palliative Erlo/Pom/Dex), Grave's disease, ILD/COPD, HTN, and HLD. Her last therapy was Elotuzumab where she received 5 cycles, but due to rising lambda chains that were consistent with progression, Teclistamab recommended \"    Per chart, pt admit for Teclistimab  \" Treatment Plan: Teclistimab (C1D1 = 10/24/2023)               - Teclistimab 6.2 mg (0.06 mg/kg) subcutaneous injection - D1               - Teclistimab 31 mg (0.3 mg/kg) subcutaneous injection  - D4               - Teclistimab 154 mg (1.5 mg/kg) subcutaneous injection - D7               - Pre-meds (D1, 4, 7): Zofran 8 mg, dex 16 mg, benadryl 50 mg, APAP 650 mg \"    CURRENT NUTRITION ORDERS  Diet: Regular  - Supplements: PRN    Intake/Tolerance: good appetite noted last night per chart review    LABS  Labs reviewed  - K+, Mg++, Phos WNL  - Cr 1.04 (H), GFR 57 (L) --> both improving since yesterday    MEDICATIONS  Medications reviewed  - Teclistamab   - Decadron q72h  - Folgard (folic acid, vitamin B6, vitamin B12)  - Vitamin C (1000 mg/day)  - Vitamin D3 (25 mcg/day)    ANTHROPOMETRICS  Height: 156.2 cm (5' 1.5\")  Most " Recent Weight: 51.1 kg (112 lb 9.6 oz)    IBW: 48.9 kg  BMI: Normal BMI  Weight History: wt trending up the past 4 months  Wt Readings from Last 10 Encounters:   10/24/23 51.1 kg (112 lb 9.6 oz)   10/23/23 50.8 kg (112 lb 1.6 oz)   09/27/23 51.6 kg (113 lb 12.8 oz)   08/23/23 48.9 kg (107 lb 14.4 oz)   08/21/23 49.3 kg (108 lb 11.2 oz)   08/10/23 49.8 kg (109 lb 12.8 oz)   08/08/23 49 kg (108 lb)   06/26/23 47.7 kg (105 lb 1.6 oz)   06/19/23 47.6 kg (104 lb 14.4 oz)   06/07/23 48.5 kg (107 lb)      Dosing Weight: 51 kg (actual)    ASSESSED NUTRITION NEEDS  Estimated Energy Needs: 4603-9830 kcals/day (30 - 35 kcals/kg)  Justification: Increased needs with chemo  Estimated Protein Needs: 61-77 grams protein/day (1.2 - 1.5 grams of pro/kg)  Justification: Increased needs with chemo  Estimated Fluid Needs: (1 mL/kcal)   Justification: Maintenance, or other per provider pending fluid status    PHYSICAL FINDINGS  See malnutrition section below.    MALNUTRITION  % Intake: Unable to assess  % Weight Loss: None noted  Subcutaneous Fat Loss: Unable to assess  Muscle Loss: Unable to assess  Fluid Accumulation/Edema: None noted per chart review  Malnutrition Diagnosis: Unable to determine due to pt with other cares during attempts to visit    NUTRITION DIAGNOSIS  Predicted inadequate nutrient intake (protein-energy) related to potential for poor appetite/PO intake difficulty with cancer treatment    INTERVENTIONS  Implementation  Nutrition Education: Unable to complete due to pt with other cares     Goals  Patient to consume % of nutritionally adequate meal trays TID, or the equivalent with supplements/snacks.     Monitoring/Evaluation  Progress toward goals will be monitored and evaluated per protocol.     Mary Richards, RD, , LD  Weekday Pager: [phone]  Weekday Units covered: 5A (1891-1977) and 7B (0918-2973)  Weekend/Holiday RD Pager: [phone]

## 2023-10-25 NOTE — PROGRESS NOTES
"                                                  Teclistamab Assessment  Patient is Alert & Oriented x3, unable to state year but knew date, month. Unable to state hospital name. There are signs of headache, muscle spasms or rigidity, or vision changes. YES, pt reports occasional double vision, eye floaters but states is not new since admission.        ICANS score     Score:   2   Orientation: Orientation to year, month, city, hospital: 4 points (1 point each)   3   Identify: Name 3 objects that nurse points to (e.g. clock, pen, button): 3 points (1 point each)   1   Following Commands: (e.g. show me two fingers or close your eyes and stick out your tongue): 1 point   0   Writing: Ability to write a standard sentence (see writing page \"The nichols jumped over the log.\"): 1 point   0   Attention: Count backwards from 100 by 10s: 1 point     6   Total: Max 10, no impairment, reassess next shift                   **9 or below Notify MD and reassess in 4 hours. Provider was notified.       Cytokine Release Syndrome Assessment    Cytokine Release Syndrome: a potentially life threatening toxicity after receiving immune based therapy for cancer treatment. Severity varies from grade 1 to grade 4.      There are NO current signs of CRS (pyrexia/fever, headache, nausea, hypotension, dyspnea, anxiety, increased liver enzymes, increased bilirubin).    **Will continue to monitor for s/sx of neurotoxicity and CRS.  If these occur, please call the Attending MD for recommendations and further instruction. CRS and neurotoxicity adverse reaction protocols/grading per package insert guidelines.    "

## 2023-10-25 NOTE — PROVIDER NOTIFICATION
Provider Bruno notified via [facility] [id number].     FYI ICANS score now 6/10. Unable to state year or hospital. Handwriting remarkable worse than previous. Unable to count back by 10. Reports occasional double vision, eye floaters but states not new.     Chandler VALIENTE     Plan: Fellow came to bedside to assess. X1 IV dex ordered, BID Keppra. Discussed w/ provider about start of headache, plan to give tylenol. Continue to assess q4 and notify fellow overnight w/ changes regarding ICANS scoring.

## 2023-10-25 NOTE — PROGRESS NOTES
"                                                  Teclistamab Assessment  Patient is Alert & Oriented x4. There are signs of headache, muscle spasms or rigidity, or vision changes. No changes, baseline tremors.    (Pt was not woken up for assessment. Writer had found patient awake in the hallway. I put her glasses on her for the assessment. Oxycodone was given 2 minutes prior to assessment)    ICANS score     Score:   4   Orientation: Orientation to year, month, city, hospital: 4 points (1 point each)                -Pt was slow to say the year. She first said 'the 23rd'. Then said 23 when reasked what the year was.     3   Identify: Name 3 objects that nurse points to (e.g. clock, pen, button): 3 points (1 point each)     1   Following Commands: (e.g. show me two fingers or close your eyes and stick out your tongue): 1 point     0   Writing: Ability to write a standard sentence (see writing page \"The nichols jumped over the log.\"): 1 point                   -she was mostly able to write the sentence but there was some difference in the handwriting from previous. Also, the first time she wrote 'fence' and the second time she wrote 'lock' instead of 'log'.    0   Attention: Count backwards from 100 by 10s: 1 point                  -pt was able to do it on the 3rd attempt after being prompted \"what is 100 minus 10\".    8   Total: Max 10, no impairment, reassess next shift                   **9 or below Notify MD and reassess in 4 hours.                          -Paged Dr. Morrison     Cytokine Release Syndrome Assessment    Cytokine Release Syndrome: a potentially life threatening toxicity after receiving immune based therapy for cancer treatment. Severity varies from grade 1 to grade 4.      There are current signs of CRS (pyrexia/fever, headache, nausea, hypotension, dyspnea, anxiety, increased liver enzymes, increased bilirubin). -pt reported mild nausea. No other symptoms.    **Will continue to monitor for s/sx of " neurotoxicity and CRS.  If these occur, please call the Attending MD for recommendations and further instruction. CRS and neurotoxicity adverse reaction protocols/grading per package insert guidelines.

## 2023-10-25 NOTE — PLAN OF CARE
Goal Outcome Evaluation:    AVSS on RA. Pt is alert and oriented but has some confusion; was trying to order lunch using TV remote. Pt's sister in room, pleasant and helpful. BA on as pt agrees to call but then forgets to call. She is SBA but has some weakness and confusion. PT put walker in room; per their assessment pt is quite unsteady but does well with SBA w walker.  C/o generalized pain, also hip, shoulder pain. On scheduled methadone. Tylenol and oxycodone also given x1 with little relief per pt. No electrolyte replacement needed re-checks ordered for tomorrow. Reg diet; appetite fair  See teclistamab assessment note.

## 2023-10-25 NOTE — PLAN OF CARE
"/53 (BP Location: Left arm)   Pulse 67   Temp 98.5  F (36.9  C) (Oral)   Resp 16   Ht 1.562 m (5' 1.5\")   Wt 51.1 kg (112 lb 9.6 oz)   SpO2 97%   BMI 20.93 kg/m    Pt A&O, but forgetful. Slight elevated BP, other VSS on ra. Up with SBA. Up with SBA, refused bed alarm. Denies SOB and nausea. On scheduled methadone. C/o pain, oxy x1 given. Regular diet. Resting between cares. Continue on POC.   Problem: Adult Inpatient Plan of Care  Goal: Patient-Specific Goal (Individualized)  Description: You can add care plan individualizations to a care plan. Examples of Individualization might be:  \"Parent requests to be called daily at 9am for status\", \"I have a hard time hearing out of my right ear\", or \"Do not touch me to wake me up as it startles  me\".  Outcome: Progressing   Goal Outcome Evaluation:                        "

## 2023-10-25 NOTE — PROGRESS NOTES
[facility]    Hematology / Oncology Progress Note  Date of Admission: 10/24/2023  Hospital Day # 1  Date of Service (when I saw the patient): 10/25/2023     Assessment & Plan   Libby Grimaldo is a 73 year old female with a medical history of high risk IgG Lambda multiple myeloma not achieving remission s/p Auto-PBSCT in 2022 (on palliative Erlo/Pom/Dex), Grave's disease, ILD/COPD, HTN, and HLD. Her last therapy was Elotuzumab where she received 5 cycles, but due to rising lambda chains that were consistent with progression, Teclistamab recommended. She is being admitted for cycle 1 day 1 of Teclistamab. She received Zarxio in clinic yesterday (10/24).      Today  - D2 Teclistamab. Patient feels as she is tolerating well. Afebrile. Continue to reassess CRS and ICANS every shift or suspected change in mental status.  - ICANS score 8/10 early this AM ~0515. She did mention she was woken up for this assessment and did not have her glasses on. She was also given a dose of oxycodone before. Note that score could have been affected by these factors. She had a subsequent score of 9/10 later in the morning. Will continue to monitor and will consider prophylactic Keppra or neuro consultation if ICANS worsens.       HEME  #IgG Lambda Multiple Myeloma, High-risk. S/p APBST 2022  Follows with Dr. Guillory.  Initially diagnosed in 11/2018, completed KRD x 6 cycles, rev/Dex (20), Velcade maintenance, revlimid maintenance, with evidence of disease BmBx 11/2020 (35% plasma cells) and PET CT with new lytic lesions. Then treated with kameron, Kd x 6 cycles, subcutaneous kameron/IVIG/denosumab monthly. S/p autologous peripheral blood stem cell transplant in 2022. Followed by melphalan, ixazomib, and then started on palliative elotuzumab, pomolidomide, and dexamethasone chemotherapy 4/2023 with most recent dose (C3D1) 6/26. She presented to heme/onc clinic 8/8 to re-establish treatment schedule and  sent to ED and admitted for sepsis, confusion, and weakness. She was started on Zosyn and vancomycin with improvement in symptoms, and transitioned to oral Levaquin 8/10/23. She was discharged with outpatient PT. C5 was schedukled for 9/20 but patient cancelled appt due to a fall; started C5D1 9/27. Due to rising lambda chains that were consistent with progression, Teclistamab recommended. Admitted for C1D1 of Teclistamab 10/24. Received Zarxio in clinic 10/23.      Patient feels like she tolerated D1 dose well. Afebrile. ICANS score 8/10 early AM 10/25 ~0515. She did mention she was woken up for this assessment and did not have her glasses on. She was also given a dose of oxycodone before. Of note, that score could have been affected by these factors. She had a subsequent score of 9/10 later in the morning. She has a tremor at baseline but noted increased on D2. Patient mentions a mild headache that was alleviated by tylenol. She does have a baseline of difficulty walking due to chronic back/hip pain and neuropathy, she says she notices no changes from her baseline. Nursing notified that she was trying to use her TV remote/call light to order breakfast.   - Will continue to monitor and reassess ICANS and CRS scoring every shift or suspected change in mental status. If score worsens, will consider prophylactic Keppra or neuro consult.      Teclistimab guidelines:                - Dosing as per treatment plan below.               - Pre-medications with zofran 8 mg, dexamethasone 16 mg, benadryl 50 mg and APAP 650 mg                - Nursing monitoring:                             - Vitals q1h before and after administration and q4h thereafter                            - ICANS monitoring qshift (order on admission)               - Start Allopurinol for TLS ppx.                - Start  mg BID. Additional antimicrobial ppx if neutropenic.                - CRS and neurotoxicity adverse reaction protocols/grading  "per package insert guidelines.                - In summary for CRS, for grades 1 (fever alone) and 2 (fever + hypotension responsive to fluids and/or low-flow O2 requirement) pursue supportive management, infectious workup and hold subsequent dose until resolution. With grade 3 (single pressor requirement and/or high-flow O2) or grade 4 (multiple pressors and/or biPAP/intubation), will consider tocilizumab after discussion with on-call staff.                - In summary for neurotoxicity, grade 1 (ICANS 7-9), hold subsequent dose until resolution and consider keppra ppx/further intervention after discussion with staff on-call. For grade 2 (ICANS 3-6), start dexamethasone 10 mg q6h and discuss with on-call staff.                  Treatment Plan: Teclistimab (C1D1 = 10/24/2023)               - Teclistimab 6.2 mg (0.06 mg/kg) subcutaneous injection - D1               - Teclistimab 31 mg (0.3 mg/kg) subcutaneous injection  - D4               - Teclistimab 154 mg (1.5 mg/kg) subcutaneous injection - D7               - Pre-meds (D1, 4, 7): Zofran 8 mg, dex 16 mg, benadryl 50 mg, APAP 650 mg      Ppx:  - PTA Acyclovir 400 mg BID  - PJP ppx bactrim BID on Monday/Tuesdays  - Levofloxacin 250 mg PO- if ANC <1000  - Antifungal ppx, held d/t interaction with Methadone, Micafungin while admitted     #Pancytopenia   - Transfuse to maintain Hgb >7, Plt >10K  - Blood consent signed and in patient chart.     #Relevant thrombosis or bleeding history     2/15/22: new non-occlusive LUE DVT. No longer on xarelto. On ASA.      NEURO  #Neuropathy  Patient has reported increased falls due to feeling her feet are \"heavy\" and feel like there is something on her feet. She spoke about this at her outpatient oncology appointment 10/23; prescribed gabapentin 100 mg BID.  - Hold starting Gabapentin 100 mg BID until discharge from hospital     CARDIOVASCULAR  #Risk of cardiomyopathy   Baseline EF 57% done January 2022. Patient being followed by " cardiology. Last echo done May 2023 with EF 55%-60%.      #Hyperlipidemia   Previously on Crestor but not on med list, patient confirmed - can follow-up with PCP if needed     #History of Afib   Now in NSR.   PTA metoprolol XL 25 mg/d      PULM  #COPD  COPD with 40pyr, quit 2010. Previously documented to have idiopathic pulmonary fibrosis, however most recent PFTs (1/26/22) were normal. Has previously required treatment for COPD exacerbation with pneumonia. Some expiratory wheezing on exam improved with Duonebs; continue home inhaler regimen.  - PTA Trelegy Ellipta inhaler qd  - PTA Albuterol BID PRN     ENDO  #Hypothyroidism  #History of Graves Disease  History of hypothyroidism 2/2 treatment for Graves Disease (patient reports surgery in [place], however not thyroidectomy). Stable dose of levothyroxine over the past four years. Admission TSH (2.74).   - PTA Levothyroxine 112mcg every day     MISC.  #Generalized Anxiety Disorder  - PTA Venlafaxine 75mg TID      #Pain Assessment  - PTA prn oxycodone.      #Opiate dependence  - PTA Methadone 10mg TID     #Dry Eye  Patient noted dry eyes and requested eye drops.   - Carboxymethylcellulose PF 0.5 % ophthalmic solution 1 drop TID PRN    Fluids/Electrolytes/Nutrition   - IVF per chemotherapy regimen  - PRN lyte replacement per standing protocol  - Regular diet as tolerated     Lines: None. Port placed on right chest wall.     PPX  VTE: Enoxaparin 40 mg every day. Hold if plts < 50k  GI: n/a  Bowels: Senna and miralax PRN    Code: Full code    Dispo: Patient must be admitted for at least 48 hours after her last dose of Teclistamab with ICANS and CRS grade 0. Will continue to monitor how patient responds clinically.     I spent >45 minutes face-to-face and/or coordinating or discussing care plan. Over 50% of our time on the unit was spent counseling the patient and/or coordinating care    Patient is seen and examined by Dr. Fortune and SYDNIE.  Assessment and plan are discussed and  "delivered to the patient.    Carline Gonzáles PA-C  Hematology/Oncology  Pager: [phone]    Interval History   No acute events overnight. Nursing notes reviewed. Labs and imaging reviewed. Libby was sitting comfortably in her bed this morning watching TV. She feels like she has been tolerating her Teclistamab well, denies fever, chills, mouth sores, SOB, cough, abdominal pain, diarrhea, constipation, nausea, vomiting, dysuria, hematuria, swelling. She noted a mild headache that went away with tylenol this AM. She has numbness and tingling at baseline from neuropathy, patient noted no increase from baseline. All questions and concerns addressed at bedside.     Complete and Comprehensive review of systems review and negative other than noted here or in the HPI.     Physical Exam   Temp: 98.5  F (36.9  C) Temp src: Oral BP: (!) 166/51 Pulse: 72   Resp: 18 SpO2: 98 % O2 Device: None (Room air)    Vitals:    10/24/23 1158   Weight: 51.1 kg (112 lb 9.6 oz)     Vital Signs with Ranges  Temp:  [98  F (36.7  C)-98.6  F (37  C)] 98.5  F (36.9  C)  Pulse:  [] 72  Resp:  [16-18] 18  BP: (135-166)/(50-71) 166/51  SpO2:  [94 %-98 %] 98 %  I/O last 3 completed shifts:  In: 1565 [P.O.:1565]  Out: 1 [Urine:1]      Physical Exam:    Blood pressure (!) 166/51, pulse 72, temperature 98.5  F (36.9  C), temperature source Oral, resp. rate 18, height 1.562 m (5' 1.5\"), weight 51.1 kg (112 lb 9.6 oz), SpO2 98%.    General: Pleasantly lying in bed. Awake and conversational. Nontoxic appearing. No acute distress.   HEENT: sclera anicteric, EOMI, MMM. Exophthalmos and bilateral pupil dilation, note that this is baseline.   Neck: supple, normal ROM  CV: RRR, normal S1/S2, no m/r/g. No peripheral edema.   Resp: CTAB. No wheezing/crackles. Breath sounds are equal throughout. Normal respiratory effort on room air  GI: Soft, non-tender, non-distended. Bowel sounds present and normoactive  MSK: Warm and well-perfused. Normal tone. " Strength is equal throughout.   Skin: No rashes on limited exam. No jaundice  Neuro: Alert and interactive, moves all extremities equally, no focal deficits. Sensations are equal throughout. Increase from baseline tremor of hands.   Vascular Access: Port placed on the right chest wall.       Medications    - MEDICATION INSTRUCTIONS -        acetaminophen  650 mg Oral Q72H    acyclovir  400 mg Oral BID    dexAMETHasone  16 mg Oral Q72H    diphenhydrAMINE  50 mg Oral Q72H    enoxaparin ANTICOAGULANT  40 mg Subcutaneous Q24H    fluticasone-vilanterol  1 puff Inhalation Daily    And    umeclidinium  1 puff Inhalation Daily    folic acid-vit B6-vit B12  1 tablet Oral Daily    levofloxacin  250 mg Oral Daily    levothyroxine  112 mcg Oral Daily    methadone  10 mg Oral TID    metoprolol succinate ER  25 mg Oral Daily    micafungin  50 mg Intravenous Q24H    ondansetron  8 mg Oral Q72H    pantoprazole  40 mg Oral Daily    sulfamethoxazole-trimethoprim  1 tablet Oral Q Mon Tues BID    [START ON 10/27/2023] teclistamab-cqyv  0.3 mg/kg (Treatment Plan Recorded) Subcutaneous Once    [START ON 10/30/2023] teclistamab-cqyv  1.5 mg/kg (Treatment Plan Recorded) Subcutaneous Once    venlafaxine  75 mg Oral BID    ascorbic acid  1,000 mg Oral Daily    Vitamin D3  25 mcg Oral Daily       Data   Results for orders placed or performed during the hospital encounter of 10/24/23 (from the past 24 hour(s))   CBC with platelets differential    Narrative    The following orders were created for panel order CBC with platelets differential.  Procedure                               Abnormality         Status                     ---------                               -----------         ------                     CBC with platelets and d...[[id number]]  Abnormal            Final result               RBC and Platelet Morphology[[id number]]  Abnormal            Final result                 Please view results for these tests on the individual  orders.   Comprehensive metabolic panel   Result Value Ref Range    Sodium 136 135 - 145 mmol/L    Potassium 3.8 3.4 - 5.3 mmol/L    Carbon Dioxide (CO2) 25 22 - 29 mmol/L    Anion Gap 9 7 - 15 mmol/L    Urea Nitrogen 12.4 8.0 - 23.0 mg/dL    Creatinine 1.21 (H) 0.51 - 0.95 mg/dL    GFR Estimate 48 (L) >60 mL/min/1.73m2    Calcium 9.3 8.8 - 10.2 mg/dL    Chloride 102 98 - 107 mmol/L    Glucose 80 70 - 99 mg/dL    Alkaline Phosphatase 88 35 - 104 U/L    AST 22 0 - 45 U/L    ALT 11 0 - 50 U/L    Protein Total 5.3 (L) 6.4 - 8.3 g/dL    Albumin 3.1 (L) 3.5 - 5.2 g/dL    Bilirubin Total 0.3 <=1.2 mg/dL   Magnesium   Result Value Ref Range    Magnesium 1.6 (L) 1.7 - 2.3 mg/dL   Phosphorus   Result Value Ref Range    Phosphorus 3.8 2.5 - 4.5 mg/dL   ABO/RH Type and Screen     Narrative    The following orders were created for panel order ABO/RH Type and Screen .  Procedure                               Abnormality         Status                     ---------                               -----------         ------                     Adult Type and Screen[[id number]]                            Final result                 Please view results for these tests on the individual orders.   INR   Result Value Ref Range    INR 1.12 0.85 - 1.15   Fibrinogen activity   Result Value Ref Range    Fibrinogen Activity 319 170 - 490 mg/dL   Partial thromboplastin time   Result Value Ref Range    aPTT 26 22 - 38 Seconds   Uric acid   Result Value Ref Range    Uric Acid 5.2 2.4 - 5.7 mg/dL   Lactate Dehydrogenase   Result Value Ref Range    Lactate Dehydrogenase 199 0 - 250 U/L   CBC with platelets and differential   Result Value Ref Range    WBC Count 6.6 4.0 - 11.0 10e3/uL    RBC Count 2.57 (L) 3.80 - 5.20 10e6/uL    Hemoglobin 9.1 (L) 11.7 - 15.7 g/dL    Hematocrit 28.2 (L) 35.0 - 47.0 %     (H) 78 - 100 fL    MCH 35.4 (H) 26.5 - 33.0 pg    MCHC 32.3 31.5 - 36.5 g/dL    RDW 14.6 10.0 - 15.0 %    Platelet Count 130 (L) 150 - 450  10e3/uL    % Neutrophils 54 %    % Lymphocytes 20 %    % Monocytes 23 %    % Eosinophils 1 %    % Basophils 1 %    % Immature Granulocytes 1 %    NRBCs per 100 WBC 0 <1 /100    Absolute Neutrophils 3.6 1.6 - 8.3 10e3/uL    Absolute Lymphocytes 1.3 0.8 - 5.3 10e3/uL    Absolute Monocytes 1.5 (H) 0.0 - 1.3 10e3/uL    Absolute Eosinophils 0.1 0.0 - 0.7 10e3/uL    Absolute Basophils 0.1 0.0 - 0.2 10e3/uL    Absolute Immature Granulocytes 0.1 <=0.4 10e3/uL    Absolute NRBCs 0.0 10e3/uL   Adult Type and Screen   Result Value Ref Range    ABO/RH(D) A POS     Antibody Screen Negative Negative    SPECIMEN EXPIRATION DATE [id number]    RBC and Platelet Morphology   Result Value Ref Range    Platelet Assessment  Automated Count Confirmed. Platelet morphology is normal.     Automated Count Confirmed. Platelet morphology is normal.    Acanthocytes      Nichole Rods      Basophilic Stippling      Bite Cells      Blister Cells      Denton Cells      Elliptocytes      Hgb C Crystals      Dia-Jolly Bodies      Hypersegmented Neutrophils      Polychromasia Slight (A) None Seen    RBC agglutination      RBC Fragments      Reactive Lymphocytes      Rouleaux      Sickle Cells      Smudge Cells      Spherocytes      Stomatocytes      Target Cells      Teardrop Cells Slight (A) None Seen    Toxic Neutrophils      RBC Morphology Confirmed RBC Indices    TSH with free T4 reflex   Result Value Ref Range    TSH 2.76 0.30 - 4.20 uIU/mL   CBC with platelets differential    Narrative    The following orders were created for panel order CBC with platelets differential.  Procedure                               Abnormality         Status                     ---------                               -----------         ------                     CBC with platelets and d...[[id number]]  Abnormal            Final result               Manual Differential[[id number]]          Abnormal            Final result                 Please view results for these  tests on the individual orders.   Comprehensive metabolic panel   Result Value Ref Range    Sodium 139 135 - 145 mmol/L    Potassium 5.1 3.4 - 5.3 mmol/L    Carbon Dioxide (CO2) 25 22 - 29 mmol/L    Anion Gap 6 (L) 7 - 15 mmol/L    Urea Nitrogen 13.3 8.0 - 23.0 mg/dL    Creatinine 1.04 (H) 0.51 - 0.95 mg/dL    GFR Estimate 57 (L) >60 mL/min/1.73m2    Calcium 9.2 8.8 - 10.2 mg/dL    Chloride 108 (H) 98 - 107 mmol/L    Glucose 123 (H) 70 - 99 mg/dL    Alkaline Phosphatase 82 35 - 104 U/L    AST 18 0 - 45 U/L    ALT 8 0 - 50 U/L    Protein Total 5.5 (L) 6.4 - 8.3 g/dL    Albumin 3.1 (L) 3.5 - 5.2 g/dL    Bilirubin Total 0.2 <=1.2 mg/dL   INR   Result Value Ref Range    INR 1.07 0.85 - 1.15   Fibrinogen activity   Result Value Ref Range    Fibrinogen Activity 303 170 - 490 mg/dL   Uric acid   Result Value Ref Range    Uric Acid 4.7 2.4 - 5.7 mg/dL   Partial thromboplastin time   Result Value Ref Range    aPTT 28 22 - 38 Seconds   Magnesium   Result Value Ref Range    Magnesium 1.8 1.7 - 2.3 mg/dL   Phosphorus   Result Value Ref Range    Phosphorus 3.9 2.5 - 4.5 mg/dL   CBC with platelets and differential   Result Value Ref Range    WBC Count 5.2 4.0 - 11.0 10e3/uL    RBC Count 2.43 (L) 3.80 - 5.20 10e6/uL    Hemoglobin 8.4 (L) 11.7 - 15.7 g/dL    Hematocrit 26.1 (L) 35.0 - 47.0 %     (H) 78 - 100 fL    MCH 34.6 (H) 26.5 - 33.0 pg    MCHC 32.2 31.5 - 36.5 g/dL    RDW 14.9 10.0 - 15.0 %    Platelet Count 126 (L) 150 - 450 10e3/uL    % Neutrophils      % Lymphocytes      % Monocytes      % Eosinophils      % Basophils      % Immature Granulocytes      NRBCs per 100 WBC 0 <1 /100    Absolute Neutrophils      Absolute Lymphocytes      Absolute Monocytes      Absolute Eosinophils      Absolute Basophils      Absolute Immature Granulocytes      Absolute NRBCs 0.0 10e3/uL   Manual Differential   Result Value Ref Range    % Neutrophils 80 %    % Lymphocytes 9 %    % Monocytes 11 %    % Eosinophils 0 %    % Basophils 0 %     Absolute Neutrophils 4.2 1.6 - 8.3 10e3/uL    Absolute Lymphocytes 0.5 (L) 0.8 - 5.3 10e3/uL    Absolute Monocytes 0.6 0.0 - 1.3 10e3/uL    Absolute Eosinophils 0.0 0.0 - 0.7 10e3/uL    Absolute Basophils 0.0 0.0 - 0.2 10e3/uL    RBC Morphology Confirmed RBC Indices     Platelet Assessment  Automated Count Confirmed. Platelet morphology is normal.     Automated Count Confirmed. Platelet morphology is normal.

## 2023-10-25 NOTE — PROGRESS NOTES
"                                                  Teclistamab Assessment  Patient is Alert & Oriented x4. There are signs of headache, muscle spasms or rigidity, or vision changes. No, however pt's eyes are dilated and her eyes appear \"bugged out\"; provider notified. She also was trying to use her TV remote/Nurse call light to order breakfast.       ICANS score     Score:   4   Orientation: Orientation to year, month, city, hospital: 4 points (1 point each)   3   Identify: Name 3 objects that nurse points to (e.g. clock, pen, button): 3 points (1 point each)   1   Following Commands: (e.g. show me two fingers or close your eyes and stick out your tongue): 1 point   1   Writing: Ability to write a standard sentence (see writing page \"The nichols jumped over the log.\"): 1 point   0   Attention: Count backwards from 100 by 10s: 1 point counted down from 10, not rom 100, an by 1's.    9   Total: Max 10, no impairment, reassess next shift                   **9 or below Notify MD and reassess in 4 hours       Cytokine Release Syndrome Assessment    Cytokine Release Syndrome: a potentially life threatening toxicity after receiving immune based therapy for cancer treatment. Severity varies from grade 1 to grade 4.      There are No current signs of CRS (pyrexia/fever, headache, nausea, hypotension, dyspnea, anxiety, increased liver enzymes, increased bilirubin).    **Will continue to monitor for s/sx of neurotoxicity and CRS.  If these occur, please call the Attending MD for recommendations and further instruction. CRS and neurotoxicity adverse reaction protocols/grading per package insert guidelines.   "

## 2023-10-25 NOTE — PROGRESS NOTES
"   10/25/23 1531   Appointment Info   Signing Clinician's Name / Credentials (PT) Alva Trevizo, PT, DPT       Present no   Living Environment   People in Home spouse   Current Living Arrangements house   Home Accessibility stairs within home   Number of Stairs, Within Home, Primary eight   Stair Railings, Within Home, Primary railings safe and in good condition;railing on right side (ascending)   Transportation Anticipated family or friend will provide   Living Environment Comments Pt reports she lives in a home with her spouse, who is present/available to assist. Must negotiate 5 + 3 steps with L handrail to access main level. Tub/shower with chair.  provides transportation.   Self-Care   Usual Activity Tolerance good   Current Activity Tolerance fair   Regular Exercise No   Equipment Currently Used at Home shower chair;walker, standard   Fall history within last six months yes   Number of times patient has fallen within last six months   (reports frequent falls; ~1x/month)   Activity/Exercise/Self-Care Comment Reports IND with ADL's and mobility. Furniture surfs around home while ambulating; utilizes HHA from  in community. Frequent falls. No regular exercise. Owns a FWW per .   General Information   Onset of Illness/Injury or Date of Surgery 10/24/23   Referring Physician Carline Gonzáles PA-C   Patient/Family Therapy Goals Statement (PT) return home   Pertinent History of Current Problem (include personal factors and/or comorbidities that impact the POC) per EMR: \"Libby Grimaldo is a 73 year old female with a medical history of high risk IgG Lambda multiple myeloma not achieving remission s/p Auto-PBSCT in 2022 (on palliative Erlo/Pom/Dex), Grave's disease, ILD/COPD, HTN, and HLD. Her last therapy was Elotuzumab where she received 5 cycles, but due to rising lambda chains that were consistent with progression, Teclistamab recommended. She is being admitted for " "cycle 1 day 1 of Teclistamab. She received Zarxio in clinic yesterday (10/24). \"   Existing Precautions/Restrictions fall;immunosuppressed   Weight-Bearing Status - LUE full weight-bearing   Weight-Bearing Status - RUE full weight-bearing   Weight-Bearing Status - LLE full weight-bearing   Weight-Bearing Status - RLE full weight-bearing   General Observations Activity: Adult ICU Early Mobility Protocol   Cognition   Orientation Status (Cognition) oriented x 4   Pain Assessment   Patient Currently in Pain No   Integumentary/Edema   Integumentary/Edema no deficits were identifed   Posture    Posture Forward head position;Protracted shoulders;Kyphosis   Range of Motion (ROM)   Range of Motion ROM is WFL   Strength (Manual Muscle Testing)   Strength (Manual Muscle Testing) Deficits observed during functional mobility   Strength Comments grossly 3/5 in BLE; generalized weakness   Bed Mobility   Comment, (Bed Mobility) supine > sit with SBA and HOB elevated   Transfers   Comment, (Transfers) sit>stand with CGA and no AD   Gait/Stairs (Locomotion)   Distance in Feet (Gait) 10ft   Comment, (Gait/Stairs) Ambulated 10ft with SBA/CGA and no AD; intermittently reaching for furniture around room   Balance   Balance other (describe)   Balance Comments fair sitting balance; fair(-) standing balance   Sensory Examination   Sensory Perception other (describe)   Sensory Perception Comments reports numbness to plantar surface of B feet and B hands   Coordination   Coordination no deficits were identified   Muscle Tone   Muscle Tone no deficits were identified   Clinical Impression   Criteria for Skilled Therapeutic Intervention Yes, treatment indicated   PT Diagnosis (PT) impaired functional mobility; at risk for falls; at risk for deconditioning   Influenced by the following impairments decreased balance, strength, endurance, and sensation   Functional limitations due to impairments difficulty with functional mobility; at risk for " deconditioning   Clinical Presentation (PT Evaluation Complexity) stable   Clinical Presentation Rationale per clinical judgment   Clinical Decision Making (Complexity) low complexity   Planned Therapy Interventions (PT) balance training;bed mobility training;gait training;home exercise program;motor coordination training;neuromuscular re-education;patient/family education;postural re-education;ROM (range of motion);stair training;strengthening;stretching;transfer training;progressive activity/exercise;risk factor education;home program guidelines   Risk & Benefits of therapy have been explained evaluation/treatment results reviewed;care plan/treatment goals reviewed;risks/benefits reviewed;current/potential barriers reviewed;participants voiced agreement with care plan;participants included;patient;spouse/significant other   PT Total Evaluation Time   PT Eval, Low Complexity Minutes (75358) 5   Physical Therapy Goals   PT Frequency 6x/week   PT Predicted Duration/Target Date for Goal Attainment 11/08/23   PT: Bed Mobility Independent;Supine to/from sit;Rolling;Bridging  (with HOB flat)   PT: Transfers Independent;Sit to/from stand;Bed to/from chair  (with AD vs. without)   PT: Gait Independent;150 feet  (with AD vs. without)   PT: Stairs Modified independent;8 stairs;Rail on left   PT: Goal 1 Pt will demonstrate a low risk for falls on any standardized balance assessment to reduce risk for falls and readmission.   PT Discharge Planning   PT Plan repeated sit <> stands, progress gait with FWW, stairs when appropriate, balance   PT Discharge Recommendation (DC Rec) home with assist;home with home care physical therapy;home with outpatient physical therapy   PT Rationale for DC Rec Pt is below baseline for functional mobility. Limited by decreased endurance and is at risk for deconditioning 2/2 prolonged hospital stay and undergoing chemo. Will benefit from IP PT. Once medically appropriate, anticipated to d/c home  with assist from spouse for ADL's and mobility using FWW. Will also benefit from further PT (HH vs. OP) to maintain functional mobility and reduce caregiver burden and risk for falls. Pt and spouse in agreement to plan.   PT Brief overview of current status Ax1 with FWW and gait belt; encourage up to chair and ambulation 3-4x/day   PT Equipment Needed at Discharge   (tbd)

## 2023-10-25 NOTE — PROVIDER NOTIFICATION
Provider Alphonso Crawford notified via [employer] [id number]    During ICANS assessment, pt's writing was remarkably worse than previous shift. Missing words, unable to stay in line, illegible even more so than previous. Disoriented to hospital, year however was able to state month and day. Pt also reports occasional eye floaters and double vision but states is not new since admission and is currently not having either but come and go.     Still unable to count backwards by 10. ICANS score now 6/10, was 9/10 this morning.     Chandler RN     Plan: Discussed w/ provider that lower number indicates a decline, RN will reach out to fellow and repeat assessment in 4 hrs.

## 2023-10-25 NOTE — PROGRESS NOTES
Was notified of an ICANS score of 8 (down from 10). Per RN assessment, she lost points for change in her handwriting, was slow to remember the year, and needed coaching to count back from 100.  Per the neurotox protocol, she needs to be monitored closely and team to consider Keppra. Discuss with hematology fellow. Will defer the final decision of Keppra to primary team. RN updated of plan. Verbally signed out to day team

## 2023-10-26 NOTE — PLAN OF CARE
Goal Outcome Evaluation:  [id number]:     Libby is A&Ox2-3, continued confusion and illogical speech. HTN outside of parameters, provider notified and given AM metoprolol early, recheck still out of parameters. Some intermittent desats while sleeping, no sx of resp distress.Can be a sign of CRS, on continuous pulse ox now (no active order) No complaints of pain overnight or SOB. Continuing to score 6/10 on ICANS, both fellow and cross cover notified and monitoring. Given another 10mg IV dex overnight 6 hours after per fellow and note recommendations for ICANS scores. BA on for safety. Getting up SBA with walker. GUOP overnight, no BM noted. Sleeping between cares. Continue with POC.

## 2023-10-26 NOTE — PROGRESS NOTES
"                                                  Teclistamab Assessment  Patient is Alert & Oriented x3. Unable to state year. There are signs of headache, muscle spasms or rigidity, or vision changes. YES, pt complained of mild headache that was relieved w/ headache. Muscle spasms in neck at baseline per pt.     ICANS score     Score:   3   Orientation: Orientation to year, month, city, hospital: 4 points (1 point each)   3   Identify: Name 3 objects that nurse points to (e.g. clock, pen, button): 3 points (1 point each)   1   Following Commands: (e.g. show me two fingers or close your eyes and stick out your tongue): 1 point   1   Writing: Ability to write a standard sentence (see writing page \"The nichols jumped over the log.\"): 1 point   0   Attention: Count backwards from 100 by 10s: 1 point     8   Total: Max 10, no impairment, reassess next shift                   **9 or below Notify MD and reassess in 4 hours       Cytokine Release Syndrome Assessment    Cytokine Release Syndrome: a potentially life threatening toxicity after receiving immune based therapy for cancer treatment. Severity varies from grade 1 to grade 4.      There are Yes, current signs of CRS (pyrexia/fever, headache, nausea, hypotension, dyspnea, anxiety, increased liver enzymes, increased bilirubin). Mild nausea relieved w/ compazine. Mild headache relieved w/ tylenol.     **Will continue to monitor for s/sx of neurotoxicity and CRS.  If these occur, please call the Attending MD for recommendations and further instruction. CRS and neurotoxicity adverse reaction protocols/grading per package insert guidelines.    "

## 2023-10-26 NOTE — PROGRESS NOTES
"[facility]    Hematology / Oncology Progress Note  Date of Admission: 10/24/2023  Hospital Day # 2  Date of Service (when I saw the patient): 10/26/2023     Assessment & Plan   Libby Grimaldo is a 73 year old female with a medical history of high risk IgG Lambda multiple myeloma not achieving remission s/p Auto-PBSCT in 2022 (on palliative Erlo/Pom/Dex), Grave's disease, ILD/COPD, HTN, and HLD. Her last therapy was Elotuzumab where she received 5 cycles, but due to rising lambda chains that were consistent with progression, Teclistamab recommended. She is being admitted for cycle 1 day 1 of Teclistamab. She received Zarxio in clinic day prior to admission.      Today  - D3 Teclistamab. Continue to reassess CRS and ICANS q4hrs or suspected change in mental status until score is 10/10.  - ICANS score 6/10 overnight. Heme/onc team notified and ordered Dexamethasone 10 mg x2 and started Keppra BID. Noted by the medicine team when they saw her she had difficultly following commands such as touching her nose with her finger. When I saw her this AM, ICANS score 7/10 with difficulty orienting to year, patient said \"1920\", writing, and attention. She was able to follow commands of touching her finger to her nose without difficulty. She was able to recall events from last night and repeatedly said \"I am so tired from being woken up\". Will continue to monitor closely with ICANS and CRS every 4 hours.   - Patient has had consistently elevated BP since late afternoon admission on 10/25. Early AM on 10/26 systolic ranged from 154-194; of note, she had dex 10mg x 2 during this time. Spoke about BP management with attending on rounds; start Amlodipine 5 mg PO every day. Most recent /67. Will continue to monitor.     HEME  #IgG Lambda Multiple Myeloma, High-risk. S/p APBST 2022  Follows with Dr. Guillory.  Initially diagnosed in 11/2018, completed KRD x 6 cycles, rev/Dex (20), Velcade " maintenance, revlimid maintenance, with evidence of disease BmBx 11/2020 (35% plasma cells) and PET CT with new lytic lesions. Then treated with kameron, Kd x 6 cycles, subcutaneous kameron/IVIG/denosumab monthly. S/p autologous peripheral blood stem cell transplant in 2022. Followed by melphalan, ixazomib, and then started on palliative elotuzumab, pomolidomide, and dexamethasone chemotherapy 4/2023 with most recent dose (C3D1) 6/26. She presented to heme/onc clinic 8/8 to re-establish treatment schedule and sent to ED and admitted for sepsis, confusion, and weakness. She was started on Zosyn and vancomycin with improvement in symptoms, and transitioned to oral Levaquin 8/10/23. She was discharged with outpatient PT. C5 was schedukled for 9/20 but patient cancelled appt due to a fall; started C5D1 9/27. Due to rising lambda chains that were consistent with progression, Teclistamab recommended. Admitted for C1D1 of Teclistamab 10/24. Received Zarxio in clinic 10/23.     Patient had grade 1 ICANS after D1 that progressed to Grade 2 overnight on D3 (10/26). Heme/onc team notified and ordered Dexamethasone 10 mg x2 and started Keppra BID. Noted by the medicine team when they saw her she had difficultly following commands such as touching her nose with her finger. When reassessed in AM, ICANS 7/10 and 8/10 by RN. She was able to follow finger-to-nose and finger-to-finger commands, thorough neuro exam consistent with previous days. Of note, she has a tremor at baseline but noted increased on D2. Patient mentions a mild headache in AMs that has been alleviated by tylenol. She does have a baseline of difficulty walking due to chronic back/hip pain and neuropathy, she says she notices no changes from her baseline. Patient uses a walker.     - Will continue to monitor and reassess ICANS and CRS scoring every 4 hours or suspected change in mental status until ICANS score of 10.      Teclistimab guidelines:                - Dosing  as per treatment plan below.               - Pre-medications with zofran 8 mg, dexamethasone 16 mg, benadryl 50 mg and APAP 650 mg                - Nursing monitoring:                             - Vitals q1h before and after administration and q4h thereafter                            - ICANS monitoring qshift (order on admission)               - Start Allopurinol for TLS ppx.                - Start  mg BID. Additional antimicrobial ppx if neutropenic.                - CRS and neurotoxicity adverse reaction protocols/grading per package insert guidelines.                - In summary for CRS, for grades 1 (fever alone) and 2 (fever + hypotension responsive to fluids and/or low-flow O2 requirement) pursue supportive management, infectious workup and hold subsequent dose until resolution. With grade 3 (single pressor requirement and/or high-flow O2) or grade 4 (multiple pressors and/or biPAP/intubation), will consider tocilizumab after discussion with on-call staff.                - In summary for neurotoxicity, grade 1 (ICANS 7-9), hold subsequent dose until resolution and consider keppra ppx/further intervention after discussion with staff on-call. For grade 2 (ICANS 3-6), start dexamethasone 10 mg q6h and discuss with on-call staff.                  Treatment Plan: Teclistimab (C1D1 = 10/24/2023)               - Teclistimab 6.2 mg (0.06 mg/kg) subcutaneous injection - D1               - Teclistimab 31 mg (0.3 mg/kg) subcutaneous injection  - D4               - Teclistimab 154 mg (1.5 mg/kg) subcutaneous injection - D7               - Pre-meds (D1, 4, 7): Zofran 8 mg, dex 16 mg, benadryl 50 mg, APAP 650 mg      Ppx:  - PTA Acyclovir 400 mg BID  - PJP ppx bactrim BID on Monday/Tuesdays  - Levofloxacin 250 mg PO- if ANC <1000  - Antifungal ppx, held d/t interaction with Methadone, Micafungin while admitted     #Pancytopenia   - Transfuse to maintain Hgb >7, Plt >10K  - Blood consent signed and in patient  "chart.     #Relevant thrombosis or bleeding history     2/15/22: new non-occlusive LUE DVT. No longer on xarelto. On ASA.      NEURO  #Neuropathy  Patient has reported increased falls due to feeling her feet are \"heavy\" and feel like there is something on her feet. She spoke about this at her outpatient oncology appointment 10/23; prescribed gabapentin 100 mg BID.  - Hold starting Gabapentin 100 mg BID until discharge from hospital     CARDIOVASCULAR  #Elevated blood pressures  She has had consistently elevated BP since late afternoon admission on 10/25. Early AM on 10/26 systolic ranged from 154-194; of note, she had dex 10mg x 2 during this time. Discussed BP management at rounds; attending recommended to start Amlodipine 5 mg PO every day.   - Continue to monitor.     #Risk of cardiomyopathy   Baseline EF 57% done January 2022. Patient being followed by cardiology. Last echo done May 2023 with EF 55%-60%.      #Hyperlipidemia   Previously on Crestor but not on med list, patient confirmed - can follow-up with PCP if needed     #History of Afib   Now in NSR.   PTA metoprolol XL 25 mg/d      PULM  #COPD  COPD with 40pyr, quit 2010. Previously documented to have idiopathic pulmonary fibrosis, however most recent PFTs (1/26/22) were normal. Has previously required treatment for COPD exacerbation with pneumonia. Some expiratory wheezing on exam improved with Duonebs; continue home inhaler regimen.  - PTA Trelegy Ellipta inhaler qd  - PTA Albuterol BID PRN     ENDO  #Hypothyroidism  #History of Graves Disease  History of hypothyroidism 2/2 treatment for Graves Disease (patient reports surgery in [place], however not thyroidectomy). Stable dose of levothyroxine over the past four years. Admission TSH (2.74).   - PTA Levothyroxine 112mcg every day     MISC.  #Generalized Anxiety Disorder  - PTA Venlafaxine 75mg TID      #Pain Assessment  - PTA prn oxycodone.      #Opiate dependence  - PTA Methadone 10mg TID     #Dry " "Eye  Patient noted dry eyes and requested eye drops.   - Carboxymethylcellulose PF 0.5 % ophthalmic solution 1 drop TID PRN    Fluids/Electrolytes/Nutrition   - IVF per chemotherapy regimen  - PRN lyte replacement per standing protocol  - Regular diet as tolerated     Lines: None. Port placed on right chest wall.     PPX  VTE: Enoxaparin 40 mg every day. Hold if plts < 50k  GI: n/a  Bowels: Senna and miralax PRN    Code: Full code    Dispo: Patient must be admitted for at least 48 hours after her last dose of Teclistamab with ICANS and CRS grade 0. Will continue to monitor how patient responds clinically.     I spent >45 minutes face-to-face and/or coordinating or discussing care plan. Over 50% of our time on the unit was spent counseling the patient and/or coordinating care    Patient is seen and examined by Dr. Fortune and SYDNIE.  Assessment and plan are discussed and delivered to the patient.    Carline Gonzáles PA-C  Hematology/Oncology  Pager: [phone]    Interval History   Nursing notes reviewed. Labs and imaging reviewed. Libby was sleeping in her bed this morning. She was able to recall events from last night and repeatedly said \"I am so tired\". Her neuro exam is consistent with previous days of 5/5 motor strength throughput, able to follow finger-to-nose and finger-to-finger commands. She continues to have numbness and tingling at baseline from neuropathy, patient noted no increase from baseline. Noted some mild nausea that was alleviated with PRN compazine.  She noted a mild headache this AM that went away with tylenol. Denies fever, chills, mouth sores, SOB, cough, abdominal pain, diarrhea, constipation, vomiting, dysuria, hematuria, swelling, diplopia, changes in vision (patient does wear glasses). All questions and concerns addressed at bedside.     Complete and Comprehensive review of systems review and negative other than noted here or in the HPI.     Physical Exam   Temp: 98  F (36.7  C) Temp src: Oral " "BP: (!) 143/67 Pulse: 77   Resp: 18 SpO2: 99 % O2 Device: None (Room air)    Vitals:    10/24/23 1158 10/26/23 0836   Weight: 51.1 kg (112 lb 9.6 oz) 50 kg (110 lb 3.2 oz)     Vital Signs with Ranges  Temp:  [97.5  F (36.4  C)-98.4  F (36.9  C)] 98  F (36.7  C)  Pulse:  [62-82] 77  Resp:  [14-18] 18  BP: (143-194)/(47-88) 143/67  SpO2:  [95 %-100 %] 99 %  I/O last 3 completed shifts:  In: 240 [P.O.:240]  Out: -       Physical Exam:    Blood pressure (!) 143/67, pulse 77, temperature 98  F (36.7  C), temperature source Oral, resp. rate 18, height 1.562 m (5' 1.5\"), weight 50 kg (110 lb 3.2 oz), SpO2 99%.    General: Lying in bed. Awake and conversational. Nontoxic appearing. No acute distress.   HEENT: sclera anicteric, EOM intact, MMM. Exophthalmos and bilateral pupil dilation that is reactive to light, noted by patient that this is baseline. Patient wears glasses.  Neck: supple, normal ROM  CV: RRR, normal S1/S2, no m/r/g. No peripheral edema.   Resp: CTAB. No wheezing/crackles. Breath sounds are equal throughout. Normal respiratory effort on room air  GI: Soft, non-tender, non-distended. Bowel sounds present and normoactive  MSK: Warm and well-perfused. Normal tone. Strength is equal throughout. Uses walker.  Skin: No rashes on limited exam. No jaundice  Neuro: Alert and interactive, moves all extremities equally, no focal deficits. Sensations are equal throughout. Increase from baseline tremor of hands.   Vascular Access: Port placed on the right chest wall.       Medications    - MEDICATION INSTRUCTIONS -        acetaminophen  650 mg Oral Q72H    acyclovir  400 mg Oral BID    allopurinol  300 mg Oral Daily    amLODIPine  5 mg Oral Daily    dexAMETHasone  16 mg Oral Q72H    diphenhydrAMINE  50 mg Oral Q72H    enoxaparin ANTICOAGULANT  40 mg Subcutaneous Q24H    fluticasone-vilanterol  1 puff Inhalation Daily    And    umeclidinium  1 puff Inhalation Daily    folic acid-vit B6-vit B12  1 tablet Oral Daily    " levETIRAcetam  750 mg Oral BID    levofloxacin  250 mg Oral Daily    levothyroxine  112 mcg Oral Daily    methadone  10 mg Oral TID    metoprolol succinate ER  25 mg Oral Daily    micafungin  50 mg Intravenous Q24H    ondansetron  8 mg Oral Q72H    pantoprazole  40 mg Oral Daily    sulfamethoxazole-trimethoprim  1 tablet Oral Q Mon Tues BID    [START ON 10/27/2023] teclistamab-cqyv  0.3 mg/kg (Treatment Plan Recorded) Subcutaneous Once    [START ON 10/30/2023] teclistamab-cqyv  1.5 mg/kg (Treatment Plan Recorded) Subcutaneous Once    venlafaxine  75 mg Oral BID    ascorbic acid  1,000 mg Oral Daily    Vitamin D3  25 mcg Oral Daily       Data   Results for orders placed or performed during the hospital encounter of 10/24/23 (from the past 24 hour(s))   CBC with platelets differential    Narrative    The following orders were created for panel order CBC with platelets differential.  Procedure                               Abnormality         Status                     ---------                               -----------         ------                     CBC with platelets and d...[[id number]]  Abnormal            Final result                 Please view results for these tests on the individual orders.   ABO/Rh type and screen    Narrative    The following orders were created for panel order ABO/Rh type and screen.  Procedure                               Abnormality         Status                     ---------                               -----------         ------                     Adult Type and Screen[[id number]]                            Final result                 Please view results for these tests on the individual orders.   Comprehensive metabolic panel   Result Value Ref Range    Sodium 139 135 - 145 mmol/L    Potassium 5.1 3.4 - 5.3 mmol/L    Carbon Dioxide (CO2) 27 22 - 29 mmol/L    Anion Gap 5 (L) 7 - 15 mmol/L    Urea Nitrogen 16.0 8.0 - 23.0 mg/dL    Creatinine 1.08 (H) 0.51 - 0.95 mg/dL    GFR  Estimate 55 (L) >60 mL/min/1.73m2    Calcium 9.5 8.8 - 10.2 mg/dL    Chloride 107 98 - 107 mmol/L    Glucose 124 (H) 70 - 99 mg/dL    Alkaline Phosphatase 76 35 - 104 U/L    AST 16 0 - 45 U/L    ALT 9 0 - 50 U/L    Protein Total 6.0 (L) 6.4 - 8.3 g/dL    Albumin 3.2 (L) 3.5 - 5.2 g/dL    Bilirubin Total 0.2 <=1.2 mg/dL   INR   Result Value Ref Range    INR 1.04 0.85 - 1.15   Fibrinogen activity   Result Value Ref Range    Fibrinogen Activity 303 170 - 490 mg/dL   Uric acid   Result Value Ref Range    Uric Acid 3.6 2.4 - 5.7 mg/dL   Partial thromboplastin time   Result Value Ref Range    aPTT 27 22 - 38 Seconds   Magnesium   Result Value Ref Range    Magnesium 1.8 1.7 - 2.3 mg/dL   Phosphorus   Result Value Ref Range    Phosphorus 3.5 2.5 - 4.5 mg/dL   CBC with platelets and differential   Result Value Ref Range    WBC Count 3.0 (L) 4.0 - 11.0 10e3/uL    RBC Count 2.38 (L) 3.80 - 5.20 10e6/uL    Hemoglobin 8.1 (L) 11.7 - 15.7 g/dL    Hematocrit 25.8 (L) 35.0 - 47.0 %     (H) 78 - 100 fL    MCH 34.0 (H) 26.5 - 33.0 pg    MCHC 31.4 (L) 31.5 - 36.5 g/dL    RDW 15.1 (H) 10.0 - 15.0 %    Platelet Count 115 (L) 150 - 450 10e3/uL    % Neutrophils 82 %    % Lymphocytes 11 %    % Monocytes 7 %    % Eosinophils 0 %    % Basophils 0 %    % Immature Granulocytes 0 %    NRBCs per 100 WBC 0 <1 /100    Absolute Neutrophils 2.5 1.6 - 8.3 10e3/uL    Absolute Lymphocytes 0.3 (L) 0.8 - 5.3 10e3/uL    Absolute Monocytes 0.2 0.0 - 1.3 10e3/uL    Absolute Eosinophils 0.0 0.0 - 0.7 10e3/uL    Absolute Basophils 0.0 0.0 - 0.2 10e3/uL    Absolute Immature Granulocytes 0.0 <=0.4 10e3/uL    Absolute NRBCs 0.0 10e3/uL   Adult Type and Screen   Result Value Ref Range    Antibody Screen Negative Negative    SPECIMEN EXPIRATION DATE [id number]    ABO/RH Type & Screen   Result Value Ref Range    SPECIMEN EXPIRATION DATE [id number]     ABORH A POS

## 2023-10-26 NOTE — PROGRESS NOTES
Brief Hematology/Oncology Fellow On Call Note:     Ms. Grimaldo is a 73 year old woman with a history of high risk IgG lambda MM who was admitted for Teclistamab. She had scored a 6/10 on ICANS on the evening of 10/25 and received 10mg IV dexamethasone (around 8pm) and was started on keppra BID. She initially had some improvement in mental status, but overnight she was reported to have worsening hand writing and orientation, repeat ICANS 6/10.     Ordered for additional 10mg IV dexamethasone (which will be given 6 hours after last dose) and pt will continue to have q4 hour neurotoxicity checks.     Raza Samuel MD   Hematology/Oncology Fellow PGY5  Pager: [phone]

## 2023-10-26 NOTE — PROGRESS NOTES
"                                                  Teclistamab Assessment  Patient is Alert & Oriented x3. Unable to state year. There are signs of headache, muscle spasms or rigidity, or vision changes. No.        ICANS score     Score:   3   Orientation: Orientation to year, month, city, hospital: 4 points (1 point each)   3   Identify: Name 3 objects that nurse points to (e.g. clock, pen, button): 3 points (1 point each)   1   Following Commands: (e.g. show me two fingers or close your eyes and stick out your tongue): 1 point   0.5   Writing: Ability to write a standard sentence (see writing page \"The nichols jumped over the log.\"): 1 point Improvement from previous, but not at baseline.    0   Attention: Count backwards from 100 by 10s: 1 point     7.5   Total: Max 10, no impairment, reassess next shift                   **9 or below Notify MD and reassess in 4 hours       Cytokine Release Syndrome Assessment    Cytokine Release Syndrome: a potentially life threatening toxicity after receiving immune based therapy for cancer treatment. Severity varies from grade 1 to grade 4.      There are YES, current signs of CRS (pyrexia/fever, headache, nausea, hypotension, dyspnea, anxiety, increased liver enzymes, increased bilirubin). Mild nausea,  compazine given.     **Will continue to monitor for s/sx of neurotoxicity and CRS.  If these occur, please call the Attending MD for recommendations and further instruction. CRS and neurotoxicity adverse reaction protocols/grading per package insert guidelines.    "

## 2023-10-26 NOTE — PROGRESS NOTES
Provider Jayden leary notified via [employer] [id number].      FYI recent ICANS score 8/10, improvement from previous. Did complain of nausea but was relieved w/ compazine. /48, notifying per parameters.     Chandler VALIENTE     Fellow RAINER RICE also notified via [facility]222 regarding ICANS.

## 2023-10-26 NOTE — PROGRESS NOTES
"Notified by RN about ICANS score of 6. They spoke with the hematology team overnight and started dex 10mg Q6H  I did examine her as well.  The main issue seems to be her inability to follow commands that is interfering with the CNS exam  Bilateral UE and LE are 5/5. Handgrip is 100%. But it took patient several tries to understand that I was asking her to  my fingers.   Similarly, with the finger to nose test, she accurately was able to touch the tip of her nose with both fingers, but kept staring at me when asked to touch my finger. She attempted several times, ended up touching her R and L index finger tips and trying to touch the RN'S fingers once.   She was noted to have a fine tremor in bilateral UE  Noted that she felt \"off\" and that her handwriting and memory has worsened and she was quite concerned about why this was happening. Explained multiple times that this is med-related and that's why we're monitoring her closely with these checks and also that we're giving steroids to Rx her. Despite these reassurances, she felt a bit paranoid and was worried about me and her RN keeping something from her and \"whispering\" behind her back  In addition to neurotox, also asked RN to monitor her for confusion/delirium/insomnia/HTN from steroids as well  For her elevated BP ~4:30am, plan to give her AM metop early and recheck. If still high will order PRNs- patient notes no HA/CP/Dyspnea/blurry vision w her glasses at this time  "

## 2023-10-26 NOTE — PROGRESS NOTES
"                                                                                         Teclistamab Assessment  Patient is Alert & Oriented x3. There are signs of headache, muscle spasms or rigidity, or vision changes. Yes: tremors - at baseline.        ICANS score     Score:   3   Orientation: Orientation to year, month, city, hospital: 4 points (1 point each) Unable to say correct year  3   Identify: Name 3 objects that nurse points to (e.g. clock, pen, button): 3 points (1 point each)   1   Following Commands: (e.g. show me two fingers or close your eyes and stick out your tongue): 1 point   1   Writing: Ability to write a standard sentence (see writing page \"The nichols jumped over the log.\"): 1 point   Writing improved from previous test  0   Attention: Count backwards from 100 by 10s: 1 point Unable to perform    8     Total: Max 10, no impairment, reassess next shift                   9 or below Notify MD and reassess in 4 hours     Brit Gonzáles CNP notified       Cytokine Release Syndrome Assessment    Cytokine Release Syndrome: a potentially life threatening toxicity after receiving immune based therapy for cancer treatment. Severity varies from grade 1 to grade 4.      There are No current signs of CRS (pyrexia/fever, headache, nausea, hypotension, dyspnea, anxiety, increased liver enzymes, increased bilirubin).    Will continue to monitor for s/sx of neurotoxicity and CRS.  If these occur, please call the Attending MD for recommendations and further instruction. CRS and neurotoxicity adverse reaction protocols/grading per package insert guidelines.       1430 - Libby has been doing well this am.  Mentation appears to be a little better and able to follow commands better.  Does remain forgetful.  Writing improved since last test.  Bed alarm on  Is walking steady when up to BR.  C/O shoulder and arm pain.  On Methadone, Oxycodone and Robaxin.   Eating well and friend is here.    "

## 2023-10-26 NOTE — PROVIDER NOTIFICATION
"Paged heme fellow Dr. Samuel: \"Scored a 6 on ICANS. Handwriting is significantly worse. Orientation is worsening. Intermittently follows commands & answers some questions right but otherwise is not making sense at all.\"    Also updated crosscover Dr. Morrison.     Response: Fellow called back, ordered 1 time 10mg IV dex (as recommended by drug handout found on unit) and to FYI with any continued changes. Prince offered to assess at bedside.   "

## 2023-10-26 NOTE — PROGRESS NOTES
Swing 2 Crosscover:  Paged by nursing to request moisturizing eyedrops and adjustment of oxycodone range to 5 to 10 mg p.o. every 6 hours as needed, both ordered.  Repeat cams score of 7.5, per signout from primary team patient has had an iCan score today of 7-8 and they did not recommend changes to management with an iCan score persisting in this range.    Codi Riggs MD  [phone]

## 2023-10-26 NOTE — PLAN OF CARE
[phone]  Goal Outcome Evaluation:    Plan of Care Reviewed With: patient. Overall Patient Progress: decliningOverall Patient Progress: declining. Outcome Evaluation: ICANS drop to 6/10, disoriented, unable to count backwards by 10.    Hypertensive high of 176/48. Robaxin given for muscle spasms, oxy given x1 w/ relief. Mild headache relieved w/ tylenol. Mild nausea, compazine given w/ relief. MD aware per CRS guidelines. ICANS 6/10. Disoriented to year, hospital, unable to count back by 10 from 100, handwriting changed from baseline (see note). Significant tremors in hands/arms, pt states not increase from baseline. Fellow came to bedside to assess. x1 IV dex ordered, started on keppra BID. Repeat ICANS score 8/10. Continue q4 checks until score is 10/10. Bed alarm on overnight, pt agreeable to calling but very forgetful.

## 2023-10-26 NOTE — PROGRESS NOTES
"                                                  Teclistamab Assessment  Patient is Alert & Oriented x2. There are signs of headache, muscle spasms or rigidity, or vision changes. Continued tremors, unchanged from prior. No HA. Vision unchanged from previous.        ICANS score     Score:   2   Orientation: Orientation to year, month, city, hospital: 4 points (1 point each)   3   Identify: Name 3 objects that nurse points to (e.g. clock, pen, button): 3 points (1 point each)   1   Following Commands: (e.g. show me two fingers or close your eyes and stick out your tongue): 1 point   0   Writing: Ability to write a standard sentence (see writing page \"The nichols jumped over the log.\"): 1 point   0   Attention: Count backwards from 100 by 10s: 1 point     6   Total: Max 10, no impairment, reassess next shift                   **9 or below Notify MD and reassess in 4 hours       Cytokine Release Syndrome Assessment    Cytokine Release Syndrome: a potentially life threatening toxicity after receiving immune based therapy for cancer treatment. Severity varies from grade 1 to grade 4.      There are NO current signs of CRS (pyrexia/fever, headache, nausea, hypotension, dyspnea, anxiety, increased liver enzymes, increased bilirubin).    **Will continue to monitor for s/sx of neurotoxicity and CRS.  If these occur, please call the Attending MD for recommendations and further instruction. CRS and neurotoxicity adverse reaction protocols/grading per package insert guidelines.   "

## 2023-10-26 NOTE — SIGNIFICANT EVENT
Significant Event Note    Time of event: 9:30 PM October 25, 2023    Description of event:  Patients ICANS score dropped to 6/10, Dr Dhaliwal did come by and perform an assessment at bedside with the fellow     Plan:  A dose of decadron was given and ICANS was reassessed     Discussed with: bedside nurse    Alphonso Crawford MD

## 2023-10-26 NOTE — PROGRESS NOTES
"                                                  Teclistamab Assessment  Patient is Alert & Oriented x 2-3. Intermittently disoriented to situation. There are signs of headache, muscle spasms or rigidity, or vision changes- baseline tremors       ICANS score     Score:   3   Orientation: Orientation to year, month, city, hospital: 4 points (1 point each) -answered month on second attempt, disoriented to year  3   Identify: Name 3 objects that nurse points to (e.g. clock, pen, button): 3 points (1 point each)   0   Following Commands: (e.g. show me two fingers or close your eyes and stick out your tongue): 1 point -difficulty following 2 step commands  0   Writing: Ability to write a standard sentence (see writing page \"The nichols jumped over the log.\"): 1 point -unable  0   Attention: Count backwards from 100 by 10s: 1 point -unable    6   Total: Max 10, no impairment, reassess next shift                   **9 or below Notify MD and reassess in 4 hours                         Dr. Morrison present for assessment    Cytokine Release Syndrome Assessment    Cytokine Release Syndrome: a potentially life threatening toxicity after receiving immune based therapy for cancer treatment. Severity varies from grade 1 to grade 4.      There are NO current signs of CRS (pyrexia/fever, headache, nausea, hypotension, dyspnea, anxiety, increased liver enzymes, increased bilirubin). Pt is hypertensive, gave AM metoprolol early.    **Will continue to monitor for s/sx of neurotoxicity and CRS.  If these occur, please call the Attending MD for recommendations and further instruction. CRS and neurotoxicity adverse reaction protocols/grading per package insert guidelines.   "

## 2023-10-26 NOTE — PROVIDER NOTIFICATION
Provider Codi Riggs notified via [employer] [id number].     FYI most recent ICANS score 7.5. Unable to count back from 100, disoriented to year. Handwriting improved from previous but not quite back to baseline.     Pt requesting additional eyedrops for dryness. Can you also modify oxy order to include 5-10 mg range.     Chandler VALIENTE     Plan: No change to management for ICANS score 7-8 per primary note. Eyedrops added, oxy order modified.       Provider Oneil notified via [id number].    FYI last ICANS 7/10, no significant change. Mild nausea, compazine and zofran have been given.

## 2023-10-27 NOTE — PROGRESS NOTES
"                                                  Teclistamab Assessment  Patient is Alert & Oriented x3 (disoriented to year). There are signs of headache, muscle spasms or rigidity, or vision changes. No HA, baseline tremors, no vision changes        ICANS score     Score:   3   Orientation: Orientation to year, month, city, hospital: 4 points (1 point each)   3   Identify: Name 3 objects that nurse points to (e.g. clock, pen, button): 3 points (1 point each)   1   Following Commands: (e.g. show me two fingers or close your eyes and stick out your tongue): 1 point   1   Writing: Ability to write a standard sentence (see writing page \"The nichols jumped over the log.\"): 1 point   0   Attention: Count backwards from 100 by 10s: 1 point     8   Total: Max 10, no impairment, reassess next shift                   **9 or below Notify MD and reassess in 4 hours       Cytokine Release Syndrome Assessment    Cytokine Release Syndrome: a potentially life threatening toxicity after receiving immune based therapy for cancer treatment. Severity varies from grade 1 to grade 4.      There are NO current signs of CRS (pyrexia/fever, headache, nausea, hypotension, dyspnea, anxiety, increased liver enzymes, increased bilirubin).    **Will continue to monitor for s/sx of neurotoxicity and CRS.  If these occur, please call the Attending MD for recommendations and further instruction. CRS and neurotoxicity adverse reaction protocols/grading per package insert guidelines.   "

## 2023-10-27 NOTE — PLAN OF CARE
[id number]  Goal Outcome Evaluation:    Plan of Care Reviewed With: patient. Overall Patient Progress: improvingOverall Patient Progress: improving. Outcome Evaluation: ICANS improved 7/10. Disoriented to time however logical when speaking. Handwriting improved.    BP stable, started on amlodipine daily. Mild nausea, compazine and zofran given w/ relief. Oxy given x1 for pain. Heat applied. Denies SOB. Dizzy w/ movement, worked w/ PT. Bed alarm on. Disoriented to time. ICANS scoring 7/10. Handwriting improved from overnight but not back to baseline. Continue q4 assessments.

## 2023-10-27 NOTE — PROGRESS NOTES
"                                                  Teclistamab Assessment  Patient is Alert & Oriented x3. Unable to state year. There are signs of headache, muscle spasms or rigidity, or vision changes. No, pt states blurry vision occasionally but not new since admission. Pt states needing new glasses prescription.        ICANS score     Score:   3   Orientation: Orientation to year, month, city, hospital: 4 points (1 point each)   3   Identify: Name 3 objects that nurse points to (e.g. clock, pen, button): 3 points (1 point each)   1   Following Commands: (e.g. show me two fingers or close your eyes and stick out your tongue): 1 point   0   Writing: Ability to write a standard sentence (see writing page \"The nichols jumped over the log.\"): 1 point   0   Attention: Count backwards from 100 by 10s: 1 point     7   Total: Max 10, no impairment, reassess next shift                   **9 or below Notify MD and reassess in 4 hours       Cytokine Release Syndrome Assessment    Cytokine Release Syndrome: a potentially life threatening toxicity after receiving immune based therapy for cancer treatment. Severity varies from grade 1 to grade 4.      There are YES, current signs of CRS (pyrexia/fever, headache, nausea, hypotension, dyspnea, anxiety, increased liver enzymes, increased bilirubin). Mild nausea, zofran given.     **Will continue to monitor for s/sx of neurotoxicity and CRS.  If these occur, please call the Attending MD for recommendations and further instruction. CRS and neurotoxicity adverse reaction protocols/grading per package insert guidelines.    "

## 2023-10-27 NOTE — PROGRESS NOTES
"                                                  Teclistamab Assessment  Patient is Alert & Oriented x3. There are signs of headache, muscle spasms or rigidity, or vision changes. No.        ICANS score     Score:   3   Orientation: Orientation to year, month, city, hospital: 4 points (1 point each) Stated it was 2003  3   Identify: Name 3 objects that nurse points to (e.g. clock, pen, button): 3 points (1 point each)   1   Following Commands: (e.g. show me two fingers or close your eyes and stick out your tongue): 1 point   1   Writing: Ability to write a standard sentence (see writing page \"The nichols jumped over the log.\"): 1 point Improved from previous writing this morning  0   Attention: Count backwards from 100 by 10s: 1 point \"100, 29, 28, 26... then asked again to count back by 10's starting at 100, then stated repeated the same\"     8   Total: Max 10, no impairment, reassess next shift                   **9 or below Notify MD and reassess in 4 hours DAVID Gonzáles notified.       Cytokine Release Syndrome Assessment    Cytokine Release Syndrome: a potentially life threatening toxicity after receiving immune based therapy for cancer treatment. Severity varies from grade 1 to grade 4.      There are No current signs of CRS (pyrexia/fever, headache, nausea, hypotension, dyspnea, anxiety, increased liver enzymes, increased bilirubin).    **Will continue to monitor for s/sx of neurotoxicity and CRS.  If these occur, please call the Attending MD for recommendations and further instruction. CRS and neurotoxicity adverse reaction protocols/grading per package insert guidelines.    "

## 2023-10-27 NOTE — PROGRESS NOTES
"                                                  Teclistamab Assessment  Patient is Alert & Oriented x3. Disoriented to year. There are signs of headache, muscle spasms or rigidity, or vision changes. No.        ICANS score     Score:   3   Orientation: Orientation to year, month, city, hospital: 4 points (1 point each)   3   Identify: Name 3 objects that nurse points to (e.g. clock, pen, button): 3 points (1 point each)   1   Following Commands: (e.g. show me two fingers or close your eyes and stick out your tongue): 1 point   0.5   Writing: Ability to write a standard sentence (see writing page \"The nichols jumped over the log.\"): 1 point   0   Attention: Count backwards from 100 by 10s: 1 point     7.5   Total: Max 10, no impairment, reassess next shift                   **9 or below Notify MD and reassess in 4 hours       Cytokine Release Syndrome Assessment    Cytokine Release Syndrome: a potentially life threatening toxicity after receiving immune based therapy for cancer treatment. Severity varies from grade 1 to grade 4.      There are Yes, mild nausea current signs of CRS (pyrexia/fever, headache, nausea, hypotension, dyspnea, anxiety, increased liver enzymes, increased bilirubin).    **Will continue to monitor for s/sx of neurotoxicity and CRS.  If these occur, please call the Attending MD for recommendations and further instruction. CRS and neurotoxicity adverse reaction protocols/grading per package insert guidelines.    "

## 2023-10-27 NOTE — PLAN OF CARE
Goal Outcome Evaluation:    00:00-07:00 AM   AF  Hypertensive this morning with /58 on left arm and 182/71 on right arm.  Scheduled am Nifedipine given and will follow up BP.  Notify provider if remains hypertensive SBP >160.  OVSS on room air.  Pt alert and oriented excepted to place.  Bed alarm on for safety.  Last dose of Teclistamab on 10/24  Q 4 HR ICANS score 7.5 and 8.0 this shift, slightly improving this morning.  Denied headache or dizziness.  No nausea/vomiting.  No evidence of CRS thus far. Tylenol for bilateral shoulders and lower back discomfort.  Appears to be resting/sleeping comfortably well.  Voiding spontaneously, not saving urine.  No BM this shift.  Continue to closely monitor for ICANS and CRS q 4 hrs.  Continue w/POC

## 2023-10-27 NOTE — PLAN OF CARE
[id number]    Day 4 Teclistamab. VSS, afebrile and on RA. A&Ox3-4, forgetful at times. Continue with ICANS q4 hrs, both times assessed pt scored 8/10, provider notified both times. Dose #2 of Teclisamab held until ICANS becomes 10/10. Bed alarm on for safety. Intermittent nausea, gave zofran x1 with effectiveness. Rated 8/10 neck, shoulder & hip pain. Gave 10 mg Oxy x2, pt reports 5 mg ineffective. No SOB reported. Fair appetite, had late breakfast. Voiding spontaneously, not saving. LBM 10/26. Continue to monitor & w/ POC.     Goal Outcome Evaluation:      Plan of Care Reviewed With: patient    Overall Patient Progress: no change    Outcome Evaluation: ICANS q4 were both 8/10, provider aware.

## 2023-10-27 NOTE — PROGRESS NOTES
[facility]    Hematology / Oncology Progress Note  Date of Admission: 10/24/2023  Hospital Day # 3  Date of Service (when I saw the patient): 10/27/2023     Assessment & Plan   Libby Grimaldo is a 73 year old female with a medical history of high risk IgG Lambda multiple myeloma not achieving remission s/p Auto-PBSCT in 2022 (on palliative Erlo/Pom/Dex), Grave's disease, ILD/COPD, HTN, and HLD. Her last therapy was Elotuzumab where she received 5 cycles, but due to rising lambda chains that were consistent with progression, Teclistamab recommended. She is being admitted for cycle 1 day 1 of Teclistamab. She received Zarxio in clinic day prior to admission.      Today  - D4 Teclistamab. Patient feels as she is tolerating well. Will hold D4 dose today. Continue to reassess CRS and ICANS q4hrs or suspected change in mental status until score is 10/10.  - ICANS score 7-8/10 overnight. Continues to have difficulty orienting to year, writing, and attention. Writing may be due to old glasses prescription and was poor at baseline; discussed at rounds that if that was the only part of ICANS she was having difficulty with, could discuss continuing with next dose of Teclistamab. She was able to follow commands of touching her finger to her nose without difficulty. She appears much more similar to her baseline when she was admitted. Noted mild nausea alleviated with compazine and shoulder/neck/headache; tylenol helped. Will continue to monitor closely with ICANS and CRS every 4 hours.       HEME  #IgG Lambda Multiple Myeloma, High-risk. S/p APBST 2022  Follows with Dr. Guillory.  Initially diagnosed in 11/2018, completed KRD x 6 cycles, rev/Dex (20), Velcade maintenance, revlimid maintenance, with evidence of disease BmBx 11/2020 (35% plasma cells) and PET CT with new lytic lesions. Then treated with kameron, Kd x 6 cycles, subcutaneous kameron/IVIG/denosumab monthly. S/p autologous  peripheral blood stem cell transplant in 2022. Followed by melphalan, ixazomib, and then started on palliative elotuzumab, pomolidomide, and dexamethasone chemotherapy 4/2023 with most recent dose (C3D1) 6/26. She presented to heme/onc clinic 8/8 to re-establish treatment schedule and sent to ED and admitted for sepsis, confusion, and weakness. She was started on Zosyn and vancomycin with improvement in symptoms, and transitioned to oral Levaquin 8/10/23. She was discharged with outpatient PT. C5 was schedukled for 9/20 but patient cancelled appt due to a fall; started C5D1 9/27. Due to rising lambda chains that were consistent with progression, Teclistamab recommended. Admitted for C1D1 of Teclistamab 10/24. Received Zarxio in clinic 10/23.     Patient had grade 1 ICANS after D1 that progressed to Grade 2 overnight on D3 (10/26). Heme/onc team notified and ordered Dexamethasone 10 mg x2 and started Keppra BID. Noted by the medicine team when they saw her she had difficultly following commands such as touching her nose with her finger. When reassessed in AM, ICANS 7/10 and 8/10 by RN. She was able to follow finger-to-nose and finger-to-finger commands, thorough neuro exam consistent with previous days. Of note, she has a tremor at baseline but noted increased on D2. Patient mentions a mild headache in AMs that has been alleviated by tylenol. She does have a baseline of difficulty walking due to chronic back/hip pain and neuropathy, she says she notices no changes from her baseline. Patient uses a walker.     - Will continue to monitor and reassess ICANS and CRS scoring every 4 hours or suspected change in mental status until ICANS score of 10.      Teclistimab guidelines:                - Dosing as per treatment plan below.               - Pre-medications with zofran 8 mg, dexamethasone 16 mg, benadryl 50 mg and APAP 650 mg                - Nursing monitoring:                             - Vitals q1h before and  "after administration and q4h thereafter                            - ICANS monitoring qshift (order on admission)               - Start Allopurinol for TLS ppx.                - Start  mg BID. Additional antimicrobial ppx if neutropenic.                - CRS and neurotoxicity adverse reaction protocols/grading per package insert guidelines.                - In summary for CRS, for grades 1 (fever alone) and 2 (fever + hypotension responsive to fluids and/or low-flow O2 requirement) pursue supportive management, infectious workup and hold subsequent dose until resolution. With grade 3 (single pressor requirement and/or high-flow O2) or grade 4 (multiple pressors and/or biPAP/intubation), will consider tocilizumab after discussion with on-call staff.                - In summary for neurotoxicity, grade 1 (ICANS 7-9), hold subsequent dose until resolution and consider keppra ppx/further intervention after discussion with staff on-call. For grade 2 (ICANS 3-6), start dexamethasone 10 mg q6h and discuss with on-call staff.                  Treatment Plan: Teclistimab (C1D1 = 10/24/2023)               - Teclistimab 6.2 mg (0.06 mg/kg) subcutaneous injection - D1               - Teclistimab 31 mg (0.3 mg/kg) subcutaneous injection  - D4               - Teclistimab 154 mg (1.5 mg/kg) subcutaneous injection - D7               - Pre-meds (D1, 4, 7): Zofran 8 mg, dex 16 mg, benadryl 50 mg, APAP 650 mg      Ppx:  - PTA Acyclovir 400 mg BID  - PJP ppx bactrim BID on Monday/Tuesdays  - Levofloxacin 250 mg PO- if ANC <1000  - Antifungal ppx, held d/t interaction with Methadone, Micafungin while admitted     #Pancytopenia   - Transfuse to maintain Hgb >7, Plt >10K  - Blood consent signed and in patient chart.     #Relevant thrombosis or bleeding history     2/15/22: new non-occlusive LUE DVT. No longer on xarelto. On ASA.      NEURO  #Neuropathy  Patient has reported increased falls due to feeling her feet are \"heavy\" and " feel like there is something on her feet. She spoke about this at her outpatient oncology appointment 10/23; prescribed gabapentin 100 mg BID.  - Hold starting Gabapentin 100 mg BID until discharge from hospital     CARDIOVASCULAR  #Elevated blood pressures  She has had consistently elevated BP since late afternoon admission on 10/25. Early AM on 10/26 systolic ranged from 154-194; of note, she had dex 10mg x 2 during this time. Discussed BP management at rounds; attending recommended to start Amlodipine 5 mg PO every day.   - Continue to monitor.     #Risk of cardiomyopathy   Baseline EF 57% done January 2022. Patient being followed by cardiology. Last echo done May 2023 with EF 55%-60%.      #Hyperlipidemia   Previously on Crestor but not on med list, patient confirmed - can follow-up with PCP if needed     #History of Afib   Now in NSR.   PTA metoprolol XL 25 mg/d      PULM  #COPD  COPD with 40pyr, quit 2010. Previously documented to have idiopathic pulmonary fibrosis, however most recent PFTs (1/26/22) were normal. Has previously required treatment for COPD exacerbation with pneumonia. Some expiratory wheezing on exam improved with Duonebs; continue home inhaler regimen.  - PTA Trelegy Ellipta inhaler qd  - PTA Albuterol BID PRN     ENDO  #Hypothyroidism  #History of Graves Disease  History of hypothyroidism 2/2 treatment for Graves Disease (patient reports surgery in [place], however not thyroidectomy). Stable dose of levothyroxine over the past four years. Admission TSH (2.74).   - PTA Levothyroxine 112mcg every day     MISC.  #Generalized Anxiety Disorder  - PTA Venlafaxine 75mg TID      #Pain Assessment  - PTA prn oxycodone.      #Opiate dependence  - PTA Methadone 10mg TID     #Dry Eye  Patient noted dry eyes and requested eye drops.   - Carboxymethylcellulose PF 0.5 % ophthalmic solution 1 drop TID PRN    Fluids/Electrolytes/Nutrition   - IVF per chemotherapy regimen  - PRN lyte replacement per standing  protocol  - Regular diet as tolerated     Lines: None. Port placed on right chest wall.     PPX  VTE: Enoxaparin 40 mg every day. Hold if plts < 50k  GI: n/a  Bowels: Senna and miralax PRN    Code: Full code    Dispo: Patient must be admitted for at least 48 hours after her last dose of Teclistamab with ICANS and CRS grade 0. Will continue to monitor how patient responds clinically.     I spent >45 minutes face-to-face and/or coordinating or discussing care plan. Over 50% of our time on the unit was spent counseling the patient and/or coordinating care    Patient is seen and examined by Dr. Fortune and I.  Assessment and plan are discussed and delivered to the patient.    Carline Gonzáles PA-C  Hematology/Oncology  Pager: [phone]    Interval History   Nursing notes reviewed. Labs and imaging reviewed. Libby was relaxing in her bed watching TV this AM.  She appears similar to her baseline when she was admitted to the hospital. She continues to have numbness and tingling at baseline from neuropathy, patient noted no increase from baseline. Noted some mild nausea that was alleviated with PRN compazine.  She noted a neck/shoulder/headache this AM; PRN tylenol. Denies fever, chills, mouth sores, SOB, cough, abdominal pain, diarrhea, constipation, vomiting, dysuria, hematuria, swelling, diplopia, changes in vision (patient does wear glasses). All questions and concerns addressed at bedside.     Complete and Comprehensive review of systems review and negative other than noted here or in the HPI.     Physical Exam   Temp: 98.3  F (36.8  C) Temp src: Oral BP: 136/57 Pulse: 76   Resp: 16 SpO2: 97 % O2 Device: None (Room air)    Vitals:    10/24/23 1158 10/26/23 0836 10/27/23 0852   Weight: 51.1 kg (112 lb 9.6 oz) 50 kg (110 lb 3.2 oz) 50.6 kg (111 lb 8 oz)     Vital Signs with Ranges  Temp:  [97.3  F (36.3  C)-98.4  F (36.9  C)] 98.3  F (36.8  C)  Pulse:  [72-83] 76  Resp:  [16] 16  BP: (122-182)/(55-74) 136/57  SpO2:  [96  "%-98 %] 97 %  No intake/output data recorded.      Physical Exam:    Blood pressure 136/57, pulse 76, temperature 98.3  F (36.8  C), temperature source Oral, resp. rate 16, height 1.562 m (5' 1.5\"), weight 50.6 kg (111 lb 8 oz), SpO2 97%.    General: Lying in bed. Awake and conversational. Nontoxic appearing. No acute distress.   HEENT: sclera anicteric, EOM intact, MMM. Exophthalmos and bilateral pupil dilation that is reactive to light, noted by patient that this is baseline. Patient wears glasses.  Neck: supple, normal ROM  CV: RRR, normal S1/S2, no m/r/g. No peripheral edema.   Resp: CTAB. No wheezing/crackles. Breath sounds are equal throughout. Normal respiratory effort on room air  GI: Soft, non-tender, non-distended. Bowel sounds present and normoactive  MSK: Warm and well-perfused. Normal tone. Strength is equal throughout. Uses walker.  Skin: No rashes on limited exam. No jaundice  Neuro: Alert and interactive, moves all extremities equally, no focal deficits. Sensations are equal throughout. Increase from baseline tremor of hands.   Vascular Access: Port placed on the right chest wall.       Medications    - MEDICATION INSTRUCTIONS -        [Held by provider] acetaminophen  650 mg Oral Q72H    acyclovir  400 mg Oral BID    allopurinol  300 mg Oral Daily    amLODIPine  5 mg Oral Daily    [Held by provider] dexAMETHasone  16 mg Oral Q72H    [Held by provider] diphenhydrAMINE  50 mg Oral Q72H    enoxaparin ANTICOAGULANT  40 mg Subcutaneous Q24H    fluticasone-vilanterol  1 puff Inhalation Daily    And    umeclidinium  1 puff Inhalation Daily    folic acid-vit B6-vit B12  1 tablet Oral Daily    levETIRAcetam  750 mg Oral BID    levofloxacin  250 mg Oral Daily    levothyroxine  112 mcg Oral Daily    methadone  10 mg Oral TID    metoprolol succinate ER  25 mg Oral Daily    micafungin  50 mg Intravenous Q24H    [Held by provider] ondansetron  8 mg Oral Q72H    pantoprazole  40 mg Oral Daily    " sulfamethoxazole-trimethoprim  1 tablet Oral Q Mon Tues BID    [Held by provider] teclistamab-cqyv  0.3 mg/kg (Treatment Plan Recorded) Subcutaneous Once    [Held by provider] teclistamab-cqyv  1.5 mg/kg (Treatment Plan Recorded) Subcutaneous Once    venlafaxine  75 mg Oral BID    ascorbic acid  1,000 mg Oral Daily    Vitamin D3  25 mcg Oral Daily       Data   Results for orders placed or performed during the hospital encounter of 10/24/23 (from the past 24 hour(s))   CBC with platelets differential    Narrative    The following orders were created for panel order CBC with platelets differential.  Procedure                               Abnormality         Status                     ---------                               -----------         ------                     CBC with platelets and d...[[id number]]  Abnormal            Final result                 Please view results for these tests on the individual orders.   Comprehensive metabolic panel   Result Value Ref Range    Sodium 143 135 - 145 mmol/L    Potassium 4.2 3.4 - 5.3 mmol/L    Carbon Dioxide (CO2) 27 22 - 29 mmol/L    Anion Gap 7 7 - 15 mmol/L    Urea Nitrogen 15.3 8.0 - 23.0 mg/dL    Creatinine 1.23 (H) 0.51 - 0.95 mg/dL    GFR Estimate 47 (L) >60 mL/min/1.73m2    Calcium 9.2 8.8 - 10.2 mg/dL    Chloride 109 (H) 98 - 107 mmol/L    Glucose 76 70 - 99 mg/dL    Alkaline Phosphatase 67 35 - 104 U/L    AST 16 0 - 45 U/L    ALT 8 0 - 50 U/L    Protein Total 5.7 (L) 6.4 - 8.3 g/dL    Albumin 3.2 (L) 3.5 - 5.2 g/dL    Bilirubin Total 0.2 <=1.2 mg/dL   INR   Result Value Ref Range    INR 0.99 0.85 - 1.15   Fibrinogen activity   Result Value Ref Range    Fibrinogen Activity 279 170 - 490 mg/dL   Uric acid   Result Value Ref Range    Uric Acid 2.8 2.4 - 5.7 mg/dL   Partial thromboplastin time   Result Value Ref Range    aPTT 26 22 - 38 Seconds   Magnesium   Result Value Ref Range    Magnesium 1.8 1.7 - 2.3 mg/dL   Phosphorus   Result Value Ref Range    Phosphorus  4.2 2.5 - 4.5 mg/dL   CBC with platelets and differential   Result Value Ref Range    WBC Count 3.1 (L) 4.0 - 11.0 10e3/uL    RBC Count 2.35 (L) 3.80 - 5.20 10e6/uL    Hemoglobin 8.2 (L) 11.7 - 15.7 g/dL    Hematocrit 25.9 (L) 35.0 - 47.0 %     (H) 78 - 100 fL    MCH 34.9 (H) 26.5 - 33.0 pg    MCHC 31.7 31.5 - 36.5 g/dL    RDW 15.3 (H) 10.0 - 15.0 %    Platelet Count 128 (L) 150 - 450 10e3/uL    % Neutrophils 61 %    % Lymphocytes 16 %    % Monocytes 18 %    % Eosinophils 1 %    % Basophils 1 %    % Immature Granulocytes 3 %    NRBCs per 100 WBC 1 (H) <1 /100    Absolute Neutrophils 1.9 1.6 - 8.3 10e3/uL    Absolute Lymphocytes 0.5 (L) 0.8 - 5.3 10e3/uL    Absolute Monocytes 0.6 0.0 - 1.3 10e3/uL    Absolute Eosinophils 0.0 0.0 - 0.7 10e3/uL    Absolute Basophils 0.0 0.0 - 0.2 10e3/uL    Absolute Immature Granulocytes 0.1 <=0.4 10e3/uL    Absolute NRBCs 0.0 10e3/uL

## 2023-10-27 NOTE — PROGRESS NOTES
"                                                  Teclistamab Assessment  Patient is Alert & Oriented x4. There are signs of headache, muscle spasms or rigidity, or vision changes.     ICANS score     Score:   4   Orientation: Orientation to year, month, city, hospital: 4 points (1 point each)   3   Identify: Name 3 objects that nurse points to (e.g. clock, pen, button): 3 points (1 point each)   1   Following Commands: (e.g. show me two fingers or close your eyes and stick out your tongue): 1 point   0   Writing: Ability to write a standard sentence (see writing page \"The nichols jumped over the log.\"): 1 point Handwriting was worse than previous  0   Attention: Count backwards from 100 by 10s: 1 point Unable to complete \"30, 20, 10, 11..)    8   Total: Max 10, no impairment, reassess next shift                   **9 or below Notify MD and reassess in 4 hours DAVID Gonzáles notified, Dose #2 of Teclistimab & pre-meds held, plan to administer once ICANS is 10/10.       Cytokine Release Syndrome Assessment    Cytokine Release Syndrome: a potentially life threatening toxicity after receiving immune based therapy for cancer treatment. Severity varies from grade 1 to grade 4.      There are No current signs of CRS (pyrexia/fever, headache, nausea, hypotension, dyspnea, anxiety, increased liver enzymes, increased bilirubin). Pt stated she was a bit nauseated, however stated it is baseline in the morning.    **Will continue to monitor for s/sx of neurotoxicity and CRS.  If these occur, please call the Attending MD for recommendations and further instruction. CRS and neurotoxicity adverse reaction protocols/grading per package insert guidelines.    "

## 2023-10-27 NOTE — PLAN OF CARE
Goal Outcome Evaluation:      Plan of Care Reviewed With: patient    Overall Patient Progress: no changeOverall Patient Progress: no change    Outcome Evaluation: ICANS q4    Day 4 Teclistamab, AxO 3-4 intermittent confusion at times. VSS, afebrile, denies n/v, pain rated consistently 8/10 - prn robaxin, tyl x1 and scheduled methadone   ICANS Q4 - 8 and 7/10 provider notified   Bed alarm on for safety  Cap changed, HL  Fair appetite, last BM 10/26   No acute events continue to monitor and follow POC

## 2023-10-27 NOTE — PROGRESS NOTES
"       Teclistamab Assessment  Patient is Alert & Oriented x4. There are signs of headache, muscle spasms or rigidity, or vision changes. Yes- complaints of headache          ICANS score      Score:   3   Orientation: Orientation to year, month, city, hospital: 4 points (1 point each) -- Stated she was here at the [facility] but believes she is at [facility]   3   Identify: Name 3 objects that nurse points to (e.g. clock, pen, button): 3 points (1 point each)   1   Following Commands: (e.g. show me two fingers or close your eyes and stick out your tongue): 1 point   1   Writing: Ability to write a standard sentence (see writing page \"The nichols jumped over the log.\"): 1 point Improved from previous writing this morning  0   Attention: Count backwards from 100 by 10s: 1 point \"100, 90,80... 30, 8, 20, 10, 10... then had to think about it and repeated similar sequence \"       8 Total: Max 10, no impairment, reassess 2100                  **9 or below Notify MD and reassess in 4 hours MD Santastaff notified.         Cytokine Release Syndrome Assessment     Cytokine Release Syndrome: a potentially life threatening toxicity after receiving immune based therapy for cancer treatment. Severity varies from grade 1 to grade 4.      CRS (pyrexia/fever, headache, nausea, hypotension, dyspnea, anxiety, increased liver enzymes, increased bilirubin). - Pt verbalized headache - Dr. Fortune (Onc) in the room at time of assessment      **Will continue to monitor for s/sx of neurotoxicity and CRS.  If these occur, please call the Attending MD for recommendations and further instruction. CRS and neurotoxicity adverse reaction protocols/grading per package insert guidelines.     "

## 2023-10-28 NOTE — PROVIDER NOTIFICATION
"Pgd. Dr. Carr via [place] at 1246    \"Pt requesting anti-anxiety for MRI Could we get a 1 time order for PO Ativan? Thanks\"    Barb [phone]    Outcome: 1 timed PRN 0.25 mg Ativan placed for MRI  "

## 2023-10-28 NOTE — PROGRESS NOTES
"Patient is alert & oriented x3. (Intermittently knows year). There are signs of headache, muscle spasms or rigidity, or vision changes. Yes: C/o slight head ache.    ICANS score/Neurotoxicity assessment  Score:  3   Orientation: Orientation to year, month, city, hospital: 4 points (1 point each) (unable to reliably state year)  3   Identify: Name 3 objects that nurse points to (e.g. clock, pen, button): 3 points (1 point each)  1   Following Commands: (e.g. show me two fingers or close your eyes and stick out your tongue): 1 point  1   Writing: Ability to write a standard sentence (see writing page \"The nichols jumped over the log.\"): 1 point (sentence is correct and at baseline, date not correct for year nor as legible)   0   Attention: Count backwards from 100 by 10s: 1 point     8   Total: Max 10, no impairment, reassess next shift                  **9 or below Notify MD and reassess per provider direction     Cytokine Release Syndrome Assessment  Cytokine Release Syndrome: a potentially life-threatening toxicity after receiving immune based therapy for cancer treatment. Severity varies from grade 1 to grade 4.     There are Yes: slight head ache present, no other CRS s/sx noted.   current signs of CRS (pyrexia/fever, headache, nausea, hypotension, dyspnea, anxiety, increased liver enzymes, increased bilirubin).  **Will continue to monitor for s/sx of neurotoxicity and CRS.  If these occur, please call the Attending MD for recommendations and further instruction. CRS and neurotoxicity adverse reaction protocols/grading per package insert guidelines.        "

## 2023-10-28 NOTE — PROGRESS NOTES
"                                                  Teclistamab Assessment  Patient is Alert & Oriented x4. Originally stated it was 1950 (Her birth year), when prompted again for this year, stated 2023. There are signs of headache, muscle spasms or rigidity, or vision changes. Baseline tremors       ICANS score     Score:   4   Orientation: Orientation to year, month, city, hospital: 4 points (1 point each)   3   Identify: Name 3 objects that nurse points to (e.g. clock, pen, button): 3 points (1 point each)   1   Following Commands: (e.g. show me two fingers or close your eyes and stick out your tongue): 1 point   0   Writing: Ability to write a standard sentence (see writing page \"The nichols jumped over the log.\"): 1 point Writing worse than previous.  0   Attention: Count backwards from 100 by 10s: 1 point Would start \"100, 10, 9, 8...\" when redirected, repeated \"100, 10, 9, 8...\"    8   Total: Max 10, no impairment, reassess next shift                   **9 or below Notify MD and reassess in 4 hours Notified Dr. Carr       Cytokine Release Syndrome Assessment    Cytokine Release Syndrome: a potentially life threatening toxicity after receiving immune based therapy for cancer treatment. Severity varies from grade 1 to grade 4.      There are No current signs of CRS (pyrexia/fever, headache, nausea, hypotension, dyspnea, anxiety, increased liver enzymes, increased bilirubin).    **Will continue to monitor for s/sx of neurotoxicity and CRS.  If these occur, please call the Attending MD for recommendations and further instruction. CRS and neurotoxicity adverse reaction protocols/grading per package insert guidelines.   "

## 2023-10-28 NOTE — PROGRESS NOTES
"                                                  Teclistamab Assessment  Patient is Alert & Oriented x3. (Disoriented to date and year- Knows its Friday in October). There are signs of headache, muscle spasms or rigidity, or vision changes. No.        ICANS score     Score:   3   Orientation: Orientation to year, month, city, hospital: 4 points (1 point each)   3   Identify: Name 3 objects that nurse points to (e.g. clock, pen, button): 3 points (1 point each)   1   Following Commands: (e.g. show me two fingers or close your eyes and stick out your tongue): 1 point   0   Writing: Ability to write a standard sentence (see writing page \"The nichols jumped over the log.\"): 1 point   0   Attention: Count backwards from 100 by 10s: 1 point     7   Total: Max 10, no impairment, reassess next shift                   **9 or below Notify MD and reassess in 4 hours       Cytokine Release Syndrome Assessment    Cytokine Release Syndrome: a potentially life threatening toxicity after receiving immune based therapy for cancer treatment. Severity varies from grade 1 to grade 4.      There are No current signs of CRS (pyrexia/fever, headache, nausea, hypotension, dyspnea, anxiety, increased liver enzymes, increased bilirubin).    **Will continue to monitor for s/sx of neurotoxicity and CRS.  If these occur, please call the Attending MD for recommendations and further instruction. CRS and neurotoxicity adverse reaction protocols/grading per package insert guidelines.   "

## 2023-10-28 NOTE — PLAN OF CARE
"                                                  Teclistamab Assessment  Patient is Alert & Oriented x 2. There are signs of headache, muscle spasms or rigidity, or vision changes. No     ICANS score     Score:   2 Orientation: Orientation to year, month, city, hospital: 4 points (1 point each)   3   Identify: Name 3 objects that nurse points to (e.g. clock, pen, button): 3 points (1 point each)   1   Following Commands: (e.g. show me two fingers or close your eyes and stick out your tongue): 1 point   0   Writing: Ability to write a standard sentence (see writing page \"The nichols jumped over the log.\"): 1 point   0   Attention: Count backwards from 100 by 10s: 1 point     6   Total: Max 10, no impairment, reassess next shift                   **9 or below Notify MD and reassess in 4 hours       Cytokine Release Syndrome Assessment    Cytokine Release Syndrome: a potentially life threatening toxicity after receiving immune based therapy for cancer treatment. Severity varies from grade 1 to grade 4.      There are No current signs of CRS (pyrexia/fever, headache, nausea, hypotension, dyspnea, anxiety, increased liver enzymes, increased bilirubin).    **Will continue to monitor for s/sx of neurotoxicity and CRS.  If these occur, please call the Attending MD for recommendations and further instruction. CRS and neurotoxicity adverse reaction protocols/grading per package insert guidelines.     [id number]  AVSS, alert and oriented x 2, disoriented x to time./situation. Pain at neck/shoulder, given PRN Robaxin and Tylenol with little relief. Up with sba to bathroom. Done MRI this evening.  ICANS score is 6/10, MD notified, needs   Q 4 hrs ICANS at 2000. Chemo is on hold.  Continue to monitor care.    "

## 2023-10-28 NOTE — PROGRESS NOTES
"                                                  Teclistamab Assessment  Patient is Alert & Oriented x3. Disoriented to place, stated she was at [facility]. There are signs of headache, muscle spasms or rigidity, or vision changes. No.        ICANS score     Score:   3   Orientation: Orientation to year, month, city, hospital: 4 points (1 point each) Unable to state which hospital.   3   Identify: Name 3 objects that nurse points to (e.g. clock, pen, button): 3 points (1 point each)   1   Following Commands: (e.g. show me two fingers or close your eyes and stick out your tongue): 1 point   0   Writing: Ability to write a standard sentence (see writing page \"The nichols jumped over the log.\"): 1 point   1   Attention: Count backwards from 100 by 10s: 1 point Started counting back 10, 9, 8... was redirected and was able to perform successfully.     8   Total: Max 10, no impairment, reassess next shift                   **9 or below Notify MD and reassess in 4 hours Dr. Carr notified.       Cytokine Release Syndrome Assessment    Cytokine Release Syndrome: a potentially life threatening toxicity after receiving immune based therapy for cancer treatment. Severity varies from grade 1 to grade 4.      There are No current signs of CRS (pyrexia/fever, headache, nausea, hypotension, dyspnea, anxiety, increased liver enzymes, increased bilirubin).    **Will continue to monitor for s/sx of neurotoxicity and CRS.  If these occur, please call the Attending MD for recommendations and further instruction. CRS and neurotoxicity adverse reaction protocols/grading per package insert guidelines.    "

## 2023-10-28 NOTE — PROGRESS NOTES
Dr. Liang notified @2150 via [employer] 7/10- handwriting is much worse. Unable to write the date [facility] in the lines. Repeated a few of the words. Thanks!

## 2023-10-28 NOTE — PROGRESS NOTES
"                                                  Teclistamab Assessment  Patient is Alert & Oriented x3 (unable to state correct year). There are signs of headache, muscle spasms or rigidity, or vision changes. No, denies h/a at this time.        ICANS score     Score:   3   Orientation: Orientation to year, month, city, hospital: 4 points (1 point each)   3   Identify: Name 3 objects that nurse points to (e.g. clock, pen, button): 3 points (1 point each)   1   Following Commands: (e.g. show me two fingers or close your eyes and stick out your tongue): 1 point   1   Writing: Ability to write a standard sentence (see writing page \"The nichols jumped over the log.\"): 1 point (not able to write year or time correctly)  0   Attention: Count backwards from 100 by 10s: 1 point     8   Total: Max 10, no impairment, reassess next shift                   **9 or below Notify MD and reassess in 4 hours       Cytokine Release Syndrome Assessment    Cytokine Release Syndrome: a potentially life threatening toxicity after receiving immune based therapy for cancer treatment. Severity varies from grade 1 to grade 4.      There are No current signs of CRS (pyrexia/fever, headache, nausea, hypotension, dyspnea, anxiety, increased liver enzymes, increased bilirubin).    **Will continue to monitor for s/sx of neurotoxicity and CRS.  If these occur, please call the Attending MD for recommendations and further instruction. CRS and neurotoxicity adverse reaction protocols/grading per package insert guidelines.   "

## 2023-10-28 NOTE — PROVIDER NOTIFICATION
"Pgd. Dr. Carr at 0817 via [employer]    \"FYI: BP just now 180/81, just gave scheduled amlodipine & metoprolol. Will recheck in a bit. Thanks\"    Update at 0900: \"45 min BP recheck was 150/61\"    Barb [phone]    Outcome: Provider acknowledged pg.   "

## 2023-10-28 NOTE — PLAN OF CARE
[id number]  Goal Outcome Evaluation:      Plan of Care Reviewed With: patient    Overall Patient Progress: no change    Pt A&Ox3-4, intermittently oriented to year, ICANS continued Q4 (see progress notes). Afebrile, BP stable at 140s/60s, O2 stable on RA. Denies N. Pain in back, shoulder, and head managed with tylenol x1 and oxycodone x1. Port heparin locked between use. Good UOP, did not save. Up with SBA to bathroom, BA armed. Sleep/rest promoted between cares. Continue to monitor and with POC.

## 2023-10-28 NOTE — PLAN OF CARE
[id number]    Day 5 Teclistimab. Hypertensive this morning, provider notified. Gave scheduled metoprolol & amlodipine, recheck within parameters. OVSS, afebrile and on RA. A&Ox3, disoriented to place (Unable to name correct hospital.) ICANS score consistently 8/10, provider notified following each assessment. Received 1 time dose of IV decadron. Bed alarm on for safety. Rated 7/10 shoulder/neck pain, gave 10 mg oxy x2 with effectiveness. Gave Robaxan x1, pt reported not effective. Up w/ SBA to bathroom, voiding spontaneously. Last BM 10/26, passing flatus. Went down for brain MRI this afternoon, pre-med with ativan.  at bedside for part of shift. Continue to monitor & w/ POC.     Goal Outcome Evaluation:      Plan of Care Reviewed With: patient    Overall Patient Progress: no change    Outcome Evaluation: ICANS q4

## 2023-10-28 NOTE — PROGRESS NOTES
[facility]    Hematology / Oncology Progress Note  Date of Admission: 10/24/2023  Hospital Day # 4  Date of Service (when I saw the patient): 10/28/2023     Assessment & Plan   Libby Grimaldo is a 73 year old female with a medical history of high risk IgG Lambda multiple myeloma not achieving remission s/p Auto-PBSCT in 2022 (on palliative Erlo/Pom/Dex), Grave's disease, ILD/COPD, HTN, and HLD. Her last therapy was Elotuzumab where she received 5 cycles, but due to rising lambda chains that were consistent with progression, Teclistamab recommended. She is being admitted for cycle 1 day 1 of Teclistamab. She received Zarxio in clinic day prior to admission.      Today  - patient endorses feeling a little more confused but it is hard for her to describe. She feels like her writing is difficult to complete. Will continue to hold Teclistamab, obtain MRI brain and s/p 10 dex IV once   - ICANS score 8/10 today. Continues to have difficulty orienting to year, writing, and attention. Writing may be due to old glasses prescription and was poor at baseline. In terms of attention, she was having difficulty with counting backwards; this was also present on admission and required a third attempt to complete, so this may also be partly baseline. Tested with an alternate strategy to assess attention used in the Misha Cognitive Assessment which involved patient taping her finger when the letter A appears in a series of letters ([id number])  which she was able to complete. However some difficulty noted with finger to nose on examination. Continue to monitor ICANS     HEME  #IgG Lambda Multiple Myeloma, High-risk. S/p APBST 2022  Follows with Dr. Guillory.  Initially diagnosed in 11/2018, completed KRD x 6 cycles, rev/Dex (20), Velcade maintenance, revlimid maintenance, with evidence of disease BmBx 11/2020 (35% plasma cells) and PET CT with new lytic lesions. Then  treated with kameron, Kd x 6 cycles, subcutaneous kameron/IVIG/denosumab monthly. S/p autologous peripheral blood stem cell transplant in 2022. Followed by melphalan, ixazomib, and then started on palliative elotuzumab, pomolidomide, and dexamethasone chemotherapy 4/2023 with most recent dose (C3D1) 6/26. She presented to heme/onc clinic 8/8 to re-establish treatment schedule and sent to ED and admitted for sepsis, confusion, and weakness. She was started on Zosyn and vancomycin with improvement in symptoms, and transitioned to oral Levaquin 8/10/23. She was discharged with outpatient PT. C5 was schedukled for 9/20 but patient cancelled appt due to a fall; started C5D1 9/27. Due to rising lambda chains that were consistent with progression, Teclistamab recommended. Admitted for C1D1 of Teclistamab 10/24. Received Zarxio in clinic 10/23.     Patient had grade 1 ICANS after D1 that progressed to Grade 2 overnight on D3 (10/26). Heme/onc team notified and ordered Dexamethasone 10 mg x2 and started Keppra BID. Noted by the medicine team when they saw her she had difficultly following commands such as touching her nose with her finger. When reassessed in AM, ICANS 7/10 and 8/10 by RN. She was able to follow finger-to-nose and finger-to-finger commands, thorough neuro exam consistent with previous days. Of note, she has a tremor at baseline but noted increased on D2. Patient mentions a mild headache in AMs that has been alleviated by tylenol. She does have a baseline of difficulty walking due to chronic back/hip pain and neuropathy, she says she notices no changes from her baseline. Patient uses a walker.     - Will continue to monitor and reassess ICANS and CRS scoring every 4 hours or suspected change in mental status until ICANS score of 10.      Teclistimab guidelines:                - Dosing as per treatment plan below.               - Pre-medications with zofran 8 mg, dexamethasone 16 mg, benadryl 50 mg and APAP 650 mg                 - Nursing monitoring:                             - Vitals q1h before and after administration and q4h thereafter                            - ICANS monitoring qshift (order on admission)               - Start Allopurinol for TLS ppx.                - Start  mg BID. Additional antimicrobial ppx if neutropenic.                - CRS and neurotoxicity adverse reaction protocols/grading per package insert guidelines.                - In summary for CRS, for grades 1 (fever alone) and 2 (fever + hypotension responsive to fluids and/or low-flow O2 requirement) pursue supportive management, infectious workup and hold subsequent dose until resolution. With grade 3 (single pressor requirement and/or high-flow O2) or grade 4 (multiple pressors and/or biPAP/intubation), will consider tocilizumab after discussion with on-call staff.                - In summary for neurotoxicity, grade 1 (ICANS 7-9), hold subsequent dose until resolution and consider keppra ppx/further intervention after discussion with staff on-call. For grade 2 (ICANS 3-6), start dexamethasone 10 mg q6h and discuss with on-call staff.                  Treatment Plan: Teclistimab (C1D1 = 10/24/2023)               - Teclistimab 6.2 mg (0.06 mg/kg) subcutaneous injection - D1               - Teclistimab 31 mg (0.3 mg/kg) subcutaneous injection  - D4               - Teclistimab 154 mg (1.5 mg/kg) subcutaneous injection - D7               - Pre-meds (D1, 4, 7): Zofran 8 mg, dex 16 mg, benadryl 50 mg, APAP 650 mg      -10/28:  patient endorses feeling a little more confused but it is hard for her to describe. She feels like her writing is difficult to complete. Will continue to hold Teclistamab, obtain MRI brain and s/p 10 dex IV once     Ppx:  - PTA Acyclovir 400 mg BID  - PJP ppx bactrim BID on Monday/Tuesdays  - Levofloxacin 250 mg PO- if ANC <1000  - Antifungal ppx, held d/t interaction with Methadone, Micafungin while admitted    "  #Pancytopenia   - Transfuse to maintain Hgb >7, Plt >10K  - Blood consent signed and in patient chart.     #Relevant thrombosis or bleeding history     2/15/22: new non-occlusive LUE DVT. No longer on xarelto. On ASA.      NEURO  #Neuropathy  Patient has reported increased falls due to feeling her feet are \"heavy\" and feel like there is something on her feet. She spoke about this at her outpatient oncology appointment 10/23; prescribed gabapentin 100 mg BID.  - Hold starting Gabapentin 100 mg BID until discharge from hospital     CARDIOVASCULAR  #Elevated blood pressures  She has had consistently elevated BP since late afternoon admission on 10/25. Early AM on 10/26 systolic ranged from 154-194; of note, she had dex 10mg x 2 during this time. Discussed BP management at rounds; attending recommended to start Amlodipine 5 mg PO every day.   - Continue to monitor.     #Risk of cardiomyopathy   Baseline EF 57% done January 2022. Patient being followed by cardiology. Last echo done May 2023 with EF 55%-60%.      #Hyperlipidemia   Previously on Crestor but not on med list, patient confirmed - can follow-up with PCP if needed     #History of Afib   Now in NSR.   PTA metoprolol XL 25 mg/d      PULM  #COPD  COPD with 40pyr, quit 2010. Previously documented to have idiopathic pulmonary fibrosis, however most recent PFTs (1/26/22) were normal. Has previously required treatment for COPD exacerbation with pneumonia. Some expiratory wheezing on exam improved with Duonebs; continue home inhaler regimen.  - PTA Trelegy Ellipta inhaler qd  - PTA Albuterol BID PRN     ENDO  #Hypothyroidism  #History of Graves Disease  History of hypothyroidism 2/2 treatment for Graves Disease (patient reports surgery in [place], however not thyroidectomy). Stable dose of levothyroxine over the past four years. Admission TSH (2.74).   - PTA Levothyroxine 112mcg every day     MISC.  #Generalized Anxiety Disorder  - PTA Venlafaxine 75mg TID      #Pain " Assessment  - PTA prn oxycodone.      #Opiate dependence  - PTA Methadone 10mg TID     #Dry Eye  Patient noted dry eyes and requested eye drops.   - Carboxymethylcellulose PF 0.5 % ophthalmic solution 1 drop TID PRN    Fluids/Electrolytes/Nutrition   - IVF per chemotherapy regimen  - PRN lyte replacement per standing protocol  - Regular diet as tolerated     Lines: None. Port placed on right chest wall.     PPX  VTE: Enoxaparin 40 mg every day. Hold if plts < 50k  GI: n/a  Bowels: Senna and miralax PRN    Code: Full code    Dispo: Patient must be admitted for at least 48 hours after her last dose of Teclistamab with ICANS and CRS grade 0. Will continue to monitor how patient responds clinically.     I spent >45 minutes face-to-face and/or coordinating or discussing care plan. Over 50% of our time on the unit was spent counseling the patient and/or coordinating care    Patient is seen and examined by Dr. Fortune and SYDNIE.  Assessment and plan are discussed and delivered to the patient.    Nelly Carr MD   Hematology/Oncology Fellow       Interval History   Nursing notes reviewed. No acute events overnight.     She endorses feeling confused this morning but it is hard for her to explain exactly why. She feels like writing is more difficult for her. Denies headache or vision changes. She has a hard time counting backwards and when asked to spell WORLD backwards she said I wont be able to do that. She feels like these things were possible prior to hospital admission. Denies fevers.     Physical Exam   Temp: 97.7  F (36.5  C) Temp src: Oral BP: (!) 150/61 Pulse: 82   Resp: 16 SpO2: 99 % O2 Device: None (Room air)    Vitals:    10/24/23 1158 10/26/23 0836 10/27/23 0852   Weight: 51.1 kg (112 lb 9.6 oz) 50 kg (110 lb 3.2 oz) 50.6 kg (111 lb 8 oz)     Vital Signs with Ranges  Temp:  [97.5  F (36.4  C)-98.3  F (36.8  C)] 97.7  F (36.5  C)  Pulse:  [70-82] 82  Resp:  [16-17] 16  BP: (123-180)/(49-81) 150/61  SpO2:  [96 %-99 %] 99  "%  I/O last 3 completed shifts:  In: 263 [P.O.:240; I.V.:23]  Out: -       Physical Exam:    Blood pressure (!) 150/61, pulse 82, temperature 97.7  F (36.5  C), temperature source Oral, resp. rate 16, height 1.562 m (5' 1.5\"), weight 50.6 kg (111 lb 8 oz), SpO2 99%.    General: Lying in bed. Awake and conversational. Nontoxic appearing. No acute distress.   HEENT: sclera anicteric, EOM intact, MMM.   CV: RRR, normal S1/S2, No peripheral edema.   Resp: CTAB. No wheezing/crackles. Breath sounds are equal throughout. Normal respiratory effort on room air  GI: Soft, non-tender, non-distended.  Skin: No rashes on limited exam.   Neuro: Alert and interactive, moves all extremities equally, no focal deficits. Sensations are equal throughout. Increase from baseline tremor of hands. Unable to state year. Oriented to situation and place. Cranial nerves grossly intact.   Vascular Access: Port placed on the right chest wall.       Medications    - MEDICATION INSTRUCTIONS -        [Held by provider] acetaminophen  650 mg Oral Q72H    acyclovir  400 mg Oral BID    allopurinol  300 mg Oral Daily    alteplase  2 mg Intravenous Q2H    amLODIPine  5 mg Oral Daily    [Held by provider] dexAMETHasone  16 mg Oral Q72H    [Held by provider] diphenhydrAMINE  50 mg Oral Q72H    enoxaparin ANTICOAGULANT  40 mg Subcutaneous Q24H    fluticasone-vilanterol  1 puff Inhalation Daily    And    umeclidinium  1 puff Inhalation Daily    folic acid-vit B6-vit B12  1 tablet Oral Daily    levETIRAcetam  750 mg Oral BID    levofloxacin  250 mg Oral Daily    levothyroxine  112 mcg Oral Daily    methadone  10 mg Oral TID    metoprolol succinate ER  25 mg Oral Daily    micafungin  50 mg Intravenous Q24H    [Held by provider] ondansetron  8 mg Oral Q72H    pantoprazole  40 mg Oral Daily    sulfamethoxazole-trimethoprim  1 tablet Oral Q Mon Tues BID    [Held by provider] teclistamab-cqyv  0.3 mg/kg (Treatment Plan Recorded) Subcutaneous Once    [Held by " provider] teclistamab-cqyv  1.5 mg/kg (Treatment Plan Recorded) Subcutaneous Once    venlafaxine  75 mg Oral BID    ascorbic acid  1,000 mg Oral Daily    Vitamin D3  25 mcg Oral Daily       Data   Results for orders placed or performed during the hospital encounter of 10/24/23 (from the past 24 hour(s))   Comprehensive metabolic panel   Result Value Ref Range    Sodium 142 135 - 145 mmol/L    Potassium 3.8 3.4 - 5.3 mmol/L    Carbon Dioxide (CO2) 28 22 - 29 mmol/L    Anion Gap 5 (L) 7 - 15 mmol/L    Urea Nitrogen 9.9 8.0 - 23.0 mg/dL    Creatinine 1.12 (H) 0.51 - 0.95 mg/dL    GFR Estimate 52 (L) >60 mL/min/1.73m2    Calcium 8.0 (L) 8.8 - 10.2 mg/dL    Chloride 109 (H) 98 - 107 mmol/L    Glucose 71 70 - 99 mg/dL    Alkaline Phosphatase 74 35 - 104 U/L    AST 54 (H) 0 - 45 U/L    ALT 24 0 - 50 U/L    Protein Total 5.2 (L) 6.4 - 8.3 g/dL    Albumin 2.9 (L) 3.5 - 5.2 g/dL    Bilirubin Total 0.2 <=1.2 mg/dL   Uric acid   Result Value Ref Range    Uric Acid 2.7 2.4 - 5.7 mg/dL   Magnesium   Result Value Ref Range    Magnesium 1.6 (L) 1.7 - 2.3 mg/dL   Phosphorus   Result Value Ref Range    Phosphorus 4.0 2.5 - 4.5 mg/dL   CBC with platelets differential    Narrative    The following orders were created for panel order CBC with platelets differential.  Procedure                               Abnormality         Status                     ---------                               -----------         ------                     CBC with platelets and d...[[id number]]  Abnormal            Final result               Manual Differential[[id number]]          Abnormal            Final result                 Please view results for these tests on the individual orders.   INR   Result Value Ref Range    INR 1.08 0.85 - 1.15   Fibrinogen activity   Result Value Ref Range    Fibrinogen Activity 291 170 - 490 mg/dL   Partial thromboplastin time   Result Value Ref Range    aPTT 27 22 - 38 Seconds   CBC with platelets and differential    Result Value Ref Range    WBC Count 1.5 (L) 4.0 - 11.0 10e3/uL    RBC Count 2.39 (L) 3.80 - 5.20 10e6/uL    Hemoglobin 8.0 (L) 11.7 - 15.7 g/dL    Hematocrit 25.9 (L) 35.0 - 47.0 %     (H) 78 - 100 fL    MCH 33.5 (H) 26.5 - 33.0 pg    MCHC 30.9 (L) 31.5 - 36.5 g/dL    RDW 15.5 (H) 10.0 - 15.0 %    Platelet Count 133 (L) 150 - 450 10e3/uL    % Neutrophils      % Lymphocytes      % Monocytes      % Eosinophils      % Basophils      % Immature Granulocytes      NRBCs per 100 WBC 3 (H) <1 /100    Absolute Neutrophils      Absolute Lymphocytes      Absolute Monocytes      Absolute Eosinophils      Absolute Basophils      Absolute Immature Granulocytes      Absolute NRBCs 0.0 10e3/uL   Manual Differential   Result Value Ref Range    % Neutrophils 37 %    % Lymphocytes 17 %    % Monocytes 28 %    % Eosinophils 8 %    % Basophils 6 %    % Metamyelocytes 3 %    % Myelocytes 1 %    NRBCs per 100 WBC 1 (H) <=0 %    Absolute Neutrophils 0.6 (L) 1.6 - 8.3 10e3/uL    Absolute Lymphocytes 0.3 (L) 0.8 - 5.3 10e3/uL    Absolute Monocytes 0.4 0.0 - 1.3 10e3/uL    Absolute Eosinophils 0.1 0.0 - 0.7 10e3/uL    Absolute Basophils 0.1 0.0 - 0.2 10e3/uL    Absolute Metamyelocytes 0.0 <=0.0 10e3/uL    Absolute Myelocytes 0.0 <=0.0 10e3/uL    Absolute NRBCs 0.0 <=0.0 10e3/uL    RBC Morphology Confirmed RBC Indices     Platelet Assessment  Automated Count Confirmed. Platelet morphology is normal.     Automated Count Confirmed. Platelet morphology is normal.    Polychromasia Moderate (A) None Seen    Teardrop Cells Moderate (A) None Seen

## 2023-10-29 NOTE — PLAN OF CARE
Goal Outcome Evaluation:  Time: 8280-4251  Alert and oriented x2-3, hypertensive to 180s, prn Hydralazine given x1. OVSS on RA. Complained of neck/back pain managed with prn oxycodone 10 mg x2 and scheduled Methadone with some relief. ICAN score <6, provider notified. IV Dexamethasone Q 6 hours ordered. Denies nausea or vomiting. Up to the bathroom voiding spontaneously without difficulty. Continue to monitor with POC.

## 2023-10-29 NOTE — PROGRESS NOTES
"                                                  Teclistamab Assessment  Patient is Alert & Oriented x 3. There are signs of headache, muscle spasms or rigidity, or vision changes No.       ICANS score     Score:   3  Orientation: Orientation to year, month, city, hospital: 4 points (1 point each)   3  Identify: Name 3 objects that nurse points to (e.g. clock, pen, button): 3 points (1 point each)   1  Following Commands: (e.g. show me two fingers or close your eyes and stick out your tongue): 1 point   0  Writing: Ability to write a standard sentence (see writing page \"The nichols jumped over the log.\"): 1 point   0   Attention: Count backwards from 100 by 10s: 1 point     7  Total: Max 10, no impairment, reassess next shift                   **9 or below Notify MD and reassess in 4 hours       Cytokine Release Syndrome Assessment    Cytokine Release Syndrome: a potentially life threatening toxicity after receiving immune based therapy for cancer treatment. Severity varies from grade 1 to grade 4.      There are No current signs of CRS (pyrexia/fever, headache, nausea, hypotension, dyspnea, anxiety, increased liver enzymes, increased bilirubin).    **Will continue to monitor for s/sx of neurotoxicity and CRS.  If these occur, please call the Attending MD for recommendations and further instruction. CRS and neurotoxicity adverse reaction protocols/grading per package insert guidelines.   "

## 2023-10-29 NOTE — PROGRESS NOTES
"Pgd. Dr. Carr at 0836 via [employer]    \"Morning /77, giving scheduled heart meds and will recheck\"    Update at 0932: \"Hour recheck of BP was 168/69, does not meet parameters for PRN hydralazine. Thanks\"    Barb [phone]    Outcome: Provider acknowledged page. Continue to monitor.   "

## 2023-10-29 NOTE — PROGRESS NOTES
"                                                  Teclistamab Assessment  Patient is Alert & Oriented x3, disoriented to situation. There are signs of headache, muscle spasms or rigidity, or vision changes. Yes, complained of mild headache. Providers at bedside and aware.        ICANS score     Score:   2   Orientation: Orientation to year, month, city, hospital: 4 points (1 point each) Stated it was 1903 and in [facility].   3   Identify: Name 3 objects that nurse points to (e.g. clock, pen, button): 3 points (1 point each) First identified iPhone as a camera, then when prompted again, stated it was a phone.   1   Following Commands: (e.g. show me two fingers or close your eyes and stick out your tongue): 1 point   0   Writing: Ability to write a standard sentence (see writing page \"The nichols jumped over the log.\"): 1 point Writing illegible.   0   Attention: Count backwards from 100 by 10s: 1 point Unable to perform, counted \"10, 9, 8...\" when reprompted to count back by 10's, same response was repeated.     6   Total: Max 10, no impairment, reassess next shift                   **9 or below Notify MD and reassess in 4 hours Dr. Carr and Dr. Fortune at bedside and witnessed assessment.      Cytokine Release Syndrome Assessment    Cytokine Release Syndrome: a potentially life threatening toxicity after receiving immune based therapy for cancer treatment. Severity varies from grade 1 to grade 4.      There are Yes, pt reporting mild headache. current signs of CRS (pyrexia/fever, headache, nausea, hypotension, dyspnea, anxiety, increased liver enzymes, increased bilirubin).    **Will continue to monitor for s/sx of neurotoxicity and CRS.  If these occur, please call the Attending MD for recommendations and further instruction. CRS and neurotoxicity adverse reaction protocols/grading per package insert guidelines.    "

## 2023-10-29 NOTE — PLAN OF CARE
Goal Outcome Evaluation:      Plan of Care Reviewed With: patient, spouse    Overall Patient Progress: no changeOverall Patient Progress: no change    Outcome Evaluation: D6 Teclistamab. ICANS q4    [id number]    Oriented to person and place. ICANS score of 6 (unchanged from previous, will continue to assess q4). Hypertensive, did not meet prn hydralazine parameters. OVSS. Headache, neck and back pain comfortably manageable with prn tylenol and oxycodone. Denied nausea/vomiting. Up with standby assist. Bed alarm on at all times for pt safety. Good po intake. Voiding spontaneously without difficulty, not saving urine to be measured. BM today per pt report. S/O at bedside and attentive to pt. Notify MD with any concerns and or changes in pt's condition. Continue with plan of care.

## 2023-10-29 NOTE — PROGRESS NOTES
[facility]    Hematology / Oncology Progress Note  Date of Admission: 10/24/2023  Hospital Day # 5  Date of Service (when I saw the patient): 10/29/2023     Assessment & Plan   Libby Grimaldo is a 73 year old female with a medical history of high risk IgG Lambda multiple myeloma not achieving remission s/p Auto-PBSCT in 2022 (on palliative Erlo/Pom/Dex), Grave's disease, ILD/COPD, HTN, and HLD. Her last therapy was Elotuzumab where she received 5 cycles, but due to rising lambda chains that were consistent with progression, Teclistamab recommended. She is being admitted for cycle 1 day 1 of Teclistamab. She received Zarxio in clinic day prior to admission. Admission complicated by concern for Grade 1/2 neurotoxicity resulting in holding of Teclistamab ramp up.      Today  -10/29: ICANS 5/10, Dex 10mg Q6H until improvement to grade 1 neurotoxicity (ICANS >7)   - Etiology of confusion is unclear. MRI with no acute findings. Discussed with patient's primary Dr. Guillory who stated she has had problems with cognition in the past. Thus she may be more susceptible to hospital acquired delirium. Teclistamab recently given so associated neurotoxicity also is possible. She is immune compromised, so infectious etiologies remain on differential. It is somewhat hard to distinguish how much of her altered mental status is attributed to Teclistamab associated neurotoxicity. For now we will empiric treat Grade 2 neurotoxicity with Dexamethasone. Overall, she seems to be having a difficulty tolerating Teclistamab   and thus unclear if she will be able to complete ramp up therapy this admission. Will continue to hold Teclistamab for now.   - MRI brain with new, 2.8 cm parapharyngeal lesion of unclear etiology. Per discussion with neuroradiology it is unclear what this lesion may be. ENT consulted for biopsy.   - if she continues to have ICANS <7, may need LP in near future to rule out  infection as she is neutropenic   - increased Amlodipine to 10mg     HEME  #IgG Lambda Multiple Myeloma, High-risk. S/p APBST 2022  Follows with Dr. Guillory.  Initially diagnosed in 11/2018, completed KRD x 6 cycles, rev/Dex (20), Velcade maintenance, revlimid maintenance, with evidence of disease BmBx 11/2020 (35% plasma cells) and PET CT with new lytic lesions. Then treated with kameron, Kd x 6 cycles, subcutaneous kameron/IVIG/denosumab monthly. S/p autologous peripheral blood stem cell transplant in 2022. Followed by melphalan, ixazomib, and then started on palliative elotuzumab, pomolidomide, and dexamethasone chemotherapy 4/2023 with most recent dose (C3D1) 6/26. She presented to heme/onc clinic 8/8 to re-establish treatment schedule and sent to ED and admitted for sepsis, confusion, and weakness. She was started on Zosyn and vancomycin with improvement in symptoms, and transitioned to oral Levaquin 8/10/23. She was discharged with outpatient PT. C5 was schedukled for 9/20 but patient cancelled appt due to a fall; started C5D1 9/27. Due to rising lambda chains that were consistent with progression, Teclistamab recommended. Admitted for C1D1 of Teclistamab 10/24. Received Zarxio in clinic 10/23.     Patient had grade 1 ICANS after D1 that progressed to Grade 2 overnight on D3 (10/26). Heme/onc team notified and ordered Dexamethasone 10 mg x2 and started Keppra BID. Noted by the medicine team when they saw her she had difficultly following commands such as touching her nose with her finger. When reassessed in AM, ICANS 7/10 and 8/10 by RN. She was able to follow finger-to-nose and finger-to-finger commands, thorough neuro exam consistent with previous days. Of note, she has a tremor at baseline but noted increased on D2. Patient mentions a mild headache in AMs that has been alleviated by tylenol. She does have a baseline of difficulty walking due to chronic back/hip pain and neuropathy, she says she notices no changes  from her baseline. Patient uses a walker.      Teclistimab guidelines:                - Dosing as per treatment plan below.               - Pre-medications with zofran 8 mg, dexamethasone 16 mg, benadryl 50 mg and APAP 650 mg                - Nursing monitoring:                             - Vitals q1h before and after administration and q4h thereafter                            - ICANS monitoring qshift (order on admission)               - Start Allopurinol for TLS ppx.                - Start  mg BID. Additional antimicrobial ppx if neutropenic.                - CRS and neurotoxicity adverse reaction protocols/grading per package insert guidelines.                - In summary for CRS, for grades 1 (fever alone) and 2 (fever + hypotension responsive to fluids and/or low-flow O2 requirement) pursue supportive management, infectious workup and hold subsequent dose until resolution. With grade 3 (single pressor requirement and/or high-flow O2) or grade 4 (multiple pressors and/or biPAP/intubation), will consider tocilizumab after discussion with on-call staff.                - In summary for neurotoxicity, grade 1 (ICANS 7-9), hold subsequent dose until resolution and consider keppra ppx/further intervention after discussion with staff on-call. For grade 2 (ICANS 3-6), start dexamethasone 10 mg q6h and discuss with on-call staff.                  Treatment Plan: Teclistimab (C1D1 = 10/24/2023)               - Teclistimab 6.2 mg (0.06 mg/kg) subcutaneous injection - D1               - Teclistimab 31 mg (0.3 mg/kg) subcutaneous injection  - D4               - Teclistimab 154 mg (1.5 mg/kg) subcutaneous injection - D7               - Pre-meds (D1, 4, 7): Zofran 8 mg, dex 16 mg, benadryl 50 mg, APAP 650 mg     -10/28:  patient endorses feeling a little more confused but it is hard for her to describe. She feels like her writing is difficult to complete. Held Teclistamab. MRI brain with no acute pathology to explain  neurologic changes.    -10/29: ICANS 5/10, Dex 10mg Q6H until improvement to grade 1 neurotoxicity (ICANS >7)   - Discussed with Dr. Guillory patient's primary who states she has had issues with cognition at baseline, thus it is somewhat difficult to assess ICANS score. However, ICANS score on admission was 10/10 thus, there seems to be an acute change. Teclistamab recently given so associated neurotoxicity also is possible however we will work up alternate causes of AMS as mentioned below as well. For now we will empiric treat Grade 2 neurotoxicity with Dexamethasone 10mg Q6H and taper as able. Overall, she seems to be having a difficulty tolerating Teclistamab and thus unclear if she will be able to complete ramp up therapy this admission. Will continue to hold Teclistamab for now.   - Will continue to monitor and reassess ICANS and CRS scoring every 4 hours or suspected change in mental status until ICANS score of 10.   - trending CRP and Ferritin for ICANS and CRS     Ppx:  - PTA Acyclovir 400 mg BID  - PJP ppx bactrim BID on Monday/Tuesdays  - Levofloxacin 250 mg PO- if ANC <1000  - Antifungal ppx, held d/t interaction with Methadone, Micafungin while admitted     #Pancytopenia   - Transfuse to maintain Hgb >7, Plt >10K  - Blood consent signed and in patient chart.     #Relevant thrombosis or bleeding history     2/15/22: new non-occlusive LUE DVT. No longer on xarelto. On ASA.      NEURO  #Altered Mental Status  Appears to have more difficulty with writing sentences and is often only oriented to place this admission. Etiology of confusion is unclear. MRI with no acute findings. Discussed with patient's primary Dr. Guillory who stated she has had problems with cognition in the past, so this could be partly baseline. ICANS on admission was 10/10 and has decreased to 5-6/10, thus there does seem to be an acute change. This may be secondary to hospital acquired delirium vs. Polypharmacy (on chronic methadone) vs.  "Teclistamab neurotoxicity vs. CNS infection as she is immunocompromised. It is somewhat hard to distinguish how much of her altered mental status is attributed to Teclistamab associated neurotoxicity. For now we will empiric treat Grade 2 neurotoxicity with Dexamethasone.   - holding Teclistamab  - Dexamethasone 10mg IV Q6H for neurotoxicity   - delirium precautions   - if she continues to have ICANS <7, may need LP in near future to rule out infection as she is neutropenic     #Neuropathy  Patient has reported increased falls due to feeling her feet are \"heavy\" and feel like there is something on her feet. She spoke about this at her outpatient oncology appointment 10/23; prescribed gabapentin 100 mg BID.  - Hold starting Gabapentin 100 mg BID until discharge from hospital    HEENT  #Incidental Right parapharyngeal lesion  Found on MRI Brain this admission. Lesion protrudes into the oropharynx. Discussed with radiology who stated etiology of lesions is unclear, may be mass vs. Cyst vs. Abscess. This lesion is new since MRI brain in 2021.   - ENT consulted for biopsy      CARDIOVASCULAR  #Elevated blood pressures  She has had consistently elevated BP since late afternoon admission on 10/25. Early AM on 10/26 systolic ranged from 154-194; of note, she had dex 10mg x 2 during this time.   - Amlodipine 10 mg     #Risk of cardiomyopathy   Baseline EF 57% done January 2022. Patient being followed by cardiology. Last echo done May 2023 with EF 55%-60%.      #Hyperlipidemia   Previously on Crestor but not on med list, patient confirmed - can follow-up with PCP if needed     #History of Afib   Now in NSR.   PTA metoprolol XL 25 mg/d      PULM  #COPD  COPD with 40pyr, quit 2010. Previously documented to have idiopathic pulmonary fibrosis, however most recent PFTs (1/26/22) were normal. Has previously required treatment for COPD exacerbation with pneumonia. Some expiratory wheezing on exam improved with Duonebs; continue home " inhaler regimen.  - PTA Trelegy Ellipta inhaler qd  - PTA Albuterol BID PRN     ENDO  #Hypothyroidism  #History of Graves Disease  History of hypothyroidism 2/2 treatment for Graves Disease (patient reports surgery in [place], however not thyroidectomy). Stable dose of levothyroxine over the past four years. Admission TSH (2.74).   - PTA Levothyroxine 112mcg every day     MISC.  #Generalized Anxiety Disorder  - PTA Venlafaxine 75mg TID      #Pain Assessment  - PTA prn oxycodone.      #Opiate dependence  - PTA Methadone 10mg TID     #Dry Eye  Patient noted dry eyes and requested eye drops.   - Carboxymethylcellulose PF 0.5 % ophthalmic solution 1 drop TID PRN    Fluids/Electrolytes/Nutrition   - IVF per chemotherapy regimen  - PRN lyte replacement per standing protocol  - Regular diet as tolerated     Lines: None. Port placed on right chest wall.     PPX  VTE: Enoxaparin 40 mg every day. Hold if plts < 50k  GI: n/a  Bowels: Senna and miralax PRN    Code: Full code    Dispo: Patient must be admitted for at least 48 hours after her last dose of Teclistamab with ICANS and CRS grade 0. Will continue to monitor how patient responds clinically.     I spent >45 minutes face-to-face and/or coordinating or discussing care plan. Over 50% of our time on the unit was spent counseling the patient and/or coordinating care    Patient is seen and examined by Dr. Fortune and SYDNIE.  Assessment and plan are discussed and delivered to the patient.    Nelly Carr MD   Hematology/Oncology Fellow       Interval History   Nursing notes reviewed. No acute events overnight.     She continues to feel confused. Tremors she feels are similar to baseline, may be a little improved. Endorses mild headache and chronic back pain down to hips. No vision changes.     Physical Exam   Temp: 98.2  F (36.8  C) Temp src: Oral BP: (!) 168/69 Pulse: 79   Resp: 16 SpO2: 98 % O2 Device: None (Room air)    Vitals:    10/27/23 0852 10/28/23 1249 10/29/23 0749   Weight:  "50.6 kg (111 lb 8 oz) 51.3 kg (113 lb 3.2 oz) 50.6 kg (111 lb 8 oz)     Vital Signs with Ranges  Temp:  [97.2  F (36.2  C)-98.2  F (36.8  C)] 98.2  F (36.8  C)  Pulse:  [71-91] 79  Resp:  [16-18] 16  BP: (121-186)/(58-77) 168/69  SpO2:  [96 %-99 %] 98 %  I/O last 3 completed shifts:  In: 930 [P.O.:930]  Out: -       Physical Exam:    Blood pressure (!) 168/69, pulse 79, temperature 98.2  F (36.8  C), temperature source Oral, resp. rate 16, height 1.562 m (5' 1.5\"), weight 50.6 kg (111 lb 8 oz), SpO2 98%.    General:. Awake and conversational. Nontoxic appearing. No acute distress.   HEENT: sclera anicteric, EOM intact, MMM.   CV: RRR, normal S1/S2, No peripheral edema.   Resp: CTAB. No wheezing/crackles. Breath sounds are equal throughout. Normal respiratory effort on room air  GI: Soft, non-tender, non-distended.  Skin: No rashes on limited exam.   Neuro: Alert and interactive, moves all extremities equally, no focal deficits. Sensations are equal throughout. baseline tremor of hands present. Unable to state year. Oriented to month and location. Able to follow 3 commands.    Vascular Access: Port placed on the right chest wall.       Medications    - MEDICATION INSTRUCTIONS -        [Held by provider] acetaminophen  650 mg Oral Q72H    acyclovir  400 mg Oral BID    allopurinol  300 mg Oral Daily    [START ON 10/30/2023] amLODIPine  10 mg Oral Daily    [Held by provider] dexAMETHasone  16 mg Oral Q72H    dexAMETHasone  10 mg Intravenous Q6H    [Held by provider] diphenhydrAMINE  50 mg Oral Q72H    enoxaparin ANTICOAGULANT  40 mg Subcutaneous Q24H    fluticasone-vilanterol  1 puff Inhalation Daily    And    umeclidinium  1 puff Inhalation Daily    folic acid-vit B6-vit B12  1 tablet Oral Daily    levETIRAcetam  750 mg Oral BID    levofloxacin  250 mg Oral Daily    levothyroxine  112 mcg Oral Daily    methadone  10 mg Oral TID    metoprolol succinate ER  25 mg Oral Daily    micafungin  50 mg Intravenous Q24H    [Held " by provider] ondansetron  8 mg Oral Q72H    pantoprazole  40 mg Oral Daily    sulfamethoxazole-trimethoprim  1 tablet Oral Q Mon Tues BID    [Held by provider] teclistamab-cqyv  0.3 mg/kg (Treatment Plan Recorded) Subcutaneous Once    [Held by provider] teclistamab-cqyv  1.5 mg/kg (Treatment Plan Recorded) Subcutaneous Once    venlafaxine  75 mg Oral BID    ascorbic acid  1,000 mg Oral Daily    Vitamin D3  25 mcg Oral Daily       Data   Results for orders placed or performed during the hospital encounter of 10/24/23 (from the past 24 hour(s))   MR Brain w/o Contrast   Result Value Ref Range    Radiologist flags Right pharyngeal mass (Urgent)     Narrative    MR BRAIN W/O CONTRAST 10/28/2023 3:30 PM    Provided History: c/f encephalopathy  ICD-10:    Comparison:  CT head 8/8/2023     Technique: Sagittal T1-weighted, coronal T2-weighted, axial T2 FLAIR,  axial susceptibility weighted, and axial diffusion-weighted with ADC  map images of the brain were obtained without intravenous contrast.    Findings: There is no mass effect, midline shift or extra-axial fluid  collection. Ventricles are proportionate to the cerebral sulci.  Diffusion-weighted images reveal no abnormal reduced diffusion.  Susceptibility weighted imaging reveals no intracranial hemorrhage.  There foci of T2 hyperintense signal within the periventricular and  subcortical white matter of both cerebral hemispheres which are  nonspecific, but likely represent chronic small vessel ischemic  disease in a patient this age. Flow voids within the major  intracranial vessels are present. Mild generalized cerebral atrophy.    There is a T2 hyperintense and T1 isointense mass within the right  parapharyngeal space adjacent to the medial pterygoid which measures  2.2 x 2.8 x 1.8 cm and appears to cause mild mass effect on the right  aspect of the oropharynx and right posterior tongue.    T2 Hyperintense focus along the inner table of the right frontal  bone  measuring up to 11 mm is mildly enlarged from 8/8/2023 and may  represent a myelomatous lesion. Paranasal sinuses are relatively  clear. Right mastoid effusion. Orbits are grossly unremarkable.      Impression    Impression:   1. No acute intracranial pathology.  2. T2 hyperintense lesion within the right parapharyngeal space  protruding into the oropharynx measures 2.8 x 2.2 x 1.8 cm. Direct  visualization is recommended.  3. Lytic T2 hyperintense lesion along the inner table of the right  frontal calvarium is mildly increased in size compared to 8/8/2023 and  was not seen previously on 2/14/2022 suggesting a myelomatous lesion.  4. Leukoaraiosis and generalized cerebral atrophy.    [Urgent Result: Right pharyngeal mass]    Finding was identified on 10/28/2023 8:05 PM.     Dr Liang was contacted by Dr. Gardner at 10/28/2023 8:26 PM  and verbalized understanding of the urgent finding.          SOTO GARDNER MD         SYSTEM ID:  [id number]   CBC with platelets differential    Narrative    The following orders were created for panel order CBC with platelets differential.  Procedure                               Abnormality         Status                     ---------                               -----------         ------                     CBC with platelets and d...[[id number]]  Abnormal            Final result               Manual Differential[[id number]]          Abnormal            Final result                 Please view results for these tests on the individual orders.   ABO/Rh type and screen    Narrative    The following orders were created for panel order ABO/Rh type and screen.  Procedure                               Abnormality         Status                     ---------                               -----------         ------                     Adult Type and Screen[[id number]]                            Final result                 Please view results for these tests on the  individual orders.   Comprehensive metabolic panel   Result Value Ref Range    Sodium 142 135 - 145 mmol/L    Potassium 4.3 3.4 - 5.3 mmol/L    Carbon Dioxide (CO2) 25 22 - 29 mmol/L    Anion Gap 9 7 - 15 mmol/L    Urea Nitrogen 10.3 8.0 - 23.0 mg/dL    Creatinine 0.95 0.51 - 0.95 mg/dL    GFR Estimate 64 >60 mL/min/1.73m2    Calcium 8.2 (L) 8.8 - 10.2 mg/dL    Chloride 108 (H) 98 - 107 mmol/L    Glucose 116 (H) 70 - 99 mg/dL    Alkaline Phosphatase 94 35 - 104 U/L    AST 55 (H) 0 - 45 U/L    ALT 36 0 - 50 U/L    Protein Total 5.9 (L) 6.4 - 8.3 g/dL    Albumin 3.3 (L) 3.5 - 5.2 g/dL    Bilirubin Total 0.3 <=1.2 mg/dL   INR   Result Value Ref Range    INR 0.98 0.85 - 1.15   Fibrinogen activity   Result Value Ref Range    Fibrinogen Activity 321 170 - 490 mg/dL   Uric acid   Result Value Ref Range    Uric Acid 2.2 (L) 2.4 - 5.7 mg/dL   Partial thromboplastin time   Result Value Ref Range    aPTT 28 22 - 38 Seconds   Magnesium   Result Value Ref Range    Magnesium 1.7 1.7 - 2.3 mg/dL   Phosphorus   Result Value Ref Range    Phosphorus 2.7 2.5 - 4.5 mg/dL   CBC with platelets and differential   Result Value Ref Range    WBC Count 1.3 (L) 4.0 - 11.0 10e3/uL    RBC Count 2.57 (L) 3.80 - 5.20 10e6/uL    Hemoglobin 8.8 (L) 11.7 - 15.7 g/dL    Hematocrit 27.9 (L) 35.0 - 47.0 %     (H) 78 - 100 fL    MCH 34.2 (H) 26.5 - 33.0 pg    MCHC 31.5 31.5 - 36.5 g/dL    RDW 14.9 10.0 - 15.0 %    Platelet Count 136 (L) 150 - 450 10e3/uL    % Neutrophils      % Lymphocytes      % Monocytes      % Eosinophils      % Basophils      % Immature Granulocytes      NRBCs per 100 WBC 3 (H) <1 /100    Absolute Neutrophils      Absolute Lymphocytes      Absolute Monocytes      Absolute Eosinophils      Absolute Basophils      Absolute Immature Granulocytes      Absolute NRBCs 0.0 10e3/uL   Adult Type and Screen   Result Value Ref Range    Antibody Screen Negative Negative    SPECIMEN EXPIRATION DATE [id number]    Manual Differential    Result Value Ref Range    % Neutrophils 61 %    % Lymphocytes 25 %    % Monocytes 9 %    % Eosinophils 0 %    % Basophils 1 %    % Metamyelocytes 1 %    % Myelocytes 3 %    NRBCs per 100 WBC 1 (H) <=0 %    Absolute Neutrophils 0.8 (L) 1.6 - 8.3 10e3/uL    Absolute Lymphocytes 0.3 (L) 0.8 - 5.3 10e3/uL    Absolute Monocytes 0.1 0.0 - 1.3 10e3/uL    Absolute Eosinophils 0.0 0.0 - 0.7 10e3/uL    Absolute Basophils 0.0 0.0 - 0.2 10e3/uL    Absolute Metamyelocytes 0.0 <=0.0 10e3/uL    Absolute Myelocytes 0.0 <=0.0 10e3/uL    Absolute NRBCs 0.0 <=0.0 10e3/uL    RBC Morphology Confirmed RBC Indices     Platelet Assessment  Automated Count Confirmed. Platelet morphology is normal.     Automated Count Confirmed. Platelet morphology is normal.    Polychromasia Slight (A) None Seen    Teardrop Cells Slight (A) None Seen   ABO/RH Type & Screen   Result Value Ref Range    SPECIMEN EXPIRATION DATE [id number]     ABORH A POS

## 2023-10-29 NOTE — PROGRESS NOTES
"                                                  Teclistamab  Assessment  Patient is Alert & Oriented x2. There are signs of headache, muscle spasms or rigidity, or vision changes. NO       ICANS score     Score:   1   Orientation: Orientation to year, month, city, hospital: 4 points (1 point each)   3  Identify: Name 3 objects that nurse points to (e.g. clock, pen, button): 3 points (1 point each)   1  Following Commands: (e.g. show me two fingers or close your eyes and stick out your tongue): 1 point   0   Writing: Ability to write a standard sentence (see writing page \"The nichols jumped over the log.\"): 1 point   0  Attention: Count backwards from 100 by 10s: 1 point      5 Total: Max 10, no impairment, reassess next shift                   **9 or below Notify MD and reassess in 4 hours       Cytokine Release Syndrome Assessment    Cytokine Release Syndrome: a potentially life threatening toxicity after receiving immune based therapy for cancer treatment. Severity varies from grade 1 to grade 4.      There are No current signs of CRS (pyrexia/fever, headache, nausea, hypotension, dyspnea, anxiety, increased liver enzymes, increased bilirubin).    **Will continue to monitor for s/sx of neurotoxicity and CRS.  If these occur, please call the Attending MD for recommendations and further instruction. CRS and neurotoxicity adverse reaction protocols/grading per package insert guidelines.   "

## 2023-10-29 NOTE — CONSULTS
"Otolaryngology Consult Note  October 29, 2023      CC: parapharyngeal space mass     HPI: Libby is a 72yo with a hx of high risk IgG multiple myeloma s/p stem cell transplant in 2022 currently admitted for chemotherapy. She has been admitted since 10/24 and underwent MRI brain yesterday after developing encephalopathy. She was noted to have a 3cm parapharyngeal space mass that is new compared to a MRI in 2021.     She is essentially asymptomatic from this. She has no voice, breathing or swallowing changes. No issues with hemoptysis, dysphonia, dyspnea, noisy breathing, odynophagia, dysphagia, neck/throat pain or bad tasting drainage from the mouth. No procedures on the head and neck aside from a tonsillectomy about 20 years ago.     PMHx: Graves, COPD/IPF, HTN, HLD, high risk multiple myeloma   PSHx: cholecystectomy, ovarian cyst removal, cervical spine fusion, tonsillectomy   Social: former cigarette smoker, quit in 2010 (40pyh)     PHYSICAL EXAM:  /61 (BP Location: Right arm)   Pulse 79   Temp 98.7  F (37.1  C) (Oral)   Resp 16   Ht 1.562 m (5' 1.5\")   Wt 50.6 kg (111 lb 8 oz)   SpO2 99%   BMI 20.73 kg/m    Alert and engaged, voice is strong.   Neck soft & thin, easily palpable laryngeal framework. No lymphadenopathy.   Symmetric facial movements, EOMI and tracks appropriately   No Edentulous, tongue with complete mobility, midline uvula. Symmetric palate elevation. Mild-moderate fullness to the left soft palate but no obstructive swelling or medialization of oropharynx. Well-healed tonsillar fossa. Left soft-palate and lateral pharyngeal wall normal.   Breathing comfortably on RA, no stridor or stertor.     FIBEROPTIC ENDOSCOPY:  scope advance through right nasal cavity, nasopharynx and oropharynx are normal appearing. BOT is non-displaced, and good position sharp epiglottis. Piriforms are easily visualized and without masses or lesions. Glottis normal appearing, bilateral VC with complete mobility. " No glottic gap. Airway widely patent.     Labs: WBC 1.3     Imaging:   MRI brain 10/28: about 3cm mass in the R parpharyngeal space. Well encapsulated.   Formal read: T2 hyperintense and T1 isointense mass within the right  parapharyngeal space adjacent to the medial pterygoid which measures  2.2 x 2.8 x 1.8 cm and appears to cause mild mass effect on the right  aspect of the oropharynx and right posterior tongue.    CT head 8/23 does not extend inferiorly enough   PET 1/1022: no evidence of parapharyngeal space mass     Assessment and Plan  Libby is a 74yo with high risk IgG lambda multiple myeloma currently admitted for chemotherapy who was incidentally found to have a 3cm mass in the right parapharyngeal space. Her exam is quite reassuring, there is some fullness in the right soft palate but no evidence of airway narrowing. On review of her MRI w/radiology the mass appears to be localized to the pre-styloid space which fits with the lack of cranial nerve deficits on her exam. The differential for this is quite broad at this point however given the location, appearance, and lack of associated symptoms would lean more towards a benign neoplasm (eg. salivary gland neoplasm). Would anticipate more systemic symptoms (eg. Fever, local tenderness) and signs of edema on MRI for any infectious source but this is also complicated by her immunosuppressed state.     -- Will review imaging with H&N neuroradiologist to better differentiate etiology and help guide next steps (eg. If biopsy is indicated)   -- Will update primary team with final plan. Please page ENT on call with any acute changes in respiratory status     Pt and plan d/w staff Dr. Carrillo Ornelas MD  ENT resident

## 2023-10-29 NOTE — PROGRESS NOTES
"                                                Teclistamab Assessment    Patient is Alert & Oriented x 2, disoriented to time and situation. There are signs of headache, muscle spasms or rigidity, or vision changes. Yes, complained of mild headache and dry eyes. No change from previous assessments.     ICANS score     Score:   2   Orientation: Orientation to year (190th), month (November), city ([place]), hospital (knows she's in the hospital, doesn't know which one): 4 points (1 point each)   3   Identify: Name 3 objects that nurse points to (e.g. clock, pen, button): 3 points (1 point each) pen, spoon, tv  1   Following Commands: (e.g. show me two fingers or close your eyes and stick out your tongue): 1 point \"close your eyes\"  0   Writing: Ability to write a standard sentence (see writing page \"The nichols jumped over the log.\"): 1 point writing illegible  0   Attention: Count backwards from 100 by 10s: 1 point counted backwards from 11 by 1s    6   Total: Max 10, no impairment, reassess next shift                   **9 or below Notify MD and reassess in 4 hours No change from previous assessment. Will reassess in 4 hours.     Cytokine Release Syndrome Assessment     Cytokine Release Syndrome: a potentially life threatening toxicity after receiving immune based therapy for cancer treatment. Severity varies from grade 1 to grade 4.      There are Yes, pt reporting mild headache. current signs of CRS (pyrexia/fever, headache, nausea, hypotension, dyspnea, anxiety, increased liver enzymes, increased bilirubin).     **Will continue to monitor for s/sx of neurotoxicity and CRS.  If these occur, please call the Attending MD for recommendations and further instruction. CRS and neurotoxicity adverse reaction protocols/grading per package insert guidelines.  "

## 2023-10-29 NOTE — PROGRESS NOTES
Hospitalist cross cover note    Around 1:15am paged by RN and BMT fellow about ICANS score increasing. [id number].  I was advised by Dr. Cool to start Decadron 10mg iv q6 hours and address elevated blood pressure.    - ordered decardron 10mg IV q6h for rising ICANS score  - ordered prn hydralazine q6h for sBP>180    Will monitor his neuro status closely

## 2023-10-29 NOTE — PROGRESS NOTES
"                                                  Teclistamab Assessment  Patient is Alert & Oriented x2. Alert to self & city. There are signs of headache, muscle spasms or rigidity, or vision changes. No       ICANS score     Score:   1   Orientation: Orientation to year, month, city, hospital: 4 points (1 point each) When asked month, replied April. When asked year, replied October. When asked year again, responded October. When asked what building she was in, responded October, recognized it was incorrect, but was unable to identify hospital.   3   Identify: Name 3 objects that nurse points to (e.g. clock, pen, button): 3 points (1 point each) Originally stated phone was a clock, however home screen had lit up and shown clock. Showed phone again and pt was able to correctly   1   Following Commands: (e.g. show me two fingers or close your eyes and stick out your tongue): 1 point   0   Writing: Ability to write a standard sentence (see writing page \"The nichols jumped over the log.\"): 1 point Writing illegible, does have tremors baseline.  0   Attention: Count backwards from 100 by 10s: 1 point Unable to perform, stated \"10, 9, 8...\" when prompted again, repeated the same.     5   Total: Max 10, no impairment, reassess next shift                   **9 or below Notify MD and reassess in 4 hours Dr. Carr notified.       Cytokine Release Syndrome Assessment    Cytokine Release Syndrome: a potentially life threatening toxicity after receiving immune based therapy for cancer treatment. Severity varies from grade 1 to grade 4.      There are Yes current signs of CRS (pyrexia/fever, headache, nausea, hypotension, dyspnea, anxiety, increased liver enzymes, increased bilirubin). Slight nausea, however pt declined antiemetic.    **Will continue to monitor for s/sx of neurotoxicity and CRS.  If these occur, please call the Attending MD for recommendations and further instruction. CRS and neurotoxicity adverse reaction " protocols/grading per package insert guidelines.

## 2023-10-29 NOTE — PROGRESS NOTES
"                                                    Teclistamab Assessment    Patient is Alert & Oriented x3. There are signs of headache,muscle spasms or rigidity, or vision changes. Pt reports headache and has baseline hand tremors       ICANS score     Score:   3  Orientation: Orientation to year, month, city, hospital: 4 points (1 point each)   3  Identify: Name 3 objects that nurse points to (e.g. clock, pen, button): 3 points (1 point each)   1  Following Commands: (e.g. show me two fingers or close your eyes and stick out your tongue): 1 point   0  Writing: Ability to write a standard sentence (see writing page \"The nichols jumped over the log.\"): 1 point   0  Attention: Count backwards from 100 by 10s: 1 point     7  Total: Max 10, no impairment, reassess next shift                   **9 or below Notify MD and reassess in 4 hours       Cytokine Release Syndrome Assessment    Cytokine Release Syndrome: a potentially life threatening toxicity after receiving immune based therapy for cancer treatment. Severity varies from grade 1 to grade 4.      There are NO current signs of CRS (pyrexia/fever, headache, nausea, hypotension, dyspnea, anxiety, increased liver enzymes, increased bilirubin).    **Will continue to monitor for s/sx of neurotoxicity and CRS.  If these occur, please call the Attending MD for recommendations and further instruction. CRS and neurotoxicity adverse reaction protocols/grading per package insert guidelines.   "

## 2023-10-29 NOTE — PROGRESS NOTES
Notified by nursing to contact neurorads about right posterior oropharyngeal lesion bulging into oral cavity    I went to bedside and looked with a tongue depressor and light, couldn't see anything. Her posterior tonsillar pillars are prominent and obstructed my view to the posterior oropharynx laterally. She doesn't feel anything back there out of the ordinary.    If concern persists would consult ENT to look with a laryngoscope    William Liang DO  Hospitalist    Medicine and Pediatrics  [email or web address]  Pager: [phone]  Available on Tintri

## 2023-10-29 NOTE — PROVIDER NOTIFICATION
Sera Mccoy #[phone]  5A: Rm 5206: S.C  Pt ICAN score at 0030 was 5. Did page on-call fellow, per Dr.Vikas Pollock, the hospitalist can order 10 mg of Dexamethasone Q 6 hours. Also enter prn BP meds since pt SBP is 184.

## 2023-10-30 NOTE — PROGRESS NOTES
LP discussed with Dr France and primary team. Patient declined LP at this juncture. Potential risks and benefits explained and patient able to demonstrate adequate understanding.     Thank you for consulting the CAPS team, please do not hesitate to page/call/Vocera if you have any further questions or if patient wishes to proceed with LP simply place consult order into [facility] once again    Bertrand Tucker MD

## 2023-10-30 NOTE — PLAN OF CARE
Goal Outcome Evaluation:      Plan of Care Reviewed With: patient    Overall Patient Progress: improvingOverall Patient Progress: improving     [id number]: Cognition appears to be improving compared with previous shift report and ICAN scores. Dex dose decreased. UA/UC sent per order. MD from procedure team discussed LP with patient and she asked to delay it until tomorrow. He was going to talk with team. Phosphorus replaced orally. Re-check in the am. Now that BP is back in the normal range, Libby reports she no longer has a headache

## 2023-10-30 NOTE — PLAN OF CARE
Goal Outcome Evaluation:  Time: 5798-5234  Alert and oriented x3, hypertensive to 180-190s, prn Hydralazine given x1 with recheck at . Afebrile, OVSS on RA. Back, neck and head pain managed with prn oxycodone x2 and Tylenol x2 with some relief. Denies nausea or vomiting. ICANS score 7 and 6, needs reassessment every 4 hours. Up to the bathroom voiding spontaneously. Sleeping between cares, continue with POC.

## 2023-10-30 NOTE — PROGRESS NOTES
"Patient is alert & oriented x3. There are signs of headache, muscle spasms or rigidity, or vision changes. NO.    ICANS score/Neurotoxicity assessment  Score:  3   Orientation: Orientation to year, month, city, hospital: 4 points (1 point each) Said it was Halloween today, \"[place]\" could state the year but not until much prompting.   3   Identify: Name 3 objects that nurse points to (e.g. clock, pen, button): 3 points (1 point each)  1   Following Commands: (e.g. show me two fingers or close your eyes and stick out your tongue): 1 point  0   Writing: Ability to write a standard sentence (see writing page \"The nichols jumped over the log.\"): 1 point much improved but by the 3rd word starting mixing up the words/letters  0  Attention: Count backwards from 100 by 10s: 1 point was able to do it all the way until 20 but then started with 10,9,8,7 . . . .     7   Total: Max 10, no impairment, reassess next shift                  **9 or below Notify MD and reassess per provider direction     Cytokine Release Syndrome Assessment  Cytokine Release Syndrome: a potentially life-threatening toxicity after receiving immune based therapy for cancer treatment. Severity varies from grade 1 to grade 4.     There are No current signs of CRS (pyrexia/fever, headache, nausea, hypotension, dyspnea, anxiety, increased liver enzymes, increased bilirubin).  **Will continue to monitor for s/sx of neurotoxicity and CRS.  If these occur, please call the Attending MD for recommendations and further instruction. CRS and neurotoxicity adverse reaction protocols/grading per package insert guidelines.    "

## 2023-10-30 NOTE — PROGRESS NOTES
"                                                  Teclistamab Assessment  Patient is Alert & Oriented x 3. There are signs of headache, muscle spasms or rigidity, or vision changes. Yes, headache       ICANS score     Score:   3  Orientation: Orientation to year, month, city, hospital: 4 points (1 point each) unable to tell the year, said 1925  3 Identify: Name 3 objects that nurse points to (e.g. clock, pen, button): 3 points (1 point each) walker, cup, shoe  1  Following Commands: (e.g. show me two fingers or close your eyes and stick out your tongue): 1 point   0  Writing: Ability to write a standard sentence (see writing page \"The nichols jumped over the log.\"): 1 point ineligible  0  Attention: Count backwards from 100 by 10s: 1 point counted 100, 1,18,17...    7  Total: Max 10, no impairment, reassess next shift                   **9 or below Notify MD and reassess in 4 hours Unchanged from previous assessment, will reassess in 4 hours      Cytokine Release Syndrome Assessment    Cytokine Release Syndrome: a potentially life threatening toxicity after receiving immune based therapy for cancer treatment. Severity varies from grade 1 to grade 4.      There are current signs of CRS yes, reports she has a headache(pyrexia/fever, headache, nausea, hypotension, dyspnea, anxiety, increased liver enzymes, increased bilirubin).    **Will continue to monitor for s/sx of neurotoxicity and CRS.  If these occur, please call the Attending MD for recommendations and further instruction. CRS and neurotoxicity adverse reaction protocols/grading per package insert guidelines.   "

## 2023-10-30 NOTE — PROGRESS NOTES
"Patient is alert & oriented x4  . There are signs of headache, muscle spasms or rigidity, or vision changes. No.    ICANS score/Neurotoxicity assessment  Score:  4 Orientation: Orientation to year, month, city, hospital: 4 points (1 point each)  3   Identify: Name 3 objects that nurse points to (e.g. clock, pen, button): 3 points (1 point each)  1   Following Commands: (e.g. show me two fingers or close your eyes and stick out your tongue): 1 point  1   Writing: Ability to write a standard sentence (see writing page \"The nichols jumped over the log.\"): 1 point  0   Attention: Count backwards from 100 by 10s: 1 point Kept saying 10,9,8,7,6 . . . But couldn't subtract by 10s.      9 Total: Max 10, no impairment, reassess next shift                  **9 or below Notify MD and reassess per provider direction     Cytokine Release Syndrome Assessment  Cytokine Release Syndrome: a potentially life-threatening toxicity after receiving immune based therapy for cancer treatment. Severity varies from grade 1 to grade 4.     There are No current signs of CRS (pyrexia/fever, headache, nausea, hypotension, dyspnea, anxiety, increased liver enzymes, increased bilirubin).  **Will continue to monitor for s/sx of neurotoxicity and CRS.  If these occur, please call the Attending MD for recommendations and further instruction. CRS and neurotoxicity adverse reaction protocols/grading per package insert guidelines.    "

## 2023-10-30 NOTE — PROGRESS NOTES
"Patient is alert & oriented x3. There are signs of headache, muscle spasms or rigidity, or vision changes. No.    ICANS score/Neurotoxicity assessment  Score:  3   Orientation: Orientation to year, month, city, hospital: 4 points (1 point each) Year [id number], when asked again in different way it was again 1031  3   Identify: Name 3 objects that nurse points to (e.g. clock, pen, button): 3 points (1 point each)  1   Following Commands: (e.g. show me two fingers or close your eyes and stick out your tongue): 1 point  0   Writing: Ability to write a standard sentence (see writing page \"The nichols jumped over the log.\"): 1 point Unchanged from 0900  0   Attention: Count backwards from 100 by 10s: 1 point 11, 10, 9, 8, 7, 6, 5, 4, 3, 2, 1. Asked again with same response      7   Total: Max 10, no impairment, reassess next shift                  **9 or below Notify MD and reassess per provider direction     Cytokine Release Syndrome Assessment  Cytokine Release Syndrome: a potentially life-threatening toxicity after receiving immune based therapy for cancer treatment. Severity varies from grade 1 to grade 4.     There are No current signs of CRS (pyrexia/fever, headache, nausea, hypotension, dyspnea, anxiety, increased liver enzymes, increased bilirubin).  **Will continue to monitor for s/sx of neurotoxicity and CRS.  If these occur, please call the Attending MD for recommendations and further instruction. CRS and neurotoxicity adverse reaction protocols/grading per package insert guidelines.    "

## 2023-10-30 NOTE — PROGRESS NOTES
"                                                  Teclistamab Assessment  Patient is Alert & Oriented x3. There are signs of headache, muscle spasms or rigidity, or vision changes.pt states she has a headache, no other symptoms       ICANS score     Score:   3  Orientation: Orientation to year, month, city, hospital: 4 points (1 point each) said it was 1930  3  Identify: Name 3 objects that nurse points to (e.g. clock, pen, button): 3 points (1 point each) Pen, glasses, phone  1  Following Commands: (e.g. show me two fingers or close your eyes and stick out your tongue): 1 point Show me two fingers  0  Writing: Ability to write a standard sentence (see writing page \"The nichols jumped over the log.\"): 1 point ineligible  0  Attention: Count backwards from 100 by 10s: 1 point counts 100,29,28,27,26...    7  Total: Max 10, no impairment, reassess next shift                   **9 or below Notify MD and reassess in 4 hours  No change from previous assessment. Will reassess in 4 hours.        Cytokine Release Syndrome Assessment    Cytokine Release Syndrome: a potentially life threatening toxicity after receiving immune based therapy for cancer treatment. Severity varies from grade 1 to grade 4.      There are current signs of CRS yes, pt reports headache (pyrexia/fever, headache, nausea, hypotension, dyspnea, anxiety, increased liver enzymes, increased bilirubin).    **Will continue to monitor for s/sx of neurotoxicity and CRS.  If these occur, please call the Attending MD for recommendations and further instruction. CRS and neurotoxicity adverse reaction protocols/grading per package insert guidelines.   "

## 2023-10-30 NOTE — PROGRESS NOTES
[facility]    Hematology / Oncology Progress Note  Date of Admission: 10/24/2023  Hospital Day # 6  Date of Service (when I saw the patient): 10/30/2023     Assessment & Plan   Libby Grimaldo is a 73 year old female with a medical history of high risk IgG Lambda multiple myeloma not achieving remission s/p Auto-PBSCT in 2022 (on palliative Erlo/Pom/Dex), Grave's disease, ILD/COPD, HTN, and HLD. Her last therapy was Elotuzumab where she received 5 cycles, but due to rising lambda chains that were consistent with progression, Teclistamab recommended. She is being admitted for cycle 1 day 1 of Teclistamab. She received Zarxio in clinic day prior to admission. Admission complicated by concern for Grade 1/2 neurotoxicity resulting in holding of Teclistamab ramp up.      Today  -10/30: ICANS 6-7/10. Patient continues to have worsening confusion. She had difficulty following commands, was not oriented to time or place. For now we will empiric treat Grade 2 neurotoxicity with Dexamethasone 5mg Q6H. Overall, she seems to be having a difficulty tolerating Teclistamab and thus unclear if she will be able to complete ramp up therapy this admission. Will continue to hold Teclistamab for now.   - Etiology of confusion is unclear. MRI with no acute findings. Discussed with patient's primary Dr. Guillory who stated she has had problems with cognition in the past. Thus she may be more susceptible to hospital acquired delirium. Teclistamab recently given so associated neurotoxicity also is possible. She is immune compromised, so infectious etiologies remain on differential. It is somewhat hard to distinguish how much of her altered mental status is attributed to Teclistamab associated neurotoxicity. Palliative consult placed for opoid management, given her history of substance use, recommended to not change medications at this time.   - MRI brain with new, 2.8 cm parapharyngeal lesion of  unclear etiology. Per discussion with neuroradiology it is unclear what this lesion may be. ENT consulted for biopsy; waiting on final decision.   - Consult placed for LP to rule out any infectious etiologies as patient is neutropenic, patient wishes to defer til tomorrow, will plan for 10/31. Continue to hold Eliquis.     [facility]  #IgG Lambda Multiple Myeloma, High-risk. S/p APBST 2022  Follows with Dr. Guillory.  Initially diagnosed in 11/2018, completed KRD x 6 cycles, rev/Dex (20), Velcade maintenance, revlimid maintenance, with evidence of disease BmBx 11/2020 (35% plasma cells) and PET CT with new lytic lesions. Then treated with kameron, Kd x 6 cycles, subcutaneous kameron/IVIG/denosumab monthly. S/p autologous peripheral blood stem cell transplant in 2022. Followed by melphalan, ixazomib, and then started on palliative elotuzumab, pomolidomide, and dexamethasone chemotherapy 4/2023 with most recent dose (C3D1) 6/26. She presented to heme/onc clinic 8/8 to re-establish treatment schedule and sent to ED and admitted for sepsis, confusion, and weakness. She was started on Zosyn and vancomycin with improvement in symptoms, and transitioned to oral Levaquin 8/10/23. She was discharged with outpatient PT. C5 was schedukled for 9/20 but patient cancelled appt due to a fall; started C5D1 9/27. Due to rising lambda chains that were consistent with progression, Teclistamab recommended. Admitted for C1D1 of Teclistamab 10/24. Received Zarxio in clinic 10/23.     Patient had grade 1 ICANS after D1 that progressed to Grade 2 overnight on D3 (10/26). Heme/onc team notified and ordered Dexamethasone 10 mg x2 and started Keppra BID. Noted by the medicine team when they saw her she had difficultly following commands such as touching her nose with her finger. When reassessed in AM, ICANS 7/10 and 8/10 by RN. She was able to follow finger-to-nose and finger-to-finger commands, thorough neuro exam consistent with previous days. Of note,  she has a tremor at baseline but noted increased on D2. Patient mentions a mild headache in AMs that has been alleviated by tylenol. She does have a baseline of difficulty walking due to chronic back/hip pain and neuropathy, she says she notices no changes from her baseline. Patient uses a walker.      Teclistimab guidelines:                - Dosing as per treatment plan below.               - Pre-medications with zofran 8 mg, dexamethasone 16 mg, benadryl 50 mg and APAP 650 mg                - Nursing monitoring:                             - Vitals q1h before and after administration and q4h thereafter                            - ICANS monitoring qshift (order on admission)               - Start Allopurinol for TLS ppx.                - Start  mg BID. Additional antimicrobial ppx if neutropenic.                - CRS and neurotoxicity adverse reaction protocols/grading per package insert guidelines.                - In summary for CRS, for grades 1 (fever alone) and 2 (fever + hypotension responsive to fluids and/or low-flow O2 requirement) pursue supportive management, infectious workup and hold subsequent dose until resolution. With grade 3 (single pressor requirement and/or high-flow O2) or grade 4 (multiple pressors and/or biPAP/intubation), will consider tocilizumab after discussion with on-call staff.                - In summary for neurotoxicity, grade 1 (ICANS 7-9), hold subsequent dose until resolution and consider keppra ppx/further intervention after discussion with staff on-call. For grade 2 (ICANS 3-6), start dexamethasone 10 mg q6h and discuss with on-call staff.                  Treatment Plan: Teclistimab (C1D1 = 10/24/2023)               - Teclistimab 6.2 mg (0.06 mg/kg) subcutaneous injection - D1               - Teclistimab 31 mg (0.3 mg/kg) subcutaneous injection  - D4               - Teclistimab 154 mg (1.5 mg/kg) subcutaneous injection - D7               - Pre-meds (D1, 4, 7): Zofran 8  mg, dex 16 mg, benadryl 50 mg, APAP 650 mg     -10/28:  patient endorses feeling a little more confused but it is hard for her to describe. She feels like her writing is difficult to complete. Held Teclistamab. MRI brain with no acute pathology to explain neurologic changes.    -10/29: ICANS 5/10, Dex 10mg Q6H until improvement to grade 1 neurotoxicity (ICANS >7)   - Discussed with Dr. Guillory patient's primary who states she has had issues with cognition at baseline, thus it is somewhat difficult to assess ICANS score. However, ICANS score on admission was 10/10 thus, there seems to be an acute change. Teclistamab recently given so associated neurotoxicity also is possible however we will work up alternate causes of AMS as mentioned below as well. For now we will empiric treat Grade 2 neurotoxicity with Dexamethasone 5 mg Q6H and taper as able. Overall, she seems to be having a difficulty tolerating Teclistamab and thus unclear if she will be able to complete ramp up therapy this admission. Will continue to hold Teclistamab for now.   - Will continue to monitor and reassess ICANS and CRS scoring every 4 hours or suspected change in mental status until ICANS score of 10.   - Trending CRP and Ferritin for ICANS and CRS. Ordered add on Ferritin for labs drawn on 10/27 and 10/28.    Ppx:  - PTA Acyclovir 400 mg BID  - PJP ppx bactrim BID on Monday/Tuesdays  - Levofloxacin 250 mg PO- if ANC <1000  - Antifungal ppx, held d/t interaction with Methadone, Micafungin while admitted     #Pancytopenia   - Transfuse to maintain Hgb >7, Plt >10K  - Blood consent signed and in patient chart.     #Relevant thrombosis or bleeding history     2/15/22: new non-occlusive LUE DVT. No longer on xarelto. On ASA.      NEURO  #Altered Mental Status  Appears to have more difficulty with writing sentences and is often only oriented to place this admission. Etiology of confusion is unclear. MRI with no acute findings. Discussed with patient's  "primary Dr. Guillory who stated she has had problems with cognition in the past, so this could be partly baseline. ICANS on admission was 10/10 and has decreased to 5-6/10, thus there does seem to be an acute change. This may be secondary to hospital acquired delirium vs. Polypharmacy (on chronic methadone) vs. Teclistamab neurotoxicity vs. CNS infection as she is immunocompromised. It is somewhat hard to distinguish how much of her altered mental status is attributed to Teclistamab associated neurotoxicity. For now we will empiric treat Grade 2 neurotoxicity with Dexamethasone. Palliative consult placed for opoid management that could be contributing to her AMS, given her history of substance use, recommended to not change medications at this time.   - Holding Teclistamab  - Dexamethasone 5 mg IV Q6H for neurotoxicity   - Delirium precautions   - Consult placed for LP to rule out any infectious etiologies as patient is neutropenic, patient wishes to defer til tomorrow, will plan for 10/31.     #Neuropathy  Patient has reported increased falls due to feeling her feet are \"heavy\" and feel like there is something on her feet. She spoke about this at her outpatient oncology appointment 10/23; prescribed gabapentin 100 mg BID.  - Hold starting Gabapentin 100 mg BID until discharge from hospital    HEENT  #Incidental Right parapharyngeal lesion  Found on MRI Brain this admission. Lesion protrudes into the oropharynx. Discussed with radiology who stated etiology of lesions is unclear, may be mass vs. Cyst vs. Abscess. This lesion is new since MRI brain in 2021.   - ENT consulted for biopsy      CARDIOVASCULAR  #Elevated blood pressures  She has had consistently elevated BP since late afternoon admission on 10/25. Early AM on 10/26 systolic ranged from 154-194; of note, she had dex 10mg x 2 during this time.   - Amlodipine 10 mg     #Risk of cardiomyopathy   Baseline EF 57% done January 2022. Patient being followed by " cardiology. Last echo done May 2023 with EF 55%-60%.      #Hyperlipidemia   Previously on Crestor but not on med list, patient confirmed - can follow-up with PCP if needed     #History of Afib   Now in NSR.   PTA metoprolol XL 25 mg/d      PULM  #COPD  COPD with 40pyr, quit 2010. Previously documented to have idiopathic pulmonary fibrosis, however most recent PFTs (1/26/22) were normal. Has previously required treatment for COPD exacerbation with pneumonia. Some expiratory wheezing on exam improved with Duonebs; continue home inhaler regimen.  - PTA Trelegy Ellipta inhaler qd  - PTA Albuterol BID PRN     ENDO  #Hypothyroidism  #History of Graves Disease  History of hypothyroidism 2/2 treatment for Graves Disease (patient reports surgery in [place], however not thyroidectomy). Stable dose of levothyroxine over the past four years. Admission TSH (2.74).   - PTA Levothyroxine 112mcg every day     MISC.  #Generalized Anxiety Disorder  - PTA Venlafaxine 75mg TID      #Pain Assessment  - PTA prn oxycodone.      #Opiate dependence  - PTA Methadone 10mg TID     #Dry Eye  Patient noted dry eyes and requested eye drops.   - Carboxymethylcellulose PF 0.5 % ophthalmic solution 1 drop TID PRN    Fluids/Electrolytes/Nutrition   - IVF per chemotherapy regimen  - PRN lyte replacement per standing protocol  - Regular diet as tolerated     Lines: None. Port placed on right chest wall.     PPX  VTE: Enoxaparin 40 mg every day. Hold if plts < 50k  GI: n/a  Bowels: Senna and miralax PRN    Code: Full code    Dispo: Patient must be admitted for at least 48 hours after her last dose of Teclistamab with ICANS and CRS grade 0. Will continue to monitor how patient responds clinically.     I spent >45 minutes face-to-face and/or coordinating or discussing care plan. Over 50% of our time on the unit was spent counseling the patient and/or coordinating care    Patient is seen and examined by Dr. Fortune and SYDNIE.  Assessment and plan are discussed and  "delivered to the patient.    Carline Gonzáles PA-C  Hematology/Oncology  Pager #[phone]      Interval History   Nursing notes reviewed. No acute events overnight. She continues to appear confused. Tremors she feels are similar to baseline, may appear to be a little worse. Endorses mild headache and shoulder pain. Chronic back pain down to hips remains the same. Patient had difficulties answering questions, and needed assistance ordering breakfast. Denies fever, chills, mouth sores, SOB, cough, abdominal pain, diarrhea, constipation, vomiting, dysuria, hematuria, numbness, tingling, swelling, vision changes. Labs and imaging reviewed. All questions and concerns addressed at bedside.       Physical Exam   Temp: 98.5  F (36.9  C) Temp src: Oral BP: (!) 168/76 Pulse: 69   Resp: 17 SpO2: 98 % O2 Device: None (Room air)    Vitals:    10/27/23 0852 10/28/23 1249 10/29/23 0749   Weight: 50.6 kg (111 lb 8 oz) 51.3 kg (113 lb 3.2 oz) 50.6 kg (111 lb 8 oz)     Vital Signs with Ranges  Temp:  [97.6  F (36.4  C)-98.7  F (37.1  C)] 98.5  F (36.9  C)  Pulse:  [69-89] 69  Resp:  [15-18] 17  BP: (133-199)/(59-89) 168/76  SpO2:  [94 %-99 %] 98 %  I/O last 3 completed shifts:  In: 605 [P.O.:480; I.V.:125]  Out: 400 [Urine:400]      Physical Exam:    Blood pressure (!) 168/76, pulse 69, temperature 98.5  F (36.9  C), temperature source Oral, resp. rate 17, height 1.562 m (5' 1.5\"), weight 50.6 kg (111 lb 8 oz), SpO2 98%.    General:. Awake and conversational. Nontoxic appearing. No acute distress.   HEENT: sclera anicteric, EOM intact, MMM.   CV: RRR, normal S1/S2, No peripheral edema.   Resp: CTAB. No wheezing/crackles. Breath sounds are equal throughout. Normal respiratory effort on room air  GI: Soft, non-tender, non-distended.  Skin: No rashes on limited exam.   Neuro: Alert and interactive, moves all extremities equally, no focal deficits. Sensations are equal throughout. baseline tremor of hands present and worse. Unable to " state year or hospital. Oriented to month and city. Struggles to follow commands.    Vascular Access: Port placed on the right chest wall.       Medications    - MEDICATION INSTRUCTIONS -        acyclovir  400 mg Oral BID    allopurinol  300 mg Oral Daily    amLODIPine  10 mg Oral Daily    dexAMETHasone  5 mg Intravenous Q6H    enoxaparin ANTICOAGULANT  40 mg Subcutaneous Q24H    fluticasone-vilanterol  1 puff Inhalation Daily    And    umeclidinium  1 puff Inhalation Daily    folic acid-vit B6-vit B12  1 tablet Oral Daily    levETIRAcetam  750 mg Oral BID    levofloxacin  250 mg Oral Daily    levothyroxine  112 mcg Oral Daily    methadone  10 mg Oral TID    metoprolol succinate ER  25 mg Oral Daily    micafungin  50 mg Intravenous Q24H    pantoprazole  40 mg Oral Daily    potassium & sodium phosphates  1 packet Oral or Feeding Tube Q4H    sulfamethoxazole-trimethoprim  1 tablet Oral Q Mon Tues BID    venlafaxine  75 mg Oral BID    ascorbic acid  1,000 mg Oral Daily    Vitamin D3  25 mcg Oral Daily       Data   Results for orders placed or performed during the hospital encounter of 10/24/23 (from the past 24 hour(s))   Glucose by meter   Result Value Ref Range    GLUCOSE BY METER POCT 106 (H) 70 - 99 mg/dL   CBC with platelets differential    Narrative    The following orders were created for panel order CBC with platelets differential.  Procedure                               Abnormality         Status                     ---------                               -----------         ------                     CBC with platelets and d...[[id number]]  Abnormal            Final result                 Please view results for these tests on the individual orders.   Comprehensive metabolic panel   Result Value Ref Range    Sodium 141 135 - 145 mmol/L    Potassium 4.4 3.4 - 5.3 mmol/L    Carbon Dioxide (CO2) 27 22 - 29 mmol/L    Anion Gap 5 (L) 7 - 15 mmol/L    Urea Nitrogen 15.0 8.0 - 23.0 mg/dL    Creatinine 0.85 0.51 -  0.95 mg/dL    GFR Estimate 73 >60 mL/min/1.73m2    Calcium 8.2 (L) 8.8 - 10.2 mg/dL    Chloride 109 (H) 98 - 107 mmol/L    Glucose 136 (H) 70 - 99 mg/dL    Alkaline Phosphatase 76 35 - 104 U/L    AST 27 0 - 45 U/L    ALT 25 0 - 50 U/L    Protein Total 5.7 (L) 6.4 - 8.3 g/dL    Albumin 3.2 (L) 3.5 - 5.2 g/dL    Bilirubin Total 0.2 <=1.2 mg/dL   INR   Result Value Ref Range    INR 1.08 0.85 - 1.15   Fibrinogen activity   Result Value Ref Range    Fibrinogen Activity 257 170 - 490 mg/dL   Uric acid   Result Value Ref Range    Uric Acid 1.9 (L) 2.4 - 5.7 mg/dL   Partial thromboplastin time   Result Value Ref Range    aPTT 27 22 - 38 Seconds   Magnesium   Result Value Ref Range    Magnesium 1.9 1.7 - 2.3 mg/dL   Phosphorus   Result Value Ref Range    Phosphorus 2.0 (L) 2.5 - 4.5 mg/dL   Ferritin   Result Value Ref Range    Ferritin 138 11 - 328 ng/mL   CRP inflammation   Result Value Ref Range    CRP Inflammation <3.00 <5.00 mg/L   CBC with platelets and differential   Result Value Ref Range    WBC Count 2.1 (L) 4.0 - 11.0 10e3/uL    RBC Count 2.46 (L) 3.80 - 5.20 10e6/uL    Hemoglobin 8.5 (L) 11.7 - 15.7 g/dL    Hematocrit 27.1 (L) 35.0 - 47.0 %     (H) 78 - 100 fL    MCH 34.6 (H) 26.5 - 33.0 pg    MCHC 31.4 (L) 31.5 - 36.5 g/dL    RDW 15.7 (H) 10.0 - 15.0 %    Platelet Count 157 150 - 450 10e3/uL    % Neutrophils 74 %    % Lymphocytes 16 %    % Monocytes 6 %    % Eosinophils 0 %    % Basophils 0 %    % Immature Granulocytes 4 %    NRBCs per 100 WBC 1 (H) <1 /100    Absolute Neutrophils 1.5 (L) 1.6 - 8.3 10e3/uL    Absolute Lymphocytes 0.3 (L) 0.8 - 5.3 10e3/uL    Absolute Monocytes 0.1 0.0 - 1.3 10e3/uL    Absolute Eosinophils 0.0 0.0 - 0.7 10e3/uL    Absolute Basophils 0.0 0.0 - 0.2 10e3/uL    Absolute Immature Granulocytes 0.1 <=0.4 10e3/uL    Absolute NRBCs 0.0 10e3/uL

## 2023-10-30 NOTE — PROGRESS NOTES
"                                                  Teclistamab Assessment  Patient is Alert & Oriented x2. There are signs of headache, muscle spasms or rigidity, or vision changes.yes, reports headache       ICANS score     Score:   2   Orientation: Orientation to year, month, city, hospital: 4 points (1 point each) can't tell month or year  3  Identify: Name 3 objects that nurse points to (e.g. clock, pen, button): 3 points (1 point each) TV, sweater, telephone  1   Following Commands: (e.g. show me two fingers or close your eyes and stick out your tongue): 1 point able to show two fingers  0   Writing: Ability to write a standard sentence (see writing page \"The nichols jumped over the log.\"): 1 point ineligible  0  Attention: Count backwards from 100 by 10s: 1 point pt counted 100, 29,19,18..    6   Total: Max 10, no impairment, reassess next shift                   **9 or below Notify MD and reassess in 4 hours       Cytokine Release Syndrome Assessment    Cytokine Release Syndrome: a potentially life threatening toxicity after receiving immune based therapy for cancer treatment. Severity varies from grade 1 to grade 4.      There are current signs of CRS yes, headache (pyrexia/fever, headache, nausea, hypotension, dyspnea, anxiety, increased liver enzymes, increased bilirubin).    **Will continue to monitor for s/sx of neurotoxicity and CRS.  If these occur, please call the Attending MD for recommendations and further instruction. CRS and neurotoxicity adverse reaction protocols/grading per package insert guidelines.    "

## 2023-10-31 NOTE — PROGRESS NOTES
"                                                  Teclistamab Assessment  Patient is Alert & Oriented x3. There are signs of headache, muscle spasms or rigidity, or vision changes. No.        ICANS score     Score:   3   Orientation: Orientation to year, month, city, hospital: 4 points (1 point each) Could not state year.  3   Identify: Name 3 objects that nurse points to (e.g. clock, pen, button): 3 points (1 point each)   1   Following Commands: (e.g. show me two fingers or close your eyes and stick out your tongue): 1 point   0   Writing: Ability to write a standard sentence (see writing page \"The nichols jumped over the log.\"): 1 point Did not stay in lines.  0   Attention: Count backwards from 100 by 10s: 1 point Stated \"100, 90, 8, 7, 6...\"    7   Total: Max 10, no impairment, reassess next shift                   **9 or below Notify MD and reassess in 4 hours       Cytokine Release Syndrome Assessment    Cytokine Release Syndrome: a potentially life threatening toxicity after receiving immune based therapy for cancer treatment. Severity varies from grade 1 to grade 4.      There are No current signs of CRS (pyrexia/fever, headache, nausea, hypotension, dyspnea, anxiety, increased liver enzymes, increased bilirubin).    **Will continue to monitor for s/sx of neurotoxicity and CRS.  If these occur, please call the Attending MD for recommendations and further instruction. CRS and neurotoxicity adverse reaction protocols/grading per package insert guidelines.   "

## 2023-10-31 NOTE — PROCEDURES
[facility]    Lumbar puncture    Date/Time: 10/31/2023 1:57 PM    Performed by: Melania Lopez MD  Authorized by: Melania Lopez MD  Indications: evaluation for altered mental status  Preparation: Patient was prepped and draped in the usual sterile fashion.      UNIVERSAL PROTOCOL   Site Marked: Yes  Prior Images Obtained and Reviewed:  Yes  Required items: Required blood products, implants, devices and special equipment available    Patient identity confirmed:  Verbally with patient, arm band, provided demographic data and hospital-assigned identification number  NA - No sedation, light sedation, or local anesthesia  Confirmation Checklist:  Patient's identity using two indicators, relevant allergies and procedure was appropriate and matched the consent or emergent situation  Time out: Immediately prior to the procedure a time out was called    Universal Protocol: the Joint Commission Universal Protocol was followed    Preparation: Patient was prepped and draped in usual sterile fashion    ESBL (mL):  0     ANESTHESIA    Anesthesia:  Local infiltration  Local Anesthetic:  Lidocaine 1% without epinephrine  Anesthetic Total (mL):  5      SEDATION    Patient Sedated: No      PROCEDURE DETAILS  Lumbar space: L4-L5 interspace  Patient's position: left lateral decubitus  Needle type: spinal needle - Quincke tip  Needle length: 3.5 in  Number of attempts: 4  Total volume: 0 ml  Post-procedure: site cleaned and adhesive bandage applied      PROCEDURE  Describe Procedure: 4 total attempts made but hit bone every time and unable to obtain CSF at all 4 attempts. Procedure abandoned,  Patient Tolerance:  Patient tolerated the procedure well with no immediate complications (does well with pre medication with ativan)  Length of time physician/provider present for 1:1 monitoring during sedation: 0

## 2023-10-31 NOTE — PROGRESS NOTES
[facility]    Hematology / Oncology Progress Note  Date of Admission: 10/24/2023  Hospital Day # 7  Date of Service (when I saw the patient): 10/31/2023     Assessment & Plan   Libby Grimaldo is a 73 year old female with a medical history of high risk IgG Lambda multiple myeloma not achieving remission s/p Auto-PBSCT in 2022 (on palliative Erlo/Pom/Dex), Grave's disease, ILD/COPD, HTN, and HLD. Her last therapy was Elotuzumab where she received 5 cycles, but due to rising lambda chains that were consistent with progression, Teclistamab recommended. She is being admitted for cycle 1 day 1 of Teclistamab. She received Zarxio in clinic day prior to admission. Admission complicated by concern for Grade 1/2 neurotoxicity resulting in holding of Teclistamab ramp up.      Today  - Day 8 Teclistamab. She received one dose with no ramp up. ICANS 6-9/10. Overall, she seems to be having a difficulty tolerating. Patient continues to have confusion. Patient's primary, Dr. Guillory, ok with stopping Teclistamab with an outpatient follow up after mental status improves.   - Etiology of confusion is unclear. MRI shows no acute findings. Dr. Guillory stated she has had problems with cognition in the past. Thus, she may be more susceptible to hospital acquired delirium. Teclistamab recently given so associated neurotoxicity also is possible, but less likely as it is day 8 and ICANS has not shown overall improvement with dex. Stop empiric treat Grade 2 neurotoxicity with Dexamethasone. She is immune compromised, so infectious etiologies remain on differential. LP planned for today but not able to be completed. Plan for LP 11/01 in IR. Palliative consult placed for opoid management, given her history of substance use, recommended to not change medications at this time. UDS ordered.   - MRI brain with new, 2.8 cm parapharyngeal lesion of unclear etiology. Per discussion with neuroradiology it is  unclear what this lesion may be. ENT consulted for biopsy; recommended CT neck with contrast and an outpatient follow up.     HEME  #IgG Lambda Multiple Myeloma, High-risk. S/p APBST 2022  Follows with Dr. Guillory.  Initially diagnosed in 11/2018, completed KRD x 6 cycles, rev/Dex (20), Velcade maintenance, revlimid maintenance, with evidence of disease BmBx 11/2020 (35% plasma cells) and PET CT with new lytic lesions. Then treated with kameron, Kd x 6 cycles, subcutaneous kameron/IVIG/denosumab monthly. S/p autologous peripheral blood stem cell transplant in 2022. Followed by melphalan, ixazomib, and then started on palliative elotuzumab, pomolidomide, and dexamethasone chemotherapy 4/2023 with most recent dose (C3D1) 6/26. She presented to heme/onc clinic 8/8 to re-establish treatment schedule and sent to ED and admitted for sepsis, confusion, and weakness. She was started on Zosyn and vancomycin with improvement in symptoms, and transitioned to oral Levaquin 8/10/23. She was discharged with outpatient PT. C5 was schedukled for 9/20 but patient cancelled appt due to a fall; started C5D1 9/27. Due to rising lambda chains that were consistent with progression, Teclistamab recommended. Admitted for C1D1 of Teclistamab 10/24. Received Zarxio in clinic 10/23.     Patient had grade 1 ICANS after D1 that progressed to Grade 2 overnight on D3 (10/26). Heme/onc team notified and ordered Dexamethasone 10 mg x2 and started Keppra BID. Noted by the medicine team when they saw her she had difficultly following commands such as touching her nose with her finger. When reassessed in AM, ICANS 7/10 and 8/10 by RN. She was able to follow finger-to-nose and finger-to-finger commands, thorough neuro exam consistent with previous days. Of note, she has a tremor at baseline but noted increased on D2. Patient mentions a mild headache in AMs that has been alleviated by tylenol. She does have a baseline of difficulty walking due to chronic  back/hip pain and neuropathy, she says she notices no changes from her baseline. Patient uses a walker.      Teclistimab guidelines:                - Dosing as per treatment plan below.               - Pre-medications with zofran 8 mg, dexamethasone 16 mg, benadryl 50 mg and APAP 650 mg                - Nursing monitoring:                             - Vitals q1h before and after administration and q4h thereafter                            - ICANS monitoring qshift (order on admission)               - Start Allopurinol for TLS ppx.                - Start  mg BID. Additional antimicrobial ppx if neutropenic.                - CRS and neurotoxicity adverse reaction protocols/grading per package insert guidelines.                - In summary for CRS, for grades 1 (fever alone) and 2 (fever + hypotension responsive to fluids and/or low-flow O2 requirement) pursue supportive management, infectious workup and hold subsequent dose until resolution. With grade 3 (single pressor requirement and/or high-flow O2) or grade 4 (multiple pressors and/or biPAP/intubation), will consider tocilizumab after discussion with on-call staff.                - In summary for neurotoxicity, grade 1 (ICANS 7-9), hold subsequent dose until resolution and consider keppra ppx/further intervention after discussion with staff on-call. For grade 2 (ICANS 3-6), start dexamethasone 10 mg q6h and discuss with on-call staff.                  Treatment Plan: Teclistimab (C1D1 = 10/24/2023)               - Teclistimab 6.2 mg (0.06 mg/kg) subcutaneous injection - D1               - Teclistimab 31 mg (0.3 mg/kg) subcutaneous injection  - D4               - Teclistimab 154 mg (1.5 mg/kg) subcutaneous injection - D7               - Pre-meds (D1, 4, 7): Zofran 8 mg, dex 16 mg, benadryl 50 mg, APAP 650 mg     -10/28:  patient endorses feeling a little more confused but it is hard for her to describe. She feels like her writing is difficult to complete.  Held Teclistamab. MRI brain with no acute pathology to explain neurologic changes.    -10/29: ICANS 5/10, Dex 10mg Q6H until improvement to grade 1 neurotoxicity (ICANS >7)   - Discussed with Dr. Guillory patient's primary who states she has had issues with cognition at baseline, thus it is somewhat difficult to assess ICANS score. However, ICANS score on admission was 10/10 thus, there seems to be an acute change. Teclistamab recently given so associated neurotoxicity also is possible however we will work up alternate causes of AMS as mentioned below as well. Overall, she seems to be having a difficulty tolerating Teclistamab and thus unclear if she will be able to complete ramp up therapy this admission. Will continue to hold Teclistamab for now.   - Will continue to monitor and reassess ICANS and CRS scoring every 4 hours or suspected change in mental status until ICANS score of 10.   - Trending CRP and Ferritin for ICANS and CRS. Ordered add on Ferritin for labs drawn on 10/27 and 10/28.  - Stop empiric dex 10/31. ICANS still decreased despite empiric treatment.     Ppx:  - PTA Acyclovir 400 mg BID  - PJP ppx bactrim BID on Monday/Tuesdays  - Levofloxacin 250 mg PO- if ANC <1000  - Antifungal ppx, held d/t interaction with Methadone, Micafungin while admitted     #Pancytopenia   - Transfuse to maintain Hgb >7, Plt >10K  - Blood consent signed and in patient chart.     #Relevant thrombosis or bleeding history     2/15/22: new non-occlusive LUE DVT. No longer on xarelto. On ASA.      NEURO  #Altered Mental Status  Appears to have more difficulty with writing sentences and is often only oriented to place this admission. Etiology of confusion is unclear. MRI with no acute findings. Discussed with patient's primary Dr. Guillory who stated she has had problems with cognition in the past, so this could be partly baseline. ICANS on admission was 10/10 and has decreased to 5-6/10, thus there does seem to be an acute  "change. This may be secondary to hospital acquired delirium vs. Polypharmacy (on chronic methadone) vs. Teclistamab neurotoxicity vs. CNS infection as she is immunocompromised. It is somewhat hard to distinguish how much of her altered mental status is attributed to Teclistamab associated neurotoxicity. For now we will empiric treat Grade 2 neurotoxicity with Dexamethasone. Palliative consult placed for opoid management that could be contributing to her AMS, given her history of substance use, recommended to not change medications at this time.   - Holding Teclistamab - Per Dr. Guillory ok to stop and follow up outpatient when mental status improves  - Dexamethasone for empiric neurotoxicity treatment stopped 10/31  - Delirium precautions   - Consult placed for LP to rule out any infectious etiologies as patient is neutropenic; plan for 10/31. LP planned for today but not able to be completed. Plan for LP 11/01 in IR    #Neuropathy  Patient has reported increased falls due to feeling her feet are \"heavy\" and feel like there is something on her feet. She spoke about this at her outpatient oncology appointment 10/23; prescribed gabapentin 100 mg BID.  - Hold starting Gabapentin 100 mg BID until discharge from hospital    HEENT  #Incidental Right parapharyngeal lesion  Found on MRI Brain this admission. Lesion protrudes into the oropharynx. Discussed with radiology who stated etiology of lesions is unclear, may be mass vs. Cyst vs. Abscess. This lesion is new since MRI brain in 2021.   - ENT consulted for biopsy; recommended CT neck with contrast and outpatient follow up; orders placed.      CARDIOVASCULAR  #Elevated blood pressures  She has had consistently elevated BP since late afternoon admission on 10/25. Early AM on 10/26 systolic ranged from 154-194; of note, she had dex 10mg x 2 during this time.   - Amlodipine 10 mg     #Risk of cardiomyopathy   Baseline EF 57% done January 2022. Patient being followed by " cardiology. Last echo done May 2023 with EF 55%-60%.      #Hyperlipidemia   Previously on Crestor but not on med list, patient confirmed - can follow-up with PCP if needed     #History of Afib   Now in NSR.   PTA metoprolol XL 25 mg/d      PULM  #COPD  COPD with 40pyr, quit 2010. Previously documented to have idiopathic pulmonary fibrosis, however most recent PFTs (1/26/22) were normal. Has previously required treatment for COPD exacerbation with pneumonia. Some expiratory wheezing on exam improved with Duonebs; continue home inhaler regimen.  - PTA Trelegy Ellipta inhaler qd  - PTA Albuterol BID PRN     ENDO  #Hypothyroidism  #History of Graves Disease  History of hypothyroidism 2/2 treatment for Graves Disease (patient reports surgery in [place], however not thyroidectomy). Stable dose of levothyroxine over the past four years. Admission TSH (2.74).   - PTA Levothyroxine 112mcg every day     MISC.  #Generalized Anxiety Disorder  - PTA Venlafaxine 75mg TID      #Pain Assessment  - PTA prn oxycodone.      #Opiate dependence  - PTA Methadone 10mg TID     #Dry Eye  Patient noted dry eyes and requested eye drops.   - Carboxymethylcellulose PF 0.5 % ophthalmic solution 1 drop TID PRN    Fluids/Electrolytes/Nutrition   - IVF per chemotherapy regimen  - PRN lyte replacement per standing protocol  - Regular diet as tolerated     Lines: None. Port placed on right chest wall.     PPX  VTE: Enoxaparin 40 mg every day. Hold if plts < 50k  GI: n/a  Bowels: Senna and miralax PRN    Code: Full code    Dispo: TBD. Inpatient until improvement in mental status.     I spent >45 minutes face-to-face and/or coordinating or discussing care plan. Over 50% of our time on the unit was spent counseling the patient and/or coordinating care    Patient is seen and examined by Dr. Fortune and I.  Assessment and plan are discussed and delivered to the patient.    Carline Gonzáles PA-C  Hematology/Oncology  Pager #[phone]      Interval History  "  Nursing notes reviewed. No acute events overnight. She continues to appear confused. Patient able to orient to year, which she has not been able to do since Teclistamab day 1. Continues to have difficulty counting back and writing. She was able to follow commands. She is nervous about LP regarding pain, assurance and education provided. Patient expresses being excited to leave, made aware that the sooner she does LP the sooner she can leave. Denies fever, chills, mouth sores, SOB, cough, abdominal pain, diarrhea, constipation, vomiting, dysuria, hematuria, numbness, tingling, swelling, vision changes. Labs and imaging reviewed. All questions and concerns addressed at bedside.       Physical Exam   Temp: 98.2  F (36.8  C) Temp src: Oral BP: (!) 167/70 Pulse: 74   Resp: 16 SpO2: 99 % O2 Device: None (Room air)    Vitals:    10/28/23 1249 10/29/23 0749 10/31/23 1017   Weight: 51.3 kg (113 lb 3.2 oz) 50.6 kg (111 lb 8 oz) 51.2 kg (112 lb 14.4 oz)     Vital Signs with Ranges  Temp:  [97.8  F (36.6  C)-98.5  F (36.9  C)] 98.2  F (36.8  C)  Pulse:  [72-80] 74  Resp:  [16-18] 16  BP: (120-167)/(42-70) 167/70  SpO2:  [98 %-100 %] 99 %  I/O last 3 completed shifts:  In: 1506 [P.O.:1340; I.V.:166]  Out: 1500 [Urine:1500]      Physical Exam:    Blood pressure (!) 167/70, pulse 74, temperature 98.2  F (36.8  C), temperature source Oral, resp. rate 16, height 1.562 m (5' 1.5\"), weight 51.2 kg (112 lb 14.4 oz), SpO2 99%.    General:. Awake and conversational. Nontoxic appearing. No acute distress.   HEENT: sclera anicteric, EOM intact, MMM.   CV: RRR, normal S1/S2, No peripheral edema.   Resp: CTAB. No wheezing/crackles. Breath sounds are equal throughout. Normal respiratory effort on room air  GI: Soft, non-tender, non-distended.  Skin: No rashes on limited exam.   Neuro: Alert and interactive, moves all extremities equally, no focal deficits. Sensations are equal throughout. baseline tremor of hands present. Oriented to year, " month and city. Can follow commands.    Vascular Access: Port placed on the right chest wall.       Medications    - MEDICATION INSTRUCTIONS -        acyclovir  400 mg Oral BID    allopurinol  300 mg Oral Daily    amLODIPine  10 mg Oral Daily    dexAMETHasone  5 mg Intravenous Q6H    [Held by provider] enoxaparin ANTICOAGULANT  40 mg Subcutaneous Q24H    fluticasone-vilanterol  1 puff Inhalation Daily    And    umeclidinium  1 puff Inhalation Daily    folic acid-vit B6-vit B12  1 tablet Oral Daily    levETIRAcetam  750 mg Oral BID    levofloxacin  250 mg Oral Daily    levothyroxine  112 mcg Oral Daily    methadone  10 mg Oral TID    metoprolol succinate ER  25 mg Oral Daily    micafungin  50 mg Intravenous Q24H    pantoprazole  40 mg Oral Daily    potassium & sodium phosphates  1 packet Oral or Feeding Tube Q4H    sulfamethoxazole-trimethoprim  1 tablet Oral Q Mon Tues BID    venlafaxine  75 mg Oral BID    ascorbic acid  1,000 mg Oral Daily    Vitamin D3  25 mcg Oral Daily       Data   Results for orders placed or performed during the hospital encounter of 10/24/23 (from the past 24 hour(s))   UA with Microscopic reflex to Culture    Specimen: Urine, Clean Catch   Result Value Ref Range    Color Urine Light Yellow Colorless, Straw, Light Yellow, Yellow    Appearance Urine Clear Clear    Glucose Urine Negative Negative mg/dL    Bilirubin Urine Negative Negative    Ketones Urine Negative Negative mg/dL    Specific Gravity Urine 1.016 1.003 - 1.035    Blood Urine Negative Negative    pH Urine 7.0 5.0 - 7.0    Protein Albumin Urine 20 (A) Negative mg/dL    Urobilinogen Urine Normal Normal, 2.0 mg/dL    Nitrite Urine Negative Negative    Leukocyte Esterase Urine Negative Negative    RBC Urine <1 <=2 /HPF    WBC Urine 3 <=5 /HPF    Squamous Epithelials Urine <1 <=1 /HPF    Transitional Epithelials Urine <1 <=1 /HPF    Narrative    Urine Culture not indicated   Urine Culture    Specimen: Urine, Straight Catheter   Result  Value Ref Range    Culture <1,000 CFU/mL Urogenital cricket    Ammonia   Result Value Ref Range    Ammonia 27 11 - 51 umol/L   CBC with platelets differential    Narrative    The following orders were created for panel order CBC with platelets differential.  Procedure                               Abnormality         Status                     ---------                               -----------         ------                     CBC with platelets and d...[[id number]]  Abnormal            Final result                 Please view results for these tests on the individual orders.   Comprehensive metabolic panel   Result Value Ref Range    Sodium 140 135 - 145 mmol/L    Potassium 4.5 3.4 - 5.3 mmol/L    Carbon Dioxide (CO2) 24 22 - 29 mmol/L    Anion Gap 8 7 - 15 mmol/L    Urea Nitrogen 19.3 8.0 - 23.0 mg/dL    Creatinine 0.89 0.51 - 0.95 mg/dL    GFR Estimate 69 >60 mL/min/1.73m2    Calcium 7.8 (L) 8.8 - 10.2 mg/dL    Chloride 108 (H) 98 - 107 mmol/L    Glucose 138 (H) 70 - 99 mg/dL    Alkaline Phosphatase 65 35 - 104 U/L    AST 18 0 - 45 U/L    ALT 19 0 - 50 U/L    Protein Total 5.4 (L) 6.4 - 8.3 g/dL    Albumin 3.1 (L) 3.5 - 5.2 g/dL    Bilirubin Total 0.2 <=1.2 mg/dL   INR   Result Value Ref Range    INR 0.98 0.85 - 1.15   Fibrinogen activity   Result Value Ref Range    Fibrinogen Activity 228 170 - 490 mg/dL   Uric acid   Result Value Ref Range    Uric Acid 1.5 (L) 2.4 - 5.7 mg/dL   Partial thromboplastin time   Result Value Ref Range    aPTT 22 22 - 38 Seconds   Magnesium   Result Value Ref Range    Magnesium 1.9 1.7 - 2.3 mg/dL   Phosphorus   Result Value Ref Range    Phosphorus 2.4 (L) 2.5 - 4.5 mg/dL   Ferritin   Result Value Ref Range    Ferritin 127 11 - 328 ng/mL   CRP inflammation   Result Value Ref Range    CRP Inflammation <3.00 <5.00 mg/L   CBC with platelets and differential   Result Value Ref Range    WBC Count 3.0 (L) 4.0 - 11.0 10e3/uL    RBC Count 2.38 (L) 3.80 - 5.20 10e6/uL    Hemoglobin 8.2 (L)  11.7 - 15.7 g/dL    Hematocrit 25.6 (L) 35.0 - 47.0 %     (H) 78 - 100 fL    MCH 34.5 (H) 26.5 - 33.0 pg    MCHC 32.0 31.5 - 36.5 g/dL    RDW 16.3 (H) 10.0 - 15.0 %    Platelet Count 140 (L) 150 - 450 10e3/uL    % Neutrophils 82 %    % Lymphocytes 10 %    % Monocytes 6 %    % Eosinophils 0 %    % Basophils 0 %    % Immature Granulocytes 2 %    NRBCs per 100 WBC 1 (H) <1 /100    Absolute Neutrophils 2.5 1.6 - 8.3 10e3/uL    Absolute Lymphocytes 0.3 (L) 0.8 - 5.3 10e3/uL    Absolute Monocytes 0.2 0.0 - 1.3 10e3/uL    Absolute Eosinophils 0.0 0.0 - 0.7 10e3/uL    Absolute Basophils 0.0 0.0 - 0.2 10e3/uL    Absolute Immature Granulocytes 0.1 <=0.4 10e3/uL    Absolute NRBCs 0.0 10e3/uL

## 2023-10-31 NOTE — PROGRESS NOTES
"                                                  Teclistamab Assessment - 2000  Patient is Alert & Oriented x3. There are signs of headache, muscle spasms or rigidity, or vision changes. NO       ICANS score     Score:  3  Orientation: Orientation to year, month, city, hospital: 4 points (1 point each) Not able to state correct year  3 Identify: Name 3 objects that nurse points to (e.g. clock, pen, button): 3 points (1 point each)   1   Following Commands: (e.g. show me two fingers or close your eyes and stick out your tongue): 1 point   1   Writing: Ability to write a standard sentence (see writing page \"The nichols jumped over the log.\"): 1 point   0   Attention: Count backwards from 100 by 10s: 1 point Not able to count by 10s: \"100,90,99,100\"    8   Total: Max 10, no impairment, reassess next shift                   **9 or below Notify MD and reassess in 4 hours       Cytokine Release Syndrome Assessment    Cytokine Release Syndrome: a potentially life threatening toxicity after receiving immune based therapy for cancer treatment. Severity varies from grade 1 to grade 4.      There are No current signs of CRS (pyrexia/fever, headache, nausea, hypotension, dyspnea, anxiety, increased liver enzymes, increased bilirubin).    **Will continue to monitor for s/sx of neurotoxicity and CRS.  If these occur, please call the Attending MD for recommendations and further instruction. CRS and neurotoxicity adverse reaction protocols/grading per package insert guidelines.   "

## 2023-10-31 NOTE — PROGRESS NOTES
"                                                  Teclistamab Assessment - 0000  Patient is Alert & Oriented x3. There are signs of headache, muscle spasms or rigidity, or vision changes. No.        ICANS score     Score:   2   Orientation: Orientation to year, month, city, hospital: 4 points (1 point each) Not able to state correct year or hospital.   3   Identify: Name 3 objects that nurse points to (e.g. clock, pen, button): 3 points (1 point each)   1   Following Commands: (e.g. show me two fingers or close your eyes and stick out your tongue): 1 point   0   Writing: Ability to write a standard sentence (see writing page \"The nichols jumped over the log.\"): 1 point Wrote sentence in previous line  0   Attention: Count backwards from 100 by 10s: 1 point states \"100, 10, 2, 1.\"    6   Total: Max 10, no impairment, reassess next shift                   **9 or below Notify MD and reassess in 4 hours .      Cytokine Release Syndrome Assessment    Cytokine Release Syndrome: a potentially life threatening toxicity after receiving immune based therapy for cancer treatment. Severity varies from grade 1 to grade 4.      There are No current signs of CRS (pyrexia/fever, headache, nausea, hypotension, dyspnea, anxiety, increased liver enzymes, increased bilirubin).    **Will continue to monitor for s/sx of neurotoxicity and CRS.  If these occur, please call the Attending MD for recommendations and further instruction. CRS and neurotoxicity adverse reaction protocols/grading per package insert guidelines.   "

## 2023-10-31 NOTE — PLAN OF CARE
[phone]    A&Ox3, has trouble with the date. Hypertensive at 161/69, cross cover notified. OVSS on room air. Pain in L shoulder & hip, received 10mg oxycodone x1 with relief. Denies nausea, SOB. Port heparin locked with good blood return. Q4h Teclistamab ICANS: 8, 6, 7. Up SBA to bathroom, voiding into hat. No BM this shift. bed alarm on for safety. Possible LP today, patient is aware. Continue w/POC.

## 2023-10-31 NOTE — PLAN OF CARE
Goal Outcome Evaluation:      Plan of Care Reviewed With: patient    Overall Patient Progress: no changeOverall Patient Progress: no change  Pt afebrile with stable vs. ICANS score 7-8. Decadron d/c'd. LP attempted-unsuccessfully at bedside. Received prn Plan for it to be done tomorrow in radiology. Pt laying flat until 1440. CT neck done. Urine tox screen sent.  No blood products required. Phos level 2.4 being replaced per protocol. Redraw tomorrow. Good urine output. No BM, received miralax.

## 2023-10-31 NOTE — PROGRESS NOTES
"Patient is alert & oriented x4. There are signs of headache, muscle spasms or rigidity, or vision changes. No.    ICANS score/Neurotoxicity assessment  Score:  4   Orientation: Orientation to year, month, city, hospital: 4 points (1 point each)  3   Identify: Name 3 objects that nurse points to (e.g. clock, pen, button): 3 points (1 point each)  1   Following Commands: (e.g. show me two fingers or close your eyes and stick out your tongue): 1 point  0   Writing: Ability to write a standard sentence (see writing page \"The nichols jumped over the log.\"): 1 point  0   Attention: Count backwards from 100 by 10s: 1 point     8   Total: Max 10, no impairment, reassess next shift                  **9 or below Notify MD and reassess per provider direction     Cytokine Release Syndrome Assessment  Cytokine Release Syndrome: a potentially life-threatening toxicity after receiving immune based therapy for cancer treatment. Severity varies from grade 1 to grade 4.     There are No current signs of CRS (pyrexia/fever, headache, nausea, hypotension, dyspnea, anxiety, increased liver enzymes, increased bilirubin).  **Will continue to monitor for s/sx of neurotoxicity and CRS.  If these occur, please call the Attending MD for recommendations and further instruction. CRS and neurotoxicity adverse reaction protocols/grading per package insert guidelines.   "

## 2023-10-31 NOTE — PROGRESS NOTES
"Patient is Alert & Oriented x3. There are signs of headache, muscle spasms or rigidity, or vision changes. No.         ICANS score      Score:   3   Orientation: Orientation to year, month, city, hospital: 4 points (1 point each) Stated 11/1/03.  3   Identify: Name 3 objects that nurse points to (e.g. clock, pen, button): 3 points (1 point each)   1   Following Commands: (e.g. show me two fingers or close your eyes and stick out your tongue): 1 point   0   Writing: Ability to write a standard sentence (see writing page \"The nichols jumped over the log.\"): 1 point Did not stay in lines.  1  Attention: Count backwards from 100 by 10s:  Total: Max 8,                  reassess in 4 hours         Cytokine Release Syndrome Assessment     Cytokine Release Syndrome: a potentially life threatening toxicity after receiving immune based therapy for cancer treatment. Severity varies from grade 1 to grade 4.      There are No current signs of CRS (pyrexia/fever, headache, nausea, hypotension, dyspnea, anxiety, increased liver enzymes, increased bilirubin).     **Will continue to monitor for s/sx of neurotoxicity and CRS.  If these occur, please call the Attending MD for recommendations and further instruction. CRS and neurotoxicity adverse reaction protocols/grading per package insert guidelines.                      "

## 2023-11-01 NOTE — PROGRESS NOTES
"Patient is alert & oriented x4. There are signs of headache, muscle spasms or rigidity, or vision changes. No.    ICANS score/Neurotoxicity assessment  Score:  4   Orientation: Orientation to year, month, city, hospital: 4 points (1 point each) - wrong year  3   Identify: Name 3 objects that nurse points to (e.g. clock, pen, button): 3 points (1 point each)  1   Following Commands: (e.g. show me two fingers or close your eyes and stick out your tongue): 1 point  0   Writing: Ability to write a standard sentence (see writing page \"The nichols jumped over the log.\"): 1 point  0   Attention: Count backwards from 100 by 10s: 1 point     8   Total: Max 10, no impairment, reassess next shift                  **9 or below Notify MD and reassess per provider direction     Cytokine Release Syndrome Assessment  Cytokine Release Syndrome: a potentially life-threatening toxicity after receiving immune based therapy for cancer treatment. Severity varies from grade 1 to grade 4.     There are No current signs of CRS (pyrexia/fever, headache, nausea, hypotension, dyspnea, anxiety, increased liver enzymes, increased bilirubin).  **Will continue to monitor for s/sx of neurotoxicity and CRS.  If these occur, please call the Attending MD for recommendations and further instruction. CRS and neurotoxicity adverse reaction protocols/grading per package insert guidelines.   "

## 2023-11-01 NOTE — PLAN OF CARE
Goal Outcome Evaluation:      Plan of Care Reviewed With: patient, spouse    Overall Patient Progress: improvingOverall Patient Progress: improving  Pt afebrile with stable vs. ICANS score 6. ICANS assessment changed from q4hrs to q8hrs. LP done in radiology. Completed laying flat x1hr. Site c/d/I.  Chest/abd/pelvic CT done. No blood products or electrolyte replacement required.  Good urine output. No BM since 10/29, received miralax and senna. Repeat if no results.

## 2023-11-01 NOTE — PLAN OF CARE
"Afebrile. VSS on RA. Complains of back and hip pain, PRN oxycodone x1. Disoriented to time, stating the year is \"2020, and 2028\" overnight with ICAN assessment. ICAN scores 7 and 6. Hospitalist updated. Denies N/V. Up SBA to bathroom overnight, bed alarm on for safety. BG with morning labs 64, recheck with finger stick 82. Plan for LP today in IR at 0930.    Problem: Adult Inpatient Plan of Care  Goal: Patient-Specific Goal (Individualized)  Description: You can add care plan individualizations to a care plan. Examples of Individualization might be:  \"Parent requests to be called daily at 9am for status\", \"I have a hard time hearing out of my right ear\", or \"Do not touch me to wake me up as it startles  me\".  Outcome: Not Progressing     Problem: Adult Inpatient Plan of Care  Goal: Absence of Hospital-Acquired Illness or Injury  Outcome: Not Progressing  Intervention: Identify and Manage Fall Risk  Recent Flowsheet Documentation  Taken 11/1/2023 0100 by Jeni Preston RN  Safety Promotion/Fall Prevention: safety round/check completed  Taken 10/31/2023 2340 by Jeni Preston RN  Safety Promotion/Fall Prevention: safety round/check completed  Intervention: Prevent Skin Injury  Recent Flowsheet Documentation  Taken 11/1/2023 0100 by Jeni Preston RN  Body Position: position changed independently  Intervention: Prevent Infection  Recent Flowsheet Documentation  Taken 11/1/2023 0100 by Jeni Preston RN  Infection Prevention: environmental surveillance performed   Goal Outcome Evaluation:                        "

## 2023-11-01 NOTE — PROGRESS NOTES
"Hospitalist Wilma Gibbs notified via [employer] text paging. \"FYI pt ICANs 7/10, will continue to monitor and rechekc at 0400\". No new orders received.       ICANS score/Neurotoxicity assessment  Score:  3   Orientation: Orientation to year, month, city, hospital: 4 points (1 point each) - wrong year  3   Identify: Name 3 objects that nurse points to (e.g. clock, pen, button): 3 points (1 point each)  1   Following Commands: (e.g. show me two fingers or close your eyes and stick out your tongue): 1 point  0   Writing: Ability to write a standard sentence (see writing page \"The nichols jumped over the log.\"): 1 point  0   Attention: Count backwards from 100 by 10s: 1 point     7   Total: Max 10, no impairment, reassess next shift                  **9 or below Notify MD and reassess per provider direction    Cytokine Release Syndrome Assessment  Cytokine Release Syndrome: a potentially life-threatening toxicity after receiving immune based therapy for cancer treatment. Severity varies from grade 1 to grade 4.      There are No current signs of CRS (pyrexia/fever, headache, nausea, hypotension, dyspnea, anxiety, increased liver enzymes, increased bilirubin).  **Will continue to monitor for s/sx of neurotoxicity and CRS.  If these occur, please call the Attending MD for recommendations and further instruction. CRS and neurotoxicity adverse reaction protocols/grading per package insert guidelines.   "

## 2023-11-01 NOTE — PROVIDER NOTIFICATION
"ICANS score/Neurotoxicity assessment  Score:  2  Orientation: Orientation to year, month, city, hospital: 4 points (1 point each) - wrong month and year  3   Identify: Name 3 objects that nurse points to (e.g. clock, pen, button): 3 points (1 point each)  1   Following Commands: (e.g. show me two fingers or close your eyes and stick out your tongue): 1 point  0   Writing: Ability to write a standard sentence (see writing page \"The nichols jumped over the log.\"): 1 point  0   Attention: Count backwards from 100 by 10s: 1 point     6   Total: Max 10, no impairment, reassess next shift                  **9 or below Notify MD and reassess per provider direction     Cytokine Release Syndrome Assessment  Cytokine Release Syndrome: a potentially life-threatening toxicity after receiving immune based therapy for cancer treatment. Severity varies from grade 1 to grade 4.      There are No current signs of CRS (pyrexia/fever, headache, nausea, hypotension, dyspnea, anxiety, increased liver enzymes, increased bilirubin).  **Will continue to monitor for s/sx of neurotoxicity and CRS.  If these occur, please call the Attending MD for recommendations and further instruction. CRS and neurotoxicity adverse reaction protocols/grading per package insert guidelines. [id number]  "

## 2023-11-01 NOTE — PROGRESS NOTES
[facility]    Hematology / Oncology Progress Note  Date of Admission: 10/24/2023  Hospital Day # 8  Date of Service (when I saw the patient): 11/01/2023     Assessment & Plan   Libby Grimaldo is a 73 year old female with a medical history of high risk IgG Lambda multiple myeloma not achieving remission s/p Auto-PBSCT in 2022 (on palliative Erlo/Pom/Dex), Grave's disease, ILD/COPD, HTN, and HLD. Her last therapy was Elotuzumab where she received 5 cycles, but due to rising lambda chains that were consistent with progression, Teclistamab recommended. She is being admitted for cycle 1 day 1 of Teclistamab. She received Zarxio in clinic day prior to admission. Admission complicated by concern for Grade 1/2 neurotoxicity resulting in holding of Teclistamab ramp up.      Today  - Day 9 Teclistamab. She received one dose with no ramp up. ICANS 6-8/10. Overall, she seems to be having a difficulty tolerating. Patient continues to have confusion. Patient's primary, Dr. Guillory, ok with stopping Teclistamab with an outpatient follow up after mental status improves.   - Etiology of confusion is unclear. Brain MRI shows no acute findings, incidental parapharyngeal lesion. She is immune compromised, so infectious etiologies remain on differential. LP 11/01 in IR with fluoroscopy; awaiting results.   - MRI brain with new, 2.8 cm parapharyngeal lesion of unclear etiology. Per discussion with neuroradiology it is unclear what this lesion may be. ENT consulted for biopsy; recommended CT neck with contrast and an outpatient follow up. CT showed lesions in the supraspinatus and parapharyngeal space that show concern for malignancy. Unsure if they are extraosseous involvement from MM or another primary malignancy. Biopsy of supraspinatus lesions was recommended. Ordered restaging CT of chest abdomen pelvis. Will wait to reach out regarding biopsy until restaging complete.    HEME  #IgG Lambda  Multiple Myeloma, High-risk. S/p APBST 2022  Follows with Dr. Guillory.  Initially diagnosed in 11/2018, completed KRD x 6 cycles, rev/Dex (20), Velcade maintenance, revlimid maintenance, with evidence of disease BmBx 11/2020 (35% plasma cells) and PET CT with new lytic lesions. Then treated with kameron, Kd x 6 cycles, subcutaneous kaemron/IVIG/denosumab monthly. S/p autologous peripheral blood stem cell transplant in 2022. Followed by melphalan, ixazomib, and then started on palliative elotuzumab, pomolidomide, and dexamethasone chemotherapy 4/2023 with most recent dose (C3D1) 6/26. She presented to heme/onc clinic 8/8 to re-establish treatment schedule and sent to ED and admitted for sepsis, confusion, and weakness. She was started on Zosyn and vancomycin with improvement in symptoms, and transitioned to oral Levaquin 8/10/23. She was discharged with outpatient PT. C5 was schedukled for 9/20 but patient cancelled appt due to a fall; started C5D1 9/27. Due to rising lambda chains that were consistent with progression, Teclistamab recommended. Admitted for C1D1 of Teclistamab 10/24. Received Zarxio in clinic 10/23.     During hospitalization, patient had grade 1 ICANS after D1 that progressed to Grade 2 overnight on D3 (10/26). Heme/onc team notified and ordered Dexamethasone 10 mg x2 and started Keppra BID. Noted by the medicine team when they saw her she had difficultly following commands such as touching her nose with her finger. When reassessed in AM, ICANS 7/10 and 8/10 by RN. She was able to follow finger-to-nose and finger-to-finger commands, thorough neuro exam consistent with previous days. Of note, she has a tremor at baseline but noted increased on D2. Patient mentions a mild headache in AMs that has been alleviated by tylenol. She does have a baseline of difficulty walking due to chronic back/hip pain and neuropathy, she says she notices no changes from her baseline. Patient uses a walker. On 10/28:  patient  endorses feeling a little more confused but it is hard for her to describe. She feels like her writing is difficult to complete. Held Teclistamab. MRI brain with no acute pathology to explain neurologic changes.  10/29: ICANS 5/10, Dex 10mg Q6H until improvement to grade 1 neurotoxicity (ICANS >7). Discussed with Dr. Guillory patient's primary who states she has had issues with cognition at baseline, thus it is somewhat difficult to assess ICANS score. However, ICANS score on admission was 10/10 thus, there seems to be an acute change. Teclistamab recently given so associated neurotoxicity also is possible however we will work up alternate causes of AMS as mentioned below as well. Overall, she seems to be having a difficulty tolerating Teclistamab and thus unclear if she will be able to complete ramp up therapy this admission. Will continue to hold Teclistamab for now. Stopped empiric dex 10/31 as ICANS still decreased and variable  despite empiric treatment. Will continue to monitor and reassess ICANS and CRS scoring every 8 hours or suspected change in mental status until ICANS score of 10.   - Trending CRP and Ferritin for ICANS and CRS. Ordered add on Ferritin for labs drawn on 10/27 and 10/28.  - Brain MRI showed incidental 2.8 cm parapharyngeal lesion of unclear etiology. Per discussion with neuroradiology it is unclear what this lesion may be. ENT consulted for biopsy; recommended CT neck with contrast and an outpatient follow up. CT showed lesions in the supraspinatus and parapharyngeal space that show concern for malignancy. Unsure if they are extraosseous involvement from MM or another primary malignancy. Biopsy of supraspinatus lesions was recommended. Ordered restaging CT of chest abdomen pelvis.     Ppx:  - PTA Acyclovir 400 mg BID  - PJP ppx bactrim BID on Monday/Tuesdays  - Levofloxacin 250 mg PO- if ANC <1000  - Antifungal ppx, held d/t interaction with Methadone, Micafungin while admitted    "  #Pancytopenia   #Pancytopenia secondary to Chemo   - Transfuse to maintain Hgb >7, Plt >10K  - Blood consent signed and in patient chart.     #Relevant thrombosis or bleeding history     2/15/22: new non-occlusive LUE DVT. No longer on xarelto. On ASA.      NEURO  #Altered Mental Status  #Encephalopathy due to drugs  Appears to have more difficulty with writing sentences and is often only oriented to place this admission. Etiology of confusion is unclear. MRI with no acute findings. Discussed with patient's primary Dr. Guillory who stated she has had problems with cognition in the past, so this could be partly baseline. ICANS on admission was 10/10 and has decreased to 5-6/10, thus there does seem to be an acute change. This may be secondary to hospital acquired delirium vs. Polypharmacy (on chronic methadone) vs. Teclistamab neurotoxicity vs. CNS infection as she is immunocompromised. It is somewhat hard to distinguish how much of her altered mental status is attributed to Teclistamab associated neurotoxicity. For now we will empiric treat Grade 2 neurotoxicity with Dexamethasone. Palliative consult placed for opoid management that could be contributing to her AMS, given her history of substance use, recommended to not change medications at this time.   - Holding Teclistamab - Per Dr. Guillory, ok to stop and follow up outpatient when mental status improves  - Dexamethasone for empiric neurotoxicity treatment stopped 10/31  - Delirium precautions   - LP in IR under fluoroscopy done 11/01; awaiting results.     #Neuropathy  Patient has reported increased falls due to feeling her feet are \"heavy\" and feel like there is something on her feet. She spoke about this at her outpatient oncology appointment 10/23; prescribed gabapentin 100 mg BID.  - Hold starting Gabapentin 100 mg BID until discharge from hospital    HEENT  #Incidental Right parapharyngeal lesion  Found on MRI Brain this admission. Lesion protrudes into " the oropharynx. Discussed with radiology who stated etiology of lesions is unclear, may be mass vs. Cyst vs. Abscess. This lesion is new since MRI brain in 2021. ENT consulted for biopsy; recommended CT neck with contrast and outpatient follow up. CT showed lesions in the supraspinatus and parapharyngeal space that show concern for malignancy. Unsure if they are extraosseous involvement from MM or another primary malignancy. Biopsy of supraspinatus lesions was recommended. Restaging CT of chest abdomen pelvis; awaiting results. Will wait to reach out regarding biopsy until restaging complete     CARDIOVASCULAR  #Elevated blood pressures  She has had consistently elevated BP since late afternoon admission on 10/25. Early AM on 10/26 systolic ranged from 154-194; of note, she had dex 10mg x 2 during this time.   - Amlodipine 10 mg     #Risk of cardiomyopathy   Baseline EF 57% done January 2022. Patient being followed by cardiology. Last echo done May 2023 with EF 55%-60%.      #Hyperlipidemia   Previously on Crestor but not on med list, patient confirmed - can follow-up with PCP if needed     #History of Afib   Now in NSR.   PTA metoprolol XL 25 mg/d      PULM  #COPD  COPD with 40pyr, quit 2010. Previously documented to have idiopathic pulmonary fibrosis, however most recent PFTs (1/26/22) were normal. Has previously required treatment for COPD exacerbation with pneumonia. Some expiratory wheezing on exam improved with Duonebs; continue home inhaler regimen.  - PTA Trelegy Ellipta inhaler qd  - PTA Albuterol BID PRN     ENDO  #Hypothyroidism  #History of Graves Disease  History of hypothyroidism 2/2 treatment for Graves Disease (patient reports surgery in [place], however not thyroidectomy). Stable dose of levothyroxine over the past four years. Admission TSH (2.74).   - PTA Levothyroxine 112mcg every day     MISC.  #Generalized Anxiety Disorder  - PTA Venlafaxine 75mg TID      #Pain Assessment  - PTA prn oxycodone.       #Opiate dependence  - PTA Methadone 10mg TID     #Dry Eye  Patient noted dry eyes and requested eye drops.   - Carboxymethylcellulose PF 0.5 % ophthalmic solution 1 drop TID PRN    Fluids/Electrolytes/Nutrition   - IVF per chemotherapy regimen  - PRN lyte replacement per standing protocol  - Regular diet as tolerated     Lines: None. Port placed on right chest wall.     PPX  VTE: Enoxaparin 40 mg every day. Hold if plts < 50k  GI: n/a  Bowels: Senna and miralax PRN    Code: Full code    Dispo: TBD. Inpatient until improvement in mental status.     I spent >45 minutes face-to-face and/or coordinating or discussing care plan. Over 50% of our time on the unit was spent counseling the patient and/or coordinating care    Patient is seen and examined by Dr. Carr and SYDNIE.  Assessment and plan are discussed and delivered to the patient.    Carline Gonzáles PA-C  Hematology/Oncology  Pager #[phone]      Interval History   Nursing notes reviewed. No acute events overnight. She continues to appear confused with continued variation in orientation, unable to orient to year and month. Able to follow commands. She is nervous about LP regarding pain, assurance and education provided. She notes headache and continued neck pain that she has consistently endorsed. Denies fever, chills, mouth sores, SOB, cough, abdominal pain, diarrhea, constipation, vomiting, dysuria, hematuria, numbness, tingling, swelling, vision changes. Labs and imaging reviewed. All questions and concerns addressed at bedside.       Physical Exam   Temp: 97.3  F (36.3  C) Temp src: Oral BP: (!) 141/62 Pulse: 99   Resp: 18 SpO2: 99 % O2 Device: None (Room air)    Vitals:    10/29/23 0749 10/31/23 1017 11/01/23 0846   Weight: 50.6 kg (111 lb 8 oz) 51.2 kg (112 lb 14.4 oz) 51.1 kg (112 lb 11.2 oz)     Vital Signs with Ranges  Temp:  [97.3  F (36.3  C)-98.5  F (36.9  C)] 97.3  F (36.3  C)  Pulse:  [62-99] 99  Resp:  [16-18] 18  BP: (126-154)/(50-72)  "141/62  SpO2:  [94 %-100 %] 99 %  I/O last 3 completed shifts:  In: 880 [P.O.:880]  Out: 1400 [Urine:1400]      Physical Exam:    Blood pressure (!) 141/62, pulse 99, temperature 97.3  F (36.3  C), temperature source Oral, resp. rate 18, height 1.562 m (5' 1.5\"), weight 51.1 kg (112 lb 11.2 oz), SpO2 99%.    General:. Awake and conversational. Nontoxic appearing. No acute distress. Mildly confused.   HEENT: sclera anicteric, EOM intact, MMM.   CV: RRR, normal S1/S2, No peripheral edema.   Resp: CTAB. No wheezing/crackles. Breath sounds are equal throughout. Normal respiratory effort on room air  GI: Soft, non-tender, non-distended.  Skin: No rashes on limited exam.   Neuro: Alert and interactive, moves all extremities equally, no focal deficits. Sensations are equal throughout. baseline tremor of hands present. Can follow commands.  Not able to orient to year and month.   Vascular Access: Port placed on the right chest wall.       Medications    - MEDICATION INSTRUCTIONS -        acyclovir  400 mg Oral BID    amLODIPine  10 mg Oral Daily    [Held by provider] enoxaparin ANTICOAGULANT  40 mg Subcutaneous Q24H    fluticasone-vilanterol  1 puff Inhalation Daily    And    umeclidinium  1 puff Inhalation Daily    folic acid-vit B6-vit B12  1 tablet Oral Daily    levETIRAcetam  750 mg Oral BID    levofloxacin  250 mg Oral Daily    levothyroxine  112 mcg Oral Daily    methadone  10 mg Oral TID    metoprolol succinate ER  25 mg Oral Daily    micafungin  50 mg Intravenous Q24H    pantoprazole  40 mg Oral Daily    sulfamethoxazole-trimethoprim  1 tablet Oral Q Mon Tues BID    venlafaxine  75 mg Oral BID    ascorbic acid  1,000 mg Oral Daily    Vitamin D3  25 mcg Oral Daily       Data   Results for orders placed or performed during the hospital encounter of 10/24/23 (from the past 24 hour(s))   Urine Drug Screen    Narrative    The following orders were created for panel order Urine Drug Screen.  Procedure                      "          Abnormality         Status                     ---------                               -----------         ------                     Urine Drug Screen Panel[[id number]]      Normal              Final result                 Please view results for these tests on the individual orders.   Urine Drug Screen Panel   Result Value Ref Range    Amphetamines Urine Screen Negative Screen Negative    Barbituates Urine Screen Negative Screen Negative    Benzodiazepine Urine Screen Negative Screen Negative    Cannabinoids Urine Screen Negative Screen Negative    Cocaine Urine Screen Negative Screen Negative    Fentanyl Qual Urine Screen Negative Screen Negative    Opiates Urine Screen Negative Screen Negative    PCP Urine Screen Negative Screen Negative   Lumbar puncture    Narrative    Melania Lopez MD     10/31/2023  2:00 PM  [facility]    Lumbar puncture    Date/Time: 10/31/2023 1:57 PM    Performed by: Melania Lopez MD  Authorized by: Melania Lopez MD  Indications: evaluation for altered mental   status  Preparation: Patient was prepped and draped in the usual sterile fashion.      UNIVERSAL PROTOCOL   Site Marked: Yes  Prior Images Obtained and Reviewed:  Yes  Required items: Required blood products, implants, devices and special   equipment available    Patient identity confirmed:  Verbally with patient, arm band, provided   demographic data and hospital-assigned identification number  NA - No sedation, light sedation, or local anesthesia  Confirmation Checklist:  Patient's identity using two indicators, relevant   allergies and procedure was appropriate and matched the consent or   emergent situation  Time out: Immediately prior to the procedure a time out was called    Universal Protocol: the Joint Commission Universal Protocol was followed    Preparation: Patient was prepped and draped in usual sterile fashion    ESBL (mL):  0     ANESTHESIA    Anesthesia:  Local  infiltration  Local Anesthetic:  Lidocaine 1% without epinephrine  Anesthetic Total (mL):  5      SEDATION    Patient Sedated: No      PROCEDURE DETAILS  Lumbar space: L4-L5 interspace  Patient's position: left lateral decubitus  Needle type: spinal needle - Quincke tip  Needle length: 3.5 in  Number of attempts: 4  Total volume: 0 ml  Post-procedure: site cleaned and adhesive bandage applied      PROCEDURE  Describe Procedure: 4 total attempts made but hit bone every time and   unable to obtain CSF at all 4 attempts. Procedure abandoned,  Patient Tolerance:  Patient tolerated the procedure well with no immediate   complications (does well with pre medication with ativan)  Length of time physician/provider present for 1:1 monitoring during   sedation: 0   CBC with platelets differential    Narrative    The following orders were created for panel order CBC with platelets differential.  Procedure                               Abnormality         Status                     ---------                               -----------         ------                     CBC with platelets and d...[[id number]]  Abnormal            Final result                 Please view results for these tests on the individual orders.   Comprehensive metabolic panel   Result Value Ref Range    Sodium 138 135 - 145 mmol/L    Potassium 4.4 3.4 - 5.3 mmol/L    Carbon Dioxide (CO2) 25 22 - 29 mmol/L    Anion Gap 5 (L) 7 - 15 mmol/L    Urea Nitrogen 15.7 8.0 - 23.0 mg/dL    Creatinine 0.96 (H) 0.51 - 0.95 mg/dL    GFR Estimate 63 >60 mL/min/1.73m2    Calcium 7.7 (L) 8.8 - 10.2 mg/dL    Chloride 108 (H) 98 - 107 mmol/L    Glucose 64 (L) 70 - 99 mg/dL    Alkaline Phosphatase 61 35 - 104 U/L    AST 23 0 - 45 U/L    ALT 20 0 - 50 U/L    Protein Total 5.6 (L) 6.4 - 8.3 g/dL    Albumin 3.3 (L) 3.5 - 5.2 g/dL    Bilirubin Total 0.2 <=1.2 mg/dL   INR   Result Value Ref Range    INR 0.99 0.85 - 1.15   Fibrinogen activity   Result Value Ref Range    Fibrinogen  Activity 215 170 - 490 mg/dL   Uric acid   Result Value Ref Range    Uric Acid 1.6 (L) 2.4 - 5.7 mg/dL   Partial thromboplastin time   Result Value Ref Range    aPTT 20 (L) 22 - 38 Seconds   Magnesium   Result Value Ref Range    Magnesium 1.9 1.7 - 2.3 mg/dL   Phosphorus   Result Value Ref Range    Phosphorus 3.0 2.5 - 4.5 mg/dL   Ferritin   Result Value Ref Range    Ferritin 139 11 - 328 ng/mL   CRP inflammation   Result Value Ref Range    CRP Inflammation <3.00 <5.00 mg/L   CBC with platelets and differential   Result Value Ref Range    WBC Count 4.8 4.0 - 11.0 10e3/uL    RBC Count 2.58 (L) 3.80 - 5.20 10e6/uL    Hemoglobin 8.7 (L) 11.7 - 15.7 g/dL    Hematocrit 27.8 (L) 35.0 - 47.0 %     (H) 78 - 100 fL    MCH 33.7 (H) 26.5 - 33.0 pg    MCHC 31.3 (L) 31.5 - 36.5 g/dL    RDW 16.9 (H) 10.0 - 15.0 %    Platelet Count 152 150 - 450 10e3/uL    % Neutrophils 70 %    % Lymphocytes 17 %    % Monocytes 11 %    % Eosinophils 0 %    % Basophils 0 %    % Immature Granulocytes 2 %    NRBCs per 100 WBC 0 <1 /100    Absolute Neutrophils 3.4 1.6 - 8.3 10e3/uL    Absolute Lymphocytes 0.8 0.8 - 5.3 10e3/uL    Absolute Monocytes 0.6 0.0 - 1.3 10e3/uL    Absolute Eosinophils 0.0 0.0 - 0.7 10e3/uL    Absolute Basophils 0.0 0.0 - 0.2 10e3/uL    Absolute Immature Granulocytes 0.1 <=0.4 10e3/uL    Absolute NRBCs 0.0 10e3/uL   ABO/Rh type and screen    Narrative    The following orders were created for panel order ABO/Rh type and screen.  Procedure                               Abnormality         Status                     ---------                               -----------         ------                     Adult Type and Screen[[id number]]                            Edited Result - FINAL        Please view results for these tests on the individual orders.   Adult Type and Screen   Result Value Ref Range    ABO/RH(D) A POS     Antibody Screen Negative Negative    SPECIMEN EXPIRATION DATE [id number]    Glucose by meter    Result Value Ref Range    GLUCOSE BY METER POCT 82 70 - 99 mg/dL   Glucose CSF:   Result Value Ref Range    Glucose CSF 47 40 - 70 mg/dL    Narrative    CSF glucose concentrations are about 60 percent of normal plasma glucose.   Protein total CSF:   Result Value Ref Range    Protein total CSF 24.9 15.0 - 45.0 mg/dL   Extra Tube    Narrative    The following orders were created for panel order Extra Tube.  Procedure                               Abnormality         Status                     ---------                               -----------         ------                     Extra Body Fluid/CSF Col...[[id number]]                      In process                   Please view results for these tests on the individual orders.   CSF Cell Count with Differential:    Narrative    The following orders were created for panel order CSF Cell Count with Differential:.  Procedure                               Abnormality         Status                     ---------                               -----------         ------                     Cell Count CSF[[id number]]                                   In process                   Please view results for these tests on the individual orders.

## 2023-11-01 NOTE — PLAN OF CARE
Goal Outcome Evaluation:      Plan of Care Reviewed With: patient     Pt A&O x3 sometimes forgetful. Able to make needs known. VSS, c/o pain at 7/10 see MAR. Voiding/drinking well. SBA with a walker. On bed alarm,  at bedside. Will con't w/POC.

## 2023-11-01 NOTE — CONSULTS
Care Management Initial Consult    General Information  Assessment completed with: Patient,    Type of CM/SW Visit: Initial Assessment    Primary Care Provider verified and updated as needed: Yes   Readmission within the last 30 days: no previous admission in last 30 days      Reason for Consult: discharge planning  Advance Care Planning: Advance Care Planning Reviewed: concerns discussed          Communication Assessment  Patient's communication style: spoken language (English or Bilingual)    Hearing Difficulty or Deaf: no   Wear Glasses or Blind: no    Cognitive  Cognitive/Neuro/Behavioral: .WDL except  Level of Consciousness: alert  Arousal Level: opens eyes spontaneously  Orientation: disoriented to, time  Mood/Behavior: calm, cooperative  Best Language: 0 - No aphasia  Speech: clear, spontaneous, logical    Living Environment:   People in home: spouse     Current living Arrangements: house      Able to return to prior arrangements: yes       Family/Social Support:  Care provided by: self, spouse/significant other  Provides care for: no one  Marital Status:   , Sibling(s), Children          Description of Support System: Supportive    Support Assessment: Adequate family and caregiver support    Current Resources:   Patient receiving home care services: No     Community Resources: None  Equipment currently used at home: shower chair, walker, standard  Supplies currently used at home: Diabetic Supplies, Oxygen Tubing/Supplies    Employment/Financial:  Employment Status: retired        Financial Concerns: none   Referral to Financial Worker: No       Does the patient's insurance plan have a 3 day qualifying hospital stay waiver?  No    Lifestyle & Psychosocial Needs:  Social Determinants of Health     Food Insecurity: Not on file   Depression: Not at risk (6/1/2023)    PHQ-2     PHQ-2 Score: 1   Housing Stability: Not on file   Tobacco Use: Medium Risk (8/21/2023)    Patient History     Smoking  Tobacco Use: Former     Smokeless Tobacco Use: Former     Passive Exposure: Past   Financial Resource Strain: Not on file   Alcohol Use: Not on file   Transportation Needs: Not on file   Physical Activity: Not on file   Interpersonal Safety: Not At Risk (1/24/2022)    Humiliation, Afraid, Rape, and Kick questionnaire     Fear of Current or Ex-Partner: No     Emotionally Abused: No     Physically Abused: No     Sexually Abused: No   Stress: Not on file   Social Connections: Not on file       Functional Status:  Prior to admission patient needed assistance:   Dependent ADLs:: Independent  Dependent IADLs:: Transportation  Assesssment of Functional Status: Needs assistance with transportation    Mental Health Status:  Mental Health Status: No Current Concerns       Chemical Dependency Status:  Chemical Dependency Status: No Current Concerns             Values/Beliefs:  Spiritual, Cultural Beliefs, Methodist Practices, Values that affect care: no             Additional Information:  Met with pt/spouse at bedside for CM assessment/discharge planning. Discussed rehab team's recommendation for TCU and provided pt with TCU provider list for her review and selection to which pt declined. She reported great family/care giving support at home. She's open to Home care services. [facility] notified and HC referrals sent out. Pt's been accepted by [facility] for HC services     Discuss the need for ACP and pt agrees to completing a healthcare directive.  Blank copies of HCD given for review and completion. CM will notarized HCD once completed. No further concern at this time.      Alanna Espinoza, 5A Oncology & 5B non BMT [facility]  P: [phone]  Pager: [phone]  F: [phone]  Weekend & [facility] Recognized Holidays Pager: [phone]  Weekend Coverage: 5A; 5B; 5C

## 2023-11-01 NOTE — PROGRESS NOTES
"Patient is alert & oriented x4. There are signs of headache, muscle spasms or rigidity, or vision changes. No.    ICANS score/Neurotoxicity assessment  Score:  4   Orientation: Orientation to year, month, city, hospital: 4 points (1 point each)  3   Identify: Name 3 objects that nurse points to (e.g. clock, pen, button): 3 points (1 point each)  1   Following Commands: (e.g. show me two fingers or close your eyes and stick out your tongue): 1 point  1   Writing: Ability to write a standard sentence (see writing page \"The nichols jumped over the log.\"): 1 point  0   Attention: Count backwards from 100 by 10s: 1 point (unable to start counting backwards from 100, starting at 10 instead)     9   Total: Max 10, no impairment, reassess next shift                  **9 or below Notify MD and reassess per provider direction     Cytokine Release Syndrome Assessment  Cytokine Release Syndrome: a potentially life-threatening toxicity after receiving immune based therapy for cancer treatment. Severity varies from grade 1 to grade 4.     There are No current signs of CRS (pyrexia/fever, headache, nausea, hypotension, dyspnea, anxiety, increased liver enzymes, increased bilirubin).  **Will continue to monitor for s/sx of neurotoxicity and CRS.  If these occur, please call the Attending MD for recommendations and further instruction. CRS and neurotoxicity adverse reaction protocols/grading per package insert guidelines.   "

## 2023-11-01 NOTE — TELEPHONE ENCOUNTER
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

## 2023-11-02 NOTE — PROGRESS NOTES
[facility]    Hematology / Oncology Progress Note  Date of Admission: 10/24/2023  Hospital Day # 9  Date of Service (when I saw the patient): 11/02/2023     Assessment & Plan   Libby Grimaldo is a 73 year old female with a medical history of high risk IgG Lambda multiple myeloma not achieving remission s/p Auto-PBSCT in 2022 (on palliative Erlo/Pom/Dex), Grave's disease, ILD/COPD, HTN, and HLD. Her last therapy was Elotuzumab where she received 5 cycles, but due to rising lambda chains that were consistent with progression, Teclistamab recommended. She is being admitted for cycle 1 day 1 of Teclistamab. She received Zarxio in clinic day prior to admission. Admission complicated by concern for Grade 1/2 neurotoxicity resulting in holding of Teclistamab ramp up and general confusion.      Today  - Day 10 Teclistamab. She received one dose with no ramp up. Overnight ICANS 7-9/10. She continues to have variable confusion. Workup for other etiologies included LP, MRI, cultures, UA/UC, with no identifiable source. Patient's primary oncologist, Dr. Guillory, ok with stopping Teclistamab with an outpatient follow up after mental status improves.   - Planning for discharge tomorrow; requested follow up appt with [facility] and then with Dr. Guillory after IR biopsy.   - MRI brain with new, 2.8 cm parapharyngeal lesion of unclear etiology. Per discussion with neuroradiology it is unclear what this lesion may be. ENT consulted for biopsy; recommended CT neck with contrast and an outpatient follow up. CT neck showed lesions in the supraspinatus and parapharyngeal space that show concern for malignancy. Unsure if they are extraosseous involvement from MM or another primary malignancy. Biopsy of supraspinatus lesion was recommended. Plan for IR outpatient biopsy next week with following appointment with Dr. Guillory for results.       HEME  #IgG Lambda Multiple Myeloma, High-risk. S/p APBST  2022  Follows with Dr. Guillory.  Initially diagnosed in 11/2018, completed KRD x 6 cycles, rev/Dex (20), Velcade maintenance, revlimid maintenance, with evidence of disease BmBx 11/2020 (35% plasma cells) and PET CT with new lytic lesions. Then treated with kameron, Kd x 6 cycles, subcutaneous kameron/IVIG/denosumab monthly. S/p autologous peripheral blood stem cell transplant in 2022. Followed by melphalan, ixazomib, and then started on palliative elotuzumab, pomolidomide, and dexamethasone chemotherapy 4/2023 with most recent dose (C3D1) 6/26. She presented to heme/onc clinic 8/8 to re-establish treatment schedule and sent to ED and admitted for sepsis, confusion, and weakness. She was started on Zosyn and vancomycin with improvement in symptoms, and transitioned to oral Levaquin 8/10/23. She was discharged with outpatient PT. C5 was schedukled for 9/20 but patient cancelled appt due to a fall; started C5D1 9/27. Due to rising lambda chains that were consistent with progression, Teclistamab recommended. Admitted for C1D1 of Teclistamab 10/24. Received Zarxio in clinic 10/23.     During hospitalization, patient had grade 1 ICANS after D1 that progressed to Grade 2 overnight on D3 (10/26). Heme/onc team notified and ordered Dexamethasone 10 mg x2 and started Keppra BID. Noted by the medicine team when they saw her she had difficultly following commands such as touching her nose with her finger. When reassessed in AM, ICANS 7/10 and 8/10 by RN. She was able to follow finger-to-nose and finger-to-finger commands, thorough neuro exam consistent with previous days. Of note, she has a tremor at baseline but noted increased on D2. Patient mentions a mild headache in AMs that has been alleviated by tylenol. She does have a baseline of difficulty walking due to chronic back/hip pain and neuropathy, she says she notices no changes from her baseline. Patient uses a walker. On 10/28:  patient endorses feeling a little more confused  but it is hard for her to describe. She feels like her writing is difficult to complete. Held Teclistamab. MRI brain with no acute pathology to explain neurologic changes.  10/29: ICANS 5/10, Dex 10mg Q6H until improvement to grade 1 neurotoxicity (ICANS >7). Discussed with Dr. Guillory patient's primary who states she has had issues with cognition at baseline, thus it is somewhat difficult to assess ICANS score. However, ICANS score on admission was 10/10 thus, there seems to be an acute change. Teclistamab recently given so associated neurotoxicity also is possible however we will work up alternate causes of AMS as mentioned below as well. Overall, she seems to be having a difficulty tolerating Teclistamab and thus unclear if she will be able to complete ramp up therapy this admission. Will continue to hold Teclistamab for now. Stopped empiric dex 10/31 as ICANS still decreased and variable  despite empiric treatment. Will continue to monitor and reassess ICANS and CRS scoring every 8 hours or suspected change in mental status until ICANS score of 10.   - Trending CRP and Ferritin for ICANS and CRS. Ordered add on Ferritin for labs drawn on 10/27 and 10/28.  - Brain MRI showed incidental 2.8 cm parapharyngeal lesion of unclear etiology. Per discussion with neuroradiology it is unclear what this lesion may be. ENT consulted for biopsy; recommended CT neck with contrast and an outpatient follow up. CT showed lesions in the supraspinatus and parapharyngeal space that show concern for malignancy. Unsure if they are extraosseous involvement from MM or another primary malignancy. Biopsy of supraspinatus lesions was recommended.  Plan for IR outpatient biopsy next week with following appointment with Dr. Guillory for results.   - Notable findings on restaging CT chest/abdomen/pelvis done 11/01:   1. Large enhancing soft tissue masses along bilateral scapulae with  underlying cortical erosions and pathologic fracture. New  soft tissue  mass in the left thoracic paraspinal region. These may represent  extraosseous myeloma versus extramedullary hematopoiesis. Consider  tissue sampling.  2. Increased size of the thoracic right paraspinal lesion.   3. New solid pulmonary nodules, most notably the right lower lobe  solid nodule measuring up to 9 mm, which may represent infectious,  inflammatory, or neoplastic etiology. Attention on follow-up.  4. Cystic lesions of the pancreas are better appreciated on MRI of  5/25/2023.    Ppx:  - PTA Acyclovir 400 mg BID  - PJP ppx bactrim BID on Monday/Tuesdays  - Levofloxacin 250 mg PO- if ANC <1000  - Antifungal ppx, held d/t interaction with Methadone, Micafungin while admitted     #Pancytopenia   #Pancytopenia secondary to Chemo   - Transfuse to maintain Hgb >7, Plt >10K  - Blood consent signed and in patient chart.     #Relevant thrombosis or bleeding history     2/15/22: new non-occlusive LUE DVT. No longer on xarelto. On ASA.      NEURO  #Altered Mental Status  #Encephalopathy due to drugs  Appears to have more difficulty with writing sentences and is often only oriented to place this admission. Etiology of confusion is unclear. MRI with no acute findings. Discussed with patient's primary Dr. Guillory who stated she has had problems with cognition in the past, so this could be partly baseline. ICANS on admission was 10/10 and has decreased to 5-6/10, thus there does seem to be an acute change. This may be secondary to hospital acquired delirium vs. Polypharmacy (on chronic methadone) vs. Teclistamab neurotoxicity vs. CNS infection as she is immunocompromised. It is somewhat hard to distinguish how much of her altered mental status is attributed to Teclistamab associated neurotoxicity. For now we will empiric treat Grade 2 neurotoxicity with Dexamethasone. Palliative consult placed for opoid management that could be contributing to her AMS, given her history of substance use, recommended to not  "change medications at this time.   - Holding Teclistamab - Per Dr. Guillory, ok to stop and follow up outpatient when mental status improves  - Dexamethasone for empiric neurotoxicity treatment stopped 10/31  - Delirium precautions   - LP in IR under fluoroscopy done 11/01; negative infectious panel, protein/glucose wnl, flow shows no CNS involvement.     #Neuropathy  Patient has reported increased falls due to feeling her feet are \"heavy\" and feel like there is something on her feet. She spoke about this at her outpatient oncology appointment 10/23; prescribed gabapentin 100 mg BID.  - Hold starting Gabapentin 100 mg BID until discharge from hospital    HEENT  #Incidental Right parapharyngeal lesion  Found on MRI Brain this admission. Lesion protrudes into the oropharynx. Discussed with radiology who stated etiology of lesions is unclear, may be mass vs. Cyst vs. Abscess. This lesion is new since MRI brain in 2021. ENT consulted for biopsy; recommended CT neck with contrast and outpatient follow up. CT showed lesions in the supraspinatus and parapharyngeal space that show concern for malignancy. Unsure if they are extraosseous involvement from MM or another primary malignancy. Biopsy of supraspinatus lesions was recommended.  Plan for IR outpatient biopsy next week with following appointment with Dr. Guillory for results.      CARDIOVASCULAR  #Elevated blood pressures  She has had consistently elevated BP since late afternoon admission on 10/25. Early AM on 10/26 systolic ranged from 154-194; of note, she had dex 10mg x 2 during this time.   - Amlodipine 10 mg     #Risk of cardiomyopathy   Baseline EF 57% done January 2022. Patient being followed by cardiology. Last echo done May 2023 with EF 55%-60%.      #Hyperlipidemia   Previously on Crestor but not on med list, patient confirmed - can follow-up with PCP if needed     #History of Afib   Now in NSR.   PTA metoprolol XL 25 mg/d      PULM  #COPD  COPD with 40pyr, " quit 2010. Previously documented to have idiopathic pulmonary fibrosis, however most recent PFTs (1/26/22) were normal. Has previously required treatment for COPD exacerbation with pneumonia. Some expiratory wheezing on exam improved with Duonebs; continue home inhaler regimen.  - PTA Trelegy Ellipta inhaler qd  - PTA Albuterol BID PRN     ENDO  #Hypothyroidism  #History of Graves Disease  History of hypothyroidism 2/2 treatment for Graves Disease (patient reports surgery in [place], however not thyroidectomy). Stable dose of levothyroxine over the past four years. Admission TSH (2.74).   - PTA Levothyroxine 112mcg every day     MISC.  #Generalized Anxiety Disorder  - PTA Venlafaxine 75mg TID      #Pain Assessment  - PTA prn oxycodone.      #Opiate dependence  - PTA Methadone 10mg TID     #Dry Eye  Patient noted dry eyes and requested eye drops.   - Carboxymethylcellulose PF 0.5 % ophthalmic solution 1 drop TID PRN    Fluids/Electrolytes/Nutrition   - IVF per chemotherapy regimen  - PRN lyte replacement per standing protocol  - Regular diet as tolerated     Lines: None. Port placed on right chest wall.     PPX  VTE: Enoxaparin 40 mg every day. Hold if plts < 50k  GI: n/a  Bowels: Senna and miralax PRN    Code: Full code    Dispo: Planning for discharge 11/3-4.    - Biweekly labs starting 11/6; requested   - [facility] follow up appointment week of 11/6; requested   - Soft tissue biopsy outpatient in IR week of 11/6; order placed   - Dr. Guillory appointment after biopsy for results; requested    I spent >45 minutes face-to-face and/or coordinating or discussing care plan. Over 50% of our time on the unit was spent counseling the patient and/or coordinating care    Patient is seen and examined by Dr. Carr and SYDNIE.  Assessment and plan are discussed and delivered to the patient.    Carline Gonzáles PA-C  Hematology/Oncology  Pager #[phone]      Interval History   Nursing notes reviewed. No acute events overnight. She  "continues to appear confused with continued variation in orientation, unable to orient to year and month. Able to follow commands. She is nervous about LP regarding pain, assurance and education provided. She notes headache and continued neck pain that she has consistently endorsed. Denies fever, chills, mouth sores, SOB, cough, abdominal pain, diarrhea, constipation, vomiting, dysuria, hematuria, numbness, tingling, swelling, vision changes. Labs and imaging reviewed. All questions and concerns addressed at bedside.       Physical Exam   Temp: 97.8  F (36.6  C) Temp src: Oral BP: 113/61 Pulse: 85   Resp: 16 SpO2: 98 % O2 Device: None (Room air)    Vitals:    10/31/23 1017 11/01/23 0846 11/02/23 0835   Weight: 51.2 kg (112 lb 14.4 oz) 51.1 kg (112 lb 11.2 oz) 50.5 kg (111 lb 4.8 oz)     Vital Signs with Ranges  Temp:  [97.4  F (36.3  C)-98.3  F (36.8  C)] 97.8  F (36.6  C)  Pulse:  [80-86] 85  Resp:  [15-18] 16  BP: (113-153)/(51-74) 113/61  SpO2:  [98 %-100 %] 98 %  I/O last 3 completed shifts:  In: 865 [P.O.:840; I.V.:25]  Out: 800 [Urine:800]      Physical Exam:    Blood pressure 113/61, pulse 85, temperature 97.8  F (36.6  C), temperature source Oral, resp. rate 16, height 1.562 m (5' 1.5\"), weight 50.5 kg (111 lb 4.8 oz), SpO2 98%.    General:. Awake and conversational. Nontoxic appearing. No acute distress. Mildly confused.   HEENT: sclera anicteric, EOM intact, MMM.   CV: RRR, normal S1/S2, No peripheral edema.   Resp: CTAB. No wheezing/crackles. Breath sounds are equal throughout. Normal respiratory effort on room air  GI: Soft, non-tender, non-distended.  Skin: No rashes on limited exam.   Neuro: Alert and interactive, moves all extremities equally, no focal deficits. Sensations are equal throughout. baseline tremor of hands present. Can follow commands.  Not able to orient to year and month.   Vascular Access: Port placed on the right chest wall.       Medications    - MEDICATION INSTRUCTIONS -        " acyclovir  400 mg Oral BID    amLODIPine  10 mg Oral Daily    [Held by provider] enoxaparin ANTICOAGULANT  40 mg Subcutaneous Q24H    fluticasone-vilanterol  1 puff Inhalation Daily    And    umeclidinium  1 puff Inhalation Daily    folic acid-vit B6-vit B12  1 tablet Oral Daily    levETIRAcetam  750 mg Oral BID    levofloxacin  250 mg Oral Daily    levothyroxine  112 mcg Oral Daily    methadone  10 mg Oral TID    metoprolol succinate ER  25 mg Oral Daily    micafungin  50 mg Intravenous Q24H    pantoprazole  40 mg Oral Daily    sulfamethoxazole-trimethoprim  1 tablet Oral Q Mon Tues BID    venlafaxine  75 mg Oral BID    ascorbic acid  1,000 mg Oral Daily    Vitamin D3  25 mcg Oral Daily       Data   Results for orders placed or performed during the hospital encounter of 10/24/23 (from the past 24 hour(s))   CT Chest/Abdomen/Pelvis w Contrast    Narrative    EXAM: CT CHEST/ABDOMEN/PELVIS W CONTRAST, 11/1/2023 3:32 PM    TECHNIQUE:  Helical CT images from the lung bases through the  symphysis pubis were obtained with 69 cc Isovue-370 intravenous  contrast. Coronal and sagittal reformatted images were generated at a  workstation for further assessment.    HISTORY: 73 YOF with MM. Restaging CT. Has not been done since April 2022.    COMPARISON: MR abdomen 5/25/2023, CT chest abdomen pelvis 4/28/2022    FINDINGS:     Chest:   Central tracheobronchial tree is patent. New 9 x 4 mm solid nodule of  the left lower lobe (series 9, image 200) and solid nodule of the  right upper lobe (series 9, image 78). Stable 4 mm solid nodules in  the right middle lobe (series 9, image 163). Other scattered sub-6 mm  nodules are stable. Stable subpleural cystic changes. Bibasilar  subsegmental atelectasis. No pleural effusions. No pneumothorax.    Heart size is within normal limits. No pericardial effusion. Moderate  coronary artery calcifications. Nonaneurysmal aorta.No central  pulmonary embolism. . No thoracic lymphadenopathy.  Right  IJ dolores catheter with tip in the low SVC. Calcified breast  implants bilaterally.    New lesions of the chest including 7.6 x 4.4 x 10.2 cm enhancing  lesion extending from the posterior right scapula with irregular  cortical erosions pathological of the body of the scapula, 5.5 x 2.4 x  3.9 cm lesion extending from the left scapula with pathologic fracture  of the scapular spine, and 3.3 x 1.6 x 2.4 cm left paraspinal thoracic  lesion. Interval increase in the right paraspinal lesion, measuring  6.2 x 2.7 x 5.4 cm, previously 5.4 x 2.5 x 4.6 cm, when measured in  similar fashion.    Abdomen/Pelvis:  No focal hepatic lesion. Stable intrahepatic biliary dilatation.   Status post cholecystectomy with dilated common bile duct measuring up  to 13 mm, stable.   Atrophic appearance of the pancreas. The pancreatic duct is prominent  measuring up to 5 mm. Cystic lesions of the pancreas are better  appreciated on MRI 5/25/2023.  Spleen is within normal limits.   No adrenal nodules.     No hydronephrosis or obstructing renal stones. No kidney masses.  Contrast material within the bladder. Pelvic organs are unremarkable.    No abnormally thickened or dilated bowel. Appendix is unremarkable.  Normal caliber of the large bowel. No free fluid or air within the  abdomen.    No infrarenal aortic aneurysm. No pathologically enlarged  retroperitoneal, mesenteric, or pelvic lymph nodes.    Bones / Soft Tissues:   Mottled and osteopenic appearance of the axial skeleton, stable.  Anterior spinal fusion of the cervicothoracic spine. Stable  compression deformity of T3.      Impression    IMPRESSION: In this patient with multiple myeloma:  1. Large enhancing soft tissue masses along bilateral scapulae with  underlying cortical erosions and pathologic fracture. New soft tissue  mass in the left thoracic paraspinal region. These may represent  extraosseous myeloma versus extramedullary hematopoiesis. Consider  tissue sampling.  2.  Increased size of the thoracic right paraspinal lesion.   3. New solid pulmonary nodules, most notably the right lower lobe  solid nodule measuring up to 9 mm, which may represent infectious,  inflammatory, or neoplastic etiology. Attention on follow-up.  4. Cystic lesions of the pancreas are better appreciated on MRI of  5/25/2023.    I have personally reviewed the examination and initial interpretation  and I agree with the findings.    MYA ORLANDO MD         SYSTEM ID:  [id number]   CBC with platelets differential    Narrative    The following orders were created for panel order CBC with platelets differential.  Procedure                               Abnormality         Status                     ---------                               -----------         ------                     CBC with platelets and d...[[id number]]  Abnormal            Final result                 Please view results for these tests on the individual orders.   Comprehensive metabolic panel   Result Value Ref Range    Sodium 140 135 - 145 mmol/L    Potassium 4.8 3.4 - 5.3 mmol/L    Carbon Dioxide (CO2) 25 22 - 29 mmol/L    Anion Gap 4 (L) 7 - 15 mmol/L    Urea Nitrogen 11.7 8.0 - 23.0 mg/dL    Creatinine 0.93 0.51 - 0.95 mg/dL    GFR Estimate 65 >60 mL/min/1.73m2    Calcium 8.0 (L) 8.8 - 10.2 mg/dL    Chloride 111 (H) 98 - 107 mmol/L    Glucose 62 (L) 70 - 99 mg/dL    Alkaline Phosphatase 66 35 - 104 U/L    AST 32 0 - 45 U/L    ALT 31 0 - 50 U/L    Protein Total 5.2 (L) 6.4 - 8.3 g/dL    Albumin 3.1 (L) 3.5 - 5.2 g/dL    Bilirubin Total 0.3 <=1.2 mg/dL   INR   Result Value Ref Range    INR 1.07 0.85 - 1.15   Fibrinogen activity   Result Value Ref Range    Fibrinogen Activity 247 170 - 490 mg/dL   Uric acid   Result Value Ref Range    Uric Acid 2.3 (L) 2.4 - 5.7 mg/dL   Partial thromboplastin time   Result Value Ref Range    aPTT 22 22 - 38 Seconds   Magnesium   Result Value Ref Range    Magnesium 1.9 1.7 - 2.3 mg/dL   Phosphorus   Result  Value Ref Range    Phosphorus 3.2 2.5 - 4.5 mg/dL   Ferritin   Result Value Ref Range    Ferritin 151 11 - 328 ng/mL   CRP inflammation   Result Value Ref Range    CRP Inflammation <3.00 <5.00 mg/L   CBC with platelets and differential   Result Value Ref Range    WBC Count 3.6 (L) 4.0 - 11.0 10e3/uL    RBC Count 2.64 (L) 3.80 - 5.20 10e6/uL    Hemoglobin 9.1 (L) 11.7 - 15.7 g/dL    Hematocrit 28.7 (L) 35.0 - 47.0 %     (H) 78 - 100 fL    MCH 34.5 (H) 26.5 - 33.0 pg    MCHC 31.7 31.5 - 36.5 g/dL    RDW 17.0 (H) 10.0 - 15.0 %    Platelet Count 130 (L) 150 - 450 10e3/uL    % Neutrophils 68 %    % Lymphocytes 17 %    % Monocytes 11 %    % Eosinophils 2 %    % Basophils 0 %    % Immature Granulocytes 2 %    NRBCs per 100 WBC 0 <1 /100    Absolute Neutrophils 2.5 1.6 - 8.3 10e3/uL    Absolute Lymphocytes 0.6 (L) 0.8 - 5.3 10e3/uL    Absolute Monocytes 0.4 0.0 - 1.3 10e3/uL    Absolute Eosinophils 0.1 0.0 - 0.7 10e3/uL    Absolute Basophils 0.0 0.0 - 0.2 10e3/uL    Absolute Immature Granulocytes 0.1 <=0.4 10e3/uL    Absolute NRBCs 0.0 10e3/uL

## 2023-11-02 NOTE — PROGRESS NOTES
"  ICANS score/Neurotoxicity assessment  Score:  3  Orientation: Orientation to year, month, city, hospital: 4 points (1 point each) - wrong year  3   Identify: Name 3 objects that nurse points to (e.g. clock, pen, button): 3 points (1 point each)  1   Following Commands: (e.g. show me two fingers or close your eyes and stick out your tongue): 1 point  0   Writing: Ability to write a standard sentence (see writing page \"The nichols jumped over the log.\"): 1 point  0   Attention: Count backwards from 100 by 10s: 1 point     7  Total: Max 10, no impairment, reassess next shift                  **9 or below Notify MD and reassess per provider direction     Cytokine Release Syndrome Assessment  Cytokine Release Syndrome: a potentially life-threatening toxicity after receiving immune based therapy for cancer treatment. Severity varies from grade 1 to grade 4.      There are No current signs of CRS (pyrexia/fever, headache, nausea, hypotension, dyspnea, anxiety, increased liver enzymes, increased bilirubin).  **Will continue to monitor for s/sx of neurotoxicity and CRS.  If these occur, please call the Attending MD for recommendations and further instruction. CRS and neurotoxicity adverse reaction protocols/grading per package insert guidelines. [id number]                       "

## 2023-11-02 NOTE — PLAN OF CARE
Goal Outcome Evaluation:    Pt A&O x3 sometimes forgetful. Able to make needs known. VSS, c/o pain at 7/10 see MAR. Voiding/drinking well Had BM x2. SBA with a walker. CT and LP done today, LP site C/D/I. On bed alarm,  at bedside. Will con't w/POC.        Problem: Adult Inpatient Plan of Care  Goal: Absence of Hospital-Acquired Illness or Injury  Intervention: Prevent Skin Injury  Recent Flowsheet Documentation  Taken 11/1/2023 1610 by Emelina Lambert RN  Body Position: position changed independently     Problem: Adult Inpatient Plan of Care  Goal: Absence of Hospital-Acquired Illness or Injury  Intervention: Identify and Manage Fall Risk  Recent Flowsheet Documentation  Taken 11/1/2023 1610 by Emelina Lambert RN  Safety Promotion/Fall Prevention:   activity supervised   assistive device/personal items within reach   nonskid shoes/slippers when out of bed   safety round/check completed       Plan of Care Reviewed With: patient

## 2023-11-02 NOTE — PROGRESS NOTES
11/02/23 0919   Appointment Info   Signing Clinician's Name / Credentials (OT) Annabelle Montenegro OTR/L   Living Environment   People in Home spouse   Current Living Arrangements house   Home Accessibility stairs within home   Number of Stairs, Main Entrance 4   Stair Railings, Main Entrance railings safe and in good condition   Number of Stairs, Within Home, Primary eight   Stair Railings, Within Home, Primary railings safe and in good condition;railing on right side (ascending)   Transportation Anticipated family or friend will provide   Living Environment Comments Pt reports she lives in a home with her spouse, who is present/available to assist. Must negotiate 5 + 3 steps with L handrail to access main level. Tub/shower with chair.  provides transportation.   Self-Care   Usual Activity Tolerance good   Current Activity Tolerance moderate   Regular Exercise No   Equipment Currently Used at Home shower chair;walker, standard   Fall history within last six months yes   Number of times patient has fallen within last six months 1   Activity/Exercise/Self-Care Comment Pt reports indep with ADLs at baseline. Furniture surfs at home. HHA outside.   Instrumental Activities of Daily Living (IADL)   Previous Responsibilities medication management  (spouse sets up med box and supervises pt meds)   IADL Comments spouse completes most IADLs, incl groceries, cooking, laundry, cleaning, driving   General Information   Onset of Illness/Injury or Date of Surgery 10/31/23   Referring Physician Carline Gonzáles PA-C   Patient/Family Therapy Goal Statement (OT) home   Additional Occupational Profile Info/Pertinent History of Current Problem per chart: 73 year old female with a medical history of high risk IgG Lambda multiple myeloma not achieving remission s/p Auto-PBSCT in 2022 (on palliative Erlo/Pom/Dex), Grave's disease, ILD/COPD, HTN, and HLD. Her last therapy was Elotuzumab where she received 5 cycles, but due to  rising lambda chains that were consistent with progression, Teclistamab recommended. She is being admitted for cycle 1 day 1 of Teclistamab.   Existing Precautions/Restrictions fall;immunosuppressed   Left Upper Extremity (Weight-bearing Status) full weight-bearing (FWB)   Right Upper Extremity (Weight-bearing Status) full weight-bearing (FWB)   Left Lower Extremity (Weight-bearing Status) full weight-bearing (FWB)   Right Lower Extremity (Weight-bearing Status) full weight-bearing (FWB)   General Observations and Info activity order: adult ICU early mobility protocol   Cognitive Status Examination   Orientation Status person;place   Affect/Mental Status (Cognitive) WFL   Follows Commands follows one-step commands;75-90% accuracy   Safety Deficit minimal deficit;ability to follow commands;awareness of need for assistance   Memory Deficit moderate deficit;short-term memory;immediate recall   Cognitive Status Comments pt oriented to month/date, stated year as 2030; needs repetition to follow 2-step commands; per chart and pt, she has baseline memory deficits   Pain Assessment   Patient Currently in Pain Yes, see Vital Sign flowsheet  (headache)   Posture   Posture protracted shoulders   Range of Motion Comprehensive   Comment, General Range of Motion BUE WFL   Strength Comprehensive (MMT)   Comment, General Manual Muscle Testing (MMT) Assessment BUE WFL   Coordination   Coordination Comments mild BUE tremor   Bed Mobility   Bed Mobility supine-sit;sit-supine   Supine-Sit [place] (Bed Mobility) supervision   Sit-Supine [place] (Bed Mobility) supervision   Transfers   Transfers sit-stand transfer;toilet transfer;shower transfer   Sit-Stand Transfer   Sit-Stand [place] (Transfers) contact guard   Assistive Device (Sit-Stand Transfers) walker, front-wheeled   Shower Transfer   Type (Shower Transfer) lateral   [place] Level (Shower Transfer) contact guard   Shower Transfer Comments tub/shower w/shower  chair; had suction cup GB but they fell off   Toilet Transfer   Type (Toilet Transfer) sit-stand;stand-sit   [place] Level (Toilet Transfer) supervision   Balance   Balance Comments good seated; mildly unsteady standing EOB without AD   Activities of Daily Living   BADL Assessment/Intervention lower body dressing;grooming;toileting   Lower Body Dressing Assessment/Training   [place] Level (Lower Body Dressing) supervision   Grooming Assessment/Training   [place] Level (Grooming) supervision   Toileting   [place] Level (Toileting) supervision   Clinical Impression   Criteria for Skilled Therapeutic Interventions Met (OT) Yes, treatment indicated   OT Diagnosis impaired ADLs   OT Problem List-Impairments impacting ADL problems related to;activity tolerance impaired;balance;cognition;pain   Assessment of Occupational Performance 3-5 Performance Deficits   Identified Performance Deficits functional mobility, tub/shower transfer, cognition   Planned Therapy Interventions (OT) ADL retraining;cognition;strengthening;transfer training;home program guidelines   Clinical Decision Making Complexity (OT) detailed assessment/moderate complexity   Risk & Benefits of therapy have been explained evaluation/treatment results reviewed;patient   OT Total Evaluation Time   OT Eval, Moderate Complexity Minutes (43656) 5   OT Goals   Therapy Frequency (OT) 5 times/week   OT Predicted Duration/Target Date for Goal Attainment 11/09/23   OT Goals Hygiene/Grooming;Cognition;OT Goal 1   OT: Hygiene/Grooming modified independent;while standing   OT: Cognitive Patient/caregiver will verbalize understanding of cognitive assessment results/recommendations as needed for safe discharge planning   OT: Goal 1 pt will demonstrate safety with tub/shower transfer with SBA   Interventions   Interventions Quick Adds Self-Care/Home Management;Therapeutic Activity   OT Discharge Planning   OT Plan ed on clamp-on GB for tub/shower (bring  picture), g/h standing at sink (endurance ADLs), monitor cognition   OT Discharge Recommendation (DC Rec) home with assist;home with home care occupational therapy   OT Rationale for DC Rec Pt close to baseline, anticipate she would be able to discharge to home if spouse able to resume 24/7 supervision, A for all IADLs, and Ax1 for tub/shower transfer, as well as HHOT.   OT Brief overview of current status SBA dressing, SBA toilet transfer/toileting, CGA tub/shower transfer   OT Equipment Needed at Discharge other (see comments)  (grab bar)   Total Session Time   Timed Code Treatment Minutes 25   Total Session Time (sum of timed and untimed services) 30

## 2023-11-02 NOTE — PLAN OF CARE
Goal Outcome Evaluation:      Plan of Care Reviewed With: patient    Overall Patient Progress: improvingOverall Patient Progress: improving  Pt afebrile with stable vs. ICANS score 7, only real difficulty is the year and counting backwards from 100 by 10's and writing the sentence. She otherwise seems clear thinking and steady on her feet. She remains on a bed alarm. No blood products or electrolyte replacement required.  Good urine output. LBM today.

## 2023-11-02 NOTE — PROGRESS NOTES
"Patient is alert & oriented x3. There are signs of headache, muscle spasms or rigidity, or vision changes. No.    ICANS score/Neurotoxicity assessment  Score:  3   Orientation: Orientation to year, month, city, hospital: 4 points (1 point each) - wrong year  3   Identify: Name 3 objects that nurse points to (e.g. clock, pen, button): 3 points (1 point each)  1   Following Commands: (e.g. show me two fingers or close your eyes and stick out your tongue): 1 point  0   Writing: Ability to write a standard sentence (see writing page \"The nichols jumped over the log.\"): 1 point  0   Attention: Count backwards from 100 by 10s: 1 point     7   Total: Max 10, no impairment, reassess next shift                  **9 or below Notify MD and reassess per provider direction     Cytokine Release Syndrome Assessment  Cytokine Release Syndrome: a potentially life-threatening toxicity after receiving immune based therapy for cancer treatment. Severity varies from grade 1 to grade 4.     There are No current signs of CRS (pyrexia/fever, headache, nausea, hypotension, dyspnea, anxiety, increased liver enzymes, increased bilirubin).  **Will continue to monitor for s/sx of neurotoxicity and CRS.  If these occur, please call the Attending MD for recommendations and further instruction. CRS and neurotoxicity adverse reaction protocols/grading per package insert guidelines.   "

## 2023-11-02 NOTE — PLAN OF CARE
Goal Outcome Evaluation:  [id number]  Alert and oriented x4, intermittently hypertensive within parameters, OVSS on RA. Neck/head and back pain managed with prn oxycodone x1 with relief. Denies nausea or vomiting. ICANS score 8 overnight. LP site with dressing C/D/I. Up to the bathroom voiding spontaneously without difficulty. LBM 11/1. No acute events, continue with POC.

## 2023-11-02 NOTE — CONSULTS
Interventional Radiology  [facility] Consult Service Note    Libby Grimaldo  [id number]    11/02/23   10:20 AM    Consult Requested:  Consult Reason? 73 YOF with MM. New supraspinatus lesion found on neck CT. Primary onc would like to be biopsied before discharge.    Patients clinical information/history? 73 YOF with MM. New supraspinatus lesion found on neck CT. Primary onc would like to be biopsied before discharge.    Interventional Radiology Adult/Peds IP Consult: Which location will this be scheduled at? [place]; When do you want the patient seen? Routine within 24 hours; Consult Reason? 73 YOF with MM. New supraspinatus lesion found on neck CT. Primary on... [[id number]]    Electronically signed by: Carline Gonzáles PA-C on 11/02/23 0838  Call Back # [phone]    Is the patient on aspirin, Plavix or blood thinners?No  Is the patient currently NPO ?No    ===  INR - 1.07  Plts - 130  AC - enoxaparin ANTICOAGULANT (LOVENOX) injection 40 mg  Ordered Dose: 40 mg Route: Subcutaneous Frequency: EVERY 24 HOURS    ===  IMAGING:  CT SOFT TISSUE NECK W CONTRAST 10/31/2023    History:  Incidental parapharyngeal mass found on MRI  ICD-10: Parapharyngeal mass.    Impression:  1.  Avidly enhancing lesions in the right parapharyngeal space and  bilateral supraspinatus muscles with associated bony erosion of the  underlying scapulae as described above. These are concerning for  malignancy. In this patient with history of multiple myeloma, they  might represent extraosseous involvement. Alternatively they represent   reflect metastases from another primary malignancy. Recommend soft  tissue biopsy of the supraspinatus lesions.  2.  No cervical adenopathy.        ===  Recommendations/Plan:    Biopsy is relatively straight forward by ultrasound and low risk.    Reviewed case and imaging with IR Dr. Geronimo Ying.    IR can consider adding procedure during inpatient status IF biopsy is clinically  "indicated and will effect inpatient management/care. If this justification/need is documented in the patient's chart, with rationale, we will appoint as soon as we are able. If not appropriate for inpatient procedure, coordination can arranged for outpatient biopsy.    Inpatient referring provider notified IR that outpatient biopsy in very near future was acceptable clinically. Will await outpatient IR referral order and specimen orders to transfer request to procedure schedulers.    ===  Vitals:   BP (!) 153/72 (BP Location: Right arm)   Pulse 86   Temp 97.4  F (36.3  C) (Oral)   Resp 18   Ht 1.562 m (5' 1.5\")   Wt 50.5 kg (111 lb 4.8 oz)   SpO2 98%   BMI 20.69 kg/m      Pertinent Labs:   Lab Results   Component Value Date    WBC 3.6 (L) 11/02/2023    WBC 4.8 11/01/2023    WBC 3.0 (L) 10/31/2023     Lab Results   Component Value Date    HGB 9.1 11/02/2023    HGB 8.7 11/01/2023    HGB 8.2 10/31/2023     Lab Results   Component Value Date     11/02/2023     11/01/2023     10/31/2023     Lab Results   Component Value Date    INR 1.07 11/02/2023    PTT 22 11/02/2023     Lab Results   Component Value Date    POTASSIUM 4.8 11/02/2023    POTASSIUM 4.2 08/01/2022        COVID-19 Antibody Results, Testing for Immunity           No data to display              COVID-19 PCR Results          1/20/2022    16:04 1/31/2022    09:42 2/2/2022    15:12 2/10/2022    16:15 2/16/2022    10:21 2/23/2022    20:30 4/18/2022    12:26 5/24/2022    14:21 8/9/2023    12:20   COVID-19 PCR Results   SARS CoV2 PCR Negative  Negative  Negative  Negative  Negative  Negative  Negative  Negative  Negative          Terry Medel PA-C  Interventional Radiology  Pager: [phone]    "

## 2023-11-03 NOTE — CONSULTS
[facility]    Stroke Consult Note    Reason for Consult: Stroke Code     Chief Complaint: Sudden onset unresponsiveness    HPI  Libby Grimaldo is a 73 year old female with history of high risk IgG Lambda multiple myeloma not achieving remission s/p Auto-PBSCT in 2022 (on palliative Erlo/Pom/Dex), Grave's disease, ILD/COPD, HTN, and HLD who was admitted on 10/24 for Teclistamab therapy.     After receiving one dose of Teclistamab, she developed fluctuating levels of confusion. She underwent infectious workup and found to have no evidence of UTI, pneumonia or meningitis. CSF non-infectious and flow cytometry negative for CNS involvement. MRI Brain 10/24 showed no strokes, intracranial hemorrhage or other parenchymal abnormalities; was found to have slightly increased size of right frontal calvarial lesion. Encephalopathy was thought to be possibly related to Grade 2 Immune cell associated neurotoxicity, chemotherapy was held and was placed on steroids & Keppra 750 mg bid.     She was getting ready for discharge. Around 1:30 PM, she became poorly responsive and did not move lower extremities to command - stroke code was called. On my arrival, patient is somnolent and needed repetitive stimulation to stay awake. She is oriented to place and person, disoriented to time. She exhibited good antigravity strength in all 4 extremities. No visual field deficits, facial droop, aphasia or dysarthria.     Of note, she has a remote history of seizures 40 years ago related to pain medication per sister. She was not taking any antiepileptics prior to admission.     Imaging Findings  CT HEAD WO CONTRAST  1. No acute intracranial pathology.   2. Chronic small vessel ischemic disease.    CTA HEAD/NECK W CONTRAST  1. Head CTA demonstrates no aneurysm or stenosis of the major  intracranial arteries.   2. Neck CTA demonstrates patent major cervical arteries. 40-45%  stenosis of the right  "internal carotid artery within the proximal  portion due to atherosclerotic plaques. The right internal carotid  artery gets diffusely small 4 cm after the bifurcation it measures 2.1  mm at the narrowest point (midcervical portion). Diameter increases  within petrous and cavernous segments and it measures 4.5 mm.   3. Right parapharyngeal mass is stable.    Intravenous Thrombolysis  Not given due to:   - stroke mimic: toxic/metabolic encephalopathy    Endovascular Treatment  Not initiated due to absence of proximal vessel occlusion    Impression   Encephalopathy likely multifactorial including Immune cell associated neurotoxicity, hospital acquired delirium and possibly other metabolic/infectious causes. Seizures possible given the neurotoxicity, however no obvious seizure like episodes.    Recommended labs: VBG unremarkable, Ammonia slightly elevated (57), lactic acid elevated (5.1), leukopenic. Normal Vit B12 levels, Vit B1 level and blood cultures pending.     Recommendations  - Will obtain continuous video EEG to monitor overnight  - Continue Keppra 750 mg bid  - May consider repeat MRI Brain if workup unrevealing  - Further workup of toxic/metabolic causes per primary  - Management of Immune cell associated neurotoxicity per oncology    Thank you for this consult. General neurology team will continue to follow.      Otto Castillo MD       Department of Neurology       Securely message with the Vocera Web Console (learn more here)   To page me or covering stroke neurology team member, click here: AMCOM  Choose \"On Call\" tab at top, then select \"NEUROLOGY/ALL SITES\" from middle drop-down box, press Enter, then look for \"stroke\" or \"telestroke\" for your site.    ______________________________________________________            # Hypoalbuminemia: Lowest albumin = 2.9 g/dL at 10/28/2023  7:19 AM, will monitor as appropriate   # Thrombocytopenia: Lowest platelets = 98 in last 2 days, will " monitor for bleeding           # Moderate Malnutrition: based on nutrition assessment      # Financial/Environmental Concerns: none           Past Medical History   Past Medical History:   Diagnosis Date     Cervical radiculopathy      COPD (chronic obstructive pulmonary disease) (H)      Double vision      Febrile seizure (H)     Per patient. Once while a toddler, once while in highschool, and once while in college.     Floaters      Graves disease      HLD (hyperlipidemia)      HTN (hypertension)      IPF (idiopathic pulmonary fibrosis) (H)      Lumbar radiculopathy      Multiple myeloma in relapse (H)      Osteoporosis      Pneumonia     Per patient. Complicated by sepsis.     Recurrent UTI     Per patient. Has required prophylactic antibiotcs.     Vitamin B12 deficiency (non anemic)      Past Surgical History   Past Surgical History:   Procedure Laterality Date     BONE MARROW BIOPSY, BONE SPECIMEN, NEEDLE/TROCAR Right 01/24/2022    Procedure: BIOPSY, BONE MARROW;  Surgeon: Yuniel Tineo;  Location: [facility] OR     BONE MARROW BIOPSY, BONE SPECIMEN, NEEDLE/TROCAR Right 9/1/2022    Procedure: BIOPSY, BONE MARROW;  Surgeon: Juju Oates PA-C;  Location: [facility] OR     CERVICAL FUSION       CHOLECYSTECTOMY       CYSTECTOMY OVARIAN BENIGN       EYE SURGERY      Per patient.     IR CVC TUNNEL PLACEMENT > 5 YRS OF AGE  02/01/2022     IR CVC TUNNEL REMOVAL LEFT  02/22/2022     PICC DOUBLE LUMEN PLACEMENT Left 05/25/2022    Left basilic, 44 cm, 2 cm external length     TONSILLECTOMY       WRIST SURGERY Left      Medications   Home Meds  Prior to Admission medications    Medication Sig Start Date End Date Taking? Authorizing Provider   acyclovir (ZOVIRAX) 400 MG tablet TAKE 1 TABLET(400 MG) BY MOUTH EVERY 12 HOURS 10/8/23  Yes Julio C Guillory MD   albuterol (PROAIR HFA/PROVENTIL HFA/VENTOLIN HFA) 108 (90 Base) MCG/ACT inhaler Inhale 2 puffs into the lungs every 6 hours as needed for shortness of breath 4/30/19  Yes  Reported, Patient   amLODIPine (NORVASC) 10 MG tablet Take 1 tablet (10 mg) by mouth daily 11/3/23  Yes Roya Fernandez PA-C   ascorbic acid 1000 MG TABS tablet Take 1,000 mg by mouth daily   Yes Reported, Patient   B Complex-C (VITAMIN B COMPLEX W/VITAMIN C) TABS tablet Take 1 tablet by mouth daily   Yes Unknown, Entered By History   Fluticasone-Umeclidin-Vilanterol (TRELEGY ELLIPTA) 100-62.5-25 MCG/INH oral inhaler Inhale 1 puff into the lungs daily    Yes Reported, Patient   levothyroxine (SYNTHROID/LEVOTHROID) 112 MCG tablet Take 112 mcg by mouth daily 8/16/22  Yes Reported, Patient   loperamide (IMODIUM) 2 MG capsule Take 1-2 capsules (2-4 mg) by mouth 4 times daily as needed for diarrhea 8/11/23  Yes Ashley Low PA-C   methadone (DOLOPHINE) 10 MG tablet Take 10 mg by mouth 3 times daily Morning, afternoon, and night   Yes Reported, Patient   methocarbamol (ROBAXIN) 500 MG tablet Take 500 mg by mouth 3 times daily as needed for muscle spasms   Yes Unknown, Entered By History   metoprolol succinate ER (TOPROL XL) 25 MG 24 hr tablet TAKE 1 TABLET(25 MG) BY MOUTH DAILY 10/8/23  Yes Julio C Guillory MD   naloxone (NARCAN) 4 MG/0.1ML nasal spray Spray 1 spray (4 mg) into one nostril alternating nostrils as needed for opioid reversal every 2-3 minutes until assistance arrives 11/3/23  Yes Roya Fernandez PA-C   ondansetron (ZOFRAN) 8 MG tablet Take 1 tablet (8 mg) by mouth every 8 hours as needed for nausea 11/3/23  Yes Roya Fernandez PA-C   oxyCODONE IR (ROXICODONE) 10 MG tablet Take 1 tablet by mouth every 6 hours as needed for pain   Yes Reported, Patient   pantoprazole (PROTONIX) 40 MG EC tablet Take 1 tablet (40 mg) by mouth daily 5/30/22  Yes Shanita Naylor PA-C   polyethylene glycol (MIRALAX) 17 GM/Dose powder Take 17 g by mouth daily 11/3/23  Yes Roya Fernandez, DOROTA   sulfamethoxazole-trimethoprim (BACTRIM DS) 800-160 MG tablet Take 1 tablet by mouth Every Mon, Tues two times  daily Start on Monday 6/27/2022 12/7/22  Yes Julio C Guillory MD   venlafaxine (EFFEXOR) 75 MG tablet Take 1 tablet (75 mg) by mouth 2 times daily 11/3/23  Yes Roya Fernandez PA-C   Vitamin D3 (VITAMIN D, CHOLECALCIFEROL,) 25 mcg (1000 units) tablet Take 1 tablet by mouth daily   Yes Unknown, Entered By History   atorvastatin (LIPITOR) 40 MG tablet Take 40 mg by mouth daily  11/1/19 5/30/22  Reported, Patient       Scheduled Meds    acyclovir  400 mg Oral BID     amLODIPine  10 mg Oral Daily     [Held by provider] enoxaparin ANTICOAGULANT  40 mg Subcutaneous Q24H     fluticasone-vilanterol  1 puff Inhalation Daily    And     umeclidinium  1 puff Inhalation Daily     folic acid-vit B6-vit B12  1 tablet Oral Daily     heparin  5 mL Intracatheter Q28 Days     [Held by provider] levETIRAcetam  750 mg Oral BID     levofloxacin  250 mg Oral Daily     levothyroxine  112 mcg Oral Daily     methadone  10 mg Oral TID     metoprolol succinate ER  25 mg Oral Daily     micafungin  50 mg Intravenous Q24H     pantoprazole  40 mg Oral Daily     sulfamethoxazole-trimethoprim  1 tablet Oral Q Mon Tues BID     venlafaxine  75 mg Oral BID     ascorbic acid  1,000 mg Oral Daily     Vitamin D3  25 mcg Oral Daily       Infusion Meds    - MEDICATION INSTRUCTIONS -         PRN Meds  acetaminophen, albuterol, albuterol, albuterol, artificial tears, diphenhydrAMINE, EPINEPHrine, famotidine, heparin lock flush, hydrALAZINE, lidocaine 4%, lidocaine (buffered or not buffered), loperamide, LORazepam, LORazepam, meperidine, methocarbamol, methylPREDNISolone, naloxone **OR** naloxone **OR** naloxone **OR** naloxone, - MEDICATION INSTRUCTIONS -, ondansetron **OR** ondansetron **OR** ondansetron, oxyCODONE IR, polyethylene glycol, polyethylene glycol-propylene glycol PF, prochlorperazine **OR** prochlorperazine, senna-docusate **OR** senna-docusate, sodium chloride 0.9%    Allergies   Allergies   Allergen Reactions     Bupropion      Other  "reaction(s): Seizures     Family History   Family History   Problem Relation Age of Onset     Heart Disease Mother      Cancer Mother         \"Around lungs\" - she explicitly said it was *not* lung cancer.     Diabetes Father      Heart Disease Father      Social History   Social History     Tobacco Use     Smoking status: Former     Packs/day: 1.00     Years: 40.00     Additional pack years: 0.00     Total pack years: 40.00     Types: Cigarettes     Passive exposure: Past     Smokeless tobacco: Former     Quit date: 2010   Substance Use Topics     Alcohol use: Not Currently     Drug use: Not Currently     Types: Marijuana       Review of Systems   The 10 point Review of Systems is negative other than noted in the HPI or here.        PHYSICAL EXAMINATION  Temp:  [97.6  F (36.4  C)-98.2  F (36.8  C)] 98.2  F (36.8  C)  Pulse:  [] 110  Resp:  [16-24] 22  BP: (126-225)/(56-98) 158/70  SpO2:  [94 %-100 %] 94 %     General Exam  General:  Lying in bed, shivering  HEENT:  normocephalic/atraumatic  Cardio:  RRR    Neuro Exam  Mental Status:  Somnolent, needs repetitive stimulate to stay awake  Speech: Clear. No aphasia or dysarthria  Cranial Nerves:  visual fields intact, pupils 6 mm and reactive to light bilaterally, EOMI, facial movements symmetric,  Motor: Normal tone. Good antigravity strength in all 4 extremities without drift  Sensory: intact in all 4 extremities  Coordination: unable to test due to altered mental status    Dysphagia Screen  Per Nursing    Stroke Scales    NIHSS  1a. Level of Consciousness 2-->Not alert, requires repeated stimulation to attend, or is obtunded and requires strong or painful stimulation to make movements (not stereotyped)   1b. LOC Questions 1-->Answers one question correctly   1c. LOC Commands 1-->Performs one task correctly   2.   Best Gaze 0-->Normal   3.   Visual 0-->No visual loss   4.   Facial Palsy 0-->Normal symmetrical movements   5a. Motor Arm, Left 0-->No drift, limb " holds 90 (or 45) degrees for full 10 secs   5b. Motor Arm, Right 0-->No drift, limb holds 90 (or 45) degrees for full 10 secs   6a. Motor Leg, Left 0-->No drift, leg holds 30 degree position for full 5 secs   6b. Motor Leg, right 0-->No drift, leg holds 30 degree position for full 5 secs   7.   Limb Ataxia 0-->Absent   8.   Sensory 0-->Normal, no sensory loss   9.   Best Language 0-->No aphasia, normal   10. Dysarthria 0-->Normal   11. Extinction and Inattention  0-->No abnormality   Total 4 (11/03/23 1401)       Modified Germf[place] Score (Pre-morbid)  3-Moderate disability; requiring some help, but able to walk without assistance    Imaging  I personally reviewed all imaging; relevant findings per HPI.     Lab Results Data   CBC  Recent Labs   Lab 11/03/23  1447 11/03/23 0305 11/02/23  0537   WBC 6.2 3.8* 3.6*   RBC 3.40* 2.77* 2.64*   HGB 11.8 9.4* 9.1*   HCT 37.4 30.2* 28.7*   PLT 98* 128* 130*     Basic Metabolic Panel    Recent Labs   Lab 11/03/23  1543 11/03/23  1447 11/03/23  1345 11/03/23  0305 11/02/23  0537   NA  --  136  --  138 140   POTASSIUM  --  4.5  --  4.5 4.8   CHLORIDE  --  104  --  107 111*   CO2  --  20*  --  24 25   BUN  --  11.6  --  13.9 11.7   CR  --  0.89  --  0.92 0.93   * 81 78 85 62*   DIANDRA  --  8.8  --  7.9* 8.0*     Liver Panel  Recent Labs   Lab 11/03/23  1447 11/03/23  0305 11/02/23  0537   PROTTOTAL 5.5* 5.1* 5.2*   ALBUMIN 3.4* 3.1* 3.1*   BILITOTAL 0.6 0.2 0.3   ALKPHOS 102 67 66   AST 40 22 32   ALT 29 25 31     INR    Recent Labs   Lab Test 11/03/23  0305 11/02/23  0537 11/01/23  0443   INR 0.99 1.07 0.99      Lipid Profile  No lab results found.  A1C  No lab results found.  Troponin    Recent Labs   Lab 11/03/23  1447   CTROPT 26*          Stroke Code Data Data   Stroke Code Data  (for stroke code without tele)  Stroke code activated 11/03/23   1345   First stroke provider response         Last known normal 11/03/23   1330   Time of discovery   (or onset of symptoms)          Head CT read by Stroke Neuro Dr/Provider 11/03/23   1415   Was stroke code de-escalated? Yes 11/03/23 1431            I saw Libby Grimaldo on 11/03/23 as a STROKE CODE activation.  Libby Grimaldo was in critical condition due to acute onset neurologic deficits consisting of encephalopathy --she was at high risk of neurologic deterioration.  Both intravenous thrombolysis and endovascular thrombectomy were considered, but were ultimately deferred upon completion of her emergent clinical evaluation and review of her stat neuroimaging. The stroke code was de-escalated at that time.  I spent 45 minutes critical care decision-making time emergently evaluating and managing this patient's stroke code activation

## 2023-11-03 NOTE — PROGRESS NOTES
Stroke Code Nurse-Responder Note    Arrival Time to Stroke Code: 1343    Stroke Code Team interventions: De-escalated at 1434 by Stroke Neuro team.     ED/Bedside Nurse providing handoff: STEFFANIE Gallagher    Time left for CT: 1353    Time arrived to next location (ED/Unit/IR): 1405    ED/Bedside Nurse given handoff (name/time): STEFFANIE Gallagher 1500      Denise Prabhakar RN

## 2023-11-03 NOTE — PLAN OF CARE
Occupational Therapy Discharge Summary    Reason for therapy discharge:    All goals and outcomes met, no further needs identified.    Progress towards therapy goal(s). See goals on Care Plan in [facility] electronic health record for goal details.  Pt. CGA/SBA with BADLs and functional mobility, amb. Without AD. Rec. 24/7 S with mob./higher level cog. Tasks.    Therapy recommendation(s):    A with higher level cog. tasks; meds, driving, finances.

## 2023-11-03 NOTE — PROGRESS NOTES
CLINICAL NUTRITION SERVICES - REASSESSMENT NOTE     Nutrition Prescription    RECOMMENDATIONS FOR MDs/PROVIDERS TO ORDER:  None at this time     Malnutrition Status:    Moderate malnutrition in the context of acute illness    Recommendations already ordered by Registered Dietitian (RD):  None at this time     Future/Additional Recommendations:  Monitor PO intakes, wt trends; if with poor PO or if weight continues to trend down, offer supplements, scheduled snacks, and/or consider starting calorie counts.     EVALUATION OF THE PROGRESS TOWARD GOALS   Diet: Regular  - PRN supplements    Intake: Eating mostly 100% of meals documented in flowsheets since admission, but at times 50-75%.  Attempted visit with patient, but requiring nursing cares and now has transfer orders to intermediate care unit.     NEW FINDINGS   Weight: down 4 lb since admit (3.5%)    Updated dosing weight 49 kg and reassess estimated nutrition needs below:     ASSESSED NUTRITION NEEDS  Estimated Energy Needs: 9156-8896 kcals/day (30 - 35 kcals/kg)  Justification: Increased needs  Estimated Protein Needs: 59-74 grams protein/day (1.2 - 1.5 grams of pro/kg)  Justification: Increased needs with chemo  Estimated Fluid Needs: (1 mL/kcal)   Justification: Maintenance, or other per provider pending fluid status    Labs:  K+, Mg++, Phos WNL    Meds: Folgard (folic acid, vitamin B6, vitamin B12), vitamin C, vitamin D3    MALNUTRITION  % Intake: No decreased intake noted suspect  % Weight Loss: > 2% in 1 week (severe)  Subcutaneous Fat Loss: mild generalized observed from doorway  Muscle Loss: mild generalized observed from doorway  Fluid Accumulation/Edema: None noted  Malnutrition Diagnosis: Moderate malnutrition in the context of acute illness    Previous Goals   Patient to consume % of nutritionally adequate meal trays TID, or the equivalent with supplements/snacks.   Evaluation: Not met    Previous Nutrition Diagnosis  Predicted inadequate  nutrient intake (protein-energy) related to potential for poor appetite/PO intake difficulty with cancer treatment   Evaluation: No change, updated    CURRENT NUTRITION DIAGNOSIS  Predicted inadequate nutrient intake (protein-energy) related to Eating mostly 100% of meals documented in flowsheets since admission, but at times 50-75% and potential for appetite decline    INTERVENTIONS  Implementation  Chart review, pt not appropriate for visit today    Goals  Patient to consume % of nutritionally adequate meal trays TID, or the equivalent with supplements/snacks.    Monitoring/Evaluation  Progress toward goals will be monitored and evaluated per protocol.     Mary Richards, RD, , LD  Weekday Pager: [phone]  Weekday Units covered: 5A ([id number]) and 7B ([id number])  Weekend/Holiday RD Pager: [phone]

## 2023-11-03 NOTE — PROGRESS NOTES
"[facility]    Hematology / Oncology Progress Note  Date of Admission: 10/24/2023  Hospital Day # 10  Date of Service (when I saw the patient): 11/03/2023     Assessment & Plan   Libby Grimaldo is a 73 year old female with a medical history of high risk IgG Lambda multiple myeloma not achieving remission s/p Auto-PBSCT in 2022 (on palliative Erlo/Pom/Dex), Grave's disease, ILD/COPD, HTN, and HLD. Her last therapy was Elotuzumab where she received 5 cycles, but due to rising lambda chains that were consistent with progression, Teclistamab recommended. She is being admitted for cycle 1 day 1 of Teclistamab. She received Zarxio in clinic day prior to admission. Admission complicated by concern for Grade 1/2 neurotoxicity resulting in holding of Teclistamab ramp up and general confusion.      Today  - Day 11 Teclistamab. She received one dose with no ramp up. Overnight ICANS 7-9/10. She continues to have variable confusion but after conversations with Dr. Guillory, plan was ok to discharge her with follow up early next week with [facility]. Right before discharge RN noted patient \"felt dizzy\" and endorsed chest pain. She was nonverbal. Rapid response was called. When I saw patient she was not able to orient to person, place, or time. Of not she was not able to orient to time throughout her admission. She could not respond to questions. Could not lift legs or follow commands. She became incontinent. Code stroke was called. She recovered slightly, but not to base line.  Patient was taken to CT; negative for acute pathology. After she returned from CT she had another episode of acute decline where she was not able to orient to person, place or time. She was not able to follow commands. Suspicion for encephalopathy; lactic acid 5.1, wbc 6.2; awaiting results for ammonia, ABG, thiamine, and blood cultures. EKG showed no acute findings. BS 78. Bolus of lactated ringers given. Afebrile at " this time, if she does becomes febrile will consider broad spectrum antibiotics with further workup. Neurology recommended EEG for further evaluation.    - Patient will not be discharged today. IMC transfer orders placed for high level of care and more frequent neuro checks. Awaiting placement.     Of note, limited updates made to the section below; please see above for pertinent interval events and plan for the day.       HEME  #IgG Lambda Multiple Myeloma, High-risk. S/p APBST 2022  Follows with Dr. Guillory.  Initially diagnosed in 11/2018, completed KRD x 6 cycles, rev/Dex (20), Velcade maintenance, revlimid maintenance, with evidence of disease BmBx 11/2020 (35% plasma cells) and PET CT with new lytic lesions. Then treated with kameron, Kd x 6 cycles, subcutaneous kameron/IVIG/denosumab monthly. S/p autologous peripheral blood stem cell transplant in 2022. Followed by melphalan, ixazomib, and then started on palliative elotuzumab, pomolidomide, and dexamethasone chemotherapy 4/2023 with most recent dose (C3D1) 6/26. She presented to heme/onc clinic 8/8 to re-establish treatment schedule and sent to ED and admitted for sepsis, confusion, and weakness. She was started on Zosyn and vancomycin with improvement in symptoms, and transitioned to oral Levaquin 8/10/23. She was discharged with outpatient PT. C5 was schedukled for 9/20 but patient cancelled appt due to a fall; started C5D1 9/27. Due to rising lambda chains that were consistent with progression, Teclistamab recommended. Admitted for C1D1 of Teclistamab 10/24. Received Zarxio in clinic 10/23.     During hospitalization, patient had grade 1 ICANS after D1 that progressed to Grade 2 overnight on D3 (10/26). Heme/onc team notified and ordered Dexamethasone 10 mg x2 and started Keppra BID. Noted by the medicine team when they saw her she had difficultly following commands such as touching her nose with her finger. When reassessed in AM, ICANS 7/10 and 8/10 by RN. She  was able to follow finger-to-nose and finger-to-finger commands, thorough neuro exam consistent with previous days. Of note, she has a tremor at baseline but noted increased on D2. Patient mentions a mild headache in AMs that has been alleviated by tylenol. She does have a baseline of difficulty walking due to chronic back/hip pain and neuropathy, she says she notices no changes from her baseline. Patient uses a walker. On 10/28:  patient endorses feeling a little more confused but it is hard for her to describe. She feels like her writing is difficult to complete. Held Teclistamab. MRI brain with no acute pathology to explain neurologic changes.  10/29: ICANS 5/10, Dex 10mg Q6H until improvement to grade 1 neurotoxicity (ICANS >7). Discussed with Dr. Guillory patient's primary who states she has had issues with cognition at baseline, thus it is somewhat difficult to assess ICANS score. However, ICANS score on admission was 10/10 thus, there seems to be an acute change. Teclistamab recently given so associated neurotoxicity also is possible however we will work up alternate causes of AMS as mentioned below as well. Overall, she seems to be having a difficulty tolerating Teclistamab and thus unclear if she will be able to complete ramp up therapy this admission. Will continue to hold Teclistamab for now. Stopped empiric dex 10/31 as ICANS still decreased and variable  despite empiric treatment. Will continue to monitor and reassess ICANS and CRS scoring every 8 hours or suspected change in mental status until ICANS score of 10.   - Trending CRP and Ferritin for ICANS and CRS. Ordered add on Ferritin for labs drawn on 10/27 and 10/28.  - Brain MRI showed incidental 2.8 cm parapharyngeal lesion of unclear etiology. Per discussion with neuroradiology it is unclear what this lesion may be. ENT consulted for biopsy; recommended CT neck with contrast and an outpatient follow up. CT showed lesions in the supraspinatus and  parapharyngeal space that show concern for malignancy. Unsure if they are extraosseous involvement from MM or another primary malignancy. Biopsy of supraspinatus lesions was recommended.  Plan for IR outpatient biopsy next week with following appointment with Dr. Guillory for results.   - Notable findings on restaging CT chest/abdomen/pelvis done 11/01:   1. Large enhancing soft tissue masses along bilateral scapulae with  underlying cortical erosions and pathologic fracture. New soft tissue  mass in the left thoracic paraspinal region. These may represent  extraosseous myeloma versus extramedullary hematopoiesis. Consider  tissue sampling.  2. Increased size of the thoracic right paraspinal lesion.   3. New solid pulmonary nodules, most notably the right lower lobe  solid nodule measuring up to 9 mm, which may represent infectious,  inflammatory, or neoplastic etiology. Attention on follow-up.  4. Cystic lesions of the pancreas are better appreciated on MRI of  5/25/2023.    Ppx:  - PTA Acyclovir 400 mg BID  - PJP ppx bactrim BID on Monday/Tuesdays  - Levofloxacin 250 mg PO- if ANC <1000  - Antifungal ppx, held d/t interaction with Methadone, Micafungin while admitted     #Pancytopenia   #Pancytopenia secondary to Chemo   - Transfuse to maintain Hgb >7, Plt >10K  - Blood consent signed and in patient chart.     #Relevant thrombosis or bleeding history     2/15/22: new non-occlusive LUE DVT. No longer on xarelto. On ASA.      NEURO  #Altered Mental Status  #Encephalopathy due to drugs  Appears to have more difficulty with writing sentences and is often only oriented to place this admission. Etiology of confusion is unclear. MRI with no acute findings. Discussed with patient's primary Dr. Guillory who stated she has had problems with cognition in the past, so this could be partly baseline. ICANS on admission was 10/10 and has decreased to 5-6/10, thus there does seem to be an acute change. This may be secondary to  "hospital acquired delirium vs. Polypharmacy (on chronic methadone) vs. Teclistamab neurotoxicity vs. CNS infection as she is immunocompromised. It is somewhat hard to distinguish how much of her altered mental status is attributed to Teclistamab associated neurotoxicity. For now we will empiric treat Grade 2 neurotoxicity with Dexamethasone. Palliative consult placed for opoid management that could be contributing to her AMS, given her history of substance use, recommended to not change medications at this time.   - Holding Teclistamab - Per Dr. Guillory, ok to stop and follow up outpatient when mental status improves  - Dexamethasone for empiric neurotoxicity treatment stopped 10/31  - Delirium precautions   - LP in IR under fluoroscopy done 11/01; negative infectious panel, protein/glucose wnl, flow shows no CNS involvement.     #Neuropathy  Patient has reported increased falls due to feeling her feet are \"heavy\" and feel like there is something on her feet. She spoke about this at her outpatient oncology appointment 10/23; prescribed gabapentin 100 mg BID.  - Hold starting Gabapentin 100 mg BID until discharge from hospital    HEENT  #Incidental Right parapharyngeal lesion  Found on MRI Brain this admission. Lesion protrudes into the oropharynx. Discussed with radiology who stated etiology of lesions is unclear, may be mass vs. Cyst vs. Abscess. This lesion is new since MRI brain in 2021. ENT consulted for biopsy; recommended CT neck with contrast and outpatient follow up. CT showed lesions in the supraspinatus and parapharyngeal space that show concern for malignancy. Unsure if they are extraosseous involvement from MM or another primary malignancy. Biopsy of supraspinatus lesions was recommended.  Plan for IR outpatient biopsy next week with following appointment with Dr. Guillory for results.      CARDIOVASCULAR  #Elevated blood pressures  She has had consistently elevated BP since late afternoon admission on " 10/25. Early AM on 10/26 systolic ranged from 154-194; of note, she had dex 10mg x 2 during this time.   - Amlodipine 10 mg     #Risk of cardiomyopathy   Baseline EF 57% done January 2022. Patient being followed by cardiology. Last echo done May 2023 with EF 55%-60%.      #Hyperlipidemia   Previously on Crestor but not on med list, patient confirmed - can follow-up with PCP if needed     #History of Afib   Now in NSR.   PTA metoprolol XL 25 mg/d      PULM  #COPD  COPD with 40pyr, quit 2010. Previously documented to have idiopathic pulmonary fibrosis, however most recent PFTs (1/26/22) were normal. Has previously required treatment for COPD exacerbation with pneumonia. Some expiratory wheezing on exam improved with Duonebs; continue home inhaler regimen.  - PTA Trelegy Ellipta inhaler qd  - PTA Albuterol BID PRN     ENDO  #Hypothyroidism  #History of Graves Disease  History of hypothyroidism 2/2 treatment for Graves Disease (patient reports surgery in [place], however not thyroidectomy). Stable dose of levothyroxine over the past four years. Admission TSH (2.74).   - PTA Levothyroxine 112mcg every day     MISC.  #Generalized Anxiety Disorder  - PTA Venlafaxine 75mg TID      #Pain Assessment  - PTA prn oxycodone.      #Opiate dependence  - PTA Methadone 10mg TID     #Dry Eye  Patient noted dry eyes and requested eye drops.   - Carboxymethylcellulose PF 0.5 % ophthalmic solution 1 drop TID PRN    Fluids/Electrolytes/Nutrition   - IVF per chemotherapy regimen  - PRN lyte replacement per standing protocol  - Regular diet as tolerated     Lines: None. Port placed on right chest wall.     PPX  VTE: Enoxaparin 40 mg every day. Hold if plts < 50k  GI: n/a  Bowels: Senna and miralax PRN    Code: Full code    Dispo: TBD depending on clinical course.     I spent >45 minutes face-to-face and/or coordinating or discussing care plan. Over 50% of our time on the unit was spent counseling the patient and/or coordinating  "care    Patient is seen and examined by Dr. Muñoz.  Assessment and plan are discussed and delivered to the patient.    Carline Gonzáles PA-C  Hematology/Oncology  Pager #[phone]      Interval History   Nursing notes reviewed. No acute events overnight. She was sitting comfortably in her bed when I saw her and excited to leave.  Code stroke called for acute neurological changes, she was not able to orient to person, place or time, not able to follow commands. She became incontinent. After she went for head CT she had a similar episode. Transfer orderes placed for IMC. This AM, denied fever, chills, mouth sores, SOB, cough, abdominal pain, diarrhea, constipation, vomiting, dysuria, hematuria, tingling, swelling, vision changes. Labs and imaging reviewed. All questions and concerns addressed at bedside.       Physical Exam   Temp: 98.2  F (36.8  C) Temp src: Oral BP: (!) 158/70 Pulse: 110   Resp: 22 SpO2: 94 % O2 Device: Nasal cannula Oxygen Delivery: 1 LPM  Vitals:    11/01/23 0846 11/02/23 0835 11/03/23 0831   Weight: 51.1 kg (112 lb 11.2 oz) 50.5 kg (111 lb 4.8 oz) 49.2 kg (108 lb 8 oz)     Vital Signs with Ranges  Temp:  [97.6  F (36.4  C)-98.2  F (36.8  C)] 98.2  F (36.8  C)  Pulse:  [] 110  Resp:  [16-24] 22  BP: (126-225)/(56-98) 158/70  SpO2:  [94 %-100 %] 94 %  I/O last 3 completed shifts:  In: 838 [P.O.:838]  Out: -       Physical Exam:    Blood pressure (!) 158/70, pulse 110, temperature 98.2  F (36.8  C), temperature source Oral, resp. rate 22, height 1.562 m (5' 1.5\"), weight 49.2 kg (108 lb 8 oz), SpO2 94%.    General:. Awake and conversational. Nontoxic appearing. No acute distress. Mildly confused.   HEENT: sclera anicteric, EOM intact, MMM.   CV: RRR, normal S1/S2, No peripheral edema.   Resp: CTAB. No wheezing/crackles. Breath sounds are equal throughout. Normal respiratory effort on room air  GI: Soft, non-tender, non-distended.  Skin: No rashes on limited exam.   Neuro: Alert and " interactive, moves all extremities equally, no focal deficits. Sensations are equal throughout. baseline tremor of hands present. Can follow commands.  Not able to orient to year and month.   Vascular Access: Port placed on the right chest wall.       Medications    - MEDICATION INSTRUCTIONS -        acyclovir  400 mg Oral BID    amLODIPine  10 mg Oral Daily    [Held by provider] enoxaparin ANTICOAGULANT  40 mg Subcutaneous Q24H    fluticasone-vilanterol  1 puff Inhalation Daily    And    umeclidinium  1 puff Inhalation Daily    folic acid-vit B6-vit B12  1 tablet Oral Daily    heparin  5 mL Intracatheter Q28 Days    [Held by provider] levETIRAcetam  750 mg Oral BID    levofloxacin  250 mg Oral Daily    levothyroxine  112 mcg Oral Daily    methadone  10 mg Oral TID    metoprolol succinate ER  25 mg Oral Daily    micafungin  50 mg Intravenous Q24H    pantoprazole  40 mg Oral Daily    sulfamethoxazole-trimethoprim  1 tablet Oral Q Mon Tues BID    venlafaxine  75 mg Oral BID    ascorbic acid  1,000 mg Oral Daily    Vitamin D3  25 mcg Oral Daily       Data   Results for orders placed or performed during the hospital encounter of 10/24/23 (from the past 24 hour(s))   CBC with platelets differential    Narrative    The following orders were created for panel order CBC with platelets differential.  Procedure                               Abnormality         Status                     ---------                               -----------         ------                     CBC with platelets and d...[[id number]]  Abnormal            Final result                 Please view results for these tests on the individual orders.   Comprehensive metabolic panel   Result Value Ref Range    Sodium 138 135 - 145 mmol/L    Potassium 4.5 3.4 - 5.3 mmol/L    Carbon Dioxide (CO2) 24 22 - 29 mmol/L    Anion Gap 7 7 - 15 mmol/L    Urea Nitrogen 13.9 8.0 - 23.0 mg/dL    Creatinine 0.92 0.51 - 0.95 mg/dL    GFR Estimate 66 >60 mL/min/1.73m2     Calcium 7.9 (L) 8.8 - 10.2 mg/dL    Chloride 107 98 - 107 mmol/L    Glucose 85 70 - 99 mg/dL    Alkaline Phosphatase 67 35 - 104 U/L    AST 22 0 - 45 U/L    ALT 25 0 - 50 U/L    Protein Total 5.1 (L) 6.4 - 8.3 g/dL    Albumin 3.1 (L) 3.5 - 5.2 g/dL    Bilirubin Total 0.2 <=1.2 mg/dL   INR   Result Value Ref Range    INR 0.99 0.85 - 1.15   Fibrinogen activity   Result Value Ref Range    Fibrinogen Activity 308 170 - 490 mg/dL   Uric acid   Result Value Ref Range    Uric Acid 2.6 2.4 - 5.7 mg/dL   Partial thromboplastin time   Result Value Ref Range    aPTT 23 22 - 38 Seconds   Magnesium   Result Value Ref Range    Magnesium 1.7 1.7 - 2.3 mg/dL   Phosphorus   Result Value Ref Range    Phosphorus 3.8 2.5 - 4.5 mg/dL   Ferritin   Result Value Ref Range    Ferritin 147 11 - 328 ng/mL   CRP inflammation   Result Value Ref Range    CRP Inflammation <3.00 <5.00 mg/L   CBC with platelets and differential   Result Value Ref Range    WBC Count 3.8 (L) 4.0 - 11.0 10e3/uL    RBC Count 2.77 (L) 3.80 - 5.20 10e6/uL    Hemoglobin 9.4 (L) 11.7 - 15.7 g/dL    Hematocrit 30.2 (L) 35.0 - 47.0 %     (H) 78 - 100 fL    MCH 33.9 (H) 26.5 - 33.0 pg    MCHC 31.1 (L) 31.5 - 36.5 g/dL    RDW 16.5 (H) 10.0 - 15.0 %    Platelet Count 128 (L) 150 - 450 10e3/uL    % Neutrophils 74 %    % Lymphocytes 12 %    % Monocytes 8 %    % Eosinophils 5 %    % Basophils 0 %    % Immature Granulocytes 1 %    NRBCs per 100 WBC 0 <1 /100    Absolute Neutrophils 2.8 1.6 - 8.3 10e3/uL    Absolute Lymphocytes 0.5 (L) 0.8 - 5.3 10e3/uL    Absolute Monocytes 0.3 0.0 - 1.3 10e3/uL    Absolute Eosinophils 0.2 0.0 - 0.7 10e3/uL    Absolute Basophils 0.0 0.0 - 0.2 10e3/uL    Absolute Immature Granulocytes 0.1 <=0.4 10e3/uL    Absolute NRBCs 0.0 10e3/uL   ABO/Rh type and screen    Narrative    The following orders were created for panel order ABO/Rh type and screen.  Procedure                               Abnormality         Status                     ---------                                -----------         ------                     Adult Type and Screen[[id number]]                            Final result                 Please view results for these tests on the individual orders.   Adult Type and Screen   Result Value Ref Range    ABO/RH(D) A POS     Antibody Screen Negative Negative    SPECIMEN EXPIRATION DATE [id number]    Glucose by meter   Result Value Ref Range    GLUCOSE BY METER POCT 78 70 - 99 mg/dL   EKG 12-lead, complete   Result Value Ref Range    Systolic Blood Pressure  mmHg    Diastolic Blood Pressure  mmHg    Ventricular Rate 103 BPM    Atrial Rate 103 BPM    OH Interval 162 ms    QRS Duration 76 ms     ms    QTc 442 ms    P Axis 81 degrees    R AXIS 47 degrees    T Axis 85 degrees    Interpretation ECG       Sinus tachycardia  Nonspecific T wave abnormality  Abnormal ECG  When compared with ECG of 20-JAN-2022 16:13,  Vent. rate has increased BY  35 BPM  Nonspecific T wave abnormality now evident in Lateral leads     CT Head w/o Contrast    Narrative    EXAM: CT HEAD W/O CONTRAST  11/3/2023 2:28 PM     HISTORY:  Encephalopathy; Code stroke       COMPARISON:  MRI 10/28 2023    TECHNIQUE: Using multidetector thin collimation helical acquisition  technique, axial, coronal and sagittal CT images from the skull base  to the vertex were obtained without intravenous contrast.   (topogram) image(s) also obtained and reviewed.    FINDINGS:  Images are degraded by patient motion artifact.    No intracranial hemorrhage, mass effect, or midline shift. No acute  loss of gray-white matter differentiation in the cerebral hemispheres.  Ventricles are proportionate to the cerebral sulci. Clear basal  cisterns. Patchy periventricular hypoattenuation, consistent with  chronic small vessel ischemic disease.    Right frontal lytic lesion. Right mastoid effusion. The visualized  portions of the paranasal sinuses and the left mastoid air cells are  clear. Grossly  normal orbits.       Impression    IMPRESSION: Motion degraded exam.  1. No acute intracranial pathology.   2. Chronic small vessel ischemic disease.    TEJINDER LEYVA MD         SYSTEM ID:  [id number]   CTA Head Neck with Contrast    Narrative    EXAM: CTA HEAD NECK W CONTRAST  11/3/2023 2:29 PM     HISTORY:  Encephalopathy; Code stroke       COMPARISON:  Brain MRI 10/28/2023, head CT 8/8/2023    TECHNIQUE:    HEAD and NECK CTA: During rapid bolus intravenous injection of  nonionic contrast material, axial images were obtained using thin  collimation multidetector helical technique from the base of the upper  aortic arch through the [place]. This CT angiogram data was  reconstructed at thin intervals with mild overlap. Images were sent to  the 3D workstation, and 3D reconstructions were obtained. The axial  source images, multiplanar reformations, 3D reconstructions in both  maximum intensity projection display and volume rendered models were  reviewed, with reconstructions performed by the technologist and the  radiologist.    CONTRAST: Iso [place] 370: 67 mls    FINDINGS:    Head CTA demonstrates no intracranial arterial aneurysm or stenosis.  Fetal origin of bilateral PCAs.    Neck CTA demonstrates patent major cervical arteries. There is  atherosclerotic calcifications at bilateral carotid bulbs. There is  approximately 40-45% stenosis of the right internal carotid artery  within the proximal portion. The right internal carotid artery gets  diffusely small beyond 4 cm after the bifurcation it measures 2.1 mm  at the narrowest point. Diameter increases within petrous and  cavernous segments and it measures 4.5 mm. No significant stenosis at  the left ICA bifurcation. No vertebral artery stenosis. Patent and  conventional aortic arch branching pattern.    No acute finding in the visualized neck soft tissues, or in the  superior mediastinum/thorax.    Unchanged right parapharyngeal mass.    5 mm  anterolisthesis at C3-4.    Postsurgical changes of instrumented anterior spinal fusion C5-C7.      Impression    IMPRESSION:    1. Head CTA demonstrates no aneurysm or stenosis of the major  intracranial arteries.   2. Neck CTA demonstrates patent major cervical arteries. 40-45%  stenosis of the right internal carotid artery within the proximal  portion due to atherosclerotic plaques. The right internal carotid  artery gets diffusely small 4 cm after the bifurcation it measures 2.1  mm at the narrowest point (midcervical portion). Diameter increases  within petrous and cavernous segments and it measures 4.5 mm.   3. Right parapharyngeal mass is stable.    TEJINDER LEYVA MD         SYSTEM ID:  [id number]   Ammonia   Result Value Ref Range    Ammonia 57 (H) 11 - 51 umol/L   Lactic acid whole blood   Result Value Ref Range    Lactic Acid 5.1 (HH) 0.7 - 2.0 mmol/L   CBC with platelets   Result Value Ref Range    WBC Count 6.2 4.0 - 11.0 10e3/uL    RBC Count 3.40 (L) 3.80 - 5.20 10e6/uL    Hemoglobin 11.8 11.7 - 15.7 g/dL    Hematocrit 37.4 35.0 - 47.0 %     (H) 78 - 100 fL    MCH 34.7 (H) 26.5 - 33.0 pg    MCHC 31.6 31.5 - 36.5 g/dL    RDW 17.1 (H) 10.0 - 15.0 %    Platelet Count 98 (L) 150 - 450 10e3/uL   Comprehensive metabolic panel   Result Value Ref Range    Sodium 136 135 - 145 mmol/L    Potassium 4.5 3.4 - 5.3 mmol/L    Carbon Dioxide (CO2) 20 (L) 22 - 29 mmol/L    Anion Gap 12 7 - 15 mmol/L    Urea Nitrogen 11.6 8.0 - 23.0 mg/dL    Creatinine 0.89 0.51 - 0.95 mg/dL    GFR Estimate 69 >60 mL/min/1.73m2    Calcium 8.8 8.8 - 10.2 mg/dL    Chloride 104 98 - 107 mmol/L    Glucose 81 70 - 99 mg/dL    Alkaline Phosphatase 102 35 - 104 U/L    AST 40 0 - 45 U/L    ALT 29 0 - 50 U/L    Protein Total 5.5 (L) 6.4 - 8.3 g/dL    Albumin 3.4 (L) 3.5 - 5.2 g/dL    Bilirubin Total 0.6 <=1.2 mg/dL   Vitamin B12   Result Value Ref Range    Vitamin B12 480 232 - 1,245 pg/mL   Troponin T, High Sensitivity   Result Value Ref  Range    Troponin T, High Sensitivity 26 (H) <=14 ng/L   Blood gas venous   Result Value Ref Range    pH Venous 7.30 (L) 7.32 - 7.43    pCO2 Venous 49 40 - 50 mm Hg    pO2 Venous 43 25 - 47 mm Hg    Bicarbonate Venous 24 21 - 28 mmol/L    Base Excess/Deficit -3.0 -7.7 - 1.9 mmol/L    FIO2 3    Glucose by meter   Result Value Ref Range    GLUCOSE BY METER POCT 128 (H) 70 - 99 mg/dL

## 2023-11-03 NOTE — PROGRESS NOTES
"01:30 AM    Patient is alert & oriented x3. There are signs of headache, muscle spasms or rigidity, or vision changes. No.     ICANS score/Neurotoxicity assessment  Score:  3   Orientation: Orientation to year, month, city, hospital: 4 points (1 point each) - wrong year  3   Identify: Name 3 objects that nurse points to (e.g. clock, pen, button): 3 points (1 point each)  1   Following Commands: (e.g. show me two fingers or close your eyes and stick out your tongue): 1 point  0   Writing: Ability to write a standard sentence (see writing page \"The nichols jumped over the log.\"): 1 point  0   Attention: Count backwards from 100 by 10s: 1 point     7   Total: Max 10, no impairment, reassess next shift                  **9 or below Notify MD and reassess per provider direction     Cytokine Release Syndrome Assessment  Cytokine Release Syndrome: a potentially life-threatening toxicity after receiving immune based therapy for cancer treatment. Severity varies from grade 1 to grade 4.      There are No current signs of CRS (pyrexia/fever, headache, nausea, hypotension, dyspnea, anxiety, increased liver enzymes, increased bilirubin).  **Will continue to monitor for s/sx of neurotoxicity and CRS.  If these occur, please call the Attending MD for recommendations and further instruction. CRS and neurotoxicity adverse reaction protocols/grading per package insert guidelines.   "

## 2023-11-03 NOTE — PLAN OF CARE
Goal Outcome Evaluation:    00:00-07:00 am  AF slightly elevated /75 OVSS on RA.  A&Ox3 but can be intermittently forgetful and confused.  Bed alarm on  ICANS Score remains at 7/10.  Continues having difficulty with year, counting backwards, and writing sentence.  Pain in lower back and bilateral shoulders manageable adequately with Tylenol x1 and Oxycodone x1.  SBA with walker  Voiding well  No diarrhea this shift.  No new acute events overnight.  Plan for discharge today.  Continue w/POC

## 2023-11-03 NOTE — PROVIDER NOTIFICATION
"   11/03/23 1400   Call Information   Date of Call 11/03/23   Time of Call 1336   Name of person requesting the team Aileen   Title of person requesting team RN   RRT Arrival time 1337   Time RRT ended 1530   Reason for call   Type of RRT Adult   Primary reason for call Neurological   Neurological Acute change in LOC or neuro status;Confused;Speech loss/slurred;Unresponsive;Weakness   Was patient transferred from the ED, ICU, or PACU within last 24 hours prior to RRT call? No   SBAR   Situation RRT called for AMS. Pt quickly reported dizziness, feeling unwell.   Background Per provider note, \"amy Grimaldo is a 73 year old female with a medical history of high risk IgG Lambda multiple myeloma not achieving remission s/p Auto-PBSCT in 2022 (on palliative Erlo/Pom/Dex), Grave's disease, ILD/COPD, HTN, and HLD. Her last therapy was Elotuzumab where she received 5 cycles, but due to rising lambda chains that were consistent with progression, Teclistamab recommended. She is being admitted for cycle 1 day 1 of Teclistamab. She received Zarxio in clinic day prior to admission. Admission complicated by concern for Grade 1/2 neurotoxicity resulting in holding of Teclistamab ramp up and general confusion. \"   Notable History/Conditions Cancer;Cardiac;Hypertension;Neurological;Seizures   Assessment Pt confused, BP 200s/90s, Sating low 90s on RA, afebrile, BG 78. Stroke code called.   Interventions Blood glucose;Labs;O2 per N/C or mask;Portable monitor   Adjustments to Recommend Call stroke code   Patient Outcome   Patient Outcome Stabilized on unit  (Stroke code called)   RRT Team   Attending/Primary/Covering Physician Gyn/Onc   Date Attending Physician notified 11/03/23   Time Attending Physician notified 1336   Physician(s) DAVID Curry   Lead RN Denise DANIELS, RN   STEFFANIE Gallagher, RN   RT Ida, RT   Other staff [place], STEFFANIE   Post RRT Intervention Assessment   Post RRT Assessment Other (see comment)  (Addtional RRT called at t1500 for " LA 5.1)   Date Follow Up Done 11/03/23   Time Follow Up Done 1700   Comments EEG in place, pt seems more alert and able to answer quetions. Repeat LA

## 2023-11-03 NOTE — PROGRESS NOTES
Rapid Response Team Note    Assessment   In assessment a rapid response was called on Libby Grimaldo due to acute encephalopathy. This presentation is likely due to multiple myeloma and concern for acute CVA    Plan   -  Code stroke for CT head> no acute intracranial pathology  - blood sugar 78  - ECG without major ST elevation changes  - blood cultures  - ABG  - vitamin B12, CMP, CBC, ammonia  -  The Hematology/Oncology primary team was  in the room during my assessment.  -  Disposition: The patient  will be transferred to the neuro floor  -  Reassessment and plan follow-up will be performed by the primary team    ARNOLD BECERRA PA-C  [facility] [place] RRT [facility] Job Code Contact #[phone]  [facility] Paging/Directory    Hospital Course   Brief Summary of events leading to rapid response:   73 y.o female with a pmh of multiple myeloma, ILD, COPD, HTN, and HLD who was receiving Teclistamab having variable confusion.  Just before discharge patient said to the room RN she didn't feel well and fell back in bed becoming non verbal.  Patient was incontinent during this episode.  She was not answering questions appropriately, was moving BUE spontaneously, moving LLE to pain, and not moving RLE.  A code stroke was called at that time and the CT was negative for acute intracranial pathology.  We are working up for other reasons for encephalopathy with cbc, LA, ABG, ammonia, CMP, and  CBC.  She is currently on levaquin and micafungin for PPX but we will consider broadening ABX dependent on laboratory data.  Differential includes, delirium, polypharmacy on methadone, Teclistamab toxicity,vs CNS toxicity as immunocompromised.  LA came back 5.1 with wbc 6.2.  If febrile should start broad spectrum ABX.  Will rechek ABX in 4 hours.     Admission Diagnosis:   Multiple myeloma without remission (H) [C90.00]    Physical Exam   Temp: 98.2  F (36.8  C) Temp  Min: 97.6  F (36.4  C)  Max: 98.2  F (36.8  C)  Resp: 18 Resp  Min: 16  Max: 18  SpO2: 96 %  SpO2  Min: 96 %  Max: 100 %  Pulse: 102 Pulse  Min: 73  Max: 102    No data recorded  BP: (!) 170/78 Systolic (24hrs), Av , Min:120 , Max:225   Diastolic (24hrs), Av, Min:58, Max:98     I/Os: I/O last 3 completed shifts:  In: 1078 [P.O.:1078]  Out: 400 [Urine:400]     Exam:   General: acutely ill appearing  Mental Status:  non answering questions, not following commands .  HEENT: no scleral icterus, moist mucous membranes  Cardiac: RRR with no murmurs or rubs  Pulm: CTAB with no adventitious breath sounds  Abd: non distended, non tender to palpation, + BS  Extremities: 2+ radial and dorsalis pedis pulses bilaterally, no BLE edema  Neuro: spontaneously moving BUE, moves LLE to painful stimuli  Psych: unable to asscess    Significant Results and Procedures   Lactic Acid:   Recent Labs   Lab Test 23  1620 23  1309 22  1858   LACT 1.3 2.3* 1.9     CBC:   Recent Labs   Lab Test 23  0305 23  0537 23  0443   WBC 3.8* 3.6* 4.8   HGB 9.4* 9.1* 8.7*   HCT 30.2* 28.7* 27.8*   * 130* 152        Sepsis Evaluation   The patient is not known to have an infection.  NO EVIDENCE OF SEPSIS at this time.  Vital sign, physical exam, and lab findings are due to Differential includes, delirium, polypharmacy on methadone, Teclistamab toxicity,vs CNS toxicity.

## 2023-11-03 NOTE — PLAN OF CARE
"Goal Outcome Evaluation:      Plan of Care Reviewed With: patient, spouse, sibling    Overall Patient Progress: decliningOverall Patient Progress: declining  Pt began the shift afebrile with stable vs and at her baseline, neurologically. The plan was for her to discharge to home. I was at bedside with pt at 1330 and flushed her portacath with 500units heparin and removed the needle for discharge. I scanned her scheduled morphine into epic and attempted to hand it to her and she didn't grasp the med cup, but instead said she felt bad and had to lie down. ~1335) I assisted her, she was flushed and said she stopped responding to my questions and would only say \"I feel bad\", but was unable to answer what part of her felt bad. I got a set of vs and her SBP was 220's (see vs flowsheet). She became dusky and said her chest hurt and I called a rapid response. I notified Brit MELARA, her PA. EKG was done, Pt never lost consciousness, but many times did not respond. Lost bladder control. Stroke code was called. Glucose 78. Pt went to CT. Upon arrival back from CT, pt seemed improved. Was able to answer her first AND last name, answered that she was in the hospital. Did not know the date (but that was not different from baseline), was able to answer her birthday, sometimes.  Troponins were ordered, as well as many other labs (see results). Then, with the neurologist from the stroke code still present she again got dusky and BP increased to 200s and she stopped responding again. Lost bowel control and had a large incontinent stool. Her arms seemed stiff and she had a small tremor (not unlike a shiver, as she had been c/o being cold) that made it difficult to get a BP. Again, she did not lose consciousness.  Sats remain WNL the entire time, but we did apply O2 for a period during the workup. Continued to monitor vs, BP decreased more toward baseline and pt became more responsive again, but did not return to baseline. Pt's family " thought that sometimes her blood glucose went low because she doesn't eat and that 78 was low for her. Pt received 25cc D50.  No significant changes. Then Lactic acid returned at  5.1, Brit HERNANDEZ notified and another rapid response was called. Pt  received LR 1L bolus.

## 2023-11-03 NOTE — PLAN OF CARE
Goal Outcome Evaluation:      Plan of Care Reviewed With: patient    Overall Patient Progress: no changeOverall Patient Progress: no change    Outcome Evaluation: plan for Discharge tomorrow    Pt A&O x3 sometimes forgetful. Able to make needs known. VSS, c/o pain at 7/10-8/10 on her back and shoulder, see MAR. Voiding/drinking well Had loose BM x2, asked for imodium . SBA with a walker. LP site C/D/I. On bed alarm, sister came to visit.  Will con't w/POC.       Problem: Adult Inpatient Plan of Care  Goal: Absence of Hospital-Acquired Illness or Injury  Intervention: Prevent Skin Injury  Recent Flowsheet Documentation  Taken 11/2/2023 1610 by Emelina Lambert RN  Body Position: position changed independently  Taken 11/2/2023 1604 by Emelina Lambert RN  Body Position: position changed independently     Problem: Adult Inpatient Plan of Care  Goal: Absence of Hospital-Acquired Illness or Injury  Intervention: Prevent Infection  Recent Flowsheet Documentation  Taken 11/2/2023 1610 by Emelina Lambert RN  Infection Prevention: equipment surfaces disinfected

## 2023-11-03 NOTE — PROGRESS NOTES
"                                                  Talquetamab Assessment  Patient is Alert & Oriented x3. There are NO signs of skin changes, nail bed changes (discoloration, dystrophy, hypertrophy), or oral changes, muscle weakness or tremors.       ICANS score     Score:   3   Orientation: Orientation to year, month, city, hospital: 4 points (1 point each)   3   Identify: Name 3 objects that nurse points to (e.g. clock, pen, button): 3 points (1 point each)   1   Following Commands: (e.g. show me two fingers or close your eyes and stick out your tongue): 1 point   0   Writing: Ability to write a standard sentence (see writing page \"The nichols jumped over the log.\"): 1 point   0   Attention: Count backwards from 100 by 10s: 1 point     7   Total: Max 10, no impairment, reassess next shift                   **9 or below Notify MD and reassess in 4 hours       Cytokine Release Syndrome Assessment    Cytokine Release Syndrome: a potentially life threatening toxicity after receiving immune based therapy for cancer treatment. Severity varies from grade 1 to grade 4.      There are NO current signs of CRS (pyrexia/fever, headache, nausea, hypotension, dyspnea, anxiety, increased liver enzymes, increased bilirubin).    **Will continue to monitor for s/sx of neurotoxicity and CRS.  If these occur, please call the Attending MD for recommendations and further instruction. CRS and neurotoxicity adverse reaction protocols/grading per package insert guidelines.   "

## 2023-11-04 NOTE — PLAN OF CARE
"Goal Outcome Evaluation:      Plan of Care Reviewed With: patient, spouse    Overall Patient Progress: improvingOverall Patient Progress: improving                                                       Talquetamab Assessment  Patient is Alert & Oriented x3. There are NO signs of skin changes, nail bed changes (discoloration, dystrophy, hypertrophy), or oral changes, muscle weakness or tremors.       ICANS score     Score:   3   Orientation: Orientation to year, month, city, hospital: 4 points (1 point each)   3   Identify: Name 3 objects that nurse points to (e.g. clock, pen, button): 3 points (1 point each)   1   Following Commands: (e.g. show me two fingers or close your eyes and stick out your tongue): 1 point   1   Writing: Ability to write a standard sentence (see writing page \"The nichols jumped over the log.\"): 1 point   0   Attention: Count backwards from 100 by 10s: 1 point     8   Total: Max 10, no impairment, reassess next shift                   **9 or below Notify MD and reassess in 4 hours       Cytokine Release Syndrome Assessment     There are NO signs of CRS (pyrexia/fever, headache, nausea, hypotension, dyspnea, anxiety, increased liver enzymes, increased bilirubin).  **Will continue to monitor for s/sx of neurotoxicity and CRS.      "

## 2023-11-04 NOTE — PROGRESS NOTES
"[facility]  Neurology Consultation - Progress Note    Patient Name:  Libby Grimaldo  Date of Service:  November 4, 2023    Subjective:    No acute clinical events overnight.  This morning, the patient and her  feel that she is very close to her cognitive baseline.  The patient denies any new symptoms this morning.    Objective:    Vitals: BP (!) 177/80 (BP Location: Right arm)   Pulse 89   Temp 97.8  F (36.6  C) (Oral)   Resp 18   Ht 1.562 m (5' 1.5\")   Wt 49.2 kg (108 lb 8 oz)   SpO2 100%   BMI 20.17 kg/m    General: Lying in bed, NAD  Head: Atraumatic, normocephalic   Cardiac: no lower extremity edema  Neurologic:  Mental Status: The patient is alert and oriented to person, city, and month.  She was not able to identify the hospital in which she is currently admitted.  The patient was able to follow some commands, but struggles with attention based tasks such as saying the days of the week backwards her language was fairly intact for naming and repetition.  Cranial Nerves: EOM intact, without nystagmus. Facial movements symmetric at rest and with activation.   Motor: No abnormal movements.  Moving all 4 extremities antigravity.   Sensory: Intact to light touch x 4 extremities   Station/Gait: Deferred.     Pertinent Investigations:    I have personally reviewed most recent and pertinent labs, tests, and radiological images.     Assessment  This is a 73-year-old female, with a history of high risk IgG lambda multiple myeloma, Grave's disease, ILD/COPD, HTN, and HLD, who was admitted on 10/24 for initiation of Teclistamab therapy for treatment of her multiple myeloma.    Per chart review, it appears the patient developed a fluctuating encephalopathy immediately after receiving her first dose of CAR-T therapy.  At that time, ICANS protocol was triggered.  There was no evidence of infection, with relatively benign CSF studies.  MRI of the brain did not reveal any stroke " or abnormalities within the brain parenchyma itself.  Therefore, the patient's chemotherapy was held and she was placed on dexamethasone and levetiracetam 750 mg twice daily.  The dexamethasone has since been discontinued, but the patient continues on levetiracetam. Her mental status had been at baseline for some time, per chart review and discussion with the patient's team.     Neurology was involved in the patient's case when a stroke code was called for unresponsiveness on 11/5/2023.  By the time the stroke response team arrived at the room, the patient had once again become responsive and did not have a focal or lateralizing exam.      Given the patient's rapid improvement in mental status after a period of unresponsiveness, with no discrete factor (such as metabolic derangement or toxic exposure) sufficient to explain this, the two most prominent differential items in her case are hospital-acquired delirium or seizure activity.  Therefore, she continues to be monitored with video EEG.    Recommendations:   -Continue vEEG monitoring.  -Continue levetiracetam 750 mg twice daily.  -Ensure delirium precautions are in place.  -Neurology will continue to follow.    Thank you for involving Neurology in the care of Libby Grimaldo.  Please do not hesitate to call with questions/concerns (consult pager [phone]).      Patient was seen and discussed with Dr. Nunes.    Winston Foster MD  Neurology, PGY2

## 2023-11-04 NOTE — PLAN OF CARE
"Goal Outcome Evaluation:    AVSS on RA. Lactic acid 1.6, troponin 45. 12 hour EEG initiated. Pt seemed to be swallowing her meds but then writer noticed that they were clumped and sticking to her teeth despite pt repeatedly sipping water. Provider notified; pharm consulted plan is to address in the AM; she did get most of her meds this evening. Up w/ two to bathroom w/ walker and gait belt. Per pt's  she is having episodes where she \"is not breathing and then she gasps\"; provider notified, telemtery monitoring ordered. Repeat troponin ordered for 0030. Commode ordered. Port hep locked  "

## 2023-11-04 NOTE — PROGRESS NOTES
Date Admitted: 10/24/23    Reason Admitted: 72 yo admitted C1 of Teclistamab, complicated by neurotoxicity, treatment being held    Dx/Hx: Grave's disease, HTN, Multiple myeloma    [id number]: was informed by outgoing RN that pt was being monitored for seizure activities, was lethargic at start shift, only oriented to self and place, however, pt has improved dramatically from start of shift, having normal conversation with writer, making her needs known, swallowing with no difficulty, rates back pain at 7/10, 5 mg Oxy and 650 mg Tylenol given, voided, no BM, /80, provider notified, Hydralazine available for SBP >180, EEG monitoring on, will continue to monitor for toxicity and intervene as appropriate

## 2023-11-04 NOTE — PROGRESS NOTES
[facility]    Hematology / Oncology Progress Note  Date of Admission: 10/24/2023  Hospital Day # 11  Date of Service (when I saw the patient): 11/04/2023     Assessment & Plan   Libby Grimaldo is a 73 year old female with a medical history of high risk IgG Lambda multiple myeloma not achieving remission s/p Auto-PBSCT in 2022 (on palliative Erlo/Pom/Dex), Grave's disease, ILD/COPD, HTN, and HLD. Her last therapy was Elotuzumab where she received 5 cycles, but due to rising lambda chains that were consistent with progression, Teclistamab recommended. She is being admitted for cycle 1 day 1 of Teclistamab. She received Zarxio in clinic day prior to admission. Admission complicated by concern for Grade 1/2 neurotoxicity resulting in holding of Teclistamab ramp up and general confusion.      Today:  - Day 11 from Teclistamab (note only received first dose in ramp up 2/2 grade 2 ICANS). Will defer further doses at this time.   - Acute encephalopathy vs ?seizure vs other x11/3. Remains on EEG monitoring per neurology. Extensive workup thus far for altered mentation largely unremarkable. Patient appearing improved today. Will continue to monitor patient today.   - Can stop ppx levofloxacin and IV micafungin as patient is no longer neutropenic.  - Continue best supportive cares and encourage good PO hydration/intake.    HEME  # IgG Lambda Multiple Myeloma, High-risk. S/p APBST 2022  Follows with Dr. Guillory.  Initially diagnosed in 11/2018, completed KRD x 6 cycles, rev/Dex (20), Velcade maintenance, revlimid maintenance, with evidence of disease BmBx 11/2020 (35% plasma cells) and PET CT with new lytic lesions. Then treated with kameron, Kd x 6 cycles, subcutaneous kameron/IVIG/denosumab monthly. S/p autologous peripheral blood stem cell transplant in 2022. Followed by melphalan, ixazomib, and then started on palliative elotuzumab, pomolidomide, and dexamethasone chemotherapy 4/2023  with most recent dose (C3D1) 6/26. She presented to heme/onc clinic 8/8 to re-establish treatment schedule and sent to ED and admitted for sepsis, confusion, and weakness. She was started on Zosyn and vancomycin with improvement in symptoms, and transitioned to oral Levaquin 8/10/23. She was discharged with outpatient PT. C5 was schedukled for 9/20 but patient cancelled appt due to a fall; started C5D1 9/27. Due to rising lambda chains that were consistent with progression, Teclistamab recommended. Admitted for C1D1 of Teclistamab 10/24. Received Zarxio in clinic 10/23.   - Brain MRI showed incidental 2.8 cm parapharyngeal lesion of unclear etiology, further detailed below.   - CT CAP restaging 11/2: Large enhancing soft tissue masses along bilateral scapulae with underlying cortical erosions and pathologic fracture. New soft tissue  mass in the left thoracic paraspinal region. These may represent extraosseous myeloma versus extramedullary hematopoiesis. Consider  tissue sampling. Increased size of the thoracic right paraspinal lesion. New solid pulmonary nodules, most notably the right lower lobe solid nodule measuring up to 9 mm, which may represent infectious, inflammatory, or neoplastic etiology. Attention on follow-up. Cystic lesions of the pancreas are better appreciated on MRI of 5/25/2023.    # Grade 2 ICANS   During hospitalization, patient had grade 1 ICANS after D1 that progressed to Grade 2 overnight on D3 (10/26). Heme/onc team notified and ordered Dexamethasone 10 mg x2 and started Keppra BID. Noted by the medicine team when they saw her she had difficultly following commands such as touching her nose with her finger. When reassessed in AM, ICANS 7/10 and 8/10 by RN. She was able to follow finger-to-nose and finger-to-finger commands, thorough neuro exam consistent with previous days. Of note, she has a tremor at baseline but noted increased on D2. Patient mentions a mild headache in AMs that has been  alleviated by tylenol. She does have a baseline of difficulty walking due to chronic back/hip pain and neuropathy, she says she notices no changes from her baseline. Patient uses a walker. On 10/28:  patient endorses feeling a little more confused but it is hard for her to describe. She feels like her writing is difficult to complete. Held Teclistamab. MRI brain with no acute pathology to explain neurologic changes.  10/29: ICANS 5/10, Dex 10mg Q6H until improvement to grade 1 neurotoxicity (ICANS >7). Discussed with Dr. Guillory patient's primary who states she has had issues with cognition at baseline, thus it is somewhat difficult to assess ICANS score. However, ICANS score on admission was 10/10 thus, there seems to be an acute change. Teclistamab recently given so associated neurotoxicity also is possible however we will work up alternate causes of AMS as mentioned below as well. Overall, she seems to be having a difficulty tolerating Teclistamab and thus unclear if she will be able to complete ramp up therapy this admission. Will continue to hold Teclistamab for now. Stopped empiric dex 10/31 as ICANS still decreased and variable  despite empiric treatment. Will continue to monitor and reassess ICANS and CRS scoring every 8 hours or suspected change in mental status until ICANS score of 10.   - Trending CRP and Ferritin for ICANS and CRS. Ordered add on Ferritin for labs drawn on 10/27 and 10/28.    #Pancytopenia   2/2 underlying malignancy and immunotherapy  - Transfuse to maintain Hgb >7, Plt >10K  - Blood consent signed and in patient chart.     # History of LUE DVT 2022     2/15/22: new non-occlusive LUE DVT. No longer on xarelto. On ASA.    ID  ID Ppx:  - PTA Acyclovir 400 mg BID  - PJP ppx bactrim BID on Monday/Tuesdays  - Levofloxacin 250 mg PO- if ANC <1000  - Antifungal ppx, held d/t interaction with Methadone, Micafungin while admitted    NEURO  # Altered Mental Status  # Encephalopathy  Appears to  "have more difficulty with writing sentences and is often only oriented to place this admission. Etiology of confusion is unclear. MRI with no acute findings. Discussed with patient's primary Dr. Guillory who stated she has had problems with cognition in the past, so this could be partly baseline. ICANS on admission was 10/10 and has decreased to 5-6/10, thus there does seem to be an acute change. This may be secondary to hospital acquired delirium vs. Polypharmacy (on chronic methadone) vs. Teclistamab neurotoxicity vs. CNS infection as she is immunocompromised. It is somewhat hard to distinguish how much of her altered mental status is attributed to Teclistamab associated neurotoxicity. For now we will empiric treat Grade 2 neurotoxicity with Dexamethasone. Palliative consult placed for opoid management that could be contributing to her AMS, given her history of substance use, recommended to not change medications at this time and could consider addiction medicine consultation.  - Holding Teclistamab - Per Dr. Guillory, ok to stop and follow up outpatient when mental status improves  - Dexamethasone for empiric neurotoxicity treatment stopped 10/31  - Delirium precautions   - LP in IR under fluoroscopy done 11/01; negative infectious panel, protein/glucose wnl, flow shows no CNS involvement.   - 11/3: patient was anticipating discharge. Prior to discharge, RN noted patient \"felt dizzy\" and endorsed chest pain. She became nonverbal. A rapid response was called. On initial evaluation, she was not able to orient to person, place, or time. Of note, she was not able to orient to time throughout her admission. She could not respond to questions. Could not lift legs or follow commands. She became incontinent. Code stroke was called. She recovered slightly, but not to baseline. Head CT negative for acute pathology. After she returned from CT she had another episode of acute decline where she was not able to orient to person, " "place or time. She was not able to follow commands. Suspicion for encephalopathy vs seizure/post ictal; lactic acid 5.1, wbc 6.2;  ammonia 57, thiamine pending, and blood cultures NGTD. EKG showed no acute findings. BG 78. Bolus of lactated ringers given. Afebrile at this time, if she does becomes febrile will consider broad spectrum antibiotics with further workup. Neurology recommended EEG for further evaluation. Troponins 26 ? 45 ? 32. Repeat EKG today NSR. Morning cortisol 7.2. B12 480. Etiology of event unclear. Spoke with neurology team, awaiting final read of EEG, will continue tonight. Per initial reads, no epileptiform changes thus far. Of note, patient on Keppra at time of event. Remains unclear. Will continue to monitor clinical/cognitive status overnight.     # Neuropathy  Patient has reported increased falls due to feeling her feet are \"heavy\" and feel like there is something on her feet. She spoke about this at her outpatient oncology appointment 10/23; prescribed gabapentin 100 mg BID.  - Hold starting Gabapentin 100 mg BID until discharge from hospital    HEENT  # Incidental Right parapharyngeal lesion  Found on MRI Brain this admission. Lesion protrudes into the oropharynx. Discussed with radiology who stated etiology of lesions is unclear, may be mass vs. Cyst vs. Abscess. This lesion is new since MRI brain in 2021.   - ENT consulted for biopsy; recommended CT neck with contrast and outpatient follow up.   - CT showed lesions in the supraspinatus and parapharyngeal space that show concern for malignancy. Suspect extraosseous involvement from MM rather than from another primary malignancy. In discussion with ENT, was recommended for biopsy of supraspinatus. Discussed with IR who recommended outpatient biopsy if no acute change to management indicating inpatient need.    - Plan for IR biopsy week of 11/6 outpatient with close oncology follow up.     CARDIOVASCULAR  # Elevated blood pressures  She has " had consistently elevated BP since late afternoon admission on 10/25. Early AM on 10/26 systolic ranged from 154-194; of note, she had dex 10mg x 2 during this time and may be contributory.  - Amlodipine 10 mg     # Risk of cardiomyopathy   Baseline EF 57% done January 2022. Patient being followed by cardiology. Last echo done May 2023 with EF 55%-60%.      # Hyperlipidemia   Previously on Crestor but not on med list, patient confirmed - can follow-up with PCP if needed     # History of Afib   Consistently ausculted in NSR on repeat daily examinations.  PTA metoprolol XL 25 mg/d      PULM  # COPD  COPD with 40pyr, quit 2010. Previously documented to have idiopathic pulmonary fibrosis, however most recent PFTs (1/26/22) were normal. Has previously required treatment for COPD exacerbation with pneumonia. Some expiratory wheezing on exam improved with Duonebs; continue home inhaler regimen.  - PTA Trelegy Ellipta inhaler daily  - PTA Albuterol BID PRN     ENDO  # Hypothyroidism  # History of Graves Disease  History of hypothyroidism 2/2 treatment for Graves Disease (patient reports surgery in [place], however no thyroidectomy). Stable dose of levothyroxine over the past four years. Admission TSH (2.74).   - PTA Levothyroxine 112 mcg daily     MISC  # Generalized Anxiety Disorder  - PTA Venlafaxine 75mg TID      # Pain Assessment  - PTA prn oxycodone      # Opiate dependence  - PTA Methadone 10mg TID     # Dry Eye  Patient noted dry eyes and requested eye drops.   - Carboxymethylcellulose PF 0.5 % ophthalmic solution 1 drop TID PRN    Fluids/Electrolytes/Nutrition   - IVF per chemotherapy regimen  - PRN lyte replacement per standing protocol  - Regular diet as tolerated     PPX  VTE: Enoxaparin ppx, Hold if plts < 50k or prior to procedure  GI: n/a  Bowels: Senna and miralax PRN    Code: Full code    Dispo: TBD depending on clinical course.     I spent >45 minutes face-to-face and/or coordinating or discussing care plan.  "Over 50% of our time on the unit was spent counseling the patient and/or coordinating care    Patient is seen and examined by Dr. Small.  Assessment and plan are discussed and delivered to the patient.    Roya Fernandez PA-C  Hematology/Oncology  Pager: #[phone]    Interval History   Nursing notes reviewed. No acute events overnight. Libby is feeling better this morning,  supportive at bedside. She appears alert and oriented. She denies any new or worsening symptoms. States \"I'm not sure what happened\" in regards to events yesterday. We discussed the events from yesterday, the workup, reason for EEG, and continued monitoring today. Pending how she is doing today and overnight, could consider discharging/planning tomorrow. Though discussed monitoring through the night. She denies headache, dizziness, N/V, chest pain, shortness of breath, abdominal pain, changes to bowel/bladder, or pain in the extremities. All questions and concerns addressed at bedside.     A comprehensive review of symptoms was performed and was negative except as detailed in the interval history above.    Physical Exam   Temp: 97.9  F (36.6  C) Temp src: Oral BP: (!) 147/69 Pulse: 76   Resp: 16 SpO2: 96 % O2 Device: None (Room air) Oxygen Delivery: 1 LPM  Vitals:    11/02/23 0835 11/03/23 0831 11/04/23 0849   Weight: 50.5 kg (111 lb 4.8 oz) 49.2 kg (108 lb 8 oz) 48.9 kg (107 lb 14.4 oz)     Vital Signs with Ranges  Temp:  [97.8  F (36.6  C)-98.5  F (36.9  C)] 97.9  F (36.6  C)  Pulse:  [] 76  Resp:  [16-20] 16  BP: (115-199)/(68-89) 147/69  SpO2:  [94 %-100 %] 96 %  I/O last 3 completed shifts:  In: 2225 [P.O.:1200; I.V.:25; IV Piggyback:1000]  Out: 1175 [Urine:1175]      Physical Exam:    Blood pressure (!) 147/69, pulse 76, temperature 97.9  F (36.6  C), temperature source Oral, resp. rate 16, height 1.562 m (5' 1.5\"), weight 48.9 kg (107 lb 14.4 oz), SpO2 96%.    Constitutional: Awake and conversational. Non-toxic appearing. " No acute distress.   HEENT: Normocephalic, atraumatic. Sclerae anicteric. PERRLA. EOM intact. Moist mucus membranes without lesion or abscess.    Respiratory: Breathing comfortably on room air with no accessory muscle use. Speaking in full sentences, no evidence of respiratory distress. Lungs CTAB, no wheeze or rales.   Cardiovascular: Regular rate and rhythm. S1, S2. No murmurs appreciated.  Circulatory: 2+ radial and 2+ posterior tibialis pulses bilaterally. No peripheral edema.    GI: Abdomen with normoactive bowel sounds, soft, non-distended, and non-tender throughout. No rebound or guarding.   Skin: Skin is clean, dry, intact. No jaundice or significant rashes appreciated.   Musculoskeletal/ Extremities: No redness, warmth, or swelling of the joints appreciated.   Neurologic: Alert and oriented with normal speech. CN II-VII intact. Grossly nonfocal exam. Sensation intact to LT throughout. Motor strength 5/5  strength, deltoid, triceps, biceps, plantar flexion and dorsiflexion. FNF intact. Moves all extremities equally.   Neuropsychiatric: Calm, appropriate mood and affect congruent to situation. Good judgment and insight.   Vascular access: Port on right chest wall CDI.    Medications    - MEDICATION INSTRUCTIONS -        acyclovir  400 mg Oral BID    amLODIPine  10 mg Oral Daily    [Held by provider] enoxaparin ANTICOAGULANT  40 mg Subcutaneous Q24H    fluticasone-vilanterol  1 puff Inhalation Daily    And    umeclidinium  1 puff Inhalation Daily    folic acid-vit B6-vit B12  1 tablet Oral Daily    heparin  5 mL Intracatheter Q28 Days    levETIRAcetam  750 mg Oral BID    levothyroxine  112 mcg Oral Daily    methadone  10 mg Oral TID    metoprolol succinate ER  25 mg Oral Daily    pantoprazole  40 mg Oral Daily    sulfamethoxazole-trimethoprim  1 tablet Oral Q Mon Tues BID    venlafaxine  75 mg Oral BID    ascorbic acid  1,000 mg Oral Daily    Vitamin D3  25 mcg Oral Daily       Data   Results for orders  placed or performed during the hospital encounter of 10/24/23 (from the past 24 hour(s))   Blood Culture Hand, Left    Specimen: Hand, Left; Blood   Result Value Ref Range    Culture No growth after 12 hours    Lactic acid whole blood   Result Value Ref Range    Lactic Acid 1.6 0.7 - 2.0 mmol/L   Troponin T, High Sensitivity   Result Value Ref Range    Troponin T, High Sensitivity 45 (H) <=14 ng/L   Troponin T, High Sensitivity   Result Value Ref Range    Troponin T, High Sensitivity 32 (H) <=14 ng/L   CBC with platelets differential    Narrative    The following orders were created for panel order CBC with platelets differential.  Procedure                               Abnormality         Status                     ---------                               -----------         ------                     CBC with platelets and d...[[id number]]  Abnormal            Final result                 Please view results for these tests on the individual orders.   Comprehensive metabolic panel   Result Value Ref Range    Sodium 140 135 - 145 mmol/L    Potassium 4.5 3.4 - 5.3 mmol/L    Carbon Dioxide (CO2) 25 22 - 29 mmol/L    Anion Gap 7 7 - 15 mmol/L    Urea Nitrogen 13.3 8.0 - 23.0 mg/dL    Creatinine 0.97 (H) 0.51 - 0.95 mg/dL    GFR Estimate 62 >60 mL/min/1.73m2    Calcium 8.5 (L) 8.8 - 10.2 mg/dL    Chloride 108 (H) 98 - 107 mmol/L    Glucose 71 70 - 99 mg/dL    Alkaline Phosphatase 132 (H) 35 - 104 U/L    AST 53 (H) 0 - 45 U/L    ALT 38 0 - 50 U/L    Protein Total 5.2 (L) 6.4 - 8.3 g/dL    Albumin 3.0 (L) 3.5 - 5.2 g/dL    Bilirubin Total 0.5 <=1.2 mg/dL   INR   Result Value Ref Range    INR 1.03 0.85 - 1.15   Fibrinogen activity   Result Value Ref Range    Fibrinogen Activity 401 170 - 490 mg/dL   Uric acid   Result Value Ref Range    Uric Acid 3.3 2.4 - 5.7 mg/dL   Partial thromboplastin time   Result Value Ref Range    aPTT 26 22 - 38 Seconds   Magnesium   Result Value Ref Range    Magnesium 1.9 1.7 - 2.3 mg/dL    Phosphorus   Result Value Ref Range    Phosphorus 3.6 2.5 - 4.5 mg/dL   CBC with platelets and differential   Result Value Ref Range    WBC Count 9.9 4.0 - 11.0 10e3/uL    RBC Count 2.91 (L) 3.80 - 5.20 10e6/uL    Hemoglobin 10.0 (L) 11.7 - 15.7 g/dL    Hematocrit 31.8 (L) 35.0 - 47.0 %     (H) 78 - 100 fL    MCH 34.4 (H) 26.5 - 33.0 pg    MCHC 31.4 (L) 31.5 - 36.5 g/dL    RDW 17.3 (H) 10.0 - 15.0 %    Platelet Count 136 (L) 150 - 450 10e3/uL    % Neutrophils 83 %    % Lymphocytes 10 %    % Monocytes 5 %    % Eosinophils 1 %    % Basophils 0 %    % Immature Granulocytes 1 %    NRBCs per 100 WBC 0 <1 /100    Absolute Neutrophils 8.2 1.6 - 8.3 10e3/uL    Absolute Lymphocytes 1.0 0.8 - 5.3 10e3/uL    Absolute Monocytes 0.5 0.0 - 1.3 10e3/uL    Absolute Eosinophils 0.1 0.0 - 0.7 10e3/uL    Absolute Basophils 0.0 0.0 - 0.2 10e3/uL    Absolute Immature Granulocytes 0.1 <=0.4 10e3/uL    Absolute NRBCs 0.0 10e3/uL   Cortisol   Result Value Ref Range    Cortisol 7.2   ug/dL   EKG 12-lead, complete   Result Value Ref Range    Systolic Blood Pressure  mmHg    Diastolic Blood Pressure  mmHg    Ventricular Rate 81 BPM    Atrial Rate 81 BPM    SD Interval 176 ms    QRS Duration 76 ms     ms    QTc 439 ms    P Axis 82 degrees    R AXIS 39 degrees    T Axis 66 degrees    Interpretation ECG       Sinus rhythm  Normal ECG  When compared with ECG of 03-NOV-2023 13:47, (unconfirmed)  No significant change was found

## 2023-11-04 NOTE — PROGRESS NOTES
"Patient is alert & oriented x4. There are signs of headache, muscle spasms or rigidity, or vision changes. No.    ICANS score/Neurotoxicity assessment  Score:  4   Orientation: Orientation to year, month, city, hospital: 4 points (1 point each)  3   Identify: Name 3 objects that nurse points to (e.g. clock, pen, button): 3 points (1 point each)  1   Following Commands: (e.g. show me two fingers or close your eyes and stick out your tongue): 1 point  1   Writing: Ability to write a standard sentence (see writing page \"The nichols jumped over the log.\"): 1 point  0  Attention: Count backwards from 100 by 10s: 1 point     10   Total: Max 10, no impairment, reassess next shift                  **9 or below Notify MD and reassess per provider direction     Cytokine Release Syndrome Assessment  Cytokine Release Syndrome: a potentially life-threatening toxicity after receiving immune based therapy for cancer treatment. Severity varies from grade 1 to grade 4.     There are No current signs of CRS (pyrexia/fever, headache, nausea, hypotension, dyspnea, anxiety, increased liver enzymes, increased bilirubin).  **Will continue to monitor for s/sx of neurotoxicity and CRS.  If these occur, please call the Attending MD for recommendations and further instruction. CRS and neurotoxicity adverse reaction protocols/grading per package insert guidelines.  4  "

## 2023-11-04 NOTE — PLAN OF CARE
"Goal Outcome Evaluation:      Plan of Care Reviewed With: patient, spouse    Overall Patient Progress: improvingOverall Patient Progress: improving  Pt afebrile with stable vs. No signs or symptom of the episode of confusion and poor response to commands that happened yesterday afternoon. Actually, Pt's focus and alertness and ability to respond correctly to questions and memory of interactions are the best that they have been since receiving Talquetamab. Now that she is more articulate, it is apparent that she is having mild expressive aphasia. She wanted to tell me where her son lived and stated that he lived and out state and expressed frustration that she couldn't say where he lived. Then 1 minute later she said \"[place]. My son lives in [place]\". EEG is still in process. Continues to be monitored by telemetry. EKG done, NSR. Good po intake. Reports being very thirsty. Good  urine output. Up to bedside commode with assist.          "

## 2023-11-04 NOTE — PROGRESS NOTES
VEEG monitoring preliminary results:    VEEG has been running for over one hour.  EEG showed moderate generalized slowing with mostly theta activities.  Findings are consistent with moderate diffuse encephalopathy.  No epileptiform activities, no clinical or electrographic seizures were observed.    Karen Posey MD  Neurology

## 2023-11-04 NOTE — PROGRESS NOTES
Brief Cross Cover Note    Notified by RN that she is unable to swallow her oral pills (found them stuck in her teeth).  Placed pharmacy consult to help transition meds from oral -> IV.    11/03/23, 8:50 PM  Margaret Beatty MD

## 2023-11-05 NOTE — PLAN OF CARE
"[id number]  /63 (BP Location: Right arm)   Pulse 73   Temp 97.6  F (36.4  C) (Oral)   Resp 20   Ht 1.562 m (5' 1.5\")   Wt 48.9 kg (107 lb 14.4 oz)   SpO2 96%   BMI 20.06 kg/m      A&Ox3-4, disoriented to place this morning. Up with SBA and walker. VSS on room air. Denies nausea. Pain managed with scheduled methadone and PRN oxycodone x1, refused tylenol. Regular diet, fair appetite. Voids spontaneously via bedside commode. Tele and EEG discontinued today. RN managed electrolytes, no replacements needed today. Port hep locked. Anticipated discharge this evening.     Goal Outcome Evaluation:      Plan of Care Reviewed With: patient, spouse    Overall Patient Progress: improvingOverall Patient Progress: improving           "

## 2023-11-05 NOTE — PROGRESS NOTES
"                                                  [place] Assessment  Patient is Alert & Oriented x3. There are NO signs of skin changes, nail bed changes (discoloration, dystrophy, hypertrophy), or oral changes, muscle weakness or tremors.       ICANS score     Score:   3   Orientation: Orientation to year, month, city, hospital: 4 points (1 point each)   3   Identify: Name 3 objects that nurse points to (e.g. clock, pen, button): 3 points (1 point each)   1   Following Commands: (e.g. show me two fingers or close your eyes and stick out your tongue): 1 point   1   Writing: Ability to write a standard sentence (see writing page \"The nichols jumped over the log.\"): 1 point   0   Attention: Count backwards from 100 by 10s: 1 point     8   Total: Max 10, no impairment, reassess next shift                   **9 or below Notify MD and reassess in 4 hours       Cytokine Release Syndrome Assessment    Cytokine Release Syndrome: a potentially life threatening toxicity after receiving immune based therapy for cancer treatment. Severity varies from grade 1 to grade 4.      There are NO current signs of CRS (pyrexia/fever, headache, nausea, hypotension, dyspnea, anxiety, increased liver enzymes, increased bilirubin).    **Will continue to monitor for s/sx of neurotoxicity and CRS.  If these occur, please call the Attending MD for recommendations and further instruction. CRS and neurotoxicity adverse reaction protocols/grading per package insert guidelines.   "

## 2023-11-05 NOTE — PROGRESS NOTES
"[facility]  Neurology Consultation - Progress Note    Patient Name:  Libby Grimaldo  Date of Service:  November 5, 2023    Subjective:    No acute events overnight. Patient and her  both affirm she has returned to baseline. No further concern for spells overnight    Objective:    Vitals: /58 (BP Location: Right arm)   Pulse 90   Temp 98.1  F (36.7  C) (Oral)   Resp 20   Ht 1.562 m (5' 1.5\")   Wt 48.9 kg (107 lb 14.4 oz)   SpO2 97%   BMI 20.06 kg/m    General: Lying in bed, NAD  Head: Atraumatic, normocephalic.  No icterus  Cardiac: Appears well perfused  Neurologic:  Mental Status: Fully alert, attentive and oriented to person, place, month/year, and situation.  Able to provide an adequate history, follow commands, and answer questions without difficulty.  Naming intact.  Unable to perform Luria testing but could perform three-step crossed commands.  Speech is clear and fluid, no concern for aphasia.  No concern for neglect  Cranial Nerves: PERRL, conjugate gaze, EOMI without nystagmus, visual fields intact, facial sensation intact, no facial asymmetry noted, hearing intact to conversation, no dysarthria, tongue is midline  Motor: Normal bulk and tone.  No abnormal movements noted.  Strength is 5/5 in all extremities in both proximal and distal muscle groups  Sensory: Intact to light touch in all extremities without extinction  Coordination: FNF and HS intact bilaterally  Reflexes: Down-going toes bilaterally, no clonus  Station/Gait: Deferred    Pertinent Investigations:    I have personally reviewed most recent and pertinent labs, tests, and radiological images.     Assessment  Libby Grimaldo is a 73-year-old female, with a history of high risk IgG lambda multiple myeloma, Grave's disease, ILD/COPD, HTN, and HLD, who was admitted on 10/24 for initiation of Teclistamab therapy for treatment of her multiple myeloma.     Per chart review, it appears the patient " developed a fluctuating encephalopathy immediately after receiving her first dose of CAR-T therapy.  At that time, ICANS protocol was triggered.  There was no evidence of infection, with relatively benign CSF studies.  MRI of the brain did not reveal any stroke or abnormalities within the brain parenchyma itself.  Therefore, the patient's chemotherapy was held and she was placed on dexamethasone and levetiracetam 750 mg twice daily. The dexamethasone has since been discontinued, but the patient continues on levetiracetam. Her mental status had been at baseline for some time, per chart review and discussion with the patient's team.      Neurology was involved in the patient's case when a stroke code was called for unresponsiveness on 11/5/2023.  By the time the stroke response team arrived at the room, the patient had once again become responsive and did not have a focal or lateralizing exam.       Given the patient's rapid improvement in mental status after a period of unresponsiveness, with no discrete factor (such as metabolic derangement or toxic exposure) sufficient to explain this, the two most prominent differential items in her case are hospital-acquired delirium or seizure activity.  Overnight EEG did not note any seizures or epileptiform activity. Still, would recommend continuation of Keppra for now and follow-up with outpatient Neurology for further assessment    Recommendations:   -Stop EEG  -Continue Keppra 750mg BID  -On eventual discharge, recommend outpatient Neurology follow-up  -Neurology will sign off    Thank you for involving Neurology in the care of Libby Grimaldo.  Please do not hesitate to call with questions/concerns (consult pager [phone]).      Patient was seen and discussed with Dr. Nunes.    Celina Beckwith MD  Neurology PGY4

## 2023-11-05 NOTE — PLAN OF CARE
Goal Outcome Ev    Back pain 10mg oxycodone given x1   afebrile with stable vs. EEG continues. Continues to be monitored by telemetry.  Good po intake. . Good  urine output. Up to bedside commode with assist.

## 2023-11-05 NOTE — PLAN OF CARE
"Goal Outcome Evaluation:    /61 (BP Location: Right arm)   Pulse 72   Temp 97.9  F (36.6  C) (Oral)   Resp 16   Ht 1.562 m (5' 1.5\")   Wt 48.9 kg (107 lb 14.4 oz)   SpO2 92%   BMI 20.06 kg/m          A/O x 3, disoriented to time. PRN Tylenol x1 and Oxycodone x 2 given for pain 7/10. Slept between cares. Continue to monitor per POC.                "

## 2023-11-05 NOTE — DISCHARGE SUMMARY
[facility]  Discharge Summary  Hematology / Oncology    Date of Admission:  10/24/2023  Date of Discharge:  11/05/2023  Discharging Provider: Roya Fernandez PA-C  Date of Service (when I saw the patient): 11/05/2023    Discharge Diagnoses   # IgG Lambda Multiple Myeloma, High-risk. S/p APBST 2022   # Grade 2 ICANS   # Thrombocytopenia  # Anemia  # Altered Mental Status  # Encephalopathy  # Neuropathy   # Incidental right parapharyngeal lesion   # Hypertension  # History of Afib   # COPD   # Hypothyroidism  # History of Graves Disease  # Generalized Anxiety Disorder   # Opiate dependence     History of Present Illness   Libby Grimaldo is a 73 year old female with a medical history of high risk IgG Lambda multiple myeloma not achieving remission s/p Auto-PBSCT in 2022 (on palliative Erlo/Pom/Dex), Grave's disease, ILD/COPD, HTN, HLD, and opioid dependence. She was admitted for C1 Teclistamab ramp up. Unfortunately, after her first dose of Teclistamab, she subsequently had Grade 1 ICANS which progressed to Grade 2. She was supported with Dexamethazone 10 mg ? 5 mg, then discontinued after several days without improvement (consistently scoring 5-8/10). Several workups completed for altered cognition including lumbar puncture which was negative for malignancy and infection, and Brain MRI which showed incidental parapharyngeal mass. ENT consulted and recommended biopsy as outpatient. Patient subsequently restaged with CT CAP which showed several enhancing soft tissue masses along bilateral scapulae, left thoracic paraspinal region, and thoracic right paraspinal lesion, along with new solid pulmonary nodules. It had been recommended patient discharge to TCU, however patient strongly declined. Recommendations were updated after further evals to home with assist in which spouse able to assume any necessary cares. She was planning to discharge Friday, 11/3, with close  outpatient follow up, however had an acute event of altered mental status resulting in a stroke code. Head CT/CTA showed no acute abnormality. Neurology followed with EEG which showed generalized slowing, though no seizure like activity. Several metabolic labs completed which were unremarkable for cause. Etiology remains unclear, query seizure vs delirium vs encephalopathy vs other. Patient remained inpatient to be observed with EEG and subsequently discharged 2-days later in good condition, notably with cognition back to baseline.     On day of discharge, patient is feeling well and is well appearing. She denies any new or worsening symptoms. She is eager to discharge today. She asks for refill of oxycodone and methadone. Per review of PDMP and in discussion with discharge pharmacy, appears patient should have a 4-7-day supply at home for both. Patient follows with pain clinic and she states she has an appt tomorrow (11/6). I discussed concern with prescribing these medications as typically she would have a pain contract not to be filled elsewhere. She voices understanding but still requests refill. She states there is no one at home to check prescriptions ( at bedside who confirms). I discussed concerns and risks vs benefits of these medications in great detail, along with extensive discussion with discharge pharmacist. Discussed with staff attending. I will provide a 1-day prescription for prn oxycodone for breakthrough cancer related pain tonight while awaiting refill at proper prescribers appt tomorrow, though I again voiced if she has a pain contract with her clinic this may be risk of violation. She voices understanding. It is unclear if patient has been using prescriptions more readily at home vs forgotten having home supply vs misusing. I would encourage outpatient addiction medicine consultation if felt appropriate. She does have chronic pain related to cancer in which I will refill a small supply  (1-day) of prn oxycodone, though with methadone I will have her follow up in the morning with her pain clinic for appropriate refills. We discussed strict return precautions and close follow up. Both her and her  voice understanding. All questions answered at this time.     Outpatient follow-up issues:  - Follow up with neurology outpatient regarding further testing and duration of Keppra if needing to continue to take.   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   - Consider addiction medicine consultation if felt indicated, aware patient follows with pain clinic.    New discharge medications:  - Amlodipine  - Levetiracetam  - Zofran prn  - Miralax prn  - Oxycodone (x1 day supply prn)    Next follow-up:  - Thursday x11/9 [facility] and labs   - Requested twice weekly labs and prn transfusions, follow up with Dr. Guillory after IR biopsy  - Pending scheduling of IR biopsy    Hospital Course   Libby Grimaldo was admitted on 10/24/2023.  The following problems were addressed during her hospitalization:    HEME  # IgG Lambda Multiple Myeloma, High-risk. S/p APBST 2022  Follows with Dr. Guillory.  Initially diagnosed in 11/2018, completed KRD x 6 cycles, rev/Dex (20), Velcade maintenance, revlimid maintenance, with evidence of disease BmBx 11/2020 (35% plasma cells) and PET CT with new lytic lesions. Then treated with kameron, Kd x 6 cycles, subcutaneous kameron/IVIG/denosumab monthly. S/p autologous peripheral blood stem cell transplant in 2022. Followed by melphalan, ixazomib, and then started on palliative elotuzumab, pomolidomide, and dexamethasone chemotherapy 4/2023 with most recent dose (C3D1) 6/26. She presented to heme/onc clinic 8/8 to re-establish treatment schedule and sent to ED and admitted for sepsis, confusion, and weakness. She was started on Zosyn and vancomycin with improvement in symptoms, and transitioned to oral Levaquin 8/10/23. She was discharged  with outpatient PT. C5 was schedukled for 9/20 but patient cancelled appt due to a fall; started C5D1 9/27. Due to rising lambda chains that were consistent with progression, Teclistamab recommended. Admitted for C1D1 of Teclistamab 10/24. Received Zarxio in clinic 10/23.   - Brain MRI x10/28 showing T2 hyperintense lesion within the right parapharyngeal space protruding into the oropharynx measures 2.8 x 2.2 x 1.8 cm. Direct visualization is recommended. Lytic T2 hyperintense lesion along the inner table of the right frontal calvarium is mildly increased in size compared to 8/8/2023 and was not seen previously on 2/14/2022 suggesting a myelomatous lesion. showed incidental 2.8 cm parapharyngeal lesion of unclear etiology, further detailed below. Leukoaraiosis and generalized cerebral atrophy.   - CT CAP restaging 11/2: Large enhancing soft tissue masses along bilateral scapulae with underlying cortical erosions and pathologic fracture. New soft tissue  mass in the left thoracic paraspinal region. These may represent extraosseous myeloma versus extramedullary hematopoiesis. Consider tissue sampling. Increased size of the thoracic right paraspinal lesion. New solid pulmonary nodules, most notably the right lower lobe solid nodule measuring up to 9 mm, which may represent infectious, inflammatory, or neoplastic etiology. Attention on follow-up. Cystic lesions of the pancreas are better appreciated on MRI of 5/25/2023.  - Consulted IR for biopsy of soft tissue mass, given staffing abilities, it was deferred to outpatient, anticipate scheduling week of 11/5. Plan for discharge with outpatient biopsy and close clinic follow up to further discuss. 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 Will await biopsy pathology.     # Grade 2 ICANS, resolved  During hospitalization, patient had grade 1 ICANS after Day 1 Teclistamab ramp up that progressed to Grade 2 overnight on Day 3 (x10/26). Was administered  dexamethasone 10 mg x2 and started prophylactic Keppra. Noted to have difficultly following commands such as touching her nose with her finger. When reassessed in AM, ICANS 7/10 and 8/10 by RN. Thorough neuro exam consistent with previous days. Of note, she has a tremor at baseline but noted increased on Day 2. On 10/28, patient endorsed feeling a little more confused but was hard for her to describe. She felt like her writing was difficult to complete. Second dose Teclistamab held. MRI brain with no acute pathology to explain neurologic changes.   - 10/29: ICANS 5/10. Initiated Dexamethasone 10mg Q6H with anticipation to continue until improvement to grade 1 neurotoxicity (ICANS >7).   - Discussed with Dr. Guillory patient's primary who states she has had issues with cognition at baseline, thus it is somewhat difficult to assess ICANS score. However, ICANS score on admission was 10/10 thus, there seemed to be an acute change.   - Stopped empiric dexamethasone x10/31 as ICANS still decreased and variable  despite empiric treatment.   - Trended CRP and Ferritin for ICANS and CRS, both of which were WNL.   - On day of discharge, patient scoring 9-10/10 on ICANS and appeared back to pre-admission baseline.    # Thrombocytopenia  # Anemia  2/2 underlying malignancy and immunotherapy  - Transfuse to maintain Hgb >7, Plt >10K  - Blood consent signed and in patient chart.     # History of LUE DVT 2022     Noted on LUE US (2/15/22) to have new non-occlusive LUE DVT. Previously on Xarelto, now on ASA which has been held.   - No acute inpatient management issues.    NEURO  # Altered Mental Status  # Encephalopathy  Noted ~day 1-3 of Teclistamab ramp up and subsequent doses held with concern for Grade 2 ICANS as detailed. Etiology unclear, thought possibly secondary to hospital acquired delirium vs. Polypharmacy (on chronic methadone) vs. Teclistamab neurotoxicity vs. CNS infection as she is immunocompromised. Treated with  "Dexamethasone and Keppra ppx as above with infectious workup and imaging as above. Palliative consult placed for opioid management that could be contributing to her AMS, though given her history of substance use, recommended to not change medications at this time.   - Urine drug screen negative.  - Delirium precautions   - LP in IR under fluoroscopy done 11/01; negative infectious panel, flow cytometry without e/o of CNS involvement from malignancy.    - On Friday, 11/3, patient was anticipating discharge. Prior to discharge, RN noted patient \"felt dizzy\" and endorsed chest pain. She became nonverbal. A rapid response was called. On initial evaluation, she was not able to orient to person, place, or time. Of note, she was not able to orient to time throughout her admission. She could not respond to questions, lift legs, or follow commands. She became incontinent. Escalated to stroke code. She recovered slightly, but not to baseline. Head CT/CTA negative for acute pathology. After she returned from CT she had another episode of acute decline where she was not able to orient to person, place or time. She appeared encephalopathic. She was not able to follow commands. Suspicion for encephalopathy vs seizure/post ictal.   - Lactic acid 5.1, WBC 6.2, Ammonia 57, Thiamine pending, blood glucose 78, and blood cultures NGTD. Morning cortisol 7.2. B12 480. EKG showed no acute findings.  Bolus of lactated ringers given with improvement in lactic acid to 1.6. Remained afebrile, hypertensive, OVSS. Neurology recommended EEG for further evaluation. Troponins 26 ? 45 ? 32. Repeat EKG NSR. Etiology of event unclear. Spoke with neurology team, final read shows no seizure activity, moderate generalized slowing consistent with moderate diffuse encephalopathy. Recommended to continue on Keppra ppx until outpatient neurology follow up. From neurology perspective in discussion day prior to discharge, etiology of event remains unclear " "with otherwise negative workup. Will follow as outpatient.      # Neuropathy  Patient has reported increased falls due to feeling her feet are \"heavy\" and feel like there is something on her feet. She spoke about this at her outpatient oncology appointment 10/23; prescribed gabapentin 100 mg BID.  - Hold starting Gabapentin 100 mg BID until discharge from hospital    HEENT  # Incidental right parapharyngeal lesion  Found on MRI Brain this admission. Lesion protrudes into the oropharynx. Discussed with radiology who stated etiology of lesions is unclear, may be mass vs. cyst vs. abscess. This lesion is new since MRI brain in 2021.   - ENT consulted for biopsy; recommended CT neck with contrast and outpatient follow up.   - CT showed lesions in the supraspinatus and parapharyngeal space that show concern for malignancy. Suspect extraosseous involvement from MM rather than from another primary malignancy. In discussion with ENT, was recommended for biopsy of supraspinatus. Discussed with IR who recommended outpatient biopsy if no acute change to management indicating inpatient need.                - Plan for IR biopsy week of 11/6 outpatient with close oncology follow up.    CARDIOVASCULAR  # Hypertension  Noted persistently elevated blood pressures starting late afternoon of admission. Systolic ranged from 154-194; of note, she had dex 10mg x 2 during this time and may be contributory. Remained elevated despite removing dexamethasone.   - Amlodipine 10 mg      # Risk of cardiomyopathy  Baseline EF 57% done January 2022. Patient being followed by cardiology. Last echo done May 2023 with EF 55%-60%.      # Hyperlipidemia   Previously on Crestor but not on med list, patient confirmed - can follow-up with PCP if needed.     # History of Afib   Consistently ausculted in NSR on repeat daily examinations.  - PTA metoprolol XL 25 mg daily    PULM  # COPD  COPD with 40pyr, quit 2010. Previously documented to have idiopathic " pulmonary fibrosis, however most recent PFTs (1/26/22) were normal. Has previously required treatment for COPD exacerbation with pneumonia. Some expiratory wheezing on exam improved with Duonebs; continue home inhaler regimen.  - PTA Trelegy Ellipta inhaler daily  - PTA Albuterol BID PRN     ENDO  # Hypothyroidism  # History of Graves Disease  History of hypothyroidism 2/2 treatment for Graves Disease (patient reports surgery in [place], however no thyroidectomy). Stable dose of levothyroxine over the past four years. Admission TSH (2.74).   - PTA Levothyroxine 112 mcg daily     MISC  # Generalized Anxiety Disorder  - PTA Venlafaxine 75mg BID      # Cancer related pain  - PTA prn oxycodone      # Opiate dependence  - PTA Methadone 10mg TID     # Dry Eye  Patient noted dry eyes and requested eye drops.   - Carboxymethylcellulose PF 0.5 % ophthalmic solution 1 drop TID PRN    ID  ID Ppx:  - PTA Acyclovir 400 mg BID  - PJP ppx bactrim BID on Monday/Tuesdays  - Levofloxacin 250 mg PO if ANC <1000  - Was given IV micafungin as antifungal ppx during admission as interaction with Methadone for Posa/Vori. Is not neutropenic on discharge.     Clinically Significant Risk Factors              # Hypoalbuminemia: Lowest albumin = 2.9 g/dL at 10/28/2023  7:19 AM, will monitor as appropriate   # Thrombocytopenia: Lowest platelets = 112 in last 2 days, will monitor for bleeding           # Moderate Malnutrition: based on nutrition assessment    # Financial/Environmental Concerns: none         Patient was seen and plan of care was discussed with attending physician Dr. Carr.    I spent 60 minutes in the care of this patient today, which included time necessary for review of interval events, obtaining history and physical exam, ordering medications/tests/procedures as medically indicated, review of pertinent medical literature, counseling of the patient, coordination of care, and documentation time. Over 50% of time was spent  counseling the patient and/or coordinating care.    Roya Fernandez PA-C  Hematology/Oncology  Pager: #[phone]    Code Status   Full Code    Primary Care Physician   [facility]    Physical Exam   Vital Signs with Ranges  Temp:  [97.6  F (36.4  C)-99.1  F (37.3  C)] 98.1  F (36.7  C)  Pulse:  [72-90] 90  Resp:  [16-20] 20  BP: (110-122)/(51-64) 110/58  SpO2:  [92 %-99 %] 97 %  107 lbs 14.4 oz    Constitutional: Awake and conversational. Non-toxic appearing. No acute distress.   HEENT: Normocephalic, atraumatic. Sclerae anicteric. PERRLA. EOM intact. Moist mucus membranes without lesion or abscess.    Respiratory: Breathing comfortably on room air with no accessory muscle use. Speaking in full sentences, no evidence of respiratory distress. Lungs CTAB, no wheeze or rales.   Cardiovascular: Regular rate and rhythm. S1, S2. No murmurs appreciated.  Circulatory: 2+ radial and 2+ posterior tibialis pulses bilaterally. No peripheral edema.    GI: Abdomen with normoactive bowel sounds, soft, non-distended, and non-tender throughout. No rebound or guarding.   Skin: Skin is clean, dry, intact. No jaundice or significant rashes appreciated.   Musculoskeletal/ Extremities: No redness, warmth, or swelling of the joints appreciated.   Neurologic: Alert and oriented with normal speech. CN II-VII intact. Grossly nonfocal exam. Sensation intact to LT throughout. Motor strength 5/5  strength, deltoid, triceps, biceps, plantar flexion and dorsiflexion. FNF intact. Moves all extremities equally.   Neuropsychiatric: Calm, appropriate mood and affect congruent to situation. Good judgment and insight.   Vascular access: Port on right chest wall CDI.    Discharge Disposition   Discharged to home  Condition at discharge: Stable    Consultations This Hospital Stay   NUTRITION SERVICES ADULT IP CONSULT  PHYSICAL THERAPY ADULT IP CONSULT  PHARMACY IP CONSULT  ENT IP CONSULT  PALLIATIVE CARE ADULT IP CONSULT  INTERNAL MEDICINE  PROCEDURE TEAM ADULT IP CONSULT [place] - LUMBAR PUNCTURE  OCCUPATIONAL THERAPY ADULT IP CONSULT  INTERNAL MEDICINE PROCEDURE TEAM ADULT IP CONSULT [place] - LUMBAR PUNCTURE  CARE MANAGEMENT / SOCIAL WORK IP CONSULT  INTERVENTIONAL RADIOLOGY ADULT/PEDS IP CONSULT  PHARMACY IP CONSULT    Discharge Orders      Home Care Referral      Adult Neurology  Referral      Activity    Your activity upon discharge: activity as tolerated     Follow Up and recommended labs and tests    Upcoming follow up:   - Biweekly labs: first appointment 11/9 @ 7:45 AM before [facility] follow up.   - [facility] hospital follow up on 11/9 @ 8:15 AM   - Expect call regarding IR biopsy appt   - ENT appointment 11/27 @ 2:25 PM     When to contact your care team    Please call the [facility] at [phone] if you develop temperature above 100.4, shortness of breath, chest pain, headaches, vision changes, bleeding, uncontrolled nausea, vomiting, diarrhea, pain, or any other signs or symptoms of concern. If you are concerned that your symptoms are life-threatening, don't hesitate to call 911 or go to the nearest Emergency Room.     Reason for your hospital stay    You were admitted for chemotherapy of Teclistamab. After one dose in the ramp up you experienced confusion concerning for neurotoxicity. Prior to anticipated discharge, you experienced an episode of acute confusion. Head CT was okay, you were attached to an EEG to monitor for seizure activity which was not seen. You will follow up with the neurology clinic at discharge for further evaluation, and remain on Keppra (Levetiracetam) at discharge which is a seizure medication in the meantime.   You will follow up in the clinic with Dr. Guillory after your biopsy with interventional radiology (awaiting scheduling) to discuss results and next steps.     Diet    Follow this diet upon discharge: Regular diet as tolerated     Check Out Appointment  Request    [facility] please schedule:  - Twice weekly labs with PRN transfusions starting 11/06  - [facility] follow up week of 11/06  - Next available with Dr. Guillory (awaiting scheduling of IR biopsy, please schedule appointment after biopsy.)     Discharge Medications   Current Discharge Medication List        START taking these medications    Details   amLODIPine (NORVASC) 10 MG tablet Take 1 tablet (10 mg) by mouth daily  Qty: 30 tablet, Refills: 0    Associated Diagnoses: Hypertension, unspecified type      levETIRAcetam (KEPPRA) 750 MG tablet Take 1 tablet (750 mg) by mouth 2 times daily  Qty: 60 tablet, Refills: 0    Associated Diagnoses: Altered mental status, unspecified altered mental status type      naloxone (NARCAN) 4 MG/0.1ML nasal spray Spray 1 spray (4 mg) into one nostril alternating nostrils as needed for opioid reversal every 2-3 minutes until assistance arrives  Qty: 0.2 mL, Refills: 0    Associated Diagnoses: Multiple myeloma not having achieved remission (H)      ondansetron (ZOFRAN) 8 MG tablet Take 1 tablet (8 mg) by mouth every 8 hours as needed for nausea  Qty: 15 tablet, Refills: 0    Associated Diagnoses: Multiple myeloma not having achieved remission (H)      polyethylene glycol (MIRALAX) 17 GM/Dose powder Take 17 g by mouth daily  Qty: 510 g, Refills: 0    Associated Diagnoses: Multiple myeloma not having achieved remission (H)           CONTINUE these medications which have CHANGED    Details   methadone (DOLOPHINE) 10 MG tablet Take 1 tablet (10 mg) by mouth 3 times daily Morning, afternoon, and night    Associated Diagnoses: Multiple myeloma not having achieved remission (H); Uncomplicated opioid dependence (H)      oxyCODONE IR (ROXICODONE) 10 MG tablet Take 1 tablet (10 mg) by mouth every 6 hours as needed for moderate to severe pain  Qty: 4 tablet, Refills: 0    Associated Diagnoses: Multiple myeloma not having achieved remission (H); Uncomplicated opioid dependence (H)      venlafaxine  (EFFEXOR) 75 MG tablet Take 1 tablet (75 mg) by mouth 2 times daily           CONTINUE these medications which have NOT CHANGED    Details   acyclovir (ZOVIRAX) 400 MG tablet TAKE 1 TABLET(400 MG) BY MOUTH EVERY 12 HOURS  Qty: 180 tablet, Refills: 3    Associated Diagnoses: Multiple myeloma not having achieved remission (H); Admission for chemotherapy      albuterol (PROAIR HFA/PROVENTIL HFA/VENTOLIN HFA) 108 (90 Base) MCG/ACT inhaler Inhale 2 puffs into the lungs every 6 hours as needed for shortness of breath    Comments: Pharmacy may dispense brand covered by insurance (Proair, or proventil or ventolin or generic albuterol inhaler)      ascorbic acid 1000 MG TABS tablet Take 1,000 mg by mouth daily      B Complex-C (VITAMIN B COMPLEX W/VITAMIN C) TABS tablet Take 1 tablet by mouth daily      Fluticasone-Umeclidin-Vilanterol (TRELEGY ELLIPTA) 100-62.5-25 MCG/INH oral inhaler Inhale 1 puff into the lungs daily       levothyroxine (SYNTHROID/LEVOTHROID) 112 MCG tablet Take 112 mcg by mouth daily      loperamide (IMODIUM) 2 MG capsule Take 1-2 capsules (2-4 mg) by mouth 4 times daily as needed for diarrhea  Qty: 60 capsule, Refills: 1    Associated Diagnoses: Multiple myeloma not having achieved remission (H)      methocarbamol (ROBAXIN) 500 MG tablet Take 500 mg by mouth 3 times daily as needed for muscle spasms      metoprolol succinate ER (TOPROL XL) 25 MG 24 hr tablet TAKE 1 TABLET(25 MG) BY MOUTH DAILY  Qty: 30 tablet, Refills: 3    Associated Diagnoses: Multiple myeloma not having achieved remission (H)      pantoprazole (PROTONIX) 40 MG EC tablet Take 1 tablet (40 mg) by mouth daily  Qty: 30 tablet, Refills: 0    Associated Diagnoses: Multiple myeloma not having achieved remission (H); Stem cells transplant status (H)      sulfamethoxazole-trimethoprim (BACTRIM DS) 800-160 MG tablet Take 1 tablet by mouth Every Mon, Tues two times daily Start on Monday 6/27/2022  Qty: 48 tablet, Refills: 3    Associated  Diagnoses: Multiple myeloma not having achieved remission (H); Stem cells transplant status (H)      Vitamin D3 (VITAMIN D, CHOLECALCIFEROL,) 25 mcg (1000 units) tablet Take 1 tablet by mouth daily           STOP taking these medications       acetaminophen (TYLENOL) 500 MG tablet Comments:   Reason for Stopping:         gabapentin (NEURONTIN) 100 MG capsule Comments:   Reason for Stopping:         guaiFENesin (MUCINEX) 600 MG 12 hr tablet Comments:   Reason for Stopping:         levofloxacin (LEVAQUIN) 250 MG tablet Comments:   Reason for Stopping:         ondansetron (ZOFRAN ODT) 8 MG ODT tab Comments:   Reason for Stopping:             Allergies   Allergies   Allergen Reactions    Bupropion      Other reaction(s): Seizures       Data   Most Recent 3 CBC's:  Recent Labs   Lab Test 11/05/23  0508 11/04/23  0613 11/03/23  1447   WBC 4.2 9.9 6.2   HGB 8.6* 10.0* 11.8   * 109* 110*   * 136* 98*     Most Recent 3 BMP's:  Recent Labs   Lab Test 11/05/23  0508 11/04/23  0613 11/03/23  1543 11/03/23  1447    140  --  136   POTASSIUM 4.0 4.5  --  4.5   CHLORIDE 107 108*  --  104   CO2 24 25  --  20*   BUN 9.9 13.3  --  11.6   CR 0.94 0.97*  --  0.89   ANIONGAP 8 7  --  12   DIANDRA 9.2 8.5*  --  8.8   * 71 128* 81     Most Recent 2 LFT's:  Recent Labs   Lab Test 11/05/23  0508 11/04/23 0613   AST 28 53*   ALT 29 38   ALKPHOS 99 132*   BILITOTAL 0.2 0.5

## 2023-11-05 NOTE — PROGRESS NOTES
Discharge  D: Orders for discharge and outpatient medications written.  I: Home medications and return to clinic schedule reviewed with patient. Discharge instructions and parameters for calling Health Care Provider reviewed. Patient left at 1400 accompanied by .   A: Patient/family verbalized understanding and was ready for discharge.   P: Patient instructed to  medications in Pharmacy. Follow up as scheduled 11/9.

## 2023-11-05 NOTE — PROGRESS NOTES
"Patient is alert & oriented x4. There are signs of headache, muscle spasms or rigidity, or vision changes. No.    ICANS score/Neurotoxicity assessment  Score:  4   Orientation: Orientation to year, month, city, hospital: 4 points (1 point each)  3   Identify: Name 3 objects that nurse points to (e.g. clock, pen, button): 3 points (1 point each)  1   Following Commands: (e.g. show me two fingers or close your eyes and stick out your tongue): 1 point  1   Writing: Ability to write a standard sentence (see writing page \"The nichols jumped over the log.\"): 1 point  1   Attention: Count backwards from 100 by 10s: 1 point     10   Total: Max 10, no impairment, reassess next shift                  **9 or below Notify MD and reassess per provider direction     Cytokine Release Syndrome Assessment  Cytokine Release Syndrome: a potentially life-threatening toxicity after receiving immune based therapy for cancer treatment. Severity varies from grade 1 to grade 4.     There are No current signs of CRS (pyrexia/fever, headache, nausea, hypotension, dyspnea, anxiety, increased liver enzymes, increased bilirubin).  **Will continue to monitor for s/sx of neurotoxicity and CRS.  If these occur, please call the Attending MD for recommendations and further instruction. CRS and neurotoxicity adverse reaction protocols/grading per package insert guidelines.    "

## 2023-11-06 NOTE — CONSULTS
Outpatient IR Biopsy Referral    Patient is a 74 y/o female with a PMH of aFIb, COPD, hypothyroidism, ANGELIKA, opiate dependence, multiple myeloma. IR has been asked to biopsy the     11/5/23 Discharge summary - Consulted IR for biopsy of soft tissue mass, given staffing abilities, it was deferred to outpatient, anticipate scheduling week of 11/5. Plan for discharge with outpatient biopsy and close clinic follow up to further discuss. 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 Will await biopsy pathology.  Please see IR consult note 11/2/23    CT 11/1/23 New lesions of the chest including 7.6 x 4.4 x 10.2 cm enhancing  lesion extending from the posterior right scapula with irregular  cortical erosions pathological of the body of the scapula, 5.5 x 2.4 x  3.9 cm lesion extending from the left scapula with pathologic fracture  of the scapular spine, and 3.3 x 1.6 x 2.4 cm left paraspinal thoracic  lesion. Interval increase in the right paraspinal lesion, measuring  6.2 x 2.7 x 5.4 cm, previously 5.4 x 2.5 x 4.6 cm, when measured in  similar fashion.    IMPRESSION: In this patient with multiple myeloma:  1. Large enhancing soft tissue masses along bilateral scapulae with  underlying cortical erosions and pathologic fracture. New soft tissue  mass in the left thoracic paraspinal region. These may represent  extraosseous myeloma versus extramedullary hematopoiesis. Consider  tissue sampling.  2. Increased size of the thoracic right paraspinal lesion.   3. New solid pulmonary nodules, most notably the right lower lobe  solid nodule measuring up to 9 mm, which may represent infectious,  inflammatory, or neoplastic etiology. Attention on follow-up.  4. Cystic lesions of the pancreas are better appreciated on MRI of  5/25/2023.    Case and imaging CT 11/1/23 was reviewed with Dr Ying on 11/2/23 from IR and biopsy of New supraspinatus lesion found on neck CT  was approved.       Procedure order, surgical pathology  and leukemia lymphoma orders placed.    If requesting team would like samples sent for anything else please enter them prior to scheduled procedure.    Primary team Carline ALMODOVAR made aware of IR recommendations via epic messaging.    Sarah GLASS  Interventional Radiology   IR on-call pager: [phone]

## 2023-11-06 NOTE — PLAN OF CARE
Physical Therapy Discharge Summary    Reason for therapy discharge:    Discharged to home with home therapy.    Progress towards therapy goal(s). See goals on Care Plan in [facility] electronic health record for goal details.  Goals partially met.  Barriers to achieving goals:   discharge from facility.    Therapy recommendation(s):    Continued therapy is recommended.  Rationale/Recommendations:  strength, endurance, fall prevention.

## 2023-11-08 PROBLEM — R50.81 NEUTROPENIC FEVER (H): Status: ACTIVE | Noted: 2023-01-01

## 2023-11-08 PROBLEM — G93.40 ENCEPHALOPATHY: Status: ACTIVE | Noted: 2023-01-01

## 2023-11-08 PROBLEM — E83.42 HYPOMAGNESEMIA: Status: ACTIVE | Noted: 2023-01-01

## 2023-11-08 PROBLEM — D70.9 NEUTROPENIC FEVER (H): Status: ACTIVE | Noted: 2023-01-01

## 2023-11-09 NOTE — PROGRESS NOTES
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    Per ED report, she displayed \"confusion\" since waking up on day of presentation (e.g., answering \"yes\" to any question posed to her). However, there were reportedly no focal neurologic deficits.    Vital signs were notable for an elevated heart rate but no fever. There were no localizing signs/symptoms of infection at the time of discussion with ED provider, but evaluation had been initiated and is ongoing. Cell counts and chemistries are pending but patient was discharged with platelet count 112 on 11/05.    Patient received management for neurologic symptoms during a recent hospitalization (10/24-11/05) and despite an in-depth investigation (including LP and EEG), an etiology was not definitively established.   She is now returning with encephalopathy. It is unclear that her symptoms were then (or are now) related to her exposure to teclistamab. Therefore, ICANS protocol with steroids is not recommended at this time. Her presentation otherwise warrants a full neurological evaluation.    - No ICANS protocol/steroids at this time  - Agree with head imaging  - Agree with ID workup  - Agree with Neurology consultation  - Admission to Malignant Hematology service      Plan discussed with Dr Vargas as well as ED and Medicine Attendings.    Robert Unger  PGY4  Hematology, Oncology, and Transplantation  p              "

## 2023-11-09 NOTE — CONSULTS
General acute hospital  Neurology Consultation    Patient Name:  Libby Grimaldo  MRN:  [id number]    :  1950  Date of Service:  2023  Primary care provider:  Vielka Forman      Neurology consultation service was asked to see Libby Grimaldo by Dr. Mejía to evaluate encephalopathy.    Chief Complaint:  encephalopathy    History of Present Illness:   Libby Grimaldo is a 73-year-old female, with a history of high risk IgG lambda multiple myeloma, Grave's disease, ILD/COPD, HTN, and HLD who was initially admitted 10/24 for initiation of Teclistamab therapy for treatment of her multiple myeloma. The patient has a complicated course where she developed encephalopathy following initiation of CAR-T therapy that was evaluated by her medicine and neurology teams extensively. At that time, she did not have any evidence of infection and had an LP to substantiate this. She had EEG that was benign, although given the concern for seizure she was started on keppra 750mg bid. She was trailed on steroids and also was on ICANS protocol as well Brain MRI was unremarkable as well. A stroke code was alter called for encephalopathy on  for unresponsiveness and no acute treatment was administered. She however improved, and on the day of discharge had returned to baseline, and was fully alert, attentive and oriented to person, place, month/year, and situation.     Today however she returns to the Ed with onset of confusion once again. She had a low grade temperature at home 100.2 degrees. She is noted to have a cough, there are no new urinary features  to suggest UTI. She is tachycardic and hypotensive on review of her vitals. Also febrile to 102.9 in the ED On exam, she is briskly awake and alert, tells me it is  and that she is in the hospital. However, she does not know the month, cannot perform serial 7s of complete multistep commands. She does not have nuchal rigidity. Ct head  "and CTA were unremarkable in the ED. There are blood cultures pending and she is being admitted to the care of one of our medicine teams.     ROS  A comprehensive ROS was performed and pertinent findings were included in HPI.     PMH  Past Medical History:   Diagnosis Date    Cervical radiculopathy     COPD (chronic obstructive pulmonary disease) (H)     Double vision     Febrile seizure (H)     Per patient. Once while a toddler, once while in highschool, and once while in college.    Floaters     Graves disease     HLD (hyperlipidemia)     HTN (hypertension)     IPF (idiopathic pulmonary fibrosis) (H)     Lumbar radiculopathy     Multiple myeloma in relapse (H)     Osteoporosis     Pneumonia     Per patient. Complicated by sepsis.    Recurrent UTI     Per patient. Has required prophylactic antibiotcs.    Vitamin B12 deficiency (non anemic)      Past Surgical History:   Procedure Laterality Date    BONE MARROW BIOPSY, BONE SPECIMEN, NEEDLE/TROCAR Right 01/24/2022    Procedure: BIOPSY, BONE MARROW;  Surgeon: Yuniel Tineo;  Location: [facility] OR    BONE MARROW BIOPSY, BONE SPECIMEN, NEEDLE/TROCAR Right 9/1/2022    Procedure: BIOPSY, BONE MARROW;  Surgeon: Juju Oates PA-C;  Location: [facility] OR    CERVICAL FUSION      CHOLECYSTECTOMY      CYSTECTOMY OVARIAN BENIGN      EYE SURGERY      Per patient.    IR CVC TUNNEL PLACEMENT > 5 YRS OF AGE  02/01/2022    IR CVC TUNNEL REMOVAL LEFT  02/22/2022    PICC DOUBLE LUMEN PLACEMENT Left 05/25/2022    Left basilic, 44 cm, 2 cm external length    TONSILLECTOMY      WRIST SURGERY Left        Medications   I have personally reviewed the patient's medication list.     Allergies  I have personally reviewed the patient's allergy list.     Social History  Denies tobacco, alcohol, and recreational drug use     Family History    Family History   Problem Relation Age of Onset    Heart Disease Mother     Cancer Mother         \"Around lungs\" - she explicitly said it was *not* lung cancer. "    Diabetes Father     Heart Disease Father        Physical Examination   Vitals: /69 (BP Location: Left arm)   Pulse 87   Temp 98.3  F (36.8  C)   Resp 14   SpO2 98%   Mental Status: intermittently following commands, cannot perform multistep commands. However, is briskly awake and alert and tracks me. Knows the year and her location. However, has no idea why she is here. No aphasia, however lacks the ability to attend long enough to repeat long sentences.   Cranial Nerves: PERRL, conjugate gaze, EOMI without nystagmus, visual fields intact, facial sensation intact, no facial asymmetry noted, hearing intact to conversation, no dysarthria, tongue is midline  Motor: Normal bulk and tone.  No abnormal movements noted.  Strength is 5/5 in all extremities in both proximal and distal muscle groups  Sensory: Intact to light touch in all extremities without extinction  Coordination: FNF and HS intact bilaterally  Reflexes: Down-going toes bilaterally, no clonus  Station/Gait: Deferred    Investigations   I have personally reviewed pertinent labs, tests, and radiological imaging. Discussion of notable findings is included under Impression.     Was patient transferred from outside hospital?   No    Impression  73-year-old female, with a history of high risk IgG lambda multiple myeloma, Grave's disease, ILD/COPD, HTN, and HLD who returns to the hospital following an admission 10/24 for initiation of Teclistamab therapy which was complicated by prolonged encephalopathy of unknown etiology. MRI, EEG, and LP were all unremarkable. She however returned to baseline and discharged 3 days ago without issue. She returns now, febrile, hypotensive, and encephalopathy. This mix of symptoms suggests an infectious cause of her symptoms. She is on antibiotics now and blood cultures are pending as she does have a line in place that puts her at risk of bacteremia and sepsis. I wanted to focus on the possibility of CNS infection.  She certainly has risk factors for endocarditis and myocotic aneurysms but her CT/CTA is negative and she has no small IPH's that might explain her presentation. She lacks nuchal rigidity and kernig's signs as well. I note that she does not have reduced LOC, rather she is simply confused. These factors draw us away from a diagnosis of encephalitis or meningitis. We might still consider CNS dosing of her antibiotics and consider LP if we cannot identify a source for infection. With her presentation and recently normal LP however, would still consider this to be lower yield. Last LP completed 11/1 while she was having similar symptoms, protein 24, gluc 47 (serum 82), 1 cell with negative meningitis/encephalitis panel, Medications could be considered, but she has not started any new sedating agents since discharge per my review. At the time of her discharge, the working diagnosis was an unwitnessed seizure, so can continue keppra and obtain a keppra level,    Recommendations  -No role for EEG or MRI overnight  -Ct head and CTA reviewed and unremarkable  -Continue Keppra 750mg bid  -Keppra level   -Can consider repeating LP if infectious workup from the ED does not provide a source    Further recs to follow completion of ED workup. Please page overnight neurology residents for any changes in exam or key advances in the workup of this patient. Thank you for this interesting consult.     Patient to be seen and discussed with Dr. Aiken in the AM    Angélica Steele MD  Neurology PGY-4    DAY TEAM ADDENDUM  Patient only willing to provide a small portion of the mental status exam today, saying she has been asked these questions too many times. Is awake, alert, fully oriented, and speaking logically on other topics. Able to follow other basic commands. Remainder of the exam appears non-focal. Very low suspicion at this time for a CNS infection or recurrent ICANS. If no other source of patient's fever can be found,  could consider an LP but at present one would not be indicated. Keppra level is elevated, suggesting medication compliance. Neurology will sign off    Recommendations:  - Continue PTA Keppra 750mg BID  - No need for LP at this point. Would only consider if patient's mental status worsens and no alternative source of infection can be found  - Neurology will sign off    Patient seen and discussed with the General Neurology attending, Dr Attila Beckwith MD  Neurology PGY4

## 2023-11-09 NOTE — PROGRESS NOTES
[facility]    Hematology / Oncology Progress Note    Patient: Libby Grimaldo  MRN: [id number]  Admission Date: 11/8/2023  Date of Service (when I saw the patient): 11/09/2023  Hospital Day # 1     Assessment & Plan   Libby Grimaldo is a 73 year old female with a history of high risk IgG lambda multiple myeloma (most recently s/p 1 dose of teclistimab on 10/24), Grave's disease, ILD/COPD, HTN, HLD, and opioid dependence. She was recently admitted 10/24-11/5 for teclistimab ramp up which was c/b encephalopathy of uncertain etiology, and was discharged at baseline cognition. She presented to the ED 11/8 with neutropenic fever and recurrent encephalopathy.     ID  # Sepsis  # Neutropenic fever  # Pneumonia  Upon admission, pt was febrile up to 102.9. Tachycardic.  on admission. Lactic 4.9 on admission, improved to 1.3 with 2L fluids. She denies focal s/sx of infection.   - Work up:   - BCx 11/8 NGTD  - UA bland  - CT chest showed multiple tree-in-bud ground glass nodules in RUL c/w mild pneumonia  - COVID/flu/RSV negative  - Antibiotics:   - Zosyn x11/8  - Vancomycin 11/8 - ordered MRSA nares, will discontinue if negative  - Azithromycin x3d 11/9-11/11    # ID PPX  - Acyclovir 400 mg BID  - Bactrim BID on Monday/Tuesdays for PJP ppx  - ANC >1000 currently. Pt was very transiently neutropenic so was not on antibiotic/antifungal ppx prior to admission.      NEURO  # Acute toxic-metabolic encephalopathy  Pt was admitted 10/24-11/5. She was initially admitted for teclistimab ramp up. Noted to have AMS ~days 1-3 after first dose of teclistimab, classified as grade 2 ICANs. She received dexamethasone without improvement in AMS, thus was discontinued and other etiologies were considered. Had stroke code called 11/3 as well. Work up included stroke work up (negative), LP (negative), urine drug screen (negative), EEG (moderate slowing c/w moderate diffuse encephalopathy). Work up  also included MRI brain which showed increased size of frontal calvarial lesion and new R-sided parapharyngeal mass. Etiology unclear, thought possibly secondary to hospital acquired delirium vs. polypharmacy vs. teclistamab neurotoxicity vs. CNS infection as she is immunocompromised. By time of discharge on 11/5, was back to baseline cognition. She represented 11/8 after waking up with AMS, oriented only to location, making nonsensical statements.  - Work up this admission:   - CT head and CTA without acute findings  - Ammonia WNL  - Sepsis work up as above  - Chronically on methadone and oxycodone and this is unchanged so suspect less likely polypharmacy.   - Low suspicion for ICANS given the facts that only 1 low dose of teclistimab was given 10/24, not responsive to steroids so considered alternative etiology.   - Neuro consulted. Ordered Keppra level per their recs. No additional work up recommended at this time.  - Continue PTA Keppra for ppx  - Delirium precautions in place  - PT/OT consulted  - Appears that mental status is improved today with treatment of infection, continue to monitor closely     # IgG lambda multiple myeloma, high-risk  # S/p autoPBST 2022  Follows with Dr. Guillory. Initially diagnosed in 11/2018, completed KRD x 6 cycles, rev/Dex (20), Velcade maintenance, revlimid maintenance, with evidence of disease BmBx 11/2020 (35% plasma cells) and PET CT with new lytic lesions.Then treated with kameron, Kd x 6 cycles, subcutaneous kameron/IVIG/denosumab monthly. S/p autologous peripheral blood stem cell transplant in 2022. Followed by melphalan, ixazomib, and then started on palliative elotuzumab, pomolidomide, and dexamethasone chemotherapy 4/2023 with most recent dose C5D1 9/27. Course c/b infection, fall. Due to rising lambda chains that were consistent with progression, teclistamab recommended. Received teclistimab C1D1=10/24/23, c/b AMS, thought initially thought to be grade 2 ICANS. Was given  "dexamethasone 10 mg x2 and started prophylactic Keppra and no significant improvement with dexamethasone. See above for full encephalopathy work up. During work up, was found to have new R parapharyngeal lesion, see below. Was also found to have increasing size of front calvarial lytic lesion. Most recent restaging was CT CAP on 11/2, which showed: \"Large enhancing soft tissue masses along bilateral scapulae with underlying cortical erosions and pathologic fracture. New soft tissue mass in the left thoracic paraspinal region. These may represent extraosseous myeloma versus extramedullary hematopoiesis. Increased size of the thoracic right paraspinal lesion. New solid pulmonary nodules. Cystic lesions of the pancreas are better appreciated on MRI of 5/25/2023.\"  - No acute inpatient needs  - Follow up with Dr. Guillory regarding next steps in treatment     # Incidental right parapharyngeal lesion  Found on MRI brain last admission. Lesion protrudes into the oropharynx. CT neck showed lesions in the supraspinatus and parapharyngeal space concerning for malignancy. Suspect extraosseous involvement from MM rather than from another primary malignancy. In discussion with ENT, was recommended for biopsy of supraspinatus. IR who outpatient biopsy if no acute change to management indicating inpatient need.   - Pt is scheduled for outpatient IR biopsy 11/22    # Pancytopenia  Secondary to malignancy, infection, teclistimab.   - Monitor CBC daily  - Transfuse to maintain Hgb >7, plt >10K    # History of LUE DVT     Noted on LUE US (2/15/22) to have non-occlusive LUE DVT. Previously on Xarelto, now on ASA which has been held.      MISC  # Hypomagnesemia  - Replete per protocol     CHRONIC  # HTN  # History of afib  Started on amlodipine last admission. Given sepsis, will be careful with BP management.   - Will continue PTA metoprolol for now with hold parameters  - Hold PTA amlodipine for now, can resume if BPs stable     # " COPD  COPD with 40 py history, quit 2010. Previously documented to have idiopathic pulmonary fibrosis, however most recent PFTs (1/26/22) were normal. Has previously required treatment for COPD exacerbation with pneumonia. No wheezing, no increased WOB.   - PTA Trelegy Ellipta inhaler daily (sub formulary inpt)  - PTA albuterol PRN     # Hypothyroidism  # History of Grave's disease  History of hypothyroidism 2/2 treatment for Grave's Disease (patient reports surgery in [place], however no thyroidectomy). Stable dose of levothyroxine over the past four years. Admission TSH WNL.  - Continue PTA levothyroxine      # Generalized anxiety disorder  - Continue PTA venlafaxine 75mg BID      # Chronic pain  - PTA methadone TID and PTA oxycodone 10 mg q6hr PRN  - Tylenol PRN  - Robaxin TID PRN  - Follow up with OP pain clinic     Clinically Significant Risk Factors Present on Admission           # Hypercalcemia: corrected calcium is >10.1, will monitor as appropriate  # Hypomagnesemia: Lowest Mg = 1.5 mg/dL in last 2 days, will replace as needed   # Hypoalbuminemia: Lowest albumin = 3 g/dL at 11/9/2023 10:12 AM, will monitor as appropriate   # Thrombocytopenia: Lowest platelets = 122 in last 2 days, will monitor for bleeding     # Acute Respiratory Failure: based on blood gas results.  Continue supplemental oxygen as needed         # Financial/Environmental Concerns:             FEN  Diet: Regular Diet Adult   IVF: Bolus PRN   Lytes: Replete per protocol    PPX  VTE: None currently, order tomorrow if Hgb stable  Bowel: MiraLax daily  GI/PUD: Protonix    MISC  Code Status: Full Code   Lines/Drains: Port  Dispo: TBD    Patient was seen and plan of care was discussed with attending physician Dr. Vargas.    I spent 80 minutes in the care of this patient today, which included time necessary for review of interval events, obtaining history and physical exam, ordering medications/tests/procedures as medically indicated, review of  "pertinent medical literature, counseling of the patient, coordination of care, and documentation time. Over 50% of time was spent counseling the patient and/or coordinating care.    Lien Alvarenga PA-C   Hematology/Oncology   Pager: [phone]  Desk phone: [phone]    Interval History   Patient was admitted overnight for neutropenic fever and AMS.     Patient seen this morning in ED hallway. She is alert and appears well. She recalls that she came in for fever last night. She remembers her own name, her 's name, and where she is. She politely refuses to answer other orientation questions, saying she has answered them too much recently. She does seem a little confused when I asked to do physical exam, she thought I said something about \"starry eyes.\" Otherwise seems improved in terms of cognition compared to what was described overnight. She denies any focal s/sx of infection on extensive ROS. LBM 2 days ago. Reports having good strength and PO intake while at home.     Vital Signs with Ranges  Temp:  [96.2  F (35.7  C)-102.9  F (39.4  C)] 98.3  F (36.8  C)  Pulse:  [] 96  Resp:  [16-24] 16  BP: (115-166)/(66-89) 135/66  SpO2:  [96 %-100 %] 100 %  No intake/output data recorded.    Physical Exam   General: Sitting up in bed, alert, NAD. Pleasant and conversational.  Skin: No concerning lesions, rash, jaundice, cyanosis, erythema, or ecchymoses on exposed surfaces.   HEENT: NCAT. Anicteric sclera. Moist mucous membranes with no lesions, erythema, or thrush. Dentures in place.   Respiratory: Non-labored breathing on room air, good air exchange. Faint bibasilar crackles.   Cardiovascular: RRR. No murmur or rub.   Gastrointestinal: Normoactive BS. Abdomen soft, ND, NT. No palpable masses.  Extremities: No LE edema.   Neurologic: Refuses to answer orientation questions, speech normal, no deficits grossly. Pupils PERRL. CN grossly intact. Strength and sensation intact in all 4 extremities. Finger-to-nose testing " intact.     Medications      acyclovir  400 mg Oral BID    [Held by provider] amLODIPine  10 mg Oral Daily    azithromycin  500 mg Oral Daily    fluticasone-vilanterol  1 puff Inhalation Daily    And    umeclidinium  1 puff Inhalation Daily    levETIRAcetam  750 mg Oral BID    levothyroxine  112 mcg Oral Daily    methadone  10 mg Oral TID    metoprolol succinate ER  25 mg Oral Daily    pantoprazole  40 mg Oral Daily    piperacillin-tazobactam  4.5 g Intravenous Q6H    polyethylene glycol  17 g Oral Daily    sulfamethoxazole-trimethoprim  1 tablet Oral Daily    vancomycin  500 mg Intravenous Q12H    venlafaxine  75 mg Oral BID    vitamin B complex with vitamin C  1 tablet Oral Daily    ascorbic acid  1,000 mg Oral Daily    Vitamin D3  25 mcg Oral Daily     Data   Results for orders placed or performed during the hospital encounter of 11/08/23 (from the past 24 hour(s))   Comprehensive metabolic panel   Result Value Ref Range    Sodium 137 135 - 145 mmol/L    Potassium 3.9 3.4 - 5.3 mmol/L    Carbon Dioxide (CO2) 26 22 - 29 mmol/L    Anion Gap 9 7 - 15 mmol/L    Urea Nitrogen 11.4 8.0 - 23.0 mg/dL    Creatinine 0.77 0.51 - 0.95 mg/dL    GFR Estimate 82 >60 mL/min/1.73m2    Calcium 9.7 8.8 - 10.2 mg/dL    Chloride 102 98 - 107 mmol/L    Glucose 91 70 - 99 mg/dL    Alkaline Phosphatase 104 35 - 104 U/L    AST 27 0 - 45 U/L    ALT 19 0 - 50 U/L    Protein Total 5.9 (L) 6.4 - 8.3 g/dL    Albumin 3.3 (L) 3.5 - 5.2 g/dL    Bilirubin Total 0.5 <=1.2 mg/dL   Procalcitonin   Result Value Ref Range    Procalcitonin 3.39 (H) <0.05 ng/mL   Troponin T, High Sensitivity   Result Value Ref Range    Troponin T, High Sensitivity 27 (H) <=14 ng/L   Magnesium   Result Value Ref Range    Magnesium 1.5 (L) 1.7 - 2.3 mg/dL   TSH with free T4 reflex   Result Value Ref Range    TSH 1.26 0.30 - 4.20 uIU/mL   CRP inflammation   Result Value Ref Range    CRP Inflammation 8.79 (H) <5.00 mg/L   Blood Culture Peripheral Blood    Specimen:  Peripheral Blood   Result Value Ref Range    Culture No growth after 12 hours    [place] Draw    Narrative    The following orders were created for panel order [place] Draw.  Procedure                               Abnormality         Status                     ---------                               -----------         ------                     Extra Blue Top Tube[[id number]]                              Final result               Extra Red Top Tube[[id number]]                               Final result               Extra Green Top (Lithium...[[id number]]                      Final result                 Please view results for these tests on the individual orders.   Extra Blue Top Tube   Result Value Ref Range    Hold Specimen JIC    Extra Red Top Tube   Result Value Ref Range    Hold Specimen JIC    Extra Green Top (Lithium Heparin) Tube   Result Value Ref Range    Hold Specimen JIC    Phosphorus   Result Value Ref Range    Phosphorus 3.9 2.5 - 4.5 mg/dL   XR Chest 2 Views    Narrative    Exam: XR CHEST 2 VIEWS, 11/8/2023 6:56 PM    Comparison: CT chest abdomen and pelvis 11/1/2023    History: fever    Findings:  2 views of the chest. Right chest port tip projects over the mid SVC.  Trachea is midline. Cardiomediastinal silhouette is within normal  limits. No focal airspace opacity. No pneumothorax or pleural  effusion. The visualized upper abdomen is unremarkable. No acute  osseous abnormalities.      Impression    Impression: No focal consolidation.    I have personally reviewed the examination and initial interpretation  and I agree with the findings.    KAREY GRECO MD         SYSTEM ID:  [id number]   EKG 12-lead, tracing only   Result Value Ref Range    Systolic Blood Pressure  mmHg    Diastolic Blood Pressure  mmHg    Ventricular Rate 103 BPM    Atrial Rate 103 BPM    KS Interval 154 ms    QRS Duration 76 ms     ms    QTc 427 ms    P Axis 80 degrees    R AXIS 36 degrees    T Axis 83 degrees     Interpretation ECG       Sinus tachycardia with Premature atrial complexes  Left ventricular hypertrophy with repolarization abnormality  Abnormal ECG     Asymptomatic Influenza A/B, RSV, & SARS-CoV2 PCR (COVID-19) Nose    Specimen: Nose; Swab   Result Value Ref Range    Influenza A PCR Negative Negative    Influenza B PCR Negative Negative    RSV PCR Negative Negative    SARS CoV2 PCR Negative Negative    Narrative    Testing was performed using the Xpert Xpress CoV2/Flu/RSV Assay on the [employer] GeneXpert Instrument. This test should be ordered for the detection of SARS-CoV-2, influenza, and RSV viruses in individuals who meet clinical and/or epidemiological criteria. Test performance is unknown in asymptomatic patients. This test is for in vitro diagnostic use under the FDA EUA for laboratories certified under CLIA to perform high or moderate complexity testing. This test has not been FDA cleared or approved. A negative result does not rule out the presence of PCR inhibitors in the specimen or target RNA in concentration below the limit of detection for the assay. If only one viral target is positive but coinfection with multiple targets is suspected, the sample should be re-tested with another FDA cleared, approved, or authorized test, if coinfection would change clinical management. This test was validated by the [facility] [employer]. These laboratories are certified under the Clinical Laboratory Improvement Amendments of 1988 (CLIA-88) as qualified to perform high complexity laboratory testing.   CBC with platelets differential    Narrative    The following orders were created for panel order CBC with platelets differential.  Procedure                               Abnormality         Status                     ---------                               -----------         ------                     CBC with platelets and d...[[id number]]  Abnormal            Final result                 Please view  results for these tests on the individual orders.   Ammonia   Result Value Ref Range    Ammonia 19 11 - 51 umol/L   CBC with platelets and differential   Result Value Ref Range    WBC Count 1.6 (L) 4.0 - 11.0 10e3/uL    RBC Count 3.24 (L) 3.80 - 5.20 10e6/uL    Hemoglobin 11.1 (L) 11.7 - 15.7 g/dL    Hematocrit 34.8 (L) 35.0 - 47.0 %     (H) 78 - 100 fL    MCH 34.3 (H) 26.5 - 33.0 pg    MCHC 31.9 31.5 - 36.5 g/dL    RDW 16.0 (H) 10.0 - 15.0 %    Platelet Count 122 (L) 150 - 450 10e3/uL    % Neutrophils 39 %    % Lymphocytes 41 %    % Monocytes 12 %    % Eosinophils 5 %    % Basophils 1 %    % Immature Granulocytes 2 %    NRBCs per 100 WBC 0 <1 /100    Absolute Neutrophils 0.6 (L) 1.6 - 8.3 10e3/uL    Absolute Lymphocytes 0.7 (L) 0.8 - 5.3 10e3/uL    Absolute Monocytes 0.2 0.0 - 1.3 10e3/uL    Absolute Eosinophils 0.1 0.0 - 0.7 10e3/uL    Absolute Basophils 0.0 0.0 - 0.2 10e3/uL    Absolute Immature Granulocytes 0.0 <=0.4 10e3/uL    Absolute NRBCs 0.0 10e3/uL   Blood Culture Peripheral Blood    Specimen: Peripheral Blood   Result Value Ref Range    Culture No growth after 12 hours    iStat Gases (lactate) arterial, POCT   Result Value Ref Range    Bicarbonate Arterial POCT 25 21 - 28 mmol/L    Lactic Acid POCT 4.9 (HH) <=2.0 mmol/L    O2 Sat, Arterial POCT 55 (L) 92 - 100 %    pCO2 Arterial POCT 44 35 - 45 mm Hg    pH Arterial POCT 7.37 7.35 - 7.45    pO2 Arterial POCT 30 (LL) 80 - 105 mm Hg   CT Head w/o Contrast    [facility]  CT HEAD W/O CONTRAST, CTA HEAD NECK W CONTRAST  11/8/2023 8:32 PM CST     INDICATION: Awakened this morning with increasing altered mental status. History of recurrent multiple myeloma.  TECHNIQUE: Head and neck CT angiogram with IV contrast. Noncontrast head CT followed by axial helical CT images of the head and neck vessels obtained during the arterial phase of intravenous contrast administration. Axial helical 2D  reconstructed images   and multiplanar 3D MIP reconstructed images of the head and neck vessels were performed by the technologist. Dose reduction techniques were used.  CONTRAST: Iopamidol (ISOVUE 370) solution 67 mL  COMPARISON: 11/03/2023      FINDINGS:   NONCONTRAST HEAD CT:   INTRACRANIAL CONTENTS: No intracranial hemorrhage, extraaxial collection, or mass effect.  No CT evidence of acute infarct. Mild to moderate presumed chronic small vessel ischemic changes. Mild generalized volume loss. No hydrocephalus.     VISUALIZED ORBITS/SINUSES/MASTOIDS: No intraorbital abnormality. No paranasal sinus mucosal disease. Scattered fluid/membrane thickening in the right mastoid air cells. No apparent mass in the posterior nasopharynx or skull base.    BONES/SOFT TISSUES: No acute abnormality. Unchanged lytic lesion in the right frontal calvarium.      HEAD CTA:  ANTERIOR CIRCULATION: No flow-limiting stenosis, large vessel occlusion, aneurysm, or high flow vascular malformation. 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 Fetal origin of the bilateral posterior cerebral arteries from   the anterior circulation.    POSTERIOR CIRCULATION: No flow-limiting stenosis, large vessel occlusion, aneurysm, or high flow vascular malformation. Congenitally small vertebrobasilar system.     DURAL VENOUS SINUSES: Expected enhancement of the major dural venous sinuses.      NECK CTA:  RIGHT CAROTID: There is again approximately 50% stenosis in the right ICA based on NASCET criteria. As on recent prior CTA, approximately 4 cm from its origin, the cervical right ICA becomes asymmetrically diffusely decreased in caliber compared to the   left, gradually increasing to more normal caliber being at the level of the skull base and petrous segment.    LEFT CAROTID: There is again approximately 50% stenosis in the left ICA based on NASCET criteria.    VERTEBRAL ARTERIES: Mildly dominant right and smaller left vertebral  arteries are patent in the neck and into the head.     AORTIC ARCH: Common origin of the brachiocephalic and left common carotid arteries. No significant stenosis at the origin of the great vessels.    MISCELLANEOUS: No evidence for dissection or pseudoaneurysm. 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 Scattered groundglass pulmonary opacities throughout the visualized right lung. Accessed right chest wall Garanf-g-Opml catheter. 5 mm   anterolisthesis of C3 on C4. Stable solid appearing ACDF at C5-C7           Impression    CONCLUSION:   HEAD CT:  1.  No acute intracranial abnormality.  2.  Mild to moderate chronic age-related changes.  3.  Stable lytic lesion in the right frontal calvarium.    HEAD CTA:   1.  No flow-limiting stenosis, large vessel occlusion, or aneurysm in the proximal intracranial vessels.  2.  Variant vascular anatomy as detailed above.    NECK CTA:  1.  No flow-limiting stenosis in the cervical arterial vasculature in the neck based on NASCET criteria.  2.  Unchanged diffuse narrowing of the mid/distal right cervical ICA, beginning approximately 4 cm from its origin, again with gradually returned and more normal caliber, starting at its petrous segment.  3.  No evidence for dissection.  4.  Scattered ground glass opacities throughout the visualized right lung, likely infectious or inflammatory.  5.             CTA Head Neck with Contrast    [facility]  CT HEAD W/O CONTRAST, CTA HEAD NECK W CONTRAST  11/8/2023 8:32 PM CST     INDICATION: Awakened this morning with increasing altered mental status. History of recurrent multiple myeloma.  TECHNIQUE: Head and neck CT angiogram with IV contrast. Noncontrast head CT followed by axial helical CT images of the head and neck vessels obtained during the arterial phase of intravenous contrast administration. Axial helical 2D reconstructed images   and multiplanar 3D MIP reconstructed  images of the head and neck vessels were performed by the technologist. Dose reduction techniques were used.  CONTRAST: Iopamidol (ISOVUE 370) solution 67 mL  COMPARISON: 11/03/2023      FINDINGS:   NONCONTRAST HEAD CT:   INTRACRANIAL CONTENTS: No intracranial hemorrhage, extraaxial collection, or mass effect.  No CT evidence of acute infarct. Mild to moderate presumed chronic small vessel ischemic changes. Mild generalized volume loss. No hydrocephalus.     VISUALIZED ORBITS/SINUSES/MASTOIDS: No intraorbital abnormality. No paranasal sinus mucosal disease. Scattered fluid/membrane thickening in the right mastoid air cells. No apparent mass in the posterior nasopharynx or skull base.    BONES/SOFT TISSUES: No acute abnormality. Unchanged lytic lesion in the right frontal calvarium.      HEAD CTA:  ANTERIOR CIRCULATION: No flow-limiting stenosis, large vessel occlusion, aneurysm, or high flow vascular malformation. 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 Fetal origin of the bilateral posterior cerebral arteries from   the anterior circulation.    POSTERIOR CIRCULATION: No flow-limiting stenosis, large vessel occlusion, aneurysm, or high flow vascular malformation. Congenitally small vertebrobasilar system.     DURAL VENOUS SINUSES: Expected enhancement of the major dural venous sinuses.      NECK CTA:  RIGHT CAROTID: There is again approximately 50% stenosis in the right ICA based on NASCET criteria. As on recent prior CTA, approximately 4 cm from its origin, the cervical right ICA becomes asymmetrically diffusely decreased in caliber compared to the   left, gradually increasing to more normal caliber being at the level of the skull base and petrous segment.    LEFT CAROTID: There is again approximately 50% stenosis in the left ICA based on NASCET criteria.    VERTEBRAL ARTERIES: Mildly dominant right and smaller left vertebral arteries are patent in the neck and into the head.      AORTIC ARCH: Common origin of the brachiocephalic and left common carotid arteries. No significant stenosis at the origin of the great vessels.    MISCELLANEOUS: No evidence for dissection or pseudoaneurysm. 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 Scattered groundglass pulmonary opacities throughout the visualized right lung. Accessed right chest wall [id number]-i-Mfpi catheter. 5 mm   anterolisthesis of C3 on C4. Stable solid appearing ACDF at C5-C7           Impression    CONCLUSION:   HEAD CT:  1.  No acute intracranial abnormality.  2.  Mild to moderate chronic age-related changes.  3.  Stable lytic lesion in the right frontal calvarium.    HEAD CTA:   1.  No flow-limiting stenosis, large vessel occlusion, or aneurysm in the proximal intracranial vessels.  2.  Variant vascular anatomy as detailed above.    NECK CTA:  1.  No flow-limiting stenosis in the cervical arterial vasculature in the neck based on NASCET criteria.  2.  Unchanged diffuse narrowing of the mid/distal right cervical ICA, beginning approximately 4 cm from its origin, again with gradually returned and more normal caliber, starting at its petrous segment.  3.  No evidence for dissection.  4.  Scattered ground glass opacities throughout the visualized right lung, likely infectious or inflammatory.  5.             UA with Microscopic reflex to Culture    Specimen: Urine, Catheter   Result Value Ref Range    Color Urine Light Yellow Colorless, Straw, Light Yellow, Yellow    Appearance Urine Clear Clear    Glucose Urine Negative Negative mg/dL    Bilirubin Urine Negative Negative    Ketones Urine Negative Negative mg/dL    Specific Gravity Urine 1.005 1.003 - 1.035    Blood Urine Negative Negative    pH Urine 7.0 5.0 - 7.0    Protein Albumin Urine 20 (A) Negative mg/dL    Urobilinogen Urine Normal Normal, 2.0 mg/dL    Nitrite Urine Negative Negative    Leukocyte Esterase Urine Negative Negative    RBC Urine <1 <=2 /HPF    WBC Urine 1 <=5  /HPF    Narrative    Urine Culture not indicated   Lactic Acid STAT   Result Value Ref Range    Lactic Acid 1.3 0.7 - 2.0 mmol/L   CT Chest w/o Contrast    Narrative    EXAM: CT CHEST W/O CONTRAST  LOCATION: [facility]  DATE: 11/9/2023    INDICATION: fever, neutropenic, rule out pneumonia. History of multiple myeloma.  COMPARISON: None.  TECHNIQUE: CT chest without IV contrast. Multiplanar reformats were obtained. Dose reduction techniques were used.  CONTRAST: None.    FINDINGS:   LUNGS AND PLEURA: Multiple tree-in-bud groundglass nodules are seen in the right upper lobe. A solitary solid nodule measuring 5 mm in the left lower lobe. Mild scarring in the right lower lobe. No confluent airspace consolidation. No pleural effusion or   pneumothorax.    MEDIASTINUM/AXILLAE: No lymphadenopathy. No thoracic aortic aneurysm. A right chest wall port catheter terminates in the SVC.    CORONARY ARTERY CALCIFICATION: Moderate in the left coronary artery distribution.    UPPER ABDOMEN: No significant finding. Gallbladder is surgically absent.    MUSCULOSKELETAL: Multiple paraspinal masses are present, largest measuring 6 x 4 cm on series 3 image 2:15 with osseous erosion/invasion of the T10 vertebral body. Anterior inferior cervical fusion instrumentation present. Moderate degenerative disc   space narrowing from T9 to T11. Mild compression deformity noted at T3, T4 and L1. No acute fracture. Moderate left and mild right glenohumeral joint space narrowing noted.        Impression    IMPRESSION:     1.  Multiple tree-in-bud groundglass nodules in the right upper lobe compatible with mild pneumonia. No confluent airspace consolidations.    2.  Solitary 5 mm nodule in the left lower lobe. Per Fleischner Society 2017 guideline, a one-year follow-up chest CT could be considered if patient is at high risk for lung cancer. Without lung cancer risk factor, no follow-up is necessary.    3.   Multiple paraspinal masses with osseous invasion/erosion of T10 by the dominant mass, likely related patient's diagnosis of multiple myeloma     CBC with Platelets & Differential    Narrative    The following orders were created for panel order CBC with Platelets & Differential.  Procedure                               Abnormality         Status                     ---------                               -----------         ------                     CBC with platelets and d...[[id number]]  Abnormal            Final result                 Please view results for these tests on the individual orders.   CBC with platelets and differential   Result Value Ref Range    WBC Count 4.1 4.0 - 11.0 10e3/uL    RBC Count 2.64 (L) 3.80 - 5.20 10e6/uL    Hemoglobin 9.2 (L) 11.7 - 15.7 g/dL    Hematocrit 29.7 (L) 35.0 - 47.0 %     (H) 78 - 100 fL    MCH 34.8 (H) 26.5 - 33.0 pg    MCHC 31.0 (L) 31.5 - 36.5 g/dL    RDW 16.2 (H) 10.0 - 15.0 %    Platelet Count 127 (L) 150 - 450 10e3/uL    % Neutrophils 48 %    % Lymphocytes 31 %    % Monocytes 16 %    % Eosinophils 3 %    % Basophils 1 %    % Immature Granulocytes 1 %    NRBCs per 100 WBC 0 <1 /100    Absolute Neutrophils 2.0 1.6 - 8.3 10e3/uL    Absolute Lymphocytes 1.3 0.8 - 5.3 10e3/uL    Absolute Monocytes 0.7 0.0 - 1.3 10e3/uL    Absolute Eosinophils 0.1 0.0 - 0.7 10e3/uL    Absolute Basophils 0.0 0.0 - 0.2 10e3/uL    Absolute Immature Granulocytes 0.0 <=0.4 10e3/uL    Absolute NRBCs 0.0 10e3/uL   Comprehensive metabolic panel   Result Value Ref Range    Sodium 141 135 - 145 mmol/L    Potassium 3.7 3.4 - 5.3 mmol/L    Carbon Dioxide (CO2) 24 22 - 29 mmol/L    Anion Gap 7 7 - 15 mmol/L    Urea Nitrogen 11.1 8.0 - 23.0 mg/dL    Creatinine 0.80 0.51 - 0.95 mg/dL    GFR Estimate 78 >60 mL/min/1.73m2    Calcium 8.2 (L) 8.8 - 10.2 mg/dL    Chloride 110 (H) 98 - 107 mmol/L    Glucose 81 70 - 99 mg/dL    Alkaline Phosphatase 116 (H) 35 - 104 U/L    AST 30 0 - 45 U/L    ALT 18 0 -  50 U/L    Protein Total 5.2 (L) 6.4 - 8.3 g/dL    Albumin 3.0 (L) 3.5 - 5.2 g/dL    Bilirubin Total 0.6 <=1.2 mg/dL   CRP inflammation   Result Value Ref Range    CRP Inflammation 25.70 (H) <5.00 mg/L   Magnesium   Result Value Ref Range    Magnesium 2.0 1.7 - 2.3 mg/dL   Phosphorus   Result Value Ref Range    Phosphorus 3.5 2.5 - 4.5 mg/dL

## 2023-11-09 NOTE — ED PROVIDER NOTES
ED Provider Note  [facility]      History     Chief Complaint   Patient presents with    Altered Mental Status     HPI  Libby Grimaldo is a 73 year old female with a history of multiple myeloma status post stem cell transplant with recurrence and recently underwent CAR-T therapy complicated by ICANS who presents to the emergency department today due to altered mental status.  Family reports that upon discharge from the hospital 3 days ago she was at her baseline mental status however today she woke up and was much more confused than usual.  They deny any recent falls.  They report that she did have a low-grade temp of 100.2 at home.  She took a gram of Tylenol prior to arrival.  She has had a slight cough.  Denies any vomiting, abdominal pain or diarrhea.  They have not noticed any changes in urination.  She has not had any new rashes.  She was previously on dexamethasone, is not currently on any steroids.  She is on prophylactic Bactrim otherwise not on any other prophylactic antibiotics.  She reportedly did have some visual and auditory hallucinations at home.      This part of the medical record was transcribed by Adams Johnston Medical Scribe, from a dictation done by Elaina Mejía MD.     Past Medical History  Past Medical History:   Diagnosis Date    Cervical radiculopathy     COPD (chronic obstructive pulmonary disease) (H)     Double vision     Febrile seizure (H)     Per patient. Once while a toddler, once while in highschool, and once while in college.    Floaters     Graves disease     HLD (hyperlipidemia)     HTN (hypertension)     IPF (idiopathic pulmonary fibrosis) (H)     Lumbar radiculopathy     Multiple myeloma in relapse (H)     Osteoporosis     Pneumonia     Per patient. Complicated by sepsis.    Recurrent UTI     Per patient. Has required prophylactic antibiotcs.    Vitamin B12 deficiency (non anemic)      Past Surgical History:   Procedure Laterality Date    BONE MARROW  "BIOPSY, BONE SPECIMEN, NEEDLE/TROCAR Right 01/24/2022    Procedure: BIOPSY, BONE MARROW;  Surgeon: Yuniel Tineo;  Location: [facility] OR    BONE MARROW BIOPSY, BONE SPECIMEN, NEEDLE/TROCAR Right 9/1/2022    Procedure: BIOPSY, BONE MARROW;  Surgeon: Juju Oates PA-C;  Location: [facility] OR    CERVICAL FUSION      CHOLECYSTECTOMY      CYSTECTOMY OVARIAN BENIGN      EYE SURGERY      Per patient.    IR CVC TUNNEL PLACEMENT > 5 YRS OF AGE  02/01/2022    IR CVC TUNNEL REMOVAL LEFT  02/22/2022    PICC DOUBLE LUMEN PLACEMENT Left 05/25/2022    Left basilic, 44 cm, 2 cm external length    TONSILLECTOMY      WRIST SURGERY Left      acyclovir (ZOVIRAX) 400 MG tablet  albuterol (PROAIR HFA/PROVENTIL HFA/VENTOLIN HFA) 108 (90 Base) MCG/ACT inhaler  amLODIPine (NORVASC) 10 MG tablet  ascorbic acid 1000 MG TABS tablet  B Complex-C (VITAMIN B COMPLEX W/VITAMIN C) TABS tablet  Fluticasone-Umeclidin-Vilanterol (TRELEGY ELLIPTA) 100-62.5-25 MCG/INH oral inhaler  levETIRAcetam (KEPPRA) 750 MG tablet  levothyroxine (SYNTHROID/LEVOTHROID) 112 MCG tablet  loperamide (IMODIUM) 2 MG capsule  methadone (DOLOPHINE) 10 MG tablet  methocarbamol (ROBAXIN) 500 MG tablet  metoprolol succinate ER (TOPROL XL) 25 MG 24 hr tablet  naloxone (NARCAN) 4 MG/0.1ML nasal spray  ondansetron (ZOFRAN) 8 MG tablet  oxyCODONE IR (ROXICODONE) 10 MG tablet  pantoprazole (PROTONIX) 40 MG EC tablet  polyethylene glycol (MIRALAX) 17 GM/Dose powder  sulfamethoxazole-trimethoprim (BACTRIM DS) 800-160 MG tablet  venlafaxine (EFFEXOR) 75 MG tablet  Vitamin D3 (VITAMIN D, CHOLECALCIFEROL,) 25 mcg (1000 units) tablet      Allergies   Allergen Reactions    Bupropion      Other reaction(s): Seizures     Family History  Family History   Problem Relation Age of Onset    Heart Disease Mother     Cancer Mother         \"Around lungs\" - she explicitly said it was *not* lung cancer.    Diabetes Father     Heart Disease Father      Social History   Social History     Tobacco Use    " Smoking status: Former     Packs/day: 1.00     Years: 40.00     Additional pack years: 0.00     Total pack years: 40.00     Types: Cigarettes     Passive exposure: Past    Smokeless tobacco: Former     Quit date: 2010   Substance Use Topics    Alcohol use: Not Currently    Drug use: Not Currently     Types: Marijuana      Past medical history, past surgical history, medications, allergies, family history, and social history were reviewed with the patient. No additional pertinent items.      A complete review of systems was performed with pertinent positives and negatives noted in the HPI, and all other systems negative.    Physical Exam   BP: (!) 161/89  Pulse: (!) 130  Temp: 99.8  F (37.7  C)  Resp: 20  SpO2: 97 %  Physical Exam  General: patient is alert, oriented to person and place, has difficulty giving recent history  Head: atraumatic and normocephalic   EENT: Tacky mucus membranes without tonsillar erythema or exudates, pupils 3 mm, equal round and reactive, extraocular movements intact, sclera anicteric  Neck: supple without meningismus  Cardiovascular: Tachycardic, extremities warm and well perfused, no lower extremity edema  Pulmonary: lungs clear to auscultation bilaterally, right chest wall port in place without surrounding erythema or induration  Abdomen: soft, non-tender   Musculoskeletal: normal range of motion   Neurological: Oriented to person and place, intermittently following commands, moving all extremities with no drift noted in the upper extremities, sensation to light touch intact, no facial droop, no slurring of speech  Skin: warm, dry     ED Course, Procedures, & Data      Procedures            EKG Interpretation:      Interpreted by Elaina Mejía MD  Time reviewed: 1900  Symptoms at time of EKG: AMS   Rhythm: sinus tachycardia  Rate: Tachycardia  Axis: Normal  Ectopy: premature atrial contraction  Conduction: normal  ST Segments/ T Waves: No acute ischemic changes  Q Waves:  none  Comparison to prior: tachycardic    Clinical Impression: sinus tachycardia                       No results found for any visits on 11/08/23.  Medications - No data to display  Labs Ordered and Resulted from Time of ED Arrival to Time of ED Departure - No data to display  No orders to display          Critical care was performed.   Critical Care Addendum  My initial assessment, based on my review of nursing observations, review of vital signs, focused history, physical exam, 12 lead ECG analysis, and discussion with neurology, oncology , established a high suspicion that Libby Grimaldo has sepsis with indication for early goal-directed therapy, which requires immediate intervention, and therefore she is critically ill.     After the initial assessment, the care team initiated multiple lab tests, initiated IV fluid administration, and initiated medication therapy with vancomycin, zosyn, magnesium  to provide stabilization care. Due to the critical nature of this patient, I reassessed nursing observations, vital signs, physical exam, and mental status multiple times prior to her disposition.     Time also spent performing documentation, reviewing test results, discussion with consultants, and coordination of care.     Critical care time (excluding teaching time and procedures): 30minutes.     The patient has signs of Severe Sepsis      If one the following conditions is present, a 30 mL/kg bolus is recommended as part of the 6 hour bundle (IBW can be used for BMI >30, or document refusal/contraindication):      1.   Initial hypotension  defined as 2 bps < 90 or map < 65 in the 6hrs before or 3hrs after time zero.     2.  Lactate >4.      The patient has signs of Severe Sepsis as evidenced by:    1. 2 SIRS criteria, AND  2. Suspected infection, AND   3. Organ dysfunction: Lactic Acidosis with value >2.0    Time severe sepsis diagnosis confirmed: 1939 11/08/23 as this was the time when Lactate resulted, and the  "level was > 2.0    3 Hour Severe Sepsis Bundle Completion:    1. Initial Lactic Acid Result:   Recent Labs   Lab Test 11/08/23 2220 11/08/23  1939 11/03/23  1959   LACT 1.3 4.9* 1.6     2. Blood Cultures before Antibiotics: Yes  3. Broad Spectrum Antibiotics Administered:  yes       Anti-infectives (From admission through now)      Start     Dose/Rate Route Frequency Ordered Stop    11/08/23 2110  acyclovir (ZOVIRAX) tablet 400 mg        Note to Pharmacy: [facility]:TAKE 1 TABLET(400 MG) BY MOUTH EVERY 12 HOURS      400 mg Oral 2 TIMES DAILY 11/08/23 2108 11/08/23 1955  vancomycin (VANCOCIN) 1,000 mg in 200 mL dextrose intermittent infusion        [facility].    1,000 mg  200 mL/hr over 1 Hours Intravenous ONCE 11/08/23 1950 11/08/23 2242 11/08/23 1825  piperacillin-tazobactam (ZOSYN) 4.5 g vial to attach to  mL bag        Note to Pharmacy: For [facility], [facility] and [facility]: For Zosyn-naive patients, use the \"Zosyn initial dose + extended infusion\" order panel.    4.5 g  over 30 Minutes Intravenous EVERY 6 HOURS 11/08/23 1821              4. Is initial hypotension present?     No (IV fluid bolus NOT required). IV Fluid volume administered: 2L                    Severe Sepsis reassessment:  1. Repeat Lactic Acid Level within 6 hours of time zero: 1.3  2. MAP>65 after initial IVF bolus, will continue to monitor fluid status and vital signs    I attest to having performed a repeat sepsis exam and assessment of perfusion at 2220 and the results demonstrate improved perfusion.      Assessment & Plan    Ms. Grimaldo is a 73-year-old female with a history of multiple myeloma status post stem cell transplant with recurrence and recent CAR-T therapy complicated by ICANS, history of febrile neutropenia, Graves' disease, recurrent UTI, COPD, hypertension who presents to the emergency department with increasing altered mental status.  She is noted to be tachycardic and mildly hypertensive.  Temp is " 99.8.  She is in no respiratory distress.  On exam she is oriented to place and person.  She is intermittently able to follow commands but and is moving all of her extremities but is not able to raise her legs when asked.  She repetitive repetitively says yes to this.      Broad differential diagnosis considered and includes but is not limited to encephalopathy secondary to infection (UTI, bacteremia, pneumonia, meningitis), metabolic encephalopathy, ICANS, intracranial hemorrhage, CVA, cardiac dysrhythmia.      Her ECG shows sinus tachycardia without acute ischemic changes.  Initial troponin is 27.  She is neutropenic with a white blood count of 1.6, ANC of 0.6, hemoglobin 11.1, platelet count 122.  Her initial lactate is elevated at 4.9.  CRP is 8 and Pro-Kevin 3.39.  She was given IV fluids and empirically started on vancomycin and Zosyn.  Chest x-ray is negative for evidence of pneumonia.  Influenza COVID-negative.  UA is negative.  She has no abdominal tenderness on exam.  Blood cultures sent and pending.  She did recently have a lumbar puncture with similar encephalopathic symptoms which was unremarkable.  Neurology was consulted for evaluation and recommendations whether or not to pursue repeat LP with consultation pending.  She did have a CT head and CTA of the head and neck which shows no acute intracranial abnormalities though did incidentally note some pulmonary opacities.  Electrolytes, LFTs, TSH and ammonia within normal limits with exception of magnesium which was 1.5 and supplemented.      I did discuss with oncology given her history of CAR-T and question of toxicity related to this.  At this time felt to be less likely and recommended holding off on any additional steroids.  Patient's repeat lactate has improved to 1.3.  Heart rate is also improving.  She did spike a fever in the ED and was given acetaminophen with improvement in her temperature.  Plan to admit to medicine for continued work-up and  management.      This part of the medical record was transcribed by Porfirio Mcmahonibdamon, from a dictation done by Elaina Rodriguez MD.     I have reviewed the nursing notes. I have reviewed the findings, diagnosis, plan and need for follow up with the patient.    New Prescriptions    No medications on file       Final diagnoses:   Encephalopathy   Multiple myeloma not having achieved remission (H)   Neutropenic fever    Hypomagnesemia   Sepsis with encephalopathy without septic shock, due to unspecified organism (H)   I, Adams Johnston, am serving as a trained medical scribe to document services personally performed by Elaina Rodriguez MD based on the provider's statements to me on November 8, 2023.  This document has been checked and approved by the attending provider.    I, Elaina Rodriguez MD, was physically present and have reviewed and verified the accuracy of this note documented by porfirio Mcmahon.      Elaina Rodriguez MD  [facility] EMERGENCY DEPARTMENT  11/8/2023     Elaina Rodriguez MD  11/09/23 0000

## 2023-11-09 NOTE — H&P
[facility]    History and Physical - Hospitalist Service, GOLD TEAM        Date of Admission:  11/8/2023    Assessment & Plan      Libby Grimaldo is a 73 year old female admitted on 11/8/2023. She has a PMH of high risk IgG Lambda multiple myeloma not achieving remission s/p Auto-PBSCT in 2022 (on palliative Erlo/Pom/Dex), Grave's disease, ILD/COPD, HTN, HLD, and opioid dependence with recent admission from 10/24-11/5/2023 for C1 Teclistamab ramp up c/b Grade 2 ICANS, now presenting with acute change in mental status and neutropenic fever, concerning for sepsis.     Sepsis  Neutropenic fever  Febrile to 39.4C on admission, tachycardic, meets criteria for sepsis. WBC 1.6 with ANC of 0.6, has neutropenic fever. Sources include bacteremia (has port), pulmonary (GGO seen in R lung on initial CT imaging and has diminished lung sounds with crackles in RLL on exam, though not hypoxic and no increased WOB, no hx of aspiration) vs viral (possible throat pain? But unclear). Less likely intraabdominal with no reported symptoms and benign abd exam. UA without infection. No meningeal signs.   - Admit to heme/malignancy service  - IV Vanc/Zosyn   - BCx pending  - repeat lactate (was 4.9 on istat) repeat was 1.3 after total 2 liters of fluids  - hold off on additional fluids, she has already received 2 L (40 mL/kg)  - CT chest w/o contrast to better evaluate GGO partially visualized    Acute toxic-metabolic encephalopathy  See below for details re: last admission with AMS. Discharged on 11/5 with normal baseline cognition. Then morning of admission 11/8, woke up with altered mental status, oriented only to location, making nonsensical statements. CT head and CTA without acute findings. Ammonia normal. Is chronically on methadone and oxycodone and this is unchanged so suspect less likely polypharmacy. She is awake and alert, just confused, not somnolent. Working diagnosis is sepsis. The  ED team did speak with heme malignancy who did not feel this change in her mental status was related to ICANS.  - Neurology consult placed in the ED. F/up up recs. Prelim recs include meningitic dosing of abx for now, full note to follow. Discussed with pharmacy, given her renal function she is already on high doses of abx and not recommend a change in dosing. Pending full consult, if neuro does rec treating for presumed meningitis, would also check if they would recommend different coverage (CTX, IV acyclovir? Etc) Had been seen by their consult service last admission, had EEG with generalized slowing but no seizure activity. Is on Keppra ppx.   - Treat sepsis as above  - delirium precautions  - PT/OT   - will hold off on starting chemical VTE ppx for now in case she needs an LP, day team to decide    IgG Lambda Multiple Myeloma, High-risk. S/p APBST 2022  Grade 2 ICANS, resolved  Follows with Dr. Guillory.  Initially diagnosed in 11/2018, completed KRD x 6 cycles, rev/Dex (20), Velcade maintenance, revlimid maintenance, with evidence of disease BmBx 11/2020 (35% plasma cells) and PET CT with new lytic lesions. Then treated with kameron, Kd x 6 cycles, subcutaneous kameron/IVIG/denosumab monthly. S/p autologous peripheral blood stem cell transplant in 2022. Followed by melphalan, ixazomib, and then started on palliative elotuzumab, pomolidomide, and dexamethasone chemotherapy 4/2023 with most recent dose (C3D1) 6/26. She presented to heme/onc clinic 8/8 to re-establish treatment schedule and sent to ED and admitted for sepsis, confusion, and weakness. She was started on Zosyn and vancomycin with improvement in symptoms, and transitioned to oral Levaquin 8/10/23. She was discharged with outpatient PT. C5 was scheduled for 9/20 but patient cancelled appt due to a fall; started C5D1 9/27. Due to rising lambda chains that were consistent with progression, Teclistamab recommended. Received Zarxio in clinic 10/23. Admitted for  C1D1 of Teclistamab 10/24 c/b grade 1 --> grade 2 ICANS. She had grade 1 ICANS after Day 1 Teclistamab ramp up that progressed to Grade 2 overnight on Day 3 (x10/26). Was administered dexamethasone 10 mg x2 and started prophylactic Keppra and no significant improvement with dexamethasone. Of note, she has a tremor at baseline but noted increased on Day 2. Second dose Teclistamab held. Thorough workup without other cause. Had stroke code called 11/3 as well. Etiology unclear, thought possibly secondary to hospital acquired delirium vs. polypharmacy vs. Teclistamab neurotoxicity vs. CNS infection as she is immunocompromised. By time of discharge on 11/5, was back to baseline cognition.   Brain MRI 10/28 showing T2 hyperintense lesion within the right parapharyngeal space protruding into the oropharynx measures 2.8 x 2.2 x 1.8 cm. Direct visualization is recommended. Lytic T2 hyperintense lesion along the inner table of the right frontal calvarium is mildly increased in size compared to 8/8/2023 and was not seen previously on 2/14/2022 suggesting a myelomatous lesion. showed incidental 2.8 cm parapharyngeal lesion of unclear etiology, further detailed below. Leukoaraiosis and generalized cerebral atrophy.   CT CAP restaging 11/2: Large enhancing soft tissue masses along bilateral scapulae with underlying cortical erosions and pathologic fracture. New soft tissue mass in the left thoracic paraspinal region. These may represent extraosseous myeloma versus extramedullary hematopoiesis. Consider tissue sampling. Increased size of the thoracic right paraspinal lesion. New solid pulmonary nodules, most notably the right lower lobe solid nodule measuring up to 9 mm, which may represent infectious, inflammatory, or neoplastic etiology. Attention on follow-up. Cystic lesions of the pancreas are better appreciated on MRI of 5/25/2023.  - Heme malignancy primary  - discussed with ED, did not feel her current presentation was  related to ICANS  - PTA Acyclovir 400 mg BID  - PJP ppx Bactrim BID on Monday/Tuesdays  - PTA Keppra for sz ppx BID     Pancytopenia  Plts and hgb stable from prior. Newly neutropenic. Secondary to malignancy, immunotherapy, sepsis can also cause leukopenia.   - CBC/diff in AM  - Transfuse to maintain Hgb >7, Plt >10K    Hypomagnesemia  - IV 2 g mag sulfate, repeat in AM    HTN  Hx of afib  Started on amlodipine last admission. Given sepsis, will be careful with BP management.   - Will continue PTA metoprolol for now with hold parameters  - Hold PTA amlodipine for now, can resume if BPs stable    Incidental right parapharyngeal lesion  Found on MRI Brain last admission. Lesion protrudes into the oropharynx. Radiology thought mass vs. cyst vs. abscess. This lesion is new since MRI brain in 2021. CT neck showed lesions in the supraspinatus and parapharyngeal space c/f malignancy. Suspect extraosseous involvement from MM rather than from another primary malignancy. In discussion with ENT, was recommended for biopsy of supraspinatus. Discussed with IR who recommended outpatient biopsy if no acute change to management indicating inpatient need.   - OP plan for IR biopsy of soft tissue mass, anticipated scheduling this week. 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     COPD  COPD with 40pyr, quit 2010. Previously documented to have idiopathic pulmonary fibrosis, however most recent PFTs (1/26/22) were normal. Has previously required treatment for COPD exacerbation with pneumonia. No wheezing, no increased WOB.   - PTA Trelegy Ellipta inhaler daily (sub formulary inpt)  - PTA Albuterol PRN    Hypothyroidism  History of Graves Disease  History of hypothyroidism 2/2 treatment for Graves Disease (patient reports surgery in [place], however no thyroidectomy). Stable dose of levothyroxine over the past four years. Admission TSH nl.  - PTA Levothyroxine 112 mcg daily    Generalized Anxiety  Disorder  - PTA Venlafaxine 75mg BID      Chronic pain  - PTA methadone TID and PTA oxycodone 10 mg q6H PRN  - Tylenol PRN  - Robaxin TID PRN  - f/up with OP pain clinic    History of LUE DVT 2022     Noted on LUE US (2/15/22) to have non-occlusive LUE DVT. Previously on Xarelto, now on ASA which has been held. No acute intervention.         Diet: Combination Diet Regular Diet Adult    DVT Prophylaxis: Pneumatic Compression Devices  Raya Catheter: Not present  Lines:          Cardiac Monitoring: None  Code Status: Full Code      Clinically Significant Risk Factors Present on Admission           # Hypercalcemia: corrected calcium is >10.1, will monitor as appropriate  # Hypomagnesemia: Lowest Mg = 1.5 mg/dL in last 2 days, will replace as needed   # Hypoalbuminemia: Lowest albumin = 3.3 g/dL at 11/8/2023  6:41 PM, will monitor as appropriate   # Thrombocytopenia: Lowest platelets = 122 in last 2 days, will monitor for bleeding     d         # Financial/Environmental Concerns:           Disposition Plan      Expected Discharge Date: 11/12/2023                Lien Kumar MD (Sally)  Internal Medicine/Pediatrics  Hospitalist    Hospitalist Service, [facility]  Securely message with [email or web address] (more info)  Text page via [facility] Paging/Directory   See signed in provider for up to date coverage information    ______________________________________________________________________    Chief Complaint   confusion    History is obtained from the patient's  and chart review.    History of Present Illness   Libby Grimaldo is a 73 year old female with PMH of high risk IgG Lambda multiple myeloma not achieving remission s/p Auto-PBSCT in 2022 (on palliative Erlo/Pom/Dex), Grave's disease, ILD/COPD, HTN, HLD, and opioid dependence with recent admission from 10/24-11/5/2023 for C1 Teclistamab ramp up c/b Grade 2 ICANS. Discharged on 11/5 with normal baseline cognition.  Then morning of admission 11/8, woke up with altered mental status. Awake and alert but not answering questions appropriately and not making any sense. Took all her meds this morning but didn't eat much beyond a protein drink this morning. Temp 100.2F at home. No other symptoms - no chills/rigors, no URI sxs, no cough, no SOB, no chest pain, no abd pain, no diarrhea, no dysuria. No concern for aspiration per . Has chronic back pain managed by methadone and oxycodone and there has been no change in that. She does report some throat pain but not clear how long that has been ongoing. She has a port, no other lines. No sick contacts. Has not missed any meds. She could tell me she was in the hospital but could not tell me which hospital, year, date, time of day, why she was here, her own name, her 's name, or the president. Per her  typically she would have known all this information. She is following all commands.    Of note, recently admitted 10/24-11/5 to heme malignancy service for C1 Teclistamab ramp up, c/b grade 1 --> grade 2 ICANS treated with dexamethasone without improvement. Workup for altered mental status included negative LP, Brain MRI which showed incidental parapharyngeal mass (ENT consulted and recommended biopsy as outpatient), CT CAP (which showed several enhancing soft tissue masses along bilateral scapulae, left thoracic paraspinal region, and thoracic right paraspinal lesion, along with new solid pulmonary nodules). On 11/3 had acute AMS and stroke code was called, had head CT/CTA showed no acute abnormality. Neurology consulted, performed EEG which showed generalized slowing consistent with moderate diffuse encephalopathy, though no seizure activity. Etiology remains unclear and her mentation returned to normal at time of discharge two days later. Neurology recommended to continue on Keppra ppx until outpatient neurology follow up. She has IR biopsy that is supposed to be this  week to evaluate the lesions found on CT CAP (recommended suprapsinatus biopsy).     In the ED, she was febrile to 39.4C, tachycardic to 110s-130, hypertensive to 160s/80s, on RA. Labs significant for neutropenia (WBC 1.6, ANC 0.6), Hgb stable at 11.1, plts stable at 122, unremarkable CMP, procal 3.39, troponin 27 (stable from prior), mag 1.5, TSH nl, CRP 8.79, flu/COVID/RSV neg, ammonia nl, lactate 4.9 on istat, UA negative. CT head without acute changes, CTA head/neck without acute findings other than scattered GGO in R lung likely infectious vs inflammatory. CXR without focal consolidation. Given a fluid bolus, 2 g of IV mag, and started on IV vanc/Zosyn. BCx pending.       Past Medical History    Past Medical History:   Diagnosis Date     Cervical radiculopathy      COPD (chronic obstructive pulmonary disease) (H)      Double vision      Febrile seizure (H)     Per patient. Once while a toddler, once while in highschool, and once while in college.     Floaters      Graves disease      HLD (hyperlipidemia)      HTN (hypertension)      IPF (idiopathic pulmonary fibrosis) (H)      Lumbar radiculopathy      Multiple myeloma in relapse (H)      Osteoporosis      Pneumonia     Per patient. Complicated by sepsis.     Recurrent UTI     Per patient. Has required prophylactic antibiotcs.     Vitamin B12 deficiency (non anemic)        Past Surgical History   Past Surgical History:   Procedure Laterality Date     BONE MARROW BIOPSY, BONE SPECIMEN, NEEDLE/TROCAR Right 01/24/2022    Procedure: BIOPSY, BONE MARROW;  Surgeon: Yuniel Tineo;  Location: [facility] OR     BONE MARROW BIOPSY, BONE SPECIMEN, NEEDLE/TROCAR Right 9/1/2022    Procedure: BIOPSY, BONE MARROW;  Surgeon: Juju Oates PA-C;  Location: [facility] OR     CERVICAL FUSION       CHOLECYSTECTOMY       CYSTECTOMY OVARIAN BENIGN       EYE SURGERY      Per patient.     IR CVC TUNNEL PLACEMENT > 5 YRS OF AGE  02/01/2022     IR CVC TUNNEL REMOVAL LEFT  02/22/2022     PICC  DOUBLE LUMEN PLACEMENT Left 05/25/2022    Left basilic, 44 cm, 2 cm external length     TONSILLECTOMY       WRIST SURGERY Left        Prior to Admission Medications   Prior to Admission Medications   Prescriptions Last Dose Informant Patient Reported? Taking?   B Complex-C (VITAMIN B COMPLEX W/VITAMIN C) TABS tablet   Yes No   Sig: Take 1 tablet by mouth daily   Fluticasone-Umeclidin-Vilanterol (TRELEGY ELLIPTA) 100-62.5-25 MCG/INH oral inhaler  Self Yes No   Sig: Inhale 1 puff into the lungs daily    Vitamin D3 (VITAMIN D, CHOLECALCIFEROL,) 25 mcg (1000 units) tablet   Yes No   Sig: Take 1 tablet by mouth daily   acyclovir (ZOVIRAX) 400 MG tablet   No No   Sig: TAKE 1 TABLET(400 MG) BY MOUTH EVERY 12 HOURS   albuterol (PROAIR HFA/PROVENTIL HFA/VENTOLIN HFA) 108 (90 Base) MCG/ACT inhaler  Self Yes No   Sig: Inhale 2 puffs into the lungs every 6 hours as needed for shortness of breath   amLODIPine (NORVASC) 10 MG tablet   No No   Sig: Take 1 tablet (10 mg) by mouth daily   ascorbic acid 1000 MG TABS tablet  Self Yes No   Sig: Take 1,000 mg by mouth daily   levETIRAcetam (KEPPRA) 750 MG tablet   No No   Sig: Take 1 tablet (750 mg) by mouth 2 times daily   levothyroxine (SYNTHROID/LEVOTHROID) 112 MCG tablet  Self Yes No   Sig: Take 112 mcg by mouth daily   loperamide (IMODIUM) 2 MG capsule   Yes No   Sig: Take 1-2 capsules (2-4 mg) by mouth 4 times daily as needed for diarrhea   methadone (DOLOPHINE) 10 MG tablet   Yes No   Sig: Take 1 tablet (10 mg) by mouth 3 times daily Morning, afternoon, and night   methocarbamol (ROBAXIN) 500 MG tablet  Self Yes No   Sig: Take 500 mg by mouth 3 times daily as needed for muscle spasms   metoprolol succinate ER (TOPROL XL) 25 MG 24 hr tablet   No No   Sig: TAKE 1 TABLET(25 MG) BY MOUTH DAILY   naloxone (NARCAN) 4 MG/0.1ML nasal spray   No No   Sig: Spray 1 spray (4 mg) into one nostril alternating nostrils as needed for opioid reversal every 2-3 minutes until assistance arrives    ondansetron (ZOFRAN) 8 MG tablet   No No   Sig: Take 1 tablet (8 mg) by mouth every 8 hours as needed for nausea   oxyCODONE IR (ROXICODONE) 10 MG tablet   No No   Sig: Take 1 tablet (10 mg) by mouth every 6 hours as needed for moderate to severe pain   pantoprazole (PROTONIX) 40 MG EC tablet  Self No No   Sig: Take 1 tablet (40 mg) by mouth daily   polyethylene glycol (MIRALAX) 17 GM/Dose powder   No No   Sig: Take 17 g by mouth daily   sulfamethoxazole-trimethoprim (BACTRIM DS) 800-160 MG tablet  Self No No   Sig: Take 1 tablet by mouth Every Mon, Tues two times daily Start on Monday 6/27/2022   venlafaxine (EFFEXOR) 75 MG tablet   Yes No   Sig: Take 1 tablet (75 mg) by mouth 2 times daily      Facility-Administered Medications: None        Review of Systems    The 10 point Review of Systems is negative other than noted in the HPI or here.     Social History   I have reviewed this patient's social history and updated it with pertinent information if needed.  Social History     Tobacco Use     Smoking status: Former     Packs/day: 1.00     Years: 40.00     Additional pack years: 0.00     Total pack years: 40.00     Types: Cigarettes     Passive exposure: Past     Smokeless tobacco: Former     Quit date: 2010   Substance Use Topics     Alcohol use: Not Currently     Drug use: Not Currently     Types: Marijuana      Lives at home with .   Previously rec TCU but improved enough by time of discharge for PT/OT to rec home with assist. Pt had declined TCU as well.     Physical Exam   Vital Signs: Temp: (!) 102.9  F (39.4  C) Temp src: Oral BP: (!) 157/68 Pulse: (!) 124   Resp: 18 SpO2: 96 % O2 Device: None (Room air)    Weight: 0 lbs 0 oz    General: awake, alert, in no acute distress  HEENT: NCAT, PERRL, EOMI, sclera anicteric, no nasal discharge, MMM, posterior pharynx without erythema or exudates  CV: tachycardic, reg rhythm, + systolic murmur  Resp: diminished lung sounds in R lung base with crackles, no  increased WOB, no wheezing  Abd: Soft, nontender, nondistended, +BS, no rebound or guarding  MSK: No peripheral edema, extremities warm and well perfused  Skin: warm, dry, no jaundice  Neuro: CN II-XII grossly intact. Alert and awake but not oriented to time, situation, place, self. Following all commands.       Medical Decision Making     75 MINUTES SPENT BY ME on the date of service doing chart review, history, exam, documentation & further activities per the note.      Data   ------------------------- PAST 24 HR DATA REVIEWED -----------------------------------------------    I have personally reviewed the following data over the past 24 hrs:    1.6 (L)  \   11.1 (L)   / 122 (L)     137 102 11.4 /  91   3.9 26 0.77 \       ALT: 19 AST: 27 AP: 104 TBILI: 0.5   ALB: 3.3 (L) TOT PROTEIN: 5.9 (L) LIPASE: N/A       Trop: 27 (H) BNP: N/A       TSH: 1.26 T4: N/A A1C: N/A       Procal: 3.39 (H) CRP: 8.79 (H) Lactic Acid: 4.9 (HH)         Imaging results reviewed over the past 24 hrs:   Recent Results (from the past 24 hour(s))   XR Chest 2 Views    Narrative    Exam: XR CHEST 2 VIEWS, 11/8/2023 6:56 PM    Comparison: CT chest abdomen and pelvis 11/1/2023    History: fever    Findings:  2 views of the chest. Right chest port tip projects over the mid SVC.  Trachea is midline. Cardiomediastinal silhouette is within normal  limits. No focal airspace opacity. No pneumothorax or pleural  effusion. The visualized upper abdomen is unremarkable. No acute  osseous abnormalities.      Impression    Impression: No focal consolidation.    I have personally reviewed the examination and initial interpretation  and I agree with the findings.    KAREY GRECO MD         SYSTEM ID:  [id number]   CT Head w/o Contrast    [facility]  CT HEAD W/O CONTRAST, CTA HEAD NECK W CONTRAST  11/8/2023 8:32 PM CST     INDICATION: Awakened this morning with increasing altered mental status.  History of recurrent multiple myeloma.  TECHNIQUE: Head and neck CT angiogram with IV contrast. Noncontrast head CT followed by axial helical CT images of the head and neck vessels obtained during the arterial phase of intravenous contrast administration. Axial helical 2D reconstructed images   and multiplanar 3D MIP reconstructed images of the head and neck vessels were performed by the technologist. Dose reduction techniques were used.  CONTRAST: Iopamidol (ISOVUE 370) solution 67 mL  COMPARISON: 11/03/2023      FINDINGS:   NONCONTRAST HEAD CT:   INTRACRANIAL CONTENTS: No intracranial hemorrhage, extraaxial collection, or mass effect.  No CT evidence of acute infarct. Mild to moderate presumed chronic small vessel ischemic changes. Mild generalized volume loss. No hydrocephalus.     VISUALIZED ORBITS/SINUSES/MASTOIDS: No intraorbital abnormality. No paranasal sinus mucosal disease. Scattered fluid/membrane thickening in the right mastoid air cells. No apparent mass in the posterior nasopharynx or skull base.    BONES/SOFT TISSUES: No acute abnormality. Unchanged lytic lesion in the right frontal calvarium.      HEAD CTA:  ANTERIOR CIRCULATION: No flow-limiting stenosis, large vessel occlusion, aneurysm, or high flow vascular malformation. 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 Fetal origin of the bilateral posterior cerebral arteries from   the anterior circulation.    POSTERIOR CIRCULATION: No flow-limiting stenosis, large vessel occlusion, aneurysm, or high flow vascular malformation. Congenitally small vertebrobasilar system.     DURAL VENOUS SINUSES: Expected enhancement of the major dural venous sinuses.      NECK CTA:  RIGHT CAROTID: There is again approximately 50% stenosis in the right ICA based on NASCET criteria. As on recent prior CTA, approximately 4 cm from its origin, the cervical right ICA becomes asymmetrically diffusely decreased in caliber compared to the   left,  gradually increasing to more normal caliber being at the level of the skull base and petrous segment.    LEFT CAROTID: There is again approximately 50% stenosis in the left ICA based on NASCET criteria.    VERTEBRAL ARTERIES: Mildly dominant right and smaller left vertebral arteries are patent in the neck and into the head.     AORTIC ARCH: Common origin of the brachiocephalic and left common carotid arteries. No significant stenosis at the origin of the great vessels.    MISCELLANEOUS: No evidence for dissection or pseudoaneurysm. 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 Scattered groundglass pulmonary opacities throughout the visualized right lung. Accessed right chest wall [id number]-[id number] catheter. 5 mm   anterolisthesis of C3 on C4. Stable solid appearing ACDF at C5-C7           Impression    CONCLUSION:   HEAD CT:  1.  No acute intracranial abnormality.  2.  Mild to moderate chronic age-related changes.  3.  Stable lytic lesion in the right frontal calvarium.    HEAD CTA:   1.  No flow-limiting stenosis, large vessel occlusion, or aneurysm in the proximal intracranial vessels.  2.  Variant vascular anatomy as detailed above.    NECK CTA:  1.  No flow-limiting stenosis in the cervical arterial vasculature in the neck based on NASCET criteria.  2.  Unchanged diffuse narrowing of the mid/distal right cervical ICA, beginning approximately 4 cm from its origin, again with gradually returned and more normal caliber, starting at its petrous segment.  3.  No evidence for dissection.  4.  Scattered ground glass opacities throughout the visualized right lung, likely infectious or inflammatory.  5.             CTA Head Neck with Contrast    [facility]  CT HEAD W/O CONTRAST, CTA HEAD NECK W CONTRAST  11/8/2023 8:32 PM CST     INDICATION: Awakened this morning with increasing altered mental status. History of recurrent multiple myeloma.  TECHNIQUE: Head and  neck CT angiogram with IV contrast. Noncontrast head CT followed by axial helical CT images of the head and neck vessels obtained during the arterial phase of intravenous contrast administration. Axial helical 2D reconstructed images   and multiplanar 3D MIP reconstructed images of the head and neck vessels were performed by the technologist. Dose reduction techniques were used.  CONTRAST: Iopamidol (ISOVUE 370) solution 67 mL  COMPARISON: 11/03/2023      FINDINGS:   NONCONTRAST HEAD CT:   INTRACRANIAL CONTENTS: No intracranial hemorrhage, extraaxial collection, or mass effect.  No CT evidence of acute infarct. Mild to moderate presumed chronic small vessel ischemic changes. Mild generalized volume loss. No hydrocephalus.     VISUALIZED ORBITS/SINUSES/MASTOIDS: No intraorbital abnormality. No paranasal sinus mucosal disease. Scattered fluid/membrane thickening in the right mastoid air cells. No apparent mass in the posterior nasopharynx or skull base.    BONES/SOFT TISSUES: No acute abnormality. Unchanged lytic lesion in the right frontal calvarium.      HEAD CTA:  ANTERIOR CIRCULATION: No flow-limiting stenosis, large vessel occlusion, aneurysm, or high flow vascular malformation. 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 Fetal origin of the bilateral posterior cerebral arteries from   the anterior circulation.    POSTERIOR CIRCULATION: No flow-limiting stenosis, large vessel occlusion, aneurysm, or high flow vascular malformation. Congenitally small vertebrobasilar system.     DURAL VENOUS SINUSES: Expected enhancement of the major dural venous sinuses.      NECK CTA:  RIGHT CAROTID: There is again approximately 50% stenosis in the right ICA based on NASCET criteria. As on recent prior CTA, approximately 4 cm from its origin, the cervical right ICA becomes asymmetrically diffusely decreased in caliber compared to the   left, gradually increasing to more normal caliber being at the  level of the skull base and petrous segment.    LEFT CAROTID: There is again approximately 50% stenosis in the left ICA based on NASCET criteria.    VERTEBRAL ARTERIES: Mildly dominant right and smaller left vertebral arteries are patent in the neck and into the head.     AORTIC ARCH: Common origin of the brachiocephalic and left common carotid arteries. No significant stenosis at the origin of the great vessels.    MISCELLANEOUS: No evidence for dissection or pseudoaneurysm. 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 Scattered groundglass pulmonary opacities throughout the visualized right lung. Accessed right chest wall [id number]-[id number] catheter. 5 mm   anterolisthesis of C3 on C4. Stable solid appearing ACDF at C5-C7           Impression    CONCLUSION:   HEAD CT:  1.  No acute intracranial abnormality.  2.  Mild to moderate chronic age-related changes.  3.  Stable lytic lesion in the right frontal calvarium.    HEAD CTA:   1.  No flow-limiting stenosis, large vessel occlusion, or aneurysm in the proximal intracranial vessels.  2.  Variant vascular anatomy as detailed above.    NECK CTA:  1.  No flow-limiting stenosis in the cervical arterial vasculature in the neck based on NASCET criteria.  2.  Unchanged diffuse narrowing of the mid/distal right cervical ICA, beginning approximately 4 cm from its origin, again with gradually returned and more normal caliber, starting at its petrous segment.  3.  No evidence for dissection.  4.  Scattered ground glass opacities throughout the visualized right lung, likely infectious or inflammatory.  5.

## 2023-11-09 NOTE — PROGRESS NOTES
11/09/23 1400   Appointment Info   Signing Clinician's Name / Credentials (OT) Katarzyna Francisco OTR/L   Living Environment   People in Home spouse   Current Living Arrangements house   Home Accessibility stairs within home   Number of Stairs, Main Entrance 4   Stair Railings, Main Entrance railings safe and in good condition   Number of Stairs, Within Home, Primary eight   Stair Railings, Within Home, Primary railings safe and in good condition;railing on right side (ascending)   Transportation Anticipated family or friend will provide   Living Environment Comments Per EMR. Pt reports she lives in a home with her spouse, who is present/available to assist. Must negotiate 5 + 3 steps with L handrail to access main level. Tub/shower with chair.  provides transportation.   Self-Care   Usual Activity Tolerance good   Current Activity Tolerance moderate   Regular Exercise No   Equipment Currently Used at Home shower chair;walker, standard   Fall history within last six months yes   Number of times patient has fallen within last six months 1   Activity/Exercise/Self-Care Comment Per EMR. Reports IND with ADL's and mobility. Furniture surfs around home while ambulating; utilizes HHA from  in community. Frequent falls. No regular exercise. Owns a FWW per .   General Information   Onset of Illness/Injury or Date of Surgery 11/08/23   Referring Physician Lien Kumar   Patient/Family Therapy Goal Statement (OT) Return home   Additional Occupational Profile Info/Pertinent History of Current Problem amy Grimaldo is a 73 year old female with a history of multiple myeloma status post stem cell transplant with recurrence and recently underwent CAR-T therapy complicated by ICANS who presents to the emergency department today due to altered mental status.  Family reports that upon discharge from the hospital 3 days ago she was at her baseline mental status however today she woke up and was much more confused  than usual.  They deny any recent falls.  They report that she did have a low-grade temp of 100.2 at home.  She took a gram of Tylenol prior to arrival.  She has had a slight cough.  Denies any vomiting, abdominal pain or diarrhea.  They have not noticed any changes in urination.  She has not had any new rashes.  She was previously on dexamethasone, is not currently on any steroids.  She is on prophylactic Bactrim otherwise not on any other prophylactic antibiotics.  She reportedly did have some visual and auditory hallucinations at home.   Existing Precautions/Restrictions fall;immunosuppressed   Left Upper Extremity (Weight-bearing Status) full weight-bearing (FWB)   Right Upper Extremity (Weight-bearing Status) full weight-bearing (FWB)   Left Lower Extremity (Weight-bearing Status) full weight-bearing (FWB)   Right Lower Extremity (Weight-bearing Status) full weight-bearing (FWB)   Cognitive Status Examination   Orientation Status person;place   Affect/Mental Status (Cognitive) WFL   Follows Commands follows one-step commands;75-90% accuracy   Safety Deficit minimal deficit;ability to follow commands;awareness of need for assistance   Memory Deficit moderate deficit;short-term memory;immediate recall   Cognitive Status Comments Stated 2030 but seems to be a baseline deficits given prior hospital stays cog notes.   Visual Perception   Visual Impairment/Limitations diplopia   Posture   Posture protracted shoulders   Range of Motion Comprehensive   General Range of Motion bilateral upper extremity ROM WFL   Strength Comprehensive (MMT)   General Manual Muscle Testing (MMT) Assessment no strength deficits identified   Coordination   Upper Extremity Coordination No deficits were identified   Coordination Comments mild BUE tremor   Bed Mobility   Bed Mobility supine-sit;sit-supine   Supine-Sit [place] (Bed Mobility) supervision   Transfers   Transfers sit-stand transfer;toilet transfer   Transfer Comments CGA-SBA    Balance   Balance Assessment sitting dynamic balance;standing dynamic balance   Balance Comments CGA   Lower Body Dressing Assessment/Training   [place] Level (Lower Body Dressing) supervision   Toileting   [place] Level (Toileting) supervision   Clinical Impression   Criteria for Skilled Therapeutic Interventions Met (OT) Yes, treatment indicated   OT Diagnosis Decreased ADL function   OT Problem List-Impairments impacting ADL problems related to;activity tolerance impaired;balance;cognition   Assessment of Occupational Performance 3-5 Performance Deficits   Planned Therapy Interventions (OT) ADL retraining;transfer training   Clinical Decision Making Complexity (OT) problem focused assessment/low complexity   Risk & Benefits of therapy have been explained evaluation/treatment results reviewed;patient   Clinical Impression Comments Pt close to baseline functionally and cognitivly. Pt will benefit from one time eval and treat to promote safe d/c home.   OT Total Evaluation Time   OT Eval, Moderate Complexity Minutes (40853) 9   OT Goals   Therapy Frequency (OT) One time eval and treatment   OT Predicted Duration/Target Date for Goal Attainment 11/10/23   OT: Lower Body Dressing Supervision/stand-by assist;Goal Met   OT: Toilet Transfer/Toileting Supervision/stand-by assist;Goal Met   OT: Home Management Modified independent;with light demand household tasks;ambulatory level;Goal Met   OT Discharge Planning   OT Plan D/C OT   OT Discharge Recommendation (DC Rec) home with assist   OT Rationale for DC Rec Pt close to baseline, anticipate shebeing able to discharge to home if spouse able to resume 24/7 supervision, A for all IADLs, and Ax1 for tub/shower transfer, as well as HHOT.   OT Brief overview of current status CGA/BSA   Total Session Time   Total Session Time (sum of timed and untimed services) 9

## 2023-11-09 NOTE — PHARMACY-VANCOMYCIN DOSING SERVICE
"Pharmacy Vancomycin Initial Note  Date of Service 2023  Patient's  1950  73 year old, female    Indication: Febrile Neutropenia    Current estimated CrCl = Estimated Creatinine Clearance: 51.7 mL/min (based on SCr of 0.77 mg/dL).    Creatinine for last 3 days  2023:  6:41 PM Creatinine 0.77 mg/dL    Recent Vancomycin Level(s) for last 3 days  No results found for requested labs within last 3 days.      Vancomycin IV Administrations (past 72 hours)        No vancomycin orders with administrations in past 72 hours.                    Nephrotoxins and other renal medications (From now, onward)      Start     Dose/Rate Route Frequency Ordered Stop    23 08  vancomycin (VANCOCIN) 500 mg vial to attach to  mL bag        See Hyperspace for full Linked Orders Report.    500 mg  over 1 Hours Intravenous EVERY 12 HOURS 23  vancomycin (VANCOCIN) 1,000 mg in 200 mL dextrose intermittent infusion        See Hyperspace for full Linked Orders Report.    1,000 mg  200 mL/hr over 1 Hours Intravenous ONCE 23  piperacillin-tazobactam (ZOSYN) 4.5 g vial to attach to  mL bag        Note to Pharmacy: For [facility], [facility] and [facility]: For Zosyn-naive patients, use the \"Zosyn initial dose + extended infusion\" order panel.    4.5 g  over 30 Minutes Intravenous EVERY 6 HOURS 23 1821              Contrast Orders - past 72 hours (72h ago, onward)      Start     Dose/Rate Route Frequency Stop    23  iopamidol (ISOVUE-370) solution 67 mL         67 mL Intravenous ONCE              [employer] Prediction of Planned Initial Vancomycin Regimen    Loading dose: 1000 mg at 20:00 2023.  Regimen: 500 mg IV every 12 hours.  Start time: 19:49 on 2023  Exposure target: AUC24 (range)400-600 mg/L.hr   AUC24,ss: 422 mg/L.hr  Probability of AUC24 > 400: 56 %  Ctrough,ss: 13.9 mg/L  Probability of Ctrough,ss > 20: 18 %  Probability of " nephrotoxicity (Lodise CHEL 2009): 9 %          Plan:  Start vancomycin  1000 mg IV load followed by 500mg IV q12h.   Vancomycin monitoring method: AUC  Vancomycin therapeutic monitoring goal: 400-600 mg*h/L  Pharmacy will check vancomycin levels as appropriate in 1-3 Days.    Serum creatinine levels will be ordered daily for the first week of therapy and at least twice weekly for subsequent weeks.      Luke Maier [facility]

## 2023-11-09 NOTE — ED TRIAGE NOTES
Pt arrives in a wheelchair to triage c/o AMS.  Family reports the she has become increasingly confused since this morning.  Reports visual and auditory hallucinations.   Also c/o fevers, Tmax 100.2  Hx multiple myeloma   Last Chemo 2 weeks ago     Triage Assessment (Adult)       Row Name 11/08/23 1759          Triage Assessment    Airway WDL WDL        Respiratory WDL    Respiratory WDL WDL        Skin Circulation/Temperature WDL    Skin Circulation/Temperature WDL WDL        Cardiac WDL    Cardiac WDL X;rhythm     Pulse Rate & Regularity tachycardic        Peripheral/Neurovascular WDL    Peripheral Neurovascular WDL WDL        Cognitive/Neuro/Behavioral WDL    Cognitive/Neuro/Behavioral WDL X;all     Level of Consciousness confused     Arousal Level opens eyes spontaneously     Orientation disoriented to;place;time;situation     Speech illogical     Mood/Behavior calm;cooperative        [place] Coma Scale    Best Eye Response 4-->(E4) spontaneous     Best Motor Response 6-->(M6) obeys commands     Best Verbal Response 4-->(V4) confused     [place] Coma Scale Score 14

## 2023-11-10 NOTE — PLAN OF CARE
AVSS on RA.  Intermittently confused.  Oxycodone 10 mg given x2.  Denies nausea.  Port SL.  Tolerating regular diet.  Voiding spontaneously without difficulty.  No BM this shift.  Up with SBA.

## 2023-11-10 NOTE — MEDICATION SCRIBE - ADMISSION MEDICATION HISTORY
Medication Scribe Admission Medication History    Admission medication history is complete. The information provided in this note is only as accurate as the sources available at the time of the update.    Information Source(s): Facility (U/[facility]/) medication list/MAR and CareEverywhere/SureScripts via in-person    Pertinent Information: Spoke with patient in person and due to patient AMS writer completed medication hx per recent hospital visit 10/24/2023-11/05/2023. New discharge medications added to medication list prior to today's visit but writer will highlight new medications and changes.     Changes made to PTA medication list:  Added: Amlodipine 10MG Tab          Levetriacetam 750MG Tab          Naloxone (Narcan) 4MG/0.1ML nasal spray            Ondansetron 8MG Tab          Miralax 17 GM/Dose powder   Deleted: None  Changed: Methadone 10MG Tab - 1 Tab PO TID              Oxycodone (Roxicodone) 10MG Tab - 1 Tab PO Q6H PRN for moderate pain              Venlafaxine 75MG Tab - 1 Tab PO BID    Medication Affordability:  Not including over the counter (OTC) medications, was there a time in the past 3 months when you did not take your medications as prescribed because of cost?: Unable to Assess    Allergies reviewed with patient and updates made in EHR: no    Medication History Completed By: Ana Washington 11/10/2023 9:18 AM    PTA Med List   Medication Sig Last Dose    acyclovir (ZOVIRAX) 400 MG tablet TAKE 1 TABLET(400 MG) BY MOUTH EVERY 12 HOURS Unknown    albuterol (PROAIR HFA/PROVENTIL HFA/VENTOLIN HFA) 108 (90 Base) MCG/ACT inhaler Inhale 2 puffs into the lungs every 6 hours as needed for shortness of breath Unknown    amLODIPine (NORVASC) 10 MG tablet Take 1 tablet (10 mg) by mouth daily Unknown    ascorbic acid 1000 MG TABS tablet Take 1,000 mg by mouth daily Unknown    B Complex-C (VITAMIN B COMPLEX W/VITAMIN C) TABS tablet Take 1 tablet by mouth daily Unknown    Fluticasone-Umeclidin-Vilanterol (TRELEGY  ELLIPTA) 100-62.5-25 MCG/INH oral inhaler Inhale 1 puff into the lungs daily  Unknown    levETIRAcetam (KEPPRA) 750 MG tablet Take 1 tablet (750 mg) by mouth 2 times daily Unknown    levothyroxine (SYNTHROID/LEVOTHROID) 112 MCG tablet Take 112 mcg by mouth daily Unknown    loperamide (IMODIUM) 2 MG capsule Take 1-2 capsules (2-4 mg) by mouth 4 times daily as needed for diarrhea Unknown    methadone (DOLOPHINE) 10 MG tablet Take 1 tablet (10 mg) by mouth 3 times daily Morning, afternoon, and night Unknown    methocarbamol (ROBAXIN) 500 MG tablet Take 500 mg by mouth 3 times daily as needed for muscle spasms Unknown    metoprolol succinate ER (TOPROL XL) 25 MG 24 hr tablet TAKE 1 TABLET(25 MG) BY MOUTH DAILY Unknown    naloxone (NARCAN) 4 MG/0.1ML nasal spray Spray 1 spray (4 mg) into one nostril alternating nostrils as needed for opioid reversal every 2-3 minutes until assistance arrives Unknown    ondansetron (ZOFRAN) 8 MG tablet Take 1 tablet (8 mg) by mouth every 8 hours as needed for nausea Unknown    oxyCODONE IR (ROXICODONE) 10 MG tablet Take 1 tablet (10 mg) by mouth every 6 hours as needed for moderate to severe pain Unknown    pantoprazole (PROTONIX) 40 MG EC tablet Take 1 tablet (40 mg) by mouth daily Unknown    polyethylene glycol (MIRALAX) 17 GM/Dose powder Take 17 g by mouth daily Unknown    sulfamethoxazole-trimethoprim (BACTRIM DS) 800-160 MG tablet Take 1 tablet by mouth Every Mon, Tues two times daily Start on Monday 6/27/2022 Unknown    venlafaxine (EFFEXOR) 75 MG tablet Take 1 tablet (75 mg) by mouth 2 times daily Unknown    Vitamin D3 (VITAMIN D, CHOLECALCIFEROL,) 25 mcg (1000 units) tablet Take 1 tablet by mouth daily Unknown

## 2023-11-10 NOTE — PROGRESS NOTES
[facility]    Hematology / Oncology Progress Note    Patient: Libby Grimaldo  MRN: [id number]  Admission Date: 11/8/2023  Date of Service (when I saw the patient): 11/10/2023  Hospital Day # 2     Assessment & Plan   Libby Grimaldo is a 73 year old female with a history of high risk IgG lambda multiple myeloma (most recently s/p 1 dose of teclistimab on 10/24), Grave's disease, ILD/COPD, HTN, HLD, and opioid dependence. She was recently admitted 10/24-11/5 for teclistimab ramp up which was c/b encephalopathy of uncertain etiology, and was discharged at baseline cognition. She presented to the ED 11/8 with neutropenic fever and recurrent encephalopathy.     Today:  - Pt afebrile >24 hours. MRSA nares negative. Discontinued Zosyn/vanc, switched to levofloxacin 750 mg daily. If afebrile on PO antibiotics, can discharge to home tomorrow.   - BPs stable-to-high. Resumed PTA amlodipine.   - Hgb stable and no anticipated procedures. Ordered ppx Lovenox.     ID  # Sepsis, resolved  # Neutropenic fever  # Pneumonia  Upon admission, pt was febrile up to 102.9. Tachycardic.  on admission. Lactic 4.9 on admission, improved to 1.3 with 2L fluids. She denies focal s/sx of infection.   - Work up:   - BCx 11/8 NGTD  - UA bland  - CT chest showed multiple tree-in-bud ground glass nodules in RUL c/w mild pneumonia  - COVID/flu/RSV negative  - MRSA nares negative  - Antibiotics:   - Zosyn 11/8-11/10  - Vancomycin 11/8-11/10  - Azithromycin x3d 11/9-11/10  - Levofloxacin 750 mg x11/10    # ID PPX  - Acyclovir 400 mg BID  - Bactrim BID on Monday/Tuesdays for PJP ppx  - Hold ppx levofloxacin with treatment dose antibiotics as above  - Transient/intermittent neutropenia. Start fluconazole ppx if persists.     NEURO  # Acute toxic-metabolic encephalopathy, resolved  Pt was admitted 10/24-11/5. She was initially admitted for teclistimab ramp up. Noted to have AMS ~days 1-3 after first dose of  teclistimab, classified as grade 2 ICANs. She received dexamethasone without improvement in AMS, thus was discontinued and other etiologies were considered. Had stroke code called 11/3 as well. Work up included stroke work up (negative), LP (negative), urine drug screen (negative), EEG (moderate slowing c/w moderate diffuse encephalopathy). Work up also included MRI brain which showed increased size of frontal calvarial lesion and new R-sided parapharyngeal mass. Etiology unclear, thought possibly secondary to hospital acquired delirium vs. polypharmacy vs. teclistamab neurotoxicity vs. CNS infection as she is immunocompromised. By time of discharge on 11/5, was back to baseline cognition. She represented 11/8 after waking up with AMS, oriented only to location, making nonsensical statements.  - Work up this admission:   - CT head and CTA without acute findings  - Ammonia WNL  - Sepsis work up as above  - Chronically on methadone and oxycodone and this is unchanged so suspect less likely polypharmacy.   - Low suspicion for ICANS given the facts that only 1 low dose of teclistimab was given 10/24, not responsive to steroids so considered alternative etiology.   - Neuro consulted. Ordered Keppra level per their recs, which was within therapeutic range. No additional work up recommended at this time.  - Continue PTA Keppra for ppx  - Delirium precautions in place  - PT/OT consulted  - Mental status is improved with treatment of infection, continue to monitor closely     # IgG lambda multiple myeloma, high-risk  # S/p autoPBST 2022  Follows with Dr. Guillory. Initially diagnosed in 11/2018, completed KRD x 6 cycles, rev/Dex (20), Velcade maintenance, revlimid maintenance, with evidence of disease BmBx 11/2020 (35% plasma cells) and PET CT with new lytic lesions.Then treated with kmaeron, Kd x 6 cycles, subcutaneous kameron/IVIG/denosumab monthly. S/p autologous peripheral blood stem cell transplant in 2022. Followed by  "melphalan, ixazomib, and then started on palliative elotuzumab, pomolidomide, and dexamethasone chemotherapy 4/2023 with most recent dose C5D1 9/27. Course c/b infection, fall. Due to rising lambda chains that were consistent with progression, teclistamab recommended. Received teclistimab C1D1=10/24/23, c/b AMS, thought initially thought to be grade 2 ICANS. Was given dexamethasone 10 mg x2 and started prophylactic Keppra and no significant improvement with dexamethasone. See above for full encephalopathy work up. During work up, was found to have new R parapharyngeal lesion, see below. Was also found to have increasing size of front calvarial lytic lesion. Most recent restaging was CT CAP on 11/2, which showed: \"Large enhancing soft tissue masses along bilateral scapulae with underlying cortical erosions and pathologic fracture. New soft tissue mass in the left thoracic paraspinal region. These may represent extraosseous myeloma versus extramedullary hematopoiesis. Increased size of the thoracic right paraspinal lesion. New solid pulmonary nodules. Cystic lesions of the pancreas are better appreciated on MRI of 5/25/2023.\"  - No acute inpatient needs  - Follow up with Dr. Guillory regarding next steps in treatment     # Incidental right parapharyngeal lesion  Found on MRI brain last admission. Lesion protrudes into the oropharynx. CT neck showed lesions in the supraspinatus and parapharyngeal space concerning for malignancy. Suspect extraosseous involvement from MM rather than from another primary malignancy. In discussion with ENT, was recommended for biopsy of supraspinatus. IR who outpatient biopsy if no acute change to management indicating inpatient need.   - Pt is scheduled for outpatient IR biopsy 11/22    # Pancytopenia  Secondary to malignancy, infection, teclistimab.   - Monitor CBC daily  - Transfuse to maintain Hgb >7, plt >10K    # History of LUE DVT     Noted on LUE US (2/15/22) to have non-occlusive " LUE DVT. Previously on Xarelto, now on ASA which has been held.      MISC  # Hypomagnesemia  - Replete per protocol     CHRONIC  # HTN  # History of afib  Started on amlodipine last admission. Given sepsis, will be careful with BP management.   - Continue PTA amlodipine and metoprolol     # COPD  COPD with 40 py history, quit 2010. Previously documented to have idiopathic pulmonary fibrosis, however most recent PFTs (1/26/22) were normal. Has previously required treatment for COPD exacerbation with pneumonia. No wheezing, no increased WOB.   - PTA Trelegy Ellipta inhaler daily (sub formulary inpt)  - PTA albuterol PRN     # Hypothyroidism  # History of Grave's disease  History of hypothyroidism 2/2 treatment for Grave's Disease (patient reports surgery in [place], however no thyroidectomy). Stable dose of levothyroxine over the past four years. Admission TSH WNL.  - Continue PTA levothyroxine      # Generalized anxiety disorder  - Continue PTA venlafaxine 75mg BID      # Chronic pain  - PTA methadone TID and PTA oxycodone 10 mg q6hr PRN  - Tylenol PRN  - Robaxin TID PRN  - Follow up with [facility] pain clinic     Clinically Significant Risk Factors           # Hypercalcemia: corrected calcium is >10.1, will monitor as appropriate  # Hypomagnesemia: Lowest Mg = 1.5 mg/dL in last 2 days, will replace as needed   # Hypoalbuminemia: Lowest albumin = 2.9 g/dL at 11/10/2023  8:37 AM, will monitor as appropriate   # Thrombocytopenia: Lowest platelets = 113 in last 2 days, will monitor for bleeding                # Financial/Environmental Concerns:             FEN  Diet: Regular Diet Adult   IVF: Bolus PRN   Lytes: Replete per protocol    PPX  VTE: Lovenox  Bowel: MiraLax daily  GI/PUD: Protonix    MISC  Code Status: Full Code   Lines/Drains: Port  Dispo: If afebrile and does well overnight, anticipate discharge to home with assist tomorrow  Follow-up:  - Requested follow up with Dr. Guillory at next available to discuss treatment  "options  - If Dr. Guillory is unavailable in the next 2 weeks, requested [facility] visit    Patient was seen and plan of care was discussed with attending physician Dr. Vargas.    I spent 80 minutes in the care of this patient today, which included time necessary for review of interval events, obtaining history and physical exam, ordering medications/tests/procedures as medically indicated, review of pertinent medical literature, counseling of the patient, coordination of care, and documentation time. Over 50% of time was spent counseling the patient and/or coordinating care.    Lien Alvarenga PA-C   Hematology/Oncology   Pager: [phone]  Desk phone: [phone]    Interval History   No acute events overnight. Patient is feeling \"much better\" today. She feels more energized and less malaised. Denies cough, SOB. Denies confusion, and appears oriented on exam, but still declines to answer orientation questions. LBM 3 days ago, which is normal for her. (Typically has BM q3d.) Has \"ordinary\" chronic pain.     Discussed our recommendation to transition to oral antibiotics and monitor for 24 hrs to prevent readmission. Pt and  are in agreement with plan.     Vital Signs with Ranges  Temp:  [97.9  F (36.6  C)-98.1  F (36.7  C)] 97.9  F (36.6  C)  Pulse:  [72-78] 72  Resp:  [16-18] 18  BP: (136-155)/(70-85) 153/75  SpO2:  [97 %-99 %] 98 %  No intake/output data recorded.    Physical Exam   General: Sitting up in bed, alert, NAD. Pleasant and conversational.  Skin: No concerning lesions, rash, jaundice, cyanosis, erythema, or ecchymoses on exposed surfaces.   HEENT: NCAT. Anicteric sclera. Moist mucous membranes with no lesions, erythema, or thrush. Dentures in place.   Respiratory: Non-labored breathing on room air, good air exchange. Faint bibasilar crackles, stable.   Cardiovascular: RRR. No murmur or rub.   Gastrointestinal: Normoactive BS. Abdomen soft, ND, NT. No palpable masses.  Extremities: No LE edema.   Neurologic: " Refuses to answer orientation questions, speech normal, no deficits grossly. Pupils PERRL. CN grossly intact.     Medications      acyclovir  400 mg Oral BID    amLODIPine  10 mg Oral Daily    fluticasone-vilanterol  1 puff Inhalation Daily    And    umeclidinium  1 puff Inhalation Daily    levETIRAcetam  750 mg Oral BID    levofloxacin  750 mg Oral Every Other Day    levothyroxine  112 mcg Oral Daily    methadone  10 mg Oral TID    metoprolol succinate ER  25 mg Oral Daily    pantoprazole  40 mg Oral Daily    polyethylene glycol  17 g Oral Daily    sulfamethoxazole-trimethoprim  1 tablet Oral Daily    venlafaxine  75 mg Oral BID    vitamin B complex with vitamin C  1 tablet Oral Daily    ascorbic acid  1,000 mg Oral Daily    Vitamin D3  25 mcg Oral Daily     Data   Results for orders placed or performed during the hospital encounter of 11/08/23 (from the past 24 hour(s))   Hemoglobin   Result Value Ref Range    Hemoglobin 7.8 (L) 11.7 - 15.7 g/dL   MRSA MSSA PCR, Nasal Swab    Specimen: Nares, Bilateral; Swab   Result Value Ref Range    MRSA Target DNA Negative Negative    SA Target DNA Negative     Narrative    The [place]  Xpert SA Nasal Complete assay performed in the [employer]  Dx System is a qualitative in vitro diagnostic test designed for rapid detection of Staphylococcus aureus (SA) and methicillin-resistant Staphylococcus aureus (MRSA) from nasal swabs in patients at risk for nasal colonization. The test utilizes automated real-time polymerase chain reaction (PCR) to detect MRSA/SA DNA. The Xpert SA Nasal Complete assay is intended to aid in the prevention and control of MRSA/SA infections in healthcare settings. The assay is not intended to diagnose, guide or monitor treatment for MRSA/SA infections, or provide results of susceptibility to methicillin. A negative result does not preclude MRSA/SA nasal colonization.    CBC with Platelets & Differential    Narrative    The following orders were created for  panel order CBC with Platelets & Differential.  Procedure                               Abnormality         Status                     ---------                               -----------         ------                     CBC with platelets and d...[[id number]]  Abnormal            Final result                 Please view results for these tests on the individual orders.   Comprehensive metabolic panel   Result Value Ref Range    Sodium 142 135 - 145 mmol/L    Potassium 3.5 3.4 - 5.3 mmol/L    Carbon Dioxide (CO2) 24 22 - 29 mmol/L    Anion Gap 8 7 - 15 mmol/L    Urea Nitrogen 8.7 8.0 - 23.0 mg/dL    Creatinine 0.86 0.51 - 0.95 mg/dL    GFR Estimate 72 >60 mL/min/1.73m2    Calcium 7.9 (L) 8.8 - 10.2 mg/dL    Chloride 110 (H) 98 - 107 mmol/L    Glucose 77 70 - 99 mg/dL    Alkaline Phosphatase 162 (H) 35 - 104 U/L    AST 34 0 - 45 U/L    ALT 21 0 - 50 U/L    Protein Total 4.9 (L) 6.4 - 8.3 g/dL    Albumin 2.9 (L) 3.5 - 5.2 g/dL    Bilirubin Total 0.4 <=1.2 mg/dL   CBC with platelets and differential   Result Value Ref Range    WBC Count 2.0 (L) 4.0 - 11.0 10e3/uL    RBC Count 2.37 (L) 3.80 - 5.20 10e6/uL    Hemoglobin 8.2 (L) 11.7 - 15.7 g/dL    Hematocrit 26.1 (L) 35.0 - 47.0 %     (H) 78 - 100 fL    MCH 34.6 (H) 26.5 - 33.0 pg    MCHC 31.4 (L) 31.5 - 36.5 g/dL    RDW 16.2 (H) 10.0 - 15.0 %    Platelet Count 113 (L) 150 - 450 10e3/uL    % Neutrophils 30 %    % Lymphocytes 37 %    % Monocytes 16 %    % Eosinophils 15 %    % Basophils 1 %    % Immature Granulocytes 1 %    NRBCs per 100 WBC 0 <1 /100    Absolute Neutrophils 0.6 (L) 1.6 - 8.3 10e3/uL    Absolute Lymphocytes 0.8 0.8 - 5.3 10e3/uL    Absolute Monocytes 0.3 0.0 - 1.3 10e3/uL    Absolute Eosinophils 0.3 0.0 - 0.7 10e3/uL    Absolute Basophils 0.0 0.0 - 0.2 10e3/uL    Absolute Immature Granulocytes 0.0 <=0.4 10e3/uL    Absolute NRBCs 0.0 10e3/uL

## 2023-11-11 NOTE — PLAN OF CARE
Physical Therapy defer - Orders received, chart reviewed, discussed with care team. No IP PT needs indicated. Per chart, pt back to baseline, planning to discharge home with family. Will complete consult and defer evaluation, please reconsult as appropriate if patient has decline in functional mobility requiring further skilled inpatient PT needs. Defer Discharge recommendations to medical team.

## 2023-11-11 NOTE — DISCHARGE SUMMARY
[facility]    Discharge Summary  Hematology / Oncology    Date of Admission:  11/8/2023  Date of Discharge:  11/11/2023  Discharging Provider: Ashley Low PA-C  Date of Service (when I saw the patient): 11/11/23    Discharge Diagnoses   # Sepsis, resolved  # Neutropenic fever  # Pneumonia  # Acute toxic-metabolic encephalopathy, resolved   # IgG lambda multiple myeloma, high-risk  # S/p autoPBST 2022  # Incidental right parapharyngeal lesion   # Pancytopenia   # History of LUE DVT   # Hypomagnesemia   # HTN  # History of afib  # COPD   # Hypothyroidism  # History of Grave's disease  # Generalized anxiety disorder   # Chronic pain       History of Present Illness   Libby Grimaldo is a 73 year old female with a history of high risk IgG lambda multiple myeloma (most recently s/p 1 dose of teclistimab on 10/24), Grave's disease, ILD/COPD, HTN, HLD, and opioid dependence. She was recently admitted 10/24-11/5 for teclistimab ramp up which was c/b encephalopathy of uncertain etiology, and was discharged at baseline cognition. She presented to the ED 11/8 with neutropenic fever and recurrent encephalopathy.  Broad infectious work-up obtained, blood cultures remain no growth to date, urinalysis bland, COVID/flu/RSV negative, CT chest consistent with right upper lobe pneumonia.  Treated with IV antibiotics with improvement in fever curve and mental status, transitioned to oral Levaquin 11/10.  Remained afebrile x24 hours, symptoms much improved and confusion resolved with mentation improved to baseline.    On the day of discharge, patient is feeling well at baseline health and looking forward to going home. She is seen ambulating with a steady gait and multiple family members present at bedside in agreement with plan for discharge. Plan for discharge home with .    Prior to discharge, I reviewed with Libby and family the plan of care, including upcoming follow-up appointments  and new medications. Appropriate prescriptions were sent to the discharge pharmacy, as needed. We reviewed strict discharge precautions, including reasons to call clinic triage or present to the ED, and she voiced understanding. She was provided with the clinic triage number, as well as written discharge instructions, in their discharge paperwork. Patient had an opportunity to ask questions, all of which were answered to their stated satisfaction. On the day of discharge, patient was overall well-appearing, hemodynamically stable, and felt safe and comfortable with the plans for discharge to home with follow-up as described.    Outpatient follow-up issues:  - ANC <1 on day of discharge, started on ppx fluconazole, continued ACV and Bactrim ppx. Please follow with labs to guide appropriate ppx coverage duration.  - Mental status  - Myeloma treatment plan   - Parapharyngeal mass biopsy results (11/22)    - Has not had clinic follow up from recent admission (10/24-11/5) with recs:  - Follow up with neurology outpatient regarding further testing and duration of Keppra if needing to continue to take.   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   - Consider addiction medicine consultation if felt indicated, aware patient follows with pain clinic.     Discharge medications:  New/changed:  - Levofloxacin 750 mg daily x 4 additional days (to complete 7 days abx course)  - Fluconazole 200 mg daily when ANC <1    Remainder of medications continued as PTA:  - Acyclovir  - Bactrim  - Albuterol inhaler  - Trelegy ellipta inhaler  - Amlodipine  - Metoprolol succinate ER  - Venalfaxine  - Keppra  - Levothyroxine  - Immodium  - Methadone  - Methocarbomol  - Oxycodone IR  - Narcan  - Pantoprazole (patient reports she is taking lansoprazole)  - Miralax  - Zofran  - Ascorbic acid  - Vitamin B/Vitamin C/Vitamin D     Upcoming follow-up:  - IR guided Biopsy 11/22  - APPT18 request  for RADHA + labs follow up sometime 11/14-11/17/23  - Follow up with Dr. Guillory in clinic ASAP to discuss chemotherapy/treatment options      Hospital Course   Libby Grimaldo was admitted on 11/8/2023.  The following problems were addressed during her hospitalization:    ID  # Sepsis, resolved  # Neutropenic fever  # Pneumonia  Upon admission, pt was febrile up to 102.9. Tachycardic.  on admission. Lactic 4.9 on admission, improved to 1.3 with 2L fluids. She denies focal s/sx of infection.   - Work up:   - BCx 11/8 NGTD  - UA bland  - CT chest showed multiple tree-in-bud ground glass nodules in RUL c/w mild pneumonia  - COVID/flu/RSV negative  - MRSA nares negative  - Antibiotics:   - Zosyn 11/8-11/10  - Vancomycin 11/8-11/10  - Azithromycin x3d 11/9-11/10  - Levofloxacin 750 mg x11/10     # ID PPX  - Acyclovir 400 mg BID  - Bactrim BID on Monday/Tuesdays for PJP ppx  - Hold ppx levofloxacin with treatment dose antibiotics as above  - Transient/intermittent neutropenia. Start fluconazole ppx if persists. ? started on day of discharge, will need to follow closely in clinic follow up     NEURO  # Acute toxic-metabolic encephalopathy, resolved  Pt was admitted 10/24-11/5. She was initially admitted for teclistimab ramp up. Noted to have AMS ~days 1-3 after first dose of teclistimab, classified as grade 2 ICANs. She received dexamethasone without improvement in AMS, thus was discontinued and other etiologies were considered. Had stroke code called 11/3 as well. Work up included stroke work up (negative), LP (negative), urine drug screen (negative), EEG (moderate slowing c/w moderate diffuse encephalopathy). Work up also included MRI brain which showed increased size of frontal calvarial lesion and new R-sided parapharyngeal mass. Etiology unclear, thought possibly secondary to hospital acquired delirium vs. polypharmacy vs. teclistamab neurotoxicity vs. CNS infection as she is immunocompromised. By time of discharge  on 11/5, was back to baseline cognition. She represented 11/8 after waking up with AMS, oriented only to location, making nonsensical statements.  - Work up this admission:   - CT head and CTA without acute findings  - Ammonia WNL  - Sepsis work up as above  - Chronically on methadone and oxycodone and this is unchanged so suspect less likely polypharmacy.   - Low suspicion for ICANS given the facts that only 1 low dose of teclistimab was given 10/24, not responsive to steroids so considered alternative etiology.   - Neuro consulted. Ordered Keppra level per their recs, which was within therapeutic range. No additional work up recommended at this time.  - Continue PTA Keppra for ppx  - Delirium precautions in place  - PT/OT consulted  - Mental status is improved with treatment of infection, continue to monitor closely     # IgG lambda multiple myeloma, high-risk  # S/p autoPBST 2022  Follows with Dr. Guillory. Initially diagnosed in 11/2018, completed KRD x 6 cycles, rev/Dex (20), Velcade maintenance, revlimid maintenance, with evidence of disease BmBx 11/2020 (35% plasma cells) and PET CT with new lytic lesions.Then treated with kameron, Kd x 6 cycles, subcutaneous kameron/IVIG/denosumab monthly. S/p autologous peripheral blood stem cell transplant in 2022. Followed by melphalan, ixazomib, and then started on palliative elotuzumab, pomolidomide, and dexamethasone chemotherapy 4/2023 with most recent dose C5D1 9/27. Course c/b infection, fall. Due to rising lambda chains that were consistent with progression, teclistamab recommended. Received teclistimab C1D1=10/24/23, c/b AMS, thought initially thought to be grade 2 ICANS. Was given dexamethasone 10 mg x2 and started prophylactic Keppra and no significant improvement with dexamethasone. See above for full encephalopathy work up. During work up, was found to have new R parapharyngeal lesion, see below. Was also found to have increasing size of front calvarial lytic lesion.  "Most recent restaging was CT CAP on 11/2, which showed: \"Large enhancing soft tissue masses along bilateral scapulae with underlying cortical erosions and pathologic fracture. New soft tissue mass in the left thoracic paraspinal region. These may represent extraosseous myeloma versus extramedullary hematopoiesis. Increased size of the thoracic right paraspinal lesion. New solid pulmonary nodules. Cystic lesions of the pancreas are better appreciated on MRI of 5/25/2023.\"  - No acute inpatient needs  - Follow up with Dr. Guillory regarding next steps in treatment     # Incidental right parapharyngeal lesion  Found on MRI brain last admission. Lesion protrudes into the oropharynx. CT neck showed lesions in the supraspinatus and parapharyngeal space concerning for malignancy. Suspect extraosseous involvement from MM rather than from another primary malignancy. In discussion with ENT, was recommended for biopsy of supraspinatus. IR who outpatient biopsy if no acute change to management indicating inpatient need.   - Pt is scheduled for outpatient IR biopsy 11/22     # Pancytopenia  Secondary to malignancy, infection, teclistimab.   - Monitor CBC daily  - Transfuse to maintain Hgb >7, plt >10K     # History of LUE DVT     Noted on LUE US (2/15/22) to have non-occlusive LUE DVT. Previously on Xarelto, now on ASA which has been held.      MISC  # Hypomagnesemia  - Replete per protocol     CHRONIC  # HTN  # History of afib  Started on amlodipine last admission. Given sepsis, will be careful with BP management.   - Continue PTA amlodipine and metoprolol     # COPD  COPD with 40 py history, quit 2010. Previously documented to have idiopathic pulmonary fibrosis, however most recent PFTs (1/26/22) were normal. Has previously required treatment for COPD exacerbation with pneumonia. No wheezing, no increased WOB.   - PTA Trelegy Ellipta inhaler daily (sub formulary inpt)  - PTA albuterol PRN     # Hypothyroidism  # History of " Grave's disease  History of hypothyroidism 2/2 treatment for Grave's Disease (patient reports surgery in [place], however no thyroidectomy). Stable dose of levothyroxine over the past four years. Admission TSH WNL.  - Continue PTA levothyroxine      # Generalized anxiety disorder  - Continue PTA venlafaxine 75mg BID      # Chronic pain  - PTA methadone TID and PTA oxycodone 10 mg q6hr PRN  - Tylenol PRN  - Robaxin TID PRN  - Follow up with OP pain clinic      Patient was staffed with Dr. Sam Low PA-C  Hematology/Oncology  Page: #[phone]    I spent >60 minutes in the care of this patient today, which included time necessary for review of interval events, obtaining history and physical exam, ordering medication(s)/test(s) as medically indicated, discussion with interdisciplinary/consult team(s), and documentation time. Over 50% of time was spent face-to-face and/or coordinating care.      Pending Results   These results will be followed up by outpatient heme/onc team  Unresulted Labs Ordered in the Past 30 Days of this Admission       Date and Time Order Name Status Description    11/8/2023  6:21 PM Blood Culture Peripheral Blood Preliminary     11/8/2023  6:21 PM Blood Culture Peripheral Blood Preliminary             Code Status   Full Code    Primary Care Physician   [facility]    BP (!) 173/95 (BP Location: Right arm)   Pulse 79   Temp 98  F (36.7  C) (Oral)   Resp 18   SpO2 100%     Constitutional: Awake and conversational. Non- toxic appearing. No acute distress.   HEENT: NCAT. Moist mucus membranes without lesions, thrush, or exudates appreciated  Lymph: Neck supple, no ridigity. No significant adenopathy noted.   Respiratory: Breathing comfortably on room air with no accessory muscle use. Speaking in full sentences, no evidence of respiratory distress. Lungs CTAB without stridor, wheeze, rhonchi, or rales.   Cardiovascular: Regular rate and rhythm. No peripheral edema.    GI:  Abdomen with normoactive bowel sounds, soft and non-tender throughout. No rebound, guarding, or peritoneal sign.   Skin: Skin is clean, dry, intact. No jaundice or significant rashes appreciated on exposed areas.   Neurologic: Alert with normal speech. Refuses to answer orientation questions. Grossly nonfocal.  Moves extremities spontaneously.  Memory and thought process preserved. Motor function normal with 5/5 muscle strength bilaterally to UE and LE.   Neuropsychiatric: Calm, affect congruent to situation.       Time Spent on this Encounter   IAshley PA-C, personally saw the patient today and spent greater than 30 minutes discharging this patient.    Discharge Disposition   Discharged to home  Condition at discharge: Stable    Consultations This Hospital Stay   NEUROLOGY STROKE ADULT IP CONSULT  PHARMACY TO DOSE VANCO  NEUROLOGY GENERAL ADULT IP CONSULT  PHYSICAL THERAPY ADULT IP CONSULT  OCCUPATIONAL THERAPY ADULT IP CONSULT    Discharge Orders      Reason for your hospital stay    You were hospitalized for fever and confusion. You were found to have pneumonia. Both your fever and confusion have improved with antibiotics.     Activity    Your activity upon discharge: activity as tolerated     Adult [facility] Follow-up and recommended labs and tests    - We have requested an appointment with Dr. Guillory to discuss next steps in myeloma treatment. If he can't see you soon, we have requested an appointment with an RADHA sooner to make sure things are going well outside of the hospital.   - You are scheduled for your biopsy on 11/22    Appointments on [place] and/or [facility] (with [facility] or [facility] provider or service). Call [phone] if you haven't heard regarding these appointments within 7 days of discharge.     When to contact your care team    [facility] triage line at [phone] for temp > or = 100.4, uncontrolled nausea/vomiting/diarrhea/constipation, unrelieved pain, bleeding  not relieved with pressure, dizziness, chest pain, shortness of breath, loss of consciousness, and any new or concerning symptoms.     Full Code     Diet    Follow this diet upon discharge: Regular Diet Adult     Check Out Appointment Request    - Please schedule labs/RADHA post-hospital visit for sometime 11/14-11/17  - Please schedule appt with Dr. Guillory at next available to discuss treatment options     Discharge Medications   Current Discharge Medication List        START taking these medications    Details   levofloxacin (LEVAQUIN) 750 MG tablet Take 1 tablet (750 mg) by mouth daily  Qty: 4 tablet, Refills: 0    Associated Diagnoses: Sepsis with encephalopathy without septic shock, due to unspecified organism (H); Fever and chills           CONTINUE these medications which have NOT CHANGED    Details   acyclovir (ZOVIRAX) 400 MG tablet TAKE 1 TABLET(400 MG) BY MOUTH EVERY 12 HOURS  Qty: 180 tablet, Refills: 3    Associated Diagnoses: Multiple myeloma not having achieved remission (H); Admission for chemotherapy      albuterol (PROAIR HFA/PROVENTIL HFA/VENTOLIN HFA) 108 (90 Base) MCG/ACT inhaler Inhale 2 puffs into the lungs every 6 hours as needed for shortness of breath    Comments: Pharmacy may dispense brand covered by insurance (Proair, or proventil or ventolin or generic albuterol inhaler)      amLODIPine (NORVASC) 10 MG tablet Take 1 tablet (10 mg) by mouth daily  Qty: 30 tablet, Refills: 0    Associated Diagnoses: Hypertension, unspecified type      ascorbic acid 1000 MG TABS tablet Take 1,000 mg by mouth daily      B Complex-C (VITAMIN B COMPLEX W/VITAMIN C) TABS tablet Take 1 tablet by mouth daily      Fluticasone-Umeclidin-Vilanterol (TRELEGY ELLIPTA) 100-62.5-25 MCG/INH oral inhaler Inhale 1 puff into the lungs daily       levETIRAcetam (KEPPRA) 750 MG tablet Take 1 tablet (750 mg) by mouth 2 times daily  Qty: 60 tablet, Refills: 0    Associated Diagnoses: Altered mental status, unspecified altered  mental status type      levothyroxine (SYNTHROID/LEVOTHROID) 112 MCG tablet Take 112 mcg by mouth daily      loperamide (IMODIUM) 2 MG capsule Take 1-2 capsules (2-4 mg) by mouth 4 times daily as needed for diarrhea  Qty: 60 capsule, Refills: 1    Associated Diagnoses: Multiple myeloma not having achieved remission (H)      methadone (DOLOPHINE) 10 MG tablet Take 1 tablet (10 mg) by mouth 3 times daily Morning, afternoon, and night    Associated Diagnoses: Multiple myeloma not having achieved remission (H); Uncomplicated opioid dependence (H)      methocarbamol (ROBAXIN) 500 MG tablet Take 500 mg by mouth 3 times daily as needed for muscle spasms      metoprolol succinate ER (TOPROL XL) 25 MG 24 hr tablet TAKE 1 TABLET(25 MG) BY MOUTH DAILY  Qty: 30 tablet, Refills: 3    Associated Diagnoses: Multiple myeloma not having achieved remission (H)      naloxone (NARCAN) 4 MG/0.1ML nasal spray Spray 1 spray (4 mg) into one nostril alternating nostrils as needed for opioid reversal every 2-3 minutes until assistance arrives  Qty: 0.2 mL, Refills: 0    Associated Diagnoses: Multiple myeloma not having achieved remission (H)      ondansetron (ZOFRAN) 8 MG tablet Take 1 tablet (8 mg) by mouth every 8 hours as needed for nausea  Qty: 15 tablet, Refills: 0    Associated Diagnoses: Multiple myeloma not having achieved remission (H)      oxyCODONE IR (ROXICODONE) 10 MG tablet Take 1 tablet (10 mg) by mouth every 6 hours as needed for moderate to severe pain  Qty: 4 tablet, Refills: 0    Associated Diagnoses: Multiple myeloma not having achieved remission (H); Uncomplicated opioid dependence (H)      pantoprazole (PROTONIX) 40 MG EC tablet Take 1 tablet (40 mg) by mouth daily  Qty: 30 tablet, Refills: 0    Associated Diagnoses: Multiple myeloma not having achieved remission (H); Stem cells transplant status (H)      polyethylene glycol (MIRALAX) 17 GM/Dose powder Take 17 g by mouth daily  Qty: 510 g, Refills: 0    Associated  Diagnoses: Multiple myeloma not having achieved remission (H)      sulfamethoxazole-trimethoprim (BACTRIM DS) 800-160 MG tablet Take 1 tablet by mouth Every Mon, Tues two times daily Start on Monday 6/27/2022  Qty: 48 tablet, Refills: 3    Associated Diagnoses: Multiple myeloma not having achieved remission (H); Stem cells transplant status (H)      venlafaxine (EFFEXOR) 75 MG tablet Take 1 tablet (75 mg) by mouth 2 times daily      Vitamin D3 (VITAMIN D, CHOLECALCIFEROL,) 25 mcg (1000 units) tablet Take 1 tablet by mouth daily           Allergies   Allergies   Allergen Reactions    Bupropion      Other reaction(s): Seizures     Data   Most Recent 3 CBC's:  Recent Labs   Lab Test 11/11/23  0608 11/10/23  0837 11/09/23  1228 11/09/23  0639   WBC 2.3* 2.0*  --  4.1   HGB 8.7* 8.2* 7.8* 9.2*   * 110*  --  113*   * 113*  --  127*      Most Recent 3 BMP's:  Recent Labs   Lab Test 11/11/23  0608 11/10/23  0837 11/09/23  1012    142 141   POTASSIUM 3.8 3.5 3.7   CHLORIDE 108* 110* 110*   CO2 25 24 24   BUN 7.9* 8.7 11.1   CR 0.78 0.86 0.80   ANIONGAP 7 8 7   DIANDRA 8.6* 7.9* 8.2*   GLC 82 77 81     Most Recent 2 LFT's:  Recent Labs   Lab Test 11/11/23  0608 11/10/23  0837   AST 27 34   ALT 18 21   ALKPHOS 156* 162*   BILITOTAL 0.3 0.4     Most Recent INR's and Anticoagulation Dosing History:  Anticoagulation Dose History  More data exists         Latest Ref Rng & Units 10/30/2023 10/31/2023 11/1/2023 11/2/2023 11/3/2023 11/4/2023 11/5/2023   Recent Dosing and Labs   INR 0.85 - 1.15 1.08  0.98  0.99  1.07  0.99  1.03  0.98      Most Recent 3 Troponin's:No lab results found.  Most Recent Cholesterol Panel:No lab results found.  Most Recent 6 Bacteria Isolates From Any Culture (See EPIC Reports for Culture Details):No lab results found.  Most Recent TSH, T4 and A1c Labs:  Recent Labs   Lab Test 11/08/23  1841 10/24/23  1303 08/08/23  1309   TSH 1.26   < > 11.30*   T4  --   --  1.06    < > = values in this  interval not displayed.     Results for orders placed or performed during the hospital encounter of 11/08/23   XR Chest 2 Views    Narrative    Exam: XR CHEST 2 VIEWS, 11/8/2023 6:56 PM    Comparison: CT chest abdomen and pelvis 11/1/2023    History: fever    Findings:  2 views of the chest. Right chest port tip projects over the mid SVC.  Trachea is midline. Cardiomediastinal silhouette is within normal  limits. No focal airspace opacity. No pneumothorax or pleural  effusion. The visualized upper abdomen is unremarkable. No acute  osseous abnormalities.      Impression    Impression: No focal consolidation.    I have personally reviewed the examination and initial interpretation  and I agree with the findings.    KAREY GRECO MD         SYSTEM ID:  [id number]   CT Head w/o Contrast    Narrative    [facility]  CT HEAD W/O CONTRAST, CTA HEAD NECK W CONTRAST  11/8/2023 8:32 PM CST     INDICATION: Awakened this morning with increasing altered mental status. History of recurrent multiple myeloma.  TECHNIQUE: Head and neck CT angiogram with IV contrast. Noncontrast head CT followed by axial helical CT images of the head and neck vessels obtained during the arterial phase of intravenous contrast administration. Axial helical 2D reconstructed images   and multiplanar 3D MIP reconstructed images of the head and neck vessels were performed by the technologist. Dose reduction techniques were used.  CONTRAST: Iopamidol (ISOVUE 370) solution 67 mL  COMPARISON: 11/03/2023      FINDINGS:   NONCONTRAST HEAD CT:   INTRACRANIAL CONTENTS: No intracranial hemorrhage, extraaxial collection, or mass effect.  No CT evidence of acute infarct. Mild to moderate presumed chronic small vessel ischemic changes. Mild generalized volume loss. No hydrocephalus.     VISUALIZED ORBITS/SINUSES/MASTOIDS: No intraorbital abnormality. No paranasal sinus mucosal disease. Scattered fluid/membrane thickening  in the right mastoid air cells. No apparent mass in the posterior nasopharynx or skull base.    BONES/SOFT TISSUES: No acute abnormality. Unchanged lytic lesion in the right frontal calvarium.      HEAD CTA:  ANTERIOR CIRCULATION: No flow-limiting stenosis, large vessel occlusion, aneurysm, or high flow vascular malformation. 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 Fetal origin of the bilateral posterior cerebral arteries from   the anterior circulation.    POSTERIOR CIRCULATION: No flow-limiting stenosis, large vessel occlusion, aneurysm, or high flow vascular malformation. Congenitally small vertebrobasilar system.     DURAL VENOUS SINUSES: Expected enhancement of the major dural venous sinuses.      NECK CTA:  RIGHT CAROTID: There is again approximately 50% stenosis in the right ICA based on NASCET criteria. As on recent prior CTA, approximately 4 cm from its origin, the cervical right ICA becomes asymmetrically diffusely decreased in caliber compared to the   left, gradually increasing to more normal caliber being at the level of the skull base and petrous segment.    LEFT CAROTID: There is again approximately 50% stenosis in the left ICA based on NASCET criteria.    VERTEBRAL ARTERIES: Mildly dominant right and smaller left vertebral arteries are patent in the neck and into the head.     AORTIC ARCH: Common origin of the brachiocephalic and left common carotid arteries. No significant stenosis at the origin of the great vessels.    MISCELLANEOUS: No evidence for dissection or pseudoaneurysm. 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 Scattered groundglass pulmonary opacities throughout the visualized right lung. Accessed right chest wall [id number] catheter. 5 mm   anterolisthesis of C3 on C4. Stable solid appearing ACDF at C5-C7           Impression    CONCLUSION:   HEAD CT:  1.  No acute intracranial abnormality.  2.  Mild to moderate chronic age-related changes.  3.   Stable lytic lesion in the right frontal calvarium.    HEAD CTA:   1.  No flow-limiting stenosis, large vessel occlusion, or aneurysm in the proximal intracranial vessels.  2.  Variant vascular anatomy as detailed above.    NECK CTA:  1.  No flow-limiting stenosis in the cervical arterial vasculature in the neck based on NASCET criteria.  2.  Unchanged diffuse narrowing of the mid/distal right cervical ICA, beginning approximately 4 cm from its origin, again with gradually returned and more normal caliber, starting at its petrous segment.  3.  No evidence for dissection.  4.  Scattered ground glass opacities throughout the visualized right lung, likely infectious or inflammatory.  5.             CTA Head Neck with Contrast    [facility]  CT HEAD W/O CONTRAST, CTA HEAD NECK W CONTRAST  11/8/2023 8:32 PM CST     INDICATION: Awakened this morning with increasing altered mental status. History of recurrent multiple myeloma.  TECHNIQUE: Head and neck CT angiogram with IV contrast. Noncontrast head CT followed by axial helical CT images of the head and neck vessels obtained during the arterial phase of intravenous contrast administration. Axial helical 2D reconstructed images   and multiplanar 3D MIP reconstructed images of the head and neck vessels were performed by the technologist. Dose reduction techniques were used.  CONTRAST: Iopamidol (ISOVUE 370) solution 67 mL  COMPARISON: 11/03/2023      FINDINGS:   NONCONTRAST HEAD CT:   INTRACRANIAL CONTENTS: No intracranial hemorrhage, extraaxial collection, or mass effect.  No CT evidence of acute infarct. Mild to moderate presumed chronic small vessel ischemic changes. Mild generalized volume loss. No hydrocephalus.     VISUALIZED ORBITS/SINUSES/MASTOIDS: No intraorbital abnormality. No paranasal sinus mucosal disease. Scattered fluid/membrane thickening in the right mastoid air cells. No apparent mass in the  posterior nasopharynx or skull base.    BONES/SOFT TISSUES: No acute abnormality. Unchanged lytic lesion in the right frontal calvarium.      HEAD CTA:  ANTERIOR CIRCULATION: No flow-limiting stenosis, large vessel occlusion, aneurysm, or high flow vascular malformation. 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 Fetal origin of the bilateral posterior cerebral arteries from   the anterior circulation.    POSTERIOR CIRCULATION: No flow-limiting stenosis, large vessel occlusion, aneurysm, or high flow vascular malformation. Congenitally small vertebrobasilar system.     DURAL VENOUS SINUSES: Expected enhancement of the major dural venous sinuses.      NECK CTA:  RIGHT CAROTID: There is again approximately 50% stenosis in the right ICA based on NASCET criteria. As on recent prior CTA, approximately 4 cm from its origin, the cervical right ICA becomes asymmetrically diffusely decreased in caliber compared to the   left, gradually increasing to more normal caliber being at the level of the skull base and petrous segment.    LEFT CAROTID: There is again approximately 50% stenosis in the left ICA based on NASCET criteria.    VERTEBRAL ARTERIES: Mildly dominant right and smaller left vertebral arteries are patent in the neck and into the head.     AORTIC ARCH: Common origin of the brachiocephalic and left common carotid arteries. No significant stenosis at the origin of the great vessels.    MISCELLANEOUS: No evidence for dissection or pseudoaneurysm. 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 Scattered groundglass pulmonary opacities throughout the visualized right lung. Accessed right chest wall Axbeuk-m-[id number] catheter. 5 mm   anterolisthesis of C3 on C4. Stable solid appearing ACDF at C5-C7           Impression    CONCLUSION:   HEAD CT:  1.  No acute intracranial abnormality.  2.  Mild to moderate chronic age-related changes.  3.  Stable lytic lesion in the right frontal  calvarium.    HEAD CTA:   1.  No flow-limiting stenosis, large vessel occlusion, or aneurysm in the proximal intracranial vessels.  2.  Variant vascular anatomy as detailed above.    NECK CTA:  1.  No flow-limiting stenosis in the cervical arterial vasculature in the neck based on NASCET criteria.  2.  Unchanged diffuse narrowing of the mid/distal right cervical ICA, beginning approximately 4 cm from its origin, again with gradually returned and more normal caliber, starting at its petrous segment.  3.  No evidence for dissection.  4.  Scattered ground glass opacities throughout the visualized right lung, likely infectious or inflammatory.  5.             CT Chest w/o Contrast    Narrative    EXAM: CT CHEST W/O CONTRAST  LOCATION: [facility]  DATE: 11/9/2023    INDICATION: fever, neutropenic, rule out pneumonia. History of multiple myeloma.  COMPARISON: None.  TECHNIQUE: CT chest without IV contrast. Multiplanar reformats were obtained. Dose reduction techniques were used.  CONTRAST: None.    FINDINGS:   LUNGS AND PLEURA: Multiple tree-in-bud groundglass nodules are seen in the right upper lobe. A solitary solid nodule measuring 5 mm in the left lower lobe. Mild scarring in the right lower lobe. No confluent airspace consolidation. No pleural effusion or   pneumothorax.    MEDIASTINUM/AXILLAE: No lymphadenopathy. No thoracic aortic aneurysm. A right chest wall port catheter terminates in the SVC.    CORONARY ARTERY CALCIFICATION: Moderate in the left coronary artery distribution.    UPPER ABDOMEN: No significant finding. Gallbladder is surgically absent.    MUSCULOSKELETAL: Multiple paraspinal masses are present, largest measuring 6 x 4 cm on series 3 image 2:15 with osseous erosion/invasion of the T10 vertebral body. Anterior inferior cervical fusion instrumentation present. Moderate degenerative disc   space narrowing from T9 to T11. Mild compression deformity noted at  T3, T4 and L1. No acute fracture. Moderate left and mild right glenohumeral joint space narrowing noted.        Impression    IMPRESSION:     1.  Multiple tree-in-bud groundglass nodules in the right upper lobe compatible with mild pneumonia. No confluent airspace consolidations.    2.  Solitary 5 mm nodule in the left lower lobe. Per Fleischner Society 2017 guideline, a one-year follow-up chest CT could be considered if patient is at high risk for lung cancer. Without lung cancer risk factor, no follow-up is necessary.    3.  Multiple paraspinal masses with osseous invasion/erosion of T10 by the dominant mass, likely related patient's diagnosis of multiple myeloma

## 2023-11-11 NOTE — PROGRESS NOTES
Patient is discharged to home and Levaquin dose for today was given earlier and the next dose will be tomorrow. Follow up with the Physician was highlighted and phone number was provided. Right upper chest port was de-accessed, client voided and walked in the hallway with assist.  Methadone and oxycodone was given for pain with some relief. Family provided ride.

## 2023-11-14 NOTE — PROGRESS NOTES
Clinic Care Coordination Contact  [facility]    Clinical Data: Care Coordinator Outreach    Outreach Documentation Number of Outreach Attempt   11/14/2023   9:08 AM 2       Left message on patient's voicemail with call back information and requested return call.    Care Coordinator will do no further outreaches at this time.    Lacie Herrera  [phone]  Care

## 2023-11-22 PROBLEM — A41.9 SEPSIS WITHOUT ACUTE ORGAN DYSFUNCTION, DUE TO UNSPECIFIED ORGANISM (H): Status: ACTIVE | Noted: 2023-01-01

## 2023-11-22 PROBLEM — G93.40 ACUTE ENCEPHALOPATHY: Status: ACTIVE | Noted: 2023-01-01

## 2023-11-22 PROBLEM — R79.89 ELEVATED LACTIC ACID LEVEL: Status: ACTIVE | Noted: 2023-01-01

## 2023-11-22 NOTE — ED PROVIDER NOTES
"    [facility] ([facility])    11/22/23       ED PROVIDER NOTE        History     Chief Complaint   Patient presents with    Nausea     HPI  Libby Grimaldo is a 73 year old female with PMH of multiple myeloma status post stem cell transplant with recurrence and recently underwent CAR-T therapy complicated by ICANS.  She presented to the ED 11/8 with neutropenic fever and recurrent encephalopathy. Today, she presents to the Emergency Department due to nausea and an episode prior to an IR Procedure that was aborted.    HPI was provided by  and sister. Patient feels nauseous and generally unwell. She was diagnosed with right upper lobe pneumonia on 11/8/2023, treated with IV antibiotics with improvement in fever and mental status, transitioned to oral Levaquin 11/10. She stopped taking Levquinn 750mg PO on 11/11/2023. She has no reported dysuria. Her sister reported that she feels \"warm\" but no temperature recorded.  She has reportedly had increasing confusion and weakness over couple days and appears to be acting like she does when she has an infection according to her family.  Her  states that in her current state she is not safe to go home with him and he is not confident in his ability to care for her.    Per chart review, She was recently admitted 10/24-11/5 for teclistimab ramp up which was c/b encephalopathy of uncertain etiology, and was discharged at baseline cognition. She presented to the ED 11/8 with neutropenic fever and recurrent encephalopathy.  Broad infectious work-up obtained, blood cultures remain no growth to date, urinalysis bland, COVID/flu/RSV negative, CT chest consistent with right upper lobe pneumonia.  Remained afebrile x24 hours, symptoms much improved and confusion resolved with mentation improved to baseline. She has outpatient biopsy of pharyngeal mass on 11/22/23 and follow-up with oncology.     Past Medical History  Past Medical History: "   Diagnosis Date    Cervical radiculopathy     COPD (chronic obstructive pulmonary disease) (H)     Double vision     Febrile seizure (H)     Per patient. Once while a toddler, once while in highschool, and once while in college.    Floaters     Graves disease     HLD (hyperlipidemia)     HTN (hypertension)     IPF (idiopathic pulmonary fibrosis) (H)     Lumbar radiculopathy     Multiple myeloma in relapse (H)     Osteoporosis     Pneumonia     Per patient. Complicated by sepsis.    Recurrent UTI     Per patient. Has required prophylactic antibiotcs.    Vitamin B12 deficiency (non anemic)      Past Surgical History:   Procedure Laterality Date    BONE MARROW BIOPSY, BONE SPECIMEN, NEEDLE/TROCAR Right 01/24/2022    Procedure: BIOPSY, BONE MARROW;  Surgeon: Yuniel Tineo;  Location: [facility] OR    BONE MARROW BIOPSY, BONE SPECIMEN, NEEDLE/TROCAR Right 9/1/2022    Procedure: BIOPSY, BONE MARROW;  Surgeon: Juju Oates PA-C;  Location: [facility] OR    CERVICAL FUSION      CHOLECYSTECTOMY      CYSTECTOMY OVARIAN BENIGN      EYE SURGERY      Per patient.    IR CVC TUNNEL PLACEMENT > 5 YRS OF AGE  02/01/2022    IR CVC TUNNEL REMOVAL LEFT  02/22/2022    PICC DOUBLE LUMEN PLACEMENT Left 05/25/2022    Left basilic, 44 cm, 2 cm external length    TONSILLECTOMY      WRIST SURGERY Left      acyclovir (ZOVIRAX) 400 MG tablet  albuterol (PROAIR HFA/PROVENTIL HFA/VENTOLIN HFA) 108 (90 Base) MCG/ACT inhaler  amLODIPine (NORVASC) 10 MG tablet  ascorbic acid 1000 MG TABS tablet  B Complex-C (VITAMIN B COMPLEX W/VITAMIN C) TABS tablet  fluconazole (DIFLUCAN) 200 MG tablet  Fluticasone-Umeclidin-Vilanterol (TRELEGY ELLIPTA) 100-62.5-25 MCG/INH oral inhaler  levETIRAcetam (KEPPRA) 750 MG tablet  levofloxacin (LEVAQUIN) 750 MG tablet  levothyroxine (SYNTHROID/LEVOTHROID) 112 MCG tablet  methadone (DOLOPHINE) 10 MG tablet  metoprolol succinate ER (TOPROL XL) 25 MG 24 hr tablet  naloxone (NARCAN) 4 MG/0.1ML nasal spray  ondansetron (ZOFRAN) 8  "MG tablet  oxyCODONE IR (ROXICODONE) 10 MG tablet  pantoprazole (PROTONIX) 40 MG EC tablet  polyethylene glycol (MIRALAX) 17 GM/Dose powder  sulfamethoxazole-trimethoprim (BACTRIM DS) 800-160 MG tablet  venlafaxine (EFFEXOR) 75 MG tablet  Vitamin D3 (VITAMIN D, CHOLECALCIFEROL,) 25 mcg (1000 units) tablet      No Known Allergies  Family History  Family History   Problem Relation Age of Onset    Heart Disease Mother     Cancer Mother         \"Around lungs\" - she explicitly said it was *not* lung cancer.    Diabetes Father     Heart Disease Father      Social History   Social History     Tobacco Use    Smoking status: Former     Packs/day: 1.00     Years: 40.00     Additional pack years: 0.00     Total pack years: 40.00     Types: Cigarettes     Passive exposure: Past    Smokeless tobacco: Former     Quit date: 2010   Substance Use Topics    Alcohol use: Not Currently    Drug use: Not Currently     Types: Marijuana         A medically appropriate review of systems was performed with pertinent positives and negatives noted in the HPI, and all other systems negative.    Physical Exam   BP: 132/67  Pulse: 105  Temp: 98.9  F (37.2  C)  Resp: 16  SpO2: 94 %  Physical Exam  Vital Signs Reviewed  Gen: Well nourished, well developed, resting comfortably, no acute distress  HEENT: NC/AT, PERRL, EOMI, MMM  Neck: Supple, FROM  CV: Regular Rate, no murmur/rub/gallop  Lungs/Chest: Normal Effort, CTAB  Abd: Non-distended, non-tender  MSK/Back: FROM, no visible deformity  Neuro: A&Ox3, GCS 14, CN II-XII unremarkable.  Following commands in all extremities.  Skin: Warm, Dry, Intact, no visible lesions    ED Course, Procedures, & Data      Patient seen and assessed in ambulance garage  Labs and broad-spectrum antibiotics ordered  Lactate elevated to 3.0, mild hypomagnesemia 1.5  Discussed with BMT fellow, will admit patient to their service. Hold off on steroids unless AMS worsens.  Admission discussed with the med onc swing " admitter    Procedures            EKG Interpretation:      Interpreted by Leonardo Nicole MD  Time reviewed: 1555  Symptoms at time of EKG: AMS   Rhythm: normal sinus   Rate: Normal  Axis: Normal  Ectopy: none  Conduction: normal  ST Segments/ T Waves: No acute ischemic changes  Q Waves: none  Comparison to prior: Artifact in V3, slightly less prominent T waves in V2 otherwise grossly unchanged    Clinical Impression: Normal sinus rhythm no GLENN                     Results for orders placed or performed during the hospital encounter of 11/22/23   Head CT w/o contrast     Status: None    Narrative    EXAM: CT HEAD W/O CONTRAST  11/22/2023 4:46 PM     HISTORY: Episode of AMS and vomiting       COMPARISON: CT head 11/8/2023. Brain MRI 10/28/2023.    TECHNIQUE: Using multidetector thin collimation helical acquisition  technique, axial, coronal and sagittal CT images from the skull base  to the vertex were obtained without intravenous contrast.   (topogram) image(s) also obtained and reviewed.    FINDINGS:  No acute intracranial hemorrhage, mass effect, or midline shift. No  acute loss of gray-white matter differentiation in the cerebral  hemispheres. Patchy areas of periventricular and subcortical white  matter hypoattenuation, unchanged. Stable punctate right basal ganglia  calcification. Ventricles are proportionate to the cerebral sulci.  Clear basal cisterns. Stable falx calcifications.    Right frontal bone lytic lesion, stable. The bony calvaria and the  bones of the skull base are otherwise normal. The visualized portions  of the paranasal sinuses are clear. Right mastoid effusion. Grossly  normal orbit.      Impression    IMPRESSION: No acute intracranial pathology. Stable moderate chronic  small vessel disease.    I have personally reviewed the examination and initial interpretation  and I agree with the findings.    CARL IRAHETA MD         SYSTEM ID:  [id number]   CT Chest Abdomen Pelvis w/o Contrast      Status: None    Narrative    EXAM: CT CHEST ABDOMEN PELVIS W/O CONTRAST  LOCATION: [facility]  DATE: 11/22/2023    INDICATION: Fever.  COMPARISON: CT chest from 11/09/2023.  TECHNIQUE: CT scan of the chest, abdomen, and pelvis was performed without IV contrast. Multiplanar reformats were obtained. Dose reduction techniques were used.   CONTRAST: None.    FINDINGS:   LUNGS AND PLEURA: Very mild paraseptal emphysematous changes. Tree-in-bud infiltrates in the right upper lobe noted previously have improved. There is new endobronchial debris throughout the segmental and subsegmental bronchi of the right lower lobe with   mild tree-in-bud opacities centrally in this distribution. A 5 mm left lower lobe nodule on image 174 is stable over short interval follow-up. Trace right pleural effusion.    MEDIASTINUM/AXILLAE: Heart size within normal limits. Multivessel coronary artery disease.    CORONARY ARTERY CALCIFICATION: Moderate to severe    HEPATOBILIARY: Absent gallbladder. Liver within normal limits for noncontrast appearance.    PANCREAS: Normal.    SPLEEN: Normal.    ADRENAL GLANDS: Normal.    KIDNEYS/BLADDER: Borderline right hydronephrosis but no obstructing ureteral stone. Normal left kidney. Normal bladder.    BOWEL: No bowel obstruction or free air. No appendicitis.    LYMPH NODES: Normal.    VASCULATURE: Severe aortoiliac atherosclerotic calcification.    PELVIC ORGANS: Normal.    MUSCULOSKELETAL: Diffuse osteopenia. Bilateral breast implants. Advanced degenerative changes of the thoracolumbar spine. Redemonstrated paraspinal mass with resultant cortical erosion along the rightward aspect of the T10 vertebral body. This again   measures roughly 6 cm on image 40 of series 2. Smaller paraspinal mass is also noted on images 50 and 51.      Impression    IMPRESSION:  1.  New endobronchial debris throughout the right lower lobe with mild tree-in-bud infiltrate centrally  in this distribution consistent with infectious/inflammatory process. Recommend correlation for potential aspiration.    2.  Redemonstrated lower thoracic paraspinal mass with cortical erosion at the T10 vertebral body presumably relating to patient's known multiple myeloma.    3.  Borderline right hydronephrosis but no obstructing stone.   Dewar Draw     Status: None    Narrative    The following orders were created for panel order [facility] Draw.  Procedure                               Abnormality         Status                     ---------                               -----------         ------                     Extra Blue Top Tube[[id number]]                              Final result               Extra Green Top (Lithium...[[id number]]                                                 Extra Purple Top Tube[[id number]]                            Final result                 Please view results for these tests on the individual orders.   Lactic acid whole blood     Status: Abnormal   Result Value Ref Range    Lactic Acid 3.0 (H) 0.7 - 2.0 mmol/L   Ammonia     Status: Abnormal   Result Value Ref Range    Ammonia 81 (H) 11 - 51 umol/L   Extra Blue Top Tube     Status: None   Result Value Ref Range    Hold Specimen JIC    Extra Purple Top Tube     Status: None   Result Value Ref Range    Hold Specimen JIC    Lipase     Status: Normal   Result Value Ref Range    Lipase 37 13 - 60 U/L   Troponin T, High Sensitivity     Status: Abnormal   Result Value Ref Range    Troponin T, High Sensitivity 32 (H) <=14 ng/L   Magnesium     Status: Abnormal   Result Value Ref Range    Magnesium 1.5 (L) 1.7 - 2.3 mg/dL   Symptomatic Influenza A/B, RSV, & SARS-CoV2 PCR (COVID-19) Nasopharyngeal     Status: Normal    Specimen: Nasopharyngeal; Swab   Result Value Ref Range    Influenza A PCR Negative Negative    Influenza B PCR Negative Negative    RSV PCR Negative Negative    SARS CoV2 PCR Negative Negative    Narrative    Testing was  performed using the Xpert Xpress CoV2/Flu/RSV Assay on the [employer] GeneXpert Instrument. This test should be ordered for the detection of SARS-CoV-2, influenza, and RSV viruses in individuals who meet clinical and/or epidemiological criteria. Test performance is unknown in asymptomatic patients. This test is for in vitro diagnostic use under the FDA EUA for laboratories certified under CLIA to perform high or moderate complexity testing. This test has not been FDA cleared or approved. A negative result does not rule out the presence of PCR inhibitors in the specimen or target RNA in concentration below the limit of detection for the assay. If only one viral target is positive but coinfection with multiple targets is suspected, the sample should be re-tested with another FDA cleared, approved, or authorized test, if coinfection would change clinical management. This test was validated by the [facility] [employer]. These laboratories are certified under the Clinical Laboratory Improvement Amendments of 1988 (CLIA-88) as qualified to perform high complexity laboratory testing.   Procalcitonin     Status: Normal   Result Value Ref Range    Procalcitonin 0.29 <0.50 ng/mL   CBC with platelets and differential     Status: Abnormal   Result Value Ref Range    WBC Count 4.1 4.0 - 11.0 10e3/uL    RBC Count 3.03 (L) 3.80 - 5.20 10e6/uL    Hemoglobin 10.3 (L) 11.7 - 15.7 g/dL    Hematocrit 32.2 (L) 35.0 - 47.0 %     (H) 78 - 100 fL    MCH 34.0 (H) 26.5 - 33.0 pg    MCHC 32.0 31.5 - 36.5 g/dL    RDW 15.2 (H) 10.0 - 15.0 %    Platelet Count 158 150 - 450 10e3/uL    % Neutrophils      % Lymphocytes      % Monocytes      % Eosinophils      % Basophils      % Immature Granulocytes      NRBCs per 100 WBC 1 (H) <1 /100    Absolute Neutrophils      Absolute Lymphocytes      Absolute Monocytes      Absolute Eosinophils      Absolute Basophils      Absolute Immature Granulocytes      Absolute NRBCs 0.0 10e3/uL   Extra  Tube     Status: None    Narrative    The following orders were created for panel order Extra Tube.  Procedure                               Abnormality         Status                     ---------                               -----------         ------                     Extra Blue Top Tube[[id number]]                              Final result               Extra Green Top (Lithium...[[id number]]                      Final result               Extra Purple Top Tube[[id number]]                            Final result               Extra Purple Top Tube[[id number]]                            Final result                 Please view results for these tests on the individual orders.   Extra Blue Top Tube     Status: None   Result Value Ref Range    Hold Specimen JIC    Extra Green Top (Lithium Heparin) Tube     Status: None   Result Value Ref Range    Hold Specimen JIC    Extra Purple Top Tube     Status: None   Result Value Ref Range    Hold Specimen JIC    Extra Purple Top Tube     Status: None   Result Value Ref Range    Hold Specimen JIC    Lactic acid whole blood     Status: Normal   Result Value Ref Range    Lactic Acid 1.0 0.7 - 2.0 mmol/L   Comprehensive metabolic panel     Status: Abnormal   Result Value Ref Range    Sodium 139 135 - 145 mmol/L    Potassium 4.4 3.4 - 5.3 mmol/L    Carbon Dioxide (CO2) 22 22 - 29 mmol/L    Anion Gap 11 7 - 15 mmol/L    Urea Nitrogen 14.6 8.0 - 23.0 mg/dL    Creatinine 1.17 (H) 0.51 - 0.95 mg/dL    GFR Estimate 50 (L) >60 mL/min/1.73m2    Calcium 9.7 8.8 - 10.2 mg/dL    Chloride 106 98 - 107 mmol/L    Glucose 104 (H) 70 - 99 mg/dL    Alkaline Phosphatase 305 (H) 40 - 150 U/L    AST 63 (H) 0 - 45 U/L    ALT 27 0 - 50 U/L    Protein Total 5.9 (L) 6.4 - 8.3 g/dL    Albumin 3.4 (L) 3.5 - 5.2 g/dL    Bilirubin Total 0.6 <=1.2 mg/dL   Manual Differential     Status: Abnormal   Result Value Ref Range    % Neutrophils 66 %    % Lymphocytes 20 %    % Monocytes 4 %    % Eosinophils 1 %    %  Basophils 0 %    % Metamyelocytes 3 %    % Myelocytes 6 %    NRBCs per 100 WBC 1 (H) <=0 %    Absolute Neutrophils 2.7 1.6 - 8.3 10e3/uL    Absolute Lymphocytes 0.8 0.8 - 5.3 10e3/uL    Absolute Monocytes 0.2 0.0 - 1.3 10e3/uL    Absolute Eosinophils 0.0 0.0 - 0.7 10e3/uL    Absolute Basophils 0.0 0.0 - 0.2 10e3/uL    Absolute Metamyelocytes 0.1 (H) <=0.0 10e3/uL    Absolute Myelocytes 0.2 (H) <=0.0 10e3/uL    Absolute NRBCs 0.0 <=0.0 10e3/uL    RBC Morphology Confirmed RBC Indices     Platelet Assessment  Automated Count Confirmed. Platelet morphology is normal.     Automated Count Confirmed. Platelet morphology is normal.   Keppra (Levetiracetam) Level     Status: Abnormal   Result Value Ref Range    Keppra (Levetiracetam) Level 71.4 (H) 10.0 - 40.0  g/mL   Lipase     Status: Normal   Result Value Ref Range    Lipase 32 13 - 60 U/L   Magnesium     Status: Abnormal   Result Value Ref Range    Magnesium 1.4 (L) 1.7 - 2.3 mg/dL   INR     Status: Normal   Result Value Ref Range    INR 1.10 0.85 - 1.15   CBC with platelets and differential     Status: Abnormal   Result Value Ref Range    WBC Count 2.7 (L) 4.0 - 11.0 10e3/uL    RBC Count 3.29 (L) 3.80 - 5.20 10e6/uL    Hemoglobin 11.1 (L) 11.7 - 15.7 g/dL    Hematocrit 34.6 (L) 35.0 - 47.0 %     (H) 78 - 100 fL    MCH 33.7 (H) 26.5 - 33.0 pg    MCHC 32.1 31.5 - 36.5 g/dL    RDW 15.4 (H) 10.0 - 15.0 %    Platelet Count 157 150 - 450 10e3/uL    % Neutrophils      % Lymphocytes      % Monocytes      % Eosinophils      % Basophils      % Immature Granulocytes      NRBCs per 100 WBC 1 (H) <1 /100    Absolute Neutrophils      Absolute Lymphocytes      Absolute Monocytes      Absolute Eosinophils      Absolute Basophils      Absolute Immature Granulocytes      Absolute NRBCs 0.0 10e3/uL   Prolactin     Status: Normal   Result Value Ref Range    Prolactin 14 5 - 23 ng/mL   TSH with free T4 reflex     Status: Normal   Result Value Ref Range    TSH 0.89 0.30 - 4.20 uIU/mL    Manual Differential     Status: Abnormal   Result Value Ref Range    % Neutrophils 70 %    % Lymphocytes 15 %    % Monocytes 11 %    % Eosinophils 1 %    % Basophils 0 %    % Myelocytes 3 %    NRBCs per 100 WBC 1 (H) <=0 %    Absolute Neutrophils 1.9 1.6 - 8.3 10e3/uL    Absolute Lymphocytes 0.4 (L) 0.8 - 5.3 10e3/uL    Absolute Monocytes 0.3 0.0 - 1.3 10e3/uL    Absolute Eosinophils 0.0 0.0 - 0.7 10e3/uL    Absolute Basophils 0.0 0.0 - 0.2 10e3/uL    Absolute Myelocytes 0.1 (H) <=0.0 10e3/uL    Absolute NRBCs 0.0 <=0.0 10e3/uL    RBC Morphology Confirmed RBC Indices     Platelet Assessment  Automated Count Confirmed. Platelet morphology is normal.     Automated Count Confirmed. Platelet morphology is normal.    [employer] Cells Moderate (A) None Seen   CBC with platelets differential     Status: Abnormal    Narrative    The following orders were created for panel order CBC with platelets differential.  Procedure                               Abnormality         Status                     ---------                               -----------         ------                     CBC with platelets and d...[[id number]]  Abnormal            Final result               Manual Differential[[id number]]          Abnormal            Final result                 Please view results for these tests on the individual orders.   ABO/RH Type and Screen  *Canceled*     Status: None ()    Narrative    The following orders were created for panel order ABO/RH Type and Screen .  Procedure                               Abnormality         Status                     ---------                               -----------         ------                       Please view results for these tests on the individual orders.   CBC with platelets differential     Status: Abnormal    Narrative    The following orders were created for panel order CBC with platelets differential.  Procedure                               Abnormality         Status                      ---------                               -----------         ------                     CBC with platelets and d...[[id number]]  Abnormal            Final result               Manual Differential[[id number]]          Abnormal            Final result                 Please view results for these tests on the individual orders.   ABO/RH Type and Screen  *Canceled*     Status: None ()    Narrative    The following orders were created for panel order ABO/RH Type and Screen .  Procedure                               Abnormality         Status                     ---------                               -----------         ------                     Adult Type and Screen[[id number]]                                                         Please view results for these tests on the individual orders.   PRA Donor Specific Antibody     Status: None ()    Narrative    The following orders were created for panel order PRA Donor Specific Antibody.  Procedure                               Abnormality         Status                     ---------                               -----------         ------                     PRA Donor Specific Antibody[[id number]]                                                   Please view results for these tests on the individual orders.     Medications   ondansetron (ZOFRAN) injection 4 mg (has no administration in time range)   vancomycin (VANCOCIN) 1,000 mg in 200 mL dextrose intermittent infusion (has no administration in time range)   acetaminophen (TYLENOL) tablet 325-650 mg (has no administration in time range)   prochlorperazine (COMPAZINE) injection 5-10 mg (has no administration in time range)     Or   prochlorperazine (COMPAZINE) tablet 5-10 mg (has no administration in time range)   LORazepam (ATIVAN) injection 0.5-1 mg (has no administration in time range)     Or   LORazepam (ATIVAN) tablet 0.5-1 mg (has no administration in time range)   ceFEPIme (MAXIPIME) 2 g vial to attach to   mL bag for ADULTS or 50 mL bag for PEDS (has no administration in time range)   acyclovir (ZOVIRAX) tablet 400 mg (has no administration in time range)   amLODIPine (NORVASC) tablet 10 mg (has no administration in time range)   fluconazole (DIFLUCAN) tablet 200 mg (has no administration in time range)   levETIRAcetam (KEPPRA) tablet 750 mg (has no administration in time range)   metoprolol succinate ER (TOPROL XL) 24 hr tablet 25 mg (has no administration in time range)   pantoprazole (PROTONIX) EC tablet 40 mg (has no administration in time range)   polyethylene glycol (MIRALAX) Packet 17 g (has no administration in time range)   sulfamethoxazole-trimethoprim (BACTRIM DS) 800-160 MG per tablet 1 tablet (has no administration in time range)   albuterol (PROVENTIL HFA/VENTOLIN HFA) inhaler (has no administration in time range)   fluticasone-vilanterol (BREO ELLIPTA) 100-25 MCG/ACT inhaler 1 puff (has no administration in time range)     And   umeclidinium (INCRUSE ELLIPTA) 62.5 MCG/ACT inhaler 1 puff (has no administration in time range)   venlafaxine (EFFEXOR) tablet 75 mg (has no administration in time range)   Vitamin D3 (CHOLECALCIFEROL) tablet 25 mcg (has no administration in time range)   sodium chloride 0.9 % infusion (has no administration in time range)   lactated ringers BOLUS 1,500 mL (0 mLs Intravenous Stopped 11/22/23 1906)   piperacillin-tazobactam (ZOSYN) 4.5 g vial to attach to  mL bag (0 g Intravenous Stopped 11/22/23 1651)   vancomycin (VANCOCIN) 1,250 mg in 0.9% NaCl 250 mL intermittent infusion (0 mg Intravenous Stopped 11/22/23 2023)   oxyCODONE IR (ROXICODONE) tablet 10 mg (10 mg Oral $Given 11/22/23 1931)     Labs Ordered and Resulted from Time of ED Arrival to Time of ED Departure   LACTIC ACID WHOLE BLOOD - Abnormal       Result Value    Lactic Acid 3.0 (*)    AMMONIA - Abnormal    Ammonia 81 (*)    TROPONIN T, HIGH SENSITIVITY - Abnormal    Troponin T, High Sensitivity 32 (*)     MAGNESIUM - Abnormal    Magnesium 1.5 (*)    CBC WITH PLATELETS AND DIFFERENTIAL - Abnormal    WBC Count 4.1      RBC Count 3.03 (*)     Hemoglobin 10.3 (*)     Hematocrit 32.2 (*)      (*)     MCH 34.0 (*)     MCHC 32.0      RDW 15.2 (*)     Platelet Count 158      % Neutrophils        % Lymphocytes        % Monocytes        % Eosinophils        % Basophils        % Immature Granulocytes        NRBCs per 100 WBC 1 (*)     Absolute Neutrophils        Absolute Lymphocytes        Absolute Monocytes        Absolute Eosinophils        Absolute Basophils        Absolute Immature Granulocytes        Absolute NRBCs 0.0     COMPREHENSIVE METABOLIC PANEL - Abnormal    Sodium 139      Potassium 4.4      Carbon Dioxide (CO2) 22      Anion Gap 11      Urea Nitrogen 14.6      Creatinine 1.17 (*)     GFR Estimate 50 (*)     Calcium 9.7      Chloride 106      Glucose 104 (*)     Alkaline Phosphatase 305 (*)     AST 63 (*)     ALT 27      Protein Total 5.9 (*)     Albumin 3.4 (*)     Bilirubin Total 0.6     DIFFERENTIAL - Abnormal    % Neutrophils 66      % Lymphocytes 20      % Monocytes 4      % Eosinophils 1      % Basophils 0      % Metamyelocytes 3      % Myelocytes 6      NRBCs per 100 WBC 1 (*)     Absolute Neutrophils 2.7      Absolute Lymphocytes 0.8      Absolute Monocytes 0.2      Absolute Eosinophils 0.0      Absolute Basophils 0.0      Absolute Metamyelocytes 0.1 (*)     Absolute Myelocytes 0.2 (*)     Absolute NRBCs 0.0      RBC Morphology Confirmed RBC Indices      Platelet Assessment        Value: Automated Count Confirmed. Platelet morphology is normal.   KEPPRA (LEVETIRACETAM) LEVEL - Abnormal    Keppra (Levetiracetam) Level 71.4 (*)    MAGNESIUM - Abnormal    Magnesium 1.4 (*)    CBC WITH PLATELETS AND DIFFERENTIAL - Abnormal    WBC Count 2.7 (*)     RBC Count 3.29 (*)     Hemoglobin 11.1 (*)     Hematocrit 34.6 (*)      (*)     MCH 33.7 (*)     MCHC 32.1      RDW 15.4 (*)     Platelet Count 157       % Neutrophils        % Lymphocytes        % Monocytes        % Eosinophils        % Basophils        % Immature Granulocytes        NRBCs per 100 WBC 1 (*)     Absolute Neutrophils        Absolute Lymphocytes        Absolute Monocytes        Absolute Eosinophils        Absolute Basophils        Absolute Immature Granulocytes        Absolute NRBCs 0.0     DIFFERENTIAL - Abnormal    % Neutrophils 70      % Lymphocytes 15      % Monocytes 11      % Eosinophils 1      % Basophils 0      % Myelocytes 3      NRBCs per 100 WBC 1 (*)     Absolute Neutrophils 1.9      Absolute Lymphocytes 0.4 (*)     Absolute Monocytes 0.3      Absolute Eosinophils 0.0      Absolute Basophils 0.0      Absolute Myelocytes 0.1 (*)     Absolute NRBCs 0.0      RBC Morphology Confirmed RBC Indices      Platelet Assessment        Value: Automated Count Confirmed. Platelet morphology is normal.    [place] Cells Moderate (*)    LIPASE - Normal    Lipase 37     INFLUENZA A/B, RSV, & SARS-COV2 PCR - Normal    Influenza A PCR Negative      Influenza B PCR Negative      RSV PCR Negative      SARS CoV2 PCR Negative     PROCALCITONIN - Normal    Procalcitonin 0.29     LACTIC ACID WHOLE BLOOD - Normal    Lactic Acid 1.0     LIPASE - Normal    Lipase 32     INR - Normal    INR 1.10     PROLACTIN - Normal    Prolactin 14     TSH WITH FREE T4 REFLEX - Normal    TSH 0.89     ROUTINE UA WITH MICROSCOPIC REFLEX TO CULTURE   COMPREHENSIVE METABOLIC PANEL   URIC ACID   PARTIAL THROMBOPLASTIN TIME   GLUCOSE MONITOR NURSING POCT   INFLUENZA A/B, RSV, & SARS-COV2 PCR   PROCALCITONIN   1,3-BETA D GLUCAN FUNGITELL   HUMAN HERPES TYPE 6 (HHV-6), QUANTITATIVE BY PCR   EBV DNA PCR QUANTITATIVE WHOLE BLOOD   VARICELLA ZOSTER VIRUS BY PCR BLOOD FLUID OR TISSUE   ROSA VIRUS BY PCR   HHV8 QUANTITATIVE PCR   AMMONIA   KAPPA AND LAMBDA LIGHT CHAIN   IMMUNOGLOBULIN TOTAL LIGHT CHAINS, URINE   IGG   PRA DONOR SPECIFIC ANTIBODY   BLOOD CULTURE   BLOOD CULTURE   FUNGAL OR YEAST  CULTURE ROUTINE   C. DIFFICILE TOXIN B PCR WITH REFLEX TO C. DIFFICILE ANTIGEN AND TOXINS A/B EIA   VANCOMYCIN RESISTANT ENTEROCOCCUS CULTURE (VRE)   CMV QUANTITATIVE, PCR   HERPES SIMPLEX VIRUS 1&2 PCR   PRA DONOR SPECIFIC ANTIBODY   ABO/RH TYPE AND SCREEN     CT Chest Abdomen Pelvis w/o Contrast   Final Result   IMPRESSION:   1.  New endobronchial debris throughout the right lower lobe with mild tree-in-bud infiltrate centrally in this distribution consistent with infectious/inflammatory process. Recommend correlation for potential aspiration.      2.  Redemonstrated lower thoracic paraspinal mass with cortical erosion at the T10 vertebral body presumably relating to patient's known multiple myeloma.      3.  Borderline right hydronephrosis but no obstructing stone.      Head CT w/o contrast   Final Result   IMPRESSION: No acute intracranial pathology. Stable moderate chronic   small vessel disease.      I have personally reviewed the examination and initial interpretation   and I agree with the findings.      CARL IRAHETA MD            SYSTEM ID:  [id number]      MR Brain w/o & w Contrast    (Results Pending)          Critical care was not performed.     Medical Decision Making  The patient's presentation was of high complexity (a chronic illness severe exacerbation, progression, or side effect of treatment).    The patient's evaluation involved:  ordering and/or review of 3+ test(s) in this encounter (see separate area of note for details)  discussion of management or test interpretation with another health professional (BMT, hospitalist)    The patient's management necessitated high risk (a decision regarding hospitalization).    Assessment & Plan    Libby Grimaldo is a 73 year old female with PMH of multiple myeloma status post stem cell transplant with recurrence and recently underwent CAR-T therapy complicated by ICANS.  She presented to the ED 11/8 with neutropenic fever and recurrent encephalopathy. Today,  she presents to the Emergency Department due to nausea and an episode prior to an IR Procedure that was aborted.    On arrival the patient appeared confused, she was not in acute distress.  Her symptoms have been worsening over a few days before being suddenly exacerbated during this episode with cyanosis and hypoxia during the procedure.  Exact etiology of this episode is uncertain.  Could be related to panic and distress.  She does use oxygen as needed at home but is on 2 L of oxygen now.    Initial lactate was 3 and patient received broad-spectrum antibiotics due to recent infection hospitalization and concern for possible sepsis.  Lactate did clear after fluid resuscitation.  Slight initial troponin with delta troponin ordered, there appears to be a delay in the laboratory results for the delta but ACS suspicion is low.  She does appear to have developed a mild VALARIE and clinically does not appear dehydrated.  She has some associated hypomagnesemia as well that we will need to be repleted.    Imaging shows continued issues in the lungs concerning for pneumonia, either persistent pneumonia from prior infection or new aspiration event.  Head CT is unremarkable.    BMT was consulted, they reviewed the patient's chart and recommended against starting steroids at this time based on her last hospitalization.  Agreed with continued infectious workup and treatment of underlying infection.  Recommended starting steroids if altered mental status worsens.  Given mental status worsening over 2 days lower suspicion for something like acute CVA.  No subacute to chronic findings on head CT.  No clear history from family of sudden development of focal deficits.  They state this appears in line with her response to prior infections.    Patient admitted by the swing admit on behalf of the malignant heme/BMT service after discussions with hospitalist and BMT on-call.    New Prescriptions    No medications on file       Final  diagnoses:   Acute encephalopathy   Elevated lactic acid level   Sepsis without acute organ dysfunction, due to unspecified organism (H)       Leonardo Nicole Jr., MD   [facility] EMERGENCY DEPARTMENT  11/22/2023     Leonardo Nicole MD  11/22/23 [id number]

## 2023-11-22 NOTE — PROGRESS NOTES
Attempted to access port x3 but was able to flushed easily but did not get a blood returned so heparin 500units was instilled and needle was taken out.Attempted to do an PIV and pt had a panic attack and desated and lips turned blue and O2 placed on pt and her O2 sats came up and refused to do the procedure.Dr Winston Bermudez was called and came and talked to pt and the procedure was cancelled.

## 2023-11-22 NOTE — LETTER
Libby Grimaldo MRN# [id number]   YOB: 1950 Age: 73 year old     Date of Admission:  11/22/23  Date of Discharge:  11/25/2023  Admitting Physician:  Farzana Ragsdale MD  Discharging Physician: Daniella att. providers found (Contact: ***)  Discharging Service:  {[id number]}  Hospitalization Status: {:[id number]}     Primary Care Clinic:  {:[id number]}  Primary Care Provider: Vielka Forman     {   Salutation            :[id number]}            You have been identified as the Primary Care Provider for Libby Grimaldo, who was recently admitted to the [facility].  Thank you for the referral to our hospital.  It is our goal to provide the highest quality of care for our patients, including planning for seamless continuity of care by providing you with timely, accurate and concise information.  After reviewing the following combined discharge summary and final progress note, please contact us if you have any remaining questions.  The Discharging Physician will be the best informed, with their contact information listed above.  If unable to reach them, or if you have received this letter in error, please call [phone] and someone will try to help you.      Outpatient Follow-Up - Palliative and Supportive Care   Tl Scott 66 y o  male [id number]    Assessment & Plan  1  Anxiety    2  Cancer related pain    3  Urothelial carcinoma of right distal ureter ([address] )      - Counseling on health screening and disease prevention, COVID-19 specific (CPT V65 49)    Medications adjusted this encounter:  Requested Prescriptions     Signed Prescriptions Disp Refills    ALPRAZolam (XANAX) 0 25 mg tablet 70 tablet 1     Sig: Take 1 tablet (0 25 mg total) by mouth 3 (three) times a day as needed for anxiety (no more than 3 tabs a day)    oxyCODONE ER (Xtampza ER) 36 MG C12A 90 capsule 0     Sig: Take 1 each by mouth 3 (three) times a day To prevent cancer painMax Daily Amount: 3 each    oxyCODONE (ROXICODONE) 20 MG TABS 180 tablet 0     Sig: Take 1-2 tablets (20-40 mg total) by mouth every 4 (four) hours as needed (20mg (1tab) for moderate pain, 40mg (2tab) for severe pain related to cancer  Max of 6tabs a day  )Max Daily Amount: 120 mg     No orders of the defined types were placed in this encounter  Medications Discontinued During This Encounter   Medication Reason    ALPRAZolam (XANAX) 0 25 mg tablet Reorder    oxyCODONE ER (Xtampza ER) 36 MG C12A Reorder    oxyCODONE (ROXICODONE) 20 MG TABS Reorder      - No changes to pain meds; refills sent to pharmacy for end-of-month to stay ahead of pain  - Will remain alert and responsive to pt's health care as he negotiates legal challenges  He should continue to have access to our expertise even if he is incarcerated  Wash Allen Lorenzo was seen today for symptoms and planning cares related to above illnesses  I have reviewed the patient's controlled substance dispensing history in the Prescription Drug Monitoring Program in compliance with the [facility] regulations before prescribing any controlled substances  They are invited to continue to follow with us    If there are questions or concerns, please contact us through our clinic/answering service 24 hours a day, seven days a week  Rohit Taylor MD  [facility] SPECIALTY [facility] Palliative and Supportive Care        Visit Information    Accompanied By: No one    Source of History: Self    History Limitations: None    Contacts: wife Jassi Hein - [phone]    History of Present Illness      Hardik Dawkins is a 66 y o  male who presents in follow-up of symptoms related to his urothelial ca of R ureter  He follows with Ms Sihtal Triplett and Dr Howard Sams of Med/Onc, Dr Fadi Garcia  Pertinent issues include: symptom management, depression or anxiety, support      Since last visit, he did have terrible flank pain, sharp and unremitting, for which he sought emergency cares  He was terribly worried that this pain -- identical to when he was dx'ed -- was a sign of cancer recurrence  W/up did not show this, but instead UTI  ABx were given and pt noted improved pain control with OTC ibuprofen and a few doses of IV opioids from ED  Today, he feels back to baseline, and is more concerned with upcoming probation violation sentencing hearing  This will occur on 3/18/21, and he continues to hope he will be offered house arrest   We noted that -- house arrest or imprisonment -- he should continue to have access to expert cares and visits with his doctors  He is in agreement with this plan, and will be sure to inform us of his status  Socially, he has moved out of daughter's home; now living with two roommates  Feels safe and stable in this new environment  Sonam Gale has been helpful, and he finally -- after much wrangling with insurance -- was able to transition off [place]    He feels that -- for the first time in a long while -- his pain control is decent, and sufficient to help him get through his day, to sleep well at night, and maintain his weight        Unfortunately, he is beset by several social challenges:    - court date pending on 3/18/21; possession with intent, which occurred during a time of probation for a prior conviction  He contends that he was not using, but his daughter Saran Lynn set him up     - re: medical decision making - Saran Lynn is his daughter by current wife, and we reviewed that his three children from previous unions plus current wife Duc Soto would form a coalition that would make deciisons  Saran Lynn would not have authority to contribute to these decisions  Five Wishes were discussed, and he is interested in this, but does not specify firmly to me today who would be his preferred decision makers        Previously, we reviewed that his pain may be closely correlated with an area of nerve injury previously noted on MRI, but he is not eager to obtain additional imaging, nor consider surgical options  We agreed to proceed conservatively with pain meds for now, and watchful waiting for the next month  He is due for a CT scan in meantime, for tumor staging, which may be a reasonable imaging modality to start with  Past medical, surgical, social, and family histories are reviewed and pertinent updates are made  Review of Systems   Constitution: Positive for decreased appetite  Negative for weight gain and weight loss  HENT: Negative for hoarse voice and nosebleeds  Eyes: Negative for vision loss in left eye and vision loss in right eye  Cardiovascular: Negative for chest pain and dyspnea on exertion  Respiratory: Negative for cough and shortness of breath  Endocrine: Negative for polydipsia, polyphagia and polyuria  Skin: Negative for flushing and itching  Musculoskeletal: Negative for falls  Gastrointestinal: Negative for anorexia, jaundice, nausea and vomiting  Genitourinary: Positive for flank pain (improving)  Negative for frequency  Neurological: Negative for dizziness and headaches  Psychiatric/Behavioral: Negative for depression and memory loss  The patient is nervous/anxious (upcoming sentencing)   The patient does not have insomnia  Vital Signs    /60 (BP Location: Left arm, Cuff Size: Large)   Pulse 84   Temp 97 7 °F (36 5 °C) (Temporal)   Ht 5' 5" (1 651 m)   Wt 64 4 kg (142 lb)   SpO2 98%   BMI 23 63 kg/m²     Physical Exam and Objective Data  Physical Exam  Constitutional:       General: He is not in acute distress  Appearance: He is ill-appearing  He is not toxic-appearing or diaphoretic  Comments: slender   HENT:      Head: Normocephalic and atraumatic  Right Ear: External ear normal       Left Ear: External ear normal    Eyes:      General:         Right eye: No discharge  Left eye: No discharge  Conjunctiva/sclera: Conjunctivae normal       Pupils: Pupils are equal, round, and reactive to light  Neck:      Trachea: No tracheal deviation  Cardiovascular:      Rate and Rhythm: Regular rhythm  Tachycardia present  Pulmonary:      Effort: Pulmonary effort is normal  No respiratory distress  Breath sounds: No stridor  Abdominal:      General: There is no distension  Palpations: Abdomen is soft  Comments: scaphoid   Skin:     General: Skin is warm and dry  Findings: No erythema or rash  Neurological:      General: No focal deficit present  Mental Status: He is alert and oriented to person, place, and time  Mental status is at baseline  Cranial Nerves: No cranial nerve deficit  Psychiatric:         Mood and Affect: Mood normal          Behavior: Behavior normal          Thought Content: Thought content normal          Judgment: Judgment normal        Radiology and Laboratory:  I personally reviewed and interpreted the following results: none new    30+ minutes was spent face to face with Frankie with greater than 50% of the time spent in counseling or coordination of care including: chart review; symptom pursuit  Additional time was also spent in discussing coronavirus vaccine indications, availability, and logistical challenges    All of the patient's or agent's questions were answered during this discussion

## 2023-11-22 NOTE — PROGRESS NOTES
After procedure cancelled patient started having chills, increased confusion (orientated x 1 to self only), started vomiting, unable to straighten arms and legs (per sister, this is happens when patient is getting an infection).  Oral temp 99.6, was 100.0 upon arrival. All other VSS. Patient cleaned up as she had vomit all over her upper body and clothing and wheeled to the emergency department to be assessed with sister and  accompanying patient. Brief report given to charge RN in ED.

## 2023-11-22 NOTE — ED NOTES
[facility]  Expected date:   Expected time:   Means of arrival:   Comments:  2A Pt

## 2023-11-22 NOTE — PROGRESS NOTES
Brief IR Progress Note: 11/22/23    Libby Grimaldo is a 73 year old who was scheduled for supraspinatus biopsy with us in IR on 11/22/23. In the pre-procedural unit (2A) she was very anxious about the procedure. Initially she declined to proceed with the procedure. After a lengthy discussion about the procedure between myself, the patient, and two of her family members, Libby was agreeable to proceed with the biopsy. Informed consent was obtained. However, prior to transfer from 2A to IR for the procedure, the patient became very anxious again. She was seen at the bedside by myself and refused to proceed with the procedure. She was oriented to person, place, and current events and was felt to have capacity to make this decision herself.    Plan:  - Biopsy canceled due to patient refusal to proceed.

## 2023-11-22 NOTE — LETTER
Libby Grimaldo MRN# [id number]   YOB: 1950 Age: 73 year old     Date of Admission:  11/22/23  Date of Discharge:  11/25/2023  3:09 PM  Admitting Physician:  Farzana Ragsdale MD  Discharging Physician: Lien Alvarenga PA-C   Discharging Service:  Hematology / Oncology  Hospitalization Status: Inpatient     Primary Care Clinic:  [facility]  Primary Care Provider: Vielka Forman     To Whom it May Concern:            You have been identified as the Primary Care Provider for Libby Grimaldo, who was recently admitted to the [facility].  Thank you for the referral to our hospital.  It is our goal to provide the highest quality of care for our patients, including planning for seamless continuity of care by providing you with timely, accurate and concise information.  After reviewing the following combined discharge summary and final progress note, please contact us if you have any remaining questions.  The Discharging Physician will be the best informed, with their contact information listed above.  If unable to reach them, or if you have received this letter in error, please call [phone] and someone will try to help you.

## 2023-11-22 NOTE — PRE-PROCEDURE
GENERAL PRE-PROCEDURE:   Procedure:  Image guided supraspinatus biopsy, either side  Date/Time:  11/22/2023 12:23 PM    Verbal consent obtained?: Yes    Written consent obtained?: Yes    Risks and benefits: Risks, benefits and alternatives were discussed    Consent given by:  Patient  Patient states understanding of procedure being performed: Yes    Patient's understanding of procedure matches consent: Yes    Procedure consent matches procedure scheduled: Yes    Expected level of sedation:  Moderate  Appropriately NPO:  Yes  ASA Class:  2  Mallampati  :  Grade 2- soft palate, base of uvula, tonsillar pillars, and portion of posterior pharyngeal wall visible  Lungs:  Lungs clear with good breath sounds bilaterally  Heart:  Normal heart sounds and rate  History & Physical reviewed:  History and physical reviewed and no updates needed  Statement of review:  I have reviewed the lab findings, diagnostic data, medications, and the plan for sedation

## 2023-11-22 NOTE — MEDICATION SCRIBE - ADMISSION MEDICATION HISTORY
Medication Scribe Admission Medication History    Admission medication history is complete. The information provided in this note is only as accurate as the sources available at the time of the update.    Information Source(s): Family member via in-person    Pertinent Information:   Patient has a long list of medications some of which she does not take. Patient has been prescribed Bactrim -160 MG tablet she takes 1 tablet by mouth twice a day every Mon and Tues last taken November 21, 2023. Prescribed Levaquin 750 mg daily, medication source had no information if patient was taking medication or not. Patient prescribed methadone prescription is  from a clinic, [facility] [address] [facility]. She takes 10 mg  of methadone 3 times daily morning, afternoon, and night.  Patient is not taking loperamide 2 mg capsule, or methocarbamol 500 MG tablet.  Changes made to PTA medication list:  Added: None  Deleted: loperamide 2 mg capsule, or methocarbamol 500 MG tablet per person handling her medication.  Changed: None    Medication Affordability:  Not including over the counter (OTC) medications, was there a time in the past 3 months when you did not take your medications as prescribed because of cost?: Yes    Allergies reviewed with patient and updates made in EHR: yes    Medication History Completed By: Luna Howard 11/22/2023 5:18 PM    PTA Med List   Medication Sig Last Dose    acyclovir (ZOVIRAX) 400 MG tablet TAKE 1 TABLET(400 MG) BY MOUTH EVERY 12 HOURS 11/22/2023 at am    albuterol (PROAIR HFA/PROVENTIL HFA/VENTOLIN HFA) 108 (90 Base) MCG/ACT inhaler Inhale 2 puffs into the lungs every 6 hours as needed for shortness of breath Unknown at as needed    amLODIPine (NORVASC) 10 MG tablet Take 1 tablet (10 mg) by mouth daily 11/22/2023 at am    ascorbic acid 1000 MG TABS tablet Take 1,000 mg by mouth daily Unknown at unknown    B Complex-C (VITAMIN B COMPLEX W/VITAMIN C) TABS tablet  Take 1 tablet by mouth daily 11/21/2023 at am    fluconazole (DIFLUCAN) 200 MG tablet Take 1 tablet (200 mg) by mouth daily Take when ANC < 1. Clinic can help guide this for you. Unknown at unknown    Fluticasone-Umeclidin-Vilanterol (TRELEGY ELLIPTA) 100-62.5-25 MCG/INH oral inhaler Inhale 1 puff into the lungs daily  11/22/2023 at am    levETIRAcetam (KEPPRA) 750 MG tablet Take 1 tablet (750 mg) by mouth 2 times daily 11/22/2023 at am    levofloxacin (LEVAQUIN) 750 MG tablet Take 1 tablet (750 mg) by mouth daily Unknown at unknown    levothyroxine (SYNTHROID/LEVOTHROID) 112 MCG tablet Take 112 mcg by mouth daily Unknown at unknown    methadone (DOLOPHINE) 10 MG tablet Take 1 tablet (10 mg) by mouth 3 times daily Morning, afternoon, and night 11/22/2023 at am    metoprolol succinate ER (TOPROL XL) 25 MG 24 hr tablet TAKE 1 TABLET(25 MG) BY MOUTH DAILY Unknown at unknown    naloxone (NARCAN) 4 MG/0.1ML nasal spray Spray 1 spray (4 mg) into one nostril alternating nostrils as needed for opioid reversal every 2-3 minutes until assistance arrives Unknown at as needed    ondansetron (ZOFRAN) 8 MG tablet Take 1 tablet (8 mg) by mouth every 8 hours as needed for nausea 11/21/2023 at am    oxyCODONE IR (ROXICODONE) 10 MG tablet Take 1 tablet (10 mg) by mouth every 6 hours as needed for moderate to severe pain Unknown at as needed    pantoprazole (PROTONIX) 40 MG EC tablet Take 1 tablet (40 mg) by mouth daily Unknown at unknown    polyethylene glycol (MIRALAX) 17 GM/Dose powder Take 17 g by mouth daily Unknown at unknown    sulfamethoxazole-trimethoprim (BACTRIM DS) 800-160 MG tablet Take 1 tablet by mouth Every Mon, Tues two times daily Start on Monday 6/27/2022 11/21/2023 at unknown    venlafaxine (EFFEXOR) 75 MG tablet Take 1 tablet (75 mg) by mouth 2 times daily 11/22/2023 at am    Vitamin D3 (VITAMIN D, CHOLECALCIFEROL,) 25 mcg (1000 units) tablet Take 1 tablet by mouth daily 11/21/2023 at night

## 2023-11-22 NOTE — PHARMACY-VANCOMYCIN DOSING SERVICE
"Pharmacy Vancomycin Initial Note  Date of Service 2023  Patient's  1950  73 year old, female    Indication: Sepsis    Current estimated CrCl = Estimated Creatinine Clearance: 33.7 mL/min (A) (based on SCr of 1.17 mg/dL (H)).    Creatinine for last 3 days  2023:  4:15 PM Creatinine 1.17 mg/dL    Recent Vancomycin Level(s) for last 3 days  No results found for requested labs within last 3 days.      Vancomycin IV Administrations (past 72 hours)        No vancomycin orders with administrations in past 72 hours.                    Nephrotoxins and other renal medications (From now, onward)      Start     Dose/Rate Route Frequency Ordered Stop    23 1600  vancomycin (VANCOCIN) 1,250 mg in 0.9% NaCl 250 mL intermittent infusion         1,250 mg  over 90 Minutes Intravenous ONCE 23 1558      23 1559  vancomycin place deluca - receiving intermittent dosing         1 each Does not apply SEE ADMIN INSTRUCTIONS 23 1559      23 1555  piperacillin-tazobactam (ZOSYN) 4.5 g vial to attach to  mL bag        Note to Pharmacy: For [facility], [facility] and [facility]: For Zosyn-naive patients, use the \"Zosyn initial dose + extended infusion\" order panel.    4.5 g  over 30 Minutes Intravenous ONCE 23 1553              Contrast Orders - past 72 hours (72h ago, onward)      None            [employer] Prediction of Planned Initial Vancomycin Regimen  Loading dose: 1250mg IV once 23  Regimen: 1000 mg IV every 24 hours.  Start time: 06:03 on 2023  Exposure target: AUC24 (range)400-600 mg/L.hr   AUC24,ss: 578 mg/L.hr  Probability of AUC24 > 400: 88 %  Ctrough,ss: 17.8 mg/L  Probability of Ctrough,ss > 20: 38 %  Probability of nephrotoxicity (Lodise CHEL ): 14 %            Plan:  Start vancomycin  1250 mg IV once followed by 1000mg q24H  Vancomycin monitoring method: AUC  Vancomycin therapeutic monitoring goal: 400-600 mg*h/L  Pharmacy will check vancomycin levels as appropriate " in 1-3 Days.    Serum creatinine levels will be ordered daily for the first week of therapy and at least twice weekly for subsequent weeks.      Rosalinda Nettles RPH

## 2023-11-23 NOTE — PROGRESS NOTES
CLINICAL NUTRITION SERVICES - ASSESSMENT NOTE     Nutrition Prescription    RECOMMENDATIONS FOR MDs/PROVIDERS TO ORDER:  Continue with diet as tolerated + oral nutrition supplements     Malnutrition Status:    Severe malnutrition in the context of chronic illness    Recommendations already ordered by Registered Dietitian (RD):  Calorie count ordered to quantify intake  Ordered ensure plus with meals     Future/Additional Recommendations:  PO adequacy  Wt trend       REASON FOR ASSESSMENT  Libby Grimaldo is a/an 73 year old female assessed by the dietitian for Provider Order - ED consult for : to assess for malnutrition     Chart reviewed:  Admitted on 11/22/2023.  PMH: BMT 5/27/2022 for multiple myeloma in relapse, currently on Teclistamab  as rescue therapy, History of seizures, hypothyroidism, HTN, COPD, Graves disease, Idopathic pulmonary fibrosis,     - She presents with worsening mentation and fevers at home.   - Per family members patient is appearing more lethargic and confused off late, The symptoms started  a few days ago and have progressed over time,     MRI brain pending to better delineate brain parenchymal disease process     NUTRITION HISTORY  Recent hospitalization early in November. Per RD note from 11/3, patient was eating mostly 100% of meals at that time.     Visited with patient today in the ED. Patient reports eating well. Somewhat appeared confused. She could not tell me what she usually eats or when her last po intake was. Patients notes, she utilizes ensure at home up to 2 per day and in agreement with a trial of ensure in hospital.     CURRENT NUTRITION ORDERS  Diet: High Kcal/High Protein  Intake/Tolerance: No po intake today.     LABS  BUN: 11.8, Cr: 1.0, GFR: 60  Na+: 141  K+: 3.5, Mg++: 1.4 (L)  Glucose: 76  CRP:31  UA: negative ketones     MEDICATIONS  Vitamin D3  Bowel regimen  Protonix  Keppra     ANTHROPOMETRICS  Height: 0 cm (Data Unavailable) 156.2 cm   Most Recent Weight: most  recent wt 48.4 kg on 11/22 prior to admit      IBW: 48.9 kg ( 99% IBW)  BMI: 19.83 kg /m2 -> Normal BMI  Weight History: 5% wt loss over the past month   Wt Readings from Last 10 Encounters:   11/22/23 48.4 kg (106 lb 11.2 oz)   11/04/23 48.9 kg (107 lb 14.4 oz)   10/23/23 50.8 kg (112 lb 1.6 oz)   09/27/23 51.6 kg (113 lb 12.8 oz)   08/23/23 48.9 kg (107 lb 14.4 oz)   08/21/23 49.3 kg (108 lb 11.2 oz)   08/10/23 49.8 kg (109 lb 12.8 oz)   08/08/23 49 kg (108 lb)   06/26/23 47.7 kg (105 lb 1.6 oz)   06/19/23 47.6 kg (104 lb 14.4 oz)       Dosing Weight: 48 kg admit wt     ASSESSED NUTRITION NEEDS  Estimated Energy Needs: 1440- 1680 kcals/day (30 - 35 kcals/kg )  Justification: Maintenance  Estimated Protein Needs: 58- 72 grams protein/day (1.2 - 1.5 grams of pro/kg)  Justification: increased need with chemo   Estimated Fluid Needs:  (1 mL/kcal)   Justification: Maintenance    PHYSICAL FINDINGS  See malnutrition section below.    MALNUTRITION  % Intake: Unable to assess  % Weight Loss: 5% in 1 month (moderate)  Subcutaneous Fat Loss: Global severe   Muscle Loss: Global severe   Fluid Accumulation/Edema: None noted  Malnutrition Diagnosis: Severe malnutrition in the context of chronic illness    NUTRITION DIAGNOSIS  Malnutrition related to cancer therapy as evidenced by severe fat / muscle wasting, likely decrease oral intake       INTERVENTIONS  Implementation  Nutrition Education: Reviewed oral nutrition supplements availabilities and encouraged intake for wt restoration   Medical food supplement therapy     Goals  Patient to consume % of nutritionally adequate meal trays TID, or the equivalent with supplements/snacks.     Monitoring/Evaluation  Progress toward goals will be monitored and evaluated per protocol.  Randolph Jacobo RD/EFREM  Weekend/Holiday RD pager: [phone]     ED RD pager: [phone]

## 2023-11-23 NOTE — PROGRESS NOTES
Brief Hematology/Oncology Fellow Note  11/22/2023    I was contacted by ED about this patient earlier tonight.     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 She had a recent admission from 11/8/23 - 11/11/23 for similar symptoms/recurrent encephalopathy. She had received teclistimab and there was concern for neurotoxicity 2/2 teclistimab. She was trialed on steroids and did not have improvement. Much of her workup was negative at that time. It was thought she may have had a RUL PNA and the patient was transitioned to PO Levaquin on discharge after her mentation improved.     I had been called as there was concern for possible ICANS. It seems that based on prior discharge summary/notes that the patient had already received a steroid trial last admission without much improvement. She is reportedly hemodynamically stable. Therefore, I recommended to expand infectious workup including but not limited to  repeat CT Chest (given prior PNA), Keppra level, blood cultures and starting on empiric broad spectrum antibiotics. I asked ED to reach out again overnight should her mental status worsen/other new symptoms arise. If so, at that time may consider another trial of steroids (+/- possible Neurology consult as well).       Thank you for contacting hematology/oncology, please don't hesitate to re-page with questions.    Faraz Wetzel,    Hematology/Oncology/BMT Fellow PGY4  Pager: [phone]

## 2023-11-23 NOTE — PROGRESS NOTES
[id number]    V/S: VSS, afebrile. -120s. HR 60-90s.  Neuro: Pt is A&O x2 (to self & time, knows she's in a hospital but not sure where), No c/o headache & lightheadedness. No c/o numbness & tingling, calls appropriately.  Resp: 1L NC. Sats > 95, no c/o SOB. Lung sounds clear & diminished bilaterally.  Cardiac: No tele orders, HR SR. No c/o chest pain & palpitations.  GI/: No BG checks ordered. NPO except for meds, tolerating well. No FR. No c/o n/v/d. Voiding adequately via commode. Last BM PTA.  Skin: Skin dry & pale. No new deficits noted.  Pain: C/o R port pain (2/10), denied pain meds.  Activity: SBA in room & in hallways.  Electrolytes: Awaiting lab results.  LDAs: R port infusing w/ NS @ 100 ml/hr.     Plan of care ongoing.  Patient currently resting in bed with call light in reach.

## 2023-11-23 NOTE — PROGRESS NOTES
EEG CLINICAL NEUROPHYSIOLOGY PRELIMINARY REPORT    During quiet wakefulness, 6.5 to 7.5 Hz posterior dominant rhythm.  Excessive diffuse theta and some delta.  No persistent focal slowing.  EEG is reactive.  No epileptiform activity or seizures.    Findings are consistent with mild to moderate diffuse encephalopathy.  Thus far, no epileptiform discharges or seizures.  No indication of nonconvulsive status epilepticus.    Full report to follow.    Jose Mabry MD  Contact information for physicians covering Epilepsy and EEG is available on [facility].  Click search, enter neurology adult/[facility] in group name box, click on neurology adult/[facility], then click Staff Epilepsy and EEG.

## 2023-11-23 NOTE — PLAN OF CARE
Goal Outcome Evaluation:      Plan of Care Reviewed With: patient    Overall Patient Progress: no changeOverall Patient Progress: no change    Outcome Evaluation: Ordered calorie count and oral nutrition supplements

## 2023-11-23 NOTE — PROGRESS NOTES
[facility]    Hematology / Oncology Progress Note    Patient: Libby Grimaldo  MRN: [id number]  Admission Date: 11/22/2023  Date of Service (when I saw the patient): 11/23/2023  Hospital Day # 1     Assessment & Plan   Libby Grimaldo is a 73 year old female with a history of high risk IgG lambda multiple myeloma (most recently s/p 1 dose of teclistimab on 10/24, teclistimab ramp up course was c/b encephalopathy of uncertain etiology), Grave's disease, ILD/COPD, HTN, HLD, and opioid dependence. She was recently admitted 11/8-11/11 for recurrent encephalopathy which was thought to secondary to pneumonia and was discharged at baseline cognition. She presented to the ED 11/22 with recurrent encephalopathy.     Today:  - Pt remains afebrile. CT shows New endobronchial debris throughout the right lower lobe with mild tree-in-bud infiltrate centrally in this distribution concerning for aspiration pneumonia . Cultures NGTD. Continue broad spectrum abx with IV Zosyn + Vancomycin.  Low suspicion for delayed teclistamab toxicity as last dose was 3 weeks ago.   -SLP eval today, no concerns for aspiration.       ID  # Sepsis  # Pneumonia  Afebrile but noted to have elevated lactic acid of 3 and CRP increased as well. CT chest showed  new endobronchial debris throughout the right lower lobe with mild tree-in-bud infiltrate centrally in this distribution consistent with infectious/inflammatory process   Work up:   - BCx 11/22 NGTD  - UA bland  - COVID/flu/RSV negative  - MRSA nares negative  - HHV6, HHV8, ROSA and CMV pending  - Antibiotics:   - Zosyn 11/23-  - Vancomycin 11/23    # ID PPX  - Acyclovir 400 mg daily  - fluconazole ppx  - Bactrim BID on Monday/Tuesdays for PJP ppx    NEURO  # Acute toxic-metabolic encephalopathy,   Pt was admitted 10/24-11/5. She was initially admitted for teclistimab ramp up. Noted to have AMS ~days 1-3 after first dose of teclistimab, classified as grade 2  ICANs. She received dexamethasone without improvement in AMS, thus was discontinued and other etiologies were considered. Had stroke code called 11/3 as well. Work up included stroke work up (negative), LP (negative), urine drug screen (negative), EEG (moderate slowing c/w moderate diffuse encephalopathy). Work up also included MRI brain which showed increased size of frontal calvarial lesion and new R-sided parapharyngeal mass. Etiology unclear, thought possibly secondary to hospital acquired delirium vs. polypharmacy vs. teclistamab neurotoxicity vs. CNS infection as she is immunocompromised. By time of discharge on 11/5, was back to baseline cognition. readmitted again 11/8-11/11 for AMS and thought it was likely due to pneumonia. Now presents with acute encephalopathy on 11/22, remains AAOx 2 self and place only today.  - Work up this admission:   - CT head and CTA without acute findings  - Ammonia WNL  - Sepsis work up as above  - MRI read pending  - Chronically on methadone and oxycodone and this is unchanged so suspect less likely polypharmacy.   - Low suspicion for ICANS given the facts that only 1 low dose of teclistimab was given 10/24, not responsive to steroids so considered alternative etiology.   - Keppra level supratherapeutic range. No additional work up recommended at this time.  - Continue PTA Keppra for ppx  - Delirium precautions in place     # IgG lambda multiple myeloma, high-risk  # S/p autoPBST 2022  Follows with Dr. Guillory. Initially diagnosed in 11/2018, completed KRD x 6 cycles, rev/Dex (20), Velcade maintenance, revlimid maintenance, with evidence of disease BmBx 11/2020 (35% plasma cells) and PET CT with new lytic lesions.Then treated with kameron, Kd x 6 cycles, subcutaneous akmeron/IVIG/denosumab monthly. S/p autologous peripheral blood stem cell transplant in 2022. Followed by melphalan, ixazomib, and then started on palliative elotuzumab, pomolidomide, and dexamethasone chemotherapy  "4/2023 with most recent dose C5D1 9/27. Course c/b infection, fall. Due to rising lambda chains that were consistent with progression, teclistamab recommended. Received teclistimab C1D1=10/24/23, c/b AMS, thought initially thought to be grade 2 ICANS. Was given dexamethasone 10 mg x2 and started prophylactic Keppra and no significant improvement with dexamethasone. See above for full encephalopathy work up. During work up, was found to have new R parapharyngeal lesion, see below. Was also found to have increasing size of front calvarial lytic lesion. Most recent restaging was CT CAP on 11/2, which showed: \"Large enhancing soft tissue masses along bilateral scapulae with underlying cortical erosions and pathologic fracture. New soft tissue mass in the left thoracic paraspinal region. These may represent extraosseous myeloma versus extramedullary hematopoiesis. Increased size of the thoracic right paraspinal lesion. New solid pulmonary nodules. Cystic lesions of the pancreas are better appreciated on MRI of 5/25/2023.\"  - No acute inpatient needs  - Follow up with Dr. Guillory regarding next steps in treatment     # Incidental right parapharyngeal lesion  Found on MRI brain last admission. Lesion protrudes into the oropharynx. CT neck showed lesions in the supraspinatus and parapharyngeal space concerning for malignancy. Suspect extraosseous involvement from MM rather than from another primary malignancy. In discussion with ENT, was recommended for biopsy of supraspinatus. IR who outpatient biopsy if no acute change to management indicating inpatient need.   - scheduled for outpatient IR biopsy 11/22, she refused the procedure    # Anemia  # Thrombocytopenia  Secondary to malignancy  - Monitor CBC daily  - Transfuse to maintain Hgb >7, plt >10K    # History of LUE DVT     Noted on LUE US (2/15/22) to have non-occlusive LUE DVT. Previously on Xarelto.         CHRONIC  # HTN  # History of afib  on amlodipine. Given " sepsis, will be careful with BP management.        # COPD  COPD with 40 py history, quit 2010. Previously documented to have idiopathic pulmonary fibrosis, however most recent PFTs (1/26/22) were normal. Has previously required treatment for COPD exacerbation with pneumonia. No wheezing, no increased WOB.   - PTA Trelegy Ellipta inhaler daily (sub formulary inpt)  - PTA albuterol PRN     # Hypothyroidism  # History of Grave's disease  History of hypothyroidism 2/2 treatment for Grave's Disease (patient reports surgery in [place], however no thyroidectomy). Stable dose of levothyroxine over the past four years. Admission TSH WNL.  - Continue PTA levothyroxine      # Generalized anxiety disorder  - Continue PTA venlafaxine 75mg BID      # Chronic pain  - PTA methadone TID and PTA oxycodone 5 mg q6hr PRN  - Tylenol PRN  - Robaxin TID PRN  - Follow up with [facility] pain clinic     Clinically Significant Risk Factors Present on Admission            # Hypomagnesemia: Lowest Mg = 1.4 mg/dL in last 2 days, will replace as needed  # Anion Gap Metabolic Acidosis: Highest Anion Gap = 21 mmol/L in last 2 days, will monitor and treat as appropriate  # Hypoalbuminemia: Lowest albumin = 3.4 g/dL at 11/22/2023  4:15 PM, will monitor as appropriate   # Thrombocytopenia: Lowest platelets = 102 in last 2 days, will monitor for bleeding         # Severe Malnutrition: based on nutrition assessment       # Financial/Environmental Concerns:             FEN  Diet: Regular Diet Adult   IVF: Bolus PRN   Lytes: Replete per protocol    PPX  VTE: Lovenox    MISC  Code Status: Full Code   Lines/Drains: Port    Patient was seen and plan of care was discussed with attending physician Dr. Ragsdale.    I spent 80 minutes in the care of this patient today, which included time necessary for review of interval events, obtaining history and physical exam, ordering medications/tests/procedures as medically indicated, review of pertinent medical literature,  counseling of the patient, coordination of care, and documentation time. Over 50% of time was spent counseling the patient and/or coordinating care.    Selena Razo MD  Hematology Fellow    Interval History   Remains confused, AAO x2. She initially insisted on leaving AMA but now willing to stay, Reports no complains at this time. Denied fever, chills, cough or SOB.      Vital Signs with Ranges  Temp:  [97.8  F (36.6  C)-100.1  F (37.8  C)] 98.2  F (36.8  C)  Pulse:  [] 82  Resp:  [16-20] 16  BP: (119-132)/(45-76) 123/64  SpO2:  [94 %-100 %] 99 %  I/O last 3 completed shifts:  In: -   Out: 600 [Urine:600]    Physical Exam   General: Sitting up in bed, alert, NAD. Pleasant and conversational.  Skin: No concerning lesions, rash, jaundice, cyanosis, erythema, or ecchymoses on exposed surfaces.   HEENT: NCAT. Anicteric sclera. Moist mucous membranes with no lesions, erythema, or thrush. Dentures in place.   Respiratory: Non-labored breathing on room air, good air exchange. Faint bibasilar crackles, stable.   Cardiovascular: RRR. No murmur or rub.   Gastrointestinal: Normoactive BS. Abdomen soft, ND, NT. No palpable masses.  Extremities: No LE edema.   Neurologic: AAO x 2      Medications    sodium chloride 100 mL/hr at 11/23/23 1244      acyclovir  400 mg Oral Daily    amLODIPine  10 mg Oral Daily    fluconazole  200 mg Oral Daily    fluticasone-vilanterol  1 puff Inhalation Daily    And    umeclidinium  1 puff Inhalation Daily    levETIRAcetam  750 mg Oral BID    metoprolol succinate ER  25 mg Oral Daily    pantoprazole  40 mg Oral Daily    piperacillin-tazobactam  3.375 g Intravenous Q6H    polyethylene glycol  17 g Oral Daily    [START ON 11/27/2023] sulfamethoxazole-trimethoprim  1 tablet Oral Q Mon Tues BID    vancomycin  1,000 mg Intravenous Q24H    venlafaxine  75 mg Oral BID    Vitamin D3  25 mcg Oral Daily     Data   Results for orders placed or performed during the hospital encounter of 11/22/23  (from the past 24 hour(s))   [place] Draw    Narrative    The following orders were created for panel order [place] Draw.  Procedure                               Abnormality         Status                     ---------                               -----------         ------                     Extra Blue Top Tube[[id number]]                              Final result               Extra Green Top (Lithium...[[id number]]                                                 Extra Purple Top Tube[[id number]]                            Final result                 Please view results for these tests on the individual orders.   Lactic acid whole blood   Result Value Ref Range    Lactic Acid 3.0 (H) 0.7 - 2.0 mmol/L   Ammonia   Result Value Ref Range    Ammonia 81 (H) 11 - 51 umol/L   Extra Blue Top Tube   Result Value Ref Range    Hold Specimen JIC    Extra Purple Top Tube   Result Value Ref Range    Hold Specimen JIC    CBC with platelets differential    Narrative    The following orders were created for panel order CBC with platelets differential.  Procedure                               Abnormality         Status                     ---------                               -----------         ------                     CBC with platelets and d...[[id number]]  Abnormal            Final result               Manual Differential[[id number]]          Abnormal            Final result                 Please view results for these tests on the individual orders.   Lipase   Result Value Ref Range    Lipase 37 13 - 60 U/L   Troponin T, High Sensitivity   Result Value Ref Range    Troponin T, High Sensitivity 32 (H) <=14 ng/L   Magnesium   Result Value Ref Range    Magnesium 1.5 (L) 1.7 - 2.3 mg/dL   Procalcitonin   Result Value Ref Range    Procalcitonin 0.29 <0.50 ng/mL   CBC with platelets and differential   Result Value Ref Range    WBC Count 4.1 4.0 - 11.0 10e3/uL    RBC Count 3.03 (L) 3.80 - 5.20 10e6/uL    Hemoglobin 10.3 (L) 11.7  - 15.7 g/dL    Hematocrit 32.2 (L) 35.0 - 47.0 %     (H) 78 - 100 fL    MCH 34.0 (H) 26.5 - 33.0 pg    MCHC 32.0 31.5 - 36.5 g/dL    RDW 15.2 (H) 10.0 - 15.0 %    Platelet Count 158 150 - 450 10e3/uL    % Neutrophils      % Lymphocytes      % Monocytes      % Eosinophils      % Basophils      % Immature Granulocytes      NRBCs per 100 WBC 1 (H) <1 /100    Absolute Neutrophils      Absolute Lymphocytes      Absolute Monocytes      Absolute Eosinophils      Absolute Basophils      Absolute Immature Granulocytes      Absolute NRBCs 0.0 10e3/uL   Manual Differential   Result Value Ref Range    % Neutrophils 66 %    % Lymphocytes 20 %    % Monocytes 4 %    % Eosinophils 1 %    % Basophils 0 %    % Metamyelocytes 3 %    % Myelocytes 6 %    NRBCs per 100 WBC 1 (H) <=0 %    Absolute Neutrophils 2.7 1.6 - 8.3 10e3/uL    Absolute Lymphocytes 0.8 0.8 - 5.3 10e3/uL    Absolute Monocytes 0.2 0.0 - 1.3 10e3/uL    Absolute Eosinophils 0.0 0.0 - 0.7 10e3/uL    Absolute Basophils 0.0 0.0 - 0.2 10e3/uL    Absolute Metamyelocytes 0.1 (H) <=0.0 10e3/uL    Absolute Myelocytes 0.2 (H) <=0.0 10e3/uL    Absolute NRBCs 0.0 <=0.0 10e3/uL    RBC Morphology Confirmed RBC Indices     Platelet Assessment  Automated Count Confirmed. Platelet morphology is normal.     Automated Count Confirmed. Platelet morphology is normal.   Keppra (Levetiracetam) Level   Result Value Ref Range    Keppra (Levetiracetam) Level 71.4 (H) 10.0 - 40.0  g/mL   Comprehensive metabolic panel   Result Value Ref Range    Sodium 143 135 - 145 mmol/L    Potassium 4.2 3.4 - 5.3 mmol/L    Carbon Dioxide (CO2) 12 (L) 22 - 29 mmol/L    Anion Gap 21 (H) 7 - 15 mmol/L    Urea Nitrogen 15.0 8.0 - 23.0 mg/dL    Creatinine 1.19 (H) 0.51 - 0.95 mg/dL    GFR Estimate 49 (L) >60 mL/min/1.73m2    Calcium 9.4 8.8 - 10.2 mg/dL    Chloride 110 (H) 98 - 107 mmol/L    Glucose 106 (H) 70 - 99 mg/dL    Alkaline Phosphatase 297 (H) 40 - 150 U/L    AST 68 (H) 0 - 45 U/L    ALT 26 0 - 50  U/L    Protein Total 5.8 (L) 6.4 - 8.3 g/dL    Albumin 3.4 (L) 3.5 - 5.2 g/dL    Bilirubin Total 0.6 <=1.2 mg/dL   Uric acid   Result Value Ref Range    Uric Acid 6.5 (H) 2.4 - 5.7 mg/dL   EKG 12-lead, tracing only   Result Value Ref Range    Systolic Blood Pressure  mmHg    Diastolic Blood Pressure  mmHg    Ventricular Rate 94 BPM    Atrial Rate 94 BPM    TN Interval 172 ms    QRS Duration 78 ms     ms    QTc 440 ms    P Axis 82 degrees    R AXIS 29 degrees    T Axis 72 degrees    Interpretation ECG       Sinus rhythm  Nonspecific ST and T wave abnormality  Abnormal ECG  Unconfirmed report - interpretation of this ECG is computer generated - see medical record for final interpretation    Confirmed by - EMERGENCY ROOM, PHYSICIAN ([id number]),  ANETA CARLTON ([id number]) on 11/23/2023 7:25:41 AM     Symptomatic Influenza A/B, RSV, & SARS-CoV2 PCR (COVID-19) Nasopharyngeal    Specimen: Nasopharyngeal; Swab   Result Value Ref Range    Influenza A PCR Negative Negative    Influenza B PCR Negative Negative    RSV PCR Negative Negative    SARS CoV2 PCR Negative Negative    Narrative    Testing was performed using the Xpert Xpress CoV2/Flu/RSV Assay on the [employer] GeneXpert Instrument. This test should be ordered for the detection of SARS-CoV-2, influenza, and RSV viruses in individuals who meet clinical and/or epidemiological criteria. Test performance is unknown in asymptomatic patients. This test is for in vitro diagnostic use under the FDA EUA for laboratories certified under CLIA to perform high or moderate complexity testing. This test has not been FDA cleared or approved. A negative result does not rule out the presence of PCR inhibitors in the specimen or target RNA in concentration below the limit of detection for the assay. If only one viral target is positive but coinfection with multiple targets is suspected, the sample should be re-tested with another FDA cleared, approved, or authorized test, if coinfection  would change clinical management. This test was validated by the [facility]. These laboratories are certified under the Clinical Laboratory Improvement Amendments of 1988 (CLIA-88) as qualified to perform high complexity laboratory testing.   Extra Tube    Narrative    The following orders were created for panel order Extra Tube.  Procedure                               Abnormality         Status                     ---------                               -----------         ------                     Extra Blue Top Tube[[id number]]                              Final result               Extra Green Top (Lithium...[[id number]]                      Final result               Extra Purple Top Tube[[id number]]                            Final result               Extra Purple Top Tube[[id number]]                            Final result                 Please view results for these tests on the individual orders.   Extra Blue Top Tube   Result Value Ref Range    Hold Specimen JIC    Extra Green Top (Lithium Heparin) Tube   Result Value Ref Range    Hold Specimen JIC    Extra Purple Top Tube   Result Value Ref Range    Hold Specimen JIC    Extra Purple Top Tube   Result Value Ref Range    Hold Specimen JIC    Comprehensive metabolic panel   Result Value Ref Range    Sodium 139 135 - 145 mmol/L    Potassium 4.4 3.4 - 5.3 mmol/L    Carbon Dioxide (CO2) 22 22 - 29 mmol/L    Anion Gap 11 7 - 15 mmol/L    Urea Nitrogen 14.6 8.0 - 23.0 mg/dL    Creatinine 1.17 (H) 0.51 - 0.95 mg/dL    GFR Estimate 50 (L) >60 mL/min/1.73m2    Calcium 9.7 8.8 - 10.2 mg/dL    Chloride 106 98 - 107 mmol/L    Glucose 104 (H) 70 - 99 mg/dL    Alkaline Phosphatase 305 (H) 40 - 150 U/L    AST 63 (H) 0 - 45 U/L    ALT 27 0 - 50 U/L    Protein Total 5.9 (L) 6.4 - 8.3 g/dL    Albumin 3.4 (L) 3.5 - 5.2 g/dL    Bilirubin Total 0.6 <=1.2 mg/dL   Lipase   Result Value Ref Range    Lipase 32 13 - 60 U/L   CBC with platelets differential     Narrative    The following orders were created for panel order CBC with platelets differential.  Procedure                               Abnormality         Status                     ---------                               -----------         ------                     CBC with platelets and d...[[id number]]  Abnormal            Final result               Manual Differential[[id number]]          Abnormal            Final result                 Please view results for these tests on the individual orders.   Magnesium   Result Value Ref Range    Magnesium 1.4 (L) 1.7 - 2.3 mg/dL   INR   Result Value Ref Range    INR 1.10 0.85 - 1.15   ABO/RH Type and Screen  *Canceled*    Narrative    The following orders were created for panel order ABO/RH Type and Screen .  Procedure                               Abnormality         Status                     ---------                               -----------         ------                     Adult Type and Screen[[id number]]                                                         Please view results for these tests on the individual orders.   Procalcitonin   Result Value Ref Range    Procalcitonin 0.99 (H) <0.50 ng/mL   CBC with platelets and differential   Result Value Ref Range    WBC Count 2.7 (L) 4.0 - 11.0 10e3/uL    RBC Count 3.29 (L) 3.80 - 5.20 10e6/uL    Hemoglobin 11.1 (L) 11.7 - 15.7 g/dL    Hematocrit 34.6 (L) 35.0 - 47.0 %     (H) 78 - 100 fL    MCH 33.7 (H) 26.5 - 33.0 pg    MCHC 32.1 31.5 - 36.5 g/dL    RDW 15.4 (H) 10.0 - 15.0 %    Platelet Count 157 150 - 450 10e3/uL    % Neutrophils      % Lymphocytes      % Monocytes      % Eosinophils      % Basophils      % Immature Granulocytes      NRBCs per 100 WBC 1 (H) <1 /100    Absolute Neutrophils      Absolute Lymphocytes      Absolute Monocytes      Absolute Eosinophils      Absolute Basophils      Absolute Immature Granulocytes      Absolute NRBCs 0.0 10e3/uL   Prolactin   Result Value Ref Range    Prolactin  14 5 - 23 ng/mL   TSH with free T4 reflex   Result Value Ref Range    TSH 0.89 0.30 - 4.20 uIU/mL   Manual Differential   Result Value Ref Range    % Neutrophils 70 %    % Lymphocytes 15 %    % Monocytes 11 %    % Eosinophils 1 %    % Basophils 0 %    % Myelocytes 3 %    NRBCs per 100 WBC 1 (H) <=0 %    Absolute Neutrophils 1.9 1.6 - 8.3 10e3/uL    Absolute Lymphocytes 0.4 (L) 0.8 - 5.3 10e3/uL    Absolute Monocytes 0.3 0.0 - 1.3 10e3/uL    Absolute Eosinophils 0.0 0.0 - 0.7 10e3/uL    Absolute Basophils 0.0 0.0 - 0.2 10e3/uL    Absolute Myelocytes 0.1 (H) <=0.0 10e3/uL    Absolute NRBCs 0.0 <=0.0 10e3/uL    RBC Morphology Confirmed RBC Indices     Platelet Assessment  Automated Count Confirmed. Platelet morphology is normal.     Automated Count Confirmed. Platelet morphology is normal.    Roberto Cells Moderate (A) None Seen   CRP inflammation   Result Value Ref Range    CRP Inflammation 11.80 (H) <5.00 mg/L   Head CT w/o contrast    Narrative    EXAM: CT HEAD W/O CONTRAST  11/22/2023 4:46 PM     HISTORY: Episode of AMS and vomiting       COMPARISON: CT head 11/8/2023. Brain MRI 10/28/2023.    TECHNIQUE: Using multidetector thin collimation helical acquisition  technique, axial, coronal and sagittal CT images from the skull base  to the vertex were obtained without intravenous contrast.   (topogram) image(s) also obtained and reviewed.    FINDINGS:  No acute intracranial hemorrhage, mass effect, or midline shift. No  acute loss of gray-white matter differentiation in the cerebral  hemispheres. Patchy areas of periventricular and subcortical white  matter hypoattenuation, unchanged. Stable punctate right basal ganglia  calcification. Ventricles are proportionate to the cerebral sulci.  Clear basal cisterns. Stable falx calcifications.    Right frontal bone lytic lesion, stable. The bony calvaria and the  bones of the skull base are otherwise normal. The visualized portions  of the paranasal sinuses are clear.  Right mastoid effusion. Grossly  normal orbit.      Impression    IMPRESSION: No acute intracranial pathology. Stable moderate chronic  small vessel disease.    I have personally reviewed the examination and initial interpretation  and I agree with the findings.    CARL IRAHETA MD         SYSTEM ID:  [id number]   Lactic acid whole blood   Result Value Ref Range    Lactic Acid 1.0 0.7 - 2.0 mmol/L   ABO/RH Type and Screen  *Canceled*    Narrative    The following orders were created for panel order ABO/RH Type and Screen .  Procedure                               Abnormality         Status                     ---------                               -----------         ------                       Please view results for these tests on the individual orders.   CT Chest Abdomen Pelvis w/o Contrast    Narrative    EXAM: CT CHEST ABDOMEN PELVIS W/O CONTRAST  LOCATION: [facility]  DATE: 11/22/2023    INDICATION: Fever.  COMPARISON: CT chest from 11/09/2023.  TECHNIQUE: CT scan of the chest, abdomen, and pelvis was performed without IV contrast. Multiplanar reformats were obtained. Dose reduction techniques were used.   CONTRAST: None.    FINDINGS:   LUNGS AND PLEURA: Very mild paraseptal emphysematous changes. Tree-in-bud infiltrates in the right upper lobe noted previously have improved. There is new endobronchial debris throughout the segmental and subsegmental bronchi of the right lower lobe with   mild tree-in-bud opacities centrally in this distribution. A 5 mm left lower lobe nodule on image 174 is stable over short interval follow-up. Trace right pleural effusion.    MEDIASTINUM/AXILLAE: Heart size within normal limits. Multivessel coronary artery disease.    CORONARY ARTERY CALCIFICATION: Moderate to severe    HEPATOBILIARY: Absent gallbladder. Liver within normal limits for noncontrast appearance.    PANCREAS: Normal.    SPLEEN: Normal.    ADRENAL GLANDS:  Normal.    KIDNEYS/BLADDER: Borderline right hydronephrosis but no obstructing ureteral stone. Normal left kidney. Normal bladder.    BOWEL: No bowel obstruction or free air. No appendicitis.    LYMPH NODES: Normal.    VASCULATURE: Severe aortoiliac atherosclerotic calcification.    PELVIC ORGANS: Normal.    MUSCULOSKELETAL: Diffuse osteopenia. Bilateral breast implants. Advanced degenerative changes of the thoracolumbar spine. Redemonstrated paraspinal mass with resultant cortical erosion along the rightward aspect of the T10 vertebral body. This again   measures roughly 6 cm on image 40 of series 2. Smaller paraspinal mass is also noted on images 50 and 51.      Impression    IMPRESSION:  1.  New endobronchial debris throughout the right lower lobe with mild tree-in-bud infiltrate centrally in this distribution consistent with infectious/inflammatory process. Recommend correlation for potential aspiration.    2.  Redemonstrated lower thoracic paraspinal mass with cortical erosion at the T10 vertebral body presumably relating to patient's known multiple myeloma.    3.  Borderline right hydronephrosis but no obstructing stone.   Asymptomatic Influenza A/B, RSV, & SARS-CoV2 PCR (COVID-19) Nasopharyngeal    Specimen: Nasopharyngeal; Swab   Result Value Ref Range    Influenza A PCR Negative Negative    Influenza B PCR Negative Negative    RSV PCR Negative Negative    SARS CoV2 PCR Negative Negative    Narrative    Testing was performed using the Xpert Xpress CoV2/Flu/RSV Assay on the [employer] GeneXpert Instrument. This test should be ordered for the detection of SARS-CoV-2, influenza, and RSV viruses in individuals who meet clinical and/or epidemiological criteria. Test performance is unknown in asymptomatic patients. This test is for in vitro diagnostic use under the FDA EUA for laboratories certified under CLIA to perform high or moderate complexity testing. This test has not been FDA cleared or approved. A negative  result does not rule out the presence of PCR inhibitors in the specimen or target RNA in concentration below the limit of detection for the assay. If only one viral target is positive but coinfection with multiple targets is suspected, the sample should be re-tested with another FDA cleared, approved, or authorized test, if coinfection would change clinical management. This test was validated by the [facility]. These laboratories are certified under the Clinical Laboratory Improvement Amendments of 1988 (CLIA-88) as qualified to perform high complexity laboratory testing.   Blood Culture Arm, Right    Specimen: Arm, Right; Blood   Result Value Ref Range    Culture No growth after 12 hours    Partial thromboplastin time   Result Value Ref Range    aPTT 31 22 - 38 Seconds   Cytomegalovirus DNA by PCR, Quantitative    Specimen: Arm, Right; Blood   Result Value Ref Range    CMV DNA IU/mL Not Detected Not Detected IU/mL    Narrative    The tej  CMV assay is a FDA-approved in vitro nucleic acid amplification test for the quantification of cytomegalovirus (CMV) DNA in human EDTA plasma using the Roche tej  6800 instrument for automated viral nucleic acid extraction and purification (silica-based capture technique), followed by PCR amplification and real-time detection. Selective amplification of target nucleic acid from the sample is achieved by the use of target virus-specific forward and reverse primers which are selected from highly conserved regions of the CMV DNA polymerase (UL54) gene.     This test is intended for use as an aid in the management of CMV in transplant patients. In patients receiving anti-CMV therapy, serial DNA measurements can be used to assess viral response to treatment. Titer results are reported in International Units/mL (IU/mL). This assay has received FDA approval for the testing of human EDTA plasma only. The [facility] at [facility]  [place] has validated the performance characteristics of the tej  CMV assay for plasma and urine.   Ammonia   Result Value Ref Range    Ammonia 30 11 - 51 umol/L   PRA Donor Specific Antibody    Narrative    The following orders were created for panel order PRA Donor Specific Antibody.  Procedure                               Abnormality         Status                     ---------                               -----------         ------                     PRA Donor Specific Antibody[[id number]]                      In process                   Please view results for these tests on the individual orders.   Blood Culture Arm, Left    Specimen: Arm, Left; Blood   Result Value Ref Range    Culture No growth after 12 hours    UA with Microscopic reflex to Culture    Specimen: Urine, Clean Catch   Result Value Ref Range    Color Urine Yellow Colorless, Straw, Light Yellow, Yellow    Appearance Urine Clear Clear    Glucose Urine Negative Negative mg/dL    Bilirubin Urine Negative Negative    Ketones Urine Negative Negative mg/dL    Specific Gravity Urine 1.015 1.003 - 1.035    Blood Urine Negative Negative    pH Urine 6.0 5.0 - 7.0    Protein Albumin Urine 30 (A) Negative mg/dL    Urobilinogen Urine Normal Normal, 2.0 mg/dL    Nitrite Urine Negative Negative    Leukocyte Esterase Urine Negative Negative    RBC Urine <1 <=2 /HPF    WBC Urine 2 <=5 /HPF    Squamous Epithelials Urine 1 <=1 /HPF    Narrative    Urine Culture not indicated   MRSA MSSA PCR, Nasal Swab    Specimen: Nares, Bilateral; Swab   Result Value Ref Range    MRSA Target DNA Negative Negative    SA Target DNA Negative     Narrative    The [employer]  Xpert SA Nasal Complete assay performed in the [employer]  Dx System is a qualitative in vitro diagnostic test designed for rapid detection of Staphylococcus aureus (SA) and methicillin-resistant Staphylococcus aureus (MRSA) from nasal swabs in patients at risk for nasal colonization. The test utilizes  automated real-time polymerase chain reaction (PCR) to detect MRSA/SA DNA. The Xpert SA Nasal Complete assay is intended to aid in the prevention and control of MRSA/SA infections in healthcare settings. The assay is not intended to diagnose, guide or monitor treatment for MRSA/SA infections, or provide results of susceptibility to methicillin. A negative result does not preclude MRSA/SA nasal colonization.    ABO/Rh type and screen    Narrative    The following orders were created for panel order ABO/Rh type and screen.  Procedure                               Abnormality         Status                     ---------                               -----------         ------                     Adult Type and Screen[[id number]]                            Final result                 Please view results for these tests on the individual orders.   Adult Type and Screen   Result Value Ref Range    ABO/RH(D) A POS     Antibody Screen Negative Negative    SPECIMEN EXPIRATION DATE [id number]    Glucose by meter   Result Value Ref Range    GLUCOSE BY METER POCT 109 (H) 70 - 99 mg/dL   MR Brain w/o & w Contrast    Narrative    EXAM: MR BRAIN W/O AND W CONTRAST  LOCATION: [facility]  DATE: 11/23/2023    INDICATION: Febrile seizures. Neuro toxicity from immunosuppression.  COMPARISON: CT soft tissue neck dated 10/31/2023. MRI brain dated 08/01/2021.  CONTRAST: 5 ml Gadavist.  TECHNIQUE: Routine multiplanar multisequence head MRI without and with intravenous contrast.    FINDINGS:  INTRACRANIAL CONTENTS: No acute or subacute infarct. No acute hemorrhage or abnormal extra-axial fluid collection. Patchy nonspecific T2/FLAIR hyperintensities within the cerebral white matter most consistent with mild to moderate chronic microvascular   ischemic change. Mild generalized cerebral atrophy. No hydrocephalus. Normal position of the cerebellar tonsils. Along the undersurface of the anterior  right tentorial leaflet there is a small enhancing extra-axial nodule measuring 0.5 cm in transverse   dimensions, 0.4 cm in craniocaudal dimensions and approximately 0.7 cm in AP dimensions (image 15 series 12 and image 8 series 11). There is no significant adjacent vasogenic edema or significant local mass effect. This finding is not well seen on MRI   brain dated 08/01/2021, however there is significant motion on the prior exam which degrades evaluation in this region. On today's exam this lesion demonstrates isointense FLAIR signal and isointense T1 signal.    There is an incompletely imaged right parapharyngeal mass demonstrating avid postcontrast enhancement and diffusion restriction, suggesting a highly cellular tumor. This finding is better assessed on recent CT soft tissue neck from 10/31/2023.    SELLA: No abnormality accounting for technique.    OSSEOUS STRUCTURES/SOFT TISSUES: There is a T1 hypointense, T2 hyperintense well-circumscribed frontal bone lesion measuring up to 1.0 x 0.7 cm in greatest axial dimensions and 0.5 cm in craniocaudal dimensions demonstrating avid postcontrast enhancement   (new since MRI brain dated 08/01/2021). An additional subcentimeter enhancing lesion involving the left occipital bone measuring up to 0.8 cm is noted (image 14 series 11). This finding is also new since MRI brain dated 08/01/2021. The major   intracranial vascular flow voids are maintained.     ORBITS: No abnormality accounting for technique.     SINUSES/MASTOIDS: Mucosal thickening primarily involving the ethmoid air cells. Right middle ear effusion.       Impression    IMPRESSION:  1.  No acute or subacute infarct.  2.  Chronic senescent and presumed microvascular ischemic changes, as above.  3.  Small enhancing extra-axial lesion along the undersurface of the right tentorial leaflet measuring up to 0.7 cm. No significant local mass effect or associated vasogenic edema. This finding may reflect a small  meningioma although dural-based   metastases could have a similar appearance. Continued attention on follow-up imaging is recommended.  4.  Enhancing calvarial lesions involving the right frontal bone and left parietal bone are concerning for osseous metastatic disease versus multiple myeloma.  5.  Additional findings above.   Basic metabolic panel   Result Value Ref Range    Sodium 141 135 - 145 mmol/L    Potassium 3.5 3.4 - 5.3 mmol/L    Chloride 111 (H) 98 - 107 mmol/L    Carbon Dioxide (CO2) 23 22 - 29 mmol/L    Anion Gap 7 7 - 15 mmol/L    Urea Nitrogen 11.8 8.0 - 23.0 mg/dL    Creatinine 1.00 (H) 0.51 - 0.95 mg/dL    GFR Estimate 60 (L) >60 mL/min/1.73m2    Calcium 8.6 (L) 8.8 - 10.2 mg/dL    Glucose 76 70 - 99 mg/dL   CRP inflammation   Result Value Ref Range    CRP Inflammation 31.10 (H) <5.00 mg/L   Vancomycin Resistant Enterococcus (VRE) Culture    Specimen: Per Rectum; Swab   Result Value Ref Range    Culture Culture negative, monitoring continues    CBC with platelets differential    Narrative    The following orders were created for panel order CBC with platelets differential.  Procedure                               Abnormality         Status                     ---------                               -----------         ------                     CBC with platelets and d...[[id number]]  Abnormal            Final result                 Please view results for these tests on the individual orders.   CBC with platelets and differential   Result Value Ref Range    WBC Count 5.9 4.0 - 11.0 10e3/uL    RBC Count 2.45 (L) 3.80 - 5.20 10e6/uL    Hemoglobin 8.4 (L) 11.7 - 15.7 g/dL    Hematocrit 26.3 (L) 35.0 - 47.0 %     (H) 78 - 100 fL    MCH 34.3 (H) 26.5 - 33.0 pg    MCHC 31.9 31.5 - 36.5 g/dL    RDW 15.7 (H) 10.0 - 15.0 %    Platelet Count 102 (L) 150 - 450 10e3/uL    % Neutrophils 49 %    % Lymphocytes 35 %    % Monocytes 14 %    % Eosinophils 1 %    % Basophils 0 %    % Immature Granulocytes 1 %     NRBCs per 100 WBC 0 <1 /100    Absolute Neutrophils 2.9 1.6 - 8.3 10e3/uL    Absolute Lymphocytes 2.1 0.8 - 5.3 10e3/uL    Absolute Monocytes 0.8 0.0 - 1.3 10e3/uL    Absolute Eosinophils 0.1 0.0 - 0.7 10e3/uL    Absolute Basophils 0.0 0.0 - 0.2 10e3/uL    Absolute Immature Granulocytes 0.0 <=0.4 10e3/uL    Absolute NRBCs 0.0 10e3/uL

## 2023-11-23 NOTE — PROGRESS NOTES
"   11/23/23 0923   Appointment Info   Signing Clinician's Name / Credentials (SLP) Veronica Salazar MA CCC-SLP   General Information   Onset of Illness/Injury or Date of Surgery 11/22/23   Referring Physician Alphonso Crawford MD   Patient/Family Therapy Goal Statement (SLP) None stated   Pertinent History of Current Problem Pt is a 73 year old female with PMH of multiple myeloma status post stem cell transplant with recurrence and recently underwent CAR-T therapy complicated by ICANS.  She presented to the ED 11/8 with neutropenic fever and recurrent encephalopathy. Today, she presents to the Emergency Department due to nausea and an episode prior to an IR Procedure that was aborted. HPI was provided by  and sister. Patient feels nauseous and generally unwell. She was diagnosed with right upper lobe pneumonia on 11/8/2023, treated with IV antibiotics with improvement in fever and mental status, transitioned to oral Levaquin 11/10. She stopped taking Levquinn 750mg PO on 11/11/2023. She has no reported dysuria. Her sister reported that she feels \"warm\" but no temperature recorded.  She has reportedly had increasing confusion and weakness over couple days and appears to be acting like she does when she has an infection according to her family.  Her  states that in her current state she is not safe to go home with him and he is not confident in his ability to care for her.     Per chart review, She was recently admitted 10/24-11/5 for teclistimab ramp up which was c/b encephalopathy of uncertain etiology, and was discharged at baseline cognition. She presented to the ED 11/8 with neutropenic fever and recurrent encephalopathy.  Broad infectious work-up obtained, blood cultures remain no growth to date, urinalysis bland, COVID/flu/RSV negative, CT chest consistent with right upper lobe pneumonia.  Remained afebrile x24 hours, symptoms much improved and confusion resolved with mentation improved to baseline. She " has outpatient biopsy of pharyngeal mass on 11/22/23 and follow-up with oncology. Clinical swallow eval completed per MD order.   General Observations Upon SLP arrival, pt positioned upright in bed, AAO x2, and willing to participate.   Type of Evaluation   Type of Evaluation Swallow Evaluation   Oral Motor   Oral Musculature generally intact   Structural Abnormalities none present   Mucosal Quality good   Dentition (Oral Motor)   Dentition (Oral Motor) edentulous;dental appliance/dentures   Dental Appliance/Denture (Oral Motor) upper   Facial Symmetry (Oral Motor)   Facial Symmetry (Oral Motor) WNL   Lip Function (Oral Motor)   Lip Range of Motion (Oral Motor) WNL   Tongue Function (Oral Motor)   Tongue ROM (Oral Motor) WNL   Jaw Function (Oral Motor)   Jaw Function (Oral Motor) WNL   Cough/Swallow/Gag Reflex (Oral Motor)   Volitional Throat Clear/Cough (Oral Motor) WNL   Vocal Quality/Secretion Management (Oral Motor)   Vocal Quality (Oral Motor) WFL   General Swallowing Observations   Past History of Dysphagia No hx of dysphagia per chart review and pt report.   Respiratory Support nasal cannula  (2L)   Current Diet/Method of Nutritional Intake (General Swallowing Observations, NIS) NPO   Swallowing Evaluation Clinical swallow evaluation   Clinical Swallow Evaluation   Feeding Assistance set up only required   Clinical Swallow Evaluation Textures Trialed thin liquids;pureed;soft & bite-sized;solid foods   Clinical Swallow Eval: Thin Liquid Texture Trial   Mode of Presentation, Thin Liquids cup;straw;self-fed   Volume of Liquid or Food Presented 6 oz   Oral Phase of Swallow WFL   Pharyngeal Phase of Swallow intact   Diagnostic Statement No overt s/sx of aspiration.   Clinical Swallow Evaluation: Puree Solid Texture Trial   Mode of Presentation, Puree spoon;self-fed   Volume of Puree Presented 2 oz   Oral Phase, Puree WFL   Pharyngeal Phase, Puree intact   Diagnostic Statement No overt s/sx of aspiration.    Clinical Swallow Eval: Soft & Bite Sized   Mode of Presentation self-fed   Volume Presented 4 tbsp   Oral Phase WFL   Pharyngeal Phase intact   Diagnostic Statement No overt s/sx of aspiration.   Clinical Swallow Evaluation: Solid Food Texture Trial   Mode of Presentation self-fed   Volume Presented 1/2 cracker   Oral Phase   (Prolonged mashing/mastication)   Pharyngeal Phase intact   Diagnostic Statement No overt s/sx of aspiration.   Esophageal Phase of Swallow   Patient reports or presents with symptoms of esophageal dysphagia No   Swallowing Recommendations   Diet Consistency Recommendations regular diet;thin liquids (level 0)   Supervision Level for Intake distant supervision needed   Mode of Delivery Recommendations bolus size, small;slow rate of intake   Swallowing Maneuver Recommendations alternate food and liquid intake   Medication Administration Recommendations, Swallowing (SLP) As tolerated   Instrumental Assessment Recommendations instrumental evaluation not recommended at this time   Clinical Impression   Criteria for Skilled Therapeutic Interventions Met (SLP Eval) Evaluation only   SLP Diagnosis WFL swallow mechanism   Risks & Benefits of therapy have been explained evaluation/treatment results reviewed;care plan/treatment goals reviewed;risks/benefits reviewed;current/potential barriers reviewed;participants voiced agreement with care plan;participants included;patient   Clinical Impression Comments Clinical swallow eval completed per MD order. Pt's swallow function deemed WFL. Oral mech exam remarkable for missing dentition, pt has upper dentures. Pt assessed w/ thin liquids, puree, semisolids, and solids. Oral phase c/b prolonged mastication/mashing. Pharyngeal phase unremarkable, no overt s/sx of aspiration. Pt to self select softer foods from the menu to aid w/ mastication. Recommend regular diet and thin liquids, meds OK as tolerated. Ensure pt is fully upright and alert, taking small  sips/bites at a slow rate. No additional SLP services warranted.   SLP Total Evaluation Time   Eval: oral/pharyngeal swallow function, clinical swallow Minutes (90373) 14   SLP Discharge Planning   SLP Plan s/o   SLP Discharge Recommendation home with assist   SLP Rationale for DC Rec WFL swallow mechanism   SLP Brief overview of current status  Recommend regular diet and thin liquids, meds OK as tolerated. Ensure pt is fully upright and alert, taking small sips/bites at a slow rate. No additional SLP services warranted.   Total Session Time   Total Session Time (sum of timed and untimed services) 19

## 2023-11-23 NOTE — H&P
[facility]    History and Physical - Medicine Service, [facility] TEAM        Date of Admission:  11/22/2023    Assessment & Plan      Libby Grimaldo is a 73 year old female admitted on 11/22/2023. She presents with worsening mentation and fevers at home.     Plan     Encephalopathy   H/o febrile seizures   H/o Icans Toxicity   VALARIE on admission suspect 2/2 dehydration   Patients mentation  somewhat worse that prior baseline, no seizure like activity noted at home, ordered eeg to rule out seizures as etiology of mentation change, Pts admission head ct unremarkable, Awaiting on MRI brain to better delineate brain parenchymal disease process, Will keep low suspicion for infectious etiology, would stop home dose of opoid's for now to assess for medication induced encephalopathy, No reason to suspect recreational drug use, Labs do indicate valarie that could be contributing to mentation change will start iv hydration and reassess, normal liver, thyroid functions  -Did speak to on call BMT fellow and they do not recommend immunosuppression currently and suspect other etiologies to explain fevers and change in mentation     Subjective Fevers   CT chest showing concerns for right base infiltrates concerning for aspiration pneumonia   patient recently finished course of levofloxacin on nov 11th, 2023, ct chest showing right base infiltrate concerning for new aspiration pneumonia, viral respiratory panel was negative so far, awaiting on broad infectious viral workup with HHV6,HHV8,CMV,EBV,HSV,VZV,ROSA virus  ordered infectious markers like 1-3 beta glucan and pro calcitonin to assess for bacterial or fungal sepsis, No signs of dvt/pe currently, patient does not display signs of autoimmune disease process, no signs of active malignancy but will be assess with kappa/lambda levels or hematoma   -Will get daily CRP to assess for inflammatory status   -started zosyn 11/22-   -Ordered speech  consult   -Patient made npo till can be assessed by speech for diet   -suspect usual pathogens causing pneumonia but will attempt collecting sputum samples for bacterial and fungal elements to guide antimicrobial therapy     H/O BMT 2022 for multiple myeloma that failed on currently on Teclistamab  as rescue therapy, ordered laurence/kappa levels to assess myeloma activity as well with hematology oncology consult     Infection ppx   C/w acyclovir for herpes ppx   C/w flucaonzole for fungal ppx   C/w bactrim for pjp ppx       Chronic medical issues   H/o febrile seizures c/w pta keppra   H/o hypothyroidism c/w pta levothyroxine   Depression c/w pta venlafaxine   Hypertension c/w pta amlodipine and metoprolol   Chronic pain stopped home dose oxycodone and methadone given patient is more confused                        Diet: Snacks/Supplements Adult: Other - Please comment; Between Meals  High Kcal/High Protein Diet, ADULT    DVT Prophylaxis: Low Risk/Ambulatory with no VTE prophylaxis indicated  Raya Catheter: Not present  Lines: PRESENT      Port A Cath Single 01/20/22 Right Chest wall-Site Assessment: WDL      Cardiac Monitoring: None  Code Status: Full Code      Clinically Significant Risk Factors Present on Admission            # Hypomagnesemia: Lowest Mg = 1.5 mg/dL in last 2 days, will replace as needed   # Hypoalbuminemia: Lowest albumin = 3.4 g/dL at 11/22/2023  4:15 PM, will monitor as appropriate              # Financial/Environmental Concerns:           Disposition Plan      Expected Discharge Date: 11/24/2023                  Alphonso Crawford MD  Medicine Service, [facility]  Securely message with [email or web address] (more info)  Text page via [employer] Paging/Directory   See signed in provider for up to date coverage information    ______________________________________________________________________    Chief Complaint   Encephalopathy and fever     History is  obtained from the patient    History of Present Illness   Libby Grimaldo is a 73 year old female with pmh of multiple myeloma s/p BMT 5/27/2022 , hypothyroidism, febrile seizures, htn presents from home comes with concerns of increasing confusion subjective fevers and vomiting from home, Per family members patient is appearing more lethargic and confused off late, The symptoms started  a few days ago and have progressed over time, the fevers are episodic in nature and occur throughout the day, patient does endorse vomiting and nausea, no localizing symptoms of runny nose, diarrhea, abdominal pain,     IN the ED patients care was discussed with the hem-oncology team and they did not advise immunosuppression, and advocated to perform expanded workup and admission to the hem-malignancy team. Patient did receive a dose of IV vancomycin and IV zosyn in the ED.       Past Medical History    Past Medical History:   Diagnosis Date    Cervical radiculopathy     COPD (chronic obstructive pulmonary disease) (H)     Double vision     Febrile seizure (H)     Per patient. Once while a toddler, once while in highschool, and once while in college.    Floaters     Graves disease     HLD (hyperlipidemia)     HTN (hypertension)     IPF (idiopathic pulmonary fibrosis) (H)     Lumbar radiculopathy     Multiple myeloma in relapse (H)     Osteoporosis     Pneumonia     Per patient. Complicated by sepsis.    Recurrent UTI     Per patient. Has required prophylactic antibiotcs.    Vitamin B12 deficiency (non anemic)        Past Surgical History   Past Surgical History:   Procedure Laterality Date    BONE MARROW BIOPSY, BONE SPECIMEN, NEEDLE/TROCAR Right 01/24/2022    Procedure: BIOPSY, BONE MARROW;  Surgeon: Yuniel Tineo;  Location: [facility] OR    BONE MARROW BIOPSY, BONE SPECIMEN, NEEDLE/TROCAR Right 9/1/2022    Procedure: BIOPSY, BONE MARROW;  Surgeon: Juju Oates PA-C;  [place] [facility] [place]    CERVICAL FUSION      CHOLECYSTECTOMY       CYSTECTOMY OVARIAN BENIGN      EYE SURGERY      Per patient.    IR CVC TUNNEL PLACEMENT > 5 YRS OF AGE  02/01/2022    IR CVC TUNNEL REMOVAL LEFT  02/22/2022    PICC DOUBLE LUMEN PLACEMENT Left 05/25/2022    Left basilic, 44 cm, 2 cm external length    TONSILLECTOMY      WRIST SURGERY Left        Prior to Admission Medications   Prior to Admission Medications   Prescriptions Last Dose Informant Patient Reported? Taking?   B Complex-C (VITAMIN B COMPLEX W/VITAMIN C) TABS tablet 11/21/2023 at night Friend Yes Yes   Sig: Take 1 tablet by mouth daily   Fluticasone-Umeclidin-Vilanterol (TRELEGY ELLIPTA) 100-62.5-25 MCG/INH oral inhaler 11/22/2023 at am Friend Yes Yes   Sig: Inhale 1 puff into the lungs daily    Vitamin D3 (VITAMIN D, CHOLECALCIFEROL,) 25 mcg (1000 units) tablet 11/21/2023 at night Friend Yes Yes   Sig: Take 1 tablet by mouth daily   acyclovir (ZOVIRAX) 400 MG tablet 11/22/2023 at am Friend No Yes   Sig: TAKE 1 TABLET(400 MG) BY MOUTH EVERY 12 HOURS   albuterol (PROAIR HFA/PROVENTIL HFA/VENTOLIN HFA) 108 (90 Base) MCG/ACT inhaler Unknown at as needed Friend Yes Yes   Sig: Inhale 2 puffs into the lungs every 6 hours as needed for shortness of breath   amLODIPine (NORVASC) 10 MG tablet 11/22/2023 at am Friend No Yes   Sig: Take 1 tablet (10 mg) by mouth daily   ascorbic acid 1000 MG TABS tablet Unknown at unknown Friend Yes Yes   Sig: Take 1,000 mg by mouth daily   fluconazole (DIFLUCAN) 200 MG tablet Unknown at unknown Friend No Yes   Sig: Take 1 tablet (200 mg) by mouth daily Take when ANC < 1. Clinic can help guide this for you.   levETIRAcetam (KEPPRA) 750 MG tablet 11/22/2023 at am Friend No Yes   Sig: Take 1 tablet (750 mg) by mouth 2 times daily   levofloxacin (LEVAQUIN) 750 MG tablet Unknown at unknown Friend No Yes   Sig: Take 1 tablet (750 mg) by mouth daily   levothyroxine (SYNTHROID/LEVOTHROID) 112 MCG tablet Unknown at unknown Friend Yes Yes   Sig: Take 112 mcg by mouth daily   methadone  (DOLOPHINE) 10 MG tablet 11/22/2023 at am Friend Yes Yes   Sig: Take 1 tablet (10 mg) by mouth 3 times daily Morning, afternoon, and night   metoprolol succinate ER (TOPROL XL) 25 MG 24 hr tablet Unknown at unknown Friend No Yes   Sig: TAKE 1 TABLET(25 MG) BY MOUTH DAILY   naloxone (NARCAN) 4 MG/0.1ML nasal spray Unknown at as needed Friend No Yes   Sig: Spray 1 spray (4 mg) into one nostril alternating nostrils as needed for opioid reversal every 2-3 minutes until assistance arrives   ondansetron (ZOFRAN) 8 MG tablet 11/21/2023 at am Friend No Yes   Sig: Take 1 tablet (8 mg) by mouth every 8 hours as needed for nausea   oxyCODONE IR (ROXICODONE) 10 MG tablet Unknown at as needed Friend No Yes   Sig: Take 1 tablet (10 mg) by mouth every 6 hours as needed for moderate to severe pain   pantoprazole (PROTONIX) 40 MG EC tablet Unknown at unknown Friend No Yes   Sig: Take 1 tablet (40 mg) by mouth daily   polyethylene glycol (MIRALAX) 17 GM/Dose powder Unknown at unknown Friend No Yes   Sig: Take 17 g by mouth daily   sulfamethoxazole-trimethoprim (BACTRIM DS) 800-160 MG tablet 11/21/2023 at unknown Friend No Yes   Sig: Take 1 tablet by mouth Every Mon, Tues two times daily Start on Monday 6/27/2022   venlafaxine (EFFEXOR) 75 MG tablet 11/22/2023 at am Friend Yes Yes   Sig: Take 1 tablet (75 mg) by mouth 2 times daily      Facility-Administered Medications: None        Review of Systems    The 10 point Review of Systems is negative other than noted in the HPI or here.      Physical Exam   Vital Signs: Temp: 100.1  F (37.8  C) Temp src: Oral BP: 125/71 Pulse: 87   Resp: 20 SpO2: 98 % O2 Device: Nasal cannula Oxygen Delivery: 2 LPM  Weight: 0 lbs 0 oz  Patient is awake but only oriented to self,   Lungs are clear   S1 and s2 heard   Abdomen was soft and non distended   Neurologically patient was intact, moving all 4 extremities     Medical Decision Making       41 MINUTES SPENT BY ME on the date of service doing chart  review, history, exam, documentation & further activities per the note.      Data     I have personally reviewed the following data over the past 24 hrs:    2.7 (L)  \   11.1 (L)   / 157     139 106 14.6 /  104 (H)   4.4 22 1.17 (H) \     ALT: 27 AST: 63 (H) AP: 305 (H) TBILI: 0.6   ALB: 3.4 (L) TOT PROTEIN: 5.9 (L) LIPASE: 37     Trop: 32 (H) BNP: N/A     Procal: 0.29 CRP: N/A Lactic Acid: 1.0       INR:  1.10 PTT:  N/A   D-dimer:  N/A Fibrinogen:  N/A

## 2023-11-24 NOTE — PROGRESS NOTES
11/24/23 1300   Appointment Info   Appointment Canceled Reason (OT) Patient declined   Appointment Cancel Comments (OT) Pt sitting EOB upon arrival.  present and suportive. Pt declined to participate in cognitive assessment. Per , has no concerns with d/c to home, will have 24/7 support at home, and feels her congition has improved.   OT Discharge Planning   OT Discharge Recommendation (DC Rec) home with assist   OT Rationale for DC Rec Pt is likely close to baseline with functional tasks & mobility. Recommend 24/7 supervision and assist for all IADLs. Per pt's , able to provide this level of support at home.

## 2023-11-24 NOTE — PROGRESS NOTES
11/23/23 1200   Appointment Info   Signing Clinician's Name / Credentials (OT) Selene Duenas, OT   Living Environment   People in Home spouse   Current Living Arrangements house   Home Accessibility stairs to enter home;stairs within home   Number of Stairs, Main Entrance 5   Stair Railings, Main Entrance railings safe and in good condition   Number of Stairs, Within Home, Primary eight   Stair Railings, Within Home, Primary railings safe and in good condition   Transportation Anticipated family or friend will provide   Living Environment Comments Pt states she lives in a one-level home with basement, 5 [address], laundry room in basement but doesn't need to go down there.   Self-Care   Usual Activity Tolerance good   Current Activity Tolerance fair   Regular Exercise No   Equipment Currently Used at Home shower chair;walker, standard   Fall history within last six months yes   Number of times patient has fallen within last six months 6   Activity/Exercise/Self-Care Comment Per pt, was mod indep with ADL   Instrumental Activities of Daily Living (IADL)   IADL Comments Per pt, spouse completes IADLs   General Information   Onset of Illness/Injury or Date of Surgery 11/22/23   Referring Physician Alphonso Crawford MD   Patient/Family Therapy Goal Statement (OT) go home   Additional Occupational Profile Info/Pertinent History of Current Problem 73 year old female with pmh of multiple myeloma s/p BMT 5/27/2022 , hypothyroidism, febrile seizures, htn presents from home comes with concerns of increasing confusion subjective fevers and vomiting from home, Per family members patient is appearing more lethargic and confused off late, The symptoms started a few days ago and have progressed over time, the fevers are episodic in nature and occur throughout the day, patient does endorse vomiting and nausea, no localizing symptoms of runny nose, diarrhea, abdominal pain,   Existing Precautions/Restrictions fall;immunosuppressed    Left Upper Extremity (Weight-bearing Status) full weight-bearing (FWB)   Right Upper Extremity (Weight-bearing Status) full weight-bearing (FWB)   Left Lower Extremity (Weight-bearing Status) full weight-bearing (FWB)   Right Lower Extremity (Weight-bearing Status) full weight-bearing (FWB)   Heart Disease Risk Factors Medical history   General Observations and Info activity: up ad german   Cognitive Status Examination   Orientation Status   (oriented to self, grossly oriented to place & time (knew it was Thanksgiving and the month, but stated the date as 25th & the year 3002))   Follows Commands follows one-step commands   Memory Deficit moderate deficit;short-term memory   Visual Perception   Visual Impairment/Limitations diplopia   Posture   Posture protracted shoulders   Range of Motion Comprehensive   Comment, General Range of Motion tere UE WFL   Strength Comprehensive (MMT)   Comment, General Manual Muscle Testing (MMT) Assessment overall generalized weakness & deconditioning   Bed Mobility   Supine-Sit [place] (Bed Mobility) supervision   Transfers   Transfers sit-stand transfer   Transfer Comments CGA   Sit-Stand Transfer   Sit-Stand [place] (Transfers) contact guard   Assistive Device (Sit-Stand Transfers)   (HHA)   Lower Body Dressing Assessment/Training   Comment, (Lower Body Dressing) don/doff socks with Aviva while sitting EOB   Clinical Impression   Criteria for Skilled Therapeutic Interventions Met (OT) Yes, treatment indicated   OT Diagnosis decreased IND with ADLs & mobility   OT Problem List-Impairments impacting ADL problems related to;activity tolerance impaired;cognition;mobility;strength   Assessment of Occupational Performance 3-5 Performance Deficits   Identified Performance Deficits tub/shower transfer, functional mobility, cognition   Planned Therapy Interventions (OT) ADL retraining;cognition;strengthening   Clinical Decision Making Complexity (OT) problem focused assessment/low  complexity   Risk & Benefits of therapy have been explained evaluation/treatment results reviewed   OT Total Evaluation Time   OT Eval, Low Complexity Minutes (95082) 10   OT Goals   Therapy Frequency (OT) 4 times/week   OT Predicted Duration/Target Date for Goal Attainment 12/08/23   OT: Lower Body Dressing Supervision/stand-by assist   OT: Toilet Transfer/Toileting Supervision/stand-by assist   OT: Cognitive Patient/caregiver will verbalize understanding of cognitive assessment results/recommendations as needed for safe discharge planning   Self-Care/Home Management   Self-Care/Home Mgmt/ADL, Compensatory, Meal Prep Minutes (80524) 20   Treatment Detail/Skilled Intervention OT:Pt supine in bed upon arrival. Rolling>R SBA. Sitting balance EOB SBA. Don/doff socks while sitting EOB with Aviva. STS with HHA SBA. Marching in place with HHA, SBA, x1 min. On 2L/min NC, AVSS. Throughout session. Pt oriented to self & place; grossly to time (knew it was Novemeber & Thanksgiving but stated the year as 3002). Pt able to follow simple one-step commands approx 75% of the vidhi, increased time processing.  Asking for assist with operating the TV control. EEG arrived shortly into eval, session ended. All needs within reach at end of session.   OT Discharge Planning   OT Plan tub/shower transfer, cognition, ADL standing at sink   OT Discharge Recommendation (DC Rec) Transitional Care Facility   OT Rationale for DC Rec Pt is likely close to baseline with functional tasks, however does demonstrate cognitive deficits which impacts safety.Current recommendation is to d/c to TCU once medically stable to promote IND with functional tasks   OT Brief overview of current status CGA-SBA with HHA   Total Session Time   Timed Code Treatment Minutes 20   Total Session Time (sum of timed and untimed services) 30

## 2023-11-24 NOTE — DISCHARGE SUMMARY
[facility]    Discharge Summary  Hematology / Oncology    Date of Admission: 11/22/2023  Date of Discharge: 11/24/23  Discharging Provider: Lien Alvarenga PA-C  Date of Service (when I saw the patient): 11/24/23    Discharge Diagnoses  Pneumonia  Baseline mild encephalopathy  Multiple myeloma  R parapharyngeal lesion  Anemia  Thrombocytopenia  HTN  COPD  Hypothyroidism  History of Grave's disease  ANGELIKA  Chronic pain    History of Present Illness  Libby Grimaldo is a 73 year old female with a history of high risk IgG lambda multiple myeloma (most recently s/p 1 dose of teclistimab on 10/24, teclistimab ramp up course was c/b encephalopathy of uncertain etiology), Grave's disease, ILD/COPD, HTN, HLD, and opioid dependence. She was recently admitted 11/8-11/11 for recurrent encephalopathy which was thought to secondary to pneumonia and was discharged at baseline cognition after improvement with antibiotics.    She presented to the ED 11/22 with recurrent encephalopathy. She was again found to have RLL pneumonia. Cognition improved with antibiotics, although seems to have mild confusion at baseline as observed overall several admission. SLP evaluation did not show evidence of aspiration. MRI brain was obtained (see read for findings) and was not thought to be causative of encephalopathy. She was discharged in improving condition to complete course of oral antibiotics at home.     Discharge Medications  - Levofloxacin 750 mg x7 days     Follow Up  11/27 - Follow up with ENT for parapharyngeal mass  11/29 - Follow up with Dr. Guillory    To Follow Outpatient  - Goals of care and myeloma plan in the setting of multiple hospital admissions    Hospital Course  Libby Grimaldo was admitted on 11/22/2023. The following problems were addressed during her hospitalization:    ID  # Sepsis, resolved  # Pneumonia  Afebrile but noted to have elevated lactic acid of 3 and CRP increased as  well. CT chest showed new endobronchial debris throughout the right lower lobe with mild tree-in-bud infiltrate centrally in this distribution consistent with infectious/inflammatory process.    - Work up:   - BCx 11/22 NGTD  - UA bland  - COVID/flu/RSV negative  - MRSA nares negative  - HHV6, HHV8, ROSA and CMV pending  - Antibiotics:   - Zosyn 11/23-11/24  - Vancomycin 11/23-11/24  - Levofloxacin 750 mg 11/27-11/29     # ID PPX  - Acyclovir 400 mg daily  - Fluconazole ppx  - Bactrim BID on Monday/Tuesdays for PJP ppx     NEURO  # Acute toxic-metabolic encephalopathy, improved  Pt was admitted 10/24-11/5. She was initially admitted for teclistimab ramp up. Noted to have AMS ~days 1-3 after first dose of teclistimab, classified as grade 2 ICANs. She received dexamethasone without improvement in AMS, thus was discontinued and other etiologies were considered. Had stroke code called 11/3 as well. Work up included stroke work up (negative), LP (negative), urine drug screen (negative), EEG (moderate slowing c/w moderate diffuse encephalopathy). Work up also included MRI brain which showed increased size of frontal calvarial lesion and new R-sided parapharyngeal mass. Etiology unclear, thought possibly secondary to hospital acquired delirium vs. polypharmacy vs. teclistamab neurotoxicity vs. CNS infection as she is immunocompromised. By time of discharge on 11/5, was back to baseline cognition. readmitted again 11/8-11/11 for AMS and thought it was likely due to pneumonia. Now presents with acute encephalopathy on 11/22, remains A&Ox 2 self and place only.  - Work up this admission:   - CT head and CTA without acute findings  - Ammonia WNL  - TSH WNL  - Sepsis work up as above  - MRI read as below:  1.  No acute or subacute infarct.  2.  Chronic senescent and presumed microvascular ischemic changes, as above.  3.  Small enhancing extra-axial lesion along the undersurface of the right tentorial leaflet measuring up to 0.7  cm. No significant local mass effect or associated vasogenic edema. This finding may reflect a small meningioma although dural-based   metastases could have a similar appearance. Continued attention on follow-up imaging is recommended.  4.  Enhancing calvarial lesions involving the right frontal bone and left parietal bone are concerning for osseous metastatic disease versus multiple myeloma.   - Discussed with [facility] MRI radiologist, they were unable to compare changes in MRI since previous MRI was done at another company.   - No findings likely to be contributory to improving encephalopathy.   - Chronically on methadone and oxycodone and this is unchanged so suspect less likely polypharmacy.   - Low suspicion for ICANS given the facts that only 1 low dose of teclistimab was given 10/24, not responsive to steroids so considered alternative etiology.   - Keppra level supratherapeutic range. Held for one dose. No additional work up recommended at this time.  - Continue PTA Keppra for ppx  - Delirium precautions in place  - Evaluated by OT, initially concerns for safe dispo given cognition. However with improving cognition and 24/7 support at home, pt was deemed safe for discharge.      # IgG lambda multiple myeloma, high-risk  # S/p autoPBST 2022  Follows with Dr. Guillory. Initially diagnosed in 11/2018, completed KRD x 6 cycles, rev/Dex (20), Velcade maintenance, revlimid maintenance, with evidence of disease BmBx 11/2020 (35% plasma cells) and PET CT with new lytic lesions.Then treated with kameron, Kd x 6 cycles, subcutaneous kameron/IVIG/denosumab monthly. S/p autologous peripheral blood stem cell transplant in 2022. Followed by melphalan, ixazomib, and then started on palliative elotuzumab, pomolidomide, and dexamethasone chemotherapy 4/2023 with most recent dose C5D1 9/27. Course c/b infection, fall. Due to rising lambda chains that were consistent with progression, teclistamab recommended. Received teclistimab  "C1D1=10/24/23, c/b AMS, thought initially thought to be grade 2 ICANS. Was given dexamethasone 10 mg x2 and started prophylactic Keppra and no significant improvement with dexamethasone. See above for full encephalopathy work up. During work up, was found to have new R parapharyngeal lesion, see below. Was also found to have increasing size of front calvarial lytic lesion. Most recent restaging was CT CAP on 11/2, which showed: \"Large enhancing soft tissue masses along bilateral scapulae with underlying cortical erosions and pathologic fracture. New soft tissue mass in the left thoracic paraspinal region. These may represent extraosseous myeloma versus extramedullary hematopoiesis. Increased size of the thoracic right paraspinal lesion. New solid pulmonary nodules. Cystic lesions of the pancreas are better appreciated on MRI of 5/25/2023.\"  - No acute inpatient needs  - Follow up with Dr. Guillory regarding next steps in treatment     # Incidental right parapharyngeal lesion  Found on MRI brain last admission. Lesion protrudes into the oropharynx. CT neck showed lesions in the supraspinatus and parapharyngeal space concerning for malignancy. Suspect extraosseous involvement from MM rather than from another primary malignancy. In discussion with ENT, was recommended for biopsy of supraspinatus. IR who outpatient biopsy if no acute change to management indicating inpatient need.   - Scheduled for outpatient IR biopsy 11/22, she refused the procedure due to encephalopathy. Has further follow scheduled with ENT>      # Anemia  # Thrombocytopenia  Secondary to malignancy  - Monitor CBC daily  - Transfuse to maintain Hgb >7, plt >10K     # History of LUE DVT     Noted on LUE US (2/15/22) to have non-occlusive LUE DVT. Previously on Xarelto.     LYTES  # Hypomagnesemia  # Hypokalemia  - Repleted prior to discharge     CHRONIC  # HTN  # History of afib  - Continue PTA amlodipine       # COPD  COPD with 40 py history, quit " 2010. Previously documented to have idiopathic pulmonary fibrosis, however most recent PFTs (1/26/22) were normal. Has previously required treatment for COPD exacerbation with pneumonia. No wheezing, no increased WOB.   - PTA Trelegy Ellipta inhaler daily (sub formulary inpt)  - PTA albuterol PRN     # Hypothyroidism  # History of Grave's disease  History of hypothyroidism 2/2 treatment for Grave's Disease (patient reports surgery in [place], however no thyroidectomy). Stable dose of levothyroxine over the past four years. Admission TSH WNL.  - Continue PTA levothyroxine      # Generalized anxiety disorder  - Continue PTA venlafaxine 75 mg BID      # Chronic pain  - PTA methadone TID and PTA oxycodone 5 mg q6hr PRN  - Tylenol PRN  - Robaxin TID PRN  - Follow up with OP pain clinic    Clinically Significant Risk Factors        # Hypokalemia: Lowest K = 3 mmol/L in last 2 days, will replace as needed   # Hypocalcemia: Lowest Ca = 8.1 mg/dL in last 2 days, will monitor and replace as appropriate   # Hypomagnesemia: Lowest Mg = 1.4 mg/dL in last 2 days, will replace as needed  # Anion Gap Metabolic Acidosis: Highest Anion Gap = 21 mmol/L in last 2 days, will monitor and treat as appropriate  # Hypoalbuminemia: Lowest albumin = 3.4 g/dL at 11/22/2023  4:15 PM, will monitor as appropriate   # Thrombocytopenia: Lowest platelets = 102 in last 2 days, will monitor for bleeding           # Severe Malnutrition: based on nutrition assessment, PRESENT ON ADMISSION     # Financial/Environmental Concerns:           Vitals  Blood pressure 139/69, pulse 84, temperature 98.5  F (36.9  C), temperature source Oral, resp. rate 16, SpO2 99%.    Physical Exam  General: Sitting up in bed, alert, NAD. Pleasant and conversational.  Skin: No concerning lesions, rash, jaundice, cyanosis, erythema, or ecchymoses on exposed surfaces.   HEENT: NCAT. Anicteric sclera. MMM with no lesions, erythema, or thrush.   Respiratory: Non-labored breathing,  good air exchange, lungs clear to auscultation bilaterally.  Cardiovascular: RRR. No murmur or rub.   Gastrointestinal: Normoactive BS. Abdomen soft, ND, NT. No palpable masses.  Extremities: No LE edema.   Neurologic: Pupils PERRL. A&O x 3/4 (says day of week is Thursday instead of Friday), speech normal. No cranial nerve deficits grossly. Strength symmetric 3-4/5 in BUE, 4-5/5 in BLE. Sensation intact in all 4 extremities. Had trouble with cerebellar testing - had a little trouble following instructions but was able to complete without evidence of cerebellar dysfunction.     Patient was seen and plan of care was discussed with attending physician Dr. Ragsdale.    I spent 70 minutes in the care of this patient today, which included time necessary for review of interval events, obtaining history and physical exam, ordering medications/tests/procedures as medically indicated, review of pertinent medical literature, counseling of the patient, coordination of care, and documentation time. Over 50% of time was spent counseling the patient and/or coordinating care.    Lien Alvarenga PA-C   Hematology/Oncology   Pager: [phone]  Desk phone: [phone]    Pending Results   These results will be followed up by outpatient team.   Unresulted Labs Ordered in the Past 30 Days of this Admission       Date and Time Order Name Status Description    11/22/2023 10:01 PM Total Light Chains Ur In process     11/22/2023  9:26 PM HHV8 Quantitative PCR In process     11/22/2023  9:25 PM ROSA Virus by PCR In process     11/22/2023  9:23 PM Varicella Zoster Virus by PCR Blood Fluid or Tissue In process     11/22/2023  9:23 PM EBV DNA PCR Quantitative Whole Blood In process     11/22/2023  9:23 PM 1,3 Beta D glucan fungitell In process     11/22/2023  9:17 PM Vancomycin Resistant Enterococcus (VRE) Culture Preliminary     11/22/2023  9:13 PM Fungal or Yeast Culture Routine Preliminary     11/22/2023  9:13 PM Blood Culture Arm, Left Preliminary      11/22/2023  9:13 PM Blood Culture Arm, Right Preliminary           Code Status   Full Code    Time Spent on this Encounter   I, Lien Alvaernga PA-C, personally saw the patient today and spent greater than 30 minutes discharging this patient.    Discharge Disposition   Discharged to home  Condition at discharge: Stable    Consultations This Hospital Stay   PHARMACY TO DOSE VANCO  PHYSICAL THERAPY ADULT IP CONSULT  OCCUPATIONAL THERAPY ADULT IP CONSULT  MEDICATION HISTORY IP PHARMACY CONSULT  NUTRITION SERVICES ADULT IP CONSULT  SPEECH LANGUAGE PATH ADULT IP CONSULT    Discharge Orders      Reason for your hospital stay    You were hospitalized for confusion with improved with treatment of pneumonia.     Activity    Your activity upon discharge: activity as tolerated     When to contact your care team    [facility] cancer clinic triage line at [phone] for temp > or = 100.4, uncontrolled nausea/vomiting/diarrhea/constipation, unrelieved pain, bleeding not relieved with pressure, dizziness, chest pain, shortness of breath, loss of consciousness, and any new or concerning symptoms.     Discharge Instructions    Take levofloxacin 750 mg for 7 days for treatment of pneumonia.     [facility] Follow-up and recommended labs and tests    Follow up with Dr. Guillory on 11/29. See attached for all follow up appointments.     Full Code     Diet    Follow this diet upon discharge: Regular Diet  Drink protein shakes if you are not able to eat enough     Discharge Medications   Current Discharge Medication List        CONTINUE these medications which have CHANGED    Details   levofloxacin (LEVAQUIN) 750 MG tablet Take 1 tablet (750 mg) by mouth daily  Qty: 7 tablet, Refills: 0    Associated Diagnoses: Sepsis with encephalopathy without septic shock, due to unspecified organism (H); Fever and chills           CONTINUE these medications which have NOT CHANGED    Details   acyclovir (ZOVIRAX) 400 MG tablet TAKE 1  TABLET(400 MG) BY MOUTH EVERY 12 HOURS  Qty: 180 tablet, Refills: 3    Associated Diagnoses: Multiple myeloma not having achieved remission (H); Admission for chemotherapy      albuterol (PROAIR HFA/PROVENTIL HFA/VENTOLIN HFA) 108 (90 Base) MCG/ACT inhaler Inhale 2 puffs into the lungs every 6 hours as needed for shortness of breath    Comments: Pharmacy may dispense brand covered by insurance (Proair, or proventil or ventolin or generic albuterol inhaler)      amLODIPine (NORVASC) 10 MG tablet Take 1 tablet (10 mg) by mouth daily  Qty: 30 tablet, Refills: 0    Associated Diagnoses: Hypertension, unspecified type      ascorbic acid 1000 MG TABS tablet Take 1,000 mg by mouth daily      B Complex-C (VITAMIN B COMPLEX W/VITAMIN C) TABS tablet Take 1 tablet by mouth daily      fluconazole (DIFLUCAN) 200 MG tablet Take 1 tablet (200 mg) by mouth daily Take when ANC < 1. Clinic can help guide this for you.  Qty: 30 tablet, Refills: 0    Associated Diagnoses: Neutropenic fever       Fluticasone-Umeclidin-Vilanterol (TRELEGY ELLIPTA) 100-62.5-25 MCG/INH oral inhaler Inhale 1 puff into the lungs daily       levETIRAcetam (KEPPRA) 750 MG tablet Take 1 tablet (750 mg) by mouth 2 times daily  Qty: 60 tablet, Refills: 0    Associated Diagnoses: Altered mental status, unspecified altered mental status type      levothyroxine (SYNTHROID/LEVOTHROID) 112 MCG tablet Take 112 mcg by mouth daily      methadone (DOLOPHINE) 10 MG tablet Take 1 tablet (10 mg) by mouth 3 times daily Morning, afternoon, and night    Associated Diagnoses: Multiple myeloma not having achieved remission (H); Uncomplicated opioid dependence (H)      metoprolol succinate ER (TOPROL XL) 25 MG 24 hr tablet TAKE 1 TABLET(25 MG) BY MOUTH DAILY  Qty: 30 tablet, Refills: 3    Associated Diagnoses: Multiple myeloma not having achieved remission (H)      naloxone (NARCAN) 4 MG/0.1ML nasal spray Spray 1 spray (4 mg) into one nostril alternating nostrils as needed for  opioid reversal every 2-3 minutes until assistance arrives  Qty: 0.2 mL, Refills: 0    Associated Diagnoses: Multiple myeloma not having achieved remission (H)      ondansetron (ZOFRAN) 8 MG tablet Take 1 tablet (8 mg) by mouth every 8 hours as needed for nausea  Qty: 15 tablet, Refills: 0    Associated Diagnoses: Multiple myeloma not having achieved remission (H)      oxyCODONE IR (ROXICODONE) 10 MG tablet Take 1 tablet (10 mg) by mouth every 6 hours as needed for moderate to severe pain  Qty: 4 tablet, Refills: 0    Associated Diagnoses: Multiple myeloma not having achieved remission (H); Uncomplicated opioid dependence (H)      pantoprazole (PROTONIX) 40 MG EC tablet Take 1 tablet (40 mg) by mouth daily  Qty: 30 tablet, Refills: 0    Associated Diagnoses: Multiple myeloma not having achieved remission (H); Stem cells transplant status (H)      polyethylene glycol (MIRALAX) 17 GM/Dose powder Take 17 g by mouth daily  Qty: 510 g, Refills: 0    Associated Diagnoses: Multiple myeloma not having achieved remission (H)      sulfamethoxazole-trimethoprim (BACTRIM DS) 800-160 MG tablet Take 1 tablet by mouth Every Mon, Tues two times daily Start on Monday 6/27/2022  Qty: 48 tablet, Refills: 3    Associated Diagnoses: Multiple myeloma not having achieved remission (H); Stem cells transplant status (H)      venlafaxine (EFFEXOR) 75 MG tablet Take 1 tablet (75 mg) by mouth 2 times daily      Vitamin D3 (VITAMIN D, CHOLECALCIFEROL,) 25 mcg (1000 units) tablet Take 1 tablet by mouth daily           Allergies   No Known Allergies  Data   Most Recent 3 CBC's:  Recent Labs   Lab Test 11/24/23  0601 11/23/23  0755 11/22/23  1615   WBC 4.2 5.9 2.7*   HGB 8.0* 8.4* 11.1*   * 107* 105*   * 102* 157      Most Recent 3 BMP's:  Recent Labs   Lab Test 11/24/23  1216 11/24/23  0901 11/24/23  0601 11/24/23  0008 11/23/23  0523 11/23/23  0043 11/22/23  1615   NA  --   --  145  --  141  --  139   POTASSIUM  --   --  3.0*  --   3.5  --  4.4   CHLORIDE  --   --  115*  --  111*  --  106   CO2  --   --  22  --  23  --  22   BUN  --   --  5.8*  --  11.8  --  14.6   CR  --   --  0.82  --  1.00*  --  1.17*   ANIONGAP  --   --  8  --  7  --  11   DIANDRA  --   --  8.1*  --  8.6*  --  9.7   * 85 74   < > 76   < > 104*    < > = values in this interval not displayed.     Most Recent 2 LFT's:  Recent Labs   Lab Test 11/22/23  1615 11/22/23  1515   AST 63* 68*   ALT 27 26   ALKPHOS 305* 297*   BILITOTAL 0.6 0.6     Most Recent INR's and Anticoagulation Dosing History:  Anticoagulation Dose History  More data exists         Latest Ref Rng & Units 10/31/2023 11/1/2023 11/2/2023 11/3/2023 11/4/2023 11/5/2023 11/22/2023   Recent Dosing and Labs   INR 0.85 - 1.15 0.98  0.99  1.07  0.99  1.03  0.98  1.10      Most Recent 3 Troponin's:No lab results found.  Most Recent Cholesterol Panel:No lab results found.  Most Recent 6 Bacteria Isolates From Any Culture (See EPIC Reports for Culture Details):No lab results found.  Most Recent TSH, T4 and A1c Labs:  Recent Labs   Lab Test 11/22/23  1615 10/24/23  1303 08/08/23  1309   TSH 0.89   < > 11.30*   T4  --   --  1.06    < > = values in this interval not displayed.       Results for orders placed or performed during the hospital encounter of 11/22/23   Head CT w/o contrast    Narrative    EXAM: CT HEAD W/O CONTRAST  11/22/2023 4:46 PM     HISTORY: Episode of AMS and vomiting       COMPARISON: CT head 11/8/2023. Brain MRI 10/28/2023.    TECHNIQUE: Using multidetector thin collimation helical acquisition  technique, axial, coronal and sagittal CT images from the skull base  to the vertex were obtained without intravenous contrast.   (topogram) image(s) also obtained and reviewed.    FINDINGS:  No acute intracranial hemorrhage, mass effect, or midline shift. No  acute loss of gray-white matter differentiation in the cerebral  hemispheres. Patchy areas of periventricular and subcortical white  matter  hypoattenuation, unchanged. Stable punctate right basal ganglia  calcification. Ventricles are proportionate to the cerebral sulci.  Clear basal cisterns. Stable falx calcifications.    Right frontal bone lytic lesion, stable. The bony calvaria and the  bones of the skull base are otherwise normal. The visualized portions  of the paranasal sinuses are clear. Right mastoid effusion. Grossly  normal orbit.      Impression    IMPRESSION: No acute intracranial pathology. Stable moderate chronic  small vessel disease.    I have personally reviewed the examination and initial interpretation  and I agree with the findings.    CARL IRAHETA MD         SYSTEM ID:  [id number]   CT Chest Abdomen Pelvis w/o Contrast    Narrative    EXAM: CT CHEST ABDOMEN PELVIS W/O CONTRAST  LOCATION: [facility]  DATE: 11/22/2023    INDICATION: Fever.  COMPARISON: CT chest from 11/09/2023.  TECHNIQUE: CT scan of the chest, abdomen, and pelvis was performed without IV contrast. Multiplanar reformats were obtained. Dose reduction techniques were used.   CONTRAST: None.    FINDINGS:   LUNGS AND PLEURA: Very mild paraseptal emphysematous changes. Tree-in-bud infiltrates in the right upper lobe noted previously have improved. There is new endobronchial debris throughout the segmental and subsegmental bronchi of the right lower lobe with   mild tree-in-bud opacities centrally in this distribution. A 5 mm left lower lobe nodule on image 174 is stable over short interval follow-up. Trace right pleural effusion.    MEDIASTINUM/AXILLAE: Heart size within normal limits. Multivessel coronary artery disease.    CORONARY ARTERY CALCIFICATION: Moderate to severe    HEPATOBILIARY: Absent gallbladder. Liver within normal limits for noncontrast appearance.    PANCREAS: Normal.    SPLEEN: Normal.    ADRENAL GLANDS: Normal.    KIDNEYS/BLADDER: Borderline right hydronephrosis but no obstructing ureteral stone. Normal  left kidney. Normal bladder.    BOWEL: No bowel obstruction or free air. No appendicitis.    LYMPH NODES: Normal.    VASCULATURE: Severe aortoiliac atherosclerotic calcification.    PELVIC ORGANS: Normal.    MUSCULOSKELETAL: Diffuse osteopenia. Bilateral breast implants. Advanced degenerative changes of the thoracolumbar spine. Redemonstrated paraspinal mass with resultant cortical erosion along the rightward aspect of the T10 vertebral body. This again   measures roughly 6 cm on image 40 of series 2. Smaller paraspinal mass is also noted on images 50 and 51.      Impression    IMPRESSION:  1.  New endobronchial debris throughout the right lower lobe with mild tree-in-bud infiltrate centrally in this distribution consistent with infectious/inflammatory process. Recommend correlation for potential aspiration.    2.  Redemonstrated lower thoracic paraspinal mass with cortical erosion at the T10 vertebral body presumably relating to patient's known multiple myeloma.    3.  Borderline right hydronephrosis but no obstructing stone.   MR Brain w/o & w Contrast    Narrative    EXAM: MR BRAIN W/O AND W CONTRAST  LOCATION: [facility]  DATE: 11/23/2023    INDICATION: Febrile seizures. Neuro toxicity from immunosuppression.  COMPARISON: CT soft tissue neck dated 10/31/2023. MRI brain dated 08/01/2021.  CONTRAST: 5 ml Gadavist.  TECHNIQUE: Routine multiplanar multisequence head MRI without and with intravenous contrast.    FINDINGS:  INTRACRANIAL CONTENTS: No acute or subacute infarct. No acute hemorrhage or abnormal extra-axial fluid collection. Patchy nonspecific T2/FLAIR hyperintensities within the cerebral white matter most consistent with mild to moderate chronic microvascular   ischemic change. Mild generalized cerebral atrophy. No hydrocephalus. Normal position of the cerebellar tonsils. Along the undersurface of the anterior right tentorial leaflet there is a small enhancing  extra-axial nodule measuring 0.5 cm in transverse   dimensions, 0.4 cm in craniocaudal dimensions and approximately 0.7 cm in AP dimensions (image 15 series 12 and image 8 series 11). There is no significant adjacent vasogenic edema or significant local mass effect. This finding is not well seen on MRI   brain dated 08/01/2021, however there is significant motion on the prior exam which degrades evaluation in this region. On today's exam this lesion demonstrates isointense FLAIR signal and isointense T1 signal.    There is an incompletely imaged right parapharyngeal mass demonstrating avid postcontrast enhancement and diffusion restriction, suggesting a highly cellular tumor. This finding is better assessed on recent CT soft tissue neck from 10/31/2023.    SELLA: No abnormality accounting for technique.    OSSEOUS STRUCTURES/SOFT TISSUES: There is a T1 hypointense, T2 hyperintense well-circumscribed frontal bone lesion measuring up to 1.0 x 0.7 cm in greatest axial dimensions and 0.5 cm in craniocaudal dimensions demonstrating avid postcontrast enhancement   (new since MRI brain dated 08/01/2021). An additional subcentimeter enhancing lesion involving the left occipital bone measuring up to 0.8 cm is noted (image 14 series 11). This finding is also new since MRI brain dated 08/01/2021. The major   intracranial vascular flow voids are maintained.     ORBITS: No abnormality accounting for technique.     SINUSES/MASTOIDS: Mucosal thickening primarily involving the ethmoid air cells. Right middle ear effusion.       Impression    IMPRESSION:  1.  No acute or subacute infarct.  2.  Chronic senescent and presumed microvascular ischemic changes, as above.  3.  Small enhancing extra-axial lesion along the undersurface of the right tentorial leaflet measuring up to 0.7 cm. No significant local mass effect or associated vasogenic edema. This finding may reflect a small meningioma although dural-based   metastases could have a  similar appearance. Continued attention on follow-up imaging is recommended.  4.  Enhancing calvarial lesions involving the right frontal bone and left parietal bone are concerning for osseous metastatic disease versus multiple myeloma.  5.  Additional findings above.

## 2023-11-24 NOTE — PLAN OF CARE
AVSS on RA.  A/O to self and location.  Oxycodone 10 mg given x2.  Denies nausea.  Lung sounds diminished.  R port infusing NS @ 100 ml/hr.  Tolerating regular diet.Voiding without difficulty.  Loose BM x3.  Up to commode independently.

## 2023-11-24 NOTE — PROGRESS NOTES
[facility]    Hematology / Oncology Progress Note    Patient: Libby Grimaldo  MRN: [id number]  Admission Date: 11/22/2023  Date of Service (when I saw the patient): 11/24/2023  Hospital Day # 2     Assessment & Plan   Libby Grimaldo is a 73 year old female with a history of high risk IgG lambda multiple myeloma (most recently s/p 1 dose of teclistimab on 10/24, teclistimab ramp up course was c/b encephalopathy of uncertain etiology), Grave's disease, ILD/COPD, HTN, HLD, and opioid dependence. She was recently admitted 11/8-11/11 for recurrent encephalopathy which was thought to secondary to pneumonia and was discharged at baseline cognition after improvement with antibiotics. She presented to the ED 11/22 with recurrent encephalopathy. She was again found to have RLL pneumonia. Cognition improved with antibiotics, although seems to have mild confusion at baseline as observed overall several admission. Plan was for discharge today until pt developed nausea, worsening diarrhea, rigors/chills so discharge was deferred.     Today:  - Transitioned antibiotics from Zosyn/vanc to PO levofloxacin  - Ordered cdiff, enteric panel, repeat set of Bcx  - 1 L bolus of NS  - Discharge deferred    ID  # Sepsis, resolved  # Pneumonia  Afebrile but noted to have elevated lactic acid of 3 and CRP increased as well. CT chest showed new endobronchial debris throughout the right lower lobe with mild tree-in-bud infiltrate centrally in this distribution consistent with infectious/inflammatory process.  Plan was for discharge on 11/24 after clinical improvement, but pt developed diarrhea, nausea, rigors, confusion, and weakness, deferring discharge.   - Work up:   - BCx 11/22 NGTD, 11/24 ordered  - UA bland  - COVID/flu/RSV negative  - MRSA nares negative  - HHV6, HHV8, ROSA and CMV pending  - Cdiff and enteric panel ordered  - Antibiotics:   - Zosyn 11/23-11/24  - Vancomycin 11/23-11/24  -  Levofloxacin 750 mg 11/27-11/29  - SLP exam WNL, no e/o aspiration     # ID PPX  - Acyclovir 400 mg daily  - Fluconazole 200 mg daily  - Bactrim BID on Monday/Tuesdays for PJP ppx     NEURO  # Acute toxic-metabolic encephalopathy, improved  Pt was admitted 10/24-11/5. She was initially admitted for teclistimab ramp up. Noted to have AMS ~days 1-3 after first dose of teclistimab, classified as grade 2 ICANs. She received dexamethasone without improvement in AMS, thus was discontinued and other etiologies were considered. Had stroke code called 11/3 as well. Work up included stroke work up (negative), LP (negative), urine drug screen (negative), EEG (moderate slowing c/w moderate diffuse encephalopathy). Work up also included MRI brain which showed increased size of frontal calvarial lesion and new R-sided parapharyngeal mass. Etiology unclear, thought possibly secondary to hospital acquired delirium vs. polypharmacy vs. teclistamab neurotoxicity vs. CNS infection as she is immunocompromised. By time of discharge on 11/5, was back to baseline cognition. readmitted again 11/8-11/11 for AMS and thought it was likely due to pneumonia. Now presents with acute encephalopathy on 11/22, remains A&Ox 2 self and place only.  - Work up this admission:   - CT head and CTA without acute findings  - Ammonia WNL  - TSH WNL  - Sepsis work up as above  - EEG with mild-mod diffuse encephalopathy, no seizures  - MRI read as below:  1.  No acute or subacute infarct.  2.  Chronic senescent and presumed microvascular ischemic changes, as above.  3.  Small enhancing extra-axial lesion along the undersurface of the right tentorial leaflet measuring up to 0.7 cm. No significant local mass effect or associated vasogenic edema. This finding may reflect a small meningioma although dural-based   metastases could have a similar appearance. Continued attention on follow-up imaging is recommended.  4.  Enhancing calvarial lesions involving the  right frontal bone and left parietal bone are concerning for osseous metastatic disease versus multiple myeloma.   - Discussed with [facility] MRI radiologist, they were unable to compare changes in MRI since previous MRI was done at another company.   - No findings likely to be contributory to improving encephalopathy.   - Chronically on methadone and oxycodone and this is unchanged so suspect less likely polypharmacy.   - Low suspicion for ICANS given the facts that only 1 low dose of teclistimab was given 10/24, not responsive to steroids so considered alternative etiology.   - Keppra level supratherapeutic range. Held for one dose. No additional work up recommended at this time.  - Continue PTA Keppra for ppx  - Delirium precautions in place  - Evaluated by OT, initially concerns for safe dispo given cognition. However with improving cognition and 24/7 support at home, pt was deemed safe for discharge.      # IgG lambda multiple myeloma, high-risk  # S/p autoPBST 2022  Follows with Dr. Guillory. Initially diagnosed in 11/2018, completed KRD x 6 cycles, rev/Dex (20), Velcade maintenance, revlimid maintenance, with evidence of disease BmBx 11/2020 (35% plasma cells) and PET CT with new lytic lesions.Then treated with kameron, Kd x 6 cycles, subcutaneous kameron/IVIG/denosumab monthly. S/p autologous peripheral blood stem cell transplant in 2022. Followed by melphalan, ixazomib, and then started on palliative elotuzumab, pomolidomide, and dexamethasone chemotherapy 4/2023 with most recent dose C5D1 9/27. Course c/b infection, fall. Due to rising lambda chains that were consistent with progression, teclistamab recommended. Received teclistimab C1D1=10/24/23, c/b AMS, thought initially thought to be grade 2 ICANS. Was given dexamethasone 10 mg x2 and started prophylactic Keppra and no significant improvement with dexamethasone. See above for full encephalopathy work up. During work up, was found to have new R parapharyngeal  "lesion, see below. Was also found to have increasing size of front calvarial lytic lesion. Most recent restaging was CT CAP on 11/2, which showed: \"Large enhancing soft tissue masses along bilateral scapulae with underlying cortical erosions and pathologic fracture. New soft tissue mass in the left thoracic paraspinal region. These may represent extraosseous myeloma versus extramedullary hematopoiesis. Increased size of the thoracic right paraspinal lesion. New solid pulmonary nodules. Cystic lesions of the pancreas are better appreciated on MRI of 5/25/2023.\"  - No acute inpatient needs  - Follow up with Dr. Guillory regarding next steps in treatment     # Incidental right parapharyngeal lesion  Found on MRI brain last admission. Lesion protrudes into the oropharynx. CT neck showed lesions in the supraspinatus and parapharyngeal space concerning for malignancy. Suspect extraosseous involvement from MM rather than from another primary malignancy. In discussion with ENT, was recommended for biopsy of supraspinatus. IR who outpatient biopsy if no acute change to management indicating inpatient need.   - Scheduled for outpatient IR biopsy 11/22, she refused the procedure due to encephalopathy. Has further follow scheduled with ENT.      # Anemia  # Thrombocytopenia  Secondary to malignancy  - Monitor CBC daily  - Transfuse to maintain Hgb >7, plt >10K     # History of LUE DVT     Noted on LUE US (2/15/22) to have non-occlusive LUE DVT. Previously on Xarelto.      LYTES  # Hypomagnesemia  # Hypokalemia  - Repleted prior to discharge     CHRONIC  # HTN  # History of afib  - Continue PTA amlodipine       # COPD  COPD with 40 py history, quit 2010. Previously documented to have idiopathic pulmonary fibrosis, however most recent PFTs (1/26/22) were normal. Has previously required treatment for COPD exacerbation with pneumonia. No wheezing, no increased WOB.   - PTA Trelegy Ellipta inhaler daily (sub formulary inpt)  - PTA " albuterol PRN     # Hypothyroidism  # History of Grave's disease  History of hypothyroidism 2/2 treatment for Grave's Disease (patient reports surgery in [place], however no thyroidectomy). Stable dose of levothyroxine over the past four years. Admission TSH WNL.  - Continue PTA levothyroxine      # Generalized anxiety disorder  - Continue PTA venlafaxine 75 mg BID      # Chronic pain  - PTA methadone TID and PTA oxycodone 5 mg q6hr PRN  - Tylenol PRN  - Robaxin TID PRN  - Follow up with [facility] pain clinic    Clinically Significant Risk Factors        # Hypokalemia: Lowest K = 3 mmol/L in last 2 days, will replace as needed   # Hypocalcemia: Lowest Ca = 8.1 mg/dL in last 2 days, will monitor and replace as appropriate   # Hypomagnesemia: Lowest Mg = 1.4 mg/dL in last 2 days, will replace as needed  # Anion Gap Metabolic Acidosis: Highest Anion Gap = 21 mmol/L in last 2 days, will monitor and treat as appropriate  # Hypoalbuminemia: Lowest albumin = 3.4 g/dL at 11/22/2023  4:15 PM, will monitor as appropriate   # Thrombocytopenia: Lowest platelets = 102 in last 2 days, will monitor for bleeding           # Severe Malnutrition: based on nutrition assessment, PRESENT ON ADMISSION   # Financial/Environmental Concerns:             FEN  Diet: Regular Diet Adult   IVF: Bolus PRN   Lytes: Replete per protocol    PPX  VTE: Lovenox  Bowel: Hold imodium for cdiff work up  GI/PUD: None indicated    MISC  Code Status: Full Code   Lines/Drains: Port  Dispo: Anticipate discharge to home in 1-2 days  Follow Up:   11/27 - Follow up with ENT for parapharyngeal mass  11/29 - Follow up with Dr. Guillory    Patient was seen and plan of care was discussed with attending physician Dr. Ragsdale.    I spent 70 minutes in the care of this patient today, which included time necessary for review of interval events, obtaining history and physical exam, ordering medications/tests/procedures as medically indicated, review of pertinent medical literature,  "counseling of the patient, coordination of care, and documentation time. Over 50% of time was spent counseling the patient and/or coordinating care.    Lien Alvarenga PA-C   Hematology/Oncology   Pager: [phone]  Desk phone: [phone]    Interval History   No acute events overnight. Patient is feeling better today. Sister at bedside also states that pt is greatly improved since admission. Denies PNA sx. A&Ox3/4, says it's Thursday instead of Friday. No further emesis. Had diarrhea, now improved. Denies abdominal pain, SOB, chest pain urinary sx. Eating ok, \"snack.\" Strength improved.     After discharge orders placed, called to pt's bedside. She is mildly confused again, rigoring, appears ill. She reported just had large volume of diarrhea. Was feeling nauseous, which had now improve. She reports feeling very cold. Afebrile when temp checked but likely spiking fever. 100% O2 on RA. Hypertensive on BP check with poor positioning d/t pt rigors.     Vital Signs with Ranges  Temp:  [97  F (36.1  C)-98.5  F (36.9  C)] 97  F (36.1  C)  Pulse:  [77-84] 82  Resp:  [16] 16  BP: (119-139)/(60-81) 121/66  SpO2:  [93 %-99 %] 93 %  I/O last 3 completed shifts:  In: 375 [P.O.:375]  Out: -     Physical Exam   General: Sitting up in bed, alert, NAD. Pleasant and conversational.  Skin: No concerning lesions, rash, jaundice, cyanosis, erythema, or ecchymoses on exposed surfaces.   HEENT: NCAT. Anicteric sclera. MMM with no lesions, erythema, or thrush.   Respiratory: Non-labored breathing, good air exchange, lungs clear to auscultation bilaterally.  Cardiovascular: RRR. No murmur or rub.   Gastrointestinal: Normoactive BS. Abdomen soft, ND, NT. No palpable masses.  Extremities: No LE edema.   Neurologic: Pupils PERRL. A&O x 3/4 (says day of week is Thursday instead of Friday), speech normal. No cranial nerve deficits grossly. Strength symmetric 3-4/5 in BUE, 4-5/5 in BLE. Sensation intact in all 4 extremities. Had trouble with " cerebellar testing - had a little trouble following instructions but was able to complete without evidence of cerebellar dysfunction.     Medications    sodium chloride Stopped (23 1342)      acyclovir  400 mg Oral BID    amLODIPine  10 mg Oral Daily    fluconazole  200 mg Oral Daily    fluticasone-vilanterol  1 puff Inhalation Daily    And    umeclidinium  1 puff Inhalation Daily    heparin  5-10 mL Intracatheter Q28 Days    levETIRAcetam  750 mg Oral BID    levofloxacin  750 mg Oral Daily    levothyroxine  112 mcg Oral Daily    methadone  10 mg Oral Q8H    metoprolol succinate ER  25 mg Oral Daily    pantoprazole  40 mg Oral Daily    polyethylene glycol  17 g Oral Daily    sodium chloride (PF)  10-20 mL Intracatheter Q28 Days    sodium chloride 0.9%  1,000 mL Intravenous Once    [START ON 2023] sulfamethoxazole-trimethoprim  1 tablet Oral Q Mon Tu BID    venlafaxine  75 mg Oral BID    Vitamin D3  25 mcg Oral Daily     Data   Results for orders placed or performed during the hospital encounter of 23 (from the past 24 hour(s))   EEG Video 12-26 hr Unmonitored    Narrative    EEG Video 12-26 hr Unmonitored Result    VIDEO EEG DATE: 2023  VIDEO EEG [id number]  VIDEO EEG DAY#: 1  VIDEO EEG SOURCE FILE DURATION: 16 hours and 44 minutes    PATIENT HISTORY: Wake up 73-year old status post CAR-T treatment   complicated by ICANS.  She presents now with altered mental status.    Concern is raised that seizures may explain her altered mental status,   especially given previous history of IC ANS.  She is being treated with   levetiracetam.    TECHNICAL SUMMARY: This is a video-EEG monitoring study. EEG was recorded   from 23 scalp electrodes placed according to the 10-20 international   system. Additional electrodes were utilized for referencing, artifact   detection, and recording from other cerebral regions. Qualified   technicians attached EEG electrodes, set up the study, monitored and    reviewed EEG recordings, and disconnected EEG electrodes when appropriate.   Video was continuously recorded. Video was reviewed for clinical   correlation and to assist with EEG interpretations.     BACKGROUND ACTIVITY: When clearly awake, 6.5 to 7.5 Hz posterior dominant   rhythm, reasonably well sustained.  Small amounts of polymorphic diffuse   delta.  When behaviorally asleep, further slowing of posterior dominant   rhythm.  More persistent delta with bursts of frontal rhythmic delta.    Deeper sleep with vertex waves and sleep spindles not seen in available   samples.    ACTIVATION PROCEDURE: EEG is reactive to cognitive stimulation which is   performed at around noon on 11/23/2023.    OTHER INTERICTAL ABNORMALITIES: No epileptiform discharges.    ICTAL ABNORMALITIES: No electrographic seizures.    IMPRESSION: Abnormal.  Findings consistent with mild to moderate diffuse   encephalopathy.  Epileptiform discharges or seizures not seen.  No   indication of nonconvulsive status epilepticus.    Jose Mabry MD  EPILEPSY STAFF       Glucose by meter   Result Value Ref Range    GLUCOSE BY METER POCT 63 (L) 70 - 99 mg/dL   Glucose by meter   Result Value Ref Range    GLUCOSE BY METER POCT 68 (L) 70 - 99 mg/dL   Glucose by meter   Result Value Ref Range    GLUCOSE BY METER POCT 90 70 - 99 mg/dL   CBC with platelets differential    Narrative    The following orders were created for panel order CBC with platelets differential.  Procedure                               Abnormality         Status                     ---------                               -----------         ------                     CBC with platelets and d...[[id number]]  Abnormal            Final result                 Please view results for these tests on the individual orders.   Basic metabolic panel   Result Value Ref Range    Sodium 145 135 - 145 mmol/L    Potassium 3.0 (L) 3.4 - 5.3 mmol/L    Chloride 115 (H) 98 - 107 mmol/L     Carbon Dioxide (CO2) 22 22 - 29 mmol/L    Anion Gap 8 7 - 15 mmol/L    Urea Nitrogen 5.8 (L) 8.0 - 23.0 mg/dL    Creatinine 0.82 0.51 - 0.95 mg/dL    GFR Estimate 76 >60 mL/min/1.73m2    Calcium 8.1 (L) 8.8 - 10.2 mg/dL    Glucose 74 70 - 99 mg/dL   Magnesium   Result Value Ref Range    Magnesium 1.4 (L) 1.7 - 2.3 mg/dL   CRP inflammation   Result Value Ref Range    CRP Inflammation 22.00 (H) <5.00 mg/L   CBC with platelets and differential   Result Value Ref Range    WBC Count 4.2 4.0 - 11.0 10e3/uL    RBC Count 2.32 (L) 3.80 - 5.20 10e6/uL    Hemoglobin 8.0 (L) 11.7 - 15.7 g/dL    Hematocrit 24.9 (L) 35.0 - 47.0 %     (H) 78 - 100 fL    MCH 34.5 (H) 26.5 - 33.0 pg    MCHC 32.1 31.5 - 36.5 g/dL    RDW 15.6 (H) 10.0 - 15.0 %    Platelet Count 103 (L) 150 - 450 10e3/uL    % Neutrophils 48 %    % Lymphocytes 35 %    % Monocytes 12 %    % Eosinophils 4 %    % Basophils 0 %    % Immature Granulocytes 1 %    NRBCs per 100 WBC 0 <1 /100    Absolute Neutrophils 2.1 1.6 - 8.3 10e3/uL    Absolute Lymphocytes 1.5 0.8 - 5.3 10e3/uL    Absolute Monocytes 0.5 0.0 - 1.3 10e3/uL    Absolute Eosinophils 0.2 0.0 - 0.7 10e3/uL    Absolute Basophils 0.0 0.0 - 0.2 10e3/uL    Absolute Immature Granulocytes 0.0 <=0.4 10e3/uL    Absolute NRBCs 0.0 10e3/uL   Phosphorus   Result Value Ref Range    Phosphorus 2.9 2.5 - 4.5 mg/dL   Glucose by meter   Result Value Ref Range    GLUCOSE BY METER POCT 85 70 - 99 mg/dL   EEG Video 2-12 HRS Ummonitored    Narrative    EEG Video 2-12 HRS Ummonitored Result    VIDEO EEG DATE: 2023  VIDEO EEG [id number]  VIDEO EEG DAY#: 2  VIDEO EEG SOURCE FILE DURATION: 6 hours 18 minutes    PATIENT HISTORY: 73-year old status post CAR-T treatment completed by   [facility].  She presents with altered mental status.  Concern is raised that   seizures may account for altered mental status.  She is being evaluated   for epilepsy.    TECHNICAL SUMMARY: This is a video-EEG monitoring study. EEG was recorded    from 23 scalp electrodes placed according to the 10-20 international   system. Additional electrodes were utilized for referencing, artifact   detection, and recording from other cerebral regions. Qualified   technicians attached EEG electrodes, set up the study, monitored and   reviewed EEG recordings, and disconnected EEG electrodes when appropriate.   Video was continuously recorded. Video was reviewed for clinical   correlation and to assist with EEG interpretations.     BACKGROUND ACTIVITY: During quiet wakefulness sec, 6.5 to 7.5 Hz posterior   dominant rhythm.  Diffuse slower frequencies.  During sleep, more   persistent slowing.  Clear sleep spindles not seen in available samples.    ACTIVATION PROCEDURE: Formal stimulation not performed.    OTHER INTERICTAL ABNORMALITIES: No epileptiform discharges.    ICTAL ABNORMALITIES: No electrographic seizures.    IMPRESSION: Abnormal.  Findings consistent with mild to moderate to   diffuse encephalopathy.  Epileptiform discharges or seizures not seen.  No   indication of nonconvulsive status epilepticus.    Jose Mabry MD  EPILEPSY STAFF       Glucose by meter   Result Value Ref Range    GLUCOSE BY METER POCT 128 (H) 70 - 99 mg/dL   Glucose by meter   Result Value Ref Range    GLUCOSE BY METER POCT 108 (H) 70 - 99 mg/dL

## 2023-11-24 NOTE — PROGRESS NOTES
Attempted to get sputum sample with Pt around 7 am per MD order and pt refused.    El Doyle RT on 11/24/2023 at 7:46 AM

## 2023-11-25 NOTE — PLAN OF CARE
Goal Outcome Evaluation:    Nursing Focus: Discharge    D: Patient discharged to Home at 1507H. Patient Libby and accompanied by Spouse and sister.    I: Discharge prescriptions sent to discharge pharmacy to be filled. All discharge medications and instructions reviewed with patient and family. Patient instructed to call clinic triage nurse if patient experiences a fever >100.4, uncontrolled nausea, vomiting, diarrhea, or pain; or experiences any signs or symptoms of bleeding. Other phone numbers to call with questions or concerns after discharge reviewed. Port needle removed after heparin flushed per hospital policy. Education completed. Copy of AVS given to patient.    A: Patient and family verbalized understanding of discharge medications and instructions. Patient will  medications at discharge pharmacy.     P: Patient to follow-up in clinic on 11/27 with Leroy Whitaker. Lab draw on 11/29 and follow-up with Julio C Macario.

## 2023-11-25 NOTE — PLAN OF CARE
Problem: Care Coordination Assessment  Goal: Care Coordinator Assessment  Description: -Safe discharge placement   Goal Outcome Evaluation:  Outcome: Met

## 2023-11-25 NOTE — PLAN OF CARE
A&Ox4, VSS on RA. Pain managed w/ prn oxycodone given prior to coming to this floor. Port infusing NS @ 100ml/hr. Pt SBA. Tolerating regular diet. Skin assessment not done, pt came up to floor around 0130.      Goal Outcome Evaluation:   Plan of Care Reviewed With: patient  Overall Patient Progress: no change

## 2023-11-25 NOTE — DISCHARGE SUMMARY
[facility]    Discharge Summary  Hematology / Oncology    Date of Admission: 11/22/2023  Date of Discharge: 11/25/23  Discharging Provider: Lien Alvarenga PA-C  Date of Service (when I saw the patient): 11/25/23    Discharge Diagnoses  Pneumonia  Baseline mild encephalopathy  Multiple myeloma  R parapharyngeal lesion  Anemia  Thrombocytopenia  HTN  COPD  Hypothyroidism  History of Grave's disease  ANGELIKA  Chronic pain  Diarrhea    History of Present Illness  Libby Grimaldo is a 73 year old female with a history of high risk IgG lambda multiple myeloma (most recently s/p 1 dose of teclistimab on 10/24, teclistimab ramp up course was c/b encephalopathy of uncertain etiology), Grave's disease, ILD/COPD, HTN, HLD, and opioid dependence. She was recently admitted 11/8-11/11 for recurrent encephalopathy which was thought to secondary to pneumonia and was discharged at baseline cognition after improvement with antibiotics.    She presented to the ED 11/22 with recurrent encephalopathy. She was again found to have RLL pneumonia. Cognition improved with antibiotics, although seems to have mild confusion at baseline as observed overall several admission. SLP evaluation did not show evidence of aspiration. MRI brain was obtained (see read for findings) and was not thought to be causative of encephalopathy. She was planned to be discharged 11/24 afternoon but developed large volume diarrhea, nausea, chills (afebrile/VSS), and recurrent mild confusion. Blood cultures obtained and negative x24 hours. Ordered cdiff and enteric panel, but did not have another BM. Pt felt much better today, no acute complaints and complete resolution of the day prior's symptoms. Thus, she was discharged to home in stable condition.     Of note, increasing lambda light chains. Discussed with pt our concerns for frequent re-admissions in the setting of her poor health and refractory myeloma. Pt does not like  being in the hospital and always wants to discharge ASAP, so introduced the idea of hospice. Pt will follow up with Dr. Guillory this week to continue goals of care discussions.     Discharge Medications  - Levofloxacin 750 mg x6 more days   - Debrox ear drops for wax in right ear  - Oxycodone IR 10 mg, dispensed #6 until pt can follow up with outpatient pain provider    Follow Up  11/27 - Follow up with ENT for parapharyngeal mass  11/29 - Follow up with Dr. Guillory    To Follow Outpatient  - Goals of care and myeloma plan in the setting of multiple hospital admissions    Hospital Course  Libby Grimaldo was admitted on 11/22/2023. The following problems were addressed during her hospitalization:    ID  # Sepsis, resolved  # Pneumonia  # Diarrhea  Afebrile but noted to have elevated lactic acid of 3 and CRP increased as well. CT chest showed new endobronchial debris throughout the right lower lobe with mild tree-in-bud infiltrate centrally in this distribution consistent with infectious/inflammatory process.    - Work up:   - BCx 11/22 and 11/24 NGTD  - UA bland  - COVID/flu/RSV negative  - MRSA nares negative  - HHV6, HHV8, ROSA and CMV pending  - Cdiff and enteric panel ordered, but unable to be completed as pt did not have BM for another 24 hours  - Antibiotics:   - Zosyn 11/23-11/24  - Vancomycin 11/23-11/24  - Levofloxacin 750 mg 11/27-11/29     # ID PPX  - Acyclovir 400 mg daily  - Fluconazole ppx  - Bactrim BID on Monday/Tuesdays for PJP ppx     NEURO  # Acute toxic-metabolic encephalopathy, improved but waxing/waning  Pt was admitted 10/24-11/5. She was initially admitted for teclistimab ramp up. Noted to have AMS ~days 1-3 after first dose of teclistimab, classified as grade 2 ICANs. She received dexamethasone without improvement in AMS, thus was discontinued and other etiologies were considered. Had stroke code called 11/3 as well. Work up included stroke work up (negative), LP (negative), urine drug screen  (negative), EEG (moderate slowing c/w moderate diffuse encephalopathy). Work up also included MRI brain which showed increased size of frontal calvarial lesion and new R-sided parapharyngeal mass. Etiology unclear, thought possibly secondary to hospital acquired delirium vs. polypharmacy vs. teclistamab neurotoxicity vs. CNS infection as she is immunocompromised. By time of discharge on 11/5, was back to baseline cognition. readmitted again 11/8-11/11 for AMS and thought it was likely due to pneumonia. Now presents with acute encephalopathy on 11/22, remains A&Ox 2 self and place only.  - Work up this admission:   - CT head and CTA without acute findings  - Ammonia WNL  - TSH WNL  - Sepsis work up as above  - MRI read as below:  1.  No acute or subacute infarct.  2.  Chronic senescent and presumed microvascular ischemic changes, as above.  3.  Small enhancing extra-axial lesion along the undersurface of the right tentorial leaflet measuring up to 0.7 cm. No significant local mass effect or associated vasogenic edema. This finding may reflect a small meningioma although dural-based   metastases could have a similar appearance. Continued attention on follow-up imaging is recommended.  4.  Enhancing calvarial lesions involving the right frontal bone and left parietal bone are concerning for osseous metastatic disease versus multiple myeloma.   - Discussed with [facility] MRI radiologist, they were unable to compare changes in MRI since previous MRI was done at another company.   - No findings likely to be contributory to improving encephalopathy.   - Chronically on methadone and oxycodone and this is unchanged so suspect less likely polypharmacy.   - Low suspicion for ICANS given the facts that only 1 low dose of teclistimab was given 10/24, not responsive to steroids so considered alternative etiology.   - Keppra level supratherapeutic range. Held for one dose. No additional work up recommended at this time.  - Continue PTA  "Keppra for ppx  - Delirium precautions in place  - Evaluated by OT, initially concerns for safe dispo given cognition. However with improving cognition and 24/7 support at home, pt was deemed safe for discharge.      # IgG lambda multiple myeloma, high-risk  # S/p autoPBST 2022  Follows with Dr. Guillory. Initially diagnosed in 11/2018, completed KRD x 6 cycles, rev/Dex (20), Velcade maintenance, revlimid maintenance, with evidence of disease BmBx 11/2020 (35% plasma cells) and PET CT with new lytic lesions.Then treated with kameron, Kd x 6 cycles, subcutaneous kameron/IVIG/denosumab monthly. S/p autologous peripheral blood stem cell transplant in 2022. Followed by melphalan, ixazomib, and then started on palliative elotuzumab, pomolidomide, and dexamethasone chemotherapy 4/2023 with most recent dose C5D1 9/27. Course c/b infection, fall. Due to rising lambda chains that were consistent with progression, teclistamab recommended. Received teclistimab C1D1=10/24/23, c/b AMS, thought initially thought to be grade 2 ICANS. Was given dexamethasone 10 mg x2 and started prophylactic Keppra and no significant improvement with dexamethasone. See above for full encephalopathy work up. During work up, was found to have new R parapharyngeal lesion, see below. Was also found to have increasing size of front calvarial lytic lesion. Most recent restaging was CT CAP on 11/2, which showed: \"Large enhancing soft tissue masses along bilateral scapulae with underlying cortical erosions and pathologic fracture. New soft tissue mass in the left thoracic paraspinal region. These may represent extraosseous myeloma versus extramedullary hematopoiesis. Increased size of the thoracic right paraspinal lesion. New solid pulmonary nodules. Cystic lesions of the pancreas are better appreciated on MRI of 5/25/2023.\"  - No acute inpatient needs  - Ordered repeat myeloma labs earlier in admission. Lambda FLC increasing.   - Follow up with Dr. Guillory " regarding next steps in treatment     # Incidental right parapharyngeal lesion  Found on MRI brain last admission. Lesion protrudes into the oropharynx. CT neck showed lesions in the supraspinatus and parapharyngeal space concerning for malignancy. Suspect extraosseous involvement from MM rather than from another primary malignancy. In discussion with ENT, was recommended for biopsy of supraspinatus. IR who outpatient biopsy if no acute change to management indicating inpatient need.   - Scheduled for outpatient IR biopsy 11/22, she refused the procedure due to encephalopathy. Has further follow scheduled with ENT.      # Anemia  # Thrombocytopenia  Secondary to malignancy  - Monitor CBC daily  - Transfuse to maintain Hgb >7, plt >10K     # History of LUE DVT     Noted on LUE US (2/15/22) to have non-occlusive LUE DVT. Previously on Xarelto.     LYTES  # Hypomagnesemia  # Hypokalemia  - Repleted prior to discharge     CHRONIC  # HTN  # History of afib  - Continue PTA amlodipine       # COPD  COPD with 40 py history, quit 2010. Previously documented to have idiopathic pulmonary fibrosis, however most recent PFTs (1/26/22) were normal. Has previously required treatment for COPD exacerbation with pneumonia. No wheezing, no increased WOB.   - PTA Trelegy Ellipta inhaler daily (sub formulary inpt)  - PTA albuterol PRN     # Hypothyroidism  # History of Grave's disease  History of hypothyroidism 2/2 treatment for Grave's Disease (patient reports surgery in [place], however no thyroidectomy). Stable dose of levothyroxine over the past four years. Admission TSH WNL.  - Continue PTA levothyroxine      # Generalized anxiety disorder  - Continue PTA venlafaxine 75 mg BID      # Chronic pain  - PTA methadone TID and PTA oxycodone 5 mg q6hr PRN  - Tylenol PRN  - Robaxin TID PRN  - Follow up with OP pain clinic    Clinically Significant Risk Factors        # Hypokalemia: Lowest K = 3 mmol/L in last 2 days, will replace as needed      # Hypomagnesemia: Lowest Mg = 1.4 mg/dL in last 2 days, will replace as needed   # Hypoalbuminemia: Lowest albumin = 3.4 g/dL at 11/22/2023  4:15 PM, will monitor as appropriate   # Thrombocytopenia: Lowest platelets = 86 in last 2 days, will monitor for bleeding           # Severe Malnutrition: based on nutrition assessment, PRESENT ON ADMISSION     # Financial/Environmental Concerns: none         Vitals  Blood pressure 130/56, pulse 80, temperature 98.7  F (37.1  C), temperature source Oral, resp. rate 18, weight 53 kg (116 lb 12.8 oz), SpO2 92%.    Physical Exam  General: Walking around room, alert, NAD. Pleasant and conversational.  Skin: No concerning lesions, rash, jaundice, cyanosis, erythema, or ecchymoses on exposed surfaces.   HEENT: NCAT. Anicteric sclera. MMM.    Respiratory: Non-labored breathing, good air exchange, lungs clear to auscultation bilaterally.  Cardiovascular: RRR. No murmur or rub.   Gastrointestinal: Normoactive BS. Abdomen soft, ND, NT. No palpable masses.  Extremities: No LE edema.   Neurologic: Declines to answer orientation questions, but seems much more oriented today. Does get right and left confused on one instance. Speech normal. Grossly non-focal.     Patient was seen and plan of care was discussed with attending physician Dr. Ragsdale.    I spent 70 minutes in the care of this patient today, which included time necessary for review of interval events, obtaining history and physical exam, ordering medications/tests/procedures as medically indicated, review of pertinent medical literature, counseling of the patient, coordination of care, and documentation time. Over 50% of time was spent counseling the patient and/or coordinating care.    Lien Alvarenga PA-C   Hematology/Oncology   Pager: [phone]  Desk phone: [phone]    Pending Results   These results will be followed up by outpatient team.   Unresulted Labs Ordered in the Past 30 Days of this Admission       Date and Time Order  Name Status Description    11/24/2023  3:02 PM Blood Culture Peripheral Blood Preliminary     11/24/2023  3:02 PM Blood Culture Peripheral Blood Preliminary     11/22/2023 10:01 PM Total Light Chains Ur In process     11/22/2023  9:26 PM HHV8 Quantitative PCR In process     11/22/2023  9:25 PM ROSA Virus by PCR In process     11/22/2023  9:23 PM Varicella Zoster Virus by PCR Blood Fluid or Tissue In process     11/22/2023  9:23 PM EBV DNA PCR Quantitative Whole Blood In process     11/22/2023  9:23 PM 1,3 Beta D glucan fungitell In process     11/22/2023  9:13 PM Fungal or Yeast Culture Routine Preliminary     11/22/2023  9:13 PM Blood Culture Arm, Left Preliminary     11/22/2023  9:13 PM Blood Culture Arm, Right Preliminary           Code Status   Full Code    Time Spent on this Encounter   ILien PA-C, personally saw the patient today and spent greater than 30 minutes discharging this patient.    Discharge Disposition   Discharged to home  Condition at discharge: Stable    Consultations This Hospital Stay   PHARMACY TO DOSE VANCO  PHYSICAL THERAPY ADULT IP CONSULT  OCCUPATIONAL THERAPY ADULT IP CONSULT  MEDICATION HISTORY IP PHARMACY CONSULT  NUTRITION SERVICES ADULT IP CONSULT  SPEECH LANGUAGE PATH ADULT IP CONSULT  CARE MANAGEMENT / SOCIAL WORK IP CONSULT    Discharge Orders      Reason for your hospital stay    You were hospitalized for confusion with improved with treatment of pneumonia.     Activity    Your activity upon discharge: activity as tolerated     When to contact your care team    [facility] [place] [place] cancer clinic triage line at [phone] for temp > or = 100.4, uncontrolled nausea/vomiting/diarrhea/constipation, unrelieved pain, bleeding not relieved with pressure, dizziness, chest pain, shortness of breath, loss of consciousness, and any new or concerning symptoms.     [facility] Follow-up and recommended labs and tests    Follow up with Dr. Guillory on 11/29. See attached for all  follow up appointments.     Discharge Instructions    Take levofloxacin 750 mg for 6 days for treatment of pneumonia.    Take Zofran (ondansetron) for nausea as needed.     Full Code     Diet    Follow this diet upon discharge: Regular Diet  Drink protein shakes if you are not able to eat enough     Discharge Medications   Current Discharge Medication List        START taking these medications    Details   carbamide peroxide (DEBROX) 6.5 % otic solution Place 5 drops into the right ear 2 times daily as needed for other  Qty: 15 mL, Refills: 0    Associated Diagnoses: Impacted cerumen of right ear           CONTINUE these medications which have CHANGED    Details   levofloxacin (LEVAQUIN) 750 MG tablet Take 1 tablet (750 mg) by mouth daily  Qty: 6 tablet, Refills: 0    Associated Diagnoses: Sepsis with encephalopathy without septic shock, due to unspecified organism (H); Fever and chills      ondansetron (ZOFRAN) 8 MG tablet Take 1 tablet (8 mg) by mouth every 8 hours as needed for nausea  Qty: 30 tablet, Refills: 0    Associated Diagnoses: Multiple myeloma not having achieved remission (H)      oxyCODONE IR (ROXICODONE) 10 MG tablet Take 1 tablet (10 mg) by mouth every 6 hours as needed for moderate to severe pain  Qty: 6 tablet, Refills: 0    Associated Diagnoses: Multiple myeloma not having achieved remission (H); Uncomplicated opioid dependence (H)           CONTINUE these medications which have NOT CHANGED    Details   acyclovir (ZOVIRAX) 400 MG tablet TAKE 1 TABLET(400 MG) BY MOUTH EVERY 12 HOURS  Qty: 180 tablet, Refills: 3    Associated Diagnoses: Multiple myeloma not having achieved remission (H); Admission for chemotherapy      albuterol (PROAIR HFA/PROVENTIL HFA/VENTOLIN HFA) 108 (90 Base) MCG/ACT inhaler Inhale 2 puffs into the lungs every 6 hours as needed for shortness of breath    Comments: Pharmacy may dispense brand covered by insurance (Proair, or proventil or ventolin or generic albuterol inhaler)       amLODIPine (NORVASC) 10 MG tablet Take 1 tablet (10 mg) by mouth daily  Qty: 30 tablet, Refills: 0    Associated Diagnoses: Hypertension, unspecified type      ascorbic acid 1000 MG TABS tablet Take 1,000 mg by mouth daily      B Complex-C (VITAMIN B COMPLEX W/VITAMIN C) TABS tablet Take 1 tablet by mouth daily      fluconazole (DIFLUCAN) 200 MG tablet Take 1 tablet (200 mg) by mouth daily Take when ANC < 1. Clinic can help guide this for you.  Qty: 30 tablet, Refills: 0    Associated Diagnoses: Neutropenic fever       Fluticasone-Umeclidin-Vilanterol (TRELEGY ELLIPTA) 100-62.5-25 MCG/INH oral inhaler Inhale 1 puff into the lungs daily       levETIRAcetam (KEPPRA) 750 MG tablet Take 1 tablet (750 mg) by mouth 2 times daily  Qty: 60 tablet, Refills: 0    Associated Diagnoses: Altered mental status, unspecified altered mental status type      levothyroxine (SYNTHROID/LEVOTHROID) 112 MCG tablet Take 112 mcg by mouth daily      methadone (DOLOPHINE) 10 MG tablet Take 1 tablet (10 mg) by mouth 3 times daily Morning, afternoon, and night    Associated Diagnoses: Multiple myeloma not having achieved remission (H); Uncomplicated opioid dependence (H)      metoprolol succinate ER (TOPROL XL) 25 MG 24 hr tablet TAKE 1 TABLET(25 MG) BY MOUTH DAILY  Qty: 30 tablet, Refills: 3    Associated Diagnoses: Multiple myeloma not having achieved remission (H)      naloxone (NARCAN) 4 MG/0.1ML nasal spray Spray 1 spray (4 mg) into one nostril alternating nostrils as needed for opioid reversal every 2-3 minutes until assistance arrives  Qty: 0.2 mL, Refills: 0    Associated Diagnoses: Multiple myeloma not having achieved remission (H)      pantoprazole (PROTONIX) 40 MG EC tablet Take 1 tablet (40 mg) by mouth daily  Qty: 30 tablet, Refills: 0    Associated Diagnoses: Multiple myeloma not having achieved remission (H); Stem cells transplant status (H)      polyethylene glycol (MIRALAX) 17 GM/Dose powder Take 17 g by mouth daily  Qty:  510 g, Refills: 0    Associated Diagnoses: Multiple myeloma not having achieved remission (H)      sulfamethoxazole-trimethoprim (BACTRIM DS) 800-160 MG tablet Take 1 tablet by mouth Every Mon, Tues two times daily Start on Monday 6/27/2022  Qty: 48 tablet, Refills: 3    Associated Diagnoses: Multiple myeloma not having achieved remission (H); Stem cells transplant status (H)      venlafaxine (EFFEXOR) 75 MG tablet Take 1 tablet (75 mg) by mouth 2 times daily      Vitamin D3 (VITAMIN D, CHOLECALCIFEROL,) 25 mcg (1000 units) tablet Take 1 tablet by mouth daily           Allergies   No Known Allergies  Data   Most Recent 3 CBC's:  Recent Labs   Lab Test 11/25/23  0537 11/24/23  0601 11/23/23  0755   WBC 4.3 4.2 5.9   HGB 7.6* 8.0* 8.4*   * 107* 107*   PLT 86* 103* 102*      Most Recent 3 BMP's:  Recent Labs   Lab Test 11/25/23  0502 11/24/23  2348 11/24/23  1733 11/24/23  1559 11/24/23  0901 11/24/23  0601 11/24/23  0008 11/23/23  0523     --   --   --   --  145  --  141   POTASSIUM 3.6  --   --  3.7  --  3.0*  --  3.5   CHLORIDE 115*  --   --   --   --  115*  --  111*   CO2 20*  --   --   --   --  22  --  23   BUN 3.4*  --   --   --   --  5.8*  --  11.8   CR 0.72  --   --   --   --  0.82  --  1.00*   ANIONGAP 7  --   --   --   --  8  --  7   DIANDRA 7.6*  --   --   --   --  8.1*  --  8.6*   GLC 74 72 78  --    < > 74   < > 76    < > = values in this interval not displayed.     Most Recent 2 LFT's:  Recent Labs   Lab Test 11/22/23  1615 11/22/23  1515   AST 63* 68*   ALT 27 26   ALKPHOS 305* 297*   BILITOTAL 0.6 0.6     Most Recent INR's and Anticoagulation Dosing History:  Anticoagulation Dose History  More data exists         Latest Ref Rng & Units 10/31/2023 11/1/2023 11/2/2023 11/3/2023 11/4/2023 11/5/2023 11/22/2023   Recent Dosing and Labs   INR 0.85 - 1.15 0.98  0.99  1.07  0.99  1.03  0.98  1.10      Most Recent 3 Troponin's:No lab results found.  Most Recent Cholesterol Panel:No lab results  found.  Most Recent 6 Bacteria Isolates From Any Culture (See EPIC Reports for Culture Details):No lab results found.  Most Recent TSH, T4 and A1c Labs:  Recent Labs   Lab Test 11/22/23  1615 10/24/23  1303 08/08/23  1309   TSH 0.89   < > 11.30*   T4  --   --  1.06    < > = values in this interval not displayed.       Results for orders placed or performed during the hospital encounter of 11/22/23   Head CT w/o contrast    Narrative    EXAM: CT HEAD W/O CONTRAST  11/22/2023 4:46 PM     HISTORY: Episode of AMS and vomiting       COMPARISON: CT head 11/8/2023. Brain MRI 10/28/2023.    TECHNIQUE: Using multidetector thin collimation helical acquisition  technique, axial, coronal and sagittal CT images from the skull base  to the vertex were obtained without intravenous contrast.   (topogram) image(s) also obtained and reviewed.    FINDINGS:  No acute intracranial hemorrhage, mass effect, or midline shift. No  acute loss of gray-white matter differentiation in the cerebral  hemispheres. Patchy areas of periventricular and subcortical white  matter hypoattenuation, unchanged. Stable punctate right basal ganglia  calcification. Ventricles are proportionate to the cerebral sulci.  Clear basal cisterns. Stable falx calcifications.    Right frontal bone lytic lesion, stable. The bony calvaria and the  bones of the skull base are otherwise normal. The visualized portions  of the paranasal sinuses are clear. Right mastoid effusion. Grossly  normal orbit.      Impression    IMPRESSION: No acute intracranial pathology. Stable moderate chronic  small vessel disease.    I have personally reviewed the examination and initial interpretation  and I agree with the findings.    CARL IRAHETA MD         SYSTEM ID:  [id number]   CT Chest Abdomen Pelvis w/o Contrast    Narrative    EXAM: CT CHEST ABDOMEN PELVIS W/O CONTRAST  LOCATION: [facility]  DATE: 11/22/2023    INDICATION:  Fever.  COMPARISON: CT chest from 11/09/2023.  TECHNIQUE: CT scan of the chest, abdomen, and pelvis was performed without IV contrast. Multiplanar reformats were obtained. Dose reduction techniques were used.   CONTRAST: None.    FINDINGS:   LUNGS AND PLEURA: Very mild paraseptal emphysematous changes. Tree-in-bud infiltrates in the right upper lobe noted previously have improved. There is new endobronchial debris throughout the segmental and subsegmental bronchi of the right lower lobe with   mild tree-in-bud opacities centrally in this distribution. A 5 mm left lower lobe nodule on image 174 is stable over short interval follow-up. Trace right pleural effusion.    MEDIASTINUM/AXILLAE: Heart size within normal limits. Multivessel coronary artery disease.    CORONARY ARTERY CALCIFICATION: Moderate to severe    HEPATOBILIARY: Absent gallbladder. Liver within normal limits for noncontrast appearance.    PANCREAS: Normal.    SPLEEN: Normal.    ADRENAL GLANDS: Normal.    KIDNEYS/BLADDER: Borderline right hydronephrosis but no obstructing ureteral stone. Normal left kidney. Normal bladder.    BOWEL: No bowel obstruction or free air. No appendicitis.    LYMPH NODES: Normal.    VASCULATURE: Severe aortoiliac atherosclerotic calcification.    PELVIC ORGANS: Normal.    MUSCULOSKELETAL: Diffuse osteopenia. Bilateral breast implants. Advanced degenerative changes of the thoracolumbar spine. Redemonstrated paraspinal mass with resultant cortical erosion along the rightward aspect of the T10 vertebral body. This again   measures roughly 6 cm on image 40 of series 2. Smaller paraspinal mass is also noted on images 50 and 51.      Impression    IMPRESSION:  1.  New endobronchial debris throughout the right lower lobe with mild tree-in-bud infiltrate centrally in this distribution consistent with infectious/inflammatory process. Recommend correlation for potential aspiration.    2.  Redemonstrated lower thoracic paraspinal mass  with cortical erosion at the T10 vertebral body presumably relating to patient's known multiple myeloma.    3.  Borderline right hydronephrosis but no obstructing stone.   MR Brain w/o & w Contrast    Narrative    EXAM: MR BRAIN W/O AND W CONTRAST  LOCATION: [facility]  DATE: 11/23/2023    INDICATION: Febrile seizures. Neuro toxicity from immunosuppression.  COMPARISON: CT soft tissue neck dated 10/31/2023. MRI brain dated 08/01/2021.  CONTRAST: 5 ml Gadavist.  TECHNIQUE: Routine multiplanar multisequence head MRI without and with intravenous contrast.    FINDINGS:  INTRACRANIAL CONTENTS: No acute or subacute infarct. No acute hemorrhage or abnormal extra-axial fluid collection. Patchy nonspecific T2/FLAIR hyperintensities within the cerebral white matter most consistent with mild to moderate chronic microvascular   ischemic change. Mild generalized cerebral atrophy. No hydrocephalus. Normal position of the cerebellar tonsils. Along the undersurface of the anterior right tentorial leaflet there is a small enhancing extra-axial nodule measuring 0.5 cm in transverse   dimensions, 0.4 cm in craniocaudal dimensions and approximately 0.7 cm in AP dimensions (image 15 series 12 and image 8 series 11). There is no significant adjacent vasogenic edema or significant local mass effect. This finding is not well seen on MRI   brain dated 08/01/2021, however there is significant motion on the prior exam which degrades evaluation in this region. On today's exam this lesion demonstrates isointense FLAIR signal and isointense T1 signal.    There is an incompletely imaged right parapharyngeal mass demonstrating avid postcontrast enhancement and diffusion restriction, suggesting a highly cellular tumor. This finding is better assessed on recent CT soft tissue neck from 10/31/2023.    SELLA: No abnormality accounting for technique.    OSSEOUS STRUCTURES/SOFT TISSUES: There is a T1  hypointense, T2 hyperintense well-circumscribed frontal bone lesion measuring up to 1.0 x 0.7 cm in greatest axial dimensions and 0.5 cm in craniocaudal dimensions demonstrating avid postcontrast enhancement   (new since MRI brain dated 08/01/2021). An additional subcentimeter enhancing lesion involving the left occipital bone measuring up to 0.8 cm is noted (image 14 series 11). This finding is also new since MRI brain dated 08/01/2021. The major   intracranial vascular flow voids are maintained.     ORBITS: No abnormality accounting for technique.     SINUSES/MASTOIDS: Mucosal thickening primarily involving the ethmoid air cells. Right middle ear effusion.       Impression    IMPRESSION:  1.  No acute or subacute infarct.  2.  Chronic senescent and presumed microvascular ischemic changes, as above.  3.  Small enhancing extra-axial lesion along the undersurface of the right tentorial leaflet measuring up to 0.7 cm. No significant local mass effect or associated vasogenic edema. This finding may reflect a small meningioma although dural-based   metastases could have a similar appearance. Continued attention on follow-up imaging is recommended.  4.  Enhancing calvarial lesions involving the right frontal bone and left parietal bone are concerning for osseous metastatic disease versus multiple myeloma.  5.  Additional findings above.

## 2023-11-25 NOTE — PROGRESS NOTES
Calorie Count  Intake recorded for: 11/24  Total Kcals: 0 Total Protein: 0g  Kcals from Hospital Food: 0   Protein: 0g  Kcals from Outside Food (average):0 Protein: 0g  # Meals Ordered from Kitchen: 2  # Meals Recorded: No food intake recorded  # Supplements Recorded: 0

## 2023-11-25 NOTE — CONSULTS
Care Management Initial Consult    General Information  Assessment completed with: VM-chart review   Type of CM/SW Visit: Initial Assessment    Primary Care Provider verified and updated as needed:     Readmission within the last 30 days: unable to assess   Reason for Consult: discharge planning and elevated risk score  Advance Care Planning: Advance Care Planning Reviewed: no concerns identified          Communication Assessment  Patient's communication style: spoken language (English or Bilingual)           Cognitive  Cognitive/Neuro/Behavioral: WDL  Level of Consciousness: confused  Arousal Level: opens eyes spontaneously  Orientation: oriented x 4  Mood/Behavior: calm, cooperative  Best Language: 0 - No aphasia  Speech: clear, spontaneous, logical    Living Environment:   People in home: spouse     Current living Arrangements: house      Able to return to prior arrangements: yes       Family/Social Support:  Care provided by: self, spouse/significant other  Provides care for: no one  Marital Status:        Description of Support System:    , Tres, son, Ty  Support Assessment: Adequate family and caregiver support, Adequate social supports    Current Resources:   Patient receiving home care services: No     Community Resources: None  Equipment currently used at home: shower chair, walker, standard  Supplies currently used at home: Diabetic Supplies    Employment/Financial:  Employment Status: retired        Financial Concerns: none   Referral to Financial Worker: No       Does the patient's insurance plan have a 3 day qualifying hospital stay waiver?  No    Lifestyle & Psychosocial Needs:  Social Determinants of Health     Food Insecurity: Not on file   Depression: Not at risk (6/1/2023)    PHQ-2     PHQ-2 Score: 1   Housing Stability: Not on file   Tobacco Use: Medium Risk (8/21/2023)    Patient History     Smoking Tobacco Use: Former     Smokeless Tobacco Use: Former     Passive Exposure: Past  "  Financial Resource Strain: Not on file   Alcohol Use: Not on file   Transportation Needs: Not on file   Physical Activity: Not on file   Interpersonal Safety: Not At Risk (1/24/2022)    Humiliation, Afraid, Rape, and Kick questionnaire     Fear of Current or Ex-Partner: No     Emotionally Abused: No     Physically Abused: No     Sexually Abused: No   Stress: Not on file   Social Connections: Not on file       Functional Status:  Prior to admission patient needed assistance:   Dependent ADLs:: Independent  Dependent IADLs:: Transportation       Mental Health Status:  Mental Health Status:  (not discussed)       Chemical Dependency Status:  Chemical Dependency Status:  (not discussed)             Values/Beliefs:  Spiritual, Cultural Beliefs, Congregational Practices, Values that affect care: no               Additional Information:  Per H&P: \"Patients mentation  somewhat worse that prior baseline, no seizure like activity noted at home, ordered eeg to rule out seizures as etiology of mentation change, Pts admission head ct unremarkable, Awaiting on MRI brain to better delineate brain parenchymal disease process, Will keep low suspicion for infectious etiology, would stop home dose of opoid's for now to assess for medication induced encephalopathy, No reason to suspect recreational drug use, Labs do indicate jonatan that could be contributing to mentation change will start iv hydration and reassess, normal liver, thyroid functions  -Did speak to on call BMT fellow and they do not recommend immunosuppression currently and suspect other etiologies to explain fevers and change in mentation.\"    Initial assessment completed at bedside due to high risk readmission score. See details above. Care management will follow for any discharge needs.          Care Management Discharge Note    Discharge Date: 11/25/2023       Discharge Disposition: Home with family    Discharge Services: None    Discharge DME: None    Discharge " Transportation: family or friend will provide    Private pay costs discussed: Not applicable    Does the patient's insurance plan have a 3 day qualifying hospital stay waiver?  No    PAS Confirmation Code: n/a  Patient/family educated on Medicare website which has current facility and service quality ratings: no    Education Provided on the Discharge Plan: Yes  Persons Notified of Discharge Plans: patient  Patient/Family in Agreement with the Plan: yes    Handoff Referral Completed: Yes        CHRISTOPHER Nixon   11/25/2023  Nurse Coordinator      Social Work and Care Management Department       SEARCHABLE in [facility] - search CARE COORDINATOR       [place] [facility] [place] ([phone]) Saturday & Sunday; ([phone]) [facility] Recognized Holidays     Units: 5A & 5C  Pager: [phone]    Units: 6B, 6C & 6D    Pager: [phone]    Units: 7A, 7B, & 7C   Pager: [phone]    Units: 6A & ICU   Pager: [phone]    Units: 5 Ortho, 5MS & WB ED Pager: [phone]    Units: 6MS, 8A & 10 ICU  Pager [phone]

## 2023-11-27 NOTE — PLAN OF CARE
Occupational Therapy Discharge Summary    Reason for therapy discharge:    Discharged to home.    Progress towards therapy goal(s). See goals on Care Plan in [facility] electronic health record for goal details.  Goals partially met.  Barriers to achieving goals:   discharge from facility.    Therapy recommendation(s):    Pt would benefit from assist from family as needed.

## 2023-11-28 NOTE — PROGRESS NOTES
BMT Progress Note    BMT Physician: Dr. Guillory/Rashad Chen     Oncology History   Multiple myeloma not having achieved remission (H)   11/7/2018 -  Cancer Staged    Staging form: Plasma Cell Myeloma and Disorders, AJCC 8th Edition  - Clinical stage from 11/7/2018: Albumin (g/dL): 3.4, ISS: Stage II, High-risk cytogenetics: Absent, LDH: Unknown     11/7/2018 Initial Diagnosis    Multiple myeloma not having achieved remission (H)     1/23/2019 - 11/14/2019 Chemotherapy    KRD x 6 cycles     11/4/2019 - 2/14/2021 Chemotherapy    Rev/Dex(20)      4/3/2020 - 7/17/2020 Chemotherapy    Velcade maintenance - dose reductions for orthostatic hypotension     7/17/2020 - 12/15/2020 Chemotherapy    Revlimid maintenance     11/23/2020 Progression    Bone marrow 35% plasma cells, PET CT demonstrating new lytic lesions     12/15/2020 - 6/11/2021 Chemotherapy    Elsy, Kd x 6 cycles     7/7/2021 -  Cancer Staged    PET/CT - CR     7/21/2021 -  Chemotherapy    Subcutaneous Daratumumab, IVIG, denosumab monthly     11/22/2021 -  Cancer Staged    Bone marrow aspirate - MRD 0.0022% positive     2/1/2022 - 2/5/2022 Chemotherapy    IP BMT Chemotherapy For Mobilization - High Dose Cyclophosphamide  Plan Provider: Julio C Guillory MD  Treatment goal: Other  Line of treatment: [No plan line of treatment]     5/25/2022 - 11/23/2022 Chemotherapy    IP - OP BMT 2016-35 Auto Multiple Myeloma - Melphalan (CrCL < 30 mL/min, >/= 2 comorbidities, OR age > 75yrs) - Adult (Version: 7/13/21)  Plan Provider: Julio C Guillory MD  Treatment goal: Other  Line of treatment: [No plan line of treatment]     9/29/2022 - 3/9/2023 Supportive Treatment    Oral ONC - ixazomib maintenance post-transplant  Plan Provider: Julio C Guillory MD  Treatment goal: Curative  Line of treatment: [No plan line of treatment]     4/21/2023 -  Supportive Treatment    OP ONC Zoledronic Acid (Zometa) MONTHLY  Plan Provider: Julio C Guillory MD  Treatment goal: Palliative  Line of  treatment: Maintenance     4/24/2023 - 9/27/2023 Chemotherapy    OP ONC Multiple Myeloma - Elotuzumab / Pomalidomide / Dexamethasone (<75 years old)  Plan Provider: Julio C Guillory MD  Treatment goal: Palliative  Line of treatment: Third Line     10/24/2023 -  Chemotherapy    IP - OP ONC Multiple Myeloma - Teclistamab  Plan Provider: Julio C Guillory MD  Treatment goal: Palliative  Line of treatment: [No plan line of treatment]         ID: Libby Grimaldo is a 73 yo woman, 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  Libby is a 73-year-old woman with progressive myeloma who was recently admitted to the hospital to begin a by specific antibody treatment.  She received the first level of dosing and developed significant encephalopathy.  She is treated aggressively and evaluated for possible infection.  Possible pneumonia was identified and she received antibiotics for this finding.  Gradually she improved enough to be able to be discharged home.  She returns today feeling improved from when she left the hospital.  She and her  acknowledge that the treatment was intolerable and not an acceptable option.  She has significant pain particularly at night, and she has limited options to treat the pain.  She did have a small number of oxycodone which she is exhausted.  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  Testing demonstrates disease progression.  I asked her what her primary goals are, and she wishes to avoid repeat hospitalization, she would like better control of her pain, she would like to decrease the number of time she needs to visit clinic, and she would like to spend more time at home.    ROS: 8 point ROS neg other than the symptoms noted above in the HPI.     PHYSICAL EXAM:   Vitals:  Blood pressure 116/73, pulse 78, temperature 99.2  F (37.3  C), resp. rate 16, weight 52.2 kg (115 lb), SpO2 98%.      Wt Readings from Last 4 Encounters:   11/29/23  52.2 kg (115 lb)   11/25/23 53 kg (116 lb 12.8 oz)   11/22/23 48.4 kg (106 lb 11.2 oz)   11/04/23 48.9 kg (107 lb 14.4 oz)     General:appears ill with rigors  Eyes: GREGG, sclera anicteric; exopthalmous  Lymphatics: no edema  Lungs:  Few coarse BS w/ cough during exam; otherwise clear  CV:  tachy  Abd: diffuse tenderness  Skin: no rashes or petechaie. No thickening/hidebound changes  Neuro: some difficulty answering questions and following commands   Additional Findings: Port site NT, no drainage.      Labs:    Blood Counts       Recent Labs   Lab Test 11/25/23  0537 11/24/23  0601 11/23/23  0755 02/18/22  1642 02/18/22  0415   HGB 7.6* 8.0* 8.4*   < > 7.4*   HCT 23.5* 24.9* 26.3*   < > 23.4*   WBC 4.3 4.2 5.9   < > 7.9   ANEUTAUTO 1.5* 2.1 2.9   < >  --    ALYMPAUTO 2.1 1.5 2.1   < >  --    AMONOAUTO 0.6 0.5 0.8   < >  --    AEOSAUTO 0.1 0.2 0.1   < >  --    ABSBASO 0.0 0.0 0.0   < >  --    NRBCMAN 0.0 0.0 0.0   < >  --    ABLA  --   --   --   --  0.1*   PLT 86* 103* 102*   < > 68*    < > = values in this interval not displayed.         Recent Labs   Lab Test 11/23/23  0033   ABORH A POS         Recent Labs   Lab Test 01/20/22  1619   HGBS Negative         Chemistries     Basic Panel  Recent Labs   Lab Test 11/25/23  0502 11/24/23  2348 11/24/23  1733 11/24/23  1559 11/24/23  0901 11/24/23  0601 11/24/23  0008 11/23/23  0523     --   --   --   --  145  --  141   POTASSIUM 3.6  --   --  3.7  --  3.0*  --  3.5   CHLORIDE 115*  --   --   --   --  115*  --  111*   CO2 20*  --   --   --   --  22  --  23   BUN 3.4*  --   --   --   --  5.8*  --  11.8   CR 0.72  --   --   --   --  0.82  --  1.00*   GLC 74 72 78  --    < > 74   < > 76    < > = values in this interval not displayed.        Calcium, Magnesium, Phosphorus  Recent Labs   Lab Test 11/25/23  0502 11/24/23  1559 11/24/23  0601 11/23/23  0523 11/10/23  0837 11/09/23  1012   DIADNRA 7.6*  --  8.1* 8.6*   < > 8.2*   MAG 1.8 2.0 1.4*  --    < > 2.0   PHOS 2.2*  --   2.9  --   --  3.5    < > = values in this interval not displayed.        LFTs  Recent Labs   Lab Test 11/22/23  1615 11/22/23  1515 11/11/23  0608   BILITOTAL 0.6 0.6 0.3   ALKPHOS 305* 297* 156*   AST 63* 68* 27   ALT 27 26 18   ALBUMIN 3.4* 3.4* 3.0*       Urine Studies       Recent Labs   Lab Test 11/23/23  0009 08/08/23  1707 03/28/22  1139   COLOR Yellow   < > Yellow   APPEARANCE Clear   < > Clear   URINEGLC Negative   < > Negative   URINEBILI Negative   < > Small*   URINEKETONE Negative   < > Negative   SG 1.015   < > 1.025   UBLD Negative   < > Negative   URINEPH 6.0   < > 5.0   PROTEIN 30*   < > Negative   UUROI Normal   < > Normal   NITRITE Negative   < > Negative   LEUKEST Negative   < > Negative   MUCUS  --   --  Present*   RBCU <1   < > 1   WBCU 2   < > 6*   USQEI 1   < > 2*    < > = values in this interval not displayed.       Creatinine Clearance    Recent Labs   Lab Test 01/24/22  0645      WT 64.0   RAW 48   STD 49     968   DUR 24.0  24.0   CREATININE 0.75   UCRR 54  54   UCR24 0.52*  0.52*         Immunoglobulins     Recent Labs   Lab Test 11/22/23 2239 10/23/23  1025 09/27/23  1034 08/08/23  1026 05/22/23  0951 04/17/23  0900   IGG 1,679* 2,056* 1,843* 1,219 1,133 1,616       Recent Labs   Lab Test 10/23/23  1025 09/27/23  1034 05/22/23  0951 04/17/23  0900 02/09/23  1153 09/01/22  0803   IGA 5* 5* 25* 61* 42* 29*       Recent Labs   Lab Test 10/23/23  1025 09/27/23  1034 05/22/23  0951 04/17/23  0900 02/09/23  1153 09/01/22  0803   IGM 11* 12* 77 47 28* 35         Monocloncal Protein Studies     M spike    Recent Labs   Lab Test 10/23/23  1025 09/27/23  1034 08/08/23  1026 05/22/23  0951 02/09/23  1153 09/01/22  0803   ELPM 1.4* 1.3* 0.7* 0.7* 0.4* 0.1*       Kappa FLC    Recent Labs   Lab Test 11/22/23  2239 10/23/23  1025 09/27/23  1034 08/21/23  1236 08/08/23  1026 05/22/23  0951   KFLCA 0.14* 0.42 0.15* 0.41 0.44 0.73       Lambda FLC    Recent Labs   Lab Test 11/22/23 2239  10/23/23  1025 09/27/23  1034 08/21/23  1236 08/08/23  1026 05/22/23  0951   LFLCA 289.64* 259.22* 222.55* 194.53* 145.82* 70.18*       FLC Ratio    Recent Labs   Lab Test 11/22/23  2239 10/23/23  1025 09/27/23  1034 08/21/23  1236 08/08/23  1026 05/22/23  0951   KLRA 0.00* 0.00* 0.00* 0.00* 0.00* 0.01*              ASSESSMENT AND PLAN:   Libby Grimaldo is a 71 year old female with 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      Myeloma/BMT/IEC PROTOCOL for 2016-35  - Chemo protocol: HD Melphalan 140mg/m2  Day 0: Cell dose: 3.94x10^6 (s/p cytoxan chemo priming 2/1/2022 and subsequent stem cell collection).   - Restaging plan: per protocol.  - Maintenance Ninlaro started early Oct 2022; Day 28 (11/4) and stopped in January 2023  - Elotuzumab, pomalidomide, dexamethasone initiated April 2023  - C2 D22 (6/19):  elotuzumab   - teclistimab started, and stopped due to encephalopathy     HEME/COAG  - anemia, thrombocytopenia due to chemo.  monitor on current regimen  - Relevant thrombosis or bleeding history:   2/15/22: new non-occlusive LUE DVT. No longer on xarelto. On ASA.      ID  - Prophylaxis plan: ACV, Bactrim       CARDIOVASCULAR  - Risk of cardiomyopathy:  Baseline EF 56%  - known hyperlipidemia- per notes previously on Crestor but not on med list - follow-up with PCP if needed    History of Afib - now in NSR but tachy.   RESPIRATORY  - hx of COPD, continue breo-ellipa inhaler      GI/NUTRITION  - CINV: continues on Zyprexa bid, Zofran PRN & Compazine prn.     - Ulcer prophylaxis: protonix 40mg daily.     RENAL/ELECTROLYTES/:  - K replaced for 3.3.   - hypoNa: pharm review for hypoNa: isaias: 40%; polalidomide 11%; effexor up to 39%. Normal today.    PSYCH  - continue effexor 75mg tid     SYMPTOM MANAGEMENT.  - # Pain Assessment:  - Libby is experiencing pain due to chronic pain.  She will continue methadone and today have renewed oxycodone total of 30 tablets to use every 6 hours as needed for pain     Respiratory infection  - much  improved after abx    Summary: Today I met with Libby, her , and her sister to discuss her current situation and to clarify goals of care.  I reviewed the findings regarding her disease which demonstrate progression despite recent attempts to control her myeloma.  She has had significant decline in her performance status, increasing frailty, and increasing pain as her disease and the development of plasmacytomas has progressed.  She has been experiencing significant adverse effects from her recent treatments and not benefited from response.  Given these findings, I recommended focusing goals on her comfort, maintaining her ability to stay home, and minimizing adverse effects from active treatment.  To meet these recommendations, hospice could be involved to help provide better pain control and end-of-life care at home.  I discussed other options which could include palliative chemotherapy, and ongoing treatment through the pain clinic.  Given her priorities, she wishes to choose hospice care.  Today we will make the referral.    Plan  - hospice referral  - renew oxycodone 10 mg q6, #30  - stop protonix, fluconazole, bactrim, finish levaquin  - RTC PRN    40 minutes spent on the date of the encounter doing chart review, history and exam, lab review, documentation and coordniating care.    SOTO BOWSER MD  November 29, 2023

## 2023-11-29 NOTE — NURSING NOTE
"Oncology Rooming Note    November 29, 2023 10:57 AM   Libby Grimaldo is a 73 year old female who presents for:    Chief Complaint   Patient presents with    Blood Draw     No labs collected from by RN, no labs ordered. Vitals taken. Checked in for appointment(s).      Oncology Clinic Visit     Multiple myeloma      Initial Vitals: /73   Pulse 78   Temp 99.2  F (37.3  C)   Resp 16   Wt 52.2 kg (115 lb)   SpO2 98%   BMI 21.38 kg/m   Estimated body mass index is 21.38 kg/m  as calculated from the following:    Height as of 10/24/23: 1.562 m (5' 1.5\").    Weight as of this encounter: 52.2 kg (115 lb). Body surface area is 1.51 meters squared.  No Pain (0) Comment: Data Unavailable   No LMP recorded. Patient is postmenopausal.  Allergies reviewed: Yes  Medications reviewed: Yes    Medications: Medication refills not needed today.  Pharmacy name entered into [facility]:    [id number] - [place], [place] - [address] AT [facility] [address]  [facility] - [place], [place] - [address]  [facility] [place], [place] - [phone]    Clinical concerns:        Jeni Lema              "

## 2023-11-29 NOTE — LETTER
11/29/2023         RE: Libby Grimaldo  [address] [facility] [address]        Dear Colleague,    Thank you for referring your patient, Libby Grimaldo, to the [facility]. Please see a copy of my visit note below.      BMT Progress Note    BMT Physician: Dr. Guillory/Rashad Chen     Oncology History   Multiple myeloma not having achieved remission (H)   11/7/2018 -  Cancer Staged    Staging form: Plasma Cell Myeloma and Disorders, AJCC 8th Edition  - Clinical stage from 11/7/2018: Albumin (g/dL): 3.4, ISS: Stage II, High-risk cytogenetics: Absent, LDH: Unknown     11/7/2018 Initial Diagnosis    Multiple myeloma not having achieved remission (H)     1/23/2019 - 11/14/2019 Chemotherapy    KRD x 6 cycles     11/4/2019 - 2/14/2021 Chemotherapy    Rev/Dex(20)      4/3/2020 - 7/17/2020 Chemotherapy    Velcade maintenance - dose reductions for orthostatic hypotension     7/17/2020 - 12/15/2020 Chemotherapy    Revlimid maintenance     11/23/2020 Progression    Bone marrow 35% plasma cells, PET CT demonstrating new lytic lesions     12/15/2020 - 6/11/2021 Chemotherapy    Elsy, Kd x 6 cycles     7/7/2021 -  Cancer Staged    PET/CT - CR     7/21/2021 -  Chemotherapy    Subcutaneous Daratumumab, IVIG, denosumab monthly     11/22/2021 -  Cancer Staged    Bone marrow aspirate - MRD 0.0022% positive     2/1/2022 - 2/5/2022 Chemotherapy    IP BMT Chemotherapy For Mobilization - High Dose Cyclophosphamide  Plan Provider: Julio C Guillory MD  Treatment goal: Other  Line of treatment: [No plan line of treatment]     5/25/2022 - 11/23/2022 Chemotherapy    IP - OP BMT 2016-35 Auto Multiple Myeloma - Melphalan (CrCL < 30 mL/min, >/= 2 comorbidities, OR age > 75yrs) - Adult (Version: 7/13/21)  Plan Provider: Julio C Guillory MD  Treatment goal: Other  Line of treatment: [No plan line of treatment]     9/29/2022 - 3/9/2023 Supportive Treatment    Oral ONC - ixazomib maintenance  post-transplant  Plan Provider: Julio C Guillory MD  Treatment goal: Curative  Line of treatment: [No plan line of treatment]     4/21/2023 -  Supportive Treatment    OP ONC Zoledronic Acid (Zometa) MONTHLY  Plan Provider: Julio C Guillory MD  Treatment goal: Palliative  Line of treatment: Maintenance     4/24/2023 - 9/27/2023 Chemotherapy    OP ONC Multiple Myeloma - Elotuzumab / Pomalidomide / Dexamethasone (<75 years old)  Plan Provider: Julio C Guillory MD  Treatment goal: Palliative  Line of treatment: Third Line     10/24/2023 -  Chemotherapy    IP - OP ONC Multiple Myeloma - Teclistamab  Plan Provider: Julio C Guillory MD  Treatment goal: Palliative  Line of treatment: [No plan line of treatment]         ID: Libby Grimaldo is a 73 yo woman, 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  Libby is a 73-year-old woman with progressive myeloma who was recently admitted to the hospital to begin a by specific antibody treatment.  She received the first level of dosing and developed significant encephalopathy.  She is treated aggressively and evaluated for possible infection.  Possible pneumonia was identified and she received antibiotics for this finding.  Gradually she improved enough to be able to be discharged home.  She returns today feeling improved from when she left the hospital.  She and her  acknowledge that the treatment was intolerable and not an acceptable option.  She has significant pain particularly at night, and she has limited options to treat the pain.  She did have a small number of oxycodone which she is exhausted.  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  Testing demonstrates disease progression.  I asked her what her primary goals are, and she wishes to avoid repeat hospitalization, she would like better control of her pain, she would like to decrease the number of time she needs to visit clinic, and she would like to spend more  time at home.    ROS: 8 point ROS neg other than the symptoms noted above in the HPI.     PHYSICAL EXAM:   Vitals:  Blood pressure 116/73, pulse 78, temperature 99.2  F (37.3  C), resp. rate 16, weight 52.2 kg (115 lb), SpO2 98%.      Wt Readings from Last 4 Encounters:   11/29/23 52.2 kg (115 lb)   11/25/23 53 kg (116 lb 12.8 oz)   11/22/23 48.4 kg (106 lb 11.2 oz)   11/04/23 48.9 kg (107 lb 14.4 oz)     General:appears ill with rigors  Eyes: GREGG, sclera anicteric; exopthalmous  Lymphatics: no edema  Lungs:  Few coarse BS w/ cough during exam; otherwise clear  CV:  tachy  Abd: diffuse tenderness  Skin: no rashes or petechaie. No thickening/hidebound changes  Neuro: some difficulty answering questions and following commands   Additional Findings: Port site NT, no drainage.      Labs:    Blood Counts       Recent Labs   Lab Test 11/25/23  0537 11/24/23  0601 11/23/23  0755 02/18/22  1642 02/18/22  0415   HGB 7.6* 8.0* 8.4*   < > 7.4*   HCT 23.5* 24.9* 26.3*   < > 23.4*   WBC 4.3 4.2 5.9   < > 7.9   ANEUTAUTO 1.5* 2.1 2.9   < >  --    ALYMPAUTO 2.1 1.5 2.1   < >  --    AMONOAUTO 0.6 0.5 0.8   < >  --    AEOSAUTO 0.1 0.2 0.1   < >  --    ABSBASO 0.0 0.0 0.0   < >  --    NRBCMAN 0.0 0.0 0.0   < >  --    ABLA  --   --   --   --  0.1*   PLT 86* 103* 102*   < > 68*    < > = values in this interval not displayed.         Recent Labs   Lab Test 11/23/23  0033   ABORH A POS         Recent Labs   Lab Test 01/20/22  1619   HGBS Negative         Chemistries     Basic Panel  Recent Labs   Lab Test 11/25/23  0502 11/24/23  2348 11/24/23  1733 11/24/23  1559 11/24/23  0901 11/24/23  0601 11/24/23  0008 11/23/23  0523     --   --   --   --  145  --  141   POTASSIUM 3.6  --   --  3.7  --  3.0*  --  3.5   CHLORIDE 115*  --   --   --   --  115*  --  111*   CO2 20*  --   --   --   --  22  --  23   BUN 3.4*  --   --   --   --  5.8*  --  11.8   CR 0.72  --   --   --   --  0.82  --  1.00*   GLC 74 72 78  --    < > 74   < > 76    < >  = values in this interval not displayed.        Calcium, Magnesium, Phosphorus  Recent Labs   Lab Test 11/25/23  0502 11/24/23  1559 11/24/23  0601 11/23/23  0523 11/10/23  0837 11/09/23  1012   DIANDRA 7.6*  --  8.1* 8.6*   < > 8.2*   MAG 1.8 2.0 1.4*  --    < > 2.0   PHOS 2.2*  --  2.9  --   --  3.5    < > = values in this interval not displayed.        LFTs  Recent Labs   Lab Test 11/22/23  1615 11/22/23  1515 11/11/23  0608   BILITOTAL 0.6 0.6 0.3   ALKPHOS 305* 297* 156*   AST 63* 68* 27   ALT 27 26 18   ALBUMIN 3.4* 3.4* 3.0*       Urine Studies       Recent Labs   Lab Test 11/23/23  0009 08/08/23  1707 03/28/22  1139   COLOR Yellow   < > Yellow   APPEARANCE Clear   < > Clear   URINEGLC Negative   < > Negative   URINEBILI Negative   < > Small*   URINEKETONE Negative   < > Negative   SG 1.015   < > 1.025   UBLD Negative   < > Negative   URINEPH 6.0   < > 5.0   PROTEIN 30*   < > Negative   UUROI Normal   < > Normal   NITRITE Negative   < > Negative   LEUKEST Negative   < > Negative   MUCUS  --   --  Present*   RBCU <1   < > 1   WBCU 2   < > 6*   USQEI 1   < > 2*    < > = values in this interval not displayed.       Creatinine Clearance    Recent Labs   Lab Test 01/24/22  0645      WT 64.0   RAW 48   STD 49     968   DUR 24.0  24.0   CREATININE 0.75   UCRR 54  54   UCR24 0.52*  0.52*         Immunoglobulins     Recent Labs   Lab Test 11/22/23  2239 10/23/23  1025 09/27/23  1034 08/08/23  1026 05/22/23  0951 04/17/23  0900   IGG 1,679* 2,056* 1,843* 1,219 1,133 1,616       Recent Labs   Lab Test 10/23/23  1025 09/27/23  1034 05/22/23  0951 04/17/23  0900 02/09/23  1153 09/01/22  0803   IGA 5* 5* 25* 61* 42* 29*       Recent Labs   Lab Test 10/23/23  1025 09/27/23  1034 05/22/23  0951 04/17/23  0900 02/09/23  1153 09/01/22  0803   IGM 11* 12* 77 47 28* 35         Monocloncal Protein Studies     M spike    Recent Labs   Lab Test 10/23/23  1025 09/27/23  1034 08/08/23  1026 05/22/23  0951 02/09/23  1153  09/01/22  0803   ELPM 1.4* 1.3* 0.7* 0.7* 0.4* 0.1*       Kappa FLC    Recent Labs   Lab Test 11/22/23  2239 10/23/23  1025 09/27/23  1034 08/21/23  1236 08/08/23  1026 05/22/23  0951   KFLCA 0.14* 0.42 0.15* 0.41 0.44 0.73       Lambda FLC    Recent Labs   Lab Test 11/22/23  2239 10/23/23  1025 09/27/23  1034 08/21/23  1236 08/08/23  1026 05/22/23  0951   LFLCA 289.64* 259.22* 222.55* 194.53* 145.82* 70.18*       FLC Ratio    Recent Labs   Lab Test 11/22/23  2239 10/23/23  1025 09/27/23  1034 08/21/23  1236 08/08/23  1026 05/22/23  0951   KLRA 0.00* 0.00* 0.00* 0.00* 0.00* 0.01*              ASSESSMENT AND PLAN:   Libby Grimaldo is a 71 year old female with 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      Myeloma/BMT/IEC PROTOCOL for 2016-35  - Chemo protocol: HD Melphalan 140mg/m2  Day 0: Cell dose: 3.94x10^6 (s/p cytoxan chemo priming 2/1/2022 and subsequent stem cell collection).   - Restaging plan: per protocol.  - Maintenance Ninlaro started early Oct 2022; Day 28 (11/4) and stopped in January 2023  - Elotuzumab, pomalidomide, dexamethasone initiated April 2023  - C2 D22 (6/19):  elotuzumab   - teclistimab started, and stopped due to encephalopathy     HEME/COAG  - anemia, thrombocytopenia due to chemo.  monitor on current regimen  - Relevant thrombosis or bleeding history:   2/15/22: new non-occlusive LUE DVT. No longer on xarelto. On ASA.      ID  - Prophylaxis plan: ACV, Bactrim       CARDIOVASCULAR  - Risk of cardiomyopathy:  Baseline EF 56%  - known hyperlipidemia- per notes previously on Crestor but not on med list - follow-up with PCP if needed    History of Afib - now in NSR but tachy.   RESPIRATORY  - hx of COPD, continue breo-ellipa inhaler      GI/NUTRITION  - CINV: continues on Zyprexa bid, Zofran PRN & Compazine prn.     - Ulcer prophylaxis: protonix 40mg daily.     RENAL/ELECTROLYTES/:  - K replaced for 3.3.   - hypoNa: pharm review for hypoNa: isaias: 40%; polalidomide 11%; effexor up to 39%. Normal  today.    PSYCH  - continue effexor 75mg tid     SYMPTOM MANAGEMENT.  - # Pain Assessment:  - Libby is experiencing pain due to chronic pain.  She will continue methadone and today have renewed oxycodone total of 30 tablets to use every 6 hours as needed for pain     Respiratory infection  - much improved after abx    Summary: Today I met with Libby, her , and her sister to discuss her current situation and to clarify goals of care.  I reviewed the findings regarding her disease which demonstrate progression despite recent attempts to control her myeloma.  She has had significant decline in her performance status, increasing frailty, and increasing pain as her disease and the development of plasmacytomas has progressed.  She has been experiencing significant adverse effects from her recent treatments and not benefited from response.  Given these findings, I recommended focusing goals on her comfort, maintaining her ability to stay home, and minimizing adverse effects from active treatment.  To meet these recommendations, hospice could be involved to help provide better pain control and end-of-life care at home.  I discussed other options which could include palliative chemotherapy, and ongoing treatment through the pain clinic.  Given her priorities, she wishes to choose hospice care.  Today we will make the referral.    Plan  - hospice referral  - renew oxycodone 10 mg q6, #30  - stop protonix, fluconazole, bactrim, finish levaquin  - RTC PRN    40 minutes spent on the date of the encounter doing chart review, history and exam, lab review, documentation and coordniating care.    SOTO BOWSER MD  November 29, 2023

## 2023-11-29 NOTE — NURSING NOTE
Chief Complaint   Patient presents with    Blood Draw     No labs collected from by RN, no labs ordered. Vitals taken. Checked in for appointment(s).       Leslie Alvarez RN

## 2023-12-11 NOTE — PROGRESS NOTES
[facility]: Cancer Care                                                                                          Verified that Pt had enrolled in hospice services. Graduating from Oncology.    Signature:  KARTIK BARLOW RN

## 2024-11-19 NOTE — PROGRESS NOTES
"  ICANS score/Neurotoxicity assessment  Score:  2  Orientation: Orientation to year, month, city, hospital: 4 points (1 point each) - wrong month and year  3   Identify: Name 3 objects that nurse points to (e.g. clock, pen, button): 3 points (1 point each)  1   Following Commands: (e.g. show me two fingers or close your eyes and stick out your tongue): 1 point  0   Writing: Ability to write a standard sentence (see writing page \"The nichols jumped over the log.\"): 1 point  0   Attention: Count backwards from 100 by 10s: 1 point     6   Total: Max 10, no impairment, reassess next shift                  **9 or below Notify MD and reassess per provider direction     Cytokine Release Syndrome Assessment  Cytokine Release Syndrome: a potentially life-threatening toxicity after receiving immune based therapy for cancer treatment. Severity varies from grade 1 to grade 4.      There are No current signs of CRS (pyrexia/fever, headache, nausea, hypotension, dyspnea, anxiety, increased liver enzymes, increased bilirubin).  **Will continue to monitor for s/sx of neurotoxicity and CRS.  If these occur, please call the Attending MD for recommendations and further instruction. CRS and neurotoxicity adverse reaction protocols/grading per package insert guidelines. [id number]           " How Severe Is Your Rash?: mild Is This A New Presentation, Or A Follow-Up?: Rash

## (undated) DEVICE — TRAY BONE MARROW BIOPSY ASC 640 31-0097A

## (undated) DEVICE — NDL BX BONE MARROW 11GA 4"

## (undated) RX ORDER — OXYCODONE HYDROCHLORIDE 5 MG/1
TABLET ORAL
Status: DISPENSED
Start: 2022-01-24

## (undated) RX ORDER — ACETAMINOPHEN 325 MG/1
TABLET ORAL
Status: DISPENSED
Start: 2022-09-01

## (undated) RX ORDER — LIDOCAINE HYDROCHLORIDE 10 MG/ML
INJECTION, SOLUTION EPIDURAL; INFILTRATION; INTRACAUDAL; PERINEURAL
Status: DISPENSED
Start: 2022-02-22

## (undated) RX ORDER — HEPARIN SODIUM (PORCINE) LOCK FLUSH IV SOLN 100 UNIT/ML 100 UNIT/ML
SOLUTION INTRAVENOUS
Status: DISPENSED
Start: 2022-09-01

## (undated) RX ORDER — SODIUM CHLORIDE 9 MG/ML
INJECTION, SOLUTION INTRAVENOUS
Status: DISPENSED
Start: 2022-02-01

## (undated) RX ORDER — HEPARIN SODIUM (PORCINE) LOCK FLUSH IV SOLN 100 UNIT/ML 100 UNIT/ML
SOLUTION INTRAVENOUS
Status: DISPENSED
Start: 2022-02-01

## (undated) RX ORDER — FENTANYL CITRATE 50 UG/ML
INJECTION, SOLUTION INTRAMUSCULAR; INTRAVENOUS
Status: DISPENSED
Start: 2023-01-01

## (undated) RX ORDER — SODIUM CHLORIDE 9 MG/ML
INJECTION, SOLUTION INTRAVENOUS
Status: DISPENSED
Start: 2023-01-01

## (undated) RX ORDER — FENTANYL CITRATE 50 UG/ML
INJECTION, SOLUTION INTRAMUSCULAR; INTRAVENOUS
Status: DISPENSED
Start: 2022-02-01

## (undated) RX ORDER — HEPARIN SODIUM (PORCINE) LOCK FLUSH IV SOLN 100 UNIT/ML 100 UNIT/ML
SOLUTION INTRAVENOUS
Status: DISPENSED
Start: 2023-01-01

## (undated) RX ORDER — HEPARIN SODIUM (PORCINE) LOCK FLUSH IV SOLN 100 UNIT/ML 100 UNIT/ML
SOLUTION INTRAVENOUS
Status: DISPENSED
Start: 2022-01-24

## (undated) RX ORDER — LIDOCAINE HYDROCHLORIDE 10 MG/ML
INJECTION, SOLUTION EPIDURAL; INFILTRATION; INTRACAUDAL; PERINEURAL
Status: DISPENSED
Start: 2023-01-01

## (undated) RX ORDER — CEFAZOLIN SODIUM 1 G/50ML
SOLUTION INTRAVENOUS
Status: DISPENSED
Start: 2022-02-01

## (undated) RX ORDER — LIDOCAINE HYDROCHLORIDE 10 MG/ML
INJECTION, SOLUTION EPIDURAL; INFILTRATION; INTRACAUDAL; PERINEURAL
Status: DISPENSED
Start: 2022-02-01

## (undated) RX ORDER — ACETAMINOPHEN 325 MG/1
TABLET ORAL
Status: DISPENSED
Start: 2022-01-24